# Patient Record
Sex: MALE | Race: WHITE | NOT HISPANIC OR LATINO | Employment: OTHER | URBAN - METROPOLITAN AREA
[De-identification: names, ages, dates, MRNs, and addresses within clinical notes are randomized per-mention and may not be internally consistent; named-entity substitution may affect disease eponyms.]

---

## 2018-05-05 LAB
ALBUMIN SERPL BCP-MCNC: 4.1 G/DL (ref 3.5–5.7)
ALP SERPL-CCNC: 92 IU/L (ref 55–165)
ALT SERPL W P-5'-P-CCNC: 16 IU/L (ref 7–29)
ANION GAP SERPL CALCULATED.3IONS-SCNC: 13.9 MM/L
APTT PPP: 31.5 SEC (ref 24.4–37.6)
AST SERPL W P-5'-P-CCNC: 13 U/L (ref 8–27)
BASOPHILS # BLD AUTO: 0.1 X3/UL (ref 0–0.3)
BASOPHILS # BLD AUTO: 0.7 % (ref 0–2)
BILIRUB SERPL-MCNC: 0.4 MG/DL (ref 0.3–1)
BNP SERPL-MCNC: 105 PG/ML (ref 1–100)
BUN SERPL-MCNC: 12 MG/DL (ref 7–25)
CALCIUM SERPL-MCNC: 8.6 MG/DL (ref 8.6–10.5)
CHLORIDE SERPL-SCNC: 99 MM/L (ref 98–107)
CO2 SERPL-SCNC: 24 MM/L (ref 21–31)
CREAT SERPL-MCNC: 0.82 MG/DL (ref 0.7–1.3)
DEPRECATED RDW RBC AUTO: 13.2 % (ref 11.5–14.5)
EGFR (HISTORICAL): > 60 GFR
EGFR AFRICAN AMERICAN (HISTORICAL): > 60 GFR
EOSINOPHIL # BLD AUTO: 0.7 X3/UL (ref 0–0.5)
EOSINOPHIL NFR BLD AUTO: 8.2 % (ref 0–5)
GLUCOSE (HISTORICAL): 231 MG/DL (ref 65–99)
HCT VFR BLD AUTO: 40.3 % (ref 42–52)
HGB BLD-MCNC: 14.2 G/DL (ref 14–18)
INR PPP: 0.94 (ref 0.9–1.5)
LDL CHOLESTEROL DIRECT (HISTORICAL): 110.7 MG/DL
LDL CHOLESTEROL DIRECT (HISTORICAL): 110.7 MG/DL
LYMPHOCYTES # BLD AUTO: 1.9 X3/UL (ref 1.2–4.2)
LYMPHOCYTES NFR BLD AUTO: 21.1 % (ref 20.5–51.1)
MCH RBC QN AUTO: 31.1 PG (ref 26–34)
MCHC RBC AUTO-ENTMCNC: 35.1 G/DL (ref 31–36)
MCV RBC AUTO: 88.5 FL (ref 81–99)
MONOCYTES # BLD AUTO: 0.6 X3/UL (ref 0–1)
MONOCYTES NFR BLD AUTO: 7.1 % (ref 1.7–12)
NEUTROPHILS # BLD AUTO: 5.6 X3/UL (ref 1.4–6.5)
NEUTS SEG NFR BLD AUTO: 62.9 % (ref 42.2–75.2)
OSMOLALITY, SERUM (HISTORICAL): 273 MOSM (ref 262–291)
PLATELET # BLD AUTO: 201 X3/UL (ref 130–400)
PMV BLD AUTO: 9.4 FL (ref 8.6–11.7)
POTASSIUM SERPL-SCNC: 3.9 MM/L (ref 3.5–5.5)
PROTHROMBIN TIME (HISTORICAL): 10.9 SEC (ref 10.1–12.9)
RBC # BLD AUTO: 4.56 X6/UL (ref 4.3–5.9)
SODIUM SERPL-SCNC: 133 MM/L (ref 134–143)
TOTAL PROTEIN (HISTORICAL): 6.6 G/DL (ref 6.4–8.9)
WBC # BLD AUTO: 8.9 X3/UL (ref 4.8–10.8)

## 2018-05-28 LAB
ALBUMIN SERPL BCP-MCNC: 4.1 G/DL (ref 3.5–5.7)
ALP SERPL-CCNC: 93 IU/L (ref 55–165)
ALT SERPL W P-5'-P-CCNC: 13 IU/L (ref 7–29)
ANION GAP SERPL CALCULATED.3IONS-SCNC: 14.2 MM/L
APTT PPP: 33.7 SEC (ref 24.4–37.6)
AST SERPL W P-5'-P-CCNC: 10 U/L (ref 8–27)
BASOPHILS # BLD AUTO: 0.1 X3/UL (ref 0–0.3)
BASOPHILS # BLD AUTO: 1.2 % (ref 0–2)
BILIRUB SERPL-MCNC: 0.5 MG/DL (ref 0.3–1)
BNP SERPL-MCNC: 170 PG/ML (ref 1–100)
BUN SERPL-MCNC: 12 MG/DL (ref 7–25)
CALCIUM SERPL-MCNC: 8.9 MG/DL (ref 8.6–10.5)
CHLORIDE SERPL-SCNC: 98 MM/L (ref 98–107)
CO2 SERPL-SCNC: 24 MM/L (ref 21–31)
CREAT SERPL-MCNC: 0.83 MG/DL (ref 0.7–1.3)
DEPRECATED RDW RBC AUTO: 13.2 % (ref 11.5–14.5)
EGFR (HISTORICAL): > 60 GFR
EGFR AFRICAN AMERICAN (HISTORICAL): > 60 GFR
EOSINOPHIL # BLD AUTO: 0.8 X3/UL (ref 0–0.5)
EOSINOPHIL NFR BLD AUTO: 8.7 % (ref 0–5)
GLUCOSE (HISTORICAL): 369 MG/DL (ref 65–99)
HCT VFR BLD AUTO: 40.2 % (ref 42–52)
HGB BLD-MCNC: 13.9 G/DL (ref 14–18)
INR PPP: 1.03 (ref 0.9–1.5)
LDL CHOLESTEROL DIRECT (HISTORICAL): 110.4 MG/DL
LYMPHOCYTES # BLD AUTO: 1.6 X3/UL (ref 1.2–4.2)
LYMPHOCYTES NFR BLD AUTO: 18.8 % (ref 20.5–51.1)
MCH RBC QN AUTO: 30.5 PG (ref 26–34)
MCHC RBC AUTO-ENTMCNC: 34.5 G/DL (ref 31–36)
MCV RBC AUTO: 88.6 FL (ref 81–99)
MONOCYTES # BLD AUTO: 0.4 X3/UL (ref 0–1)
MONOCYTES NFR BLD AUTO: 5 % (ref 1.7–12)
NEUTROPHILS # BLD AUTO: 5.8 X3/UL (ref 1.4–6.5)
NEUTS SEG NFR BLD AUTO: 66.3 % (ref 42.2–75.2)
OSMOLALITY, SERUM (HISTORICAL): 279 MOSM (ref 262–291)
PLATELET # BLD AUTO: 252 X3/UL (ref 130–400)
PMV BLD AUTO: 9.1 FL (ref 8.6–11.7)
POTASSIUM SERPL-SCNC: 4.2 MM/L (ref 3.5–5.5)
PROTHROMBIN TIME (HISTORICAL): 12 SEC (ref 10.1–12.9)
RBC # BLD AUTO: 4.54 X6/UL (ref 4.3–5.9)
SODIUM SERPL-SCNC: 132 MM/L (ref 134–143)
TOTAL PROTEIN (HISTORICAL): 6.3 G/DL (ref 6.4–8.9)
WBC # BLD AUTO: 8.8 X3/UL (ref 4.8–10.8)

## 2018-06-24 ENCOUNTER — HOSPITAL ENCOUNTER (EMERGENCY)
Facility: HOSPITAL | Age: 73
Discharge: HOME | End: 2018-06-24
Attending: EMERGENCY MEDICINE
Payer: MEDICARE

## 2018-06-24 ENCOUNTER — APPOINTMENT (EMERGENCY)
Dept: RADIOLOGY | Facility: HOSPITAL | Age: 73
End: 2018-06-24
Payer: MEDICARE

## 2018-06-24 VITALS
RESPIRATION RATE: 18 BRPM | WEIGHT: 190 LBS | BODY MASS INDEX: 27.2 KG/M2 | SYSTOLIC BLOOD PRESSURE: 167 MMHG | HEART RATE: 80 BPM | TEMPERATURE: 98.2 F | HEIGHT: 70 IN | DIASTOLIC BLOOD PRESSURE: 83 MMHG | OXYGEN SATURATION: 92 %

## 2018-06-24 DIAGNOSIS — J44.1 COPD EXACERBATION (HCC): Primary | ICD-10-CM

## 2018-06-24 LAB
ALBUMIN SERPL BCP-MCNC: 4.1 G/DL (ref 3.5–5.7)
ALP SERPL-CCNC: 91 U/L (ref 55–165)
ALT SERPL W P-5'-P-CCNC: 14 U/L (ref 7–52)
ANION GAP SERPL CALCULATED.3IONS-SCNC: 9 MMOL/L (ref 4–13)
AST SERPL W P-5'-P-CCNC: 11 U/L (ref 13–39)
BASOPHILS # BLD AUTO: 0.1 THOUSANDS/ΜL (ref 0–0.1)
BASOPHILS NFR BLD AUTO: 1 % (ref 0–2)
BILIRUB SERPL-MCNC: 0.6 MG/DL (ref 0.2–1)
BNP SERPL-MCNC: 170 PG/ML (ref 1–100)
BUN SERPL-MCNC: 11 MG/DL (ref 7–25)
CALCIUM SERPL-MCNC: 8.9 MG/DL (ref 8.6–10.5)
CHLORIDE SERPL-SCNC: 100 MMOL/L (ref 98–107)
CO2 SERPL-SCNC: 27 MMOL/L (ref 21–31)
CREAT SERPL-MCNC: 0.85 MG/DL (ref 0.7–1.3)
EOSINOPHIL # BLD AUTO: 0.7 THOUSAND/ΜL (ref 0–0.61)
EOSINOPHIL NFR BLD AUTO: 10 % (ref 0–5)
ERYTHROCYTE [DISTWIDTH] IN BLOOD BY AUTOMATED COUNT: 13.2 % (ref 11.5–14.5)
GFR SERPL CREATININE-BSD FRML MDRD: 87 ML/MIN/1.73SQ M
GLUCOSE SERPL-MCNC: 238 MG/DL (ref 65–99)
HCT VFR BLD AUTO: 39.8 % (ref 36.5–49.3)
HGB BLD-MCNC: 14.2 G/DL (ref 14–18)
INR PPP: 1.01 (ref 0.9–1.5)
LYMPHOCYTES # BLD AUTO: 2.2 THOUSANDS/ΜL (ref 0.6–4.47)
LYMPHOCYTES NFR BLD AUTO: 29 % (ref 21–51)
MCH RBC QN AUTO: 31.6 PG (ref 26–34)
MCHC RBC AUTO-ENTMCNC: 35.7 G/DL (ref 31–37)
MCV RBC AUTO: 89 FL (ref 81–99)
MONOCYTES # BLD AUTO: 0.5 THOUSAND/ΜL (ref 0.17–1.22)
MONOCYTES NFR BLD AUTO: 6 % (ref 2–12)
NEUTROPHILS # BLD AUTO: 4.1 THOUSANDS/ΜL (ref 1.4–6.5)
NEUTS SEG NFR BLD AUTO: 54 % (ref 42–75)
NRBC BLD AUTO-RTO: 0 /100 WBCS
PLATELET # BLD AUTO: 200 THOUSANDS/UL (ref 149–390)
PMV BLD AUTO: 8.8 FL (ref 8.6–11.7)
POTASSIUM SERPL-SCNC: 3.9 MMOL/L (ref 3.5–5.5)
PROT SERPL-MCNC: 6.3 G/DL (ref 6.4–8.9)
PROTHROMBIN TIME: 11.7 SECONDS (ref 10.1–12.9)
RBC # BLD AUTO: 4.49 MILLION/UL (ref 4.3–5.9)
SODIUM SERPL-SCNC: 136 MMOL/L (ref 134–143)
TROPONIN I SERPL-MCNC: <0.03 NG/ML
WBC # BLD AUTO: 7.6 THOUSAND/UL (ref 4.8–10.8)

## 2018-06-24 PROCEDURE — 84484 ASSAY OF TROPONIN QUANT: CPT | Performed by: EMERGENCY MEDICINE

## 2018-06-24 PROCEDURE — 85610 PROTHROMBIN TIME: CPT | Performed by: EMERGENCY MEDICINE

## 2018-06-24 PROCEDURE — 96374 THER/PROPH/DIAG INJ IV PUSH: CPT

## 2018-06-24 PROCEDURE — 71045 X-RAY EXAM CHEST 1 VIEW: CPT

## 2018-06-24 PROCEDURE — 85025 COMPLETE CBC W/AUTO DIFF WBC: CPT | Performed by: EMERGENCY MEDICINE

## 2018-06-24 PROCEDURE — 83880 ASSAY OF NATRIURETIC PEPTIDE: CPT | Performed by: EMERGENCY MEDICINE

## 2018-06-24 PROCEDURE — 36415 COLL VENOUS BLD VENIPUNCTURE: CPT | Performed by: EMERGENCY MEDICINE

## 2018-06-24 PROCEDURE — 99285 EMERGENCY DEPT VISIT HI MDM: CPT

## 2018-06-24 PROCEDURE — 80053 COMPREHEN METABOLIC PANEL: CPT | Performed by: EMERGENCY MEDICINE

## 2018-06-24 PROCEDURE — 94640 AIRWAY INHALATION TREATMENT: CPT

## 2018-06-24 PROCEDURE — 94760 N-INVAS EAR/PLS OXIMETRY 1: CPT

## 2018-06-24 RX ORDER — ALBUTEROL SULFATE 2.5 MG/3ML
SOLUTION RESPIRATORY (INHALATION)
Status: DISCONTINUED
Start: 2018-06-24 | End: 2018-06-24 | Stop reason: HOSPADM

## 2018-06-24 RX ORDER — IPRATROPIUM BROMIDE AND ALBUTEROL SULFATE 2.5; .5 MG/3ML; MG/3ML
SOLUTION RESPIRATORY (INHALATION)
Status: DISCONTINUED
Start: 2018-06-24 | End: 2018-06-24 | Stop reason: HOSPADM

## 2018-06-24 RX ORDER — LEVOFLOXACIN 500 MG/1
500 TABLET, FILM COATED ORAL DAILY
Qty: 7 TABLET | Refills: 0 | Status: SHIPPED | OUTPATIENT
Start: 2018-06-24 | End: 2018-07-01

## 2018-06-24 RX ORDER — ALBUTEROL SULFATE 2.5 MG/3ML
2.5 SOLUTION RESPIRATORY (INHALATION) ONCE
Status: COMPLETED | OUTPATIENT
Start: 2018-06-24 | End: 2018-06-24

## 2018-06-24 RX ORDER — ASPIRIN 81 MG/1
81 TABLET ORAL EVERY OTHER DAY
Status: ON HOLD | COMMUNITY
End: 2020-09-03 | Stop reason: SDUPTHER

## 2018-06-24 RX ORDER — METHYLPREDNISOLONE SODIUM SUCCINATE 125 MG/2ML
INJECTION, POWDER, LYOPHILIZED, FOR SOLUTION INTRAMUSCULAR; INTRAVENOUS
Status: DISCONTINUED
Start: 2018-06-24 | End: 2018-06-24 | Stop reason: HOSPADM

## 2018-06-24 RX ORDER — ALBUTEROL SULFATE 2.5 MG/3ML
2.5 SOLUTION RESPIRATORY (INHALATION) EVERY 4 HOURS PRN
Qty: 75 ML | Refills: 0 | Status: ON HOLD | OUTPATIENT
Start: 2018-06-24 | End: 2020-09-03 | Stop reason: SDUPTHER

## 2018-06-24 RX ORDER — PREDNISONE 20 MG/1
60 TABLET ORAL DAILY
Qty: 15 TABLET | Refills: 0 | Status: SHIPPED | OUTPATIENT
Start: 2018-06-24 | End: 2018-07-01

## 2018-06-24 RX ORDER — PRIMIDONE 50 MG/1
50 TABLET ORAL EVERY 8 HOURS PRN
Status: ON HOLD | COMMUNITY
Start: 2017-03-24 | End: 2019-07-18 | Stop reason: SDUPTHER

## 2018-06-24 RX ORDER — ATORVASTATIN CALCIUM 80 MG/1
80 TABLET, FILM COATED ORAL DAILY
COMMUNITY
End: 2019-08-20 | Stop reason: SDUPTHER

## 2018-06-24 RX ORDER — METHYLPREDNISOLONE SODIUM SUCCINATE 125 MG/2ML
125 INJECTION, POWDER, LYOPHILIZED, FOR SOLUTION INTRAMUSCULAR; INTRAVENOUS ONCE
Status: COMPLETED | OUTPATIENT
Start: 2018-06-24 | End: 2018-06-24

## 2018-06-24 RX ORDER — IPRATROPIUM BROMIDE AND ALBUTEROL SULFATE 2.5; .5 MG/3ML; MG/3ML
3 SOLUTION RESPIRATORY (INHALATION) ONCE
Status: COMPLETED | OUTPATIENT
Start: 2018-06-24 | End: 2018-06-24

## 2018-06-24 RX ORDER — ALBUTEROL SULFATE 90 UG/1
2 AEROSOL, METERED RESPIRATORY (INHALATION) EVERY 6 HOURS PRN
Status: ON HOLD | COMMUNITY
End: 2020-09-03 | Stop reason: SDUPTHER

## 2018-06-24 RX ORDER — METOPROLOL TARTRATE 50 MG/1
25 TABLET, FILM COATED ORAL 2 TIMES DAILY
COMMUNITY
Start: 2017-03-16 | End: 2019-12-29 | Stop reason: HOSPADM

## 2018-06-24 RX ADMIN — ALBUTEROL SULFATE 2.5 MG: 2.5 SOLUTION RESPIRATORY (INHALATION) at 08:26

## 2018-06-24 RX ADMIN — IPRATROPIUM BROMIDE AND ALBUTEROL SULFATE 3 ML: .5; 3 SOLUTION RESPIRATORY (INHALATION) at 06:29

## 2018-06-24 RX ADMIN — METHYLPREDNISOLONE SODIUM SUCCINATE 125 MG: 125 INJECTION, POWDER, FOR SOLUTION INTRAMUSCULAR; INTRAVENOUS at 06:36

## 2018-06-24 NOTE — DISCHARGE INSTRUCTIONS
COPD (Chronic Obstructive Pulmonary Disease)   WHAT YOU NEED TO KNOW:   Chronic obstructive pulmonary disease (COPD) is a lung disease that makes it hard for you to breathe  It is usually a result of lung damage caused by years of irritation and inflammation in your lungs  DISCHARGE INSTRUCTIONS:   Call 911 if:   · You feel lightheaded, short of breath, and have chest pain  Return to the emergency department if:   · You are confused, dizzy, or feel faint  · Your arm or leg feels warm, tender, and painful  It may look swollen and red  · You cough up blood  Contact your healthcare provider if:   · You have more shortness of breath than usual      · You need more medicine than usual to control your symptoms  · You are coughing or wheezing more than usual      · You are coughing up more mucus, or it is a different color or has a different odor  · You gain more than 3 pounds in a week  · You have a fever, a runny or stuffy nose, and a sore throat, or other cold or flu symptoms  · Your skin, lips, or nails start to turn blue  · You have swelling in your legs or ankles  · You are very tired or weak for more than a day  · You notice changes in your mood, or changes in your ability to think or concentrate  · You have questions or concerns about your condition or care  Medicines:   · Medicines  may be used to open your airways, decrease swelling and inflammation in your lungs, or treat an infection  You may need 2 or more medicines  A short-acting medicine relieves symptoms quickly  Long-acting medicines will control or prevent symptoms  Ask for more information about the medicines you are given and how to use them safely  · Take your medicine as directed  Contact your healthcare provider if you think your medicine is not helping or if you have side effects  Tell him or her if you are allergic to any medicine   Keep a list of the medicines, vitamins, and herbs you take  Include the amounts, and when and why you take them  Bring the list or the pill bottles to follow-up visits  Carry your medicine list with you in case of an emergency  Help make breathing easier:   · Use pursed-lip breathing any time you feel short of breath  Take a deep breath in through your nose  Slowly breathe out through your mouth with your lips pursed for twice as long as you inhaled  You can also practice this breathing pattern while you bend, lift, climb stairs, or exercise  It slows down your breathing and helps move more air in and out of your lungs  · Do not smoke, and avoid others who smoke  Nicotine and other substances can cause lung irritation or damage and make it harder for you to breathe  Do not use e-cigarettes or smokeless tobacco  They still contain nicotine  Ask your healthcare provider for information if you currently smoke and need help to quit  For support and more information:  ¨ [place]  Phone: 0- 909 - [phone]  Web Address: [employer]      · Be aware of and avoid anything that makes your symptoms worse  Stay out of high altitudes and places with high humidity  Stay inside, or cover your mouth and nose with a scarf when you are outside during cold weather  Stay inside on days when air pollution or pollen counts are high  Do not use aerosol sprays such as deodorant, bug spray, and hair spray  Manage COPD and help prevent exacerbations:  COPD is a serious condition that gets worse over time  A COPD exacerbation means your symptoms suddenly get worse  It is important to prevent exacerbations  An exacerbation can cause more lung damage  COPD cannot be cured, but you can take action to feel better and prevent COPD exacerbations:  · Protect yourself from germs  Germs can get into your lungs and cause an infection  An infection in your lungs can create more mucus and make it harder to breathe   An infection can also create swelling in your airways and prevent air from getting in  You can decrease your risk for infection by doing the following:     [facility] your hands often with soap and water  Carry germ-killing gel with you  You can use the gel to clean your hands when soap and water are not available  ¨ Do not touch your eyes, nose, or mouth unless you have washed your hands first      ¨ Always cover your mouth when you cough  Cough into a tissue or your shirtsleeve so you do not spread germs from your hands  ¨ Try to avoid people who have a cold or the flu  If you are sick, stay away from others as much as possible  · Drink more liquids  This will help to keep your air passages moist and help you cough up mucus  Ask how much liquid to drink each day and which liquids are best for you  · Exercise daily  Exercise for at least 20 minutes each day to help increase your energy and decrease shortness of breath  Walking or riding a bike are good ways to exercise  Talk to your healthcare provider about the best exercise plan for you  · Ask about vaccines  Your healthcare provider may recommend that you get regular flu and pneumonia vaccines  Pneumonia can become life-threatening for a person who has COPD  Ask about other vaccines you may need  Ask your healthcare provider about the flu and pneumonia vaccines  All adults should get the flu (influenza) vaccine every year as soon as it becomes available  The pneumonia vaccine is given to adults aged 72 or older to prevent pneumococcal disease, such as pneumonia  Adults aged 23 to 59 years who are at high risk for pneumococcal disease also should get the pneumococcal vaccine  It may need to be repeated 1 or 5 years later  Pulmonary rehabilitation:  Your healthcare provider may recommend a program to help you manage your symptoms and improve your quality of life  It may include nutritional counseling and exercise to strengthen your lungs     Make decisions about your choices for future treatment:  Ask for information about advanced medical directives and living drew  These documents help you decide and write down your choices for treatment and end-of-life care  It is best to complete them when you feel well and can think clearly about your wishes  The information can then be kept for future use if you are in the hospital or become very ill  Follow up with your healthcare provider as directed: You may need more tests  Your healthcare provider may refer you to a pulmonary (lung) specialist  Write down your questions so you remember to ask them during your visits  © 2017 [facility] Information is for End User's use only and may not be sold, redistributed or otherwise used for commercial purposes  All illustrations and images included in CareNotes® are the copyrighted property of A D A M [employer] Inc  or Basilio Cheng  The above information is an  only  It is not intended as medical advice for individual conditions or treatments  Talk to your doctor, nurse or pharmacist before following any medical regimen to see if it is safe and effective for you

## 2018-06-24 NOTE — ED CARE HANDOFF
Emergency Department Sign Out Note        Sign out and transfer of care from Dr Tee Conklin  See Separate Emergency Department note  The patient, Oly Sweeney, was evaluated by the previous provider for COPD exacerbation  Workup Completed: The patient is given Solu-Medrol and breathing treatment  The patient is examined at 7:00 a m  states he is feeling much better  Patient still having some shortness of breath and wheezing all started on albuterol treatment  ED Course / Workup  Chest x-ray:    IMPRESSION:  No acute pulmonary disease  Results Reviewed     Procedure Component Value Units Date/Time    B-Type Natriuretic Peptide [facility] ONLY) [[id number]]  (Abnormal) Collected:  06/24/18 0631    Lab Status:  Final result Specimen:  Blood from Arm, Right Updated:  06/24/18 0807      (H) pg/mL     Troponin I [[id number]]  (Normal) Collected:  06/24/18 0631    Lab Status:  Final result Specimen:  Blood from Arm, Right Updated:  06/24/18 0721     Troponin I <0 03 ng/mL     Comprehensive metabolic panel [[id number]]  (Abnormal) Collected:  06/24/18 0631    Lab Status:  Final result Specimen:  Blood from Arm, Right Updated:  06/24/18 0720     Sodium 136 mmol/L      Potassium 3 9 mmol/L      Chloride 100 mmol/L      CO2 27 mmol/L      Anion Gap 9 mmol/L      BUN 11 mg/dL      Creatinine 0 85 mg/dL      Glucose 238 (H) mg/dL      Calcium 8 9 mg/dL      AST 11 (L) U/L      ALT 14 U/L      Alkaline Phosphatase 91 U/L      Total Protein 6 3 (L) g/dL      Albumin 4 1 g/dL      Total Bilirubin 0 60 mg/dL      eGFR 87 ml/min/1 73sq m     Narrative:         National Kidney Disease Education Program recommendations are as follows:  GFR calculation is accurate only with a steady state creatinine  Chronic Kidney disease less than 60 ml/min/1 73 sq  meters  Kidney failure less than 15 ml/min/1 73 sq  meters      Noreen Jeffries [[id number]]  (Normal) Collected:  06/24/18 0631    Lab Status:  Final result Specimen:  Blood from Arm, Right Updated:  06/24/18 0656     Protime 11 7 seconds      INR 1 01    CBC and differential [[id number]]  (Abnormal) Collected:  06/24/18 0631    Lab Status:  Final result Specimen:  Blood from Arm, Right Updated:  06/24/18 0651     WBC 7 60 Thousand/uL      RBC 4 49 Million/uL      Hemoglobin 14 2 g/dL      Hematocrit 39 8 %      MCV 89 fL      MCH 31 6 pg      MCHC 35 7 g/dL      RDW 13 2 %      MPV 8 8 fL      Platelets 210 Thousands/uL      nRBC 0 /100 WBCs      Neutrophils Relative 54 %      Lymphocytes Relative 29 %      Monocytes Relative 6 %      Eosinophils Relative 10 (H) %      Basophils Relative 1 %      Neutrophils Absolute 4 10 Thousands/µL      Lymphocytes Absolute 2 20 Thousands/µL      Monocytes Absolute 0 50 Thousand/µL      Eosinophils Absolute 0 70 (H) Thousand/µL      Basophils Absolute 0 10 Thousands/µL         X-ray chest 1 view portable   Final Result           ED course  Patient is stable lungs are clear to auscultation  We will discharge home  Does not feel labs are within normal limits patient advised to follow up with his PCP and continue taking his medication  Return to the ED if symptoms are worse  Procedures  MDM  CritCare Time      Disposition  Final diagnoses:   COPD exacerbation ([facility] [address] )     Time reflects when diagnosis was documented in both MDM as applicable and the Disposition within this note     Time User Action Codes Description Comment    6/24/2018  6:47 AM Ankit Rosario Add [J44 1] COPD exacerbation [facility])       ED Disposition     ED Disposition Condition Comment    Discharge  Leslie Hopson discharge to home/self care      Condition at discharge: Stable        Follow-up Information     Follow up With Specialties Details Why Contact Info    PCP  In 2 days If symptoms worsen         Patient's Medications   Discharge Prescriptions    LEVOFLOXACIN (LEVAQUIN) 500 MG TABLET    Take 1 tablet (500 mg total) by mouth daily for 7 days Start Date: 6/24/2018 End Date: 7/1/2018       Order Dose: 500 mg       Quantity: 7 tablet    Refills: 0    PREDNISONE 20 MG TABLET    Take 3 tablets (60 mg total) by mouth daily for 7 days       Start Date: 6/24/2018 End Date: 7/1/2018       Order Dose: 60 mg       Quantity: 15 tablet    Refills: 0       Outpatient Discharge Orders  Nebulizer Supplies            ED Provider  Electronically Signed by     Betsy Lugo MD  06/24/18 0900

## 2018-06-24 NOTE — ED PROVIDER NOTES
History  Chief Complaint   Patient presents with    Shortness of Breath     Pt  With hx of COPD presents to ED with shortness of breath cough and mucous states "I get bronchitis each month and they give me neb steroids and abx and I get better then it come back  Symptoms started yesterday  History provided by:  Patient  Shortness of Breath   Severity:  Moderate  Onset quality:  Gradual  Duration:  1 day  Progression:  Worsening  Chronicity:  Recurrent  Associated symptoms: cough, sputum production and wheezing    Associated symptoms: no abdominal pain, no chest pain, no claudication, no ear pain, no fever, no headaches, no hemoptysis, no rash, no sore throat and no vomiting        Prior to Admission Medications   Prescriptions Last Dose Informant Patient Reported? Taking? albuterol (PROVENTIL HFA,VENTOLIN HFA) 90 mcg/act inhaler   Yes Yes   Sig: Inhale 2 puffs every 6 (six) hours as needed for wheezing or shortness of breath   aspirin (ECOTRIN LOW STRENGTH) 81 mg EC tablet   Yes Yes   Sig: Take 81 mg by mouth daily   atorvastatin (LIPITOR) 80 mg tablet   Yes Yes   Sig: Take 80 mg by mouth daily   metoprolol tartrate (LOPRESSOR) 50 mg tablet   Yes Yes   Sig: Take 25 mg by mouth 2 (two) times a day   primidone (MYSOLINE) 50 mg tablet   Yes Yes   Sig: Take 50 mg by mouth every 8 (eight) hours as needed      Facility-Administered Medications: None       Past Medical History:   Diagnosis Date    Cardiac disease     Diabetes mellitus ([facility] [address] )     Hyperlipidemia     Hypertension     [facility]        Past Surgical History:   Procedure Laterality Date    CORONARY ARTERY BYPASS GRAFT         History reviewed  No pertinent family history  I have reviewed and agree with the history as documented      Social History   Substance Use Topics    Smoking status: Former Smoker     Quit date: 6/24/2017    Smokeless tobacco: Never Used    Alcohol use Yes      Comment: on occasion        Review of Systems   Constitutional: Negative for activity change, appetite change, chills, fatigue and fever  HENT: Negative for congestion, ear pain, rhinorrhea and sore throat  Eyes: Negative for discharge, redness and visual disturbance  Respiratory: Positive for cough, sputum production, shortness of breath and wheezing  Negative for hemoptysis and chest tightness  Cardiovascular: Negative for chest pain, palpitations and claudication  Gastrointestinal: Negative for abdominal pain, constipation, diarrhea, nausea and vomiting  Endocrine: Negative for polydipsia and polyuria  Genitourinary: Negative for difficulty urinating, dysuria, frequency, hematuria and urgency  Musculoskeletal: Negative for arthralgias and myalgias  Skin: Negative for color change, pallor and rash  Neurological: Negative for dizziness, weakness, light-headedness, numbness and headaches  Hematological: Negative for adenopathy  Does not bruise/bleed easily  All other systems reviewed and are negative  Physical Exam  Physical Exam   Constitutional: He is oriented to person, place, and time  He appears well-developed and well-nourished  HENT:   Head: Normocephalic and atraumatic  Right Ear: External ear normal    Left Ear: External ear normal    Nose: Nose normal    Mouth/Throat: Oropharynx is clear and moist    Eyes: Conjunctivae and EOM are normal  Pupils are equal, round, and reactive to light  Neck: Normal range of motion  Neck supple  Cardiovascular: Normal rate, regular rhythm, normal heart sounds and intact distal pulses  Pulmonary/Chest: Effort normal  No respiratory distress  He has wheezes  He has no rales  He exhibits no tenderness  Abdominal: Soft  Bowel sounds are normal  He exhibits no distension  There is no tenderness  There is no guarding  Musculoskeletal: Normal range of motion  He exhibits no edema  Neurological: He is alert and oriented to person, place, and time   No cranial nerve deficit or sensory deficit  Skin: Skin is warm and dry  Psychiatric: He has a normal mood and affect  Nursing note and vitals reviewed  Vital Signs  ED Triage Vitals [06/24/18 0604]   Temperature Pulse Respirations Blood Pressure SpO2   98 2 °F (36 8 °C) 96 22 (!) 202/95 94 %      Temp Source Heart Rate Source Patient Position - Orthostatic VS BP Location FiO2 (%)   Temporal Monitor Sitting Left arm --      Pain Score       No Pain           Vitals:    06/24/18 0604   BP: (!) 202/95   Pulse: 96   Patient Position - Orthostatic VS: Sitting       Visual Acuity      ED Medications  Medications   ipratropium-albuterol (DUO-NEB) 0 5-2 5 mg/3 mL inhalation solution **AcuDose Override Pull** (not administered)   methylPREDNISolone sodium succinate (Solu-MEDROL) 125 MG injection **AcuDose Override Pull** (not administered)   ipratropium-albuterol (DUO-NEB) 0 5-2 5 mg/3 mL inhalation solution 3 mL (3 mL Nebulization Given 6/24/18 0629)   methylPREDNISolone sodium succinate (Solu-MEDROL) injection 125 mg (125 mg Intravenous Given 6/24/18 0636)       Diagnostic Studies  Results Reviewed     Procedure Component Value Units Date/Time    Troponin I [[id number]] Collected:  06/24/18 0631    Lab Status: In process Specimen:  Blood from Arm, Right Updated:  06/24/18 0636    CBC and differential [[id number]] Collected:  06/24/18 0631    Lab Status: In process Specimen:  Blood from Arm, Right Updated:  06/24/18 0636    Protime-INR [[id number]] Collected:  06/24/18 0631    Lab Status: In process Specimen:  Blood from Arm, Right Updated:  06/24/18 0636    B-Type Natriuretic Peptide [facility] ONLY) [[id number]] Collected:  06/24/18 0631    Lab Status: In process Specimen:  Blood from Arm, Right Updated:  06/24/18 0636    Comprehensive metabolic panel [[id number]] Collected:  06/24/18 0631    Lab Status:   In process Specimen:  Blood from Arm, Right Updated:  06/24/18 0636                 X-ray chest 1 view portable    (Results Pending)              Procedures  ECG 12 Lead Documentation  Date/Time: 6/24/2018 6:26 AM  Performed by: Joesph Gaucher  Authorized by: Joesph Gaucher     Indications / Diagnosis:  Sob  ECG reviewed by me, the ED Provider: yes    Patient location:  ED  Rate:     ECG rate:  93    ECG rate assessment: normal    Rhythm:     Rhythm: sinus rhythm    Ectopy:     Ectopy: PVCs    QRS:     QRS axis:  Normal    QRS intervals:  Normal  T waves:     T waves: flattening and inverted             Phone Contacts  ED Phone Contact    ED Course     0700 - Case discussed and care transferred to Dr Burgess Briceño pending labs and imaging and further evaluation and treatment and disposition  MDM  Number of Diagnoses or Management Options     Amount and/or Complexity of Data Reviewed  Clinical lab tests: ordered  Tests in the radiology section of CPT®: ordered  Tests in the medicine section of CPT®: ordered  Decide to obtain previous medical records or to obtain history from someone other than the patient: yes  Review and summarize past medical records: yes    Risk of Complications, Morbidity, and/or Mortality  Presenting problems: moderate    Patient Progress  Patient progress: stable    CritCare Time    Disposition  Final diagnoses:   COPD exacerbation ([address] )     Time reflects when diagnosis was documented in both MDM as applicable and the Disposition within this note     Time User Action Codes Description Comment    6/24/2018  6:47 AM Jerardo Ríos Add [J44 1] COPD exacerbation [facility])       ED Disposition     None      Follow-up Information    None         Patient's Medications   Discharge Prescriptions    No medications on file     No discharge procedures on file      ED Provider  Electronically Signed by           Denise Marques DO  06/24/18 0189

## 2018-06-24 NOTE — ED PROVIDER NOTES
History  Chief Complaint   Patient presents with    Shortness of Breath     HPI    Prior to Admission Medications   Prescriptions Last Dose Informant Patient Reported? Taking? albuterol (PROVENTIL HFA,VENTOLIN HFA) 90 mcg/act inhaler   Yes Yes   Sig: Inhale 2 puffs every 6 (six) hours as needed for wheezing or shortness of breath   aspirin (ECOTRIN LOW STRENGTH) 81 mg EC tablet   Yes Yes   Sig: Take 81 mg by mouth daily   atorvastatin (LIPITOR) 80 mg tablet   Yes Yes   Sig: Take 80 mg by mouth daily   metoprolol tartrate (LOPRESSOR) 50 mg tablet   Yes Yes   Sig: Take 25 mg by mouth 2 (two) times a day   primidone (MYSOLINE) 50 mg tablet   Yes Yes   Sig: Take 50 mg by mouth every 8 (eight) hours as needed      Facility-Administered Medications: None       Past Medical History:   Diagnosis Date    Cardiac disease     Diabetes mellitus ([facility] )     Hyperlipidemia     Hypertension     Prostate cancer ([facility] )        Past Surgical History:   Procedure Laterality Date    CORONARY ARTERY BYPASS GRAFT         History reviewed  No pertinent family history  I have reviewed and agree with the history as documented      Social History   Substance Use Topics    Smoking status: Former Smoker     Quit date: 6/24/2017    Smokeless tobacco: Never Used    Alcohol use Yes      Comment: on occasion        Review of Systems    Physical Exam  Physical Exam    Vital Signs  ED Triage Vitals [06/24/18 0604]   Temperature Pulse Respirations Blood Pressure SpO2   98 2 °F (36 8 °C) 96 22 (!) 202/95 94 %      Temp Source Heart Rate Source Patient Position - Orthostatic VS BP Location FiO2 (%)   Temporal Monitor Sitting Left arm --      Pain Score       No Pain           Vitals:    06/24/18 0730 06/24/18 0800 06/24/18 0830 06/24/18 0900   BP: (!) 180/90 (!) 172/83 168/85 167/83   Pulse: 77 89 80 80   Patient Position - Orthostatic VS: Sitting Sitting Sitting Sitting       Visual Acuity      ED Medications  Medications ipratropium-albuterol (DUO-NEB) 0 5-2 5 mg/3 mL inhalation solution 3 mL (3 mL Nebulization Given 6/24/18 0629)   methylPREDNISolone sodium succinate (Solu-MEDROL) injection 125 mg (125 mg Intravenous Given 6/24/18 0636)   albuterol inhalation solution 2 5 mg (2 5 mg Nebulization Given 6/24/18 0826)       Diagnostic Studies  Results Reviewed     Procedure Component Value Units Date/Time    B-Type Natriuretic Peptide [facility] ONLY) [[id number]]  (Abnormal) Collected:  06/24/18 0631    Lab Status:  Final result Specimen:  Blood from Arm, Right Updated:  06/24/18 0807      (H) pg/mL     Troponin I [[id number]]  (Normal) Collected:  06/24/18 0631    Lab Status:  Final result Specimen:  Blood from Arm, Right Updated:  06/24/18 0721     Troponin I <0 03 ng/mL     Comprehensive metabolic panel [[id number]]  (Abnormal) Collected:  06/24/18 0631    Lab Status:  Final result Specimen:  Blood from Arm, Right Updated:  06/24/18 0720     Sodium 136 mmol/L      Potassium 3 9 mmol/L      Chloride 100 mmol/L      CO2 27 mmol/L      Anion Gap 9 mmol/L      BUN 11 mg/dL      Creatinine 0 85 mg/dL      Glucose 238 (H) mg/dL      Calcium 8 9 mg/dL      AST 11 (L) U/L      ALT 14 U/L      Alkaline Phosphatase 91 U/L      Total Protein 6 3 (L) g/dL      Albumin 4 1 g/dL      Total Bilirubin 0 60 mg/dL      eGFR 87 ml/min/1 73sq m     Narrative:         National Kidney Disease Education Program recommendations are as follows:  GFR calculation is accurate only with a steady state creatinine  Chronic Kidney disease less than 60 ml/min/1 73 sq  meters  Kidney failure less than 15 ml/min/1 73 sq  meters      Jing Finch [[id number]]  (Normal) Collected:  06/24/18 0631    Lab Status:  Final result Specimen:  Blood from Arm, Right Updated:  06/24/18 0656     Protime 11 7 seconds      INR 1 01    CBC and differential [[id number]]  (Abnormal) Collected:  06/24/18 0631    Lab Status:  Final result Specimen:  Blood from Arm, Right Updated: 06/24/18 0651     WBC 7 60 Thousand/uL      RBC 4 49 Million/uL      Hemoglobin 14 2 g/dL      Hematocrit 39 8 %      MCV 89 fL      MCH 31 6 pg      MCHC 35 7 g/dL      RDW 13 2 %      MPV 8 8 fL      Platelets 262 Thousands/uL      nRBC 0 /100 WBCs      Neutrophils Relative 54 %      Lymphocytes Relative 29 %      Monocytes Relative 6 %      Eosinophils Relative 10 (H) %      Basophils Relative 1 %      Neutrophils Absolute 4 10 Thousands/µL      Lymphocytes Absolute 2 20 Thousands/µL      Monocytes Absolute 0 50 Thousand/µL      Eosinophils Absolute 0 70 (H) Thousand/µL      Basophils Absolute 0 10 Thousands/µL                  X-ray chest 1 view portable   Final Result by France Mccall MD (06/24 6568)                 Procedures  Procedures       Phone Contacts  ED Phone Contact    ED Course                               MDM  CritCare Time    Disposition  Final diagnoses:   COPD exacerbation ([facility] )     Time reflects when diagnosis was documented in both MDM as applicable and the Disposition within this note     Time User Action Codes Description Comment    6/24/2018  6:47 AM Nitza James Add [J44 1] COPD exacerbation [facility])       ED Disposition     ED Disposition Condition Comment    Discharge  Mary James discharge to home/self care      Condition at discharge: Stable        Follow-up Information     Follow up With Specialties Details Why Contact Info    PCP  In 2 days If symptoms worsen           Discharge Medication List as of 6/24/2018  9:36 AM      START taking these medications    Details   albuterol (2 5 mg/3 mL) 0 083 % nebulizer solution Take 1 vial (2 5 mg total) by nebulization every 4 (four) hours as needed for wheezing or shortness of breath, Starting Sun 6/24/2018, Print      levofloxacin (LEVAQUIN) 500 mg tablet Take 1 tablet (500 mg total) by mouth daily for 7 days, Starting Sun 6/24/2018, Until Sun 7/1/2018, Print      predniSONE 20 mg tablet Take 3 tablets (60 mg total) by mouth daily for 7 days, Starting Sun 6/24/2018, Until Sun 7/1/2018, Print         CONTINUE these medications which have NOT CHANGED    Details   albuterol (PROVENTIL HFA,VENTOLIN HFA) 90 mcg/act inhaler Inhale 2 puffs every 6 (six) hours as needed for wheezing or shortness of breath, Historical Med      aspirin (ECOTRIN LOW STRENGTH) 81 mg EC tablet Take 81 mg by mouth daily, Historical Med      atorvastatin (LIPITOR) 80 mg tablet Take 80 mg by mouth daily, Historical Med      metoprolol tartrate (LOPRESSOR) 50 mg tablet Take 25 mg by mouth 2 (two) times a day, Starting Thu 3/16/2017, Historical Med      primidone (MYSOLINE) 50 mg tablet Take 50 mg by mouth every 8 (eight) hours as needed, Starting Fri 3/24/2017, Historical Med             Outpatient Discharge Orders  Nebulizer Supplies         ED Provider  Electronically Signed by           Sharon Parra MD  06/24/18 0012

## 2019-02-22 ENCOUNTER — APPOINTMENT (EMERGENCY)
Dept: RADIOLOGY | Facility: HOSPITAL | Age: 74
DRG: 192 | End: 2019-02-22
Payer: MEDICARE

## 2019-02-22 ENCOUNTER — HOSPITAL ENCOUNTER (INPATIENT)
Facility: HOSPITAL | Age: 74
LOS: 2 days | Discharge: HOME | DRG: 192 | End: 2019-02-24
Attending: EMERGENCY MEDICINE | Admitting: INTERNAL MEDICINE
Payer: MEDICARE

## 2019-02-22 DIAGNOSIS — J44.1 COPD WITH ACUTE EXACERBATION (HCC): Primary | ICD-10-CM

## 2019-02-22 PROBLEM — E11.9 TYPE 2 DIABETES MELLITUS (HCC): Status: ACTIVE | Noted: 2017-03-08

## 2019-02-22 PROBLEM — I10 ESSENTIAL HYPERTENSION: Status: ACTIVE | Noted: 2017-03-08

## 2019-02-22 PROBLEM — E78.5 HYPERLIPIDEMIA: Status: ACTIVE | Noted: 2019-02-22

## 2019-02-22 PROBLEM — I25.10 CAD (CORONARY ARTERY DISEASE), NATIVE CORONARY ARTERY: Status: ACTIVE | Noted: 2019-02-22

## 2019-02-22 PROBLEM — R25.1 TREMOR: Chronic | Status: ACTIVE | Noted: 2019-02-22

## 2019-02-22 PROBLEM — N40.0 BPH (BENIGN PROSTATIC HYPERPLASIA): Status: ACTIVE | Noted: 2019-02-22

## 2019-02-22 PROBLEM — Z72.0 TOBACCO ABUSE: Status: ACTIVE | Noted: 2017-03-08

## 2019-02-22 LAB
ALBUMIN SERPL BCP-MCNC: 4.5 G/DL (ref 3.5–5.7)
ALP SERPL-CCNC: 88 U/L (ref 55–165)
ALT SERPL W P-5'-P-CCNC: 10 U/L (ref 7–52)
ANION GAP SERPL CALCULATED.3IONS-SCNC: 9 MMOL/L (ref 4–13)
APTT PPP: 40 SECONDS (ref 26–38)
AST SERPL W P-5'-P-CCNC: 12 U/L (ref 13–39)
BASOPHILS # BLD AUTO: 0.1 THOUSANDS/ΜL (ref 0–0.1)
BASOPHILS NFR BLD AUTO: 1 % (ref 0–2)
BILIRUB SERPL-MCNC: 0.4 MG/DL (ref 0.2–1)
BNP SERPL-MCNC: 114 PG/ML (ref 1–100)
BUN SERPL-MCNC: 16 MG/DL (ref 7–25)
CALCIUM SERPL-MCNC: 9.4 MG/DL (ref 8.6–10.5)
CHLORIDE SERPL-SCNC: 102 MMOL/L (ref 98–107)
CK SERPL-CCNC: 46 U/L (ref 30–308)
CO2 SERPL-SCNC: 26 MMOL/L (ref 21–31)
CREAT SERPL-MCNC: 0.77 MG/DL (ref 0.7–1.3)
EOSINOPHIL # BLD AUTO: 0.7 THOUSAND/ΜL (ref 0–0.61)
EOSINOPHIL NFR BLD AUTO: 9 % (ref 0–5)
ERYTHROCYTE [DISTWIDTH] IN BLOOD BY AUTOMATED COUNT: 13.9 % (ref 11.5–14.5)
GFR SERPL CREATININE-BSD FRML MDRD: 91 ML/MIN/1.73SQ M
GLUCOSE SERPL-MCNC: 122 MG/DL (ref 65–99)
GLUCOSE SERPL-MCNC: 170 MG/DL (ref 65–140)
HCT VFR BLD AUTO: 43.8 % (ref 42–47)
HGB BLD-MCNC: 15.2 G/DL (ref 14–18)
INR PPP: 1.01 (ref 0.9–1.5)
LACTATE SERPL-SCNC: 1.4 MMOL/L (ref 0.5–2)
LACTATE SERPL-SCNC: 1.8 MMOL/L (ref 0.5–2)
LYMPHOCYTES # BLD AUTO: 1.5 THOUSANDS/ΜL (ref 0.6–4.47)
LYMPHOCYTES NFR BLD AUTO: 20 % (ref 21–51)
MAGNESIUM SERPL-MCNC: 2.3 MG/DL (ref 1.9–2.7)
MCH RBC QN AUTO: 31.1 PG (ref 26–34)
MCHC RBC AUTO-ENTMCNC: 34.8 G/DL (ref 31–37)
MCV RBC AUTO: 89 FL (ref 81–99)
MONOCYTES # BLD AUTO: 0.6 THOUSAND/ΜL (ref 0.17–1.22)
MONOCYTES NFR BLD AUTO: 7 % (ref 2–12)
NEUTROPHILS # BLD AUTO: 5 THOUSANDS/ΜL (ref 1.4–6.5)
NEUTS SEG NFR BLD AUTO: 64 % (ref 42–75)
NRBC BLD AUTO-RTO: 0 /100 WBCS
PLATELET # BLD AUTO: 202 THOUSANDS/UL (ref 149–390)
PMV BLD AUTO: 9.3 FL (ref 8.6–11.7)
POTASSIUM SERPL-SCNC: 4 MMOL/L (ref 3.5–5.5)
PROT SERPL-MCNC: 7.3 G/DL (ref 6.4–8.9)
PROTHROMBIN TIME: 11.7 SECONDS (ref 10.2–13)
RBC # BLD AUTO: 4.89 MILLION/UL (ref 4.3–5.9)
SODIUM SERPL-SCNC: 137 MMOL/L (ref 134–143)
TROPONIN I SERPL-MCNC: <0.03 NG/ML
TROPONIN I SERPL-MCNC: <0.03 NG/ML
WBC # BLD AUTO: 7.8 THOUSAND/UL (ref 4.8–10.8)

## 2019-02-22 PROCEDURE — 83735 ASSAY OF MAGNESIUM: CPT | Performed by: EMERGENCY MEDICINE

## 2019-02-22 PROCEDURE — 99223 1ST HOSP IP/OBS HIGH 75: CPT | Performed by: PHYSICIAN ASSISTANT

## 2019-02-22 PROCEDURE — 94664 DEMO&/EVAL PT USE INHALER: CPT

## 2019-02-22 PROCEDURE — 87040 BLOOD CULTURE FOR BACTERIA: CPT | Performed by: EMERGENCY MEDICINE

## 2019-02-22 PROCEDURE — 82550 ASSAY OF CK (CPK): CPT | Performed by: EMERGENCY MEDICINE

## 2019-02-22 PROCEDURE — 94760 N-INVAS EAR/PLS OXIMETRY 1: CPT

## 2019-02-22 PROCEDURE — 71045 X-RAY EXAM CHEST 1 VIEW: CPT

## 2019-02-22 PROCEDURE — 36415 COLL VENOUS BLD VENIPUNCTURE: CPT | Performed by: EMERGENCY MEDICINE

## 2019-02-22 PROCEDURE — 96374 THER/PROPH/DIAG INJ IV PUSH: CPT

## 2019-02-22 PROCEDURE — 85610 PROTHROMBIN TIME: CPT | Performed by: EMERGENCY MEDICINE

## 2019-02-22 PROCEDURE — 84484 ASSAY OF TROPONIN QUANT: CPT | Performed by: EMERGENCY MEDICINE

## 2019-02-22 PROCEDURE — 94640 AIRWAY INHALATION TREATMENT: CPT

## 2019-02-22 PROCEDURE — 85025 COMPLETE CBC W/AUTO DIFF WBC: CPT | Performed by: EMERGENCY MEDICINE

## 2019-02-22 PROCEDURE — 83880 ASSAY OF NATRIURETIC PEPTIDE: CPT | Performed by: EMERGENCY MEDICINE

## 2019-02-22 PROCEDURE — 80053 COMPREHEN METABOLIC PANEL: CPT | Performed by: EMERGENCY MEDICINE

## 2019-02-22 PROCEDURE — 83036 HEMOGLOBIN GLYCOSYLATED A1C: CPT | Performed by: PHYSICIAN ASSISTANT

## 2019-02-22 PROCEDURE — 99285 EMERGENCY DEPT VISIT HI MDM: CPT

## 2019-02-22 PROCEDURE — 83605 ASSAY OF LACTIC ACID: CPT | Performed by: EMERGENCY MEDICINE

## 2019-02-22 PROCEDURE — 93005 ELECTROCARDIOGRAM TRACING: CPT

## 2019-02-22 PROCEDURE — 85730 THROMBOPLASTIN TIME PARTIAL: CPT | Performed by: EMERGENCY MEDICINE

## 2019-02-22 PROCEDURE — 82948 REAGENT STRIP/BLOOD GLUCOSE: CPT

## 2019-02-22 RX ORDER — PRIMIDONE 50 MG/1
50 TABLET ORAL ONCE
Status: COMPLETED | OUTPATIENT
Start: 2019-02-22 | End: 2019-02-22

## 2019-02-22 RX ORDER — IPRATROPIUM BROMIDE AND ALBUTEROL SULFATE 2.5; .5 MG/3ML; MG/3ML
3 SOLUTION RESPIRATORY (INHALATION) ONCE
Status: DISCONTINUED | OUTPATIENT
Start: 2019-02-22 | End: 2019-02-22

## 2019-02-22 RX ORDER — GUAIFENESIN 600 MG
600 TABLET, EXTENDED RELEASE 12 HR ORAL 2 TIMES DAILY
Status: DISCONTINUED | OUTPATIENT
Start: 2019-02-22 | End: 2019-02-24 | Stop reason: HOSPADM

## 2019-02-22 RX ORDER — ALBUTEROL SULFATE 2.5 MG/3ML
2.5 SOLUTION RESPIRATORY (INHALATION) EVERY 4 HOURS PRN
Status: DISCONTINUED | OUTPATIENT
Start: 2019-02-22 | End: 2019-02-24 | Stop reason: HOSPADM

## 2019-02-22 RX ORDER — METHYLPREDNISOLONE SODIUM SUCCINATE 125 MG/2ML
125 INJECTION, POWDER, LYOPHILIZED, FOR SOLUTION INTRAMUSCULAR; INTRAVENOUS ONCE
Status: COMPLETED | OUTPATIENT
Start: 2019-02-22 | End: 2019-02-22

## 2019-02-22 RX ORDER — IPRATROPIUM BROMIDE AND ALBUTEROL SULFATE 2.5; .5 MG/3ML; MG/3ML
3 SOLUTION RESPIRATORY (INHALATION) ONCE
Status: COMPLETED | OUTPATIENT
Start: 2019-02-22 | End: 2019-02-22

## 2019-02-22 RX ORDER — ASPIRIN 81 MG/1
81 TABLET ORAL DAILY
Status: DISCONTINUED | OUTPATIENT
Start: 2019-02-23 | End: 2019-02-24 | Stop reason: HOSPADM

## 2019-02-22 RX ORDER — AZITHROMYCIN 250 MG/1
500 TABLET, FILM COATED ORAL EVERY 24 HOURS
Status: DISCONTINUED | OUTPATIENT
Start: 2019-02-22 | End: 2019-02-24 | Stop reason: HOSPADM

## 2019-02-22 RX ORDER — ATORVASTATIN CALCIUM 40 MG/1
80 TABLET, FILM COATED ORAL DAILY
Status: DISCONTINUED | OUTPATIENT
Start: 2019-02-23 | End: 2019-02-24 | Stop reason: HOSPADM

## 2019-02-22 RX ORDER — PRIMIDONE 50 MG/1
100 TABLET ORAL DAILY
Status: DISCONTINUED | OUTPATIENT
Start: 2019-02-23 | End: 2019-02-24 | Stop reason: HOSPADM

## 2019-02-22 RX ORDER — INSULIN GLARGINE 100 [IU]/ML
10 INJECTION, SOLUTION SUBCUTANEOUS
Status: DISCONTINUED | OUTPATIENT
Start: 2019-02-22 | End: 2019-02-24 | Stop reason: HOSPADM

## 2019-02-22 RX ORDER — PRIMIDONE 50 MG/1
150 TABLET ORAL
Status: DISCONTINUED | OUTPATIENT
Start: 2019-02-22 | End: 2019-02-24 | Stop reason: HOSPADM

## 2019-02-22 RX ORDER — METHYLPREDNISOLONE SODIUM SUCCINATE 40 MG/ML
40 INJECTION, POWDER, LYOPHILIZED, FOR SOLUTION INTRAMUSCULAR; INTRAVENOUS EVERY 8 HOURS
Status: DISCONTINUED | OUTPATIENT
Start: 2019-02-23 | End: 2019-02-24 | Stop reason: HOSPADM

## 2019-02-22 RX ADMIN — IPRATROPIUM BROMIDE AND ALBUTEROL SULFATE 3 ML: 2.5; .5 SOLUTION RESPIRATORY (INHALATION) at 20:05

## 2019-02-22 RX ADMIN — IPRATROPIUM BROMIDE AND ALBUTEROL SULFATE 3 ML: 2.5; .5 SOLUTION RESPIRATORY (INHALATION) at 19:53

## 2019-02-22 RX ADMIN — METOPROLOL TARTRATE 50 MG: 25 TABLET ORAL at 21:18

## 2019-02-22 RX ADMIN — PRIMIDONE 50 MG: 50 TABLET ORAL at 21:19

## 2019-02-22 RX ADMIN — METHYLPREDNISOLONE SODIUM SUCCINATE 125 MG: 125 INJECTION, POWDER, FOR SOLUTION INTRAMUSCULAR; INTRAVENOUS at 20:22

## 2019-02-22 RX ADMIN — IPRATROPIUM BROMIDE AND ALBUTEROL SULFATE 3 ML: 2.5; .5 SOLUTION RESPIRATORY (INHALATION) at 20:22

## 2019-02-23 LAB
ATRIAL RATE: 75 BPM
BILIRUB UR QL STRIP: NEGATIVE
CLARITY UR: CLEAR
COLOR UR: YELLOW
EST. AVERAGE GLUCOSE BLD GHB EST-MCNC: 171 MG/DL
GLUCOSE SERPL-MCNC: 147 MG/DL (ref 65–140)
GLUCOSE SERPL-MCNC: 186 MG/DL (ref 65–140)
GLUCOSE SERPL-MCNC: 235 MG/DL (ref 65–140)
GLUCOSE SERPL-MCNC: 262 MG/DL (ref 65–140)
GLUCOSE UR STRIP-MCNC: ABNORMAL MG/DL
HBA1C MFR BLD: 7.6 % (ref 4.2–6.3)
HGB UR QL STRIP.AUTO: NEGATIVE
KETONES UR STRIP-MCNC: NEGATIVE MG/DL
LEUKOCYTE ESTERASE UR QL STRIP: NEGATIVE
NITRITE UR QL STRIP: NEGATIVE
P AXIS: 77 DEGREES
PH UR STRIP.AUTO: 5.5 [PH] (ref 5–8)
PR INTERVAL: 178 MS
PROT UR STRIP-MCNC: NEGATIVE MG/DL
QRS AXIS: 64 DEGREES
QRSD INTERVAL: 104 MS
QT INTERVAL: 400 MS
QTC INTERVAL: 446 MS
SP GR UR STRIP.AUTO: 1.01 (ref 1–1.03)
T WAVE AXIS: 95 DEGREES
TROPONIN I SERPL-MCNC: <0.03 NG/ML
UROBILINOGEN UR QL STRIP.AUTO: 0.2 E.U./DL
VENTRICULAR RATE: 75 BPM

## 2019-02-23 PROCEDURE — 82948 REAGENT STRIP/BLOOD GLUCOSE: CPT

## 2019-02-23 PROCEDURE — 97163 PT EVAL HIGH COMPLEX 45 MIN: CPT

## 2019-02-23 PROCEDURE — 81003 URINALYSIS AUTO W/O SCOPE: CPT | Performed by: EMERGENCY MEDICINE

## 2019-02-23 PROCEDURE — G8978 MOBILITY CURRENT STATUS: HCPCS

## 2019-02-23 PROCEDURE — 84484 ASSAY OF TROPONIN QUANT: CPT | Performed by: EMERGENCY MEDICINE

## 2019-02-23 PROCEDURE — 93010 ELECTROCARDIOGRAM REPORT: CPT | Performed by: INTERNAL MEDICINE

## 2019-02-23 PROCEDURE — G8979 MOBILITY GOAL STATUS: HCPCS

## 2019-02-23 PROCEDURE — 99232 SBSQ HOSP IP/OBS MODERATE 35: CPT | Performed by: INTERNAL MEDICINE

## 2019-02-23 PROCEDURE — 94760 N-INVAS EAR/PLS OXIMETRY 1: CPT

## 2019-02-23 RX ADMIN — PRIMIDONE 150 MG: 50 TABLET ORAL at 21:36

## 2019-02-23 RX ADMIN — INSULIN GLARGINE 10 UNITS: 100 INJECTION, SOLUTION SUBCUTANEOUS at 22:16

## 2019-02-23 RX ADMIN — INSULIN LISPRO 2 UNITS: 100 INJECTION, SOLUTION INTRAVENOUS; SUBCUTANEOUS at 21:37

## 2019-02-23 RX ADMIN — INSULIN LISPRO 1 UNITS: 100 INJECTION, SOLUTION INTRAVENOUS; SUBCUTANEOUS at 08:04

## 2019-02-23 RX ADMIN — METHYLPREDNISOLONE SODIUM SUCCINATE 40 MG: 40 INJECTION, POWDER, FOR SOLUTION INTRAMUSCULAR; INTRAVENOUS at 14:24

## 2019-02-23 RX ADMIN — METHYLPREDNISOLONE SODIUM SUCCINATE 40 MG: 40 INJECTION, POWDER, FOR SOLUTION INTRAMUSCULAR; INTRAVENOUS at 22:16

## 2019-02-23 RX ADMIN — AZITHROMYCIN 500 MG: 250 TABLET, FILM COATED ORAL at 00:36

## 2019-02-23 RX ADMIN — METOPROLOL TARTRATE 25 MG: 25 TABLET, FILM COATED ORAL at 17:16

## 2019-02-23 RX ADMIN — AZITHROMYCIN 500 MG: 250 TABLET, FILM COATED ORAL at 21:41

## 2019-02-23 RX ADMIN — INSULIN LISPRO 1 UNITS: 100 INJECTION, SOLUTION INTRAVENOUS; SUBCUTANEOUS at 00:39

## 2019-02-23 RX ADMIN — METHYLPREDNISOLONE SODIUM SUCCINATE 40 MG: 40 INJECTION, POWDER, FOR SOLUTION INTRAMUSCULAR; INTRAVENOUS at 05:28

## 2019-02-23 RX ADMIN — PRIMIDONE 100 MG: 50 TABLET ORAL at 08:05

## 2019-02-23 RX ADMIN — GUAIFENESIN 600 MG: 600 TABLET, EXTENDED RELEASE ORAL at 00:37

## 2019-02-23 RX ADMIN — ASPIRIN 81 MG: 81 TABLET, COATED ORAL at 08:05

## 2019-02-23 RX ADMIN — INSULIN LISPRO 5 UNITS: 100 INJECTION, SOLUTION INTRAVENOUS; SUBCUTANEOUS at 17:16

## 2019-02-23 RX ADMIN — INSULIN LISPRO 3 UNITS: 100 INJECTION, SOLUTION INTRAVENOUS; SUBCUTANEOUS at 11:50

## 2019-02-23 RX ADMIN — PRIMIDONE 150 MG: 50 TABLET ORAL at 00:40

## 2019-02-23 RX ADMIN — ATORVASTATIN CALCIUM 80 MG: 40 TABLET, FILM COATED ORAL at 08:06

## 2019-02-23 RX ADMIN — ENOXAPARIN SODIUM 40 MG: 40 INJECTION SUBCUTANEOUS at 08:05

## 2019-02-23 RX ADMIN — GUAIFENESIN 600 MG: 600 TABLET, EXTENDED RELEASE ORAL at 08:05

## 2019-02-23 RX ADMIN — METOPROLOL TARTRATE 25 MG: 25 TABLET, FILM COATED ORAL at 08:05

## 2019-02-23 RX ADMIN — INSULIN GLARGINE 10 UNITS: 100 INJECTION, SOLUTION SUBCUTANEOUS at 00:37

## 2019-02-23 RX ADMIN — INSULIN LISPRO 5 UNITS: 100 INJECTION, SOLUTION INTRAVENOUS; SUBCUTANEOUS at 08:04

## 2019-02-23 RX ADMIN — GUAIFENESIN 600 MG: 600 TABLET, EXTENDED RELEASE ORAL at 17:16

## 2019-02-23 RX ADMIN — INSULIN LISPRO 5 UNITS: 100 INJECTION, SOLUTION INTRAVENOUS; SUBCUTANEOUS at 11:49

## 2019-02-23 NOTE — PHYSICAL THERAPY NOTE
Physical Therapy Evaluation     Patient's Name: Edwar Bautista    Admitting Diagnosis  Shortness of breath [R06 02]  COPD with acute exacerbation ([facility] ) [J44 1]    Problem List  Patient Active Problem List   Diagnosis    Type 2 diabetes mellitus ([facility] )    Tobacco abuse    Hyperlipidemia    Essential hypertension    CAD (coronary artery disease), native coronary artery    BPH (benign prostatic hyperplasia)    COPD with acute exacerbation (HCC)    Tremor       Past Medical History  Past Medical History:   Diagnosis Date    BPH (benign prostatic hyperplasia)     45 days radiation treatment    Cardiac disease     Coronary artery disease     Diabetes mellitus ([facility] )     Hyperlipidemia     Hypertension     Prostate cancer [facility])        Past Surgical History  Past Surgical History:   Procedure Laterality Date    CORONARY ANGIOPLASTY WITH STENT PLACEMENT      CORONARY ARTERY BYPASS GRAFT      PROSTATE BIOPSY        02/23/19 09   Note Type   Note type Eval only   Pain Assessment   Pain Assessment No/denies pain   Home Living   Type of Home House   Home Layout Two level; Able to live on main level with bedroom/bathroom;Stairs to enter with rails  (3 BEATRIZ)   Bathroom Shower/Tub Tub/shower unit   Bathroom Toilet Standard   Bathroom Equipment Grab bars in shower; Shower chair   Home Equipment Walker;Cane  (uses cane occasionally)   Prior Function   Level of [place] Independent with ADLs and functional mobility   Lives With Family; Spouse   Receives Help From Family   ADL Assistance Independent   IADLs Independent   Falls in the last 6 months 0   Vocational Retired   General   Family/Caregiver Present No   Cognition   Overall Cognitive Status WFL   Arousal/Participation Alert   Orientation Level Oriented X4   Memory Within functional limits   Following Commands Follows all commands and directions without difficulty   RLE Assessment   RLE Assessment WFL   LLE Assessment   LLE Assessment [facility] Sensation WFL   Bed Mobility   Rolling R 7  Independent   Rolling L 7  Independent   Supine to Sit 7  Independent   Sit to Supine 7  Independent   Transfers   Sit to Stand 7  Independent   Stand to Sit 7  Independent   Toilet transfer 7  Independent   Additional items Standard toilet   Ambulation/Elevation   Gait pattern Excessively slow   Gait Assistance 6  Modified independent   Assistive Device None   Distance 100 ft   Balance   Static Sitting Normal   Dynamic Sitting Normal   Static Standing Good   Dynamic Standing Fair +   Ambulatory Fair +   Endurance Deficit   Endurance Deficit Yes   Endurance Deficit Description required rest break during amb, RPE = 3 post amb, SaO2 post amb = 93%   Activity Tolerance   Activity Tolerance Patient limited by fatigue   Assessment   Prognosis Excellent   Problem List Decreased endurance   Assessment Pt is 68 y o  male seen for PT evaluation s/p admit to [address] [place] [address] on 2/22/2019 w/ COPD with acute exacerbation ([facility] )  PT consulted to assess pt's functional mobility and d/c needs  Order placed for PT eval and tx, w/ up as tolerated order  Comorbidities affecting pt's physical performance at time of assessment include: recent tremors, CAD, htn with recent increase BP, and DM  PTA, pt was requiring A for mobility, ambulates community distances and elevations, has 3 BEATRIZ, lives w/ family in 2  level house and retired  Personal factors affecting pt at time of IE include: lives in 2 story house, stairs to enter home and inability to navigate community distances  Please find objective findings from PT assessment regarding body systems outlined above with impairments and limitations including decreased endurance, decreased activity tolerance, decreased functional mobility tolerance, fall risk and SOB upon exertion  The following objective measures performed on IE also reveal limitations: Barthel Index: 100/100   Pt's clinical presentation is currently unstable/unpredictable seen in pt's presentation of recent increase in BP and SOB on exertion  Pt to benefit from continued PT tx to address deficits as defined above and maximize level of functional independent mobility and consistency  From PT/mobility standpoint, recommendation at time of d/c would be OP PT pending progress in order to facilitate return to PLOF  Goals   Patient Goals to get some rest   LTG Expiration Date 03/02/19   Long Term Goal #1 Pt will:  Increase OOB activity tolerance to 10 minutes without s/s of physical exertion   Treatment Day 0   Plan   Treatment/Interventions Endurance training   PT Frequency 5x/wk   Recommendation   Recommendation Outpatient PT   PT - OK to Discharge Yes   Barthel Index   Feeding 10   Bathing 5   Grooming Score 5   Dressing Score 10   Bladder Score 10   Bowels Score 10   Toilet Use Score 10   Transfers (Bed/Chair) Score 15   Mobility (Level Surface) Score 15   Stairs Score 10   Barthel Index Score 100     Christina Scott, PT

## 2019-02-23 NOTE — RESPIRATORY THERAPY NOTE
RT Protocol Note  Ekta Gallagher 68 y o  male MRN: [id number]  Unit/Bed#: -01 Encounter: [id number]    Assessment    Principal Problem:    COPD with acute exacerbation ([facility] )  Active Problems:    Type 2 diabetes mellitus ([facility] )    Tobacco abuse    Hyperlipidemia    Essential hypertension    CAD (coronary artery disease), native coronary artery    BPH (benign prostatic hyperplasia)    Tremor      Home Pulmonary Medications:  No resp meds or equip   Home Devices/Therapy: (P) (none)    Past Medical History:   Diagnosis Date    BPH (benign prostatic hyperplasia)     45 days radiation treatment    Cardiac disease     Coronary artery disease     Diabetes mellitus ([place] [address] )     Hyperlipidemia     Hypertension     Prostate cancer ([place] [address] )      Social History     Socioeconomic History    Marital status: /Civil Union     Spouse name: None    Number of children: None    Years of education: None    Highest education level: None   Occupational History    None   Social Needs    Financial resource strain: None    Food insecurity:     Worry: None     Inability: None    Transportation needs:     Medical: None     Non-medical: None   Tobacco Use    Smoking status: Former Smoker     Last attempt to quit: 2/15/2019     Years since quittin 0    Smokeless tobacco: Never Used   Substance and Sexual Activity    Alcohol use: Yes     Comment: on occasion    Drug use: No    Sexual activity: None   Lifestyle    Physical activity:     Days per week: None     Minutes per session: None    Stress: None   Relationships    Social connections:     Talks on phone: None     Gets together: None     Attends Alevism service: None     Active member of club or organization: None     Attends meetings of clubs or organizations: None     Relationship status: None    Intimate partner violence:     Fear of current or ex partner: None     Emotionally abused: None     Physically abused: None     Forced sexual activity: None Other Topics Concern    None   Social History Narrative    None       Subjective         Objective    Physical Exam:   Assessment Type: (P) Assess only  General Appearance: (P) Alert, Awake  Respiratory Pattern: (P) Normal  Chest Assessment: (P) Chest expansion symmetrical  Bilateral Breath Sounds: (P) Expiratory wheezes  Cough: (P) None    Vitals:  Blood pressure 128/80, pulse (P) 86, temperature 97 7 °F (36 5 °C), temperature source Temporal, resp  rate (P) 16, height 5' 10" (1 778 m), weight 87 8 kg (193 lb 9 oz), SpO2 (P) 97 %  Imaging and other studies: I have personally reviewed pertinent reports              Plan    Respiratory Plan: (P) No distress/Pulmonary history        Resp Comments: (P) no resp meds or equipment

## 2019-02-23 NOTE — SOCIAL WORK
Chart reviewed by case management, assessment completed at the bedside, pt states he is independent and drives, pt lives with his wife, grandson and his fiance, he lives in a 2 story home with 3 steps ouside and 12-14 steps inside, pt has br on the 1st & 2nd floor, pt has a nebulizer, inhaler, cane, walker, and bars in the bathroom, I called his wife at 13:10 to # [phone],AMJ states she is not his primary care taker, she stated she was in the past, but now the pt is independent, pt states he has an appt at [facility] in [facility] on Wednesday to have a pulmonary function test, the pt drove here and he plans to drive himself home, pt denies any d/c needs, pt states he quit smoking and drinks alcohol on occasion, pt [facility] to [facility], pt wears glasses and he states he is able to read his medication labels, pt was given Sarah;s card to call the finance dept afte he receives his bill, Billy Tracy was called and made aware this pt may have some financial needs ,cm will continue to follow and assess for any adtional d/c needs, pulmonary consult is pending, CM reviewed d/c planning process including the following: identifying help at home, patient preference for d/c planning needs, availability of treatment team to discuss questions or concerns patient and/or family may have regarding understanding medications and recognizing signs and symptoms once discharged  CM also encouraged patient to follow up with all recommended appointments after discharge  Patient advised of importance for patient and family to participate in managing patients medical well being

## 2019-02-23 NOTE — UTILIZATION REVIEW
Initial Clinical Review    Admission: Date/Time/Statement: 2/22/19 @ 2136   Orders Placed This Encounter   Procedures    Inpatient Admission     Standing Status:   Standing     Number of Occurrences:   1     Order Specific Question:   Admitting Physician     Answer:   Chika Emmanuel     Order Specific Question:   Level of Care     Answer:   Med Surg [16]     Order Specific Question:   Estimated length of stay     Answer:   More than 2 Midnights     Order Specific Question:   Certification     Answer:   I certify that inpatient services are medically necessary for this patient for a duration of greater than two midnights  See H&P and MD Progress Notes for additional information about the patient's course of treatment  ED: Date/Time/Mode of Arrival:   ED Arrival Information     Expected Arrival Acuity Means of Arrival Escorted By Service Admission Type    - 2/22/2019 19:14 Emergent Walk-In Self General Medicine Emergency    Arrival Complaint    shortness of breath        Chief Complaint:   Chief Complaint   Patient presents with    Shortness of Breath     Started 2 days ago denies fever chills with cough COPD hx     History of Illness: Jigna Chavira is a 68 y o  male who presents with shortness of breath  PMHx of CAD with CABG and stent, DM2, HTN, HLD, BPH and prostate cancer presents to the ER with shortness of breath that started 2 days ago  The SOB has progressed over last 2 days with increased wheezing  Tried albuterol nebulizer 3 times and MDI  at home with no relief  No fever, chills, chest pain, abd pain  Stopped smoking 1 week ago, smoked since age 16    [facility]            ED Vital Signs:   ED Triage Vitals [02/22/19 1929]   Temperature Pulse Respirations Blood Pressure SpO2   98 4 °F (36 9 °C) 81 22 (!) 229/107 92 %      Temp Source Heart Rate Source Patient Position - Orthostatic VS BP Location FiO2 (%)   Temporal Monitor Lying Left arm -- Pain Score       No Pain        Wt Readings from Last 1 Encounters:   02/22/19 87 8 kg (193 lb 9 oz)     Vital Signs (abnormal):    above    Pertinent Labs/Diagnostic Test Results:    BNP  114  Other labs  unremarkable    ED Treatment:   Medication Administration from 02/22/2019 1914 to 02/22/2019 2157       Date/Time Order Dose Route Action Action by Comments     02/22/2019 2005 ipratropium-albuterol (DUO-NEB) 0 5-2 5 mg/3 mL inhalation solution 3 mL 3 mL Nebulization Given Demaris Northlake, RT      02/22/2019 1953 ipratropium-albuterol (DUO-NEB) 0 5-2 5 mg/3 mL inhalation solution 3 mL 3 mL Nebulization Given Demaris Northlake, RT      02/22/2019 2022 methylPREDNISolone sodium succinate (Solu-MEDROL) injection 125 mg 125 mg Intravenous Given Jacek Cuello, NATALIA      02/22/2019 2022 ipratropium-albuterol (DUO-NEB) 0 5-2 5 mg/3 mL inhalation solution 3 mL 3 mL Nebulization Given [facility], RT      02/22/2019 2118 metoprolol tartrate (LOPRESSOR) tablet 50 mg 50 mg Oral Given Erica Napoles RN      02/22/2019 2119 primidone (MYSOLINE) tablet 50 mg 50 mg Oral Given Erica Napoles RN         Past Medical/Surgical History:    Active Ambulatory Problems     Diagnosis Date Noted    No Active Ambulatory Problems     Resolved Ambulatory Problems     Diagnosis Date Noted    No Resolved Ambulatory Problems     Past Medical History:   Diagnosis Date    BPH (benign prostatic hyperplasia)     Cardiac disease     Coronary artery disease     Diabetes mellitus ([place] [address] )     Hyperlipidemia     Hypertension     Prostate cancer ([place] [address] )      Admitting Diagnosis: Shortness of breath [R06 02]  COPD with acute exacerbation ([place] [address] ) [J44 1]  Age/Sex: 68 y o  male     Assessment/Plan: COPD with acute exacerbation ([place] [address] )  Assessment & Plan  · Was using nebulizer and inhaler at home multiple times and had 3 Duo-nebs and 125mg solumedrol in ER with no improvement , will place as inpatient  · IV steriods  · Nebs ATC and prn  · Respiratory protocol  · Pulmonary consult     Tremor  Assessment & Plan  · Mysoline 100mg am and 150mg at bedtime     CAD (coronary artery disease), native coronary artery  Assessment & Plan  · With hx of CABG  · Continue home meds     Essential hypertension  Assessment & Plan  · Continue meds and monitor  · BP was elevated in ER  · Improved with evening medications  Hyperlipidemia  Assessment & Plan  · statin     Tobacco abuse  Assessment & Plan  · Smoked since age 16  · Quit 1 week ago  · Supportive care     Type 2 diabetes mellitus ([facility] )  Assessment & Plan  No results found for: HGBA1C    Anticipated Length of Stay:  Patient will be admitted on an Inpatient basis with an anticipated length of stay of  > 2 midnights     Justification for Hospital Stay: IV steriods, nebs ATC and prn, pulmonary input       ·         Admission Orders:   IP  2/22 @    0585  Scheduled Meds:   Current Facility-Administered Medications:  albuterol 2 5 mg Nebulization Q4H PRN Alison Lev, PA-C   aspirin 81 mg Oral Daily Alison Lev, PA-C   atorvastatin 80 mg Oral Daily Alison Lev, PA-C   azithromycin 500 mg Oral Q24H Alison Lev, PA-C   enoxaparin 40 mg Subcutaneous Daily Alison Jerome, PA-C   guaiFENesin 600 mg Oral BID Alison Jerome, PA-C   insulin glargine 10 Units Subcutaneous HS Alison Jerome, PA-C   insulin lispro 1-5 Units Subcutaneous HS Alison Lev, PA-C   insulin lispro 1-6 Units Subcutaneous TID AC SUN Khalil-SHADI   insulin lispro 5 Units Subcutaneous TID With Meals SUN Coronel-C   methylPREDNISolone sodium succinate 40 mg Intravenous Q8H SUN Khalil-SHADI   metoprolol tartrate 25 mg Oral BID Alison Jerome, PA-C   primidone 100 mg Oral Daily Alison Jerome, PA-C   primidone 150 mg Oral HS Skylar Booker PA-C     Continuous Infusions:    PRN Meds:   albuterol     Sputum c/s  Urine  C/s  CCD diet  PT/OT  Cons pulmonary  O2  2L

## 2019-02-23 NOTE — ED PROVIDER NOTES
History  Chief Complaint   Patient presents with    Shortness of Breath     Started 2 days ago denies fever chills with cough COPD hx     73-year-old male with history of COPD, hypertension, hyperlipidemia, prostate CA and CAD presents for evaluation of shortness of breath  Patient reports symptoms worsening over the last 2 days, with increased wheezing despite use of home albuterol  Patient reports no new recent illness, no fevers, no chills, no chest pain or abdominal pain  Patient reports that he has been a smoker since he was 13 and quit 1 week prior  History provided by:  Patient   used: No    Shortness of Breath   Associated symptoms: wheezing    Associated symptoms: no abdominal pain, no chest pain, no cough, no fever, no headaches, no rash, no sore throat and no vomiting        Prior to Admission Medications   Prescriptions Last Dose Informant Patient Reported? Taking?    albuterol (2 5 mg/3 mL) 0 083 % nebulizer solution 2/22/2019 at 1430  No Yes   Sig: Take 1 vial (2 5 mg total) by nebulization every 4 (four) hours as needed for wheezing or shortness of breath   albuterol (PROVENTIL HFA,VENTOLIN HFA) 90 mcg/act inhaler 2/22/2019 at 1600  Yes Yes   Sig: Inhale 2 puffs every 6 (six) hours as needed for wheezing or shortness of breath   aspirin (ECOTRIN LOW STRENGTH) 81 mg EC tablet 2/22/2019 at 0730  Yes Yes   Sig: Take 81 mg by mouth daily   atorvastatin (LIPITOR) 80 mg tablet 2/21/2019 at 2100  Yes Yes   Sig: Take 80 mg by mouth daily   metoprolol tartrate (LOPRESSOR) 50 mg tablet 2/22/2019 at 2100  Yes Yes   Sig: Take 25 mg by mouth 2 (two) times a day   primidone (MYSOLINE) 50 mg tablet 2/22/2019 at 0730  Yes Yes   Sig: Take 50 mg by mouth every 8 (eight) hours as needed      Facility-Administered Medications: None       Past Medical History:   Diagnosis Date    BPH (benign prostatic hyperplasia)     45 days radiation treatment    Cardiac disease     Coronary artery disease  Diabetes mellitus ([facility] [address] )     Hyperlipidemia     Hypertension     Prostate cancer [facility])        Past Surgical History:   Procedure Laterality Date    CORONARY ANGIOPLASTY WITH STENT PLACEMENT      CORONARY ARTERY BYPASS GRAFT      PROSTATE BIOPSY         Family History   Problem Relation Age of Onset    Cancer Father     Heart disease Brother      I have reviewed and agree with the history as documented  Social History     Tobacco Use    Smoking status: Former Smoker     Last attempt to quit: 2/15/2019     Years since quittin 0    Smokeless tobacco: Never Used   Substance Use Topics    Alcohol use: Yes     Comment: on occasion    Drug use: No        Review of Systems   Constitutional: Negative for chills and fever  HENT: Negative for rhinorrhea and sore throat  Eyes: Negative for visual disturbance  Respiratory: Positive for shortness of breath and wheezing  Negative for cough  Cardiovascular: Negative for chest pain and leg swelling  Gastrointestinal: Negative for abdominal pain, diarrhea, nausea and vomiting  Genitourinary: Negative for dysuria  Musculoskeletal: Negative for back pain and myalgias  Skin: Negative for rash  Neurological: Negative for dizziness and headaches  Psychiatric/Behavioral: Negative for confusion  All other systems reviewed and are negative  Physical Exam  Physical Exam   Constitutional: He is oriented to person, place, and time  He appears well-developed and well-nourished  HENT:   Nose: Nose normal    Mouth/Throat: Oropharynx is clear and moist  No oropharyngeal exudate  Eyes: Pupils are equal, round, and reactive to light  Conjunctivae and EOM are normal  No scleral icterus  Neck: Normal range of motion  Neck supple  No JVD present  No tracheal deviation present  Cardiovascular: Normal rate, regular rhythm and normal heart sounds  No murmur heard  Pulmonary/Chest: Tachypnea noted  He is in respiratory distress (Mild)   He has wheezes ( diffuse expiratory wheezing)  He has no rales  Abdominal: Soft  Bowel sounds are normal  There is no tenderness  There is no guarding  Musculoskeletal: Normal range of motion  He exhibits no edema or tenderness  Neurological: He is alert and oriented to person, place, and time  No cranial nerve deficit or sensory deficit  He exhibits normal muscle tone  5/5 motor, nl sens   Skin: Skin is warm and dry  Psychiatric: He has a normal mood and affect  His behavior is normal    Nursing note and vitals reviewed        Vital Signs  ED Triage Vitals [02/22/19 1929]   Temperature Pulse Respirations Blood Pressure SpO2   98 4 °F (36 9 °C) 81 22 (!) 229/107 92 %      Temp Source Heart Rate Source Patient Position - Orthostatic VS BP Location FiO2 (%)   Temporal Monitor Lying Left arm --      Pain Score       No Pain           Vitals:    02/22/19 2118 02/22/19 2130 02/22/19 2145 02/22/19 2200   BP: (!) 192/104 (!) 190/89 (!) 183/89 128/80   Pulse: 75 75 69 71   Patient Position - Orthostatic VS:    Sitting       Visual Acuity      ED Medications  Medications   guaiFENesin (MUCINEX) 12 hr tablet 600 mg (has no administration in time range)   methylPREDNISolone sodium succinate (Solu-MEDROL) injection 40 mg (has no administration in time range)   azithromycin (ZITHROMAX) tablet 500 mg (has no administration in time range)   enoxaparin (LOVENOX) subcutaneous injection 40 mg (has no administration in time range)   insulin lispro (HumaLOG) 100 units/mL subcutaneous injection 1-6 Units (has no administration in time range)   insulin glargine (LANTUS) subcutaneous injection 10 Units 0 1 mL (has no administration in time range)   insulin lispro (HumaLOG) 100 units/mL subcutaneous injection 1-5 Units (has no administration in time range)   insulin lispro (HumaLOG) 100 units/mL subcutaneous injection 5 Units (has no administration in time range)   aspirin (ECOTRIN LOW STRENGTH) EC tablet 81 mg (has no administration in time range)   atorvastatin (LIPITOR) tablet 80 mg (has no administration in time range)   metoprolol tartrate (LOPRESSOR) tablet 25 mg (has no administration in time range)   albuterol inhalation solution 2 5 mg (has no administration in time range)   primidone (MYSOLINE) tablet 100 mg (has no administration in time range)   primidone (MYSOLINE) tablet 150 mg (has no administration in time range)   ipratropium-albuterol (DUO-NEB) 0 5-2 5 mg/3 mL inhalation solution 3 mL (3 mL Nebulization Given 2/22/19 2005)   ipratropium-albuterol (DUO-NEB) 0 5-2 5 mg/3 mL inhalation solution 3 mL (3 mL Nebulization Given 2/22/19 1953)   methylPREDNISolone sodium succinate (Solu-MEDROL) injection 125 mg (125 mg Intravenous Given 2/22/19 2022)   ipratropium-albuterol (DUO-NEB) 0 5-2 5 mg/3 mL inhalation solution 3 mL (3 mL Nebulization Given 2/22/19 2022)   metoprolol tartrate (LOPRESSOR) tablet 50 mg (50 mg Oral Given 2/22/19 2118)   primidone (MYSOLINE) tablet 50 mg (50 mg Oral Given 2/22/19 2119)       Diagnostic Studies  Results Reviewed     Procedure Component Value Units Date/Time    Sputum culture and Gram stain [[id number]]     Lab Status:  No result Specimen:  Expectorated Sputum     Hemoglobin A1c w/EAG Estimation (Orders if not completed within the last 90 days) [[id number]]     Lab Status:  No result Specimen:  Blood     B-Type Natriuretic Peptide [facility] [place] [facility] ONLY) [[id number]]  (Abnormal) Collected:  02/22/19 2006    Lab Status:  Final result Specimen:  Blood from Arm, Right Updated:  02/22/19 2048      pg/mL     Troponin I [[id number]]  (Normal) Collected:  02/22/19 2006    Lab Status:  Final result Specimen:  Blood from Arm, Right Updated:  02/22/19 2043     Troponin I <0 03 ng/mL     CK Total with Reflex CKMB [[id number]]  (Normal) Collected:  02/22/19 2006    Lab Status:  Final result Specimen:  Blood from Arm, Right Updated:  02/22/19 2041     Total CK 46 U/L     Lactic acid, plasma [[id number]]  (Normal) Collected:  02/22/19 2006    Lab Status:  Final result Specimen:  Blood from Arm, Right Updated:  02/22/19 2040     LACTIC ACID 1 4 mmol/L     Narrative:       Result may be elevated if tourniquet was used during collection  Magnesium [[id number]]  (Normal) Collected:  02/22/19 2006    Lab Status:  Final result Specimen:  Blood from Arm, Right Updated:  02/22/19 2040     Magnesium 2 3 mg/dL     Comprehensive metabolic panel [[id number]]  (Abnormal) Collected:  02/22/19 2006    Lab Status:  Final result Specimen:  Blood from Arm, Right Updated:  02/22/19 2040     Sodium 137 mmol/L      Potassium 4 0 mmol/L      Chloride 102 mmol/L      CO2 26 mmol/L      ANION GAP 9 mmol/L      BUN 16 mg/dL      Creatinine 0 77 mg/dL      Glucose 122 mg/dL      Calcium 9 4 mg/dL      AST 12 U/L      ALT 10 U/L      Alkaline Phosphatase 88 U/L      Total Protein 7 3 g/dL      Albumin 4 5 g/dL      Total Bilirubin 0 40 mg/dL      eGFR 91 ml/min/1 73sq m     Narrative:       National Kidney Disease Education Program recommendations are as follows:  GFR calculation is accurate only with a steady state creatinine  Chronic Kidney disease less than 60 ml/min/1 73 sq  meters  Kidney failure less than 15 ml/min/1 73 sq  meters      Protime-INR [[id number]]  (Normal) Collected:  02/22/19 2006    Lab Status:  Final result Specimen:  Blood from Arm, Right Updated:  02/22/19 2037     Protime 11 7 seconds      INR 1 01    APTT [[id number]]  (Abnormal) Collected:  02/22/19 2006    Lab Status:  Final result Specimen:  Blood from Arm, Right Updated:  02/22/19 2037     PTT 40 seconds     CBC and differential [[id number]]  (Abnormal) Collected:  02/22/19 2006    Lab Status:  Final result Specimen:  Blood from Arm, Right Updated:  02/22/19 2025     WBC 7 80 Thousand/uL      RBC 4 89 Million/uL      Hemoglobin 15 2 g/dL      Hematocrit 43 8 %      MCV 89 fL      MCH 31 1 pg      MCHC 34 8 g/dL      RDW 13 9 %      MPV 9 3 fL      Platelets 595 Thousands/uL nRBC 0 /100 WBCs      Neutrophils Relative 64 %      Lymphocytes Relative 20 %      Monocytes Relative 7 %      Eosinophils Relative 9 %      Basophils Relative 1 %      Neutrophils Absolute 5 00 Thousands/µL      Lymphocytes Absolute 1 50 Thousands/µL      Monocytes Absolute 0 60 Thousand/µL      Eosinophils Absolute 0 70 Thousand/µL      Basophils Absolute 0 10 Thousands/µL     Blood culture #2 [[id number]] Collected:  02/22/19 2006    Lab Status: In process Specimen:  Blood from Arm, Right Updated:  02/22/19 2020    Blood culture #1 [[id number]] Collected:  02/22/19 2006    Lab Status: In process Specimen:  Blood from Arm, Right Updated:  02/22/19 2020    Lactic acid, plasma [[id number]]     Lab Status:  No result Specimen:  Blood     Troponin I [[id number]]     Lab Status:  No result Specimen:  Blood     UA w Reflex to Microscopic w Reflex to Culture [[id number]]     Lab Status:  No result Specimen:  Urine                  XR chest 1 view portable    (Results Pending)              Procedures  ECG 12 Lead Documentation  Date/Time: 2/22/2019 7:37 PM  Performed by: Ran White DO  Authorized by: Ran White DO     Indications / Diagnosis:  Shortness of breath  ECG reviewed by me, the ED Provider: yes    Patient location:  ED  Previous ECG:     Previous ECG:  Unavailable    Comparison to cardiac monitor: Yes    Interpretation:     Interpretation: normal    Rate:     ECG rate:  75 BPM    ECG rate assessment: normal    Rhythm:     Rhythm: sinus rhythm    Ectopy:     Ectopy: none    QRS:     QRS axis:  Normal  Conduction:     Conduction: normal    ST segments:     ST segments:  Non-specific  T waves:     T waves: non-specific             Phone Contacts  ED Phone Contact    ED Course  ED Course as of Feb 22 2316 Fri Feb 22, 2019   1938 Patient seen and examined at bedside  Labs, imaging and EKG ordered  Duo nebs x3 and Solu-Medrol 125 mg IV ordered  2046 Patient re-evaluated multiple times    Patient with continued wheezing despite 3 Duo nebs and Solu-Medrol  Patient reports improved symptoms but appears to still be tachypneic  Plan to give home doses of blood pressure medications and reassess  2130 Patient re-evaluated again and continues to wheeze  Hospitalist contacted and case discussed  Patient to be admitted for COPD exacerbation                MDM    Disposition  Final diagnoses:   COPD with acute exacerbation ([address] )     Time reflects when diagnosis was documented in both MDM as applicable and the Disposition within this note     Time User Action Codes Description Comment    2/22/2019  9:35 PM Kaycehipolito Christiano Granados [J44 1] COPD with acute exacerbation [facility])       ED Disposition     ED Disposition Condition Date/Time Comment    Admit Stable Fri Feb 22, 2019  9:35 PM Case was discussed with hospitalist and the patient's admission status was agreed to be Admission Status: inpatient status to the service of Dr Sav Mata   Follow-up Information    None         Current Discharge Medication List      CONTINUE these medications which have NOT CHANGED    Details   albuterol (2 5 mg/3 mL) 0 083 % nebulizer solution Take 1 vial (2 5 mg total) by nebulization every 4 (four) hours as needed for wheezing or shortness of breath  Qty: 75 mL, Refills: 0    Associated Diagnoses: COPD exacerbation (HCC)      albuterol (PROVENTIL HFA,VENTOLIN HFA) 90 mcg/act inhaler Inhale 2 puffs every 6 (six) hours as needed for wheezing or shortness of breath      aspirin (ECOTRIN LOW STRENGTH) 81 mg EC tablet Take 81 mg by mouth daily      atorvastatin (LIPITOR) 80 mg tablet Take 80 mg by mouth daily      metoprolol tartrate (LOPRESSOR) 50 mg tablet Take 25 mg by mouth 2 (two) times a day      primidone (MYSOLINE) 50 mg tablet Take 50 mg by mouth every 8 (eight) hours as needed           No discharge procedures on file      ED Provider  Electronically Signed by           Danilo Lizarraga DO  02/22/19 7219

## 2019-02-23 NOTE — PHYSICIAN ADVISOR
Current patient class: Inpatient  The patient is currently on Hospital Day: 2 at [address]      The patient was admitted to the hospital at 2136 on 2/22/19 for the following diagnosis:  Shortness of breath [R06 02]  COPD with acute exacerbation ([address] ) [J44 1]       There is documentation in the medical record of an expected length of stay of at least 2 midnights  The patient is therefore expected to satisfy the 2 midnight benchmark and given the 2 midnight presumption is appropriate for INPATIENT ADMISSION  Given this expectation of a satisfying stay, CMS instructs us that the patient is most often appropriate for inpatient admission under part A provided medical necessity is documented in the chart  After review of the relevant documentation, labs, vital signs and test results, the patient is appropriate for INPATIENT ADMISSION  Admission to the hospital as an inpatient is a complex decision making process which requires the practitioner to consider the patients presenting complaint, history and physical examination and all relevant testing  With this in mind, in this case, the patient was deemed appropriate for INPATIENT ADMISSION  After review of the documentation and testing available at the time of the admission I concur with this clinical determination of medical necessity  Rationale is as follows: The patient is a 68 yrs old Male who presented to the ED at 2/22/2019  7:25 PM with a chief complaint of Shortness of Breath (Started 2 days ago denies fever chills with cough COPD hx)     Given the need for further hospitalization, and along with the documentation of medical necessity present in the chart, the patient is appropriate for inpatient admission  The patient is expected to satisfy the 2 midnight benchmark, and will require further acute medical care  The patient does have comorbid conditions which increases the risk for significant adverse outcome  Given this the patient is appropriate for inpatient admission  The patients vitals on arrival were ED Triage Vitals [02/22/19 1929]   Temperature Pulse Respirations Blood Pressure SpO2   98 4 °F (36 9 °C) 81 22 (!) 229/107 92 %      Temp Source Heart Rate Source Patient Position - Orthostatic VS BP Location FiO2 (%)   Temporal Monitor Lying Left arm --      Pain Score       No Pain           Past Medical History:   Diagnosis Date    BPH (benign prostatic hyperplasia)     45 days radiation treatment    Cardiac disease     Coronary artery disease     Diabetes mellitus ([facility] )     Hyperlipidemia     Hypertension     Prostate cancer ([facility] )      Past Surgical History:   Procedure Laterality Date    CORONARY ANGIOPLASTY WITH STENT PLACEMENT      CORONARY ARTERY BYPASS GRAFT      PROSTATE BIOPSY             Consults have been placed to:   IP CONSULT TO CASE MANAGEMENT  IP CONSULT TO PULMONOLOGY    Vitals:    02/23/19 0605 02/23/19 0728 02/23/19 0805 02/23/19 1545   BP:  (!) 190/83 142/68 145/67   BP Location:  Left arm  Left arm   Pulse: 86 102  75   Resp: 16 18  18   Temp:  (!) 97 4 °F (36 3 °C)  (!) 96 6 °F (35 9 °C)   TempSrc:  Temporal  Tympanic   SpO2: 97% 91%  95%   Weight:       Height:           Most recent labs:    Recent Labs     02/22/19 2006 02/23/19  0151   WBC 7 80  --   --    HGB 15 2  --   --    HCT 43 8  --   --      --   --    K 4 0  --   --    CALCIUM 9 4  --   --    BUN 16  --   --    CREATININE 0 77  --   --    INR 1 01  --   --    TROPONINI <0 03   < > <0 03   CKTOTAL 46  --   --    AST 12*  --   --    ALT 10  --   --    ALKPHOS 88  --   --     < > = values in this interval not displayed         Scheduled Meds:  Current Facility-Administered Medications:  albuterol 2 5 mg Nebulization Q4H PRN Tiara Bright PA-C   aspirin 81 mg Oral Daily Tiara Bright PA-C   atorvastatin 80 mg Oral Daily Tiara Bright PA-C   azithromycin 500 mg Oral Q24H Tiara Bright PA-C enoxaparin 40 mg Subcutaneous Daily Maco Daniels PA-C   guaiFENesin 600 mg Oral BID Maco Daniels PA-C   insulin glargine 10 Units Subcutaneous HS Maco Daniels PA-C   insulin lispro 1-5 Units Subcutaneous HS Maco Daniels PA-C   insulin lispro 1-6 Units Subcutaneous TID AC Skylar Booker PA-C   insulin lispro 5 Units Subcutaneous TID With Meals Maco Daniels PA-C   methylPREDNISolone sodium succinate 40 mg Intravenous Q8H Skylar Booker PA-C   metoprolol tartrate 25 mg Oral BID Maco aDniels PA-C   primidone 100 mg Oral Daily Maco Daniels PA-C   primidone 150 mg Oral HS Skylar Booker PA-C     Continuous Infusions:   PRN Meds:   albuterol    Surgical procedures (if appropriate):

## 2019-02-23 NOTE — ASSESSMENT & PLAN NOTE
· BP has been elevated however most recent BP improved  · Will continue home meds and monitor  · Will adjust dosing or add another agent if necessary

## 2019-02-23 NOTE — PLAN OF CARE
Problem: Potential for Falls  Goal: Patient will remain free of falls  Description  INTERVENTIONS:  - Assess patient frequently for physical needs  -  Identify cognitive and physical deficits and behaviors that affect risk of falls  -  Stem fall precautions as indicated by assessment   - Educate patient/family on patient safety including physical limitations  - Instruct patient to call for assistance with activity based on assessment  - Modify environment to reduce risk of injury  - Consider OT/PT consult to assist with strengthening/mobility  Outcome: Progressing     Problem: CARDIOVASCULAR - ADULT  Goal: Maintains optimal cardiac output and hemodynamic stability  Description  INTERVENTIONS:  - Monitor I/O, vital signs and rhythm  - Monitor for S/S and trends of decreased cardiac output i e  bleeding, hypotension  - Administer and titrate ordered vasoactive medications to optimize hemodynamic stability  - Assess quality of pulses, skin color and temperature  - Assess for signs of decreased coronary artery perfusion - ex   Angina  - Instruct patient to report change in severity of symptoms  Outcome: Progressing  Goal: Absence of cardiac dysrhythmias or at baseline rhythm  Description  INTERVENTIONS:  - Continuous cardiac monitoring, monitor vital signs, obtain 12 lead EKG if indicated  - Administer antiarrhythmic and heart rate control medications as ordered  - Monitor electrolytes and administer replacement therapy as ordered  Outcome: Progressing     Problem: RESPIRATORY - ADULT  Goal: Achieves optimal ventilation and oxygenation  Description  INTERVENTIONS:  - Assess for changes in respiratory status  - Assess for changes in mentation and behavior  - Position to facilitate oxygenation and minimize respiratory effort  - Oxygen administration by appropriate delivery method based on oxygen saturation (per order) or ABGs  - Initiate smoking cessation education as indicated  - Encourage broncho-pulmonary hygiene including cough, deep breathe, Incentive Spirometry  - Assess the need for suctioning and aspirate as needed  - Assess and instruct to report SOB or any respiratory difficulty  - Respiratory Therapy support as indicated  Outcome: Progressing     Problem: GASTROINTESTINAL - ADULT  Goal: Minimal or absence of nausea and/or vomiting  Description  INTERVENTIONS:  - Administer IV fluids as ordered to ensure adequate hydration  - Maintain NPO status until nausea and vomiting are resolved  - Nasogastric tube as ordered  - Administer ordered antiemetic medications as needed  - Provide nonpharmacologic comfort measures as appropriate  - Advance diet as tolerated, if ordered  - Nutrition services referral to assist patient with adequate nutrition and appropriate food choices  Outcome: Progressing  Goal: Maintains or returns to baseline bowel function  Description  INTERVENTIONS:  - Assess bowel function  - Encourage oral fluids to ensure adequate hydration  - Administer IV fluids as ordered to ensure adequate hydration  - Administer ordered medications as needed  - Encourage mobilization and activity  - Nutrition services referral to assist patient with appropriate food choices  Outcome: Progressing  Goal: Maintains adequate nutritional intake  Description  INTERVENTIONS:  - Monitor percentage of each meal consumed  - Identify factors contributing to decreased intake, treat as appropriate  - Assist with meals as needed  - Monitor I&O, WT and lab values  - Obtain nutrition services referral as needed  Outcome: Progressing     Problem: GENITOURINARY - ADULT  Goal: Maintains or returns to baseline urinary function  Description  INTERVENTIONS:  - Assess urinary function  - Encourage oral fluids to ensure adequate hydration  - Administer IV fluids as ordered to ensure adequate hydration  - Administer ordered medications as needed  - Offer frequent toileting  - Follow urinary retention protocol if ordered  Outcome: Progressing  Goal: Absence of urinary retention  Description  INTERVENTIONS:  - Assess patient?s ability to void and empty bladder  - Monitor I/O  - Bladder scan as needed  - Discuss with physician/AP medications to alleviate retention as needed  - Discuss catheterization for long term situations as appropriate  Outcome: Progressing     Problem: METABOLIC, FLUID AND ELECTROLYTES - ADULT  Goal: Electrolytes maintained within normal limits  Description  INTERVENTIONS:  - Monitor labs and assess patient for signs and symptoms of electrolyte imbalances  - Administer electrolyte replacement as ordered  - Monitor response to electrolyte replacements, including repeat lab results as appropriate  - Instruct patient on fluid and nutrition as appropriate  Outcome: Progressing  Goal: Fluid balance maintained  Description  INTERVENTIONS:  - Monitor labs and assess for signs and symptoms of volume excess or deficit  - Monitor I/O and WT  - Instruct patient on fluid and nutrition as appropriate  Outcome: Progressing  Goal: Glucose maintained within target range  Description  INTERVENTIONS:  - Monitor Blood Glucose as ordered  - Assess for signs and symptoms of hyperglycemia and hypoglycemia  - Administer ordered medications to maintain glucose within target range  - Assess nutritional intake and initiate nutrition service referral as needed  Outcome: Progressing     Problem: SKIN/TISSUE INTEGRITY - ADULT  Goal: Skin integrity remains intact  Description  INTERVENTIONS  - Identify patients at risk for skin breakdown  - Assess and monitor skin integrity  - Assess and monitor nutrition and hydration status  - Monitor labs (i e  albumin)  - Assess for incontinence   - Turn and reposition patient  - Assist with mobility/ambulation  - Relieve pressure over bony prominences  - Avoid friction and shearing  - Provide appropriate hygiene as needed including keeping skin clean and dry  - Evaluate need for skin moisturizer/barrier cream  - Collaborate with interdisciplinary team (i e  Nutrition, Rehabilitation, etc )   - Patient/family teaching  Outcome: Progressing  Goal: Oral mucous membranes remain intact  Description  INTERVENTIONS  - Assess oral mucosa and hygiene practices  - Implement preventative oral hygiene regimen  - Implement oral medicated treatments as ordered  - Initiate Nutrition services referral as needed  Outcome: Progressing     Problem: HEMATOLOGIC - ADULT  Goal: Maintains hematologic stability  Description  INTERVENTIONS  - Assess for signs and symptoms of bleeding or hemorrhage  - Monitor labs  - Administer supportive blood products/factors as ordered and appropriate  Outcome: Progressing     Problem: MUSCULOSKELETAL - ADULT  Goal: Maintain or return mobility to safest level of function  Description  INTERVENTIONS:  - Assess patient's ability to carry out ADLs; assess patient's baseline for ADL function and identify physical deficits which impact ability to perform ADLs (bathing, care of mouth/teeth, toileting, grooming, dressing, etc )  - Assess/evaluate cause of self-care deficits   - Assess range of motion  - Assess patient's mobility; develop plan if impaired  - Assess patient's need for assistive devices and provide as appropriate  - Encourage maximum independence but intervene and supervise when necessary  - Involve family in performance of ADLs  - Assess for home care needs following discharge   - Request OT consult to assist with ADL evaluation and planning for discharge  - Provide patient education as appropriate  Outcome: Progressing  Goal: Maintain proper alignment of affected body part  Description  INTERVENTIONS:  - Support, maintain and protect limb and body alignment  - Provide pt/fam with appropriate education  Outcome: Progressing     Problem: PAIN - ADULT  Goal: Verbalizes/displays adequate comfort level or baseline comfort level  Description  Interventions:  - Encourage patient to monitor pain and request assistance  - Assess pain using appropriate pain scale  - Administer analgesics based on type and severity of pain and evaluate response  - Implement non-pharmacological measures as appropriate and evaluate response  - Consider cultural and social influences on pain and pain management  - Notify physician/advanced practitioner if interventions unsuccessful or patient reports new pain  Outcome: Progressing     Problem: INFECTION - ADULT  Goal: Absence or prevention of progression during hospitalization  Description  INTERVENTIONS:  - Assess and monitor for signs and symptoms of infection  - Monitor lab/diagnostic results  - Monitor all insertion sites, i e  indwelling lines, tubes, and drains  - Monitor endotracheal (as able) and nasal secretions for changes in amount and color  - Crystal appropriate cooling/warming therapies per order  - Administer medications as ordered  - Instruct and encourage patient and family to use good hand hygiene technique  - Identify and instruct in appropriate isolation precautions for identified infection/condition  Outcome: Progressing  Goal: Absence of fever/infection during neutropenic period  Description  INTERVENTIONS:  - Monitor WBC  - Implement neutropenic guidelines  Outcome: Progressing     Problem: SAFETY ADULT  Goal: Patient will remain free of falls  Description  INTERVENTIONS:  - Assess patient frequently for physical needs  -  Identify cognitive and physical deficits and behaviors that affect risk of falls    -  Crystal fall precautions as indicated by assessment   - Educate patient/family on patient safety including physical limitations  - Instruct patient to call for assistance with activity based on assessment  - Modify environment to reduce risk of injury  - Consider OT/PT consult to assist with strengthening/mobility  Outcome: Progressing  Goal: Maintain or return to baseline ADL function  Description  INTERVENTIONS:  -  Assess patient's ability to carry out ADLs; assess patient's baseline for ADL function and identify physical deficits which impact ability to perform ADLs (bathing, care of mouth/teeth, toileting, grooming, dressing, etc )  - Assess/evaluate cause of self-care deficits   - Assess range of motion  - Assess patient's mobility; develop plan if impaired  - Assess patient's need for assistive devices and provide as appropriate  - Encourage maximum independence but intervene and supervise when necessary  ¯ Involve family in performance of ADLs  ¯ Assess for home care needs following discharge   ¯ Request OT consult to assist with ADL evaluation and planning for discharge  ¯ Provide patient education as appropriate  Outcome: Progressing  Goal: Maintain or return mobility status to optimal level  Description  INTERVENTIONS:  - Assess patient's baseline mobility status (ambulation, transfers, stairs, etc )    - Identify cognitive and physical deficits and behaviors that affect mobility  - Identify mobility aids required to assist with transfers and/or ambulation (gait belt, sit-to-stand, lift, walker, cane, etc )  - [place] fall precautions as indicated by assessment  - Record patient progress and toleration of activity level on Mobility SBAR; progress patient to next Phase/Stage  - Instruct patient to call for assistance with activity based on assessment  - Request Rehabilitation consult to assist with strengthening/weightbearing, etc   Outcome: Progressing     Problem: DISCHARGE PLANNING  Goal: Discharge to home or other facility with appropriate resources  Description  INTERVENTIONS:  - Identify barriers to discharge w/patient and caregiver  - Arrange for needed discharge resources and transportation as appropriate  - Identify discharge learning needs (meds, wound care, etc )  - Arrange for interpretive services to assist at discharge as needed  - Refer to Case Management Department for coordinating discharge planning if the patient needs post-hospital services based on physician/advanced practitioner order or complex needs related to functional status, cognitive ability, or social support system  Outcome: Progressing     Problem: Knowledge Deficit  Goal: Patient/family/caregiver demonstrates understanding of disease process, treatment plan, medications, and discharge instructions  Description  Complete learning assessment and assess knowledge base    Interventions:  - Provide teaching at level of understanding  - Provide teaching via preferred learning methods  Outcome: Progressing

## 2019-02-23 NOTE — PLAN OF CARE
Problem: PHYSICAL THERAPY ADULT  Goal: Performs mobility at highest level of function for planned discharge setting  See evaluation for individualized goals  Description  Treatment/Interventions: Endurance training          See flowsheet documentation for full assessment, interventions and recommendations  Outcome: Progressing  Note:   Prognosis: Excellent  Problem List: Decreased endurance  Assessment: Pt is 68 y o  male seen for PT evaluation s/p admit to [address] on 2/22/2019 w/ COPD with acute exacerbation ([address] )  PT consulted to assess pt's functional mobility and d/c needs  Order placed for PT eval and tx, w/ up as tolerated order  Comorbidities affecting pt's physical performance at time of assessment include: recent tremors, CAD, htn with recent increase BP, and DM  PTA, pt was requiring A for mobility, ambulates community distances and elevations, has 3 BEATRIZ, lives w/ family in 2  level house and retired  Personal factors affecting pt at time of IE include: lives in 2 story house, stairs to enter home and inability to navigate community distances  Please find objective findings from PT assessment regarding body systems outlined above with impairments and limitations including decreased endurance, decreased activity tolerance, decreased functional mobility tolerance, fall risk and SOB upon exertion  The following objective measures performed on IE also reveal limitations: Barthel Index: 100/100  Pt's clinical presentation is currently unstable/unpredictable seen in pt's presentation of recent increase in BP and SOB on exertion  Pt to benefit from continued PT tx to address deficits as defined above and maximize level of functional independent mobility and consistency  From PT/mobility standpoint, recommendation at time of d/c would be OP PT pending progress in order to facilitate return to PLOF          Recommendation: Outpatient PT     PT - OK to Discharge: Yes    See flowsheet documentation for full assessment     Jerardo Esquivel, PT

## 2019-02-23 NOTE — H&P
H&P- Angelita Groves 1945, 68 y o  male MRN: [id number]    Unit/Bed#: -01 Encounter: [id number]    Primary Care Provider: No primary care provider on file  Date and time admitted to hospital: 2/22/2019  7:25 PM        * COPD with acute exacerbation ([facility] )  Assessment & Plan  · Was using nebulizer and inhaler at home multiple times and had 3 Duo-nebs and 125mg solumedrol in ER with no improvement , will place as inpatient  · IV steriods  · Nebs ATC and prn  · Respiratory protocol  · Pulmonary consult    Tremor  Assessment & Plan  · Mysoline 100mg am and 150mg at bedtime    CAD (coronary artery disease), native coronary artery  Assessment & Plan  · With hx of CABG  · Continue home meds    Essential hypertension  Assessment & Plan  · Continue meds and monitor  · BP was elevated in ER  · Improved with evening medications    Hyperlipidemia  Assessment & Plan  · statin    Tobacco abuse  Assessment & Plan  · Smoked since age 16  · Quit 1 week ago  · Supportive care    Type 2 diabetes mellitus ([facility] )  Assessment & Plan  No results found for: HGBA1C    No results for input(s): POCGLU in the last 72 hours  Blood Sugar Average: Last 72 hrs:          VTE Prophylaxis: Enoxaparin (Lovenox)  Code Status: full code  POLST: POLST is not applicable to this patient    Anticipated Length of Stay:  Patient will be admitted on an Inpatient basis with an anticipated length of stay of  > 2 midnights  Justification for Hospital Stay: IV steriods, nebs ATC and prn, pulmonary input    Chief Complaint:   Shortness of breath    History of Present Illness:    Angelita Groves is a 68 y o  male who presents with shortness of breath  PMHx of CAD with CABG and stent, DM2, HTN, HLD, BPH and prostate cancer presents to the ER with shortness of breath that started 2 days ago  The SOB has progressed over last 2 days with increased wheezing  Tried albuterol nebulizer 3 times and MDI  at home with no relief    No fever, chills, chest pain, abd pain  Stopped smoking 1 week ago, smoked since age 16  [facility]  Review of Systems:  Review of Systems   Constitutional: Negative for chills and fever  HENT: Negative for sore throat and trouble swallowing  Eyes: Negative for discharge and visual disturbance  Respiratory: Positive for cough, shortness of breath and wheezing  Cardiovascular: Negative for chest pain and leg swelling  Gastrointestinal: Negative for abdominal pain, diarrhea, nausea and vomiting  Genitourinary: Negative for dysuria  Musculoskeletal: Negative for back pain and neck pain  Skin: Negative for wound  Neurological: Negative for dizziness, light-headedness and headaches  Psychiatric/Behavioral: Negative for confusion  Past Medical and Surgical History:   Past Medical History:   Diagnosis Date    BPH (benign prostatic hyperplasia)     45 days radiation treatment    Cardiac disease     Coronary artery disease     Diabetes mellitus ([facility] )     Hyperlipidemia     Hypertension     Prostate cancer ([facility] )        Past Surgical History:   Procedure Laterality Date    CORONARY ANGIOPLASTY WITH STENT PLACEMENT      CORONARY ARTERY BYPASS GRAFT      PROSTATE BIOPSY         Meds/Allergies:  Prior to Admission medications    Medication Sig Start Date End Date Taking?  Authorizing Provider   albuterol (2 5 mg/3 mL) 0 083 % nebulizer solution Take 1 vial (2 5 mg total) by nebulization every 4 (four) hours as needed for wheezing or shortness of breath 6/24/18  Yes Neto Arellano MD   albuterol (PROVENTIL HFA,VENTOLIN HFA) 90 mcg/act inhaler Inhale 2 puffs every 6 (six) hours as needed for wheezing or shortness of breath   Yes Historical Provider, MD   aspirin (ECOTRIN LOW STRENGTH) 81 mg EC tablet Take 81 mg by mouth daily   Yes Historical Provider, MD   atorvastatin (LIPITOR) 80 mg tablet Take 80 mg by mouth daily   Yes Historical Provider, MD   metoprolol tartrate (LOPRESSOR) 50 mg tablet Take 25 mg by mouth 2 (two) times a day 3/16/17  Yes Historical Provider, MD   primidone (MYSOLINE) 50 mg tablet Take 50 mg by mouth every 8 (eight) hours as needed 3/24/17  Yes Historical Provider, MD     I have reviewed home medications with patient personally  Allergies: No Known Allergies    Social History:  Marital Status: /Civil Union   Occupation: retired  Patient Pre-hospital Living Situation: home  Patient Pre-hospital Level of Mobility: cane as needed  Patient Pre-hospital Diet Restrictions: none  Substance Use History:     Social History     Substance and Sexual Activity   Alcohol Use Yes    Comment: on occasion     Social History     Tobacco Use   Smoking Status Former Smoker    Last attempt to quit: 2/15/2019    Years since quittin 0   Smokeless Tobacco Never Used     Social History     Substance and Sexual Activity   Drug Use No       Family History:  I have reviewed the patients family history    Physical Exam:   Vitals:   Blood Pressure: 128/80 (19)  Pulse: 71 (19)  Temperature: 97 7 °F (36 5 °C) (19)  Temp Source: Temporal (19)  Respirations: 21 (19)  Height: 5' 10" (177 8 cm)(Stated by patient ) (19)  Weight - Scale: 87 8 kg (193 lb 9 oz)(Standing scale) (19)  SpO2: 94 % (19)    Physical Exam   Constitutional: He is oriented to person, place, and time  He appears well-developed and well-nourished  HENT:   Head: Normocephalic and atraumatic  Eyes: Conjunctivae and EOM are normal  Right eye exhibits no discharge  Left eye exhibits no discharge  Neck: Normal range of motion  No tracheal deviation present  Cardiovascular: Normal rate, regular rhythm and normal heart sounds  Exam reveals no gallop and no friction rub  No murmur heard  Pulmonary/Chest: Effort normal  No respiratory distress  He has no wheezes  He has no rales     Decreased BS throughout, no significant air movement   Abdominal: Soft  Bowel sounds are normal  He exhibits no distension  There is no tenderness  There is no guarding  Musculoskeletal: Normal range of motion  He exhibits no edema, tenderness or deformity  Neurological: He is alert and oriented to person, place, and time  Skin: Skin is warm and dry  No rash noted  No erythema  No pallor  Psychiatric: He has a normal mood and affect  His behavior is normal  Judgment and thought content normal    Nursing note and vitals reviewed  Additional Data:   Lab Results: I have personally reviewed pertinent reports  Results from last 7 days   Lab Units 02/22/19 2006   WBC Thousand/uL 7 80   HEMOGLOBIN g/dL 15 2   HEMATOCRIT % 43 8   PLATELETS Thousands/uL 202   NEUTROS PCT % 64   LYMPHS PCT % 20*   MONOS PCT % 7   EOS PCT % 9*     Results from last 7 days   Lab Units 02/22/19 2006   POTASSIUM mmol/L 4 0   CHLORIDE mmol/L 102   CO2 mmol/L 26   BUN mg/dL 16   CREATININE mg/dL 0 77   CALCIUM mg/dL 9 4   ALK PHOS U/L 88   ALT U/L 10   AST U/L 12*     Results from last 7 days   Lab Units 02/22/19 2006   INR  1 01       Imaging: xray pending  XR chest 1 view portable    (Results Pending)     NetAccess/ShopIt Records Reviewed: Yes     ** Please Note: This note has been constructed using a voice recognition system   **

## 2019-02-23 NOTE — ASSESSMENT & PLAN NOTE
· Was using nebulizer and inhaler at home multiple times and had 3 Duo-nebs and 125mg solumedrol in ER with no improvement , will place as inpatient  · IV steriods  · Nebs ATC and prn  · Respiratory protocol  · Pulmonary consult

## 2019-02-23 NOTE — PLAN OF CARE
Problem: Potential for Falls  Goal: Patient will remain free of falls  Description  INTERVENTIONS:  - Assess patient frequently for physical needs  -  Identify cognitive and physical deficits and behaviors that affect risk of falls  -  [place] fall precautions as indicated by assessment   - Educate patient/family on patient safety including physical limitations  - Instruct patient to call for assistance with activity based on assessment  - Modify environment to reduce risk of injury  - Consider OT/PT consult to assist with strengthening/mobility  Outcome: Progressing     Problem: CARDIOVASCULAR - ADULT  Goal: Maintains optimal cardiac output and hemodynamic stability  Description  INTERVENTIONS:  - Monitor I/O, vital signs and rhythm  - Monitor for S/S and trends of decreased cardiac output i e  bleeding, hypotension  - Administer and titrate ordered vasoactive medications to optimize hemodynamic stability  - Assess quality of pulses, skin color and temperature  - Assess for signs of decreased coronary artery perfusion - ex   Angina  - Instruct patient to report change in severity of symptoms  Outcome: Progressing  Goal: Absence of cardiac dysrhythmias or at baseline rhythm  Description  INTERVENTIONS:  - Continuous cardiac monitoring, monitor vital signs, obtain 12 lead EKG if indicated  - Administer antiarrhythmic and heart rate control medications as ordered  - Monitor electrolytes and administer replacement therapy as ordered  Outcome: Progressing     Problem: RESPIRATORY - ADULT  Goal: Achieves optimal ventilation and oxygenation  Description  INTERVENTIONS:  - Assess for changes in respiratory status  - Assess for changes in mentation and behavior  - Position to facilitate oxygenation and minimize respiratory effort  - Oxygen administration by appropriate delivery method based on oxygen saturation (per order) or ABGs  - Initiate smoking cessation education as indicated  - Encourage broncho-pulmonary hygiene including cough, deep breathe, Incentive Spirometry  - Assess the need for suctioning and aspirate as needed  - Assess and instruct to report SOB or any respiratory difficulty  - Respiratory Therapy support as indicated  Outcome: Progressing     Problem: GASTROINTESTINAL - ADULT  Goal: Minimal or absence of nausea and/or vomiting  Description  INTERVENTIONS:  - Administer IV fluids as ordered to ensure adequate hydration  - Maintain NPO status until nausea and vomiting are resolved  - Nasogastric tube as ordered  - Administer ordered antiemetic medications as needed  - Provide nonpharmacologic comfort measures as appropriate  - Advance diet as tolerated, if ordered  - Nutrition services referral to assist patient with adequate nutrition and appropriate food choices  Outcome: Progressing  Goal: Maintains or returns to baseline bowel function  Description  INTERVENTIONS:  - Assess bowel function  - Encourage oral fluids to ensure adequate hydration  - Administer IV fluids as ordered to ensure adequate hydration  - Administer ordered medications as needed  - Encourage mobilization and activity  - Nutrition services referral to assist patient with appropriate food choices  Outcome: Progressing  Goal: Maintains adequate nutritional intake  Description  INTERVENTIONS:  - Monitor percentage of each meal consumed  - Identify factors contributing to decreased intake, treat as appropriate  - Assist with meals as needed  - Monitor I&O, WT and lab values  - Obtain nutrition services referral as needed  Outcome: Progressing     Problem: GENITOURINARY - ADULT  Goal: Maintains or returns to baseline urinary function  Description  INTERVENTIONS:  - Assess urinary function  - Encourage oral fluids to ensure adequate hydration  - Administer IV fluids as ordered to ensure adequate hydration  - Administer ordered medications as needed  - Offer frequent toileting  - Follow urinary retention protocol if ordered  Outcome: Progressing  Goal: Absence of urinary retention  Description  INTERVENTIONS:  - Assess patient?s ability to void and empty bladder  - Monitor I/O  - Bladder scan as needed  - Discuss with physician/AP medications to alleviate retention as needed  - Discuss catheterization for long term situations as appropriate  Outcome: Progressing     Problem: METABOLIC, FLUID AND ELECTROLYTES - ADULT  Goal: Electrolytes maintained within normal limits  Description  INTERVENTIONS:  - Monitor labs and assess patient for signs and symptoms of electrolyte imbalances  - Administer electrolyte replacement as ordered  - Monitor response to electrolyte replacements, including repeat lab results as appropriate  - Instruct patient on fluid and nutrition as appropriate  Outcome: Progressing  Goal: Fluid balance maintained  Description  INTERVENTIONS:  - Monitor labs and assess for signs and symptoms of volume excess or deficit  - Monitor I/O and WT  - Instruct patient on fluid and nutrition as appropriate  Outcome: Progressing  Goal: Glucose maintained within target range  Description  INTERVENTIONS:  - Monitor Blood Glucose as ordered  - Assess for signs and symptoms of hyperglycemia and hypoglycemia  - Administer ordered medications to maintain glucose within target range  - Assess nutritional intake and initiate nutrition service referral as needed  Outcome: Progressing     Problem: SKIN/TISSUE INTEGRITY - ADULT  Goal: Skin integrity remains intact  Description  INTERVENTIONS  - Identify patients at risk for skin breakdown  - Assess and monitor skin integrity  - Assess and monitor nutrition and hydration status  - Monitor labs (i e  albumin)  - Assess for incontinence   - Turn and reposition patient  - Assist with mobility/ambulation  - Relieve pressure over bony prominences  - Avoid friction and shearing  - Provide appropriate hygiene as needed including keeping skin clean and dry  - Evaluate need for skin moisturizer/barrier cream  - Collaborate with interdisciplinary team (i e  Nutrition, Rehabilitation, etc )   - Patient/family teaching  Outcome: Progressing  Goal: Oral mucous membranes remain intact  Description  INTERVENTIONS  - Assess oral mucosa and hygiene practices  - Implement preventative oral hygiene regimen  - Implement oral medicated treatments as ordered  - Initiate Nutrition services referral as needed  Outcome: Progressing     Problem: HEMATOLOGIC - ADULT  Goal: Maintains hematologic stability  Description  INTERVENTIONS  - Assess for signs and symptoms of bleeding or hemorrhage  - Monitor labs  - Administer supportive blood products/factors as ordered and appropriate  Outcome: Progressing     Problem: MUSCULOSKELETAL - ADULT  Goal: Maintain or return mobility to safest level of function  Description  INTERVENTIONS:  - Assess patient's ability to carry out ADLs; assess patient's baseline for ADL function and identify physical deficits which impact ability to perform ADLs (bathing, care of mouth/teeth, toileting, grooming, dressing, etc )  - Assess/evaluate cause of self-care deficits   - Assess range of motion  - Assess patient's mobility; develop plan if impaired  - Assess patient's need for assistive devices and provide as appropriate  - Encourage maximum independence but intervene and supervise when necessary  - Involve family in performance of ADLs  - Assess for home care needs following discharge   - Request OT consult to assist with ADL evaluation and planning for discharge  - Provide patient education as appropriate  Outcome: Progressing  Goal: Maintain proper alignment of affected body part  Description  INTERVENTIONS:  - Support, maintain and protect limb and body alignment  - Provide pt/fam with appropriate education  Outcome: Progressing     Problem: PAIN - ADULT  Goal: Verbalizes/displays adequate comfort level or baseline comfort level  Description  Interventions:  - Encourage patient to monitor pain and request assistance  - Assess pain using appropriate pain scale  - Administer analgesics based on type and severity of pain and evaluate response  - Implement non-pharmacological measures as appropriate and evaluate response  - Consider cultural and social influences on pain and pain management  - Notify physician/advanced practitioner if interventions unsuccessful or patient reports new pain  Outcome: Progressing     Problem: INFECTION - ADULT  Goal: Absence or prevention of progression during hospitalization  Description  INTERVENTIONS:  - Assess and monitor for signs and symptoms of infection  - Monitor lab/diagnostic results  - Monitor all insertion sites, i e  indwelling lines, tubes, and drains  - Monitor endotracheal (as able) and nasal secretions for changes in amount and color  - [place] appropriate cooling/warming therapies per order  - Administer medications as ordered  - Instruct and encourage patient and family to use good hand hygiene technique  - Identify and instruct in appropriate isolation precautions for identified infection/condition  Outcome: Progressing  Goal: Absence of fever/infection during neutropenic period  Description  INTERVENTIONS:  - Monitor WBC  - Implement neutropenic guidelines  Outcome: Progressing     Problem: SAFETY ADULT  Goal: Patient will remain free of falls  Description  INTERVENTIONS:  - Assess patient frequently for physical needs  -  Identify cognitive and physical deficits and behaviors that affect risk of falls    -  [place] fall precautions as indicated by assessment   - Educate patient/family on patient safety including physical limitations  - Instruct patient to call for assistance with activity based on assessment  - Modify environment to reduce risk of injury  - Consider OT/PT consult to assist with strengthening/mobility  Outcome: Progressing  Goal: Maintain or return to baseline ADL function  Description  INTERVENTIONS:  -  Assess patient's ability to carry out ADLs; assess patient's baseline for ADL function and identify physical deficits which impact ability to perform ADLs (bathing, care of mouth/teeth, toileting, grooming, dressing, etc )  - Assess/evaluate cause of self-care deficits   - Assess range of motion  - Assess patient's mobility; develop plan if impaired  - Assess patient's need for assistive devices and provide as appropriate  - Encourage maximum independence but intervene and supervise when necessary  ¯ Involve family in performance of ADLs  ¯ Assess for home care needs following discharge   ¯ Request OT consult to assist with ADL evaluation and planning for discharge  ¯ Provide patient education as appropriate  Outcome: Progressing  Goal: Maintain or return mobility status to optimal level  Description  INTERVENTIONS:  - Assess patient's baseline mobility status (ambulation, transfers, stairs, etc )    - Identify cognitive and physical deficits and behaviors that affect mobility  - Identify mobility aids required to assist with transfers and/or ambulation (gait belt, sit-to-stand, lift, walker, cane, etc )  - [place] fall precautions as indicated by assessment  - Record patient progress and toleration of activity level on Mobility SBAR; progress patient to next Phase/Stage  - Instruct patient to call for assistance with activity based on assessment  - Request Rehabilitation consult to assist with strengthening/weightbearing, etc   Outcome: Progressing     Problem: DISCHARGE PLANNING  Goal: Discharge to home or other facility with appropriate resources  Description  INTERVENTIONS:  - Identify barriers to discharge w/patient and caregiver  - Arrange for needed discharge resources and transportation as appropriate  - Identify discharge learning needs (meds, wound care, etc )  - Arrange for interpretive services to assist at discharge as needed  - Refer to Case Management Department for coordinating discharge planning if the patient needs post-hospital services based on physician/advanced practitioner order or complex needs related to functional status, cognitive ability, or social support system  Outcome: Progressing     Problem: Knowledge Deficit  Goal: Patient/family/caregiver demonstrates understanding of disease process, treatment plan, medications, and discharge instructions  Description  Complete learning assessment and assess knowledge base    Interventions:  - Provide teaching at level of understanding  - Provide teaching via preferred learning methods  Outcome: Progressing

## 2019-02-23 NOTE — PROGRESS NOTES
Progress Note - Braden Stephens 1945, 68 y o  male MRN: [id number]    Unit/Bed#: -01 Encounter: [id number]    Primary Care Provider: No primary care provider on file  Date and time admitted to hospital: 2019  7:25 PM        * COPD with acute exacerbation (HCC)  Assessment & Plan  · Improving  · Continue Solu-Medrol  · Continue Zithromax  · Oxygen as needed  · Patient states he has an appointment with pulmonologist at the [place] next week    CAD (coronary artery disease), native coronary artery  Assessment & Plan  · With hx of CABG  · Continue home meds    Essential hypertension  Assessment & Plan  · BP has been elevated however most recent BP improved  · Will continue home meds and monitor  · Will adjust dosing or add another agent if necessary    Hyperlipidemia  Assessment & Plan  · statin    Tobacco abuse  Assessment & Plan  · Smoked since age 16  · Quit 1 week ago  · Supportive care    Type 2 diabetes mellitus (HCC)  Assessment & Plan  Insulin sliding scale        VTE Pharmacologic Prophylaxis: Pharmacologic: Enoxaparin (Lovenox)    Patient Centered Rounds: I have performed bedside rounds with nursing staff today  Discussions with Specialists or Other Care Team Provider: yes  Education and Discussions with Family / Patient: yes    Time Spent for Care: 30 minutes  More than 50% of total time spent on counseling and coordination of care as described above  Current Length of Stay: 1 day(s)    Current Patient Status: Inpatient   Certification Statement: The patient will continue to require additional inpatient hospital stay due to COPD exacerbation    Discharge Plan:  Pending hospital course    Code Status: Level 1 - Full Code    Subjective:   Patient states he feels improved from admission  Denies any chest pain  No fever or chills  Tolerating diet      Objective:     Vitals:   Temp (24hrs), Av 8 °F (36 6 °C), Min:97 4 °F (36 3 °C), Max:98 4 °F (36 9 °C)    Temp:  [97 4 °F (36 3 °C)-98 4 °F (36 9 °C)] 97 4 °F (36 3 °C)  HR:  [] 102  Resp:  [16-35] 18  BP: (128-229)/() 142/68  SpO2:  [91 %-100 %] 91 %  Body mass index is 27 77 kg/m²  Input and Output Summary (last 24 hours): Intake/Output Summary (Last 24 hours) at 2/23/2019 1218  Last data filed at 2/23/2019 1128  Gross per 24 hour   Intake    Output 525 ml   Net -525 ml       Physical Exam:     Physical Exam   Constitutional: He appears well-developed  No distress  HENT:   Head: Normocephalic and atraumatic  Eyes: Conjunctivae and EOM are normal    Neck: Normal range of motion  Neck supple  Cardiovascular: Normal rate and regular rhythm  Pulmonary/Chest: Effort normal  No respiratory distress  He has wheezes  Abdominal: Soft  He exhibits no distension  There is no tenderness  Musculoskeletal: Normal range of motion  He exhibits no edema  Neurological: He is alert  No cranial nerve deficit  Skin: Skin is warm and dry  Psychiatric: He has a normal mood and affect  Additional Data:     Labs:    Results from last 7 days   Lab Units 02/22/19 2006   WBC Thousand/uL 7 80   HEMOGLOBIN g/dL 15 2   HEMATOCRIT % 43 8   PLATELETS Thousands/uL 202   NEUTROS PCT % 64   LYMPHS PCT % 20*   MONOS PCT % 7   EOS PCT % 9*     Results from last 7 days   Lab Units 02/22/19 2006   POTASSIUM mmol/L 4 0   CHLORIDE mmol/L 102   CO2 mmol/L 26   BUN mg/dL 16   CREATININE mg/dL 0 77   CALCIUM mg/dL 9 4   ALK PHOS U/L 88   ALT U/L 10   AST U/L 12*     Results from last 7 days   Lab Units 02/22/19 2006   INR  1 01     Results from last 7 days   Lab Units 02/23/19  1127 02/23/19  0700 02/22/19  2202   POC GLUCOSE mg/dl 235* 186* 170*           * I Have Reviewed All Lab Data Listed Above  * Additional Pertinent Lab Tests Reviewed:  [facility] 67 Admission  Reviewed    Imaging:  Imaging Reports Reviewed Today Include:  No new imaging    Recent Cultures (last 7 days):           Last 24 Hours Medication List: Current Facility-Administered Medications:  albuterol 2 5 mg Nebulization Q4H PRN Greg Sinha, SPEEDY   aspirin 81 mg Oral Daily Greg Sinha, SPEEDY   atorvastatin 80 mg Oral Daily Greg Sinha, SPEEDY   azithromycin 500 mg Oral Q24H Greg Sinha, SPEEDY   enoxaparin 40 mg Subcutaneous Daily Greg Sinha, SPEEDY   guaiFENesin 600 mg Oral BID Greg Sinha, SPEEDY   insulin glargine 10 Units Subcutaneous HS Greg Sinha, SPEEDY   insulin lispro 1-5 Units Subcutaneous HS Greg Sinha, SPEEDY   insulin lispro 1-6 Units Subcutaneous TID AC Skylar Booker, SPEEDY   insulin lispro 5 Units Subcutaneous TID With Meals Greg Sinha PA-C   methylPREDNISolone sodium succinate 40 mg Intravenous Q8H Skylar Booker PA-C   metoprolol tartrate 25 mg Oral BID Greg Sinha, SPEEDY   primidone 100 mg Oral Daily Greg Sinha, SPEEDY   primidone 150 mg Oral HS Greg Sinha PA-C        Today, Patient Was Seen By: Barbara Grajeda MD    ** Please Note: Dictation voice to text software may have been used in the creation of this document   **

## 2019-02-23 NOTE — SOCIAL WORK
Pt was wife called with the phone # of the pt's room so she can call him, pt has a high lace score, unable to make an outpt cm referral for this pt, pt does not have a [facility]u [facility] pcp, pt goes to the [facility] in [facility],

## 2019-02-23 NOTE — PLAN OF CARE
Problem: DISCHARGE PLANNING - CARE MANAGEMENT  Goal: Discharge to post-acute care or home with appropriate resources  Description  INTERVENTIONS:  - Conduct assessment to determine patient/family and health care team treatment goals, and need for post-acute services based on payer coverage, community resources, and patient preferences, and barriers to discharge  - Address psychosocial, clinical, and financial barriers to discharge as identified in assessment in conjunction with the patient/family and health care team  - Arrange appropriate level of post-acute services according to patient's   needs and preference and payer coverage in collaboration with the physician and health care team  - Communicate with and update the patient/family, physician, and health care team regarding progress on the discharge plan  - Arrange appropriate transportation to post-acute venues    Pt's goal is to return home with his spouse   Outcome: Progressing

## 2019-02-23 NOTE — ASSESSMENT & PLAN NOTE
· Improving  · Continue Solu-Medrol  · Continue Zithromax  · Oxygen as needed  · Patient states he has an appointment with pulmonologist at the [place] next week

## 2019-02-24 VITALS
WEIGHT: 193.56 LBS | BODY MASS INDEX: 27.71 KG/M2 | SYSTOLIC BLOOD PRESSURE: 128 MMHG | DIASTOLIC BLOOD PRESSURE: 64 MMHG | RESPIRATION RATE: 18 BRPM | TEMPERATURE: 97.2 F | HEIGHT: 70 IN | HEART RATE: 80 BPM | OXYGEN SATURATION: 95 %

## 2019-02-24 LAB
ANION GAP SERPL CALCULATED.3IONS-SCNC: 7 MMOL/L (ref 4–13)
BASOPHILS # BLD AUTO: 0 THOUSANDS/ΜL (ref 0–0.1)
BASOPHILS NFR BLD AUTO: 0 % (ref 0–2)
BUN SERPL-MCNC: 20 MG/DL (ref 7–25)
CALCIUM SERPL-MCNC: 9.1 MG/DL (ref 8.6–10.5)
CHLORIDE SERPL-SCNC: 103 MMOL/L (ref 98–107)
CO2 SERPL-SCNC: 26 MMOL/L (ref 21–31)
CREAT SERPL-MCNC: 0.84 MG/DL (ref 0.7–1.3)
EOSINOPHIL # BLD AUTO: 0 THOUSAND/ΜL (ref 0–0.61)
EOSINOPHIL NFR BLD AUTO: 0 % (ref 0–5)
ERYTHROCYTE [DISTWIDTH] IN BLOOD BY AUTOMATED COUNT: 13.4 % (ref 11.5–14.5)
GFR SERPL CREATININE-BSD FRML MDRD: 87 ML/MIN/1.73SQ M
GLUCOSE SERPL-MCNC: 178 MG/DL (ref 65–140)
GLUCOSE SERPL-MCNC: 181 MG/DL (ref 65–99)
GLUCOSE SERPL-MCNC: 325 MG/DL (ref 65–140)
HCT VFR BLD AUTO: 39.8 % (ref 42–47)
HGB BLD-MCNC: 13.6 G/DL (ref 14–18)
LYMPHOCYTES # BLD AUTO: 1.1 THOUSANDS/ΜL (ref 0.6–4.47)
LYMPHOCYTES NFR BLD AUTO: 9 % (ref 21–51)
MCH RBC QN AUTO: 30.8 PG (ref 26–34)
MCHC RBC AUTO-ENTMCNC: 34.2 G/DL (ref 31–37)
MCV RBC AUTO: 90 FL (ref 81–99)
MONOCYTES # BLD AUTO: 0.4 THOUSAND/ΜL (ref 0.17–1.22)
MONOCYTES NFR BLD AUTO: 3 % (ref 2–12)
NEUTROPHILS # BLD AUTO: 11.2 THOUSANDS/ΜL (ref 1.4–6.5)
NEUTS SEG NFR BLD AUTO: 88 % (ref 42–75)
NRBC BLD AUTO-RTO: 0 /100 WBCS
PLATELET # BLD AUTO: 216 THOUSANDS/UL (ref 149–390)
PMV BLD AUTO: 9.1 FL (ref 8.6–11.7)
POTASSIUM SERPL-SCNC: 4 MMOL/L (ref 3.5–5.5)
RBC # BLD AUTO: 4.43 MILLION/UL (ref 4.3–5.9)
SODIUM SERPL-SCNC: 136 MMOL/L (ref 134–143)
WBC # BLD AUTO: 12.6 THOUSAND/UL (ref 4.8–10.8)

## 2019-02-24 PROCEDURE — 85025 COMPLETE CBC W/AUTO DIFF WBC: CPT | Performed by: INTERNAL MEDICINE

## 2019-02-24 PROCEDURE — 82948 REAGENT STRIP/BLOOD GLUCOSE: CPT

## 2019-02-24 PROCEDURE — 80048 BASIC METABOLIC PNL TOTAL CA: CPT | Performed by: INTERNAL MEDICINE

## 2019-02-24 PROCEDURE — 99232 SBSQ HOSP IP/OBS MODERATE 35: CPT | Performed by: INTERNAL MEDICINE

## 2019-02-24 PROCEDURE — 94760 N-INVAS EAR/PLS OXIMETRY 1: CPT

## 2019-02-24 RX ORDER — PREDNISONE 10 MG/1
30 TABLET ORAL DAILY
Qty: 12 TABLET | Refills: 0 | Status: ON HOLD | OUTPATIENT
Start: 2019-02-24 | End: 2019-03-14 | Stop reason: ALTCHOICE

## 2019-02-24 RX ORDER — HYDRALAZINE HYDROCHLORIDE 20 MG/ML
10 INJECTION INTRAMUSCULAR; INTRAVENOUS EVERY 6 HOURS PRN
Status: DISCONTINUED | OUTPATIENT
Start: 2019-02-24 | End: 2019-02-24 | Stop reason: HOSPADM

## 2019-02-24 RX ADMIN — HYDRALAZINE HYDROCHLORIDE 10 MG: 20 INJECTION INTRAMUSCULAR; INTRAVENOUS at 00:43

## 2019-02-24 RX ADMIN — METOPROLOL TARTRATE 25 MG: 25 TABLET, FILM COATED ORAL at 07:56

## 2019-02-24 RX ADMIN — PRIMIDONE 100 MG: 50 TABLET ORAL at 08:00

## 2019-02-24 RX ADMIN — ATORVASTATIN CALCIUM 80 MG: 40 TABLET, FILM COATED ORAL at 07:55

## 2019-02-24 RX ADMIN — INSULIN LISPRO 5 UNITS: 100 INJECTION, SOLUTION INTRAVENOUS; SUBCUTANEOUS at 11:20

## 2019-02-24 RX ADMIN — ASPIRIN 81 MG: 81 TABLET, COATED ORAL at 07:57

## 2019-02-24 RX ADMIN — INSULIN LISPRO 5 UNITS: 100 INJECTION, SOLUTION INTRAVENOUS; SUBCUTANEOUS at 11:19

## 2019-02-24 RX ADMIN — INSULIN LISPRO 1 UNITS: 100 INJECTION, SOLUTION INTRAVENOUS; SUBCUTANEOUS at 07:59

## 2019-02-24 RX ADMIN — GUAIFENESIN 600 MG: 600 TABLET, EXTENDED RELEASE ORAL at 07:56

## 2019-02-24 RX ADMIN — ENOXAPARIN SODIUM 40 MG: 40 INJECTION SUBCUTANEOUS at 07:57

## 2019-02-24 RX ADMIN — INSULIN LISPRO 5 UNITS: 100 INJECTION, SOLUTION INTRAVENOUS; SUBCUTANEOUS at 07:59

## 2019-02-24 RX ADMIN — METHYLPREDNISOLONE SODIUM SUCCINATE 40 MG: 40 INJECTION, POWDER, FOR SOLUTION INTRAMUSCULAR; INTRAVENOUS at 05:58

## 2019-02-24 NOTE — PLAN OF CARE
Problem: Potential for Falls  Goal: Patient will remain free of falls  Description  INTERVENTIONS:  - Assess patient frequently for physical needs  -  Identify cognitive and physical deficits and behaviors that affect risk of falls  -  [place] fall precautions as indicated by assessment   - Educate patient/family on patient safety including physical limitations  - Instruct patient to call for assistance with activity based on assessment  - Modify environment to reduce risk of injury  - Consider OT/PT consult to assist with strengthening/mobility  2/23/2019 2033 by Chad Johnson RN  Outcome: Progressing  2/23/2019 2033 by Chad Johnson RN  Outcome: Progressing     Problem: CARDIOVASCULAR - ADULT  Goal: Maintains optimal cardiac output and hemodynamic stability  Description  INTERVENTIONS:  - Monitor I/O, vital signs and rhythm  - Monitor for S/S and trends of decreased cardiac output i e  bleeding, hypotension  - Administer and titrate ordered vasoactive medications to optimize hemodynamic stability  - Assess quality of pulses, skin color and temperature  - Assess for signs of decreased coronary artery perfusion - ex   Angina  - Instruct patient to report change in severity of symptoms  2/23/2019 2033 by Chad Johnson RN  Outcome: Progressing  2/23/2019 2033 by Chad Johnson RN  Outcome: Progressing  Goal: Absence of cardiac dysrhythmias or at baseline rhythm  Description  INTERVENTIONS:  - Continuous cardiac monitoring, monitor vital signs, obtain 12 lead EKG if indicated  - Administer antiarrhythmic and heart rate control medications as ordered  - Monitor electrolytes and administer replacement therapy as ordered  2/23/2019 2033 by Chad Johnson RN  Outcome: Progressing  2/23/2019 2033 by Chad Johnson RN  Outcome: Progressing     Problem: RESPIRATORY - ADULT  Goal: Achieves optimal ventilation and oxygenation  Description  INTERVENTIONS:  - Assess for changes in respiratory status  - Assess for changes in mentation and behavior  - Position to facilitate oxygenation and minimize respiratory effort  - Oxygen administration by appropriate delivery method based on oxygen saturation (per order) or ABGs  - Initiate smoking cessation education as indicated  - Encourage broncho-pulmonary hygiene including cough, deep breathe, Incentive Spirometry  - Assess the need for suctioning and aspirate as needed  - Assess and instruct to report SOB or any respiratory difficulty  - Respiratory Therapy support as indicated  2/23/2019 2033 by Ivania Del Rio RN  Outcome: Progressing  2/23/2019 2033 by Ivania Del Rio RN  Outcome: Progressing     Problem: GASTROINTESTINAL - ADULT  Goal: Minimal or absence of nausea and/or vomiting  Description  INTERVENTIONS:  - Administer IV fluids as ordered to ensure adequate hydration  - Maintain NPO status until nausea and vomiting are resolved  - Nasogastric tube as ordered  - Administer ordered antiemetic medications as needed  - Provide nonpharmacologic comfort measures as appropriate  - Advance diet as tolerated, if ordered  - Nutrition services referral to assist patient with adequate nutrition and appropriate food choices  2/23/2019 2033 by Ivania Del Rio RN  Outcome: Progressing  2/23/2019 2033 by Ivania Del Rio RN  Outcome: Progressing  Goal: Maintains or returns to baseline bowel function  Description  INTERVENTIONS:  - Assess bowel function  - Encourage oral fluids to ensure adequate hydration  - Administer IV fluids as ordered to ensure adequate hydration  - Administer ordered medications as needed  - Encourage mobilization and activity  - Nutrition services referral to assist patient with appropriate food choices  2/23/2019 2033 by Ivania Del Rio RN  Outcome: Progressing  2/23/2019 2033 by Ivania Del Rio RN  Outcome: Progressing  Goal: Maintains adequate nutritional intake  Description  INTERVENTIONS:  - Monitor percentage of each meal consumed  - Identify factors contributing to decreased intake, treat as appropriate  - Assist with meals as needed  - Monitor I&O, WT and lab values  - Obtain nutrition services referral as needed  2/23/2019 2033 by Rupidner Valdez RN  Outcome: Progressing  2/23/2019 2033 by Rupinder Valdez RN  Outcome: Progressing     Problem: GENITOURINARY - ADULT  Goal: Maintains or returns to baseline urinary function  Description  INTERVENTIONS:  - Assess urinary function  - Encourage oral fluids to ensure adequate hydration  - Administer IV fluids as ordered to ensure adequate hydration  - Administer ordered medications as needed  - Offer frequent toileting  - Follow urinary retention protocol if ordered  2/23/2019 2033 by Rupinder Valdez RN  Outcome: Progressing  2/23/2019 2033 by Rupinder Valdez RN  Outcome: Progressing  Goal: Absence of urinary retention  Description  INTERVENTIONS:  - Assess patient?s ability to void and empty bladder  - Monitor I/O  - Bladder scan as needed  - Discuss with physician/AP medications to alleviate retention as needed  - Discuss catheterization for long term situations as appropriate  2/23/2019 2033 by Rupinder Valdez RN  Outcome: Progressing  2/23/2019 2033 by Rupinder Valdez RN  Outcome: Progressing     Problem: METABOLIC, FLUID AND ELECTROLYTES - ADULT  Goal: Electrolytes maintained within normal limits  Description  INTERVENTIONS:  - Monitor labs and assess patient for signs and symptoms of electrolyte imbalances  - Administer electrolyte replacement as ordered  - Monitor response to electrolyte replacements, including repeat lab results as appropriate  - Instruct patient on fluid and nutrition as appropriate  2/23/2019 2033 by Rupinder Valdez RN  Outcome: Progressing  2/23/2019 2033 by Rupinder Valdez RN  Outcome: Progressing  Goal: Fluid balance maintained  Description  INTERVENTIONS:  - Monitor labs and assess for signs and symptoms of volume excess or deficit  - Monitor I/O and WT  - Instruct patient on fluid and nutrition as appropriate  2/23/2019 2033 by Amy Mcdermott RN  Outcome: Progressing  2/23/2019 2033 by Amy Mcdermott RN  Outcome: Progressing  Goal: Glucose maintained within target range  Description  INTERVENTIONS:  - Monitor Blood Glucose as ordered  - Assess for signs and symptoms of hyperglycemia and hypoglycemia  - Administer ordered medications to maintain glucose within target range  - Assess nutritional intake and initiate nutrition service referral as needed  2/23/2019 2033 by Amy Mcdermott RN  Outcome: Progressing  2/23/2019 2033 by Amy Mcdermott RN  Outcome: Progressing     Problem: SKIN/TISSUE INTEGRITY - ADULT  Goal: Skin integrity remains intact  Description  INTERVENTIONS  - Identify patients at risk for skin breakdown  - Assess and monitor skin integrity  - Assess and monitor nutrition and hydration status  - Monitor labs (i e  albumin)  - Assess for incontinence   - Turn and reposition patient  - Assist with mobility/ambulation  - Relieve pressure over bony prominences  - Avoid friction and shearing  - Provide appropriate hygiene as needed including keeping skin clean and dry  - Evaluate need for skin moisturizer/barrier cream  - Collaborate with interdisciplinary team (i e  Nutrition, Rehabilitation, etc )   - Patient/family teaching  2/23/2019 2033 by Amy Mcdermott RN  Outcome: Progressing  2/23/2019 2033 by Amy Mcdermott RN  Outcome: Progressing  Goal: Oral mucous membranes remain intact  Description  INTERVENTIONS  - Assess oral mucosa and hygiene practices  - Implement preventative oral hygiene regimen  - Implement oral medicated treatments as ordered  - Initiate Nutrition services referral as needed  2/23/2019 2033 by Amy Mcdermott RN  Outcome: Progressing  2/23/2019 2033 by Amy Mcdermott RN  Outcome: Progressing     Problem: HEMATOLOGIC - ADULT  Goal: Maintains hematologic stability  Description  INTERVENTIONS  - Assess for signs and symptoms of bleeding or hemorrhage  - Monitor labs  - Administer supportive blood products/factors as ordered and appropriate  2/23/2019 2033 by Yasmine Ryder RN  Outcome: Progressing  2/23/2019 2033 by Yasmine Ryder RN  Outcome: Progressing     Problem: MUSCULOSKELETAL - ADULT  Goal: Maintain or return mobility to safest level of function  Description  INTERVENTIONS:  - Assess patient's ability to carry out ADLs; assess patient's baseline for ADL function and identify physical deficits which impact ability to perform ADLs (bathing, care of mouth/teeth, toileting, grooming, dressing, etc )  - Assess/evaluate cause of self-care deficits   - Assess range of motion  - Assess patient's mobility; develop plan if impaired  - Assess patient's need for assistive devices and provide as appropriate  - Encourage maximum independence but intervene and supervise when necessary  - Involve family in performance of ADLs  - Assess for home care needs following discharge   - Request OT consult to assist with ADL evaluation and planning for discharge  - Provide patient education as appropriate  2/23/2019 2033 by Yasmine Ryder RN  Outcome: Progressing  2/23/2019 2033 by Yasmine Ryder RN  Outcome: Progressing  Goal: Maintain proper alignment of affected body part  Description  INTERVENTIONS:  - Support, maintain and protect limb and body alignment  - Provide pt/fam with appropriate education  2/23/2019 2033 by Yasmine Ryder RN  Outcome: Progressing  2/23/2019 2033 by Yasmine Ryder RN  Outcome: Progressing     Problem: PAIN - ADULT  Goal: Verbalizes/displays adequate comfort level or baseline comfort level  Description  Interventions:  - Encourage patient to monitor pain and request assistance  - Assess pain using appropriate pain scale  - Administer analgesics based on type and severity of pain and evaluate response  - Implement non-pharmacological measures as appropriate and evaluate response  - Consider cultural and social influences on pain and pain management  - Notify physician/advanced practitioner if interventions unsuccessful or patient reports new pain  2/23/2019 2033 by Nils Fernandez RN  Outcome: Progressing  2/23/2019 2033 by Nils Fernandez RN  Outcome: Progressing     Problem: INFECTION - ADULT  Goal: Absence or prevention of progression during hospitalization  Description  INTERVENTIONS:  - Assess and monitor for signs and symptoms of infection  - Monitor lab/diagnostic results  - Monitor all insertion sites, i e  indwelling lines, tubes, and drains  - Monitor endotracheal (as able) and nasal secretions for changes in amount and color  - [place] appropriate cooling/warming therapies per order  - Administer medications as ordered  - Instruct and encourage patient and family to use good hand hygiene technique  - Identify and instruct in appropriate isolation precautions for identified infection/condition  2/23/2019 2033 by Nils Fernandez RN  Outcome: Progressing  2/23/2019 2033 by Nils Fernandez RN  Outcome: Progressing  Goal: Absence of fever/infection during neutropenic period  Description  INTERVENTIONS:  - Monitor WBC  - Implement neutropenic guidelines  2/23/2019 2033 by Nils Fernandez RN  Outcome: Progressing  2/23/2019 2033 by Nils Fernandez RN  Outcome: Progressing     Problem: SAFETY ADULT  Goal: Patient will remain free of falls  Description  INTERVENTIONS:  - Assess patient frequently for physical needs  -  Identify cognitive and physical deficits and behaviors that affect risk of falls    -  [place] fall precautions as indicated by assessment   - Educate patient/family on patient safety including physical limitations  - Instruct patient to call for assistance with activity based on assessment  - Modify environment to reduce risk of injury  - Consider OT/PT consult to assist with strengthening/mobility  2/23/2019 2033 by Nils Fernandez RN  Outcome: Progressing  2/23/2019 2033 by Nils Fernandez RN  Outcome: Progressing  Goal: Maintain or return to baseline ADL function  Description  INTERVENTIONS:  -  Assess patient's ability to carry out ADLs; assess patient's baseline for ADL function and identify physical deficits which impact ability to perform ADLs (bathing, care of mouth/teeth, toileting, grooming, dressing, etc )  - Assess/evaluate cause of self-care deficits   - Assess range of motion  - Assess patient's mobility; develop plan if impaired  - Assess patient's need for assistive devices and provide as appropriate  - Encourage maximum independence but intervene and supervise when necessary  ¯ Involve family in performance of ADLs  ¯ Assess for home care needs following discharge   ¯ Request OT consult to assist with ADL evaluation and planning for discharge  ¯ Provide patient education as appropriate  2/23/2019 2033 by Amy Mcdermott RN  Outcome: Progressing  2/23/2019 2033 by Amy Mcdermott RN  Outcome: Progressing  Goal: Maintain or return mobility status to optimal level  Description  INTERVENTIONS:  - Assess patient's baseline mobility status (ambulation, transfers, stairs, etc )    - Identify cognitive and physical deficits and behaviors that affect mobility  - Identify mobility aids required to assist with transfers and/or ambulation (gait belt, sit-to-stand, lift, walker, cane, etc )  - [place] fall precautions as indicated by assessment  - Record patient progress and toleration of activity level on Mobility SBAR; progress patient to next Phase/Stage  - Instruct patient to call for assistance with activity based on assessment  - Request Rehabilitation consult to assist with strengthening/weightbearing, etc   2/23/2019 2033 by Amy Mcdermott RN  Outcome: Progressing  2/23/2019 2033 by Amy Mcdermott RN  Outcome: Progressing     Problem: DISCHARGE PLANNING  Goal: Discharge to home or other facility with appropriate resources  Description  INTERVENTIONS:  - Identify barriers to discharge w/patient and caregiver  - Arrange for needed discharge resources and transportation as appropriate  - Identify discharge learning needs (meds, wound care, etc )  - Arrange for interpretive services to assist at discharge as needed  - Refer to Case Management Department for coordinating discharge planning if the patient needs post-hospital services based on physician/advanced practitioner order or complex needs related to functional status, cognitive ability, or social support system  2/23/2019 2033 by Pravin Lovett RN  Outcome: Progressing  2/23/2019 2033 by Pravin Lovett RN  Outcome: Progressing     Problem: Knowledge Deficit  Goal: Patient/family/caregiver demonstrates understanding of disease process, treatment plan, medications, and discharge instructions  Description  Complete learning assessment and assess knowledge base    Interventions:  - Provide teaching at level of understanding  - Provide teaching via preferred learning methods  2/23/2019 2033 by Pravin Lovett RN  Outcome: Progressing  2/23/2019 2033 by Pravin Lovett RN  Outcome: Progressing     Problem: DISCHARGE PLANNING - CARE MANAGEMENT  Goal: Discharge to post-acute care or home with appropriate resources  Description  INTERVENTIONS:  - Conduct assessment to determine patient/family and health care team treatment goals, and need for post-acute services based on payer coverage, community resources, and patient preferences, and barriers to discharge  - Address psychosocial, clinical, and financial barriers to discharge as identified in assessment in conjunction with the patient/family and health care team  - Arrange appropriate level of post-acute services according to patient's   needs and preference and payer coverage in collaboration with the physician and health care team  - Communicate with and update the patient/family, physician, and health care team regarding progress on the discharge plan  - Arrange appropriate transportation to post-acute venues    Pt's goal is to return home with his spouse   2/23/2019 2033 by Pravin Lovett RN  Outcome: Progressing  2/23/2019 2033 by Pravin Lovett RN  Outcome: Progressing

## 2019-02-24 NOTE — ASSESSMENT & PLAN NOTE
· Improving  · Will transition to p o   Steroids as patient will be discharged today  · No need for antibiotics on discharge  · Patient states he has an appointment with pulmonologist at the [facility] next week

## 2019-02-24 NOTE — SOCIAL WORK
Chart reviewed by case management, d/c order has been written, pt denies any d/c needs  Pt declined a rx for out pt therapy as recommended by pt dept, pt has a high lace score and I am unable to make a referral , pt's pcp is from the [place], pt has an appointment wed 2/27/19 at the [facility] in [facility] to see his pulmonologist, pt drove here and he plans to drive himself home, Paige Rowe Poster and nurse were made aware, I did try to reach his wife to make her aware of the d/c,but there was no answer, pt denies any d/c needs  Pt is in agreement with the d/c and d/c plan home with his spouse

## 2019-02-24 NOTE — PLAN OF CARE
Problem: DISCHARGE PLANNING - CARE MANAGEMENT  Goal: Discharge to post-acute care or home with appropriate resources  Description  INTERVENTIONS:  - Conduct assessment to determine patient/family and health care team treatment goals, and need for post-acute services based on payer coverage, community resources, and patient preferences, and barriers to discharge  - Address psychosocial, clinical, and financial barriers to discharge as identified in assessment in conjunction with the patient/family and health care team  - Arrange appropriate level of post-acute services according to patient's   needs and preference and payer coverage in collaboration with the physician and health care team  - Communicate with and update the patient/family, physician, and health care team regarding progress on the discharge plan  - Arrange appropriate transportation to post-acute venues    Pt's goal is to return home with his spouse   Outcome: Completed

## 2019-02-24 NOTE — ASSESSMENT & PLAN NOTE
· BP has been fluctuating however relatively controlled today  · Follow up with PCP for further management  · Continue home meds

## 2019-02-24 NOTE — PROGRESS NOTES
Progress Note - Harjit Cruz 1945, 68 y o  male MRN: [id number]    Unit/Bed#: -01 Encounter: [id number]    Primary Care Provider: No primary care provider on file  Date and time admitted to hospital: 2/22/2019  7:25 PM        * COPD with acute exacerbation ([facility] )  Assessment & Plan  · Improving  · Will transition to p o  Steroids as patient will be discharged today  · No need for antibiotics on discharge  · Patient states he has an appointment with pulmonologist at the [facility] next week    Essential hypertension  Assessment & Plan  · BP has been fluctuating however relatively controlled today  · Follow up with PCP for further management  · Continue home meds    Hyperlipidemia  Assessment & Plan  · statin    Tobacco abuse  Assessment & Plan  · Smoked since age 16  · Quit 1 week ago  · Supportive care    Type 2 diabetes mellitus ([facility] )  Assessment & Plan  Continue home regimen        Discharging Physician / Practitioner: Eva Perez MD  PCP: No primary care provider on file  Admission Date:   Admission Orders (From admission, onward)    Ordered        02/22/19 2136  Inpatient Admission  Once     Order ID Start Status   [id number] 02/22/19 2136 Completed              Discharge Date: 02/24/19    Resolved Problems  Date Reviewed: 2/22/2019    None          Consultations During Hospital Stay:  · None    Procedures Performed:   · None    Significant Findings / Test Results:   · COPD exacerbation    Incidental Findings:   · None     Test Results Pending at Discharge (will require follow up): · None     Outpatient Tests Requested:  · Follow up with pulmonology at the [facility] which patient has scheduled for this Wednesday    Complications:  None    Reason for Admission:  COPD exacerbation    Hospital Course:     Harjit Cruz is a 68 y o  male patient who originally presented to the hospital on 2/22/2019 due to shortness of breath    Patient admitted for COPD exacerbation and started on IV steroids as well as Zithromax  Patient clinically improved gradually  On day of discharge patient was saturating well on room air  Denied any shortness of breath, cough, chest pain  Patient was discharged on prednisone taper  Advised to follow up with the [place] pulmonology which he has an appointment with next week  Patient was in agreement with discharge plan and stable for discharge home  Please see above list of diagnoses and related plan for additional information  Condition at Discharge: stable     Discharge Day Visit / Exam:     Subjective:  No complaints at this time  Vitals: Blood Pressure: 128/64 (02/24/19 0755)  Pulse: 80 (02/24/19 0755)  Temperature: (!) 97 2 °F (36 2 °C) (02/24/19 0755)  Temp Source: Temporal (02/24/19 0755)  Respirations: 18 (02/24/19 0755)  Height: 5' 10" (177 8 cm)(Stated by patient ) (02/22/19 2200)  Weight - Scale: 87 8 kg (193 lb 9 oz)(Standing scale) (02/22/19 2200)  SpO2: 95 % (02/24/19 0755)     Exam:   Physical Exam   Constitutional: He appears well-developed  No distress  HENT:   Head: Normocephalic and atraumatic  Eyes: Conjunctivae and EOM are normal    Neck: Normal range of motion  Neck supple  Cardiovascular: Normal rate and regular rhythm  Pulmonary/Chest: Effort normal  No respiratory distress  Abdominal: Soft  He exhibits no distension  There is no tenderness  Musculoskeletal: Normal range of motion  He exhibits no edema  Neurological: He is alert  No cranial nerve deficit  Skin: Skin is warm and dry  Psychiatric: He has a normal mood and affect  Discharge instructions/Information to patient and family:   See after visit summary for information provided to patient and family  Provisions for Follow-Up Care:  See after visit summary for information related to follow-up care and any pertinent home health orders        Disposition:     Home    For Discharges to [facility] SNF:   · Not Applicable to this Patient - Not Applicable to this Patient    Planned Readmission:  No     Discharge Statement:  I spent 35 minutes discharging the patient  This time was spent on the day of discharge  I had direct contact with the patient on the day of discharge  Greater than 50% of the total time was spent examining patient, answering all patient questions, arranging and discussing plan of care with patient as well as directly providing post-discharge instructions  Additional time then spent on discharge activities  Discharge Medications:  See after visit summary for reconciled discharge medications provided to patient and family        ** Please Note: This note has been constructed using a voice recognition system **

## 2019-02-27 LAB
BACTERIA BLD CULT: NORMAL
BACTERIA BLD CULT: NORMAL

## 2019-02-28 ENCOUNTER — TELEPHONE (OUTPATIENT)
Dept: MEDSURG UNIT | Facility: HOSPITAL | Age: 74
End: 2019-02-28

## 2019-03-13 ENCOUNTER — HOSPITAL ENCOUNTER (OUTPATIENT)
Facility: HOSPITAL | Age: 74
Setting detail: OBSERVATION
Discharge: HOME | End: 2019-03-14
Attending: EMERGENCY MEDICINE | Admitting: HOSPITALIST
Payer: MEDICARE

## 2019-03-13 DIAGNOSIS — J44.1 COPD WITH ACUTE EXACERBATION (HCC): Primary | ICD-10-CM

## 2019-03-13 LAB
ANION GAP SERPL CALCULATED.3IONS-SCNC: 9 MMOL/L (ref 4–13)
BASOPHILS # BLD AUTO: 0.1 THOUSANDS/ΜL (ref 0–0.1)
BASOPHILS NFR BLD AUTO: 1 % (ref 0–2)
BUN SERPL-MCNC: 15 MG/DL (ref 7–25)
CALCIUM SERPL-MCNC: 9.1 MG/DL (ref 8.6–10.5)
CHLORIDE SERPL-SCNC: 102 MMOL/L (ref 98–107)
CO2 SERPL-SCNC: 27 MMOL/L (ref 21–31)
CREAT SERPL-MCNC: 0.81 MG/DL (ref 0.7–1.3)
EOSINOPHIL # BLD AUTO: 1.3 THOUSAND/ΜL (ref 0–0.61)
EOSINOPHIL NFR BLD AUTO: 14 % (ref 0–5)
ERYTHROCYTE [DISTWIDTH] IN BLOOD BY AUTOMATED COUNT: 13.7 % (ref 11.5–14.5)
GFR SERPL CREATININE-BSD FRML MDRD: 89 ML/MIN/1.73SQ M
GLUCOSE SERPL-MCNC: 200 MG/DL (ref 65–99)
HCT VFR BLD AUTO: 41.4 % (ref 42–47)
HGB BLD-MCNC: 14.4 G/DL (ref 14–18)
LYMPHOCYTES # BLD AUTO: 2 THOUSANDS/ΜL (ref 0.6–4.47)
LYMPHOCYTES NFR BLD AUTO: 21 % (ref 21–51)
MCH RBC QN AUTO: 31.5 PG (ref 26–34)
MCHC RBC AUTO-ENTMCNC: 34.8 G/DL (ref 31–37)
MCV RBC AUTO: 91 FL (ref 81–99)
MONOCYTES # BLD AUTO: 0.7 THOUSAND/ΜL (ref 0.17–1.22)
MONOCYTES NFR BLD AUTO: 7 % (ref 2–12)
NEUTROPHILS # BLD AUTO: 5.2 THOUSANDS/ΜL (ref 1.4–6.5)
NEUTS SEG NFR BLD AUTO: 56 % (ref 42–75)
NRBC BLD AUTO-RTO: 0 /100 WBCS
PLATELET # BLD AUTO: 217 THOUSANDS/UL (ref 149–390)
PMV BLD AUTO: 8.6 FL (ref 8.6–11.7)
POTASSIUM SERPL-SCNC: 4.5 MMOL/L (ref 3.5–5.5)
RBC # BLD AUTO: 4.57 MILLION/UL (ref 4.3–5.9)
SODIUM SERPL-SCNC: 138 MMOL/L (ref 134–143)
WBC # BLD AUTO: 9.3 THOUSAND/UL (ref 4.8–10.8)

## 2019-03-13 PROCEDURE — 94760 N-INVAS EAR/PLS OXIMETRY 1: CPT

## 2019-03-13 PROCEDURE — 96361 HYDRATE IV INFUSION ADD-ON: CPT

## 2019-03-13 PROCEDURE — 83036 HEMOGLOBIN GLYCOSYLATED A1C: CPT | Performed by: PHYSICIAN ASSISTANT

## 2019-03-13 PROCEDURE — 94640 AIRWAY INHALATION TREATMENT: CPT

## 2019-03-13 PROCEDURE — 85025 COMPLETE CBC W/AUTO DIFF WBC: CPT | Performed by: EMERGENCY MEDICINE

## 2019-03-13 PROCEDURE — 99291 CRITICAL CARE FIRST HOUR: CPT

## 2019-03-13 PROCEDURE — 80048 BASIC METABOLIC PNL TOTAL CA: CPT | Performed by: EMERGENCY MEDICINE

## 2019-03-13 PROCEDURE — 36415 COLL VENOUS BLD VENIPUNCTURE: CPT | Performed by: EMERGENCY MEDICINE

## 2019-03-13 PROCEDURE — 96374 THER/PROPH/DIAG INJ IV PUSH: CPT

## 2019-03-13 RX ORDER — IPRATROPIUM BROMIDE AND ALBUTEROL SULFATE 2.5; .5 MG/3ML; MG/3ML
3 SOLUTION RESPIRATORY (INHALATION) ONCE
Status: COMPLETED | OUTPATIENT
Start: 2019-03-13 | End: 2019-03-14

## 2019-03-13 RX ORDER — METHYLPREDNISOLONE SODIUM SUCCINATE 125 MG/2ML
125 INJECTION, POWDER, LYOPHILIZED, FOR SOLUTION INTRAMUSCULAR; INTRAVENOUS ONCE
Status: COMPLETED | OUTPATIENT
Start: 2019-03-13 | End: 2019-03-13

## 2019-03-13 RX ORDER — IPRATROPIUM BROMIDE AND ALBUTEROL SULFATE 2.5; .5 MG/3ML; MG/3ML
3 SOLUTION RESPIRATORY (INHALATION) ONCE
Status: COMPLETED | OUTPATIENT
Start: 2019-03-13 | End: 2019-03-13

## 2019-03-13 RX ORDER — SODIUM CHLORIDE 9 MG/ML
75 INJECTION, SOLUTION INTRAVENOUS CONTINUOUS
Status: DISCONTINUED | OUTPATIENT
Start: 2019-03-13 | End: 2019-03-14 | Stop reason: HOSPADM

## 2019-03-13 RX ADMIN — METHYLPREDNISOLONE SODIUM SUCCINATE 125 MG: 125 INJECTION, POWDER, FOR SOLUTION INTRAMUSCULAR; INTRAVENOUS at 23:27

## 2019-03-13 RX ADMIN — SODIUM CHLORIDE 75 ML/HR: 9 INJECTION, SOLUTION INTRAVENOUS at 23:20

## 2019-03-13 RX ADMIN — IPRATROPIUM BROMIDE AND ALBUTEROL SULFATE 3 ML: .5; 3 SOLUTION RESPIRATORY (INHALATION) at 23:23

## 2019-03-14 ENCOUNTER — APPOINTMENT (OUTPATIENT)
Dept: RADIOLOGY | Facility: HOSPITAL | Age: 74
End: 2019-03-14
Payer: MEDICARE

## 2019-03-14 VITALS
OXYGEN SATURATION: 97 % | WEIGHT: 198.2 LBS | TEMPERATURE: 97.5 F | SYSTOLIC BLOOD PRESSURE: 150 MMHG | RESPIRATION RATE: 20 BRPM | HEIGHT: 71 IN | HEART RATE: 84 BPM | BODY MASS INDEX: 27.75 KG/M2 | DIASTOLIC BLOOD PRESSURE: 78 MMHG

## 2019-03-14 PROBLEM — J96.00 ACUTE RESPIRATORY FAILURE (HCC): Status: ACTIVE | Noted: 2017-03-09

## 2019-03-14 PROBLEM — Z98.890 ON INTRA-AORTIC BALLOON PUMP ASSIST: Status: ACTIVE | Noted: 2017-03-10

## 2019-03-14 PROBLEM — E78.49 OTHER HYPERLIPIDEMIA: Status: ACTIVE | Noted: 2019-02-22

## 2019-03-14 PROBLEM — R26.2 AMBULATORY DYSFUNCTION: Status: ACTIVE | Noted: 2017-03-16

## 2019-03-14 PROBLEM — C61 PROSTATE CANCER (HCC): Status: ACTIVE | Noted: 2018-03-20

## 2019-03-14 PROBLEM — R25.1 TREMOR: Status: ACTIVE | Noted: 2019-02-22

## 2019-03-14 PROBLEM — I21.4 NSTEMI (NON-ST ELEVATED MYOCARDIAL INFARCTION) (HCC): Status: ACTIVE | Noted: 2017-03-08

## 2019-03-14 PROBLEM — Z95.1 S/P CABG X 4: Status: ACTIVE | Noted: 2017-03-09

## 2019-03-14 LAB
EST. AVERAGE GLUCOSE BLD GHB EST-MCNC: 166 MG/DL
GLUCOSE SERPL-MCNC: 203 MG/DL (ref 65–140)
GLUCOSE SERPL-MCNC: 252 MG/DL (ref 65–140)
GLUCOSE SERPL-MCNC: 287 MG/DL (ref 65–140)
HBA1C MFR BLD: 7.4 % (ref 4.2–6.3)

## 2019-03-14 PROCEDURE — 99217 PR OBSERVATION CARE DISCHARGE MANAGEMENT: CPT | Performed by: NURSE PRACTITIONER

## 2019-03-14 PROCEDURE — 94760 N-INVAS EAR/PLS OXIMETRY 1: CPT

## 2019-03-14 PROCEDURE — 82948 REAGENT STRIP/BLOOD GLUCOSE: CPT

## 2019-03-14 PROCEDURE — 99219 PR INITIAL OBSERVATION CARE/DAY 50 MINUTES: CPT | Performed by: PHYSICIAN ASSISTANT

## 2019-03-14 PROCEDURE — 99213 OFFICE O/P EST LOW 20 MIN: CPT | Performed by: INTERNAL MEDICINE

## 2019-03-14 PROCEDURE — 94640 AIRWAY INHALATION TREATMENT: CPT

## 2019-03-14 PROCEDURE — 71046 X-RAY EXAM CHEST 2 VIEWS: CPT

## 2019-03-14 RX ORDER — PREDNISONE 10 MG/1
40 TABLET ORAL DAILY
Qty: 14 TABLET | Refills: 0 | Status: SHIPPED | OUTPATIENT
Start: 2019-03-14 | End: 2019-05-16 | Stop reason: HOSPADM

## 2019-03-14 RX ORDER — ALBUTEROL SULFATE 2.5 MG/3ML
2.5 SOLUTION RESPIRATORY (INHALATION) EVERY 4 HOURS PRN
Status: DISCONTINUED | OUTPATIENT
Start: 2019-03-14 | End: 2019-03-14 | Stop reason: HOSPADM

## 2019-03-14 RX ORDER — HEPARIN SODIUM 5000 [USP'U]/ML
5000 INJECTION, SOLUTION INTRAVENOUS; SUBCUTANEOUS EVERY 8 HOURS SCHEDULED
Status: DISCONTINUED | OUTPATIENT
Start: 2019-03-14 | End: 2019-03-14 | Stop reason: HOSPADM

## 2019-03-14 RX ORDER — PREDNISONE 10 MG/1
40 TABLET ORAL DAILY
Qty: 14 TABLET | Refills: 0 | Status: CANCELLED | OUTPATIENT
Start: 2019-03-15

## 2019-03-14 RX ORDER — FLUTICASONE FUROATE AND VILANTEROL 200; 25 UG/1; UG/1
1 POWDER RESPIRATORY (INHALATION) DAILY
Qty: 28 EACH | Refills: 0 | Status: CANCELLED | OUTPATIENT
Start: 2019-03-14 | End: 2019-04-13

## 2019-03-14 RX ORDER — FLUTICASONE FUROATE AND VILANTEROL 200; 25 UG/1; UG/1
1 POWDER RESPIRATORY (INHALATION) DAILY
Qty: 28 EACH | Refills: 0 | Status: SHIPPED | OUTPATIENT
Start: 2019-03-14 | End: 2019-07-05

## 2019-03-14 RX ORDER — METHYLPREDNISOLONE SODIUM SUCCINATE 40 MG/ML
40 INJECTION, POWDER, LYOPHILIZED, FOR SOLUTION INTRAMUSCULAR; INTRAVENOUS EVERY 12 HOURS SCHEDULED
Status: DISCONTINUED | OUTPATIENT
Start: 2019-03-14 | End: 2019-03-14 | Stop reason: HOSPADM

## 2019-03-14 RX ORDER — PRIMIDONE 50 MG/1
50 TABLET ORAL EVERY 8 HOURS PRN
Status: DISCONTINUED | OUTPATIENT
Start: 2019-03-14 | End: 2019-03-14 | Stop reason: HOSPADM

## 2019-03-14 RX ORDER — ATORVASTATIN CALCIUM 80 MG/1
80 TABLET, FILM COATED ORAL
Status: DISCONTINUED | OUTPATIENT
Start: 2019-03-14 | End: 2019-03-14 | Stop reason: HOSPADM

## 2019-03-14 RX ORDER — IPRATROPIUM BROMIDE AND ALBUTEROL SULFATE 2.5; .5 MG/3ML; MG/3ML
3 SOLUTION RESPIRATORY (INHALATION)
Status: DISCONTINUED | OUTPATIENT
Start: 2019-03-14 | End: 2019-03-14 | Stop reason: HOSPADM

## 2019-03-14 RX ORDER — METHYLPREDNISOLONE SODIUM SUCCINATE 40 MG/ML
40 INJECTION, POWDER, LYOPHILIZED, FOR SOLUTION INTRAMUSCULAR; INTRAVENOUS EVERY 8 HOURS
Status: DISCONTINUED | OUTPATIENT
Start: 2019-03-14 | End: 2019-03-14

## 2019-03-14 RX ORDER — AZITHROMYCIN 250 MG/1
500 TABLET, FILM COATED ORAL EVERY 24 HOURS
Status: DISCONTINUED | OUTPATIENT
Start: 2019-03-14 | End: 2019-03-14 | Stop reason: HOSPADM

## 2019-03-14 RX ORDER — FLUTICASONE FUROATE AND VILANTEROL 200; 25 UG/1; UG/1
1 POWDER RESPIRATORY (INHALATION) DAILY
Status: DISCONTINUED | OUTPATIENT
Start: 2019-03-14 | End: 2019-03-14 | Stop reason: HOSPADM

## 2019-03-14 RX ORDER — CLONIDINE HYDROCHLORIDE 0.1 MG/1
0.1 TABLET ORAL
Status: DISCONTINUED | OUTPATIENT
Start: 2019-03-14 | End: 2019-03-14 | Stop reason: HOSPADM

## 2019-03-14 RX ORDER — ASPIRIN 81 MG/1
81 TABLET ORAL DAILY
Status: DISCONTINUED | OUTPATIENT
Start: 2019-03-14 | End: 2019-03-14 | Stop reason: HOSPADM

## 2019-03-14 RX ADMIN — FLUTICASONE FUROATE AND VILANTEROL TRIFENATATE 1 PUFF: 200; 25 POWDER RESPIRATORY (INHALATION) at 14:02

## 2019-03-14 RX ADMIN — ASPIRIN 81 MG: 81 TABLET, COATED ORAL at 08:34

## 2019-03-14 RX ADMIN — IPRATROPIUM BROMIDE AND ALBUTEROL SULFATE 3 ML: 2.5; .5 SOLUTION RESPIRATORY (INHALATION) at 11:19

## 2019-03-14 RX ADMIN — SODIUM CHLORIDE 75 ML/HR: 9 INJECTION, SOLUTION INTRAVENOUS at 02:57

## 2019-03-14 RX ADMIN — CLONIDINE HYDROCHLORIDE 0.1 MG: 0.1 TABLET ORAL at 02:57

## 2019-03-14 RX ADMIN — INSULIN LISPRO 4 UNITS: 100 INJECTION, SOLUTION INTRAVENOUS; SUBCUTANEOUS at 11:56

## 2019-03-14 RX ADMIN — AZITHROMYCIN 500 MG: 250 TABLET, FILM COATED ORAL at 02:57

## 2019-03-14 RX ADMIN — HEPARIN SODIUM 5000 UNITS: 5000 INJECTION, SOLUTION INTRAVENOUS; SUBCUTANEOUS at 05:16

## 2019-03-14 RX ADMIN — METOPROLOL TARTRATE 25 MG: 25 TABLET ORAL at 08:34

## 2019-03-14 RX ADMIN — METHYLPREDNISOLONE SODIUM SUCCINATE 40 MG: 40 INJECTION, POWDER, FOR SOLUTION INTRAMUSCULAR; INTRAVENOUS at 07:34

## 2019-03-14 RX ADMIN — INSULIN LISPRO 3 UNITS: 100 INJECTION, SOLUTION INTRAVENOUS; SUBCUTANEOUS at 07:34

## 2019-03-14 RX ADMIN — IPRATROPIUM BROMIDE AND ALBUTEROL SULFATE 3 ML: .5; 3 SOLUTION RESPIRATORY (INHALATION) at 00:02

## 2019-03-14 RX ADMIN — ALBUTEROL SULFATE 2.5 MG: 2.5 SOLUTION RESPIRATORY (INHALATION) at 04:03

## 2019-03-14 RX ADMIN — IPRATROPIUM BROMIDE AND ALBUTEROL SULFATE 3 ML: 2.5; .5 SOLUTION RESPIRATORY (INHALATION) at 07:23

## 2019-03-14 NOTE — ED NOTES
Patient is short of breath with minimal exertion, states he wanted to be unhooked to go to the bathroom  Encouraged to use urinal, patient refused  Ambulated back to bed, approximately 50 feet, and SaO2 dropped to 82% on room air  Respirations 42/min and audible wheezing noted  MD aware to come examine patient       Yodit Garner RN  03/14/19 1482

## 2019-03-14 NOTE — SOCIAL WORK
Evaluated the pt at the bedside  Pt states he lives with his spouse in a 2 story home with 4 [address]RI[address] and 12 steps inside  Pt states he is independent with ADL's and IADL's and drove himself to the hospital   Pt indicated he gets his medications from [address]  Pt goes to the [facility] [place] clinic and states he will make his own appointment  Did not make a referral to outpt care coordinator as pt goes to the [facility] for all appointments  Pt has a nebulizer, walker and cane at home  CM reviewed d/c planning process including the following: identifying help at home, patient preference for d/c planning needs, availability of treatment team to discuss questions or concerns patient and/or family may have regarding understanding medications and recognizing signs and symptoms once discharged  CM also encouraged patient to follow up with all recommended appointments after discharge  Patient advised of importance for patient and family to participate in managing patients medical well being

## 2019-03-14 NOTE — CONSULTS
Consultation - Pulmonary Medicine   Zofia Manning 68 y o  male MRN: [id number]  Unit/Bed#: -01 Encounter: [id number]      Physician Requesting Consult:  Mohsen Chaudhary PA-C  Reason for Consult:  COPD exacerbation    Assessment/Plan:    1  Acute exacerbation of COPD  · Patient should probably be on maintenance inhalers such as Breo or Trelogy  · Advised him that it would be better to call his primary doctor when he is getting worse to get oral steroids  This may avoid repeated hospital admissions  2  Concomitant cardiac disease  Details unavailable  · Recommend repeat EKG this admission, just for completeness  3  Can taper steroids as clinically able based on clinical response   ______________________________________________________________________    HPI:    Zofia Manning is a 68 y o  male who presents with shortness of breath that has been worsening over the past couple weeks  He was just in the hospital with an exacerbation and improved  He was sent out on steroid  He follows at the [place]  He has not seen a pulmonologist previously  He is on no maintenance inhalers, just nebulized medications that he uses several times a day  As of late, he has been using it up to 6 times a day  When he did not get better, he decided he better come in for evaluation  Karrie Nissen PFT results:  None available although just done recently at the [place]     Review of Systems:Review of Systems   Constitutional: Negative for chills and fever  HENT: Negative for congestion, postnasal drip and sinus pain  Respiratory: Positive for cough and shortness of breath  Cardiovascular: Negative for chest pain and leg swelling  Gastrointestinal: Negative for nausea and vomiting  Allergic/Immunologic: Negative for environmental allergies         Historical Information   Past Medical History:   Diagnosis Date    BPH (benign prostatic hyperplasia)     45 days radiation treatment    Cardiac disease     Coronary artery disease     Diabetes mellitus ([address] )     Hyperlipidemia     Hypertension     Prostate cancer [facility])      Past Surgical History:   Procedure Laterality Date    CORONARY ANGIOPLASTY WITH STENT PLACEMENT      CORONARY ARTERY BYPASS GRAFT      PROSTATE BIOPSY       Social History   Social History     Substance and Sexual Activity   Alcohol Use Yes    Comment: on occasion     Social History     Tobacco Use   Smoking Status Former Smoker    Last attempt to quit: 2/15/2019    Years since quittin 0   Smokeless Tobacco Never Used       Occupational history:  Not queried    Family History:   Family History   Problem Relation Age of Onset    Cancer Father     Heart disease Brother        Medications: The patient's active and prehospital medications were reviewed  Current Facility-Administered Medications:  albuterol 2 5 mg Nebulization Q4H PRN [facility] Papito, PA-C    aspirin 81 mg Oral Daily [facility] Papito, PA-C    atorvastatin 80 mg Oral Daily With Reuben Michelle, PA-C    azithromycin 500 mg Oral Q24H [facility] Papito, PA-C    cloNIDine 0 1 mg Oral Q3H [facility]N [facility]    heparin (porcine) 5,000 Units Subcutaneous [facility] Papito, PA-C    insulin lispro 1-6 Units Subcutaneous TID [facility] Papito, PA-C    insulin lispro 1-6 Units Subcutaneous HS [facility] Papito, PA-C    ipratropium-albuterol 3 mL Nebulization 4x Daily Maurilio Buchanan MD    methylPREDNISolone sodium succinate 40 mg Intravenous Q8H [facility] Papito, PA-C    metoprolol tartrate 25 mg Oral BID [facility]    primidone 50 mg Oral Q8H PRN [facility]    sodium chloride 75 mL/hr Intravenous Continuous Jv DO Alexx Last Rate: 75 mL/hr (19 0257)         PhysicalExamination:Physical Exam   Constitutional: He appears well-developed and well-nourished  He is cooperative  Non-toxic appearance  He does not appear ill  No distress  Neck: No JVD present  Cardiovascular: Regular rhythm  No murmur heard    Pulmonary/Chest: No accessory muscle usage or stridor  No tachypnea  No respiratory distress  He has decreased breath sounds  He has wheezes  Abdominal: Soft  Musculoskeletal:   No edema   Neurological: He is alert  Skin: No cyanosis  Nails show no clubbing  Vitals:   Vitals:    03/14/19 0405 03/14/19 0416 03/14/19 0721 03/14/19 0724   BP:  (!) 180/80 150/78    BP Location:  Right arm Left arm    Pulse:  88 84    Resp:  22 20    Temp:   97 5 °F (36 4 °C)    TempSrc:   Tympanic    SpO2: 96% 96% 96% 97%   Weight:       Height:             Weight    03/13/19 2120 03/14/19 0143   Weight: 86 2 kg (190 lb) 89 9 kg (198 lb 3 2 oz)       Temp  Min: 97 5 °F (36 4 °C)  Max: 98 2 °F (36 8 °C)  IBW: 75 3 kg  SpO2: 97 %,   SpO2 Activity: At Rest,   O2 Device: Nasal cannula    I/O last 24 hours: In: 825 3 [P O :300; I V :525 3]  Out: 950 [Urine:950]    Diagnostic Data:  CBC:   Results from last 7 days   Lab Units 03/13/19  2327   WBC Thousand/uL 9 30   HEMOGLOBIN g/dL 14 4   HEMATOCRIT % 41 4*   PLATELETS Thousands/uL 217       CMP:   Results from last 7 days   Lab Units 03/13/19  2327   POTASSIUM mmol/L 4 5   CHLORIDE mmol/L 102   CO2 mmol/L 27   BUN mg/dL 15   CREATININE mg/dL 0 81   CALCIUM mg/dL 9 1         Microbiology:        ABG: No results found for: PHART, HLW8REH, PO2ART, MGA5TGB, H8QEOKDD, BEART, SOURCE    Imaging: Procedure: Xr Chest 2 Views    Result Date: 3/14/2019  Narrative: CHEST INDICATION:   sob  COMPARISON:  2/22/2019 EXAM PERFORMED/VIEWS:  XR CHEST PA & LATERAL FINDINGS: Cardiomediastinal silhouette appears unremarkable  CABG, aortic atherosclerosis, thoracic spondylosis and chronic markings  The lungs are otherwise clear  No pneumothorax or pleural effusion  Osseous structures appear within normal limits for patient age  Impression: No acute cardiopulmonary disease  Workstation performed: [id number]     Film reviewed personally    Cardiac lab/EKG/telemetry/ECHO:       No EKG seen this admission    Prior EKG reviewed along with report which did not compared to previous EKG from June 2018      Nnamdi Butterfield MD

## 2019-03-14 NOTE — H&P
H&P- Michael Stout 1945, 68 y o  male MRN: [id number]    Unit/Bed#: ED 06 Encounter: [id number]    Primary Care Provider: No primary care provider on file  Date and time admitted to hospital: 3/13/2019  9:16 PM        COPD with acute exacerbation [facility])  Assessment & Plan  Placed on observation Medicine, give Solu-Medrol 40 mg IV q 8 hours, Zithromax 500 mg p o  Daily and nebulizers q 4 hours p r n  Additionally will consult pulmonary in a m  Type 2 diabetes mellitus ([facility] [address] )  Assessment & Plan  Lab Results   Component Value Date    HGBA1C 7 6 (H) 02/22/2019       No results for input(s): POCGLU in the last 72 hours  Blood Sugar Average: Last 72 hrs:     Place on [facility] step 2 diet, will hold oral antihyperglycemics, obtain Accu-Cheks AC and HS and place on algorithm 3 correction dose a c  And HS    Tremor  Assessment & Plan  Continue pre-hospital  Mysoline 50 mg p o  Q 8 hours p r n  Essential hypertension  Assessment & Plan  Continue pre-hospital metoprolol tartrate 25 mg p o  B i d  patient was somewhat hypertensive upon arrival in the ER will continue monitor and give Catapres p r n  For systolic blood pressure greater than 170  Hyperlipidemia  Assessment & Plan  Continue pre-hospital Lipitor 80 mg p o  Daily    Tobacco abuse  Assessment & Plan  Patient quit smoking approximately 3 weeks ago that he does report that he snuck 2 cigarettes after meals will continue with supportive care  VTE Prophylaxis: Heparin  Code Status:  Level 1  POLST: There is no POLST form on file for this patient (pre-hospital)  Discussion with family:  None present at bedside at time of exam    Anticipated Length of Stay:  Patient will be admitted on an Observation basis with an anticipated length of stay of  < 2 midnights     Justification for Hospital Stay:  COPD exacerbation requiring IV steroids, supplemental oxygen and frequent breathing treatments    Total Time for Visit, including Counseling / Coordination of Care: 45 minutes  Greater than 50% of this total time spent on direct patient counseling and coordination of care  Chief Complaint:   Dyspnea at rest x4 days    History of Present Illness:    Lincoln Child is a 68 y o  male who presents with dyspnea at rest and 4 days  Patient is a known COPD year who was recently admitted to our facility on 22 February 2019 subsequently discharged on 24 February 2019 with a diagnosis is COPD exacerbation  Patient sees Pulmonary from [facility] who he states that he followed up with after discharge here and had a formal pulmonary function test done by them approximately 1 week ago  Patient reports increasing shortness of breath to the point of dyspnea at rest and a cough productive of clear sputum over the past 4 days  Patient did uses nebulizer at home with some improvement initially though his shortness of breath became much more pronounced and resistant to nebulizer treatment  Patient denies any fever chills, no recent sick contacts  Patient does report that he stop smoking approximately 3 weeks ago after over a 60 pack year history but does report that he snuck 2 cigarettes after meals  Review of Systems:  Review of Systems   Constitutional: Negative for chills and fever  Respiratory: Positive for cough, shortness of breath and wheezing  Negative for chest tightness  Cardiovascular: Negative for chest pain and palpitations  Gastrointestinal: Negative for diarrhea, nausea and vomiting  Genitourinary: Negative for dysuria, frequency, hematuria and urgency  Neurological: Positive for tremors  Negative for syncope, weakness and light-headedness  All other systems reviewed and are negative        Past Medical and Surgical History:   Past Medical History:   Diagnosis Date    BPH (benign prostatic hyperplasia)     45 days radiation treatment    Cardiac disease     Coronary artery disease     Diabetes mellitus ([facility] [address] )     Hyperlipidemia     Hypertension     Prostate cancer [facility])        Past Surgical History:   Procedure Laterality Date    CORONARY ANGIOPLASTY WITH STENT PLACEMENT      CORONARY ARTERY BYPASS GRAFT      PROSTATE BIOPSY         Meds/Allergies:  Prior to Admission medications    Medication Sig Start Date End Date Taking? Authorizing Provider   albuterol (2 5 mg/3 mL) 0 083 % nebulizer solution Take 1 vial (2 5 mg total) by nebulization every 4 (four) hours as needed for wheezing or shortness of breath 6/24/18  Yes Zane Seen, MD   albuterol (PROVENTIL HFA,VENTOLIN HFA) 90 mcg/act inhaler Inhale 2 puffs every 6 (six) hours as needed for wheezing or shortness of breath   Yes Historical Provider, MD   aspirin (ECOTRIN LOW STRENGTH) 81 mg EC tablet Take 81 mg by mouth daily   Yes Historical Provider, MD   atorvastatin (LIPITOR) 80 mg tablet Take 80 mg by mouth daily   Yes Historical Provider, MD   metFORMIN (GLUCOPHAGE) 500 mg tablet Take 500 mg by mouth 2 (two) times a day with meals 1/2 a tablet in the morning and evening   Yes Historical Provider, MD   metoprolol tartrate (LOPRESSOR) 50 mg tablet Take 25 mg by mouth 2 (two) times a day 3/16/17  Yes Historical Provider, MD   primidone (MYSOLINE) 50 mg tablet Take 50 mg by mouth every 8 (eight) hours as needed 3/24/17  Yes Historical Provider, MD   saxagliptin (ONGLYZA) 5 MG tablet Take 5 mg by mouth daily   Yes Historical Provider, MD   predniSONE 10 mg tablet Take 3 tablets (30 mg total) by mouth daily 30mg PO Day1,2 Take 20mg PO day 3,4 Take 30mg PO day 5,6 2/24/19   Cindy Mancuso MD     I have reviewed home medications with patient personally      Allergies: No Known Allergies    Social History:  Marital Status: /Civil Union   Occupation:  Retired  Patient Pre-hospital Living Situation:  Resides at home  Patient Pre-hospital Level of Mobility:  Full with assistance of a cane  Patient Pre-hospital Diet Restrictions:  Diabetic  Substance Use History:   Social History     Substance and Sexual Activity   Alcohol Use Yes    Comment: on occasion     Social History     Tobacco Use   Smoking Status Former Smoker    Last attempt to quit: 2/15/2019    Years since quittin 0   Smokeless Tobacco Never Used     Social History     Substance and Sexual Activity   Drug Use No       Family History:  I have reviewed the patients family history    Physical Exam:   Vitals:   Blood Pressure: (!) 178/81 (19)  Pulse: 86 (19)  Temperature: 98 2 °F (36 8 °C) (19)  Temp Source: Temporal (19)  Respirations: 20 (19)  Height: 5' 11" (180 3 cm) (19)  Weight - Scale: 86 2 kg (190 lb) (19)  SpO2: 97 % (19)    Physical Exam   Constitutional: He is oriented to person, place, and time  He appears well-developed and well-nourished  HENT:   Head: Normocephalic and atraumatic  Mouth/Throat: No oropharyngeal exudate  Eyes: Pupils are equal, round, and reactive to light  EOM are normal  No scleral icterus  Neck: Normal range of motion  Neck supple  No JVD present  Cardiovascular: Normal rate, regular rhythm and normal heart sounds  No murmur heard  Pulmonary/Chest: Effort normal  No respiratory distress  He has decreased breath sounds  He has wheezes  He has no rales  Abdominal: Bowel sounds are normal  There is no tenderness  There is no rebound and no guarding  Musculoskeletal: Normal range of motion  He exhibits no edema  Lymphadenopathy:     He has no cervical adenopathy  Neurological: He is alert and oriented to person, place, and time  Skin: Skin is warm and dry  No rash noted  No erythema  Psychiatric: He has a normal mood and affect  His behavior is normal    Nursing note and vitals reviewed        Additional Data:   Lab Results: I have personally reviewed pertinent films in PACS  Results from last 7 days   Lab Units 19  2327   WBC Thousand/uL 9 30   HEMOGLOBIN g/dL 14 4   HEMATOCRIT % 41 4*   PLATELETS Thousands/uL 217   NEUTROS PCT % 56   LYMPHS PCT % 21   MONOS PCT % 7   EOS PCT % 14*     Results from last 7 days   Lab Units 03/13/19  2327   POTASSIUM mmol/L 4 5   CHLORIDE mmol/L 102   CO2 mmol/L 27   BUN mg/dL 15   CREATININE mg/dL 0 81   CALCIUM mg/dL 9 1                   Imaging: I have personally reviewed pertinent films in PACS  XR chest 2 views    (Results Pending)       EKG, Pathology, and Other Studies Reviewed on Admission:   · EKG: N/A    NetAccess / Epic Records Reviewed: Yes     ** Please Note: This note has been constructed using a voice recognition system   **

## 2019-03-14 NOTE — ASSESSMENT & PLAN NOTE
Placed on observation Medicine, give Solu-Medrol 40 mg IV q 8 hours, Zithromax 500 mg p o  Daily and nebulizers q 4 hours p r n  Additionally will consult pulmonary in a m

## 2019-03-14 NOTE — RESPIRATORY THERAPY NOTE
RT Protocol Note  France Foot 68 y o  male MRN: [id number]  Unit/Bed#: -01 Encounter: [id number]    Assessment    Active Problems:    Type 2 diabetes mellitus ([facility] )    Tobacco abuse    Hyperlipidemia    Essential hypertension    COPD with acute exacerbation (HCC)    Tremor      Home Pulmonary Medications:  Has mdi and nebs at home per pt   Home Devices/Therapy: (P) Other (Comment)(neb)    Past Medical History:   Diagnosis Date    BPH (benign prostatic hyperplasia)     45 days radiation treatment    Cardiac disease     Coronary artery disease     Diabetes mellitus ([place] [address] )     Hyperlipidemia     Hypertension     Prostate cancer ([place] [address] )      Social History     Socioeconomic History    Marital status: /Civil Union     Spouse name: None    Number of children: None    Years of education: None    Highest education level: None   Occupational History    None   Social Needs    Financial resource strain: None    Food insecurity:     Worry: None     Inability: None    Transportation needs:     Medical: None     Non-medical: None   Tobacco Use    Smoking status: Former Smoker     Last attempt to quit: 2/15/2019     Years since quittin 0    Smokeless tobacco: Never Used   Substance and Sexual Activity    Alcohol use: Yes     Comment: on occasion    Drug use: No    Sexual activity: None   Lifestyle    Physical activity:     Days per week: None     Minutes per session: None    Stress: None   Relationships    Social connections:     Talks on phone: None     Gets together: None     Attends Sabianist service: None     Active member of club or organization: None     Attends meetings of clubs or organizations: None     Relationship status: None    Intimate partner violence:     Fear of current or ex partner: None     Emotionally abused: None     Physically abused: None     Forced sexual activity: None   Other Topics Concern    None   Social History Narrative    None       Subjective Objective    Physical Exam:   Assessment Type: (P) Assess only  General Appearance: (P) Alert, Awake  Respiratory Pattern: (P) Labored, Accessory muscle use  Chest Assessment: (P) Chest expansion symmetrical  Bilateral Breath Sounds: (P) Expiratory wheezes  Cough: (P) Strong    Vitals:  Blood pressure (!) 200/101, pulse (P) 88, temperature 97 8 °F (36 6 °C), temperature source Tympanic, resp  rate (P) 22, height 5' 11" (1 803 m), weight 89 9 kg (198 lb 3 2 oz), SpO2 (P) 96 %  Imaging and other studies: I have personally reviewed pertinent reports  Plan    Respiratory Plan: (P) Mild Distress pathway        Resp Comments: prn tx asked for     mild distress cougjhing fit

## 2019-03-14 NOTE — NURSING NOTE
Patient discharged to home  IV removed with catheter tip intact, DSD and pressure applied  All belongings returned to patient upon discharge  Patient verbalized understanding of discharge and smoking cessation instructions

## 2019-03-14 NOTE — ASSESSMENT & PLAN NOTE
· Patient was somewhat hypertensive upon arrival in the ER   · Suspected to be secondary to respiratory distress  · BP is much improved    · Continue with metoprolol on discharge

## 2019-03-14 NOTE — UTILIZATION REVIEW
Initial Clinical Review    Admission: Date/Time/Statement: 3/14/19 @ 0042 OBSERVATION  Orders Placed This Encounter   Procedures    Place in Observation     Standing Status:   Standing     Number of Occurrences:   1     Order Specific Question:   Admitting Physician     Answer:   Phillip Hylton [[id number]]     Order Specific Question:   Level of Care     Answer:   Med Surg [16]     ED: Date/Time/Mode of Arrival:   ED Arrival Information     Expected Arrival Acuity Means of Arrival Escorted By Service Admission Type    - 3/13/2019 21:02 Urgent Walk-In Self General Medicine Urgent    Arrival Complaint    shortness of breath        Chief Complaint:   Chief Complaint   Patient presents with    Shortness of Breath     Assessment/Plan: 69 y/o male presents to ED from home with worsening dyspnea at rest and productive cough for 4 days  Patient did uses nebulizer at home with some improvement initially though his shortness of breath became much more pronounced and resistant to nebulizer treatment  On exam, pt has decreased breath sounds and wheezes  Pt with RA O2 sat of 82% on RA with ambulation in ED  Admitted as observation due to COPD exacerbation  COPD with acute exacerbation [facility])  Assessment & Plan  Placed on observation Medicine, give Solu-Medrol 40 mg IV q 8 hours, Zithromax 500 mg p o  Daily and nebulizers q 4 hours p r n  Additionally will consult pulmonary in a m      Type 2 diabetes mellitus ([facility] [address] )  Assessment & Plan        Lab Results   Component Value Date     HGBA1C 7 6 (H) 02/22/2019         No results for input(s): POCGLU in the last 72 hours      Blood Sugar Average: Last 72 hrs:     Place on [facility] step 2 diet, will hold oral antihyperglycemics, obtain Accu-Cheks AC and HS and place on algorithm 3 correction dose a c   And HS     Tremor  Assessment & Plan  Continue pre-hospital  Mysoline 50 mg p o  Q 8 hours p r n      Essential hypertension  Assessment & Plan  Continue pre-hospital metoprolol tartrate 25 mg p o  B i d  patient was somewhat hypertensive upon arrival in the ER will continue monitor and give Catapres p r n  For systolic blood pressure greater than 170      Hyperlipidemia  Assessment & Plan  Continue pre-hospital Lipitor 80 mg p o  Daily     Tobacco abuse  Assessment & Plan  Patient quit smoking approximately 3 weeks ago that he does report that he snuck 2 cigarettes after meals will continue with supportive care  Anticipated Length of Stay:  Patient will be admitted on an Observation basis with an anticipated length of stay of  < 2 midnights  Justification for Hospital Stay:  COPD exacerbation requiring IV steroids, supplemental oxygen and frequent breathing treatments    3/14 Pt continues to have decreased breath sounds and audible wheezes  ED Vital Signs:   ED Triage Vitals [03/13/19 2120]   Temperature Pulse Respirations Blood Pressure SpO2   98 2 °F (36 8 °C) 85 (!) 24 (!) 207/95 91 %      Temp Source Heart Rate Source Patient Position - Orthostatic VS BP Location FiO2 (%)   Temporal Monitor Sitting Left arm --      Pain Score       No Pain        Wt Readings from Last 1 Encounters:   03/14/19 89 9 kg (198 lb 3 2 oz)     Vital Signs (abnormal):   03/14/19 0416  --  88  22  180/80  96 %  --   03/14/19 0143  97 8 °F (36 6 °C)  94  20  200/101  96 %  Nasal cannula 3L   03/14/19 0047  --  86  20  178/81  97 %  --   03/13/19 2230  --  80  21  180/83  96 %  --   03/13/19 2200  --  78  19  186/83  97 %         Pertinent Labs/Diagnostic Test Results:   Blood gluc 200, 203, 252, 287  Hct 41 4  HgbA1C 7 4  CXR:  No acute cardiopulmonary disease      ED Treatment:   Medication Administration from 03/13/2019 2102 to 03/14/2019 0151       Date/Time Order Dose Route Action Action by Comments     03/13/2019 2323 ipratropium-albuterol (DUO-NEB) 0 5-2 5 mg/3 mL inhalation solution 3 mL 3 mL Nebulization Given Kamila Rashid, RT      03/13/2019 2320 sodium chloride 0 9 % infusion 75 mL/hr Intravenous New Bag Sari Galan, NATALIA      03/13/2019 2327 methylPREDNISolone sodium succinate (Solu-MEDROL) injection 125 mg 125 mg Intravenous Given Sari Daniel RN      03/14/2019 0002 ipratropium-albuterol (DUO-NEB) 0 5-2 5 mg/3 mL inhalation solution 3 mL 3 mL Nebulization Given Ekta Chapa, RT         Past Medical/Surgical History:    Active Ambulatory Problems     Diagnosis Date Noted    Type 2 diabetes mellitus ([place] [id number] ) 03/08/2017    Tobacco abuse 03/08/2017    Hyperlipidemia 02/22/2019    Essential hypertension 03/08/2017    CAD (coronary artery disease), native coronary artery 02/22/2019    BPH (benign prostatic hyperplasia) 02/22/2019    COPD with acute exacerbation ([place] [id number] ) 02/22/2019    Tremor 02/22/2019    Ambulatory dysfunction 03/16/2017    NSTEMI (non-ST elevated myocardial infarction) ([place] [id number] ) 03/08/2017    On intra-aortic balloon pump assist 03/10/2017    Prostate cancer ([place] [id number] ) 03/20/2018    S/P CABG x 4 03/09/2017    Acute respiratory failure ([place] [id number] ) 03/09/2017     Resolved Ambulatory Problems     Diagnosis Date Noted    No Resolved Ambulatory Problems     Past Medical History:   Diagnosis Date    BPH (benign prostatic hyperplasia)     Cardiac disease     Coronary artery disease     Diabetes mellitus ([place] [id number] )     Hyperlipidemia     Hypertension     Prostate cancer ([place] [id number] )      Admitting Diagnosis: Shortness of breath [R06 02]  COPD with acute exacerbation (HCC) [J44 1]  Age/Sex: 68 y o  male     Admission Orders:  Accuchecks  VS  SCDs  Labs  Diabetic diet  Pulmonology consult  Respiratory protocol    Scheduled Meds:   Current Facility-Administered Medications:  albuterol 2 5 mg Nebulization Q4H PRN   aspirin 81 mg Oral Daily   atorvastatin 80 mg Oral Daily With Dinner   azithromycin 500 mg Oral Q24H   cloNIDine 0 1 mg Oral Q3H PRN   heparin (porcine) 5,000 Units Subcutaneous Q8H [facility]   insulin lispro 1-6 Units Subcutaneous TID AC   insulin lispro 1-6 Units Subcutaneous HS ipratropium-albuterol 3 mL Nebulization 4x Daily   methylPREDNISolone sodium succinate 40 mg Intravenous Q8H   metoprolol tartrate 25 mg Oral BID   primidone 50 mg Oral Q8H PRN   sodium chloride 75 mL/hr Intravenous Continuous

## 2019-03-14 NOTE — DISCHARGE SUMMARY
Discharge- [place] Foot 1945, 68 y o  male MRN: [id number]    Unit/Bed#: -01 Encounter: [id number]    Primary Care Provider: No primary care provider on file  Date and time admitted to hospital: 3/13/2019  9:16 PM        * COPD with acute exacerbation [facility])  Assessment & Plan  · Pulmonology input is appreciated  · Will start the patient on Breo  · Clinically is much improved we were able to titrate off oxygen  · Will start steroid taper course  · Recommend to follow up with his PCP or pulmonologist once discharged  · Smoking cessation discussed    Tremor  Assessment & Plan  · Continue with Mysoline p r n  Essential hypertension  Assessment & Plan  · Patient was somewhat hypertensive upon arrival in the ER   · Suspected to be secondary to respiratory distress  · BP is much improved  · Continue with metoprolol on discharge      Other hyperlipidemia  Assessment & Plan  · Continue statin    Tobacco abuse  Assessment & Plan  · Patient quit smoking approximately 3 weeks ago that he does report that he snuck 2 cigarettes after meals  · Smoking cessation discussed with the patient in detail    Type 2 diabetes mellitus without complication, without long-term current use of insulin [facility])  Assessment & Plan  Lab Results   Component Value Date    HGBA1C 7 4 (H) 03/13/2019       Recent Labs     03/14/19  0150 03/14/19  0627 03/14/19  1129   POCGLU 203* 252* 287*       Blood Sugar Average: Last 72 hrs:  (P) 001 [id number]   · Resume his home regimen on discharge          Discharging Physician / Practitioner: Lizbeth Francois  PCP: No primary care provider on file    Admission Date:   Admission Orders (From admission, onward)    Ordered        03/14/19 0042  Place in Observation  Once     Order ID Start Status   [id number] 03/14/19 0038 Completed              Discharge Date: 03/14/19    Resolved Problems  Date Reviewed: 3/14/2019    None          Consultations During Hospital Stay:  · Pulmonary Procedures Performed:   · None     Significant Findings / Test Results:   · Chest x-ray shows no acute cardiopulmonary disease    Incidental Findings:   · None     Test Results Pending at Discharge (will require follow up): · None     Outpatient Tests Requested:  · Follow-up with PCP/pulmonology    Complications:  None     Reason for Admission:  Dyspnea at rest for 4 days  Hospital Course:     Rajiv Kim is a 68 y o  male patient who originally presented to the hospital on 3/13/2019 due to increase in dyspnea for the past 4 days  The patient was admitted with COPD exacerbation  He was initiated on IV Solu-Medrol and azithromycin  He was placed on oxygen supplement  As the patient remains afebrile, no productive cough, no leukocytosis azithromycin was discontinued  Pulmonology evaluation was done  Recommend to start the patient on steroid taper course  Patient was started on Breo  Recommended the patient follow up with his PCP or pulmonology once discharged  I recommended the patient continues to use nebulizer treatment at least 3-4 times day for the next few days and may use it PRN  Overall clinically is much improved  The patient was able to titrate off of oxygen supplement  He will be discharged with prednisone taper course and Breo inhaler  He does not need home O2 at this time  Please see above list of diagnoses and related plan for additional information  Condition at Discharge: fair     Discharge Day Visit / Exam:     Subjective:  Feeling much better compared to when I came in   Vitals: Blood Pressure: 150/78 (03/14/19 0721)  Pulse: 84 (03/14/19 0721)  Temperature: 97 5 °F (36 4 °C) (03/14/19 0721)  Temp Source: Tympanic (03/14/19 0721)  Respirations: 20 (03/14/19 0721)  Height: 5' 11" (180 3 cm) (03/14/19 0143)  Weight - Scale: 89 9 kg (198 lb 3 2 oz) (03/14/19 0143)  SpO2: 97 % (03/14/19 1121)  Exam:   Physical Exam   Constitutional: He is oriented to person, place, and time  He appears well-developed and well-nourished  No distress  HENT:   Head: Normocephalic and atraumatic  Mouth/Throat: Oropharynx is clear and moist    Eyes: Pupils are equal, round, and reactive to light  Conjunctivae and EOM are normal    Neck: Normal range of motion  Neck supple  No thyromegaly present  Cardiovascular: Normal rate, regular rhythm, normal heart sounds and intact distal pulses  Pulmonary/Chest: Effort normal  No respiratory distress  He has wheezes (mild scattered wheezing on expiration on all lobes)  Abdominal: Soft  Bowel sounds are normal  He exhibits no distension  There is no tenderness  Musculoskeletal: Normal range of motion  He exhibits no edema or deformity  Neurological: He is alert and oriented to person, place, and time  He has normal reflexes  Skin: Skin is warm and dry  No erythema  Psychiatric: He has a normal mood and affect  His behavior is normal  Thought content normal    Vitals reviewed  Discussion with Family: n/a     Discharge instructions/Information to patient and family:   See after visit summary for information provided to patient and family  Provisions for Follow-Up Care:  See after visit summary for information related to follow-up care and any pertinent home health orders  Disposition:     Home    For Discharges to [facility]:   · Not Applicable to this Patient - Not Applicable to this Patient    Planned Readmission: no      Discharge Statement:  I spent 38 minutes discharging the patient  This time was spent on the day of discharge  I had direct contact with the patient on the day of discharge  Greater than 50% of the total time was spent examining patient, answering all patient questions, arranging and discussing plan of care with patient as well as directly providing post-discharge instructions  Additional time then spent on discharge activities      Discharge Medications:  See after visit summary for reconciled discharge medications provided to patient and family        ** Please Note: This note has been constructed using a voice recognition system **

## 2019-03-14 NOTE — ASSESSMENT & PLAN NOTE
Patient quit smoking approximately 3 weeks ago that he does report that he snuck 2 cigarettes after meals will continue with supportive care

## 2019-03-14 NOTE — ASSESSMENT & PLAN NOTE
Lab Results   Component Value Date    HGBA1C 7 4 (H) 03/13/2019       Recent Labs     03/14/19  0150 03/14/19  0627 03/14/19  1129   POCGLU 203* 252* 287*       Blood Sugar Average: Last 72 hrs:  (P) 143 [id number]   · Resume his home regimen on discharge

## 2019-03-14 NOTE — ASSESSMENT & PLAN NOTE
· Pulmonology input is appreciated  · Will start the patient on Breo  · Clinically is much improved we were able to titrate off oxygen  Ambulation pulse ox 94% on RA  · Will start steroid taper course  · Recommend to follow up with his PCP or pulmonologist once discharged    · Smoking cessation discussed

## 2019-03-14 NOTE — NURSING NOTE
Pt  Admitted from ed at 0155 to room 120-1  Pt with audible wheezes  Plan of care discussed, questions answered  Pt  Did admit to smoking cig  2 xs since last discharge, refuses any counseling to help him quit  02 on at 3L/NC  Orders reviewed

## 2019-03-14 NOTE — ASSESSMENT & PLAN NOTE
Lab Results   Component Value Date    HGBA1C 7 6 (H) 02/22/2019       No results for input(s): POCGLU in the last 72 hours  Blood Sugar Average: Last 72 hrs:     Place on [facility] step 2 diet, will hold oral antihyperglycemics, obtain Accu-Cheks AC and HS and place on algorithm 3 correction dose a c   And HS

## 2019-03-14 NOTE — ASSESSMENT & PLAN NOTE
· Patient quit smoking approximately 3 weeks ago that he does report that he snuck 2 cigarettes after meals    · Smoking cessation discussed with the patient in detail

## 2019-03-14 NOTE — ASSESSMENT & PLAN NOTE
Continue pre-hospital metoprolol tartrate 25 mg p o  B i d  patient was somewhat hypertensive upon arrival in the ER will continue monitor and give Catapres p r n  For systolic blood pressure greater than 170

## 2019-03-27 NOTE — ED PROVIDER NOTES
History  Chief Complaint   Patient presents with    Shortness of Breath     PT C/O  WORSENED SOB X FEW DAYS, INCREASED COUGH, OCC SPUTUM -- VERY VAGUE HISTORIAN  NO CP/SYNCOPE  NO DEFINITE FEVER  NO CHILLS           Prior to Admission Medications   Prescriptions Last Dose Informant Patient Reported? Taking?    albuterol (2 5 mg/3 mL) 0 083 % nebulizer solution 3/13/2019 at 1800  No Yes   Sig: Take 1 vial (2 5 mg total) by nebulization every 4 (four) hours as needed for wheezing or shortness of breath   albuterol (PROVENTIL HFA,VENTOLIN HFA) 90 mcg/act inhaler 3/13/2019 at 1600  Yes Yes   Sig: Inhale 2 puffs every 6 (six) hours as needed for wheezing or shortness of breath   aspirin (ECOTRIN LOW STRENGTH) 81 mg EC tablet 3/13/2019 at 0800  Yes Yes   Sig: Take 81 mg by mouth daily   atorvastatin (LIPITOR) 80 mg tablet 3/13/2019 at 1800  Yes Yes   Sig: Take 80 mg by mouth daily   metFORMIN (GLUCOPHAGE) 500 mg tablet 3/13/2019 at 1800  Yes Yes   Sig: Take 500 mg by mouth 2 (two) times a day with meals 1/2 a tablet in the morning and evening   metoprolol tartrate (LOPRESSOR) 50 mg tablet 3/13/2019 at 1800  Yes Yes   Sig: Take 25 mg by mouth 2 (two) times a day   primidone (MYSOLINE) 50 mg tablet 3/13/2019 at 1800  Yes Yes   Sig: Take 50 mg by mouth every 8 (eight) hours as needed   saxagliptin (ONGLYZA) 5 MG tablet 3/13/2019 at 0800  Yes Yes   Sig: Take 5 mg by mouth daily      Facility-Administered Medications: None       Past Medical History:   Diagnosis Date    BPH (benign prostatic hyperplasia)     45 days radiation treatment    Cardiac disease     Coronary artery disease     [facility] mellitus ([facility] [address] [facility]     Hyperlipidemia     Hypertension     Prostate cancer ([facility] [address] )        Past Surgical History:   Procedure Laterality Date    CORONARY ANGIOPLASTY WITH STENT PLACEMENT      CORONARY ARTERY BYPASS GRAFT      PROSTATE BIOPSY         Family History   Problem Relation Age of Onset    Cancer Father     Heart disease Brother      I have reviewed and agree with the history as documented  Social History     Tobacco Use    Smoking status: Former Smoker     Last attempt to quit: 2/15/2019     Years since quittin 1    Smokeless tobacco: Never Used   Substance Use Topics    Alcohol use: Yes     Comment: on occasion    Drug use: No        Review of Systems   Constitutional: Negative for chills and fatigue  HENT: Positive for postnasal drip  Negative for nosebleeds and sinus pain  Eyes: Negative  Respiratory: Positive for cough, shortness of breath and wheezing  Negative for chest tightness  Cardiovascular: Negative for chest pain, palpitations and leg swelling  Gastrointestinal: Negative  Genitourinary: Negative  Musculoskeletal: Negative  Skin: Negative  Neurological: Negative  Hematological: Negative  Physical Exam  Physical Exam   Constitutional: He is oriented to person, place, and time  Non-toxic appearance  He appears distressed  HENT:   Head: Normocephalic and atraumatic  Mouth/Throat: Oropharynx is clear and moist  No oropharyngeal exudate or posterior oropharyngeal edema  Eyes: EOM are normal    Cardiovascular: Regular rhythm and normal heart sounds  No murmur heard  Pulmonary/Chest: No stridor  Tachypnea noted  He has wheezes in the right middle field, the right lower field, the left middle field and the left lower field  He has no rhonchi  He has no rales  He exhibits no tenderness  Abdominal: Soft  Bowel sounds are normal  There is no tenderness  Musculoskeletal:        Right lower leg: He exhibits no tenderness  Left lower leg: He exhibits no tenderness  Neurological: He is alert and oriented to person, place, and time  He is not disoriented  No cranial nerve deficit  Skin: Skin is warm  Capillary refill takes less than 2 seconds  No rash noted  No erythema  Nursing note and vitals reviewed        Vital Signs  ED Triage Vitals [19] Temperature Pulse Respirations Blood Pressure SpO2   98 2 °F (36 8 °C) 85 (!) 24 (!) 207/95 91 %      Temp Source Heart Rate Source Patient Position - Orthostatic VS BP Location FiO2 (%)   Temporal Monitor Sitting Left arm --      Pain Score       No Pain           Vitals:    03/14/19 0047 03/14/19 0143 03/14/19 0416 03/14/19 0721   BP: (!) 178/81 (!) 200/101 (!) 180/80 150/78   Pulse: 86 94 88 84   Patient Position - Orthostatic VS:    Sitting         Visual Acuity      ED Medications  Medications   ipratropium-albuterol (DUO-NEB) 0 5-2 5 mg/3 mL inhalation solution 3 mL (3 mL Nebulization Given 3/13/19 2323)   methylPREDNISolone sodium succinate (Solu-MEDROL) injection 125 mg (125 mg Intravenous Given 3/13/19 2327)   ipratropium-albuterol (DUO-NEB) 0 5-2 5 mg/3 mL inhalation solution 3 mL (3 mL Nebulization Given 3/14/19 0002)       Diagnostic Studies  Results Reviewed     Procedure Component Value Units Date/Time    Hemoglobin A1c w/EAG Estimation (Orders if not completed within the last 90 days) [[id number]]  (Abnormal) Collected:  03/13/19 2327    Lab Status:  Final result Specimen:  Blood Updated:  03/14/19 1017     Hemoglobin A1C 7 4 %       mg/dl     Fingerstick Glucose (POCT) [[id number]]  (Abnormal) Collected:  03/14/19 0150    Lab Status:  Final result Updated:  03/14/19 0151     POC Glucose 203 mg/dl     Basic metabolic panel [[id number]]  (Abnormal) Collected:  03/13/19 2327    Lab Status:  Final result Specimen:  Blood from Arm, Left Updated:  03/13/19 2350     Sodium 138 mmol/L      Potassium 4 5 mmol/L      Chloride 102 mmol/L      CO2 27 mmol/L      ANION GAP 9 mmol/L      BUN 15 mg/dL      Creatinine 0 81 mg/dL      Glucose 200 mg/dL      Calcium 9 1 mg/dL      eGFR 89 ml/min/1 73sq m     Narrative:       National Kidney Disease Education Program recommendations are as follows:  GFR calculation is accurate only with a steady state creatinine  Chronic Kidney disease less than 60 ml/min/1 73 sq  meters  Kidney failure less than 15 ml/min/1 73 sq  meters  CBC and differential [[id number]]  (Abnormal) Collected:  03/13/19 2327    Lab Status:  Final result Specimen:  Blood from Arm, Left Updated:  03/13/19 2335     WBC 9 30 Thousand/uL      RBC 4 57 Million/uL      Hemoglobin 14 4 g/dL      Hematocrit 41 4 %      MCV 91 fL      MCH 31 5 pg      MCHC 34 8 g/dL      RDW 13 7 %      MPV 8 6 fL      Platelets 502 Thousands/uL      nRBC 0 /100 WBCs      Neutrophils Relative 56 %      Lymphocytes Relative 21 %      Monocytes Relative 7 %      Eosinophils Relative 14 %      Basophils Relative 1 %      Neutrophils Absolute 5 20 Thousands/µL      Lymphocytes Absolute 2 00 Thousands/µL      Monocytes Absolute 0 70 Thousand/µL      Eosinophils Absolute 1 30 Thousand/µL      Basophils Absolute 0 10 Thousands/µL                  XR chest 2 views   Final Result by Tacho Moeller MD (03/14 8238)      No acute cardiopulmonary disease  Workstation performed: [id number]                    Procedures  Procedures       Phone Contacts  ED Phone Contact    ED Course     EXAM&EvAL-- pt given neb tx's, & steroids & improved but only slightly; w/ exertion/conversation was obviously dyspneic  W/ ambul ation, hypoxic ~ 84 %  D/W overnite [facility] for admit                            MDM    Disposition  Final diagnoses:   COPD with acute exacerbation ([facility] [address] )     Time reflects when diagnosis was documented in both MDM as applicable and the Disposition within this note     Time User Action Codes Description Comment    3/14/2019 12:41 AM Ramin JORGE Add [J44 1] COPD with acute exacerbation ([facility] [address] )     3/14/2019 12:41 AM Asha Monique Modify [J44 1] COPD with acute exacerbation [facility])       ED Disposition     ED Disposition Condition Date/Time Comment    Admit Stable Thu Mar 14, 2019 12:39 AM Case was discussed with  SPEEDY and the patient's admission status was agreed to be Admission Status: observation status to the service of Dr Daina Vera           Follow-up Information     Follow up With Specialties Details Why Contact Info    [facility]  Follow up in 1 week(s) COPD exacerbation      [facility]  Follow up in 3 day(s) please call for an appointment           Discharge Medication List as of 3/14/2019  1:39 PM      START taking these medications    Details   fluticasone-vilanterol (BREO ELLIPTA) 200-25 MCG/INH inhaler Inhale 1 puff daily for 30 days Rinse mouth after use , Starting u 3/14/2019, Until Sat 4/13/2019, Normal      predniSONE 10 mg tablet Take 4 tablets (40 mg total) by mouth daily Take 40 mg x 2 days, take 20 mg x 2 days, take 10 mg x 2 days then stop, Starting u 3/14/2019, Normal         CONTINUE these medications which have NOT CHANGED    Details   albuterol (2 5 mg/3 mL) 0 083 % nebulizer solution Take 1 vial (2 5 mg total) by nebulization every 4 (four) hours as needed for wheezing or shortness of breath, Starting Sun 6/24/2018, Print      albuterol (PROVENTIL HFA,VENTOLIN HFA) 90 mcg/act inhaler Inhale 2 puffs every 6 (six) hours as needed for wheezing or shortness of breath, Historical Med      aspirin (ECOTRIN LOW STRENGTH) 81 mg EC tablet Take 81 mg by mouth daily, Historical Med      atorvastatin (LIPITOR) 80 mg tablet Take 80 mg by mouth daily, Historical Med      metFORMIN (GLUCOPHAGE) 500 mg tablet Take 500 mg by mouth 2 (two) times a day with meals 1/2 a tablet in the morning and evening, Historical Med      metoprolol tartrate (LOPRESSOR) 50 mg tablet Take 25 mg by mouth 2 (two) times a day, Starting Thu 3/16/2017, Historical Med      primidone (MYSOLINE) 50 mg tablet Take 50 mg by mouth every 8 (eight) hours as needed, Starting Fri 3/24/2017, Historical Med      saxagliptin (ONGLYZA) 5 MG tablet Take 5 mg by mouth daily, Historical Med           Outpatient Discharge Orders   Discharge Diet     Activity as tolerated     Call provider for:  difficulty breathing, headache or visual disturbances     Call provider for:  persistent dizziness or light-headedness     No dressing needed       ED Provider  Electronically Signed by           Radha Mares DO  03/26/19 8139

## 2019-05-15 ENCOUNTER — HOSPITAL ENCOUNTER (OUTPATIENT)
Facility: HOSPITAL | Age: 74
Setting detail: OBSERVATION
Discharge: HOME | End: 2019-05-16
Attending: EMERGENCY MEDICINE | Admitting: INTERNAL MEDICINE
Payer: MEDICARE

## 2019-05-15 ENCOUNTER — APPOINTMENT (OUTPATIENT)
Dept: RADIOLOGY | Facility: HOSPITAL | Age: 74
End: 2019-05-15
Payer: MEDICARE

## 2019-05-15 DIAGNOSIS — R06.2 WHEEZING: ICD-10-CM

## 2019-05-15 DIAGNOSIS — J44.1 COPD WITH ACUTE EXACERBATION (HCC): Primary | ICD-10-CM

## 2019-05-15 LAB
ALBUMIN SERPL BCP-MCNC: 4.4 G/DL (ref 3.5–5.7)
ALP SERPL-CCNC: 79 U/L (ref 55–165)
ALT SERPL W P-5'-P-CCNC: 13 U/L (ref 7–52)
ANION GAP SERPL CALCULATED.3IONS-SCNC: 9 MMOL/L (ref 4–13)
AST SERPL W P-5'-P-CCNC: 11 U/L (ref 13–39)
ATRIAL RATE: 79 BPM
BASOPHILS # BLD AUTO: 0.1 THOUSANDS/ΜL (ref 0–0.1)
BASOPHILS NFR BLD AUTO: 1 % (ref 0–2)
BILIRUB SERPL-MCNC: 0.4 MG/DL (ref 0.2–1)
BUN SERPL-MCNC: 13 MG/DL (ref 7–25)
CALCIUM SERPL-MCNC: 9.2 MG/DL (ref 8.6–10.5)
CHLORIDE SERPL-SCNC: 100 MMOL/L (ref 98–107)
CO2 SERPL-SCNC: 24 MMOL/L (ref 21–31)
CREAT SERPL-MCNC: 0.86 MG/DL (ref 0.7–1.3)
EOSINOPHIL # BLD AUTO: 0.9 THOUSAND/ΜL (ref 0–0.61)
EOSINOPHIL NFR BLD AUTO: 11 % (ref 0–5)
ERYTHROCYTE [DISTWIDTH] IN BLOOD BY AUTOMATED COUNT: 13.5 % (ref 11.5–14.5)
GFR SERPL CREATININE-BSD FRML MDRD: 86 ML/MIN/1.73SQ M
GLUCOSE SERPL-MCNC: 235 MG/DL (ref 65–140)
GLUCOSE SERPL-MCNC: 289 MG/DL (ref 65–140)
GLUCOSE SERPL-MCNC: 303 MG/DL (ref 65–99)
HCT VFR BLD AUTO: 41 % (ref 42–47)
HGB BLD-MCNC: 14.4 G/DL (ref 14–18)
LYMPHOCYTES # BLD AUTO: 1.3 THOUSANDS/ΜL (ref 0.6–4.47)
LYMPHOCYTES NFR BLD AUTO: 16 % (ref 21–51)
MCH RBC QN AUTO: 31.6 PG (ref 26–34)
MCHC RBC AUTO-ENTMCNC: 35.1 G/DL (ref 31–37)
MCV RBC AUTO: 90 FL (ref 81–99)
MONOCYTES # BLD AUTO: 0.6 THOUSAND/ΜL (ref 0.17–1.22)
MONOCYTES NFR BLD AUTO: 7 % (ref 2–12)
NEUTROPHILS # BLD AUTO: 5.2 THOUSANDS/ΜL (ref 1.4–6.5)
NEUTS SEG NFR BLD AUTO: 64 % (ref 42–75)
P AXIS: 74 DEGREES
PLATELET # BLD AUTO: 195 THOUSANDS/UL (ref 149–390)
PMV BLD AUTO: 9 FL (ref 8.6–11.7)
POTASSIUM SERPL-SCNC: 4.2 MMOL/L (ref 3.5–5.5)
PR INTERVAL: 184 MS
PROCALCITONIN SERPL-MCNC: <0.05 NG/ML
PROT SERPL-MCNC: 7.1 G/DL (ref 6.4–8.9)
QRS AXIS: 54 DEGREES
QRSD INTERVAL: 100 MS
QT INTERVAL: 386 MS
QTC INTERVAL: 442 MS
RBC # BLD AUTO: 4.55 MILLION/UL (ref 4.3–5.9)
SODIUM SERPL-SCNC: 133 MMOL/L (ref 134–143)
T WAVE AXIS: 97 DEGREES
TROPONIN I SERPL-MCNC: <0.03 NG/ML
VENTRICULAR RATE: 79 BPM
WBC # BLD AUTO: 8 THOUSAND/UL (ref 4.8–10.8)

## 2019-05-15 PROCEDURE — 96375 TX/PRO/DX INJ NEW DRUG ADDON: CPT

## 2019-05-15 PROCEDURE — 93010 ELECTROCARDIOGRAM REPORT: CPT | Performed by: INTERNAL MEDICINE

## 2019-05-15 PROCEDURE — 80053 COMPREHEN METABOLIC PANEL: CPT | Performed by: EMERGENCY MEDICINE

## 2019-05-15 PROCEDURE — 94644 CONT INHLJ TX 1ST HOUR: CPT

## 2019-05-15 PROCEDURE — 36415 COLL VENOUS BLD VENIPUNCTURE: CPT | Performed by: EMERGENCY MEDICINE

## 2019-05-15 PROCEDURE — 93005 ELECTROCARDIOGRAM TRACING: CPT

## 2019-05-15 PROCEDURE — 84484 ASSAY OF TROPONIN QUANT: CPT | Performed by: EMERGENCY MEDICINE

## 2019-05-15 PROCEDURE — 94645 CONT INHLJ TX EACH ADDL HOUR: CPT

## 2019-05-15 PROCEDURE — 85025 COMPLETE CBC W/AUTO DIFF WBC: CPT | Performed by: EMERGENCY MEDICINE

## 2019-05-15 PROCEDURE — 84145 PROCALCITONIN (PCT): CPT | Performed by: INTERNAL MEDICINE

## 2019-05-15 PROCEDURE — 94664 DEMO&/EVAL PT USE INHALER: CPT

## 2019-05-15 PROCEDURE — 71046 X-RAY EXAM CHEST 2 VIEWS: CPT

## 2019-05-15 PROCEDURE — 99219 PR INITIAL OBSERVATION CARE/DAY 50 MINUTES: CPT | Performed by: INTERNAL MEDICINE

## 2019-05-15 PROCEDURE — 96365 THER/PROPH/DIAG IV INF INIT: CPT

## 2019-05-15 PROCEDURE — 94640 AIRWAY INHALATION TREATMENT: CPT

## 2019-05-15 PROCEDURE — 99285 EMERGENCY DEPT VISIT HI MDM: CPT

## 2019-05-15 PROCEDURE — 82948 REAGENT STRIP/BLOOD GLUCOSE: CPT

## 2019-05-15 PROCEDURE — 94760 N-INVAS EAR/PLS OXIMETRY 1: CPT

## 2019-05-15 RX ORDER — METOPROLOL TARTRATE 5 MG/5ML
5 INJECTION INTRAVENOUS ONCE
Status: COMPLETED | OUTPATIENT
Start: 2019-05-15 | End: 2019-05-15

## 2019-05-15 RX ORDER — IPRATROPIUM BROMIDE AND ALBUTEROL SULFATE 2.5; .5 MG/3ML; MG/3ML
3 SOLUTION RESPIRATORY (INHALATION)
Status: DISCONTINUED | OUTPATIENT
Start: 2019-05-15 | End: 2019-05-16

## 2019-05-15 RX ORDER — CEFTRIAXONE 1 G/50ML
1000 INJECTION, SOLUTION INTRAVENOUS ONCE
Status: COMPLETED | OUTPATIENT
Start: 2019-05-15 | End: 2019-05-15

## 2019-05-15 RX ORDER — CEFTRIAXONE 1 G/50ML
1000 INJECTION, SOLUTION INTRAVENOUS EVERY 24 HOURS
Status: DISCONTINUED | OUTPATIENT
Start: 2019-05-16 | End: 2019-05-16

## 2019-05-15 RX ORDER — METHYLPREDNISOLONE SODIUM SUCCINATE 40 MG/ML
40 INJECTION, POWDER, LYOPHILIZED, FOR SOLUTION INTRAMUSCULAR; INTRAVENOUS EVERY 8 HOURS
Status: DISCONTINUED | OUTPATIENT
Start: 2019-05-15 | End: 2019-05-16

## 2019-05-15 RX ORDER — IPRATROPIUM BROMIDE AND ALBUTEROL SULFATE 2.5; .5 MG/3ML; MG/3ML
3 SOLUTION RESPIRATORY (INHALATION) ONCE
Status: COMPLETED | OUTPATIENT
Start: 2019-05-15 | End: 2019-05-15

## 2019-05-15 RX ORDER — ATORVASTATIN CALCIUM 80 MG/1
80 TABLET, FILM COATED ORAL DAILY
Status: DISCONTINUED | OUTPATIENT
Start: 2019-05-16 | End: 2019-05-16 | Stop reason: HOSPADM

## 2019-05-15 RX ORDER — INSULIN GLARGINE 100 [IU]/ML
8 INJECTION, SOLUTION SUBCUTANEOUS
Status: DISCONTINUED | OUTPATIENT
Start: 2019-05-15 | End: 2019-05-16 | Stop reason: HOSPADM

## 2019-05-15 RX ORDER — DOXYCYCLINE HYCLATE 50 MG/1
100 CAPSULE ORAL ONCE
Status: DISCONTINUED | OUTPATIENT
Start: 2019-05-15 | End: 2019-05-15

## 2019-05-15 RX ORDER — ASPIRIN 81 MG/1
81 TABLET ORAL DAILY
Status: DISCONTINUED | OUTPATIENT
Start: 2019-05-16 | End: 2019-05-16 | Stop reason: HOSPADM

## 2019-05-15 RX ORDER — ALBUTEROL SULFATE 2.5 MG/3ML
10 SOLUTION RESPIRATORY (INHALATION) ONCE
Status: COMPLETED | OUTPATIENT
Start: 2019-05-15 | End: 2019-05-15

## 2019-05-15 RX ORDER — METHYLPREDNISOLONE SODIUM SUCCINATE 125 MG/2ML
60 INJECTION, POWDER, LYOPHILIZED, FOR SOLUTION INTRAMUSCULAR; INTRAVENOUS ONCE
Status: COMPLETED | OUTPATIENT
Start: 2019-05-15 | End: 2019-05-15

## 2019-05-15 RX ORDER — GUAIFENESIN 600 MG
600 TABLET, EXTENDED RELEASE 12 HR ORAL 2 TIMES DAILY
Status: DISCONTINUED | OUTPATIENT
Start: 2019-05-15 | End: 2019-05-16 | Stop reason: HOSPADM

## 2019-05-15 RX ADMIN — GUAIFENESIN 600 MG: 600 TABLET, EXTENDED RELEASE ORAL at 17:00

## 2019-05-15 RX ADMIN — INSULIN LISPRO 3 UNITS: 100 INJECTION, SOLUTION INTRAVENOUS; SUBCUTANEOUS at 16:58

## 2019-05-15 RX ADMIN — METOPROLOL TARTRATE 5 MG: 1 INJECTION, SOLUTION INTRAVENOUS at 13:06

## 2019-05-15 RX ADMIN — METHYLPREDNISOLONE SODIUM SUCCINATE 60 MG: 125 INJECTION, POWDER, FOR SOLUTION INTRAMUSCULAR; INTRAVENOUS at 12:13

## 2019-05-15 RX ADMIN — METOPROLOL TARTRATE 25 MG: 25 TABLET ORAL at 17:00

## 2019-05-15 RX ADMIN — INSULIN LISPRO 3 UNITS: 100 INJECTION, SOLUTION INTRAVENOUS; SUBCUTANEOUS at 21:06

## 2019-05-15 RX ADMIN — IPRATROPIUM BROMIDE AND ALBUTEROL SULFATE 3 ML: 2.5; .5 SOLUTION RESPIRATORY (INHALATION) at 19:54

## 2019-05-15 RX ADMIN — IPRATROPIUM BROMIDE AND ALBUTEROL SULFATE 3 ML: 2.5; .5 SOLUTION RESPIRATORY (INHALATION) at 17:02

## 2019-05-15 RX ADMIN — METHYLPREDNISOLONE SODIUM SUCCINATE 40 MG: 40 INJECTION, POWDER, FOR SOLUTION INTRAMUSCULAR; INTRAVENOUS at 20:02

## 2019-05-15 RX ADMIN — CEFTRIAXONE 1000 MG: 1 INJECTION, SOLUTION INTRAVENOUS at 14:27

## 2019-05-15 RX ADMIN — IPRATROPIUM BROMIDE AND ALBUTEROL SULFATE 3 ML: 2.5; .5 SOLUTION RESPIRATORY (INHALATION) at 12:10

## 2019-05-15 RX ADMIN — ALBUTEROL SULFATE 10 MG: 2.5 SOLUTION RESPIRATORY (INHALATION) at 12:27

## 2019-05-15 RX ADMIN — INSULIN GLARGINE 8 UNITS: 100 INJECTION, SOLUTION SUBCUTANEOUS at 21:06

## 2019-05-16 VITALS
TEMPERATURE: 96.2 F | DIASTOLIC BLOOD PRESSURE: 68 MMHG | RESPIRATION RATE: 18 BRPM | OXYGEN SATURATION: 91 % | WEIGHT: 202.6 LBS | BODY MASS INDEX: 28.36 KG/M2 | HEART RATE: 93 BPM | SYSTOLIC BLOOD PRESSURE: 138 MMHG | HEIGHT: 71 IN

## 2019-05-16 LAB
ALBUMIN SERPL BCP-MCNC: 4 G/DL (ref 3.5–5.7)
ALP SERPL-CCNC: 74 U/L (ref 55–165)
ALT SERPL W P-5'-P-CCNC: 10 U/L (ref 7–52)
ANION GAP SERPL CALCULATED.3IONS-SCNC: 8 MMOL/L (ref 4–13)
AST SERPL W P-5'-P-CCNC: 9 U/L (ref 13–39)
BILIRUB SERPL-MCNC: 0.4 MG/DL (ref 0.2–1)
BUN SERPL-MCNC: 13 MG/DL (ref 7–25)
CALCIUM SERPL-MCNC: 8.9 MG/DL (ref 8.6–10.5)
CHLORIDE SERPL-SCNC: 101 MMOL/L (ref 98–107)
CO2 SERPL-SCNC: 25 MMOL/L (ref 21–31)
CREAT SERPL-MCNC: 0.78 MG/DL (ref 0.7–1.3)
EOSINOPHIL # BLD AUTO: 0.08 THOUSAND/UL (ref 0–0.61)
EOSINOPHIL NFR BLD MANUAL: 1 % (ref 0–6)
ERYTHROCYTE [DISTWIDTH] IN BLOOD BY AUTOMATED COUNT: 13.6 % (ref 11.5–14.5)
GFR SERPL CREATININE-BSD FRML MDRD: 90 ML/MIN/1.73SQ M
GLUCOSE SERPL-MCNC: 228 MG/DL (ref 65–99)
GLUCOSE SERPL-MCNC: 237 MG/DL (ref 65–140)
GLUCOSE SERPL-MCNC: 280 MG/DL (ref 65–140)
HCT VFR BLD AUTO: 37 % (ref 42–47)
HGB BLD-MCNC: 13.2 G/DL (ref 14–18)
LYMPHOCYTES # BLD AUTO: 0.76 THOUSAND/UL (ref 0.6–4.47)
LYMPHOCYTES # BLD AUTO: 10 % (ref 20–51)
MCH RBC QN AUTO: 32 PG (ref 26–34)
MCHC RBC AUTO-ENTMCNC: 35.7 G/DL (ref 31–37)
MCV RBC AUTO: 90 FL (ref 81–99)
MONOCYTES # BLD AUTO: 0.23 THOUSAND/UL (ref 0–1.22)
MONOCYTES NFR BLD AUTO: 3 % (ref 4–12)
NEUTS BAND NFR BLD MANUAL: 3 % (ref 0–8)
NEUTS SEG # BLD: 6.54 THOUSAND/UL (ref 1.81–6.82)
NEUTS SEG NFR BLD AUTO: 83 % (ref 42–75)
PLATELET # BLD AUTO: 184 THOUSANDS/UL (ref 149–390)
PMV BLD AUTO: 9.3 FL (ref 8.6–11.7)
POTASSIUM SERPL-SCNC: 4.2 MMOL/L (ref 3.5–5.5)
PROCALCITONIN SERPL-MCNC: <0.05 NG/ML
PROT SERPL-MCNC: 6.5 G/DL (ref 6.4–8.9)
RBC # BLD AUTO: 4.13 MILLION/UL (ref 4.3–5.9)
SODIUM SERPL-SCNC: 134 MMOL/L (ref 134–143)
TOTAL CELLS COUNTED SPEC: 100
WBC # BLD AUTO: 7.6 THOUSAND/UL (ref 4.8–10.8)

## 2019-05-16 PROCEDURE — 94640 AIRWAY INHALATION TREATMENT: CPT

## 2019-05-16 PROCEDURE — 84145 PROCALCITONIN (PCT): CPT | Performed by: INTERNAL MEDICINE

## 2019-05-16 PROCEDURE — 94760 N-INVAS EAR/PLS OXIMETRY 1: CPT

## 2019-05-16 PROCEDURE — 82948 REAGENT STRIP/BLOOD GLUCOSE: CPT

## 2019-05-16 PROCEDURE — 85027 COMPLETE CBC AUTOMATED: CPT | Performed by: INTERNAL MEDICINE

## 2019-05-16 PROCEDURE — 94664 DEMO&/EVAL PT USE INHALER: CPT

## 2019-05-16 PROCEDURE — 94761 N-INVAS EAR/PLS OXIMETRY MLT: CPT

## 2019-05-16 PROCEDURE — 80053 COMPREHEN METABOLIC PANEL: CPT | Performed by: INTERNAL MEDICINE

## 2019-05-16 PROCEDURE — 99217 PR OBSERVATION CARE DISCHARGE MANAGEMENT: CPT | Performed by: HOSPITALIST

## 2019-05-16 PROCEDURE — 85007 BL SMEAR W/DIFF WBC COUNT: CPT | Performed by: INTERNAL MEDICINE

## 2019-05-16 RX ORDER — PREDNISONE 20 MG/1
40 TABLET ORAL DAILY
Status: DISCONTINUED | OUTPATIENT
Start: 2019-05-16 | End: 2019-05-16 | Stop reason: HOSPADM

## 2019-05-16 RX ORDER — PREDNISONE 20 MG/1
60 TABLET ORAL DAILY
Qty: 18 TABLET | Refills: 0 | Status: SHIPPED | OUTPATIENT
Start: 2019-05-16 | End: 2019-07-05

## 2019-05-16 RX ORDER — IPRATROPIUM BROMIDE AND ALBUTEROL SULFATE 2.5; .5 MG/3ML; MG/3ML
3 SOLUTION RESPIRATORY (INHALATION)
Status: DISCONTINUED | OUTPATIENT
Start: 2019-05-16 | End: 2019-05-16 | Stop reason: HOSPADM

## 2019-05-16 RX ADMIN — METOPROLOL TARTRATE 25 MG: 25 TABLET ORAL at 08:05

## 2019-05-16 RX ADMIN — METHYLPREDNISOLONE SODIUM SUCCINATE 40 MG: 40 INJECTION, POWDER, FOR SOLUTION INTRAMUSCULAR; INTRAVENOUS at 03:21

## 2019-05-16 RX ADMIN — ENOXAPARIN SODIUM 40 MG: 40 INJECTION SUBCUTANEOUS at 08:05

## 2019-05-16 RX ADMIN — INSULIN LISPRO 4 UNITS: 100 INJECTION, SOLUTION INTRAVENOUS; SUBCUTANEOUS at 11:44

## 2019-05-16 RX ADMIN — PREDNISONE 40 MG: 20 TABLET ORAL at 11:41

## 2019-05-16 RX ADMIN — IPRATROPIUM BROMIDE AND ALBUTEROL SULFATE 3 ML: 2.5; .5 SOLUTION RESPIRATORY (INHALATION) at 01:12

## 2019-05-16 RX ADMIN — ATORVASTATIN CALCIUM 80 MG: 80 TABLET, FILM COATED ORAL at 08:05

## 2019-05-16 RX ADMIN — GUAIFENESIN 600 MG: 600 TABLET, EXTENDED RELEASE ORAL at 08:05

## 2019-05-16 RX ADMIN — INSULIN LISPRO 3 UNITS: 100 INJECTION, SOLUTION INTRAVENOUS; SUBCUTANEOUS at 08:04

## 2019-05-16 RX ADMIN — IPRATROPIUM BROMIDE AND ALBUTEROL SULFATE 3 ML: 2.5; .5 SOLUTION RESPIRATORY (INHALATION) at 07:14

## 2019-07-05 ENCOUNTER — HOSPITAL ENCOUNTER (INPATIENT)
Facility: HOSPITAL | Age: 74
LOS: 1 days | Discharge: HOME | DRG: 190 | End: 2019-07-06
Attending: EMERGENCY MEDICINE | Admitting: HOSPITALIST
Payer: MEDICARE

## 2019-07-05 ENCOUNTER — APPOINTMENT (EMERGENCY)
Dept: RADIOLOGY | Facility: HOSPITAL | Age: 74
DRG: 190 | End: 2019-07-05
Payer: MEDICARE

## 2019-07-05 DIAGNOSIS — J44.1 COPD WITH ACUTE EXACERBATION (HCC): Primary | ICD-10-CM

## 2019-07-05 DIAGNOSIS — J18.9 PNEUMONIA OF LEFT LOWER LOBE DUE TO INFECTIOUS ORGANISM: ICD-10-CM

## 2019-07-05 PROBLEM — I25.10 CAD (CORONARY ARTERY DISEASE), NATIVE CORONARY ARTERY: Chronic | Status: ACTIVE | Noted: 2019-02-22

## 2019-07-05 PROBLEM — E11.9 TYPE 2 DIABETES MELLITUS WITHOUT COMPLICATION, WITHOUT LONG-TERM CURRENT USE OF INSULIN (HCC): Chronic | Status: ACTIVE | Noted: 2017-03-08

## 2019-07-05 PROBLEM — I10 ESSENTIAL HYPERTENSION: Chronic | Status: ACTIVE | Noted: 2017-03-08

## 2019-07-05 PROBLEM — Z72.0 TOBACCO ABUSE: Chronic | Status: ACTIVE | Noted: 2017-03-08

## 2019-07-05 PROBLEM — R06.02 SHORTNESS OF BREATH: Status: ACTIVE | Noted: 2019-07-05

## 2019-07-05 PROBLEM — E78.49 OTHER HYPERLIPIDEMIA: Chronic | Status: ACTIVE | Noted: 2019-02-22

## 2019-07-05 PROBLEM — N40.0 BPH (BENIGN PROSTATIC HYPERPLASIA): Chronic | Status: ACTIVE | Noted: 2019-02-22

## 2019-07-05 PROBLEM — R06.02 SHORTNESS OF BREATH: Chronic | Status: ACTIVE | Noted: 2019-07-05

## 2019-07-05 LAB
ALBUMIN SERPL BCP-MCNC: 4.4 G/DL (ref 3.5–5.7)
ALP SERPL-CCNC: 77 U/L (ref 55–165)
ALT SERPL W P-5'-P-CCNC: 14 U/L (ref 7–52)
ANION GAP SERPL CALCULATED.3IONS-SCNC: 7 MMOL/L (ref 4–13)
APTT PPP: 36 SECONDS (ref 23–37)
AST SERPL W P-5'-P-CCNC: 13 U/L (ref 13–39)
ATRIAL RATE: 76 BPM
BASOPHILS # BLD AUTO: 0.1 THOUSANDS/ΜL (ref 0–0.1)
BASOPHILS NFR BLD AUTO: 1 % (ref 0–2)
BILIRUB SERPL-MCNC: 0.5 MG/DL (ref 0.2–1)
BNP SERPL-MCNC: 81 PG/ML (ref 1–100)
BUN SERPL-MCNC: 16 MG/DL (ref 7–25)
CALCIUM SERPL-MCNC: 9.1 MG/DL (ref 8.6–10.5)
CHLORIDE SERPL-SCNC: 102 MMOL/L (ref 98–107)
CK SERPL-CCNC: 44 U/L (ref 30–308)
CO2 SERPL-SCNC: 26 MMOL/L (ref 21–31)
CREAT SERPL-MCNC: 0.94 MG/DL (ref 0.7–1.3)
EOSINOPHIL # BLD AUTO: 1.2 THOUSAND/ΜL (ref 0–0.61)
EOSINOPHIL NFR BLD AUTO: 15 % (ref 0–5)
ERYTHROCYTE [DISTWIDTH] IN BLOOD BY AUTOMATED COUNT: 13.4 % (ref 11.5–14.5)
EST. AVERAGE GLUCOSE BLD GHB EST-MCNC: 186 MG/DL
GFR SERPL CREATININE-BSD FRML MDRD: 80 ML/MIN/1.73SQ M
GLUCOSE SERPL-MCNC: 248 MG/DL (ref 65–99)
GLUCOSE SERPL-MCNC: 280 MG/DL (ref 65–140)
GLUCOSE SERPL-MCNC: 293 MG/DL (ref 65–140)
HBA1C MFR BLD: 8.1 % (ref 4.2–6.3)
HCT VFR BLD AUTO: 38.1 % (ref 42–47)
HGB BLD-MCNC: 13.4 G/DL (ref 14–18)
INR PPP: 0.98 (ref 0.9–1.5)
L PNEUMO1 AG UR QL IA.RAPID: NEGATIVE
LACTATE SERPL-SCNC: 2.2 MMOL/L (ref 0.5–2)
LYMPHOCYTES # BLD AUTO: 1.4 THOUSANDS/ΜL (ref 0.6–4.47)
LYMPHOCYTES NFR BLD AUTO: 18 % (ref 21–51)
MCH RBC QN AUTO: 31.9 PG (ref 26–34)
MCHC RBC AUTO-ENTMCNC: 35.2 G/DL (ref 31–37)
MCV RBC AUTO: 91 FL (ref 81–99)
MONOCYTES # BLD AUTO: 0.5 THOUSAND/ΜL (ref 0.17–1.22)
MONOCYTES NFR BLD AUTO: 7 % (ref 2–12)
NEUTROPHILS # BLD AUTO: 4.7 THOUSANDS/ΜL (ref 1.4–6.5)
NEUTS SEG NFR BLD AUTO: 59 % (ref 42–75)
P AXIS: 63 DEGREES
PLATELET # BLD AUTO: 173 THOUSANDS/UL (ref 149–390)
PLATELET # BLD AUTO: 180 THOUSANDS/UL (ref 149–390)
PMV BLD AUTO: 9.1 FL (ref 8.6–11.7)
POTASSIUM SERPL-SCNC: 3.8 MMOL/L (ref 3.5–5.5)
PR INTERVAL: 172 MS
PROCALCITONIN SERPL-MCNC: <0.05 NG/ML
PROT SERPL-MCNC: 7 G/DL (ref 6.4–8.9)
PROTHROMBIN TIME: 11.4 SECONDS (ref 10.2–13)
QRS AXIS: 51 DEGREES
QRSD INTERVAL: 98 MS
QT INTERVAL: 384 MS
QTC INTERVAL: 432 MS
RBC # BLD AUTO: 4.21 MILLION/UL (ref 4.3–5.9)
S PNEUM AG UR QL: NEGATIVE
SODIUM SERPL-SCNC: 135 MMOL/L (ref 134–143)
T WAVE AXIS: 139 DEGREES
TROPONIN I SERPL-MCNC: <0.03 NG/ML
VENTRICULAR RATE: 76 BPM
WBC # BLD AUTO: 7.9 THOUSAND/UL (ref 4.8–10.8)

## 2019-07-05 PROCEDURE — 85730 THROMBOPLASTIN TIME PARTIAL: CPT | Performed by: EMERGENCY MEDICINE

## 2019-07-05 PROCEDURE — 84145 PROCALCITONIN (PCT): CPT | Performed by: NURSE PRACTITIONER

## 2019-07-05 PROCEDURE — 93005 ELECTROCARDIOGRAM TRACING: CPT

## 2019-07-05 PROCEDURE — 94760 N-INVAS EAR/PLS OXIMETRY 1: CPT

## 2019-07-05 PROCEDURE — 96365 THER/PROPH/DIAG IV INF INIT: CPT

## 2019-07-05 PROCEDURE — 36415 COLL VENOUS BLD VENIPUNCTURE: CPT | Performed by: EMERGENCY MEDICINE

## 2019-07-05 PROCEDURE — 71045 X-RAY EXAM CHEST 1 VIEW: CPT

## 2019-07-05 PROCEDURE — 94664 DEMO&/EVAL PT USE INHALER: CPT

## 2019-07-05 PROCEDURE — 84484 ASSAY OF TROPONIN QUANT: CPT | Performed by: NURSE PRACTITIONER

## 2019-07-05 PROCEDURE — 94640 AIRWAY INHALATION TREATMENT: CPT

## 2019-07-05 PROCEDURE — 80053 COMPREHEN METABOLIC PANEL: CPT | Performed by: EMERGENCY MEDICINE

## 2019-07-05 PROCEDURE — 99285 EMERGENCY DEPT VISIT HI MDM: CPT

## 2019-07-05 PROCEDURE — 87449 NOS EACH ORGANISM AG IA: CPT | Performed by: NURSE PRACTITIONER

## 2019-07-05 PROCEDURE — 85049 AUTOMATED PLATELET COUNT: CPT | Performed by: NURSE PRACTITIONER

## 2019-07-05 PROCEDURE — 84484 ASSAY OF TROPONIN QUANT: CPT | Performed by: EMERGENCY MEDICINE

## 2019-07-05 PROCEDURE — 83605 ASSAY OF LACTIC ACID: CPT | Performed by: EMERGENCY MEDICINE

## 2019-07-05 PROCEDURE — 82948 REAGENT STRIP/BLOOD GLUCOSE: CPT

## 2019-07-05 PROCEDURE — 82550 ASSAY OF CK (CPK): CPT | Performed by: EMERGENCY MEDICINE

## 2019-07-05 PROCEDURE — 99223 1ST HOSP IP/OBS HIGH 75: CPT | Performed by: NURSE PRACTITIONER

## 2019-07-05 PROCEDURE — 83880 ASSAY OF NATRIURETIC PEPTIDE: CPT | Performed by: EMERGENCY MEDICINE

## 2019-07-05 PROCEDURE — 85025 COMPLETE CBC W/AUTO DIFF WBC: CPT | Performed by: EMERGENCY MEDICINE

## 2019-07-05 PROCEDURE — 94644 CONT INHLJ TX 1ST HOUR: CPT

## 2019-07-05 PROCEDURE — 83036 HEMOGLOBIN GLYCOSYLATED A1C: CPT | Performed by: NURSE PRACTITIONER

## 2019-07-05 PROCEDURE — 87040 BLOOD CULTURE FOR BACTERIA: CPT | Performed by: EMERGENCY MEDICINE

## 2019-07-05 PROCEDURE — 85610 PROTHROMBIN TIME: CPT | Performed by: EMERGENCY MEDICINE

## 2019-07-05 PROCEDURE — 96375 TX/PRO/DX INJ NEW DRUG ADDON: CPT

## 2019-07-05 PROCEDURE — 93010 ELECTROCARDIOGRAM REPORT: CPT | Performed by: INTERNAL MEDICINE

## 2019-07-05 RX ORDER — NICOTINE 21 MG/24HR
21 PATCH, TRANSDERMAL 24 HOURS TRANSDERMAL DAILY
Status: DISCONTINUED | OUTPATIENT
Start: 2019-07-05 | End: 2019-07-06 | Stop reason: HOSPADM

## 2019-07-05 RX ORDER — ALBUTEROL SULFATE 2.5 MG/3ML
2.5 SOLUTION RESPIRATORY (INHALATION) EVERY 4 HOURS PRN
Status: DISCONTINUED | OUTPATIENT
Start: 2019-07-05 | End: 2019-07-06 | Stop reason: HOSPADM

## 2019-07-05 RX ORDER — ALBUTEROL SULFATE 90 UG/1
2 AEROSOL, METERED RESPIRATORY (INHALATION) EVERY 6 HOURS PRN
Status: DISCONTINUED | OUTPATIENT
Start: 2019-07-05 | End: 2019-07-06 | Stop reason: HOSPADM

## 2019-07-05 RX ORDER — PRIMIDONE 50 MG/1
50 TABLET ORAL EVERY 8 HOURS PRN
Status: DISCONTINUED | OUTPATIENT
Start: 2019-07-05 | End: 2019-07-06 | Stop reason: HOSPADM

## 2019-07-05 RX ORDER — CEFTRIAXONE 1 G/50ML
1000 INJECTION, SOLUTION INTRAVENOUS EVERY 24 HOURS
Status: DISCONTINUED | OUTPATIENT
Start: 2019-07-05 | End: 2019-07-06 | Stop reason: HOSPADM

## 2019-07-05 RX ORDER — SODIUM CHLORIDE 9 MG/ML
100 INJECTION, SOLUTION INTRAVENOUS CONTINUOUS
Status: DISCONTINUED | OUTPATIENT
Start: 2019-07-05 | End: 2019-07-06 | Stop reason: HOSPADM

## 2019-07-05 RX ORDER — ATORVASTATIN CALCIUM 80 MG/1
80 TABLET, FILM COATED ORAL
Status: DISCONTINUED | OUTPATIENT
Start: 2019-07-05 | End: 2019-07-06 | Stop reason: HOSPADM

## 2019-07-05 RX ORDER — IPRATROPIUM BROMIDE AND ALBUTEROL SULFATE 2.5; .5 MG/3ML; MG/3ML
3 SOLUTION RESPIRATORY (INHALATION)
Status: COMPLETED | OUTPATIENT
Start: 2019-07-05 | End: 2019-07-05

## 2019-07-05 RX ORDER — ONDANSETRON 2 MG/ML
4 INJECTION INTRAMUSCULAR; INTRAVENOUS EVERY 6 HOURS PRN
Status: DISCONTINUED | OUTPATIENT
Start: 2019-07-05 | End: 2019-07-06 | Stop reason: HOSPADM

## 2019-07-05 RX ORDER — LEVOFLOXACIN 5 MG/ML
750 INJECTION, SOLUTION INTRAVENOUS ONCE
Status: DISCONTINUED | OUTPATIENT
Start: 2019-07-05 | End: 2019-07-05

## 2019-07-05 RX ORDER — ACETAMINOPHEN 325 MG/1
650 TABLET ORAL EVERY 6 HOURS PRN
Status: DISCONTINUED | OUTPATIENT
Start: 2019-07-05 | End: 2019-07-06 | Stop reason: HOSPADM

## 2019-07-05 RX ORDER — HEPARIN SODIUM 5000 [USP'U]/ML
5000 INJECTION, SOLUTION INTRAVENOUS; SUBCUTANEOUS EVERY 8 HOURS SCHEDULED
Status: DISCONTINUED | OUTPATIENT
Start: 2019-07-05 | End: 2019-07-06 | Stop reason: HOSPADM

## 2019-07-05 RX ORDER — METHYLPREDNISOLONE SODIUM SUCCINATE 125 MG/2ML
125 INJECTION, POWDER, LYOPHILIZED, FOR SOLUTION INTRAMUSCULAR; INTRAVENOUS ONCE
Status: COMPLETED | OUTPATIENT
Start: 2019-07-05 | End: 2019-07-05

## 2019-07-05 RX ORDER — ASPIRIN 81 MG/1
81 TABLET ORAL EVERY OTHER DAY
Status: DISCONTINUED | OUTPATIENT
Start: 2019-07-05 | End: 2019-07-06 | Stop reason: HOSPADM

## 2019-07-05 RX ADMIN — AZITHROMYCIN MONOHYDRATE 500 MG: 500 INJECTION, POWDER, LYOPHILIZED, FOR SOLUTION INTRAVENOUS at 18:02

## 2019-07-05 RX ADMIN — SODIUM CHLORIDE 100 ML/HR: 9 INJECTION, SOLUTION INTRAVENOUS at 15:52

## 2019-07-05 RX ADMIN — METOPROLOL TARTRATE 25 MG: 25 TABLET, FILM COATED ORAL at 17:42

## 2019-07-05 RX ADMIN — NICOTINE 21 MG: 21 PATCH, EXTENDED RELEASE TRANSDERMAL at 15:52

## 2019-07-05 RX ADMIN — IPRATROPIUM BROMIDE AND ALBUTEROL SULFATE 3 ML: 2.5; .5 SOLUTION RESPIRATORY (INHALATION) at 11:39

## 2019-07-05 RX ADMIN — INSULIN LISPRO 2 UNITS: 100 INJECTION, SOLUTION INTRAVENOUS; SUBCUTANEOUS at 23:09

## 2019-07-05 RX ADMIN — IPRATROPIUM BROMIDE AND ALBUTEROL SULFATE 3 ML: 2.5; .5 SOLUTION RESPIRATORY (INHALATION) at 11:31

## 2019-07-05 RX ADMIN — ALBUTEROL SULFATE 2 PUFF: 90 AEROSOL, METERED RESPIRATORY (INHALATION) at 18:03

## 2019-07-05 RX ADMIN — METHYLPREDNISOLONE SODIUM SUCCINATE 125 MG: 125 INJECTION, POWDER, FOR SOLUTION INTRAMUSCULAR; INTRAVENOUS at 11:34

## 2019-07-05 RX ADMIN — INSULIN LISPRO 4 UNITS: 100 INJECTION, SOLUTION INTRAVENOUS; SUBCUTANEOUS at 16:05

## 2019-07-05 RX ADMIN — CEFTRIAXONE 1000 MG: 1 INJECTION, SOLUTION INTRAVENOUS at 16:01

## 2019-07-05 RX ADMIN — LEVOFLOXACIN 750 MG: 750 INJECTION, SOLUTION INTRAVENOUS at 13:55

## 2019-07-05 RX ADMIN — ATORVASTATIN CALCIUM 80 MG: 80 TABLET, FILM COATED ORAL at 15:53

## 2019-07-05 RX ADMIN — HEPARIN SODIUM 5000 UNITS: 5000 INJECTION INTRAVENOUS; SUBCUTANEOUS at 15:53

## 2019-07-05 RX ADMIN — ALBUTEROL SULFATE 10 MG: 2.5 SOLUTION RESPIRATORY (INHALATION) at 12:45

## 2019-07-05 RX ADMIN — HEPARIN SODIUM 5000 UNITS: 5000 INJECTION INTRAVENOUS; SUBCUTANEOUS at 23:09

## 2019-07-05 NOTE — PLAN OF CARE
Problem: Potential for Falls  Goal: Patient will remain free of falls  Description  INTERVENTIONS:  - Assess patient frequently for physical needs  -  Identify cognitive and physical deficits and behaviors that affect risk of falls  -  [place] fall precautions as indicated by assessment   - Educate patient/family on patient safety including physical limitations  - Instruct patient to call for assistance with activity based on assessment  - Modify environment to reduce risk of injury  - Consider OT/PT consult to assist with strengthening/mobility  Outcome: Progressing     Problem: Nutrition/Hydration-ADULT  Goal: Nutrient/Hydration intake appropriate for improving, restoring or maintaining nutritional needs  Description  Monitor and assess patient's nutrition/hydration status for malnutrition (ex- brittle hair, bruises, dry skin, pale skin and conjunctiva, muscle wasting, smooth red tongue, and disorientation)  Collaborate with interdisciplinary team and initiate plan and interventions as ordered  Monitor patient's weight and dietary intake as ordered or per policy  Utilize nutrition screening tool and intervene per policy  Determine patient's food preferences and provide high-protein, high-caloric foods as appropriate       INTERVENTIONS:  - Monitor oral intake, urinary output, labs, and treatment plans  - Assess nutrition and hydration status and recommend course of action  - Evaluate amount of meals eaten  - Assist patient with eating if necessary   - Allow adequate time for meals  - Recommend/ encourage appropriate diets, oral nutritional supplements, and vitamin/mineral supplements  - Order, calculate, and assess calorie counts as needed  - Recommend, monitor, and adjust tube feedings and TPN/PPN based on assessed needs  - Assess need for intravenous fluids  - Provide specific nutrition/hydration education as appropriate  - Include patient/family/caregiver in decisions related to nutrition  Outcome: Progressing     Problem: PAIN - ADULT  Goal: Verbalizes/displays adequate comfort level or baseline comfort level  Description  Interventions:  - Encourage patient to monitor pain and request assistance  - Assess pain using appropriate pain scale  - Administer analgesics based on type and severity of pain and evaluate response  - Implement non-pharmacological measures as appropriate and evaluate response  - Consider cultural and social influences on pain and pain management  - Notify physician/advanced practitioner if interventions unsuccessful or patient reports new pain  Outcome: Progressing     Problem: INFECTION - ADULT  Goal: Absence or prevention of progression during hospitalization  Description  INTERVENTIONS:  - Assess and monitor for signs and symptoms of infection  - Monitor lab/diagnostic results  - Monitor all insertion sites, i e  indwelling lines, tubes, and drains  - Monitor endotracheal (as able) and nasal secretions for changes in amount and color  - [place] appropriate cooling/warming therapies per order  - Administer medications as ordered  - Instruct and encourage patient and family to use good hand hygiene technique  - Identify and instruct in appropriate isolation precautions for identified infection/condition  Outcome: Progressing  Goal: Absence of fever/infection during neutropenic period  Description  INTERVENTIONS:  - Monitor WBC  - Implement neutropenic guidelines  Outcome: Progressing     Problem: SAFETY ADULT  Goal: Maintain or return to baseline ADL function  Description  INTERVENTIONS:  -  Assess patient's ability to carry out ADLs; assess patient's baseline for ADL function and identify physical deficits which impact ability to perform ADLs (bathing, care of mouth/teeth, toileting, grooming, dressing, etc )  - Assess/evaluate cause of self-care deficits   - Assess range of motion  - Assess patient's mobility; develop plan if impaired  - Assess patient's need for assistive devices and provide as appropriate  - Encourage maximum independence but intervene and supervise when necessary  ¯ Involve family in performance of ADLs  ¯ Assess for home care needs following discharge   ¯ Request OT consult to assist with ADL evaluation and planning for discharge  ¯ Provide patient education as appropriate  Outcome: Progressing  Goal: Maintain or return mobility status to optimal level  Description  INTERVENTIONS:  - Assess patient's baseline mobility status (ambulation, transfers, stairs, etc )    - Identify cognitive and physical deficits and behaviors that affect mobility  - Identify mobility aids required to assist with transfers and/or ambulation (gait belt, sit-to-stand, lift, walker, cane, etc )  - [place] fall precautions as indicated by assessment  - Record patient progress and toleration of activity level on Mobility SBAR; progress patient to next Phase/Stage  - Instruct patient to call for assistance with activity based on assessment  - Request Rehabilitation consult to assist with strengthening/weightbearing, etc   Outcome: Progressing     Problem: DISCHARGE PLANNING  Goal: Discharge to home or other facility with appropriate resources  Description  INTERVENTIONS:  - Identify barriers to discharge w/patient and caregiver  - Arrange for needed discharge resources and transportation as appropriate  - Identify discharge learning needs (meds, wound care, etc )  - Arrange for interpretive services to assist at discharge as needed  - Refer to Case Management Department for coordinating discharge planning if the patient needs post-hospital services based on physician/advanced practitioner order or complex needs related to functional status, cognitive ability, or social support system  Outcome: Progressing     Problem: Knowledge Deficit  Goal: Patient/family/caregiver demonstrates understanding of disease process, treatment plan, medications, and discharge instructions  Description  Complete learning assessment and assess knowledge base    Interventions:  - Provide teaching at level of understanding  - Provide teaching via preferred learning methods  Outcome: Progressing

## 2019-07-05 NOTE — H&P
H&P- Coleen Lu 1945, 69 y o  male MRN: [id number]    Unit/Bed#: ED 01 Encounter: [id number]    Primary Care Provider: No primary care provider on file  Date and time admitted to hospital: 7/5/2019 11:12 AM        * SOB (shortness of breath)  Assessment & Plan  · Positive shortness of breath and wheeze for 2 days  · Cough with white sputum productivity    Pneumonia of left lower lobe due to infectious organism [facility])  Assessment & Plan  · Found on chest x-ray  · Was given Levaquin in the ER  · Will change IV antibiotics to Rocephin and azithromycin  · Sputum culture pending  · Urine Legionella and strep  · Aspiration precautions  · Continue nebulizer treatments  · Respiratory protocol  · Check procalcitonin    COPD with acute exacerbation (HCC)  Assessment & Plan  · Continue neb treatments    Type 2 diabetes mellitus without complication, without long-term current use of insulin (HCC)  Assessment & Plan  Lab Results   Component Value Date    HGBA1C 7 4 (H) 03/13/2019       No results for input(s): POCGLU in the last 72 hours  Blood Sugar Average: Last 72 hrs:  ·  Accu-Cheks Q a c  And HS with sliding scale insulin  · Hold patient's metformin and saxagliptin    Essential hypertension  Assessment & Plan  · Continue aspirin and metoprolol    CAD (coronary artery disease), native coronary artery  Assessment & Plan  · Continue aspirin    Other hyperlipidemia  Assessment & Plan  · Continue Lipitor    Tobacco abuse  Assessment & Plan  · Nicotine patch    VTE Prophylaxis: Heparin  Code Status:  Full code  POLST: POLST is not applicable to this patient  Discussion with family:  Patient    Anticipated Length of Stay:  Patient will be admitted on an Inpatient basis with an anticipated length of stay of  > 2 midnights     Justification for Hospital Stay:  IV antibiotics    Total Time for Visit, including Counseling / Coordination of Care: 70   Greater than 50% of this total time spent on direct patient counseling and coordination of care  Chief Complaint:   Shortness of breath with auditory wheezes    History of Present Illness:    Elvira Sutherland is a 69 y o  male who presents with shortness of breath with auditory wheezes  The patient states that he does have COPD and has had exacerbations numerous throughout the last 6 months  He thought he was just having another exacerbation however it was found via chest x-ray to have a left lower lobe pneumonia  Patient states that he started having audible wheezes in the early hours of this morning which prompted him to come to the emergency department  He does have a dry cough with occasional white sputum production  While in the emergency department he was given an hour long heart neb, DuoNeb, Solu-Medrol 125  He was also given Levaquin IV  Patient does continue to wheeze, and with the left lower lobe pneumonia will be able transition him into Rocephin and azithromycin IV for his pneumonia  Review of Systems:  Review of Systems   Constitutional: Positive for activity change and fever  HENT: Negative  Eyes: Negative  Respiratory: Positive for cough and shortness of breath  Cardiovascular: Positive for chest pain and palpitations  Gastrointestinal: Negative  Endocrine: Negative  Genitourinary: Negative  Musculoskeletal: Negative  Skin: Negative  Allergic/Immunologic: Negative  Neurological: Negative  Hematological: Negative  Psychiatric/Behavioral: Negative          Past Medical and Surgical History:   Past Medical History:   Diagnosis Date    BPH (benign prostatic hyperplasia)     45 days radiation treatment    [facility]     Coronary artery disease     Diabetes mellitus [facility] )     Hypertension     Prostate cancer ([facility] )        Past Surgical History:   Procedure Laterality Date    CORONARY ANGIOPLASTY WITH STENT PLACEMENT      CORONARY ARTERY BYPASS GRAFT      PROSTATE BIOPSY         Meds/Allergies:  Prior to Admission medications    Medication Sig Start Date End Date Taking? Authorizing Provider   albuterol (2 5 mg/3 mL) 0 083 % nebulizer solution Take 1 vial (2 5 mg total) by nebulization every 4 (four) hours as needed for wheezing or shortness of breath 6/24/18   Haim Trotter MD   albuterol (PROVENTIL HFA,VENTOLIN HFA) 90 mcg/act inhaler Inhale 2 puffs every 6 (six) hours as needed for wheezing or shortness of breath    Historical Provider, MD   aspirin (ECOTRIN LOW STRENGTH) 81 mg EC tablet Take 81 mg by mouth every other day     Historical Provider, MD   atorvastatin (LIPITOR) 80 mg tablet Take 80 mg by mouth daily    Historical Provider, MD   metFORMIN (GLUCOPHAGE) 500 mg tablet Take 500 mg by mouth 2 (two) times a day with meals 1/2 a tablet in the morning and evening    Historical Provider, MD   metoprolol tartrate (LOPRESSOR) 50 mg tablet Take 25 mg by mouth 2 (two) times a day 3/16/17   Historical Provider, MD   primidone (MYSOLINE) 50 mg tablet Take 50 mg by mouth every 8 (eight) hours as needed 3/24/17   Historical Provider, MD   saxagliptin (ONGLYZA) 5 MG tablet Take 5 mg by mouth daily    Historical Provider, MD   fluticasone-vilanterol (BREO ELLIPTA) 200-25 MCG/INH inhaler Inhale 1 puff daily for 30 days Rinse mouth after use  Patient not taking: Reported on 5/15/2019 3/14/19 7/5/19  PENG Anderson   predniSONE 20 mg tablet Take 3 tablets (60 mg total) by mouth daily Take 3 tablets (60 mg total) by mouth daily for 3 days, then 2 tablets (40 mg total) by mouth daily for 3 days, then 1 tablet (20mg total) daily by mouth for 3 days then stop  5/16/19 7/5/19  Maria G Hamm MD     I have reviewed home medications with patient personally      Allergies: No Known Allergies    Social History:  Marital Status: /Civil Union   Occupation:  Retired  Patient Pre-hospital Living Situation:  At home  Patient Pre-hospital Level of Mobility:  Full function  Patient Pre-hospital Diet Restrictions:  Diabetic  Substance Use History:     Social History     Substance and Sexual Activity   Alcohol Use Yes    Comment: on occasion     Social History     Tobacco Use   Smoking Status Former Smoker    Last attempt to quit: 2/15/2019    Years since quittin 3   Smokeless Tobacco Never Used     Social History     Substance and Sexual Activity   Drug Use No       Family History:  I have reviewed the patients family history    Physical Exam:   Vitals:   Blood Pressure: (!) 193/92 (19 1230)  Pulse: 72 (19 1230)  Temperature: 97 9 °F (36 6 °C) (19 1106)  Temp Source: Tympanic (19 1106)  Respirations: 18 (19 1230)  Height: 5' 11" (180 3 cm) (19 1106)  Weight - Scale: 94 3 kg (208 lb) (19 1106)  SpO2: 93 % (19 1245)    Physical Exam   Constitutional: He is oriented to person, place, and time  He appears well-developed and well-nourished  He is cooperative  HENT:   Head: Normocephalic and atraumatic  Nose: Nose normal    Mouth/Throat: Oropharynx is clear and moist  Mucous membranes are dry  Eyes: Conjunctivae and EOM are normal    Neck: Normal range of motion and full passive range of motion without pain  Neck supple  Cardiovascular: Regular rhythm, normal heart sounds and normal pulses  Tachycardia present  Pulmonary/Chest: Effort normal  He has wheezes in the right upper field, the right middle field, the right lower field, the left upper field, the left middle field and the left lower field  He has rhonchi in the right lower field and the left lower field  Abdominal: Soft  Normal appearance and bowel sounds are normal    Musculoskeletal: Normal range of motion  Neurological: He is alert and oriented to person, place, and time  Skin: Skin is warm  Psychiatric: He has a normal mood and affect   His speech is normal and behavior is normal        Additional Data:   Lab Results: I have personally reviewed pertinent reports  Results from last 7 days   Lab Units 07/05/19  1126   WBC Thousand/uL 7 90   HEMOGLOBIN g/dL 13 4*   HEMATOCRIT % 38 1*   PLATELETS Thousands/uL 180   NEUTROS PCT % 59   LYMPHS PCT % 18*   MONOS PCT % 7   EOS PCT % 15*     Results from last 7 days   Lab Units 07/05/19  1126   POTASSIUM mmol/L 3 8   CHLORIDE mmol/L 102   CO2 mmol/L 26   BUN mg/dL 16   CREATININE mg/dL 0 94   CALCIUM mg/dL 9 1   ALK PHOS U/L 77   ALT U/L 14   AST U/L 13     Results from last 7 days   Lab Units 07/05/19  1126   INR  0 98               Imaging: I have personally reviewed pertinent reports  XR chest 1 view portable   Final Result by Marina Talbot MD (07/05 1303)   Development of at the left lung base suspicious for infiltrate/atelectasis and possible pleural effusion      Status post CABG      Workstation performed: [id number]             EKG, Pathology, and Other Studies Reviewed on Admission:   · EKG:     NetAccess/Epic Records Reviewed: Yes     ** Please Note: This note has been constructed using a voice recognition system   **

## 2019-07-05 NOTE — ED PROVIDER NOTES
History  Chief Complaint   Patient presents with    Shortness of Breath     wheezing started several days ago     Cough     white sputum      78-year-old male with history of COPD, diabetes, hypertension, BPH and CAD presents for evaluation of shortness of breath x3 days  Patient reports shortness of breath and wheezing despite use of albuterol at home  Additionally patient with cough productive of yellow to white sputum  Patient denies fevers, chills, chest pain, abdominal pain nausea vomiting  Shortness of Breath   Associated symptoms: cough and wheezing    Associated symptoms: no abdominal pain, no chest pain, no fever, no headaches, no rash, no sore throat and no vomiting    Cough   Associated symptoms: shortness of breath and wheezing    Associated symptoms: no chest pain, no chills, no fever, no headaches, no myalgias, no rash, no rhinorrhea and no sore throat        Prior to Admission Medications   Prescriptions Last Dose Informant Patient Reported? Taking?    albuterol (2 5 mg/3 mL) 0 083 % nebulizer solution   No No   Sig: Take 1 vial (2 5 mg total) by nebulization every 4 (four) hours as needed for wheezing or shortness of breath   albuterol (PROVENTIL HFA,VENTOLIN HFA) 90 mcg/act inhaler   Yes No   Sig: Inhale 2 puffs every 6 (six) hours as needed for wheezing or shortness of breath   aspirin (ECOTRIN LOW STRENGTH) 81 mg EC tablet   Yes No   Sig: Take 81 mg by mouth every other day    atorvastatin (LIPITOR) 80 mg tablet   Yes No   Sig: Take 80 mg by mouth daily   metFORMIN (GLUCOPHAGE) 500 mg tablet   Yes No   Sig: Take 500 mg by mouth 2 (two) times a day with meals 1/2 a tablet in the morning and evening   metoprolol tartrate (LOPRESSOR) 50 mg tablet   Yes No   Sig: Take 25 mg by mouth 2 (two) times a day   primidone (MYSOLINE) 50 mg tablet   Yes No   Sig: Take 50 mg by mouth every 8 (eight) hours as needed   saxagliptin (ONGLYZA) 5 MG tablet   Yes No   Sig: Take 5 mg by mouth daily Facility-Administered Medications: None       Past Medical History:   Diagnosis Date    BPH (benign prostatic hyperplasia)     45 days radiation treatment    COPD (chronic obstructive pulmonary disease) ([place] [address] )     Coronary artery disease     Diabetes mellitus ([place] [address] )     Hypertension     Prostate cancer ([place] [address] )        Past Surgical History:   Procedure Laterality Date    CORONARY ANGIOPLASTY WITH STENT PLACEMENT      CORONARY ARTERY BYPASS GRAFT      PROSTATE BIOPSY         Family History   Problem Relation Age of Onset    Cancer Father     Heart disease Brother      I have reviewed and agree with the history as documented  Social History     Tobacco Use    Smoking status: Former Smoker     Last attempt to quit: 2/15/2019     Years since quittin 3    Smokeless tobacco: Never Used   Substance Use Topics    Alcohol use: Yes     Comment: on occasion    Drug use: No        Review of Systems   Constitutional: Negative for chills and fever  HENT: Negative for rhinorrhea and sore throat  Eyes: Negative for visual disturbance  Respiratory: Positive for cough, shortness of breath and wheezing  Cardiovascular: Negative for chest pain and leg swelling  Gastrointestinal: Negative for abdominal pain, diarrhea, nausea and vomiting  Genitourinary: Negative for dysuria  Musculoskeletal: Negative for back pain and myalgias  Skin: Negative for rash  Neurological: Negative for dizziness and headaches  Psychiatric/Behavioral: Negative for confusion  All other systems reviewed and are negative  Physical Exam  Physical Exam   Constitutional: He is oriented to person, place, and time  He appears well-developed and well-nourished  HENT:   Nose: Nose normal    Mouth/Throat: Oropharynx is clear and moist  No oropharyngeal exudate  Eyes: Pupils are equal, round, and reactive to light  Conjunctivae and EOM are normal  No scleral icterus  Neck: Normal range of motion  Neck supple   No JVD present  No tracheal deviation present  Cardiovascular: Normal rate, regular rhythm and normal heart sounds  No murmur heard  Pulmonary/Chest: Effort normal  No respiratory distress  He has wheezes (Bilateral expiratory wheezing)  He has no rales  Abdominal: Soft  Bowel sounds are normal  There is no tenderness  There is no guarding  Musculoskeletal: Normal range of motion  He exhibits no edema or tenderness  Neurological: He is alert and oriented to person, place, and time  No cranial nerve deficit or sensory deficit  He exhibits normal muscle tone  5/5 motor, nl sens   Skin: Skin is warm and dry  Psychiatric: He has a normal mood and affect  His behavior is normal    Nursing note and vitals reviewed        Vital Signs  ED Triage Vitals   Temperature Pulse Respirations Blood Pressure SpO2   07/05/19 1106 07/05/19 1106 07/05/19 1106 07/05/19 1108 07/05/19 1106   97 9 °F (36 6 °C) 84 16 (!) 195/88 95 %      Temp Source Heart Rate Source Patient Position - Orthostatic VS BP Location FiO2 (%)   07/05/19 1106 07/05/19 1106 -- 07/05/19 1108 --   Tympanic Monitor  Left arm       Pain Score       07/05/19 1106       No Pain           Vitals:    07/05/19 1106 07/05/19 1108 07/05/19 1230   BP:  (!) 195/88 (!) 193/92   Pulse: 84  72         Visual Acuity      ED Medications  Medications   levofloxacin (LEVAQUIN) IVPB (premix) 750 mg (has no administration in time range)   sodium chloride 0 9 % bolus 1,000 mL (has no administration in time range)   ipratropium-albuterol (DUO-NEB) 0 5-2 5 mg/3 mL inhalation solution 3 mL (3 mL Nebulization Given 7/5/19 1139)   methylPREDNISolone sodium succinate (Solu-MEDROL) injection 125 mg (125 mg Intravenous Given 7/5/19 1134)   albuterol inhalation solution 10 mg (10 mg Nebulization Given 7/5/19 1245)       Diagnostic Studies  Results Reviewed     Procedure Component Value Units Date/Time    Lactic acid, plasma [[id number]]  (Abnormal) Collected:  07/05/19 1407    Lab Status: Final result Specimen:  Blood from Arm, Left Updated:  07/05/19 1435     LACTIC ACID 2 2 mmol/L     Narrative:       Result may be elevated if tourniquet was used during collection  Blood culture #2 [[id number]] Collected:  07/05/19 1407    Lab Status: In process Specimen:  Blood from Arm, Left Updated:  07/05/19 1433    Blood culture #1 [[id number]] Collected:  07/05/19 1407    Lab Status:   In process Specimen:  Blood from Arm, Left Updated:  07/05/19 1433    B-Type Natriuretic Peptide [facility] ONLY) [[id number]]  (Normal) Collected:  07/05/19 1126    Lab Status:  Final result Specimen:  Blood from Arm, Right Updated:  07/05/19 1205     BNP 81 pg/mL     Troponin I [[id number]]     Lab Status:  No result Specimen:  Blood     Troponin I [[id number]]  (Normal) Collected:  07/05/19 1126    Lab Status:  Final result Specimen:  Blood from Arm, Right Updated:  07/05/19 1159     Troponin I <0 03 ng/mL     CK Total with Reflex CKMB [[id number]]  (Normal) Collected:  07/05/19 1126    Lab Status:  Final result Specimen:  Blood from Arm, Right Updated:  07/05/19 1156     Total CK 44 U/L     Comprehensive metabolic panel [[id number]]  (Abnormal) Collected:  07/05/19 1126    Lab Status:  Final result Specimen:  Blood from Arm, Right Updated:  07/05/19 1156     Sodium 135 mmol/L      Potassium 3 8 mmol/L      Chloride 102 mmol/L      CO2 26 mmol/L      ANION GAP 7 mmol/L      BUN 16 mg/dL      Creatinine 0 94 mg/dL      Glucose 248 mg/dL      Calcium 9 1 mg/dL      AST 13 U/L      ALT 14 U/L      Alkaline Phosphatase 77 U/L      Total Protein 7 0 g/dL      Albumin 4 4 g/dL      Total Bilirubin 0 50 mg/dL      eGFR 80 ml/min/1 73sq m     Narrative:       [place] guidelines for Chronic Kidney Disease (CKD):     Stage 1 with normal or high GFR (GFR > 90 mL/min/1 73 square meters)    Stage 2 Mild CKD (GFR = 60-89 mL/min/1 73 square meters)    Stage 3A Moderate CKD (GFR = 45-59 mL/min/1 73 square meters)    Stage 3B Moderate CKD (GFR = 30-44 mL/min/1 73 square meters)    Stage 4 Severe CKD (GFR = 15-29 mL/min/1 73 square meters)    Stage 5 End Stage CKD (GFR <15 mL/min/1 73 square meters)  Note: GFR calculation is accurate only with a steady state creatinine    Protime-INR [[id number]]  (Normal) Collected:  07/05/19 1126    Lab Status:  Final result Specimen:  Blood from Arm, Right Updated:  07/05/19 1152     Protime 11 4 seconds      INR 0 98    APTT [[id number]]  (Normal) Collected:  07/05/19 1126    Lab Status:  Final result Specimen:  Blood from Arm, Right Updated:  07/05/19 1152     PTT 36 seconds     CBC and differential [[id number]]  (Abnormal) Collected:  07/05/19 1126    Lab Status:  Final result Specimen:  Blood from Arm, Right Updated:  07/05/19 1141     WBC 7 90 Thousand/uL      RBC 4 21 Million/uL      Hemoglobin 13 4 g/dL      Hematocrit 38 1 %      MCV 91 fL      MCH 31 9 pg      MCHC 35 2 g/dL      RDW 13 4 %      MPV 9 1 fL      Platelets 915 Thousands/uL      Neutrophils Relative 59 %      Lymphocytes Relative 18 %      Monocytes Relative 7 %      Eosinophils Relative 15 %      Basophils Relative 1 %      Neutrophils Absolute 4 70 Thousands/µL      Lymphocytes Absolute 1 40 Thousands/µL      Monocytes Absolute 0 50 Thousand/µL      Eosinophils Absolute 1 20 Thousand/µL      Basophils Absolute 0 10 Thousands/µL                  XR chest 1 view portable   Final Result by Satinder Méndez MD (07/05 1303)   Development of at the left lung base suspicious for infiltrate/atelectasis and possible pleural effusion      Status post CABG      Workstation performed: [id number]                    Procedures  ECG 12 Lead Documentation Only  Date/Time: 7/5/2019 12:06 PM  Performed by: Jeff Phillip DO  Authorized by: Jeff Phillip DO     Indications / Diagnosis:  Shortness of breath  ECG reviewed by me, the ED Provider: yes    Patient location:  ED  Previous ECG:     Previous ECG:  Compared to current    Comparison ECG info:  05/19/2019    Similarity:  No change    Comparison to cardiac monitor: Yes    Interpretation:     Interpretation: abnormal    Rate:     ECG rate:  76 BPM    ECG rate assessment: normal    Rhythm:     Rhythm: sinus rhythm    Ectopy:     Ectopy: none    QRS:     QRS axis:  Normal    QRS intervals:  Normal  Conduction:     Conduction: normal    ST segments:     ST segments:  Non-specific  T waves:     T waves: inverted      Inverted:  V4, V5 and V6  Comments:      No change from prior           ED Course  ED Course as of Jul 05 1449   Fri Jul 05, 2019   1122 Patient seen and examined at bedside  Patient with bilateral expiratory wheezing  DuoNeb x2, Solu-Medrol 125 mg IV, labs and imaging ordered  1200 Labs reviewed, WBC 7 9 and troponin and BNP negative  1220 Patient re-evaluated status post DuoNeb treatment and continues to have bilateral expiratory wheezing  Patient to receive continuous albuterol treatment x1  And will reassess      9109 Imaging reviewed, patient with possible left lower lobe pneumonia  Levaquin 750 mg IV ordered and cultures and lactic acid ordered  1400 Patient re-evaluated and continues to wheeze bilaterally  Patient maintaining oxygen saturation above 94%  Discussed with patient plan for admission for COPD exacerbation and pneumonia  [facility] contacted and case discussed  Patient to be admitted for COPD exacerbation and pneumonia              MDM    Disposition  Final diagnoses:   COPD with acute exacerbation ([facility] )   Pneumonia of left lower lobe due to infectious organism [facility]     Time reflects when diagnosis was documented in both MDM as applicable and the Disposition within this note     Time User Action Codes Description Comment    7/5/2019  2:30 PM Hellen Spann Add [J44 1] COPD with acute exacerbation ([facility] )     7/5/2019  2:30 PM Florence Spann Add [J18 1] Pneumonia of left lower lobe due to infectious organism [facility])       ED Disposition     ED Disposition Condition Date/Time Comment    Admit Stable Fri Jul 5, 2019  2:30 PM Case was discussed with hospitalist and the patient's admission status was agreed to be Admission Status: inpatient status to the service of Dr Doss Lowers   Follow-up Information    None         Patient's Medications   Discharge Prescriptions    No medications on file     No discharge procedures on file      ED Provider  Electronically Signed by           Rock Walter DO  07/05/19 9799

## 2019-07-06 VITALS
SYSTOLIC BLOOD PRESSURE: 184 MMHG | HEIGHT: 71 IN | RESPIRATION RATE: 18 BRPM | DIASTOLIC BLOOD PRESSURE: 88 MMHG | HEART RATE: 83 BPM | OXYGEN SATURATION: 96 % | BODY MASS INDEX: 28.43 KG/M2 | TEMPERATURE: 98.2 F | WEIGHT: 203.1 LBS

## 2019-07-06 LAB
ALBUMIN SERPL BCP-MCNC: 4.2 G/DL (ref 3.5–5.7)
ALP SERPL-CCNC: 72 U/L (ref 55–165)
ALT SERPL W P-5'-P-CCNC: 14 U/L (ref 7–52)
ANION GAP SERPL CALCULATED.3IONS-SCNC: 11 MMOL/L (ref 4–13)
AST SERPL W P-5'-P-CCNC: 12 U/L (ref 13–39)
BASOPHILS # BLD AUTO: 0.1 THOUSANDS/ΜL (ref 0–0.1)
BASOPHILS NFR BLD AUTO: 1 % (ref 0–2)
BILIRUB SERPL-MCNC: 0.4 MG/DL (ref 0.2–1)
BUN SERPL-MCNC: 13 MG/DL (ref 7–25)
CALCIUM SERPL-MCNC: 9.1 MG/DL (ref 8.6–10.5)
CHLORIDE SERPL-SCNC: 103 MMOL/L (ref 98–107)
CO2 SERPL-SCNC: 24 MMOL/L (ref 21–31)
CREAT SERPL-MCNC: 0.85 MG/DL (ref 0.7–1.3)
EOSINOPHIL # BLD AUTO: 0 THOUSAND/ΜL (ref 0–0.61)
EOSINOPHIL NFR BLD AUTO: 0 % (ref 0–5)
ERYTHROCYTE [DISTWIDTH] IN BLOOD BY AUTOMATED COUNT: 13.6 % (ref 11.5–14.5)
GFR SERPL CREATININE-BSD FRML MDRD: 86 ML/MIN/1.73SQ M
GLUCOSE SERPL-MCNC: 158 MG/DL (ref 65–140)
GLUCOSE SERPL-MCNC: 172 MG/DL (ref 65–99)
HCT VFR BLD AUTO: 39.1 % (ref 42–47)
HGB BLD-MCNC: 13.9 G/DL (ref 14–18)
LYMPHOCYTES # BLD AUTO: 1 THOUSANDS/ΜL (ref 0.6–4.47)
LYMPHOCYTES NFR BLD AUTO: 14 % (ref 21–51)
MCH RBC QN AUTO: 32.1 PG (ref 26–34)
MCHC RBC AUTO-ENTMCNC: 35.4 G/DL (ref 31–37)
MCV RBC AUTO: 91 FL (ref 81–99)
MONOCYTES # BLD AUTO: 0.4 THOUSAND/ΜL (ref 0.17–1.22)
MONOCYTES NFR BLD AUTO: 5 % (ref 2–12)
NEUTROPHILS # BLD AUTO: 5.6 THOUSANDS/ΜL (ref 1.4–6.5)
NEUTS SEG NFR BLD AUTO: 79 % (ref 42–75)
PLATELET # BLD AUTO: 188 THOUSANDS/UL (ref 149–390)
PMV BLD AUTO: 9.9 FL (ref 8.6–11.7)
POTASSIUM SERPL-SCNC: 4.3 MMOL/L (ref 3.5–5.5)
PROCALCITONIN SERPL-MCNC: <0.05 NG/ML
PROT SERPL-MCNC: 6.7 G/DL (ref 6.4–8.9)
RBC # BLD AUTO: 4.32 MILLION/UL (ref 4.3–5.9)
SODIUM SERPL-SCNC: 138 MMOL/L (ref 134–143)
WBC # BLD AUTO: 7 THOUSAND/UL (ref 4.8–10.8)

## 2019-07-06 PROCEDURE — 87070 CULTURE OTHR SPECIMN AEROBIC: CPT | Performed by: NURSE PRACTITIONER

## 2019-07-06 PROCEDURE — 87205 SMEAR GRAM STAIN: CPT | Performed by: NURSE PRACTITIONER

## 2019-07-06 PROCEDURE — 80053 COMPREHEN METABOLIC PANEL: CPT | Performed by: NURSE PRACTITIONER

## 2019-07-06 PROCEDURE — 99239 HOSP IP/OBS DSCHRG MGMT >30: CPT | Performed by: NURSE PRACTITIONER

## 2019-07-06 PROCEDURE — 85025 COMPLETE CBC W/AUTO DIFF WBC: CPT | Performed by: NURSE PRACTITIONER

## 2019-07-06 PROCEDURE — 84145 PROCALCITONIN (PCT): CPT | Performed by: NURSE PRACTITIONER

## 2019-07-06 PROCEDURE — 82948 REAGENT STRIP/BLOOD GLUCOSE: CPT

## 2019-07-06 RX ORDER — AZITHROMYCIN 500 MG/1
500 TABLET, FILM COATED ORAL DAILY
Qty: 5 TABLET | Refills: 0 | Status: SHIPPED | OUTPATIENT
Start: 2019-07-06 | End: 2019-07-11

## 2019-07-06 RX ADMIN — NICOTINE 21 MG: 21 PATCH, EXTENDED RELEASE TRANSDERMAL at 08:01

## 2019-07-06 RX ADMIN — INSULIN LISPRO 1 UNITS: 100 INJECTION, SOLUTION INTRAVENOUS; SUBCUTANEOUS at 07:52

## 2019-07-06 RX ADMIN — SODIUM CHLORIDE 100 ML/HR: 9 INJECTION, SOLUTION INTRAVENOUS at 03:38

## 2019-07-06 RX ADMIN — HEPARIN SODIUM 5000 UNITS: 5000 INJECTION INTRAVENOUS; SUBCUTANEOUS at 05:49

## 2019-07-06 RX ADMIN — METOPROLOL TARTRATE 25 MG: 25 TABLET, FILM COATED ORAL at 08:01

## 2019-07-06 NOTE — PLAN OF CARE
Problem: Potential for Falls  Goal: Patient will remain free of falls  Description  INTERVENTIONS:  - Assess patient frequently for physical needs  -  Identify cognitive and physical deficits and behaviors that affect risk of falls  -  [place] fall precautions as indicated by assessment   - Educate patient/family on patient safety including physical limitations  - Instruct patient to call for assistance with activity based on assessment  - Modify environment to reduce risk of injury  - Consider OT/PT consult to assist with strengthening/mobility  Outcome: Progressing     Problem: Nutrition/Hydration-ADULT  Goal: Nutrient/Hydration intake appropriate for improving, restoring or maintaining nutritional needs  Description  Monitor and assess patient's nutrition/hydration status for malnutrition (ex- brittle hair, bruises, dry skin, pale skin and conjunctiva, muscle wasting, smooth red tongue, and disorientation)  Collaborate with interdisciplinary team and initiate plan and interventions as ordered  Monitor patient's weight and dietary intake as ordered or per policy  Utilize nutrition screening tool and intervene per policy  Determine patient's food preferences and provide high-protein, high-caloric foods as appropriate       INTERVENTIONS:  - Monitor oral intake, urinary output, labs, and treatment plans  - Assess nutrition and hydration status and recommend course of action  - Evaluate amount of meals eaten  - Assist patient with eating if necessary   - Allow adequate time for meals  - Recommend/ encourage appropriate diets, oral nutritional supplements, and vitamin/mineral supplements  - Order, calculate, and assess calorie counts as needed  - Recommend, monitor, and adjust tube feedings and TPN/PPN based on assessed needs  - Assess need for intravenous fluids  - Provide specific nutrition/hydration education as appropriate  - Include patient/family/caregiver in decisions related to nutrition  Outcome: Progressing     Problem: PAIN - ADULT  Goal: Verbalizes/displays adequate comfort level or baseline comfort level  Description  Interventions:  - Encourage patient to monitor pain and request assistance  - Assess pain using appropriate pain scale  - Administer analgesics based on type and severity of pain and evaluate response  - Implement non-pharmacological measures as appropriate and evaluate response  - Consider cultural and social influences on pain and pain management  - Notify physician/advanced practitioner if interventions unsuccessful or patient reports new pain  Outcome: Progressing     Problem: INFECTION - ADULT  Goal: Absence or prevention of progression during hospitalization  Description  INTERVENTIONS:  - Assess and monitor for signs and symptoms of infection  - Monitor lab/diagnostic results  - Monitor all insertion sites, i e  indwelling lines, tubes, and drains  - Monitor endotracheal (as able) and nasal secretions for changes in amount and color  - [place] appropriate cooling/warming therapies per order  - Administer medications as ordered  - Instruct and encourage patient and family to use good hand hygiene technique  - Identify and instruct in appropriate isolation precautions for identified infection/condition  Outcome: Progressing  Goal: Absence of fever/infection during neutropenic period  Description  INTERVENTIONS:  - Monitor WBC  - Implement neutropenic guidelines  Outcome: Progressing     Problem: SAFETY ADULT  Goal: Maintain or return to baseline ADL function  Description  INTERVENTIONS:  -  Assess patient's ability to carry out ADLs; assess patient's baseline for ADL function and identify physical deficits which impact ability to perform ADLs (bathing, care of mouth/teeth, toileting, grooming, dressing, etc )  - Assess/evaluate cause of self-care deficits   - Assess range of motion  - Assess patient's mobility; develop plan if impaired  - Assess patient's need for assistive devices and provide as appropriate  - Encourage maximum independence but intervene and supervise when necessary  ¯ Involve family in performance of ADLs  ¯ Assess for home care needs following discharge   ¯ Request OT consult to assist with ADL evaluation and planning for discharge  ¯ Provide patient education as appropriate  Outcome: Progressing  Goal: Maintain or return mobility status to optimal level  Description  INTERVENTIONS:  - Assess patient's baseline mobility status (ambulation, transfers, stairs, etc )    - Identify cognitive and physical deficits and behaviors that affect mobility  - Identify mobility aids required to assist with transfers and/or ambulation (gait belt, sit-to-stand, lift, walker, cane, etc )  - [place] fall precautions as indicated by assessment  - Record patient progress and toleration of activity level on Mobility SBAR; progress patient to next Phase/Stage  - Instruct patient to call for assistance with activity based on assessment  - Request Rehabilitation consult to assist with strengthening/weightbearing, etc   Outcome: Progressing     Problem: DISCHARGE PLANNING  Goal: Discharge to home or other facility with appropriate resources  Description  INTERVENTIONS:  - Identify barriers to discharge w/patient and caregiver  - Arrange for needed discharge resources and transportation as appropriate  - Identify discharge learning needs (meds, wound care, etc )  - Arrange for interpretive services to assist at discharge as needed  - Refer to Case Management Department for coordinating discharge planning if the patient needs post-hospital services based on physician/advanced practitioner order or complex needs related to functional status, cognitive ability, or social support system  Outcome: Progressing     Problem: Knowledge Deficit  Goal: Patient/family/caregiver demonstrates understanding of disease process, treatment plan, medications, and discharge instructions  Description  Complete learning assessment and assess knowledge base    Interventions:  - Provide teaching at level of understanding  - Provide teaching via preferred learning methods  Outcome: Progressing

## 2019-07-06 NOTE — PLAN OF CARE
Problem: Potential for Falls  Goal: Patient will remain free of falls  Description  INTERVENTIONS:  - Assess patient frequently for physical needs  -  Identify cognitive and physical deficits and behaviors that affect risk of falls  -  [place] fall precautions as indicated by assessment   - Educate patient/family on patient safety including physical limitations  - Instruct patient to call for assistance with activity based on assessment  - Modify environment to reduce risk of injury  - Consider OT/PT consult to assist with strengthening/mobility  7/6/2019 1008 by Trever Pinedo RN  Outcome: Adequate for Discharge  7/6/2019 0801 by Trever Pinedo RN  Outcome: Progressing     Problem: Nutrition/Hydration-ADULT  Goal: Nutrient/Hydration intake appropriate for improving, restoring or maintaining nutritional needs  Description  Monitor and assess patient's nutrition/hydration status for malnutrition (ex- brittle hair, bruises, dry skin, pale skin and conjunctiva, muscle wasting, smooth red tongue, and disorientation)  Collaborate with interdisciplinary team and initiate plan and interventions as ordered  Monitor patient's weight and dietary intake as ordered or per policy  Utilize nutrition screening tool and intervene per policy  Determine patient's food preferences and provide high-protein, high-caloric foods as appropriate       INTERVENTIONS:  - Monitor oral intake, urinary output, labs, and treatment plans  - Assess nutrition and hydration status and recommend course of action  - Evaluate amount of meals eaten  - Assist patient with eating if necessary   - Allow adequate time for meals  - Recommend/ encourage appropriate diets, oral nutritional supplements, and vitamin/mineral supplements  - Order, calculate, and assess calorie counts as needed  - Recommend, monitor, and adjust tube feedings and TPN/PPN based on assessed needs  - Assess need for intravenous fluids  - Provide specific nutrition/hydration education as appropriate  - Include patient/family/caregiver in decisions related to nutrition  7/6/2019 1008 by Lukas Ag RN  Outcome: Adequate for Discharge  7/6/2019 0801 by Lukas Ag RN  Outcome: Progressing     Problem: PAIN - ADULT  Goal: Verbalizes/displays adequate comfort level or baseline comfort level  Description  Interventions:  - Encourage patient to monitor pain and request assistance  - Assess pain using appropriate pain scale  - Administer analgesics based on type and severity of pain and evaluate response  - Implement non-pharmacological measures as appropriate and evaluate response  - Consider cultural and social influences on pain and pain management  - Notify physician/advanced practitioner if interventions unsuccessful or patient reports new pain  7/6/2019 1008 by Lukas Ag RN  Outcome: Adequate for Discharge  7/6/2019 0801 by Lukas Ag RN  Outcome: Progressing     Problem: INFECTION - ADULT  Goal: Absence or prevention of progression during hospitalization  Description  INTERVENTIONS:  - Assess and monitor for signs and symptoms of infection  - Monitor lab/diagnostic results  - Monitor all insertion sites, i e  indwelling lines, tubes, and drains  - Monitor endotracheal (as able) and nasal secretions for changes in amount and color  - [place] appropriate cooling/warming therapies per order  - Administer medications as ordered  - Instruct and encourage patient and family to use good hand hygiene technique  - Identify and instruct in appropriate isolation precautions for identified infection/condition  7/6/2019 1008 by Lukas Ag RN  Outcome: Adequate for Discharge  7/6/2019 0801 by Lukas Ag RN  Outcome: Progressing  Goal: Absence of fever/infection during neutropenic period  Description  INTERVENTIONS:  - Monitor WBC  - Implement neutropenic guidelines  7/6/2019 1008 by Lkuas Ag RN  Outcome: Adequate for Discharge  7/6/2019 0801 by Lukas Ag RN  Outcome: Progressing     Problem: SAFETY ADULT  Goal: Maintain or return to baseline ADL function  Description  INTERVENTIONS:  -  Assess patient's ability to carry out ADLs; assess patient's baseline for ADL function and identify physical deficits which impact ability to perform ADLs (bathing, care of mouth/teeth, toileting, grooming, dressing, etc )  - Assess/evaluate cause of self-care deficits   - Assess range of motion  - Assess patient's mobility; develop plan if impaired  - Assess patient's need for assistive devices and provide as appropriate  - Encourage maximum independence but intervene and supervise when necessary  ¯ Involve family in performance of ADLs  ¯ Assess for home care needs following discharge   ¯ Request OT consult to assist with ADL evaluation and planning for discharge  ¯ Provide patient education as appropriate  7/6/2019 1008 by Oniel Cristobal RN  Outcome: Adequate for Discharge  7/6/2019 0801 by Oniel Cristobal RN  Outcome: Progressing  Goal: Maintain or return mobility status to optimal level  Description  INTERVENTIONS:  - Assess patient's baseline mobility status (ambulation, transfers, stairs, etc )    - Identify cognitive and physical deficits and behaviors that affect mobility  - Identify mobility aids required to assist with transfers and/or ambulation (gait belt, sit-to-stand, lift, walker, cane, etc )  - [place] fall precautions as indicated by assessment  - Record patient progress and toleration of activity level on Mobility SBAR; progress patient to next Phase/Stage  - Instruct patient to call for assistance with activity based on assessment  - Request Rehabilitation consult to assist with strengthening/weightbearing, etc   7/6/2019 1008 by Oniel Cristobal RN  Outcome: Adequate for Discharge  7/6/2019 0801 by Oniel Cristobal RN  Outcome: Progressing     Problem: DISCHARGE PLANNING  Goal: Discharge to home or other facility with appropriate resources  Description  INTERVENTIONS:  - Identify barriers to discharge w/patient and caregiver  - Arrange for needed discharge resources and transportation as appropriate  - Identify discharge learning needs (meds, wound care, etc )  - Arrange for interpretive services to assist at discharge as needed  - Refer to Case Management Department for coordinating discharge planning if the patient needs post-hospital services based on physician/advanced practitioner order or complex needs related to functional status, cognitive ability, or social support system  7/6/2019 1008 by Tammy Martin RN  Outcome: Adequate for Discharge  7/6/2019 0801 by Tammy Martin RN  Outcome: Progressing     Problem: Knowledge Deficit  Goal: Patient/family/caregiver demonstrates understanding of disease process, treatment plan, medications, and discharge instructions  Description  Complete learning assessment and assess knowledge base    Interventions:  - Provide teaching at level of understanding  - Provide teaching via preferred learning methods  7/6/2019 1008 by Tammy Martin RN  Outcome: Adequate for Discharge  7/6/2019 0801 by Tammy Martin RN  Outcome: Progressing

## 2019-07-06 NOTE — SOCIAL WORK
CM met with pt at bedside and explained role  Pt lives with his wife in a 2 story house; [address], 12-14 steps inside  Pt uses a cane to ambulate  He reports walker and w/c at home from previous use  Pt reports he is completely independent with ADLs  He continues to drive and will drive himself home upon discharge  Pt PCP is through [facility]  Pt uses [facility] and denies any difficulty obtaining his medications  Pt denies any history of HHC or STR  He further denied a history of MH or D&A treatment  Pt denies any discharge needs at this time  CM reviewed d/c planning process including the following: identifying help at home, patient preference for d/c planning needs, availability of treatment team to discuss questions or concerns patient and/or family may have regarding understanding medications and recognizing signs and symptoms once discharged  CM also encouraged patient to follow up with all recommended appointments after discharge  Patient advised of importance for patient and family to participate in managing patients medical well being  Patient/caregiver received discharge checklist   Content reviewed  Patient/caregiver encouraged to participate in discharge plan of care prior to discharge home

## 2019-07-06 NOTE — DISCHARGE INSTR - AVS FIRST PAGE
XR chest 1 view portable   Status: Final result   PACS Images      Show images for XR chest 1 view portable   Study Result     CHEST      INDICATION: 81-year-old male, shortness of breath     COMPARISON:  5/15/2019 chest x-ray     EXAM PERFORMED/VIEWS:  XR CHEST PORTABLE  Single view     FINDINGS:     Sternal wires and cardiac clips consistent with CABG, unchanged    Normal cardiac size      Partial obscuration of the left lung base suspicious for infiltrate/atelectasis and possible pleural effusion      No pneumothorax     Osseous structures appear within normal limits for patient age      IMPRESSION:  Development of at the left lung base suspicious for infiltrate/atelectasis and possible pleural effusion     Status post CABG

## 2019-07-06 NOTE — NURSING NOTE
Pt discharged home  Vss  Pt denies pain, denies shortness of breath  Discharge instructions read and explained to pt, all questions answered  IV removed without complications and tip intact  All belongings with pt  Pt escorted to front entrance and assisted into his car

## 2019-07-06 NOTE — DISCHARGE SUMMARY
Discharge- Corina Jayne 1945, 69 y o  male MRN: [id number]    Unit/Bed#: -01 Encounter: [id number]    Primary Care Provider: No primary care provider on file  Date and time admitted to hospital: 7/5/2019 11:12 AM        * SOB (shortness of breath)  Assessment & Plan  · Positive shortness of breath and wheeze for 2 days  · Cough with white sputum productivity  · Resolved    Pneumonia of left lower lobe due to infectious organism [facility])  Assessment & Plan  · Found on chest x-ray  · Was given Levaquin in the ER  · Will change IV antibiotics to Rocephin and azithromycin  · Sputum culture pending  · Urine Legionella and strep  · Aspiration precautions  · Continue nebulizer treatments  · Respiratory protocol  · Check procalcitonin    COPD with acute exacerbation ([facility] [address] )  Assessment & Plan  · Continue neb treatments    Type 2 diabetes mellitus without complication, without long-term current use of insulin [facility])  Assessment & Plan  Lab Results   Component Value Date    HGBA1C 8 1 (H) 07/05/2019       Recent Labs     07/05/19  1545 07/05/19  2022 07/06/19  0640   POCGLU 280* 293* 158*       Blood Sugar Average: Last 72 hrs:  · (P) 167 [id number] Accu-Cheks Q a c  And HS with sliding scale insulin  · Hold patient's metformin and saxagliptin    Essential hypertension  Assessment & Plan  · Continue aspirin and metoprolol    CAD (coronary artery disease), native coronary artery  Assessment & Plan  · Continue aspirin    Other hyperlipidemia  Assessment & Plan  · Continue Lipitor    Tobacco abuse  Assessment & Plan  · Nicotine patch      Discharging Physician / Practitioner: PENG [place]den  PCP: No primary care provider on file    Admission Date:   Admission Orders (From admission, onward)    Ordered        07/05/19 1431  Inpatient Admission  Once             Discharge Date: 07/06/19    Resolved Problems  Date Reviewed: 7/6/2019    None          Consultations During [facility] Stay:  · none    Procedures Performed:   · CXR: Development of at the left lung base suspicious for infiltrate/atelectasis and possible pleural effusion    Significant Findings / Test Results:   · none    Incidental Findings:   · none     Test Results Pending at Discharge (will require follow up):   · none     Outpatient Tests Requested:  · none    Complications:     none    Reason for Admission: Shortness of breath with auditory wheezes    Hospital Course:     Hal Brown is a 69 y o  male patient who originally presented to the hospital on 7/5/2019 due to shortness of breath with auditory wheezes  The patient states that he does have COPD and has had exacerbations numerous throughout the last 6 months  He thought he was just having another exacerbation however it was found via chest x-ray to have a left lower lobe pneumonia  Patient states that he started having audible wheezes in the early hours of this morning which prompted him to come to the emergency department  He does have a dry cough with occasional white sputum production      While in the emergency department he was given an hour long heart neb, DuoNeb, Solu-Medrol 125  He was also given Levaquin IV      Patient does continue to wheeze, and with the left lower lobe pneumonia will be able transition him into Rocephin and azithromycin IV for his pneumonia  Patient improved overnight, he no longer has further wheezing  He states he has been up and ambulating without any severe shortness of breath  The patient states that he has been continuing to use his incentive spirometer and will be transitioned to oral antibiotics and ready for discharge        Please see above list of diagnoses and related plan for additional information  Condition at Discharge: good     Discharge Day Visit / Exam:     Subjective:  Patient is sitting at the side of the bed    He states that he has been using his incentive spirometer which he is able to use appropriately in front of me  He states that any shortness of breath he had any wheezing has gone  Listening to him also provides no wheezing and lungs are clear  We will continue with oral antibiotics as an outpatient and he should follow up in 1-2 weeks with his PCP  Vitals: Blood Pressure: (!) 184/88 (07/06/19 0809)  Pulse: 83 (07/06/19 0809)  Temperature: 98 2 °F (36 8 °C) (07/06/19 0809)  Temp Source: Temporal (07/06/19 0809)  Respirations: 18 (07/06/19 0809)  Height: 5' 11" (180 3 cm) (07/05/19 1106)  Weight - Scale: 92 1 kg (203 lb 1 6 oz) (07/06/19 0600)  SpO2: 96 % (07/06/19 0809)  Exam:   Physical Exam   Constitutional: He is oriented to person, place, and time  Vital signs are normal  He appears well-developed and well-nourished  He is cooperative  HENT:   Head: Normocephalic and atraumatic  Nose: Nose normal    Mouth/Throat: Oropharynx is clear and moist and mucous membranes are normal    Eyes: Conjunctivae and EOM are normal    Neck: Normal range of motion and full passive range of motion without pain  Neck supple  Cardiovascular: Normal rate, regular rhythm, normal heart sounds and normal pulses  Pulmonary/Chest: Effort normal and breath sounds normal    Abdominal: Soft  Normal appearance and bowel sounds are normal    Musculoskeletal: Normal range of motion  Neurological: He is alert and oriented to person, place, and time  Skin: Skin is warm  Psychiatric: He has a normal mood and affect  His speech is normal and behavior is normal        Discussion with Family:  Discussed with patient    Discharge instructions/Information to patient and family:   See after visit summary for information provided to patient and family  Provisions for Follow-Up Care:  See after visit summary for information related to follow-up care and any pertinent home health orders        Disposition:     Home    For Discharges to [facility] SNF:   · Not Applicable to this Patient - Not Applicable to this Patient    Planned Readmission:    No     Discharge Statement:  I spent 35 minutes discharging the patient  This time was spent on the day of discharge  I had direct contact with the patient on the day of discharge  Greater than 50% of the total time was spent examining patient, answering all patient questions, arranging and discussing plan of care with patient as well as directly providing post-discharge instructions  Additional time then spent on discharge activities  On admission, the intent was to keep this patient for 2 midnights, but due to unforeseen circumstances and the patient improvement over the last 24 hours the patient is appropriate for discharge home and follow up as an outpatient  Discharge Medications:  See after visit summary for reconciled discharge medications provided to patient and family        ** Please Note: This note has been constructed using a voice recognition system **

## 2019-07-08 LAB
BACTERIA SPT RESP CULT: ABNORMAL
BACTERIA SPT RESP CULT: ABNORMAL
GRAM STN SPEC: ABNORMAL

## 2019-07-08 NOTE — PHYSICIAN ADVISOR
Current patient class: Inpatient  The patient is currently on Hospital Day: 2 at [address]      The patient was admitted to the hospital at [phone] on 7/5/19 for the following diagnosis:  Shortness of breath [R06 02]  COPD with acute exacerbation ([facility] [address] ) [J44 1]  Pneumonia of left lower lobe due to infectious organism ([facility] [address] ) [J18 1]       There is documentation in the medical record of an expected length of stay of at least 2 midnights  The patient is therefore expected to satisfy the 2 midnight benchmark and given the 2 midnight presumption is appropriate for INPATIENT ADMISSION  Given this expectation of a satisfying stay, CMS instructs us that the patient is most often appropriate for inpatient admission under part A provided medical necessity is documented in the chart  After review of the relevant documentation, labs, vital signs and test results, the patient is appropriate for INPATIENT ADMISSION  Admission to the hospital as an inpatient is a complex decision making process which requires the practitioner to consider the patients presenting complaint, history and physical examination and all relevant testing  With this in mind, in this case, the patient was deemed appropriate for INPATIENT ADMISSION  After review of the documentation and testing available at the time of the admission I concur with this clinical determination of medical necessity  Rationale is as follows: The patient is a 69 yrs old Male who presented to the ED at 7/5/2019 11:12 AM with a chief complaint of Shortness of Breath (wheezing started several days ago ) and Cough (white sputum )  Patient was noted to have audible wheezing on admission  The patient was noted to have pneumonia and put on IV ceftriaxone azithromycin  Patient was only discharged after 1 midnight    Given the documentation in the chart the patient's wheezing did improved the patient significantly improved the next day and was discharged on oral antibiotics  Given the rapid improvement in a patient who came in with audible wheezing with a community-acquired pneumonia who did receive IV steroids the patient is inpatient appropriate with a sooner than expected recovery  The patients vitals on arrival were ED Triage Vitals   Temperature Pulse Respirations Blood Pressure SpO2   07/05/19 1106 07/05/19 1106 07/05/19 1106 07/05/19 1108 07/05/19 1106   97 9 °F (36 6 °C) 84 16 (!) 195/88 95 %      Temp Source Heart Rate Source Patient Position - Orthostatic VS BP Location FiO2 (%)   07/05/19 1106 07/05/19 1106 07/05/19 1503 07/05/19 1108 --   Tympanic Monitor Sitting Left arm       Pain Score       07/05/19 1106       No Pain           Past Medical History:   Diagnosis Date    BPH (benign prostatic hyperplasia)     45 days radiation treatment    COPD [facility]chronic obstructive pulmonary disease) ([facility] )     Coronary artery disease     Diabetes mellitus ([facility] )     Hypertension     Prostate cancer ([facility] )      Past Surgical History:   Procedure Laterality Date    CORONARY ANGIOPLASTY WITH STENT PLACEMENT      CORONARY ARTERY BYPASS GRAFT      PROSTATE BIOPSY             Consults have been placed to:   None    Vitals:    07/06/19 0600 07/06/19 0755 07/06/19 0757 07/06/19 0809   BP:  (!) 184/96  (!) 184/88   BP Location:    Left arm   Pulse:   88 83   Resp:    18   Temp:    98 2 °F (36 8 °C)   TempSrc:    Temporal   SpO2:    96%   Weight: 92 1 kg (203 lb 1 6 oz)      Height:           Most recent labs:    Recent Labs     07/05/19  1756 07/06/19  0539   WBC  --  7 00   HGB  --  13 9*   HCT  --  39 1*   PLT  --  188   K  --  4 3   CALCIUM  --  9 1   BUN  --  13   CREATININE  --  0 85   TROPONINI <0 03  --    AST  --  12*   ALT  --  14   ALKPHOS  --  72       Scheduled Meds:  Continuous Infusions:  No current facility-administered medications for this encounter  PRN Meds:      Surgical procedures (if appropriate):

## 2019-07-08 NOTE — UTILIZATION REVIEW
Initial Clinical Review    Admission: Date/Time/Statement: 7/5/19 @ 1431 INPATIENT    Orders Placed This Encounter   Procedures    Inpatient Admission     Standing Status:   Standing     Number of Occurrences:   1     Order Specific Question:   Admitting Physician     Answer:   Larisa Batista [[id number]]     Order Specific Question:   Level of Care     Answer:   Med Surg [[id number]]     Order Specific Question:   Estimated length of stay     Answer:   More than 2 Midnights     Order Specific Question:   Certification     Answer:   I certify that inpatient services are medically necessary for this patient for a duration of greater than two midnights  See H&P and MD Progress Notes for additional information about the patient's course of treatment  ED Arrival Information     Expected Arrival Acuity Means of Arrival Escorted By Service Admission Type    - 7/5/2019 11:03 Urgent Walk-In Self General Medicine Urgent    Arrival Complaint    -        Chief Complaint   Patient presents with    Shortness of Breath     wheezing started several days ago     Cough     white sputum      Assessment/Plan: 68 y/o male presents to ED from home with SOB, wheezing, productive cough x3 days  No improvement with home albuterol    B/L expiratory wheezing, tachycardia on exam   Admitted as inpatient  SOB (shortness of breath)  Assessment & Plan  · Positive shortness of breath and wheeze for 2 days  · Cough with white sputum productivity  Pneumonia of left lower lobe due to infectious organism [facility])  Assessment & Plan  · Found on chest x-ray  · Was given Levaquin in the ER  · Will change IV antibiotics to Rocephin and azithromycin  · Sputum culture pending  · Urine Legionella and strep  · Aspiration precautions  · Continue nebulizer treatments  · Respiratory protocol  · Check procalcitonin  COPD with acute exacerbation (HCC)  Assessment & Plan  · Continue neb treatments  Essential hypertension  Assessment & Plan  · Continue aspirin and metoprolol    7/6 per hospitalist:  Patient improved overnight, he no longer has further wheezing  He states he has been up and ambulating without any severe shortness of breath  The patient states that he has been continuing to use his incentive spirometer and will be transitioned to oral antibiotics and ready for discharge      ED Triage Vitals   Temperature Pulse Respirations Blood Pressure SpO2   07/05/19 1106 07/05/19 1106 07/05/19 1106 07/05/19 1108 07/05/19 1106   97 9 °F (36 6 °C) 84 16 (!) 195/88 95 %      Temp Source Heart Rate Source Patient Position - Orthostatic VS BP Location FiO2 (%)   07/05/19 1106 07/05/19 1106 07/05/19 1503 07/05/19 1108 --   Tympanic Monitor Sitting Left arm       Pain Score       07/05/19 1106       No Pain          Wt Readings from Last 1 Encounters:   07/06/19 92 1 kg (203 lb 1 6 oz)     Additional Vital Signs:   07/06/19 0809  98 2 °F (36 8 °C)  83  18  184/88Abnormal   96 %  None (Room air)   07/06/19 0757    88           07/06/19 0755        184/96Abnormal        07/05/19 2151  98 6 °F (37 °C)  84  17  126/62  94 %  None (Room air)   07/05/19 2056        164/94       07/05/19 2033            None (Room air)   07/05/19 2032          94 %  None (Room air)   07/05/19 1503  98 1 °F (36 7 °C)  82  18  180/86Abnormal   94 %  None (Room air)   07/05/19 1245          93 %     07/05/19 1230    72  18  193/92Abnormal   94 %         Pertinent Labs/Diagnostic Test Results:   Results from last 7 days   Lab Units 07/06/19  0539 07/05/19  1624 07/05/19  1126   WBC Thousand/uL 7 00  --  7 90   HEMOGLOBIN g/dL 13 9*  --  13 4*   HEMATOCRIT % 39 1*  --  38 1*   PLATELETS Thousands/uL 188 173 180   NEUTROS ABS Thousands/µL 5 60  --  4 70         Results from last 7 days   Lab Units 07/06/19  0539 07/05/19  1126   SODIUM mmol/L 138 135   POTASSIUM mmol/L 4 3 3 8   CHLORIDE mmol/L 103 102   CO2 mmol/L 24 26   ANION GAP mmol/L 11 7   BUN mg/dL 13 16   CREATININE mg/dL 0 85 0 94   EGFR ml/min/1 73sq m 86 80   CALCIUM mg/dL 9 1 9 1     Results from last 7 days   Lab Units 07/06/19  0539 07/05/19  1126   AST U/L 12* 13   ALT U/L 14 14   ALK PHOS U/L 72 77   TOTAL PROTEIN g/dL 6 7 7 0   ALBUMIN g/dL 4 2 4 4   TOTAL BILIRUBIN mg/dL 0 40 0 50     Results from last 7 days   Lab Units 07/06/19  0640 07/05/19  2022 07/05/19  1545   POC GLUCOSE mg/dl 158* 293* 280*     Results from last 7 days   Lab Units 07/06/19  0539 07/05/19  1126   GLUCOSE RANDOM mg/dL 172* 248*         Results from last 7 days   Lab Units 07/05/19  1624   HEMOGLOBIN A1C % 8 1*   EAG mg/dl 186     Results from last 7 days   Lab Units 07/05/19  1126   CK TOTAL U/L 44     Results from last 7 days   Lab Units 07/05/19  1756 07/05/19  1624 07/05/19  1126   TROPONIN I ng/mL <0 03 <0 03 <0 03         Results from last 7 days   Lab Units 07/05/19  1126   PROTIME seconds 11 4   INR  0 98   PTT seconds 36     Results from last 7 days   Lab Units 07/06/19  0539 07/05/19  1624   PROCALCITONIN ng/ml <0 05 <0 05     Results from last 7 days   Lab Units 07/05/19  1407   LACTIC ACID mmol/L 2 2*         Results from last 7 days   Lab Units 07/05/19  1126   BNP pg/mL 81     Results from last 7 days   Lab Units 07/05/19  1756   STREP PNEUMONIAE ANTIGEN, URINE  Negative   LEGIONELLA URINARY ANTIGEN  Negative     Results from last 7 days   Lab Units 07/06/19  0755 07/05/19  1407   BLOOD CULTURE   --  No Growth at 48 hrs  No Growth at 48 hrs  SPUTUM CULTURE  2+ Growth of   Commensal respiratory may only; No significant growth of Staph aureus/MRSA or Pseudomonas aeruginosa    --    GRAM STAIN RESULT  1+ Epithelial cells per low power field*  No polys seen*  1+ Gram positive rods*  1+ Gram positive cocci in pairs*  1+ Gram negative rods*  --      EKG:  Normal sinus rhythm  Left ventricular hypertrophy  Nonspecific ST-t wave changes  CXR:  Development of at the left lung base suspicious for infiltrate/atelectasis and possible pleural effusion   Status post CABG    ED Treatment:   Medication Administration from 07/05/2019 1102 to 07/05/2019 1501       Date/Time Order Dose Route Action Action by Comments     07/05/2019 1139 ipratropium-albuterol (DUO-NEB) 0 5-2 5 mg/3 mL inhalation solution 3 mL 3 mL Nebulization Given Sofiya Link, RT      11/63/2743 1131 ipratropium-albuterol (DUO-NEB) 0 5-2 5 mg/3 mL inhalation solution 3 mL 3 mL Nebulization Given Sofiya Link, RT      64/81/1686 1134 methylPREDNISolone sodium succinate (Solu-MEDROL) injection 125 mg 125 mg Intravenous Given Joshua Neely RN      07/05/2019 1245 albuterol inhalation solution 10 mg 10 mg Nebulization Given Mile Gillespie, RT      90/87/8677 1355 levofloxacin (LEVAQUIN) IVPB (premix) 750 mg 750 mg Intravenous Tc Nguyen RN         Past Medical History:   Diagnosis Date    BPH (benign prostatic hyperplasia)     45 days radiation treatment    [facility]     Coronary artery disease     Diabetes mellitus ([facility] )     Hypertension     Prostate cancer ([facility] )      Present on Admission:   SOB (shortness of breath)   Type 2 diabetes mellitus without complication, without long-term current use of insulin (HCC)   Tobacco abuse   Other hyperlipidemia   Essential hypertension   CAD (coronary artery disease), native coronary artery   COPD with acute exacerbation (HCC)   Pneumonia of left lower lobe due to infectious organism [facility])      Admitting Diagnosis: Shortness of breath [R06 02]  COPD with acute exacerbation ([facility] ) [J44 1]  Pneumonia of left lower lobe due to infectious organism ([facility] ) [J18 1]  Age/Sex: 69 y o  male  Admission Orders:    PO lipitor; IV rocephin; IV zithromax; SQ heparin; SQ humalog; PO lopressor; albuterol inhaler  sodium chloride 0 9 % infusion   Rate: 100 mL/hr Dose: 100 mL/hr  Freq: Continuous Route: IV  Indications of Use: IV Hydration  Last Dose: 100 mL/hr (07/06/19 0389)  Start: 07/05/19 1530 End: 07/06/19 1254    SCDs  VS  I/S  PT/OT    Network Utilization Review Department  Phone: [phone]; Fax [phone]  [email or web address]  ATTENTION: Please call with any questions or concerns to [phone]  and carefully listen to the prompts so that you are directed to the right person  Send all requests for admission clinical reviews, approved or denied determinations and any other requests to fax [phone]   All voicemails are confidential

## 2019-07-10 LAB
BACTERIA BLD CULT: NORMAL
BACTERIA BLD CULT: NORMAL

## 2019-07-16 ENCOUNTER — APPOINTMENT (EMERGENCY)
Dept: RADIOLOGY | Facility: HOSPITAL | Age: 74
DRG: 190 | End: 2019-07-16
Payer: MEDICARE

## 2019-07-16 ENCOUNTER — HOSPITAL ENCOUNTER (INPATIENT)
Facility: HOSPITAL | Age: 74
LOS: 1 days | Discharge: HOME | DRG: 190 | End: 2019-07-18
Attending: EMERGENCY MEDICINE | Admitting: HOSPITALIST
Payer: MEDICARE

## 2019-07-16 DIAGNOSIS — J44.1 COPD WITH ACUTE EXACERBATION (HCC): Chronic | ICD-10-CM

## 2019-07-16 DIAGNOSIS — R25.1 TREMOR: Chronic | ICD-10-CM

## 2019-07-16 DIAGNOSIS — J18.9 PNEUMONIA: ICD-10-CM

## 2019-07-16 DIAGNOSIS — J44.1 COPD EXACERBATION (HCC): Primary | ICD-10-CM

## 2019-07-16 LAB
ALBUMIN SERPL BCP-MCNC: 4.1 G/DL (ref 3.5–5.7)
ALP SERPL-CCNC: 73 U/L (ref 55–165)
ALT SERPL W P-5'-P-CCNC: 16 U/L (ref 7–52)
ANION GAP SERPL CALCULATED.3IONS-SCNC: 8 MMOL/L (ref 4–13)
AST SERPL W P-5'-P-CCNC: 12 U/L (ref 13–39)
ATRIAL RATE: 81 BPM
BASOPHILS # BLD AUTO: 0.1 THOUSANDS/ΜL (ref 0–0.1)
BASOPHILS NFR BLD AUTO: 1 % (ref 0–2)
BILIRUB SERPL-MCNC: 0.5 MG/DL (ref 0.2–1)
BUN SERPL-MCNC: 16 MG/DL (ref 7–25)
CALCIUM SERPL-MCNC: 8.8 MG/DL (ref 8.6–10.5)
CHLORIDE SERPL-SCNC: 102 MMOL/L (ref 98–107)
CO2 SERPL-SCNC: 27 MMOL/L (ref 21–31)
CREAT SERPL-MCNC: 0.81 MG/DL (ref 0.7–1.3)
EOSINOPHIL # BLD AUTO: 0.7 THOUSAND/ΜL (ref 0–0.61)
EOSINOPHIL NFR BLD AUTO: 10 % (ref 0–5)
ERYTHROCYTE [DISTWIDTH] IN BLOOD BY AUTOMATED COUNT: 13.4 % (ref 11.5–14.5)
GFR SERPL CREATININE-BSD FRML MDRD: 88 ML/MIN/1.73SQ M
GLUCOSE SERPL-MCNC: 228 MG/DL (ref 65–99)
GLUCOSE SERPL-MCNC: 260 MG/DL (ref 65–140)
GLUCOSE SERPL-MCNC: 342 MG/DL (ref 65–140)
HCT VFR BLD AUTO: 41.1 % (ref 42–47)
HGB BLD-MCNC: 14.3 G/DL (ref 14–18)
LACTATE SERPL-SCNC: 1.3 MMOL/L (ref 0.5–2)
LYMPHOCYTES # BLD AUTO: 1.3 THOUSANDS/ΜL (ref 0.6–4.47)
LYMPHOCYTES NFR BLD AUTO: 18 % (ref 21–51)
MCH RBC QN AUTO: 31.4 PG (ref 26–34)
MCHC RBC AUTO-ENTMCNC: 34.8 G/DL (ref 31–37)
MCV RBC AUTO: 90 FL (ref 81–99)
MONOCYTES # BLD AUTO: 0.6 THOUSAND/ΜL (ref 0.17–1.22)
MONOCYTES NFR BLD AUTO: 8 % (ref 2–12)
NEUTROPHILS # BLD AUTO: 4.7 THOUSANDS/ΜL (ref 1.4–6.5)
NEUTS SEG NFR BLD AUTO: 63 % (ref 42–75)
P AXIS: 69 DEGREES
PLATELET # BLD AUTO: 171 THOUSANDS/UL (ref 149–390)
PMV BLD AUTO: 9 FL (ref 8.6–11.7)
POTASSIUM SERPL-SCNC: 3.8 MMOL/L (ref 3.5–5.5)
PR INTERVAL: 176 MS
PROCALCITONIN SERPL-MCNC: <0.05 NG/ML
PROT SERPL-MCNC: 6.6 G/DL (ref 6.4–8.9)
QRS AXIS: 58 DEGREES
QRSD INTERVAL: 102 MS
QT INTERVAL: 412 MS
QTC INTERVAL: 478 MS
RBC # BLD AUTO: 4.56 MILLION/UL (ref 4.3–5.9)
SODIUM SERPL-SCNC: 137 MMOL/L (ref 134–143)
T WAVE AXIS: 93 DEGREES
VENTRICULAR RATE: 81 BPM
WBC # BLD AUTO: 7.5 THOUSAND/UL (ref 4.8–10.8)

## 2019-07-16 PROCEDURE — 83605 ASSAY OF LACTIC ACID: CPT | Performed by: EMERGENCY MEDICINE

## 2019-07-16 PROCEDURE — 99220 PR INITIAL OBSERVATION CARE/DAY 70 MINUTES: CPT | Performed by: HOSPITALIST

## 2019-07-16 PROCEDURE — 94644 CONT INHLJ TX 1ST HOUR: CPT

## 2019-07-16 PROCEDURE — 93005 ELECTROCARDIOGRAM TRACING: CPT

## 2019-07-16 PROCEDURE — 82948 REAGENT STRIP/BLOOD GLUCOSE: CPT

## 2019-07-16 PROCEDURE — 94760 N-INVAS EAR/PLS OXIMETRY 1: CPT

## 2019-07-16 PROCEDURE — 71046 X-RAY EXAM CHEST 2 VIEWS: CPT

## 2019-07-16 PROCEDURE — 93010 ELECTROCARDIOGRAM REPORT: CPT | Performed by: INTERNAL MEDICINE

## 2019-07-16 PROCEDURE — 94640 AIRWAY INHALATION TREATMENT: CPT

## 2019-07-16 PROCEDURE — 96374 THER/PROPH/DIAG INJ IV PUSH: CPT

## 2019-07-16 PROCEDURE — 99285 EMERGENCY DEPT VISIT HI MDM: CPT

## 2019-07-16 PROCEDURE — 80053 COMPREHEN METABOLIC PANEL: CPT | Performed by: EMERGENCY MEDICINE

## 2019-07-16 PROCEDURE — 96361 HYDRATE IV INFUSION ADD-ON: CPT

## 2019-07-16 PROCEDURE — 36415 COLL VENOUS BLD VENIPUNCTURE: CPT | Performed by: EMERGENCY MEDICINE

## 2019-07-16 PROCEDURE — 84145 PROCALCITONIN (PCT): CPT | Performed by: HOSPITALIST

## 2019-07-16 PROCEDURE — 85025 COMPLETE CBC W/AUTO DIFF WBC: CPT | Performed by: EMERGENCY MEDICINE

## 2019-07-16 PROCEDURE — 87040 BLOOD CULTURE FOR BACTERIA: CPT | Performed by: EMERGENCY MEDICINE

## 2019-07-16 RX ORDER — ALBUTEROL SULFATE 2.5 MG/3ML
2.5 SOLUTION RESPIRATORY (INHALATION) EVERY 4 HOURS PRN
Status: DISCONTINUED | OUTPATIENT
Start: 2019-07-16 | End: 2019-07-16

## 2019-07-16 RX ORDER — ONDANSETRON 2 MG/ML
4 INJECTION INTRAMUSCULAR; INTRAVENOUS EVERY 6 HOURS PRN
Status: DISCONTINUED | OUTPATIENT
Start: 2019-07-16 | End: 2019-07-18 | Stop reason: HOSPADM

## 2019-07-16 RX ORDER — CEFTRIAXONE 1 G/50ML
1000 INJECTION, SOLUTION INTRAVENOUS ONCE
Status: COMPLETED | OUTPATIENT
Start: 2019-07-16 | End: 2019-07-16

## 2019-07-16 RX ORDER — SODIUM CHLORIDE 9 MG/ML
1000 INJECTION, SOLUTION INTRAVENOUS ONCE
Status: COMPLETED | OUTPATIENT
Start: 2019-07-16 | End: 2019-07-16

## 2019-07-16 RX ORDER — ATORVASTATIN CALCIUM 80 MG/1
80 TABLET, FILM COATED ORAL DAILY
Status: DISCONTINUED | OUTPATIENT
Start: 2019-07-17 | End: 2019-07-18 | Stop reason: HOSPADM

## 2019-07-16 RX ORDER — ALBUTEROL SULFATE 90 UG/1
2 AEROSOL, METERED RESPIRATORY (INHALATION) EVERY 6 HOURS PRN
Status: DISCONTINUED | OUTPATIENT
Start: 2019-07-16 | End: 2019-07-16

## 2019-07-16 RX ORDER — DOCUSATE SODIUM 100 MG/1
100 CAPSULE, LIQUID FILLED ORAL 2 TIMES DAILY
Status: DISCONTINUED | OUTPATIENT
Start: 2019-07-16 | End: 2019-07-18 | Stop reason: HOSPADM

## 2019-07-16 RX ORDER — LEVALBUTEROL 1.25 MG/.5ML
1.25 SOLUTION, CONCENTRATE RESPIRATORY (INHALATION)
Status: DISCONTINUED | OUTPATIENT
Start: 2019-07-16 | End: 2019-07-18 | Stop reason: HOSPADM

## 2019-07-16 RX ORDER — METHYLPREDNISOLONE SODIUM SUCCINATE 125 MG/2ML
125 INJECTION, POWDER, LYOPHILIZED, FOR SOLUTION INTRAMUSCULAR; INTRAVENOUS ONCE
Status: COMPLETED | OUTPATIENT
Start: 2019-07-16 | End: 2019-07-16

## 2019-07-16 RX ORDER — CEFTRIAXONE 1 G/50ML
1000 INJECTION, SOLUTION INTRAVENOUS EVERY 24 HOURS
Status: DISCONTINUED | OUTPATIENT
Start: 2019-07-16 | End: 2019-07-16

## 2019-07-16 RX ORDER — METHYLPREDNISOLONE SODIUM SUCCINATE 40 MG/ML
40 INJECTION, POWDER, LYOPHILIZED, FOR SOLUTION INTRAMUSCULAR; INTRAVENOUS EVERY 8 HOURS SCHEDULED
Status: DISCONTINUED | OUTPATIENT
Start: 2019-07-16 | End: 2019-07-18

## 2019-07-16 RX ORDER — GABAPENTIN 100 MG/1
200 CAPSULE ORAL
Status: ON HOLD | COMMUNITY
End: 2020-09-03 | Stop reason: SDUPTHER

## 2019-07-16 RX ORDER — ACETAMINOPHEN 325 MG/1
650 TABLET ORAL EVERY 6 HOURS PRN
Status: DISCONTINUED | OUTPATIENT
Start: 2019-07-16 | End: 2019-07-18 | Stop reason: HOSPADM

## 2019-07-16 RX ORDER — PRIMIDONE 50 MG/1
50 TABLET ORAL EVERY 8 HOURS PRN
Status: DISCONTINUED | OUTPATIENT
Start: 2019-07-16 | End: 2019-07-18 | Stop reason: HOSPADM

## 2019-07-16 RX ORDER — SODIUM CHLORIDE FOR INHALATION 0.9 %
12 VIAL, NEBULIZER (ML) INHALATION ONCE
Status: COMPLETED | OUTPATIENT
Start: 2019-07-16 | End: 2019-07-16

## 2019-07-16 RX ORDER — SODIUM CHLORIDE FOR INHALATION 0.9 %
3 VIAL, NEBULIZER (ML) INHALATION
Status: DISCONTINUED | OUTPATIENT
Start: 2019-07-16 | End: 2019-07-18 | Stop reason: HOSPADM

## 2019-07-16 RX ORDER — CEFEPIME HYDROCHLORIDE 2 G/50ML
2000 INJECTION, SOLUTION INTRAVENOUS EVERY 12 HOURS
Status: DISCONTINUED | OUTPATIENT
Start: 2019-07-16 | End: 2019-07-17

## 2019-07-16 RX ORDER — ALBUTEROL SULFATE 90 UG/1
2 AEROSOL, METERED RESPIRATORY (INHALATION) EVERY 4 HOURS PRN
Status: DISCONTINUED | OUTPATIENT
Start: 2019-07-16 | End: 2019-07-18 | Stop reason: HOSPADM

## 2019-07-16 RX ORDER — ASPIRIN 81 MG/1
81 TABLET ORAL EVERY OTHER DAY
Status: DISCONTINUED | OUTPATIENT
Start: 2019-07-18 | End: 2019-07-18 | Stop reason: HOSPADM

## 2019-07-16 RX ADMIN — LEVALBUTEROL 1.25 MG: 1.25 SOLUTION, CONCENTRATE RESPIRATORY (INHALATION) at 20:43

## 2019-07-16 RX ADMIN — VANCOMYCIN HYDROCHLORIDE 1750 MG: 1 INJECTION, POWDER, LYOPHILIZED, FOR SOLUTION INTRAVENOUS at 21:10

## 2019-07-16 RX ADMIN — CEFEPIME HYDROCHLORIDE 2000 MG: 2 INJECTION, SOLUTION INTRAVENOUS at 19:47

## 2019-07-16 RX ADMIN — ISODIUM CHLORIDE 3 ML: 0.03 SOLUTION RESPIRATORY (INHALATION) at 20:43

## 2019-07-16 RX ADMIN — METOPROLOL TARTRATE 25 MG: 25 TABLET ORAL at 19:47

## 2019-07-16 RX ADMIN — ALBUTEROL SULFATE 10 MG: 2.5 SOLUTION RESPIRATORY (INHALATION) at 15:29

## 2019-07-16 RX ADMIN — METHYLPREDNISOLONE SODIUM SUCCINATE 125 MG: 125 INJECTION, POWDER, FOR SOLUTION INTRAMUSCULAR; INTRAVENOUS at 15:14

## 2019-07-16 RX ADMIN — ISODIUM CHLORIDE 12 ML: 0.03 SOLUTION RESPIRATORY (INHALATION) at 15:29

## 2019-07-16 RX ADMIN — IPRATROPIUM BROMIDE 1 MG: 0.5 SOLUTION RESPIRATORY (INHALATION) at 15:29

## 2019-07-16 RX ADMIN — DOCUSATE SODIUM 100 MG: 100 CAPSULE, LIQUID FILLED ORAL at 19:47

## 2019-07-16 RX ADMIN — SODIUM CHLORIDE 1000 ML/HR: 0.9 INJECTION, SOLUTION INTRAVENOUS at 15:14

## 2019-07-16 RX ADMIN — METHYLPREDNISOLONE SODIUM SUCCINATE 40 MG: 40 INJECTION, POWDER, FOR SOLUTION INTRAMUSCULAR; INTRAVENOUS at 21:11

## 2019-07-16 RX ADMIN — INSULIN LISPRO 5 UNITS: 100 INJECTION, SOLUTION INTRAVENOUS; SUBCUTANEOUS at 19:46

## 2019-07-16 RX ADMIN — CEFTRIAXONE 1000 MG: 1 INJECTION, SOLUTION INTRAVENOUS at 16:52

## 2019-07-16 NOTE — ED TRIAGE NOTES
Patient recently discharged 7/5 with pneumonia  Today patient suddenly became severely SOB patient used his nebulizer and and PRN inhaler

## 2019-07-16 NOTE — PLAN OF CARE
Problem: Potential for Falls  Goal: Patient will remain free of falls  Description  INTERVENTIONS:  - Assess patient frequently for physical needs  -  Identify cognitive and physical deficits and behaviors that affect risk of falls    -  [place] fall precautions as indicated by assessment   - Educate patient/family on patient safety including physical limitations  - Instruct patient to call for assistance with activity based on assessment  - Modify environment to reduce risk of injury  - Consider OT/PT consult to assist with strengthening/mobility  Outcome: Progressing     Problem: PAIN - ADULT  Goal: Verbalizes/displays adequate comfort level or baseline comfort level  Description  Interventions:  - Encourage patient to monitor pain and request assistance  - Assess pain using appropriate pain scale  - Administer analgesics based on type and severity of pain and evaluate response  - Implement non-pharmacological measures as appropriate and evaluate response  - Consider cultural and social influences on pain and pain management  - Notify physician/advanced practitioner if interventions unsuccessful or patient reports new pain  Outcome: Progressing     Problem: INFECTION - ADULT  Goal: Absence or prevention of progression during hospitalization  Description  INTERVENTIONS:  - Assess and monitor for signs and symptoms of infection  - Monitor lab/diagnostic results  - Monitor all insertion sites, i e  indwelling lines, tubes, and drains  - Monitor endotracheal (as able) and nasal secretions for changes in amount and color  - [place] appropriate cooling/warming therapies per order  - Administer medications as ordered  - Instruct and encourage patient and family to use good hand hygiene technique  - Identify and instruct in appropriate isolation precautions for identified infection/condition  Outcome: Progressing  Goal: Absence of fever/infection during neutropenic period  Description  INTERVENTIONS:  - Monitor WBC  - Implement neutropenic guidelines  Outcome: Progressing     Problem: SAFETY ADULT  Goal: Maintain or return to baseline ADL function  Description  INTERVENTIONS:  -  Assess patient's ability to carry out ADLs; assess patient's baseline for ADL function and identify physical deficits which impact ability to perform ADLs (bathing, care of mouth/teeth, toileting, grooming, dressing, etc )  - Assess/evaluate cause of self-care deficits   - Assess range of motion  - Assess patient's mobility; develop plan if impaired  - Assess patient's need for assistive devices and provide as appropriate  - Encourage maximum independence but intervene and supervise when necessary  ¯ Involve family in performance of ADLs  ¯ Assess for home care needs following discharge   ¯ Request OT consult to assist with ADL evaluation and planning for discharge  ¯ Provide patient education as appropriate  Outcome: Progressing  Goal: Maintain or return mobility status to optimal level  Description  INTERVENTIONS:  - Assess patient's baseline mobility status (ambulation, transfers, stairs, etc )    - Identify cognitive and physical deficits and behaviors that affect mobility  - Identify mobility aids required to assist with transfers and/or ambulation (gait belt, sit-to-stand, lift, walker, cane, etc )  - [place] fall precautions as indicated by assessment  - Record patient progress and toleration of activity level on Mobility SBAR; progress patient to next Phase/Stage  - Instruct patient to call for assistance with activity based on assessment  - Request Rehabilitation consult to assist with strengthening/weightbearing, etc   Outcome: Progressing     Problem: DISCHARGE PLANNING  Goal: Discharge to home or other facility with appropriate resources  Description  INTERVENTIONS:  - Identify barriers to discharge w/patient and caregiver  - Arrange for needed discharge resources and transportation as appropriate  - Identify discharge learning needs (meds, wound care, etc )  - Arrange for interpretive services to assist at discharge as needed  - Refer to Case Management Department for coordinating discharge planning if the patient needs post-hospital services based on physician/advanced practitioner order or complex needs related to functional status, cognitive ability, or social support system  Outcome: Progressing     Problem: Knowledge Deficit  Goal: Patient/family/caregiver demonstrates understanding of disease process, treatment plan, medications, and discharge instructions  Description  Complete learning assessment and assess knowledge base    Interventions:  - Provide teaching at level of understanding  - Provide teaching via preferred learning methods  Outcome: Progressing

## 2019-07-16 NOTE — ASSESSMENT & PLAN NOTE
Lab Results   Component Value Date    HGBA1C 8 1 (H) 07/05/2019       Recent Labs     07/16/19  1729   POCGLU 260*       Blood Sugar Average: Last 72 hrs:  (P) 260   Will implement Accu-Cheks with AC and HS and sliding scale coverage    Hold oral hypoglycemics

## 2019-07-16 NOTE — CONSULTS
Vancomycin Assessment    Grant Monge is a 69 y o  male who is currently receiving vancomycin 1250 mg IV q12 for Pneumonia     Relevant clinical data and objective history reviewed:  Creatinine   Date Value Ref Range Status   07/16/2019 0 81 0 70 - 1 30 mg/dL Final     Comment:     Standardized to IDMS reference method   07/06/2019 0 85 0 70 - 1 30 mg/dL Final     Comment:     Standardized to IDMS reference method   07/05/2019 0 94 0 70 - 1 30 mg/dL Final     Comment:     Standardized to IDMS reference method   05/28/2018 0 83 0 7 - 1 3 MG/DL Final     Comment:     IDMS method performed  The drugs Metamizole, Sulfasalazine, and Sulfapyridine may interfere and  result in false low results  05/05/2018 0 82 0 7 - 1 3 MG/DL Final     Comment:     IDMS method performed  The drugs Metamizole, Sulfasalazine, and Sulfapyridine may interfere and  result in false low results  /94 (BP Location: Left arm)   Pulse 81   Temp (!) 97 2 °F (36 2 °C) (Temporal)   Resp 22   Ht 5' 11" (1 803 m) Comment: Stated  Wt 90 9 kg (200 lb 6 4 oz)   SpO2 93%   BMI 27 95 kg/m²   No intake/output data recorded    Lab Results   Component Value Date/Time    BUN 16 07/16/2019 02:56 PM    BUN 12 05/28/2018 10:00 AM    WBC 7 50 07/16/2019 02:56 PM    WBC 8 8 05/28/2018 10:00 AM    HGB 14 3 07/16/2019 02:56 PM    HGB 13 9 (L) 05/28/2018 10:00 AM    HCT 41 1 (L) 07/16/2019 02:56 PM    HCT 40 2 (L) 05/28/2018 10:00 AM    MCV 90 07/16/2019 02:56 PM    MCV 88 6 05/28/2018 10:00 AM     07/16/2019 02:56 PM     05/28/2018 10:00 AM     Temp Readings from Last 3 Encounters:   07/16/19 (!) 97 2 °F (36 2 °C) (Temporal)   07/06/19 98 2 °F (36 8 °C) (Temporal)   05/16/19 (!) 96 2 °F (35 7 °C) (Tympanic)     Vancomycin Days of Therapy: 1    Assessment/Plan  The patient is currently on vancomycin utilizing scheduled dosing based on actual body weight  Baseline risks associated with therapy include: advanced age  The patient is currently receiving 1250 mg IV q12 and after clinical evaluation will be changed to 1750 mg IV q12  Pharmacy will also follow closely for s/sx of nephrotoxicity, infusion reactions and appropriateness of therapy  BMP and CBC will be ordered per protocol  Plan for trough as patient approaches steady state, prior to the 4th  dose at approximately 0630 on 7/18/19  Due to infection severity, will target a trough of 15-20 (appropriate for most indications)   Pharmacy will continue to follow the patients culture results and clinical progress daily      Jean-Pierre Zhao, Pharmacist

## 2019-07-16 NOTE — H&P
H&P- [facility] 1945, 69 y o  male MRN: [id number]    Unit/Bed#: -02 Encounter: [id number]    Primary Care Provider: No primary care provider on file  Date and time admitted to hospital: 7/16/2019  2:34 PM        * COPD with acute exacerbation ([facility] [address] )  Assessment & Plan  · Admit to med surge  · Secondary to a left lung pneumonia  · Follow-up on blood cultures as ordered by the emergency department  · Check a procalcitonin level  · Change antibiotics from Rocephin and Zithromax to vancomycin and cefepime because of the patient's recent multiple hospitalizations  · Suspect this pneumonia is secondary to a Gram-negative etiology  · Adjust antibiotics according based on culture results  · Patient received 125 mg of IV Solu-Medrol in the emergency department, will continue with 40 mg IV t i d   · Continue breathing treatments via the respiratory protocol    Type 2 diabetes mellitus without complication, without long-term current use of insulin [facility])  Assessment & Plan  Lab Results   Component Value Date    HGBA1C 8 1 (H) 07/05/2019       Recent Labs     07/16/19  1729   POCGLU 260*       Blood Sugar Average: Last 72 hrs:  (P) 260   Will implement Accu-Cheks with AC and HS and sliding scale coverage  Hold oral hypoglycemics    Essential hypertension  Assessment & Plan  · Continue metoprolol for blood pressure control    CAD (coronary artery disease), native coronary artery  Assessment & Plan  · Stable  · Continue aspirin, metoprolol, and atorvastatin        VTE Prophylaxis: Enoxaparin (Lovenox)  Code Status:  DNR DNI level 3  POLST: POLST is not applicable to this patient  Discussion with family:  No family members were present at the time of my H&P evaluation    Anticipated Length of Stay:  Patient will be admitted on an Observation basis with an anticipated length of stay of  < 2 midnights     Justification for Hospital Stay:  Need for Antibiotics and steroids    Chief Complaint:   Shortness of breath x2 days    History of Present Illness:    Missy Cerna is a 69 y o  male who presents with shortness of breath x2 days  Patient was recently hospitalized and discharged from this facility about 10 days ago he states that initially he was doing well  Two days ago on Monday July 14th he once again started feeling short of breath  He denies any chest pain, nausea, vomiting, diarrhea, but admits to some subjective fevers and subjective chills  He also has started coughing again  He is producing a clear colored sputum for the past 2 days  He denies any travel history  Over the past 2 days he reports having more difficulty with shortness of breath has not had the ability to complete his activities of daily living  He states that his rescue inhaler was not working  He got frustrated with his shortness of breath and drove himself back to the emergency room earlier this morning  In the emergency room he was found to have an arrival O2 sat of 90% on room air  After he was given some IV steroids and supplemental oxygen his O2 sat increased to 97%  On chest x-ray he was determined to have a left lingular pneumonia  He is being admitted to the observation unit for further management  He feels that his symptoms have worsened because of the increasing humidity with the summer temperatures  Review of Systems:   Review of Systems   Constitutional: Positive for chills, fatigue and fever  Respiratory: Positive for cough, chest tightness, shortness of breath and wheezing  All other systems reviewed and are negative        Past Medical and Surgical History:   Past Medical History:   Diagnosis Date    BPH (benign prostatic hyperplasia)     45 days radiation treatment    Cardiac disease     [facility]ch[facility] obstructive pulmonary disease) ([facility] )     Coronary artery disease     Diabetes mellitus ([facility] )     Hypertension     Prostate cancer [facility])     prostate        Past Surgical History:   Procedure Laterality Date    CORONARY ANGIOPLASTY WITH STENT PLACEMENT      CORONARY ARTERY BYPASS GRAFT      PROSTATE BIOPSY         Meds/Allergies:  Prior to Admission medications    Medication Sig Start Date End Date Taking? Authorizing Provider   albuterol (2 5 mg/3 mL) 0 083 % nebulizer solution Take 1 vial (2 5 mg total) by nebulization every 4 (four) hours as needed for wheezing or shortness of breath 6/24/18  Yes Familia Patricia MD   albuterol (PROVENTIL HFA,VENTOLIN HFA) 90 mcg/act inhaler Inhale 2 puffs every 6 (six) hours as needed for wheezing or shortness of breath   Yes Historical Provider, MD   aspirin (ECOTRIN LOW STRENGTH) 81 mg EC tablet Take 81 mg by mouth every other day    Yes Historical Provider, MD   atorvastatin (LIPITOR) 80 mg tablet Take 80 mg by mouth daily   Yes Historical Provider, MD   metFORMIN (GLUCOPHAGE) 500 mg tablet Take 500 mg by mouth 2 (two) times a day with meals 1/2 a tablet in the morning and evening   Yes Historical Provider, MD   metoprolol tartrate (LOPRESSOR) 50 mg tablet Take 25 mg by mouth 2 (two) times a day 3/16/17  Yes Historical Provider, MD   primidone (MYSOLINE) 50 mg tablet Take 50 mg by mouth every 8 (eight) hours as needed 3/24/17  Yes Historical Provider, MD   saxagliptin (ONGLYZA) 5 MG tablet Take 5 mg by mouth daily   Yes Historical Provider, MD   gabapentin (NEURONTIN) 100 mg capsule Take 200 mg by mouth daily at bedtime    Historical Provider, MD     I have reviewed home medications with patient personally      Allergies: No Known Allergies    Social History:  Marital Status: /Civil Union   Occupation:  Retired  Patient Pre-hospital Living Situation:  Lives at home with his wife, grandson and grandson's kan  Patient Pre-hospital Level of Mobility:  Independent  Patient Pre-hospital Diet Restrictions:  Diabetic diet in place  Substance Use History:  Denies  Social History     Substance and Sexual Activity   Alcohol Use Not Currently    Comment: on occasion     Social History     Tobacco Use   Smoking Status Former Smoker    Last attempt to quit: 2/15/2019    Years since quittin 4   Smokeless Tobacco Never Used     Social History     Substance and Sexual Activity   Drug Use No       Family History:  I have reviewed the patients family history - patient does not report any early onset diabetes, cancer and or heart disease his immediate family members    Physical Exam:   Vitals:   Blood Pressure: 154/94 (19)  Pulse: 81 (19)  Temperature: (!) 97 2 °F (36 2 °C) (19)  Temp Source: Temporal (19)  Respirations: 22 (19)  Height: 5' 11" (180 3 cm)(Stated) (19)  Weight - Scale: 90 9 kg (200 lb 6 4 oz) (19)  SpO2: 93 % (19)    Physical Exam   Constitutional: He is oriented to person, place, and time  He appears well-developed and well-nourished  HENT:   Head: Normocephalic and atraumatic  Nose: Nose normal    Mouth/Throat: Oropharynx is clear and moist    Eyes: Pupils are equal, round, and reactive to light  Conjunctivae and EOM are normal    Neck: Normal range of motion  Neck supple  No JVD present  No thyromegaly present  Cardiovascular: Normal rate, regular rhythm and intact distal pulses  Exam reveals no gallop and no friction rub  No murmur heard  Pulmonary/Chest: No respiratory distress  Increased respiratory effort  Mild positive use of the accessory muscles of respiration  Tight air entry bilaterally  Faint bilateral expiratory wheezing  Decreased breath sounds bilaterally at the bases   Abdominal: Soft  Bowel sounds are normal  He exhibits no distension and no mass  There is no tenderness  There is no guarding  Musculoskeletal: Normal range of motion  He exhibits no edema  Lymphadenopathy:     He has no cervical adenopathy  Neurological: He is alert and oriented to person, place, and time  No cranial nerve deficit  Skin: Skin is warm  No rash noted  No erythema  Psychiatric: He has a normal mood and affect  His behavior is normal    Vitals reviewed  Additional Data:   Lab Results: I have personally reviewed pertinent reports  Results from last 7 days   Lab Units 07/16/19  1456   WBC Thousand/uL 7 50   HEMOGLOBIN g/dL 14 3   HEMATOCRIT % 41 1*   PLATELETS Thousands/uL 171   NEUTROS PCT % 63   LYMPHS PCT % 18*   MONOS PCT % 8   EOS PCT % 10*     Results from last 7 days   Lab Units 07/16/19  1456   POTASSIUM mmol/L 3 8   CHLORIDE mmol/L 102   CO2 mmol/L 27   BUN mg/dL 16   CREATININE mg/dL 0 81   CALCIUM mg/dL 8 8   ALK PHOS U/L 73   ALT U/L 16   AST U/L 12*         Results from last 7 days   Lab Units 07/16/19  1729   POC GLUCOSE mg/dl 260*           Imaging: I have personally reviewed pertinent reports  XR chest 2 views   Final Result by João Soler MD (07/16 1711)      Small focus of atelectasis or infiltrate within the lingula  Workstation performed: [id number]             EKG, Pathology, and Other Studies Reviewed on Admission:   · EKG:  None reviewed    [facility] Reviewed: Yes     ** Please Note: This note has been constructed using a voice recognition system   **

## 2019-07-16 NOTE — ED PROVIDER NOTES
History  Chief Complaint   Patient presents with    Shortness of Breath     Patient is a 80-year-old man with a history of COPD and diabetes who was admitted to the hospital approximately 10 days ago for exacerbation is COPD and pneumonia  He spent 1 day in the hospital discharge feeling better  Patient says yesterday watch television when he developed acute dyspnea  Patient has had a cough which is productive of yellow sputum  He has not been febrile  Patient says he has not smoked in the past 30 days  Prior to Admission Medications   Prescriptions Last Dose Informant Patient Reported? Taking?    albuterol (2 5 mg/3 mL) 0 083 % nebulizer solution   No No   Sig: Take 1 vial (2 5 mg total) by nebulization every 4 (four) hours as needed for wheezing or shortness of breath   albuterol (PROVENTIL HFA,VENTOLIN HFA) 90 mcg/act inhaler   Yes No   Sig: Inhale 2 puffs every 6 (six) hours as needed for wheezing or shortness of breath   aspirin (ECOTRIN LOW STRENGTH) 81 mg EC tablet   Yes No   Sig: Take 81 mg by mouth every other day    atorvastatin (LIPITOR) 80 mg tablet   Yes No   Sig: Take 80 mg by mouth daily   metFORMIN (GLUCOPHAGE) 500 mg tablet   Yes No   Sig: Take 500 mg by mouth 2 (two) times a day with meals 1/2 a tablet in the morning and evening   metoprolol tartrate (LOPRESSOR) 50 mg tablet   Yes No   Sig: Take 25 mg by mouth 2 (two) times a day   primidone (MYSOLINE) 50 mg tablet   Yes No   Sig: Take 50 mg by mouth every 8 (eight) hours as needed   saxagliptin (ONGLYZA) 5 MG tablet   Yes No   Sig: Take 5 mg by mouth daily      Facility-Administered Medications: None       Past Medical History:   Diagnosis Date    BPH (benign prostatic hyperplasia)     45 days radiation treatment    Cardiac disease     [facility]     Coronary artery disease     [facility]     Hypertension     Pro[facility] )     prostate        Past Surgical History: Procedure Laterality Date    CORONARY ANGIOPLASTY WITH STENT PLACEMENT      CORONARY ARTERY BYPASS GRAFT      PROSTATE BIOPSY         Family History   Problem Relation Age of Onset    Cancer Father     Heart disease Brother      I have reviewed and agree with the history as documented  Social History     Tobacco Use    Smoking status: Former Smoker     Last attempt to quit: 2/15/2019     Years since quittin 4    Smokeless tobacco: Never Used   Substance Use Topics    Alcohol use: Yes     Comment: on occasion    Drug use: No        Review of Systems   Constitutional: Negative for chills, fatigue, fever and unexpected weight change  HENT: Negative for congestion and nosebleeds  Eyes: Negative for visual disturbance  Respiratory: Negative for chest tightness and shortness of breath  Cardiovascular: Negative for chest pain, palpitations and leg swelling  Gastrointestinal: Negative for abdominal pain, blood in stool, diarrhea, nausea and vomiting  Endocrine: Negative for cold intolerance and heat intolerance  Genitourinary: Negative for difficulty urinating  Musculoskeletal: Negative for arthralgias, back pain, gait problem, joint swelling and myalgias  Skin: Negative for rash  Neurological: Negative for dizziness, speech difficulty, weakness and headaches  Psychiatric/Behavioral: Negative for behavioral problems, confusion, self-injury and suicidal ideas  All other systems reviewed and are negative  Physical Exam  Physical Exam   Constitutional: He is oriented to person, place, and time  He appears well-developed and well-nourished  HENT:   Head: Normocephalic and atraumatic  Nose: Nose normal    Eyes: Pupils are equal, round, and reactive to light  EOM are normal    Neck: Normal range of motion  Neck supple  Cardiovascular: Normal rate, regular rhythm and normal heart sounds  Exam reveals no gallop and no friction rub  No murmur heard    Pulmonary/Chest: Effort normal  No respiratory distress  He has wheezes (In all lung fields)  He has no rales  Abdominal: Soft  He exhibits no distension  There is no tenderness  There is no rebound and no guarding  Musculoskeletal: Normal range of motion  He exhibits no edema  Neurological: He is alert and oriented to person, place, and time  Skin: Skin is warm and dry  Psychiatric: He has a normal mood and affect  His behavior is normal  Judgment and thought content normal    Nursing note and vitals reviewed  Vital Signs  ED Triage Vitals [07/16/19 1435]   Temperature Pulse Respirations Blood Pressure SpO2   (!) 97 4 °F (36 3 °C) 85 (!) 24 140/84 90 %      Temp Source Heart Rate Source Patient Position - Orthostatic VS BP Location FiO2 (%)   Temporal Monitor Sitting Right arm --      Pain Score       No Pain           Vitals:    07/16/19 1435   BP: 140/84   Pulse: 85   Patient Position - Orthostatic VS: Sitting         Visual Acuity      ED Medications  Medications - No data to display    Diagnostic Studies  Results Reviewed     None                 No orders to display              Procedures  Procedures       ED Course  ED Course as of Jul 16 1811   Tue Jul 16, 2019   1629 Infiltrate   XR chest 2 views                               MDM    Disposition  Final diagnoses:   None     ED Disposition     None      Follow-up Information    None         Patient's Medications   Discharge Prescriptions    No medications on file     No discharge procedures on file      ED Provider  Electronically Signed by           Kristyn Yepez MD  07/16/19 5205

## 2019-07-16 NOTE — ASSESSMENT & PLAN NOTE
· Admit to med surge  · Secondary to a left lung pneumonia  · Follow-up on blood cultures as ordered by the emergency department  · Check a procalcitonin level  · Change antibiotics from Rocephin and Zithromax to vancomycin and cefepime because of the patient's recent multiple hospitalizations    · Suspect this pneumonia is secondary to a Gram-negative etiology  · Adjust antibiotics according based on culture results  · Patient received 125 mg of IV Solu-Medrol in the emergency department, will continue with 40 mg IV t i d   · Continue breathing treatments via the respiratory protocol

## 2019-07-17 PROBLEM — R06.89 ACUTE RESPIRATORY INSUFFICIENCY: Status: ACTIVE | Noted: 2017-03-09

## 2019-07-17 LAB
ALBUMIN SERPL BCP-MCNC: 3.8 G/DL (ref 3.5–5.7)
ALP SERPL-CCNC: 65 U/L (ref 55–165)
ALT SERPL W P-5'-P-CCNC: 14 U/L (ref 7–52)
ANION GAP SERPL CALCULATED.3IONS-SCNC: 7 MMOL/L (ref 4–13)
AST SERPL W P-5'-P-CCNC: 11 U/L (ref 13–39)
BASOPHILS # BLD AUTO: 0 THOUSANDS/ΜL (ref 0–0.1)
BASOPHILS NFR BLD AUTO: 0 % (ref 0–2)
BILIRUB SERPL-MCNC: 0.5 MG/DL (ref 0.2–1)
BUN SERPL-MCNC: 14 MG/DL (ref 7–25)
CALCIUM SERPL-MCNC: 8.4 MG/DL (ref 8.6–10.5)
CHLORIDE SERPL-SCNC: 102 MMOL/L (ref 98–107)
CO2 SERPL-SCNC: 26 MMOL/L (ref 21–31)
CREAT SERPL-MCNC: 0.78 MG/DL (ref 0.7–1.3)
EOSINOPHIL # BLD AUTO: 0 THOUSAND/ΜL (ref 0–0.61)
EOSINOPHIL NFR BLD AUTO: 0 % (ref 0–5)
ERYTHROCYTE [DISTWIDTH] IN BLOOD BY AUTOMATED COUNT: 13.5 % (ref 11.5–14.5)
GFR SERPL CREATININE-BSD FRML MDRD: 90 ML/MIN/1.73SQ M
GLUCOSE SERPL-MCNC: 162 MG/DL (ref 65–140)
GLUCOSE SERPL-MCNC: 244 MG/DL (ref 65–140)
GLUCOSE SERPL-MCNC: 246 MG/DL (ref 65–99)
GLUCOSE SERPL-MCNC: 353 MG/DL (ref 65–140)
GLUCOSE SERPL-MCNC: 368 MG/DL (ref 65–140)
HCT VFR BLD AUTO: 39 % (ref 42–47)
HGB BLD-MCNC: 13.6 G/DL (ref 14–18)
LYMPHOCYTES # BLD AUTO: 0.6 THOUSANDS/ΜL (ref 0.6–4.47)
LYMPHOCYTES NFR BLD AUTO: 10 % (ref 21–51)
MAGNESIUM SERPL-MCNC: 1.9 MG/DL (ref 1.9–2.7)
MCH RBC QN AUTO: 31.5 PG (ref 26–34)
MCHC RBC AUTO-ENTMCNC: 34.9 G/DL (ref 31–37)
MCV RBC AUTO: 90 FL (ref 81–99)
MONOCYTES # BLD AUTO: 0.1 THOUSAND/ΜL (ref 0.17–1.22)
MONOCYTES NFR BLD AUTO: 1 % (ref 2–12)
NEUTROPHILS # BLD AUTO: 5.4 THOUSANDS/ΜL (ref 1.4–6.5)
NEUTS SEG NFR BLD AUTO: 89 % (ref 42–75)
PHOSPHATE SERPL-MCNC: 3.3 MG/DL (ref 3–5.5)
PLATELET # BLD AUTO: 164 THOUSANDS/UL (ref 149–390)
PMV BLD AUTO: 10 FL (ref 8.6–11.7)
POTASSIUM SERPL-SCNC: 4 MMOL/L (ref 3.5–5.5)
PROT SERPL-MCNC: 6 G/DL (ref 6.4–8.9)
RBC # BLD AUTO: 4.32 MILLION/UL (ref 4.3–5.9)
SODIUM SERPL-SCNC: 135 MMOL/L (ref 134–143)
WBC # BLD AUTO: 6 THOUSAND/UL (ref 4.8–10.8)

## 2019-07-17 PROCEDURE — 94640 AIRWAY INHALATION TREATMENT: CPT

## 2019-07-17 PROCEDURE — G8978 MOBILITY CURRENT STATUS: HCPCS

## 2019-07-17 PROCEDURE — 84100 ASSAY OF PHOSPHORUS: CPT | Performed by: HOSPITALIST

## 2019-07-17 PROCEDURE — 94760 N-INVAS EAR/PLS OXIMETRY 1: CPT

## 2019-07-17 PROCEDURE — 85025 COMPLETE CBC W/AUTO DIFF WBC: CPT | Performed by: HOSPITALIST

## 2019-07-17 PROCEDURE — G8989 SELF CARE D/C STATUS: HCPCS

## 2019-07-17 PROCEDURE — G8987 SELF CARE CURRENT STATUS: HCPCS

## 2019-07-17 PROCEDURE — 80053 COMPREHEN METABOLIC PANEL: CPT | Performed by: HOSPITALIST

## 2019-07-17 PROCEDURE — G8979 MOBILITY GOAL STATUS: HCPCS

## 2019-07-17 PROCEDURE — 97163 PT EVAL HIGH COMPLEX 45 MIN: CPT

## 2019-07-17 PROCEDURE — G8988 SELF CARE GOAL STATUS: HCPCS

## 2019-07-17 PROCEDURE — 83735 ASSAY OF MAGNESIUM: CPT | Performed by: HOSPITALIST

## 2019-07-17 PROCEDURE — G8980 MOBILITY D/C STATUS: HCPCS

## 2019-07-17 PROCEDURE — 82948 REAGENT STRIP/BLOOD GLUCOSE: CPT

## 2019-07-17 PROCEDURE — 99232 SBSQ HOSP IP/OBS MODERATE 35: CPT | Performed by: PHYSICIAN ASSISTANT

## 2019-07-17 PROCEDURE — 94664 DEMO&/EVAL PT USE INHALER: CPT

## 2019-07-17 PROCEDURE — 97165 OT EVAL LOW COMPLEX 30 MIN: CPT

## 2019-07-17 RX ORDER — INSULIN GLARGINE 100 [IU]/ML
10 INJECTION, SOLUTION SUBCUTANEOUS EVERY MORNING
Status: DISCONTINUED | OUTPATIENT
Start: 2019-07-17 | End: 2019-07-18 | Stop reason: HOSPADM

## 2019-07-17 RX ORDER — GABAPENTIN 100 MG/1
200 CAPSULE ORAL
Status: DISCONTINUED | OUTPATIENT
Start: 2019-07-17 | End: 2019-07-18 | Stop reason: HOSPADM

## 2019-07-17 RX ORDER — AZITHROMYCIN 250 MG/1
500 TABLET, FILM COATED ORAL EVERY 24 HOURS
Status: DISCONTINUED | OUTPATIENT
Start: 2019-07-17 | End: 2019-07-18 | Stop reason: HOSPADM

## 2019-07-17 RX ADMIN — INSULIN LISPRO 3 UNITS: 100 INJECTION, SOLUTION INTRAVENOUS; SUBCUTANEOUS at 09:09

## 2019-07-17 RX ADMIN — METHYLPREDNISOLONE SODIUM SUCCINATE 40 MG: 40 INJECTION, POWDER, FOR SOLUTION INTRAMUSCULAR; INTRAVENOUS at 21:06

## 2019-07-17 RX ADMIN — METHYLPREDNISOLONE SODIUM SUCCINATE 40 MG: 40 INJECTION, POWDER, FOR SOLUTION INTRAMUSCULAR; INTRAVENOUS at 14:34

## 2019-07-17 RX ADMIN — ISODIUM CHLORIDE 3 ML: 0.03 SOLUTION RESPIRATORY (INHALATION) at 19:49

## 2019-07-17 RX ADMIN — METHYLPREDNISOLONE SODIUM SUCCINATE 40 MG: 40 INJECTION, POWDER, FOR SOLUTION INTRAMUSCULAR; INTRAVENOUS at 06:37

## 2019-07-17 RX ADMIN — DOCUSATE SODIUM 100 MG: 100 CAPSULE, LIQUID FILLED ORAL at 09:10

## 2019-07-17 RX ADMIN — INSULIN LISPRO 8 UNITS: 100 INJECTION, SOLUTION INTRAVENOUS; SUBCUTANEOUS at 11:59

## 2019-07-17 RX ADMIN — INSULIN LISPRO 1 UNITS: 100 INJECTION, SOLUTION INTRAVENOUS; SUBCUTANEOUS at 17:05

## 2019-07-17 RX ADMIN — INSULIN LISPRO 8 UNITS: 100 INJECTION, SOLUTION INTRAVENOUS; SUBCUTANEOUS at 17:05

## 2019-07-17 RX ADMIN — INSULIN LISPRO 8 UNITS: 100 INJECTION, SOLUTION INTRAVENOUS; SUBCUTANEOUS at 09:09

## 2019-07-17 RX ADMIN — LEVALBUTEROL 1.25 MG: 1.25 SOLUTION, CONCENTRATE RESPIRATORY (INHALATION) at 08:51

## 2019-07-17 RX ADMIN — DOCUSATE SODIUM 100 MG: 100 CAPSULE, LIQUID FILLED ORAL at 17:05

## 2019-07-17 RX ADMIN — CEFEPIME HYDROCHLORIDE 2000 MG: 2 INJECTION, SOLUTION INTRAVENOUS at 06:37

## 2019-07-17 RX ADMIN — INSULIN LISPRO 6 UNITS: 100 INJECTION, SOLUTION INTRAVENOUS; SUBCUTANEOUS at 11:59

## 2019-07-17 RX ADMIN — ISODIUM CHLORIDE 3 ML: 0.03 SOLUTION RESPIRATORY (INHALATION) at 08:51

## 2019-07-17 RX ADMIN — INSULIN GLARGINE 10 UNITS: 100 INJECTION, SOLUTION SUBCUTANEOUS at 09:09

## 2019-07-17 RX ADMIN — METOPROLOL TARTRATE 25 MG: 25 TABLET ORAL at 17:05

## 2019-07-17 RX ADMIN — ATORVASTATIN CALCIUM 80 MG: 80 TABLET, FILM COATED ORAL at 09:09

## 2019-07-17 RX ADMIN — LEVALBUTEROL 1.25 MG: 1.25 SOLUTION, CONCENTRATE RESPIRATORY (INHALATION) at 19:49

## 2019-07-17 RX ADMIN — ISODIUM CHLORIDE 3 ML: 0.03 SOLUTION RESPIRATORY (INHALATION) at 14:21

## 2019-07-17 RX ADMIN — AZITHROMYCIN 500 MG: 250 TABLET, FILM COATED ORAL at 09:09

## 2019-07-17 RX ADMIN — METOPROLOL TARTRATE 25 MG: 25 TABLET ORAL at 09:09

## 2019-07-17 RX ADMIN — ENOXAPARIN SODIUM 40 MG: 40 INJECTION SUBCUTANEOUS at 09:09

## 2019-07-17 RX ADMIN — GABAPENTIN 200 MG: 100 CAPSULE ORAL at 21:06

## 2019-07-17 RX ADMIN — LEVALBUTEROL 1.25 MG: 1.25 SOLUTION, CONCENTRATE RESPIRATORY (INHALATION) at 14:21

## 2019-07-17 NOTE — ASSESSMENT & PLAN NOTE
Lab Results   Component Value Date    HGBA1C 8 1 (H) 07/05/2019       Recent Labs     07/16/19  1729 07/16/19  1939 07/17/19  0655   POCGLU 260* 342* 244*       Blood Sugar Average: Last 72 hrs:  (P) 282   Will implement Accu-Cheks with AC and HS and sliding scale coverage    Hold oral hypoglycemics  BS elevated with steroids, will adjust insulin

## 2019-07-17 NOTE — PLAN OF CARE
Problem: PHYSICAL THERAPY ADULT  Goal: Performs mobility at highest level of function for planned discharge setting  See evaluation for individualized goals  Description  Treatment/Interventions: Elevations, Therapeutic exercise, Endurance training, Patient/family training, Gait training, Spoke to nursing  Equipment Recommended: (none at this time)       See flowsheet documentation for full assessment, interventions and recommendations  Note:   Prognosis: Good  Problem List: Decreased endurance, Decreased mobility  Assessment: Pt is 69 y o  male seen for PT evaluation on 7/17/2019 s/p admit to [address] [place] [address] on 7/16/2019 w/ Acute respiratory insufficiency  PT consulted to assess pt's functional mobility and d/c needs  Order placed for PT eval and tx, w/ up and OOB as tolerated order  Performed at least 2 patient identifiers during session: Name and wristband  Comorbidities affecting pt's physical performance at time of assessment include: COPD, DME type 2, essential HTN, CAD  PTA, pt was independent w/ all functional mobility w/ SPC occasionally, ambulates unrestricted distances and all terrain, has 4 BEATRIZ, lives w/ spouse and grnddtr+family in 2 level home and retired  Personal factors affecting pt at time of IE include: lives in 2 story house and stairs to enter home  Please find objective findings from PT assessment regarding body systems outlined above with impairments and limitations including decreased endurance, decreased activity tolerance, decreased functional mobility tolerance and SOB upon exertion, as well as mobility assessment (need for supervision assist w/ all phases of mobility when usually ambulating independently)  The following objective measures performed on IE also reveal limitations: Barthel Index: 85/100  Pt's clinical presentation is currently unstable/unpredictable seen in pt's presentation of ongoing medical assessment   Pt to benefit from continued PT tx to address deficits as defined above and maximize level of functional independent mobility and consistency  From PT/mobility standpoint, recommendation at time of d/c would be anticipate no needs pending progress in order to facilitate return to PLOF  Barriers to Discharge: None     Recommendation: Home with family support(anticipate no needs)     PT - OK to Discharge: Yes(when medically cleared)    See flowsheet documentation for full assessment

## 2019-07-17 NOTE — PLAN OF CARE
Problem: Potential for Falls  Goal: Patient will remain free of falls  Description  INTERVENTIONS:  - Assess patient frequently for physical needs  -  Identify cognitive and physical deficits and behaviors that affect risk of falls    -  [place] fall precautions as indicated by assessment   - Educate patient/family on patient safety including physical limitations  - Instruct patient to call for assistance with activity based on assessment  - Modify environment to reduce risk of injury  - Consider OT/PT consult to assist with strengthening/mobility  Outcome: Progressing     Problem: PAIN - ADULT  Goal: Verbalizes/displays adequate comfort level or baseline comfort level  Description  Interventions:  - Encourage patient to monitor pain and request assistance  - Assess pain using appropriate pain scale  - Administer analgesics based on type and severity of pain and evaluate response  - Implement non-pharmacological measures as appropriate and evaluate response  - Consider cultural and social influences on pain and pain management  - Notify physician/advanced practitioner if interventions unsuccessful or patient reports new pain  Outcome: Progressing     Problem: INFECTION - ADULT  Goal: Absence or prevention of progression during hospitalization  Description  INTERVENTIONS:  - Assess and monitor for signs and symptoms of infection  - Monitor lab/diagnostic results  - Monitor all insertion sites, i e  indwelling lines, tubes, and drains  - Monitor endotracheal (as able) and nasal secretions for changes in amount and color  - [place] appropriate cooling/warming therapies per order  - Administer medications as ordered  - Instruct and encourage patient and family to use good hand hygiene technique  - Identify and instruct in appropriate isolation precautions for identified infection/condition  Outcome: Progressing  Goal: Absence of fever/infection during neutropenic period  Description  INTERVENTIONS:  - Monitor WBC  - Implement neutropenic guidelines  Outcome: Progressing     Problem: SAFETY ADULT  Goal: Maintain or return to baseline ADL function  Description  INTERVENTIONS:  -  Assess patient's ability to carry out ADLs; assess patient's baseline for ADL function and identify physical deficits which impact ability to perform ADLs (bathing, care of mouth/teeth, toileting, grooming, dressing, etc )  - Assess/evaluate cause of self-care deficits   - Assess range of motion  - Assess patient's mobility; develop plan if impaired  - Assess patient's need for assistive devices and provide as appropriate  - Encourage maximum independence but intervene and supervise when necessary  ¯ Involve family in performance of ADLs  ¯ Assess for home care needs following discharge   ¯ Request OT consult to assist with ADL evaluation and planning for discharge  ¯ Provide patient education as appropriate  Outcome: Progressing  Goal: Maintain or return mobility status to optimal level  Description  INTERVENTIONS:  - Assess patient's baseline mobility status (ambulation, transfers, stairs, etc )    - Identify cognitive and physical deficits and behaviors that affect mobility  - Identify mobility aids required to assist with transfers and/or ambulation (gait belt, sit-to-stand, lift, walker, cane, etc )  - [place] fall precautions as indicated by assessment  - Record patient progress and toleration of activity level on Mobility SBAR; progress patient to next Phase/Stage  - Instruct patient to call for assistance with activity based on assessment  - Request Rehabilitation consult to assist with strengthening/weightbearing, etc   Outcome: Progressing     Problem: DISCHARGE PLANNING  Goal: Discharge to home or other facility with appropriate resources  Description  INTERVENTIONS:  - Identify barriers to discharge w/patient and caregiver  - Arrange for needed discharge resources and transportation as appropriate  - Identify discharge learning needs (meds, wound care, etc )  - Arrange for interpretive services to assist at discharge as needed  - Refer to Case Management Department for coordinating discharge planning if the patient needs post-hospital services based on physician/advanced practitioner order or complex needs related to functional status, cognitive ability, or social support system  Outcome: Progressing     Problem: Knowledge Deficit  Goal: Patient/family/caregiver demonstrates understanding of disease process, treatment plan, medications, and discharge instructions  Description  Complete learning assessment and assess knowledge base    Interventions:  - Provide teaching at level of understanding  - Provide teaching via preferred learning methods  Outcome: Progressing

## 2019-07-17 NOTE — PLAN OF CARE
Problem: DISCHARGE PLANNING - CARE MANAGEMENT  Goal: Discharge to post-acute care or home with appropriate resources  Description  INTERVENTIONS:  - Conduct assessment to determine patient/family and health care team treatment goals, and need for post-acute services based on payer coverage, community resources, and patient preferences, and barriers to discharge  - Address psychosocial, clinical, and financial barriers to discharge as identified in assessment in conjunction with the patient/family and health care team  - Arrange appropriate level of post-acute services according to patient's   needs and preference and payer coverage in collaboration with the physician and health care team  - Communicate with and update the patient/family, physician, and health care team regarding progress on the discharge plan  - Arrange appropriate transportation to post-acute venues  Discharge home with wife  May need a desat study prior to discharge     Outcome: Progressing

## 2019-07-17 NOTE — UTILIZATION REVIEW
Initial Clinical Review    Admission: Date/Time/Statement:   OBSERVATION WRITTEN 7/16/19 @ 1639 CONVERTED TO INPATIENT ADMISSION 7/17/19 @ 1101 DUE TO FURTHER DIAGNOSTIC WORKUP REQUIRED FOR RESPIRATORY INSUFFICIENCY, REQUIRING AT LEAST A 2 MIDNIGHT STAY  Admitting Physician Estefany SHAIKH    Level of Care Med Surg    Estimated length of stay More than 2 Midnights    Certification I certify that inpatient services are medically necessary for this patient for a duration of greater than two midnights  See H&P and MD Progress Notes for additional information about the patient's course of treatment  ED Arrival Information     Expected Arrival Acuity Means of Arrival Escorted By Service Admission Type    - 7/16/2019 14:31 Emergent Walk-In Self General Medicine Emergency    Arrival Complaint    -        Chief Complaint   Patient presents with    Shortness of Breath     Assessment/Plan:  69 y o  male who presents with shortness of breath x2 days  Patient was recently hospitalized and discharged from this facility about 10 days ago he states that initially he was doing well  Two days ago on Monday July 14th he once again started feeling short of breath  He denies any chest pain, nausea, vomiting, diarrhea, but admits to some subjective fevers and subjective chills  He also has started coughing again  He is producing a clear colored sputum for the past 2 days  He denies any travel history  Over the past 2 days he reports having more difficulty with shortness of breath has not had the ability to complete his activities of daily living  He states that his rescue inhaler was not working  He got frustrated with his shortness of breath and drove himself back to the emergency room earlier this morning  In the emergency room he was found to have an arrival O2 sat of 90% on room air  After he was given some IV steroids and supplemental oxygen his O2 sat increased to 97%    On chest x-ray he was determined to have a left lingular pneumonia  He is being admitted to the observation unit for further management  He feels that his symptoms have worsened because of the increasing humidity with the summer temperatures  COPD with acute exacerbation ([address] )  Assessment & Plan  · Admit to med surge  · Secondary to a left lung pneumonia  · Follow-up on blood cultures as ordered by the emergency department  · Check a procalcitonin level  · Change antibiotics from Rocephin and Zithromax to vancomycin and cefepime because of the patient's recent multiple hospitalizations  · Suspect this pneumonia is secondary to a Gram-negative etiology  · Adjust antibiotics according based on culture results  · Patient received 125 mg of IV Solu-Medrol in the emergency department, will continue with 40 mg IV t i d   · Continue breathing treatments via the respiratory protocol     Type 2 diabetes mellitus without complication, without long-term current use of insulin (HCC)  Assessment & Plan  Blood Sugar Average: Last 72 hrs:  (P) 260   Will implement Accu-Cheks with AC and HS and sliding scale coverage  Hold oral hypoglycemics     Essential hypertension  Assessment & Plan  · Continue metoprolol for blood pressure control     CAD (coronary artery disease), native coronary artery  Assessment & Plan  · Stable  · Continue aspirin, metoprolol, and atorvastatin     VTE Prophylaxis: Enoxaparin (Lovenox)  Anticipated Length of Stay:  Patient will be admitted on an Observation basis with an anticipated length of stay of  < 2 midnights     Justification for Hospital Stay:  Need for Antibiotics and steroids    ED Triage Vitals [07/16/19 1435]   Temperature Pulse Respirations Blood Pressure SpO2   (!) 97 4 °F (36 3 °C) 85 (!) 24 140/84 90 %      Temp Source Heart Rate Source Patient Position - Orthostatic VS BP Location FiO2 (%)   Temporal Monitor Sitting Right arm --      Pain Score       No Pain        Wt Readings from Last 1 Encounters:   07/17/19 90 9 kg (200 lb 6 4 oz)     Additional Vital Signs:   Date/Time  Temp  Pulse  Resp  BP  SpO2  O2 Device  Patient Position - Orthostatic VS   07/17/19 0716  97 6 °F (36 4 °C)  91  18  178/89Abnormal   91 %  Nasal cannula  Lying   07/16/19 2318  97 4 °F (36 3 °C)Abnormal   83  20  198/88Abnormal   98 %  Nasal cannula  Lying   07/16/19 2043  --  --  --  --  97 %  Nasal cannula  --   07/16/19 1832  --  88  22  --  86 %Abnormal    None (Room air)  --   SpO2: Pt placed back on O2 @ 2l at 07/16/19 1832   07/16/19 1727  97 2 °F (36 2 °C)Abnormal   81  22  154/94  93 %  Nasal cannula  Sitting   07/16/19 1715  97 3 °F (36 3 °C)Abnormal   81  20  182/81Abnormal   96 %  Nasal cannula  Lying   07/16/19 1645  --  80  20  189/84Abnormal   97 %  Nasal cannula  Lying   07/16/19 1532  --  --  --  --  97 %  Nasal cannula  --   07/16/19 1435  97 4 °F (36 3 °C)Abnormal   85  24Abnormal   140/84  90 %  None (Room air)  Sitting       Pertinent Labs/Diagnostic Test Results:   Results from last 7 days   Lab Units 07/17/19  0440 07/16/19  1456   WBC Thousand/uL 6 00 7 50   HEMOGLOBIN g/dL 13 6* 14 3   HEMATOCRIT % 39 0* 41 1*   PLATELETS Thousands/uL 164 171   NEUTROS ABS Thousands/µL 5 40 4 70     Results from last 7 days   Lab Units 07/17/19  0440 07/16/19  1456   SODIUM mmol/L 135 137   POTASSIUM mmol/L 4 0 3 8   CHLORIDE mmol/L 102 102   CO2 mmol/L 26 27   ANION GAP mmol/L 7 8   BUN mg/dL 14 16   CREATININE mg/dL 0 78 0 81   EGFR ml/min/1 73sq m 90 88   CALCIUM mg/dL 8 4* 8 8   MAGNESIUM mg/dL 1 9  --    PHOSPHORUS mg/dL 3 3  --      Results from last 7 days   Lab Units 07/17/19  0440 07/16/19  1456   AST U/L 11* 12*   ALT U/L 14 16   ALK PHOS U/L 65 73   TOTAL PROTEIN g/dL 6 0* 6 6   ALBUMIN g/dL 3 8 4 1   TOTAL BILIRUBIN mg/dL 0 50 0 50     Results from last 7 days   Lab Units 07/17/19  0655 07/16/19  1939 07/16/19  1729   POC GLUCOSE mg/dl 244* 342* 260*     Results from last 7 days   Lab Units 07/17/19  0440 07/16/19  1456   GLUCOSE RANDOM mg/dL 246* 228*     Results from last 7 days   Lab Units 07/16/19  1829   PROCALCITONIN ng/ml <0 05     Results from last 7 days   Lab Units 07/16/19  1648   LACTIC ACID mmol/L 1 3     7/16 CXR:  Small focus of atelectasis or infiltrate within the lingula    7/16 EKG:  NSR  ED Treatment:   Medication Administration from 07/16/2019 1431 to 07/16/2019 1725       Date/Time Order Dose Route Action     07/16/2019 1529 albuterol inhalation solution 10 mg 10 mg Nebulization Given     07/16/2019 1529 ipratropium (ATROVENT) 0 02 % inhalation solution 1 mg 1 mg Nebulization Given     07/16/2019 1529 sodium chloride 0 9 % inhalation solution 12 mL 12 mL Nebulization Given     07/16/2019 1514 methylPREDNISolone sodium succinate (Solu-MEDROL) injection 125 mg 125 mg Intravenous Given     07/16/2019 1514 sodium chloride 0 9 % infusion 1,000 mL/hr Intravenous New Bag     07/16/2019 1652 cefTRIAXone (ROCEPHIN) IVPB (premix) 1,000 mg 1,000 mg Intravenous New Bag        Past Medical History:   Diagnosis Date    BPH (benign prostatic hyperplasia)     45 days radiation treatment    Cardiac disease     COPD (chronic obstructive pulmonary disease) ([place] [address] )     Coronary artery disease     Diabetes mellitus ([place] [address] )     Hypertension     Prostate cancer ([facility] )     prostate      Present on Admission:   COPD with acute exacerbation ([place] [address] )   Type 2 diabetes mellitus without complication, without long-term current use of insulin ([place] [address] )   Essential hypertension   CAD (coronary artery disease), native coronary artery   Tobacco abuse   Tremor   Acute respiratory insufficiency      Admitting Diagnosis: Shortness of breath [R06 02]  Pneumonia [J18 9]  COPD exacerbation ([place] [address] ) [J44 1]  Age/Sex: 69 y o  male  Admission Orders:  Regular diet  oob as dorina  Accuchecks QAC and QHS with coverage  Daily weights, I/O  Blood and Sputum cx  Pt, ot  sscd  Current Facility-Administered Medications:  acetaminophen 650 mg Oral Q6H PRN   albuterol 2 puff Inhalation Q4H PRN   [START ON 7/18/2019] aspirin 81 mg Oral Every Other Day   atorvastatin 80 mg Oral Daily   azithromycin 500 mg Oral Q24H   docusate sodium 100 mg Oral BID   enoxaparin 40 mg Subcutaneous Daily   gabapentin 200 mg Oral HS   insulin glargine 10 Units Subcutaneous QAM   insulin lispro 1-6 Units Subcutaneous TID AC   insulin lispro 8 Units Subcutaneous TID With Meals   levalbuterol 1 25 mg Nebulization TID   And      sodium chloride 3 mL Nebulization TID   methylPREDNISolone sodium succinate 40 mg Intravenous Q8H [address]   metoprolol tartrate 25 mg Oral BID   ondansetron 4 mg Intravenous Q6H PRN   primidone 50 mg Oral Q8H PRN       IP CONSULT TO PHARMACY    Network Utilization Review Department  Phone: [phone]; Fax [phone]  [email or web address]  ATTENTION: Please call with any questions or concerns to [phone]  and carefully listen to the prompts so that you are directed to the right person  Send all requests for admission clinical reviews, approved or denied determinations and any other requests to fax [phone]   All voicemails are confidential

## 2019-07-17 NOTE — SOCIAL WORK
Evaluated the pt at the bedside  Pt was admitted to the hospital with SOB  Pt indicated he lives with his spouse in a 2 story home with 4 BEATRIZ and 14 steps to the upstairs  Pt reports he is independent and is able to drive  Pt sees his PCP at the [place] [facility] in [facility]  Pt has a cane and walker at home  Pt reports when he goes to the store he will occasionally use the cane  Pt gets his medications from [facility] and the [place]  Discussed the Observation level of care with the pt and provided booklet of same  Pt verbalized understanding of same  Pt reports his spouse will transport home upon discharge  Pt does not wear oxygen at home  Patient/caregiver received discharge checklist   Content reviewed  Patient  encouraged to participate in discharge plan of care prior to discharge home  CM reviewed d/c planning process including the following: identifying help at home, patient preference for d/c planning needs, availability of treatment team to discuss questions or concerns patient and/or family may have regarding understanding medications and recognizing signs and symptoms once discharged  CM also encouraged patient to follow up with all recommended appointments after discharge  Patient advised of importance for patient and family to participate in managing patients medical well being

## 2019-07-17 NOTE — ASSESSMENT & PLAN NOTE
·  Status post CABG x4  ·  no complaints of chest pain  · Continue aspirin, metoprolol, and atorvastatin

## 2019-07-17 NOTE — PHYSICAL THERAPY NOTE
Physical Therapy Evaluation     Patient's Name: Trisha Velasquez    Admitting Diagnosis  Shortness of breath [R06 02]  Pneumonia [J18 9]  COPD exacerbation ([place] [address] ) [J44 1]    Problem List  Patient Active Problem List   Diagnosis    Type 2 diabetes mellitus without complication, without long-term current use of insulin ([place] [address] )    Tobacco abuse    Other hyperlipidemia    Essential hypertension    CAD (coronary artery disease), native coronary artery    BPH (benign prostatic hyperplasia)    COPD with acute exacerbation (HCC)    Tremor    Ambulatory dysfunction    NSTEMI (non-ST elevated myocardial infarction) ([place] [address] )    On intra-aortic balloon pump assist    Prostate cancer (HCC)    S/P CABG x 4    Acute respiratory insufficiency    Pneumonia of left lower lobe due to infectious organism [facility])       Past Medical History  Past Medical History:   Diagnosis Date    BPH (benign prostatic hyperplasia)     45 days radiation treatment    Cardiac disease     COPD (chronic obstructive pulmonary disease) ([place] [address] )     Coronary artery disease     Diabetes mellitus ([place] [address] )     Hypertension     Prostate cancer ([place] [address] )     prostate        Past Surgical History  Past Surgical History:   Procedure Laterality Date    CORONARY ANGIOPLASTY WITH STENT PLACEMENT      CORONARY ARTERY BYPASS GRAFT      PROSTATE BIOPSY          19 1142   Note Type   Note type Eval/Treat   Pain Assessment   Pain Assessment No/denies pain   Pain Score No Pain   Home Living   Type of Home House   Home Layout Two level;Bed/bath upstairs;Stairs to enter with rails;1/2 bath on main level  (FF to 2nd level; 4 BEATRIZ c U/L HR)   Bathroom Shower/Tub Tub/shower unit   Bathroom Toilet Standard   Bathroom Equipment Grab bars in shower; Shower chair   [address] Cane;Walker  (occasional use of SPC)   Additional Comments no O2 at baseline   Prior Function   Level of [place] Independent with ADLs and functional mobility   Lives With Spouse; Family   Receives Help From Family   ADL Assistance Independent   IADLs Independent   Falls in the last 6 months 0   Vocational Retired  (worked for [employer])   Comments pt drives   Restrictions/Precautions   Wells Didi Bearing Precautions Per Order No   Braces or Orthoses   (none per pt)   Other Precautions O2;Fall Risk  (1 L O2 via NC)   General   Family/Caregiver Present No   Cognition   Overall Cognitive Status WFL   Arousal/Participation Alert   Attention Within functional limits   Orientation Level Oriented X4   Memory Within functional limits   Following Commands Follows all commands and directions without difficulty   Comments pt agreeable to PT session   RLE Assessment   RLE Assessment WFL   LLE Assessment   LLE Assessment WFL   Coordination   Movements are Fluid and Coordinated 1   Sensation WFL   Light Touch   RLE Light Touch Grossly intact   LLE Light Touch Grossly intact   Bed Mobility   Supine to Sit 6  Modified independent   Additional items HOB elevated   Transfers   Sit to Stand 7  Independent   Stand to Sit 7  Independent   Ambulation/Elevation   Gait pattern WNL; Short stride  (no overt LOB)   Gait Assistance 5  Supervision   Additional items Assist x 1;Verbal cues   Assistive Device None   Distance 200 ft   Balance   Static Sitting Normal   Dynamic Sitting Normal   Static Standing Good   Dynamic Standing Good   Ambulatory Good   Endurance Deficit   Endurance Deficit Yes   Activity Tolerance   Activity Tolerance Patient tolerated treatment well   Nurse Made Aware Yes, RN verbalized pt appropriate for PT session   Assessment   Prognosis Good   Problem List Decreased endurance;Decreased mobility   Assessment Pt is 69 y o  male seen for PT evaluation on 7/17/2019 s/p admit to [address] on 7/16/2019 w/ Acute respiratory insufficiency  PT consulted to assess pt's functional mobility and d/c needs   Order placed for PT eval and tx, w/ up and OOB as tolerated order  Performed at least 2 patient identifiers during session: Name and wristband  Comorbidities affecting pt's physical performance at time of assessment include: COPD, DME type 2, essential HTN, CAD  PTA, pt was independent w/ all functional mobility w/ SPC occasionally, ambulates unrestricted distances and all terrain, has 4 BEATRIZ, lives w/ spouse and grnddtr+family in 2 level home and retired  Personal factors affecting pt at time of IE include: lives in 2 story house and stairs to enter home  Please find objective findings from PT assessment regarding body systems outlined above with impairments and limitations including decreased endurance, decreased activity tolerance, decreased functional mobility tolerance and SOB upon exertion, as well as mobility assessment (need for supervision assist w/ all phases of mobility when usually ambulating independently)  The following objective measures performed on IE also reveal limitations: Barthel Index: 85/100  Pt's clinical presentation is currently unstable/unpredictable seen in pt's presentation of ongoing medical assessment  Pt to benefit from continued PT tx to address deficits as defined above and maximize level of functional independent mobility and consistency  From PT/mobility standpoint, recommendation at time of d/c would be anticipate no needs pending progress in order to facilitate return to PLOF  Barriers to Discharge None   Goals   Patient Goals "to go home"   STG Expiration Date 07/20/19   Short Term Goal #1 In 2-3 days: Ambulate > 350 ft  with least restrictive assistive device independently w/o LOB and w/ normalized gait pattern 100% of the time, Navigate 12 stairs modified independent with unilateral handrail to facilitate return to previous living environment, Complete exercise program independently and Tolerate 4 hr OOB to faciliate upright tolerance   Treatment Day 0   Plan   Treatment/Interventions Elevations; Therapeutic exercise; Endurance training;Patient/family training;Gait training;Spoke to nursing   PT Frequency Follow-up visit only   Recommendation   Recommendation Home with family support  (anticipate no needs)   Equipment Recommended   (none at this time)   PT - OK to Discharge Yes  (when medically cleared)   Barthel Index   Feeding 10   Bathing 5   Grooming Score 5   Dressing Score 10   Bladder Score 10   Bowels Score 10   Toilet Use Score 10   Transfers (Bed/Chair) Score 15   Mobility (Level Surface) Score 10   Stairs Score 0   Barthel Index Score 85           Binh Le, PT

## 2019-07-17 NOTE — OCCUPATIONAL THERAPY NOTE
Occupational Therapy Evaluation      Omar Bonus    7/17/2019    Patient Active Problem List   Diagnosis    Type 2 diabetes mellitus without complication, without long-term current use of insulin ([facility] )    Tobacco abuse    Other hyperlipidemia    Essential hypertension    CAD (coronary artery disease), native coronary artery    BPH (benign prostatic hyperplasia)    COPD with acute exacerbation (HCC)    Tremor    Ambulatory dysfunction    NSTEMI (non-ST elevated myocardial infarction) ([facility] )    On intra-aortic balloon pump assist    Prostate cancer ([place] [address] )    S/P CABG x 4    Acute respiratory insufficiency    Pneumonia of left lower lobe due to infectious organism [facility])       Past Medical History:   Diagnosis Date    BPH (benign prostatic hyperplasia)     45 days radiation treatment    Cardiac disease     COPD (chronic obstructive pulmonary disease) ([place] [address] )     Coronary artery disease     Diabetes mellitus ([place] [address] )     Hypertension     Prostate cancer ([place] [address] )     prostate        Past Surgical History:   Procedure Laterality Date    CORONARY ANGIOPLASTY WITH STENT PLACEMENT      CORONARY ARTERY BYPASS GRAFT      PROSTATE BIOPSY          07/17/19 0932   Note Type   Note type Eval only   Restrictions/Precautions   Weight Bearing Precautions Per Order No   Other Precautions O2;Fall Risk  (1L of O2)   Pain Assessment   Pain Assessment No/denies pain   Pain Score No Pain   Home Living   Type of [address] [place] [address] Two level;Bed/bath upstairs;Stairs to enter with rails;1/2 bath on main level  (FF to 2nd level; 4 BEATRIZ c U/L HR)   Bathroom Shower/Tub Tub/shower unit   Bathroom Toilet Standard   Bathroom Equipment Grab bars in shower; Shower chair   [address] Cane;Walker  (occasional use of SPC)   Additional Comments no O2 at baseline   Prior Function   Level of [place] Independent with ADLs and functional mobility   Lives With Spouse; Family   Receives Help From Family   ADL Assistance Independent   IADLs Independent   Falls in the last 6 months 0   Vocational Retired  (worked for [employer])   Comments + drive   ADL   [address]  Independent   LB Bathing Assistance 7  [address] 7  Independent   LB Dressing Assistance 7  Independent   Bed Mobility   Supine to Sit 7  Independent   Transfers   Sit to Stand 7  Independent   Stand to Sit 7  Independent   Functional Mobility   Functional Mobility 7  Independent   Additional Comments 200ft   Additional items   (none)   Balance   Static Sitting Normal   Dynamic Sitting Normal   Static Standing Good   Dynamic Standing Good   Ambulatory Good   Activity Tolerance   Activity Tolerance Patient tolerated treatment well   RUE Assessment   RUE Assessment WFL   LUE Assessment   LUE Assessment WFL   Hand Function   Gross Motor Coordination Functional   Fine Motor Coordination Functional   Cognition   Overall Cognitive Status WFL   Arousal/Participation Alert; Cooperative   Attention Within functional limits   Orientation Level Oriented X4   Memory Within functional limits   Following Commands Follows all commands and directions without difficulty   Comments Mini-Cog assessment performed today  Version 1 was given  Patient able to recall 3/3 words  Patient able to draw a normal clock with the incorrect time  Total score of test was 5/5 indicating no further screening of cognition is required  Cognition Assessment Tools   (Mini Cog)   Score 4   Assessment   Assessment Pt is a 69 y o  male seen for OT evaluation s/p admit to [address] [place] [address] on 7/16/2019 w/ Acute respiratory insufficiency  Comorbidities affecting pt's functional performance at time of assessment include: COPD, CAD, DM, HTN, Prostate CA  Personal factors affecting pt at time of IE include:steps to enter environment  Prior to admission, pt was I with ADLs and IADLs, Pt is currently at baseline function   From OT standpoint, recommendation at time of d/c would be Home I    Goals   Patient Goals Return home   Plan   Goal Expiration Date 07/17/19   Treatment Day 0   OT Frequency Eval only   Recommendation   OT Discharge Recommendation Home independent   OT - OK to Discharge Yes   Barthel Index   Feeding 10   Bathing 5   Grooming Score 5   Dressing Score 10   Bladder Score 10   Bowels Score 10   Toilet Use Score 10   Transfers (Bed/Chair) Score 15   Mobility (Level Surface) Score 15   Stairs Score 10   Barthel Index Score 100     Adrian Graham MS, OTR/L

## 2019-07-17 NOTE — PROGRESS NOTES
Progress Note - Pilar Rhodes 1945, 69 y o  male MRN: [id number]    Unit/Bed#: -02 Encounter: [id number]    Primary Care Provider: No primary care provider on file  Date and time admitted to hospital: 7/16/2019  2:34 PM        * Acute respiratory insufficiency  Assessment & Plan  ·  Pulse ox was 86% on room air in the ER, improved with oxygen 2 L nasal cannula  ·  secondary to COPD exacerbation with recent pneumonia and hospitalization  ·  supportive care and Respiratory protocol    COPD with acute exacerbation ([address] )  Assessment & Plan  · Secondary to a left lung pneumonia w/ recent hospitalization  · Follow-up on blood cultures as ordered by the emergency department  · procalcitonin level - negative  · Change antibiotics PO Zithromax   · Patient received 125 mg of IV Solu-Medrol in the emergency department, will continue with 40 mg IV t i d   · Continue breathing treatments via the respiratory protocol    Tremor  Assessment & Plan  ·  Continue Mysoline p r n  CAD (coronary artery disease), native coronary artery  Assessment & Plan  ·  Status post CABG x4  ·  no complaints of chest pain  · Continue aspirin, metoprolol, and atorvastatin    Essential hypertension  Assessment & Plan  · Continue metoprolol for blood pressure control    Type 2 diabetes mellitus without complication, without long-term current use of insulin [facility])  Assessment & Plan  Lab Results   Component Value Date    HGBA1C 8 1 (H) 07/05/2019       Recent Labs     07/16/19  1729 07/16/19  1939 07/17/19  0655   POCGLU 260* 342* 244*       Blood Sugar Average: Last 72 hrs:  (P) 282   Will implement Accu-Cheks with AC and HS and sliding scale coverage  Hold oral hypoglycemics  BS elevated with steroids, will adjust insulin      VTE Pharmacologic Prophylaxis: Pharmacologic: Enoxaparin (Lovenox)    Patient Centered Rounds: I have performed bedside rounds with nursing staff today      Discussions with Specialists or Other Care Team Provider:  Case management, care coordination team  Education and Discussions with Family / Patient:  patient    Current Length of Stay: 0 day(s)    Current Patient Status: Observation   Certification Statement: The patient, admitted on an observation basis, will now require > 2 midnight hospital stay due to IV steroids, nebulizers,    Discharge Plan:  When medically stable    Code Status: Level 3 - DNAR and DNI    Subjective:   Patient seen in bed  He reports he still feels short of breath with activity and still feels wheezing  He reports this is about his 4th exacerbation and 6 months  Recently hospitalized for pneumonia  [facility] and [facility] with [facility] in the past   He states he has nebulizers and albuterol MDI at home  He does not have any home oxygen  He has no chest pain, nausea, vomiting, abdominal pain    Objective:     Vitals:   Temp (24hrs), Av 4 °F (36 3 °C), Min:97 2 °F (36 2 °C), Max:97 6 °F (36 4 °C)    Temp:  [97 2 °F (36 2 °C)-97 6 °F (36 4 °C)] 97 6 °F (36 4 °C)  HR:  [80-91] 91  Resp:  [18-24] 18  BP: (140-198)/(81-94) 178/89  SpO2:  [86 %-98 %] 91 %  Body mass index is 27 95 kg/m²  Input and Output Summary (last 24 hours): Intake/Output Summary (Last 24 hours) at 2019 0759  Last data filed at 2019 0500  Gross per 24 hour   Intake 1300 ml   Output 750 ml   Net 550 ml       Physical Exam:     Physical Exam   Constitutional: He is oriented to person, place, and time  He appears well-developed and well-nourished  Pleasant elderly  male   HENT:   Head: Normocephalic and atraumatic  Eyes: Conjunctivae and EOM are normal  Right eye exhibits no discharge  Left eye exhibits no discharge  Neck: Normal range of motion  No tracheal deviation present  Cardiovascular: Normal rate, regular rhythm and normal heart sounds  Exam reveals no gallop and no friction rub  No murmur heard    Pulmonary/Chest: Effort normal  No respiratory distress  He has wheezes  He has no rales  Decreased breath sounds   Abdominal: Soft  Bowel sounds are normal  He exhibits no distension and no mass  There is no tenderness  There is no guarding  Musculoskeletal: Normal range of motion  He exhibits no edema, tenderness or deformity  Neurological: He is alert and oriented to person, place, and time  Skin: Skin is warm and dry  No rash noted  No erythema  No pallor  Psychiatric: He has a normal mood and affect  His behavior is normal  Judgment and thought content normal    Nursing note and vitals reviewed  Additional Data:     Labs:    Results from last 7 days   Lab Units 07/17/19  0440   WBC Thousand/uL 6 00   HEMOGLOBIN g/dL 13 6*   HEMATOCRIT % 39 0*   PLATELETS Thousands/uL 164   NEUTROS PCT % 89*   LYMPHS PCT % 10*   MONOS PCT % 1*   EOS PCT % 0     Results from last 7 days   Lab Units 07/17/19  0440   POTASSIUM mmol/L 4 0   CHLORIDE mmol/L 102   CO2 mmol/L 26   BUN mg/dL 14   CREATININE mg/dL 0 78   CALCIUM mg/dL 8 4*   ALK PHOS U/L 65   ALT U/L 14   AST U/L 11*         Results from last 7 days   Lab Units 07/17/19  0655 07/16/19  1939 07/16/19  1729   POC GLUCOSE mg/dl 244* 342* 260*           * I Have Reviewed All Lab Data Listed Above  * Additional Pertinent Lab Tests Reviewed:  [address] Admission  Reviewed    Imaging:  Imaging Reports Reviewed Today Include:  Chest x-ray with small focus of atelectasis or infiltrate within the lingula    Recent Cultures (last 7 days):    blood cultures pending no sputum culture obtained as of yet        Last 24 Hours Medication List:     Current Facility-Administered Medications:  acetaminophen 650 mg Oral Q6H PRN Mercedes Cristobal MD   albuterol 2 puff Inhalation Q4H PRN Mercedes Cristobal MD   [START ON 7/18/2019] aspirin 81 mg Oral Every Other Day Mercedes Crisotbal MD   atorvastatin 80 mg Oral Daily Mercedes Cristobal MD   azithromycin 500 mg Oral Q24H Domi Jaining SPEEDY Booker   docusate sodium 100 mg Oral BID Sarahi Mace MD   enoxaparin 40 mg Subcutaneous Daily Sarahi Mace MD   gabapentin 200 mg Oral HS Bernadette Ramsey PA-C   insulin glargine 10 Units Subcutaneous QAM Bernadette Ramsey PA-C   insulin lispro 1-6 Units Subcutaneous TID [facility] Sarahi Mace MD   insulin lispro 8 Units Subcutaneous TID With Meals Bernadette Ramsey PA-C   levalbuterol 1 25 mg Nebulization TID Sarahi Mace MD   And       sodium chloride 3 mL Nebulization TID Sarahi Mace MD   methylPREDNISolone sodium succinate 40 mg Intravenous [facility] Sarahi Mace MD   metoprolol tartrate 25 mg Oral BID Sarahi Mace MD   ondansetron 4 mg Intravenous Q6H PRN Sarahi Mace MD   primidone 50 mg Oral Q8H PRN Sarahi Mace MD        Today, Patient Was Seen By: Bernadette Ramsey PA-C    ** Please Note: Dictation voice to text software may have been used in the creation of this document   **

## 2019-07-17 NOTE — ASSESSMENT & PLAN NOTE
· Secondary to a left lung pneumonia w/ recent hospitalization  · Follow-up on blood cultures as ordered by the emergency department  · procalcitonin level - negative  · Change antibiotics PO Zithromax   · Patient received 125 mg of IV Solu-Medrol in the emergency department, will continue with 40 mg IV t i d   · Continue breathing treatments via the respiratory protocol

## 2019-07-17 NOTE — ASSESSMENT & PLAN NOTE
·  Pulse ox was 86% on room air in the ER, improved with oxygen 2 L nasal cannula  ·  secondary to COPD exacerbation with recent pneumonia and hospitalization  ·  supportive care and Respiratory protocol

## 2019-07-18 ENCOUNTER — EPISODE CHANGES (OUTPATIENT)
Dept: CASE MANAGEMENT | Facility: HOSPITAL | Age: 74
End: 2019-07-18

## 2019-07-18 VITALS
OXYGEN SATURATION: 96 % | DIASTOLIC BLOOD PRESSURE: 70 MMHG | RESPIRATION RATE: 18 BRPM | WEIGHT: 195.4 LBS | HEIGHT: 71 IN | HEART RATE: 78 BPM | SYSTOLIC BLOOD PRESSURE: 146 MMHG | TEMPERATURE: 97 F | BODY MASS INDEX: 27.35 KG/M2

## 2019-07-18 LAB
GLUCOSE SERPL-MCNC: 217 MG/DL (ref 65–140)
GLUCOSE SERPL-MCNC: 314 MG/DL (ref 65–140)

## 2019-07-18 PROCEDURE — 82948 REAGENT STRIP/BLOOD GLUCOSE: CPT

## 2019-07-18 PROCEDURE — 99239 HOSP IP/OBS DSCHRG MGMT >30: CPT | Performed by: PHYSICIAN ASSISTANT

## 2019-07-18 PROCEDURE — 94640 AIRWAY INHALATION TREATMENT: CPT

## 2019-07-18 PROCEDURE — 94760 N-INVAS EAR/PLS OXIMETRY 1: CPT

## 2019-07-18 PROCEDURE — 94664 DEMO&/EVAL PT USE INHALER: CPT

## 2019-07-18 PROCEDURE — 94761 N-INVAS EAR/PLS OXIMETRY MLT: CPT

## 2019-07-18 RX ORDER — METHYLPREDNISOLONE 4 MG/1
TABLET ORAL
Refills: 0 | Status: ON HOLD
Start: 2019-07-19 | End: 2019-10-23 | Stop reason: CLARIF

## 2019-07-18 RX ORDER — PRIMIDONE 50 MG/1
50 TABLET ORAL EVERY 12 HOURS SCHEDULED
Refills: 0 | Status: ON HOLD
Start: 2019-07-18 | End: 2019-10-23 | Stop reason: SDUPTHER

## 2019-07-18 RX ORDER — METHYLPREDNISOLONE SODIUM SUCCINATE 40 MG/ML
20 INJECTION, POWDER, LYOPHILIZED, FOR SOLUTION INTRAMUSCULAR; INTRAVENOUS EVERY 8 HOURS SCHEDULED
Status: DISCONTINUED | OUTPATIENT
Start: 2019-07-18 | End: 2019-07-18 | Stop reason: HOSPADM

## 2019-07-18 RX ADMIN — INSULIN LISPRO 5 UNITS: 100 INJECTION, SOLUTION INTRAVENOUS; SUBCUTANEOUS at 11:49

## 2019-07-18 RX ADMIN — INSULIN LISPRO 8 UNITS: 100 INJECTION, SOLUTION INTRAVENOUS; SUBCUTANEOUS at 08:17

## 2019-07-18 RX ADMIN — LEVALBUTEROL 1.25 MG: 1.25 SOLUTION, CONCENTRATE RESPIRATORY (INHALATION) at 07:00

## 2019-07-18 RX ADMIN — INSULIN LISPRO 8 UNITS: 100 INJECTION, SOLUTION INTRAVENOUS; SUBCUTANEOUS at 12:43

## 2019-07-18 RX ADMIN — ISODIUM CHLORIDE 3 ML: 0.03 SOLUTION RESPIRATORY (INHALATION) at 07:00

## 2019-07-18 RX ADMIN — METHYLPREDNISOLONE SODIUM SUCCINATE 40 MG: 40 INJECTION, POWDER, FOR SOLUTION INTRAMUSCULAR; INTRAVENOUS at 06:22

## 2019-07-18 RX ADMIN — METOPROLOL TARTRATE 25 MG: 25 TABLET ORAL at 08:48

## 2019-07-18 RX ADMIN — INSULIN GLARGINE 10 UNITS: 100 INJECTION, SOLUTION SUBCUTANEOUS at 08:54

## 2019-07-18 RX ADMIN — ASPIRIN 81 MG: 81 TABLET, COATED ORAL at 08:48

## 2019-07-18 RX ADMIN — AZITHROMYCIN 500 MG: 250 TABLET, FILM COATED ORAL at 08:49

## 2019-07-18 RX ADMIN — INSULIN LISPRO 2 UNITS: 100 INJECTION, SOLUTION INTRAVENOUS; SUBCUTANEOUS at 07:48

## 2019-07-18 RX ADMIN — ATORVASTATIN CALCIUM 80 MG: 80 TABLET, FILM COATED ORAL at 08:47

## 2019-07-18 RX ADMIN — ENOXAPARIN SODIUM 40 MG: 40 INJECTION SUBCUTANEOUS at 08:47

## 2019-07-18 RX ADMIN — DOCUSATE SODIUM 100 MG: 100 CAPSULE, LIQUID FILLED ORAL at 08:48

## 2019-07-18 NOTE — RESPIRATORY THERAPY NOTE
Home Oxygen Qualifying Test       Patient name: Destini Song        : 1945   Date of Test:  2019  Diagnosis: Acute respiratory Insufficiency     Home Oxygen Test:  Medicare Guidelines require item(s) 1-5 on all ambulatory patients or 1 and 2 on non-ambulatory patients  1   Baseline SPO2 on Room Air at rest - 97 %  2   SPO2 during exercise on Room Air - 90 %  During exercise monitor SpO2  If SPO2 increases >=89% with ambulation do not add supplemental oxygen  If <= 88% on room air add O2 via NC and titrate patient  Patient must be ambulated with O2 and titrated to > 88% with exertion  3   SPO2 on Oxygen at rest - N/A     4   SPO2 during exercise on Oxygen - N/A     5   Exercise performed:          walking, duration 10 (min), distance 320 (feet)          []  Supplemental Home Oxygen is indicated  [x]  Client does not qualify for home oxygen  Respiratory Additional Notes-   Pt  ambulated a good steady pace around the entire MedSurg unit  Pt  denied SOB, increased WOB or Respiratory distress during ambulation       Lisandro Zelaya, RT

## 2019-07-18 NOTE — DISCHARGE INSTRUCTIONS
Chronic Hypertension, Ambulatory Care   Liss Zaman S, Tony BL, et al: 2014 evidence-based guideline for the management of high blood pressure in adults: report from the panel members appointed to the [facility] ([address])  [place]EN[place] 2014; 311(5):507-520  Norm DE: Hypertension, Essential  In: Dayne FJ, ed  The 5-Minute Clinical Consult 2012, 20th ed  [facility],[address] [place] [address], [address], [place], 2012  Dominga AV , Wendi GL , Merrill HR , et al: The Seventh Report of the [employer] on Prevention, Detection, Evaluation, and Treatment of High Blood Pressure: the JNC 7 report  G[place]EN[place] 2003; 132(96):6178-8222  Fanny BUSTAMANTE : Antihypertensives  Medsurg Nurs 2008; 17(5):337-341  Madhuri RH : Resistant hypertension  Heart 2012; 98(3):254-261  [address] : Best Evidence on management of asymptomatic hypertension in ED patients  J Emerg Nurs 2011; 37(2):174-178  Ashley Cortez EJ: Hypertension  In: Handbook of Medical-Surgical Nursing, 4th ed  [facility],[address] [place] [address], [address], [place], 2006  [facility], Camille G, et al: Call to action on use and reimbursement for home blood pressure monitoring: a joint scientific statement from the [employer], American Society of Hypertension, and Preventive Cardiovascular Nurses Association  J Cardiovasc Nurs 2008; 23(4):299-323  Delphine TYLER : Managing hypertension in women  [facility] 2008; 20(3):305-310  © 2014 [address] is for End User's use only and may not be sold, redistributed or otherwise used for commercial purposes  All illustrations and images included in CareNotes® are the copyrighted property of [employer] , [employer]  or Basilio Cheng  The above information is an  only  It is not intended as medical advice for individual conditions or treatments   Talk to your doctor, nurse or pharmacist before following any medical regimen to see if it is safe and effective for you  COPD (Chronic Obstructive Pulmonary Disease)   WHAT YOU NEED TO KNOW:   What is chronic obstructive pulmonary disease (COPD)? COPD is a lung disease that makes it hard for you to breathe  It is usually a result of lung damage caused by years of irritation and inflammation in your lungs  This limits air flow in your lungs  Smoking, pollution, genetics, or a history of lung infections can increase your risk for COPD  What are the signs and symptoms of COPD? · Shortness of breath     · A dry cough     · Coughing fits that bring up mucus from your lungs     · Wheezing and chest tightness  How is COPD diagnosed? Your healthcare provider will ask about your symptoms and examine you  He or she will ask if a family member has COPD or breathing problems  He or she will ask if you are a current or former smoker  Tell your provider if you have other medical conditions, such as heart disease or asthma  Tell him or her how long you have had symptoms, what makes them worse, and how they affect your life  You may need the following:  · Lung function tests  measure the airflow in your lungs and show how well you can breathe  · Blood tests  check for infection and measure oxygen levels in your blood  · A chest x-ray  is done to check for other lung problems  · CT scan pictures  may be taken of your lungs  You may be given contrast liquid to help your lungs show up better in the pictures  Tell the healthcare provider if you have ever had an allergic reaction to contrast liquid  How is COPD treated? · Medicines  may be used to open your airways, decrease swelling and inflammation in your lungs, or treat an infection  You may need 2 or more medicines  A short-acting medicine relieves symptoms quickly  Long-acting medicines will control or prevent symptoms  Ask your healthcare provider for more information about the medicines you are given and how to use them safely  · Pulmonary rehabilitation  is a program to help you manage your symptoms and improve your quality of life  It may include nutritional counseling and exercise to strengthen your lungs  · Oxygen  may help you breathe easier and feel more alert if you have severe COPD  · Surgery  is sometimes done if all other treatments have failed  A lung reduction is surgery to remove part of your damaged lung  A lung transplant is the replacement of your lung with a donor lung  Ask your healthcare provider for more information about surgery for COPD  What are the risks of COPD? COPD raises your risk for diabetes, high blood pressure, and heart disease  Without treatment, COPD can become life-threatening  What can I do to help make breathing easier? · Use pursed-lip breathing any time you feel short of breath  Take a deep breath in through your nose  Slowly breathe out through your mouth with your lips pursed for twice as long as you inhaled  You can also practice this breathing pattern while you bend, lift, climb stairs, or exercise  It slows down your breathing and helps move more air in and out of your lungs  · Do not smoke, and avoid others who smoke  Nicotine and other substances can cause lung irritation or damage and make it harder for you to breathe  Do not use e-cigarettes or smokeless tobacco  They still contain nicotine  Ask your healthcare provider for information if you currently smoke and need help to quit  For support and more information:  ¨ [employer]  gov  Phone: 4- 884 - 899-2585  Web Address: V-Key      · Be aware of and avoid anything that makes your symptoms worse  Stay out of high altitudes and places with high humidity  Stay inside, or cover your mouth and nose with a scarf when you are outside during cold weather  Stay inside on days when air pollution or pollen counts are high  Do not use aerosol sprays such as deodorant, bug spray, and hair spray    How can I manage COPD and help prevent exacerbations? COPD is a serious condition that gets worse over time  A COPD exacerbation means your symptoms suddenly get worse  It is important to prevent exacerbations  An exacerbation can cause more lung damage  COPD cannot be cured, but you can take action to feel better and prevent COPD exacerbations:  · Protect yourself from germs  Germs can get into your lungs and cause an infection  An infection in your lungs can create more mucus and make it harder to breathe  An infection can also create swelling in your airways and prevent air from getting in  You can decrease your risk for infection by doing the following:     [facility] your hands often with soap and water  Carry germ-killing gel with you  You can use the gel to clean your hands when soap and water are not available  ¨ Do not touch your eyes, nose, or mouth unless you have washed your hands first      ¨ Always cover your mouth when you cough  Cough into a tissue or your shirtsleeve so you do not spread germs from your hands  ¨ Try to avoid people who have a cold or the flu  If you are sick, stay away from others as much as possible  · Drink more liquids  This will help to keep your air passages moist and help you cough up mucus  Ask how much liquid to drink each day and which liquids are best for you  · Exercise daily  Exercise for at least 20 minutes each day to help increase your energy and decrease shortness of breath  Talk to your healthcare provider about the best exercise plan for you  · Ask about vaccines  Your healthcare provider may recommend that you get regular flu and pneumonia vaccines  Pneumonia can become life-threatening for a person who has COPD  Ask about other vaccines you may need  Ask your healthcare provider about the flu and pneumonia vaccines  All adults should get the flu (influenza) vaccine every year as soon as it becomes available   The pneumonia vaccine is given to adults aged 72 or older to prevent pneumococcal disease, such as pneumonia  Adults aged 23 to 59 years who are at high risk for pneumococcal disease also should get the pneumococcal vaccine  It may need to be repeated 1 or 5 years later  Call 911 if:   · You feel lightheaded, short of breath, and have chest pain  When should I seek immediate care? · You are confused, dizzy, or feel faint  · Your arm or leg feels warm, tender, and painful  It may look swollen and red  · You cough up blood  When should I contact my healthcare provider? · You have more shortness of breath than usual      · You need more medicine than usual to control your symptoms  · You are coughing or wheezing more than usual      · You are coughing up more mucus, or it is a different color or has a different odor  · You gain more than 3 pounds in a week  · You have a fever, a runny or stuffy nose, and a sore throat, or other cold or flu symptoms  · Your skin, lips, or nails start to turn blue  · You have swelling in your legs or ankles  · You are very tired or weak for more than a day  · You notice changes in your mood, or changes in your ability to think or concentrate  · You have questions or concerns about your condition or care  CARE AGREEMENT:   You have the right to help plan your care  Learn about your health condition and how it may be treated  Discuss treatment options with your caregivers to decide what care you want to receive  You always have the right to refuse treatment  The above information is an  only  It is not intended as medical advice for individual conditions or treatments  Talk to your doctor, nurse or pharmacist before following any medical regimen to see if it is safe and effective for you  © 2017 [employer] Information is for End User's use only and may not be sold, redistributed or otherwise used for commercial purposes   All illustrations and images included in CareNotes® are the copyrighted property of A D A M [employer]  or Basilio Cheng  Diabetes in the Older Adult   WHAT YOU NEED TO KNOW:   What do I need to know if I am an older adult with diabetes? Older adults with diabetes are at risk for heart disease, stroke, kidney disease, blindness, and nerve damage  You may also be at risk for any of the following:  · Poor nutrition or low blood sugar levels    · Confusion or problems with memory, attention, or learning new things    · Trouble controlling urination or frequent urinary tract infections    · Trouble with coordination or balance    · Falls and injuries    · Pain    · Depression    · Open sores on your legs or feet  What are the ABCs of diabetes? The ABCs stand for certain things you can do to manage or prevent problems caused by diabetes:  · A  stands for A1c test   This test shows the average amount of sugar in your blood over the past 2 to 3 months  High levels of sugar in your blood can cause damage to your heart, blood vessels, kidneys, feet, and eyes  Most older adults with diabetes should have an A1c level less than 7 5  Ask your healthcare provider if this A1c goal is right for you  Your provider can help you make changes if your A1c is too high  · B  stands for blood pressure   High blood pressure can increase your risk for a heart attack, stroke, or kidney disease  Most older adults with diabetes should have a systolic blood pressure (first number) of 140  Your diastolic blood pressure (second number) should be below 90  Ask your healthcare provider if these blood pressure goals are right for you  · C  stands for cholesterol   High levels of cholesterol can block your arteries and cause a heart attack or stroke  Ask your healthcare provider what your cholesterol levels should be  · S  stands for stop smoking    Nicotine and other chemicals in cigarettes and cigars can cause lung damage and make it more difficult to manage your diabetes  What can I do to manage the ABCs and prevent problems caused by diabetes? · Check your blood sugar levels as directed  Your healthcare provider will tell you when and how often to check during the day  Your healthcare provider will also tell you what your blood sugar levels should be before and after a meal  You may need to check for ketones in your urine or blood if your level is higher than directed  Write down your results and show them to your healthcare provider  Your provider may use the results to make changes to your medicine, food, or exercise schedules  Ask your healthcare provider for more information about how to treat a high or low blood sugar level  · Follow your meal plan as directed  A dietitian will help you make a meal plan to keep your blood sugar level steady and make sure you get enough nutrition  Do not skip meals  Your blood sugar level may drop too low if you have taken diabetes medicine and do not eat  Ask your healthcare provider about programs in your community that can deliver the meals to your home  · Try to be active for 30 to 60 minutes most days of the week  Exercise can help keep your blood sugar level steady, decrease your risk of heart disease, and help you lose weight  It can also help improve your balance and decrease your risk for falls  Work with your healthcare provider to create an exercise plan  Ask a family member or friend to exercise with you  Start slow and exercise for 5 to 10 minutes at a time  Examples of activities include walking or swimming  Include muscle strengthening activities 2 to 3 days each week  Include balance training 2 to 3 times each week  Activities that help increase balance include yoga and julia chi      · Maintain a healthy weight  Ask your healthcare provider how much you should weigh  A healthy weight can help you control your diabetes and prevent heart disease   Ask your provider to help you create a weight loss plan if you are overweight  Together you can set manageable weight loss goals  · Do not smoke  Ask your healthcare provider for information if you currently smoke and need help to quit  Do not use e-cigarettes or smokeless tobacco in place of cigarettes or to help you quit  They still contain nicotine  · Manage stress  Stress may increase your blood sugar level  Deep breathing, muscle relaxation, and music may help you relax  Ask your healthcare provider for more information about these practices  What else can I do to manage my diabetes? · Check your feet every day for sores  Look at your whole foot, including the bottom, and between and under your toes  Check for wounds, corns, and calluses  Use a mirror to see the bottom of your feet  The skin on your feet may be shiny, tight, dry, or darker than normal  Your feet may also be cold and pale  Feel your feet by running your hands along the tops, bottoms, sides, and between your toes  Redness, swelling, and warmth are signs of blood flow problems that can lead to a foot ulcer  Do not try to remove corns or calluses yourself  · Wear medical alert identification  Wear medical alert jewelry or carry a card that says you have diabetes  Ask your healthcare provider where to get these items  · Ask about vaccines  You have a higher risk for serious illness if you get the flu, pneumonia, or hepatitis  Ask your healthcare provider if you should get a flu, pneumonia, shingles, or hepatitis B vaccine, and when to get the vaccine  · Keep all appointments  You may need to return to have your A1c checked every 3 months  You will need to return at least once each year to have your feet checked  You will need an eye exam once a year to check for retinopathy  You will also need urine tests every year to check for kidney problems  You may need tests to monitor for heart disease   Write down your questions so you remember to ask them during your visits  · Get help from family and friends  You may need help checking your blood sugar level, giving insulin injections, or preparing your meals  Ask your family and friends to help you with these tasks  Talk to your healthcare provider if you do not have someone at home to help you  A healthcare provider can come to your home to help you with these tasks  Call 911 for any of the following:   · You have any of the following signs of a stroke:      ¨ Numbness or drooping on one side of your face     ¨ Weakness in an arm or leg    ¨ Confusion or difficulty speaking    ¨ Dizziness, a severe headache, or vision loss    · You have any of the following signs of a heart attack:      ¨ Squeezing, pressure, or pain in your chest that lasts longer than 5 minutes or returns    ¨ Discomfort or pain in your back, neck, jaw, stomach, or arm     ¨ Trouble breathing    ¨ Nausea or vomiting    ¨ Lightheadedness or a sudden cold sweat, especially with chest pain or trouble breathing  When should I seek immediate care? · You have severe abdominal pain, or the pain spreads to your back  You may also be vomiting  · You have trouble staying awake or focusing  · You are shaking or sweating  · You have blurred or double vision  · Your breath has a fruity, sweet smell  · Your breathing is deep and labored, or rapid and shallow  · Your heartbeat is fast and weak  · You fall and get hurt  When should I contact my healthcare provider? · You are vomiting or have diarrhea  · You have an upset stomach and cannot eat the foods on your meal plan  · You feel weak or more tired than usual      · You feel dizzy, have headaches, or are easily irritated  · Your skin is red, warm, dry, or swollen  · You have a wound that does not heal      · You have numbness in your arms or legs  · You have trouble coping with your illness, or you feel anxious or depressed       · You have problems with your memory  · You have changes in your vision  · You have questions or concerns about your condition or care  CARE AGREEMENT:   You have the right to help plan your care  Learn about your health condition and how it may be treated  Discuss treatment options with your caregivers to decide what care you want to receive  You always have the right to refuse treatment  The above information is an  only  It is not intended as medical advice for individual conditions or treatments  Talk to your doctor, nurse or pharmacist before following any medical regimen to see if it is safe and effective for you  © 2017 [facility] Information is for End User's use only and may not be sold, redistributed or otherwise used for commercial purposes  All illustrations and images included in CareNotes® are the copyrighted property of [employer]  or Basilio Cheng

## 2019-07-18 NOTE — PLAN OF CARE
Problem: Potential for Falls  Goal: Patient will remain free of falls  Description  INTERVENTIONS:  - Assess patient frequently for physical needs  -  Identify cognitive and physical deficits and behaviors that affect risk of falls    -  [place] fall precautions as indicated by assessment   - Educate patient/family on patient safety including physical limitations  - Instruct patient to call for assistance with activity based on assessment  - Modify environment to reduce risk of injury  - Consider OT/PT consult to assist with strengthening/mobility  Outcome: Progressing     Problem: INFECTION - ADULT  Goal: Absence or prevention of progression during hospitalization  Description  INTERVENTIONS:  - Assess and monitor for signs and symptoms of infection  - Monitor lab/diagnostic results  - Monitor all insertion sites, i e  indwelling lines, tubes, and drains  - Monitor endotracheal (as able) and nasal secretions for changes in amount and color  - [place] appropriate cooling/warming therapies per order  - Administer medications as ordered  - Instruct and encourage patient and family to use good hand hygiene technique  - Identify and instruct in appropriate isolation precautions for identified infection/condition  Outcome: Progressing     Problem: SAFETY ADULT  Goal: Maintain or return to baseline ADL function  Description  INTERVENTIONS:  -  Assess patient's ability to carry out ADLs; assess patient's baseline for ADL function and identify physical deficits which impact ability to perform ADLs (bathing, care of mouth/teeth, toileting, grooming, dressing, etc )  - Assess/evaluate cause of self-care deficits   - Assess range of motion  - Assess patient's mobility; develop plan if impaired  - Assess patient's need for assistive devices and provide as appropriate  - Encourage maximum independence but intervene and supervise when necessary  ¯ Involve family in performance of ADLs  ¯ Assess for home care needs following discharge   ¯ Request OT consult to assist with ADL evaluation and planning for discharge  ¯ Provide patient education as appropriate  Outcome: Progressing

## 2019-07-18 NOTE — ASSESSMENT & PLAN NOTE
Lab Results   Component Value Date    HGBA1C 8 1 (H) 07/05/2019       Recent Labs     07/17/19  1622 07/17/19  2034 07/18/19  0648 07/18/19  1126   POCGLU 162* 368* 217* 314*       Blood Sugar Average: Last 72 hrs:  (P) 282 5   Resume home meds at discharge

## 2019-07-18 NOTE — DISCHARGE SUMMARY
Discharge- Monisha Avery 1945, 69 y o  male MRN: [id number]    Unit/Bed#: -02 Encounter: [id number]    Primary Care Provider: No primary care provider on file  Date and time admitted to hospital: 7/16/2019  2:34 PM        * Acute respiratory insufficiency  Assessment & Plan  · Pulse ox was 86% on room air in the ER, improved with oxygen 2 L nasal cannula  · Resolved-Patient currently on Room Air  · secondary to COPD exacerbation with recent pneumonia and hospitalization early July  · Patient has no needs for oxygen at discharge per home desat study result    COPD with acute exacerbation ([facility] [address] )  Assessment & Plan  · Secondary to a left lung pneumonia infection w/ recent hospitalization  · Blood cultures no growth to date  · procalcitonin level - negative  · Completed antibiotic  · Patient has steroids at home from the [place] methylprednisolone pack:  Take as directed  · Patient reports he scheduled follow-up with Dr Verona Hodgkins as an outpatient  · Sputum culture mixed respiratory may  · Patient was ambulated by Respiratory therapy he has no needs for oxygen at discharge    Tremor  Assessment & Plan  ·  Continue Mysoline     CAD (coronary artery disease), native coronary artery  Assessment & Plan  ·  Status post CABG x4  ·  no complaints of chest pain  · Continue aspirin, metoprolol, and atorvastatin    Essential hypertension  Assessment & Plan  · Continue metoprolol for blood pressure control    Type 2 diabetes mellitus without complication, without long-term current use of insulin [facility])  Assessment & Plan  Lab Results   Component Value Date    HGBA1C 8 1 (H) 07/05/2019       Recent Labs     07/17/19  1622 07/17/19  2034 07/18/19  0648 07/18/19  1126   POCGLU 162* 368* 217* 314*       Blood Sugar Average: Last 72 hrs:  (P) 282 5   Resume home meds at discharge      Discharging Physician / Practitioner: Mike Jay PA-C  PCP: No primary care provider on file    Admission Date:   Admission Orders (From admission, onward)    Ordered        07/17/19 1101  Inpatient Admission  Once         07/16/19 1639  Place in Observation (expected length of stay for this patient is less than two midnights)  Once             Discharge Date: 07/18/19    Resolved Problems  Date Reviewed: 7/18/2019    None          Consultations During Hospital Stay:  · None    Procedures Performed:   · Small focus of atelectasis or infiltrate within the lingula    Significant Findings / Test Results:   · None    Incidental Findings:   · None     Test Results Pending at Discharge (will require follow up): · None     Outpatient Tests Requested:  · None    Complications:     None    Reason for Admission:  Shortness of breath for 2 days    Hospital Course:     Cami Bassett is a 69 y o  male patient who originally presented to the hospital on 7/16/2019 due to shortness of breath for 2 days  Patient has past medical history recent hospitalization for pneumonia, diabetes mellitus type 2 not on insulin, hypertension, coronary artery disease with history of CABG and stent, no home oxygen presented to the ER secondary to coughing shortness of breath fatigue and wheezing  He had completed his course of antibiotics for pneumonia  Patient reports that he called his [address] [place] [address] provider who sent over and antibiotic azithromycin and steroids  The patient did not receive these he presents to the ER secondary to not feeling well  In the ER is found to be 86% on room air is placed on nasal cannula  He was placed on IV steroids  Respiratory protocol  His saturations improved  The patient's wheezing improved he has completed course of antibiotics will be transitioned to his steroids from the [address] [place] [address] at discharge  He does have follow-up scheduled with Dr Rui Ellis as an outpatient  [facility]   Patient was ambulated by Respiratory therapy has no oxygen needs at discharge      Please see above list of diagnoses and related plan for additional information  Condition at Discharge: stable     Discharge Day Visit / Exam:     Subjective:  Patient seen ambulating in his room  He has no complaints of chest pain  He reports his breathing is better  He has some mild sputum that he is bringing up  Vitals: Blood Pressure: 146/70 (07/18/19 0847)  Pulse: 78 (07/18/19 0847)  Temperature: (!) 97 °F (36 1 °C) (07/18/19 0646)  Temp Source: Temporal (07/18/19 0646)  Respirations: 18 (07/18/19 0646)  Height: 5' 11" (180 3 cm)(Stated) (07/16/19 1727)  Weight - Scale: 88 6 kg (195 lb 6 4 oz) (07/18/19 0600)  SpO2: 96 % (07/18/19 0700)  Exam:   Physical Exam   Constitutional: He is oriented to person, place, and time  He appears well-developed and well-nourished  HENT:   Head: Normocephalic and atraumatic  Eyes: Conjunctivae and EOM are normal  Right eye exhibits no discharge  Left eye exhibits no discharge  Neck: Normal range of motion  No tracheal deviation present  Cardiovascular: Normal rate, regular rhythm and normal heart sounds  Exam reveals no gallop and no friction rub  No murmur heard  Pulmonary/Chest: Effort normal and breath sounds normal  No respiratory distress  He has no wheezes  He has no rales  Decreased breath sounds   Abdominal: Soft  Bowel sounds are normal  He exhibits no distension and no mass  There is no tenderness  There is no guarding  Musculoskeletal: Normal range of motion  He exhibits no edema, tenderness or deformity  Neurological: He is alert and oriented to person, place, and time  Skin: Skin is warm and dry  No rash noted  No erythema  No pallor  Psychiatric: He has a normal mood and affect  His behavior is normal  Judgment and thought content normal    Nursing note and vitals reviewed  Discussion with Family: wife on phone in room    Discharge instructions/Information to patient and family:   See after visit summary for information provided to patient and family        Provisions for Follow-Up Care:  See after visit summary for information related to follow-up care and any pertinent home health orders  Disposition:     Home    Planned Readmission:    None     Discharge Statement:  I spent 35minutes discharging the patient  This time was spent on the day of discharge  I had direct contact with the patient on the day of discharge  Greater than 50% of the total time was spent examining patient, answering all patient questions, arranging and discussing plan of care with patient as well as directly providing post-discharge instructions  Additional time then spent on discharge activities  Discharge Medications:  See after visit summary for reconciled discharge medications provided to patient and family        ** Please Note: This note has been constructed using a voice recognition system **

## 2019-07-18 NOTE — ASSESSMENT & PLAN NOTE
· Pulse ox was 86% on room air in the ER, improved with oxygen 2 L nasal cannula  · Resolved-Patient currently on Room Air  · secondary to COPD exacerbation with recent pneumonia and hospitalization early July  · Patient has no needs for oxygen at discharge per home desat study result

## 2019-07-18 NOTE — ASSESSMENT & PLAN NOTE
· Secondary to a left lung pneumonia infection w/ recent hospitalization  · Blood cultures no growth to date  · procalcitonin level - negative  · Completed antibiotic  · Patient has steroids at home from the [place] methylprednisolone pack:  Take as directed  · Patient reports he scheduled follow-up with Dr Rosa Maria Rodriguez as an outpatient  · Sputum culture mixed respiratory may  · Patient was ambulated by Respiratory therapy he has no needs for oxygen at discharge

## 2019-07-18 NOTE — NURSING NOTE
Patient discharged to home  Patient VSS  IV removed with catheter tip intact, DSD and pressure applied  Patient verbalized understanding of discharge instructions  All belongings returned to patient upon discharge

## 2019-07-21 LAB
BACTERIA BLD CULT: NORMAL
BACTERIA BLD CULT: NORMAL

## 2019-07-23 ENCOUNTER — PATIENT OUTREACH (OUTPATIENT)
Dept: CASE MANAGEMENT | Facility: OTHER | Age: 74
End: 2019-07-23

## 2019-07-24 DIAGNOSIS — I25.5 ISCHEMIC CARDIOMYOPATHY: Primary | ICD-10-CM

## 2019-07-25 ENCOUNTER — PATIENT OUTREACH (OUTPATIENT)
Dept: CASE MANAGEMENT | Facility: OTHER | Age: 74
End: 2019-07-25

## 2019-08-01 ENCOUNTER — PATIENT OUTREACH (OUTPATIENT)
Dept: CASE MANAGEMENT | Facility: OTHER | Age: 74
End: 2019-08-01

## 2019-08-01 NOTE — PROGRESS NOTES
Patient denies SOB  Occasional productive cough with a trace amount of clear mucus  He denies wheeze, chest pain, fever, n/v  He completed his medrol pack as ordered  He saw his PCP at the [place] last week  He states she "didn't like my sugar"  Patient started on Januvia  He does fasting accuchecks and is now keeping a log for his doctor  This morning was 155  He states he eats whatever he wants and is not interested in making any changes  He is not interested in participating in outpatient care management

## 2019-08-20 ENCOUNTER — OFFICE VISIT (OUTPATIENT)
Dept: CARDIOLOGY CLINIC | Facility: CLINIC | Age: 74
End: 2019-08-20
Payer: MEDICARE

## 2019-08-20 ENCOUNTER — HOSPITAL ENCOUNTER (OUTPATIENT)
Dept: NON INVASIVE DIAGNOSTICS | Facility: CLINIC | Age: 74
Discharge: HOME | End: 2019-08-20
Payer: MEDICARE

## 2019-08-20 VITALS
HEART RATE: 76 BPM | BODY MASS INDEX: 28 KG/M2 | DIASTOLIC BLOOD PRESSURE: 78 MMHG | HEIGHT: 71 IN | WEIGHT: 200 LBS | SYSTOLIC BLOOD PRESSURE: 126 MMHG

## 2019-08-20 DIAGNOSIS — I25.5 ISCHEMIC CARDIOMYOPATHY: ICD-10-CM

## 2019-08-20 DIAGNOSIS — I25.10 CORONARY ARTERY DISEASE INVOLVING NATIVE CORONARY ARTERY OF NATIVE HEART WITHOUT ANGINA PECTORIS: Chronic | ICD-10-CM

## 2019-08-20 DIAGNOSIS — E11.9 TYPE 2 DIABETES MELLITUS WITHOUT COMPLICATION, WITHOUT LONG-TERM CURRENT USE OF INSULIN (HCC): Primary | Chronic | ICD-10-CM

## 2019-08-20 DIAGNOSIS — J44.9 CHRONIC OBSTRUCTIVE PULMONARY DISEASE, UNSPECIFIED COPD TYPE (HCC): ICD-10-CM

## 2019-08-20 PROCEDURE — 1124F ACP DISCUSS-NO DSCNMKR DOCD: CPT | Performed by: INTERNAL MEDICINE

## 2019-08-20 PROCEDURE — 93306 TTE W/DOPPLER COMPLETE: CPT | Performed by: INTERNAL MEDICINE

## 2019-08-20 PROCEDURE — 99214 OFFICE O/P EST MOD 30 MIN: CPT | Performed by: INTERNAL MEDICINE

## 2019-08-20 PROCEDURE — C8929 TTE W OR WO FOL WCON,DOPPLER: HCPCS

## 2019-08-20 RX ORDER — LISINOPRIL 5 MG/1
5 TABLET ORAL DAILY
Qty: 90 TABLET | Refills: 3 | Status: SHIPPED | OUTPATIENT
Start: 2019-08-20 | End: 2019-12-29 | Stop reason: HOSPADM

## 2019-08-20 RX ORDER — ALOGLIPTIN 25 MG/1
TABLET, FILM COATED ORAL DAILY
COMMUNITY
End: 2019-12-29 | Stop reason: HOSPADM

## 2019-08-20 RX ORDER — ROSUVASTATIN CALCIUM 10 MG/1
10 TABLET, COATED ORAL DAILY
Status: ON HOLD | COMMUNITY
End: 2020-09-02 | Stop reason: CLARIF

## 2019-08-20 RX ADMIN — PERFLUTREN 0.8 ML/MIN: 6.52 INJECTION, SUSPENSION INTRAVENOUS at 14:52

## 2019-08-20 NOTE — PROGRESS NOTES
[facility]     Percy Shine / 69 y o  male / : 1945 / MRN: [id number]  Date of Visit: 19    ASSESSMENT/PLAN  1  CAD  · S/p NSTEMI + 4V CABG 3/2017 (LIMA-LAD, VG-RI, [id number] RCA)   · On ASA, rosuvastatin and metoprolol   · No recent angina     2  Ischemic CM   · EF has been 40-45%, appears slightly worse by today's echo (30-40%)  · Prophylactic ICD discussed with patient and his wife - will hold off at this time, given other comorbidities - patient and wife are agreeable  Plan to repeat echo in 1 year  · Continue metoprolol  Will add low-dose lisinopril    3  HTN - continue metoprolol  Start lisinopril as above     4  HLD - on statin therapy    5  PVD   · Good hair growth on lower extremities   · No clear claudication symptoms     6  COPD  · Advised complete smoking cessation   · Will refer to pulmonary rehab, as exercise tolerance is very limited and interferes with daily activities     F/u in 6 months     SUBJECTIVE:  HPI: Percy Shine is a 69 y o  male who presents for follow-up regarding CAD, PVD, HTN and HLD  He has had 5 hospital admissions this year for COPD exacerbations  (BNP was low during these times)  He continued to smoke, although he quit a couple of months ago, given the frequency of re-admission  He occasionally has 1-2 cigarettes but nothing regularly  He has chest tightness (different than the chest pain prior to his MI) which is relieved by frequent nebulizer treatments (3-5x/day)  Exercise tolerance is very low due to dyspnea  He describes foot tightness - feels like he needs to stretch - while in bed, but no clear claudication  Cardiac testing:    Echo today - EF 30-40%  Global dysfunction with severe hypokinesis of the apex  Mild MR  Arterial duplex 2017 - likely occlusion of the left SFA  KEI mildly impaired     Echo 2017 - EF 40%  Mild LVH  Mild MR    Echo 3/2017 - EF 45%  Basal inferior akinesis   Mild-moderate MR    No Known Allergies      Current Outpatient Medications:     albuterol (2 5 mg/3 mL) 0 083 % nebulizer solution, Take 1 vial (2 5 mg total) by nebulization every 4 (four) hours as needed for wheezing or shortness of breath, Disp: 75 mL, Rfl: 0    albuterol (PROVENTIL HFA,VENTOLIN HFA) 90 mcg/act inhaler, Inhale 2 puffs every 6 (six) hours as needed for wheezing or shortness of breath, Disp: , Rfl:     Alogliptin Benzoate 25 MG TABS, Take by mouth daily, Disp: , Rfl:     aspirin (ECOTRIN LOW STRENGTH) 81 mg EC tablet, Take 81 mg by mouth every other day , Disp: , Rfl:     Empagliflozin 10 MG TABS, Take 10 mg by mouth every morning, Disp: , Rfl:     gabapentin (NEURONTIN) 100 mg capsule, Take 200 mg by mouth daily at bedtime, Disp: , Rfl:     lisinopril (ZESTRIL) 5 mg tablet, Take 1 tablet (5 mg total) by mouth daily, Disp: 90 tablet, Rfl: 3    metFORMIN (GLUCOPHAGE) 500 mg tablet, 1/2 a tablet in the morning and evening , Disp: , Rfl:     methylPREDNISolone 4 MG tablet therapy pack, Use as directed on package, Disp: , Rfl: 0    metoprolol tartrate (LOPRESSOR) 50 mg tablet, Take 25 mg by mouth 2 (two) times a day, Disp: , Rfl:     primidone (MYSOLINE) 50 mg tablet, Take 1 tablet (50 mg total) by mouth every 12 (twelve) hours (Patient taking differently: Take 100 mg by mouth every 12 (twelve) hours ), Disp: , Rfl: 0    rosuvastatin (CRESTOR) 10 MG tablet, Take 10 mg by mouth daily, Disp: , Rfl:     tiotropium-olodaterol (STIOLTO RESPIMAT) 2 5-2 5 MCG/ACT inhaler, Inhale 2 puffs daily, Disp: , Rfl:     saxagliptin (ONGLYZA) 5 MG tablet, Take 5 mg by mouth daily, Disp: , Rfl:   No current facility-administered medications for this visit       Past Medical History:   Diagnosis Date    BPH (benign prostatic hyperplasia)     45 days radiation treatment    COPD (chronic obstructive pulmonary disease)     Coronary artery disease     CABG x4 in 2017    Diabetes mellitus     History of Arterial Duplex of LE 12/26/2017 Likely occlusion of the left superficial femoral artery  Calcific changes bilaterally  Despite these changes, the ankle-brachial index as a measure of peripheral blood flow only mildly impaired   History of echocardiogram 2017    EF 40%, mild LVH, mild MR     Hyperlipidemia     Hypertension     NSTEMI (non-ST elevated myocardial infarction)     Prostate cancer     prostate     PVD (peripheral vascular disease)     Tremor        Family History   Problem Relation Age of Onset    Cancer Father     Heart disease Brother        Past Surgical History:   Procedure Laterality Date    CARDIAC CATHETERIZATION  2017    Significant left main plus triple-vessel CAD   CORONARY ARTERY BYPASS GRAFT  2017    4V CABG:  LIMA to LAD, VG to RI, SVG to PDA to LVBR RCA      PROSTATE BIOPSY         Social History     Socioeconomic History    Marital status: /Civil Union     Spouse name: Not on file    Number of children: Not on file    Years of education: Not on file    Highest education level: Not on file   Occupational History    Not on file   Social Needs    Financial resource strain: Not on file    Food insecurity:     Worry: Not on file     Inability: Not on file    Transportation needs:     Medical: Not on file     Non-medical: Not on file   Tobacco Use    Smoking status: Former Smoker     Last attempt to quit: 2/15/2019     Years since quittin 5    Smokeless tobacco: Never Used   Substance and Sexual Activity    Alcohol use: Not Currently     Comment: on occasion    Drug use: No    Sexual activity: Not on file   Lifestyle    Physical activity:     Days per week: Not on file     Minutes per session: Not on file    Stress: Not on file   Relationships    Social connections:     Talks on phone: Not on file     Gets together: Not on file     Attends Holiness service: Not on file     Active member of club or organization: Not on file     Attends meetings of clubs or organizations: Not on file     Relationship status: Not on file    Intimate partner violence:     Fear of current or ex partner: Not on file     Emotionally abused: Not on file     Physically abused: Not on file     Forced sexual activity: Not on file   Other Topics Concern    Not on file   Social History Narrative    Not on file       Review of Systems   Constitution: Negative  HENT: Negative  Eyes: Negative  Cardiovascular: Positive for dyspnea on exertion  Negative for chest pain, claudication, irregular heartbeat, leg swelling, near-syncope, orthopnea, palpitations, paroxysmal nocturnal dyspnea and syncope  Respiratory: Positive for shortness of breath and wheezing  Negative for cough  Endocrine: Negative  Skin: Negative  Musculoskeletal: Negative  Gastrointestinal: Negative  Genitourinary: Negative  Neurological: Negative for dizziness and light-headedness  Psychiatric/Behavioral: Negative  OBJECTIVE:  Vitals: /78   Pulse 76   Ht 5' 11" (1 803 m)   Wt 90 7 kg (200 lb)   BMI 27 89 kg/m²     Physical exam:   Physical Exam   Constitutional: He is oriented to person, place, and time  He appears well-developed and well-nourished  No distress  HENT:   Head: Normocephalic and atraumatic  Eyes: EOM are normal  No scleral icterus  Neck: Normal range of motion  Neck supple  No JVD present  Cardiovascular: Normal rate, regular rhythm, S1 normal and S2 normal    No murmur heard  Pulses:       Femoral pulses are 1+ on the right side, and 1+ on the left side  Popliteal pulses are 1+ on the right side, and 1+ on the left side  Dorsalis pedis pulses are 0 on the right side, and 0 on the left side  Midline sternotomy scar   Pulmonary/Chest: Effort normal  He has wheezes (significant diffuse expiratory)  He has no rales  Abdominal: Soft  Bowel sounds are normal    Musculoskeletal: He exhibits no edema     Neurological: He is alert and oriented to person, place, and time  Skin: Skin is warm and dry  Psychiatric: He has a normal mood and affect   His behavior is normal        Lab Results:   Lab Results   Component Value Date    HGBA1C 8 1 (H) 07/05/2019    HGBA1C 7 4 (H) 03/13/2019    HGBA1C 7 6 (H) 02/22/2019     No results found for: CHOL  No results found for: HDL  No results found for: LDLCALC  No results found for: TRIG  No results found for: CHOLHDL

## 2019-08-29 ENCOUNTER — CLINICAL SUPPORT (OUTPATIENT)
Dept: CARDIAC REHAB | Facility: HOSPITAL | Age: 74
End: 2019-08-29
Payer: MEDICARE

## 2019-08-29 VITALS — WEIGHT: 197 LBS | BODY MASS INDEX: 27.58 KG/M2 | HEIGHT: 71 IN

## 2019-08-29 DIAGNOSIS — J44.9 CHRONIC OBSTRUCTIVE PULMONARY DISEASE, UNSPECIFIED COPD TYPE (HCC): Primary | ICD-10-CM

## 2019-08-29 DIAGNOSIS — I50.22 CHRONIC SYSTOLIC HEART FAILURE (HCC): Primary | ICD-10-CM

## 2019-08-29 DIAGNOSIS — J44.9 CHRONIC OBSTRUCTIVE PULMONARY DISEASE, UNSPECIFIED COPD TYPE (HCC): ICD-10-CM

## 2019-08-29 NOTE — PROGRESS NOTES
Pulmonary Rehabilitation Plan of Care   Care Plan     Today's date: 2019   Total visits to date: 1- Intake  Patient name: Ashley King      : 1945  Age: 69 y o  MRN: [id number]  Referring Physician: Holly Guerin MD  Provider: Junito Hurt  Clinician: Nicko Caputo MS, CCRP    Dx: No diagnosis found  Date of onset: 2019    COMMENTS:  Today was Irene Ryan first visit at Pulmonary Rehab  The patient states that he is normally 100% medication compliant but forgot to take his meds this morning because he was in a rush to get here; he states he will take his medications when he gets back home  Patient states that he does have neuropathy in both feet which may limit his exercise and affect his balance  During rest, the patient's HR was 83, his BP was 110/64, and his O2 was 89-90% on Room Air  The patient states he has not had a PFT done for a few years and is unsure which [place] physician completed the test for him  The patient states that he does have an appointment to see a Pulmonologist next Friday afternoon and will find out more then  Pulmonary Rehab staff will follow up with Cardiologist and Primary Care physician to order new PFT and/or have old results faxed over  As of today, the patient's COPD type is unspecified but will be determined with a PFT  Patient states he is agreeable to coming to Pulmonary Rehab 3 times per week for 12 weeks or 36 sessions are completed  The patient attempted a 6MWT; he completed a total of 2 minutes and 30 seconds  The test was terminated due to his O2 dropping from 88% to 85% from the second to third minute of the test  Melodie Price was able to walk 280 feet during that time which is a 1 40 MET level  The patient stated that the test was a "4" on the 1-10 RPE Scale  The patient also stated that his SOB was a "6" on the 1-10 SOB Scale  Melodie Price reached a peak HR of 101 and a peak BP of 144/60 during the 6MWT   During recovery, the patient's HR was 88, his BP was 118/60, and his O2 was 95%  The patient was put on a telemetry monitor due to prior history of Heart Disease  He did show some ectopy which can be seen in the attached "Exercise Session Details "    Medication compliance: Yes   Comments: Patient states he did forget to take his emdications this  Morning but will take it as soon as he gets home; he states he normally does not forget  Fall Risk: Moderate   Comments: Neuropathy in both feet limit the patient's balance  EXERCISE/ACTIVITY    Cardiopulmonary Goals:   Min: 30-50   HR:    RPE: 5-7 moderate to somewhat hard exercise   O2 sat: >90%    Modalities: UBE, NuStep and Recumbent bike  Strength trainin-3 days / week, 12-15 repitations  and 1-2 sets per modality    Modalities: Leg press, Chest press, Lateral raise, Arm curl and Seated Row    Exercise Progression: Bike/Nustep for 10-12 min at level 1/5, Arm Ergometer for  8-10 min at level 4 or 60/40, Weights 5-10 lbs , 10-15 repititions, 1-2 sets      RPE 4-6  Home activity: None; patient was instructed to hold off on formal exercise plan until consulting with his pulmonologist next Friday  Goals: 10% improvement in functional capacity, home exercise days opposite MN, improved DASI score and >150 mins of exercise/wk  Education: Benefit of exercise, home exercise, RPE scale and O2 saturation monitoring   Plan:home exercise target 30 mins, 2 days opposite MN and Improved 6MW results  Readiness to change: Preparation    NUTRITION    Weight control:    Starting weight: 197     Diabetes: Patient states he has diabetes but does not check his BG     Goals:BMI <25, improved A1c and Improved Rate Your Plate score  Education:More frequent meals, smaller portions  hydration  diabetes management and exercise  weight loss  healthy choices while dining out  portion control  Plan: Education Class: Healthy Eating and Education Class: Label Reading  Readiness to change: Preparation    PSYCHOSOCIAL    Emotional: signs/sxs of depression, benefits of positive support system and coping mechanisms  Social support: Patient denies having depression; he states that his grandson, future granddaughter in law as well as his neighbors are very supportive  Goals: Reduce perceived stress to 1-3/10, improved [facility] QOL < 27, PHQ-9 - reduced severity by one level, Feelings in [facility] Score < 3, Physical Fitness in [facility] Score < 3, Daily Activity in [facility] Score < 3, Social Activities in [facility] Score < 3, Pain in [facility] Score < 3, Overall Health in [facility] Score < 3, Quality of Life in [facility] Score < 3 , Change in Health in [place] Score < 3 , Increased interest in doing things, improved sleep, Improved appetite, improved concentration, improved positive thoughts of well being and increased energy  Education: Mental Health Education  Plan: Stress and Your Lungs, Relaxation and Anxiety and Lung disease  Readiness to change: Preparation    OTHER CORE COMPONENTS     Tobacco:   Social History     Tobacco Use   Smoking Status Former Smoker    Last attempt to quit: 2/15/2019    Years since quittin 5   Smokeless Tobacco Never Used     Oxygen: 89-90 on Room Air; Patient has not yet seen a pulmonologist and rhiannon require supplemental O2    Blood pressure:    Restin/64   Exercise:    Goals: consistent BP < 130/80, reduced dietary sodium <2300mg and consistent exercise >150 mins/wk  Education: Pulmonary Disease, physiology, Conservation of energy, oxygen, breathing techniques and Diet,nutrition  Plan: Causes of lung disease, Prevention and treatment, Smoking cessation, Exercise benefits and Proper nutrition  Readiness to change: Preparation    Exercise Prescription   Exercise Modality  Initial Workload MET Level    Nu-Step (NU) Level: 5 Steps/Minute: 60 2 5 METs   Room Walking Intervals of 80 feet laps    Arm Ergometer (AE) RPM: 40-50 Level 4 or 60/40 2 7 METs   Recumbent Bike (RB) Level: 1 RPM: 50-60 3 2 METs   Resistance (RES) 5 lbs Weights 10 lbs Pulleys        Comments: Patient completed a 6 Minute Walk Test for a total of 280 feet at ( 2 5 min @ 1 4 METS)  Will start exercise at 2 0-2 5 METS and increase intensity as tolerated by patient  Will add strength training at 4-6 weeks       OUTCOMES  Name: Alla Barragan : 1945      Risk:     HIGH        Pre Post % change  Goal   Date: 2019      Physical           Sub Max ETT (mets) NA  #VALUE! 10% increase   6MWT (feet) 280  -100 0% 10% increase   [facility] Dyspnea Score 77  -100 0% 5 pt decrease   Supplemental O2 use (L) 0      [facility] AI (est  peak O2) 18 95  -100 0%    COPD assessment Test (CAT) 27  -100 0% 2 pt decrease   Peak exercise CR/ LA (mets) 1 4  -100 0% 40% increase   Emotional           PHQ 9  (> 10 refer to MD) 12  -100 0% 4 pt decrease   [facility] lower score = improvement     Total  31  -100 0% < 27   Feelings 3  -100 0% < 3   Physical fitness 5  -100 0% < 3   Social Support 2  0 0% < 3   Daily activities 4  -100 0% < 3   Social Activities 3  -100 0% < 3   Pain 4  -100 0% < 3   Overall Health 4  -100 0% < 3   Quality of Life 3  -100 0% < 3   Change in health 3  -100 0% < 3   Dietary           Rate your plate 43  -105 1% > 58   Measurements           Weight 197  -100 0% 2 5-5%    BMI 27 9   -100 0% 19-25   Waist Circ  39  -100 0% < 40 M / < 35 F    BP left arm     (systolic) 048  -522 0% < 462                              (diastolic) 64  -130 3% < 90   Smoking #/day (if applicable) 0  #DIV/0! 0   Lipids/ Glucose (Date) 2019   -1     Total cholesterol NA  #VALUE!    Triglycerides NA  #VALUE! < 150   HDL NA  #VALUE! 40-60   LDL NA  #VALUE! < 100   A1C % 8 1  -100 0% 4 0 - 5 6%   Fasting   -100 0%           Physician signature: _______________________ _________________     Date:             CARDIAC/PULMONARY REHAB ASSESSMENT    Today's date: 2019   Patient name: El Segundolandy Mackenzie      : 1945       MRN: [id number]  PCP: No primary care provider on file  Pulmonologist: Patient states he does not know who it is; he is seeing the pulmonologist for the first next week  Dx: No diagnosis found  Date of onset: 7/16/2019  Cultural needs: None  Height:   70 in  Weight:    197  Medical History:   Past Medical History:   Diagnosis Date    BPH (benign prostatic hyperplasia)     45 days radiation treatment    COPD (chronic obstructive pulmonary disease)     Coronary artery disease     CABG x4 in 2017    Diabetes mellitus     History of Arterial Duplex of LE 12/26/2017    Likely occlusion of the left superficial femoral artery  Calcific changes bilaterally  Despite these changes, the ankle-brachial index as a measure of peripheral blood flow only mildly impaired   History of echocardiogram 06/12/2017    EF 40%, mild LVH, mild MR     Hyperlipidemia     Hypertension     NSTEMI (non-ST elevated myocardial infarction)     Prostate cancer     prostate     PVD (peripheral vascular disease)     Tremor        Physical Limitations: Neuropathy in both feet  Risk Factors   Smoking: Yes; patient states he still smokes and "sneaks a cigarette once in a while "  HTN: Yes  DM: Type 2; patient states he does not check it  Obesity: No, overweight; BMI 27 9   Inactivity: Yes; patient does not do formal exercise at home  Family History:   Family History   Problem Relation Age of Onset    Cancer Father     Heart disease Brother      Allergies: No Known Allergies  Other: None       Current Medications:   Current Outpatient Medications   Medication Sig Dispense Refill    albuterol (2 5 mg/3 mL) 0 083 % nebulizer solution Take 1 vial (2 5 mg total) by nebulization every 4 (four) hours as needed for wheezing or shortness of breath 75 mL 0    albuterol (PROVENTIL HFA,VENTOLIN HFA) 90 mcg/act inhaler Inhale 2 puffs every 6 (six) hours as needed for wheezing or shortness of breath      Alogliptin Benzoate 25 MG TABS Take by mouth daily      aspirin (ECOTRIN LOW STRENGTH) 81 mg EC tablet Take 81 mg by mouth every other day       Empagliflozin 10 MG TABS Take 10 mg by mouth every morning      gabapentin (NEURONTIN) 100 mg capsule Take 200 mg by mouth daily at bedtime      lisinopril (ZESTRIL) 5 mg tablet Take 1 tablet (5 mg total) by mouth daily 90 tablet 3    metFORMIN (GLUCOPHAGE) 500 mg tablet 1/2 a tablet in the morning and evening       methylPREDNISolone 4 MG tablet therapy pack Use as directed on package  0    metoprolol tartrate (LOPRESSOR) 50 mg tablet Take 25 mg by mouth 2 (two) times a day      primidone (MYSOLINE) 50 mg tablet Take 1 tablet (50 mg total) by mouth every 12 (twelve) hours (Patient taking differently: Take 100 mg by mouth every 12 (twelve) hours )  0    rosuvastatin (CRESTOR) 10 MG tablet Take 10 mg by mouth daily      saxagliptin (ONGLYZA) 5 MG tablet Take 5 mg by mouth daily      tiotropium-olodaterol (STIOLTO RESPIMAT) 2 5-2 5 MCG/ACT inhaler Inhale 2 puffs daily       No current facility-administered medications for this visit  Functional Status Prior to Diagnosis for Treatment   Occupation: Retired in 2009  Recreation: sleeping; watching car races, spending time with friends  ADLs: Patient states he gets SOB with anything  [place]: Patient states he is able to do everything himself but does have problems with SOB with going up/down steps as well as getting up and getting dressed  Exercise: Patient denies formal exercise at home  Other: None  Short Term Program Goals: Decrease shortness of breath, improve stamina, increase strength   Long Term Goals: Be able to participate in favorite activity, sport, hobby (golf, hunt, sew, play games, cook) and enjoy family, friends without breathing difficulties    Comments: Patient O2 is between 89-90 on [employer]   Patient is to see a pulmonologist next Friday to determine the severity of his COPD as well as to see if he needs supplemental O2  Ability to reach goals/rehabilitation potential: medium    Projected return to function: Partial    Emotional/Social    Marital status: ; Wife is retired    Life Stressors: Stress level 3/10; Patient states that when he and his wife fight, it stressing him out  Goals: Patient states he would like to walk with less dyspnea as well as completely get the bronchitis out of his system  Comments: Patient states he is agreeable to coming to Pulmonary Rehab 3 times per week  Patient PFT results are not on record; patient states he does not remember who did the PFT  Pulmonary Rehab staff will follow up with Cardiologist and Primary Care physician to order new PFT and/or have old results faxed over

## 2019-08-29 NOTE — PROGRESS NOTES
Pulmonary rehab requested that the patient have PFTs prior to starting pulmonary rehab  Order placed

## 2019-09-02 ENCOUNTER — HOSPITAL ENCOUNTER (EMERGENCY)
Facility: HOSPITAL | Age: 74
Discharge: HOME | End: 2019-09-02
Attending: EMERGENCY MEDICINE
Payer: MEDICARE

## 2019-09-02 ENCOUNTER — APPOINTMENT (EMERGENCY)
Dept: RADIOLOGY | Facility: HOSPITAL | Age: 74
End: 2019-09-02
Payer: MEDICARE

## 2019-09-02 VITALS
HEIGHT: 70 IN | BODY MASS INDEX: 28.35 KG/M2 | TEMPERATURE: 97.8 F | OXYGEN SATURATION: 95 % | DIASTOLIC BLOOD PRESSURE: 69 MMHG | WEIGHT: 198 LBS | SYSTOLIC BLOOD PRESSURE: 137 MMHG | HEART RATE: 79 BPM | RESPIRATION RATE: 17 BRPM

## 2019-09-02 DIAGNOSIS — J44.1 COPD WITH ACUTE EXACERBATION (HCC): Primary | ICD-10-CM

## 2019-09-02 LAB
ANION GAP SERPL CALCULATED.3IONS-SCNC: 7 MMOL/L (ref 4–13)
BASOPHILS # BLD AUTO: 0.1 THOUSANDS/ΜL (ref 0–0.1)
BASOPHILS NFR BLD AUTO: 1 % (ref 0–2)
BNP SERPL-MCNC: 140 PG/ML (ref 1–100)
BUN SERPL-MCNC: 15 MG/DL (ref 7–25)
CALCIUM SERPL-MCNC: 9 MG/DL (ref 8.6–10.5)
CHLORIDE SERPL-SCNC: 102 MMOL/L (ref 98–107)
CO2 SERPL-SCNC: 27 MMOL/L (ref 21–31)
CREAT SERPL-MCNC: 0.97 MG/DL (ref 0.7–1.3)
EOSINOPHIL # BLD AUTO: 0.7 THOUSAND/ΜL (ref 0–0.61)
EOSINOPHIL NFR BLD AUTO: 7 % (ref 0–5)
ERYTHROCYTE [DISTWIDTH] IN BLOOD BY AUTOMATED COUNT: 13.9 % (ref 11.5–14.5)
GFR SERPL CREATININE-BSD FRML MDRD: 77 ML/MIN/1.73SQ M
GLUCOSE SERPL-MCNC: 265 MG/DL (ref 65–99)
HCT VFR BLD AUTO: 41.8 % (ref 42–47)
HGB BLD-MCNC: 14.2 G/DL (ref 14–18)
LYMPHOCYTES # BLD AUTO: 1.2 THOUSANDS/ΜL (ref 0.6–4.47)
LYMPHOCYTES NFR BLD AUTO: 12 % (ref 21–51)
MAGNESIUM SERPL-MCNC: 2.2 MG/DL (ref 1.9–2.7)
MCH RBC QN AUTO: 31.7 PG (ref 26–34)
MCHC RBC AUTO-ENTMCNC: 34 G/DL (ref 31–37)
MCV RBC AUTO: 93 FL (ref 81–99)
MONOCYTES # BLD AUTO: 0.5 THOUSAND/ΜL (ref 0.17–1.22)
MONOCYTES NFR BLD AUTO: 6 % (ref 2–12)
NEUTROPHILS # BLD AUTO: 7.3 THOUSANDS/ΜL (ref 1.4–6.5)
NEUTS SEG NFR BLD AUTO: 74 % (ref 42–75)
PLATELET # BLD AUTO: 165 THOUSANDS/UL (ref 149–390)
PMV BLD AUTO: 9.4 FL (ref 8.6–11.7)
POTASSIUM SERPL-SCNC: 4.1 MMOL/L (ref 3.5–5.5)
RBC # BLD AUTO: 4.49 MILLION/UL (ref 4.3–5.9)
SODIUM SERPL-SCNC: 136 MMOL/L (ref 134–143)
TROPONIN I SERPL-MCNC: <0.03 NG/ML
WBC # BLD AUTO: 9.8 THOUSAND/UL (ref 4.8–10.8)

## 2019-09-02 PROCEDURE — 83880 ASSAY OF NATRIURETIC PEPTIDE: CPT | Performed by: EMERGENCY MEDICINE

## 2019-09-02 PROCEDURE — 71045 X-RAY EXAM CHEST 1 VIEW: CPT

## 2019-09-02 PROCEDURE — 83735 ASSAY OF MAGNESIUM: CPT | Performed by: EMERGENCY MEDICINE

## 2019-09-02 PROCEDURE — 94760 N-INVAS EAR/PLS OXIMETRY 1: CPT

## 2019-09-02 PROCEDURE — 93005 ELECTROCARDIOGRAM TRACING: CPT

## 2019-09-02 PROCEDURE — 96374 THER/PROPH/DIAG INJ IV PUSH: CPT

## 2019-09-02 PROCEDURE — 94640 AIRWAY INHALATION TREATMENT: CPT

## 2019-09-02 PROCEDURE — 84484 ASSAY OF TROPONIN QUANT: CPT | Performed by: EMERGENCY MEDICINE

## 2019-09-02 PROCEDURE — 80048 BASIC METABOLIC PNL TOTAL CA: CPT | Performed by: EMERGENCY MEDICINE

## 2019-09-02 PROCEDURE — 85025 COMPLETE CBC W/AUTO DIFF WBC: CPT | Performed by: EMERGENCY MEDICINE

## 2019-09-02 PROCEDURE — 99285 EMERGENCY DEPT VISIT HI MDM: CPT

## 2019-09-02 PROCEDURE — 36415 COLL VENOUS BLD VENIPUNCTURE: CPT | Performed by: EMERGENCY MEDICINE

## 2019-09-02 RX ORDER — PREDNISONE 20 MG/1
60 TABLET ORAL DAILY
Qty: 12 TABLET | Refills: 0 | Status: SHIPPED | OUTPATIENT
Start: 2019-09-03 | End: 2019-09-07

## 2019-09-02 RX ORDER — IPRATROPIUM BROMIDE AND ALBUTEROL SULFATE 2.5; .5 MG/3ML; MG/3ML
3 SOLUTION RESPIRATORY (INHALATION) ONCE
Status: COMPLETED | OUTPATIENT
Start: 2019-09-02 | End: 2019-09-02

## 2019-09-02 RX ORDER — ALBUTEROL SULFATE 2.5 MG/3ML
5 SOLUTION RESPIRATORY (INHALATION) ONCE
Status: COMPLETED | OUTPATIENT
Start: 2019-09-02 | End: 2019-09-02

## 2019-09-02 RX ORDER — METHYLPREDNISOLONE SODIUM SUCCINATE 125 MG/2ML
125 INJECTION, POWDER, LYOPHILIZED, FOR SOLUTION INTRAMUSCULAR; INTRAVENOUS ONCE
Status: COMPLETED | OUTPATIENT
Start: 2019-09-02 | End: 2019-09-02

## 2019-09-02 RX ADMIN — ALBUTEROL SULFATE 5 MG: 2.5 SOLUTION RESPIRATORY (INHALATION) at 11:51

## 2019-09-02 RX ADMIN — IPRATROPIUM BROMIDE AND ALBUTEROL SULFATE 3 ML: 2.5; .5 SOLUTION RESPIRATORY (INHALATION) at 10:52

## 2019-09-02 RX ADMIN — IPRATROPIUM BROMIDE AND ALBUTEROL SULFATE 3 ML: 2.5; .5 SOLUTION RESPIRATORY (INHALATION) at 10:41

## 2019-09-02 RX ADMIN — METHYLPREDNISOLONE SODIUM SUCCINATE 125 MG: 125 INJECTION, POWDER, FOR SOLUTION INTRAMUSCULAR; INTRAVENOUS at 10:35

## 2019-09-02 NOTE — ED PROVIDER NOTES
History  Chief Complaint   Patient presents with    Shortness of Breath     wheezing, short of breath, history COPD     SOB, started about 1 week ago, at rest, worse w/exertion, no fever or chest pain, w/scant cough productive of white sputum  Has hx both COPD and CHF w/ECHO 2019: LVEF 30-40%  Does not wear home oxygen  Took nebulized treat at home this morning, w/o improvement  Prior to Admission Medications   Prescriptions Last Dose Informant Patient Reported? Taking?    Alogliptin Benzoate 25 MG TABS   Yes No   Sig: Take by mouth daily   Empagliflozin 10 MG TABS   Yes No   Sig: Take 10 mg by mouth every morning   albuterol (2 5 mg/3 mL) 0 083 % nebulizer solution   No No   Sig: Take 1 vial (2 5 mg total) by nebulization every 4 (four) hours as needed for wheezing or shortness of breath   albuterol (PROVENTIL HFA,VENTOLIN HFA) 90 mcg/act inhaler   Yes No   Sig: Inhale 2 puffs every 6 (six) hours as needed for wheezing or shortness of breath   aspirin (ECOTRIN LOW STRENGTH) 81 mg EC tablet   Yes No   Sig: Take 81 mg by mouth every other day    gabapentin (NEURONTIN) 100 mg capsule   Yes No   Sig: Take 200 mg by mouth daily at bedtime   lisinopril (ZESTRIL) 5 mg tablet   No No   Sig: Take 1 tablet (5 mg total) by mouth daily   metFORMIN (GLUCOPHAGE) 500 mg tablet   Yes No   Si/2 a tablet in the morning and evening    methylPREDNISolone 4 MG tablet therapy pack   No No   Sig: Use as directed on package   metoprolol tartrate (LOPRESSOR) 50 mg tablet   Yes No   Sig: Take 25 mg by mouth 2 (two) times a day   primidone (MYSOLINE) 50 mg tablet   No No   Sig: Take 1 tablet (50 mg total) by mouth every 12 (twelve) hours   Patient taking differently: Take 100 mg by mouth every 12 (twelve) hours    rosuvastatin (CRESTOR) 10 MG tablet   Yes No   Sig: Take 10 mg by mouth daily   saxagliptin (ONGLYZA) 5 MG tablet   Yes No   Sig: Take 5 mg by mouth daily   tiotropium-olodaterol (STIOLTO RESPIMAT) 2 5-2 5 MCG/ACT inhaler   Yes No   Sig: Inhale 2 puffs daily      Facility-Administered Medications: None       Past Medical History:   Diagnosis Date    BPH (benign prostatic hyperplasia)     45 days radiation treatment    COPD (chronic obstructive pulmonary disease)     Coronary artery disease     CABG x4 in 2017    Diabetes mellitus     History of Arterial Duplex of LE 2017    Likely occlusion of the left superficial femoral artery  Calcific changes bilaterally  Despite these changes, the ankle-brachial index as a measure of peripheral blood flow only mildly impaired   History of echocardiogram 2017    EF 40%, mild LVH, mild MR     Hyperlipidemia     Hypertension     NSTEMI (non-ST elevated myocardial infarction)     Prostate cancer     prostate     PVD (peripheral vascular disease)     Tremor        Past Surgical History:   Procedure Laterality Date    CARDIAC CATHETERIZATION  2017    Significant left main plus triple-vessel CAD   CORONARY ARTERY BYPASS GRAFT  2017    4V CABG:  LIMA to LAD, VG to RI, SVG to PDA to LVBR RCA   PROSTATE BIOPSY         Family History   Problem Relation Age of Onset    Cancer Father     Heart disease Brother      I have reviewed and agree with the history as documented  Social History     Tobacco Use    Smoking status: Former Smoker     Last attempt to quit: 2/15/2019     Years since quittin 5    Smokeless tobacco: Never Used   Substance Use Topics    Alcohol use: Not Currently     Comment: on occasion    Drug use: No        Review of Systems   Constitutional: Negative for chills and fever  HENT: Negative for rhinorrhea and sore throat  Eyes: Negative for visual disturbance  Respiratory: Positive for cough and shortness of breath  Cardiovascular: Negative for chest pain and palpitations  Gastrointestinal: Negative for abdominal pain, diarrhea, nausea and vomiting  Genitourinary: Negative for dysuria     Musculoskeletal: Negative for back pain and myalgias  Skin: Negative for rash  Neurological: Negative for dizziness and headaches  Psychiatric/Behavioral: Negative for confusion  All other systems reviewed and are negative  Physical Exam  Physical Exam   Constitutional: He is oriented to person, place, and time  He appears well-developed and well-nourished  In mild respiratory distress   HENT:   Nose: Nose normal    Mouth/Throat: Oropharynx is clear and moist  No oropharyngeal exudate  Eyes: Pupils are equal, round, and reactive to light  Conjunctivae and EOM are normal  No scleral icterus  Neck: Normal range of motion  Neck supple  No JVD present  No tracheal deviation present  Cardiovascular: Normal rate, regular rhythm and normal heart sounds  No murmur heard  Pulmonary/Chest: He is in respiratory distress  He has decreased breath sounds  He has wheezes  He has no rales  Abdominal: Soft  Bowel sounds are normal  There is no tenderness  There is no guarding  Musculoskeletal: Normal range of motion  He exhibits no edema or tenderness  Right lower leg: He exhibits no tenderness  Left lower leg: He exhibits no tenderness  Neurological: He is alert and oriented to person, place, and time  No cranial nerve deficit or sensory deficit  He exhibits normal muscle tone  5/5 motor, nl sens   Skin: Skin is warm and dry  Psychiatric: He has a normal mood and affect  His behavior is normal    Nursing note and vitals reviewed        Vital Signs  ED Triage Vitals [09/02/19 1013]   Temperature Pulse Respirations Blood Pressure SpO2   97 8 °F (36 6 °C) 90 22 (!) 195/95 91 %      Temp Source Heart Rate Source Patient Position - Orthostatic VS BP Location FiO2 (%)   Temporal Monitor Sitting Left arm --      Pain Score       No Pain           Vitals:    09/02/19 1145 09/02/19 1200 09/02/19 1215 09/02/19 1230   BP:  151/76  137/69   Pulse: 75 78 84 79   Patient Position - Orthostatic VS:             Visual Acuity      ED Medications  Medications   ipratropium-albuterol (DUO-NEB) 0 5-2 5 mg/3 mL inhalation solution 3 mL (3 mL Nebulization Given 9/2/19 1041)   ipratropium-albuterol (DUO-NEB) 0 5-2 5 mg/3 mL inhalation solution 3 mL (3 mL Nebulization Given 9/2/19 1052)   methylPREDNISolone sodium succinate (Solu-MEDROL) injection 125 mg (125 mg Intravenous Given 9/2/19 1035)   albuterol inhalation solution 5 mg (5 mg Nebulization Given 9/2/19 1151)       Diagnostic Studies  Results Reviewed     Procedure Component Value Units Date/Time    B-Type Natriuretic Peptide [facility] ONLY) [[id number]]  (Abnormal) Collected:  09/02/19 1053    Lab Status:  Final result Specimen:  Blood from Arm, Left Updated:  09/02/19 1125      pg/mL     Basic metabolic panel [[id number]]  (Abnormal) Collected:  09/02/19 1053    Lab Status:  Final result Specimen:  Blood from Arm, Left Updated:  09/02/19 1125     Sodium 136 mmol/L      Potassium 4 1 mmol/L      Chloride 102 mmol/L      CO2 27 mmol/L      ANION GAP 7 mmol/L      BUN 15 mg/dL      Creatinine 0 97 mg/dL      Glucose 265 mg/dL      Calcium 9 0 mg/dL      eGFR 77 ml/min/1 73sq m     Narrative:       Leonard guidelines for Chronic Kidney Disease (CKD):     Stage 1 with normal or high GFR (GFR > 90 mL/min/1 73 square meters)    Stage 2 Mild CKD (GFR = 60-89 mL/min/1 73 square meters)    Stage 3A Moderate CKD (GFR = 45-59 mL/min/1 73 square meters)    Stage 3B Moderate CKD (GFR = 30-44 mL/min/1 73 square meters)    Stage 4 Severe CKD (GFR = 15-29 mL/min/1 73 square meters)    Stage 5 End Stage CKD (GFR <15 mL/min/1 73 square meters)  Note: GFR calculation is accurate only with a steady state creatinine    Magnesium [[id number]]  (Normal) Collected:  09/02/19 1053    Lab Status:  Final result Specimen:  Blood from Arm, Left Updated:  09/02/19 1125     Magnesium 2 2 mg/dL     Troponin I [[id number]]  (Normal) Collected:  09/02/19 1053    Lab Status:  Final result Specimen:  Blood from Arm, Left Updated:  09/02/19 1125     Troponin I <0 03 ng/mL     CBC and differential [[id number]]  (Abnormal) Collected:  09/02/19 1053    Lab Status:  Final result Specimen:  Blood from Arm, Left Updated:  09/02/19 1107     WBC 9 80 Thousand/uL      RBC 4 49 Million/uL      Hemoglobin 14 2 g/dL      Hematocrit 41 8 %      MCV 93 fL      MCH 31 7 pg      MCHC 34 0 g/dL      RDW 13 9 %      MPV 9 4 fL      Platelets 296 Thousands/uL      Neutrophils Relative 74 %      Lymphocytes Relative 12 %      Monocytes Relative 6 %      Eosinophils Relative 7 %      Basophils Relative 1 %      Neutrophils Absolute 7 30 Thousands/µL      Lymphocytes Absolute 1 20 Thousands/µL      Monocytes Absolute 0 50 Thousand/µL      Eosinophils Absolute 0 70 Thousand/µL      Basophils Absolute 0 10 Thousands/µL                  XR chest 1 view portable   ED Interpretation by Nadja Nogueira MD (09/02 1058)   No acute process      Final Result by Irene Razo MD (09/02 1110)      No acute cardiopulmonary disease  Workstation performed: [id number]                    Procedures  ECG 12 Lead Documentation Only  Date/Time: 9/2/2019 10:39 AM  Performed by: Nadja Nogueira MD  Authorized by: Nadja Nogueira MD     Indications / Diagnosis:  SOB  ECG reviewed by me, the ED Provider: yes    Patient location:  ED  Previous ECG:     Previous ECG:  Compared to current    Comparison ECG info:  7/16/2019  Comments:      NSR w/isolated PVCs, w/trace lateral ST depressions, no ST elevations; c/w EKG dated 7/16/2019 no significant changes              ED Course  ED Course as of Sep 02 1830   Mon Sep 02, 2019   1018 Initial Impression/MDM: SOB w/mild hypoxemia, likely COPD exacerbation, no fever or chest pain; will screen with CXR and labs, and treat initially w/Duoneb x 2 and solumedrol IV          1140 Improved w/mild residual wheezing, w/u including CXR unremarkable; will give additional neb x 2 and re-evaluate; patient stating he feels ok for discharge      1244 Improved further w/trace residual wheezing; asking to leave Ed, has outpatient Pulmonology appointment already scheduled in 3 days  MDM    Disposition  Final diagnoses:   COPD with acute exacerbation ([address] )     Time reflects when diagnosis was documented in both MDM as applicable and the Disposition within this note     Time User Action Codes Description Comment    9/2/2019 12:42 PM Antonio Nunn Add [J44 1] COPD with acute exacerbation [facility])       ED Disposition     ED Disposition Condition Date/Time Comment    Discharge Stable Mon Sep 2, 2019 12:42 PM Jovanny Og discharge to home/self care  Follow-up Information     Follow up With Specialties Details Why Contact Info    Follow-up with the Pulmonologist (lung doctor) this Thursday as planned                Discharge Medication List as of 9/2/2019 12:44 PM      START taking these medications    Details   predniSONE 20 mg tablet Take 3 tablets (60 mg total) by mouth daily for 4 days, Starting Tue 9/3/2019, Until Sat 9/7/2019, Print         CONTINUE these medications which have NOT CHANGED    Details   albuterol (2 5 mg/3 mL) 0 083 % nebulizer solution Take 1 vial (2 5 mg total) by nebulization every 4 (four) hours as needed for wheezing or shortness of breath, Starting Sun 6/24/2018, Print      albuterol (PROVENTIL HFA,VENTOLIN HFA) 90 mcg/act inhaler Inhale 2 puffs every 6 (six) hours as needed for wheezing or shortness of breath, Historical Med      Alogliptin Benzoate 25 MG TABS Take by mouth daily, Historical Med      aspirin (ECOTRIN LOW STRENGTH) 81 mg EC tablet Take 81 mg by mouth every other day , Historical Med      Empagliflozin 10 MG TABS Take 10 mg by mouth every morning, Historical Med      gabapentin (NEURONTIN) 100 mg capsule Take 200 mg by mouth daily at bedtime, Historical Med      lisinopril (ZESTRIL) 5 mg tablet Take 1 tablet (5 mg total) by mouth daily, Starting Tue 8/20/2019, Normal      metFORMIN (GLUCOPHAGE) 500 mg tablet 1/2 a tablet in the morning and evening , Historical Med      methylPREDNISolone 4 MG tablet therapy pack Use as directed on package, No Print      metoprolol tartrate (LOPRESSOR) 50 mg tablet Take 25 mg by mouth 2 (two) times a day, Starting Thu 3/16/2017, Historical Med      primidone (MYSOLINE) 50 mg tablet Take 1 tablet (50 mg total) by mouth every 12 (twelve) hours, Starting Thu 7/18/2019, No Print      rosuvastatin (CRESTOR) 10 MG tablet Take 10 mg by mouth daily, Historical Med      saxagliptin (ONGLYZA) 5 MG tablet Take 5 mg by mouth daily, Historical Med      tiotropium-olodaterol (STIOLTO RESPIMAT) 2 5-2 5 MCG/ACT inhaler Inhale 2 puffs daily, Historical Med           No discharge procedures on file      ED Provider  Electronically Signed by           Rafy Ferro MD  09/02/19 4000

## 2019-09-03 LAB
ATRIAL RATE: 88 BPM
P AXIS: 76 DEGREES
PR INTERVAL: 178 MS
QRS AXIS: 64 DEGREES
QRSD INTERVAL: 100 MS
QT INTERVAL: 378 MS
QTC INTERVAL: 457 MS
T WAVE AXIS: 102 DEGREES
VENTRICULAR RATE: 88 BPM

## 2019-09-03 PROCEDURE — 93010 ELECTROCARDIOGRAM REPORT: CPT | Performed by: INTERNAL MEDICINE

## 2019-09-04 ENCOUNTER — PATIENT OUTREACH (OUTPATIENT)
Dept: CASE MANAGEMENT | Facility: OTHER | Age: 74
End: 2019-09-04

## 2019-09-04 NOTE — PROGRESS NOTES
Patient states improved SOB since ED visit, but still not back to his baseline  Occasional productive cough persists with white mucus  He denies chest tightness or pain  Reviewed S&S of COPD exacerbation and when to call his PCP or pulmonologist for red flags - increased SOB, worsening cough, increase in mucus production, thick and/or dark colored mucus  Patient states understanding & verbalizes he will call his PCP prior to going to the ED  He thanks me for my call, but still does not want to participate in care management

## 2019-09-20 ENCOUNTER — HOSPITAL ENCOUNTER (INPATIENT)
Facility: HOSPITAL | Age: 74
LOS: 1 days | Discharge: HOME | DRG: 190 | End: 2019-09-21
Attending: INTERNAL MEDICINE | Admitting: INTERNAL MEDICINE
Payer: MEDICARE

## 2019-09-20 ENCOUNTER — APPOINTMENT (EMERGENCY)
Dept: RADIOLOGY | Facility: HOSPITAL | Age: 74
DRG: 190 | End: 2019-09-20
Payer: MEDICARE

## 2019-09-20 DIAGNOSIS — J44.9 CHRONIC OBSTRUCTIVE PULMONARY DISEASE (COPD) (HCC): Primary | ICD-10-CM

## 2019-09-20 DIAGNOSIS — J44.1 COPD WITH ACUTE EXACERBATION (HCC): Chronic | ICD-10-CM

## 2019-09-20 LAB
ALBUMIN SERPL BCP-MCNC: 4.4 G/DL (ref 3.5–5.7)
ALP SERPL-CCNC: 76 U/L (ref 55–165)
ALT SERPL W P-5'-P-CCNC: 17 U/L (ref 7–52)
ANION GAP SERPL CALCULATED.3IONS-SCNC: 10 MMOL/L (ref 4–13)
ANION GAP SERPL CALCULATED.3IONS-SCNC: 8 MMOL/L (ref 4–13)
AST SERPL W P-5'-P-CCNC: 13 U/L (ref 13–39)
BASOPHILS # BLD AUTO: 0.1 THOUSANDS/ΜL (ref 0–0.1)
BASOPHILS NFR BLD AUTO: 1 % (ref 0–2)
BILIRUB SERPL-MCNC: 0.3 MG/DL (ref 0.2–1)
BNP SERPL-MCNC: 116 PG/ML (ref 1–100)
BUN SERPL-MCNC: 15 MG/DL (ref 7–25)
BUN SERPL-MCNC: 16 MG/DL (ref 7–25)
CALCIUM SERPL-MCNC: 8.9 MG/DL (ref 8.6–10.5)
CALCIUM SERPL-MCNC: 9.3 MG/DL (ref 8.6–10.5)
CHLORIDE SERPL-SCNC: 102 MMOL/L (ref 98–107)
CHLORIDE SERPL-SCNC: 103 MMOL/L (ref 98–107)
CO2 SERPL-SCNC: 26 MMOL/L (ref 21–31)
CO2 SERPL-SCNC: 26 MMOL/L (ref 21–31)
CREAT SERPL-MCNC: 0.85 MG/DL (ref 0.7–1.3)
CREAT SERPL-MCNC: 0.96 MG/DL (ref 0.7–1.3)
EOSINOPHIL # BLD AUTO: 0.08 THOUSAND/UL (ref 0–0.61)
EOSINOPHIL # BLD AUTO: 0.6 THOUSAND/ΜL (ref 0–0.61)
EOSINOPHIL NFR BLD AUTO: 8 % (ref 0–5)
EOSINOPHIL NFR BLD MANUAL: 1 % (ref 0–6)
ERYTHROCYTE [DISTWIDTH] IN BLOOD BY AUTOMATED COUNT: 13.7 % (ref 11.5–14.5)
ERYTHROCYTE [DISTWIDTH] IN BLOOD BY AUTOMATED COUNT: 13.9 % (ref 11.5–14.5)
EST. AVERAGE GLUCOSE BLD GHB EST-MCNC: 192 MG/DL
GFR SERPL CREATININE-BSD FRML MDRD: 78 ML/MIN/1.73SQ M
GFR SERPL CREATININE-BSD FRML MDRD: 86 ML/MIN/1.73SQ M
GLUCOSE SERPL-MCNC: 250 MG/DL (ref 65–140)
GLUCOSE SERPL-MCNC: 258 MG/DL (ref 65–99)
GLUCOSE SERPL-MCNC: 260 MG/DL (ref 65–140)
GLUCOSE SERPL-MCNC: 262 MG/DL (ref 65–140)
GLUCOSE SERPL-MCNC: 329 MG/DL (ref 65–99)
GLUCOSE SERPL-MCNC: 414 MG/DL (ref 65–140)
HBA1C MFR BLD: 8.3 % (ref 4.2–6.3)
HCT VFR BLD AUTO: 41.1 % (ref 42–47)
HCT VFR BLD AUTO: 44.3 % (ref 42–47)
HGB BLD-MCNC: 14.1 G/DL (ref 14–18)
HGB BLD-MCNC: 15 G/DL (ref 14–18)
LYMPHOCYTES # BLD AUTO: 0.54 THOUSAND/UL (ref 0.6–4.47)
LYMPHOCYTES # BLD AUTO: 1.7 THOUSANDS/ΜL (ref 0.6–4.47)
LYMPHOCYTES # BLD AUTO: 7 % (ref 20–51)
LYMPHOCYTES NFR BLD AUTO: 21 % (ref 21–51)
MCH RBC QN AUTO: 31.4 PG (ref 26–34)
MCH RBC QN AUTO: 31.5 PG (ref 26–34)
MCHC RBC AUTO-ENTMCNC: 33.8 G/DL (ref 31–37)
MCHC RBC AUTO-ENTMCNC: 34.3 G/DL (ref 31–37)
MCV RBC AUTO: 91 FL (ref 81–99)
MCV RBC AUTO: 93 FL (ref 81–99)
MONOCYTES # BLD AUTO: 0.31 THOUSAND/UL (ref 0–1.22)
MONOCYTES # BLD AUTO: 0.6 THOUSAND/ΜL (ref 0.17–1.22)
MONOCYTES NFR BLD AUTO: 4 % (ref 4–12)
MONOCYTES NFR BLD AUTO: 7 % (ref 2–12)
NEUTROPHILS # BLD AUTO: 4.8 THOUSANDS/ΜL (ref 1.4–6.5)
NEUTS BAND NFR BLD MANUAL: 4 % (ref 0–8)
NEUTS SEG # BLD: 6.78 THOUSAND/UL (ref 1.81–6.82)
NEUTS SEG NFR BLD AUTO: 62 % (ref 42–75)
NEUTS SEG NFR BLD AUTO: 84 % (ref 42–75)
PLATELET # BLD AUTO: 167 THOUSANDS/UL (ref 149–390)
PLATELET # BLD AUTO: 195 THOUSANDS/UL (ref 149–390)
PLATELET BLD QL SMEAR: ADEQUATE
PMV BLD AUTO: 9.3 FL (ref 8.6–11.7)
PMV BLD AUTO: 9.5 FL (ref 8.6–11.7)
POTASSIUM SERPL-SCNC: 4.1 MMOL/L (ref 3.5–5.5)
POTASSIUM SERPL-SCNC: 4.2 MMOL/L (ref 3.5–5.5)
PROCALCITONIN SERPL-MCNC: <0.05 NG/ML
PROCALCITONIN SERPL-MCNC: <0.05 NG/ML
PROT SERPL-MCNC: 6.9 G/DL (ref 6.4–8.9)
RBC # BLD AUTO: 4.5 MILLION/UL (ref 4.3–5.9)
RBC # BLD AUTO: 4.75 MILLION/UL (ref 4.3–5.9)
RBC MORPH BLD: NORMAL
SODIUM SERPL-SCNC: 137 MMOL/L (ref 134–143)
SODIUM SERPL-SCNC: 138 MMOL/L (ref 134–143)
TOTAL CELLS COUNTED SPEC: 100
TROPONIN I SERPL-MCNC: <0.03 NG/ML
WBC # BLD AUTO: 7.7 THOUSAND/UL (ref 4.8–10.8)
WBC # BLD AUTO: 7.8 THOUSAND/UL (ref 4.8–10.8)

## 2019-09-20 PROCEDURE — 85025 COMPLETE CBC W/AUTO DIFF WBC: CPT | Performed by: INTERNAL MEDICINE

## 2019-09-20 PROCEDURE — 83036 HEMOGLOBIN GLYCOSYLATED A1C: CPT | Performed by: PHYSICIAN ASSISTANT

## 2019-09-20 PROCEDURE — 36415 COLL VENOUS BLD VENIPUNCTURE: CPT | Performed by: INTERNAL MEDICINE

## 2019-09-20 PROCEDURE — 84484 ASSAY OF TROPONIN QUANT: CPT | Performed by: INTERNAL MEDICINE

## 2019-09-20 PROCEDURE — 80048 BASIC METABOLIC PNL TOTAL CA: CPT | Performed by: PHYSICIAN ASSISTANT

## 2019-09-20 PROCEDURE — 84145 PROCALCITONIN (PCT): CPT | Performed by: INTERNAL MEDICINE

## 2019-09-20 PROCEDURE — 94664 DEMO&/EVAL PT USE INHALER: CPT

## 2019-09-20 PROCEDURE — 83880 ASSAY OF NATRIURETIC PEPTIDE: CPT | Performed by: INTERNAL MEDICINE

## 2019-09-20 PROCEDURE — 85007 BL SMEAR W/DIFF WBC COUNT: CPT | Performed by: PHYSICIAN ASSISTANT

## 2019-09-20 PROCEDURE — 96365 THER/PROPH/DIAG IV INF INIT: CPT

## 2019-09-20 PROCEDURE — 94760 N-INVAS EAR/PLS OXIMETRY 1: CPT

## 2019-09-20 PROCEDURE — 82948 REAGENT STRIP/BLOOD GLUCOSE: CPT

## 2019-09-20 PROCEDURE — 71045 X-RAY EXAM CHEST 1 VIEW: CPT

## 2019-09-20 PROCEDURE — 96375 TX/PRO/DX INJ NEW DRUG ADDON: CPT

## 2019-09-20 PROCEDURE — 99223 1ST HOSP IP/OBS HIGH 75: CPT | Performed by: HOSPITALIST

## 2019-09-20 PROCEDURE — 85027 COMPLETE CBC AUTOMATED: CPT | Performed by: PHYSICIAN ASSISTANT

## 2019-09-20 PROCEDURE — 99285 EMERGENCY DEPT VISIT HI MDM: CPT

## 2019-09-20 PROCEDURE — 93005 ELECTROCARDIOGRAM TRACING: CPT

## 2019-09-20 PROCEDURE — 84145 PROCALCITONIN (PCT): CPT | Performed by: PHYSICIAN ASSISTANT

## 2019-09-20 PROCEDURE — 94640 AIRWAY INHALATION TREATMENT: CPT

## 2019-09-20 PROCEDURE — 1124F ACP DISCUSS-NO DSCNMKR DOCD: CPT | Performed by: INTERNAL MEDICINE

## 2019-09-20 PROCEDURE — 80053 COMPREHEN METABOLIC PANEL: CPT | Performed by: INTERNAL MEDICINE

## 2019-09-20 RX ORDER — ONDANSETRON 2 MG/ML
4 INJECTION INTRAMUSCULAR; INTRAVENOUS EVERY 6 HOURS PRN
Status: DISCONTINUED | OUTPATIENT
Start: 2019-09-20 | End: 2019-09-21 | Stop reason: HOSPADM

## 2019-09-20 RX ORDER — METHYLPREDNISOLONE SODIUM SUCCINATE 40 MG/ML
40 INJECTION, POWDER, LYOPHILIZED, FOR SOLUTION INTRAMUSCULAR; INTRAVENOUS EVERY 8 HOURS
Status: DISCONTINUED | OUTPATIENT
Start: 2019-09-20 | End: 2019-09-21

## 2019-09-20 RX ORDER — LISINOPRIL 5 MG/1
5 TABLET ORAL DAILY
Status: DISCONTINUED | OUTPATIENT
Start: 2019-09-20 | End: 2019-09-21 | Stop reason: HOSPADM

## 2019-09-20 RX ORDER — LORAZEPAM 2 MG/ML
1 INJECTION INTRAMUSCULAR ONCE
Status: COMPLETED | OUTPATIENT
Start: 2019-09-20 | End: 2019-09-20

## 2019-09-20 RX ORDER — GABAPENTIN 100 MG/1
200 CAPSULE ORAL
Status: DISCONTINUED | OUTPATIENT
Start: 2019-09-20 | End: 2019-09-21 | Stop reason: HOSPADM

## 2019-09-20 RX ORDER — GUAIFENESIN 600 MG
600 TABLET, EXTENDED RELEASE 12 HR ORAL 2 TIMES DAILY
Status: DISCONTINUED | OUTPATIENT
Start: 2019-09-20 | End: 2019-09-21 | Stop reason: HOSPADM

## 2019-09-20 RX ORDER — HEPARIN SODIUM 5000 [USP'U]/ML
5000 INJECTION, SOLUTION INTRAVENOUS; SUBCUTANEOUS EVERY 8 HOURS SCHEDULED
Status: DISCONTINUED | OUTPATIENT
Start: 2019-09-20 | End: 2019-09-21 | Stop reason: HOSPADM

## 2019-09-20 RX ORDER — IPRATROPIUM BROMIDE AND ALBUTEROL SULFATE 2.5; .5 MG/3ML; MG/3ML
3 SOLUTION RESPIRATORY (INHALATION)
Status: DISCONTINUED | OUTPATIENT
Start: 2019-09-20 | End: 2019-09-21 | Stop reason: HOSPADM

## 2019-09-20 RX ORDER — BUDESONIDE AND FORMOTEROL FUMARATE DIHYDRATE 160; 4.5 UG/1; UG/1
2 AEROSOL RESPIRATORY (INHALATION) 2 TIMES DAILY
Status: DISCONTINUED | OUTPATIENT
Start: 2019-09-20 | End: 2019-09-21 | Stop reason: HOSPADM

## 2019-09-20 RX ORDER — ACETAMINOPHEN 325 MG/1
650 TABLET ORAL EVERY 6 HOURS PRN
Status: DISCONTINUED | OUTPATIENT
Start: 2019-09-20 | End: 2019-09-21 | Stop reason: HOSPADM

## 2019-09-20 RX ORDER — METHYLPREDNISOLONE SODIUM SUCCINATE 125 MG/2ML
125 INJECTION, POWDER, LYOPHILIZED, FOR SOLUTION INTRAMUSCULAR; INTRAVENOUS ONCE
Status: COMPLETED | OUTPATIENT
Start: 2019-09-20 | End: 2019-09-20

## 2019-09-20 RX ORDER — ASPIRIN 81 MG/1
81 TABLET ORAL EVERY OTHER DAY
Status: DISCONTINUED | OUTPATIENT
Start: 2019-09-20 | End: 2019-09-21 | Stop reason: HOSPADM

## 2019-09-20 RX ORDER — AZITHROMYCIN 250 MG/1
500 TABLET, FILM COATED ORAL EVERY 24 HOURS
Status: DISCONTINUED | OUTPATIENT
Start: 2019-09-20 | End: 2019-09-21 | Stop reason: HOSPADM

## 2019-09-20 RX ORDER — PRAVASTATIN SODIUM 40 MG
80 TABLET ORAL
Status: DISCONTINUED | OUTPATIENT
Start: 2019-09-20 | End: 2019-09-21 | Stop reason: HOSPADM

## 2019-09-20 RX ORDER — MAGNESIUM SULFATE 1 G/100ML
1 INJECTION INTRAVENOUS ONCE
Status: COMPLETED | OUTPATIENT
Start: 2019-09-20 | End: 2019-09-20

## 2019-09-20 RX ORDER — ALBUTEROL SULFATE 2.5 MG/3ML
2.5 SOLUTION RESPIRATORY (INHALATION) EVERY 4 HOURS PRN
Status: DISCONTINUED | OUTPATIENT
Start: 2019-09-20 | End: 2019-09-21 | Stop reason: HOSPADM

## 2019-09-20 RX ORDER — PRIMIDONE 50 MG/1
100 TABLET ORAL EVERY 12 HOURS SCHEDULED
Status: DISCONTINUED | OUTPATIENT
Start: 2019-09-20 | End: 2019-09-21 | Stop reason: HOSPADM

## 2019-09-20 RX ORDER — METHYLPREDNISOLONE SODIUM SUCCINATE 125 MG/2ML
125 INJECTION, POWDER, LYOPHILIZED, FOR SOLUTION INTRAMUSCULAR; INTRAVENOUS ONCE
Status: DISCONTINUED | OUTPATIENT
Start: 2019-09-20 | End: 2019-09-20

## 2019-09-20 RX ORDER — IPRATROPIUM BROMIDE AND ALBUTEROL SULFATE 2.5; .5 MG/3ML; MG/3ML
3 SOLUTION RESPIRATORY (INHALATION) ONCE
Status: COMPLETED | OUTPATIENT
Start: 2019-09-20 | End: 2019-09-20

## 2019-09-20 RX ADMIN — HEPARIN SODIUM 5000 UNITS: 5000 INJECTION INTRAVENOUS; SUBCUTANEOUS at 14:33

## 2019-09-20 RX ADMIN — IPRATROPIUM BROMIDE AND ALBUTEROL SULFATE 3 ML: 2.5; .5 SOLUTION RESPIRATORY (INHALATION) at 01:24

## 2019-09-20 RX ADMIN — HEPARIN SODIUM 5000 UNITS: 5000 INJECTION INTRAVENOUS; SUBCUTANEOUS at 06:03

## 2019-09-20 RX ADMIN — GUAIFENESIN 600 MG: 600 TABLET, EXTENDED RELEASE ORAL at 08:08

## 2019-09-20 RX ADMIN — INSULIN HUMAN 8 UNITS: 100 INJECTION, SOLUTION PARENTERAL at 02:24

## 2019-09-20 RX ADMIN — ASPIRIN 81 MG: 81 TABLET, COATED ORAL at 08:08

## 2019-09-20 RX ADMIN — INSULIN LISPRO 3 UNITS: 100 INJECTION, SOLUTION INTRAVENOUS; SUBCUTANEOUS at 17:26

## 2019-09-20 RX ADMIN — METOPROLOL TARTRATE 25 MG: 25 TABLET, FILM COATED ORAL at 17:26

## 2019-09-20 RX ADMIN — HEPARIN SODIUM 5000 UNITS: 5000 INJECTION INTRAVENOUS; SUBCUTANEOUS at 22:01

## 2019-09-20 RX ADMIN — METOPROLOL TARTRATE 25 MG: 25 TABLET, FILM COATED ORAL at 08:08

## 2019-09-20 RX ADMIN — MAGNESIUM SULFATE HEPTAHYDRATE 1 G: 1 INJECTION, SOLUTION INTRAVENOUS at 01:51

## 2019-09-20 RX ADMIN — GUAIFENESIN 600 MG: 600 TABLET, EXTENDED RELEASE ORAL at 17:26

## 2019-09-20 RX ADMIN — GABAPENTIN 200 MG: 100 CAPSULE ORAL at 22:01

## 2019-09-20 RX ADMIN — METHYLPREDNISOLONE SODIUM SUCCINATE 125 MG: 125 INJECTION, POWDER, FOR SOLUTION INTRAMUSCULAR; INTRAVENOUS at 01:26

## 2019-09-20 RX ADMIN — PRIMIDONE 100 MG: 50 TABLET ORAL at 22:00

## 2019-09-20 RX ADMIN — IPRATROPIUM BROMIDE AND ALBUTEROL SULFATE 3 ML: 2.5; .5 SOLUTION RESPIRATORY (INHALATION) at 20:04

## 2019-09-20 RX ADMIN — IPRATROPIUM BROMIDE AND ALBUTEROL SULFATE 3 ML: 2.5; .5 SOLUTION RESPIRATORY (INHALATION) at 07:25

## 2019-09-20 RX ADMIN — BUDESONIDE AND FORMOTEROL FUMARATE DIHYDRATE 2 PUFF: 160; 4.5 AEROSOL RESPIRATORY (INHALATION) at 17:25

## 2019-09-20 RX ADMIN — INSULIN LISPRO 3 UNITS: 100 INJECTION, SOLUTION INTRAVENOUS; SUBCUTANEOUS at 10:10

## 2019-09-20 RX ADMIN — INSULIN LISPRO 3 UNITS: 100 INJECTION, SOLUTION INTRAVENOUS; SUBCUTANEOUS at 22:02

## 2019-09-20 RX ADMIN — AZITHROMYCIN 500 MG: 250 TABLET, FILM COATED ORAL at 04:04

## 2019-09-20 RX ADMIN — METHYLPREDNISOLONE SODIUM SUCCINATE 40 MG: 40 INJECTION, POWDER, FOR SOLUTION INTRAMUSCULAR; INTRAVENOUS at 20:00

## 2019-09-20 RX ADMIN — LORAZEPAM 1 MG: 2 INJECTION INTRAMUSCULAR; INTRAVENOUS at 01:48

## 2019-09-20 RX ADMIN — PRIMIDONE 100 MG: 50 TABLET ORAL at 10:08

## 2019-09-20 RX ADMIN — METHYLPREDNISOLONE SODIUM SUCCINATE 40 MG: 40 INJECTION, POWDER, FOR SOLUTION INTRAMUSCULAR; INTRAVENOUS at 11:48

## 2019-09-20 RX ADMIN — BUDESONIDE AND FORMOTEROL FUMARATE DIHYDRATE 2 PUFF: 160; 4.5 AEROSOL RESPIRATORY (INHALATION) at 10:10

## 2019-09-20 RX ADMIN — ALBUTEROL SULFATE 2.5 MG: 2.5 SOLUTION RESPIRATORY (INHALATION) at 04:08

## 2019-09-20 RX ADMIN — IPRATROPIUM BROMIDE AND ALBUTEROL SULFATE 3 ML: 2.5; .5 SOLUTION RESPIRATORY (INHALATION) at 13:39

## 2019-09-20 RX ADMIN — INSULIN LISPRO 6 UNITS: 100 INJECTION, SOLUTION INTRAVENOUS; SUBCUTANEOUS at 11:48

## 2019-09-20 RX ADMIN — LISINOPRIL 5 MG: 5 TABLET ORAL at 08:08

## 2019-09-20 RX ADMIN — PRAVASTATIN SODIUM 80 MG: 40 TABLET ORAL at 17:26

## 2019-09-20 NOTE — ASSESSMENT & PLAN NOTE
Lab Results   Component Value Date    HGBA1C 8 1 (H) 07/05/2019       No results for input(s): POCGLU in the last 72 hours  Blood Sugar Average: Last 72 hrs:     Place on [facility] step 2 diet, obtain Accu-Cheks AC and HS, hold pre-hospital oral antihyperglycemics give Humalog correction dose a c   And HS

## 2019-09-20 NOTE — SOCIAL WORK
Visit with patient in his hospital room to initiate discharge planning  Patient lives with his spouse in a 2 story home with bathrooms both floors  Has 4 steps to enter home  Does not have O2 supplied at home  Patient is independent, drives, ambulates independently without dme  Uses [facility] in [place] and denies any problem obtaining his meds  Will drive himself home on discharge  CM reviewed d/c planning process including the following: identifying help at home, patient preference for d/c planning needs, availability of treatment team to discuss questions or concerns patient and/or family may have regarding understanding medications and recognizing signs and symptoms once discharged  CM also encouraged patient to follow up with all recommended appointments after discharge  Patient advised of importance for patient and family to participate in managing patients medical well being

## 2019-09-20 NOTE — PHYSICIAN ADVISOR
Current patient class: Inpatient  The patient is currently on Hospital Day: 1 at [address]      The patient was admitted to the hospital at [phone] on 9/20/19 for the following diagnosis:  Shortness of breath [R06 02]       There is documentation in the medical record of an expected length of stay of at least 2 midnights  The patient is therefore expected to satisfy the 2 midnight benchmark and given the 2 midnight presumption is appropriate for INPATIENT ADMISSION  Given this expectation of a satisfying stay, CMS instructs us that the patient is most often appropriate for inpatient admission under part A provided medical necessity is documented in the chart  After review of the relevant documentation, labs, vital signs and test results, the patient is appropriate for INPATIENT ADMISSION  Admission to the hospital as an inpatient is a complex decision making process which requires the practitioner to consider the patients presenting complaint, history and physical examination and all relevant testing  With this in mind, in this case, the patient was deemed appropriate for INPATIENT ADMISSION  After review of the documentation and testing available at the time of the admission I concur with this clinical determination of medical necessity  Rationale is as follows: The patient is a 69 yrs old Male who presented to the ED at 9/20/2019  1:16 AM with a chief complaint of Shortness of Breath     The patient is being admitted with COPD exacerbation  The plan of care includes IV steroids, Duonebs, IV Zithromax, pulmonary consultation  This patient is appropriate for INPATIENT admission; continued hospitalization is necessary to ensure stabilization of his clinical status       The patients vitals on arrival were ED Triage Vitals [09/20/19 0114]   Temperature Pulse Respirations Blood Pressure SpO2   (!) 95 7 °F (35 4 °C) (!) 127 (!) 26 141/83 95 %      Temp Source Heart Rate Source Patient Position - Orthostatic VS BP Location FiO2 (%)   Temporal Monitor Sitting Left arm --      Pain Score       No Pain           Past Medical History:   Diagnosis Date    BPH (benign prostatic hyperplasia)     45 days radiation treatment    COPD (chronic obstructive pulmonary disease)     Coronary artery disease     CABG x4 in 2017    Diabetes mellitus     History of Arterial Duplex of LE 12/26/2017    Likely occlusion of the left superficial femoral artery  Calcific changes bilaterally  Despite these changes, the ankle-brachial index as a measure of peripheral blood flow only mildly impaired   History of echocardiogram 06/12/2017    EF 40%, mild LVH, mild MR     Hyperlipidemia     Hypertension     NSTEMI (non-ST elevated myocardial infarction)     Prostate cancer     prostate     PVD (peripheral vascular disease)     Tremor      Past Surgical History:   Procedure Laterality Date    CARDIAC CATHETERIZATION  03/08/2017    Significant left main plus triple-vessel CAD   CORONARY ARTERY BYPASS GRAFT  03/08/2017    4V CABG:  LIMA to LAD, VG to RI, SVG to PDA to LVBR RCA      PROSTATE BIOPSY             Consults have been placed to:   IP CONSULT TO PULMONOLOGY    Vitals:    09/20/19 0806 09/20/19 1339 09/20/19 1443 09/20/19 1725   BP:   155/72 149/72   BP Location:   Left arm    Pulse:   78 72   Resp:   22    Temp:   98 7 °F (37 1 °C)    TempSrc:   Temporal    SpO2: 92% 94% 91%    Weight:       Height:           Most recent labs:    Recent Labs     09/20/19  0134 09/20/19  0520   WBC 7 80 7 70   HGB 15 0 14 1   HCT 44 3 41 1*    167   K 4 2 4 1   CALCIUM 9 3 8 9   BUN 15 16   CREATININE 0 96 0 85   TROPONINI <0 03  --    AST 13  --    ALT 17  --    ALKPHOS 76  --        Scheduled Meds:  Current Facility-Administered Medications:  acetaminophen 650 mg Oral Q6H PRN Tiffany Cover, PA-C   albuterol 2 5 mg Nebulization Q4H PRN Tiffany Cover, PA-C   aspirin 81 mg Oral Every Other Day Vani Johnson Alecia Benson, SPEEDY   azithromycin 500 mg Oral Q24H Mary Failing, PA-SHADI   budesonide-formoterol 2 puff Inhalation BID Mary Failing, PA-C   gabapentin 200 mg Oral HS Mary Failing, PA-C   guaiFENesin 600 mg Oral BID Mary Failing, PA-C   heparin (porcine) 5,000 Units Subcutaneous [facility] Mary Failing, PA-C   insulin lispro 1-6 Units Subcutaneous TID [facility] Mary Failing, PA-C   insulin lispro 1-6 Units Subcutaneous HS Mary Failing, PA-SHADI   ipratropium-albuterol 3 mL Nebulization Q6H While awake James Colby MD   lisinopril 5 mg Oral Daily Mary Failing, SPEEDY   methylPREDNISolone sodium succinate 40 mg Intravenous Q8H Mary Failing, SPEEDY   metoprolol tartrate 25 mg Oral BID Mary Failing, PA-SHADI   ondansetron 4 mg Intravenous Q6H PRN Mary Failing, SPEEDY   pravastatin 80 mg Oral Daily With [employer], PA-C   primidone 100 mg Oral Q12H [address] Mary Failing, SPEEDY     Continuous Infusions:   PRN Meds:   acetaminophen    albuterol    ondansetron    Surgical procedures (if appropriate):

## 2019-09-20 NOTE — ASSESSMENT & PLAN NOTE
· Admit to Medicine  · Respiratory and O2 protocol  · Solu-Medrol 40 mg IV q 8 hours  · Zithromax 500 mg IV daily  · Duo nebs q 6 while awake  · Mucinex 600 mg p o  Q 12  · Consult pulmonary  · Continue pre-hospital Symbicort 160/4 52 puffs b i d    · Likely cause of exacerbation of COPD is bronchitis

## 2019-09-20 NOTE — RESPIRATORY THERAPY NOTE
RT Protocol Note  Sixto Christianson 69 y o  male MRN: [id number]  Unit/Bed#: -02 Encounter: [id number]    Assessment    Active Problems:    * No active hospital problems  *      Home Pulmonary Medications:  Home Devices/Therapy: (P) Other (Comment)(Alb MN and alb  MDI Q6PRN)    Past Medical History:   Diagnosis Date    BPH (benign prostatic hyperplasia)     45 days radiation treatment    COPD (chronic obstructive pulmonary disease)     Coronary artery disease     CABG x4 in 2017    Diabetes mellitus     History of Arterial Duplex of LE 2017    Likely occlusion of the left superficial femoral artery  Calcific changes bilaterally  Despite these changes, the ankle-brachial index as a measure of peripheral blood flow only mildly impaired      History of echocardiogram 2017    EF 40%, mild LVH, mild MR     Hyperlipidemia     Hypertension     NSTEMI (non-ST elevated myocardial infarction)     Prostate cancer     prostate     PVD (peripheral vascular disease)     Tremor      Social History     Socioeconomic History    Marital status: /Civil Union     Spouse name: None    Number of children: None    Years of education: None    Highest education level: None   Occupational History    None   Social Needs    Financial resource strain: None    Food insecurity:     Worry: None     Inability: None    Transportation needs:     Medical: None     Non-medical: None   Tobacco Use    Smoking status: Former Smoker     Last attempt to quit: 2/15/2019     Years since quittin 5    Smokeless tobacco: Never Used   Substance and Sexual Activity    Alcohol use: Not Currently     Comment: on occasion    Drug use: No    Sexual activity: None   Lifestyle    Physical activity:     Days per week: None     Minutes per session: None    Stress: None   Relationships    Social connections:     Talks on phone: None     Gets together: None     Attends Nondenominational service: None     Active member of club or organization: None     Attends meetings of clubs or organizations: None     Relationship status: None    Intimate partner violence:     Fear of current or ex partner: None     Emotionally abused: None     Physically abused: None     Forced sexual activity: None   Other Topics Concern    None   Social History Narrative    None       Subjective Pt  protocoled to [facility] Q6WA, Albuterol MDI Q4 PRN  Pt  Denies home 02 use  Pt  Is on 02 at this present time  Objective    Physical Exam:   Assessment Type: (P) Pre-treatment  General Appearance: (P) Alert, Awake  Respiratory Pattern: (P) Labored  Chest Assessment: (P) Chest expansion symmetrical  Bilateral Breath Sounds: (P) Diminished, Expiratory wheezes    Vitals:  Blood pressure (!) 201/89, pulse 90, temperature (!) 96 9 °F (36 1 °C), temperature source Tympanic, resp  rate (!) 28, height 5' 11" (1 803 m), weight 88 8 kg (195 lb 12 3 oz), SpO2 96 %  Imaging and other studies: I have personally reviewed pertinent reports  Plan    Respiratory Plan: (P) Moderate/Severe Distress pathway        Resp Comments: (P) [facility] ordered  Jovanny Rai PA-C aware

## 2019-09-20 NOTE — ED PROVIDER NOTES
History  Chief Complaint   Patient presents with    Shortness of Breath     69YEAR-OLD MALE WITH HISTORY SIGNIFICANT FOR COPD presents to the emergency room in respiratory distress  Patient states he awakened the last evening Thursday at 2:30 a m  With some shortness of breath took his albuterol and a nebulizer treatment felt a little better and slept  Throughout the day became short of breath and uses nebulizer 4 times  At about midnight he states he felt like he could not breathe any longer and drove himself to the emergency room  He complains of some chest tightness but no chest pain pressure heaviness  He has had multiple trips emergency room with exacerbation of COPD  His breathing was significantly labored and his O2 sat was 78% on 2 L  patient is put on a 50% non-rebreather and his O2 sat went up to 98%  He stat respiratory treatment was ordered and the patient felt significantly better  States he has no chest tightness or pressure now  He denies recent illness fever cough  Patient stop smoking about 6 8 weeks ago  He complains of about a 20 lb weight gain or more and that period of time            Prior to Admission Medications   Prescriptions Last Dose Informant Patient Reported? Taking?    Alogliptin Benzoate 25 MG TABS   Yes No   Sig: Take by mouth daily   Empagliflozin 10 MG TABS   Yes No   Sig: Take 10 mg by mouth every morning   albuterol (2 5 mg/3 mL) 0 083 % nebulizer solution   No No   Sig: Take 1 vial (2 5 mg total) by nebulization every 4 (four) hours as needed for wheezing or shortness of breath   albuterol (PROVENTIL HFA,VENTOLIN HFA) 90 mcg/act inhaler   Yes No   Sig: Inhale 2 puffs every 6 (six) hours as needed for wheezing or shortness of breath   aspirin (ECOTRIN LOW STRENGTH) 81 mg EC tablet   Yes No   Sig: Take 81 mg by mouth every other day    gabapentin (NEURONTIN) 100 mg capsule   Yes No   Sig: Take 200 mg by mouth daily at bedtime   lisinopril (ZESTRIL) 5 mg tablet   No No Sig: Take 1 tablet (5 mg total) by mouth daily   metFORMIN (GLUCOPHAGE) 500 mg tablet   Yes No   Si/2 a tablet in the morning and evening    methylPREDNISolone 4 MG tablet therapy pack   No No   Sig: Use as directed on package   metoprolol tartrate (LOPRESSOR) 50 mg tablet   Yes No   Sig: Take 25 mg by mouth 2 (two) times a day   primidone (MYSOLINE) 50 mg tablet   No No   Sig: Take 1 tablet (50 mg total) by mouth every 12 (twelve) hours   Patient taking differently: Take 100 mg by mouth every 12 (twelve) hours    rosuvastatin (CRESTOR) 10 MG tablet   Yes No   Sig: Take 10 mg by mouth daily   saxagliptin (ONGLYZA) 5 MG tablet   Yes No   Sig: Take 5 mg by mouth daily   tiotropium-olodaterol (STIOLTO RESPIMAT) 2 5-2 5 MCG/ACT inhaler   Yes No   Sig: Inhale 2 puffs daily      Facility-Administered Medications: None       Past Medical History:   Diagnosis Date    BPH (benign prostatic hyperplasia)     45 days radiation treatment    COPD (chronic obstructive pulmonary disease)     Coronary artery disease     CABG x4 in 2017    Diabetes mellitus     History of Arterial Duplex of LE 2017    Likely occlusion of the left superficial femoral artery  Calcific changes bilaterally  Despite these changes, the ankle-brachial index as a measure of peripheral blood flow only mildly impaired   History of echocardiogram 2017    EF 40%, mild LVH, mild MR     Hyperlipidemia     Hypertension     NSTEMI (non-ST elevated myocardial infarction)     Prostate cancer     prostate     PVD (peripheral vascular disease)     Tremor        Past Surgical History:   Procedure Laterality Date    CARDIAC CATHETERIZATION  2017    Significant left main plus triple-vessel CAD   CORONARY ARTERY BYPASS GRAFT  2017    4V CABG:  LIMA to LAD, VG to RI, SVG to PDA to LVBR RCA      PROSTATE BIOPSY         Family History   Problem Relation Age of Onset    Cancer Father     Heart disease Brother      I have reviewed and agree with the history as documented  Social History     Tobacco Use    Smoking status: Former Smoker     Last attempt to quit: 2/15/2019     Years since quittin 5    Smokeless tobacco: Never Used   Substance Use Topics    Alcohol use: Not Currently     Comment: on occasion    Drug use: No        Review of Systems   Constitutional: Negative  HENT: Negative  Eyes: Negative  Respiratory: Positive for cough, chest tightness, shortness of breath and wheezing  Negative for stridor  Cardiovascular: Negative for chest pain, palpitations and leg swelling  Gastrointestinal: Negative  Genitourinary: Negative  Skin: Negative  Neurological: Negative  Hematological: Negative  Psychiatric/Behavioral: Negative  Physical Exam  Physical Exam   Constitutional: He is oriented to person, place, and time  He appears well-developed and well-nourished  He appears ill  He appears distressed  HENT:   Head: Normocephalic and atraumatic  Neck: Normal range of motion  Cardiovascular:   Patient is significantly tachycardic rhythm at a rate of about 120  Pulmonary/Chest: Accessory muscle usage present  Tachypnea noted  He is in respiratory distress  He has decreased breath sounds  He has wheezes  He has no rhonchi  He has no rales  He exhibits no mass and no tenderness  Abdominal: Soft  Bowel sounds are normal    Musculoskeletal:        Right lower leg: Normal         Left lower leg: Normal    Neurological: He is alert and oriented to person, place, and time  Skin: Skin is warm and dry  Psychiatric: His behavior is normal  His mood appears anxious  Nursing note and vitals reviewed        Vital Signs  ED Triage Vitals [19 0114]   Temperature Pulse Respirations Blood Pressure SpO2   (!) 95 7 °F (35 4 °C) (!) 127 (!) 26 141/83 95 %      Temp Source Heart Rate Source Patient Position - Orthostatic VS BP Location FiO2 (%)   Temporal Monitor Sitting Left arm --      Pain Score       No Pain           Vitals:    09/20/19 0114   BP: 141/83   Pulse: (!) 127   Patient Position - Orthostatic VS: Sitting         Visual Acuity      ED Medications  Medications   ipratropium-albuterol (DUO-NEB) 0 5-2 5 mg/3 mL inhalation solution 3 mL (3 mL Nebulization Given 9/20/19 0124)   methylPREDNISolone sodium succinate (Solu-MEDROL) injection 125 mg (125 mg Intravenous Given 9/20/19 0126)       Diagnostic Studies  Results Reviewed     None                 No orders to display              Procedures  ECG 12 Lead Documentation Only  Date/Time: 9/20/2019 1:52 AM  Performed by: Oneil Britton MD  Authorized by: Oneil Britton MD     Indications / Diagnosis:  Sob  ECG reviewed by me, the ED Provider: yes    Patient location:  ED  Previous ECG:     Comparison to cardiac monitor: Yes    Interpretation:     Interpretation: normal    Rate:     ECG rate:  95    ECG rate assessment: normal    Rhythm:     Rhythm: sinus rhythm    Ectopy:     Ectopy: PVCs      PVCs:  Frequent  QRS:     QRS axis:  Normal    QRS intervals:  Normal  Conduction:     Conduction: normal    ST segments:     ST segments:  Abnormal    Elevation:  V5 and V6  T waves:     T waves: non-specific    Q waves:     Q waves:  V1 and V2    There is no change in the patient's EKG from his Sept 2nd EKG  ED Course                               MDM    Disposition  Final diagnoses:   None     ED Disposition     None      Follow-up Information    None         Patient's Medications   Discharge Prescriptions    No medications on file     No discharge procedures on file      ED Provider  Electronically Signed by           Oneil Britton MD  09/20/19 5876       Oneil Britton MD  09/20/19 1214

## 2019-09-20 NOTE — H&P
H&P- Jerald Geller 1945, 69 y o  male MRN: [id number]    Unit/Bed#: -02 Encounter: [id number]    Primary Care Provider: Katie Browne MD   Date and time admitted to hospital: 9/20/2019  1:16 AM        * COPD with acute exacerbation ([facility] )  Assessment & Plan  · Admit to Medicine  · Respiratory and O2 protocol  · Solu-Medrol 40 mg IV q 8 hours  · Zithromax 500 mg IV daily  · Duo nebs q 6 while awake  · Mucinex 600 mg p o  Q 12  · Consult pulmonary  · Continue pre-hospital Symbicort 160/4 52 puffs b i d  · Likely cause of exacerbation of COPD is bronchitis    Type 2 diabetes mellitus without complication, without long-term current use of insulin ([facility] )  Assessment & Plan  Lab Results   Component Value Date    HGBA1C 8 1 (H) 07/05/2019       No results for input(s): POCGLU in the last 72 hours  Blood Sugar Average: Last 72 hrs:     Place on [facility] step 2 diet, obtain Accu-Cheks AC and HS, hold pre-hospital oral antihyperglycemics give Humalog correction dose a c  And HS    Essential hypertension  Assessment & Plan  Continue pre-hospital Lopressor 25 mg p o  B i d  And lisinopril 5 mg p o  Daily    Other hyperlipidemia  Assessment & Plan  Substitute Pravachol 80 mg p o  Daily for pre-hospital Crestor 10 mg    Tobacco abuse  Assessment & Plan  Currently in remission and patient refused nicotine replacement      VTE Prophylaxis: Heparin  Code Status:  Level 1  POLST: There is no POLST form on file for this patient (pre-hospital)  Discussion with family:  None present at bedside at time of exam    Anticipated Length of Stay:  Patient will be admitted on an Inpatient basis with an anticipated length of stay of  > 2 midnights  Justification for Hospital Stay:  COPD exacerbation requiring frequent nebulizer treatments and IV steroids    Total Time for Visit, including Counseling / Coordination of Care: 1 hour    Greater than 50% of this total time spent on direct patient counseling and coordination of care     Chief Complaint:   Dyspnea x2 days    History of Present Illness:    Elda Lee is a 69 y o  male who presents with dyspnea x2 days  Patient has a longstanding history of COPD and is on multiple respiratory medications and follows with Dr Lyric Hanson from Pulmonary who he last saw 1 week ago to establish care with him  Patient was previously seen in our ER on 09/02/2019 and diagnosed with COPD which improved after an hour long nebulizer and he was to follow up with his already scheduled pulmonary appointment  Patient states 2 days ago he began with increasing shortness of breath but this was responsive to his prescribed albuterol MDI as well as nebulizer treatments but yesterday became more short of breath at approximately midnight acutely became worse and was unresponsive to his MDI  Patient does report a cough which is productive of scant clear sputum but denies any fever chills, no recent sick contacts  Patient does have an extensive smoking history but states that he stopped smoking approximately 2 months ago  While he is on multiple respiratory medications at home he has not O2 dependent  Upon arrival in the ER was found to be in respiratory distress with an O2 saturation of 78% and placed on a non-rebreather and his O2 saturation improved significantly and on exam he is resting comfortably in bed and states that his breathing is much improved from his arrival though he does still have audible wheezes from across the room  Review of Systems:  Review of Systems   Constitutional: Negative for chills and fever  Respiratory: Positive for cough, chest tightness, shortness of breath and wheezing  Cardiovascular: Negative for chest pain and palpitations  Gastrointestinal: Negative for diarrhea, nausea and vomiting  Genitourinary: Negative for dysuria, frequency, hematuria and urgency  All other systems reviewed and are negative        Past Medical and Surgical History:   Past Medical History:   Diagnosis Date    BPH (benign prostatic hyperplasia)     45 days radiation treatment    COPD (chronic obstructive pulmonary disease)     Coronary artery disease     CABG x4 in 2017    Diabetes mellitus     History of Arterial Duplex of LE 12/26/2017    Likely occlusion of the left superficial femoral artery  Calcific changes bilaterally  Despite these changes, the ankle-brachial index as a measure of peripheral blood flow only mildly impaired   History of echocardiogram 06/12/2017    EF 40%, mild LVH, mild MR     Hyperlipidemia     Hypertension     NSTEMI (non-ST elevated myocardial infarction)     Prostate cancer     prostate     PVD (peripheral vascular disease)     Tremor        Past Surgical History:   Procedure Laterality Date    CARDIAC CATHETERIZATION  03/08/2017    Significant left main plus triple-vessel CAD   CORONARY ARTERY BYPASS GRAFT  03/08/2017    4V CABG:  LIMA to LAD, VG to RI, SVG to PDA to LVBR RCA   PROSTATE BIOPSY         Meds/Allergies:  Prior to Admission medications    Medication Sig Start Date End Date Taking?  Authorizing Provider   albuterol (2 5 mg/3 mL) 0 083 % nebulizer solution Take 1 vial (2 5 mg total) by nebulization every 4 (four) hours as needed for wheezing or shortness of breath 6/24/18   Simón Henry MD   albuterol (PROVENTIL HFA,VENTOLIN HFA) 90 mcg/act inhaler Inhale 2 puffs every 6 (six) hours as needed for wheezing or shortness of breath    Historical Provider, MD   Alogliptin Benzoate 25 MG TABS Take by mouth daily    Historical Provider, MD   aspirin (ECOTRIN LOW STRENGTH) 81 mg EC tablet Take 81 mg by mouth every other day     Historical Provider, MD   Empagliflozin 10 MG TABS Take 10 mg by mouth every morning    Historical Provider, MD   gabapentin (NEURONTIN) 100 mg capsule Take 200 mg by mouth daily at bedtime    Historical Provider, MD   lisinopril (ZESTRIL) 5 mg tablet Take 1 tablet (5 mg total) by mouth daily 8/20/19   Sky Godfrey Gildardo Nielsen PA-C   metFORMIN (GLUCOPHAGE) 500 mg tablet 1/2 a tablet in the morning and evening     Historical Provider, MD   methylPREDNISolone 4 MG tablet therapy pack Use as directed on package 19   Angeles Barr PA-C   metoprolol tartrate (LOPRESSOR) 50 mg tablet Take 25 mg by mouth 2 (two) times a day 3/16/17   Historical Provider, MD   primidone (MYSOLINE) 50 mg tablet Take 1 tablet (50 mg total) by mouth every 12 (twelve) hours  Patient taking differently: Take 100 mg by mouth every 12 (twelve) hours  19   Angeles Barr PA-C   rosuvastatin (CRESTOR) 10 MG tablet Take 10 mg by mouth daily    Historical Provider, MD   saxagliptin (ONGLYZA) 5 MG tablet Take 5 mg by mouth daily    Historical Provider, MD   tiotropium-olodaterol (STIOLTO RESPIMAT) 2 5-2 5 MCG/ACT inhaler Inhale 2 puffs daily    Historical Provider, MD     I have reviewed home medications with patient personally      Allergies: No Known Allergies    Social History:  Marital Status: /Civil Union   Occupation:  Retired [employer] worker  Patient Pre-hospital Living Situation:  Resides at home with wife  Patient Pre-hospital Level of Mobility:  Full with assist of a cane  Patient Pre-hospital Diet Restrictions:  None  Substance Use History:     Social History     Substance and Sexual Activity   Alcohol Use Not Currently    Comment: on occasion     Social History     Tobacco Use   Smoking Status Former Smoker    Last attempt to quit: 2/15/2019    Years since quittin 5   Smokeless Tobacco Never Used     Social History     Substance and Sexual Activity   Drug Use No       Family History:  I have reviewed the patients family history    Physical Exam:   Vitals:   Blood Pressure: (!) 201/89 (19)  Pulse: 90 (19)  Temperature: (!) 96 9 °F (36 1 °C) (19)  Temp Source: Tympanic (19)  Respirations: (!) 28 (19)  Height: 5' 11" (180 3 cm) (19)  Weight - Scale: 88 8 kg (195 lb 12 3 oz) (09/20/19 0315)  SpO2: 98 % (09/20/19 0409)    Physical Exam   Constitutional: He is oriented to person, place, and time  He appears well-developed and well-nourished  HENT:   Head: Normocephalic and atraumatic  Mouth/Throat: No oropharyngeal exudate  Eyes: Pupils are equal, round, and reactive to light  EOM are normal  No scleral icterus  Neck: Normal range of motion  Neck supple  No JVD present  Cardiovascular: Normal rate, regular rhythm and normal heart sounds  No murmur heard  Pulmonary/Chest: Tachypnea noted  No respiratory distress  He has wheezes in the right upper field, the right middle field, the right lower field, the left upper field, the left middle field and the left lower field  He has no rhonchi  He has no rales  Abdominal: Soft  Bowel sounds are normal  There is no tenderness  There is no rebound and no guarding  Musculoskeletal: Normal range of motion  He exhibits no edema  Lymphadenopathy:     He has no cervical adenopathy  Neurological: He is alert and oriented to person, place, and time  Skin: Skin is warm and dry  No rash noted  No erythema  Psychiatric: He has a normal mood and affect  His behavior is normal    Nursing note and vitals reviewed  Additional Data:   Lab Results: I have personally reviewed pertinent reports  Results from last 7 days   Lab Units 09/20/19  0134   WBC Thousand/uL 7 80   HEMOGLOBIN g/dL 15 0   HEMATOCRIT % 44 3   PLATELETS Thousands/uL 195   NEUTROS PCT % 62   LYMPHS PCT % 21   MONOS PCT % 7   EOS PCT % 8*     Results from last 7 days   Lab Units 09/20/19  0134   POTASSIUM mmol/L 4 2   CHLORIDE mmol/L 102   CO2 mmol/L 26   BUN mg/dL 15   CREATININE mg/dL 0 96   CALCIUM mg/dL 9 3   ALK PHOS U/L 76   ALT U/L 17   AST U/L 13                   Imaging: I have personally reviewed pertinent reports  XR chest 1 view portable   ED Interpretation by Erin Powell MD (09/20 4896)   Chest x-ray is a AP portable semi-erect    Eugenia Donohue inspiratory effort with some blunting at the costal phrenic ankles  There is no obvious infiltrate effusion or pneumothorax  EKG, Pathology, and Other Studies Reviewed on Admission:   · EKG:  Normal sinus rhythm rate of 95 with frequent PVCs    [facility] Records Reviewed: Yes     ** Please Note: This note has been constructed using a voice recognition system   **

## 2019-09-20 NOTE — UTILIZATION REVIEW
Initial Clinical Review    Admission: Date/Time/Statement: Inpatient Admission Orders (From admission, onward)     Ordered        09/20/19 0224  Inpatient Admission  Once                   Orders Placed This Encounter   Procedures    Inpatient Admission     Standing Status:   Standing     Number of Occurrences:   1     Order Specific Question:   Admitting Physician     Answer:   Tom Kramer     Order Specific Question:   Level of Care     Answer:   Med Surg [16]     Comments:   24 hour     Order Specific Question:   Estimated length of stay     Answer:   Not Applicable     ED Arrival Information     Expected Arrival Acuity Means of Arrival Escorted By Service Admission Type    - 9/20/2019 01:12 Emergent Walk-In Self General Medicine Emergency    Arrival Complaint    shortness of breath        Chief Complaint   Patient presents with    Shortness of Breath     Assessment/Plan:   69 y o  male who presents with dyspnea x2 days  Patient has a longstanding history of COPD and is on multiple respiratory medications and follows with Dr Bill Rodriguez from Pulmonary who he last saw 1 week ago to establish care with him  Patient was previously seen in our ER on 09/02/2019 and diagnosed with COPD which improved after an hour long nebulizer and he was to follow up with his already scheduled pulmonary appointment      Patient states 2 days ago he began with increasing shortness of breath but this was responsive to his prescribed albuterol MDI as well as nebulizer treatments but yesterday became more short of breath at approximately midnight acutely became worse and was unresponsive to his MDI  Patient does report a cough which is productive of scant clear sputum but denies any fever chills, no recent sick contacts  Patient does have an extensive smoking history but states that he stopped smoking approximately 2 months ago    While he is on multiple respiratory medications at home he has not O2 dependent      Upon arrival in the ER was found to be in respiratory distress with an O2 saturation of 78% and placed on a non-rebreather and his O2 saturation improved significantly and on exam he is resting comfortably in bed and states that his breathing is much improved from his arrival though he does still have audible wheezes from across the room  COPD with acute exacerbation ([facility] [address] )  Assessment & Plan  · Admit to Medicine  · Respiratory and O2 protocol  · Solu-Medrol 40 mg IV q 8 hours  · Zithromax 500 mg IV daily  · Duo nebs q 6 while awake  · Mucinex 600 mg p o  Q 12  · Consult pulmonary  · Continue pre-hospital Symbicort 160/4 52 puffs b i d  · Likely cause of exacerbation of COPD is bronchitis     Type 2 diabetes mellitus without complication, without long-term current use of insulin ([facility])  Assessment & Plan        Lab Results   Component Value Date     HGBA1C 8 1 (H) 07/05/2019         No results for input(s): POCGLU in the last 72 hours      Blood Sugar Average: Last 72 hrs:     Place on [facility] step 2 diet, obtain Accu-Cheks AC and HS, hold pre-hospital oral antihyperglycemics give Humalog correction dose a c  And HS     Essential hypertension  Assessment & Plan  Continue pre-hospital Lopressor 25 mg p o  B i d  And lisinopril 5 mg p o  Daily     Other hyperlipidemia  Assessment & Plan  Substitute Pravachol 80 mg p o  Daily for pre-hospital Crestor 10 mg     Tobacco abuse  Assessment & Plan  Currently in remission and patient refused nicotine replacement        VTE Prophylaxis: Heparin  Code Status:  Level 1  POLST: There is no POLST form on file for this patient (pre-hospital)  Discussion with family:  None present at bedside at time of exam     Anticipated Length of Stay:  Patient will be admitted on an Inpatient basis with an anticipated length of stay of  > 2 midnights     Justification for Hospital Stay:  COPD exacerbation requiring frequent nebulizer treatments and IV steroids     ED Triage Vitals [09/20/19 0114] Temperature Pulse Respirations Blood Pressure SpO2   (!) 95 7 °F (35 4 °C) (!) 127 (!) 26 141/83 95 %      Temp Source Heart Rate Source Patient Position - Orthostatic VS BP Location FiO2 (%)   Temporal Monitor Sitting Left arm --      Pain Score       No Pain        Wt Readings from Last 1 Encounters:   09/20/19 88 8 kg (195 lb 12 3 oz)     Additional Vital Signs:   09/20/19 0731  97 6 °F (36 4 °C)  86  22  176/83  Abnormal   99 %  Nasal cannula  Sitting   09/20/19 0725          97 %  Nasal cannula     09/20/19 0319            Nasal cannula     09/20/19 0315  96 9 °F (36 1 °C)Abnormal   90  28  Abnormal   201/89  Abnormal   96 %  Nasal cannula  Lying   09/20/19 0245    82  24  Abnormal     96 %       09/20/19 0230    86  21  174/90  Abnormal   97 %       09/20/19 0215    91  25  Abnormal   148/86  97 %       09/20/19 0200    92  24Abnormal   148/86             Pertinent Labs/Diagnostic Test Results:   CXR - No acute cardiopulmonary disease      Results from last 7 days   Lab Units 09/20/19  0520 09/20/19  0134   WBC Thousand/uL 7 70 7 80   HEMOGLOBIN g/dL 14 1 15 0   HEMATOCRIT % 41 1* 44 3   PLATELETS Thousands/uL 167 195   NEUTROS ABS Thousands/µL  --  4 80   TOTAL NEUT ABS Thousand/uL 6 78  --    BANDS PCT % 4  --          Results from last 7 days   Lab Units 09/20/19  0520 09/20/19  0134   SODIUM mmol/L 137 138   POTASSIUM mmol/L 4 1 4 2   CHLORIDE mmol/L 103 102   CO2 mmol/L 26 26   ANION GAP mmol/L 8 10   BUN mg/dL 16 15   CREATININE mg/dL 0 85 0 96   EGFR ml/min/1 73sq m 86 78   CALCIUM mg/dL 8 9 9 3     Results from last 7 days   Lab Units 09/20/19  0134   AST U/L 13   ALT U/L 17   ALK PHOS U/L 76   TOTAL PROTEIN g/dL 6 9   ALBUMIN g/dL 4 4   TOTAL BILIRUBIN mg/dL 0 30     Results from last 7 days   Lab Units 09/20/19  1112 09/20/19  0618   POC GLUCOSE mg/dl 414* 260*     Results from last 7 days   Lab Units 09/20/19  0520 09/20/19  0134   GLUCOSE RANDOM mg/dL 258* 329* Results from last 7 days   Lab Units 09/20/19  0520   HEMOGLOBIN A1C % 8 3*   EAG mg/dl 192     Results from last 7 days   Lab Units 09/20/19  0134   TROPONIN I ng/mL <0 03     Results from last 7 days   Lab Units 09/20/19  0520 09/20/19  0134   PROCALCITONIN ng/ml <0 05 <0 05     Results from last 7 days   Lab Units 09/20/19  0134   BNP pg/mL 116*     Results from last 7 days   Lab Units 09/20/19  0520   TOTAL COUNTED  100       ED Treatment:   Medication Administration from 09/20/2019 0112 to 09/20/2019 0255       Date/Time Order Dose Route Action     09/20/2019 0124 ipratropium-albuterol (DUO-NEB) 0 5-2 5 mg/3 mL inhalation solution 3 mL 3 mL Nebulization Given     09/20/2019 0126 methylPREDNISolone sodium succinate (Solu-MEDROL) injection 125 mg 125 mg Intravenous Given     09/20/2019 0151 magnesium sulfate IVPB (premix) SOLN 1 g 1 g Intravenous New Bag     09/20/2019 0148 LORazepam (ATIVAN) 2 mg/mL injection 1 mg 1 mg Intravenous Given     09/20/2019 0224 insulin regular (HumuLIN R,NovoLIN R) injection 8 Units 8 Units Intravenous Given        Past Medical History:   Diagnosis Date    BPH (benign prostatic hyperplasia)     45 days radiation treatment    COPD (chronic obstructive pulmonary disease)     Coronary artery disease     CABG x4 in 2017    Diabetes mellitus     History of Arterial Duplex of LE 12/26/2017    Likely occlusion of the left superficial femoral artery  Calcific changes bilaterally  Despite these changes, the ankle-brachial index as a measure of peripheral blood flow only mildly impaired      History of echocardiogram 06/12/2017    EF 40%, mild LVH, mild MR     Hyperlipidemia     Hypertension     NSTEMI (non-ST elevated myocardial infarction)     Prostate cancer     prostate     PVD (peripheral vascular disease)     Tremor      Present on Admission:   Type 2 diabetes mellitus without complication, without long-term current use of insulin (HCC)   Other hyperlipidemia   Essential hypertension   Tobacco abuse    Admitting Diagnosis: Shortness of breath [R06 02]     Age/Sex: 69 y o  male     Admission Orders:  Sputum culture and Gram stain  IP CONSULT TO PULMONOLOGY    Current Facility-Administered Medications:  acetaminophen 650 mg Oral Q6H PRN   albuterol 2 5 mg Nebulization Q4H PRN   aspirin 81 mg Oral Every Other Day   azithromycin 500 mg Oral Q24H   budesonide-formoterol 2 puff Inhalation BID   gabapentin 200 mg Oral HS   guaiFENesin 600 mg Oral BID   heparin (porcine) 5,000 Units Subcutaneous Q8H [facility]   insulin lispro 1-6 Units Subcutaneous TID AC   insulin lispro 1-6 Units Subcutaneous HS   ipratropium-albuterol 3 mL Nebulization Q6H While awake   lisinopril 5 mg Oral Daily   methylPREDNISolone sodium succinate 40 mg Intravenous Q8H   metoprolol tartrate 25 mg Oral BID   ondansetron 4 mg Intravenous Q6H PRN   pravastatin 80 mg Oral Daily With Dinner   primidone 100 mg Oral Q12H [facility]     Network Utilization Review Department  Phone: [phone]; Fax [phone]  [email or web address]  ATTENTION: Please call with any questions or concerns to [phone]  and carefully listen to the prompts so that you are directed to the right person  Send all requests for admission clinical reviews, approved or denied determinations and any other requests to fax [phone]   All voicemails are confidential

## 2019-09-20 NOTE — ED NOTES
Patient breathing easier post breathing treatment and medications  Currently on 4 L 02 nasal cannula at 98%    Mild audible wheezes heard at rest      Drew Lui RN  09/20/19 [id number]

## 2019-09-20 NOTE — PLAN OF CARE
Problem: Potential for Falls  Goal: Patient will remain free of falls  Description  INTERVENTIONS:  - Assess patient frequently for physical needs  -  Identify cognitive and physical deficits and behaviors that affect risk of falls    -  [place] fall precautions as indicated by assessment   - Educate patient/family on patient safety including physical limitations  - Instruct patient to call for assistance with activity based on assessment  - Modify environment to reduce risk of injury  - Consider OT/PT consult to assist with strengthening/mobility  Outcome: Progressing     Problem: PAIN - ADULT  Goal: Verbalizes/displays adequate comfort level or baseline comfort level  Description  Interventions:  - Encourage patient to monitor pain and request assistance  - Assess pain using appropriate pain scale  - Administer analgesics based on type and severity of pain and evaluate response  - Implement non-pharmacological measures as appropriate and evaluate response  - Consider cultural and social influences on pain and pain management  - Notify physician/advanced practitioner if interventions unsuccessful or patient reports new pain  Outcome: Progressing     Problem: INFECTION - ADULT  Goal: Absence or prevention of progression during hospitalization  Description  INTERVENTIONS:  - Assess and monitor for signs and symptoms of infection  - Monitor lab/diagnostic results  - Monitor all insertion sites, i e  indwelling lines, tubes, and drains  - Monitor endotracheal if appropriate and nasal secretions for changes in amount and color  - [place] appropriate cooling/warming therapies per order  - Administer medications as ordered  - Instruct and encourage patient and family to use good hand hygiene technique   Outcome: Progressing  Goal: Absence of fever/infection during neutropenic period  Description  INTERVENTIONS:  - Monitor WBC    Outcome: Progressing     Problem: SAFETY ADULT  Goal: Patient will remain free of falls  Description  INTERVENTIONS:  - Assess patient frequently for physical needs  -  Identify cognitive and physical deficits and behaviors that affect risk of falls    -  [place] fall precautions as indicated by assessment   - Educate patient/family on patient safety including physical limitations  - Instruct patient to call for assistance with activity based on assessment  - Modify environment to reduce risk of injury  - Consider OT/PT consult to assist with strengthening/mobility  Outcome: Progressing  Goal: Maintain or return to baseline ADL function  Description  INTERVENTIONS:  -  Assess patient's ability to carry out ADLs; assess patient's baseline for ADL function and identify physical deficits which impact ability to perform ADLs (bathing, care of mouth/teeth, toileting, grooming, dressing, etc )  - Assess/evaluate cause of self-care deficits   - Assess range of motion  - Assess patient's mobility; develop plan if impaired  - Assess patient's need for assistive devices and provide as appropriate  - Encourage maximum independence but intervene and supervise when necessary  - Involve family in performance of ADLs  - Assess for home care needs following discharge   - Consider OT consult to assist with ADL evaluation and planning for discharge  - Provide patient education as appropriate  Outcome: Progressing  Goal: Maintain or return mobility status to optimal level  Description  INTERVENTIONS:  - Assess patient's baseline mobility status (ambulation, transfers, stairs, etc )    - Identify cognitive and physical deficits and behaviors that affect mobility  - Identify mobility aids required to assist with transfers and/or ambulation (gait belt, sit-to-stand, lift, walker, cane, etc )  - [place] fall precautions as indicated by assessment  - Record patient progress and toleration of activity level on Mobility SBAR; progress patient to next Phase/Stage  - Instruct patient to call for assistance with activity based on assessment  - Consider rehabilitation consult to assist with strengthening/weightbearing, etc   Outcome: Progressing     Problem: DISCHARGE PLANNING  Goal: Discharge to home or other facility with appropriate resources  Description  INTERVENTIONS:  - Identify barriers to discharge w/patient and caregiver  - Arrange for needed discharge resources and transportation as appropriate  - Identify discharge learning needs (meds, wound care, etc )  - Refer to Case Management Department for coordinating discharge planning if the patient needs post-hospital services based on physician/advanced practitioner order or complex needs related to functional status, cognitive ability, or social support system   Outcome: Progressing     Problem: Knowledge Deficit  Goal: Patient/family/caregiver demonstrates understanding of disease process, treatment plan, medications, and discharge instructions  Description  Complete learning assessment and assess knowledge base    Interventions:  - Provide teaching at level of understanding  - Provide teaching via preferred learning methods  Outcome: Progressing     Problem: RESPIRATORY - ADULT  Goal: Achieves optimal ventilation and oxygenation  Description  INTERVENTIONS:  - Assess for changes in respiratory status  - Assess for changes in mentation and behavior  - Position to facilitate oxygenation and minimize respiratory effort  - Oxygen administered by appropriate delivery if ordered  - Initiate smoking cessation education as indicated  - Encourage broncho-pulmonary hygiene including cough, deep breathe, Incentive Spirometry  - Assess the need for suctioning and aspirate as needed  - Assess and instruct to report SOB or any respiratory difficulty  - Respiratory Therapy support as indicated  Outcome: Progressing

## 2019-09-21 VITALS
WEIGHT: 204.15 LBS | RESPIRATION RATE: 18 BRPM | BODY MASS INDEX: 28.58 KG/M2 | SYSTOLIC BLOOD PRESSURE: 167 MMHG | HEART RATE: 87 BPM | DIASTOLIC BLOOD PRESSURE: 74 MMHG | TEMPERATURE: 97.3 F | HEIGHT: 71 IN | OXYGEN SATURATION: 93 %

## 2019-09-21 LAB
ANION GAP SERPL CALCULATED.3IONS-SCNC: 9 MMOL/L (ref 4–13)
ATRIAL RATE: 94 BPM
BASOPHILS # BLD AUTO: 0 THOUSANDS/ΜL (ref 0–0.1)
BASOPHILS NFR BLD AUTO: 0 % (ref 0–2)
BUN SERPL-MCNC: 20 MG/DL (ref 7–25)
CALCIUM SERPL-MCNC: 8.7 MG/DL (ref 8.6–10.5)
CHLORIDE SERPL-SCNC: 101 MMOL/L (ref 98–107)
CO2 SERPL-SCNC: 25 MMOL/L (ref 21–31)
CREAT SERPL-MCNC: 0.8 MG/DL (ref 0.7–1.3)
EOSINOPHIL # BLD AUTO: 0 THOUSAND/ΜL (ref 0–0.61)
EOSINOPHIL NFR BLD AUTO: 0 % (ref 0–5)
ERYTHROCYTE [DISTWIDTH] IN BLOOD BY AUTOMATED COUNT: 13.5 % (ref 11.5–14.5)
GFR SERPL CREATININE-BSD FRML MDRD: 89 ML/MIN/1.73SQ M
GLUCOSE SERPL-MCNC: 225 MG/DL (ref 65–99)
GLUCOSE SERPL-MCNC: 234 MG/DL (ref 65–140)
GLUCOSE SERPL-MCNC: 341 MG/DL (ref 65–140)
HCT VFR BLD AUTO: 37.4 % (ref 42–47)
HGB BLD-MCNC: 12.9 G/DL (ref 14–18)
LYMPHOCYTES # BLD AUTO: 0.7 THOUSANDS/ΜL (ref 0.6–4.47)
LYMPHOCYTES NFR BLD AUTO: 7 % (ref 21–51)
MCH RBC QN AUTO: 31.4 PG (ref 26–34)
MCHC RBC AUTO-ENTMCNC: 34.5 G/DL (ref 31–37)
MCV RBC AUTO: 91 FL (ref 81–99)
MONOCYTES # BLD AUTO: 0.3 THOUSAND/ΜL (ref 0.17–1.22)
MONOCYTES NFR BLD AUTO: 2 % (ref 2–12)
NEUTROPHILS # BLD AUTO: 10.5 THOUSANDS/ΜL (ref 1.4–6.5)
NEUTS SEG NFR BLD AUTO: 91 % (ref 42–75)
P AXIS: 74 DEGREES
PLATELET # BLD AUTO: 175 THOUSANDS/UL (ref 149–390)
PMV BLD AUTO: 10.1 FL (ref 8.6–11.7)
POTASSIUM SERPL-SCNC: 4.1 MMOL/L (ref 3.5–5.5)
PR INTERVAL: 186 MS
QRS AXIS: 64 DEGREES
QRSD INTERVAL: 96 MS
QT INTERVAL: 372 MS
QTC INTERVAL: 465 MS
RBC # BLD AUTO: 4.11 MILLION/UL (ref 4.3–5.9)
SODIUM SERPL-SCNC: 135 MMOL/L (ref 134–143)
T WAVE AXIS: 139 DEGREES
VENTRICULAR RATE: 94 BPM
WBC # BLD AUTO: 11.6 THOUSAND/UL (ref 4.8–10.8)

## 2019-09-21 PROCEDURE — 99239 HOSP IP/OBS DSCHRG MGMT >30: CPT | Performed by: NURSE PRACTITIONER

## 2019-09-21 PROCEDURE — 85025 COMPLETE CBC W/AUTO DIFF WBC: CPT | Performed by: HOSPITALIST

## 2019-09-21 PROCEDURE — 94640 AIRWAY INHALATION TREATMENT: CPT

## 2019-09-21 PROCEDURE — 94760 N-INVAS EAR/PLS OXIMETRY 1: CPT

## 2019-09-21 PROCEDURE — 82948 REAGENT STRIP/BLOOD GLUCOSE: CPT

## 2019-09-21 PROCEDURE — 80048 BASIC METABOLIC PNL TOTAL CA: CPT | Performed by: HOSPITALIST

## 2019-09-21 PROCEDURE — 93010 ELECTROCARDIOGRAM REPORT: CPT | Performed by: INTERNAL MEDICINE

## 2019-09-21 PROCEDURE — 94761 N-INVAS EAR/PLS OXIMETRY MLT: CPT

## 2019-09-21 RX ORDER — PREDNISONE 20 MG/1
TABLET ORAL
Qty: 32 TABLET | Refills: 0 | Status: ON HOLD | OUTPATIENT
Start: 2019-09-21 | End: 2019-10-23 | Stop reason: CLARIF

## 2019-09-21 RX ORDER — GUAIFENESIN 600 MG
600 TABLET, EXTENDED RELEASE 12 HR ORAL 2 TIMES DAILY
Qty: 20 TABLET | Refills: 0 | Status: SHIPPED | OUTPATIENT
Start: 2019-09-21 | End: 2019-12-29 | Stop reason: HOSPADM

## 2019-09-21 RX ORDER — PREDNISONE 20 MG/1
40 TABLET ORAL 2 TIMES DAILY WITH MEALS
Status: DISCONTINUED | OUTPATIENT
Start: 2019-09-21 | End: 2019-09-21 | Stop reason: HOSPADM

## 2019-09-21 RX ORDER — AZITHROMYCIN 500 MG/1
500 TABLET, FILM COATED ORAL EVERY 24 HOURS
Qty: 4 TABLET | Refills: 0 | Status: SHIPPED | OUTPATIENT
Start: 2019-09-22 | End: 2019-09-26

## 2019-09-21 RX ADMIN — METOPROLOL TARTRATE 25 MG: 25 TABLET, FILM COATED ORAL at 08:30

## 2019-09-21 RX ADMIN — AZITHROMYCIN 500 MG: 250 TABLET, FILM COATED ORAL at 04:01

## 2019-09-21 RX ADMIN — HEPARIN SODIUM 5000 UNITS: 5000 INJECTION INTRAVENOUS; SUBCUTANEOUS at 05:59

## 2019-09-21 RX ADMIN — INSULIN LISPRO 5 UNITS: 100 INJECTION, SOLUTION INTRAVENOUS; SUBCUTANEOUS at 12:05

## 2019-09-21 RX ADMIN — GUAIFENESIN 600 MG: 600 TABLET, EXTENDED RELEASE ORAL at 08:30

## 2019-09-21 RX ADMIN — IPRATROPIUM BROMIDE AND ALBUTEROL SULFATE 3 ML: 2.5; .5 SOLUTION RESPIRATORY (INHALATION) at 07:14

## 2019-09-21 RX ADMIN — METHYLPREDNISOLONE SODIUM SUCCINATE 40 MG: 40 INJECTION, POWDER, FOR SOLUTION INTRAMUSCULAR; INTRAVENOUS at 04:01

## 2019-09-21 RX ADMIN — PRIMIDONE 100 MG: 50 TABLET ORAL at 08:30

## 2019-09-21 RX ADMIN — LISINOPRIL 5 MG: 5 TABLET ORAL at 08:30

## 2019-09-21 RX ADMIN — INSULIN LISPRO 3 UNITS: 100 INJECTION, SOLUTION INTRAVENOUS; SUBCUTANEOUS at 08:30

## 2019-09-21 RX ADMIN — BUDESONIDE AND FORMOTEROL FUMARATE DIHYDRATE 2 PUFF: 160; 4.5 AEROSOL RESPIRATORY (INHALATION) at 08:30

## 2019-09-21 NOTE — DISCHARGE SUMMARY
Discharge- Brissa Comp 1945, 69 y o  male MRN: [id number]    Unit/Bed#: -02 Encounter: [id number]    Primary Care Provider: Melo Esposito MD   Date and time admitted to hospital: 9/20/2019  1:16 AM        * COPD with acute exacerbation ([address] )  Assessment & Plan  · Admit to Medicine  · Respiratory and O2 protocol  · Solu-Medrol 40 mg IV q 8 hours - will change to po prednisone  · Zithromax 500 mg IV daily  · Duo nebs q 6 while awake  · Mucinex 600 mg p o  Q 12  · Consult pulmonary  · Continue pre-hospital Symbicort 160/4 52 puffs b i d  · Likely cause of exacerbation of COPD is bronchitis    Type 2 diabetes mellitus without complication, without long-term current use of insulin [facility])  Assessment & Plan  Lab Results   Component Value Date    HGBA1C 8 3 (H) 09/20/2019       Recent Labs     09/20/19  1112 09/20/19  1552 09/20/19  2059 09/21/19  0700   POCGLU 414* 262* 250* 234*       Blood Sugar Average: Last 72 hrs:  (P) 284   · Place on [facility] step 2 diet, obtain Accu-Cheks AC and HS, hold pre-hospital oral antihyperglycemics give Humalog correction dose a c  And HS    Essential hypertension  Assessment & Plan  · Continue pre-hospital Lopressor 25 mg p o  B i d  And lisinopril 5 mg p o  Daily    Other hyperlipidemia  Assessment & Plan  · Substitute Pravachol 80 mg p o   Daily for pre-hospital Crestor 10 mg    Tobacco abuse  Assessment & Plan  · Currently in remission and patient refused nicotine replacement      Discharging Physician / Practitioner: PENG Guajardo  PCP: Melo Esposito MD  Admission Date:   Admission Orders (From admission, onward)     Ordered        09/20/19 0224  Inpatient Admission  Once                   Discharge Date: 09/21/19    Resolved Problems  Date Reviewed: 9/21/2019    None          Consultations During Hospital Stay:  · Pulmonary consult placed will be discontinued    Procedures Performed:   · CXR: No acute cardiopulmonary disease    Significant Findings / Test Results:   · none    Incidental Findings:   · none     Test Results Pending at Discharge (will require follow up):   · none     Outpatient Tests Requested:  · none    Complications:     none    Reason for Admission: dyspnea x 2 days    Hospital Course:     Flavio Richey is a 69 y o  male patient who originally presented to the hospital on 9/20/2019 due to dyspnea x2 days  Patient has a longstanding history of COPD and is on multiple respiratory medications and follows with Dr Yokasta Couch from Pulmonary who he last saw 1 week ago to establish care with him  Patient was previously seen in our ER on 09/02/2019 and diagnosed with COPD which improved after an hour long nebulizer and he was to follow up with his already scheduled pulmonary appointment      Patient states 2 days ago he began with increasing shortness of breath but this was responsive to his prescribed albuterol MDI as well as nebulizer treatments but yesterday became more short of breath at approximately midnight acutely became worse and was unresponsive to his MDI  Patient does report a cough which is productive of scant clear sputum but denies any fever chills, no recent sick contacts  Patient does have an extensive smoking history but states that he stopped smoking approximately 2 months ago  While he is on multiple respiratory medications at home he has not O2 dependent      Upon arrival in the ER was found to be in respiratory distress with an O2 saturation of 78% and placed on a non-rebreather and his O2 saturation improved significantly and on exam he is resting comfortably in bed and states that his breathing is much improved from his arrival though he does still have audible wheezes from across the room  All of his audible wheezes have subsided, patient was weaned off his oxygen and will be appropriate for discharge home  Patient has been receiving IV steroids, and will be discharged home on p o  Prednisone      Please see above list of diagnoses and related plan for additional information  Patient was originally scheduled to stay more than 2 midnights, but due to the patient's significant improvement, we will discharge him home  Condition at Discharge: fair     Discharge Day Visit / Exam:     Subjective:  Patient is lying in bed, he is on room air  He states that he does feel significantly improved however he does get short of breath with ambulation  We will order a desat study and monitor patient for need for oxygen at home  Vitals: Blood Pressure: 167/74 (09/21/19 0656)  Pulse: 87 (09/21/19 0656)  Temperature: (!) 97 3 °F (36 3 °C) (09/21/19 0656)  Temp Source: Temporal (09/21/19 0656)  Respirations: 18 (09/21/19 0656)  Height: 5' 11" (180 3 cm) (09/20/19 0315)  Weight - Scale: 92 6 kg (204 lb 2 3 oz) (09/21/19 0600)  SpO2: 90 % (09/21/19 0826)  Exam:   Physical Exam    Please see physical exam from daily progress note  Discussion with Family: discussed need for steroids  Discharge instructions/Information to patient and family:   See after visit summary for information provided to patient and family  Provisions for Follow-Up Care:  See after visit summary for information related to follow-up care and any pertinent home health orders  Disposition:     Home    For Discharges to [facility]:   · Not Applicable to this Patient - Not Applicable to this Patient    Planned Readmission:    no     Discharge Statement:  I spent 35 minutes discharging the patient  This time was spent on the day of discharge  I had direct contact with the patient on the day of discharge  Greater than 50% of the total time was spent examining patient, answering all patient questions, arranging and discussing plan of care with patient as well as directly providing post-discharge instructions  Additional time then spent on discharge activities      Discharge Medications:  See after visit summary for reconciled discharge medications provided to patient and family        ** Please Note: This note has been constructed using a voice recognition system **

## 2019-09-21 NOTE — PROGRESS NOTES
Progress Note - Marisa Leongr 1945, 69 y o  male MRN: [id number]    Unit/Bed#: -02 Encounter: [id number]    Primary Care Provider: Lissa Solo MD   Date and time admitted to hospital: 9/20/2019  1:16 AM        * COPD with acute exacerbation ([facility] [address] )  Assessment & Plan  · Admit to Medicine  · Respiratory and O2 protocol  · Solu-Medrol 40 mg IV q 8 hours - will change to po prednisone  · Zithromax 500 mg IV daily  · Duo nebs q 6 while awake  · Mucinex 600 mg p o  Q 12  · Consult pulmonary  · Continue pre-hospital Symbicort 160/4 52 puffs b i d  · Likely cause of exacerbation of COPD is bronchitis    Type 2 diabetes mellitus without complication, without long-term current use of insulin [facility])  Assessment & Plan  Lab Results   Component Value Date    HGBA1C 8 3 (H) 09/20/2019       Recent Labs     09/20/19  1112 09/20/19  1552 09/20/19  2059 09/21/19  0700   POCGLU 414* 262* 250* 234*       Blood Sugar Average: Last 72 hrs:  (P) 284   · Place on [facility] step 2 diet, obtain Accu-Cheks AC and HS, hold pre-hospital oral antihyperglycemics give Humalog correction dose a c  And HS    Essential hypertension  Assessment & Plan  · Continue pre-hospital Lopressor 25 mg p o  B i d  And lisinopril 5 mg p o  Daily    Other hyperlipidemia  Assessment & Plan  · Substitute Pravachol 80 mg p o  Daily for pre-hospital Crestor 10 mg    Tobacco abuse  Assessment & Plan  · Currently in remission and patient refused nicotine replacement      VTE Pharmacologic Prophylaxis: Pharmacologic: Heparin    Patient Centered Rounds: I have performed bedside rounds with nursing staff today      Discussions with Specialists or Other Care Team Provider:  Nursing, case management, respiratory  Education and Discussions with Family / Patient:  Discussed with patient COPD exacerbations    Current Length of Stay: 1 day(s)    Current Patient Status: Inpatient   Certification Statement: The patient will continue to require additional inpatient hospital stay due to Continued need for steroids, monitoring for oxygen requirements  Discharge Plan:  Home when appropriate    Code Status: Level 1 - Full Code    Subjective:   Patient is lying in bed, he is on room air  He states that he does feel significantly improved however he does get short of breath with ambulation  We will order a desat study and monitor patient for need for oxygen at home  Objective:     Vitals:   Temp (24hrs), Av °F (36 7 °C), Min:97 3 °F (36 3 °C), Max:98 7 °F (37 1 °C)    Temp:  [97 3 °F (36 3 °C)-98 7 °F (37 1 °C)] 97 3 °F (36 3 °C)  HR:  [70-87] 87  Resp:  [18-22] 18  BP: (140-167)/(68-74) 167/74  SpO2:  [90 %-99 %] 90 %  Body mass index is 28 47 kg/m²  Input and Output Summary (last 24 hours): Intake/Output Summary (Last 24 hours) at 2019 1144  Last data filed at 2019 1100  Gross per 24 hour   Intake 600 ml   Output 2375 ml   Net -1775 ml       Physical Exam:     Physical Exam   Constitutional: He is oriented to person, place, and time  Vital signs are normal  He appears well-developed and well-nourished  He is cooperative  HENT:   Head: Normocephalic and atraumatic  Nose: Nose normal    Mouth/Throat: Oropharynx is clear and moist and mucous membranes are normal    Eyes: Conjunctivae and EOM are normal    Neck: Normal range of motion and full passive range of motion without pain  Cardiovascular: Normal rate, regular rhythm, normal heart sounds and normal pulses  Pulmonary/Chest: Effort normal and breath sounds normal    Currently on room air   Abdominal: Soft  Normal appearance and bowel sounds are normal    Musculoskeletal: Normal range of motion  Neurological: He is alert and oriented to person, place, and time  Skin: Skin is warm  Psychiatric: He has a normal mood and affect   His speech is normal and behavior is normal        Additional Data:     Labs:    Results from last 7 days   Lab Units 19  0507   WBC Thousand/uL 11 60* HEMOGLOBIN g/dL 12 9*   HEMATOCRIT % 37 4*   PLATELETS Thousands/uL 175   NEUTROS PCT % 91*   LYMPHS PCT % 7*   MONOS PCT % 2   EOS PCT % 0     Results from last 7 days   Lab Units 09/21/19  0507  09/20/19  0134   POTASSIUM mmol/L 4 1   < > 4 2   CHLORIDE mmol/L 101   < > 102   CO2 mmol/L 25   < > 26   BUN mg/dL 20   < > 15   CREATININE mg/dL 0 80   < > 0 96   CALCIUM mg/dL 8 7   < > 9 3   ALK PHOS U/L  --   --  76   ALT U/L  --   --  17   AST U/L  --   --  13    < > = values in this interval not displayed  Results from last 7 days   Lab Units 09/21/19  1105 09/21/19  0700 09/20/19  2059 09/20/19  1552 09/20/19  1112 09/20/19  0618   POC GLUCOSE mg/dl 341* 234* 250* 262* 414* 260*     Results from last 7 days   Lab Units 09/20/19  0520   HEMOGLOBIN A1C % 8 3*       * I Have Reviewed All Lab Data Listed Above  * Additional Pertinent Lab Tests Reviewed:  [address] Admission  Reviewed    Imaging:  Imaging Reports Reviewed Today Include:  None    Recent Cultures (last 7 days):           Last 24 Hours Medication List:     Current Facility-Administered Medications:  acetaminophen 650 mg Oral Q6H PRN Anurag Land, PA-C   albuterol 2 5 mg Nebulization Q4H PRN Anurag Land, PA-C   aspirin 81 mg Oral Every Other Day Anurag Land, PA-C   azithromycin 500 mg Oral Q24H Anurag Land, PA-C   budesonide-formoterol 2 puff Inhalation BID Anurag Land, PA-C   gabapentin 200 mg Oral HS Anurag Land, PA-C   guaiFENesin 600 mg Oral BID Anurag Land, PA-C   heparin (porcine) 5,000 Units Subcutaneous [facility] Anurag Land, PA-C   insulin lispro 1-6 Units Subcutaneous TID [facility] Anurag Land, PA-C   insulin lispro 1-6 Units Subcutaneous HS Anurag Land, PA-C   ipratropium-albuterol 3 mL Nebulization Q6H While awake Agata Bennett MD   lisinopril 5 mg Oral Daily Anurag Land, PA-C   metoprolol tartrate 25 mg Oral BID Anurag Land PA-C   ondansetron 4 mg Intravenous Q6H PRN Anurag Land PA-C   pravastatin 80 mg Oral Daily With Janine Guajardo PA-C   predniSONE 40 mg Oral BID With Meals PENG Kerr   primidone 100 mg Oral Q12H [address] Jay Harris PA-C        Today, Patient Was Seen By: PENG Boone    ** Please Note: Dictation voice to text software may have been used in the creation of this document   **

## 2019-09-21 NOTE — SOCIAL WORK
D/c order written, pt denied any d/c needs, rn and doctor aware pt mynor drive himself home, pt was assessed by resp dept and he does not qualify for home oxygen, pt was in agreement with the d/c and d/c plan home

## 2019-09-21 NOTE — ASSESSMENT & PLAN NOTE
Lab Results   Component Value Date    HGBA1C 8 3 (H) 09/20/2019       Recent Labs     09/20/19  1112 09/20/19  1552 09/20/19 2059 09/21/19  0700   POCGLU 414* 262* 250* 234*       Blood Sugar Average: Last 72 hrs:  (P) 284   · Place on [facility] step 2 diet, obtain Accu-Cheks AC and HS, hold pre-hospital oral antihyperglycemics give Humalog correction dose a c   And HS

## 2019-09-21 NOTE — ASSESSMENT & PLAN NOTE
· Admit to Medicine  · Respiratory and O2 protocol  · Solu-Medrol 40 mg IV q 8 hours - will change to po prednisone  · Zithromax 500 mg IV daily  · Duo nebs q 6 while awake  · Mucinex 600 mg p o  Q 12  · Consult pulmonary  · Continue pre-hospital Symbicort 160/4 52 puffs b i d    · Likely cause of exacerbation of COPD is bronchitis

## 2019-09-21 NOTE — PLAN OF CARE
Problem: Potential for Falls  Goal: Patient will remain free of falls  Description  INTERVENTIONS:  - Assess patient frequently for physical needs  -  Identify cognitive and physical deficits and behaviors that affect risk of falls    -  [place] fall precautions as indicated by assessment   - Educate patient/family on patient safety including physical limitations  - Instruct patient to call for assistance with activity based on assessment  - Modify environment to reduce risk of injury  - Consider OT/PT consult to assist with strengthening/mobility  Outcome: Progressing     Problem: PAIN - ADULT  Goal: Verbalizes/displays adequate comfort level or baseline comfort level  Description  Interventions:  - Encourage patient to monitor pain and request assistance  - Assess pain using appropriate pain scale  - Administer analgesics based on type and severity of pain and evaluate response  - Implement non-pharmacological measures as appropriate and evaluate response  - Consider cultural and social influences on pain and pain management  - Notify physician/advanced practitioner if interventions unsuccessful or patient reports new pain  Outcome: Progressing     Problem: INFECTION - ADULT  Goal: Absence or prevention of progression during hospitalization  Description  INTERVENTIONS:  - Assess and monitor for signs and symptoms of infection  - Monitor lab/diagnostic results  - Monitor all insertion sites, i e  indwelling lines, tubes, and drains  - Monitor endotracheal if appropriate and nasal secretions for changes in amount and color  - [place] appropriate cooling/warming therapies per order  - Administer medications as ordered  - Instruct and encourage patient and family to use good hand hygiene technique   Outcome: Progressing  Goal: Absence of fever/infection during neutropenic period  Description  INTERVENTIONS:  - Monitor WBC    Outcome: Progressing     Problem: SAFETY ADULT  Goal: Patient will remain free of falls  Description  INTERVENTIONS:  - Assess patient frequently for physical needs  -  Identify cognitive and physical deficits and behaviors that affect risk of falls    -  [place] fall precautions as indicated by assessment   - Educate patient/family on patient safety including physical limitations  - Instruct patient to call for assistance with activity based on assessment  - Modify environment to reduce risk of injury  - Consider OT/PT consult to assist with strengthening/mobility  Outcome: Progressing  Goal: Maintain or return to baseline ADL function  Description  INTERVENTIONS:  -  Assess patient's ability to carry out ADLs; assess patient's baseline for ADL function and identify physical deficits which impact ability to perform ADLs (bathing, care of mouth/teeth, toileting, grooming, dressing, etc )  - Assess/evaluate cause of self-care deficits   - Assess range of motion  - Assess patient's mobility; develop plan if impaired  - Assess patient's need for assistive devices and provide as appropriate  - Encourage maximum independence but intervene and supervise when necessary  - Involve family in performance of ADLs  - Assess for home care needs following discharge   - Consider OT consult to assist with ADL evaluation and planning for discharge  - Provide patient education as appropriate  Outcome: Progressing  Goal: Maintain or return mobility status to optimal level  Description  INTERVENTIONS:  - Assess patient's baseline mobility status (ambulation, transfers, stairs, etc )    - Identify cognitive and physical deficits and behaviors that affect mobility  - Identify mobility aids required to assist with transfers and/or ambulation (gait belt, sit-to-stand, lift, walker, cane, etc )  - [place] fall precautions as indicated by assessment  - Record patient progress and toleration of activity level on Mobility SBAR; progress patient to next Phase/Stage  - Instruct patient to call for assistance with activity based on assessment  - Consider rehabilitation consult to assist with strengthening/weightbearing, etc   Outcome: Progressing     Problem: DISCHARGE PLANNING  Goal: Discharge to home or other facility with appropriate resources  Description  INTERVENTIONS:  - Identify barriers to discharge w/patient and caregiver  - Arrange for needed discharge resources and transportation as appropriate  - Identify discharge learning needs (meds, wound care, etc )  - Refer to Case Management Department for coordinating discharge planning if the patient needs post-hospital services based on physician/advanced practitioner order or complex needs related to functional status, cognitive ability, or social support system   Outcome: Progressing     Problem: Knowledge Deficit  Goal: Patient/family/caregiver demonstrates understanding of disease process, treatment plan, medications, and discharge instructions  Description  Complete learning assessment and assess knowledge base    Interventions:  - Provide teaching at level of understanding  - Provide teaching via preferred learning methods  Outcome: Progressing     Problem: RESPIRATORY - ADULT  Goal: Achieves optimal ventilation and oxygenation  Description  INTERVENTIONS:  - Assess for changes in respiratory status  - Assess for changes in mentation and behavior  - Position to facilitate oxygenation and minimize respiratory effort  - Oxygen administered by appropriate delivery if ordered  - Initiate smoking cessation education as indicated  - Encourage broncho-pulmonary hygiene including cough, deep breathe, Incentive Spirometry  - Assess the need for suctioning and aspirate as needed  - Assess and instruct to report SOB or any respiratory difficulty  - Respiratory Therapy support as indicated  Outcome: Progressing     Problem: DISCHARGE PLANNING - CARE MANAGEMENT  Goal: Discharge to post-acute care or home with appropriate resources  Description  INTERVENTIONS:  - Conduct assessment to determine patient/family and health care team treatment goals, and need for post-acute services based on payer coverage, community resources, and patient preferences, and barriers to discharge  - Address psychosocial, clinical, and financial barriers to discharge as identified in assessment in conjunction with the patient/family and health care team  - Arrange appropriate level of post-acute services according to patient's   needs and preference and payer coverage in collaboration with the physician and health care team  - Communicate with and update the patient/family, physician, and health care team regarding progress on the discharge plan  - Arrange appropriate transportation to post-acute venues    Will drive self home on discharge   Outcome: Progressing

## 2019-09-21 NOTE — RESPIRATORY THERAPY NOTE
Home Oxygen Qualifying Test       Patient name: Tashi Mcmillan        : 1945   Date of Test:  2019  Diagnosis:      Home Oxygen Test:    **Medicare Guidelines require item(s) 1-5 on all ambulatory patients or 1 and 2 on non-ambulatory patients  1   Baseline SPO2 on Room Air at rest 95 %  2   SPO2 during exercise on Room Air 93 %  During exercise monitor SpO2  If SPO2 increases >=89% with ambulation do not add supplemental             oxygen  If <= 88% on room air add O2 via NC and titrate patient  Patient must be ambulated with O2 and titrated to > 88% with exertion  3   SPO2 on Oxygen at rest NA % NA lpm     4   SPO2 during exercise on Oxygen  NA% a liter flow of NA lpm     5   Exercise performed:          walking          []  Supplemental Home Oxygen is indicated  [x]  Client does not qualify for home oxygen        Respiratory Additional Notes  Jagdish Chi, RT

## 2019-09-21 NOTE — PLAN OF CARE
Problem: Potential for Falls  Goal: Patient will remain free of falls  Description  INTERVENTIONS:  - Assess patient frequently for physical needs  -  Identify cognitive and physical deficits and behaviors that affect risk of falls    -  [place] fall precautions as indicated by assessment   - Educate patient/family on patient safety including physical limitations  - Instruct patient to call for assistance with activity based on assessment  - Modify environment to reduce risk of injury  - Consider OT/PT consult to assist with strengthening/mobility  Outcome: Progressing     Problem: PAIN - ADULT  Goal: Verbalizes/displays adequate comfort level or baseline comfort level  Description  Interventions:  - Encourage patient to monitor pain and request assistance  - Assess pain using appropriate pain scale  - Administer analgesics based on type and severity of pain and evaluate response  - Implement non-pharmacological measures as appropriate and evaluate response  - Consider cultural and social influences on pain and pain management  - Notify physician/advanced practitioner if interventions unsuccessful or patient reports new pain  Outcome: Progressing     Problem: INFECTION - ADULT  Goal: Absence or prevention of progression during hospitalization  Description  INTERVENTIONS:  - Assess and monitor for signs and symptoms of infection  - Monitor lab/diagnostic results  - Monitor all insertion sites, i e  indwelling lines, tubes, and drains  - Monitor endotracheal if appropriate and nasal secretions for changes in amount and color  - [place] appropriate cooling/warming therapies per order  - Administer medications as ordered  - Instruct and encourage patient and family to use good hand hygiene technique   Outcome: Progressing  Goal: Absence of fever/infection during neutropenic period  Description  INTERVENTIONS:  - Monitor WBC    Outcome: Progressing     Problem: SAFETY ADULT  Goal: Patient will remain free of falls  Description  INTERVENTIONS:  - Assess patient frequently for physical needs  -  Identify cognitive and physical deficits and behaviors that affect risk of falls    -  [place] fall precautions as indicated by assessment   - Educate patient/family on patient safety including physical limitations  - Instruct patient to call for assistance with activity based on assessment  - Modify environment to reduce risk of injury  - Consider OT/PT consult to assist with strengthening/mobility  Outcome: Progressing  Goal: Maintain or return to baseline ADL function  Description  INTERVENTIONS:  -  Assess patient's ability to carry out ADLs; assess patient's baseline for ADL function and identify physical deficits which impact ability to perform ADLs (bathing, care of mouth/teeth, toileting, grooming, dressing, etc )  - Assess/evaluate cause of self-care deficits   - Assess range of motion  - Assess patient's mobility; develop plan if impaired  - Assess patient's need for assistive devices and provide as appropriate  - Encourage maximum independence but intervene and supervise when necessary  - Involve family in performance of ADLs  - Assess for home care needs following discharge   - Consider OT consult to assist with ADL evaluation and planning for discharge  - Provide patient education as appropriate  Outcome: Progressing  Goal: Maintain or return mobility status to optimal level  Description  INTERVENTIONS:  - Assess patient's baseline mobility status (ambulation, transfers, stairs, etc )    - Identify cognitive and physical deficits and behaviors that affect mobility  - Identify mobility aids required to assist with transfers and/or ambulation (gait belt, sit-to-stand, lift, walker, cane, etc )  - [place] fall precautions as indicated by assessment  - Record patient progress and toleration of activity level on Mobility SBAR; progress patient to next Phase/Stage  - Instruct patient to call for assistance with activity based on assessment  - Consider rehabilitation consult to assist with strengthening/weightbearing, etc   Outcome: Progressing     Problem: DISCHARGE PLANNING  Goal: Discharge to home or other facility with appropriate resources  Description  INTERVENTIONS:  - Identify barriers to discharge w/patient and caregiver  - Arrange for needed discharge resources and transportation as appropriate  - Identify discharge learning needs (meds, wound care, etc )  - Refer to Case Management Department for coordinating discharge planning if the patient needs post-hospital services based on physician/advanced practitioner order or complex needs related to functional status, cognitive ability, or social support system   Outcome: Progressing     Problem: Knowledge Deficit  Goal: Patient/family/caregiver demonstrates understanding of disease process, treatment plan, medications, and discharge instructions  Description  Complete learning assessment and assess knowledge base    Interventions:  - Provide teaching at level of understanding  - Provide teaching via preferred learning methods  Outcome: Progressing     Problem: RESPIRATORY - ADULT  Goal: Achieves optimal ventilation and oxygenation  Description  INTERVENTIONS:  - Assess for changes in respiratory status  - Assess for changes in mentation and behavior  - Position to facilitate oxygenation and minimize respiratory effort  - Oxygen administered by appropriate delivery if ordered  - Initiate smoking cessation education as indicated  - Encourage broncho-pulmonary hygiene including cough, deep breathe, Incentive Spirometry  - Assess the need for suctioning and aspirate as needed  - Assess and instruct to report SOB or any respiratory difficulty  - Respiratory Therapy support as indicated  Outcome: Progressing     Problem: DISCHARGE PLANNING - CARE MANAGEMENT  Goal: Discharge to post-acute care or home with appropriate resources  Description  INTERVENTIONS:  - Conduct assessment to determine patient/family and health care team treatment goals, and need for post-acute services based on payer coverage, community resources, and patient preferences, and barriers to discharge  - Address psychosocial, clinical, and financial barriers to discharge as identified in assessment in conjunction with the patient/family and health care team  - Arrange appropriate level of post-acute services according to patient's   needs and preference and payer coverage in collaboration with the physician and health care team  - Communicate with and update the patient/family, physician, and health care team regarding progress on the discharge plan  - Arrange appropriate transportation to post-acute venues    Will drive self home on discharge   Outcome: Progressing

## 2019-09-21 NOTE — SOCIAL WORK
I called Octavio Amaya 2 12:55 to # [phone] and the was no answer , I then called [phone] and spoke with the pt  Pt had left befor I could check if he had a nebulizer at home, pt stated to me he does have a nebulizer at home, he denied any additional d/c needs

## 2019-09-21 NOTE — NURSING NOTE
AWAKE AND SITTING UP AT BEDSIDE  R/A IS MAINTAINED  NO COUGH NOTED  LUNGS ARE DECREASED B/L  NO S/S RESP DISTRESS IS NOTED   HE OFFERS NO C/O  CALL BELL GIVEN

## 2019-09-23 DIAGNOSIS — Z71.89 COMPLEX CARE COORDINATION: Primary | ICD-10-CM

## 2019-09-25 ENCOUNTER — CLINICAL SUPPORT (OUTPATIENT)
Dept: CARDIAC REHAB | Facility: HOSPITAL | Age: 74
End: 2019-09-25
Attending: INTERNAL MEDICINE
Payer: MEDICARE

## 2019-09-25 DIAGNOSIS — I50.22 CHRONIC SYSTOLIC HEART FAILURE (HCC): ICD-10-CM

## 2019-09-25 PROCEDURE — 93797 PHYS/QHP OP CAR RHAB WO ECG: CPT

## 2019-09-25 NOTE — PROGRESS NOTES
CARDIAC/PULMONARY REHAB ASSESSMENT     Today's date: 2019   Patient name: Juan Hadley                                        : 1945                                                            MRN: [id number]  PCP: No primary care provider on file  Cardiologist: Geneva Brittle, MD  Dx: CHF  Date of onset: 2019  Cultural needs: None       Height:   70 in  Weight:    208 lb  Medical History:        Past Medical History:   Diagnosis Date    BPH (benign prostatic hyperplasia)       45 days radiation treatment    COPD (chronic obstructive pulmonary disease)      Coronary artery disease       CABG x4 in 2017    Diabetes mellitus      History of Arterial Duplex of LE 2017     Likely occlusion of the left superficial femoral artery  Calcific changes bilaterally  Despite these changes, the ankle-brachial index as a measure of peripheral blood flow only mildly impaired   History of echocardiogram 2017     EF 40%, mild LVH, mild MR     Hyperlipidemia      Hypertension      NSTEMI (non-ST elevated myocardial infarction)      Prostate cancer       prostate     PVD (peripheral vascular disease)      Tremor           Physical Limitations: Neuropathy in both feet       Risk Factors   Smoking: Yes; patient states he still smokes and "sneaks a cigarette once in a while "  HTN: Yes  DM: Type 2; patient states he does not check it  Obesity: No, overweight; BMI 27 9   Inactivity: Yes; patient does not do formal exercise at home     Family History:         Family History   Problem Relation Age of Onset    Cancer Father      Heart disease Brother        Allergies: No Known Allergies  Other: None       Current Medications:   Current Outpatient Medications   Medication Sig Dispense Refill    albuterol (2 5 mg/3 mL) 0 083 % nebulizer solution Take 1 vial (2 5 mg total) by nebulization every 4 (four) hours as needed for wheezing or shortness of breath 75 mL 0    albuterol (PROVENTIL HFA,VENTOLIN HFA) 90 mcg/act inhaler Inhale 2 puffs every 6 (six) hours as needed for wheezing or shortness of breath        Alogliptin Benzoate 25 MG TABS Take by mouth daily        aspirin (ECOTRIN LOW STRENGTH) 81 mg EC tablet Take 81 mg by mouth every other day         Empagliflozin 10 MG TABS Take 10 mg by mouth every morning        gabapentin (NEURONTIN) 100 mg capsule Take 200 mg by mouth daily at bedtime        lisinopril (ZESTRIL) 5 mg tablet Take 1 tablet (5 mg total) by mouth daily 90 tablet 3    metFORMIN (GLUCOPHAGE) 500 mg tablet 1/2 a tablet in the morning and evening         methylPREDNISolone 4 MG tablet therapy pack Use as directed on package   0    metoprolol tartrate (LOPRESSOR) 50 mg tablet Take 25 mg by mouth 2 (two) times a day        primidone (MYSOLINE) 50 mg tablet Take 1 tablet (50 mg total) by mouth every 12 (twelve) hours (Patient taking differently: Take 100 mg by mouth every 12 (twelve) hours )   0    rosuvastatin (CRESTOR) 10 MG tablet Take 10 mg by mouth daily        saxagliptin (ONGLYZA) 5 MG tablet Take 5 mg by mouth daily        tiotropium-olodaterol (STIOLTO RESPIMAT) 2 5-2 5 MCG/ACT inhaler Inhale 2 puffs daily          No current facility-administered medications for this visit           Functional Status Prior to Diagnosis for Treatment   Occupation: Retired in 2009  Recreation: sleeping; watching car races, spending time with friends  ADLs: Patient states he gets SOB with anything  Larimer: Patient states he is able to do everything himself but does have problems with SOB with going up/down steps as well as getting up and getting dressed  Exercise: Patient denies formal exercise at home     Other: None       Short Term Program Goals: Decrease shortness of breath, improve stamina, increase strength            Long Term Goals: Be able to participate in favorite activity, sport, hobby (golf, hunt, sew, play games, cook) and enjoy family, friends without breathing difficulties     Comments: Patient O2 is between 89-90 on Room Air  Patient is to see a pulmonologist next Friday to determine the severity of his COPD as well as to see if he needs supplemental O2       Ability to reach goals/rehabilitation potential: medium     Projected return to function: Partial     Emotional/Social     Marital status: ; Wife is retired     Life Stressors: Stress level 3/10; Patient states that when he and his wife fight, it stressing him out       Goals: Patient states he would like to walk with less dyspnea as well as completely get the bronchitis out of his system       Comments: Patient states he is agreeable to coming to Cardiac Rehab 3 times per week  Patient PFT results were done with Dr Julio Saavedra at [facility]; results showed very severe COPD

## 2019-09-25 NOTE — PROGRESS NOTES
Cardiac Rehabilitation Plan of Care   Care Plan       Today's date: 2019   Visits: 1- Intake  Patient name: Simi Gilbert      : 1945  Age: 69 y o  MRN: [id number]  Referring Physician: Jaimee Delatorre MD  Cardiologist: Tip Pack MD  Provider: Oneida Moffett  Clinician: Miguel Ave, [place], CCRP    Dx:   Encounter Diagnosis   Name Primary?  Chronic systolic heart failure [facility])      Date of onset: 2019      SUMMARY OF PROGRESS:   Today was Sherryl Hammans first initial visit at [address]  The patient states he is 100% medication compliant  Patient states that he does have neuropathy in both feet which may limit his exercise and affect his balance  During rest, the patient's HR was 83, his BP was 110/64, and his O2 was 89-90% on room air  The patient stated that he did  Have a PFT done with Dr Jessica Ventura at [address]  PFT results stated that he had "very severe COPD " Pulmonary Rehab staff will follow up with his Cardiologist and PCP once again to assure he does have a PFT done  Patient states he is agreeable to coming to Pulmonary Rehab 3 times per week for 12 weeks or 36 sessions are completed  The patient attempted a 6MWT; he completed a total of 2 minutes and 30 seconds  The test was terminated due to his O2 dropping from 88% to 85% from the second to third minute of the test  Lazaro Alegria was able to walk 280 feet during that time which is a 1 40 MET level  The patient stated that the test was a "4" on the 1-10 RPE Scale  The patient also stated that his SOB was a "6" on the 1-10 SOB Scale  Lazaro Alegria reached a peak HR of 101 and a peak BP of 144/60 during the 6MWT  During recovery, the patient's HR was 88, his BP was 118/60, and his O2 was 95%  He did show some ectopy which can be seen in the attached "Exercise Session Details "      Medication compliance: Yes   Comments: Patient states he is 100% medication compliant     Fall Risk: Moderate   Comments: Patient uses a one point cane to aide when walking  Pt  Also has neuropathy in both feet which effects his balance as well  EKG changes: Frequent PVCs      EXERCISE ASSESSMENT and PLAN    Current Exercise Program in Rehab:       Frequency: 3-4 days/week        Minutes: 30-35         METS: 2 0-2 5   HR: 20-30 above RHR; 103-113   RPE: 4-6         Modalities: UBE, NuStep and Recumbent bike      Exercise Progression 30 Day Goals :    Frequency: 4-5days/week   Minutes: 35-40   METS: 3 0-3 5   HR: 20-30 above RHR; 103-113    RPE: 4-6   Modalities: UBE, NuStep, Recumbent bike and Room walking    Strength trainin-3 days / week  12-15 repetitions  1-2 sets per modality   Will be added following 2-3 weeks of monitored exercise sessions   Modalities: Leg Press, Chest Press, Lateral Raise, Arm Curl, Seated Row and Front Raises    Progressing: In Progress    Home Exercise: None; Cardiac Rehab Staff did encourage patient to begin exercising at home once his COPD is more controlled  Goals: 10% improvement in functional capacity, Reduced Dyspnea with physical activity  0-1/10, improved DASI score by 10%, Increase in peak CR METs by 40%, Improved 6MWT distance by 10%, Resume ADLs with increased strength and Exercise 5 days/wk, >150mins/wk  Education: Benefit of exercise for CAD risk factors, home exercise guidelines, signs and sxs and RPE scale   Plan:education on home exercise guidelines, home exercise 30+ mins 2 days opposite CR and Education class: Risk Factors for Heart Disease  Readiness to change: Preparation      NUTRITION ASSESSMENT AND PLAN    Weight control:    Starting weight: 208 lb    Current weight:     Waist circumference:    Startin   Current:    Diabetes: Patient states he has diabetes but does not check his BG     Lipid management: Discussed diet and lipid management  Goals:reduced BMI to < 25, reduced waist circumference <40 inches, fasting BG , improved A1c  < 6 5%, Improved Rate Your Plate score  >75 and Wt  loss 1-2 ppw goal of 20 lbs  Education: heart healthy eating  low sodium diet  hydration  diet and lipid management  diabetes management and exercise  wt  loss  Progressing: In Progress  Plan: Education class: Reading Food Labels, Education Class: Heart Healthy Eating, Increase PUFA and MUFA, Decrease SFA, Increase whole grains, increase fruits/vegs, eliminate processed meats, reduce red meat 1x/wk, swtich to low fat dairy and Reduce added sugars <25g/day  Readiness to change: Preparation      PSYCHOSOCIAL ASSESSMENT AND PLAN    Emotional:  PHQ-9 = 1-4 = Minimal Depression  Self-reported stress level: 5   Social support: Very Good  Goals:  Reduce perceived stress to 1-3/10, PHQ-9 - reduced severity by one level, Feelings in [facility] Score < 3, Physical Fitness in [facility] Score < 3, Daily Activity in [facility] Score < 3, Social Activities in [facility] Score < 3, Pain in [facility] Score < 3, Quality of Life in [facility] Score < 3 , Change in Health in [facility] Score < 3 , Increased interest in doing things, improved sleep, improved positive thoughts of well being and increased energy  Education: signs/sxs of depression, benefits of positive support system and coping mechanisms  Progressing: In Progress  Plan: Practice relaxation techniques  Readiness to change: Preparation      OTHER CORE COMPONENTS     Tobacco:   Social History     Tobacco Use   Smoking Status Former Smoker    Last attempt to quit: 2/15/2019    Years since quittin 6   Smokeless Tobacco Never Used       Tobacco Use Intervention: Referral to tobacco expert:   Brief counseling by cardiac rehab professional  Date: 2019    Blood pressure:    Restin/64   Exercise: 144/60   Recovery: 118/60    Goals: consistent BP < 130/80, consistent exercise >150 mins/wk, reduce number of cigarettes/day, set a target quit date and make contact with a tobacco use prevention program  Education:  understanding HTN and CAD, low sodium diet and HTN and smoking and heart disease  Progressing: In Progress  Plan: Class: Understanding Heart Disease, Class: Common Heart Medications, make contact with smoking cessation program and Set target quit date for smoking  Readiness to change: Preparation    OUTCOMES  Name: Eris Medeiros : 1945      Risk:     HIGH        Pre Post % change  Goal   Date: 2019      Physical           Sub Max ETT (mets) NA  #VALUE! 10% increase   6MWT (feet) 280  -100 0% 10% increase   UCSD Dyspnea Score 77  -100 0% 5 pt decrease   Supplemental O2 use (L) 0      [facility] AI (est  peak O2) 18 95  -100 0%    COPD assessment Test (CAT) 27  -100 0% 2 pt decrease   Peak exercise CR/ ND (mets) 1 4  -100 0% 40% increase   Emotional           PHQ 9  (> 10 refer to MD) 12  -100 0% 4 pt decrease   [facility] score = improvement     Total  31  -100 0% < 27   Feelings 3  -100 0% < 3   Physical fitness 5  -100 0% < 3   Social Support 2  0 0% < 3   Daily activities 4  -100 0% < 3   Social Activities 3  -100 0% < 3   Pain 4  -100 0% < 3   Overall Health 4  -100 0% < 3   Quality of Life 3  -100 0% < 3   Change in health 3  -100 0% < 3   Dietary           Rate your plate 43  -144 4% > 58   Measurements           Weight 208  -100 0% 2 5-5%    BMI 27 9   -100 0% 19-25   Waist Circ  39  -100 0% < 40 M / < 35 F    BP left arm     (systolic) 963  -027 4% < 589                              (diastolic) 64  -424 3% < 90   Smoking #/day (if applicable) 0  #DIV/0! 0   Lipids/ Glucose (Date) 2019   -1     Total cholesterol NA  #VALUE!    Triglycerides NA  #VALUE! < 150   HDL NA  #VALUE! 40-60   LDL NA  #VALUE! < 100   A1C % 8 1  -100 0% 4 0 - 5 6%   Fasting   -100 0%           Physician signature: _______________________ _________________     Date:         Exercise Prescription   Exercise Modality  Initial Workload MET Level    Nu-Step (NU) Level: 4 Steps/Minute: 60 2 0 METs   Arm Ergometer (AE) RPM: 40-50 Level 4 0 2 7 METs   Room Walking  0% 2 5 METs Recumbent Bike (RB) Level: 1 RPM: 50-60 3 2 METs   Resistance (RES) 5 lbs Weights 10 lbs Pulleys      Comments: Patient completed 6MWT at 280 feet ( 2:30 @ 1 4 METS)  Will start exercise at 2 0-2 5 METS and increase intensity as tolerated by patient  Will add strength training at  weeks

## 2019-09-27 ENCOUNTER — CLINICAL SUPPORT (OUTPATIENT)
Dept: CARDIAC REHAB | Facility: HOSPITAL | Age: 74
End: 2019-09-27
Attending: INTERNAL MEDICINE
Payer: MEDICARE

## 2019-09-27 DIAGNOSIS — I50.22 CHRONIC SYSTOLIC HEART FAILURE (HCC): ICD-10-CM

## 2019-09-27 PROCEDURE — 93798 PHYS/QHP OP CAR RHAB W/ECG: CPT

## 2019-09-30 ENCOUNTER — CLINICAL SUPPORT (OUTPATIENT)
Dept: CARDIAC REHAB | Facility: HOSPITAL | Age: 74
End: 2019-09-30
Attending: INTERNAL MEDICINE
Payer: MEDICARE

## 2019-09-30 DIAGNOSIS — I50.22 CHRONIC SYSTOLIC HEART FAILURE (HCC): ICD-10-CM

## 2019-09-30 PROCEDURE — 93798 PHYS/QHP OP CAR RHAB W/ECG: CPT

## 2019-10-02 ENCOUNTER — CLINICAL SUPPORT (OUTPATIENT)
Dept: CARDIAC REHAB | Facility: HOSPITAL | Age: 74
End: 2019-10-02
Attending: INTERNAL MEDICINE
Payer: MEDICARE

## 2019-10-02 DIAGNOSIS — I50.22 CHRONIC SYSTOLIC HEART FAILURE (HCC): ICD-10-CM

## 2019-10-02 PROCEDURE — 93798 PHYS/QHP OP CAR RHAB W/ECG: CPT

## 2019-10-04 ENCOUNTER — CLINICAL SUPPORT (OUTPATIENT)
Dept: CARDIAC REHAB | Facility: HOSPITAL | Age: 74
End: 2019-10-04
Attending: INTERNAL MEDICINE
Payer: MEDICARE

## 2019-10-04 DIAGNOSIS — I50.22 CHRONIC SYSTOLIC HEART FAILURE (HCC): ICD-10-CM

## 2019-10-04 PROCEDURE — 93798 PHYS/QHP OP CAR RHAB W/ECG: CPT

## 2019-10-07 ENCOUNTER — APPOINTMENT (OUTPATIENT)
Dept: CARDIAC REHAB | Facility: HOSPITAL | Age: 74
End: 2019-10-07
Attending: INTERNAL MEDICINE
Payer: MEDICARE

## 2019-10-09 ENCOUNTER — APPOINTMENT (OUTPATIENT)
Dept: CARDIAC REHAB | Facility: HOSPITAL | Age: 74
End: 2019-10-09
Attending: INTERNAL MEDICINE
Payer: MEDICARE

## 2019-10-09 DIAGNOSIS — I50.22 CHRONIC SYSTOLIC HEART FAILURE (HCC): ICD-10-CM

## 2019-10-09 PROCEDURE — 93798 PHYS/QHP OP CAR RHAB W/ECG: CPT

## 2019-10-11 ENCOUNTER — CLINICAL SUPPORT (OUTPATIENT)
Dept: CARDIAC REHAB | Facility: HOSPITAL | Age: 74
End: 2019-10-11
Attending: INTERNAL MEDICINE
Payer: MEDICARE

## 2019-10-11 DIAGNOSIS — I50.22 CHRONIC SYSTOLIC HEART FAILURE (HCC): ICD-10-CM

## 2019-10-11 PROCEDURE — 93798 PHYS/QHP OP CAR RHAB W/ECG: CPT

## 2019-10-14 ENCOUNTER — CLINICAL SUPPORT (OUTPATIENT)
Dept: CARDIAC REHAB | Facility: HOSPITAL | Age: 74
End: 2019-10-14
Attending: INTERNAL MEDICINE
Payer: MEDICARE

## 2019-10-14 DIAGNOSIS — I50.22 CHRONIC SYSTOLIC HEART FAILURE (HCC): ICD-10-CM

## 2019-10-14 PROCEDURE — 93798 PHYS/QHP OP CAR RHAB W/ECG: CPT

## 2019-10-15 ENCOUNTER — PATIENT OUTREACH (OUTPATIENT)
Dept: CASE MANAGEMENT | Facility: OTHER | Age: 74
End: 2019-10-15

## 2019-10-15 NOTE — PROGRESS NOTES
Patient denies SOB and chest pain, pressure, or tightness  C/o occasional dry cough  Reviewed COPD exacerbation-triggers, red flags, & action plan with teach back  Patient states he quit smoking a month ago without assistance  He was seen by the [place]  His BS had a high of 326 while he was taking prednisone  Now he reports improvement, but still in the high 100s, low 200s  He states he does not follow a diabetic diet & frequently eats candy & drinks regular soda  He is willing to cut back slightly, but not eliminate or make substitutions for foods he likes  Educated on uncontrolled blood sugars & complications  Patient states understanding  He denies questions or needs  BPCI closure, episode end, 10/15/19

## 2019-10-16 ENCOUNTER — APPOINTMENT (OUTPATIENT)
Dept: CARDIAC REHAB | Facility: HOSPITAL | Age: 74
End: 2019-10-16
Attending: INTERNAL MEDICINE
Payer: MEDICARE

## 2019-10-18 ENCOUNTER — CLINICAL SUPPORT (OUTPATIENT)
Dept: CARDIAC REHAB | Facility: HOSPITAL | Age: 74
End: 2019-10-18
Attending: INTERNAL MEDICINE
Payer: MEDICARE

## 2019-10-18 DIAGNOSIS — I50.22 CHRONIC SYSTOLIC HEART FAILURE (HCC): ICD-10-CM

## 2019-10-18 PROCEDURE — 93798 PHYS/QHP OP CAR RHAB W/ECG: CPT

## 2019-10-21 ENCOUNTER — APPOINTMENT (OUTPATIENT)
Dept: CARDIAC REHAB | Facility: HOSPITAL | Age: 74
End: 2019-10-21
Attending: INTERNAL MEDICINE
Payer: MEDICARE

## 2019-10-22 ENCOUNTER — HOSPITAL ENCOUNTER (OUTPATIENT)
Facility: HOSPITAL | Age: 74
Setting detail: OBSERVATION
Discharge: HOME | End: 2019-10-23
Attending: INTERNAL MEDICINE | Admitting: INTERNAL MEDICINE
Payer: MEDICARE

## 2019-10-22 ENCOUNTER — APPOINTMENT (EMERGENCY)
Dept: RADIOLOGY | Facility: HOSPITAL | Age: 74
End: 2019-10-22
Payer: MEDICARE

## 2019-10-22 DIAGNOSIS — J44.1 COPD WITH ACUTE EXACERBATION (HCC): Primary | Chronic | ICD-10-CM

## 2019-10-22 DIAGNOSIS — I50.82 BIVENTRICULAR CONGESTIVE HEART FAILURE (HCC): ICD-10-CM

## 2019-10-22 DIAGNOSIS — R25.1 TREMOR: Chronic | ICD-10-CM

## 2019-10-22 DIAGNOSIS — J44.1 COPD EXACERBATION (HCC): ICD-10-CM

## 2019-10-22 LAB
ALBUMIN SERPL BCP-MCNC: 4.4 G/DL (ref 3.5–5.7)
ALP SERPL-CCNC: 78 U/L (ref 55–165)
ALT SERPL W P-5'-P-CCNC: 17 U/L (ref 7–52)
ANION GAP SERPL CALCULATED.3IONS-SCNC: 10 MMOL/L (ref 4–13)
ANION GAP SERPL CALCULATED.3IONS-SCNC: 11 MMOL/L (ref 4–13)
AST SERPL W P-5'-P-CCNC: 11 U/L (ref 13–39)
BASOPHILS # BLD AUTO: 0 THOUSANDS/ΜL (ref 0–0.1)
BASOPHILS # BLD AUTO: 0 THOUSANDS/ΜL (ref 0–0.1)
BASOPHILS NFR BLD AUTO: 0 % (ref 0–2)
BASOPHILS NFR BLD AUTO: 1 % (ref 0–2)
BILIRUB SERPL-MCNC: 0.4 MG/DL (ref 0.2–1)
BNP SERPL-MCNC: 89 PG/ML (ref 1–100)
BUN SERPL-MCNC: 17 MG/DL (ref 7–25)
BUN SERPL-MCNC: 17 MG/DL (ref 7–25)
CALCIUM SERPL-MCNC: 8.7 MG/DL (ref 8.6–10.5)
CALCIUM SERPL-MCNC: 9.1 MG/DL (ref 8.6–10.5)
CHLORIDE SERPL-SCNC: 104 MMOL/L (ref 98–107)
CHLORIDE SERPL-SCNC: 104 MMOL/L (ref 98–107)
CO2 SERPL-SCNC: 24 MMOL/L (ref 21–31)
CO2 SERPL-SCNC: 24 MMOL/L (ref 21–31)
CREAT SERPL-MCNC: 0.79 MG/DL (ref 0.7–1.3)
CREAT SERPL-MCNC: 0.81 MG/DL (ref 0.7–1.3)
D DIMER PPP FEU-MCNC: <0.15 UG/ML FEU
EOSINOPHIL # BLD AUTO: 0.2 THOUSAND/ΜL (ref 0–0.61)
EOSINOPHIL # BLD AUTO: 0.3 THOUSAND/ΜL (ref 0–0.61)
EOSINOPHIL NFR BLD AUTO: 3 % (ref 0–5)
EOSINOPHIL NFR BLD AUTO: 4 % (ref 0–5)
ERYTHROCYTE [DISTWIDTH] IN BLOOD BY AUTOMATED COUNT: 13.5 % (ref 11.5–14.5)
ERYTHROCYTE [DISTWIDTH] IN BLOOD BY AUTOMATED COUNT: 14 % (ref 11.5–14.5)
GFR SERPL CREATININE-BSD FRML MDRD: 88 ML/MIN/1.73SQ M
GFR SERPL CREATININE-BSD FRML MDRD: 89 ML/MIN/1.73SQ M
GLUCOSE SERPL-MCNC: 235 MG/DL (ref 65–140)
GLUCOSE SERPL-MCNC: 249 MG/DL (ref 65–99)
GLUCOSE SERPL-MCNC: 273 MG/DL (ref 65–140)
GLUCOSE SERPL-MCNC: 282 MG/DL (ref 65–99)
GLUCOSE SERPL-MCNC: 366 MG/DL (ref 65–140)
GLUCOSE SERPL-MCNC: 397 MG/DL (ref 65–140)
GLUCOSE SERPL-MCNC: 417 MG/DL (ref 65–140)
HCT VFR BLD AUTO: 40.1 % (ref 42–47)
HCT VFR BLD AUTO: 41.9 % (ref 42–47)
HGB BLD-MCNC: 13.9 G/DL (ref 14–18)
HGB BLD-MCNC: 14.5 G/DL (ref 14–18)
LACTATE SERPL-SCNC: 1.6 MMOL/L (ref 0.5–2)
LYMPHOCYTES # BLD AUTO: 0.7 THOUSANDS/ΜL (ref 0.6–4.47)
LYMPHOCYTES # BLD AUTO: 1.5 THOUSANDS/ΜL (ref 0.6–4.47)
LYMPHOCYTES NFR BLD AUTO: 11 % (ref 21–51)
LYMPHOCYTES NFR BLD AUTO: 23 % (ref 21–51)
MCH RBC QN AUTO: 31.3 PG (ref 26–34)
MCH RBC QN AUTO: 31.4 PG (ref 26–34)
MCHC RBC AUTO-ENTMCNC: 34.5 G/DL (ref 31–37)
MCHC RBC AUTO-ENTMCNC: 34.6 G/DL (ref 31–37)
MCV RBC AUTO: 91 FL (ref 81–99)
MCV RBC AUTO: 91 FL (ref 81–99)
MONOCYTES # BLD AUTO: 0.2 THOUSAND/ΜL (ref 0.17–1.22)
MONOCYTES # BLD AUTO: 0.6 THOUSAND/ΜL (ref 0.17–1.22)
MONOCYTES NFR BLD AUTO: 3 % (ref 2–12)
MONOCYTES NFR BLD AUTO: 8 % (ref 2–12)
NEUTROPHILS # BLD AUTO: 4.3 THOUSANDS/ΜL (ref 1.4–6.5)
NEUTROPHILS # BLD AUTO: 5.6 THOUSANDS/ΜL (ref 1.4–6.5)
NEUTS SEG NFR BLD AUTO: 64 % (ref 42–75)
NEUTS SEG NFR BLD AUTO: 83 % (ref 42–75)
PLATELET # BLD AUTO: 220 THOUSANDS/UL (ref 149–390)
PLATELET # BLD AUTO: 238 THOUSANDS/UL (ref 149–390)
PMV BLD AUTO: 8.6 FL (ref 8.6–11.7)
PMV BLD AUTO: 8.8 FL (ref 8.6–11.7)
POTASSIUM SERPL-SCNC: 3.8 MMOL/L (ref 3.5–5.5)
POTASSIUM SERPL-SCNC: 4 MMOL/L (ref 3.5–5.5)
PROCALCITONIN SERPL-MCNC: <0.05 NG/ML
PROCALCITONIN SERPL-MCNC: <0.05 NG/ML
PROT SERPL-MCNC: 6.7 G/DL (ref 6.4–8.9)
RBC # BLD AUTO: 4.43 MILLION/UL (ref 4.3–5.9)
RBC # BLD AUTO: 4.61 MILLION/UL (ref 4.3–5.9)
SODIUM SERPL-SCNC: 138 MMOL/L (ref 134–143)
SODIUM SERPL-SCNC: 139 MMOL/L (ref 134–143)
WBC # BLD AUTO: 6.6 THOUSAND/UL (ref 4.8–10.8)
WBC # BLD AUTO: 6.7 THOUSAND/UL (ref 4.8–10.8)

## 2019-10-22 PROCEDURE — G8987 SELF CARE CURRENT STATUS: HCPCS

## 2019-10-22 PROCEDURE — 94760 N-INVAS EAR/PLS OXIMETRY 1: CPT

## 2019-10-22 PROCEDURE — 99285 EMERGENCY DEPT VISIT HI MDM: CPT

## 2019-10-22 PROCEDURE — 99220 PR INITIAL OBSERVATION CARE/DAY 70 MINUTES: CPT | Performed by: NURSE PRACTITIONER

## 2019-10-22 PROCEDURE — 84145 PROCALCITONIN (PCT): CPT | Performed by: NURSE PRACTITIONER

## 2019-10-22 PROCEDURE — 97163 PT EVAL HIGH COMPLEX 45 MIN: CPT

## 2019-10-22 PROCEDURE — G8988 SELF CARE GOAL STATUS: HCPCS

## 2019-10-22 PROCEDURE — 84145 PROCALCITONIN (PCT): CPT | Performed by: INTERNAL MEDICINE

## 2019-10-22 PROCEDURE — 94640 AIRWAY INHALATION TREATMENT: CPT

## 2019-10-22 PROCEDURE — 71045 X-RAY EXAM CHEST 1 VIEW: CPT

## 2019-10-22 PROCEDURE — G8978 MOBILITY CURRENT STATUS: HCPCS

## 2019-10-22 PROCEDURE — 85025 COMPLETE CBC W/AUTO DIFF WBC: CPT | Performed by: NURSE PRACTITIONER

## 2019-10-22 PROCEDURE — 80053 COMPREHEN METABOLIC PANEL: CPT | Performed by: INTERNAL MEDICINE

## 2019-10-22 PROCEDURE — 97167 OT EVAL HIGH COMPLEX 60 MIN: CPT

## 2019-10-22 PROCEDURE — 85379 FIBRIN DEGRADATION QUANT: CPT | Performed by: INTERNAL MEDICINE

## 2019-10-22 PROCEDURE — 82948 REAGENT STRIP/BLOOD GLUCOSE: CPT

## 2019-10-22 PROCEDURE — 36415 COLL VENOUS BLD VENIPUNCTURE: CPT | Performed by: INTERNAL MEDICINE

## 2019-10-22 PROCEDURE — 96375 TX/PRO/DX INJ NEW DRUG ADDON: CPT

## 2019-10-22 PROCEDURE — 93005 ELECTROCARDIOGRAM TRACING: CPT

## 2019-10-22 PROCEDURE — 83605 ASSAY OF LACTIC ACID: CPT | Performed by: INTERNAL MEDICINE

## 2019-10-22 PROCEDURE — 80048 BASIC METABOLIC PNL TOTAL CA: CPT | Performed by: NURSE PRACTITIONER

## 2019-10-22 PROCEDURE — G8979 MOBILITY GOAL STATUS: HCPCS

## 2019-10-22 PROCEDURE — 85025 COMPLETE CBC W/AUTO DIFF WBC: CPT | Performed by: INTERNAL MEDICINE

## 2019-10-22 PROCEDURE — 87040 BLOOD CULTURE FOR BACTERIA: CPT | Performed by: INTERNAL MEDICINE

## 2019-10-22 PROCEDURE — 83880 ASSAY OF NATRIURETIC PEPTIDE: CPT | Performed by: INTERNAL MEDICINE

## 2019-10-22 PROCEDURE — 96365 THER/PROPH/DIAG IV INF INIT: CPT

## 2019-10-22 RX ORDER — METHYLPREDNISOLONE SODIUM SUCCINATE 40 MG/ML
40 INJECTION, POWDER, LYOPHILIZED, FOR SOLUTION INTRAMUSCULAR; INTRAVENOUS EVERY 8 HOURS
Status: DISCONTINUED | OUTPATIENT
Start: 2019-10-22 | End: 2019-10-23 | Stop reason: HOSPADM

## 2019-10-22 RX ORDER — ACETAMINOPHEN 325 MG/1
650 TABLET ORAL EVERY 6 HOURS PRN
Status: DISCONTINUED | OUTPATIENT
Start: 2019-10-22 | End: 2019-10-23 | Stop reason: HOSPADM

## 2019-10-22 RX ORDER — IPRATROPIUM BROMIDE AND ALBUTEROL SULFATE 2.5; .5 MG/3ML; MG/3ML
3 SOLUTION RESPIRATORY (INHALATION)
Status: DISCONTINUED | OUTPATIENT
Start: 2019-10-22 | End: 2019-10-23 | Stop reason: HOSPADM

## 2019-10-22 RX ORDER — LORAZEPAM 2 MG/ML
1 INJECTION INTRAMUSCULAR ONCE
Status: COMPLETED | OUTPATIENT
Start: 2019-10-22 | End: 2019-10-22

## 2019-10-22 RX ORDER — ASPIRIN 81 MG/1
81 TABLET ORAL EVERY OTHER DAY
Status: DISCONTINUED | OUTPATIENT
Start: 2019-10-22 | End: 2019-10-23 | Stop reason: HOSPADM

## 2019-10-22 RX ORDER — PRAVASTATIN SODIUM 40 MG
80 TABLET ORAL
Status: DISCONTINUED | OUTPATIENT
Start: 2019-10-22 | End: 2019-10-23 | Stop reason: HOSPADM

## 2019-10-22 RX ORDER — CEFTRIAXONE 1 G/50ML
1000 INJECTION, SOLUTION INTRAVENOUS ONCE
Status: COMPLETED | OUTPATIENT
Start: 2019-10-22 | End: 2019-10-22

## 2019-10-22 RX ORDER — INSULIN GLARGINE 100 [IU]/ML
15 INJECTION, SOLUTION SUBCUTANEOUS
Status: DISCONTINUED | OUTPATIENT
Start: 2019-10-22 | End: 2019-10-23 | Stop reason: HOSPADM

## 2019-10-22 RX ORDER — GABAPENTIN 100 MG/1
200 CAPSULE ORAL
Status: DISCONTINUED | OUTPATIENT
Start: 2019-10-22 | End: 2019-10-23 | Stop reason: HOSPADM

## 2019-10-22 RX ORDER — METHYLPREDNISOLONE SODIUM SUCCINATE 125 MG/2ML
125 INJECTION, POWDER, LYOPHILIZED, FOR SOLUTION INTRAMUSCULAR; INTRAVENOUS ONCE
Status: COMPLETED | OUTPATIENT
Start: 2019-10-22 | End: 2019-10-22

## 2019-10-22 RX ORDER — AZITHROMYCIN 250 MG/1
500 TABLET, FILM COATED ORAL ONCE
Status: COMPLETED | OUTPATIENT
Start: 2019-10-22 | End: 2019-10-22

## 2019-10-22 RX ORDER — ONDANSETRON 2 MG/ML
4 INJECTION INTRAMUSCULAR; INTRAVENOUS EVERY 6 HOURS PRN
Status: DISCONTINUED | OUTPATIENT
Start: 2019-10-22 | End: 2019-10-23 | Stop reason: HOSPADM

## 2019-10-22 RX ORDER — PRIMIDONE 50 MG/1
100 TABLET ORAL EVERY 12 HOURS SCHEDULED
Status: DISCONTINUED | OUTPATIENT
Start: 2019-10-22 | End: 2019-10-23 | Stop reason: HOSPADM

## 2019-10-22 RX ORDER — LISINOPRIL 5 MG/1
5 TABLET ORAL DAILY
Status: DISCONTINUED | OUTPATIENT
Start: 2019-10-22 | End: 2019-10-23 | Stop reason: HOSPADM

## 2019-10-22 RX ORDER — ALBUTEROL SULFATE 2.5 MG/3ML
2.5 SOLUTION RESPIRATORY (INHALATION) EVERY 4 HOURS PRN
Status: DISCONTINUED | OUTPATIENT
Start: 2019-10-22 | End: 2019-10-23 | Stop reason: HOSPADM

## 2019-10-22 RX ORDER — CEFTRIAXONE 1 G/50ML
1000 INJECTION, SOLUTION INTRAVENOUS EVERY 24 HOURS
Status: DISCONTINUED | OUTPATIENT
Start: 2019-10-23 | End: 2019-10-23

## 2019-10-22 RX ORDER — GUAIFENESIN 600 MG
600 TABLET, EXTENDED RELEASE 12 HR ORAL 2 TIMES DAILY
Status: DISCONTINUED | OUTPATIENT
Start: 2019-10-22 | End: 2019-10-23 | Stop reason: HOSPADM

## 2019-10-22 RX ORDER — ALBUTEROL SULFATE 90 UG/1
2 AEROSOL, METERED RESPIRATORY (INHALATION) EVERY 6 HOURS PRN
Status: DISCONTINUED | OUTPATIENT
Start: 2019-10-22 | End: 2019-10-22

## 2019-10-22 RX ADMIN — LORAZEPAM: 2 INJECTION INTRAMUSCULAR; INTRAVENOUS at 02:33

## 2019-10-22 RX ADMIN — PRAVASTATIN SODIUM 80 MG: 40 TABLET ORAL at 16:54

## 2019-10-22 RX ADMIN — INSULIN GLARGINE 15 UNITS: 100 INJECTION, SOLUTION SUBCUTANEOUS at 21:28

## 2019-10-22 RX ADMIN — PRIMIDONE 100 MG: 50 TABLET ORAL at 21:29

## 2019-10-22 RX ADMIN — GUAIFENESIN 600 MG: 600 TABLET, EXTENDED RELEASE ORAL at 08:24

## 2019-10-22 RX ADMIN — INSULIN LISPRO 4 UNITS: 100 INJECTION, SOLUTION INTRAVENOUS; SUBCUTANEOUS at 21:28

## 2019-10-22 RX ADMIN — INSULIN LISPRO 6 UNITS: 100 INJECTION, SOLUTION INTRAVENOUS; SUBCUTANEOUS at 15:41

## 2019-10-22 RX ADMIN — METHYLPREDNISOLONE SODIUM SUCCINATE 125 MG: 125 INJECTION, POWDER, FOR SOLUTION INTRAMUSCULAR; INTRAVENOUS at 02:34

## 2019-10-22 RX ADMIN — ASPIRIN 81 MG: 81 TABLET, COATED ORAL at 08:24

## 2019-10-22 RX ADMIN — METOPROLOL TARTRATE 25 MG: 25 TABLET, FILM COATED ORAL at 17:07

## 2019-10-22 RX ADMIN — ENOXAPARIN SODIUM 40 MG: 40 INJECTION SUBCUTANEOUS at 08:24

## 2019-10-22 RX ADMIN — METHYLPREDNISOLONE SODIUM SUCCINATE 40 MG: 40 INJECTION, POWDER, FOR SOLUTION INTRAMUSCULAR; INTRAVENOUS at 17:07

## 2019-10-22 RX ADMIN — GUAIFENESIN 600 MG: 600 TABLET, EXTENDED RELEASE ORAL at 17:07

## 2019-10-22 RX ADMIN — INSULIN LISPRO 3 UNITS: 100 INJECTION, SOLUTION INTRAVENOUS; SUBCUTANEOUS at 08:25

## 2019-10-22 RX ADMIN — GABAPENTIN 200 MG: 100 CAPSULE ORAL at 21:29

## 2019-10-22 RX ADMIN — INSULIN LISPRO 5 UNITS: 100 INJECTION, SOLUTION INTRAVENOUS; SUBCUTANEOUS at 11:41

## 2019-10-22 RX ADMIN — AZITHROMYCIN 500 MG: 250 TABLET, FILM COATED ORAL at 02:46

## 2019-10-22 RX ADMIN — IPRATROPIUM BROMIDE AND ALBUTEROL SULFATE 3 ML: 2.5; .5 SOLUTION RESPIRATORY (INHALATION) at 14:32

## 2019-10-22 RX ADMIN — IPRATROPIUM BROMIDE AND ALBUTEROL SULFATE 3 ML: 2.5; .5 SOLUTION RESPIRATORY (INHALATION) at 20:08

## 2019-10-22 RX ADMIN — INSULIN LISPRO 4 UNITS: 100 INJECTION, SOLUTION INTRAVENOUS; SUBCUTANEOUS at 16:54

## 2019-10-22 RX ADMIN — IPRATROPIUM BROMIDE AND ALBUTEROL SULFATE 3 ML: 2.5; .5 SOLUTION RESPIRATORY (INHALATION) at 07:42

## 2019-10-22 RX ADMIN — METOPROLOL TARTRATE 25 MG: 25 TABLET, FILM COATED ORAL at 08:26

## 2019-10-22 RX ADMIN — PRIMIDONE 100 MG: 50 TABLET ORAL at 08:26

## 2019-10-22 RX ADMIN — CEFTRIAXONE 1000 MG: 1 INJECTION, SOLUTION INTRAVENOUS at 02:47

## 2019-10-22 RX ADMIN — IPRATROPIUM BROMIDE AND ALBUTEROL SULFATE 3 ML: 2.5; .5 SOLUTION RESPIRATORY (INHALATION) at 02:06

## 2019-10-22 RX ADMIN — METHYLPREDNISOLONE SODIUM SUCCINATE 40 MG: 40 INJECTION, POWDER, FOR SOLUTION INTRAMUSCULAR; INTRAVENOUS at 10:39

## 2019-10-22 RX ADMIN — LISINOPRIL 5 MG: 5 TABLET ORAL at 08:25

## 2019-10-22 NOTE — ED PROVIDER NOTES
History  Chief Complaint   Patient presents with    Shortness of Breath     Patient presents with respiratory distress  He states he was having difficulty all day and uses nebulizer 4 times, states at at about 11 o'clock he became more short of breath tonight uses nebulizer since that time but was using his rescue inhaler  He is recurrent bouts exacerbation of COPD and is well known to the emergency room  He denies fever chills or productive cough  States he has been wheezing throughout the day  He has no chest pain chest pressure heaviness or tightness  Patient states he stopped smoking about a month ago  Prior to Admission Medications   Prescriptions Last Dose Informant Patient Reported? Taking?    Alogliptin Benzoate 25 MG TABS   Yes No   Sig: Take by mouth daily   Empagliflozin 10 MG TABS   Yes No   Sig: Take 10 mg by mouth every morning   albuterol (2 5 mg/3 mL) 0 083 % nebulizer solution   No No   Sig: Take 1 vial (2 5 mg total) by nebulization every 4 (four) hours as needed for wheezing or shortness of breath   albuterol (PROVENTIL HFA,VENTOLIN HFA) 90 mcg/act inhaler   Yes No   Sig: Inhale 2 puffs every 6 (six) hours as needed for wheezing or shortness of breath   aspirin (ECOTRIN LOW STRENGTH) 81 mg EC tablet   Yes No   Sig: Take 81 mg by mouth every other day    gabapentin (NEURONTIN) 100 mg capsule   Yes No   Sig: Take 200 mg by mouth daily at bedtime   guaiFENesin (MUCINEX) 600 mg 12 hr tablet   No No   Sig: Take 1 tablet (600 mg total) by mouth 2 (two) times a day   lisinopril (ZESTRIL) 5 mg tablet   No No   Sig: Take 1 tablet (5 mg total) by mouth daily   metFORMIN (GLUCOPHAGE) 500 mg tablet   Yes No   Si/2 a tablet in the morning and evening    methylPREDNISolone 4 MG tablet therapy pack   No No   Sig: Use as directed on package   metoprolol tartrate (LOPRESSOR) 50 mg tablet   Yes No   Sig: Take 25 mg by mouth 2 (two) times a day   predniSONE 20 mg tablet   No No   Simg twice daily x 3 days, 30mg twice daily x 3 days, 20mg twice daily x 3 days, 10mg twice daily x 3 days, 10mg daily x 3 days  primidone (MYSOLINE) 50 mg tablet   No No   Sig: Take 1 tablet (50 mg total) by mouth every 12 (twelve) hours   Patient taking differently: Take 100 mg by mouth every 12 (twelve) hours    rosuvastatin (CRESTOR) 10 MG tablet   Yes No   Sig: Take 10 mg by mouth daily   saxagliptin (ONGLYZA) 5 MG tablet   Yes No   Sig: Take 5 mg by mouth daily   tiotropium-olodaterol (STIOLTO RESPIMAT) 2 5-2 5 MCG/ACT inhaler   Yes No   Sig: Inhale 2 puffs daily      Facility-Administered Medications: None       Past Medical History:   Diagnosis Date    BPH (benign prostatic hyperplasia)     45 days radiation treatment    COPD (chronic obstructive pulmonary disease)     Coronary artery disease     CABG x4 in 2017    Diabetes mellitus     History of Arterial Duplex of LE 2017    Likely occlusion of the left superficial femoral artery  Calcific changes bilaterally  Despite these changes, the ankle-brachial index as a measure of peripheral blood flow only mildly impaired   History of echocardiogram 2017    EF 40%, mild LVH, mild MR     Hyperlipidemia     Hypertension     NSTEMI (non-ST elevated myocardial infarction)     Prostate cancer     prostate     PVD (peripheral vascular disease)     Tremor        Past Surgical History:   Procedure Laterality Date    CARDIAC CATHETERIZATION  2017    Significant left main plus triple-vessel CAD   CORONARY ARTERY BYPASS GRAFT  2017    4V CABG:  LIMA to LAD, VG to RI, SVG to PDA to LVBR RCA   PROSTATE BIOPSY         Family History   Problem Relation Age of Onset    Cancer Father     Heart disease Brother      I have reviewed and agree with the history as documented      Social History     Tobacco Use    Smoking status: Former Smoker     Last attempt to quit: 2/15/2019     Years since quittin 6    Smokeless tobacco: Never Used Substance Use Topics    Alcohol use: Not Currently     Comment: on occasion    Drug use: No        Review of Systems   Constitutional: Negative  HENT: Negative  Respiratory: Positive for cough, chest tightness, shortness of breath and wheezing  Negative for apnea  Cardiovascular: Negative for chest pain, palpitations and leg swelling  Gastrointestinal: Negative  Genitourinary: Negative  Musculoskeletal: Negative  Neurological: Negative  Hematological: Negative  Psychiatric/Behavioral: Negative  Physical Exam  Physical Exam   Constitutional: He appears well-developed and well-nourished  He appears distressed  HENT:   Head: Normocephalic and atraumatic  Mouth/Throat: Oropharynx is clear and moist    Eyes: Pupils are equal, round, and reactive to light  EOM are normal    Neck: Normal range of motion  Hepatojugular reflux present  Cardiovascular: Intact distal pulses  Pulmonary/Chest: Tachypnea noted  He is in respiratory distress  He has decreased breath sounds  He has wheezes  He has no rhonchi  He has no rales  Abdominal: Soft  Bowel sounds are normal    Musculoskeletal: Normal range of motion  Right lower leg: Normal         Left lower leg: Normal    Skin: Capillary refill takes less than 2 seconds  He is diaphoretic  Psychiatric: His mood appears anxious  Nursing note and vitals reviewed        Vital Signs  ED Triage Vitals [10/22/19 0149]   Temperature Pulse Respirations Blood Pressure SpO2   98 6 °F (37 °C) (!) 113 (!) 24 (!) 234/128 91 %      Temp src Heart Rate Source Patient Position - Orthostatic VS BP Location FiO2 (%)   -- -- -- -- --      Pain Score       No Pain           Vitals:    10/22/19 0149   BP: (!) 234/128   Pulse: (!) 113         Visual Acuity      ED Medications  Medications - No data to display    Diagnostic Studies  Results Reviewed     None                 No orders to display              Procedures  ECG 12 Lead Documentation Only  Date/Time: 10/22/2019 2:07 AM  Performed by: Ilene Bertrand MD  Authorized by: Ilene Bertrand MD     Indications / Diagnosis:  SOB  ECG reviewed by me, the ED Provider: yes    Patient location:  ED  Previous ECG:     Comparison to cardiac monitor: Yes    Interpretation:     Interpretation: abnormal    Rate:     ECG rate:  100    ECG rate assessment: tachycardic    Rhythm:     Rhythm: sinus tachycardia    Ectopy:     Ectopy: PVCs      PVCs:  Frequent  QRS:     QRS axis:  Normal  Conduction:     Conduction: normal    ST segments:     ST segments:  Normal  T waves:     T waves: flattening      Flattening:  I, aVL, V4, V5 and V6  Q waves:     Q waves:  V1 and V2           ED Course                               MDM    Disposition  Final diagnoses:   None     ED Disposition     None      Follow-up Information    None         Patient's Medications   Discharge Prescriptions    No medications on file     No discharge procedures on file      ED Provider  Electronically Signed by           Ilene Bertrand MD  10/22/19 1196       Ilene Bertrand MD  10/22/19 5991

## 2019-10-22 NOTE — ASSESSMENT & PLAN NOTE
Lab Results   Component Value Date    HGBA1C 8 3 (H) 09/20/2019       Recent Labs     10/22/19  0202   POCGLU 235*       Blood Sugar Average: Last 72 hrs:  (P) 235   ·   · Patient with hyperglycemia secondary to steroids   · Resume home medications at discharge

## 2019-10-22 NOTE — SOCIAL WORK
Chart reviewed by CM, assessment completed at the bedside, pt lives with his wife and family in a 2 story home, 4 steps outside, 12-14 steps inside,br 2nd che, pt has a cane, walker, b=nebulizer and inhalers, pt is on oxygen and he does not have oxygen, pt denies smoking and on rare occasion alcohol use, pt drives and he drove here, he plans to drive himself home, pt [facility] rxp [facility], pt has pulmonary consult ordered, no d/c today as discussed at care coordination rounds today, pt is on oxygen and he does not have oxygen at home, pt I s aware that he is here as an obs status and obs booklet was given, pt was given [email or web address] card for financial assistance with his bill   Patient/caregiver received discharge checklist   Content reviewed  Patient/caregiver encouraged to participate in discharge plan of care prior to discharge home  CM reviewed d/c planning process including the following: identifying help at home, patient preference for d/c planning needs, availability of treatment team to discuss questions or concerns patient and/or family may have regarding understanding medications and recognizing signs and symptoms once discharged  CM also encouraged patient to follow up with all recommended appointments after discharge  Patient advised of importance for patient and family to participate in managing patients medical well being

## 2019-10-22 NOTE — UTILIZATION REVIEW
Initial Clinical Review    Admission: Date/Time/Statement: 10/22 @ 0314 OBSERVATION    Orders Placed This Encounter   Procedures    Place in Observation (expected length of stay for this patient is less than two midnights)     Standing Status:   Standing     Number of Occurrences:   1     Order Specific Question:   Admitting Physician     Answer:   Radha Desir [[id number]]     Order Specific Question:   Level of Care     Answer:   Med Surg [[id number]]     ED Arrival Information     Expected Arrival Acuity Means of Arrival Escorted By Service Admission Type    - 10/22/2019 01:44 Urgent Walk-In Self General Medicine Urgent    Arrival Complaint    shortness of breath        Chief Complaint   Patient presents with    Shortness of Breath     Assessment/Plan: 68 y/o male presents to ED from home with respiratory distress  Worsening SOB despite using nebulizer and inhalers multiple times  Hx COPD  On exam, respiratory distress, accessory muscle use, decreased breath sounds, wheezes, tachypnea, diaphoretic, +HJR    Admitted as observation  COPD with acute exacerbation [facility])  Assessment & Plan  · Consult Pulmonary  · Continue Rocephin and azithromycin IV  · Solu-Medrol IV  · Nebulizers and inhalers  · Mucinex  · Check procalcitonin and sputum culture      ED Triage Vitals   Temperature Pulse Respirations Blood Pressure SpO2   10/22/19 0149 10/22/19 0149 10/22/19 0149 10/22/19 0149 10/22/19 0149   98 6 °F (37 °C) (!) 113 (!) 24 (!) 234/128 91 %      Temp Source Heart Rate Source Patient Position - Orthostatic VS BP Location FiO2 (%)   10/22/19 0359 -- 10/22/19 0359 10/22/19 0359 --   Temporal  Lying Left arm       Pain Score       10/22/19 0149       No Pain        Wt Readings from Last 1 Encounters:   10/22/19 88 3 kg (194 lb 10 7 oz)     Additional Vital Signs:   10/22/19 0620  98 1 °F (36 7 °C)  97  18  172/88Abnormal   97 %  Nasal cannula  Lying   10/22/19 0359  97 °F (36 1 °C)Abnormal   83  18  163/86  98 %  Nasal cannula Lying   10/22/19 0337    80  16  161/91  96 %  Nasal cannula     10/22/19 0330    81  16    96 %       10/22/19 0315    86  17    97 %       10/22/19 0300    90  20  153/95  96 %       10/22/19 0245    90  21    97 %       10/22/19 0215    87  23Abnormal   196/101Abnormal   99 %           Pertinent Labs/Diagnostic Test Results:   Results from last 7 days   Lab Units 10/22/19  0434 10/22/19  0209   WBC Thousand/uL 6 70 6 60   HEMOGLOBIN g/dL 13 9* 14 5   HEMATOCRIT % 40 1* 41 9*   PLATELETS Thousands/uL 220 238   NEUTROS ABS Thousands/µL 5 60 4 30         Results from last 7 days   Lab Units 10/22/19  0434 10/22/19  0209   SODIUM mmol/L 138 139   POTASSIUM mmol/L 4 0 3 8   CHLORIDE mmol/L 104 104   CO2 mmol/L 24 24   ANION GAP mmol/L 10 11   BUN mg/dL 17 17   CREATININE mg/dL 0 79 0 81   EGFR ml/min/1 73sq m 89 88   CALCIUM mg/dL 8 7 9 1     Results from last 7 days   Lab Units 10/22/19  0209   AST U/L 11*   ALT U/L 17   ALK PHOS U/L 78   TOTAL PROTEIN g/dL 6 7   ALBUMIN g/dL 4 4   TOTAL BILIRUBIN mg/dL 0 40     Results from last 7 days   Lab Units 10/22/19  1118 10/22/19  0622 10/22/19  0202   POC GLUCOSE mg/dl 417* 273* 235*     Results from last 7 days   Lab Units 10/22/19  0434 10/22/19  0209   GLUCOSE RANDOM mg/dL 282* 249*             No results found for: BETA-HYDROXYBUTYRATE                       Results from last 7 days   Lab Units 10/22/19  0209   D-DIMER QUANTITATIVE ug/ml FEU <0 15             Results from last 7 days   Lab Units 10/22/19  0434 10/22/19  0209   PROCALCITONIN ng/ml <0 05 <0 05     Results from last 7 days   Lab Units 10/22/19  0218   LACTIC ACID mmol/L 1 6             Results from last 7 days   Lab Units 10/22/19  0209   BNP pg/mL 89     EKG:  Sinus tachycardia, frequent PVCs, flattened T waves    CXR:  Mildly increased density left lower lung field, small effusion not excluded  Otherwise no consolidation       ED Treatment:   Medication Administration from 10/22/2019 0144 to 10/22/2019 0345       Date/Time Order Dose Route Action Action by Comments     10/22/2019 0233 LORazepam (ATIVAN) 2 mg/mL injection 1 mg   Intravenous Given Cynthia Fuller RN      95/00/9575 0234 methylPREDNISolone sodium succinate (Solu-MEDROL) injection 125 mg 125 mg Intravenous Given Cynthia Fuller RN      46/96/9477 0206 ipratropium-albuterol (DUO-NEB) 0 5-2 5 mg/3 mL inhalation solution 3 mL 3 mL Nebulization Given Markjaden Herrera, RT      10/22/2019 0323 cefTRIAXone (ROCEPHIN) IVPB (premix) 1,000 mg 0 mg Intravenous Stopped Cynthia Fuller RN      31/65/3642 0247 cefTRIAXone (ROCEPHIN) IVPB (premix) 1,000 mg 1,000 mg Intravenous [facility] Cynthia Fuller, [address] [place] [address]      32/34/3466 0246 azithromycin (ZITHROMAX) tablet 500 mg 500 mg Oral Given Cynthia Fuller RN         Past Medical History:   Diagnosis Date    BPH (benign prostatic hyperplasia)     45 days radiation treatment    COPD (chronic obstructive pulmonary disease)     Coronary artery disease     CABG x4 in 2017    Diabetes mellitus     History of Arterial Duplex of LE 12/26/2017    Likely occlusion of the left superficial femoral artery  Calcific changes bilaterally  Despite these changes, the ankle-brachial index as a measure of peripheral blood flow only mildly impaired      History of echocardiogram 06/12/2017    EF 40%, mild LVH, mild MR     Hyperlipidemia     Hypertension     NSTEMI (non-ST elevated myocardial infarction)     Prostate cancer     prostate     PVD (peripheral vascular disease)     Tremor      Present on Admission:   COPD with acute exacerbation (HCC)   Type 2 diabetes mellitus without complication, without long-term current use of insulin ([facility] )   Essential hypertension   Other hyperlipidemia   CAD (coronary artery disease), native coronary artery   BPH (benign prostatic hyperplasia)      Admitting Diagnosis: Shortness of breath [R06 02]  COPD with acute exacerbation (HCC) [J44 1]  Age/Sex: 69 y o  male  Admission Orders:    Medications:  aspirin 81 mg Oral Every Other Day   [START ON 10/23/2019] azithromycin 500 mg Intravenous Q24H   [START ON 10/23/2019] cefTRIAXone 1,000 mg Intravenous Q24H   enoxaparin 40 mg Subcutaneous Daily   gabapentin 200 mg Oral HS   guaiFENesin 600 mg Oral BID   insulin lispro 1-5 Units Subcutaneous TID AC   insulin lispro 1-5 Units Subcutaneous HS   ipratropium-albuterol 3 mL Nebulization Q6H   lisinopril 5 mg Oral Daily   methylPREDNISolone sodium succinate 40 mg Intravenous Q8H   metoprolol tartrate 25 mg Oral BID   pravastatin 80 mg Oral Daily With Dinner   primidone 100 mg Oral Q12H ENDER          acetaminophen 650 mg Oral Q6H PRN   albuterol 2 5 mg Nebulization Q4H PRN   ondansetron 4 mg Intravenous Q6H PRN       IP CONSULT TO PULMONOLOGY  IP CONSULT TO CASE MANAGEMENT    Network Utilization Review Department  [email or web address]  ATTENTION: Please call with any questions or concerns to [phone] and carefully listen to the prompts so that you are directed to the right person  All voicemails are confidential   Anabel Watt all requests for admission clinical reviews, approved or denied determinations and any other requests to dedicated fax number below belonging to the campus where the patient is receiving treatment    FACILITY NAME UR FAX NUMBER   ADMISSION DENIALS (Administrative/Medical Necessity) [facility] (Maternity/NICU/Pediatrics) [phone]   [facility] [id number] [place] [address] [address] [facility] [phone]   [address] [place] [address] [phone]   [facility] [address] [phone]

## 2019-10-22 NOTE — PHYSICAL THERAPY NOTE
Physical Therapy Evaluation     Patient's Name: Damien Frederick    Admitting Diagnosis  Shortness of breath [R06 02]  COPD with [facility] exacerbation ([facility] ) [J44 1]    Problem List  Patient Active Problem List   Diagnosis    Type 2 diabetes mellitus without complication, without long-term current use of insulin ([facility] )    Tobacco abuse    Other hyperlipidemia    Essential hypertension    CAD (coronary artery disease), native coronary artery    BPH (benign prostatic hyperplasia)    COPD with acute exacerbation (HCC)    Tremor    Ambulatory dysfunction    NSTEMI (non-ST elevated myocardial infarction) ([facility]    On intra-aortic balloon pump assist    Prostate cancer ([facility] )    S/P CABG x 4    Ischemic cardiomyopathy       Past Medical History  Past Medical History:   Diagnosis Date    BPH (benign prostatic hyperplasia)     39 days radiation treatment    COPD (chronic obstructive pulmonary disease)     Coronary artery disease     CABG x4 in 2017    Diabetes mellitus     History of Arterial Duplex of LE 12/26/2017    Likely occlusion of the left superficial femoral artery  Calcific changes bilaterally  Despite these changes, the ankle-brachial index as a measure of peripheral blood flow only mildly impaired   History of echocardiogram 06/12/2017    EF 40%, mild LVH, mild MR     Hyperlipidemia     Hypertension     NSTEMI (non-ST elevated myocardial infarction)     Prostate cancer     prostate     PVD (peripheral vascular disease)     Tremor        Past Surgical History  Past Surgical History:   Procedure Laterality Date    CARDIAC CATHETERIZATION  03/08/2017    Significant left main plus triple-vessel CAD   CORONARY ARTERY BYPASS GRAFT  03/08/2017    4V CABG:  LIMA to LAD, VG to RI, SVG to PDA to LVBR RCA      PROSTATE BIOPSY            10/22/19 0919   Note Type   Note type Eval/Treat   Pain Assessment   Pain Assessment No/denies pain   Pain Score No Pain   Home Living   Type of [address] Layout Multi-level;Bed/bath upstairs;Stairs to enter with rails  (13 steps to 2nd floor, 4 BEATRIZ)   Bathroom Shower/Tub Tub/shower unit   [employer] Raised   Bathroom Equipment Grab bars in shower; Shower chair   [address] Cane;Walker  (prn use of SPC)   Additional Comments pt does not wear O2 at baseline   Prior Function   Level of [place] Independent with ADLs and functional mobility   Lives With Spouse; Family  (grandson, grandson's fiancee)   [address] Help From Family   ADL Assistance Independent   IADLs Independent   Falls in the last 6 months 0   Vocational Retired   Comments pt drives   Restrictions/Precautions   [place] Bearing Precautions Per Order No   Other Precautions Telemetry;O2;Fall Risk  (2 L O2 via NC)   General   Family/Caregiver Present No   Cognition   Overall Cognitive Status WFL   Arousal/Participation Alert   Orientation Level Oriented X4   Memory Within functional limits   Following Commands Follows all commands and directions without difficulty   Comments pt agreeable to PT session   RLE Assessment   RLE Assessment WFL  (grossly 4/5 throughout)   LLE Assessment   LLE Assessment WFL  (grossly 4/5 throughout)   Coordination   Movements are Fluid and Coordinated 1   Sensation WFL   Bed Mobility   Supine to Sit 5  Supervision   Additional items Assist x 1;HOB elevated; Bedrails; Increased time required   Transfers   Sit to Stand 5  Supervision   Additional items Assist x 1; Increased time required;Verbal cues   Stand to Sit 5  Supervision   Additional items Assist x 1;Verbal cues; Impulsive   Toilet transfer 5  Supervision   Additional items Assist x 1;Standard toilet;Verbal cues; Impulsive   Ambulation/Elevation   Gait pattern Short stride; Shuffling  (reduced safety awareness managing RW/obstacles)   Gait Assistance 5  Supervision  (SBA)   Additional items Assist x 1;Verbal cues; Tactile cues   Assistive Device Rolling walker   Distance 20 ft x 2   Stair Management Assistance Not tested   Balance   Static Sitting Good   Dynamic Sitting Fair   Static Standing Fair   Dynamic Standing Fair -   Ambulatory Fair -   Endurance Deficit   Endurance Deficit Yes   Activity Tolerance   Activity Tolerance Patient limited by fatigue   Medical Staff Made Aware pt w/ up w/ A order   Nurse Made Aware Yes, RN verbalized pt appropriate for PT session   Assessment   Prognosis Good   Problem List Decreased strength;Decreased endurance; Impaired balance;Decreased mobility; Decreased safety awareness   Assessment Pt is 69 y o  male seen for PT evaluation on 10/22/2019 s/p admit to [facility] on 10/22/2019 w/ COPD with acute exacerbation ([facility] ); pt presented to ED c SOB x 24 hrs  PT consulted to assess pt's functional mobility and d/c needs  Order placed for PT eval and tx, w/ up w/ A order  Performed at least 2 patient identifiers during session: Name and wristband  Comorbidities affecting pt's physical performance at time of assessment include: COPD, DM type 2, essential HTN, HLD, CAD, BPH  PTA, pt was independent w/ all functional mobility w/ prn use of SPC, ambulates community distances and elevations, ambulates household distances, has 4 BEATRIZ, lives w/ family in 2 level home and retired  Personal factors affecting pt at time of IE include: lives in 2 story house, ambulating w/ assistive device, stairs to enter home, unable to perform dynamic tasks in community, decreased initiation and engagement, unable to perform physical activity and inability to perform IADLs  Please find objective findings from PT assessment regarding body systems outlined above with impairments and limitations including weakness, impaired balance, decreased endurance, decreased activity tolerance, decreased functional mobility tolerance and fall risk, as well as mobility assessment (need for cueing for mobility technique)   The following objective measures performed on IE also reveal limitations: Barthel Index: 50/100  Pt's clinical presentation is currently unstable/unpredictable seen in pt's presentation of need for input for task focus and mobility technique and ongoing medical assessment  Pt to benefit from continued PT tx to address deficits as defined above and maximize level of functional independent mobility and consistency  From PT/mobility standpoint, recommendation at time of d/c would be Home PT with family support pending progress in order to facilitate return to PLOF  Barriers to Discharge Inaccessible home environment   Goals   Patient Goals "to return home"   [facility] Expiration Date 11/01/19   Short Term Goal #1 In 7-10 days: Increase bilateral LE strength 1/2 grade to facilitate independent mobility, Perform all bed mobility tasks modified independent to decrease caregiver burden, Perform all transfers modified independent to improve independence, Ambulate > 150 ft  with least restrictive assistive device modified independent w/o LOB and w/ normalized gait pattern 100% of the time, Navigate 10 stairs with close S with unilateral handrail to facilitate return to previous living environment, Increase all balance 1/2 grade to decrease risk for falls, Complete exercise program independently and Tolerate 4 hr OOB to faciliate upright tolerance   PT Treatment Day 0   Plan   Treatment/Interventions Functional transfer training;Elevations; Therapeutic exercise; Endurance training;Patient/family training;Gait training;Spoke to nursing   PT Frequency   (3-5x/wk)   Recommendation   Recommendation Home PT; Home with family support   Equipment Recommended Walker  (continue RW use)   PT - OK to Discharge No   Barthel Index   Feeding 10   Bathing 0   Grooming Score 5   Dressing Score 5   Bladder Score 5   Bowels Score 10   Toilet Use Score 5   Transfers (Bed/Chair) Score 10   Mobility (Level Surface) Score 0   Stairs Score 0   Barthel Index Score 50         Laure Lombard, PT

## 2019-10-22 NOTE — PLAN OF CARE
Problem: DISCHARGE PLANNING - CARE MANAGEMENT  Goal: Discharge to post-acute care or home with appropriate resources  Description  INTERVENTIONS:  - Conduct assessment to determine patient/family and health care team treatment goals, and need for post-acute services based on payer coverage, community resources, and patient preferences, and barriers to discharge  - Address psychosocial, clinical, and financial barriers to discharge as identified in assessment in conjunction with the patient/family and health care team  - Arrange appropriate level of post-acute services according to patient's   needs and preference and payer coverage in collaboration with the physician and health care team  - Communicate with and update the patient/family, physician, and health care team regarding progress on the discharge plan  - Arrange appropriate transportation to post-acute venues    Pt's goal is to return home   Outcome: Progressing

## 2019-10-22 NOTE — ASSESSMENT & PLAN NOTE
· Suspect mucus plugging  · Continue home medications  · Mucinex  · procalcitonin <0 05 x 2 and sputum culture not obtained as of yet

## 2019-10-22 NOTE — H&P
H&P- Milton Tracy 1945, 69 y o  male MRN: [id number]    Unit/Bed#: -01 Encounter: [id number]    Primary Care Provider: Burak Gomez MD   Date and time admitted to hospital: 10/22/2019  1:47 AM        * COPD with acute exacerbation (HCC)  Assessment & Plan  · Consult Pulmonary  · Continue Rocephin and azithromycin IV  · Solu-Medrol IV  · Nebulizers and inhalers  · Mucinex  · Check procalcitonin and sputum culture    Type 2 diabetes mellitus without complication, without long-term current use of insulin [facility])  Assessment & Plan  Lab Results   Component Value Date    HGBA1C 8 3 (H) 09/20/2019       Recent Labs     10/22/19  0202   POCGLU 235*       Blood Sugar Average: Last 72 hrs:  (P) 235   · Accu-Cheks Q a c  And HS with sliding scale insulin  · Will hold patient's oral medications    Essential hypertension  Assessment & Plan  · Continue metoprolol and lisinopril    Other hyperlipidemia  Assessment & Plan  · Continue statin    CAD (coronary artery disease), native coronary artery  Assessment & Plan  · Continue aspirin    BPH (benign prostatic hyperplasia)  Assessment & Plan  · Continue Neurontin      VTE Prophylaxis: Enoxaparin (Lovenox)  Code Status:  Full code  POLST: POLST is not applicable to this patient  Discussion with family:  Discussed with patient    Anticipated Length of Stay:  Patient will be admitted on an Observation basis with an anticipated length of stay of  < 2 midnights  Justification for Hospital Stay:  Need for IV antibiotics, steroids, consultation with Pulmonary    Chief Complaint:   Shortness of breath    History of Present Illness:    Milton Tracy is a 69 y o  male who presents with shortness of breath for 24 hours  Increased wheezing in the last few hours with increased nebulizer use at least 4 times before arriving at the emergency department  Patient stated that he felt like he was unable to get enough air in    While in the emergency department he was placed on oxygen which did increase his saturation from 91-97%  The patient does deny any fever chills or cough, he did quit smoking during his last admission and states he has not smoke since prior to that admission  This patient does have a longstanding history of COPD exacerbations with diabetes type 2, hyperlipidemia, hypertension, coronary artery disease, BPH  In the emergency department he was given Solu-Medrol 125, azithromycin and Rocephin IV, DuoNeb treatment, and Ativan 2 mg IV  Review of Systems:  Review of Systems   Constitutional: Positive for fatigue  HENT: Negative  Eyes: Negative  Respiratory: Positive for shortness of breath and wheezing  Cardiovascular: Negative  Gastrointestinal: Negative  Endocrine: Negative  Genitourinary: Negative  Skin: Negative  Allergic/Immunologic: Negative  Neurological: Negative  Hematological: Negative  Psychiatric/Behavioral: Negative  Past Medical and Surgical History:   Past Medical History:   Diagnosis Date    BPH (benign prostatic hyperplasia)     39 days radiation treatment    COPD (chronic obstructive pulmonary disease)     Coronary artery disease     CABG x4 in 2017    Diabetes mellitus     History of Arterial Duplex of LE 12/26/2017    Likely occlusion of the left superficial femoral artery  Calcific changes bilaterally  Despite these changes, the ankle-brachial index as a measure of peripheral blood flow only mildly impaired   History of echocardiogram 06/12/2017    EF 40%, mild LVH, mild MR     Hyperlipidemia     Hypertension     NSTEMI (non-ST elevated myocardial infarction)     Prostate cancer     prostate     PVD (peripheral vascular disease)     Tremor        Past Surgical History:   Procedure Laterality Date    CARDIAC CATHETERIZATION  03/08/2017    Significant left main plus triple-vessel CAD      CORONARY ARTERY BYPASS GRAFT  03/08/2017    4V CABG:  LIMA to LAD, VG to RI, SVG to PDA to LVBR RCA     PROSTATE BIOPSY         Meds/Allergies:  Prior to Admission medications    Medication Sig Start Date End Date Taking? Authorizing Provider   albuterol (2 5 mg/3 mL) 0 083 % nebulizer solution Take 1 vial (2 5 mg total) by nebulization every 4 (four) hours as needed for wheezing or shortness of breath 6/24/18  Yes Lori Conklin MD   albuterol (PROVENTIL HFA,VENTOLIN HFA) 90 mcg/act inhaler Inhale 2 puffs every 6 (six) hours as needed for wheezing or shortness of breath   Yes Historical Provider, MD   Alogliptin Benzoate 25 MG TABS Take by mouth daily   Yes Historical Provider, MD   aspirin (ECOTRIN LOW STRENGTH) 81 mg EC tablet Take 81 mg by mouth every other day    Yes Historical Provider, MD   Empagliflozin 10 MG TABS Take 10 mg by mouth every morning   Yes Historical Provider, MD   gabapentin (NEURONTIN) 100 mg capsule Take 200 mg by mouth daily at bedtime   Yes Historical Provider, MD   guaiFENesin (MUCINEX) 600 mg 12 hr tablet Take 1 tablet (600 mg total) by mouth 2 (two) times a day 9/21/19  Yes PENG Kerr   lisinopril (ZESTRIL) 5 mg tablet Take 1 tablet (5 mg total) by mouth daily 8/20/19  Yes Linh Zhang PA-C   metFORMIN (GLUCOPHAGE) 500 mg tablet 1/2 a tablet in the morning and evening    Yes Historical Provider, MD   methylPREDNISolone 4 MG tablet therapy pack Use as directed on package 7/19/19  Yes Kamron Jacobs PA-C   metoprolol tartrate (LOPRESSOR) 50 mg tablet Take 25 mg by mouth 2 (two) times a day 3/16/17  Yes Historical Provider, MD   predniSONE 20 mg tablet 40mg twice daily x 3 days, 30mg twice daily x 3 days, 20mg twice daily x 3 days, 10mg twice daily x 3 days, 10mg daily x 3 days   9/21/19  Yes PENG Kerr   primidone (MYSOLINE) 50 mg tablet Take 1 tablet (50 mg total) by mouth every 12 (twelve) hours  Patient taking differently: Take 100 mg by mouth every 12 (twelve) hours  7/18/19  Yes Kamron Jacobs PA-C   rosuvastatin (CRESTOR) 10 MG tablet Take 10 mg by mouth daily   Yes Historical Provider, MD   saxagliptin (ONGLYZA) 5 MG tablet Take 5 mg by mouth daily   Yes Historical Provider, MD   tiotropium-olodaterol ([place]) 2 5-2 5 MCG/ACT inhaler Inhale 2 puffs daily   Yes Historical Provider, MD     I have reviewed home medications with patient personally  Allergies: No Known Allergies    Social History:  Marital Status: /Civil Union   Occupation:  Retired  Patient Pre-hospital Living Situation:  At home  Patient Pre-hospital Level of Mobility:  Functioning  Patient Pre-hospital Diet Restrictions:  Cardiac  Substance Use History:   Social History     Substance and Sexual Activity   Alcohol Use Not Currently    Comment: on occasion     Social History     Tobacco Use   Smoking Status Former Smoker    Last attempt to quit: 2/15/2019    Years since quittin 6   Smokeless Tobacco Never Used     Social History     Substance and Sexual Activity   Drug Use No       Family History:  I have reviewed the patients family history    Physical Exam:   Vitals:   Blood Pressure: 163/86 (10/22/19 0359)  Pulse: 83 (10/22/19 0359)  Temperature: (!) 97 °F (36 1 °C) (10/22/19 0359)  Temp Source: Temporal (10/22/19 0359)  Respirations: 18 (10/22/19 0359)  Height: 5' 10" (177 8 cm) (10/22/19 0359)  Weight - Scale: 88 3 kg (194 lb 10 7 oz) (10/22/19 0359)  SpO2: 98 % (10/22/19 0359)    Physical Exam   Constitutional: He is oriented to person, place, and time  Vital signs are normal  He appears well-developed and well-nourished  He appears lethargic  He is easily aroused  He appears ill  HENT:   Head: Normocephalic and atraumatic  Nose: Nose normal    Mouth/Throat: Oropharynx is clear and moist and mucous membranes are normal    Eyes: Conjunctivae and EOM are normal    Neck: Full passive range of motion without pain  Cardiovascular: Normal rate, regular rhythm, normal heart sounds and normal pulses  Pulmonary/Chest: Accessory muscle usage present   Tachypnea noted  He has wheezes in the right upper field, the right middle field, the right lower field, the left upper field, the left middle field and the left lower field  Abdominal: Soft  Normal appearance and bowel sounds are normal    Musculoskeletal: Normal range of motion  Neurological: He is oriented to person, place, and time and easily aroused  He appears lethargic  Skin: Skin is warm  Psychiatric: He has a normal mood and affect  His speech is normal and behavior is normal        Additional Data:   Lab Results: I have personally reviewed pertinent reports  Results from last 7 days   Lab Units 10/22/19  0209   WBC Thousand/uL 6 60   HEMOGLOBIN g/dL 14 5   HEMATOCRIT % 41 9*   PLATELETS Thousands/uL 238   NEUTROS PCT % 64   LYMPHS PCT % 23   MONOS PCT % 8   EOS PCT % 4     Results from last 7 days   Lab Units 10/22/19  0209   POTASSIUM mmol/L 3 8   CHLORIDE mmol/L 104   CO2 mmol/L 24   BUN mg/dL 17   CREATININE mg/dL 0 81   CALCIUM mg/dL 9 1   ALK PHOS U/L 78   ALT U/L 17   AST U/L 11*         Results from last 7 days   Lab Units 10/22/19  0202   POC GLUCOSE mg/dl 235*           Imaging: I have personally reviewed pertinent reports  XR chest 1 view portable   ED Interpretation by Agustina Allen MD (10/22 0309)   Chest x-ray shows increased left-sided effusion as compared to prior study  Patient with also increased right-sided possible infiltrate as compared to prior study  EKG, Pathology, and Other Studies Reviewed on Admission:   · EKG:  Sinus tachycardia, heart rate 100    [facility] Records Reviewed: Yes     ** Please Note: This note has been constructed using a voice recognition system   **

## 2019-10-22 NOTE — PLAN OF CARE
Problem: PHYSICAL THERAPY ADULT  Goal: Performs mobility at highest level of function for planned discharge setting  See evaluation for individualized goals  Description  Treatment/Interventions: Functional transfer training, Elevations, Therapeutic exercise, Endurance training, Patient/family training, Gait training, Spoke to nursing  Equipment Recommended: Walker(continue RW use)       See flowsheet documentation for full assessment, interventions and recommendations  Note:   Prognosis: Good  Problem List: Decreased strength, Decreased endurance, Impaired balance, Decreased mobility, Decreased safety awareness  Assessment: Pt is 69 y o  male seen for PT evaluation on 10/22/2019 s/p admit to [facility] on 10/22/2019 w/ COPD with acute exacerbation ([facility] [address] ); pt presented to ED c SOB x 24 hrs  PT consulted to assess pt's functional mobility and d/c needs  Order placed for PT eval and tx, w/ up w/ A order  Performed at least 2 patient identifiers during session: Name and wristband  Comorbidities affecting pt's physical performance at time of assessment include: COPD, DM type 2, essential HTN, HLD, CAD, BPH  PTA, pt was independent w/ all functional mobility w/ prn use of SPC, ambulates community distances and elevations, ambulates household distances, has 4 BEATRIZ, lives w/ family in 2 level home and retired  Personal factors affecting pt at time of IE include: lives in 2 story house, ambulating w/ assistive device, stairs to enter home, unable to perform dynamic tasks in community, decreased initiation and engagement, unable to perform physical activity and inability to perform IADLs   Please find objective findings from PT assessment regarding body systems outlined above with impairments and limitations including weakness, impaired balance, decreased endurance, decreased activity tolerance, decreased functional mobility tolerance and fall risk, as well as mobility assessment (need for cueing for mobility technique)  The following objective measures performed on IE also reveal limitations: Barthel Index: 50/100  Pt's clinical presentation is currently unstable/unpredictable seen in pt's presentation of need for input for task focus and mobility technique and ongoing medical assessment  Pt to benefit from continued PT tx to address deficits as defined above and maximize level of functional independent mobility and consistency  From PT/mobility standpoint, recommendation at time of d/c would be Home PT with family support pending progress in order to facilitate return to PLOF  Barriers to Discharge: Inaccessible home environment     Recommendation: Home PT, Home with family support     PT - OK to Discharge: No    See flowsheet documentation for full assessment

## 2019-10-22 NOTE — OCCUPATIONAL THERAPY NOTE
Occupational Therapy Evaluation      [place] [facility]    10/22/2019    Patient Active Problem List   Diagnosis    Type 2 diabetes mellitus without complication, without long-term current use of insulin ([facility] [address] )    Tobacco abuse    Other hyperlipidemia    Essential hypertension    CAD (coronary artery disease), native coronary artery    BPH (benign prostatic hyperplasia)    COPD with acute exacerbation (HCC)    Tremor    Ambulatory dysfunction    NSTEMI (non-ST elevated myocardial infarction) ([facility] [address] )    On intra-aortic balloon pump assist    Prostate cancer ([facility] [address] )    S/P CABG x 4    Ischemic cardiomyopathy       Past Medical History:   Diagnosis Date    BPH (benign prostatic hyperplasia)     45 days radiation treatment    COPD (chronic obstructive pulmonary disease)     Coronary artery disease     CABG x4 in 2017    Diabetes mellitus     History of Arterial Duplex of LE 12/26/2017    Likely occlusion of the left superficial femoral artery  Calcific changes bilaterally  Despite these changes, the ankle-brachial index as a measure of peripheral blood flow only mildly impaired   History of echocardiogram 06/12/2017    EF 40%, mild LVH, mild MR     Hyperlipidemia     Hypertension     NSTEMI (non-ST elevated myocardial infarction)     Prostate cancer     prostate     PVD (peripheral vascular disease)     Tremor        Past Surgical History:   Procedure Laterality Date    CARDIAC CATHETERIZATION  03/08/2017    Significant left main plus triple-vessel CAD   CORONARY ARTERY BYPASS GRAFT  03/08/2017    4V CABG:  LIMA to LAD, VG to RI, SVG to PDA to LVBR RCA      PROSTATE BIOPSY          10/22/19 0951   Note Type   Note type Eval only   Restrictions/Precautions   Weight Bearing Precautions Per Order No   Other Precautions Telemetry;O2;Fall Risk  (2 L O2 via NC- not used at baseline )   Pain Assessment   Pain Assessment No/denies pain   Pain Score No Pain   Home Living   Type of [address] Layout Multi-level;Bed/bath upstairs;Stairs to enter with rails  (4 BEATRIZ, 13 steps to 2nd floor )   Bathroom Shower/Tub Tub/shower unit   [employer] Raised   Bathroom Equipment Grab bars in shower; Shower chair   [address] Cane;Walker  (prn use of SPC)   Prior Function   Level of [place] Independent with ADLs and functional mobility   Lives With Spouse; Family  (grandson, grandson's fiancee)   [address] Help From Family   ADL Assistance Independent   IADLs Independent   Falls in the last 6 months 0   Vocational Retired   Comments (+) driving    Lifestyle   Autonomy Patient reporting being independent with ADLs/IADLs, ambulatory with no AD or SPC as needed and lives with family in a multi-level house with 3 BEATRIZ   Reciprocal Relationships family    ADL   Eating Assistance 6  Modified independent   Grooming Assistance 5  Supervision/Setup   UB Bathing Assistance 5  Supervision/Setup   LB Bathing Assistance 4  Minimal Assistance   UB Dressing Assistance 5  Supervision/Setup   LB Dressing Assistance 4  [address]  5  Supervision/Setup   Bed Mobility   Supine to Sit 5  Supervision   Additional items Assist x 1;HOB elevated; Bedrails; Increased time required   Transfers   Sit to Stand 5  Supervision   Additional items Assist x 1; Increased time required;Verbal cues   Stand to Sit 5  Supervision   Additional items Assist x 1;Verbal cues; Impulsive   Toilet transfer 5  Supervision   Additional items Assist x 1;Verbal cues;Standard toilet; Impulsive   Functional Mobility   Functional Mobility 5  Supervision   Additional items Rolling walker   Balance   Static Sitting Good   Dynamic Sitting Fair   Static Standing Fair   Dynamic Standing Fair -   Activity Tolerance   Activity Tolerance Patient limited by fatigue   Nurse Made Aware RN [address] made aware of outcomes/recs   RUE Assessment   RUE Assessment WFL  (full AROM, grossly 4-/5 MMT)   LUE Assessment   LUE Assessment WFL  (full AROM, grossly 4-/5 MMT)   Hand Function   Gross Motor Coordination Functional   Fine Motor Coordination Functional   Sensation   Light Touch No apparent deficits  (per pt report)   Cognition   Overall Cognitive Status [facility]   Arousal/Participation Alert; Responsive; Cooperative   Attention Within functional limits   Orientation Level Oriented X4   Memory Within functional limits   Following Commands Follows all commands and directions without difficulty   Comments Mini-Cog assessment performed today  Version 1 was given  Patient able to recall 1/3 words  Patient able to draw an abnormal clock with the incorrect time  Total score of test was 2/5 indicating  further screening of cognition is recommended  Cognition Assessment Tools   (Mini-cog )   Score 2   Assessment   Limitation Decreased ADL status; Decreased UE strength;Decreased Safe judgement during ADL;Decreased cognition;Decreased endurance;Decreased self-care trans;Decreased high-level ADLs   Prognosis Fair   Assessment Pt is a 69 y o  male who was admitted to [address] [place] [address] on 10/22/2019 with COPD with acute exacerbation ([facility] [address] )  At this time, patient is also affected by the comorbidities of: DM2, hyperlipidemia, HTN, CAD, and BPH  Additionally, patient is affected by the following personal factors:steps to enter environment, difficulty performing ADLS and difficulty performing IADLS   Orders placed for OT evaluation/treatment with up with assistance orders  Prior to admission, Patient reporting being independent with ADLs/IADLs, ambulatory with no AD or SPC as needed and lives with family in a multi-level house with 3 BEATRIZ  Upon OT evaluation, patient requires supervision/set-up for UB ADLs and minimum assist for LB ADLs   Occupational performance is affected by the following deficits: decreased strength, decreased balance, decreased tolerance, impaired memory, impaired sequencing, impaired problem solving, impulsivity and decreased safety awareness  Based on the following information, patient would benefit from continued skilled OT treatment while in the hospital to address deficits and maximize level of functional independence with ADL's and functional mobility  Occupational Performance areas to address include: grooming, bathing/shower, toilet hygiene, dressing, medication management, functional mobility and clothing management  From OT standpoint, recommendation at time of d/c would be home OT  Goals   Patient Goals to go home    Plan   Treatment Interventions ADL retraining;Functional transfer training;UE strengthening/ROM; Endurance training;Cognitive reorientation; Compensatory technique education; Energy conservation   Goal Expiration Date 11/01/19   OT Treatment Day 0   OT Frequency 3-5x/wk   Recommendation   OT Discharge Recommendation Home OT   OT - OK to Discharge Yes  (Once medically cleared)   Barthel Index   Feeding 10   Bathing 0   Grooming Score 5   Dressing Score 5   Bladder Score 5   Bowels Score 10   Toilet Use Score 5   Transfers (Bed/Chair) Score 10   Mobility (Level Surface) Score 0   Stairs Score 0   Barthel Index Score 50     GOALS:    *ADL transfers with (I) for inc'd independence with ADLs/purposeful tasks    *UB ADL with (I) for inc'd independence with self cares    *LB ADL with (I) using AE prn for inc'd independence with self cares    *Toileting with (I) for clothing management and hygiene for return to PLOF with personal care    *Increase static stand balance to good and dyn stand balance to fair+ for inc'd safety with standing purposeful tasks    *Increase stand tolerance x10 m for inc'd tolerance with standing purposeful tasks    *Participate in 10m UE therex to increase overall stamina/activity tolerance for purposeful tasks    *Bed mobility- (I) for inc'd independence to manage own comfort and initiate EOB & OOB purposeful tasks    *Patient will verbalize 3 safety awareness/ principles to prevent falls in the home setting  *Patient will verbalize and demonstrate use of energy conservation/deep breathing techniques and work simplification skills during functional activities with no verbal cues  *Patient will increase OOB/sitting tolerance to 2-4 hours per day to increase participation in self-care and leisure tasks with no s/s of exertion  *Patient will engage in ongoing cognitive assessments to assist with safe discharge planning/recommendations          Sravan Rodriguez MS, OTR/L

## 2019-10-22 NOTE — ASSESSMENT & PLAN NOTE
· Consult Pulmonary  · Continue Rocephin and azithromycin IV  · Solu-Medrol IV  · Nebulizers and inhalers  · Mucinex  · Check procalcitonin and sputum culture

## 2019-10-22 NOTE — PLAN OF CARE
Problem: OCCUPATIONAL THERAPY ADULT  Goal: Performs self-care activities at highest level of function for planned discharge setting  See evaluation for individualized goals  Description  Treatment Interventions: ADL retraining, Functional transfer training, UE strengthening/ROM, Endurance training, Cognitive reorientation, Compensatory technique education, Energy conservation          See flowsheet documentation for full assessment, interventions and recommendations  Note:   Limitation: Decreased ADL status, Decreased UE strength, Decreased Safe judgement during ADL, Decreased cognition, Decreased endurance, Decreased self-care trans, Decreased high-level ADLs  Prognosis: Fair  Assessment: Pt is a 69 y o  male who was admitted to [address] [place] [address] on 10/22/2019 with COPD with acute exacerbation ([facility] [address] )  At this time, patient is also affected by the comorbidities of: DM2, hyperlipidemia, HTN, CAD, and BPH  Additionally, patient is affected by the following personal factors:steps to enter environment, difficulty performing ADLS and difficulty performing IADLS   Orders placed for OT evaluation/treatment with up with assistance orders  Prior to admission, Patient reporting being independent with ADLs/IADLs, ambulatory with no AD or SPC as needed and lives with family in a multi-level house with 3 BEATRIZ  Upon OT evaluation, patient requires supervision/set-up for UB ADLs and minimum assist for LB ADLs  Occupational performance is affected by the following deficits: decreased strength, decreased balance, decreased tolerance, impaired memory, impaired sequencing, impaired problem solving, impulsivity and decreased safety awareness  Based on the following information, patient would benefit from continued skilled OT treatment while in the hospital to address deficits and maximize level of functional independence with ADL's and functional mobility   Occupational Performance areas to address include: grooming, bathing/shower, toilet hygiene, dressing, medication management, functional mobility and clothing management  From OT standpoint, recommendation at time of d/c would be home OT       OT Discharge Recommendation: Home OT  OT - OK to Discharge: Yes(Once medically cleared)     Michelle Tuttle, OT

## 2019-10-22 NOTE — ASSESSMENT & PLAN NOTE
Lab Results   Component Value Date    HGBA1C 8 3 (H) 09/20/2019       Recent Labs     10/22/19  0202   POCGLU 235*       Blood Sugar Average: Last 72 hrs:  (P) 235   · Accu-Cheks Q a c   And HS with sliding scale insulin  · Will hold patient's oral medications

## 2019-10-23 ENCOUNTER — APPOINTMENT (OUTPATIENT)
Dept: CARDIAC REHAB | Facility: HOSPITAL | Age: 74
End: 2019-10-23
Attending: INTERNAL MEDICINE
Payer: MEDICARE

## 2019-10-23 VITALS
WEIGHT: 194.67 LBS | DIASTOLIC BLOOD PRESSURE: 70 MMHG | HEIGHT: 70 IN | TEMPERATURE: 98.1 F | HEART RATE: 85 BPM | BODY MASS INDEX: 27.87 KG/M2 | SYSTOLIC BLOOD PRESSURE: 153 MMHG | RESPIRATION RATE: 18 BRPM | OXYGEN SATURATION: 95 %

## 2019-10-23 PROBLEM — J41.1 MUCOPURULENT CHRONIC BRONCHITIS (HCC): Status: ACTIVE | Noted: 2019-02-22

## 2019-10-23 LAB
ATRIAL RATE: 102 BPM
GLUCOSE SERPL-MCNC: 236 MG/DL (ref 65–140)
GLUCOSE SERPL-MCNC: 293 MG/DL (ref 65–140)
GLUCOSE SERPL-MCNC: 361 MG/DL (ref 65–140)
P AXIS: 81 DEGREES
PR INTERVAL: 182 MS
QRS AXIS: 69 DEGREES
QRSD INTERVAL: 100 MS
QT INTERVAL: 392 MS
QTC INTERVAL: 510 MS
T WAVE AXIS: 86 DEGREES
VENTRICULAR RATE: 102 BPM

## 2019-10-23 PROCEDURE — 93010 ELECTROCARDIOGRAM REPORT: CPT | Performed by: INTERNAL MEDICINE

## 2019-10-23 PROCEDURE — 97116 GAIT TRAINING THERAPY: CPT

## 2019-10-23 PROCEDURE — 94640 AIRWAY INHALATION TREATMENT: CPT

## 2019-10-23 PROCEDURE — 82948 REAGENT STRIP/BLOOD GLUCOSE: CPT

## 2019-10-23 PROCEDURE — 99217 PR OBSERVATION CARE DISCHARGE MANAGEMENT: CPT | Performed by: PHYSICIAN ASSISTANT

## 2019-10-23 PROCEDURE — 94760 N-INVAS EAR/PLS OXIMETRY 1: CPT

## 2019-10-23 RX ORDER — PRIMIDONE 50 MG/1
100 TABLET ORAL EVERY 12 HOURS SCHEDULED
Status: ON HOLD
Start: 2019-10-23 | End: 2020-09-02 | Stop reason: CLARIF

## 2019-10-23 RX ADMIN — LISINOPRIL 5 MG: 5 TABLET ORAL at 08:29

## 2019-10-23 RX ADMIN — INSULIN LISPRO 8 UNITS: 100 INJECTION, SOLUTION INTRAVENOUS; SUBCUTANEOUS at 16:18

## 2019-10-23 RX ADMIN — INSULIN LISPRO 4 UNITS: 100 INJECTION, SOLUTION INTRAVENOUS; SUBCUTANEOUS at 13:12

## 2019-10-23 RX ADMIN — AZITHROMYCIN MONOHYDRATE 500 MG: 500 INJECTION, POWDER, LYOPHILIZED, FOR SOLUTION INTRAVENOUS at 05:37

## 2019-10-23 RX ADMIN — IPRATROPIUM BROMIDE AND ALBUTEROL SULFATE 3 ML: 2.5; .5 SOLUTION RESPIRATORY (INHALATION) at 07:23

## 2019-10-23 RX ADMIN — CEFTRIAXONE 1000 MG: 1 INJECTION, SOLUTION INTRAVENOUS at 04:18

## 2019-10-23 RX ADMIN — PRIMIDONE 100 MG: 50 TABLET ORAL at 08:29

## 2019-10-23 RX ADMIN — METHYLPREDNISOLONE SODIUM SUCCINATE 40 MG: 40 INJECTION, POWDER, FOR SOLUTION INTRAMUSCULAR; INTRAVENOUS at 09:15

## 2019-10-23 RX ADMIN — METHYLPREDNISOLONE SODIUM SUCCINATE 40 MG: 40 INJECTION, POWDER, FOR SOLUTION INTRAMUSCULAR; INTRAVENOUS at 02:32

## 2019-10-23 RX ADMIN — ENOXAPARIN SODIUM 40 MG: 40 INJECTION SUBCUTANEOUS at 08:28

## 2019-10-23 RX ADMIN — INSULIN LISPRO 8 UNITS: 100 INJECTION, SOLUTION INTRAVENOUS; SUBCUTANEOUS at 09:16

## 2019-10-23 RX ADMIN — METHYLPREDNISOLONE SODIUM SUCCINATE 40 MG: 40 INJECTION, POWDER, FOR SOLUTION INTRAMUSCULAR; INTRAVENOUS at 16:16

## 2019-10-23 RX ADMIN — PRAVASTATIN SODIUM 80 MG: 40 TABLET ORAL at 16:17

## 2019-10-23 RX ADMIN — GUAIFENESIN 600 MG: 600 TABLET, EXTENDED RELEASE ORAL at 08:28

## 2019-10-23 RX ADMIN — INSULIN LISPRO 2 UNITS: 100 INJECTION, SOLUTION INTRAVENOUS; SUBCUTANEOUS at 16:17

## 2019-10-23 RX ADMIN — IPRATROPIUM BROMIDE AND ALBUTEROL SULFATE 3 ML: 2.5; .5 SOLUTION RESPIRATORY (INHALATION) at 13:50

## 2019-10-23 RX ADMIN — INSULIN LISPRO 8 UNITS: 100 INJECTION, SOLUTION INTRAVENOUS; SUBCUTANEOUS at 13:13

## 2019-10-23 RX ADMIN — INSULIN LISPRO 3 UNITS: 100 INJECTION, SOLUTION INTRAVENOUS; SUBCUTANEOUS at 08:28

## 2019-10-23 RX ADMIN — METOPROLOL TARTRATE 25 MG: 25 TABLET, FILM COATED ORAL at 08:29

## 2019-10-23 RX ADMIN — IPRATROPIUM BROMIDE AND ALBUTEROL SULFATE 3 ML: 2.5; .5 SOLUTION RESPIRATORY (INHALATION) at 01:24

## 2019-10-23 RX ADMIN — METOPROLOL TARTRATE 25 MG: 25 TABLET, FILM COATED ORAL at 16:16

## 2019-10-23 RX ADMIN — GUAIFENESIN 600 MG: 600 TABLET, EXTENDED RELEASE ORAL at 16:15

## 2019-10-23 NOTE — DISCHARGE SUMMARY
Discharge- [facility] 1945, 69 y o  male MRN: [id number]    Unit/Bed#: -01 Encounter: [id number]    Primary Care Provider: Simran Le MD   Date and time admitted to hospital: 10/22/2019  1:47 AM        * Mucopurulent chronic bronchitis (HCC)  Assessment & Plan  · Suspect mucus plugging  · Continue home medications  · Mucinex  · procalcitonin <0 05 x 2 and sputum culture not obtained as of yet    Tremor  Assessment & Plan  · mysoline    CAD (coronary artery disease), native coronary artery  Assessment & Plan  · Continue aspirin and beta-blocker    Essential hypertension  Assessment & Plan  · Continue metoprolol and lisinopril     Other hyperlipidemia  Assessment & Plan  · Continue statin     Type 2 diabetes mellitus without complication, without long-term current use of insulin (HCC)  Assessment & Plan  Lab Results   Component Value Date    HGBA1C 8 3 (H) 09/20/2019       Recent Labs     10/22/19  0202   POCGLU 235*       Blood Sugar Average: Last 72 hrs:  (P) 235   ·   · Patient with hyperglycemia secondary to steroids   · Resume home medications at discharge    Discharging Physician / Practitioner: Karan Moulton PA-C  PCP: Simran Le MD  Admission Date:   Admission Orders (From admission, onward)     Ordered        10/22/19 0314  Place in Observation (expected length of stay for this patient is less than two midnights)  Once                   Discharge Date: 10/23/19    Resolved Problems  Date Reviewed: 10/23/2019    None          Consultations During Hospital Stay:  · None    Procedures Performed:   · Chest x-ray with mildly increased density left lung field, small effusion possible  No consolidation noted  Significant Findings / Test Results:   · None    Incidental Findings:   · None     Test Results Pending at Discharge (will require follow up):    · None     Outpatient Tests Requested:  · Pulmonary function testing has been ordered and is pending    Complications:  None    Reason for Admission:  Shortness of breath    Hospital Course:     Bonnie Meeks is a 69 y o  male patient who originally presented to the hospital on 10/22/2019 due to shortness of breath  Patient has known COPD, diabetes mellitus type 2, hypertension, hyperlipidemia, coronary artery disease  He presented to the emergency room secondary of shortness of breath he noted some increased wheezing he tried his nebulizer at home with no improvement  Patient in the emergency room was given Solu-Medrol 125 mg IV Rocephin and azithromycin and a DuoNeb treatment and some Ativan  Patient reports he rapidly improved after he expectorated a large chunk of mucus  Patient reports no wheezing no chest pain or shortness breath at this time  He was evaluated by Physical and Occupational therapy they recommended home services  Patient declined home services he is requesting to be discharged home  I do recommend following up with pulmonologist as an outpatient  Patient has had several hospitalizations for COPD exacerbation in the past year 1 in July 1 and September  He has pulmonary function testing scheduled October 25th  Please see above list of diagnoses and related plan for additional information  Condition at Discharge: stable     Discharge Day Visit / Exam:     Subjective:  Patient seen in the chair  He reports feeling much better after expect creating a large chunk of mucus  He has been ambulating without any issues of chest pain or shortness breath  He would like to be discharged home  Vitals: Blood Pressure: 153/70 (10/23/19 1445)  Pulse: 85 (10/23/19 1445)  Temperature: 98 1 °F (36 7 °C) (10/23/19 1445)  Temp Source: Tympanic (10/23/19 1445)  Respirations: 18 (10/23/19 1445)  Height: 5' 10" (177 8 cm) (10/22/19 0359)  Weight - Scale: 88 3 kg (194 lb 10 7 oz) (10/22/19 0359)  SpO2: 95 % (10/23/19 1445)     Exam:   Physical Exam   Constitutional: He is oriented to person, place, and time   He appears well-developed and well-nourished  Pleasant elderly male sitting in the chair   HENT:   Head: Normocephalic and atraumatic  Eyes: Conjunctivae and EOM are normal  Right eye exhibits no discharge  Left eye exhibits no discharge  Neck: Normal range of motion  No tracheal deviation present  Cardiovascular: Normal rate, regular rhythm and normal heart sounds  Exam reveals no gallop and no friction rub  No murmur heard  Pulmonary/Chest: Effort normal  No respiratory distress  He has decreased breath sounds in the right lower field and the left lower field  He has no wheezes  He has no rales  Abdominal: Soft  Bowel sounds are normal  He exhibits no distension and no mass  There is no tenderness  There is no guarding  Musculoskeletal: Normal range of motion  He exhibits no edema, tenderness or deformity  Neurological: He is alert and oriented to person, place, and time  Skin: Skin is warm and dry  No rash noted  No erythema  No pallor  Psychiatric: He has a normal mood and affect  His behavior is normal  Judgment and thought content normal    Nursing note and vitals reviewed  Discussion with Family:  Patient, case management    Discharge instructions/Information to patient and family:   See after visit summary for information provided to patient and family  Provisions for Follow-Up Care:  See after visit summary for information related to follow-up care and any pertinent home health orders  Disposition:     Home      Planned Readmission:    None     Discharge Statement:  I spent 30 minutes discharging the patient  This time was spent on the day of discharge  I had direct contact with the patient on the day of discharge  Greater than 50% of the total time was spent examining patient, answering all patient questions, arranging and discussing plan of care with patient as well as directly providing post-discharge instructions    Additional time then spent on discharge activities  Discharge Medications:  See after visit summary for reconciled discharge medications provided to patient and family        ** Please Note: This note has been constructed using a voice recognition system **

## 2019-10-23 NOTE — PHYSICIAN ADVISOR
Current patient class: Observation  The patient is currently on Hospital Day: 2 at [address]        The patient was admitted to the hospital  on N/A at N/A for the following diagnosis:  Shortness of breath [R06 02]  COPD with acute exacerbation ([facility] [address] ) [J44 1]     After review of the relevant documentation, labs, vital signs and test results, the patient is most appropriate for OBSERVATION STATUS  Rationale is as follows:     The patient is a 69 yrs   Male who presented to the ED at 10/22/2019  1:47 AM with a chief complaint of shortness of breath        * Mucopurulent chronic bronchitis (HCC)  Assessment & Plan  · Suspect mucus plugging  · Continue home medications  · Mucinex  · procalcitonin <0 05 x 2 and sputum culture not obtained as of yet     Tremor  Assessment & Plan  · mysoline     CAD (coronary artery disease), native coronary artery  Assessment & Plan  · Continue aspirin and beta-blocker     Essential hypertension  Assessment & Plan  · Continue metoprolol and lisinopril      Other hyperlipidemia  Assessment & Plan  · Continue statin      Type 2 diabetes mellitus without complication, without long-term current use of insulin (HCC)  Assessment & Plan        Lab Results   Component Value Date     HGBA1C 8 3 (H) 09/20/2019             Recent Labs     10/22/19  0202   POCGLU 235*         Blood Sugar Average: Last 72 hrs:  (P) 235   ·    · Patient with hyperglycemia secondary to steroids   · Resume home medications at discharge      The patients vitals on arrival were ED Triage Vitals   Temperature Pulse Respirations Blood Pressure SpO2   10/22/19 0149 10/22/19 0149 10/22/19 0149 10/22/19 0149 10/22/19 0149   98 6 °F (37 °C) (!) 113 (!) 24 (!) 234/128 91 %      Temp Source Heart Rate Source Patient Position - Orthostatic VS BP Location FiO2 (%)   10/22/19 0359 10/22/19 1417 10/22/19 0359 10/22/19 0359 --   Temporal Monitor Lying Left arm       Pain Score       10/22/19 0149 No Pain           Past Medical History:   Diagnosis Date    BPH (benign prostatic hyperplasia)     45 days radiation treatment    COPD (chronic obstructive pulmonary disease)     Coronary artery disease     CABG x4 in 2017    Diabetes mellitus     History of Arterial Duplex of LE 12/26/2017    Likely occlusion of the left superficial femoral artery  Calcific changes bilaterally  Despite these changes, the ankle-brachial index as a measure of peripheral blood flow only mildly impaired   History of echocardiogram 06/12/2017    EF 40%, mild LVH, mild MR     Hyperlipidemia     Hypertension     NSTEMI (non-ST elevated myocardial infarction)     Prostate cancer     prostate     PVD (peripheral vascular disease)     Tremor      Past Surgical History:   Procedure Laterality Date    CARDIAC CATHETERIZATION  03/08/2017    Significant left main plus triple-vessel CAD   CORONARY ARTERY BYPASS GRAFT  03/08/2017    4V CABG:  LIMA to LAD, VG to RI, SVG to PDA to LVBR RCA      PROSTATE BIOPSY             Consults have been placed to:   IP CONSULT TO PULMONOLOGY  IP CONSULT TO CASE MANAGEMENT    Vitals:    10/23/19 0723 10/23/19 1158 10/23/19 1350 10/23/19 1445   BP:  143/69  153/70   BP Location:  Right arm  Left arm   Pulse:  89  85   Resp:  18  18   Temp:  98 4 °F (36 9 °C)  98 1 °F (36 7 °C)   TempSrc:  Tympanic  Tympanic   SpO2: 91% 93% 97% 95%   Weight:       Height:           Most recent labs:    Recent Labs     10/22/19  0209 10/22/19  0434   WBC 6 60 6 70   HGB 14 5 13 9*   HCT 41 9* 40 1*    220   K 3 8 4 0   CALCIUM 9 1 8 7   BUN 17 17   CREATININE 0 81 0 79   AST 11*  --    ALT 17  --    ALKPHOS 78  --        Scheduled Meds:  Current Facility-Administered Medications:  acetaminophen 650 mg Oral Q6H PRN PENG Kerr   albuterol 2 5 mg Nebulization Q4H PRN PENG Kerr   aspirin 81 mg Oral Every Other Day PENG Kerr   enoxaparin 40 mg Subcutaneous Daily Lian Hogan, PENG   gabapentin 200 mg Oral HS Lian Hogan, PENG   guaiFENesin 600 mg Oral BID PENG Kerr   insulin glargine 15 Units Subcutaneous HS Jonathan Jones MD   insulin lispro 1-5 Units Subcutaneous TID AC Lian Hogan, PENG   insulin lispro 1-5 Units Subcutaneous HS Lian Hogan, PENG   insulin lispro 8 Units Subcutaneous TID With Meals Leandra Hess PA-C   ipratropium-albuterol 3 mL Nebulization Q6H PENG Kerr   lisinopril 5 mg Oral Daily PENG Kerr   methylPREDNISolone sodium succinate 40 mg Intravenous Q8H PENG Kerr   metoprolol tartrate 25 mg Oral BID Lian Hogan, PENG   ondansetron 4 mg Intravenous Q6H PRN PENG Kerr   pravastatin 80 mg Oral Daily With [employer], PENG   primidone 100 mg Oral Q12H [facility] PENG Kerr     Continuous Infusions:   PRN Meds:   acetaminophen    albuterol    ondansetron

## 2019-10-23 NOTE — PHYSICAL THERAPY NOTE
10/23/19 1433   Pain Assessment   Pain Assessment 0-10   Pain Score No Pain   Restrictions/Precautions   Weight Bearing Precautions Per Order No   Other Precautions Telemetry; Fall Risk  (no O2 this date)   General   Chart Reviewed Yes   Family/Caregiver Present No   Cognition   Overall Cognitive Status WFL   Arousal/Participation Alert; Cooperative   Attention Within functional limits   Orientation Level Oriented X4   Memory Within functional limits   Following Commands Follows all commands and directions without difficulty   Comments pt agreed to PT session   Subjective   Subjective "I feel good, no O2 now "   Bed Mobility   Additional Comments pt seated in bedside chair to begin & end session   Transfers   Sit to Stand 5  Supervision   Additional items Assist x 1; Increased time required;Verbal cues   Stand to Sit 5  Supervision   Additional items Assist x 1;Verbal cues;Armrests   Ambulation/Elevation   Gait pattern Short stride   Gait Assistance 5  Supervision   Additional items Assist x 1;Verbal cues   Assistive Device None   Distance 300 feet x 1   Stair Management Assistance Not tested   Balance   Static Sitting Good   Dynamic Sitting Fair +   Static Standing Fair   Dynamic Standing Fair   Ambulatory Fair   Endurance Deficit   Endurance Deficit Yes   Activity Tolerance   Activity Tolerance Patient tolerated treatment well   Exercises   Hip Flexion Sitting;10 reps;AROM; Bilateral   Hip Abduction Sitting;10 reps;AROM; Bilateral   Hip Adduction Sitting;10 reps;AROM; Bilateral   Knee AROM Long Arc Quad Sitting;10 reps;AROM; Bilateral   Ankle Pumps Sitting;10 reps;AROM; Bilateral   Assessment   Prognosis Good   Problem List Decreased strength;Decreased endurance; Impaired balance;Decreased mobility; Decreased safety awareness   Assessment Pt seen for PT treatment session this date with interventions consisting of gait training w/ emphasis on improving pt's ability to ambulate level surfaces x 300 feet x 1 with close S provided by therapist with no AD  Pt agreeable to PT treatment session upon arrival, pt found seated OOB in chair, in no apparent distress  In comparison to previous session, pt with significant improvements in amb distance, activity tolerance  Post session: all needs in reach Continue to recommend anticipate no needs at time of d/c in order to maximize pt's functional independence and safety w/ mobility  Pt continues to be functioning below baseline level, and remains limited 2* factors listed above and including decreased strength, endurance & safe functional mobility  PT will continue to see pt while here in order to address the deficits listed above and provide interventions consistent w/ POC in effort to achieve STGs   Goals   Patient Goals to go home   PT Treatment Day 1   Plan   Treatment/Interventions Functional transfer training;LE strengthening/ROM; Therapeutic exercise; Endurance training;Patient/family training;Equipment eval/education;Gait training   Progress Progressing toward goals   PT Frequency   (3-5 x week)   Recommendation   Recommendation Home with family support   Equipment Recommended [employer] Sergeant  (as needed)   PT - OK to Discharge Yes  (when med cleared)   Additional Comments pt seated OOB in chair at end of session, all needs in reach   He deferred SCDs "for a break from them "   Lesa Hatfield, PTA

## 2019-10-23 NOTE — PLAN OF CARE
Problem: PHYSICAL THERAPY ADULT  Goal: Performs mobility at highest level of function for planned discharge setting  See evaluation for individualized goals  Description  Treatment/Interventions: Functional transfer training, Elevations, Therapeutic exercise, Endurance training, Patient/family training, Gait training, Spoke to nursing  Equipment Recommended: Walker(continue RW use)       See flowsheet documentation for full assessment, interventions and recommendations  Outcome: Progressing  Note:   Prognosis: Good  Problem List: Decreased strength, Decreased endurance, Impaired balance, Decreased mobility, Decreased safety awareness  Assessment: Pt seen for PT treatment session this date with interventions consisting of gait training w/ emphasis on improving pt's ability to ambulate level surfaces x 300 feet x 1 with close S provided by therapist with no AD  Pt agreeable to PT treatment session upon arrival, pt found seated OOB in chair, in no apparent distress  In comparison to previous session, pt with significant improvements in amb distance, activity tolerance  Post session: all needs in reach Continue to recommend anticipate no needs at time of d/c in order to maximize pt's functional independence and safety w/ mobility  Pt continues to be functioning below baseline level, and remains limited 2* factors listed above and including decreased strength, endurance & safe functional mobility  PT will continue to see pt while here in order to address the deficits listed above and provide interventions consistent w/ POC in effort to achieve STGs  Barriers to Discharge: Inaccessible home environment     Recommendation: Home with family support     PT - OK to Discharge: Yes(when med cleared)    See flowsheet documentation for full assessment

## 2019-10-23 NOTE — PROGRESS NOTES
Cardiac Rehabilitation Plan of Care   30 Day      Today's date: 10/23/2019   Visits:   Patient name: Jerald Geller      : 1945  Age: 69 y o  MRN: [id number]  Referring Physician: Valeryi Ly MD  Cardiologist: oCdy Bhatti MD  Provider: Bladimir Landon  Clinician: Gosia Cee MS, CCRP    Dx:   Encounter Diagnosis   Name Primary?  Chronic systolic heart failure ([address] )      Date of onset: 2019      SUMMARY OF PROGRESS: Friday, 10/18/2019, was Rafy George  visit at Cardiac Rehab  The patient states he is 100% medication compliant  Patient states that he does have neuropathy in both feet which may limit his exercise and affect his balance  During rest, the patient's HR was 74, his BP was 102/64, and his O2 was approximately 90% on room air  The patient stated that he did  Have a PFT done with Dr Reyna Michaels at [facility]  PFT results stated that he had "very severe COPD " The patient attempted a 6MWT on his initial visit; he completed a total of 2 minutes and 30 seconds  The test was terminated due to his O2 dropping from 88% to 85% from the second to third minute of the test  Arsalan Feliz was able to walk 280 feet during that time which is a 1 40 MET level  On 10/18/19, the patient stated that the exercises were a "4" on the 1-10 RPE Scale  The patient also stated that his SOB was a "5/6" on the 1-10 SOB Scale  Arsalan Feliz also reached a peak HR of 96 on the UBE and a peak BP of 128/62 while on the NuStep  During recovery, the patient's HR was 83, his BP was 122/56, and his O2 was 95%  He did show some ectopy which can be seen in the attached "Exercise Session Details " The patient has been doing well in the program and stated that he felt as though it was helping him to do more ADLs at home  However, the patient was admitted to the ED yesterday for SOB   The patient will need medical clearance to return to the program        Medication compliance: Yes   Comments: Patient states he is 100% medication compliant     Fall Risk: Moderate   Comments: Patient uses a one point cane to aide when walking  Pt  Also has neuropathy in both feet which effects his balance as well  EKG changes: Frequent PVCs      EXERCISE ASSESSMENT and PLAN    Current Exercise Program in Rehab:       Frequency: 3-4 days/week        Minutes: 35-40        METS: 2 0-2 5   HR: 20-30 above RHR;    RPE: 4-6         Modalities: UBE, NuStep and Recumbent bike      Exercise Progression 30 Day Goals :    Frequency: 4-5 days/week   Minutes:40-45   METS: 3 0-3 5   HR: 20-30 above RHR;    RPE: 4-6   Modalities: UBE, NuStep, Recumbent bike and Room walking    Strength trainin-3 days / week  12-15 repetitions  1-2 sets per modality   Will be added following 2-3 weeks of monitored exercise sessions   Modalities: Leg Press, Chest Press, Lateral Raise, Arm Curl, Seated Row and Front Raises    Progressing: In Progress    Home Exercise: None; Cardiac Rehab Staff did encourage patient to begin exercising at home once his COPD is more controlled  Goals: 10% improvement in functional capacity, Reduced Dyspnea with physical activity  0-1/10, improved DASI score by 10%, Increase in peak CR METs by 40%, Improved 6MWT distance by 10%, Resume ADLs with increased strength and Exercise 5 days/wk, >150mins/wk  Education: Benefit of exercise for CAD risk factors, home exercise guidelines, signs and sxs and RPE scale   Plan:education on home exercise guidelines, home exercise 30+ mins 2 days opposite CR and Education class: Risk Factors for Heart Disease  Readiness to change: Action      NUTRITION ASSESSMENT AND PLAN    Weight control:    Starting weight: 208 lb    Current weight:   208 lb  Waist circumference:    Startin in   Current:  42 in  Diabetes: Patient states he has diabetes but does not check his BG     Lipid management: Discussed diet and lipid management  Goals:reduced BMI to < 25, reduced waist circumference <40 inches, fasting BG , improved A1c  < 6 5%, Improved Rate Your Plate score  >16 and Wt  loss 1-2 ppw goal of 20 lbs  Education: heart healthy eating  low sodium diet  hydration  diet and lipid management  diabetes management and exercise  wt  loss  Progressing: In Progress  Plan: Education class: Reading Food Labels, Education Class: Heart Healthy Eating, Increase PUFA and MUFA, Decrease SFA, Increase whole grains, increase fruits/vegs, eliminate processed meats, reduce red meat 1x/wk, swtich to low fat dairy and Reduce added sugars <25g/day  Readiness to change: Action      PSYCHOSOCIAL ASSESSMENT AND PLAN    Emotional:  PHQ-9 = 1-4 = Minimal Depression  Self-reported stress level: 5   Social support: Very Good  Goals:  Reduce perceived stress to 1-3/10, PHQ-9 - reduced severity by one level, Feelings in [facility] Score < 3, Physical Fitness in [facility] Score < 3, Daily Activity in [facility] Score < 3, Social Activities in [facility] Score < 3, Pain in [facility] Score < 3, Quality of Life in [facility] Score < 3 , Change in Health in [place] Score < 3 , Increased interest in doing things, improved sleep, improved positive thoughts of well being and increased energy  Education: signs/sxs of depression, benefits of positive support system and coping mechanisms  Progressing: In Progress  Plan: Practice relaxation techniques  Readiness to change: Action    OTHER CORE COMPONENTS     Tobacco:   Social History     Tobacco Use   Smoking Status Former Smoker    Last attempt to quit: 2/15/2019    Years since quittin 6   Smokeless Tobacco Never Used       Tobacco Use Intervention: Referral to tobacco expert:   Brief counseling by cardiac rehab professional  Date: 2019    Blood pressure:    Restin/64   Exercise: 128/62   Recovery: 122/56    Goals: consistent BP < 130/80, consistent exercise >150 mins/wk, reduce number of cigarettes/day, set a target quit date and make contact with a tobacco use prevention program  Education:  understanding HTN and CAD, low sodium diet and HTN and smoking and heart disease  Progressing: In Progress  Plan: Class: Understanding Heart Disease, Class: Common Heart Medications, make contact with smoking cessation program and Set target quit date for smoking  Readiness to change: Action

## 2019-10-23 NOTE — SOCIAL WORK
D/c plan was discussed at care coordination rounds, doctor and rn aware if the pt is being d/c today he plans to drive himself, pt does not want hhc , pt will continue with his cardiac rehab, he wanted to make his own pcp appointment, pt denies any additional d/c needs, pt was in agreement with the d/c plan home with family and outpt services  outpt cm referral was made for this pt

## 2019-10-24 DIAGNOSIS — Z71.89 COMPLEX CARE COORDINATION: Primary | ICD-10-CM

## 2019-10-25 ENCOUNTER — APPOINTMENT (OUTPATIENT)
Dept: CARDIAC REHAB | Facility: HOSPITAL | Age: 74
End: 2019-10-25
Attending: INTERNAL MEDICINE
Payer: MEDICARE

## 2019-10-27 LAB
BACTERIA BLD CULT: NORMAL
BACTERIA BLD CULT: NORMAL

## 2019-10-28 ENCOUNTER — CLINICAL SUPPORT (OUTPATIENT)
Dept: CARDIAC REHAB | Facility: HOSPITAL | Age: 74
End: 2019-10-28
Attending: INTERNAL MEDICINE
Payer: MEDICARE

## 2019-10-28 DIAGNOSIS — I50.22 CHRONIC SYSTOLIC HEART FAILURE (HCC): ICD-10-CM

## 2019-10-28 PROCEDURE — 93798 PHYS/QHP OP CAR RHAB W/ECG: CPT

## 2019-10-30 ENCOUNTER — CLINICAL SUPPORT (OUTPATIENT)
Dept: CARDIAC REHAB | Facility: HOSPITAL | Age: 74
End: 2019-10-30
Attending: INTERNAL MEDICINE
Payer: MEDICARE

## 2019-10-30 DIAGNOSIS — I50.22 CHRONIC SYSTOLIC HEART FAILURE (HCC): ICD-10-CM

## 2019-10-30 PROCEDURE — 93798 PHYS/QHP OP CAR RHAB W/ECG: CPT

## 2019-11-01 ENCOUNTER — CLINICAL SUPPORT (OUTPATIENT)
Dept: CARDIAC REHAB | Facility: HOSPITAL | Age: 74
End: 2019-11-01
Attending: INTERNAL MEDICINE
Payer: MEDICARE

## 2019-11-01 DIAGNOSIS — I50.22 CHRONIC SYSTOLIC HEART FAILURE (HCC): ICD-10-CM

## 2019-11-01 PROCEDURE — 93798 PHYS/QHP OP CAR RHAB W/ECG: CPT

## 2019-11-04 ENCOUNTER — APPOINTMENT (OUTPATIENT)
Dept: CARDIAC REHAB | Facility: HOSPITAL | Age: 74
End: 2019-11-04
Attending: INTERNAL MEDICINE
Payer: MEDICARE

## 2019-11-05 ENCOUNTER — APPOINTMENT (EMERGENCY)
Dept: RADIOLOGY | Facility: HOSPITAL | Age: 74
End: 2019-11-05
Payer: MEDICARE

## 2019-11-05 ENCOUNTER — HOSPITAL ENCOUNTER (EMERGENCY)
Facility: HOSPITAL | Age: 74
Discharge: HOME | End: 2019-11-05
Attending: EMERGENCY MEDICINE
Payer: MEDICARE

## 2019-11-05 VITALS
BODY MASS INDEX: 27.87 KG/M2 | TEMPERATURE: 97.5 F | OXYGEN SATURATION: 90 % | SYSTOLIC BLOOD PRESSURE: 131 MMHG | HEIGHT: 70 IN | DIASTOLIC BLOOD PRESSURE: 70 MMHG | HEART RATE: 79 BPM | WEIGHT: 194.67 LBS | RESPIRATION RATE: 21 BRPM

## 2019-11-05 DIAGNOSIS — J44.1 COPD WITH ACUTE EXACERBATION (HCC): Primary | ICD-10-CM

## 2019-11-05 LAB
ANION GAP SERPL CALCULATED.3IONS-SCNC: 8 MMOL/L (ref 4–13)
BASOPHILS # BLD AUTO: 0.1 THOUSANDS/ΜL (ref 0–0.1)
BASOPHILS NFR BLD AUTO: 1 % (ref 0–2)
BUN SERPL-MCNC: 15 MG/DL (ref 7–25)
CALCIUM SERPL-MCNC: 8.7 MG/DL (ref 8.6–10.5)
CHLORIDE SERPL-SCNC: 104 MMOL/L (ref 98–107)
CO2 SERPL-SCNC: 27 MMOL/L (ref 21–31)
CREAT SERPL-MCNC: 0.87 MG/DL (ref 0.7–1.3)
EOSINOPHIL # BLD AUTO: 0.5 THOUSAND/ΜL (ref 0–0.61)
EOSINOPHIL NFR BLD AUTO: 7 % (ref 0–5)
ERYTHROCYTE [DISTWIDTH] IN BLOOD BY AUTOMATED COUNT: 14.2 % (ref 11.5–14.5)
GFR SERPL CREATININE-BSD FRML MDRD: 86 ML/MIN/1.73SQ M
GLUCOSE SERPL-MCNC: 145 MG/DL (ref 65–99)
HCT VFR BLD AUTO: 41.7 % (ref 42–47)
HGB BLD-MCNC: 14.2 G/DL (ref 14–18)
LYMPHOCYTES # BLD AUTO: 1.1 THOUSANDS/ΜL (ref 0.6–4.47)
LYMPHOCYTES NFR BLD AUTO: 14 % (ref 21–51)
MAGNESIUM SERPL-MCNC: 2 MG/DL (ref 1.9–2.7)
MCH RBC QN AUTO: 31.3 PG (ref 26–34)
MCHC RBC AUTO-ENTMCNC: 34.1 G/DL (ref 31–37)
MCV RBC AUTO: 92 FL (ref 81–99)
MONOCYTES # BLD AUTO: 0.3 THOUSAND/ΜL (ref 0.17–1.22)
MONOCYTES NFR BLD AUTO: 4 % (ref 2–12)
NEUTROPHILS # BLD AUTO: 5.8 THOUSANDS/ΜL (ref 1.4–6.5)
NEUTS SEG NFR BLD AUTO: 73 % (ref 42–75)
PLATELET # BLD AUTO: 165 THOUSANDS/UL (ref 149–390)
PMV BLD AUTO: 9.5 FL (ref 8.6–11.7)
POTASSIUM SERPL-SCNC: 3.7 MMOL/L (ref 3.5–5.5)
RBC # BLD AUTO: 4.54 MILLION/UL (ref 4.3–5.9)
SODIUM SERPL-SCNC: 139 MMOL/L (ref 134–143)
TROPONIN I SERPL-MCNC: 0.04 NG/ML
WBC # BLD AUTO: 7.9 THOUSAND/UL (ref 4.8–10.8)

## 2019-11-05 PROCEDURE — 36415 COLL VENOUS BLD VENIPUNCTURE: CPT | Performed by: EMERGENCY MEDICINE

## 2019-11-05 PROCEDURE — 94760 N-INVAS EAR/PLS OXIMETRY 1: CPT

## 2019-11-05 PROCEDURE — 71046 X-RAY EXAM CHEST 2 VIEWS: CPT

## 2019-11-05 PROCEDURE — 85025 COMPLETE CBC W/AUTO DIFF WBC: CPT | Performed by: EMERGENCY MEDICINE

## 2019-11-05 PROCEDURE — 94640 AIRWAY INHALATION TREATMENT: CPT

## 2019-11-05 PROCEDURE — 99285 EMERGENCY DEPT VISIT HI MDM: CPT | Performed by: EMERGENCY MEDICINE

## 2019-11-05 PROCEDURE — 84484 ASSAY OF TROPONIN QUANT: CPT | Performed by: EMERGENCY MEDICINE

## 2019-11-05 PROCEDURE — 96365 THER/PROPH/DIAG IV INF INIT: CPT

## 2019-11-05 PROCEDURE — 93005 ELECTROCARDIOGRAM TRACING: CPT

## 2019-11-05 PROCEDURE — 99285 EMERGENCY DEPT VISIT HI MDM: CPT

## 2019-11-05 PROCEDURE — 80048 BASIC METABOLIC PNL TOTAL CA: CPT | Performed by: EMERGENCY MEDICINE

## 2019-11-05 PROCEDURE — 94644 CONT INHLJ TX 1ST HOUR: CPT

## 2019-11-05 PROCEDURE — 83735 ASSAY OF MAGNESIUM: CPT | Performed by: EMERGENCY MEDICINE

## 2019-11-05 RX ORDER — PREDNISONE 10 MG/1
40 TABLET ORAL DAILY
Qty: 20 TABLET | Refills: 0 | Status: SHIPPED | OUTPATIENT
Start: 2019-11-05 | End: 2019-11-10

## 2019-11-05 RX ORDER — PREDNISONE 20 MG/1
60 TABLET ORAL ONCE
Status: COMPLETED | OUTPATIENT
Start: 2019-11-05 | End: 2019-11-05

## 2019-11-05 RX ORDER — MAGNESIUM SULFATE HEPTAHYDRATE 40 MG/ML
2 INJECTION, SOLUTION INTRAVENOUS ONCE
Status: COMPLETED | OUTPATIENT
Start: 2019-11-05 | End: 2019-11-05

## 2019-11-05 RX ORDER — SODIUM CHLORIDE FOR INHALATION 0.9 %
12 VIAL, NEBULIZER (ML) INHALATION ONCE
Status: COMPLETED | OUTPATIENT
Start: 2019-11-05 | End: 2019-11-05

## 2019-11-05 RX ADMIN — MAGNESIUM SULFATE HEPTAHYDRATE 2 G: 40 INJECTION, SOLUTION INTRAVENOUS at 10:09

## 2019-11-05 RX ADMIN — ALBUTEROL SULFATE 10 MG: 2.5 SOLUTION RESPIRATORY (INHALATION) at 10:07

## 2019-11-05 RX ADMIN — IPRATROPIUM BROMIDE 1 MG: 0.5 SOLUTION RESPIRATORY (INHALATION) at 10:07

## 2019-11-05 RX ADMIN — IPRATROPIUM BROMIDE 0.5 MG: 0.5 SOLUTION RESPIRATORY (INHALATION) at 12:14

## 2019-11-05 RX ADMIN — ISODIUM CHLORIDE 12 ML: 0.03 SOLUTION RESPIRATORY (INHALATION) at 10:08

## 2019-11-05 RX ADMIN — PREDNISONE 60 MG: 20 TABLET ORAL at 10:08

## 2019-11-05 RX ADMIN — ALBUTEROL SULFATE 5 MG: 2.5 SOLUTION RESPIRATORY (INHALATION) at 12:14

## 2019-11-05 NOTE — ED PROCEDURE NOTE
PROCEDURE  ECG 12 Lead Documentation Only  Date/Time: 11/5/2019 9:53 AM  Performed by: Tone Reyes DO  Authorized by:  Tone Reyes DO     Indications / Diagnosis:  Chest pain/dyspnea  ECG reviewed by me, the ED Provider: yes    Patient location:  ED  Interpretation:     Interpretation: non-specific    Rate:     ECG rate:  79    ECG rate assessment: normal    Rhythm:     Rhythm: sinus rhythm    Ectopy:     Ectopy: none    QRS:     QRS axis:  Normal  Conduction:     Conduction: normal    ST segments:     ST segments:  Non-specific  T waves:     T waves: normal           Tone Reyes DO  11/05/19 1038

## 2019-11-05 NOTE — ED PROVIDER NOTES
History  Chief Complaint   Patient presents with    Shortness of Breath     started last pm     Patient complains of chest tightness and dyspnea with wheezing similar to prior episodes of COPD exacerbation that started last night after he plugged in an air freshener  He has used these in his home before and does not know them to be a trigger of his COPD  He was last admitted for COPD 3 weeks ago  He has not changed or missed any of his medications in that time period he has been using his inhalers and nebulizers as directed, without increased frequency or dose  He has never been intubated for his COPD  He also has a h/o coronary artery disease status post 4 vessel CABG in 2016  He followed up with Dr Camila Hair in the past couple of weeks without further problems  He denies any other associated cardiac symptoms  He presents to the emergency department with audible wheezing, retractions and mild conversational dyspnea but not appearing toxic or in significant respiratory distress  He denies any change in his baseline exertional dyspnea since symptoms began last night  He does not require supplemental oxygen at home  Denies inhalation drug use  No recent travel or sick contacts  Denies f/c, lightheadedness/dizziness, diaphoresis, abdominal pain, n/v/d  12 system ROS o/w negative  History provided by:  Patient and medical records  Shortness of Breath   Severity:  Moderate  Onset quality:  Sudden  Duration:  1 day  Timing:  Constant  Progression:  Worsening  Chronicity:  Recurrent  Context: fumes (Potentially)    Context: not smoke exposure    Relieved by:  Nothing  Worsened by:   Activity, coughing and movement  Ineffective treatments:  Inhaler (Nebulizer)  Associated symptoms: cough (Dry, intermittent)    Associated symptoms: no abdominal pain, no claudication, no diaphoresis, no fever, no headaches, no hemoptysis, no neck pain, no sore throat, no sputum production, no syncope, no swollen glands, no vomiting and no wheezing    Risk factors: no hx of PE/DVT, no prolonged immobilization, no recent surgery and no tobacco use (Quit in 2019)        Prior to Admission Medications   Prescriptions Last Dose Informant Patient Reported? Taking?    Alogliptin Benzoate 25 MG TABS   Yes No   Sig: Take by mouth daily   Empagliflozin 10 MG TABS   Yes No   Sig: Take 10 mg by mouth every morning   albuterol (2 5 mg/3 mL) 0 083 % nebulizer solution   No No   Sig: Take 1 vial (2 5 mg total) by nebulization every 4 (four) hours as needed for wheezing or shortness of breath   albuterol (PROVENTIL HFA,VENTOLIN HFA) 90 mcg/act inhaler   Yes No   Sig: Inhale 2 puffs every 6 (six) hours as needed for wheezing or shortness of breath   aspirin (ECOTRIN LOW STRENGTH) 81 mg EC tablet   Yes No   Sig: Take 81 mg by mouth every other day    gabapentin (NEURONTIN) 100 mg capsule   Yes No   Sig: Take 200 mg by mouth daily at bedtime   guaiFENesin (MUCINEX) 600 mg 12 hr tablet   No No   Sig: Take 1 tablet (600 mg total) by mouth 2 (two) times a day   lisinopril (ZESTRIL) 5 mg tablet   No No   Sig: Take 1 tablet (5 mg total) by mouth daily   metFORMIN (GLUCOPHAGE) 500 mg tablet   Yes No   Si/2 a tablet in the morning and evening    metoprolol tartrate (LOPRESSOR) 50 mg tablet   Yes No   Sig: Take 25 mg by mouth 2 (two) times a day   primidone (MYSOLINE) 50 mg tablet   No No   Sig: Take 2 tablets (100 mg total) by mouth every 12 (twelve) hours   rosuvastatin (CRESTOR) 10 MG tablet   Yes No   Sig: Take 10 mg by mouth daily   saxagliptin (ONGLYZA) 5 MG tablet   Yes No   Sig: Take 5 mg by mouth daily   tiotropium-olodaterol (STIOLTO RESPIMAT) 2 5-2 5 MCG/ACT inhaler   Yes No   Sig: Inhale 2 puffs daily      Facility-Administered Medications: None       Past Medical History:   Diagnosis Date    BPH (benign prostatic hyperplasia)     45 days radiation treatment    COPD (chronic obstructive pulmonary disease)     Coronary artery disease     CABG x4 in 2017    Diabetes mellitus     History of Arterial Duplex of LE 2017    Likely occlusion of the left superficial femoral artery  Calcific changes bilaterally  Despite these changes, the ankle-brachial index as a measure of peripheral blood flow only mildly impaired   History of echocardiogram 2017    EF 40%, mild LVH, mild MR     Hyperlipidemia     Hypertension     NSTEMI (non-ST elevated myocardial infarction)     Prostate cancer     prostate     PVD (peripheral vascular disease)     Tremor        Past Surgical History:   Procedure Laterality Date    CARDIAC CATHETERIZATION  2017    Significant left main plus triple-vessel CAD   CORONARY ARTERY BYPASS GRAFT  2017    4V CABG:  LIMA to LAD, VG to RI, SVG to PDA to LVBR RCA   PROSTATE BIOPSY         Family History   Problem Relation Age of Onset    Cancer Father     Heart disease Brother      I have reviewed and agree with the history as documented  Social History     Tobacco Use    Smoking status: Former Smoker     Last attempt to quit: 2/15/2019     Years since quittin 7    Smokeless tobacco: Never Used   Substance Use Topics    Alcohol use: Not Currently     Comment: on occasion    Drug use: No        Review of Systems   Constitutional: Negative for activity change, diaphoresis, fever and unexpected weight change  HENT: Negative for congestion, postnasal drip, rhinorrhea, sore throat and trouble swallowing  Eyes: Negative  Respiratory: Positive for cough (Dry, intermittent), chest tightness and shortness of breath  Negative for hemoptysis, sputum production and wheezing  Cardiovascular: Negative for palpitations, claudication, leg swelling and syncope  Gastrointestinal: Negative for abdominal pain, diarrhea, nausea and vomiting  Genitourinary: Negative for flank pain  Musculoskeletal: Negative for neck pain and neck stiffness  Skin: Negative for pallor  Neurological: Negative for dizziness, light-headedness and headaches  Psychiatric/Behavioral: Negative for agitation and confusion  All other systems reviewed and are negative  Physical Exam  Physical Exam   Constitutional: He is oriented to person, place, and time  He appears well-developed and well-nourished  Non-toxic appearance  He appears ill  No distress  HENT:   Head: Normocephalic and atraumatic  Mouth/Throat: Oropharynx is clear and moist  No oropharyngeal exudate or posterior oropharyngeal edema  Eyes: Pupils are equal, round, and reactive to light  Conjunctivae and EOM are normal  No scleral icterus  Neck: Normal range of motion  Neck supple  Cardiovascular: Normal rate, regular rhythm and normal heart sounds  No murmur heard  Pulmonary/Chest: Accessory muscle usage present  No stridor  Tachypnea noted  He is in respiratory distress  He has decreased breath sounds in the right lower field and the left lower field  He has wheezes (Inspiratory and expiratory) in the right upper field, the right middle field, the right lower field, the left upper field, the left middle field and the left lower field  Abdominal: Soft  Bowel sounds are normal    Musculoskeletal: Normal range of motion  He exhibits no edema  Right lower leg: He exhibits no tenderness and no edema  Left lower leg: He exhibits no tenderness and no edema  Lymphadenopathy:     He has no cervical adenopathy  Neurological: He is alert and oriented to person, place, and time  No cranial nerve deficit  He exhibits normal muscle tone  Skin: Skin is warm and dry  Capillary refill takes less than 2 seconds  He is not diaphoretic  No pallor  Psychiatric: He has a normal mood and affect  His behavior is normal  Thought content normal    Vitals reviewed        Vital Signs  ED Triage Vitals [11/05/19 0949]   Temperature Pulse Respirations Blood Pressure SpO2   97 5 °F (36 4 °C) 76 22 (!) 197/75 96 %      Temp Source Heart Rate Source Patient Position - Orthostatic VS BP Location FiO2 (%)   Temporal -- -- Left arm --      Pain Score       3           Vitals:    11/05/19 0949 11/05/19 1000 11/05/19 1130   BP: (!) 197/75 (!) 189/85 131/70   Pulse: 76 73 79         Visual Acuity      ED Medications  Medications   magnesium sulfate 2 g/50 mL IVPB (premix) 2 g (0 g Intravenous Stopped 11/5/19 1030)   predniSONE tablet 60 mg (60 mg Oral Given 11/5/19 1008)   albuterol inhalation solution 10 mg (10 mg Nebulization Given 11/5/19 1007)   ipratropium (ATROVENT) 0 02 % inhalation solution 1 mg (1 mg Nebulization Given 11/5/19 1007)   sodium chloride 0 9 % inhalation solution 12 mL (12 mL Nebulization Given 11/5/19 1008)   ipratropium (ATROVENT) 0 02 % inhalation solution 0 5 mg (0 5 mg Nebulization Given 11/5/19 1214)   albuterol inhalation solution 5 mg (5 mg Nebulization Given 11/5/19 1214)       Diagnostic Studies  Results Reviewed     Procedure Component Value Units Date/Time    Troponin I [[id number]]  (Abnormal) Collected:  11/05/19 1000    Lab Status:  Final result Specimen:  Blood from Arm, Right Updated:  11/05/19 1036     Troponin I 0 04 ng/mL     Magnesium [[id number]]  (Normal) Collected:  11/05/19 0959    Lab Status:  Final result Specimen:  Blood from Arm, Right Updated:  11/05/19 1022     Magnesium 2 0 mg/dL     Basic metabolic panel [[id number]]  (Abnormal) Collected:  11/05/19 0959    Lab Status:  Final result Specimen:  Blood from Arm, Right Updated:  11/05/19 1022     Sodium 139 mmol/L      Potassium 3 7 mmol/L      Chloride 104 mmol/L      CO2 27 mmol/L      ANION GAP 8 mmol/L      BUN 15 mg/dL      Creatinine 0 87 mg/dL      Glucose 145 mg/dL      Calcium 8 7 mg/dL      eGFR 86 ml/min/1 73sq m     Narrative:       Leonard guidelines for Chronic Kidney Disease (CKD):     Stage 1 with normal or high GFR (GFR > 90 mL/min/1 73 square meters)    Stage 2 Mild CKD (GFR = 60-89 mL/min/1 73 square meters)    Stage 3A Moderate CKD (GFR = 45-59 mL/min/1 73 square meters)    Stage 3B Moderate CKD (GFR = 30-44 mL/min/1 73 square meters)    Stage 4 Severe CKD (GFR = 15-29 mL/min/1 73 square meters)    Stage 5 End Stage CKD (GFR <15 mL/min/1 73 square meters)  Note: GFR calculation is accurate only with a steady state creatinine    CBC and differential [[id number]]  (Abnormal) Collected:  11/05/19 0959    Lab Status:  Final result Specimen:  Blood from Arm, Right Updated:  11/05/19 1012     WBC 7 90 Thousand/uL      RBC 4 54 Million/uL      Hemoglobin 14 2 g/dL      Hematocrit 41 7 %      MCV 92 fL      MCH 31 3 pg      MCHC 34 1 g/dL      RDW 14 2 %      MPV 9 5 fL      Platelets 721 Thousands/uL      Neutrophils Relative 73 %      Lymphocytes Relative 14 %      Monocytes Relative 4 %      Eosinophils Relative 7 %      Basophils Relative 1 %      Neutrophils Absolute 5 80 Thousands/µL      Lymphocytes Absolute 1 10 Thousands/µL      Monocytes Absolute 0 30 Thousand/µL      Eosinophils Absolute 0 50 Thousand/µL      Basophils Absolute 0 10 Thousands/µL                  X-ray chest 2 views   Final Result by Debbie Toro MD (11/05 1206)      Chronic air-trapping without pneumonic consolidation or other acute cardiopulmonary findings  Workstation performed: [id number]                    Procedures  Procedures       ED Course  ED Course as of Nov 05 1316   Tue Nov 05, 2019   1030 NART neb begun - still with insp/exp wheezing  1039 Historically <0 03  Has used albuterol at home PTA and is getting a Heart neb now  No ischemic EKG changes  Will monitor  Troponin I(!): 0 04   1148 Symptoms much improved but still has some expiratory wheezes  Will give another neb in re-evaluate for admission versus discharge  1314 No further wheezes, patient feels well and has adequate supplies of his medications at home    Will continue oral steroids and recommend follow-up with his pulmonologist  HEART Risk Score      Most Recent Value   History  0 Filed at: 11/05/2019 1038   ECG  1 Filed at: 11/05/2019 1038   Age  2 Filed at: 11/05/2019 1038   Risk Factors  2 Filed at: 11/05/2019 1038   Troponin  0 Filed at: 11/05/2019 1038   Heart Score Risk Calculator   History  0 Filed at: 11/05/2019 1038   ECG  1 Filed at: 11/05/2019 1038   Age  2 Filed at: 11/05/2019 1038   Risk Factors  2 Filed at: 11/05/2019 1038   Troponin  0 Filed at: 11/05/2019 1038   HEART Score  5 Filed at: 11/05/2019 1038   HEART Score  5 Filed at: 11/05/2019 1038                            MDM  Number of Diagnoses or Management Options  Diagnosis management comments: DDx: Chest tightness/wheezes - COPD flare, bronchitis/bronchospasm, doubt pneumonia, pneumothorax, ACS/MI or PE  A/P: Will check cardiopulmonary workup, treat symptoms with Heart neb, magnesium and steroids, reevaluate for disposition  Amount and/or Complexity of Data Reviewed  Clinical lab tests: reviewed and ordered  Tests in the radiology section of CPT®: ordered and reviewed  Review and summarize past medical records: yes        Disposition  Final diagnoses:   COPD with acute exacerbation ([address] )     Time reflects when diagnosis was documented in both MDM as applicable and the Disposition within this note     Time User Action Codes Description Comment    11/5/2019  1:14 PM [address] [place] [address]  2800, [facility] Enmanuel Sanchez [J44 [id number]] COPD with acute exacerbation [facility])       ED Disposition     ED Disposition Condition Date/Time Comment    Discharge Stable Tue Nov 5, 2019  1:14 PM Mariely Listen discharge to home/self care              Follow-up Information     Follow up With Specialties Details Why [facility] Pul[facility] Call today for further evaluation and treatment Haile Mendoza [phone]  [facility], [address] [place] [address] 1717 [facility], [address] [facility]          Patient's Medications   Discharge Prescriptions    PREDNISONE 10 MG TABLET    Take 4 tablets (40 mg total) by mouth daily for 5 days       Start Date: 11/5/2019 End Date: 11/10/2019       Order Dose: 40 mg       Quantity: 20 tablet    Refills: 0     No discharge procedures on file      ED Provider  Electronically Signed by           Jannie Payne DO  11/05/19 9504

## 2019-11-06 ENCOUNTER — CLINICAL SUPPORT (OUTPATIENT)
Dept: CARDIAC REHAB | Facility: HOSPITAL | Age: 74
End: 2019-11-06
Attending: INTERNAL MEDICINE
Payer: MEDICARE

## 2019-11-06 DIAGNOSIS — I50.22 CHRONIC SYSTOLIC HEART FAILURE (HCC): ICD-10-CM

## 2019-11-06 LAB
ATRIAL RATE: 79 BPM
P AXIS: 74 DEGREES
PR INTERVAL: 180 MS
QRS AXIS: 70 DEGREES
QRSD INTERVAL: 100 MS
QT INTERVAL: 436 MS
QTC INTERVAL: 499 MS
T WAVE AXIS: 129 DEGREES
VENTRICULAR RATE: 79 BPM

## 2019-11-06 PROCEDURE — 93798 PHYS/QHP OP CAR RHAB W/ECG: CPT

## 2019-11-06 PROCEDURE — 93010 ELECTROCARDIOGRAM REPORT: CPT | Performed by: INTERNAL MEDICINE

## 2019-11-08 ENCOUNTER — CLINICAL SUPPORT (OUTPATIENT)
Dept: CARDIAC REHAB | Facility: HOSPITAL | Age: 74
End: 2019-11-08
Attending: INTERNAL MEDICINE
Payer: MEDICARE

## 2019-11-08 DIAGNOSIS — I50.22 CHRONIC SYSTOLIC HEART FAILURE (HCC): ICD-10-CM

## 2019-11-08 PROCEDURE — 93798 PHYS/QHP OP CAR RHAB W/ECG: CPT

## 2019-11-11 ENCOUNTER — CLINICAL SUPPORT (OUTPATIENT)
Dept: CARDIAC REHAB | Facility: HOSPITAL | Age: 74
End: 2019-11-11
Attending: INTERNAL MEDICINE
Payer: MEDICARE

## 2019-11-11 DIAGNOSIS — I50.22 CHRONIC SYSTOLIC HEART FAILURE (HCC): ICD-10-CM

## 2019-11-11 PROCEDURE — 93798 PHYS/QHP OP CAR RHAB W/ECG: CPT

## 2019-11-13 ENCOUNTER — CLINICAL SUPPORT (OUTPATIENT)
Dept: CARDIAC REHAB | Facility: HOSPITAL | Age: 74
End: 2019-11-13
Attending: INTERNAL MEDICINE
Payer: MEDICARE

## 2019-11-13 DIAGNOSIS — I50.22 CHRONIC SYSTOLIC HEART FAILURE (HCC): ICD-10-CM

## 2019-11-13 PROCEDURE — 93798 PHYS/QHP OP CAR RHAB W/ECG: CPT

## 2019-11-15 ENCOUNTER — CLINICAL SUPPORT (OUTPATIENT)
Dept: CARDIAC REHAB | Facility: HOSPITAL | Age: 74
End: 2019-11-15
Attending: INTERNAL MEDICINE
Payer: MEDICARE

## 2019-11-15 DIAGNOSIS — I50.22 CHRONIC SYSTOLIC HEART FAILURE (HCC): ICD-10-CM

## 2019-11-15 PROCEDURE — 93798 PHYS/QHP OP CAR RHAB W/ECG: CPT

## 2019-11-18 ENCOUNTER — CLINICAL SUPPORT (OUTPATIENT)
Dept: CARDIAC REHAB | Facility: HOSPITAL | Age: 74
End: 2019-11-18
Attending: INTERNAL MEDICINE
Payer: MEDICARE

## 2019-11-18 DIAGNOSIS — I50.22 CHRONIC SYSTOLIC HEART FAILURE (HCC): ICD-10-CM

## 2019-11-18 PROCEDURE — 93798 PHYS/QHP OP CAR RHAB W/ECG: CPT

## 2019-11-18 NOTE — PROGRESS NOTES
Cardiac Rehabilitation Plan of Care   60 Day      Today's date: 2019   Visits:   Patient name: Jett Hendrix      : 1945  Age: 69 y o  MRN: [id number]  Referring Physician: Leon Holcomb MD  Cardiologist: Watson Moore MD  Provider: Mike Mosley  Clinician: Glenys Buckner MS, CCRP    Dx:   Encounter Diagnosis   Name Primary?  Chronic systolic heart failure [facility])      Date of onset: 2019      SUMMARY OF PROGRESS: Today was Johnny Sanchez  visit at Cardiac Rehab  The patient states he is 100% medication compliant  Patient states that he does have neuropathy in both feet which may limit his exercise and affect his balance  During rest, the patient's HR was 59, his BP was 122/74, and his O2 was approximately 90% on room air  The patient stated that he did  Have a PFT done with Dr Mikhail Pham at [facility]  PFT results stated that he had "very severe COPD " The patient attempted a 6MWT on his initial visit; he completed a total of 2 minutes and 30 seconds  The test was terminated due to his O2 dropping from 88% to 85% from the second to third minute of the test  Janie Galeano was able to walk 280 feet during that time which is a 1 40 MET level  The patient stated that the exercises were a "4-5" on the 1-10 RPE Scale  The patient did not complain of SOB today  Janie Galeano also reached a peak HR of 102 on the UBE and a peak BP of 136/78 while on the NuStep  During recovery, the patient's HR was 84, his BP was 122/58, and his O2 was 95%  He did show some ectopy which can be seen in the attached "Exercise Session Details " The patient has been doing well in the program and stated that he felt as though it was helping him to do more ADLs at home  Linda Price states he has been having great difficulty breathing and also stated that last week he almost decided to go to the ER for another exacerbation   Patient experiences frequent/occasional PVCs      Medication compliance: Yes   Comments: Patient states he is 100% medication compliant     Fall Risk: Moderate   Comments: Patient uses a one point cane to aide when walking  Pt  Also has neuropathy in both feet which effects his balance as well  EKG changes: Frequent PVCs      EXERCISE ASSESSMENT and PLAN    Current Exercise Program in Rehab:       Frequency: 3-4 days/week        Minutes: 35-40        METS: 2 0-2 5   HR: 20-30 above RHR;    RPE: 4-6         Modalities: UBE, NuStep and Recumbent bike      Exercise Progression 30 Day Goals :    Frequency: 4-5 days/week   Minutes:40-45   METS: 3 0-3 5   HR: 20-30 above RHR;    RPE: 4-6   Modalities: UBE, NuStep, Recumbent bike and Room walking    Strength trainin-3 days / week  12-15 repetitions  1-2 sets per modality   Will be added following 2-3 weeks of monitored exercise sessions   Modalities: Leg Press, Chest Press, Lateral Raise, Arm Curl, Seated Row and Front Raises    Progressing: In Progress    Home Exercise: None; Cardiac Rehab Staff did encourage patient to begin exercising at home once his COPD is more controlled  Goals: 10% improvement in functional capacity, Reduced Dyspnea with physical activity  0-1/10, improved DASI score by 10%, Increase in peak CR METs by 40%, Improved 6MWT distance by 10%, Resume ADLs with increased strength and Exercise 5 days/wk, >150mins/wk  Education: Benefit of exercise for CAD risk factors, home exercise guidelines, signs and sxs and RPE scale   Plan:education on home exercise guidelines, home exercise 30+ mins 2 days opposite CR and Education class: Risk Factors for Heart Disease  Readiness to change: Action      NUTRITION ASSESSMENT AND PLAN    Weight control:    Starting weight: 208 lb    Current weight:   208 lb  Waist circumference:    Startin in   Current:  42 in  Diabetes: Patient states he has diabetes but does not check his BG     Lipid management: Discussed diet and lipid management  Goals:reduced BMI to < 25, reduced waist circumference <40 inches, fasting BG , improved A1c  < 6 5%, Improved Rate Your Plate score  >87 and Wt  loss 1-2 ppw goal of 20 lbs  Education: heart healthy eating  low sodium diet  hydration  diet and lipid management  diabetes management and exercise  wt  loss  Progressing: In Progress  Plan: Education class: Reading Food Labels, Education Class: Heart Healthy Eating, Increase PUFA and MUFA, Decrease SFA, Increase whole grains, increase fruits/vegs, eliminate processed meats, reduce red meat 1x/wk, swtich to low fat dairy and Reduce added sugars <25g/day  Readiness to change: Action      PSYCHOSOCIAL ASSESSMENT AND PLAN    Emotional:  PHQ-9 = 1-4 = Minimal Depression  Self-reported stress level: 5   Social support: Very Good  Goals:  Reduce perceived stress to 1-3/10, PHQ-9 - reduced severity by one level, Feelings in [facility] Score < 3, Physical Fitness in [facility] Score < 3, Daily Activity in [facility] Score < 3, Social Activities in [facility] Score < 3, Pain in [facility] Score < 3, Quality of Life in [facility] Score < 3 , Change in Health in [place] Score < 3 , Increased interest in doing things, improved sleep, improved positive thoughts of well being and increased energy  Education: signs/sxs of depression, benefits of positive support system and coping mechanisms  Progressing: In Progress  Plan: Practice relaxation techniques  Readiness to change: Action    OTHER CORE COMPONENTS     Tobacco:   Social History     Tobacco Use   Smoking Status Former Smoker    Last attempt to quit: 2/15/2019    Years since quittin 7   Smokeless Tobacco Never Used       Tobacco Use Intervention: Referral to tobacco expert:   Brief counseling by cardiac rehab professional  Date: 2019    Blood pressure:    Restin/64   Exercise: 128/62   Recovery: 122/56    Goals: consistent BP < 130/80, consistent exercise >150 mins/wk, reduce number of cigarettes/day, set a target quit date and make contact with a tobacco use prevention program  Education:  understanding HTN and CAD, low sodium diet and HTN and smoking and heart disease  Progressing: In Progress  Plan: Class: Understanding Heart Disease, Class: Common Heart Medications, make contact with smoking cessation program and Set target quit date for smoking  Readiness to change: Action

## 2019-11-21 ENCOUNTER — APPOINTMENT (EMERGENCY)
Dept: RADIOLOGY | Facility: HOSPITAL | Age: 74
End: 2019-11-21
Payer: MEDICARE

## 2019-11-21 ENCOUNTER — HOSPITAL ENCOUNTER (OUTPATIENT)
Facility: HOSPITAL | Age: 74
Setting detail: OBSERVATION
Discharge: HOME | End: 2019-11-23
Attending: EMERGENCY MEDICINE | Admitting: INTERNAL MEDICINE
Payer: MEDICARE

## 2019-11-21 DIAGNOSIS — J96.20 ACUTE ON CHRONIC RESPIRATORY FAILURE, UNSPECIFIED WHETHER WITH HYPOXIA OR HYPERCAPNIA (HCC): ICD-10-CM

## 2019-11-21 DIAGNOSIS — J44.1 COPD EXACERBATION (HCC): Primary | ICD-10-CM

## 2019-11-21 LAB
ALBUMIN SERPL BCP-MCNC: 4.3 G/DL (ref 3.5–5.7)
ALP SERPL-CCNC: 66 U/L (ref 55–165)
ALT SERPL W P-5'-P-CCNC: 14 U/L (ref 7–52)
ANION GAP SERPL CALCULATED.3IONS-SCNC: 9 MMOL/L (ref 4–13)
APTT PPP: 40 SECONDS (ref 23–37)
AST SERPL W P-5'-P-CCNC: 10 U/L (ref 13–39)
BASOPHILS # BLD AUTO: 0 THOUSANDS/ΜL (ref 0–0.1)
BASOPHILS NFR BLD AUTO: 1 % (ref 0–2)
BILIRUB SERPL-MCNC: 0.4 MG/DL (ref 0.2–1)
BNP SERPL-MCNC: 117 PG/ML (ref 1–100)
BUN SERPL-MCNC: 18 MG/DL (ref 7–25)
CALCIUM SERPL-MCNC: 9.1 MG/DL (ref 8.6–10.5)
CHLORIDE SERPL-SCNC: 103 MMOL/L (ref 98–107)
CK SERPL-CCNC: 21 U/L (ref 30–308)
CO2 SERPL-SCNC: 26 MMOL/L (ref 21–31)
CREAT SERPL-MCNC: 0.79 MG/DL (ref 0.7–1.3)
CRP SERPL HS-MCNC: 11.01 MG/L
EOSINOPHIL # BLD AUTO: 0.6 THOUSAND/ΜL (ref 0–0.61)
EOSINOPHIL NFR BLD AUTO: 7 % (ref 0–5)
ERYTHROCYTE [DISTWIDTH] IN BLOOD BY AUTOMATED COUNT: 14.3 % (ref 11.5–14.5)
GFR SERPL CREATININE-BSD FRML MDRD: 89 ML/MIN/1.73SQ M
GLUCOSE SERPL-MCNC: 197 MG/DL (ref 65–99)
HCT VFR BLD AUTO: 43.5 % (ref 42–47)
HGB BLD-MCNC: 14.9 G/DL (ref 14–18)
INR PPP: 1.02 (ref 0.9–1.5)
LACTATE SERPL-SCNC: 2.1 MMOL/L (ref 0.5–2)
LYMPHOCYTES # BLD AUTO: 1.2 THOUSANDS/ΜL (ref 0.6–4.47)
LYMPHOCYTES NFR BLD AUTO: 14 % (ref 21–51)
MAGNESIUM SERPL-MCNC: 2.1 MG/DL (ref 1.9–2.7)
MCH RBC QN AUTO: 31.5 PG (ref 26–34)
MCHC RBC AUTO-ENTMCNC: 34.2 G/DL (ref 31–37)
MCV RBC AUTO: 92 FL (ref 81–99)
MONOCYTES # BLD AUTO: 0.5 THOUSAND/ΜL (ref 0.17–1.22)
MONOCYTES NFR BLD AUTO: 6 % (ref 2–12)
NEUTROPHILS # BLD AUTO: 6.2 THOUSANDS/ΜL (ref 1.4–6.5)
NEUTS SEG NFR BLD AUTO: 73 % (ref 42–75)
PLATELET # BLD AUTO: 175 THOUSANDS/UL (ref 149–390)
PMV BLD AUTO: 9 FL (ref 8.6–11.7)
POTASSIUM SERPL-SCNC: 4.1 MMOL/L (ref 3.5–5.5)
PROT SERPL-MCNC: 6.7 G/DL (ref 6.4–8.9)
PROTHROMBIN TIME: 11.8 SECONDS (ref 10.2–13)
RBC # BLD AUTO: 4.72 MILLION/UL (ref 4.3–5.9)
SODIUM SERPL-SCNC: 138 MMOL/L (ref 134–143)
TROPONIN I SERPL-MCNC: <0.03 NG/ML
WBC # BLD AUTO: 8.4 THOUSAND/UL (ref 4.8–10.8)

## 2019-11-21 PROCEDURE — 99285 EMERGENCY DEPT VISIT HI MDM: CPT | Performed by: EMERGENCY MEDICINE

## 2019-11-21 PROCEDURE — 36600 WITHDRAWAL OF ARTERIAL BLOOD: CPT

## 2019-11-21 PROCEDURE — 36415 COLL VENOUS BLD VENIPUNCTURE: CPT | Performed by: EMERGENCY MEDICINE

## 2019-11-21 PROCEDURE — 99285 EMERGENCY DEPT VISIT HI MDM: CPT

## 2019-11-21 PROCEDURE — 71046 X-RAY EXAM CHEST 2 VIEWS: CPT

## 2019-11-21 PROCEDURE — 85025 COMPLETE CBC W/AUTO DIFF WBC: CPT | Performed by: EMERGENCY MEDICINE

## 2019-11-21 PROCEDURE — 86141 C-REACTIVE PROTEIN HS: CPT | Performed by: EMERGENCY MEDICINE

## 2019-11-21 PROCEDURE — 94760 N-INVAS EAR/PLS OXIMETRY 1: CPT

## 2019-11-21 PROCEDURE — 83735 ASSAY OF MAGNESIUM: CPT | Performed by: EMERGENCY MEDICINE

## 2019-11-21 PROCEDURE — 83880 ASSAY OF NATRIURETIC PEPTIDE: CPT | Performed by: EMERGENCY MEDICINE

## 2019-11-21 PROCEDURE — 87040 BLOOD CULTURE FOR BACTERIA: CPT | Performed by: EMERGENCY MEDICINE

## 2019-11-21 PROCEDURE — 80053 COMPREHEN METABOLIC PANEL: CPT | Performed by: EMERGENCY MEDICINE

## 2019-11-21 PROCEDURE — 82805 BLOOD GASES W/O2 SATURATION: CPT | Performed by: EMERGENCY MEDICINE

## 2019-11-21 PROCEDURE — 85610 PROTHROMBIN TIME: CPT | Performed by: EMERGENCY MEDICINE

## 2019-11-21 PROCEDURE — 94644 CONT INHLJ TX 1ST HOUR: CPT

## 2019-11-21 PROCEDURE — 93005 ELECTROCARDIOGRAM TRACING: CPT

## 2019-11-21 PROCEDURE — 85730 THROMBOPLASTIN TIME PARTIAL: CPT | Performed by: EMERGENCY MEDICINE

## 2019-11-21 PROCEDURE — 83605 ASSAY OF LACTIC ACID: CPT | Performed by: EMERGENCY MEDICINE

## 2019-11-21 PROCEDURE — 96374 THER/PROPH/DIAG INJ IV PUSH: CPT

## 2019-11-21 PROCEDURE — 84484 ASSAY OF TROPONIN QUANT: CPT | Performed by: EMERGENCY MEDICINE

## 2019-11-21 PROCEDURE — 82550 ASSAY OF CK (CPK): CPT | Performed by: EMERGENCY MEDICINE

## 2019-11-21 RX ORDER — METHYLPREDNISOLONE SODIUM SUCCINATE 125 MG/2ML
125 INJECTION, POWDER, LYOPHILIZED, FOR SOLUTION INTRAMUSCULAR; INTRAVENOUS ONCE
Status: COMPLETED | OUTPATIENT
Start: 2019-11-21 | End: 2019-11-21

## 2019-11-21 RX ORDER — IPRATROPIUM BROMIDE AND ALBUTEROL SULFATE 2.5; .5 MG/3ML; MG/3ML
3 SOLUTION RESPIRATORY (INHALATION) ONCE
Status: COMPLETED | OUTPATIENT
Start: 2019-11-21 | End: 2019-11-21

## 2019-11-21 RX ORDER — ALBUTEROL SULFATE 2.5 MG/3ML
10 SOLUTION RESPIRATORY (INHALATION) ONCE
Status: DISCONTINUED | OUTPATIENT
Start: 2019-11-21 | End: 2019-11-21

## 2019-11-21 RX ORDER — SODIUM CHLORIDE FOR INHALATION 0.9 %
3 VIAL, NEBULIZER (ML) INHALATION ONCE
Status: COMPLETED | OUTPATIENT
Start: 2019-11-21 | End: 2019-11-21

## 2019-11-21 RX ADMIN — IPRATROPIUM BROMIDE AND ALBUTEROL SULFATE 3 ML: 2.5; .5 SOLUTION RESPIRATORY (INHALATION) at 20:22

## 2019-11-21 RX ADMIN — IPRATROPIUM BROMIDE 1 MG: 0.5 SOLUTION RESPIRATORY (INHALATION) at 20:21

## 2019-11-21 RX ADMIN — METHYLPREDNISOLONE SODIUM SUCCINATE 125 MG: 125 INJECTION, POWDER, FOR SOLUTION INTRAMUSCULAR; INTRAVENOUS at 20:46

## 2019-11-21 RX ADMIN — ISODIUM CHLORIDE 3 ML: 0.03 SOLUTION RESPIRATORY (INHALATION) at 20:22

## 2019-11-21 RX ADMIN — ALBUTEROL SULFATE 10 MG: 2.5 SOLUTION RESPIRATORY (INHALATION) at 20:22

## 2019-11-22 LAB
ALBUMIN SERPL BCP-MCNC: 4.1 G/DL (ref 3.5–5.7)
ALP SERPL-CCNC: 61 U/L (ref 55–165)
ALT SERPL W P-5'-P-CCNC: 12 U/L (ref 7–52)
ANION GAP SERPL CALCULATED.3IONS-SCNC: 11 MMOL/L (ref 4–13)
ANISOCYTOSIS BLD QL SMEAR: PRESENT
ARTERIAL PATENCY WRIST A: YES
AST SERPL W P-5'-P-CCNC: 9 U/L (ref 13–39)
ATRIAL RATE: 89 BPM
BASE EXCESS BLDA CALC-SCNC: -4.1 MMOL/L (ref -2–3)
BILIRUB SERPL-MCNC: 0.4 MG/DL (ref 0.2–1)
BUN SERPL-MCNC: 22 MG/DL (ref 7–25)
CALCIUM SERPL-MCNC: 8.4 MG/DL (ref 8.6–10.5)
CHLORIDE SERPL-SCNC: 104 MMOL/L (ref 98–107)
CO2 SERPL-SCNC: 23 MMOL/L (ref 21–31)
CREAT SERPL-MCNC: 0.72 MG/DL (ref 0.7–1.3)
ERYTHROCYTE [DISTWIDTH] IN BLOOD BY AUTOMATED COUNT: 14.2 % (ref 11.5–14.5)
EST. AVERAGE GLUCOSE BLD GHB EST-MCNC: 192 MG/DL
GFR SERPL CREATININE-BSD FRML MDRD: 93 ML/MIN/1.73SQ M
GLUCOSE SERPL-MCNC: 160 MG/DL (ref 65–140)
GLUCOSE SERPL-MCNC: 176 MG/DL (ref 65–140)
GLUCOSE SERPL-MCNC: 185 MG/DL (ref 65–99)
GLUCOSE SERPL-MCNC: 199 MG/DL (ref 65–140)
GLUCOSE SERPL-MCNC: 199 MG/DL (ref 65–140)
GLUCOSE SERPL-MCNC: 250 MG/DL (ref 65–140)
HBA1C MFR BLD: 8.3 % (ref 4.2–6.3)
HCO3 BLDA-SCNC: 21.4 MMOL/L (ref 22–28)
HCT VFR BLD AUTO: 40.9 % (ref 42–47)
HGB BLD-MCNC: 14.1 G/DL (ref 14–18)
LACTATE SERPL-SCNC: 1.7 MMOL/L (ref 0.5–2)
LYMPHOCYTES # BLD AUTO: 0.47 THOUSAND/UL (ref 0.6–4.47)
LYMPHOCYTES # BLD AUTO: 7 % (ref 20–51)
MCH RBC QN AUTO: 31.8 PG (ref 26–34)
MCHC RBC AUTO-ENTMCNC: 34.4 G/DL (ref 31–37)
MCV RBC AUTO: 92 FL (ref 81–99)
MONOCYTES # BLD AUTO: 0.07 THOUSAND/UL (ref 0–1.22)
MONOCYTES NFR BLD AUTO: 1 % (ref 4–12)
NEUTS BAND NFR BLD MANUAL: 3 % (ref 0–8)
NEUTS SEG # BLD: 6.16 THOUSAND/UL (ref 1.81–6.82)
NEUTS SEG NFR BLD AUTO: 89 % (ref 42–75)
NON VENT ROOM AIR: ABNORMAL %
O2 CT BLDA-SCNC: 17.3 ML/DL
OTHER FIO2: ABNORMAL %
OXYHGB MFR BLDA: 89.3 % (ref 94–100)
P AXIS: 82 DEGREES
PCO2 BLDA: 41.8 MM HG (ref 35–45)
PH BLDA: 7.33 [PH] (ref 7.35–7.45)
PLATELET # BLD AUTO: 154 THOUSANDS/UL (ref 149–390)
PLATELET BLD QL SMEAR: ADEQUATE
PMV BLD AUTO: 9.7 FL (ref 8.6–11.7)
PO2 BLDA: 64 MM HG (ref 80–100)
POTASSIUM SERPL-SCNC: 4.1 MMOL/L (ref 3.5–5.5)
PR INTERVAL: 174 MS
PROCALCITONIN SERPL-MCNC: <0.05 NG/ML
PROT SERPL-MCNC: 6.3 G/DL (ref 6.4–8.9)
QRS AXIS: 71 DEGREES
QRSD INTERVAL: 100 MS
QT INTERVAL: 380 MS
QTC INTERVAL: 462 MS
RBC # BLD AUTO: 4.42 MILLION/UL (ref 4.3–5.9)
RBC MORPH BLD: ABNORMAL
SODIUM SERPL-SCNC: 138 MMOL/L (ref 134–143)
SPECIMEN SOURCE: ABNORMAL
T WAVE AXIS: 114 DEGREES
TOTAL CELLS COUNTED SPEC: 100
VENTRICULAR RATE: 89 BPM
WBC # BLD AUTO: 6.7 THOUSAND/UL (ref 4.8–10.8)

## 2019-11-22 PROCEDURE — 97163 PT EVAL HIGH COMPLEX 45 MIN: CPT

## 2019-11-22 PROCEDURE — G8987 SELF CARE CURRENT STATUS: HCPCS

## 2019-11-22 PROCEDURE — G8979 MOBILITY GOAL STATUS: HCPCS

## 2019-11-22 PROCEDURE — 94760 N-INVAS EAR/PLS OXIMETRY 1: CPT

## 2019-11-22 PROCEDURE — 82948 REAGENT STRIP/BLOOD GLUCOSE: CPT

## 2019-11-22 PROCEDURE — 93010 ELECTROCARDIOGRAM REPORT: CPT | Performed by: INTERNAL MEDICINE

## 2019-11-22 PROCEDURE — 84145 PROCALCITONIN (PCT): CPT | Performed by: NURSE PRACTITIONER

## 2019-11-22 PROCEDURE — 85007 BL SMEAR W/DIFF WBC COUNT: CPT | Performed by: NURSE PRACTITIONER

## 2019-11-22 PROCEDURE — 80053 COMPREHEN METABOLIC PANEL: CPT | Performed by: NURSE PRACTITIONER

## 2019-11-22 PROCEDURE — 85027 COMPLETE CBC AUTOMATED: CPT | Performed by: NURSE PRACTITIONER

## 2019-11-22 PROCEDURE — 83036 HEMOGLOBIN GLYCOSYLATED A1C: CPT | Performed by: NURSE PRACTITIONER

## 2019-11-22 PROCEDURE — 94640 AIRWAY INHALATION TREATMENT: CPT

## 2019-11-22 PROCEDURE — G8978 MOBILITY CURRENT STATUS: HCPCS

## 2019-11-22 PROCEDURE — G8989 SELF CARE D/C STATUS: HCPCS

## 2019-11-22 PROCEDURE — 97165 OT EVAL LOW COMPLEX 30 MIN: CPT

## 2019-11-22 PROCEDURE — 97116 GAIT TRAINING THERAPY: CPT

## 2019-11-22 PROCEDURE — 99220 PR INITIAL OBSERVATION CARE/DAY 70 MINUTES: CPT | Performed by: INTERNAL MEDICINE

## 2019-11-22 PROCEDURE — G8988 SELF CARE GOAL STATUS: HCPCS

## 2019-11-22 RX ORDER — SODIUM CHLORIDE 9 MG/ML
125 INJECTION, SOLUTION INTRAVENOUS CONTINUOUS
Status: DISCONTINUED | OUTPATIENT
Start: 2019-11-22 | End: 2019-11-22

## 2019-11-22 RX ORDER — ALBUTEROL SULFATE 2.5 MG/3ML
2.5 SOLUTION RESPIRATORY (INHALATION)
Status: DISCONTINUED | OUTPATIENT
Start: 2019-11-22 | End: 2019-11-23 | Stop reason: HOSPADM

## 2019-11-22 RX ORDER — GABAPENTIN 100 MG/1
200 CAPSULE ORAL
Status: DISCONTINUED | OUTPATIENT
Start: 2019-11-22 | End: 2019-11-23 | Stop reason: HOSPADM

## 2019-11-22 RX ORDER — LISINOPRIL 5 MG/1
5 TABLET ORAL DAILY
Status: DISCONTINUED | OUTPATIENT
Start: 2019-11-22 | End: 2019-11-23 | Stop reason: HOSPADM

## 2019-11-22 RX ORDER — PRAVASTATIN SODIUM 40 MG
80 TABLET ORAL
Status: DISCONTINUED | OUTPATIENT
Start: 2019-11-22 | End: 2019-11-23 | Stop reason: HOSPADM

## 2019-11-22 RX ORDER — AZITHROMYCIN 250 MG/1
500 TABLET, FILM COATED ORAL ONCE
Status: COMPLETED | OUTPATIENT
Start: 2019-11-22 | End: 2019-11-22

## 2019-11-22 RX ORDER — AZITHROMYCIN 250 MG/1
250 TABLET, FILM COATED ORAL EVERY 24 HOURS
Status: DISCONTINUED | OUTPATIENT
Start: 2019-11-23 | End: 2019-11-23 | Stop reason: HOSPADM

## 2019-11-22 RX ORDER — BUDESONIDE AND FORMOTEROL FUMARATE DIHYDRATE 160; 4.5 UG/1; UG/1
2 AEROSOL RESPIRATORY (INHALATION) 2 TIMES DAILY
Status: DISCONTINUED | OUTPATIENT
Start: 2019-11-22 | End: 2019-11-23 | Stop reason: HOSPADM

## 2019-11-22 RX ORDER — ASPIRIN 81 MG/1
81 TABLET ORAL EVERY OTHER DAY
Status: DISCONTINUED | OUTPATIENT
Start: 2019-11-22 | End: 2019-11-23 | Stop reason: HOSPADM

## 2019-11-22 RX ORDER — PRIMIDONE 50 MG/1
100 TABLET ORAL EVERY 12 HOURS SCHEDULED
Status: DISCONTINUED | OUTPATIENT
Start: 2019-11-22 | End: 2019-11-23 | Stop reason: HOSPADM

## 2019-11-22 RX ORDER — PREDNISONE 20 MG/1
40 TABLET ORAL DAILY
Status: DISCONTINUED | OUTPATIENT
Start: 2019-11-22 | End: 2019-11-22

## 2019-11-22 RX ORDER — ALBUTEROL SULFATE 2.5 MG/3ML
2.5 SOLUTION RESPIRATORY (INHALATION) EVERY 4 HOURS PRN
Status: DISCONTINUED | OUTPATIENT
Start: 2019-11-22 | End: 2019-11-23 | Stop reason: HOSPADM

## 2019-11-22 RX ORDER — ACETAMINOPHEN 325 MG/1
650 TABLET ORAL EVERY 6 HOURS PRN
Status: DISCONTINUED | OUTPATIENT
Start: 2019-11-22 | End: 2019-11-23 | Stop reason: HOSPADM

## 2019-11-22 RX ORDER — GUAIFENESIN 600 MG
600 TABLET, EXTENDED RELEASE 12 HR ORAL 2 TIMES DAILY
Status: DISCONTINUED | OUTPATIENT
Start: 2019-11-22 | End: 2019-11-23 | Stop reason: HOSPADM

## 2019-11-22 RX ORDER — ALBUTEROL SULFATE 90 UG/1
2 AEROSOL, METERED RESPIRATORY (INHALATION) EVERY 6 HOURS PRN
Status: DISCONTINUED | OUTPATIENT
Start: 2019-11-22 | End: 2019-11-22

## 2019-11-22 RX ORDER — METHYLPREDNISOLONE SODIUM SUCCINATE 40 MG/ML
40 INJECTION, POWDER, LYOPHILIZED, FOR SOLUTION INTRAMUSCULAR; INTRAVENOUS EVERY 12 HOURS SCHEDULED
Status: DISCONTINUED | OUTPATIENT
Start: 2019-11-22 | End: 2019-11-23 | Stop reason: HOSPADM

## 2019-11-22 RX ORDER — ONDANSETRON 2 MG/ML
4 INJECTION INTRAMUSCULAR; INTRAVENOUS EVERY 6 HOURS PRN
Status: DISCONTINUED | OUTPATIENT
Start: 2019-11-22 | End: 2019-11-23 | Stop reason: HOSPADM

## 2019-11-22 RX ADMIN — PIPERACILLIN SODIUM AND TAZOBACTAM SODIUM 3.38 G: 3; .375 INJECTION, POWDER, LYOPHILIZED, FOR SOLUTION INTRAVENOUS at 00:06

## 2019-11-22 RX ADMIN — INSULIN LISPRO 1 UNITS: 100 INJECTION, SOLUTION INTRAVENOUS; SUBCUTANEOUS at 17:38

## 2019-11-22 RX ADMIN — GUAIFENESIN 600 MG: 600 TABLET, EXTENDED RELEASE ORAL at 17:37

## 2019-11-22 RX ADMIN — METOPROLOL TARTRATE 25 MG: 25 TABLET, FILM COATED ORAL at 17:36

## 2019-11-22 RX ADMIN — VANCOMYCIN HYDROCHLORIDE 1250 MG: 1 INJECTION, POWDER, LYOPHILIZED, FOR SOLUTION INTRAVENOUS at 00:34

## 2019-11-22 RX ADMIN — INSULIN LISPRO 1 UNITS: 100 INJECTION, SOLUTION INTRAVENOUS; SUBCUTANEOUS at 21:25

## 2019-11-22 RX ADMIN — ALBUTEROL SULFATE 2.5 MG: 2.5 SOLUTION RESPIRATORY (INHALATION) at 08:10

## 2019-11-22 RX ADMIN — PRIMIDONE 100 MG: 50 TABLET ORAL at 21:25

## 2019-11-22 RX ADMIN — GABAPENTIN 200 MG: 100 CAPSULE ORAL at 21:25

## 2019-11-22 RX ADMIN — Medication 3.38 G: at 06:20

## 2019-11-22 RX ADMIN — BUDESONIDE AND FORMOTEROL FUMARATE DIHYDRATE 2 PUFF: 160; 4.5 AEROSOL RESPIRATORY (INHALATION) at 08:26

## 2019-11-22 RX ADMIN — METOPROLOL TARTRATE 25 MG: 25 TABLET, FILM COATED ORAL at 08:25

## 2019-11-22 RX ADMIN — ASPIRIN 81 MG: 81 TABLET, COATED ORAL at 08:25

## 2019-11-22 RX ADMIN — GUAIFENESIN 600 MG: 600 TABLET, EXTENDED RELEASE ORAL at 08:25

## 2019-11-22 RX ADMIN — ALBUTEROL SULFATE 2.5 MG: 2.5 SOLUTION RESPIRATORY (INHALATION) at 16:11

## 2019-11-22 RX ADMIN — INSULIN LISPRO 3 UNITS: 100 INJECTION, SOLUTION INTRAVENOUS; SUBCUTANEOUS at 12:26

## 2019-11-22 RX ADMIN — ENOXAPARIN SODIUM 40 MG: 40 INJECTION SUBCUTANEOUS at 08:26

## 2019-11-22 RX ADMIN — METHYLPREDNISOLONE SODIUM SUCCINATE 40 MG: 40 INJECTION, POWDER, FOR SOLUTION INTRAMUSCULAR; INTRAVENOUS at 21:25

## 2019-11-22 RX ADMIN — PREDNISONE 40 MG: 20 TABLET ORAL at 08:25

## 2019-11-22 RX ADMIN — PRAVASTATIN SODIUM 80 MG: 40 TABLET ORAL at 17:37

## 2019-11-22 RX ADMIN — SODIUM CHLORIDE 125 ML/HR: 9 INJECTION, SOLUTION INTRAVENOUS at 02:06

## 2019-11-22 RX ADMIN — INSULIN LISPRO 1 UNITS: 100 INJECTION, SOLUTION INTRAVENOUS; SUBCUTANEOUS at 08:27

## 2019-11-22 RX ADMIN — BUDESONIDE AND FORMOTEROL FUMARATE DIHYDRATE 2 PUFF: 160; 4.5 AEROSOL RESPIRATORY (INHALATION) at 17:37

## 2019-11-22 RX ADMIN — ALBUTEROL SULFATE 2.5 MG: 2.5 SOLUTION RESPIRATORY (INHALATION) at 20:20

## 2019-11-22 RX ADMIN — PRIMIDONE 100 MG: 50 TABLET ORAL at 08:25

## 2019-11-22 RX ADMIN — LISINOPRIL 5 MG: 5 TABLET ORAL at 08:25

## 2019-11-22 RX ADMIN — AZITHROMYCIN 500 MG: 250 TABLET, FILM COATED ORAL at 12:26

## 2019-11-22 NOTE — RESPIRATORY THERAPY NOTE
RT Protocol Note  Claude Reno 69 y o  male MRN: [id number]  Unit/Bed#: -01 Encounter: [id number]    Assessment    Principal Problem:    Chronic obstructive pulmonary disease with acute exacerbation ([address] )  Active Problems:    Type 2 diabetes mellitus without complication, without long-term current use of insulin (HCC)    Other hyperlipidemia    Essential hypertension    CAD (coronary artery disease), native coronary artery    Tremor      Home Pulmonary Medications:  Home Devices/Therapy: (P) Other (Comment)(No home 02or BiPAP/CPAP  Proventil MDI q6prn [place] QID)    Past Medical History:   Diagnosis Date    BPH (benign prostatic hyperplasia)     45 days radiation treatment    COPD (chronic obstructive pulmonary disease)     Coronary artery disease     CABG x4 in 2017    Diabetes mellitus     History of Arterial Duplex of LE 2017    Likely occlusion of the left superficial femoral artery  Calcific changes bilaterally  Despite these changes, the ankle-brachial index as a measure of peripheral blood flow only mildly impaired      History of echocardiogram 2017    EF 40%, mild LVH, mild MR     Hyperlipidemia     Hypertension     NSTEMI (non-ST elevated myocardial infarction)     Prostate cancer     prostate     PVD (peripheral vascular disease)     Tremor      Social History     Socioeconomic History    Marital status: /Civil Union     Spouse name: None    Number of children: None    Years of education: None    Highest education level: None   Occupational History    None   Social Needs    Financial resource strain: None    Food insecurity:     Worry: None     Inability: None    Transportation needs:     Medical: None     Non-medical: None   Tobacco Use    Smoking status: Former Smoker     Last attempt to quit: 2/15/2019     Years since quittin 7    Smokeless tobacco: Never Used   Substance and Sexual Activity    Alcohol use: Not Currently     Comment: on occasion    Drug use: No    Sexual activity: None   Lifestyle    Physical activity:     Days per week: None     Minutes per session: None    Stress: None   Relationships    Social connections:     Talks on phone: None     Gets together: None     Attends [employer] service: None     Active member of club or organization: None     Attends meetings of clubs or organizations: None     Relationship status: None    Intimate partner violence:     Fear of current or ex partner: None     Emotionally abused: None     Physically abused: None     Forced sexual activity: None   Other Topics Concern    None   Social History Narrative    None       Subjective Pt  In no distress at this time 02 intact via nasal cannula @ 2lpm  Pt  protocoled to home bronchodialator routine  Objective    Physical Exam:   Assessment Type: (P) Assess only  General Appearance: (P) Drowsy  Respiratory Pattern: (P) Dyspnea with exertion  Chest Assessment: (P) Chest expansion symmetrical  Bilateral Breath Sounds: (P) Diminished, Expiratory wheezes    Vitals:  Blood pressure (!) 183/87, pulse 90, temperature 98 3 °F (36 8 °C), temperature source Temporal, resp  rate (!) 24, height 5' 11" (1 803 m), weight 86 8 kg (191 lb 5 8 oz), SpO2 97 %  Results from last 7 days   Lab Units 11/21/19  2336   PH ART  7 330*   PCO2 ART mm Hg 41 8   PO2 ART mm Hg 64 0*   HCO3 ART mmol/L 21 4*   BASE EXC ART mmol/L -4 1*   O2 CONTENT ART mL/dL 17 3   O2 HGB, ARTERIAL % 89 3*   ABG SOURCE  Radial, Right   ROSA MARIA TEST  Yes   NON VENT ROOM AIR % 21%       Imaging and other studies: I have personally reviewed pertinent reports  Plan    Respiratory Plan: (P) Home Bronchodilator Patient pathway        Resp Comments: (P) Pt  has no distress at this time  /S/U left in room

## 2019-11-22 NOTE — ASSESSMENT & PLAN NOTE
· Patient has chronic issues with COPD exacerbations  · Patient was at a social gathering on 11/16/2019 where he did smoke 3 cigarettes  · Patient has had shortness of breath since that time  · Patient did try using his inhalers and neb treatments at home without improvement  · Patient does have cough with off white/gray colored sputum  · Obtain sputum culture  · Check procalcitonin  · Respiratory protocol  · IV fluids  · Mucinex, prednisone, Zosyn, Vanco  · Monitor on oxygen

## 2019-11-22 NOTE — ED NOTES
Patient requested water - MD aware and ok with the same - the same given     Tristen Bailey RN  11/21/19 0904

## 2019-11-22 NOTE — PLAN OF CARE
Problem: PHYSICAL THERAPY ADULT  Goal: Performs mobility at highest level of function for planned discharge setting  See evaluation for individualized goals  Description  Treatment/Interventions: Elevations, Therapeutic exercise, Gait training  Equipment Recommended: Silke Taveras       See flowsheet documentation for full assessment, interventions and recommendations  Note:   Prognosis: Good  Problem List: Decreased mobility, Decreased coordination  Assessment: Pt is 69 y o  male seen for PT evaluation on 11/22/2019 s/p admit to [address] [place] [address] on 11/21/2019 w/ Chronic obstructive pulmonary disease with acute exacerbation ([facility] [address] )  PT consulted to assess pt's functional mobility and d/c needs  Order placed for PT eval and tx, w/ up w/ A order  Performed at least 2 patient identifiers during session: Name and wristband  Comorbidities affecting pt's physical performance at time of assessment include: DM2, HTN, Tremor  PTA, pt was independent w/ all functional mobility w/ o A  Personal factors affecting pt at time of IE include: lives in 2 story house, ambulating w/ assistive device and stairs to enter home  Please find objective findings from PT assessment regarding body systems outlined above with impairments and limitations including decreased activity tolerance, as well as mobility assessment (need for supervision assist w/ all phases of mobility when usually ambulating independently)  The following objective measures performed on IE also reveal limitations: Barthel Index: 90/100  Pt's clinical presentation is currently unstable/unpredictable seen in pt's presentation of ongoing medical assessment  Pt to benefit from continued PT tx to address deficits as defined above and maximize level of functional independent mobility and consistency   From PT/mobility standpoint, recommendation at time of d/c would be anticipate no needs and home independently w/ no skilled acute PT needs pending progress in order to facilitate return to PLOF  Recommendation: Home with family support     PT - OK to Discharge: Yes(if medically stable)    See flowsheet documentation for full assessment

## 2019-11-22 NOTE — UTILIZATION REVIEW
Initial Clinical Review    Admission: Date/Time/Statement: 11/21 @ 2319 OBSERVATION    Orders Placed This Encounter   Procedures    Place in Observation     Standing Status:   Standing     Number of Occurrences:   1     Order Specific Question:   Admitting Physician     Answer:   Ita Shoemaker [[id number]]     Order Specific Question:   Level of Care     Answer:   Med Surg [[id number]]     ED Arrival Information     Expected Arrival Acuity Means of Arrival Escorted By Service Admission Type    - 11/21/2019 19:48 Emergent Walk-In Self Hospitalist Emergency    Arrival Complaint    difficulty breathing        Chief Complaint   Patient presents with    Respiratory Distress     92% room air, albuterol at home     Assessment/Plan: 68 y/o male presents to ED from home with SOB, productive cough with grey sputum, increased home inhaler use  On exam, respiratory distress, tachypnea, wheezes, abd distension    Admitted as observation  Chronic obstructive pulmonary disease with acute exacerbation ([facility] [address] )  Assessment & Plan  · Patient has chronic issues with COPD exacerbations  · Patient was at a social gathering on 11/16/2019 where he did smoke 3 cigarettes  · Patient has had shortness of breath since that time  · Patient did try using his inhalers and neb treatments at home without improvement  · Patient does have cough with off white/gray colored sputum  · Obtain sputum culture  · Check procalcitonin  · Respiratory protocol  · IV fluids  · Mucinex, prednisone, Zosyn, Vanco  · Monitor on oxygen      ED Triage Vitals   Temperature Pulse Respirations Blood Pressure SpO2   11/21/19 2326 11/21/19 2025 11/21/19 2025 11/21/19 2025 11/21/19 2025   98 4 °F (36 9 °C) 84 (!) 26 (!) 182/87 97 %      Temp Source Heart Rate Source Patient Position - Orthostatic VS BP Location FiO2 (%)   11/21/19 2326 11/21/19 2025 11/21/19 2025 11/21/19 2025 --   Temporal Monitor Lying Left arm       Pain Score       11/21/19 2326       No Pain        Wt Readings from Last 1 Encounters:   11/22/19 86 8 kg (191 lb 5 8 oz)     Additional Vital Signs:   11/22/19 0720  97 8 °F (36 6 °C)  87  22  138/80  98 %  Nasal cannula 2L   11/22/19 0046  98 3 °F (36 8 °C)  90  24Abnormal   183/87Abnormal   97 %  Nasal cannula   11/21/19 2326  98 4 °F (36 9 °C)  84  20  165/106Abnormal   94 %         Pertinent Labs/Diagnostic Test Results:   Results from last 7 days   Lab Units 11/22/19  0539 11/21/19 2041   WBC Thousand/uL 6 70 8 40   HEMOGLOBIN g/dL 14 1 14 9   HEMATOCRIT % 40 9* 43 5   PLATELETS Thousands/uL 154 175   NEUTROS ABS Thousands/µL  --  6 20         Results from last 7 days   Lab Units 11/22/19  0539 11/21/19  2041   SODIUM mmol/L 138 138   POTASSIUM mmol/L 4 1 4 1   CHLORIDE mmol/L 104 103   CO2 mmol/L 23 26   ANION GAP mmol/L 11 9   BUN mg/dL 22 18   CREATININE mg/dL 0 72 0 79   EGFR ml/min/1 73sq m 93 89   CALCIUM mg/dL 8 4* 9 1   MAGNESIUM mg/dL  --  2 1     Results from last 7 days   Lab Units 11/22/19  0539 11/21/19  2041   AST U/L 9* 10*   ALT U/L 12 14   ALK PHOS U/L 61 66   TOTAL PROTEIN g/dL 6 3* 6 7   ALBUMIN g/dL 4 1 4 3   TOTAL BILIRUBIN mg/dL 0 40 0 40     Results from last 7 days   Lab Units 11/22/19  0625 11/22/19  0028   POC GLUCOSE mg/dl 176* 199*     Results from last 7 days   Lab Units 11/22/19  0539 11/21/19  2041   GLUCOSE RANDOM mg/dL 185* 197*             No results found for: BETA-HYDROXYBUTYRATE   Results from last 7 days   Lab Units 11/21/19  2336   PH ART  7 330*   PCO2 ART mm Hg 41 8   PO2 ART mm Hg 64 0*   HCO3 ART mmol/L 21 4*   BASE EXC ART mmol/L -4 1*   O2 CONTENT ART mL/dL 17 3   O2 HGB, ARTERIAL % 89 3*   ABG SOURCE  Radial, Right             Results from last 7 days   Lab Units 11/21/19  2041   CK TOTAL U/L 21*     Results from last 7 days   Lab Units 11/21/19  2041   TROPONIN I ng/mL <0 03         Results from last 7 days   Lab Units 11/21/19  2041   PROTIME seconds 11 8   INR  1 02   PTT seconds 40*             Results from last 7 days   Lab Units 11/21/19  2332 11/21/19  2041   LACTIC ACID mmol/L 1 7 2 1*             Results from last 7 days   Lab Units 11/21/19  2041   BNP pg/mL 117*     CXR:  Emphysematous changes are noted  No focal consolidation, pleural effusion, or pneumothorax  ED Treatment:   Medication Administration from 11/21/2019 1948 to 11/22/2019 0015       Date/Time Order Dose Route Action Action by Comments     11/21/2019 2022 ipratropium-albuterol (DUO-NEB) 0 5-2 5 mg/3 mL inhalation solution 3 mL 3 mL Nebulization Given Chaim Lambert, RT      11/21/2019 2022 albuterol inhalation solution 10 mg 10 mg Nebulization Given Chaim Lambert, RT      11/21/2019 2021 ipratropium (ATROVENT) 0 02 % inhalation solution 1 mg 1 mg Nebulization Given Chaim Lambert, RT      11/21/2019 2022 sodium chloride 0 9 % inhalation solution 3 mL 3 mL Nebulization Given Chaim Lambert, RT      11/21/2019 2046 methylPREDNISolone sodium succinate (Solu-MEDROL) injection 125 mg 125 mg Intravenous Given Frances Ly RN      11/22/2019 0006 piperacillin-tazobactam (ZOSYN) 3 375 g in sodium chloride 0 9 % 50 mL IVPB 3 375 g Intravenous New Bag Cory Emmanuel RN         Past Medical History:   Diagnosis Date    BPH (benign prostatic hyperplasia)     45 days radiation treatment    COPD (chronic obstructive pulmonary disease)     Coronary artery disease     CABG x4 in 2017    Diabetes mellitus     History of Arterial Duplex of LE 12/26/2017    Likely occlusion of the left superficial femoral artery  Calcific changes bilaterally  Despite these changes, the ankle-brachial index as a measure of peripheral blood flow only mildly impaired      History of echocardiogram 06/12/2017    EF 40%, mild LVH, mild MR     Hyperlipidemia     Hypertension     NSTEMI (non-ST elevated myocardial infarction)     Prostate cancer     prostate     PVD (peripheral vascular disease)     Tremor      Present on Admission:   Type 2 diabetes mellitus without complication, without long-term current use of insulin (HCC)   Essential hypertension   Other hyperlipidemia   CAD (coronary artery disease), native coronary artery   Chronic obstructive pulmonary disease with acute exacerbation (HCC)   Tremor      Admitting Diagnosis: Respiratory distress [R06 03]  COPD exacerbation (HCC) [J44 1]  Acute on chronic respiratory failure, unspecified whether with hypoxia or hypercapnia (HCC) [J96 20]  Age/Sex: 69 y o  male  Admission Orders:  Scheduled Medications:    Medications:  albuterol 2 5 mg Nebulization 4x Daily   aspirin 81 mg Oral Every Other Day   budesonide-formoterol 2 puff Inhalation BID   enoxaparin 40 mg Subcutaneous Daily   gabapentin 200 mg Oral HS   guaiFENesin 600 mg Oral BID   insulin lispro 1-5 Units Subcutaneous HS   insulin lispro 1-6 Units Subcutaneous TID AC   lisinopril 5 mg Oral Daily   metoprolol tartrate 25 mg Oral BID   piperacillin-tazobactam 3 375 g Intravenous Q6H   pravastatin 80 mg Oral Daily With Dinner   predniSONE 40 mg Oral Daily   primidone 100 mg Oral Q12H ENDER   vancomycin 20 mg/kg Intravenous Q12H     Continuous IV Infusions:    sodium chloride 125 mL/hr Intravenous Continuous     PRN Meds:    acetaminophen 650 mg Oral Q6H PRN   albuterol 2 5 mg Nebulization Q4H PRN   ondansetron 4 mg Intravenous Q6H PRN       IP CONSULT TO PHARMACY  IP CONSULT TO CASE MANAGEMENT    Network Utilization Review Department  [email or web address]  ATTENTION: Please call with any questions or concerns to [phone] and carefully listen to the prompts so that you are directed to the right person  All voicemails are confidential   Norberto Mcfarlane all requests for admission clinical reviews, approved or denied determinations and any other requests to dedicated fax number below belonging to the campus where the patient is receiving treatment    FACILITY NAME UR FAX NUMBER   ADMISSION DENIALS (Administrative/Medical Necessity) [phone]   [address] (Maternity/NICU/Pediatrics) [phone]   ST  [address] [facility] [phone]   [facility] [phone]   [address] [place] [address] [phone]   [address] [place] [address] [address] [place] [phone]

## 2019-11-22 NOTE — SOCIAL WORK
Visit with patient in his hospital room to initiate discharge planning  Patient is observation status and denies questions re: same  Lives with spouse in a 2 story home with bathrooms on both floors  Has 4 steps to enter  Does have a cane at home and uses occasionally  Has walker at home and showerchair  States has handrails as needed  Drives self, drove here and plans on driving himself home on discharge  Uses the [address] when he goes to the [place]  He uses [address] and denies any issues obtaining his meds  [facility]  Was doing cardiac rehab and plans to return on discharge  CM reviewed d/c planning process including the following: identifying help at home, patient preference for d/c planning needs, availability of treatment team to discuss questions or concerns patient and/or family may have regarding understanding medications and recognizing signs and symptoms once discharged  CM also encouraged patient to follow up with all recommended appointments after discharge  Patient advised of importance for patient and family to participate in managing patients medical well being

## 2019-11-22 NOTE — ED NOTES
Patient ambulatory in ED to bathroom - MD to bedside to discuss [id number], [address] [facility]  11/21/19 2829

## 2019-11-22 NOTE — ED PROVIDER NOTES
History  Chief Complaint   Patient presents with    Respiratory Distress     92% room air, albuterol at home     69YEAR-OLD MALE  1 PACK-A-DAY SMOKER  - at least a 60 pack year hx  PMH: COPD, CABG      HE IS HERE FOR ACUTE COPD EXACERBATION C/W SHORTNESS OF BREATH AND PRODUCTIVE COUGH  Sputum is Grey color now  NO  COMPLAINTS OF DIAPHORESIS OR CHEST PAIN OR PRESSURE  HE DENIES ANY PAIN TO THE JAW NECK OR THE BACK  INTERVENTIONS:   PT TOOK HIS INHALER 4 X TODAY    PATIENT DENIES FEVERS, CHILLS  NO SINUS SYMPTOMS  NO HEADACHE OR NECK PAIN, NO DIZZINESS     VTE  RISK FACTORS:  NONE  NO HISTORY OF PE OR DVT  NO LONG CAR RIDES OR PLANE RIDES  NO IMMOBILIZATION  NO RECENT SURGERY OR TRAUMA  NO HEMOPTYSIS  OTHER ASSOCIATED SYMPTOMS:  NO ABDOMINAL PAIN  NO ACUTE BACK PAIN  NO NAUSEA OR VOMITING  NO STOOL CHANGES  NO URINARY COMPLAINTS: NO DYSURIA, NO HEMATURIA, NO FREQUENCY        History provided by:  Patient  Shortness of Breath   Severity:  Moderate  Onset quality:  Gradual  Timing:  Intermittent  Progression:  Worsening  Chronicity:  Chronic  Context: weather changes    Relieved by: Inhaler  Worsened by:  Nothing  Associated symptoms: cough, sputum production and wheezing    Associated symptoms: no abdominal pain, no chest pain, no claudication, no diaphoresis, no fever, no headaches, no hemoptysis, no neck pain, no rash, no sore throat, no syncope, no swollen glands and no vomiting    Risk factors: tobacco use    Risk factors: no recent alcohol use, no family hx of DVT, no hx of cancer, no hx of PE/DVT, no obesity, no oral contraceptive use, no prolonged immobilization and no recent surgery        Prior to Admission Medications   Prescriptions Last Dose Informant Patient Reported? Taking?    Alogliptin Benzoate 25 MG TABS   Yes Yes   Sig: Take by mouth daily   Empagliflozin 10 MG TABS   Yes No   Sig: Take 10 mg by mouth every morning   albuterol (2 5 mg/3 mL) 0 083 % nebulizer solution 2019 at Unknown time  No Yes   Sig: Take 1 vial (2 5 mg total) by nebulization every 4 (four) hours as needed for wheezing or shortness of breath   albuterol (PROVENTIL HFA,VENTOLIN HFA) 90 mcg/act inhaler   Yes Yes   Sig: Inhale 2 puffs every 6 (six) hours as needed for wheezing or shortness of breath   aspirin (ECOTRIN LOW STRENGTH) 81 mg EC tablet   Yes Yes   Sig: Take 81 mg by mouth every other day    gabapentin (NEURONTIN) 100 mg capsule   Yes Yes   Sig: Take 200 mg by mouth daily at bedtime   guaiFENesin (MUCINEX) 600 mg 12 hr tablet   No Yes   Sig: Take 1 tablet (600 mg total) by mouth 2 (two) times a day   lisinopril (ZESTRIL) 5 mg tablet   No Yes   Sig: Take 1 tablet (5 mg total) by mouth daily   metFORMIN (GLUCOPHAGE) 500 mg tablet   Yes Yes   Si/2 a tablet in the morning and evening    metoprolol tartrate (LOPRESSOR) 50 mg tablet   Yes Yes   Sig: Take 25 mg by mouth 2 (two) times a day   primidone (MYSOLINE) 50 mg tablet   No Yes   Sig: Take 2 tablets (100 mg total) by mouth every 12 (twelve) hours   rosuvastatin (CRESTOR) 10 MG tablet   Yes Yes   Sig: Take 10 mg by mouth daily   saxagliptin (ONGLYZA) 5 MG tablet   Yes Yes   Sig: Take 5 mg by mouth daily   tiotropium-olodaterol (STIOLTO RESPIMAT) 2 5-2 5 MCG/ACT inhaler   Yes Yes   Sig: Inhale 2 puffs daily      Facility-Administered Medications: None       Past Medical History:   Diagnosis Date    BPH (benign prostatic hyperplasia)     45 days radiation treatment    COPD (chronic obstructive pulmonary disease)     Coronary artery disease     CABG x4 in 2017    Diabetes mellitus     History of Arterial Duplex of LE 2017    Likely occlusion of the left superficial femoral artery  Calcific changes bilaterally  Despite these changes, the ankle-brachial index as a measure of peripheral blood flow only mildly impaired      History of echocardiogram 2017    EF 40%, mild LVH, mild MR     Hyperlipidemia     Hypertension     NSTEMI (non-ST elevated myocardial infarction)     Prostate cancer     prostate     PVD (peripheral vascular disease)     Tremor        Past Surgical History:   Procedure Laterality Date    CARDIAC CATHETERIZATION  2017    Significant left main plus triple-vessel CAD   CORONARY ARTERY BYPASS GRAFT  2017    4V CABG:  LIMA to LAD, VG to RI, SVG to PDA to LVBR RCA   PROSTATE BIOPSY         Family History   Problem Relation Age of Onset    Cancer Father     Heart disease Brother      I have reviewed and agree with the history as documented  Social History     Tobacco Use    Smoking status: Former Smoker     Last attempt to quit: 2/15/2019     Years since quittin 7    Smokeless tobacco: Never Used   Substance Use Topics    Alcohol use: Not Currently     Comment: on occasion    Drug use: No        Review of Systems   Constitutional: Negative for chills, diaphoresis, fatigue and fever  HENT: Negative for sore throat  Respiratory: Positive for cough, sputum production, shortness of breath and wheezing  Negative for hemoptysis and stridor  Cardiovascular: Negative for chest pain, palpitations, claudication, leg swelling and syncope  Gastrointestinal: Negative for abdominal pain, blood in stool, nausea and vomiting  Genitourinary: Negative for difficulty urinating, dysuria, flank pain and frequency  Musculoskeletal: Negative for arthralgias, back pain, gait problem, joint swelling, myalgias, neck pain and neck stiffness  Skin: Negative for rash and wound  Neurological: Negative for dizziness, weakness, light-headedness and headaches  All other systems reviewed and are negative  Physical Exam  Physical Exam   Constitutional: He is oriented to person, place, and time  He appears well-developed and well-nourished  No distress  HENT:   Head: Normocephalic and atraumatic  Nose: Nose normal    Mouth/Throat: Oropharynx is clear and moist  No oropharyngeal exudate     Eyes: Pupils are equal, round, and reactive to light  Conjunctivae and EOM are normal  Right eye exhibits no discharge  Left eye exhibits no discharge  No scleral icterus  Neck: Normal range of motion  Neck supple  No JVD present  No tracheal deviation present  Cardiovascular: Normal rate, regular rhythm, normal heart sounds and intact distal pulses  Exam reveals no gallop and no friction rub  No murmur heard  Pulmonary/Chest: No stridor  He is in respiratory distress (MILD)  He has wheezes  He has no rales  He exhibits no tenderness  Abdominal: Soft  Bowel sounds are normal  He exhibits no distension and no mass  There is no tenderness  There is no rebound and no guarding  No hernia  Musculoskeletal: Normal range of motion  He exhibits no edema, tenderness or deformity  Lymphadenopathy:     He has no cervical adenopathy  Neurological: He is alert and oriented to person, place, and time  No cranial nerve deficit or sensory deficit  He exhibits normal muscle tone  Coordination normal    Skin: Skin is warm  Capillary refill takes less than 2 seconds  No rash noted  He is not diaphoretic  No erythema  No pallor  Psychiatric: He has a normal mood and affect   His behavior is normal  Judgment and thought content normal        Vital Signs  ED Triage Vitals   Temperature Pulse Respirations Blood Pressure SpO2   11/21/19 2326 11/21/19 2025 11/21/19 2025 11/21/19 2025 11/21/19 2025   98 4 °F (36 9 °C) 84 (!) 26 (!) 182/87 97 %      Temp Source Heart Rate Source Patient Position - Orthostatic VS BP Location FiO2 (%)   11/21/19 2326 11/21/19 2025 11/21/19 2025 11/21/19 2025 --   Temporal Monitor Lying Left arm       Pain Score       11/21/19 2326       No Pain           Vitals:    11/22/19 1736 11/22/19 2227 11/23/19 0704 11/23/19 1120   BP: 146/60 140/62 (!) 174/84 158/82   Pulse: 64 68 71    Patient Position - Orthostatic VS:  Lying Lying Sitting         Visual Acuity      ED Medications  Medications ipratropium-albuterol (DUO-NEB) 0 5-2 5 mg/3 mL inhalation solution 3 mL (3 mL Nebulization Given 11/21/19 2022)   albuterol inhalation solution 10 mg (10 mg Nebulization Given 11/21/19 2022)     And   ipratropium (ATROVENT) 0 02 % inhalation solution 1 mg (1 mg Nebulization Given 11/21/19 2021)     And   sodium chloride 0 9 % inhalation solution 3 mL (3 mL Nebulization Given 11/21/19 2022)   methylPREDNISolone sodium succinate (Solu-MEDROL) injection 125 mg (125 mg Intravenous Given 11/21/19 2046)   piperacillin-tazobactam (ZOSYN) 3 375 g in sodium chloride 0 9 % 50 mL IVPB (3 375 g Intravenous New Bag 11/22/19 0006)   vancomycin (VANCOCIN) 1,250 mg in sodium chloride 0 9 % 250 mL IVPB (1,250 mg Intravenous New Bag 11/22/19 0034)   azithromycin (ZITHROMAX) tablet 500 mg (500 mg Oral Given 11/22/19 1226)       Diagnostic Studies  Results Reviewed     Procedure Component Value Units Date/Time    Blood culture #2 [[id number]] Collected:  11/21/19 2300    Lab Status:  Preliminary result Specimen:  Blood from Arm, Right Updated:  11/25/19 1001     Blood Culture No Growth at 72 hrs  Blood culture #1 [[id number]] Collected:  11/21/19 2300    Lab Status:  Preliminary result Specimen:  Blood from Arm, Right Updated:  11/25/19 1001     Blood Culture No Growth at 72 hrs      Blood gas, arterial [[id number]]  (Abnormal) Collected:  11/21/19 2336    Lab Status:  Final result Specimen:  Blood, Arterial from Radial, Right Updated:  11/22/19 0006     pH, Arterial 7 330     pCO2, Arterial 41 8 mm Hg      pO2, Arterial 64 0 mm Hg      HCO3, Arterial 21 4 mmol/L      Base Excess, Arterial -4 1 mmol/L      O2 Content, Arterial 17 3 mL/dL      O2 HGB,Arterial  89 3 %      SOURCE Radial, Right     ROSA MARIA TEST Yes     ROOM AIR FIO2 21% %      Other FIO2 room -air %     Lactic acid, plasma [[id number]]  (Normal) Collected:  11/21/19 2332    Lab Status:  Final result Specimen:  Blood from Arm, Right Updated:  11/22/19 0004     LACTIC ACID 1 7 mmol/L     Narrative:       Result may be elevated if tourniquet was used during collection  Lactic acid, plasma [[id number]]  (Abnormal) Collected:  11/21/19 2041    Lab Status:  Final result Specimen:  Blood from Arm, Right Updated:  11/21/19 2133     LACTIC ACID 2 1 mmol/L     Narrative:       Result may be elevated if tourniquet was used during collection      CK Total with Reflex CKMB [[id number]]  (Abnormal) Collected:  11/21/19 2041    Lab Status:  Final result Specimen:  Blood from Arm, Right Updated:  11/21/19 2129     Total CK 21 U/L     B-Type Natriuretic Peptide ([facility]) [[id number]]  (Abnormal) Collected:  11/21/19 2041    Lab Status:  Final result Specimen:  Blood from Arm, Right Updated:  11/21/19 2128      pg/mL     Magnesium [[id number]]  (Normal) Collected:  11/21/19 2041    Lab Status:  Final result Specimen:  Blood from Arm, Right Updated:  11/21/19 2128     Magnesium 2 1 mg/dL     High sensitivity CRP [[id number]] Collected:  11/21/19 2041    Lab Status:  Final result Specimen:  Blood from Arm, Right Updated:  11/21/19 2128     CRP, High Sensitivity 11 01 mg/L     Narrative:             HsCRP Level       Relative Risk           <1 0 mg/L          Low           1 0 to 3 0 mg/L    Average           >3 0 mg/L          High        Comprehensive metabolic panel [[id number]]  (Abnormal) Collected:  11/21/19 2041    Lab Status:  Final result Specimen:  Blood from Arm, Right Updated:  11/21/19 2128     Sodium 138 mmol/L      Potassium 4 1 mmol/L      Chloride 103 mmol/L      CO2 26 mmol/L      ANION GAP 9 mmol/L      BUN 18 mg/dL      Creatinine 0 79 mg/dL      Glucose 197 mg/dL      Calcium 9 1 mg/dL      AST 10 U/L      ALT 14 U/L      Alkaline Phosphatase 66 U/L      Total Protein 6 7 g/dL      Albumin 4 3 g/dL      Total Bilirubin 0 40 mg/dL      eGFR 89 ml/min/1 73sq m     Narrative:       [place] guidelines for Chronic Kidney Disease (CKD):     Stage 1 with normal or high GFR (GFR > 90 mL/min/1 73 square meters)    Stage 2 Mild CKD (GFR = 60-89 mL/min/1 73 square meters)    Stage 3A Moderate CKD (GFR = 45-59 mL/min/1 73 square meters)    Stage 3B Moderate CKD (GFR = 30-44 mL/min/1 73 square meters)    Stage 4 Severe CKD (GFR = 15-29 mL/min/1 73 square meters)    Stage 5 End Stage CKD (GFR <15 mL/min/1 73 square meters)  Note: GFR calculation is accurate only with a steady state creatinine    Troponin I [[id number]]  (Normal) Collected:  11/21/19 2041    Lab Status:  Final result Specimen:  Blood from Arm, Right Updated:  11/21/19 2128     Troponin I <0 03 ng/mL     Protime-INR [[id number]]  (Normal) Collected:  11/21/19 2041    Lab Status:  Final result Specimen:  Blood from Arm, Right Updated:  11/21/19 2101     Protime 11 8 seconds      INR 1 02    APTT [[id number]]  (Abnormal) Collected:  11/21/19 2041    Lab Status:  Final result Specimen:  Blood from Arm, Right Updated:  11/21/19 2101     PTT 40 seconds     CBC and differential [[id number]]  (Abnormal) Collected:  11/21/19 2041    Lab Status:  Final result Specimen:  Blood from Arm, Right Updated:  11/21/19 2049     WBC 8 40 Thousand/uL      RBC 4 72 Million/uL      Hemoglobin 14 9 g/dL      Hematocrit 43 5 %      MCV 92 fL      MCH 31 5 pg      MCHC 34 2 g/dL      RDW 14 3 %      MPV 9 0 fL      Platelets 245 Thousands/uL      Neutrophils Relative 73 %      Lymphocytes Relative 14 %      Monocytes Relative 6 %      Eosinophils Relative 7 %      Basophils Relative 1 %      Neutrophils Absolute 6 20 Thousands/µL      Lymphocytes Absolute 1 20 Thousands/µL      Monocytes Absolute 0 50 Thousand/µL      Eosinophils Absolute 0 60 Thousand/µL      Basophils Absolute 0 00 Thousands/µL                  XR chest 2 views   Final Result by Bety Morris MD (11/22 0255)      Emphysematous changes are noted  No focal consolidation, pleural effusion, or pneumothorax              Workstation performed: [id number] Procedures  Procedures       ED Course  ED Course as of Nov 26 0959   Thu Nov 21, 2019   2128 Pt improving on neb  VSS, spo2 98%      2245 PT OFF HOUR LONG  STILL TACHYPNEIC  DOES NOT FEEL WELL ENOUGH TO GO HOME  LACTIC ACID ELEVATED        2248 D/W MERARI  WILL ADMIT      2252 DISCUSSED WITH ANA FROM RESPIRATORY  RECOMMEND DOING A BG BEFORE DECIDING ON OPTIFLOW VERSUS VAPOTHERM    PATIENT WILL NEED ICU LEVEL CARE IF VAPOTHERM IS NECESSARY                                  MDM    Disposition  Final diagnoses:   COPD exacerbation ([facility] )   Acute on chronic respiratory failure, unspecified whether with hypoxia or hypercapnia ([facility] )     Time reflects when diagnosis was documented in both MDM as applicable and the Disposition within this note     Time User Action Codes Description Comment    11/21/2019 11:18 PM Kofi Granados [J44 1] COPD exacerbation ([facility] )     11/21/2019 11:18 PM Kofi Fountain Add [J96 20] Acute on chronic respiratory failure, unspecified whether with hypoxia or hypercapnia [facility])       ED Disposition     ED Disposition Condition Date/Time Comment    Admit Stable Thu Nov 21, 2019 11:18 PM Case was discussed with Merari and the patient's admission status was agreed to be Admission Status: observation status to the service of Dr Kailyn Ho  Follow-up Information     Follow up With Specialties Details Why Thai Price MD Internal Medicine Follow up in 1 week(s)  [address]  [id number]  [phone]            Discharge Medication List as of 11/23/2019 12:47 PM      START taking these medications    Details   predniSONE 20 mg tablet Take 2 tablets (40 mg total) by mouth daily [id number], [id number], [id number], [id number], then stop, Starting Sat 11/23/2019, Print         CONTINUE these medications which have NOT CHANGED    Details   albuterol (2 5 mg/3 mL) 0 083 % nebulizer solution Take 1 vial (2 5 mg total) by nebulization every 4 (four) hours as needed for wheezing or shortness of breath, Starting Sun 6/24/2018, Print      albuterol (PROVENTIL HFA,VENTOLIN HFA) 90 mcg/act inhaler Inhale 2 puffs every 6 (six) hours as needed for wheezing or shortness of breath, Historical Med      Alogliptin Benzoate 25 MG TABS Take by mouth daily, Historical Med      aspirin (ECOTRIN LOW STRENGTH) 81 mg EC tablet Take 81 mg by mouth every other day , Historical Med      gabapentin (NEURONTIN) 100 mg capsule Take 200 mg by mouth daily at bedtime, Historical Med      guaiFENesin (MUCINEX) 600 mg 12 hr tablet Take 1 tablet (600 mg total) by mouth 2 (two) times a day, Starting Sat 9/21/2019, Normal      lisinopril (ZESTRIL) 5 mg tablet Take 1 tablet (5 mg total) by mouth daily, Starting Tue 8/20/2019, Normal      metFORMIN (GLUCOPHAGE) 500 mg tablet 1/2 a tablet in the morning and evening , Historical Med      metoprolol tartrate (LOPRESSOR) 50 mg tablet Take 25 mg by mouth 2 (two) times a day, Starting Thu 3/16/2017, Historical Med      primidone (MYSOLINE) 50 mg tablet Take 2 tablets (100 mg total) by mouth every 12 (twelve) hours, Starting Wed 10/23/2019, No Print      rosuvastatin (CRESTOR) 10 MG tablet Take 10 mg by mouth daily, Historical Med      saxagliptin (ONGLYZA) 5 MG tablet Take 5 mg by mouth daily, Historical Med      tiotropium-olodaterol (STIOLTO RESPIMAT) 2 5-2 5 MCG/ACT inhaler Inhale 2 puffs daily, Historical Med      Empagliflozin 10 MG TABS Take 10 mg by mouth every morning, Historical Med           Outpatient Discharge Orders   Call provider for:  difficulty breathing, headache or visual disturbances     Call provider for:  extreme fatigue     Call provider for:  persistent dizziness or light-headedness     Call provider for:  severe uncontrolled pain       ED Provider  Electronically Signed by           Jeanette Tello MD  11/26/19 0398

## 2019-11-22 NOTE — ASSESSMENT & PLAN NOTE
Lab Results   Component Value Date    HGBA1C 8 3 (H) 09/20/2019       Recent Labs     11/22/19  0028   POCGLU 199*       Blood Sugar Average: Last 72 hrs:  (P) 199   · Accu-Cheks Q a c   And HS with sliding scale insulin  · Hold metformin and 3 other oral anti hyperglycemics

## 2019-11-22 NOTE — H&P
H&P- Jett Hendrix 1945, 69 y o  male MRN: [id number]    Unit/Bed#: -01 Encounter: [id number]    Primary Care Provider: Denia Hernandez MD   Date and time admitted to hospital: 11/21/2019  7:55 PM        * Chronic obstructive pulmonary disease with acute exacerbation ([facility] [address] )  Assessment & Plan  · Patient has chronic issues with COPD exacerbations  · Patient was at a social gathering on 11/16/2019 where he did smoke 3 cigarettes  · Patient has had shortness of breath since that time  · Patient did try using his inhalers and neb treatments at home without improvement  · Patient does have cough with off white/gray colored sputum  · Obtain sputum culture  · Check procalcitonin  · Respiratory protocol  · IV fluids  · Mucinex, prednisone, Zosyn, Vanco  · Monitor on oxygen    Type 2 diabetes mellitus without complication, without long-term current use of insulin [facility])  Assessment & Plan  Lab Results   Component Value Date    HGBA1C 8 3 (H) 09/20/2019       Recent Labs     11/22/19  0028   POCGLU 199*       Blood Sugar Average: Last 72 hrs:  (P) 199   · Accu-Cheks Q a c  And HS with sliding scale insulin  · Hold metformin and 3 other oral anti hyperglycemics    Essential hypertension  Assessment & Plan  · Continue metoprolol and lisinopril    Other hyperlipidemia  Assessment & Plan  · Continue statin    CAD (coronary artery disease), native coronary artery  Assessment & Plan  · Continue aspirin and metoprolol    Tremor  Assessment & Plan  · Continue primidone      VTE Prophylaxis: Enoxaparin (Lovenox)  Code Status:  Full code  POLST: POLST is not applicable to this patient  Discussion with family:  Discussed with patient    Anticipated Length of Stay:  Patient will be admitted on an Observation basis with an anticipated length of stay of  < 2 midnights     Justification for Hospital Stay:  Need for IV antibiotics and continue breathing treatments    Chief Complaint:   Worsening shortness of breath    History of Present Illness:    Max Arriola is a 69 y o  male who presents with worsening shortness of breath  Patient has a longstanding history of COPD  He states he was at a social gathering on Saturday and did smoke 3 cigarettes which immediately made him start having some shortness of breath  He states that it had worsened in the last 24 hours, to the point where he had been doing his albuterol neb treatments 4 times a day and using in his inhaler 4 times a day  The patient comes to the emergency department tonight as he started having a productive cough with off white/gray colored sputum  In the emergency department he was given DuoNeb treatments, albuterol neb treatments, Atrovent, Solu-Medrol 125, Zosyn, vancomycin  Review of Systems:  Review of Systems   Respiratory: Positive for cough and shortness of breath  All other systems reviewed and are negative  Past Medical and Surgical History:   Past Medical History:   Diagnosis Date    BPH (benign prostatic hyperplasia)     39 days radiation treatment    COPD (chronic obstructive pulmonary disease)     Coronary artery disease     CABG x4 in 2017    Diabetes mellitus     History of Arterial Duplex of LE 12/26/2017    Likely occlusion of the left superficial femoral artery  Calcific changes bilaterally  Despite these changes, the ankle-brachial index as a measure of peripheral blood flow only mildly impaired   History of echocardiogram 06/12/2017    EF 40%, mild LVH, mild MR     Hyperlipidemia     Hypertension     NSTEMI (non-ST elevated myocardial infarction)     Prostate cancer     prostate     PVD (peripheral vascular disease)     Tremor        Past Surgical History:   Procedure Laterality Date    CARDIAC CATHETERIZATION  03/08/2017    Significant left main plus triple-vessel CAD   CORONARY ARTERY BYPASS GRAFT  03/08/2017    4V CABG:  LIMA to LAD, VG to RI, SVG to PDA to LVBR RCA      PROSTATE BIOPSY         Meds/Allergies:  Prior to Admission medications    Medication Sig Start Date End Date Taking? Authorizing Provider   albuterol (2 5 mg/3 mL) 0 083 % nebulizer solution Take 1 vial (2 5 mg total) by nebulization every 4 (four) hours as needed for wheezing or shortness of breath 6/24/18  Yes Lucy Roman MD   albuterol (PROVENTIL HFA,VENTOLIN HFA) 90 mcg/act inhaler Inhale 2 puffs every 6 (six) hours as needed for wheezing or shortness of breath   Yes Historical Provider, MD   Alogliptin Benzoate 25 MG TABS Take by mouth daily   Yes Historical Provider, MD   aspirin (ECOTRIN LOW STRENGTH) 81 mg EC tablet Take 81 mg by mouth every other day    Yes Historical Provider, MD   gabapentin (NEURONTIN) 100 mg capsule Take 200 mg by mouth daily at bedtime   Yes Historical Provider, MD   guaiFENesin (MUCINEX) 600 mg 12 hr tablet Take 1 tablet (600 mg total) by mouth 2 (two) times a day 9/21/19  Yes PENG Kerr   lisinopril (ZESTRIL) 5 mg tablet Take 1 tablet (5 mg total) by mouth daily 8/20/19  Yes Rosie Arango PA-C   metFORMIN (GLUCOPHAGE) 500 mg tablet 1/2 a tablet in the morning and evening    Yes Historical Provider, MD   metoprolol tartrate (LOPRESSOR) 50 mg tablet Take 25 mg by mouth 2 (two) times a day 3/16/17  Yes Historical Provider, MD   primidone (MYSOLINE) 50 mg tablet Take 2 tablets (100 mg total) by mouth every 12 (twelve) hours 10/23/19  Yes Natalia Acharya PA-C   rosuvastatin (CRESTOR) 10 MG tablet Take 10 mg by mouth daily   Yes Historical Provider, MD   saxagliptin (ONGLYZA) 5 MG tablet Take 5 mg by mouth daily   Yes Historical Provider, MD   tiotropium-olodaterol (STIOLTO RESPIMAT) 2 5-2 5 MCG/ACT inhaler Inhale 2 puffs daily   Yes Historical Provider, MD   Empagliflozin 10 MG TABS Take 10 mg by mouth every morning    Historical Provider, MD BERGER have reviewed home medications with patient personally      Allergies: No Known Allergies    Social History:  Marital Status: /Civil Union   Occupation: Retired  Patient Pre-hospital Living Situation:  At home  Patient Pre-hospital Level of Mobility:  Function  Patient Pre-hospital Diet Restrictions:  Diabetic  Substance Use History:   Social History     Substance and Sexual Activity   Alcohol Use Not Currently    Comment: on occasion     Social History     Tobacco Use   Smoking Status Former Smoker    Last attempt to quit: 2/15/2019    Years since quittin 7   Smokeless Tobacco Never Used     Social History     Substance and Sexual Activity   Drug Use No       Family History:  I have reviewed the patients family history    Physical Exam:   Vitals:   Blood Pressure: (!) 165/106 (19)  Pulse: 84 (19)  Temperature: 98 4 °F (36 9 °C) (19)  Temp Source: Temporal (19)  Respirations: 20 (19)  SpO2: 94 % (19)    Physical Exam   Constitutional: He is oriented to person, place, and time  Vital signs are normal  He appears well-developed and well-nourished  He is cooperative  HENT:   Head: Normocephalic and atraumatic  Nose: Nose normal    Mouth/Throat: Oropharynx is clear and moist and mucous membranes are normal    Eyes: Conjunctivae and EOM are normal    Neck: Full passive range of motion without pain  Cardiovascular: Normal rate, regular rhythm, normal heart sounds and normal pulses  Pulmonary/Chest: Tachypnea noted  He has wheezes in the right upper field, the right middle field, the right lower field, the left upper field, the left middle field and the left lower field  Abdominal: Soft  Bowel sounds are normal  He exhibits distension  Musculoskeletal: Normal range of motion  Neurological: He is alert and oriented to person, place, and time  Skin: Skin is warm  Psychiatric: He has a normal mood and affect  His speech is normal and behavior is normal        Additional Data:   Lab Results: I have personally reviewed pertinent reports      Results from last 7 days   Lab Units 11/21/19  2041   WBC Thousand/uL 8 40   HEMOGLOBIN g/dL 14 9   HEMATOCRIT % 43 5   PLATELETS Thousands/uL 175   NEUTROS PCT % 73   LYMPHS PCT % 14*   MONOS PCT % 6   EOS PCT % 7*     Results from last 7 days   Lab Units 11/21/19  2041   POTASSIUM mmol/L 4 1   CHLORIDE mmol/L 103   CO2 mmol/L 26   BUN mg/dL 18   CREATININE mg/dL 0 79   CALCIUM mg/dL 9 1   ALK PHOS U/L 66   ALT U/L 14   AST U/L 10*     Results from last 7 days   Lab Units 11/21/19  2041   INR  1 02     Results from last 7 days   Lab Units 11/22/19  0028   POC GLUCOSE mg/dl 199*           Imaging: I have personally reviewed pertinent reports  XR chest 2 views    (Results Pending)       EKG, Pathology, and Other Studies Reviewed on Admission:   · EKG:  Normal sinus rhythm, heart rate 89    [facility] / [facility] Records Reviewed: Yes     ** Please Note: This note has been constructed using a voice recognition system   **

## 2019-11-22 NOTE — PHYSICAL THERAPY NOTE
Physical Therapy Evaluation     Patient's Name: Carmine Palma    Admitting Diagnosis  Respiratory distress [R06 03]  COPD exacerbation ([facility] ) [J44 1]  Acute on chronic respiratory failure, unspecified whether with hypoxia or hypercapnia ([facility] ) [J96 20]    Problem List  Patient Active Problem List   Diagnosis    Type 2 diabetes mellitus without complication, without long-term current use of insulin ([facility] [address] )    Tobacco abuse    Other hyperlipidemia    Essential hypertension    CAD (coronary artery disease), native coronary artery    BPH (benign prostatic hyperplasia)    Mucopurulent chronic bronchitis (HCC)    Tremor    Ambulatory dysfunction    NSTEMI (non-ST elevated myocardial infarction) ([facility] [address] )    On intra-aortic balloon pump assist    Prostate cancer ([facility] [address] )    S/P CABG x 4    Ischemic cardiomyopathy    Chronic obstructive pulmonary disease with acute exacerbation ([facility] )       Past Medical History  Past Medical History:   Diagnosis Date    BPH (benign prostatic hyperplasia)     45 days radiation treatment    COPD (chronic obstructive pulmonary disease)     Coronary artery disease     CABG x4 in 2017    Diabetes mellitus     History of Arterial Duplex of LE 12/26/2017    Likely occlusion of the left superficial femoral artery  Calcific changes bilaterally  Despite these changes, the ankle-brachial index as a measure of peripheral blood flow only mildly impaired   History of echocardiogram 06/12/2017    EF 40%, mild LVH, mild MR     Hyperlipidemia     Hypertension     NSTEMI (non-ST elevated myocardial infarction)     Prostate cancer     prostate     PVD (peripheral vascular disease)     Tremor        Past Surgical History  Past Surgical History:   Procedure Laterality Date    CARDIAC CATHETERIZATION  03/08/2017    Significant left main plus triple-vessel CAD   CORONARY ARTERY BYPASS GRAFT  03/08/2017    4V CABG:  LIMA to LAD, VG to RI, SVG to PDA to LVBR RCA      PROSTATE BIOPSY 11/22/19 0900   Note Type   Note type Eval/Treat   Pain Assessment   Pain Assessment No/denies pain   Pain Score No Pain   Home Living   Type of Home House   Home Layout Two level;Bed/bath upstairs   Bathroom Shower/Tub Tub/shower unit   Bathroom Toilet Standard   Bathroom Equipment Grab bars in shower;Grab bars around toilet; Shower chair   Bathroom Accessibility Accessible   Home Equipment Walker;Cane   Prior Function   Level of [place] Independent with ADLs and functional mobility   Lives With Spouse   ADL Assistance Independent   IADLs Independent   Falls in the last 6 months 0   Vocational Retired   Comments (+) driving   Restrictions/Precautions   [place] Bearing Precautions Per Order No   Other Precautions Multiple lines;O2;Telemetry; Fall Risk  (2L O2)   General   Family/Caregiver Present No   Cognition   Overall Cognitive Status WFL   Arousal/Participation Alert   Orientation Level Oriented X4   Memory Within functional limits   Following Commands Follows all commands and directions without difficulty   RLE Assessment   RLE Assessment WFL   LLE Assessment   LLE Assessment WFL   Coordination   Movements are Fluid and Coordinated 1   Bed Mobility   Additional Comments Pt sitting EOB upon arrival   Transfers   Sit to Stand   (CGA)   Additional items Assist x 1; Increased time required   Stand to Sit   (CGA)   Additional items Assist x 1; Increased time required   Toilet transfer 5  Supervision   Additional items Assist x 1; Increased time required;Standard toilet   Ambulation/Elevation   Gait pattern Foward flexed   Gait Assistance 5  Supervision   Additional items Assist x 1;Verbal cues   Assistive Device Rolling walker   Distance 150 x2   Stair Management Assistance Not tested   Balance   Static Sitting Good   Dynamic Sitting Good   Static Standing Good   Dynamic Standing Good   Ambulatory Fair +   Endurance Deficit   Endurance Deficit No   Activity Tolerance   Activity Tolerance Patient tolerated treatment well   Nurse Made Aware yes, Carroll Hernández RN   Assessment   Prognosis Good   Problem List Decreased mobility; Decreased coordination   Assessment Pt is 69 y o  male seen for PT evaluation on 11/22/2019 s/p admit to [address] on 11/21/2019 w/ Chronic obstructive pulmonary disease with acute exacerbation ([facility] [address] )  PT consulted to assess pt's functional mobility and d/c needs  Order placed for PT eval and tx, w/ up w/ A order  Performed at least 2 patient identifiers during session: Name and wristband  Comorbidities affecting pt's physical performance at time of assessment include: DM2, HTN, Tremor  PTA, pt was independent w/ all functional mobility w/ o A  Personal factors affecting pt at time of IE include: lives in 2 story house, ambulating w/ assistive device and stairs to enter home  Please find objective findings from PT assessment regarding body systems outlined above with impairments and limitations including decreased activity tolerance, as well as mobility assessment (need for supervision assist w/ all phases of mobility when usually ambulating independently)  The following objective measures performed on IE also reveal limitations: Barthel Index: 90/100  Pt's clinical presentation is currently unstable/unpredictable seen in pt's presentation of ongoing medical assessment  Pt to benefit from continued PT tx to address deficits as defined above and maximize level of functional independent mobility and consistency  From PT/mobility standpoint, recommendation at time of d/c would be anticipate no needs and home independently w/ no skilled acute PT needs pending progress in order to facilitate return to PLOF      Goals   Patient Goals Get back home   LTG Expiration Date 11/27/19   Long Term Goal #1 In 3-5 days: Perform all transfers independently to improve independence, Ambulate > 300 feet ft  with RW modified independent w/o LOB and w/ normalized gait pattern 100% of the time, Navigate 11 stairs modified independent with unilateral handrail to facilitate return to previous living environment, Increase all balance 1/2 grade to decrease risk for falls and Complete % of the time   PT Treatment Day 1   Plan   Treatment/Interventions Elevations; Therapeutic exercise;Gait training   PT Frequency Follow-up visit only   Recommendation   Recommendation Home with family support   Equipment Recommended Walker   PT - OK to Discharge Yes  (if medically stable)   Barthel Index   Feeding 10   Bathing 5   Grooming Score 5   Dressing Score 10   Bladder Score 10   Bowels Score 10   Toilet Use Score 10   Transfers (Bed/Chair) Score 15   Mobility (Level Surface) Score 15   Stairs Score 0   Barthel Index Score 90     S: Patient reported no pain or dizziness upon standing  O: CGA x1 during sit to stand, gait training w/ supervision x 1 x 10 minutes with focus on correct walker use, toilet transfer and ambulation 300 feet to ensure understanding  A: Patient tolerated session very well    Pt appears to be safe to return home independently with no anticipated needs     P: Continue to increase ambulation distance and independence level during transfers and functional mobility as well as stair negotiation in order to ensure safety in the home    Ricky Bradford, SPT

## 2019-11-22 NOTE — OCCUPATIONAL THERAPY NOTE
Occupational Therapy Evaluation      Joshua Mominwater    11/22/2019    Patient Active Problem List   Diagnosis    Type 2 diabetes mellitus without complication, without long-term current use of insulin ([address] )    Tobacco abuse    Other hyperlipidemia    Essential hypertension    CAD (coronary artery disease), native coronary artery    BPH (benign prostatic hyperplasia)    Mucopurulent chronic bronchitis (HCC)    Tremor    Ambulatory dysfunction    NSTEMI (non-ST elevated myocardial infarction) ([address] )    On intra-aortic balloon pump assist    Prostate cancer ([facility] [address] )    S/P CABG x 4    Ischemic cardiomyopathy    Chronic obstructive pulmonary disease with acute exacerbation (HCC)       Past Medical History:   Diagnosis Date    BPH (benign prostatic hyperplasia)     45 days radiation treatment    COPD (chronic obstructive pulmonary disease)     Coronary artery disease     CABG x4 in 2017    Diabetes mellitus     History of Arterial Duplex of LE 12/26/2017    Likely occlusion of the left superficial femoral artery  Calcific changes bilaterally  Despite these changes, the ankle-brachial index as a measure of peripheral blood flow only mildly impaired   History of echocardiogram 06/12/2017    EF 40%, mild LVH, mild MR     Hyperlipidemia     Hypertension     NSTEMI (non-ST elevated myocardial infarction)     Prostate cancer     prostate     PVD (peripheral vascular disease)     Tremor        Past Surgical History:   Procedure Laterality Date    CARDIAC CATHETERIZATION  03/08/2017    Significant left main plus triple-vessel CAD   CORONARY ARTERY BYPASS GRAFT  03/08/2017    4V CABG:  LIMA to LAD, VG to RI, SVG to PDA to LVBR RCA   PROSTATE BIOPSY          11/22/19 0287   Note Type   Note type Eval only   Restrictions/Precautions   Weight Bearing Precautions Per Order No   Other Precautions Telemetry; Fall Risk   Pain Assessment   Pain Assessment No/denies pain   Pain Score No Pain   Home Living Type of Home House   Home Layout Two level;Bed/bath upstairs   Bathroom Shower/Tub Tub/shower unit   Bathroom Toilet Standard   Bathroom Equipment Grab bars in shower;Grab bars around toilet; Shower chair   Bathroom Accessibility Accessible   Home Equipment Walker;Cane  (no AD used at baseline )   Prior Function   Level of [place] Independent with ADLs and functional mobility   Lives With [place] Help From Family   ADL Assistance Independent   IADLs Independent   Falls in the last 6 months 0   Vocational Retired   Comments (+) driving    Lifestyle   Autonomy Patient reporting being independent with ADLs/IADLs, ambulatory with no AD, and lives with wife in a two story house    Reciprocal Relationships family    Service to Others served in the [facility] enjoys watching TV   ADL   Eating Assistance 6  Modified independent   Grooming Assistance 6  Modified Independent   [address] 5  Supervision/Setup   LB Bathing Assistance 5  Supervision/Setup    [address] 5  Supervision/Setup   LB Dressing Assistance 5  [address]  5  Supervision/Setup   Bed Mobility   Additional Comments Pt OOB and seated in chair prior to eval and at end of eval    Transfers   Sit to Stand 5  Supervision   Additional items Assist x 1; Armrests; Verbal cues   Stand to Sit 5  Supervision   Additional items Assist x 1; Increased time required;Verbal cues   Functional Mobility   Functional Mobility 5  Supervision  (with no AD)   Balance   Static Sitting Good   Dynamic Sitting Good   Static Standing Good   Dynamic Standing Good   Activity Tolerance   Activity Tolerance Patient tolerated treatment well   Nurse Made Aware NATALIA Meléndez verbalized pt appropriate for therapy    RUE Assessment   RUE Assessment WFL   LUE Assessment   LUE Assessment WFL   Hand Function   Gross Motor Coordination Functional   Fine Motor Coordination Functional   Sensation   Light Touch Partial deficits in the RLE;Partial deficits in the LLE  (numbness/tingly in B feet 2/2 DM)   Vision-Basic Assessment   Current Vision Wears glasses all the time   Vision - Complex Assessment   Acuity Able to read clock/calendar on wall without difficulty   Cognition   Overall Cognitive Status [facility]   Arousal/Participation Alert; Responsive; Cooperative   Attention Within functional limits   Orientation Level Oriented X4   Memory Within functional limits   Following Commands Follows all commands and directions without difficulty   Comments Pt agreeable to OT eval    Assessment   Prognosis Good   Assessment Pt is a 69 y o  male who was admitted to [address] [place] [address] on 11/21/2019 with Chronic obstructive pulmonary disease with acute exacerbation ([facility] [address] )  At this time, patient is also affected by the comorbidities of: DM2, hyperlipidemia, HTN, CAD, and chronic tremor   Orders placed for OT evaluation/treatment with up with assistance orders  Prior to admission, Patient reporting being independent with ADLs/IADLs, ambulatory with no AD, and lives with wife in a two story house  Upon OT evaluation, patient requires supervision/set-up for UB ADLs and supervision/set-up for LB ADLs  Patient presents with functional use of BUEs, with intact prehension and fine motor coordination, and symmetrical muscle strength  Patient appears to be functioning at baseline, no further Acute OT needs identified at this time to warrant OT services  D/C OT and from OT standpoint, recommendation at time of d/c would be home with family support     Goals   Patient Goals to go home today   Recommendation   OT Discharge Recommendation Home with family support   OT - OK to Discharge Yes  (Once medically cleared)   Barthel Index   Feeding 10   Bathing 5   Grooming Score 5   Dressing Score 10   Bladder Score 10   Bowels Score 10   Toilet Use Score 10   Transfers (Bed/Chair) Score 15   Mobility (Level Surface) Score 15   Stairs Score 0  (DNT)   Barthel Index Score 90     Moises Rangel, OT

## 2019-11-22 NOTE — CONSULTS
Vancomycin IV Pharmacy-to-Dose Consultation    Joshua Galeano is a 69 y o  male who was receiving Vancomycin IV with management by the Pharmacy Consult service for treatment of other COPD  The patient's Vancomycin therapy has been discontinued  Thank you for allowing us to take part in this patient's care  Pharmacy will sign-off now; please call or re-consult if there are any questions        Katia Murrieta [facility]

## 2019-11-22 NOTE — PLAN OF CARE
Problem: Potential for Falls  Goal: Patient will remain free of falls  Description  INTERVENTIONS:  - Assess patient frequently for physical needs  -  Identify cognitive and physical deficits and behaviors that affect risk of falls    -  [place] fall precautions as indicated by assessment   - Educate patient/family on patient safety including physical limitations  - Instruct patient to call for assistance with activity based on assessment  - Modify environment to reduce risk of injury  - Consider OT/PT consult to assist with strengthening/mobility  Outcome: Progressing     Problem: PAIN - ADULT  Goal: Verbalizes/displays adequate comfort level or baseline comfort level  Description  Interventions:  - Encourage patient to monitor pain and request assistance  - Assess pain using appropriate pain scale  - Administer analgesics based on type and severity of pain and evaluate response  - Implement non-pharmacological measures as appropriate and evaluate response  - Consider cultural and social influences on pain and pain management  - Notify physician/advanced practitioner if interventions unsuccessful or patient reports new pain  Outcome: Progressing     Problem: SAFETY ADULT  Goal: Maintain or return to baseline ADL function  Description  INTERVENTIONS:  -  Assess patient's ability to carry out ADLs; assess patient's baseline for ADL function and identify physical deficits which impact ability to perform ADLs (bathing, care of mouth/teeth, toileting, grooming, dressing, etc )  - Assess/evaluate cause of self-care deficits   - Assess range of motion  - Assess patient's mobility; develop plan if impaired  - Assess patient's need for assistive devices and provide as appropriate  - Encourage maximum independence but intervene and supervise when necessary  - Involve family in performance of ADLs  - Assess for home care needs following discharge   - Consider OT consult to assist with ADL evaluation and planning for discharge  - Provide patient education as appropriate  Outcome: Progressing  Goal: Maintain or return mobility status to optimal level  Description  INTERVENTIONS:  - Assess patient's baseline mobility status (ambulation, transfers, stairs, etc )    - Identify cognitive and physical deficits and behaviors that affect mobility  - Identify mobility aids required to assist with transfers and/or ambulation (gait belt, sit-to-stand, lift, walker, cane, etc )  - [place] fall precautions as indicated by assessment  - Record patient progress and toleration of activity level on Mobility SBAR; progress patient to next Phase/Stage  - Instruct patient to call for assistance with activity based on assessment  - Consider rehabilitation consult to assist with strengthening/weightbearing, etc   Outcome: Progressing     Problem: DISCHARGE PLANNING  Goal: Discharge to home or other facility with appropriate resources  Description  INTERVENTIONS:  - Identify barriers to discharge w/patient and caregiver  - Arrange for needed discharge resources and transportation as appropriate  - Identify discharge learning needs (meds, wound care, etc )  - Arrange for interpretive services to assist at discharge as needed  - Refer to Case Management Department for coordinating discharge planning if the patient needs post-hospital services based on physician/advanced practitioner order or complex needs related to functional status, cognitive ability, or social support system  Outcome: Progressing     Problem: Knowledge Deficit  Goal: Patient/family/caregiver demonstrates understanding of disease process, treatment plan, medications, and discharge instructions  Description  Complete learning assessment and assess knowledge base    Interventions:  - Provide teaching at level of understanding  - Provide teaching via preferred learning methods  Outcome: Progressing

## 2019-11-22 NOTE — PROGRESS NOTES
Vancomycin Assessment    Jade Garay is a 69 y o  male who is currently receiving vancomycin 1250 mg once then 1000 mg iv q 12 hours for other COPD   Relevant clinical data and objective history reviewed:  Creatinine   Date Value Ref Range Status   11/21/2019 0 79 0 70 - 1 30 mg/dL Final     Comment:     Standardized to IDMS reference method   11/05/2019 0 87 0 70 - 1 30 mg/dL Final     Comment:     Standardized to IDMS reference method   10/22/2019 0 79 0 70 - 1 30 mg/dL Final     Comment:     Standardized to IDMS reference method   05/28/2018 0 83 0 7 - 1 3 MG/DL Final     Comment:     IDMS method performed  The drugs Metamizole, Sulfasalazine, and Sulfapyridine may interfere and  result in false low results  05/05/2018 0 82 0 7 - 1 3 MG/DL Final     Comment:     IDMS method performed  The drugs Metamizole, Sulfasalazine, and Sulfapyridine may interfere and  result in false low results  BP (!) 183/87 (BP Location: Left arm)   Pulse 90   Temp 98 3 °F (36 8 °C) (Temporal)   Resp (!) 24   Ht 5' 11" (1 803 m)   Wt 86 8 kg (191 lb 5 8 oz)   SpO2 97%   BMI 26 69 kg/m²   No intake/output data recorded    Lab Results   Component Value Date/Time    BUN 18 11/21/2019 08:41 PM    BUN 12 05/28/2018 10:00 AM    WBC 8 40 11/21/2019 08:41 PM    WBC 8 8 05/28/2018 10:00 AM    HGB 14 9 11/21/2019 08:41 PM    HGB 13 9 (L) 05/28/2018 10:00 AM    HCT 43 5 11/21/2019 08:41 PM    HCT 40 2 (L) 05/28/2018 10:00 AM    MCV 92 11/21/2019 08:41 PM    MCV 88 6 05/28/2018 10:00 AM     11/21/2019 08:41 PM     05/28/2018 10:00 AM     Temp Readings from Last 3 Encounters:   11/22/19 98 3 °F (36 8 °C) (Temporal)   11/05/19 97 5 °F (36 4 °C) (Temporal)   10/23/19 98 1 °F (36 7 °C) (Tympanic)     Vancomycin Days of Therapy: 1    Assessment/Plan  The patient is currently on vancomycin utilizing scheduled dosing based on actual body weight  Baseline risks associated with therapy include: concomitant nephrotoxic medications and dehydration  The patient is currently receiving 1250 mg once then 1000 mg iv q 12 hours and after clinical evaluation will be changed to 1750 mg iv q 12hours  Pharmacy will also follow closely for s/sx of nephrotoxicity, infusion reactions and appropriateness of therapy  BMP and CBC will be ordered per protocol  Plan for trough as patient approaches steady state, prior to the 4th  dose at approximately 12:30 on 11/23/2019  Due to infection severity, will target a trough of 15-20 (appropriate for most indications)   Pharmacy will continue to follow the patients culture results and clinical progress daily      Carlotta Sorto, Pharmacist

## 2019-11-23 VITALS
HEART RATE: 71 BPM | OXYGEN SATURATION: 97 % | SYSTOLIC BLOOD PRESSURE: 158 MMHG | WEIGHT: 190.26 LBS | HEIGHT: 71 IN | DIASTOLIC BLOOD PRESSURE: 82 MMHG | RESPIRATION RATE: 18 BRPM | BODY MASS INDEX: 26.64 KG/M2 | TEMPERATURE: 97.5 F

## 2019-11-23 LAB
ANION GAP SERPL CALCULATED.3IONS-SCNC: 10 MMOL/L (ref 4–13)
BASOPHILS # BLD AUTO: 0 THOUSANDS/ΜL (ref 0–0.1)
BASOPHILS NFR BLD AUTO: 0 % (ref 0–2)
BUN SERPL-MCNC: 22 MG/DL (ref 7–25)
CALCIUM SERPL-MCNC: 8.4 MG/DL (ref 8.6–10.5)
CHLORIDE SERPL-SCNC: 105 MMOL/L (ref 98–107)
CO2 SERPL-SCNC: 23 MMOL/L (ref 21–31)
CREAT SERPL-MCNC: 0.75 MG/DL (ref 0.7–1.3)
EOSINOPHIL # BLD AUTO: 0 THOUSAND/ΜL (ref 0–0.61)
EOSINOPHIL NFR BLD AUTO: 0 % (ref 0–5)
ERYTHROCYTE [DISTWIDTH] IN BLOOD BY AUTOMATED COUNT: 13.8 % (ref 11.5–14.5)
GFR SERPL CREATININE-BSD FRML MDRD: 91 ML/MIN/1.73SQ M
GLUCOSE SERPL-MCNC: 176 MG/DL (ref 65–140)
GLUCOSE SERPL-MCNC: 191 MG/DL (ref 65–99)
GLUCOSE SERPL-MCNC: 244 MG/DL (ref 65–140)
HCT VFR BLD AUTO: 39.8 % (ref 42–47)
HGB BLD-MCNC: 13.7 G/DL (ref 14–18)
LYMPHOCYTES # BLD AUTO: 0.9 THOUSANDS/ΜL (ref 0.6–4.47)
LYMPHOCYTES NFR BLD AUTO: 9 % (ref 21–51)
MCH RBC QN AUTO: 31.5 PG (ref 26–34)
MCHC RBC AUTO-ENTMCNC: 34.6 G/DL (ref 31–37)
MCV RBC AUTO: 91 FL (ref 81–99)
MONOCYTES # BLD AUTO: 0.2 THOUSAND/ΜL (ref 0.17–1.22)
MONOCYTES NFR BLD AUTO: 3 % (ref 2–12)
NEUTROPHILS # BLD AUTO: 8 THOUSANDS/ΜL (ref 1.4–6.5)
NEUTS SEG NFR BLD AUTO: 88 % (ref 42–75)
PLATELET # BLD AUTO: 171 THOUSANDS/UL (ref 149–390)
PMV BLD AUTO: 9.3 FL (ref 8.6–11.7)
POTASSIUM SERPL-SCNC: 4 MMOL/L (ref 3.5–5.5)
RBC # BLD AUTO: 4.36 MILLION/UL (ref 4.3–5.9)
SODIUM SERPL-SCNC: 138 MMOL/L (ref 134–143)
WBC # BLD AUTO: 9.2 THOUSAND/UL (ref 4.8–10.8)

## 2019-11-23 PROCEDURE — 99217 PR OBSERVATION CARE DISCHARGE MANAGEMENT: CPT | Performed by: INTERNAL MEDICINE

## 2019-11-23 PROCEDURE — 85025 COMPLETE CBC W/AUTO DIFF WBC: CPT | Performed by: INTERNAL MEDICINE

## 2019-11-23 PROCEDURE — 94760 N-INVAS EAR/PLS OXIMETRY 1: CPT

## 2019-11-23 PROCEDURE — 82948 REAGENT STRIP/BLOOD GLUCOSE: CPT

## 2019-11-23 PROCEDURE — 80048 BASIC METABOLIC PNL TOTAL CA: CPT | Performed by: INTERNAL MEDICINE

## 2019-11-23 PROCEDURE — 94640 AIRWAY INHALATION TREATMENT: CPT

## 2019-11-23 PROCEDURE — 87205 SMEAR GRAM STAIN: CPT | Performed by: NURSE PRACTITIONER

## 2019-11-23 RX ORDER — PREDNISONE 20 MG/1
40 TABLET ORAL DAILY
Qty: 30 TABLET | Refills: 0 | Status: SHIPPED | OUTPATIENT
Start: 2019-11-23 | End: 2019-12-29 | Stop reason: HOSPADM

## 2019-11-23 RX ADMIN — METHYLPREDNISOLONE SODIUM SUCCINATE 40 MG: 40 INJECTION, POWDER, FOR SOLUTION INTRAMUSCULAR; INTRAVENOUS at 08:08

## 2019-11-23 RX ADMIN — ALBUTEROL SULFATE 2.5 MG: 2.5 SOLUTION RESPIRATORY (INHALATION) at 07:59

## 2019-11-23 RX ADMIN — GUAIFENESIN 600 MG: 600 TABLET, EXTENDED RELEASE ORAL at 08:11

## 2019-11-23 RX ADMIN — METOPROLOL TARTRATE 25 MG: 25 TABLET, FILM COATED ORAL at 08:11

## 2019-11-23 RX ADMIN — LISINOPRIL 5 MG: 5 TABLET ORAL at 08:11

## 2019-11-23 RX ADMIN — BUDESONIDE AND FORMOTEROL FUMARATE DIHYDRATE 2 PUFF: 160; 4.5 AEROSOL RESPIRATORY (INHALATION) at 08:08

## 2019-11-23 RX ADMIN — PRIMIDONE 100 MG: 50 TABLET ORAL at 08:11

## 2019-11-23 RX ADMIN — ENOXAPARIN SODIUM 40 MG: 40 INJECTION SUBCUTANEOUS at 08:10

## 2019-11-23 RX ADMIN — INSULIN LISPRO 1 UNITS: 100 INJECTION, SOLUTION INTRAVENOUS; SUBCUTANEOUS at 08:10

## 2019-11-23 RX ADMIN — INSULIN LISPRO 3 UNITS: 100 INJECTION, SOLUTION INTRAVENOUS; SUBCUTANEOUS at 11:33

## 2019-11-23 RX ADMIN — AZITHROMYCIN 250 MG: 250 TABLET, FILM COATED ORAL at 08:11

## 2019-11-23 NOTE — ASSESSMENT & PLAN NOTE
Lab Results   Component Value Date    HGBA1C 8 3 (H) 11/22/2019       Recent Labs     11/22/19  1608 11/22/19 2029 11/23/19  0620 11/23/19  1117   POCGLU 160* 199* 176* 244*       Blood Sugar Average: Last 72 hrs:  (P) 200 [id number]   ·  resume home medication regimen upon discharge

## 2019-11-23 NOTE — DISCHARGE SUMMARY
Discharge- Nelson Mancuso 1945, 69 y o  male MRN: [id number]    Unit/Bed#: -01 Encounter: [id number]    Primary Care Provider: Constanza Richardson MD   Date and time admitted to hospital: 11/21/2019  7:55 PM        CAD (coronary artery disease), native coronary artery  Assessment & Plan  · Continue aspirin and metoprolol    Essential hypertension  Assessment & Plan  · Continue metoprolol and lisinopril    Type 2 diabetes mellitus without complication, without long-term current use of insulin [facility])  Assessment & Plan  Lab Results   Component Value Date    HGBA1C 8 3 (H) 11/22/2019       Recent Labs     11/22/19  1608 11/22/19  2029 11/23/19  0620 11/23/19  1117   POCGLU 160* 199* 176* 244*       Blood Sugar Average: Last 72 hrs:  (P) 200 [id number]   ·  resume home medication regimen upon discharge      * Chronic obstructive pulmonary disease with acute exacerbation (HCC)  Assessment & Plan  · Procalcitonin negative  · Symptoms significantly improved  · Patient comfortable on room air  · Plan to discharge home on prednisone taper and recommend outpatient follow-up with his PCP          Discharging Physician / Practitioner: Bryan Amato MD  PCP: Constanza Richardson MD  Admission Date:   Admission Orders (From admission, onward)     Ordered        11/21/19 5749  Place in Observation  Once                   Discharge Date: 11/23/19    Resolved Problems  Date Reviewed: 11/23/2019    None          Consultations During Hospital Stay:  · n/a    Procedures Performed:   · n/a    Significant Findings / Test Results:   · n/a    Incidental Findings:   · n/a     Test Results Pending at Discharge (will require follow up):   · n/a     Outpatient Tests Requested:  · none    Complications:     n/a    Reason for Admission:  Shortness of breath    Hospital Course:     Nelson Mancuso is a 69 y o  male patient who originally presented to the hospital on 11/21/2019 due to shortness of breath    Patient has a longstanding history of COPD, a notes that in at a social gathering he did smoke some cigarettes recently which he had previously quit  Since then he developed a cough which prompted his admission to the hospital   He was noted to have a COPD exacerbation  He was treated with steroids and nebulizers with subsequent resolution in his symptoms  As he has returned to his baseline will be discharged on a prednisone taper and will be following with his primary care physician upon discharge  Please see above list of diagnoses and related plan for additional information  Condition at Discharge: fair     Discharge Day Visit / Exam:     Subjective:  Patient seen examined  No acute events overnight  Feels well and wants to go home  Vitals: Blood Pressure: 158/82 (11/23/19 1120)  Pulse: 71 (11/23/19 0704)  Temperature: 97 5 °F (36 4 °C) (11/23/19 0704)  Temp Source: Tympanic (11/23/19 0704)  Respirations: 18 (11/23/19 0704)  Height: 5' 11" (180 3 cm) (11/22/19 0046)  Weight - Scale: 86 3 kg (190 lb 4 1 oz) (11/23/19 0558)  SpO2: 97 % (11/23/19 0704)  Exam:   Physical Exam   Constitutional: He is oriented to person, place, and time  He appears well-developed and well-nourished  HENT:   Head: Normocephalic and atraumatic  Eyes: Pupils are equal, round, and reactive to light  EOM are normal    Neck: Normal range of motion  Neck supple  Cardiovascular: Normal rate and regular rhythm  Pulmonary/Chest: Effort normal  No respiratory distress  Coarse breath sounds noted bilaterally   Abdominal: Soft  Bowel sounds are normal  He exhibits no distension  Musculoskeletal: Normal range of motion  He exhibits no edema  Neurological: He is alert and oriented to person, place, and time  Skin: Skin is warm  Capillary refill takes less than 2 seconds  Psychiatric: He has a normal mood and affect  His behavior is normal  Judgment and thought content normal    Nursing note and vitals reviewed        Discussion with Family: n/a    Discharge instructions/Information to patient and family:   See after visit summary for information provided to patient and family  Provisions for Follow-Up Care:  See after visit summary for information related to follow-up care and any pertinent home health orders  Disposition:     Home    For Discharges to [facility] SNF:   · Not Applicable to this Patient - Not Applicable to this Patient    Planned Readmission:    n/a     Discharge Statement:  I spent 35 minutes discharging the patient  This time was spent on the day of discharge  I had direct contact with the patient on the day of discharge  Greater than 50% of the total time was spent examining patient, answering all patient questions, arranging and discussing plan of care with patient as well as directly providing post-discharge instructions  Additional time then spent on discharge activities  Discharge Medications:  See after visit summary for reconciled discharge medications provided to patient and family        ** Please Note: This note has been constructed using a voice recognition system **

## 2019-11-23 NOTE — UTILIZATION REVIEW
Continued Stay Review    Date: 11/23                      Current Patient Class: obs   Current Level of Care: beto     HPI:74 y o  male initially admitted on  11/21  @  2312    11/22   COPD exacerbation  Patient is not on home oxygen and does follow with pulmonology as outpatient  Will continue steroid taper  Continue Zithromax  Continue nebulizers            Pertinent Labs/Diagnostic Results:   Results from last 7 days   Lab Units 11/23/19  0537 11/22/19  0539 11/21/19  2041   WBC Thousand/uL 9 20 6 70 8 40   HEMOGLOBIN g/dL 13 7* 14 1 14 9   HEMATOCRIT % 39 8* 40 9* 43 5   PLATELETS Thousands/uL 171 154 175   NEUTROS ABS Thousands/µL 8 00*  --  6 20   TOTAL NEUT ABS Thousand/uL  --  6 16  --    BANDS PCT %  --  3  --          Results from last 7 days   Lab Units 11/23/19  0537 11/22/19  0539 11/21/19  2041   SODIUM mmol/L 138 138 138   POTASSIUM mmol/L 4 0 4 1 4 1   CHLORIDE mmol/L 105 104 103   CO2 mmol/L 23 23 26   ANION GAP mmol/L 10 11 9   BUN mg/dL 22 22 18   CREATININE mg/dL 0 75 0 72 0 79   EGFR ml/min/1 73sq m 91 93 89   CALCIUM mg/dL 8 4* 8 4* 9 1   MAGNESIUM mg/dL  --   --  2 1     Results from last 7 days   Lab Units 11/22/19  0539 11/21/19  2041   AST U/L 9* 10*   ALT U/L 12 14   ALK PHOS U/L 61 66   TOTAL PROTEIN g/dL 6 3* 6 7   ALBUMIN g/dL 4 1 4 3   TOTAL BILIRUBIN mg/dL 0 40 0 40     Results from last 7 days   Lab Units 11/23/19  0620 11/22/19  2029 11/22/19  1608 11/22/19  1151 11/22/19  0625 11/22/19  0028   POC GLUCOSE mg/dl 176* 199* 160* 250* 176* 199*     Results from last 7 days   Lab Units 11/23/19  0537 11/22/19  0539 11/21/19  2041   GLUCOSE RANDOM mg/dL 191* 185* 197*         Results from last 7 days   Lab Units 11/22/19  0539   HEMOGLOBIN A1C % 8 3*   EAG mg/dl 192     Results from last 7 days   Lab Units 11/21/19  2336   PH ART  7 330*   PCO2 ART mm Hg 41 8   PO2 ART mm Hg 64 0*   HCO3 ART mmol/L 21 4*   BASE EXC ART mmol/L -4 1*   O2 CONTENT ART mL/dL 17 3   O2 HGB, ARTERIAL % 89 3*   ABG SOURCE  Radial, Right     Results from last 7 days   Lab Units 11/21/19  2041   CK TOTAL U/L 21*     Results from last 7 days   Lab Units 11/21/19  2041   TROPONIN I ng/mL <0 03         Results from last 7 days   Lab Units 11/21/19  2041   PROTIME seconds 11 8   INR  1 02   PTT seconds 40*         Results from last 7 days   Lab Units 11/22/19  0539   PROCALCITONIN ng/ml <0 05     Results from last 7 days   Lab Units 11/21/19  2332 11/21/19  2041   LACTIC ACID mmol/L 1 7 2 1*     Results from last 7 days   Lab Units 11/21/19  2041   BNP pg/mL 117*     Results from last 7 days   Lab Units 11/21/19  2300   BLOOD CULTURE  Received in Microbiology Lab  Culture in Progress  Received in Microbiology Lab  Culture in Progress  Vital Signs:   11/23/19 0704  97 5 °F (36 4 °C)  71  18  174/84  97 %  ra   11/22/19 2227  97 2 °F (36 2 °C)Abnormal   68  19  140/62 94 %     Medications:   Scheduled Medications:    Medications:  albuterol 2 5 mg Nebulization 4x Daily   aspirin 81 mg Oral Every Other Day   azithromycin 250 mg Oral Q24H   budesonide-formoterol 2 puff Inhalation BID   enoxaparin 40 mg Subcutaneous Daily   gabapentin 200 mg Oral HS   guaiFENesin 600 mg Oral BID   insulin lispro 1-5 Units Subcutaneous HS   insulin lispro 1-6 Units Subcutaneous TID AC   lisinopril 5 mg Oral Daily   methylPREDNISolone sodium succinate 40 mg Intravenous Q12H ENDER   metoprolol tartrate 25 mg Oral BID   pravastatin 80 mg Oral Daily With Dinner   primidone 100 mg Oral Q12H [address]     Continuous IV Infusions:     PRN Meds:    acetaminophen 650 mg Oral Q6H PRN   albuterol 2 5 mg Nebulization Q4H PRN   ondansetron 4 mg Intravenous Q6H PRN       Discharge Plan: tbd    Network Utilization Review Department  [email or web address]  ATTENTION: Please call with any questions or concerns to [phone] and carefully listen to the prompts so that you are directed to the right person   All voicemails are confidential   Chrystine Can all requests for admission clinical reviews, approved or denied determinations and any other requests to dedicated fax number below belonging to the campus where the patient is receiving treatment    FACILITY NAME UR FAX NUMBER   ADMISSION DENIALS (Administrative/Medical Necessity) [address] [facility] (Maternity/NICU/Pediatrics) [phone]   [facility] [address] [facility] [address] [facility] [phone]   [address] [place] [address] 15[address] [facility] [phone]   [facility] [address] [place] [address] [phone]

## 2019-11-23 NOTE — PLAN OF CARE
Problem: DISCHARGE PLANNING  Goal: Discharge to home or other facility with appropriate resources  Description  INTERVENTIONS:  - Identify barriers to discharge w/patient and caregiver  - Arrange for needed discharge resources and transportation as appropriate  - Identify discharge learning needs (meds, wound care, etc )  - Arrange for interpretive services to assist at discharge as needed  - Refer to Case Management Department for coordinating discharge planning if the patient needs post-hospital services based on physician/advanced practitioner order or complex needs related to functional status, cognitive ability, or social support system  Outcome: Adequate for Discharge

## 2019-11-23 NOTE — NURSING NOTE
Pt is alert and oriented  Pleasant and cooperative  Heart is regular, no edema noted  Lungs are decreased with exp wheeze, cough present with very small amount of occasional 'gray' sputum  Denies pain  Hourly rounding discussed, no needs at this time  Call bell within reach  Pt aware we need sputum sample, states he is not coughing up enough

## 2019-11-23 NOTE — PLAN OF CARE
Problem: Potential for Falls  Goal: Patient will remain free of falls  Description  INTERVENTIONS:  - Assess patient frequently for physical needs  -  Identify cognitive and physical deficits and behaviors that affect risk of falls    -  [place] fall precautions as indicated by assessment   - Educate patient/family on patient safety including physical limitations  - Instruct patient to call for assistance with activity based on assessment  - Modify environment to reduce risk of injury  - Consider OT/PT consult to assist with strengthening/mobility  Outcome: Progressing     Problem: PAIN - ADULT  Goal: Verbalizes/displays adequate comfort level or baseline comfort level  Description  Interventions:  - Encourage patient to monitor pain and request assistance  - Assess pain using appropriate pain scale  - Administer analgesics based on type and severity of pain and evaluate response  - Implement non-pharmacological measures as appropriate and evaluate response  - Consider cultural and social influences on pain and pain management  - Notify physician/advanced practitioner if interventions unsuccessful or patient reports new pain  Outcome: Progressing     Problem: SAFETY ADULT  Goal: Maintain or return to baseline ADL function  Description  INTERVENTIONS:  -  Assess patient's ability to carry out ADLs; assess patient's baseline for ADL function and identify physical deficits which impact ability to perform ADLs (bathing, care of mouth/teeth, toileting, grooming, dressing, etc )  - Assess/evaluate cause of self-care deficits   - Assess range of motion  - Assess patient's mobility; develop plan if impaired  - Assess patient's need for assistive devices and provide as appropriate  - Encourage maximum independence but intervene and supervise when necessary  - Involve family in performance of ADLs  - Assess for home care needs following discharge   - Consider OT consult to assist with ADL evaluation and planning for discharge  - Provide patient education as appropriate  Outcome: Progressing  Goal: Maintain or return mobility status to optimal level  Description  INTERVENTIONS:  - Assess patient's baseline mobility status (ambulation, transfers, stairs, etc )    - Identify cognitive and physical deficits and behaviors that affect mobility  - Identify mobility aids required to assist with transfers and/or ambulation (gait belt, sit-to-stand, lift, walker, cane, etc )  - [place] fall precautions as indicated by assessment  - Record patient progress and toleration of activity level on Mobility SBAR; progress patient to next Phase/Stage  - Instruct patient to call for assistance with activity based on assessment  - Consider rehabilitation consult to assist with strengthening/weightbearing, etc   Outcome: Progressing     Problem: DISCHARGE PLANNING  Goal: Discharge to home or other facility with appropriate resources  Description  INTERVENTIONS:  - Identify barriers to discharge w/patient and caregiver  - Arrange for needed discharge resources and transportation as appropriate  - Identify discharge learning needs (meds, wound care, etc )  - Arrange for interpretive services to assist at discharge as needed  - Refer to Case Management Department for coordinating discharge planning if the patient needs post-hospital services based on physician/advanced practitioner order or complex needs related to functional status, cognitive ability, or social support system  Outcome: Progressing     Problem: Knowledge Deficit  Goal: Patient/family/caregiver demonstrates understanding of disease process, treatment plan, medications, and discharge instructions  Description  Complete learning assessment and assess knowledge base    Interventions:  - Provide teaching at level of understanding  - Provide teaching via preferred learning methods  Outcome: Progressing     Problem: DISCHARGE PLANNING - CARE MANAGEMENT  Goal: Discharge to post-acute care or home with appropriate resources  Description  INTERVENTIONS:  - Conduct assessment to determine patient/family and health care team treatment goals, and need for post-acute services based on payer coverage, community resources, and patient preferences, and barriers to discharge  - Address psychosocial, clinical, and financial barriers to discharge as identified in assessment in conjunction with the patient/family and health care team  - Arrange appropriate level of post-acute services according to patient's   needs and preference and payer coverage in collaboration with the physician and health care team  - Communicate with and update the patient/family, physician, and health care team regarding progress on the discharge plan  - Arrange appropriate transportation to post-acute venues    Home with family on discharge   Return to cardiac rehab   Outcome: Progressing

## 2019-11-24 LAB
BACTERIA SPT RESP CULT: ABNORMAL
GRAM STN SPEC: ABNORMAL

## 2019-11-25 ENCOUNTER — CLINICAL SUPPORT (OUTPATIENT)
Dept: CARDIAC REHAB | Facility: HOSPITAL | Age: 74
End: 2019-11-25
Attending: INTERNAL MEDICINE
Payer: MEDICARE

## 2019-11-25 DIAGNOSIS — I50.22 CHRONIC SYSTOLIC HEART FAILURE (HCC): ICD-10-CM

## 2019-11-25 PROCEDURE — 93798 PHYS/QHP OP CAR RHAB W/ECG: CPT

## 2019-11-26 NOTE — ED PROCEDURE NOTE
PROCEDURE  CriticalCare Time  Performed by: Xuan Sibley MD  Authorized by: Xuan Sibley MD     Critical care provider statement:     Critical care time (minutes):  35    Critical care start time:  11/21/2019 8:30 PM    Critical care end time:  11/22/2019 10:30 PM    Critical care time was exclusive of:  Separately billable procedures and treating other patients    Critical care was necessary to treat or prevent imminent or life-threatening deterioration of the following conditions:  Respiratory failure    Critical care was time spent personally by me on the following activities:  Examination of patient, development of treatment plan with patient or surrogate, obtaining history from patient or surrogate, ordering and performing treatments and interventions, ordering and review of laboratory studies, ordering and review of radiographic studies and re-evaluation of patient's condition         Xuan Sibley MD  11/26/19 Lauren Lowry MD  11/26/19 1002

## 2019-11-27 ENCOUNTER — CLINICAL SUPPORT (OUTPATIENT)
Dept: CARDIAC REHAB | Facility: HOSPITAL | Age: 74
End: 2019-11-27
Attending: INTERNAL MEDICINE
Payer: MEDICARE

## 2019-11-27 DIAGNOSIS — I50.22 CHRONIC SYSTOLIC HEART FAILURE (HCC): ICD-10-CM

## 2019-11-27 LAB
BACTERIA BLD CULT: NORMAL
BACTERIA BLD CULT: NORMAL

## 2019-11-27 PROCEDURE — 93798 PHYS/QHP OP CAR RHAB W/ECG: CPT

## 2019-11-29 ENCOUNTER — CLINICAL SUPPORT (OUTPATIENT)
Dept: CARDIAC REHAB | Facility: HOSPITAL | Age: 74
End: 2019-11-29
Attending: INTERNAL MEDICINE
Payer: MEDICARE

## 2019-11-29 DIAGNOSIS — I50.22 CHRONIC SYSTOLIC HEART FAILURE (HCC): ICD-10-CM

## 2019-11-29 PROCEDURE — 93798 PHYS/QHP OP CAR RHAB W/ECG: CPT

## 2019-12-02 ENCOUNTER — APPOINTMENT (OUTPATIENT)
Dept: CARDIAC REHAB | Facility: HOSPITAL | Age: 74
End: 2019-12-02
Attending: INTERNAL MEDICINE
Payer: MEDICARE

## 2019-12-04 ENCOUNTER — CLINICAL SUPPORT (OUTPATIENT)
Dept: CARDIAC REHAB | Facility: HOSPITAL | Age: 74
End: 2019-12-04
Attending: INTERNAL MEDICINE
Payer: MEDICARE

## 2019-12-04 DIAGNOSIS — I50.22 CHRONIC SYSTOLIC HEART FAILURE (HCC): ICD-10-CM

## 2019-12-04 PROCEDURE — 93798 PHYS/QHP OP CAR RHAB W/ECG: CPT

## 2019-12-06 ENCOUNTER — CLINICAL SUPPORT (OUTPATIENT)
Dept: CARDIAC REHAB | Facility: HOSPITAL | Age: 74
End: 2019-12-06
Attending: INTERNAL MEDICINE
Payer: MEDICARE

## 2019-12-06 DIAGNOSIS — I50.22 CHRONIC SYSTOLIC HEART FAILURE (HCC): ICD-10-CM

## 2019-12-06 PROCEDURE — 93798 PHYS/QHP OP CAR RHAB W/ECG: CPT

## 2019-12-09 ENCOUNTER — CLINICAL SUPPORT (OUTPATIENT)
Dept: CARDIAC REHAB | Facility: HOSPITAL | Age: 74
End: 2019-12-09
Attending: INTERNAL MEDICINE
Payer: MEDICARE

## 2019-12-09 DIAGNOSIS — I50.22 CHRONIC SYSTOLIC HEART FAILURE (HCC): ICD-10-CM

## 2019-12-09 PROCEDURE — 93798 PHYS/QHP OP CAR RHAB W/ECG: CPT

## 2019-12-11 ENCOUNTER — APPOINTMENT (OUTPATIENT)
Dept: CARDIAC REHAB | Facility: HOSPITAL | Age: 74
End: 2019-12-11
Attending: INTERNAL MEDICINE
Payer: MEDICARE

## 2019-12-12 NOTE — PROGRESS NOTES
Cardiac Rehabilitation Plan of Care   90 Day      Today's date: 2019   Visits:   Patient name: Luis Elizabeth      : 1945  Age: 69 y o  MRN: [id number]  Referring Physician: Boston Ardon MD  Cardiologist: Kendrick Daniel MD  Provider: Mae Luciano  Clinician: Soledad Issa, MS, CCRP    Dx:   Encounter Diagnosis   Name Primary?  Chronic systolic heart failure ([address] )      Date of onset: 2019      SUMMARY OF PROGRESS: Sheldon Scheuermann has completed  visits at Cardiac Rehab  The patient states he is 100% medication compliant  Patient states that he does have neuropathy in both feet which may limit his exercise and affect his balance  During rest, the patient's HR was 80, his BP was 102/58, and his O2 was approximately 90% on room air  The patient stated that he did  Have a PFT done with Dr Iglesia Montalvo at [facility] AT THE [facility]  PFT results stated that he had "very severe COPD " The patient attempted a 6MWT on his initial visit; he completed a total of 2 minutes and 30 seconds  The test was terminated due to his O2 dropping from 88% to 85% from the second to third minute of the test  Derian Bartholomew was able to walk 280 feet during that time which is a 1 40 MET level  The patient stated that the exercises were a "4-5" on the 1-10 RPE Scale  The patient did not complain of SOB today  Derian Bartholomew also reached a peak HR of 102 on the UBE and the NuStep and a peak BP of 132/70 while on the UBE  During recovery, the patient's HR was 86, his BP was 124/60, and his O2 was 95%  He did show some ectopy which can be seen in the attached "Exercise Session Details " The patient has been doing well in the program and stated that he felt as though it was helping him to do more ADLs at home  Derian Bartholomew states he has been having great difficulty breathing and also stated that last week he almost decided to go to the ER for another exacerbation  Patient experiences frequent/occasional PVCs     Hammans did decide to admit himself to the ED on  after he had stated on  that he had thought about doing it "yesterday " patient was hospitalized until , which he was then to cleared to resume exercise on   Medication compliance: Yes   Comments: Patient states he is 100% medication compliant     Fall Risk: Moderate   Comments: Patient uses a one point cane to aide when walking  Pt  Also has neuropathy in both feet which effects his balance as well  EKG changes: Frequent PVCs      EXERCISE ASSESSMENT and PLAN    Current Exercise Program in Rehab:       Frequency: 3-4 days/week        Minutes: 35-40        METS: 2 5-3 5   HR: 20-30 above RHR; 100-110   RPE: 4-6         Modalities: UBE, NuStep and Recumbent bike      Exercise Progression 30 Day Goals :    Frequency: 4-5 days/week   Minutes:40-45   METS: 3 5-4 5   HR: 20-30 above RHR; 100-110   RPE: 4-6   Modalities: UBE, NuStep, Recumbent bike and Room walking    Strength trainin-3 days / week  12-15 repetitions  1-2 sets per modality   Will be added following 2-3 weeks of monitored exercise sessions   Modalities: Leg Press, Chest Press, Lateral Raise, Arm Curl, Seated Row and Front Raises    Progressing: In Progress    Home Exercise: None; Cardiac Rehab Staff did encourage patient to begin exercising at home once his COPD is more controlled       Goals: 10% improvement in functional capacity, Reduced Dyspnea with physical activity  0-1/10, improved DASI score by 10%, Increase in peak CR METs by 40%, Improved 6MWT distance by 10%, Resume ADLs with increased strength and Exercise 5 days/wk, >150mins/wk  Education: Benefit of exercise for CAD risk factors, home exercise guidelines, signs and sxs and RPE scale   Plan:education on home exercise guidelines, home exercise 30+ mins 2 days opposite CR and Education class: Risk Factors for Heart Disease  Readiness to change: Action      NUTRITION ASSESSMENT AND PLAN    Weight control:    Starting weight: 208 lb    Current weight:   208 lb  Waist circumference:    Startin in   Current:  42 in  Diabetes: Patient states he has diabetes but does not check his BG  Lipid management: Discussed diet and lipid management  Goals:reduced BMI to < 25, reduced waist circumference <40 inches, fasting BG , improved A1c  < 6 5%, Improved Rate Your Plate score  >46 and Wt  loss 1-2 ppw goal of 20 lbs  Education: heart healthy eating  low sodium diet  hydration  diet and lipid management  diabetes management and exercise  wt  loss  Progressing: In Progress  Plan: Education class: Reading Food Labels, Education Class: Heart Healthy Eating, Increase PUFA and MUFA, Decrease SFA, Increase whole grains, increase fruits/vegs, eliminate processed meats, reduce red meat 1x/wk, swtich to low fat dairy and Reduce added sugars <25g/day  Readiness to change: Action      PSYCHOSOCIAL ASSESSMENT AND PLAN    Emotional:  PHQ-9 = 1-4 = Minimal Depression  Self-reported stress level: 5   Social support: Very Good  Goals:  Reduce perceived stress to 1-3/10, PHQ-9 - reduced severity by one level, Feelings in [facility] Score < 3, Physical Fitness in [facility] Score < 3, Daily Activity in [facility] Score < 3, Social Activities in [facility] Score < 3, Pain in [facility] Score < 3, Quality of Life in [facility] Score < 3 , Change in Health in [place] Score < 3 , Increased interest in doing things, improved sleep, improved positive thoughts of well being and increased energy  Education: signs/sxs of depression, benefits of positive support system and coping mechanisms  Progressing: In Progress  Plan: Practice relaxation techniques  Readiness to change: Action    OTHER CORE COMPONENTS     Tobacco:   Social History     Tobacco Use   Smoking Status Former Smoker    Last attempt to quit: 2/15/2019    Years since quittin 8   Smokeless Tobacco Never Used       Tobacco Use Intervention: Referral to tobacco expert:   Brief counseling by cardiac rehab professional  Date: 2019    Blood pressure:    Restin/58   Exercise: 132/70   Recovery: 124/60    Goals: consistent BP < 130/80, consistent exercise >150 mins/wk, reduce number of cigarettes/day, set a target quit date and make contact with a tobacco use prevention program  Education:  understanding HTN and CAD, low sodium diet and HTN and smoking and heart disease  Progressing: In Progress  Plan: Class: Understanding Heart Disease, Class: Common Heart Medications, make contact with smoking cessation program and Set target quit date for smoking  Readiness to change: Action

## 2019-12-13 ENCOUNTER — APPOINTMENT (OUTPATIENT)
Dept: CARDIAC REHAB | Facility: HOSPITAL | Age: 74
End: 2019-12-13
Attending: INTERNAL MEDICINE
Payer: MEDICARE

## 2019-12-16 ENCOUNTER — CLINICAL SUPPORT (OUTPATIENT)
Dept: CARDIAC REHAB | Facility: HOSPITAL | Age: 74
End: 2019-12-16
Attending: INTERNAL MEDICINE
Payer: MEDICARE

## 2019-12-16 DIAGNOSIS — I50.22 CHRONIC SYSTOLIC HEART FAILURE (HCC): ICD-10-CM

## 2019-12-16 PROCEDURE — 93798 PHYS/QHP OP CAR RHAB W/ECG: CPT

## 2019-12-18 ENCOUNTER — CLINICAL SUPPORT (OUTPATIENT)
Dept: CARDIAC REHAB | Facility: HOSPITAL | Age: 74
End: 2019-12-18
Attending: INTERNAL MEDICINE
Payer: MEDICARE

## 2019-12-18 DIAGNOSIS — I50.22 CHRONIC SYSTOLIC HEART FAILURE (HCC): ICD-10-CM

## 2019-12-18 PROCEDURE — 93798 PHYS/QHP OP CAR RHAB W/ECG: CPT

## 2019-12-20 ENCOUNTER — CLINICAL SUPPORT (OUTPATIENT)
Dept: CARDIAC REHAB | Facility: HOSPITAL | Age: 74
End: 2019-12-20
Attending: INTERNAL MEDICINE
Payer: MEDICARE

## 2019-12-20 DIAGNOSIS — I50.22 CHRONIC SYSTOLIC HEART FAILURE (HCC): ICD-10-CM

## 2019-12-20 PROCEDURE — 93798 PHYS/QHP OP CAR RHAB W/ECG: CPT

## 2019-12-23 ENCOUNTER — APPOINTMENT (OUTPATIENT)
Dept: CARDIAC REHAB | Facility: HOSPITAL | Age: 74
End: 2019-12-23
Attending: INTERNAL MEDICINE
Payer: MEDICARE

## 2019-12-25 ENCOUNTER — APPOINTMENT (OUTPATIENT)
Dept: CARDIAC REHAB | Facility: HOSPITAL | Age: 74
End: 2019-12-25
Attending: INTERNAL MEDICINE
Payer: MEDICARE

## 2019-12-27 ENCOUNTER — HOSPITAL ENCOUNTER (INPATIENT)
Facility: HOSPITAL | Age: 74
LOS: 1 days | Discharge: HOME | DRG: 192 | End: 2019-12-29
Attending: EMERGENCY MEDICINE | Admitting: INTERNAL MEDICINE
Payer: MEDICARE

## 2019-12-27 ENCOUNTER — CLINICAL SUPPORT (OUTPATIENT)
Dept: CARDIAC REHAB | Facility: HOSPITAL | Age: 74
End: 2019-12-27
Attending: INTERNAL MEDICINE
Payer: MEDICARE

## 2019-12-27 ENCOUNTER — APPOINTMENT (EMERGENCY)
Dept: RADIOLOGY | Facility: HOSPITAL | Age: 74
DRG: 192 | End: 2019-12-27
Payer: MEDICARE

## 2019-12-27 ENCOUNTER — APPOINTMENT (EMERGENCY)
Dept: CT IMAGING | Facility: HOSPITAL | Age: 74
DRG: 192 | End: 2019-12-27
Payer: MEDICARE

## 2019-12-27 DIAGNOSIS — E78.5 HYPERLIPIDEMIA: ICD-10-CM

## 2019-12-27 DIAGNOSIS — R55 SYNCOPE: Primary | ICD-10-CM

## 2019-12-27 DIAGNOSIS — I10 ESSENTIAL HYPERTENSION: Chronic | ICD-10-CM

## 2019-12-27 DIAGNOSIS — F10.929 ALCOHOL INTOXICATION (HCC): ICD-10-CM

## 2019-12-27 DIAGNOSIS — I10 HYPERTENSION: ICD-10-CM

## 2019-12-27 DIAGNOSIS — Z86.79 HISTORY OF CORONARY ARTERY DISEASE: ICD-10-CM

## 2019-12-27 DIAGNOSIS — I50.22 CHRONIC SYSTOLIC HEART FAILURE (HCC): ICD-10-CM

## 2019-12-27 DIAGNOSIS — J44.1 CHRONIC OBSTRUCTIVE PULMONARY DISEASE WITH ACUTE EXACERBATION (HCC): Chronic | ICD-10-CM

## 2019-12-27 DIAGNOSIS — E11.9 NON-INSULIN DEPENDENT TYPE 2 DIABETES MELLITUS (HCC): ICD-10-CM

## 2019-12-27 PROBLEM — F10.920 ACUTE ALCOHOLIC INTOXICATION WITHOUT COMPLICATION (HCC): Status: ACTIVE | Noted: 2019-12-27

## 2019-12-27 LAB
ALBUMIN SERPL BCP-MCNC: 4.5 G/DL (ref 3.5–5.7)
ALP SERPL-CCNC: 68 U/L (ref 55–165)
ALT SERPL W P-5'-P-CCNC: 16 U/L (ref 7–52)
ANION GAP SERPL CALCULATED.3IONS-SCNC: 11 MMOL/L (ref 4–13)
APTT PPP: 40 SECONDS (ref 23–37)
AST SERPL W P-5'-P-CCNC: 15 U/L (ref 13–39)
BASOPHILS # BLD AUTO: 0.1 THOUSANDS/ΜL (ref 0–0.1)
BASOPHILS NFR BLD AUTO: 1 % (ref 0–2)
BILIRUB SERPL-MCNC: 0.4 MG/DL (ref 0.2–1)
BUN SERPL-MCNC: 9 MG/DL (ref 7–25)
CALCIUM SERPL-MCNC: 9.1 MG/DL (ref 8.6–10.5)
CHLORIDE SERPL-SCNC: 97 MMOL/L (ref 98–107)
CO2 SERPL-SCNC: 26 MMOL/L (ref 21–31)
CREAT SERPL-MCNC: 0.84 MG/DL (ref 0.7–1.3)
EOSINOPHIL # BLD AUTO: 0.3 THOUSAND/ΜL (ref 0–0.61)
EOSINOPHIL NFR BLD AUTO: 6 % (ref 0–5)
ERYTHROCYTE [DISTWIDTH] IN BLOOD BY AUTOMATED COUNT: 13.7 % (ref 11.5–14.5)
ETHANOL SERPL-MCNC: 266.5 MG/DL
GFR SERPL CREATININE-BSD FRML MDRD: 87 ML/MIN/1.73SQ M
GLUCOSE SERPL-MCNC: 119 MG/DL (ref 65–140)
GLUCOSE SERPL-MCNC: 173 MG/DL (ref 65–99)
HCT VFR BLD AUTO: 43 % (ref 42–47)
HGB BLD-MCNC: 15 G/DL (ref 14–18)
INR PPP: 0.93 (ref 0.9–1.5)
LYMPHOCYTES # BLD AUTO: 1.3 THOUSANDS/ΜL (ref 0.6–4.47)
LYMPHOCYTES NFR BLD AUTO: 28 % (ref 21–51)
MCH RBC QN AUTO: 31.4 PG (ref 26–34)
MCHC RBC AUTO-ENTMCNC: 34.9 G/DL (ref 31–37)
MCV RBC AUTO: 90 FL (ref 81–99)
MONOCYTES # BLD AUTO: 0.4 THOUSAND/ΜL (ref 0.17–1.22)
MONOCYTES NFR BLD AUTO: 8 % (ref 2–12)
NEUTROPHILS # BLD AUTO: 2.7 THOUSANDS/ΜL (ref 1.4–6.5)
NEUTS SEG NFR BLD AUTO: 58 % (ref 42–75)
PLATELET # BLD AUTO: 187 THOUSANDS/UL (ref 149–390)
PMV BLD AUTO: 8.3 FL (ref 8.6–11.7)
POTASSIUM SERPL-SCNC: 4.1 MMOL/L (ref 3.5–5.5)
PROT SERPL-MCNC: 7.2 G/DL (ref 6.4–8.9)
PROTHROMBIN TIME: 10.8 SECONDS (ref 10.2–13)
RBC # BLD AUTO: 4.77 MILLION/UL (ref 4.3–5.9)
SODIUM SERPL-SCNC: 134 MMOL/L (ref 134–143)
TROPONIN I SERPL-MCNC: <0.03 NG/ML
TROPONIN I SERPL-MCNC: <0.03 NG/ML
WBC # BLD AUTO: 4.7 THOUSAND/UL (ref 4.8–10.8)

## 2019-12-27 PROCEDURE — 93005 ELECTROCARDIOGRAM TRACING: CPT

## 2019-12-27 PROCEDURE — 93798 PHYS/QHP OP CAR RHAB W/ECG: CPT

## 2019-12-27 PROCEDURE — 82948 REAGENT STRIP/BLOOD GLUCOSE: CPT

## 2019-12-27 PROCEDURE — 85025 COMPLETE CBC W/AUTO DIFF WBC: CPT | Performed by: PHYSICIAN ASSISTANT

## 2019-12-27 PROCEDURE — 72125 CT NECK SPINE W/O DYE: CPT

## 2019-12-27 PROCEDURE — 99220 PR INITIAL OBSERVATION CARE/DAY 70 MINUTES: CPT | Performed by: PHYSICIAN ASSISTANT

## 2019-12-27 PROCEDURE — 99285 EMERGENCY DEPT VISIT HI MDM: CPT

## 2019-12-27 PROCEDURE — 85610 PROTHROMBIN TIME: CPT | Performed by: PHYSICIAN ASSISTANT

## 2019-12-27 PROCEDURE — 99285 EMERGENCY DEPT VISIT HI MDM: CPT | Performed by: PHYSICIAN ASSISTANT

## 2019-12-27 PROCEDURE — 70450 CT HEAD/BRAIN W/O DYE: CPT

## 2019-12-27 PROCEDURE — 83036 HEMOGLOBIN GLYCOSYLATED A1C: CPT | Performed by: PHYSICIAN ASSISTANT

## 2019-12-27 PROCEDURE — 96361 HYDRATE IV INFUSION ADD-ON: CPT

## 2019-12-27 PROCEDURE — 80320 DRUG SCREEN QUANTALCOHOLS: CPT | Performed by: PHYSICIAN ASSISTANT

## 2019-12-27 PROCEDURE — 96360 HYDRATION IV INFUSION INIT: CPT

## 2019-12-27 PROCEDURE — 80053 COMPREHEN METABOLIC PANEL: CPT | Performed by: PHYSICIAN ASSISTANT

## 2019-12-27 PROCEDURE — 85730 THROMBOPLASTIN TIME PARTIAL: CPT | Performed by: PHYSICIAN ASSISTANT

## 2019-12-27 PROCEDURE — 36415 COLL VENOUS BLD VENIPUNCTURE: CPT | Performed by: PHYSICIAN ASSISTANT

## 2019-12-27 PROCEDURE — 84484 ASSAY OF TROPONIN QUANT: CPT | Performed by: PHYSICIAN ASSISTANT

## 2019-12-27 PROCEDURE — 71046 X-RAY EXAM CHEST 2 VIEWS: CPT

## 2019-12-27 RX ORDER — SODIUM CHLORIDE 9 MG/ML
75 INJECTION, SOLUTION INTRAVENOUS CONTINUOUS
Status: DISCONTINUED | OUTPATIENT
Start: 2019-12-27 | End: 2019-12-28

## 2019-12-27 RX ORDER — GUAIFENESIN 600 MG
600 TABLET, EXTENDED RELEASE 12 HR ORAL 2 TIMES DAILY
Status: DISCONTINUED | OUTPATIENT
Start: 2019-12-28 | End: 2019-12-29 | Stop reason: HOSPADM

## 2019-12-27 RX ORDER — ACETAMINOPHEN 325 MG/1
650 TABLET ORAL EVERY 6 HOURS PRN
Status: DISCONTINUED | OUTPATIENT
Start: 2019-12-27 | End: 2019-12-29 | Stop reason: HOSPADM

## 2019-12-27 RX ORDER — ALBUTEROL SULFATE 2.5 MG/3ML
2.5 SOLUTION RESPIRATORY (INHALATION) EVERY 4 HOURS PRN
Status: DISCONTINUED | OUTPATIENT
Start: 2019-12-27 | End: 2019-12-27

## 2019-12-27 RX ORDER — ONDANSETRON 2 MG/ML
4 INJECTION INTRAMUSCULAR; INTRAVENOUS EVERY 6 HOURS PRN
Status: DISCONTINUED | OUTPATIENT
Start: 2019-12-27 | End: 2019-12-29 | Stop reason: HOSPADM

## 2019-12-27 RX ORDER — PRAVASTATIN SODIUM 40 MG
80 TABLET ORAL
Status: DISCONTINUED | OUTPATIENT
Start: 2019-12-28 | End: 2019-12-29 | Stop reason: HOSPADM

## 2019-12-27 RX ORDER — LISINOPRIL 5 MG/1
5 TABLET ORAL DAILY
Status: DISCONTINUED | OUTPATIENT
Start: 2019-12-28 | End: 2019-12-28

## 2019-12-27 RX ORDER — ASPIRIN 81 MG/1
81 TABLET ORAL EVERY OTHER DAY
Status: DISCONTINUED | OUTPATIENT
Start: 2019-12-28 | End: 2019-12-29 | Stop reason: HOSPADM

## 2019-12-27 RX ORDER — HEPARIN SODIUM 5000 [USP'U]/ML
5000 INJECTION, SOLUTION INTRAVENOUS; SUBCUTANEOUS EVERY 8 HOURS SCHEDULED
Status: DISCONTINUED | OUTPATIENT
Start: 2019-12-27 | End: 2019-12-29 | Stop reason: HOSPADM

## 2019-12-27 RX ORDER — PRIMIDONE 50 MG/1
100 TABLET ORAL EVERY 12 HOURS SCHEDULED
Status: DISCONTINUED | OUTPATIENT
Start: 2019-12-27 | End: 2019-12-29 | Stop reason: HOSPADM

## 2019-12-27 RX ORDER — IPRATROPIUM BROMIDE AND ALBUTEROL SULFATE 2.5; .5 MG/3ML; MG/3ML
3 SOLUTION RESPIRATORY (INHALATION)
Status: DISCONTINUED | OUTPATIENT
Start: 2019-12-28 | End: 2019-12-29 | Stop reason: HOSPADM

## 2019-12-27 RX ADMIN — SODIUM CHLORIDE 1000 ML: 0.9 INJECTION, SOLUTION INTRAVENOUS at 20:16

## 2019-12-27 RX ADMIN — SODIUM CHLORIDE 1000 ML: 0.9 INJECTION, SOLUTION INTRAVENOUS at 18:45

## 2019-12-27 NOTE — ED PROVIDER NOTES
History  Chief Complaint   Patient presents with    Fall     Patient was drinking and had a syncopal episode and fell and hit his head, does not remember the event, states he drank "alot"   Alcohol Intoxication     [de-identified] through male presents emergency room via EMS after he sustained a fall from a bar stool witnessed by bystanders with brief loss consciousness  Patient has been drinking today excessive amounts according to patient  He denies headache neck pain visual disturbances shortness of breath chest pain  Patient denies lightheaded dizziness bleeding from ears nose or mouth  History provided by:  Patient and EMS personnel  History limited by: intoxicated   used: No    Fall   Mechanism of injury: fall    Injury location:  Head/neck  Head/neck injury location:  Head  Incident location: bar    Time since incident:  30 minutes  Arrived directly from scene: yes    Fall:     Fall occurred:  From a stool    Impact surface:  Hard floor    Point of impact:  Head and back    Entrapped after fall: no    Protective equipment: none    Suspicion of alcohol use: yes    Suspicion of drug use: no    Prior to arrival data:     Bystander interventions:  Bystander C-spine precautions    Patient ambulatory at scene: no      Blood loss:  None    Responsiveness at scene:  Alert    Orientation at scene:  Person and place    Loss of consciousness: yes      Amnesic to event: yes      Airway interventions:  None    Breathing interventions:  None    Cardiac interventions:  None    Medications administered:  None    Immobilization:  C-collar    Airway condition since incident:  Stable    Breathing condition since incident:  Stable    Circulation condition since incident:  Stable    Mental status condition since incident:  Stable  Associated symptoms: loss of consciousness    Associated symptoms: no abdominal pain, no back pain, no chest pain, no difficulty breathing, no headaches, no hearing loss, no nausea, no neck pain, no seizures and no vomiting    Risk factors: no anticoagulation therapy          No Known Allergies      Prior to Admission Medications   Prescriptions Last Dose Informant Patient Reported? Taking?    Alogliptin Benzoate 25 MG TABS   Yes No   Sig: Take by mouth daily   Empagliflozin 10 MG TABS   Yes No   Sig: Take 10 mg by mouth every morning   albuterol (2 5 mg/3 mL) 0 083 % nebulizer solution   No No   Sig: Take 1 vial (2 5 mg total) by nebulization every 4 (four) hours as needed for wheezing or shortness of breath   albuterol (PROVENTIL HFA,VENTOLIN HFA) 90 mcg/act inhaler   Yes No   Sig: Inhale 2 puffs every 6 (six) hours as needed for wheezing or shortness of breath   aspirin (ECOTRIN LOW STRENGTH) 81 mg EC tablet   Yes No   Sig: Take 81 mg by mouth every other day    gabapentin (NEURONTIN) 100 mg capsule   Yes No   Sig: Take 200 mg by mouth daily at bedtime   guaiFENesin (MUCINEX) 600 mg 12 hr tablet   No No   Sig: Take 1 tablet (600 mg total) by mouth 2 (two) times a day   lisinopril (ZESTRIL) 5 mg tablet   No No   Sig: Take 1 tablet (5 mg total) by mouth daily   metFORMIN (GLUCOPHAGE) 500 mg tablet   Yes No   Si/2 a tablet in the morning and evening    metoprolol tartrate (LOPRESSOR) 50 mg tablet   Yes No   Sig: Take 25 mg by mouth 2 (two) times a day   predniSONE 20 mg tablet   No No   Sig: Take 2 tablets (40 mg total) by mouth daily [id number], [id number], 06sbq7avpm, [id number], then stop   primidone (MYSOLINE) 50 mg tablet   No No   Sig: Take 2 tablets (100 mg total) by mouth every 12 (twelve) hours   rosuvastatin (CRESTOR) 10 MG tablet   Yes No   Sig: Take 10 mg by mouth daily   saxagliptin (ONGLYZA) 5 MG tablet   Yes No   Sig: Take 5 mg by mouth daily   tiotropium-olodaterol (STIOLTO RESPIMAT) 2 5-2 5 MCG/ACT inhaler   Yes No   Sig: Inhale 2 puffs daily      Facility-Administered Medications: None       Past Medical History:   Diagnosis Date    BPH (benign prostatic hyperplasia)     45 days radiation treatment    COPD (chronic obstructive pulmonary disease)     Coronary artery disease     CABG x4 in 2017    Diabetes mellitus     History of Arterial Duplex of LE 2017    Likely occlusion of the left superficial femoral artery  Calcific changes bilaterally  Despite these changes, the ankle-brachial index as a measure of peripheral blood flow only mildly impaired   History of echocardiogram 2017    EF 40%, mild LVH, mild MR     Hyperlipidemia     Hypertension     NSTEMI (non-ST elevated myocardial infarction)     Prostate cancer     prostate     PVD (peripheral vascular disease)     Tremor        Past Surgical History:   Procedure Laterality Date    CARDIAC CATHETERIZATION  2017    Significant left main plus triple-vessel CAD   CORONARY ARTERY BYPASS GRAFT  2017    4V CABG:  LIMA to LAD, VG to RI, SVG to PDA to LVBR RCA   PROSTATE BIOPSY         Family History   Problem Relation Age of Onset    Cancer Father     Heart disease Brother      I have reviewed and agree with the history as documented  Social History     Tobacco Use    Smoking status: Current Every Day Smoker     Packs/day: 1 00     Types: Cigarettes     Last attempt to quit: 2/15/2019     Years since quittin 8    Smokeless tobacco: Never Used   Substance Use Topics    Alcohol use: Yes     Comment: regularly     Drug use: No        Review of Systems   Constitutional: Negative for chills, diaphoresis, fatigue and fever  HENT: Negative for congestion, ear pain, hearing loss, rhinorrhea, sneezing and sore throat  Respiratory: Negative for cough, shortness of breath, wheezing and stridor  Cardiovascular: Negative for chest pain, palpitations and leg swelling  Gastrointestinal: Negative for abdominal pain, constipation, diarrhea, nausea and vomiting  Genitourinary: Negative for difficulty urinating, dysuria, frequency, hematuria and urgency  Musculoskeletal: Negative for back pain, gait problem, myalgias and neck pain  Skin: Negative for rash  Neurological: Positive for loss of consciousness and syncope  Negative for dizziness, seizures, weakness, light-headedness and headaches  All other systems reviewed and are negative  Physical Exam  Physical Exam   Constitutional: He is oriented to person, place, and time  He appears well-developed and well-nourished  HENT:   Head: Normocephalic and atraumatic  Right Ear: External ear normal    Left Ear: External ear normal    Nose: Nose normal    Mouth/Throat: Oropharynx is clear and moist  No oropharyngeal exudate  Eyes: Pupils are equal, round, and reactive to light  Conjunctivae and EOM are normal    Neck: Normal range of motion  Neck supple  No tracheal deviation present  Cardiovascular: Normal rate, regular rhythm, normal heart sounds and intact distal pulses  Pulmonary/Chest: Effort normal and breath sounds normal  No respiratory distress  He has no wheezes  Abdominal: Soft  Bowel sounds are normal  He exhibits no distension  There is no tenderness  Musculoskeletal: Normal range of motion  He exhibits no edema or tenderness  Neurological: He is alert and oriented to person, place, and time  Skin: Skin is warm and dry  No rash noted  No erythema  Nursing note and vitals reviewed        Vital Signs  ED Triage Vitals   Temperature Pulse Respirations Blood Pressure SpO2   12/27/19 1828 12/27/19 1827 12/27/19 1827 12/27/19 1828 12/27/19 1827   (!) 97 °F (36 1 °C) 80 16 (!) 191/80 99 %      Temp Source Heart Rate Source Patient Position - Orthostatic VS BP Location FiO2 (%)   12/27/19 1828 12/27/19 1827 12/27/19 1828 12/27/19 1828 --   Temporal Monitor Sitting Left arm       Pain Score       12/27/19 1827       No Pain           Vitals:    12/27/19 1827 12/27/19 1828 12/27/19 1909   BP:  (!) 191/80 169/80   Pulse: 80  78   Patient Position - Orthostatic VS:  Sitting          Visual Acuity      ED Medications  Medications   sodium chloride 0 9 % bolus 1,000 mL (0 mL Intravenous Stopped 12/27/19 2016)   sodium chloride 0 9 % bolus 1,000 mL (1,000 mL Intravenous New Bag 12/27/19 2016)       Diagnostic Studies  Results Reviewed     Procedure Component Value Units Date/Time    Troponin I [[id number]] Collected:  12/27/19 2124    Lab Status:   In process Specimen:  Blood from Arm, Right Updated:  12/27/19 2128    Ethanol [[id number]]  (Abnormal) Collected:  12/27/19 1840    Lab Status:  Final result Specimen:  Blood from Arm, Right Updated:  12/27/19 1915     Ethanol Lvl 266 5 mg/dL     Comprehensive metabolic panel [[id number]]  (Abnormal) Collected:  12/27/19 1840    Lab Status:  Final result Specimen:  Blood from Arm, Right Updated:  12/27/19 1914     Sodium 134 mmol/L      Potassium 4 1 mmol/L      Chloride 97 mmol/L      CO2 26 mmol/L      ANION GAP 11 mmol/L      BUN 9 mg/dL      Creatinine 0 84 mg/dL      Glucose 173 mg/dL      Calcium 9 1 mg/dL      AST 15 U/L      ALT 16 U/L      Alkaline Phosphatase 68 U/L      Total Protein 7 2 g/dL      Albumin 4 5 g/dL      Total Bilirubin 0 40 mg/dL      eGFR 87 ml/min/1 73sq m     Narrative:       Leonard guidelines for Chronic Kidney Disease (CKD):     Stage 1 with normal or high GFR (GFR > 90 mL/min/1 73 square meters)    Stage 2 Mild CKD (GFR = 60-89 mL/min/1 73 square meters)    Stage 3A Moderate CKD (GFR = 45-59 mL/min/1 73 square meters)    Stage 3B Moderate CKD (GFR = 30-44 mL/min/1 73 square meters)    Stage 4 Severe CKD (GFR = 15-29 mL/min/1 73 square meters)    Stage 5 End Stage CKD (GFR <15 mL/min/1 73 square meters)  Note: GFR calculation is accurate only with a steady state creatinine    Troponin I [[id number]]  (Normal) Collected:  12/27/19 1840    Lab Status:  Final result Specimen:  Blood from Arm, Right Updated:  12/27/19 1914     Troponin I <0 03 ng/mL     Protime-INR [[id number]]  (Normal) Collected: 12/27/19 1840    Lab Status:  Final result Specimen:  Blood from Arm, Right Updated:  12/27/19 1904     Protime 10 8 seconds      INR 0 93    APTT [[id number]]  (Abnormal) Collected:  12/27/19 1840    Lab Status:  Final result Specimen:  Blood from Arm, Right Updated:  12/27/19 1904     PTT 40 seconds     CBC and differential [[id number]]  (Abnormal) Collected:  12/27/19 1840    Lab Status:  Final result Specimen:  Blood from Arm, Right Updated:  12/27/19 1849     WBC 4 70 Thousand/uL      RBC 4 77 Million/uL      Hemoglobin 15 0 g/dL      Hematocrit 43 0 %      MCV 90 fL      MCH 31 4 pg      MCHC 34 9 g/dL      RDW 13 7 %      MPV 8 3 fL      Platelets 370 Thousands/uL      Neutrophils Relative 58 %      Lymphocytes Relative 28 %      Monocytes Relative 8 %      Eosinophils Relative 6 %      Basophils Relative 1 %      Neutrophils Absolute 2 70 Thousands/µL      Lymphocytes Absolute 1 30 Thousands/µL      Monocytes Absolute 0 40 Thousand/µL      Eosinophils Absolute 0 30 Thousand/µL      Basophils Absolute 0 10 Thousands/µL                  CT cervical spine without contrast   Final Result by Lisa Pinedo MD (12/27 1917)      No cervical spine fracture or traumatic malalignment  Workstation performed: [id number]         CT head without contrast   Final Result by Lisa Pinedo MD (12/27 1920)      No acute intracranial abnormality  Microangiopathic changes                    Workstation performed: [id number]         XR chest 2 views    (Results Pending)              Procedures  ECG 12 Lead Documentation Only  Date/Time: 12/27/2019 9:26 PM  Performed by: Alyssa Peng PA-C  Authorized by: Alyssa Peng PA-C     Indications / Diagnosis:  Syncope  ECG reviewed by me, the ED Provider: yes (& Dr Lisandro Minaya)    Patient location:  ED  Previous ECG:     Previous ECG:  Compared to current    Comparison ECG info:  11/21/19    Similarity:  No change  Interpretation:     Interpretation: non-specific    Rate: ECG rate:  78    ECG rate assessment: normal    Rhythm:     Rhythm: sinus rhythm    QRS:     QRS axis:  Normal  Conduction:     Conduction: normal    ST segments:     ST segments:  Abnormal    Elevation:  V5 and V6  T waves:     T waves: non-specific               ED Course  ED Course as of Dec 27 2131   Fri Dec 27, 2019   2118 Discussed with patient's wife and daughter at bedside they are refusing to take patient home intoxicated  Second troponin being drawn currently still denies chest pain at this time however still intoxicated  Patient be signed out to attending physician Dr Andreina Cruz              HEART Risk Score      Most Recent Value   History  0 Filed at: 12/27/2019 2124   ECG  1 Filed at: 12/27/2019 2124   Age  2 Filed at: 12/27/2019 2124   Risk Factors  2 Filed at: 12/27/2019 2124   Troponin  0 Filed at: 12/27/2019 2124   Heart Score Risk Calculator   History  0 Filed at: 12/27/2019 2124   ECG  1 Filed at: 12/27/2019 2124   Age  2 Filed at: 12/27/2019 2124   Risk Factors  2 Filed at: 12/27/2019 2124   Troponin  0 Filed at: 12/27/2019 2124   HEART Score  5 Filed at: 12/27/2019 2124   HEART Score  5 Filed at: 12/27/2019 2124                            MDM  Number of Diagnoses or Management Options  Alcohol intoxication ([facility] ): established and worsening  History of coronary artery disease:   Hyperlipidemia:   Hypertension:   Non-insulin dependent type 2 diabetes mellitus ([facility] ):   Syncope: new and requires workup     Amount and/or Complexity of Data Reviewed  Clinical lab tests: ordered and reviewed  Tests in the radiology section of CPT®: ordered and reviewed  Discuss the patient with other providers: yes (Hospitalist regarding admission)  Independent visualization of images, tracings, or specimens: yes    Risk of Complications, Morbidity, and/or Mortality  Presenting problems: moderate  Diagnostic procedures: moderate  Management options: moderate  General comments: 29-year-old male with history of coronary artery disease intoxicated syncopized and fell off a bar stool  Will admit observation tele for rule out arrhythmias eval with 1st troponin negative and EKG nonspecific changes same as from November 2019    Patient Progress  Patient progress: stable        Disposition  Final diagnoses:   Syncope   Alcohol intoxication ([facility] )   History of coronary artery disease   Hypertension   Hyperlipidemia   Non-insulin dependent type 2 diabetes mellitus ([facility] )     Time reflects when diagnosis was documented in both MDM as applicable and the Disposition within this note     Time User Action Codes Description Comment    12/27/2019  9:29 PM Raford Denisha Add [R55] Syncope     12/27/2019  9:29 PM Raford Denisha Add [F10 929] [facility] )     12/27/2019  9:29 PM Raford Denisha Add [Z86 79] History of coronary artery disease     12/27/2019  9:29 PM Raford Denisha Add [I10] Hypertension     12/27/2019  9:30 PM Raford Denisha Add [E78 5] Hyperlipidemia     12/27/2019  9:30 PM Raford Denisha Add [E11 9] Non-insulin dependent type 2 diabetes mellitus [facility])       ED Disposition     ED Disposition Condition Date/Time Comment    Admit Stable Fri Dec 27, 2019  9:29 PM Case was discussed with Mervat Fontenot and the patient's admission status was agreed to be Admission Status: observation status to the service of Dr Alexandra Fatmata   Follow-up Information    None         Patient's Medications   Discharge Prescriptions    No medications on file     No discharge procedures on file      ED Provider  Electronically Signed by           Dannielle Blair PA-C  12/27/19 3595

## 2019-12-28 ENCOUNTER — APPOINTMENT (OUTPATIENT)
Dept: CT IMAGING | Facility: HOSPITAL | Age: 74
DRG: 192 | End: 2019-12-28
Payer: MEDICARE

## 2019-12-28 LAB
ANION GAP SERPL CALCULATED.3IONS-SCNC: 13 MMOL/L (ref 4–13)
BUN SERPL-MCNC: 7 MG/DL (ref 7–25)
CALCIUM SERPL-MCNC: 8.1 MG/DL (ref 8.6–10.5)
CHLORIDE SERPL-SCNC: 104 MMOL/L (ref 98–107)
CO2 SERPL-SCNC: 22 MMOL/L (ref 21–31)
CREAT SERPL-MCNC: 0.69 MG/DL (ref 0.7–1.3)
ERYTHROCYTE [DISTWIDTH] IN BLOOD BY AUTOMATED COUNT: 13.5 % (ref 11.5–14.5)
EST. AVERAGE GLUCOSE BLD GHB EST-MCNC: 189 MG/DL
GFR SERPL CREATININE-BSD FRML MDRD: 94 ML/MIN/1.73SQ M
GLUCOSE SERPL-MCNC: 127 MG/DL (ref 65–140)
GLUCOSE SERPL-MCNC: 141 MG/DL (ref 65–140)
GLUCOSE SERPL-MCNC: 187 MG/DL (ref 65–140)
GLUCOSE SERPL-MCNC: 84 MG/DL (ref 65–140)
GLUCOSE SERPL-MCNC: 91 MG/DL (ref 65–99)
HBA1C MFR BLD: 8.2 % (ref 4.2–6.3)
HCT VFR BLD AUTO: 39.8 % (ref 42–47)
HGB BLD-MCNC: 13.9 G/DL (ref 14–18)
MAGNESIUM SERPL-MCNC: 1.9 MG/DL (ref 1.9–2.7)
MCH RBC QN AUTO: 31.2 PG (ref 26–34)
MCHC RBC AUTO-ENTMCNC: 35 G/DL (ref 31–37)
MCV RBC AUTO: 89 FL (ref 81–99)
PLATELET # BLD AUTO: 188 THOUSANDS/UL (ref 149–390)
PMV BLD AUTO: 8.6 FL (ref 8.6–11.7)
POTASSIUM SERPL-SCNC: 3.6 MMOL/L (ref 3.5–5.5)
RBC # BLD AUTO: 4.46 MILLION/UL (ref 4.3–5.9)
SODIUM SERPL-SCNC: 139 MMOL/L (ref 134–143)
TROPONIN I SERPL-MCNC: <0.03 NG/ML
WBC # BLD AUTO: 5.5 THOUSAND/UL (ref 4.8–10.8)

## 2019-12-28 PROCEDURE — 84484 ASSAY OF TROPONIN QUANT: CPT | Performed by: PHYSICIAN ASSISTANT

## 2019-12-28 PROCEDURE — 80048 BASIC METABOLIC PNL TOTAL CA: CPT | Performed by: PHYSICIAN ASSISTANT

## 2019-12-28 PROCEDURE — 94640 AIRWAY INHALATION TREATMENT: CPT

## 2019-12-28 PROCEDURE — 71250 CT THORAX DX C-: CPT

## 2019-12-28 PROCEDURE — 93005 ELECTROCARDIOGRAM TRACING: CPT

## 2019-12-28 PROCEDURE — 94664 DEMO&/EVAL PT USE INHALER: CPT

## 2019-12-28 PROCEDURE — 83735 ASSAY OF MAGNESIUM: CPT | Performed by: INTERNAL MEDICINE

## 2019-12-28 PROCEDURE — 85027 COMPLETE CBC AUTOMATED: CPT | Performed by: PHYSICIAN ASSISTANT

## 2019-12-28 PROCEDURE — 99232 SBSQ HOSP IP/OBS MODERATE 35: CPT | Performed by: INTERNAL MEDICINE

## 2019-12-28 PROCEDURE — 82948 REAGENT STRIP/BLOOD GLUCOSE: CPT

## 2019-12-28 PROCEDURE — 94760 N-INVAS EAR/PLS OXIMETRY 1: CPT

## 2019-12-28 RX ORDER — IPRATROPIUM BROMIDE AND ALBUTEROL SULFATE 2.5; .5 MG/3ML; MG/3ML
3 SOLUTION RESPIRATORY (INHALATION) ONCE
Status: COMPLETED | OUTPATIENT
Start: 2019-12-28 | End: 2019-12-28

## 2019-12-28 RX ORDER — IPRATROPIUM BROMIDE AND ALBUTEROL SULFATE 2.5; .5 MG/3ML; MG/3ML
3 SOLUTION RESPIRATORY (INHALATION)
Status: CANCELLED | OUTPATIENT
Start: 2019-12-28

## 2019-12-28 RX ORDER — METHYLPREDNISOLONE SODIUM SUCCINATE 40 MG/ML
40 INJECTION, POWDER, LYOPHILIZED, FOR SOLUTION INTRAMUSCULAR; INTRAVENOUS EVERY 8 HOURS SCHEDULED
Status: DISCONTINUED | OUTPATIENT
Start: 2019-12-28 | End: 2019-12-29 | Stop reason: HOSPADM

## 2019-12-28 RX ORDER — LISINOPRIL 20 MG/1
20 TABLET ORAL DAILY
Status: DISCONTINUED | OUTPATIENT
Start: 2019-12-28 | End: 2019-12-29 | Stop reason: HOSPADM

## 2019-12-28 RX ADMIN — HEPARIN SODIUM 5000 UNITS: 5000 INJECTION INTRAVENOUS; SUBCUTANEOUS at 21:46

## 2019-12-28 RX ADMIN — METOPROLOL TARTRATE 25 MG: 25 TABLET, FILM COATED ORAL at 18:35

## 2019-12-28 RX ADMIN — PRIMIDONE 100 MG: 50 TABLET ORAL at 09:05

## 2019-12-28 RX ADMIN — PRAVASTATIN SODIUM 80 MG: 40 TABLET ORAL at 18:35

## 2019-12-28 RX ADMIN — GUAIFENESIN 600 MG: 600 TABLET, EXTENDED RELEASE ORAL at 09:05

## 2019-12-28 RX ADMIN — INSULIN LISPRO 1 UNITS: 100 INJECTION, SOLUTION INTRAVENOUS; SUBCUTANEOUS at 21:45

## 2019-12-28 RX ADMIN — IPRATROPIUM BROMIDE AND ALBUTEROL SULFATE 3 ML: 2.5; .5 SOLUTION RESPIRATORY (INHALATION) at 12:14

## 2019-12-28 RX ADMIN — PRIMIDONE 100 MG: 50 TABLET ORAL at 00:27

## 2019-12-28 RX ADMIN — METHYLPREDNISOLONE SODIUM SUCCINATE 40 MG: 40 INJECTION, POWDER, FOR SOLUTION INTRAMUSCULAR; INTRAVENOUS at 21:46

## 2019-12-28 RX ADMIN — SODIUM CHLORIDE 75 ML/HR: 0.9 INJECTION, SOLUTION INTRAVENOUS at 00:26

## 2019-12-28 RX ADMIN — PRIMIDONE 100 MG: 50 TABLET ORAL at 21:46

## 2019-12-28 RX ADMIN — METOPROLOL TARTRATE 25 MG: 25 TABLET, FILM COATED ORAL at 01:42

## 2019-12-28 RX ADMIN — METHYLPREDNISOLONE SODIUM SUCCINATE 40 MG: 40 INJECTION, POWDER, FOR SOLUTION INTRAMUSCULAR; INTRAVENOUS at 13:18

## 2019-12-28 RX ADMIN — GUAIFENESIN 600 MG: 600 TABLET, EXTENDED RELEASE ORAL at 18:35

## 2019-12-28 RX ADMIN — HEPARIN SODIUM 5000 UNITS: 5000 INJECTION INTRAVENOUS; SUBCUTANEOUS at 00:27

## 2019-12-28 RX ADMIN — HEPARIN SODIUM 5000 UNITS: 5000 INJECTION INTRAVENOUS; SUBCUTANEOUS at 06:52

## 2019-12-28 RX ADMIN — IPRATROPIUM BROMIDE AND ALBUTEROL SULFATE 3 ML: 2.5; .5 SOLUTION RESPIRATORY (INHALATION) at 07:05

## 2019-12-28 RX ADMIN — IPRATROPIUM BROMIDE AND ALBUTEROL SULFATE 3 ML: 2.5; .5 SOLUTION RESPIRATORY (INHALATION) at 14:19

## 2019-12-28 RX ADMIN — METOPROLOL TARTRATE 25 MG: 25 TABLET, FILM COATED ORAL at 09:05

## 2019-12-28 RX ADMIN — ASPIRIN 81 MG: 81 TABLET, COATED ORAL at 09:05

## 2019-12-28 RX ADMIN — HEPARIN SODIUM 5000 UNITS: 5000 INJECTION INTRAVENOUS; SUBCUTANEOUS at 13:18

## 2019-12-28 RX ADMIN — LISINOPRIL 20 MG: 20 TABLET ORAL at 13:17

## 2019-12-28 RX ADMIN — LISINOPRIL 5 MG: 5 TABLET ORAL at 09:15

## 2019-12-28 RX ADMIN — IPRATROPIUM BROMIDE AND ALBUTEROL SULFATE 3 ML: 2.5; .5 SOLUTION RESPIRATORY (INHALATION) at 19:28

## 2019-12-28 RX ADMIN — IPRATROPIUM BROMIDE AND ALBUTEROL SULFATE 3 ML: 2.5; .5 SOLUTION RESPIRATORY (INHALATION) at 02:59

## 2019-12-28 NOTE — UTILIZATION REVIEW
Initial Clinical Review    Admission: Date/Time/Statement: Placed in Observation status on 12/27 @ 21:29  Orders Placed This Encounter   Procedures    Place in Observation (expected length of stay for this patient is less than two midnights)     Standing Status:   Standing     Number of Occurrences:   1     Order Specific Question:   Admitting Physician     Answer:   Julius Heimlich [[id number]]     Order Specific Question:   Level of Care     Answer:   Med Surg [[id number]]     ED Arrival Information     Expected Arrival Acuity Means of Arrival Escorted By Service Admission Type    - 12/27/2019 18:25 Urgent Ambulance [address] [facility] General Medicine Urgent    Arrival Complaint    Intoxication        Chief Complaint   Patient presents with    Fall     Patient was drinking and had a syncopal episode and fell and hit his head, does not remember the event, states he drank "alot"   Alcohol Intoxication     Assessment/Plan:  68 yo m with a PMH of NSTEMI s/p CABG x 3, COPD, BPH, DM, HTN, HLD, Prostate Cancer, PVD  He had been drinking at both a fire banuelos and the [employer] > 4- 5 beers( ETOH 266 5 on admit)  He had a syncopal episode falling off a bar stool with positive LOC, he denies any complaints  He does have a minor abrasion on his posterior scalp  He isadmitted to Observation status  Due to a syncopal episode        ED Triage Vitals   Temperature Pulse Respirations Blood Pressure SpO2   12/27/19 1828 12/27/19 1827 12/27/19 1827 12/27/19 1828 12/27/19 1827   (!) 97 °F (36 1 °C) 80 16 (!) 191/80 99 %      Temp Source Heart Rate Source Patient Position - Orthostatic VS BP Location FiO2 (%)   12/27/19 1828 12/27/19 1827 12/27/19 1828 12/27/19 1828 --   Temporal Monitor Sitting Left arm       Pain Score       12/27/19 1827       No Pain        Wt Readings from Last 1 Encounters:   12/28/19 77 1 kg (169 lb 15 6 oz)     Additional Vital Signs:   Date/Time  Temp  Pulse  Resp  BP  SpO2  O2 Device  Patient Position - Orthostatic VS 12/28/19 0749  98 5 °F (36 9 °C)  87  18  148/81  93 %  None (Room air)  Sitting   12/28/19 0705          91 %  None (Room air)     12/28/19 0336  97 7 °F (36 5 °C)  82  18  162/73  95 %  None (Room air)  Lying   12/28/19 0300          94 %  None (Room air)     12/28/19 0133  97 6 °F (36 4 °C)  90  20  193/84Abnormal   97 %  None (Room air)  Sitting   12/27/19 2227  98 °F (36 7 °C)  91  22  186/85Abnormal   97 %  None (Room air)     12/27/19 2201    87  16  150/68  96 %  None (Room air)  Lying   12/27/19 1909    78  18  169/80  98 %             Pertinent Labs/Diagnostic Test Results:   Results from last 7 days   Lab Units 12/28/19  0548 12/27/19  1840   WBC Thousand/uL 5 50 4 70*   HEMOGLOBIN g/dL 13 9* 15 0   HEMATOCRIT % 39 8* 43 0   PLATELETS Thousands/uL 188 187   NEUTROS ABS Thousands/µL  --  2 70     Results from last 7 days   Lab Units 12/28/19  0548 12/27/19  1840   SODIUM mmol/L 139 134   POTASSIUM mmol/L 3 6 4 1   CHLORIDE mmol/L 104 97*   CO2 mmol/L 22 26   ANION GAP mmol/L 13 11   BUN mg/dL 7 9   CREATININE mg/dL 0 69* 0 84   EGFR ml/min/1 73sq m 94 87   CALCIUM mg/dL 8 1* 9 1     Results from last 7 days   Lab Units 12/27/19  1840   AST U/L 15   ALT U/L 16   ALK PHOS U/L 68   TOTAL PROTEIN g/dL 7 2   ALBUMIN g/dL 4 5   TOTAL BILIRUBIN mg/dL 0 40     Results from last 7 days   Lab Units 12/28/19  0743 12/27/19  2227   POC GLUCOSE mg/dl 84 119     Results from last 7 days   Lab Units 12/28/19  0548 12/27/19  1840   GLUCOSE RANDOM mg/dL 91 173*     Results from last 7 days   Lab Units 12/28/19  0234 12/27/19  2124 12/27/19  1840   TROPONIN I ng/mL <0 03 <0 03 <0 03     Results from last 7 days   Lab Units 12/27/19  1840   PROTIME seconds 10 8   INR  0 93   PTT seconds 40*     Results from last 7 days   Lab Units 12/27/19  1840   ETHANOL LVL mg/dL 266 5*     CT Cervical Spine - 12/27 -  No acute  CT Head - 12/27 -  No acute  CXR 12/27 - no acute  ECG - 12/27 - NSR  - none specific ST  & T wave changes        ED Treatment:   Medication Administration from 12/27/2019 1825 to 12/27/2019 2221       Date/Time Order Dose Route Action     12/27/2019 1845 sodium chloride 0 9 % bolus 1,000 mL 1,000 mL Intravenous New Bag     12/27/2019 2016 sodium chloride 0 9 % bolus 1,000 mL 1,000 mL Intravenous New Bag        Past Medical History:   Diagnosis Date    BPH (benign prostatic hyperplasia)     45 days radiation treatment    COPD (chronic obstructive pulmonary disease)     Coronary artery disease     CABG x4 in 2017    Diabetes mellitus     History of Arterial Duplex of LE 12/26/2017    Likely occlusion of the left superficial femoral artery  Calcific changes bilaterally  Despite these changes, the ankle-brachial index as a measure of peripheral blood flow only mildly impaired      History of echocardiogram 06/12/2017    EF 40%, mild LVH, mild MR     Hyperlipidemia     Hypertension     NSTEMI (non-ST elevated myocardial infarction)     Prostate cancer     prostate     PVD (peripheral vascular disease)     Tremor      Present on Admission:   Syncope and collapse   Type 2 diabetes mellitus without complication, without long-term current use of insulin ([facility])   Tobacco abuse   Other hyperlipidemia   Essential hypertension   Tremor   Chronic obstructive pulmonary disease with acute exacerbation ([facility])      Admitting Diagnosis: Alcohol intoxication ([facility] [address] ) [F10 929]  Hyperlipidemia [E78 5]  Syncope [R55]  Hypertension [I10]  History of coronary artery disease [Z86 79]  [facility]-insulin dependent [facility] mellitus [facility] ) [E11 9]  Age/Sex: 69 y o  male  Admission Orders:  Scheduled Medications:    Medications:  aspirin 81 mg Oral Every Other Day   guaiFENesin 600 mg Oral BID   heparin (porcine) 5,000 Units Subcutaneous Q8H [address]   insulin lispro 1-6 Units Subcutaneous TID AC   insulin lispro 1-6 Units Subcutaneous HS   ipratropium-albuterol 3 mL Nebulization Q6H   lisinopril 5 mg Oral Daily   metoprolol tartrate 25 mg Oral BID   pravastatin 80 mg Oral Daily With Dinner   primidone 100 mg Oral Q12H [facility]     Continuous IV Infusions:    sodium chloride 75 mL/hr Intravenous Continuous     PRN Meds:    acetaminophen 650 mg Oral Q6H PRN   ondansetron 4 mg Intravenous Q6H PRN       Nursing Order - telem - SCD's to le's- Cisneros weights - up with assistance  - diet cons carb     Network Utilization Review Department  [email or web address]  ATTENTION: Please call with any questions or concerns to [phone] and carefully listen to the prompts so that you are directed to the right person  All voicemails are confidential   Mat Bicker all requests for admission clinical reviews, approved or denied determinations and any other requests to dedicated fax number below belonging to the campus where the patient is receiving treatment   List of dedicated fax numbers for the Facilities:  [address] DENIALS (Administrative/Medical Necessity) [phone]   [address] (Maternity/NICU/Pediatrics) [phone]   Samson Egan [phone]   Angi Promise [phone]   [facility] [phone]   Cierra Perez [phone]   [address] [facility] 15[address] [facility] [phone]   [facility]  [phone]   [address] [facility]  [address] [facility] [address] [facility] [phone]

## 2019-12-28 NOTE — NURSING NOTE
Informed hospitalist that tele monitor alarmed as 'vtach'    Several nurses reviewed episode and believe it may be artifact

## 2019-12-28 NOTE — ASSESSMENT & PLAN NOTE
Lab Results   Component Value Date    HGBA1C 8 3 (H) 11/22/2019       Recent Labs     12/27/19  2227   POCGLU 119       Blood Sugar Average: Last 72 hrs:  (P) 119     Will place on [facility] step 2 diet, hold pre-hospital oral antihyperglycemics, obtain Accu-Cheks AC and HS with Humalog correction dose a c   And HS

## 2019-12-28 NOTE — ED NOTES
Iv restarted due to one in left hand not working, sl swollen  Family at bedside-updated on plan of care  Denied questions        Marlee Rust, RN  12/27/19 0351

## 2019-12-28 NOTE — PROGRESS NOTES
12/28/19 0133   Vital Signs   Temperature 97 6 °F (36 4 °C)   Temp Source Temporal   Pulse 90   Heart Rate Source Monitor   Respirations 20   Blood Pressure (!) 193/84   BP Location Left arm   BP Method Automatic   Patient Position - Orthostatic VS Sitting     Gave   metoprolol tartrate (LOPRESSOR) tablet 25 mg

## 2019-12-28 NOTE — NURSING NOTE
Patient BP reported at 162/100, RN in to re-check BP and new bp was 170/96 will, informed MD, new orders placed, will continue to monitor

## 2019-12-28 NOTE — NURSING NOTE
TigerText to hospitalist:    Yolande Haywood  Bed:  -02  Prblm:  Syncope and collapse    Hello, Patient wants CPR if needed but also wants to be DNI

## 2019-12-28 NOTE — ASSESSMENT & PLAN NOTE
· Placed in observation telemetry  · Trend cardiac enzymes  · Repeat EKG in a m  · Patient recently had an outpatient stress test approximately 1 year ago as well as an echo he believes 6-8 months ago all these were reportedly negative  · Patient follows as an outpatient with Dr Ahmet Mancia from Cardiology we last saw approximately 5 months ago for routine follow-up  · Likely source of his syncope is acute alcohol intoxication but patient does have significant cardiac risk factors as well as cardiac history and will consider cardiac consult in a m  Depending upon results

## 2019-12-28 NOTE — ASSESSMENT & PLAN NOTE
· Will start Solu-Medrol 40 mg IV q 8  · Oxygen as needed  · Continue Mucinex  · Continue duo nebs  · No sign of underlying infection

## 2019-12-28 NOTE — PROGRESS NOTES
Progress Note - Andrés Benson 1945, 69 y o  male MRN: [id number]    Unit/Bed#: -02 Encounter: [id number]    Primary Care Provider: Juanis Galvez MD   Date and time admitted to hospital: 2019  6:31 PM        * Syncope and collapse  Assessment & Plan  · No tele events noted  · Troponins negative  · Possibly secondary to alcohol intoxication  · Will continue IV fluids as needed  · Monitor electrolytes    Chronic obstructive pulmonary disease with acute exacerbation (HCC)  Assessment & Plan  · Will start Solu-Medrol 40 mg IV q 8  · Oxygen as needed  · Continue Mucinex  · Continue duo nebs  · No sign of underlying infection    Alcoholic intoxication without complication (HCC)  Assessment & Plan  · Improved  · Will continue IV fluids as needed  · Counseled on alcohol cessation    Essential hypertension  Assessment & Plan  BP stable  Continue home meds    Other hyperlipidemia  Assessment & Plan  Continue statin    Type 2 diabetes mellitus without complication, without long-term current use of insulin ([facility])  Assessment & Plan  Insulin sliding scale      VTE Pharmacologic Prophylaxis: Pharmacologic: Heparin    Patient Centered Rounds: I have performed bedside rounds with nursing staff today  Discussions with Specialists or Other Care Team Provider: yes  Education and Discussions with Family / Patient: yes    Current Length of Stay: 0 day(s)    Current Patient Status: Inpatient   Certification Statement: The patient will continue to require additional inpatient hospital stay due to COPD exacerbation    Discharge Plan:  Pending hospital course    Code Status: Level 1 - Full Code    Subjective:   Patient with shortness of breath and wheezing this morning  Denies any chest pain or palpitations  No further episodes of lightheadedness or syncope  Tolerating diet      Objective:     Vitals:   Temp (24hrs), Av °F (36 7 °C), Min:97 °F (36 1 °C), Max:99 5 °F (37 5 °C)    Temp:  [97 °F (36 1 °C)-99 5 °F (37 5 °C)] 99 5 °F (37 5 °C)  HR:  [78-92] 90  Resp:  [16-24] 22  BP: (146-193)/() 154/74  SpO2:  [91 %-99 %] 95 %  Body mass index is 24 39 kg/m²  Input and Output Summary (last 24 hours): Intake/Output Summary (Last 24 hours) at 12/28/2019 1604  Last data filed at 12/28/2019 1325  Gross per 24 hour   Intake 1000 ml   Output 950 ml   Net 50 ml       Physical Exam:     Physical Exam   Constitutional: He appears well-developed  No distress  HENT:   Head: Normocephalic and atraumatic  Eyes: Conjunctivae and EOM are normal    Neck: Normal range of motion  Neck supple  Cardiovascular: Normal rate and regular rhythm  Pulmonary/Chest: Effort normal  No respiratory distress  He has wheezes  Abdominal: Soft  He exhibits no distension  There is no tenderness  Musculoskeletal: Normal range of motion  He exhibits no edema  Neurological: He is alert  No cranial nerve deficit  Skin: Skin is warm and dry  Psychiatric: He has a normal mood and affect  His behavior is normal        Additional Data:     Labs:    Results from last 7 days   Lab Units 12/28/19  0548 12/27/19  1840   WBC Thousand/uL 5 50 4 70*   HEMOGLOBIN g/dL 13 9* 15 0   HEMATOCRIT % 39 8* 43 0   PLATELETS Thousands/uL 188 187   NEUTROS PCT %  --  58   LYMPHS PCT %  --  28   MONOS PCT %  --  8   EOS PCT %  --  6*     Results from last 7 days   Lab Units 12/28/19  0548 12/27/19  1840   POTASSIUM mmol/L 3 6 4 1   CHLORIDE mmol/L 104 97*   CO2 mmol/L 22 26   BUN mg/dL 7 9   CREATININE mg/dL 0 69* 0 84   CALCIUM mg/dL 8 1* 9 1   ALK PHOS U/L  --  68   ALT U/L  --  16   AST U/L  --  15     Results from last 7 days   Lab Units 12/27/19  1840   INR  0 93     Results from last 7 days   Lab Units 12/28/19  1117 12/28/19  0743 12/27/19  2227   POC GLUCOSE mg/dl 141* 84 119           * I Have Reviewed All Lab Data Listed Above  * Additional Pertinent Lab Tests Reviewed:  Sophia 66 Admission  Reviewed    Imaging:  Imaging Reports Reviewed Today Include:  No new imaging    Recent Cultures (last 7 days):           Last 24 Hours Medication List:     Current Facility-Administered Medications:  acetaminophen 650 mg Oral Q6H PRN Darwin Glover PA-C   aspirin 81 mg Oral Every Other Day Darwin Glover PA-C   guaiFENesin 600 mg Oral BID Darwin Glover PA-C   heparin (porcine) 5,000 Units Subcutaneous [facility] [place] SPEEDY Glover   insulin lispro 1-6 Units Subcutaneous TID [facility] Darwin Glover PA-C   insulin lispro 1-6 Units Subcutaneous HS Darwin Glover PA-C   ipratropium-albuterol 3 mL Nebulization Q6H Darwin Glover PA-C   lisinopril 20 mg Oral Daily Terry Gamino MD   methylPREDNISolone sodium succinate 40 mg Intravenous Q8H Anette MD Usman   metoprolol tartrate 25 mg Oral BID Darwin Glover PA-C   ondansetron 4 mg Intravenous Q6H PRN Darwin Glover PA-C   pravastatin 80 mg Oral Daily With Eugene Armstrong PA-C   primidone 100 mg Oral Q12H [facility] Darwin Glover PA-C        Today, Patient Was Seen By: Terry Gamino MD    ** Please Note: Dictation voice to text software may have been used in the creation of this document   **

## 2019-12-28 NOTE — SOCIAL WORK
CM met with pt at bedside and explained role  Pt lives with his wife in a 2 story house; [address], 12-14 steps inside  Pt uses a cane to ambulate  He reports walker and w/c at home from previous use  Pt reports he is completely independent with ADLs  He continues to drive  Pt PCP is through [facility]  [address]  Pt uses [facility] and denies any difficulty obtaining his medications  Pt denies any history of HHC or STR  He further denied a history of MH or D&A treatment  Pt denies any discharge needs at this time  Spouse will transport home  CM to follow  CM reviewed d/c planning process including the following: identifying help at home, patient preference for d/c planning needs, availability of treatment team to discuss questions or concerns patient and/or family may have regarding understanding medications and recognizing signs and symptoms once discharged  CM also encouraged patient to follow up with all recommended appointments after discharge  Patient advised of importance for patient and family to participate in managing patients medical well being  Patient/caregiver received discharge checklist   Content reviewed  Patient/caregiver encouraged to participate in discharge plan of care prior to discharge home

## 2019-12-28 NOTE — PLAN OF CARE
Problem: Potential for Falls  Goal: Patient will remain free of falls  Description  INTERVENTIONS:  - Assess patient frequently for physical needs  -  Identify cognitive and physical deficits and behaviors that affect risk of falls    -  Warner fall precautions as indicated by assessment   - Educate patient/family on patient safety including physical limitations  - Instruct patient to call for assistance with activity based on assessment  - Modify environment to reduce risk of injury  - Consider OT/PT consult to assist with strengthening/mobility  Outcome: Progressing     Problem: PAIN - ADULT  Goal: Verbalizes/displays adequate comfort level or baseline comfort level  Description  Interventions:  - Encourage patient to monitor pain and request assistance  - Assess pain using appropriate pain scale  - Administer analgesics based on type and severity of pain and evaluate response  - Implement non-pharmacological measures as appropriate and evaluate response  - Consider cultural and social influences on pain and pain management  - Notify physician/advanced practitioner if interventions unsuccessful or patient reports new pain  Outcome: Progressing     Problem: INFECTION - ADULT  Goal: Absence or prevention of progression during hospitalization  Description  INTERVENTIONS:  - Assess and monitor for signs and symptoms of infection  - Monitor lab/diagnostic results  - Monitor all insertion sites, i e  indwelling lines, tubes, and drains  - Monitor endotracheal if appropriate and nasal secretions for changes in amount and color  - Warner appropriate cooling/warming therapies per order  - Administer medications as ordered  - Instruct and encourage patient and family to use good hand hygiene technique  - Identify and instruct in appropriate isolation precautions for identified infection/condition  Outcome: Progressing  Goal: Absence of fever/infection during neutropenic period  Description  INTERVENTIONS:  - Monitor WBC    Outcome: Progressing     Problem: SAFETY ADULT  Goal: Patient will remain free of falls  Description  INTERVENTIONS:  - Assess patient frequently for physical needs  -  Identify cognitive and physical deficits and behaviors that affect risk of falls    -  Clarita fall precautions as indicated by assessment   - Educate patient/family on patient safety including physical limitations  - Instruct patient to call for assistance with activity based on assessment  - Modify environment to reduce risk of injury  - Consider OT/PT consult to assist with strengthening/mobility  Outcome: Progressing  Goal: Maintain or return to baseline ADL function  Description  INTERVENTIONS:  -  Assess patient's ability to carry out ADLs; assess patient's baseline for ADL function and identify physical deficits which impact ability to perform ADLs (bathing, care of mouth/teeth, toileting, grooming, dressing, etc )  - Assess/evaluate cause of self-care deficits   - Assess range of motion  - Assess patient's mobility; develop plan if impaired  - Assess patient's need for assistive devices and provide as appropriate  - Encourage maximum independence but intervene and supervise when necessary  - Involve family in performance of ADLs  - Assess for home care needs following discharge   - Consider OT consult to assist with ADL evaluation and planning for discharge  - Provide patient education as appropriate  Outcome: Progressing  Goal: Maintain or return mobility status to optimal level  Description  INTERVENTIONS:  - Assess patient's baseline mobility status (ambulation, transfers, stairs, etc )    - Identify cognitive and physical deficits and behaviors that affect mobility  - Identify mobility aids required to assist with transfers and/or ambulation (gait belt, sit-to-stand, lift, walker, cane, etc )  - Clarita fall precautions as indicated by assessment  - Record patient progress and toleration of activity level on Mobility SBAR; progress patient to next Phase/Stage  - Instruct patient to call for assistance with activity based on assessment  - Consider rehabilitation consult to assist with strengthening/weightbearing, etc   Outcome: Progressing     Problem: DISCHARGE PLANNING  Goal: Discharge to home or other facility with appropriate resources  Description  INTERVENTIONS:  - Identify barriers to discharge w/patient and caregiver  - Arrange for needed discharge resources and transportation as appropriate  - Identify discharge learning needs (meds, wound care, etc )  - Arrange for interpretive services to assist at discharge as needed  - Refer to Case Management Department for coordinating discharge planning if the patient needs post-hospital services based on physician/advanced practitioner order or complex needs related to functional status, cognitive ability, or social support system  Outcome: Progressing     Problem: Knowledge Deficit  Goal: Patient/family/caregiver demonstrates understanding of disease process, treatment plan, medications, and discharge instructions  Description  Complete learning assessment and assess knowledge base    Interventions:  - Provide teaching at level of understanding  - Provide teaching via preferred learning methods  Outcome: Progressing

## 2019-12-28 NOTE — ASSESSMENT & PLAN NOTE
· No tele events noted  · Troponins negative  · Possibly secondary to alcohol intoxication  · Will continue IV fluids as needed  · Monitor electrolytes

## 2019-12-28 NOTE — H&P
H&P- Camila Barrett 1945, 69 y o  male MRN: [id number]    Unit/Bed#: -02 Encounter: [id number]    Primary Care Provider: Audi Benson MD   Date and time admitted to hospital: 12/27/2019  6:31 PM        * Syncope and collapse  Assessment & Plan  · Placed in observation telemetry  · Trend cardiac enzymes  · Repeat EKG in a m  · Patient recently had an outpatient stress test approximately 1 year ago as well as an echo he believes 6-8 months ago all these were reportedly negative  · Patient follows as an outpatient with Dr Jed Lao from Cardiology we last saw approximately 5 months ago for routine follow-up  · Likely source of his syncope is acute alcohol intoxication but patient does have significant cardiac risk factors as well as cardiac history and will consider cardiac consult in a m  Depending upon results  Acute alcoholic intoxication without complication [facility])  Assessment & Plan  Supportive care, will give Zofran p r n  And and hydrate with normal saline at 75 cc/hour    Type 2 diabetes mellitus without complication, without long-term current use of insulin [facility])  Assessment & Plan  Lab Results   Component Value Date    HGBA1C 8 3 (H) 11/22/2019       Recent Labs     12/27/19  2227   POCGLU 119       Blood Sugar Average: Last 72 hrs:  (P) 119     Will place on [facility] step 2 diet, hold pre-hospital oral antihyperglycemics, obtain Accu-Cheks AC and HS with Humalog correction dose a c  And HS    Chronic obstructive pulmonary disease with acute exacerbation (HCC)  Assessment & Plan  Continue pre-hospital Mucinex 600 mg p o  B i d  and give Duo nebs q 6 hours  Tremor  Assessment & Plan  Continue pre-hospital primidone 50 mg p o  Q 12 hours    Essential hypertension  Assessment & Plan  Continue pre-hospital Lopressor 25 mg p  O  B i d  And lisinopril 5 mg p o  Daily     Other hyperlipidemia  Assessment & Plan  Substitute Pravachol 80 mg p o   Daily for pre-hospital Crestor    Tobacco abuse  Assessment & Plan  Patient denies smoking on a regular basis and refused nicotine replacement      VTE Prophylaxis: Heparin  Code Status:  Level 1  POLST: There is no POLST form on file for this patient (pre-hospital)  Discussion with family:  None present at bedside at time of exam    Anticipated Length of Stay:  Patient will be admitted on an Observation basis with an anticipated length of stay of  < 2 midnights  Justification for Hospital Stay:  Syncope and collapse rule out cardiac origin requiring further cardiac evaluation, acute alcohol intoxication    Chief Complaint:   Syncope x1 this evening    History of Present Illness:    Jerald Geller is a 69 y o  male who presents with syncope x1 this evening  Patient was at the [facility] drinking beer when he had a syncopal episode while seated on the bar stool  Patient states that he had only drank 4-5 beers but then does state that he was previously at the [facility] before going to [facility]  Patient had a syncopal episode of unknown duration that was witnessed by bystanders while seated at the bar stool and patient has no recollection of events from the time the syncopal episode up until his arrival in the ER  Patient denies any preceding dizziness, lightheadedness, chest pain, palpitations or shortness of breath  Patient does have a significant cardiac history to include CABG x4 he believes 3 years ago and he follows with Dr Julius Garcia from Cardiology we last saw in routine follow-up approximately 5 months ago  Additionally patient states he had a stress test at the [facility] 1 year ago which was reportedly negative as well as an echocardiogram at Dr Barby Flores in his office 6-8 months ago once again also reportedly negative  Unfortunately last echocardiogram that I was able to find results of was in August of 2018 which showed an ejection fraction of 19-23% with diastolic dysfunction      Patient is resting comfortably in bed at time of exam, he denies any complaints and states he does not believe he hurt himself with this syncopal episode on physical exam there is a minor abrasion and on his posterior scalp  Review of Systems:  Review of Systems   Constitutional: Negative for chills and fever  Respiratory: Negative for cough, shortness of breath and wheezing  Cardiovascular: Negative for chest pain, palpitations and leg swelling  Gastrointestinal: Negative for diarrhea, nausea and vomiting  Genitourinary: Negative for dysuria, frequency, hematuria and urgency  Neurological: Positive for syncope  Negative for dizziness, seizures, weakness, light-headedness and headaches  All other systems reviewed and are negative  Past Medical and Surgical History:   Past Medical History:   Diagnosis Date    BPH (benign prostatic hyperplasia)     39 days radiation treatment    COPD (chronic obstructive pulmonary disease)     Coronary artery disease     CABG x4 in 2017    Diabetes mellitus     History of Arterial Duplex of LE 12/26/2017    Likely occlusion of the left superficial femoral artery  Calcific changes bilaterally  Despite these changes, the ankle-brachial index as a measure of peripheral blood flow only mildly impaired   History of echocardiogram 06/12/2017    EF 40%, mild LVH, mild MR     Hyperlipidemia     Hypertension     NSTEMI (non-ST elevated myocardial infarction)     Prostate cancer     prostate     PVD (peripheral vascular disease)     Tremor        Past Surgical History:   Procedure Laterality Date    CARDIAC CATHETERIZATION  03/08/2017    Significant left main plus triple-vessel CAD   CORONARY ARTERY BYPASS GRAFT  03/08/2017    4V CABG:  LIMA to LAD, VG to RI, SVG to PDA to LVBR RCA   PROSTATE BIOPSY         Meds/Allergies:  Prior to Admission medications    Medication Sig Start Date End Date Taking?  Authorizing Provider   albuterol (2 5 mg/3 mL) 0 083 % nebulizer solution Take 1 vial (2 5 mg total) by nebulization every 4 (four) hours as needed for wheezing or shortness of breath 6/24/18   Hernan Gómez MD   albuterol (PROVENTIL HFA,VENTOLIN HFA) 90 mcg/act inhaler Inhale 2 puffs every 6 (six) hours as needed for wheezing or shortness of breath    Historical Provider, MD   Alogliptin Benzoate 25 MG TABS Take by mouth daily    Historical Provider, MD   aspirin (ECOTRIN LOW STRENGTH) 81 mg EC tablet Take 81 mg by mouth every other day     Historical Provider, MD   Empagliflozin 10 MG TABS Take 10 mg by mouth every morning    Historical Provider, MD   gabapentin (NEURONTIN) 100 mg capsule Take 200 mg by mouth daily at bedtime    Historical Provider, MD   guaiFENesin (MUCINEX) 600 mg 12 hr tablet Take 1 tablet (600 mg total) by mouth 2 (two) times a day 9/21/19   PENG Chu   lisinopril (ZESTRIL) 5 mg tablet Take 1 tablet (5 mg total) by mouth daily 8/20/19   Matilde Montano PA-C   metFORMIN (GLUCOPHAGE) 500 mg tablet 1/2 a tablet in the morning and evening     Historical Provider, MD   metoprolol tartrate (LOPRESSOR) 50 mg tablet Take 25 mg by mouth 2 (two) times a day 3/16/17   Historical Provider, MD   predniSONE 20 mg tablet Take 2 tablets (40 mg total) by mouth daily [id number], [id number], [id number], 30nvc5vihp, then stop 11/23/19   Caridad Loredo MD   primidone (MYSOLINE) 50 mg tablet Take 2 tablets (100 mg total) by mouth every 12 (twelve) hours 10/23/19   Abbie Beck PA-C   rosuvastatin (CRESTOR) 10 MG tablet Take 10 mg by mouth daily    Historical Provider, MD   saxagliptin (ONGLYZA) 5 MG tablet Take 5 mg by mouth daily    Historical Provider, MD   tiotropium-olodaterol ([facility]) 2 5-2 5 MCG/ACT inhaler Inhale 2 puffs daily    Historical Provider, MD     I have reveiwed home medications using records provided by [facility]      Allergies: No Known Allergies    Social History:  Marital Status: /Civil Union   Occupation:  Retired from [address]  [address] Situation:  Resides at home with wife, grandson and grandson's kan  Patient Pre-hospital Level of Mobility:  Full with occasional use of a cane  Patient Pre-hospital Diet Restrictions:  None  Substance Use History:   Social History     Substance and Sexual Activity   Alcohol Use Yes    Comment: regularly      Social History     Tobacco Use   Smoking Status Current Every Day Smoker    Packs/day: 1 00    Types: Cigarettes    Last attempt to quit: 2/15/2019    Years since quittin 8   Smokeless Tobacco Never Used     Social History     Substance and Sexual Activity   Drug Use No       Family History:  I have reviewed the patients family history    Physical Exam:   Vitals:   Blood Pressure: (!) 186/85 (19)  Pulse: 91 (19)  Temperature: 98 °F (36 7 °C) (19)  Temp Source: Temporal (19)  Respirations: 22 (19)  Height: 5' 10" (177 8 cm) (19)  Weight - Scale: 77 1 kg (170 lb) (19)  SpO2: 97 % (19)    Physical Exam   Constitutional: He is oriented to person, place, and time  He appears well-developed and well-nourished  HENT:   Head: Normocephalic  Mouth/Throat: No oropharyngeal exudate  Mild abrasion approximately 5-6 cm in length on posterior scalp without bleeding that was present upon admission   Eyes: Pupils are equal, round, and reactive to light  EOM are normal  No scleral icterus  Neck: Normal range of motion  Neck supple  No JVD present  Cardiovascular: Normal rate, regular rhythm and normal heart sounds  No murmur heard  Pulmonary/Chest: Effort normal  No respiratory distress  He has decreased breath sounds  He has wheezes  He has no rhonchi  He has no rales  Abdominal: Soft  Bowel sounds are normal  There is no tenderness  There is no rebound and no guarding  Musculoskeletal: Normal range of motion  He exhibits no edema  Lymphadenopathy:     He has no cervical adenopathy     Neurological: He is alert and oriented to person, place, and time  Skin: Skin is warm and dry  No rash noted  No erythema  Psychiatric: He has a normal mood and affect  His behavior is normal    Nursing note and vitals reviewed  Additional Data:   Lab Results: I have personally reviewed pertinent reports  Results from last 7 days   Lab Units 12/27/19  1840   WBC Thousand/uL 4 70*   HEMOGLOBIN g/dL 15 0   HEMATOCRIT % 43 0   PLATELETS Thousands/uL 187   NEUTROS PCT % 58   LYMPHS PCT % 28   MONOS PCT % 8   EOS PCT % 6*     Results from last 7 days   Lab Units 12/27/19  1840   POTASSIUM mmol/L 4 1   CHLORIDE mmol/L 97*   CO2 mmol/L 26   BUN mg/dL 9   CREATININE mg/dL 0 84   CALCIUM mg/dL 9 1   ALK PHOS U/L 68   ALT U/L 16   AST U/L 15     Results from last 7 days   Lab Units 12/27/19  1840   INR  0 93     Results from last 7 days   Lab Units 12/27/19  2227   POC GLUCOSE mg/dl 119           Imaging: I have personally reviewed pertinent reports  CT cervical spine without contrast   Final Result by Jonathan Wagner MD (12/27 4341)      No cervical spine fracture or traumatic malalignment  Workstation performed: [id number]         CT head without contrast   Final Result by Jonathan Wagner MD (12/27 1920)      No acute intracranial abnormality  Microangiopathic changes  Workstation performed: [id number]         XR chest 2 views    (Results Pending)       EKG, Pathology, and Other Studies Reviewed on Admission:   · EKG:  Normal sinus rhythm at a rate of 78 with ST elevation in V5 and V6 which is chronic and present on previous EKG    [facility] / [facility] Records Reviewed: Yes     ** Please Note: This note has been constructed using a voice recognition system   **

## 2019-12-29 VITALS
DIASTOLIC BLOOD PRESSURE: 74 MMHG | RESPIRATION RATE: 18 BRPM | BODY MASS INDEX: 26.73 KG/M2 | OXYGEN SATURATION: 96 % | HEIGHT: 70 IN | WEIGHT: 186.73 LBS | TEMPERATURE: 97.1 F | SYSTOLIC BLOOD PRESSURE: 167 MMHG | HEART RATE: 78 BPM

## 2019-12-29 LAB
ANION GAP SERPL CALCULATED.3IONS-SCNC: 14 MMOL/L (ref 4–13)
BASOPHILS # BLD AUTO: 0 THOUSANDS/ΜL (ref 0–0.1)
BASOPHILS NFR BLD AUTO: 0 % (ref 0–2)
BUN SERPL-MCNC: 18 MG/DL (ref 7–25)
CALCIUM SERPL-MCNC: 9 MG/DL (ref 8.6–10.5)
CHLORIDE SERPL-SCNC: 100 MMOL/L (ref 98–107)
CO2 SERPL-SCNC: 21 MMOL/L (ref 21–31)
CREAT SERPL-MCNC: 0.77 MG/DL (ref 0.7–1.3)
EOSINOPHIL # BLD AUTO: 0 THOUSAND/ΜL (ref 0–0.61)
EOSINOPHIL NFR BLD AUTO: 0 % (ref 0–5)
ERYTHROCYTE [DISTWIDTH] IN BLOOD BY AUTOMATED COUNT: 13.4 % (ref 11.5–14.5)
GFR SERPL CREATININE-BSD FRML MDRD: 90 ML/MIN/1.73SQ M
GLUCOSE SERPL-MCNC: 174 MG/DL (ref 65–140)
GLUCOSE SERPL-MCNC: 175 MG/DL (ref 65–99)
GLUCOSE SERPL-MCNC: 298 MG/DL (ref 65–140)
HCT VFR BLD AUTO: 42.5 % (ref 42–47)
HGB BLD-MCNC: 14.4 G/DL (ref 14–18)
LYMPHOCYTES # BLD AUTO: 0.6 THOUSANDS/ΜL (ref 0.6–4.47)
LYMPHOCYTES NFR BLD AUTO: 13 % (ref 21–51)
MCH RBC QN AUTO: 30.8 PG (ref 26–34)
MCHC RBC AUTO-ENTMCNC: 33.8 G/DL (ref 31–37)
MCV RBC AUTO: 91 FL (ref 81–99)
MONOCYTES # BLD AUTO: 0.1 THOUSAND/ΜL (ref 0.17–1.22)
MONOCYTES NFR BLD AUTO: 2 % (ref 2–12)
NEUTROPHILS # BLD AUTO: 3.9 THOUSANDS/ΜL (ref 1.4–6.5)
NEUTS SEG NFR BLD AUTO: 85 % (ref 42–75)
PLATELET # BLD AUTO: 216 THOUSANDS/UL (ref 149–390)
PMV BLD AUTO: 9.3 FL (ref 8.6–11.7)
POTASSIUM SERPL-SCNC: 4.1 MMOL/L (ref 3.5–5.5)
RBC # BLD AUTO: 4.65 MILLION/UL (ref 4.3–5.9)
SODIUM SERPL-SCNC: 135 MMOL/L (ref 134–143)
WBC # BLD AUTO: 4.6 THOUSAND/UL (ref 4.8–10.8)

## 2019-12-29 PROCEDURE — 82948 REAGENT STRIP/BLOOD GLUCOSE: CPT

## 2019-12-29 PROCEDURE — 94640 AIRWAY INHALATION TREATMENT: CPT

## 2019-12-29 PROCEDURE — 85025 COMPLETE CBC W/AUTO DIFF WBC: CPT | Performed by: INTERNAL MEDICINE

## 2019-12-29 PROCEDURE — 94760 N-INVAS EAR/PLS OXIMETRY 1: CPT

## 2019-12-29 PROCEDURE — 80048 BASIC METABOLIC PNL TOTAL CA: CPT | Performed by: INTERNAL MEDICINE

## 2019-12-29 PROCEDURE — 99239 HOSP IP/OBS DSCHRG MGMT >30: CPT | Performed by: INTERNAL MEDICINE

## 2019-12-29 RX ORDER — LISINOPRIL 20 MG/1
20 TABLET ORAL DAILY
Qty: 30 TABLET | Refills: 0 | Status: SHIPPED | OUTPATIENT
Start: 2019-12-30 | End: 2020-07-23 | Stop reason: HOSPADM

## 2019-12-29 RX ORDER — PREDNISONE 10 MG/1
30 TABLET ORAL DAILY
Qty: 12 TABLET | Refills: 0 | Status: ON HOLD | OUTPATIENT
Start: 2019-12-29 | End: 2020-02-20 | Stop reason: SDUPTHER

## 2019-12-29 RX ADMIN — METHYLPREDNISOLONE SODIUM SUCCINATE 40 MG: 40 INJECTION, POWDER, FOR SOLUTION INTRAMUSCULAR; INTRAVENOUS at 06:15

## 2019-12-29 RX ADMIN — HEPARIN SODIUM 5000 UNITS: 5000 INJECTION INTRAVENOUS; SUBCUTANEOUS at 06:15

## 2019-12-29 RX ADMIN — INSULIN LISPRO 4 UNITS: 100 INJECTION, SOLUTION INTRAVENOUS; SUBCUTANEOUS at 11:57

## 2019-12-29 RX ADMIN — GUAIFENESIN 600 MG: 600 TABLET, EXTENDED RELEASE ORAL at 08:49

## 2019-12-29 RX ADMIN — INSULIN LISPRO 1 UNITS: 100 INJECTION, SOLUTION INTRAVENOUS; SUBCUTANEOUS at 08:48

## 2019-12-29 RX ADMIN — LISINOPRIL 20 MG: 20 TABLET ORAL at 08:48

## 2019-12-29 RX ADMIN — METOPROLOL TARTRATE 25 MG: 25 TABLET, FILM COATED ORAL at 08:49

## 2019-12-29 RX ADMIN — IPRATROPIUM BROMIDE AND ALBUTEROL SULFATE 3 ML: 2.5; .5 SOLUTION RESPIRATORY (INHALATION) at 01:46

## 2019-12-29 RX ADMIN — PRIMIDONE 100 MG: 50 TABLET ORAL at 08:49

## 2019-12-29 RX ADMIN — IPRATROPIUM BROMIDE AND ALBUTEROL SULFATE 3 ML: 2.5; .5 SOLUTION RESPIRATORY (INHALATION) at 07:09

## 2019-12-29 NOTE — ASSESSMENT & PLAN NOTE
Blood pressure was elevated  Lisinopril increased from 5 mg to 20 mg daily  Continue metoprolol  Follow up with PCP for further management

## 2019-12-29 NOTE — NURSING NOTE
hospitalist was in to see and assess the pt and he is ok for d/c to home, iv site removed with the tip intact and the tele monitor was removed, avs reviewed with the pt, all questions answered and the pt verbalized understanding, taken to family car via w/c by the pca and the pt was d/c'd from the hospital

## 2019-12-29 NOTE — PLAN OF CARE
Problem: Potential for Falls  Goal: Patient will remain free of falls  Description  INTERVENTIONS:  - Assess patient frequently for physical needs  -  Identify cognitive and physical deficits and behaviors that affect risk of falls    -  [place] fall precautions as indicated by assessment   - Educate patient/family on patient safety including physical limitations  - Instruct patient to call for assistance with activity based on assessment  - Modify environment to reduce risk of injury  - Consider OT/PT consult to assist with strengthening/mobility  Outcome: Progressing     Problem: PAIN - ADULT  Goal: Verbalizes/displays adequate comfort level or baseline comfort level  Description  Interventions:  - Encourage patient to monitor pain and request assistance  - Assess pain using appropriate pain scale  - Administer analgesics based on type and severity of pain and evaluate response  - Implement non-pharmacological measures as appropriate and evaluate response  - Consider cultural and social influences on pain and pain management  - Notify physician/advanced practitioner if interventions unsuccessful or patient reports new pain  Outcome: Progressing     Problem: INFECTION - ADULT  Goal: Absence or prevention of progression during hospitalization  Description  INTERVENTIONS:  - Assess and monitor for signs and symptoms of infection  - Monitor lab/diagnostic results  - Monitor all insertion sites, i e  indwelling lines, tubes, and drains  - Monitor endotracheal if appropriate and nasal secretions for changes in amount and color  - [place] appropriate cooling/warming therapies per order  - Administer medications as ordered  - Instruct and encourage patient and family to use good hand hygiene technique  - Identify and instruct in appropriate isolation precautions for identified infection/condition  Outcome: Progressing  Goal: Absence of fever/infection during neutropenic period  Description  INTERVENTIONS:  - Monitor WBC    Outcome: Progressing     Problem: SAFETY ADULT  Goal: Patient will remain free of falls  Description  INTERVENTIONS:  - Assess patient frequently for physical needs  -  Identify cognitive and physical deficits and behaviors that affect risk of falls    -  [place] fall precautions as indicated by assessment   - Educate patient/family on patient safety including physical limitations  - Instruct patient to call for assistance with activity based on assessment  - Modify environment to reduce risk of injury  - Consider OT/PT consult to assist with strengthening/mobility  Outcome: Progressing  Goal: Maintain or return to baseline ADL function  Description  INTERVENTIONS:  -  Assess patient's ability to carry out ADLs; assess patient's baseline for ADL function and identify physical deficits which impact ability to perform ADLs (bathing, care of mouth/teeth, toileting, grooming, dressing, etc )  - Assess/evaluate cause of self-care deficits   - Assess range of motion  - Assess patient's mobility; develop plan if impaired  - Assess patient's need for assistive devices and provide as appropriate  - Encourage maximum independence but intervene and supervise when necessary  - Involve family in performance of ADLs  - Assess for home care needs following discharge   - Consider OT consult to assist with ADL evaluation and planning for discharge  - Provide patient education as appropriate  Outcome: Progressing  Goal: Maintain or return mobility status to optimal level  Description  INTERVENTIONS:  - Assess patient's baseline mobility status (ambulation, transfers, stairs, etc )    - Identify cognitive and physical deficits and behaviors that affect mobility  - Identify mobility aids required to assist with transfers and/or ambulation (gait belt, sit-to-stand, lift, walker, cane, etc )  - [place] fall precautions as indicated by assessment  - Record patient progress and toleration of activity level on Mobility SBAR; progress patient to next Phase/Stage  - Instruct patient to call for assistance with activity based on assessment  - Consider rehabilitation consult to assist with strengthening/weightbearing, etc   Outcome: Progressing     Problem: DISCHARGE PLANNING  Goal: Discharge to home or other facility with appropriate resources  Description  INTERVENTIONS:  - Identify barriers to discharge w/patient and caregiver  - Arrange for needed discharge resources and transportation as appropriate  - Identify discharge learning needs (meds, wound care, etc )  - Arrange for interpretive services to assist at discharge as needed  - Refer to Case Management Department for coordinating discharge planning if the patient needs post-hospital services based on physician/advanced practitioner order or complex needs related to functional status, cognitive ability, or social support system  Outcome: Progressing     Problem: Knowledge Deficit  Goal: Patient/family/caregiver demonstrates understanding of disease process, treatment plan, medications, and discharge instructions  Description  Complete learning assessment and assess knowledge base    Interventions:  - Provide teaching at level of understanding  - Provide teaching via preferred learning methods  Outcome: Progressing

## 2019-12-29 NOTE — ASSESSMENT & PLAN NOTE
· Likely secondary to alcohol intoxication  · No further episodes while in the hospital  · Troponins negative  · No significant findings on telemetry monitor  · Electrolytes within normal limits

## 2019-12-29 NOTE — DISCHARGE SUMMARY
Discharge- Ilana Bajwa 1945, 69 y o  male MRN: [id number]    Unit/Bed#: -02 Encounter: [id number]    Primary Care Provider: Jong Wood MD   Date and time admitted to hospital: 12/27/2019  6:31 PM        * Syncope and collapse  Assessment & Plan  · Likely secondary to alcohol intoxication  · No further episodes while in the hospital  · Troponins negative  · No significant findings on telemetry monitor  · Electrolytes within normal limits    Chronic obstructive pulmonary disease with acute exacerbation (HCC)  Assessment & Plan  · Improved  · Will discharge on prednisone taper  · Continue home meds  · Follow up with PCP and pulmonology as outpatient  · Patient follows with Dr Shakila Gonzalez    Alcoholic intoxication without complication ([facility] )  Assessment & Plan  · Improved  · Counseled on alcohol cessation    Essential hypertension  Assessment & Plan  Blood pressure was elevated  Lisinopril increased from 5 mg to 20 mg daily  Continue metoprolol  Follow up with PCP for further management    Other hyperlipidemia  Assessment & Plan  Continue statin    Type 2 diabetes mellitus without complication, without long-term current use of insulin ([facility] )  Assessment & Plan  Continue home meds      Discharging Physician / Practitioner: Katie Sarmiento MD  PCP: Jong Wood MD  Admission Date:   Admission Orders (From admission, onward)     Ordered        12/28/19 1601  Inpatient Admission  Once         12/27/19 2131  Place in Observation (expected length of stay for this patient is less than two midnights)  Once                   Discharge Date: 12/29/19    Resolved Problems  Date Reviewed: 12/27/2019    None          Consultations During Hospital Stay:  · None    Procedures Performed:   · None    Significant Findings / Test Results:   · Alcohol intoxication and COPD exacerbation    Incidental Findings:   · None     Test Results Pending at Discharge (will require follow up):    · None     Outpatient Tests Requested:  · Routine labs with PCP    Complications:     None    Reason for Admission:  Alcohol intoxication    Hospital Course:     Brissa Staley is a 69 y o  male patient who originally presented to the hospital on 12/27/2019 due to syncope  Patient was monitored on tele with no acute events noted  As patient's alcohol intoxication improved he returned to baseline with no acute issues  Patient was complaining of shortness of breath and noted to have wheezing on exam   Patient was started on steroids and nebulizers  Patient did gradually improve  Patient was discharged on prednisone taper  During hospitalization patient was also noted to have elevated blood pressure for which lisinopril dosing was increased to 20 mg daily  Patient advised to follow up with PCP for further management of blood pressure  Patient was discharged on steroid taper and advised to follow up with pulmonology as outpatient  Please see above list of diagnoses and related plan for additional information  Condition at Discharge: stable     Discharge Day Visit / Exam:     Subjective:  No complaints at this time  Denies any shortness of breath  No lightheadedness or dizziness  No chest pain or shortness of breath  No palpitations  Vitals: Blood Pressure: 138/67 (12/29/19 0740)  Pulse: 82 (12/29/19 0740)  Temperature: (!) 96 6 °F (35 9 °C) (12/29/19 0740)  Temp Source: Temporal (12/29/19 0740)  Respirations: 18 (12/29/19 0740)  Height: 5' 10" (177 8 cm) (12/27/19 2227)  Weight - Scale: 84 7 kg (186 lb 11 7 oz) (12/29/19 0600)  SpO2: 93 % (12/29/19 0740)     Exam:   Physical Exam   Constitutional: He appears well-developed  No distress  HENT:   Head: Normocephalic and atraumatic  Eyes: Conjunctivae and EOM are normal    Neck: Normal range of motion  Neck supple  Cardiovascular: Normal rate and regular rhythm  Pulmonary/Chest: Effort normal  No respiratory distress     Mild bilateral expiratory wheeze   Abdominal: Soft  He exhibits no distension  There is no tenderness  Musculoskeletal: Normal range of motion  He exhibits no edema  Neurological: He is alert  No cranial nerve deficit  Skin: Skin is warm and dry  Psychiatric: He has a normal mood and affect  His behavior is normal        Discussion with Family:  No family available at bedside during my evaluation    Discharge instructions/Information to patient and family:   See after visit summary for information provided to patient and family  Provisions for Follow-Up Care:  See after visit summary for information related to follow-up care and any pertinent home health orders  Disposition:     Home    For Discharges to [facility] SNF:   · Not Applicable to this Patient - Not Applicable to this Patient    Planned Readmission:    no     Discharge Statement:  I spent 35 minutes discharging the patient  This time was spent on the day of discharge  I had direct contact with the patient on the day of discharge  Greater than 50% of the total time was spent examining patient, answering all patient questions, arranging and discussing plan of care with patient as well as directly providing post-discharge instructions  Additional time then spent on discharge activities  Discharge Medications:  See after visit summary for reconciled discharge medications provided to patient and family        ** Please Note: This note has been constructed using a voice recognition system **

## 2019-12-29 NOTE — ASSESSMENT & PLAN NOTE
· Improved  · Will discharge on prednisone taper  · Continue home meds  · Follow up with PCP and pulmonology as outpatient  · Patient follows with Dr Michael Grimaldo

## 2019-12-29 NOTE — DISCHARGE INSTRUCTIONS
Alcohol Intoxication   WHAT YOU NEED TO KNOW:   Alcohol intoxication is a harmful physical condition caused when you drink more alcohol than your body can handle  It is also called ethanol poisoning, or being drunk  DISCHARGE INSTRUCTIONS:   Medicine: You may be given medicine to manage the signs and symptoms of alcohol intoxication  Take your medicine as directed  Contact your healthcare provider if you think your medicine is not helping or if you have side effects  Tell him if you are allergic to any medicine  Keep a list of the medicines, vitamins, and herbs you take  Include the amounts, and when and why you take them  Bring the list or the pill bottles to follow-up visits  Keep the list with you in case of emergency  Follow up with your healthcare provider as directed:  Write down your questions so you remember to ask them during your visits  Limit or avoid alcohol:  Men should not have more than 2 drinks per day  Women should not have more than 1 drink per day  A drink is 12 ounces of beer, 5 ounces of wine, or 1½ ounces of liquor  Do not drive or operate machines when you drink alcohol:  Make sure you always have someone to drive you when you drink alcohol  For more information:   · Alcoholics Anonymous  Web Address: http://www [employer]/  Contact your healthcare provider if:   · You need help to stop drinking alcohol  · You have trouble with work or school because you drink too much alcohol  · You have physical or verbal fights because of alcohol  · You have questions or concerns about your condition or care  Return to the emergency department if:   · You have sudden trouble breathing or chest pain  · You have a seizure  · You feel sad enough to harm yourself or others  · You have hallucinations (you see or hear things that are not real)  · You cannot stop vomiting  · You were in an accident because of alcohol    © 2017 [employer] Information is for End User's use only and may not be sold, redistributed or otherwise used for commercial purposes  All illustrations and images included in CareNotes® are the copyrighted property of [employer]  or Basilio Cheng  The above information is an  only  It is not intended as medical advice for individual conditions or treatments  Talk to your doctor, nurse or pharmacist before following any medical regimen to see if it is safe and effective for you  Syncope   WHAT YOU NEED TO KNOW:   Syncope is also called fainting or passing out  Syncope is a sudden, temporary loss of consciousness, followed by a fall from a standing or sitting position  Syncope ranges from not serious to a sign of a more serious condition that needs to be treated  You can control some health conditions that cause syncope  Your healthcare providers can help you create a plan to manage syncope and prevent episodes  DISCHARGE INSTRUCTIONS:   Seek care immediately if:   · You are bleeding because you hit your head when you fainted  · You suddenly have double vision, difficulty speaking, numbness, and cannot move your arms or legs  · You have chest pain and trouble breathing  · You vomit blood or material that looks like coffee grounds  · You see blood in your bowel movement  Contact your healthcare provider if:   · You have new or worsening symptoms  · You have another syncope episode  · You have a headache, fast heartbeat, or feel too dizzy to stand up  · You have questions or concerns about your condition or care  Follow up with your healthcare provider as directed:  Write down your questions so you remember to ask them during your visits  Manage syncope:   · Keep a record of your syncope episodes  Include your symptoms and your activity before and after the episode   The record can help your healthcare provider find the cause of your syncope and help you manage episodes  · Sit or lie down when needed  This includes when you feel dizzy, your throat is getting tight, and your vision changes  Raise your legs above the level of your heart  · Take slow, deep breaths if you start to breathe faster with anxiety or fear  This can help decrease dizziness and the feeling that you might faint  · Check your blood pressure often  This is important if you take medicine to lower your blood pressure  Check your blood pressure when you are lying down and when you are standing  Ask how often to check during the day  Keep a record of your blood pressure numbers  Your healthcare provider may use the record to help plan your treatment  Prevent a syncope episode:   · Move slowly and let yourself get used to one position before you move to another position  This is very important when you change from a lying or sitting position to a standing position  Take some deep breaths before you stand up from a lying position  Stand up slowly  Sudden movements may cause a fainting spell  Sit on the side of the bed or couch for a few minutes before you stand up  If you are on bedrest, try to be upright for about 2 hours each day, or as directed  Do not lock your legs if you are standing for a long period of time  Move your legs and bend your knees to keep blood flowing  · Follow your healthcare provider's recommendations  Your provider may  recommend that you drink more liquids to prevent dehydration  You may also need to have more salt to keep your blood pressure from dropping too low and causing syncope  Your provider will tell you how much liquid and sodium to have each day  · Watch for signs of low blood sugar  These include hunger, nervousness, sweating, and fast or fluttery heartbeats  Talk with your healthcare provider about ways to keep your blood sugar level steady  · Do not strain if you are constipated  You may faint if you strain to have a bowel movement   Walking is the best way to get your bowels moving  Eat foods high in fiber to make it easier to have a bowel movement  Good examples are high-fiber cereals, beans, vegetables, and whole-grain breads  Prune juice may help make bowel movements softer  · Be careful in hot weather  Heat can cause a syncope episode  Limit activity done outside on hot days  Physical activity in hot weather can lead to dehydration  This can cause an episode  © 2017 [address] [facility] Information is for End User's use only and may not be sold, redistributed or otherwise used for commercial purposes  All illustrations and images included in CareNotes® are the copyrighted property of [employer]  or Basilio Cheng  The above information is an  only  It is not intended as medical advice for individual conditions or treatments  Talk to your doctor, nurse or pharmacist before following any medical regimen to see if it is safe and effective for you  Metoprolol (By mouth)   Metoprolol (met-oh-PROE-lol)  Treats high blood pressure, angina (chest pain), and heart failure  May lower the risk of death after a heart attack  This medicine is a beta-blocker  Brand Name(s): Lopressor, Toprol XL   There may be other brand names for this medicine  When This Medicine Should Not Be Used: This medicine is not right for everyone  Do not use if you had an allergic reaction to metoprolol or similar medicines  Do not use this medicine if you have certain blood circulation or heart problems  Ask your doctor about these problems  How to Use This Medicine:   Tablet, Long Acting Tablet  · Take your medicine as directed  Your dose may need to be changed several times to find what works best for you  · Take this medicine with a meal or right after a meal  Take this medicine the same way every day, at the same time  · Swallow the tablet whole with a glass of water   You may break the extended-release tablet in half, but do not chew or crush it  · Missed dose: Take a dose as soon as you remember  If it is almost time for your next dose, wait until then and take a regular dose  Do not take extra medicine to make up for a missed dose  · Store the medicine in a closed container at room temperature, away from heat, moisture, and direct light  Drugs and Foods to Avoid:   Ask your doctor or pharmacist before using any other medicine, including over-the-counter medicines, vitamins, and herbal products  · Some medicines can affect how metoprolol works  Tell your doctor if you are taking any of the following:   ¨ Digoxin, dipyridamole, hydralazine, hydroxychloroquine, methyldopa, quinidine  ¨ Medicine to treat depression (such as bupropion, clomipramine, desipramine, fluoxetine, fluvoxamine, paroxetine, sertraline), medicine to treat mental illness (such as chlorpromazine, fluphenazine, haloperidol, thioridazine), medicine for heart rhythm problems (such as propafenone), HIV/AIDS medicine (such as ritonavir), medicine to treat a fungus infection (such as terbinafine), a monoamine oxidase inhibitor (MAOI), an ergot medicine for headaches, a calcium channel blocker (such as amlodipine, diltiazem, verapamil), or an alpha blocker (such as clonidine, prazosin, reserpine, guanethidine)  Warnings While Using This Medicine:   · Tell your doctor if you are pregnant or breastfeeding, or if you have blood vessel, heart, or circulation problems (such as heart failure, rhythm problems, or slow heartbeat)  Tell your doctor if you have kidney disease, liver disease, diabetes, lung disease (such as asthma), an overactive thyroid, or a history of allergies  · This medicine may cause worse symptoms of heart failure while the dose is being adjusted  · Do not stop using this medicine suddenly  Your doctor will need to slowly decrease your dose before you stop it completely  · Tell any doctor or dentist who treats you that you are using this medicine   You may need to stop using this medicine several days before you have surgery or medical tests  · This medicine could lower your blood pressure too much, especially when you first use it or if you are dehydrated  Stand or sit up slowly if you feel lightheaded or dizzy  · This medicine may make you dizzy or drowsy  Do not drive or do anything else that could be dangerous until you know how this medicine affects you  · Your doctor will check your progress and the effects of this medicine at regular visits  Keep all appointments  · Keep all medicine out of the reach of children  Never share your medicine with anyone  Possible Side Effects While Using This Medicine:   Call your doctor right away if you notice any of these side effects:  · Allergic reaction: Itching or hives, swelling in your face or hands, swelling or tingling in your mouth or throat, chest tightness, trouble breathing  · Lightheadedness, dizziness, or fainting  · Slow heartbeat  · Swelling in your hands, ankles, or feet, trouble breathing, tiredness  · Worsening chest pain  If you notice these less serious side effects, talk with your doctor:   · Diarrhea  · Mild dizziness or tiredness  If you notice other side effects that you think are caused by this medicine, tell your doctor  Call your doctor for medical advice about side effects  You may report side effects to FDA at 2-303-FDA-0857  © 2017 [employer] [employer] Information is for End User's use only and may not be sold, redistributed or otherwise used for commercial purposes  The above information is an  only  It is not intended as medical advice for individual conditions or treatments  Talk to your doctor, nurse or pharmacist before following any medical regimen to see if it is safe and effective for you        Cigarette Smoking and Your Health   WHAT YOU NEED TO KNOW:   What are the risks to my health if I smoke tobacco?  Nicotine and other chemicals found in tobacco damage every cell in your body  Even if you are a light smoker, you have an increased risk for cancer, heart disease, and lung disease  If you are pregnant or have diabetes, smoking increases your risk for complications  What are the benefits to my health if I stop smoking? · You decrease respiratory symptoms such as coughing, wheezing, and shortness of breath  · You reduce your risk for cancers of the lung, mouth, throat, kidney, bladder, pancreas, stomach, and cervix  If you already have cancer, you increase the benefits of chemotherapy  You also reduce your risk for cancer returning or a second cancer from developing  · You reduce your risk for heart disease, blood clots, heart attack, and stroke  · You reduce your risk for lung infections, and diseases such as pneumonia, asthma, chronic bronchitis, and emphysema  · Your circulation improves  More oxygen can be delivered to your body  If you have diabetes, you lower your risk for complications, such as kidney, artery, and eye diseases  You also lower your risk for nerve damage  Nerve damage can lead to amputations, poor vision, and blindness  · You improve your body's ability to heal and to fight infections  What are the health benefits to others if I stop smoking? Tobacco is harmful to nonsmokers who breathe in your secondhand smoke  The following are ways the health of others around you may improve when you stop smoking:  · You lower the risks for lung cancer and heart disease in nonsmoking adults  · If you are pregnant, you lower the risk for miscarriage, early delivery, low birth weight, and stillbirth  You also lower your baby's risk for SIDS, obesity, developmental delay, and neurobehavioral problems, such as ADHD  · If you have children, you lower their risk for ear infections, colds, pneumonia, bronchitis, and asthma  Where can I find more information and support to stop smoking? · Smokefree  gov  Phone: 8- 780 - 721-4247  Web Address: www smokefree  gov  CARE AGREEMENT:   You have the right to help plan your care  Learn about your health condition and how it may be treated  Discuss treatment options with your caregivers to decide what care you want to receive  You always have the right to refuse treatment  The above information is an  only  It is not intended as medical advice for individual conditions or treatments  Talk to your doctor, nurse or pharmacist before following any medical regimen to see if it is safe and effective for you  © 2017 [employer] [employer]  Information is for End User's use only and may not be sold, redistributed or otherwise used for commercial purposes  All illustrations and images included in CareNotes® are the copyrighted property of [employer]  or Basilio Cheng  How to Stop Smoking   WHAT YOU NEED TO KNOW:   You will improve your health and the health of others around you if you stop smoking  Your risk for heart and lung disease, cancer, stroke, heart attack, and vision problems will also decrease  You can benefit from quitting no matter how long you have smoked  DISCHARGE INSTRUCTIONS:   Prepare to stop smoking:  Nicotine is a highly addictive drug found in cigarettes  Withdrawal symptoms can happen when you stop smoking and make it hard to quit  These include anxiety, depression, irritability, trouble sleeping, and increased appetite  You increase your chances of success if you prepare to quit  · Set a quit date  Dalton Martín a date that is within the next 2 weeks  Do not pick a day that you think may be stressful or busy  Write down the day or [place] it on your calender  · Tell friends and family that you plan to quit  Explain that you may have withdrawal symptoms when you try to quit  Ask them to support you  They may be able to encourage you and help reduce your stress to make it easier for you to quit  · Make a list of your reasons for quitting  Put the list somewhere you will see it every day, such as your refrigerator  You can look at the list when you have a craving  · Remove all tobacco and nicotine products from your home, car, and workplace  Also, remove anything else that will tempt you to smoke, such as lighters, matches, or ashtrays  Clean your car, home, and places at work that smell like smoke  The smell of smoke can trigger a craving  · Identify triggers that make you want to smoke  This may include activities, feelings, or people  Also write down 1 way you can deal with each of your triggers  For example, if you want to smoke as soon as you wake up, plan another activity during this time, such as exercise  · Make a plan for how you will quit  Learn about the tools that can help you quit, such as medicine, counseling, or nicotine replacement therapy  Choose at least 2 options to help you quit  Tools to help you stop smoking:   · Counseling  from a trained healthcare provider can provide you with support and skills to quit smoking  The provider will also teach you to manage your withdrawal symptoms and cravings  You may receive counseling from one counselor, in group therapy, or through phone therapy called a quit line  · Nicotine replacement therapy (NRT)  such as nicotine patches, gum, or lozenges may help reduce your nicotine cravings  You may get these without a doctor's order  Do not use e-cigarettes or smokeless tobacco in place of cigarettes or to help you quit  They still contain nicotine  · Prescription medicines  such as nasal sprays or nicotine inhalers may help reduce your withdrawal symptoms  Other medicines may also be used to reduce your urge to smoke  Ask your healthcare provider about these medicines  You may need to start certain medicines 2 weeks before your quit date for them to work well  · Hypnosis  is a practice that helps guide you through thoughts and feelings   Hypnosis may help decrease your cravings and make you more willing to quit  · Acupuncture therapy  uses very thin needles to balance energy channels in the body  This is thought to help decrease cravings and symptoms of nicotine withdrawal      · Support groups  let you talk to others who are trying to quit or have already quit  It may be helpful to speak with others about how they quit  Manage your cravings:   · Avoid situations, people, and places that tempt you to smoke  Go to nonsmoking places, such as libraries or restaurants  Understand what tempts you and try to avoid these things  · Keep your hands busy  Hold things such as a stress ball or pen  · Put candy or toothpicks in your mouth  Keep lollipops, sugarless gum, or toothpicks with you at all times  · Do not have alcohol or caffeine  These drinks may tempt you to smoke  Drink healthy liquids such as water or juice instead  · Reward yourself when you resist your cravings  Rewards will motivate you and help you stay positive  · Do an activity that distracts you from your craving  Examples include going for a walk, exercising, or cleaning  Prevent weight gain after you quit:  You may gain a few pounds after you quit smoking  It is healthier for you to gain a few pounds than to continue to smoke  The following can help you prevent weight gain:  · Eat healthy foods  These include fruits, vegetables, whole-grain breads, low-fat dairy products, beans, lean meats, and fish  Eat healthy snacks, such as low-fat yogurt, if you get hungry between meals  · Drink water before, during, and between meals  This will make your stomach feel full and help prevent you from overeating  Ask your healthcare provider how much liquid to drink each day and which liquids are best for you  · Exercise  Take a walk or do some kind of exercise every day  Ask your healthcare provider what exercise is right for you  This may help reduce your cravings and reduce stress    For support and more information:   · Smokefree  gov  Phone: 8- 477 - 188-1166  Web Address: www smokefree  gov  © 2017 [employer] [employer] Information is for End User's use only and may not be sold, redistributed or otherwise used for commercial purposes  All illustrations and images included in CareNotes® are the copyrighted property of [employer]  or Basilio Cheng  The above information is an  only  It is not intended as medical advice for individual conditions or treatments  Talk to your doctor, nurse or pharmacist before following any medical regimen to see if it is safe and effective for you

## 2019-12-29 NOTE — PLAN OF CARE
Problem: Potential for Falls  Goal: Patient will remain free of falls  Description  INTERVENTIONS:  - Assess patient frequently for physical needs  -  Identify cognitive and physical deficits and behaviors that affect risk of falls    -  [place] fall precautions as indicated by assessment   - Educate patient/family on patient safety including physical limitations  - Instruct patient to call for assistance with activity based on assessment  - Modify environment to reduce risk of injury  - Consider OT/PT consult to assist with strengthening/mobility  12/29/2019 1228 by Yvonne Jimenes RN  Outcome: Adequate for Discharge  12/29/2019 0905 by Yvonne Jimenes RN  Outcome: Progressing     Problem: PAIN - ADULT  Goal: Verbalizes/displays adequate comfort level or baseline comfort level  Description  Interventions:  - Encourage patient to monitor pain and request assistance  - Assess pain using appropriate pain scale  - Administer analgesics based on type and severity of pain and evaluate response  - Implement non-pharmacological measures as appropriate and evaluate response  - Consider cultural and social influences on pain and pain management  - Notify physician/advanced practitioner if interventions unsuccessful or patient reports new pain  12/29/2019 1228 by Yvonne Jimenes RN  Outcome: Adequate for Discharge  12/29/2019 0905 by Yvonne Jimenes RN  Outcome: Progressing     Problem: INFECTION - ADULT  Goal: Absence or prevention of progression during hospitalization  Description  INTERVENTIONS:  - Assess and monitor for signs and symptoms of infection  - Monitor lab/diagnostic results  - Monitor all insertion sites, i e  indwelling lines, tubes, and drains  - Monitor endotracheal if appropriate and nasal secretions for changes in amount and color  - [place] appropriate cooling/warming therapies per order  - Administer medications as ordered  - Instruct and encourage patient and family to use good hand hygiene technique  - Identify and instruct in appropriate isolation precautions for identified infection/condition  12/29/2019 1228 by Jak Johnson RN  Outcome: Adequate for Discharge  12/29/2019 0905 by Jak Johnson RN  Outcome: Progressing  Goal: Absence of fever/infection during neutropenic period  Description  INTERVENTIONS:  - Monitor WBC    12/29/2019 1228 by Jak Johnson RN  Outcome: Adequate for Discharge  12/29/2019 0905 by Jak Johnson RN  Outcome: Progressing     Problem: SAFETY ADULT  Goal: Patient will remain free of falls  Description  INTERVENTIONS:  - Assess patient frequently for physical needs  -  Identify cognitive and physical deficits and behaviors that affect risk of falls    -  [place] fall precautions as indicated by assessment   - Educate patient/family on patient safety including physical limitations  - Instruct patient to call for assistance with activity based on assessment  - Modify environment to reduce risk of injury  - Consider OT/PT consult to assist with strengthening/mobility  12/29/2019 1228 by Jak Johnson RN  Outcome: Adequate for Discharge  12/29/2019 0905 by Jak Johnson RN  Outcome: Progressing  Goal: Maintain or return to baseline ADL function  Description  INTERVENTIONS:  -  Assess patient's ability to carry out ADLs; assess patient's baseline for ADL function and identify physical deficits which impact ability to perform ADLs (bathing, care of mouth/teeth, toileting, grooming, dressing, etc )  - Assess/evaluate cause of self-care deficits   - Assess range of motion  - Assess patient's mobility; develop plan if impaired  - Assess patient's need for assistive devices and provide as appropriate  - Encourage maximum independence but intervene and supervise when necessary  - Involve family in performance of ADLs  - Assess for home care needs following discharge   - Consider OT consult to assist with ADL evaluation and planning for discharge  - Provide patient education as appropriate  12/29/2019 1228 by Maxime Cuirel RN  Outcome: Adequate for Discharge  12/29/2019 0905 by Maxime Curiel RN  Outcome: Progressing  Goal: Maintain or return mobility status to optimal level  Description  INTERVENTIONS:  - Assess patient's baseline mobility status (ambulation, transfers, stairs, etc )    - Identify cognitive and physical deficits and behaviors that affect mobility  - Identify mobility aids required to assist with transfers and/or ambulation (gait belt, sit-to-stand, lift, walker, cane, etc )  - [place] fall precautions as indicated by assessment  - Record patient progress and toleration of activity level on Mobility SBAR; progress patient to next Phase/Stage  - Instruct patient to call for assistance with activity based on assessment  - Consider rehabilitation consult to assist with strengthening/weightbearing, etc   12/29/2019 1228 by Maxime Curiel RN  Outcome: Adequate for Discharge  12/29/2019 0905 by Maxime Curiel RN  Outcome: Progressing     Problem: DISCHARGE PLANNING  Goal: Discharge to home or other facility with appropriate resources  Description  INTERVENTIONS:  - Identify barriers to discharge w/patient and caregiver  - Arrange for needed discharge resources and transportation as appropriate  - Identify discharge learning needs (meds, wound care, etc )  - Arrange for interpretive services to assist at discharge as needed  - Refer to Case Management Department for coordinating discharge planning if the patient needs post-hospital services based on physician/advanced practitioner order or complex needs related to functional status, cognitive ability, or social support system  12/29/2019 1228 by Maxime Curiel RN  Outcome: Adequate for Discharge  12/29/2019 0905 by Maxime Curiel RN  Outcome: Progressing     Problem: Knowledge Deficit  Goal: Patient/family/caregiver demonstrates understanding of disease process, treatment plan, medications, and discharge instructions  Description  Complete learning assessment and assess knowledge base    Interventions:  - Provide teaching at level of understanding  - Provide teaching via preferred learning methods  12/29/2019 1228 by Yulissa Garrison RN  Outcome: Adequate for Discharge  12/29/2019 0905 by Yulissa Garrison RN  Outcome: Progressing

## 2019-12-30 ENCOUNTER — APPOINTMENT (OUTPATIENT)
Dept: CARDIAC REHAB | Facility: HOSPITAL | Age: 74
End: 2019-12-30
Attending: INTERNAL MEDICINE
Payer: MEDICARE

## 2019-12-30 LAB
ATRIAL RATE: 78 BPM
ATRIAL RATE: 89 BPM
P AXIS: 78 DEGREES
P AXIS: 82 DEGREES
PR INTERVAL: 182 MS
PR INTERVAL: 188 MS
QRS AXIS: 60 DEGREES
QRS AXIS: 74 DEGREES
QRSD INTERVAL: 102 MS
QRSD INTERVAL: 108 MS
QT INTERVAL: 380 MS
QT INTERVAL: 408 MS
QTC INTERVAL: 462 MS
QTC INTERVAL: 465 MS
T WAVE AXIS: -73 DEGREES
T WAVE AXIS: 189 DEGREES
VENTRICULAR RATE: 78 BPM
VENTRICULAR RATE: 89 BPM

## 2019-12-30 PROCEDURE — 93010 ELECTROCARDIOGRAM REPORT: CPT | Performed by: INTERNAL MEDICINE

## 2019-12-31 ENCOUNTER — APPOINTMENT (OUTPATIENT)
Dept: CARDIAC REHAB | Facility: HOSPITAL | Age: 74
End: 2019-12-31
Attending: INTERNAL MEDICINE
Payer: MEDICARE

## 2020-01-01 ENCOUNTER — APPOINTMENT (OUTPATIENT)
Dept: CARDIAC REHAB | Facility: HOSPITAL | Age: 75
End: 2020-01-01
Attending: INTERNAL MEDICINE
Payer: MEDICARE

## 2020-01-02 ENCOUNTER — PATIENT OUTREACH (OUTPATIENT)
Dept: CASE MANAGEMENT | Facility: OTHER | Age: 75
End: 2020-01-02

## 2020-01-02 NOTE — PROGRESS NOTES
CM reached out to patient for hospital follow up and to introduce to bundle program   Patient states that he "does have difficulty breathing at times' , but resolves with rest   Patient also stated that he hasn't been complying with taking daily blood sugars or following diabetic diet  BPCI porgram explained to patient which he refused  CM offered to mail contact information and he agree

## 2020-01-03 ENCOUNTER — CLINICAL SUPPORT (OUTPATIENT)
Dept: CARDIAC REHAB | Facility: HOSPITAL | Age: 75
End: 2020-01-03
Attending: INTERNAL MEDICINE
Payer: MEDICARE

## 2020-01-03 DIAGNOSIS — I50.22 CHRONIC SYSTOLIC HEART FAILURE (HCC): ICD-10-CM

## 2020-01-03 PROCEDURE — 93798 PHYS/QHP OP CAR RHAB W/ECG: CPT

## 2020-01-06 ENCOUNTER — CLINICAL SUPPORT (OUTPATIENT)
Dept: CARDIAC REHAB | Facility: HOSPITAL | Age: 75
End: 2020-01-06
Attending: INTERNAL MEDICINE
Payer: MEDICARE

## 2020-01-06 DIAGNOSIS — I50.22 CHRONIC SYSTOLIC HEART FAILURE (HCC): ICD-10-CM

## 2020-01-06 PROCEDURE — 93798 PHYS/QHP OP CAR RHAB W/ECG: CPT

## 2020-01-08 ENCOUNTER — CLINICAL SUPPORT (OUTPATIENT)
Dept: CARDIAC REHAB | Facility: HOSPITAL | Age: 75
End: 2020-01-08
Attending: INTERNAL MEDICINE
Payer: MEDICARE

## 2020-01-08 DIAGNOSIS — I50.22 CHRONIC SYSTOLIC HEART FAILURE (HCC): ICD-10-CM

## 2020-01-08 PROCEDURE — 93798 PHYS/QHP OP CAR RHAB W/ECG: CPT

## 2020-01-08 NOTE — PROGRESS NOTES
Cardiac Rehabilitation Plan of Care   120 Day      Today's date: 2020   Visits:   Patient name: Jett Hendrix      : 1945  Age: 69 y o  MRN: [id number]  Referring Physician: Leon Holcomb MD  Cardiologist: aWtson Moore MD  Provider: Mike Mosley  Clinician: Verba Felty Cristino Coke, MPT    Dx:   Encounter Diagnosis   Name Primary?  Chronic systolic heart failure ([address] )      Date of onset: 2019      SUMMARY OF PROGRESS: Yesi Faustin has completed  visits at Cardiac Rehab  The patient states he is 100% medication compliant  Patient states that he does have neuropathy in both feet which may limit his exercise and affect his balance  At rest, the patient's HR are 68 and  his /56  His peak vitals are  and /60  His recovery vitals are HR 77 and /58  nhe rates his session a 4 on a 1-10 RPE Scale  His telemetry does reveal some ectopy which can be seen in the attached "Exercise Session Details " The patient has been doing well in the program and stated that he felt as though it was helping him to do more ADLs at home  Janie Galeano gives excellent effort entire session  His program will be progressed as he is able to tolerate  He has been attending the weekly education sessions  Medication compliance: Yes   Comments: Patient states he is 100% medication compliant     Fall Risk: Moderate   Comments: Patient uses a one point cane to aide when walking  Pt  Also has neuropathy in both feet which effects his balance as well       EKG changes: Frequent PVCs      EXERCISE ASSESSMENT and PLAN    Current Exercise Program in Rehab:       Frequency: 3-4 days/week        Minutes: 40-45       METS: 3 to 3 5   HR: 20-30 above RHR; 100-110   RPE: 4-6         Modalities: UBE, NuStep and Recumbent bike      Exercise Progression 30 Day Goals :    Frequency: 4-5 days/week   Minutes: 45-50   METS: 3 5-4 5   HR: 20-30 above RHR; 100-110   RPE: 4-6   Modalities: UBE, NuStep, Recumbent bike and Room walking    Strength trainin-3 days / week  12-15 repetitions  1-2 sets per modality   Will be added following 2-3 weeks of monitored exercise sessions   Modalities: Leg Press, Chest Press, Lateral Raise, Arm Curl, Seated Row and Front Raises    Progressing: In Progress    Home Exercise: None; Cardiac Rehab Staff did encourage patient to begin exercising at home once his COPD is more controlled  Goals: 10% improvement in functional capacity, Reduced Dyspnea with physical activity  0-10, improved DASI score by 10%, Increase in peak CR METs by 40%, Improved 6MWT distance by 10%, Resume ADLs with increased strength and Exercise 5 days/wk, >150mins/wk  Education: Benefit of exercise for CAD risk factors, home exercise guidelines, signs and sxs and RPE scale   Plan:education on home exercise guidelines, home exercise 30+ mins 2 days opposite CR and Education class: Risk Factors for Heart Disease  Readiness to change: Action      NUTRITION ASSESSMENT AND PLAN    Weight control:    Starting weight: 208 lb    Current weight:   204 lb  Waist circumference:    Startin in   Current:  42 in  Diabetes: Patient states he has diabetes but does not check his BG  Lipid management: Discussed diet and lipid management  Goals:reduced BMI to < 25, reduced waist circumference <40 inches, fasting BG , improved A1c  < 6 5%, Improved Rate Your Plate score  >75 and Wt  loss 1-2 ppw goal of 20 lbs  Education: heart healthy eating  low sodium diet  hydration  diet and lipid management  diabetes management and exercise  wt  loss  Progressing: In Progress  Plan: Education class: Reading Food Labels, Education Class: Heart Healthy Eating, Increase PUFA and MUFA, Decrease SFA, Increase whole grains, increase fruits/vegs, eliminate processed meats, reduce red meat 1x/wk, swtich to low fat dairy and Reduce added sugars <25g/day  Readiness to change: Action      PSYCHOSOCIAL ASSESSMENT AND PLAN    Emotional:  PHQ-9 = 1-4 = Minimal Depression  Self-reported stress level: 5   Social support: Very Good  Goals:  Reduce perceived stress to 1-3/10, PHQ-9 - reduced severity by one level, Feelings in [facility] Score < 3, Physical Fitness in [facility] Score < 3, Daily Activity in [facility] Score < 3, Social Activities in [facility] Score < 3, Pain in [facility] Score < 3, Quality of Life in [facility] Score < 3 , Change in Health in [place] Score < 3 , Increased interest in doing things, improved sleep, improved positive thoughts of well being and increased energy  Education: signs/sxs of depression, benefits of positive support system and coping mechanisms  Progressing: In Progress  Plan: Practice relaxation techniques  Readiness to change: Action    OTHER CORE COMPONENTS     Tobacco:   Social History     Tobacco Use   Smoking Status Current Every Day Smoker    Packs/day: 0 25    Types: Cigarettes    Last attempt to quit: 2/15/2019    Years since quittin 8   Smokeless Tobacco Never Used   Tobacco Comment    only smokes when he drinks beer       Tobacco Use Intervention: Referral to tobacco expert:   Brief counseling by cardiac rehab professional  Date: 2019    Blood pressure:    Restin/56   Exercise: 138/60   Recovery: 120/58    Goals: consistent BP < 130/80, consistent exercise >150 mins/wk, reduce number of cigarettes/day, set a target quit date and make contact with a tobacco use prevention program  Education:  understanding HTN and CAD, low sodium diet and HTN and smoking and heart disease  Progressing: In Progress  Plan: Class: Understanding Heart Disease, Class: Common Heart Medications, make contact with smoking cessation program and Set target quit date for smoking  Readiness to change: Action

## 2020-01-10 ENCOUNTER — CLINICAL SUPPORT (OUTPATIENT)
Dept: CARDIAC REHAB | Facility: HOSPITAL | Age: 75
End: 2020-01-10
Attending: INTERNAL MEDICINE
Payer: MEDICARE

## 2020-01-10 DIAGNOSIS — I50.22 CHRONIC SYSTOLIC HEART FAILURE (HCC): ICD-10-CM

## 2020-01-10 PROCEDURE — 93798 PHYS/QHP OP CAR RHAB W/ECG: CPT

## 2020-01-13 ENCOUNTER — CLINICAL SUPPORT (OUTPATIENT)
Dept: CARDIAC REHAB | Facility: HOSPITAL | Age: 75
End: 2020-01-13
Attending: INTERNAL MEDICINE
Payer: MEDICARE

## 2020-01-13 DIAGNOSIS — I50.22 CHRONIC SYSTOLIC HEART FAILURE (HCC): ICD-10-CM

## 2020-01-13 PROCEDURE — 93798 PHYS/QHP OP CAR RHAB W/ECG: CPT

## 2020-01-15 ENCOUNTER — CLINICAL SUPPORT (OUTPATIENT)
Dept: CARDIAC REHAB | Facility: HOSPITAL | Age: 75
End: 2020-01-15
Attending: INTERNAL MEDICINE
Payer: MEDICARE

## 2020-01-15 DIAGNOSIS — I50.22 CHRONIC SYSTOLIC HEART FAILURE (HCC): ICD-10-CM

## 2020-01-15 PROCEDURE — 93798 PHYS/QHP OP CAR RHAB W/ECG: CPT

## 2020-01-15 NOTE — PHYSICIAN ADVISOR
Current patient class: Inpatient  The patient is currently on Hospital Day: 3 at [address]      The patient was admitted to the hospital at [phone] on 12/28/19 for the following diagnosis:  Alcohol intoxication ([facility] [address] ) [F10 929]  Hyperlipidemia [E78 5]  Syncope [R55]  Hypertension [I10]  History of coronary artery disease [Z86 79]  Non-insulin dependent type 2 diabetes mellitus ([facility] [address] ) [E11 9]       There is documentation in the medical record of an expected length of stay of at least 2 midnights  The patient is therefore expected to satisfy the 2 midnight benchmark and given the 2 midnight presumption is appropriate for INPATIENT ADMISSION  Given this expectation of a satisfying stay, CMS instructs us that the patient is most often appropriate for inpatient admission under part A provided medical necessity is documented in the chart  After review of the relevant documentation, labs, vital signs and test results, the patient is appropriate for INPATIENT ADMISSION  Admission to the hospital as an inpatient is a complex decision making process which requires the practitioner to consider the patients presenting complaint, history and physical examination and all relevant testing  With this in mind, in this case, the patient was deemed appropriate for INPATIENT ADMISSION  After review of the documentation and testing available at the time of the admission I concur with this clinical determination of medical necessity  Rationale is as follows: The patient is a 69 yrs old Male who presented to the ED at 12/27/2019  6:31 PM with a chief complaint of Fall (Patient was drinking and had a syncopal episode and fell and hit his head, does not remember the event, states he drank "alot"  ) and Alcohol Intoxication     The patient was admitted with syncope, acute alcohol intoxication, DM, COPD   The plan of care includes telemetry monitoring, serial cardiac enzymes, echo, IV fluids, Zofran as needed, glucose monitoring  This patient is appropriate for INPATIENT admission; continued hospitalization is necessary to ensure stabilization of his clinical status  The patients vitals on arrival were ED Triage Vitals   Temperature Pulse Respirations Blood Pressure SpO2   12/27/19 1828 12/27/19 1827 12/27/19 1827 12/27/19 1828 12/27/19 1827   (!) 97 °F (36 1 °C) 80 16 (!) 191/80 99 %      Temp Source Heart Rate Source Patient Position - Orthostatic VS BP Location FiO2 (%)   12/27/19 1828 12/27/19 1827 12/27/19 1828 12/27/19 1828 --   Temporal Monitor Sitting Left arm       Pain Score       12/27/19 1827       No Pain           Past Medical History:   Diagnosis Date    BPH (benign prostatic hyperplasia)     45 days radiation treatment    COPD (chronic obstructive pulmonary disease)     Coronary artery disease     CABG x4 in 2017    Diabetes mellitus     History of Arterial Duplex of LE 12/26/2017    Likely occlusion of the left superficial femoral artery  Calcific changes bilaterally  Despite these changes, the ankle-brachial index as a measure of peripheral blood flow only mildly impaired   History of echocardiogram 06/12/2017    EF 40%, mild LVH, mild MR     Hyperlipidemia     Hypertension     NSTEMI (non-ST elevated myocardial infarction)     Prostate cancer     prostate     PVD (peripheral vascular disease)     Tremor      Past Surgical History:   Procedure Laterality Date    CARDIAC CATHETERIZATION  03/08/2017    Significant left main plus triple-vessel CAD   CORONARY ARTERY BYPASS GRAFT  03/08/2017    4V CABG:  LIMA to LAD, VG to RI, SVG to PDA to LVBR RCA      EYE SURGERY      shots in eye once a month @ the [address] have been placed to:   None    Vitals:    12/29/19 0600 12/29/19 0709 12/29/19 0740 12/29/19 1140   BP:   138/67 167/74   BP Location:   Right arm Right arm   Pulse:   82 78   Resp:   18 18   Temp:   (!) 96 6 °F (35 9 °C) (!) 97 1 °F (36 2 °C)   TempSrc:   Temporal Temporal   SpO2:  97% 93% 96%   Weight: 84 7 kg (186 lb 11 7 oz)      Height:           Most recent labs:    No results for input(s): WBC, HGB, HCT, PLT, K, NA, CALCIUM, BUN, CREATININE, LIPASE, AMYLASE, INR, TROPONINI, CKTOTAL, AST, ALT, ALKPHOS, BILITOT in the last 72 hours  Scheduled Meds:  Continuous Infusions:  No current facility-administered medications for this encounter  PRN Meds:      Surgical procedures (if appropriate):

## 2020-01-17 ENCOUNTER — CLINICAL SUPPORT (OUTPATIENT)
Dept: CARDIAC REHAB | Facility: HOSPITAL | Age: 75
End: 2020-01-17
Attending: INTERNAL MEDICINE
Payer: MEDICARE

## 2020-01-17 DIAGNOSIS — I50.22 CHRONIC SYSTOLIC HEART FAILURE (HCC): ICD-10-CM

## 2020-01-17 PROCEDURE — 93798 PHYS/QHP OP CAR RHAB W/ECG: CPT

## 2020-01-20 ENCOUNTER — CLINICAL SUPPORT (OUTPATIENT)
Dept: CARDIAC REHAB | Facility: HOSPITAL | Age: 75
End: 2020-01-20
Attending: INTERNAL MEDICINE
Payer: MEDICARE

## 2020-01-20 DIAGNOSIS — I50.22 CHRONIC SYSTOLIC HEART FAILURE (HCC): ICD-10-CM

## 2020-01-20 PROCEDURE — 93798 PHYS/QHP OP CAR RHAB W/ECG: CPT

## 2020-01-20 NOTE — PROGRESS NOTES
Cardiac Rehabilitation Plan of Care   120 Day (Final Discharge)      Today's date: 2020   Visits: 36/36  Patient name: Celia Jones      : 1945  Age: 69 y o  MRN: [id number]  Referring Physician: Jazmyn Boothe MD  Cardiologist: Rita Smiley MD  Provider: Soha Darden  Clinician: Dulce Maria Donovan MS, CCRP    Dx: CHF   Date of onset: 2019    SUMMARY OF PROGRESS: As of today, Parisa Reddy has completed 36/36 visits at Cardiac Rehab and is going to be graduating from the program today  The patient states he is 100% medication compliant  Patient states that he does have neuropathy in both feet which may limit his exercise and affect his balance  At rest, the patient's HR was 87 and  his /74  His peak vitals were HR of 106 and /82 on the Recumbent Bike  His recovery vitals are HR 93 and BP 98/68  Cy Him rates his session a "4-6" on a 1-10 RPE Scale  Catalinale Him does still get cramping in his legs while using the Recumbent Bike and the NuStep  For instance, the patient did have to stop at 11 minutes while using the Recumbent Bike rather than 15 minutes  His telemetry does reveal some ectopy which can be seen in the attached "Exercise Session Details " The patient has been doing well in the program and stated that he felt as though it was helping him to do more ADLs at home  Cy Chuck gives excellent effort entire session  His program will be progressed as he is able to tolerate  He has been attending the weekly education sessions  Cy Woods stated that he does not have any current plans to continue exercising but was encouraged to go to the gym and check into [employer]; he states he plans on looking into it  The patient was given a Home Exercise Program upon discharge  Medication compliance: Yes   Comments: Patient states he is 100% medication compliant     Fall Risk: Moderate   Comments: Patient uses a one point cane to aide when walking  Pt   Also has neuropathy in both feet which effects his balance as well  EKG changes: Frequent PVCs      EXERCISE ASSESSMENT and PLAN    Current Exercise Program in Rehab:       Frequency: 3-4 days/week        Minutes: 45-50      METS: 2 5 to 3 5   HR: 20-30 above RHR; 107-117   RPE: 4-6         Modalities: UBE, NuStep and Recumbent bike      Exercise Progression 30 Day Goals :    Frequency: 4-5 days/week   Minutes: 50-55   METS: 3 5-4 5   HR: 20-30 above RHR; 107-117   RPE: 4-6   Modalities: UBE, NuStep, Recumbent bike and Room walking    Strength trainin-3 days / week  12-15 repetitions  1-2 sets per modality   Will be added following 2-3 weeks of monitored exercise sessions   Modalities: Leg Press, Chest Press, Lateral Raise, Arm Curl, Seated Row and Front Raises    Progressing: In Progress    Home Exercise: None; Cardiac Rehab Staff did encourage patient to begin exercising at home once his COPD is more controlled  Goals: 10% improvement in functional capacity, Reduced Dyspnea with physical activity  0-1/10, improved DASI score by 10%, Increase in peak CR METs by 40%, Improved 6MWT distance by 10%, Resume ADLs with increased strength and Exercise 5 days/wk, >150mins/wk  Education: Benefit of exercise for CAD risk factors, home exercise guidelines, signs and sxs and RPE scale   Plan:education on home exercise guidelines, home exercise 30+ mins 2 days opposite CR and Education class: Risk Factors for Heart Disease  Readiness to change: Action      NUTRITION ASSESSMENT AND PLAN    Weight control:    Starting weight: 208 lb    Current weight:   204 lb  Waist circumference:    Startin in   Current:  42 in  Diabetes: Patient states he has diabetes but does not check his BG  Lipid management: Discussed diet and lipid management  Goals:reduced BMI to < 25, reduced waist circumference <40 inches, fasting BG , improved A1c  < 6 5%, Improved Rate Your Plate score  >61 and Wt  loss 1-2 ppw goal of 20 lbs    Education: heart healthy eating  low sodium diet  hydration  diet and lipid management  diabetes management and exercise  wt  loss  Progressing: In Progress  Plan: Education class: Reading Food Labels, Education Class: Heart Healthy Eating, Increase PUFA and MUFA, Decrease SFA, Increase whole grains, increase fruits/vegs, eliminate processed meats, reduce red meat 1x/wk, swtich to low fat dairy and Reduce added sugars <25g/day  Readiness to change: Action      PSYCHOSOCIAL ASSESSMENT AND PLAN    Emotional:  PHQ-9 = 1-4 = Minimal Depression  Self-reported stress level: 5   Social support: Very Good  Goals:  Reduce perceived stress to 1-3/10, PHQ-9 - reduced severity by one level, Feelings in [facility] Score < 3, Physical Fitness in [facility] Score < 3, Daily Activity in [facility] Score < 3, Social Activities in [facility] Score < 3, Pain in [facility] Score < 3, Quality of Life in [facility] Score < 3 , Change in Health in [place] Score < 3 , Increased interest in doing things, improved sleep, improved positive thoughts of well being and increased energy  Education: signs/sxs of depression, benefits of positive support system and coping mechanisms  Progressing: In Progress  Plan: Practice relaxation techniques  Readiness to change: Action    OTHER CORE COMPONENTS     Tobacco:   Social History     Tobacco Use   Smoking Status Current Every Day Smoker    Packs/day: 0 25    Types: Cigarettes    Last attempt to quit: 2/15/2019    Years since quittin 9   Smokeless Tobacco Never Used   Tobacco Comment    only smokes when he drinks beer       Tobacco Use Intervention: Referral to tobacco expert:   Brief counseling by cardiac rehab professional  Date: 2019    Blood pressure:    Restin/74   Exercise: 136/82   Recovery: 98/68    Goals: consistent BP < 130/80, consistent exercise >150 mins/wk, reduce number of cigarettes/day, set a target quit date and make contact with a tobacco use prevention program  Education:  understanding HTN and CAD, low sodium diet and HTN and smoking and heart disease  Progressing: In Progress  Plan: Class: Understanding Heart Disease, Class: Common Heart Medications, make contact with smoking cessation program and Set target quit date for smoking  Readiness to change: Action    OUTCOMES  Name: Charanjit Schmitz : 1945      Risk:     HIGH        Pre Post % change  Goal   Date: 2019     Physical           Sub Max ETT (mets) NA NA #VALUE! 10% increase   6MWT (feet) 280 800 185 7% 10% increase   [facility]SD Dyspnea Score 77 47 -39 0% 5 pt decrease   Supplemental O2 use (L) 0 0     [facility] AI (est  peak O2) 18 95 23 45 23 7%    COPD assessment Test (CAT) 27 18 -33 3% 2 pt decrease   Peak exercise CR/ ND (mets) 1 4 2 6 85 7% 40% increase   Emotional           PHQ 9  (> 10 refer to MD) 12 6 -50 0% 4 pt decrease   [facility] lower score = improvement     Total  31 26 -16 1% < 27   Feelings 3 2 -33 3% < 3   Physical fitness 5 4 -20 0% < 3   Social Support 2 3 150 0% < 3   Daily activities 4 3 -25 0% < 3   Social Activities 3 2 -33 3% < 3   Pain 4 3 -25 0% < 3   Overall Health 4 4 0 0% < 3   Quality of Life 3 3 0 0% < 3   Change in health 3 2 -33 3% < 3   Dietary           Rate your plate 43 43 8 7% > 58   Measurements           Weight 208 186 -10 6% 2 5-5%    BMI 27 9 26 79 -4 0% 19-25   Waist Circ  39 38 -2 6% < 40 M / < 35 F    BP left arm     (systolic) 169 902 80 2% < 140                              (diastolic) 64 80 82 5% < 90   Smoking #/day (if applicable) 0 0 #DIV/0! 0   Lipids/ Glucose (Date) 201919 #VALUE!      Total cholesterol NA NA #VALUE!    Triglycerides NA NA #VALUE! < 150   HDL NA NA #VALUE! 40-60   LDL NA NA #VALUE! < 100   A1C % 8 1 8 2 1 2% 4 0 - 5 6%   Fasting  NA #VALUE!           Physician signature: _______________________ _________________     Date:

## 2020-01-22 ENCOUNTER — APPOINTMENT (OUTPATIENT)
Dept: CARDIAC REHAB | Facility: HOSPITAL | Age: 75
End: 2020-01-22
Attending: INTERNAL MEDICINE
Payer: MEDICARE

## 2020-01-24 ENCOUNTER — APPOINTMENT (OUTPATIENT)
Dept: CARDIAC REHAB | Facility: HOSPITAL | Age: 75
End: 2020-01-24
Attending: INTERNAL MEDICINE
Payer: MEDICARE

## 2020-01-27 ENCOUNTER — APPOINTMENT (OUTPATIENT)
Dept: CARDIAC REHAB | Facility: HOSPITAL | Age: 75
End: 2020-01-27
Attending: INTERNAL MEDICINE
Payer: MEDICARE

## 2020-01-29 ENCOUNTER — APPOINTMENT (OUTPATIENT)
Dept: CARDIAC REHAB | Facility: HOSPITAL | Age: 75
End: 2020-01-29
Attending: INTERNAL MEDICINE
Payer: MEDICARE

## 2020-01-31 ENCOUNTER — APPOINTMENT (OUTPATIENT)
Dept: CARDIAC REHAB | Facility: HOSPITAL | Age: 75
End: 2020-01-31
Attending: INTERNAL MEDICINE
Payer: MEDICARE

## 2020-02-19 ENCOUNTER — HOSPITAL ENCOUNTER (OUTPATIENT)
Facility: HOSPITAL | Age: 75
Setting detail: OBSERVATION
Discharge: HOME | End: 2020-02-20
Attending: EMERGENCY MEDICINE | Admitting: INTERNAL MEDICINE
Payer: MEDICARE

## 2020-02-19 ENCOUNTER — APPOINTMENT (EMERGENCY)
Dept: RADIOLOGY | Facility: HOSPITAL | Age: 75
End: 2020-02-19
Payer: MEDICARE

## 2020-02-19 DIAGNOSIS — J44.1 CHRONIC OBSTRUCTIVE PULMONARY DISEASE WITH ACUTE EXACERBATION (HCC): Chronic | ICD-10-CM

## 2020-02-19 DIAGNOSIS — J44.1 COPD EXACERBATION (HCC): Primary | ICD-10-CM

## 2020-02-19 PROBLEM — F10.920 ALCOHOLIC INTOXICATION WITHOUT COMPLICATION (HCC): Status: RESOLVED | Noted: 2019-12-27 | Resolved: 2020-02-19

## 2020-02-19 PROBLEM — R55 SYNCOPE AND COLLAPSE: Status: RESOLVED | Noted: 2019-12-27 | Resolved: 2020-02-19

## 2020-02-19 LAB
ALBUMIN SERPL BCP-MCNC: 4.2 G/DL (ref 3.5–5.7)
ALP SERPL-CCNC: 73 U/L (ref 55–165)
ALT SERPL W P-5'-P-CCNC: 11 U/L (ref 7–52)
ANION GAP SERPL CALCULATED.3IONS-SCNC: 10 MMOL/L (ref 4–13)
AST SERPL W P-5'-P-CCNC: 11 U/L (ref 13–39)
ATRIAL RATE: 92 BPM
BASOPHILS # BLD AUTO: 0.1 THOUSANDS/ΜL (ref 0–0.1)
BASOPHILS NFR BLD AUTO: 1 % (ref 0–2)
BILIRUB SERPL-MCNC: 0.3 MG/DL (ref 0.2–1)
BNP SERPL-MCNC: 54 PG/ML (ref 1–100)
BUN SERPL-MCNC: 21 MG/DL (ref 7–25)
CALCIUM SERPL-MCNC: 8.8 MG/DL (ref 8.6–10.5)
CHLORIDE SERPL-SCNC: 102 MMOL/L (ref 98–107)
CO2 SERPL-SCNC: 26 MMOL/L (ref 21–31)
CREAT SERPL-MCNC: 1.03 MG/DL (ref 0.7–1.3)
EOSINOPHIL # BLD AUTO: 0.8 THOUSAND/ΜL (ref 0–0.61)
EOSINOPHIL NFR BLD AUTO: 9 % (ref 0–5)
ERYTHROCYTE [DISTWIDTH] IN BLOOD BY AUTOMATED COUNT: 14.5 % (ref 11.5–14.5)
FLUAV RNA NPH QL NAA+PROBE: NORMAL
FLUBV RNA NPH QL NAA+PROBE: NORMAL
GFR SERPL CREATININE-BSD FRML MDRD: 72 ML/MIN/1.73SQ M
GLUCOSE SERPL-MCNC: 113 MG/DL (ref 65–140)
GLUCOSE SERPL-MCNC: 138 MG/DL (ref 65–140)
GLUCOSE SERPL-MCNC: 141 MG/DL (ref 65–99)
GLUCOSE SERPL-MCNC: 144 MG/DL (ref 65–140)
GLUCOSE SERPL-MCNC: 182 MG/DL (ref 65–140)
HCT VFR BLD AUTO: 40.7 % (ref 42–47)
HGB BLD-MCNC: 13.4 G/DL (ref 14–18)
LYMPHOCYTES # BLD AUTO: 1.2 THOUSANDS/ΜL (ref 0.6–4.47)
LYMPHOCYTES NFR BLD AUTO: 14 % (ref 21–51)
MAGNESIUM SERPL-MCNC: 2.3 MG/DL (ref 1.9–2.7)
MCH RBC QN AUTO: 29.9 PG (ref 26–34)
MCHC RBC AUTO-ENTMCNC: 32.9 G/DL (ref 31–37)
MCV RBC AUTO: 91 FL (ref 81–99)
MONOCYTES # BLD AUTO: 0.6 THOUSAND/ΜL (ref 0.17–1.22)
MONOCYTES NFR BLD AUTO: 7 % (ref 2–12)
NEUTROPHILS # BLD AUTO: 6 THOUSANDS/ΜL (ref 1.4–6.5)
NEUTS SEG NFR BLD AUTO: 68 % (ref 42–75)
P AXIS: 66 DEGREES
PLATELET # BLD AUTO: 317 THOUSANDS/UL (ref 149–390)
PMV BLD AUTO: 8.3 FL (ref 8.6–11.7)
POTASSIUM SERPL-SCNC: 4 MMOL/L (ref 3.5–5.5)
PR INTERVAL: 172 MS
PROCALCITONIN SERPL-MCNC: 0.13 NG/ML
PROT SERPL-MCNC: 7.3 G/DL (ref 6.4–8.9)
QRS AXIS: 64 DEGREES
QRSD INTERVAL: 98 MS
QT INTERVAL: 384 MS
QTC INTERVAL: 474 MS
RBC # BLD AUTO: 4.47 MILLION/UL (ref 4.3–5.9)
RSV RNA NPH QL NAA+PROBE: NORMAL
SODIUM SERPL-SCNC: 138 MMOL/L (ref 134–143)
T WAVE AXIS: 41 DEGREES
TROPONIN I SERPL-MCNC: <0.03 NG/ML
VENTRICULAR RATE: 92 BPM
WBC # BLD AUTO: 8.8 THOUSAND/UL (ref 4.8–10.8)

## 2020-02-19 PROCEDURE — 94640 AIRWAY INHALATION TREATMENT: CPT | Performed by: EMERGENCY MEDICINE

## 2020-02-19 PROCEDURE — 85025 COMPLETE CBC W/AUTO DIFF WBC: CPT | Performed by: EMERGENCY MEDICINE

## 2020-02-19 PROCEDURE — 87631 RESP VIRUS 3-5 TARGETS: CPT | Performed by: EMERGENCY MEDICINE

## 2020-02-19 PROCEDURE — 93005 ELECTROCARDIOGRAM TRACING: CPT

## 2020-02-19 PROCEDURE — 96375 TX/PRO/DX INJ NEW DRUG ADDON: CPT

## 2020-02-19 PROCEDURE — 80053 COMPREHEN METABOLIC PANEL: CPT | Performed by: EMERGENCY MEDICINE

## 2020-02-19 PROCEDURE — 82948 REAGENT STRIP/BLOOD GLUCOSE: CPT

## 2020-02-19 PROCEDURE — 94640 AIRWAY INHALATION TREATMENT: CPT

## 2020-02-19 PROCEDURE — 83735 ASSAY OF MAGNESIUM: CPT | Performed by: EMERGENCY MEDICINE

## 2020-02-19 PROCEDURE — 96365 THER/PROPH/DIAG IV INF INIT: CPT

## 2020-02-19 PROCEDURE — 99285 EMERGENCY DEPT VISIT HI MDM: CPT

## 2020-02-19 PROCEDURE — 71045 X-RAY EXAM CHEST 1 VIEW: CPT

## 2020-02-19 PROCEDURE — 99285 EMERGENCY DEPT VISIT HI MDM: CPT | Performed by: EMERGENCY MEDICINE

## 2020-02-19 PROCEDURE — 94760 N-INVAS EAR/PLS OXIMETRY 1: CPT

## 2020-02-19 PROCEDURE — 84145 PROCALCITONIN (PCT): CPT | Performed by: EMERGENCY MEDICINE

## 2020-02-19 PROCEDURE — 84484 ASSAY OF TROPONIN QUANT: CPT | Performed by: EMERGENCY MEDICINE

## 2020-02-19 PROCEDURE — 36415 COLL VENOUS BLD VENIPUNCTURE: CPT | Performed by: EMERGENCY MEDICINE

## 2020-02-19 PROCEDURE — 93010 ELECTROCARDIOGRAM REPORT: CPT | Performed by: INTERNAL MEDICINE

## 2020-02-19 PROCEDURE — 83880 ASSAY OF NATRIURETIC PEPTIDE: CPT | Performed by: EMERGENCY MEDICINE

## 2020-02-19 PROCEDURE — 99220 PR INITIAL OBSERVATION CARE/DAY 70 MINUTES: CPT | Performed by: PHYSICIAN ASSISTANT

## 2020-02-19 PROCEDURE — 94644 CONT INHLJ TX 1ST HOUR: CPT

## 2020-02-19 RX ORDER — METHYLPREDNISOLONE SODIUM SUCCINATE 125 MG/2ML
60 INJECTION, POWDER, LYOPHILIZED, FOR SOLUTION INTRAMUSCULAR; INTRAVENOUS ONCE
Status: COMPLETED | OUTPATIENT
Start: 2020-02-19 | End: 2020-02-19

## 2020-02-19 RX ORDER — ALBUTEROL SULFATE 2.5 MG/3ML
2.5 SOLUTION RESPIRATORY (INHALATION) EVERY 4 HOURS PRN
Status: DISCONTINUED | OUTPATIENT
Start: 2020-02-19 | End: 2020-02-20 | Stop reason: HOSPADM

## 2020-02-19 RX ORDER — SODIUM CHLORIDE FOR INHALATION 0.9 %
3 VIAL, NEBULIZER (ML) INHALATION ONCE
Status: COMPLETED | OUTPATIENT
Start: 2020-02-19 | End: 2020-02-19

## 2020-02-19 RX ORDER — LISINOPRIL 20 MG/1
20 TABLET ORAL DAILY
Status: DISCONTINUED | OUTPATIENT
Start: 2020-02-19 | End: 2020-02-20 | Stop reason: HOSPADM

## 2020-02-19 RX ORDER — MAGNESIUM SULFATE 1 G/100ML
1 INJECTION INTRAVENOUS ONCE
Status: COMPLETED | OUTPATIENT
Start: 2020-02-19 | End: 2020-02-19

## 2020-02-19 RX ORDER — SODIUM CHLORIDE FOR INHALATION 0.9 %
3 VIAL, NEBULIZER (ML) INHALATION
Status: DISCONTINUED | OUTPATIENT
Start: 2020-02-19 | End: 2020-02-20 | Stop reason: HOSPADM

## 2020-02-19 RX ORDER — METHYLPREDNISOLONE SODIUM SUCCINATE 40 MG/ML
40 INJECTION, POWDER, LYOPHILIZED, FOR SOLUTION INTRAMUSCULAR; INTRAVENOUS EVERY 8 HOURS
Status: DISCONTINUED | OUTPATIENT
Start: 2020-02-19 | End: 2020-02-20 | Stop reason: HOSPADM

## 2020-02-19 RX ORDER — LEVALBUTEROL 1.25 MG/.5ML
1.25 SOLUTION, CONCENTRATE RESPIRATORY (INHALATION)
Status: DISCONTINUED | OUTPATIENT
Start: 2020-02-19 | End: 2020-02-20 | Stop reason: HOSPADM

## 2020-02-19 RX ORDER — ASPIRIN 81 MG/1
81 TABLET ORAL EVERY OTHER DAY
Status: DISCONTINUED | OUTPATIENT
Start: 2020-02-19 | End: 2020-02-20 | Stop reason: HOSPADM

## 2020-02-19 RX ORDER — PRAVASTATIN SODIUM 40 MG
80 TABLET ORAL
Status: DISCONTINUED | OUTPATIENT
Start: 2020-02-19 | End: 2020-02-20 | Stop reason: HOSPADM

## 2020-02-19 RX ORDER — AZITHROMYCIN 250 MG/1
500 TABLET, FILM COATED ORAL EVERY 24 HOURS
Status: DISCONTINUED | OUTPATIENT
Start: 2020-02-19 | End: 2020-02-20 | Stop reason: HOSPADM

## 2020-02-19 RX ORDER — GABAPENTIN 100 MG/1
200 CAPSULE ORAL
Status: DISCONTINUED | OUTPATIENT
Start: 2020-02-19 | End: 2020-02-20 | Stop reason: HOSPADM

## 2020-02-19 RX ORDER — GUAIFENESIN 600 MG
600 TABLET, EXTENDED RELEASE 12 HR ORAL 2 TIMES DAILY
Status: DISCONTINUED | OUTPATIENT
Start: 2020-02-19 | End: 2020-02-20 | Stop reason: HOSPADM

## 2020-02-19 RX ORDER — METHYLPREDNISOLONE SODIUM SUCCINATE 125 MG/2ML
60 INJECTION, POWDER, LYOPHILIZED, FOR SOLUTION INTRAMUSCULAR; INTRAVENOUS ONCE
Status: CANCELLED | OUTPATIENT
Start: 2020-02-19 | End: 2020-02-19

## 2020-02-19 RX ORDER — PRIMIDONE 50 MG/1
100 TABLET ORAL EVERY 12 HOURS SCHEDULED
Status: DISCONTINUED | OUTPATIENT
Start: 2020-02-19 | End: 2020-02-20 | Stop reason: HOSPADM

## 2020-02-19 RX ADMIN — INSULIN LISPRO 5 UNITS: 100 INJECTION, SOLUTION INTRAVENOUS; SUBCUTANEOUS at 07:55

## 2020-02-19 RX ADMIN — METHYLPREDNISOLONE SODIUM SUCCINATE 40 MG: 40 INJECTION, POWDER, FOR SOLUTION INTRAMUSCULAR; INTRAVENOUS at 17:28

## 2020-02-19 RX ADMIN — PRIMIDONE 100 MG: 50 TABLET ORAL at 21:11

## 2020-02-19 RX ADMIN — PRIMIDONE 100 MG: 50 TABLET ORAL at 12:32

## 2020-02-19 RX ADMIN — ISODIUM CHLORIDE 3 ML: 0.03 SOLUTION RESPIRATORY (INHALATION) at 13:39

## 2020-02-19 RX ADMIN — INSULIN LISPRO 1 UNITS: 100 INJECTION, SOLUTION INTRAVENOUS; SUBCUTANEOUS at 12:32

## 2020-02-19 RX ADMIN — LEVALBUTEROL HYDROCHLORIDE 1.25 MG: 1.25 SOLUTION, CONCENTRATE RESPIRATORY (INHALATION) at 21:48

## 2020-02-19 RX ADMIN — MAGNESIUM SULFATE HEPTAHYDRATE 1 G: 1 INJECTION, SOLUTION INTRAVENOUS at 03:32

## 2020-02-19 RX ADMIN — ALBUTEROL SULFATE 10 MG: 2.5 SOLUTION RESPIRATORY (INHALATION) at 03:45

## 2020-02-19 RX ADMIN — METOPROLOL TARTRATE 25 MG: 25 TABLET ORAL at 08:58

## 2020-02-19 RX ADMIN — GUAIFENESIN 600 MG: 600 TABLET, EXTENDED RELEASE ORAL at 08:58

## 2020-02-19 RX ADMIN — LISINOPRIL 20 MG: 20 TABLET ORAL at 08:58

## 2020-02-19 RX ADMIN — GUAIFENESIN 600 MG: 600 TABLET, EXTENDED RELEASE ORAL at 17:29

## 2020-02-19 RX ADMIN — INSULIN LISPRO 5 UNITS: 100 INJECTION, SOLUTION INTRAVENOUS; SUBCUTANEOUS at 12:32

## 2020-02-19 RX ADMIN — METOPROLOL TARTRATE 25 MG: 25 TABLET ORAL at 17:29

## 2020-02-19 RX ADMIN — METHYLPREDNISOLONE SODIUM SUCCINATE 60 MG: 125 INJECTION, POWDER, FOR SOLUTION INTRAMUSCULAR; INTRAVENOUS at 03:27

## 2020-02-19 RX ADMIN — LEVALBUTEROL HYDROCHLORIDE 1.25 MG: 1.25 SOLUTION, CONCENTRATE RESPIRATORY (INHALATION) at 09:20

## 2020-02-19 RX ADMIN — ISODIUM CHLORIDE 3 ML: 0.03 SOLUTION RESPIRATORY (INHALATION) at 03:46

## 2020-02-19 RX ADMIN — ENOXAPARIN SODIUM 40 MG: 40 INJECTION SUBCUTANEOUS at 08:58

## 2020-02-19 RX ADMIN — METHYLPREDNISOLONE SODIUM SUCCINATE 40 MG: 40 INJECTION, POWDER, FOR SOLUTION INTRAMUSCULAR; INTRAVENOUS at 07:48

## 2020-02-19 RX ADMIN — ISODIUM CHLORIDE 3 ML: 0.03 SOLUTION RESPIRATORY (INHALATION) at 09:20

## 2020-02-19 RX ADMIN — IPRATROPIUM BROMIDE 0.5 MG: 0.5 SOLUTION RESPIRATORY (INHALATION) at 03:45

## 2020-02-19 RX ADMIN — INSULIN LISPRO 5 UNITS: 100 INJECTION, SOLUTION INTRAVENOUS; SUBCUTANEOUS at 17:28

## 2020-02-19 RX ADMIN — AZITHROMYCIN 500 MG: 250 TABLET, FILM COATED ORAL at 05:23

## 2020-02-19 RX ADMIN — ASPIRIN 81 MG: 81 TABLET ORAL at 08:58

## 2020-02-19 RX ADMIN — PRAVASTATIN SODIUM 80 MG: 40 TABLET ORAL at 17:29

## 2020-02-19 RX ADMIN — LEVALBUTEROL HYDROCHLORIDE 1.25 MG: 1.25 SOLUTION, CONCENTRATE RESPIRATORY (INHALATION) at 13:39

## 2020-02-19 RX ADMIN — ISODIUM CHLORIDE 3 ML: 0.03 SOLUTION RESPIRATORY (INHALATION) at 21:48

## 2020-02-19 RX ADMIN — INSULIN DETEMIR 10 UNITS: 100 INJECTION, SOLUTION SUBCUTANEOUS at 07:46

## 2020-02-19 RX ADMIN — GABAPENTIN 200 MG: 100 CAPSULE ORAL at 21:11

## 2020-02-19 NOTE — ED NOTES
Magnesium stopped at this time per Dr Megan Dodge    50 ml of 100 ml bag (1/2 gram infused )     Shivam Espana RN  02/19/20 0534

## 2020-02-19 NOTE — PLAN OF CARE
Problem: Potential for Falls  Goal: Patient will remain free of falls  Description  INTERVENTIONS:  - Assess patient frequently for physical needs  -  Identify cognitive and physical deficits and behaviors that affect risk of falls    -  [place] fall precautions as indicated by assessment   - Educate patient/family on patient safety including physical limitations  - Instruct patient to call for assistance with activity based on assessment  - Modify environment to reduce risk of injury  - Consider OT/PT consult to assist with strengthening/mobility  Outcome: Progressing     Problem: RESPIRATORY - ADULT  Goal: Achieves optimal ventilation and oxygenation  Description  INTERVENTIONS:  - Assess for changes in respiratory status  - Assess for changes in mentation and behavior  - Position to facilitate oxygenation and minimize respiratory effort  - Oxygen administered by appropriate delivery if ordered  - Initiate smoking cessation education as indicated  - Encourage broncho-pulmonary hygiene including cough, deep breathe, Incentive Spirometry  - Assess the need for suctioning and aspirate as needed  - Assess and instruct to report SOB or any respiratory difficulty  - Respiratory Therapy support as indicated  Outcome: Progressing     Problem: METABOLIC, FLUID AND ELECTROLYTES - ADULT  Goal: Electrolytes maintained within normal limits  Description  INTERVENTIONS:  - Monitor labs and assess patient for signs and symptoms of electrolyte imbalances  - Administer electrolyte replacement as ordered  - Monitor response to electrolyte replacements, including repeat lab results as appropriate  - Instruct patient on fluid and nutrition as appropriate  Outcome: Progressing     Problem: SKIN/TISSUE INTEGRITY - ADULT  Goal: Skin integrity remains intact  Description  INTERVENTIONS  - Identify patients at risk for skin breakdown  - Assess and monitor skin integrity  - Assess and monitor nutrition and hydration status  - Monitor labs (i e  albumin)  - Assess for incontinence   - Turn and reposition patient  - Assist with mobility/ambulation  - Relieve pressure over bony prominences  - Avoid friction and shearing  - Provide appropriate hygiene as needed including keeping skin clean and dry  - Evaluate need for skin moisturizer/barrier cream  - Collaborate with interdisciplinary team (i e  Nutrition, Rehabilitation, etc )   - Patient/family teaching  Outcome: Progressing     Problem: MUSCULOSKELETAL - ADULT  Goal: Maintain or return mobility to safest level of function  Description  INTERVENTIONS:  - Assess patient's ability to carry out ADLs; assess patient's baseline for ADL function and identify physical deficits which impact ability to perform ADLs (bathing, care of mouth/teeth, toileting, grooming, dressing, etc )  - Assess/evaluate cause of self-care deficits   - Assess range of motion  - Assess patient's mobility  - Assess patient's need for assistive devices and provide as appropriate  - Encourage maximum independence but intervene and supervise when necessary  - Involve family in performance of ADLs  - Assess for home care needs following discharge   - Consider OT consult to assist with ADL evaluation and planning for discharge  - Provide patient education as appropriate  Outcome: Progressing     Problem: PAIN - ADULT  Goal: Verbalizes/displays adequate comfort level or baseline comfort level  Description  Interventions:  - Encourage patient to monitor pain and request assistance  - Assess pain using appropriate pain scale  - Administer analgesics based on type and severity of pain and evaluate response  - Implement non-pharmacological measures as appropriate and evaluate response  - Consider cultural and social influences on pain and pain management  - Notify physician/advanced practitioner if interventions unsuccessful or patient reports new pain  Outcome: Progressing     Problem: INFECTION - ADULT  Goal: Absence or prevention of progression during hospitalization  Description  INTERVENTIONS:  - Assess and monitor for signs and symptoms of infection  - Monitor lab/diagnostic results  - Monitor all insertion sites, i e  indwelling lines, tubes, and drains  - Monitor endotracheal if appropriate and nasal secretions for changes in amount and color  - [place] appropriate cooling/warming therapies per order  - Administer medications as ordered  - Instruct and encourage patient and family to use good hand hygiene technique   Outcome: Progressing     Problem: SAFETY ADULT  Goal: Maintain or return to baseline ADL function  Description  INTERVENTIONS:  -  Assess patient's ability to carry out ADLs; assess patient's baseline for ADL function and identify physical deficits which impact ability to perform ADLs (bathing, care of mouth/teeth, toileting, grooming, dressing, etc )  - Assess/evaluate cause of self-care deficits   - Assess range of motion  - Assess patient's mobility; develop plan if impaired  - Assess patient's need for assistive devices and provide as appropriate  - Encourage maximum independence but intervene and supervise when necessary  - Involve family in performance of ADLs  - Assess for home care needs following discharge   - Consider OT consult to assist with ADL evaluation and planning for discharge  - Provide patient education as appropriate  Outcome: Progressing  Goal: Maintain or return mobility status to optimal level  Description  INTERVENTIONS:  - Assess patient's baseline mobility status (ambulation, transfers, stairs, etc )    - Identify cognitive and physical deficits and behaviors that affect mobility  - Identify mobility aids required to assist with transfers and/or ambulation (gait belt, sit-to-stand, lift, walker, cane, etc )  - [place] fall precautions as indicated by assessment  - Record patient progress and toleration of activity level on Mobility SBAR; progress patient to next Phase/Stage  - Instruct patient to call for assistance with activity based on assessment  - Consider rehabilitation consult to assist with strengthening/weightbearing, etc   Outcome: Progressing     Problem: DISCHARGE PLANNING  Goal: Discharge to home or other facility with appropriate resources  Description  INTERVENTIONS:  - Identify barriers to discharge w/patient and caregiver  - Arrange for needed discharge resources and transportation as appropriate  - Identify discharge learning needs (meds, wound care, etc )  - Refer to Case Management Department for coordinating discharge planning if the patient needs post-hospital services based on physician/advanced practitioner order or complex needs related to functional status, cognitive ability, or social support system   Outcome: Progressing     Problem: Knowledge Deficit  Goal: Patient/family/caregiver demonstrates understanding of disease process, treatment plan, medications, and discharge instructions  Description  Complete learning assessment and assess knowledge base    Interventions:  - Provide teaching at level of understanding  - Provide teaching via preferred learning methods  Outcome: Progressing

## 2020-02-19 NOTE — ED PROVIDER NOTES
History  Chief Complaint   Patient presents with    Shortness of Breath     68 YO M    PMH:   COPD  CAD/Cabg  DM    Social: prior smoker    Pt here for evaluation of SOB and distress, worsening cough  cw prior COPD   Cough is Productive, clear sputum      severity indicators:  Last admission: 2 months ago  H/o intubation or need for Cpap/Biap: none      Tmax:   No fever    Interventions:  Machine Nebs at home      No cp  No abdominal pain  Denies Nausea/ vomiting  No Diarrhea     No urinary complaints:  No dysuria/hematuria/frequency     No oral sores   No swollen lymph nodes    Rash:    None noted      NO RECENT LONG CAR RIDES OR PLAN RIDES  NO H/O PE OR DVT  NO BIRTH CONTROL USE  NO RECENT TRAUMA OR SURGERY  NO HEMOPTYSIS      Sick contacts: None  No recent hospitalization             Shortness of Breath   Severity:  Severe  Onset quality:  Gradual  Progression:  Worsening  Relieved by:  Nothing  Worsened by:  Nothing  Associated symptoms: cough, sputum production and wheezing    Associated symptoms: no abdominal pain, no chest pain, no claudication, no diaphoresis, no fever, no headaches, no hemoptysis, no neck pain, no sore throat, no syncope, no swollen glands and no vomiting    Risk factors: no family hx of DVT, no hx of cancer, no hx of PE/DVT, no obesity, no prolonged immobilization and no tobacco use        Prior to Admission Medications   Prescriptions Last Dose Informant Patient Reported? Taking?    Empagliflozin 10 MG TABS   Yes No   Sig: Take 10 mg by mouth every morning   albuterol (2 5 mg/3 mL) 0 083 % nebulizer solution   No No   Sig: Take 1 vial (2 5 mg total) by nebulization every 4 (four) hours as needed for wheezing or shortness of breath   albuterol (PROVENTIL HFA,VENTOLIN HFA) 90 mcg/act inhaler   Yes No   Sig: Inhale 2 puffs every 6 (six) hours as needed for wheezing or shortness of breath   aspirin (ECOTRIN LOW STRENGTH) 81 mg EC tablet   Yes No   Sig: Take 81 mg by mouth every other day gabapentin (NEURONTIN) 100 mg capsule   Yes No   Sig: Take 200 mg by mouth daily at bedtime   lisinopril (ZESTRIL) 20 mg tablet   No No   Sig: Take 1 tablet (20 mg total) by mouth daily   metFORMIN (GLUCOPHAGE) 500 mg tablet   Yes No   Sig: Take 500 mg by mouth daily with dinner    metoprolol tartrate (LOPRESSOR) 25 mg tablet   No No   Sig: Take 1 tablet (25 mg total) by mouth 2 (two) times a day   predniSONE 10 mg tablet   No No   Sig: Take 3 tablets (30 mg total) by mouth daily 30mg PO day1,2 20mg PO day3,4 10mg PO day 5,6   Patient taking differently: Take 20 mg by mouth 2 (two) times a day with meals 30mg PO day1,2 20mg PO day3,4 10mg PO day 5,6   primidone (MYSOLINE) 50 mg tablet   No No   Sig: Take 2 tablets (100 mg total) by mouth every 12 (twelve) hours   rosuvastatin (CRESTOR) 10 MG tablet   Yes No   Sig: Take 10 mg by mouth daily   saxagliptin (ONGLYZA) 5 MG tablet   Yes No   Sig: Take 5 mg by mouth daily   tiotropium-olodaterol (STIOLTO RESPIMAT) 2 5-2 5 MCG/ACT inhaler   Yes No   Sig: Inhale 2 puffs daily      Facility-Administered Medications: None       Past Medical History:   Diagnosis Date    BPH (benign prostatic hyperplasia)     45 days radiation treatment    COPD (chronic obstructive pulmonary disease)     Coronary artery disease     CABG x4 in 2017    Diabetes mellitus     History of Arterial Duplex of LE 12/26/2017    Likely occlusion of the left superficial femoral artery  Calcific changes bilaterally  Despite these changes, the ankle-brachial index as a measure of peripheral blood flow only mildly impaired      History of echocardiogram 06/12/2017    EF 40%, mild LVH, mild MR     Hyperlipidemia     Hypertension     NSTEMI (non-ST elevated myocardial infarction)     Prostate cancer     prostate     PVD (peripheral vascular disease)     Tremor        Past Surgical History:   Procedure Laterality Date    CARDIAC CATHETERIZATION  03/08/2017    Significant left main plus triple-vessel CAD   CORONARY ARTERY BYPASS GRAFT  2017    4V CABG:  LIMA to LAD, VG to RI, SVG to PDA to LVBR RCA   EYE SURGERY      [facility] a [facility]    PROSTATE BIOPSY         Family History   Problem Relation Age of Onset    Cancer Father     Heart disease Brother      I have reviewed and agree with the history as documented  Social History     Tobacco Use    Smoking status: Former Smoker     Packs/day: 0 25     Types: Cigarettes     Last attempt to quit: 12/15/2018     Years since quittin 1    Smokeless tobacco: Never Used    Tobacco comment: only smokes when he drinks beer   Substance Use Topics    Alcohol use: Yes     Alcohol/week: 2 0 standard drinks     Types: 2 Cans of beer per week     Frequency: 2-4 times a month     Drinks per session: 1 or 2     Binge frequency: Never     Comment: regularly     Drug use: Never       Review of Systems   Constitutional: Negative for diaphoresis and fever  HENT: Negative for sore throat  Respiratory: Positive for cough, sputum production, shortness of breath and wheezing  Negative for hemoptysis  Cardiovascular: Negative for chest pain, claudication and syncope  Gastrointestinal: Negative for abdominal pain and vomiting  Genitourinary: Negative for dysuria and flank pain  Musculoskeletal: Negative for neck pain  Neurological: Negative for headaches  All other systems reviewed and are negative  Physical Exam  Physical Exam   Constitutional: He is oriented to person, place, and time  He appears well-developed and well-nourished  Non-toxic appearance  He appears ill  He appears distressed  HENT:   Head: Normocephalic and atraumatic  Nose: Nose normal    Mouth/Throat: Oropharynx is clear and moist  No oropharyngeal exudate  Eyes: Pupils are equal, round, and reactive to light  Conjunctivae and EOM are normal  Right eye exhibits no discharge  Left eye exhibits no discharge  No scleral icterus     Neck: Normal range of motion  Neck supple  No JVD present  No tracheal deviation present  Cardiovascular: Normal rate, regular rhythm, normal heart sounds and intact distal pulses  Exam reveals no gallop and no friction rub  No murmur heard  Pulmonary/Chest: Accessory muscle usage present  No stridor  He is in respiratory distress  He has decreased breath sounds  He has wheezes in the right upper field, the left upper field and the left lower field  He has no rhonchi  He has no rales  He exhibits no tenderness  Abdominal: Soft  Bowel sounds are normal  He exhibits no distension, no ascites and no mass  There is no tenderness  There is no rebound and no guarding  No hernia  Musculoskeletal: Normal range of motion  He exhibits no edema, tenderness or deformity  Right lower leg: Normal         Left lower leg: Normal    Lymphadenopathy:     He has no cervical adenopathy  Neurological: He is alert and oriented to person, place, and time  No cranial nerve deficit or sensory deficit  He exhibits normal muscle tone  Coordination normal    Skin: Skin is warm  Capillary refill takes less than 2 seconds  No rash noted  He is not diaphoretic  No erythema  No pallor  Psychiatric: Judgment and thought content normal  He is agitated     Mild agitation from SOB         Vital Signs  ED Triage Vitals [02/19/20 0309]   Temperature Pulse Respirations Blood Pressure SpO2   (!) 97 1 °F (36 2 °C) 91 (!) 24 (!) 173/87 93 %      Temp Source Heart Rate Source Patient Position - Orthostatic VS BP Location FiO2 (%)   Temporal Monitor Sitting Left arm --      Pain Score       No Pain           Vitals:    02/19/20 0315 02/19/20 0330 02/19/20 0345 02/19/20 0400   BP: (!) 173/87 140/74  149/83   Pulse: 92 91 85 83   Patient Position - Orthostatic VS:             Visual Acuity      ED Medications  Medications   albuterol inhalation solution 10 mg (10 mg Nebulization Given 2/19/20 0345)     And   ipratropium (ATROVENT) 0 02 % inhalation solution 0 5 mg (0 5 mg Nebulization Given 2/19/20 0345)     And   sodium chloride 0 9 % inhalation solution 3 mL (3 mL Nebulization Given 2/19/20 0346)   magnesium sulfate IVPB (premix) SOLN 1 g (0 g Intravenous Stopped 2/19/20 0405)   methylPREDNISolone sodium succinate (Solu-MEDROL) injection 60 mg (60 mg Intravenous Given 2/19/20 0327)       Diagnostic Studies  Results Reviewed     Procedure Component Value Units Date/Time    B-Type Natriuretic Peptide ([facility]) [[id number]]  (Normal) Collected:  02/19/20 0320    Lab Status:  Final result Specimen:  Blood from Arm, Right Updated:  02/19/20 0355     BNP 54 pg/mL     Comprehensive metabolic panel [[id number]]  (Abnormal) Collected:  02/19/20 0320    Lab Status:  Final result Specimen:  Blood from Arm, Right Updated:  02/19/20 0354     Sodium 138 mmol/L      Potassium 4 0 mmol/L      Chloride 102 mmol/L      CO2 26 mmol/L      ANION GAP 10 mmol/L      BUN 21 mg/dL      Creatinine 1 03 mg/dL      Glucose 141 mg/dL      Calcium 8 8 mg/dL      AST 11 U/L      ALT 11 U/L      Alkaline Phosphatase 73 U/L      Total Protein 7 3 g/dL      Albumin 4 2 g/dL      Total Bilirubin 0 30 mg/dL      eGFR 72 ml/min/1 73sq m     Narrative:       [place] guidelines for Chronic Kidney Disease (CKD):     Stage 1 with normal or high GFR (GFR > 90 mL/min/1 73 square meters)    Stage 2 Mild CKD (GFR = 60-89 mL/min/1 73 square meters)    Stage 3A Moderate CKD (GFR = 45-59 mL/min/1 73 square meters)    Stage 3B Moderate CKD (GFR = 30-44 mL/min/1 73 square meters)    Stage 4 Severe CKD (GFR = 15-29 mL/min/1 73 square meters)    Stage 5 End Stage CKD (GFR <15 mL/min/1 73 square meters)  Note: GFR calculation is accurate only with a steady state creatinine    Magnesium [[id number]]  (Normal) Collected:  02/19/20 0320    Lab Status:  Final result Specimen:  Blood from Arm, Right Updated:  02/19/20 0354     Magnesium 2 3 mg/dL     Troponin I [[id number]] (Normal) Collected:  02/19/20 0320    Lab Status:  Final result Specimen:  Blood from Arm, Right Updated:  02/19/20 0350     Troponin I <0 03 ng/mL     Influenza A/B and RSV PCR [[id number]] Collected:  02/19/20 0320    Lab Status: In process Specimen:  Nose Updated:  02/19/20 0336    CBC and differential [[id number]]  (Abnormal) Collected:  02/19/20 0320    Lab Status:  Final result Specimen:  Blood from Arm, Right Updated:  02/19/20 0336     WBC 8 80 Thousand/uL      RBC 4 47 Million/uL      Hemoglobin 13 4 g/dL      Hematocrit 40 7 %      MCV 91 fL      MCH 29 9 pg      MCHC 32 9 g/dL      RDW 14 5 %      MPV 8 3 fL      Platelets 241 Thousands/uL      Neutrophils Relative 68 %      Lymphocytes Relative 14 %      Monocytes Relative 7 %      Eosinophils Relative 9 %      Basophils Relative 1 %      Neutrophils Absolute 6 00 Thousands/µL      Lymphocytes Absolute 1 20 Thousands/µL      Monocytes Absolute 0 60 Thousand/µL      Eosinophils Absolute 0 80 Thousand/µL      Basophils Absolute 0 10 Thousands/µL     Procalcitonin [[id number]] Collected:  02/19/20 0320    Lab Status:   In process Specimen:  Blood from Arm, Right Updated:  02/19/20 0327                 XR chest 1 view portable    (Results Pending)              Procedures  Procedures         ED Course  ED Course as of Feb 19 0419 Wed Feb 19, 2020   0315 Patient improving on nasal cannula oxygen, satting high 90s      0336 WBC: 8 80   0336 Hemoglobin(!): 13 4   0336 HCT(!): 40 7   0336 Platelet Count: 573   0336 Neutrophils %: 68   0336 Lymphocytes Relative(!): 14   0336 Monocytes Relative: 7   0337 Eosinophils(!): 9   0400 Magnesium: 2 3   0400 BNP: 54   0400 WBC: 8 80   0400 Hemoglobin(!): 13 4   0400 HCT(!): 40 7   0401 Platelet Count: 962   0401 Neutrophils %: 68   0401 Lymphocytes Relative(!): 14   0401 Monocytes Relative: 7   0401 Eosinophils(!): 9   0401 Sodium: 138   0401 Potassium: 4 0   0401 Chloride: 102   0401 CO2: 26   0401 Anion Gap: 10   0401 BUN: 21   0401 Creatinine: 1 03   0401 Glucose, Random(!): 141   0401 Calcium: 8 8   0401 AST(!): 11   0401 ALT: 11   0401 Alkaline Phosphatase: 73   0401 Will give only 0 5 mg IV mag, as hie Mg level is normal    Troponin I: <0 03   0407 CXR: no pna, no ptx  Will admit for COPD exacerbation      0408 Dw Gib Lipoma w/ Hospitalist  Will obs for COPD exacerbation                                   MDM      Disposition  Final diagnoses:   COPD exacerbation ([facility] [address] )     Time reflects when diagnosis was documented in both MDM as applicable and the Disposition within this note     Time User Action Codes Description Comment    2/19/2020  4:16 AM Isabella Granados [J44 1] COPD exacerbation [facility])       ED Disposition     ED Disposition Condition Date/Time Comment    Admit Stable Wed Feb 19, 2020  4:17 AM Case was discussed with Gib Lipatul and the patient's admission status was agreed to be Admission Status: observation status to the service of Dr Celina Macias   Follow-up Information    None         Patient's Medications   Discharge Prescriptions    No medications on file     No discharge procedures on file      PDMP Review     None          ED Provider  Electronically Signed by           Lorraine Suarez MD  02/19/20 9984

## 2020-02-19 NOTE — H&P
H&P- Andrés Benson 1945, 69 y o  male MRN: [id number]    Unit/Bed#: ED 01 Encounter: [id number]    Primary Care Provider: Juanis Galvez MD   Date and time admitted to hospital: 2/19/2020  3:06 AM        * Chronic obstructive pulmonary disease with acute exacerbation ([facility] [address] )  Assessment & Plan  · Patient presents to the ER with shortness of breath respiratory distress  · He was given nebulizers and IV steroids  · Continue steroids and respiratory protocol  · Wean off oxygen as able    Ambulatory dysfunction  Assessment & Plan  · PT OT eval for DC planning    Tremor  Assessment & Plan  · Continue primidone    CAD (coronary artery disease), native coronary artery  Assessment & Plan  · History of CABG x4  · Continue home meds  · No chest pain    Essential hypertension  Assessment & Plan  · Continue meds and monitor    Other hyperlipidemia  Assessment & Plan  · Continue statin    Type 2 diabetes mellitus without complication, without long-term current use of insulin (HCC)  Assessment & Plan  Lab Results   Component Value Date    HGBA1C 8 2 (H) 12/27/2019       No results for input(s): POCGLU in the last 72 hours  Blood Sugar Average: Last 72 hrs:  ·  insulin coverage while in the hospital  · Hold oral medications      VTE Prophylaxis: Enoxaparin (Lovenox)  Code Status:  Full code  POLST: There is no POLST form on file for this patient (pre-hospital)  Discussion with patient    Anticipated Length of Stay:  Patient will be admitted on an Observation basis with an anticipated length of stay of  < 2 midnights  Justification for Hospital Stay:  IV steroids, wean from oxygen, supportive care, Respiratory protocol    Chief Complaint:   Shortness of breath    History of Present Illness:    Andrés Benson is a 69 y o  male who presents with shortness of breath    Patient with past medical history diabetes mellitus type 2 not on insulin, CAD with history of CABG x4, hypertension, hyperlipidemia, history of prostate cancer, tremor, COPD presented to the ER secondary to shortness of breath and worsening cough  Patient reported shortness of breath on Tuesday , he woke up approximately 2:00 a m  he was very short of breath he used his inhaler with 4 puffs and then did a nebulizer treatment he was still very short of breath and drove himself to the hospital   He notes clear sputum  Patient was noted to have respiratory distress in the ER with accessory muscle usage  In the ER he was given an albuterol nebulizer with Atrovent, magnesium 50 mL of 100 mL bag, Solu-Medrol 60 mg    Review of Systems:  Review of Systems   Constitutional: Negative for chills and fever  HENT: Negative for rhinorrhea and sore throat  Eyes: Negative for discharge and redness  Respiratory: Positive for cough, chest tightness, shortness of breath and wheezing  Cardiovascular: Negative for chest pain and palpitations  Gastrointestinal: Negative for abdominal pain, diarrhea, nausea and vomiting  Genitourinary: Negative for dysuria and hematuria  Musculoskeletal: Negative for back pain and neck pain  Skin: Negative for rash and wound  Neurological: Negative for dizziness and headaches  Psychiatric/Behavioral: Negative for agitation and confusion  Past Medical and Surgical History:   Past Medical History:   Diagnosis Date    BPH (benign prostatic hyperplasia)     39 days radiation treatment    COPD (chronic obstructive pulmonary disease)     Coronary artery disease     CABG x4 in 2017    Diabetes mellitus     History of Arterial Duplex of LE 12/26/2017    Likely occlusion of the left superficial femoral artery  Calcific changes bilaterally  Despite these changes, the ankle-brachial index as a measure of peripheral blood flow only mildly impaired      History of echocardiogram 06/12/2017    EF 40%, mild LVH, mild MR     Hyperlipidemia     Hypertension     NSTEMI (non-ST elevated myocardial infarction)     Prostate cancer prostate     PVD (peripheral vascular disease)     Tremor        Past Surgical History:   Procedure Laterality Date    CARDIAC CATHETERIZATION  03/08/2017    Significant left main plus triple-vessel CAD   CORONARY ARTERY BYPASS GRAFT  03/08/2017    4V CABG:  LIMA to LAD, VG to RI, SVG to PDA to LVBR RCA   EYE SURGERY      shots in eye once a month @ the [address] [place] [address]         Meds/Allergies:  Prior to Admission medications    Medication Sig Start Date End Date Taking?  Authorizing Provider   albuterol (2 5 mg/3 mL) 0 083 % nebulizer solution Take 1 vial (2 5 mg total) by nebulization every 4 (four) hours as needed for wheezing or shortness of breath 6/24/18   Miguelito Wick MD   albuterol (PROVENTIL HFA,VENTOLIN HFA) 90 mcg/act inhaler Inhale 2 puffs every 6 (six) hours as needed for wheezing or shortness of breath    Historical Provider, MD   aspirin (ECOTRIN LOW STRENGTH) 81 mg EC tablet Take 81 mg by mouth every other day     Historical Provider, MD   Empagliflozin 10 MG TABS Take 10 mg by mouth every morning    Historical Provider, MD   gabapentin (NEURONTIN) 100 mg capsule Take 200 mg by mouth daily at bedtime    Historical Provider, MD   lisinopril (ZESTRIL) 20 mg tablet Take 1 tablet (20 mg total) by mouth daily 12/30/19   Hardy De Oliveira MD   metFORMIN (GLUCOPHAGE) 500 mg tablet Take 500 mg by mouth daily with dinner     Historical Provider, MD   metoprolol tartrate (LOPRESSOR) 25 mg tablet Take 1 tablet (25 mg total) by mouth 2 (two) times a day 12/29/19   Hardy De Oliveira MD   predniSONE 10 mg tablet Take 3 tablets (30 mg total) by mouth daily 30mg PO day1,2 20mg PO day3,4 10mg PO day 5,6  Patient taking differently: Take 20 mg by mouth 2 (two) times a day with meals 30mg PO day1,2 20mg PO day3,4 10mg PO day 5,6 12/29/19   Hadry De Oliveira MD   primidone (MYSOLINE) 50 mg tablet Take 2 tablets (100 mg total) by mouth every 12 (twelve) hours 10/23/19   Edwar Stout PA-C   rosuvastatin (CRESTOR) 10 MG tablet Take 10 mg by mouth daily    Historical Provider, MD   saxagliptin (ONGLYZA) 5 MG tablet Take 5 mg by mouth daily    Historical Provider, MD   tiotropium-olodaterol (Fayrene Drape) 2 5-2 5 MCG/ACT inhaler Inhale 2 puffs daily    Historical Provider, MD     I have reviewed home medications with patient personally  Allergies: No Known Allergies    Social History:  Marital Status: /Civil Union   Occupation:  Retired from [employer]  Patient Pre-hospital Living Situation:  Resides at home with wife, grandson and grandson's dontrelle  Patient Pre-hospital Level of Mobility:  Full with occasional use of a cane  Patient Pre-hospital Diet Restrictions:  None  Substance Use History:   Social History     Substance and Sexual Activity   Alcohol Use Yes    Alcohol/week: 2 0 standard drinks    Types: 2 Cans of beer per week    Frequency: 2-4 times a month    Drinks per session: 1 or 2    Binge frequency: Never    Comment: regularly      Social History     Tobacco Use   Smoking Status Former Smoker    Packs/day: 0 25    Types: Cigarettes    Last attempt to quit: 12/15/2018    Years since quittin 1   Smokeless Tobacco Never Used   Tobacco Comment    only smokes when he drinks beer     Social History     Substance and Sexual Activity   Drug Use Never       Family History:  I have reviewed the patients family history    Physical Exam:   Vitals:   Blood Pressure: 149/83 (20)  Pulse: 83 (20)  Temperature: (!) 97 1 °F (36 2 °C) (20)  Temp Source: Temporal (20)  Respirations: 16 (20)  Height: 5' 10" (177 8 cm) (20)  Weight - Scale: 89 8 kg (198 lb) (20)  SpO2: 99 % (20)    Physical Exam   Constitutional: He is oriented to person, place, and time  He appears well-developed and well-nourished     Patient was she receiving a breathing treatment when I evaluated him   HENT:   Head: Normocephalic and atraumatic  Eyes: Conjunctivae and EOM are normal  Right eye exhibits no discharge  Left eye exhibits no discharge  Glasses in place   Neck: Normal range of motion  No tracheal deviation present  Cardiovascular: Normal rate, regular rhythm and normal heart sounds  Exam reveals no gallop and no friction rub  No murmur heard  Pulmonary/Chest: Effort normal  No respiratory distress  He has decreased breath sounds in the right lower field and the left lower field  He has wheezes  He has no rales  Abdominal: Soft  Bowel sounds are normal  He exhibits no distension and no mass  There is no tenderness  There is no guarding  Musculoskeletal: Normal range of motion  He exhibits no edema, tenderness or deformity  Neurological: He is alert and oriented to person, place, and time  Skin: Skin is warm and dry  No rash noted  No erythema  No pallor  Psychiatric: He has a normal mood and affect  His behavior is normal  Judgment and thought content normal    Nursing note and vitals reviewed  Additional Data:   Lab Results: I have personally reviewed pertinent reports  Results from last 7 days   Lab Units 02/19/20  0320   WBC Thousand/uL 8 80   HEMOGLOBIN g/dL 13 4*   HEMATOCRIT % 40 7*   PLATELETS Thousands/uL 317   NEUTROS PCT % 68   LYMPHS PCT % 14*   MONOS PCT % 7   EOS PCT % 9*     Results from last 7 days   Lab Units 02/19/20  0320   POTASSIUM mmol/L 4 0   CHLORIDE mmol/L 102   CO2 mmol/L 26   BUN mg/dL 21   CREATININE mg/dL 1 03   CALCIUM mg/dL 8 8   ALK PHOS U/L 73   ALT U/L 11   AST U/L 11*       Imaging: formal read pending  XR chest 1 view portable    (Results Pending)       NetAccess / Epic Records Reviewed: Yes     ** Please Note: This note has been constructed using a voice recognition system   **

## 2020-02-19 NOTE — ED PROCEDURE NOTE
PROCEDURE  ECG 12 Lead Documentation Only  Date/Time: 2/19/2020 3:27 AM  Performed by: Nguyễn Garces MD  Authorized by: Nguyễn Garces MD     Indications / Diagnosis:  Shortness of breath  ECG reviewed by me, the ED Provider: yes    Previous ECG:     Comparison to cardiac monitor: Yes    Interpretation:     Interpretation: non-specific    Rate:     ECG rate:  92    ECG rate assessment: normal    Rhythm:     Rhythm: sinus rhythm    Ectopy:     Ectopy: none    QRS:     QRS axis:  Left    QRS intervals:  Normal  Conduction:     Conduction: abnormal      Abnormal conduction: non-specific intraventricular conduction delay    ST segments:     ST segments:  Non-specific  T waves:     T waves: non-specific           Nguyễn Garces MD  02/19/20 7095

## 2020-02-19 NOTE — RESPIRATORY THERAPY NOTE
RT Protocol Note  Negar Carmichael 69 y o  male MRN: [id number]  Unit/Bed#: -01 Encounter: [id number]    Assessment    Principal Problem:    Chronic obstructive pulmonary disease with acute exacerbation ([address] )  Active Problems:    Type 2 diabetes mellitus without complication, without long-term current use of insulin (HCC)    Other hyperlipidemia    Essential hypertension    CAD (coronary artery disease), native coronary artery    Tremor    Ambulatory dysfunction      Home Pulmonary Medications:  Albuterol Nebulizer Q 4 PRN   Proventil MDI Q 6 PRN  Stiolto Respimat 2 Puffs daily    Home Devices/Therapy: Other (Comment)(None)    Past Medical History:   Diagnosis Date    BPH (benign prostatic hyperplasia)     45 days radiation treatment    COPD (chronic obstructive pulmonary disease)     Coronary artery disease     CABG x4 in 2017    Diabetes mellitus     History of Arterial Duplex of LE 2017    Likely occlusion of the left superficial femoral artery  Calcific changes bilaterally  Despite these changes, the ankle-brachial index as a measure of peripheral blood flow only mildly impaired      History of echocardiogram 2017    EF 40%, mild LVH, mild MR     Hyperlipidemia     Hypertension     NSTEMI (non-ST elevated myocardial infarction)     Prostate cancer     prostate     PVD (peripheral vascular disease)     Tremor      Social History     Socioeconomic History    Marital status: /Civil Union     Spouse name: None    Number of children: None    Years of education: None    Highest education level: None   Occupational History    None   Social Needs    Financial resource strain: Not hard at all   [place] insecurity:     Worry: Never true     Inability: Never true   [employer] needs:     Medical: No     Non-medical: No   Tobacco Use    Smoking status: Former Smoker     Packs/day: 0 25     Types: Cigarettes     Last attempt to quit: 12/15/2018     Years since quittin 1    Smokeless tobacco: Never Used    Tobacco comment: only smokes when he drinks beer   Substance and Sexual Activity    Alcohol use: Never     Alcohol/week: 2 0 standard drinks     Types: 2 Cans of beer per week     Frequency: 2-4 times a month     Drinks per session: 1 or 2     Binge frequency: Never     Comment: regularly     Drug use: Never    Sexual activity: None   Lifestyle    Physical activity:     Days per week: None     Minutes per session: None    Stress: None   Relationships    Social connections:     Talks on phone: None     Gets together: None     Attends Orthodoxy service: None     Active member of club or organization: None     Attends meetings of clubs or organizations: None     Relationship status: None    Intimate partner violence:     Fear of current or ex partner: None     Emotionally abused: None     Physically abused: None     Forced sexual activity: None   Other Topics Concern    None   Social History Narrative    None       Subjective         Objective    Physical Exam:   Assessment Type: Assess only  General Appearance: Alert, Awake  Respiratory Pattern: Dyspnea with exertion  Bilateral Breath Sounds: Diminished  Cough: Non-productive    Vitals:  Blood pressure 143/67, pulse 94, temperature (!) 97 2 °F (36 2 °C), temperature source Tympanic, resp  rate 18, height 5' 10" (1 778 m), weight 84 1 kg (185 lb 8 3 oz), SpO2 93 %  Imaging and other studies: I have personally reviewed pertinent reports              Plan    Respiratory Plan: Mild Distress pathway        Resp Comments: UDN given with 1 25 mg Raudel Cortez / David Berg

## 2020-02-19 NOTE — ASSESSMENT & PLAN NOTE
· Patient presents to the ER with shortness of breath respiratory distress  · He was given nebulizers and IV steroids  · Continue steroids and respiratory protocol  · Wean off oxygen as able

## 2020-02-19 NOTE — ASSESSMENT & PLAN NOTE
Lab Results   Component Value Date    HGBA1C 8 2 (H) 12/27/2019       No results for input(s): POCGLU in the last 72 hours      Blood Sugar Average: Last 72 hrs:  ·  insulin coverage while in the hospital  · Hold oral medications

## 2020-02-20 VITALS
RESPIRATION RATE: 18 BRPM | BODY MASS INDEX: 26.56 KG/M2 | WEIGHT: 185.52 LBS | TEMPERATURE: 97.4 F | DIASTOLIC BLOOD PRESSURE: 64 MMHG | HEIGHT: 70 IN | SYSTOLIC BLOOD PRESSURE: 129 MMHG | OXYGEN SATURATION: 94 % | HEART RATE: 74 BPM

## 2020-02-20 LAB
ANION GAP SERPL CALCULATED.3IONS-SCNC: 9 MMOL/L (ref 4–13)
BUN SERPL-MCNC: 19 MG/DL (ref 7–25)
CALCIUM SERPL-MCNC: 8.7 MG/DL (ref 8.6–10.5)
CHLORIDE SERPL-SCNC: 103 MMOL/L (ref 98–107)
CO2 SERPL-SCNC: 25 MMOL/L (ref 21–31)
CREAT SERPL-MCNC: 0.76 MG/DL (ref 0.7–1.3)
ERYTHROCYTE [DISTWIDTH] IN BLOOD BY AUTOMATED COUNT: 14.4 % (ref 11.5–14.5)
GFR SERPL CREATININE-BSD FRML MDRD: 91 ML/MIN/1.73SQ M
GLUCOSE SERPL-MCNC: 158 MG/DL (ref 65–140)
GLUCOSE SERPL-MCNC: 171 MG/DL (ref 65–99)
GLUCOSE SERPL-MCNC: 226 MG/DL (ref 65–140)
HCT VFR BLD AUTO: 37.4 % (ref 42–47)
HGB BLD-MCNC: 12.4 G/DL (ref 14–18)
MCH RBC QN AUTO: 30.1 PG (ref 26–34)
MCHC RBC AUTO-ENTMCNC: 33.2 G/DL (ref 31–37)
MCV RBC AUTO: 91 FL (ref 81–99)
PLATELET # BLD AUTO: 296 THOUSANDS/UL (ref 149–390)
PMV BLD AUTO: 8.7 FL (ref 8.6–11.7)
POTASSIUM SERPL-SCNC: 4.2 MMOL/L (ref 3.5–5.5)
PROCALCITONIN SERPL-MCNC: <0.05 NG/ML
RBC # BLD AUTO: 4.13 MILLION/UL (ref 4.3–5.9)
SODIUM SERPL-SCNC: 137 MMOL/L (ref 134–143)
WBC # BLD AUTO: 8.3 THOUSAND/UL (ref 4.8–10.8)

## 2020-02-20 PROCEDURE — 87205 SMEAR GRAM STAIN: CPT | Performed by: PHYSICIAN ASSISTANT

## 2020-02-20 PROCEDURE — 84145 PROCALCITONIN (PCT): CPT | Performed by: PHYSICIAN ASSISTANT

## 2020-02-20 PROCEDURE — 85027 COMPLETE CBC AUTOMATED: CPT | Performed by: PHYSICIAN ASSISTANT

## 2020-02-20 PROCEDURE — 94760 N-INVAS EAR/PLS OXIMETRY 1: CPT

## 2020-02-20 PROCEDURE — 82948 REAGENT STRIP/BLOOD GLUCOSE: CPT

## 2020-02-20 PROCEDURE — 87070 CULTURE OTHR SPECIMN AEROBIC: CPT | Performed by: PHYSICIAN ASSISTANT

## 2020-02-20 PROCEDURE — 94640 AIRWAY INHALATION TREATMENT: CPT

## 2020-02-20 PROCEDURE — 99217 PR OBSERVATION CARE DISCHARGE MANAGEMENT: CPT | Performed by: INTERNAL MEDICINE

## 2020-02-20 PROCEDURE — 80048 BASIC METABOLIC PNL TOTAL CA: CPT | Performed by: PHYSICIAN ASSISTANT

## 2020-02-20 RX ORDER — PREDNISONE 10 MG/1
30 TABLET ORAL DAILY
Qty: 12 TABLET | Refills: 0 | Status: SHIPPED | OUTPATIENT
Start: 2020-02-20 | End: 2020-03-13 | Stop reason: HOSPADM

## 2020-02-20 RX ADMIN — METHYLPREDNISOLONE SODIUM SUCCINATE 40 MG: 40 INJECTION, POWDER, FOR SOLUTION INTRAMUSCULAR; INTRAVENOUS at 08:23

## 2020-02-20 RX ADMIN — LEVALBUTEROL HYDROCHLORIDE 1.25 MG: 1.25 SOLUTION, CONCENTRATE RESPIRATORY (INHALATION) at 07:29

## 2020-02-20 RX ADMIN — AZITHROMYCIN 500 MG: 250 TABLET, FILM COATED ORAL at 05:04

## 2020-02-20 RX ADMIN — PRIMIDONE 100 MG: 50 TABLET ORAL at 08:18

## 2020-02-20 RX ADMIN — ISODIUM CHLORIDE 3 ML: 0.03 SOLUTION RESPIRATORY (INHALATION) at 07:29

## 2020-02-20 RX ADMIN — LISINOPRIL 20 MG: 20 TABLET ORAL at 08:18

## 2020-02-20 RX ADMIN — INSULIN LISPRO 5 UNITS: 100 INJECTION, SOLUTION INTRAVENOUS; SUBCUTANEOUS at 08:20

## 2020-02-20 RX ADMIN — METOPROLOL TARTRATE 25 MG: 25 TABLET ORAL at 08:18

## 2020-02-20 RX ADMIN — ENOXAPARIN SODIUM 40 MG: 40 INJECTION SUBCUTANEOUS at 08:19

## 2020-02-20 RX ADMIN — INSULIN LISPRO 1 UNITS: 100 INJECTION, SOLUTION INTRAVENOUS; SUBCUTANEOUS at 08:19

## 2020-02-20 RX ADMIN — INSULIN DETEMIR 10 UNITS: 100 INJECTION, SOLUTION SUBCUTANEOUS at 08:19

## 2020-02-20 RX ADMIN — INSULIN LISPRO 2 UNITS: 100 INJECTION, SOLUTION INTRAVENOUS; SUBCUTANEOUS at 11:34

## 2020-02-20 RX ADMIN — METHYLPREDNISOLONE SODIUM SUCCINATE 40 MG: 40 INJECTION, POWDER, FOR SOLUTION INTRAMUSCULAR; INTRAVENOUS at 00:58

## 2020-02-20 RX ADMIN — GUAIFENESIN 600 MG: 600 TABLET, EXTENDED RELEASE ORAL at 08:18

## 2020-02-20 RX ADMIN — INSULIN LISPRO 5 UNITS: 100 INJECTION, SOLUTION INTRAVENOUS; SUBCUTANEOUS at 11:34

## 2020-02-20 NOTE — PLAN OF CARE
Problem: Potential for Falls  Goal: Patient will remain free of falls  Description  INTERVENTIONS:  - Assess patient frequently for physical needs  -  Identify cognitive and physical deficits and behaviors that affect risk of falls    -  [place] fall precautions as indicated by assessment   - Educate patient/family on patient safety including physical limitations  - Instruct patient to call for assistance with activity based on assessment  - Modify environment to reduce risk of injury  - Consider OT/PT consult to assist with strengthening/mobility  Outcome: Progressing     Problem: RESPIRATORY - ADULT  Goal: Achieves optimal ventilation and oxygenation  Description  INTERVENTIONS:  - Assess for changes in respiratory status  - Assess for changes in mentation and behavior  - Position to facilitate oxygenation and minimize respiratory effort  - Oxygen administered by appropriate delivery if ordered  - Initiate smoking cessation education as indicated  - Encourage broncho-pulmonary hygiene including cough, deep breathe, Incentive Spirometry  - Assess the need for suctioning and aspirate as needed  - Assess and instruct to report SOB or any respiratory difficulty  - Respiratory Therapy support as indicated  Outcome: Progressing     Problem: METABOLIC, FLUID AND ELECTROLYTES - ADULT  Goal: Electrolytes maintained within normal limits  Description  INTERVENTIONS:  - Monitor labs and assess patient for signs and symptoms of electrolyte imbalances  - Administer electrolyte replacement as ordered  - Monitor response to electrolyte replacements, including repeat lab results as appropriate  - Instruct patient on fluid and nutrition as appropriate  Outcome: Progressing     Problem: SKIN/TISSUE INTEGRITY - ADULT  Goal: Skin integrity remains intact  Description  INTERVENTIONS  - Identify patients at risk for skin breakdown  - Assess and monitor skin integrity  - Assess and monitor nutrition and hydration status  - Monitor labs (i e  albumin)  - Assess for incontinence   - Turn and reposition patient  - Assist with mobility/ambulation  - Relieve pressure over bony prominences  - Avoid friction and shearing  - Provide appropriate hygiene as needed including keeping skin clean and dry  - Evaluate need for skin moisturizer/barrier cream  - Collaborate with interdisciplinary team (i e  Nutrition, Rehabilitation, etc )   - Patient/family teaching  Outcome: Progressing     Problem: MUSCULOSKELETAL - ADULT  Goal: Maintain or return mobility to safest level of function  Description  INTERVENTIONS:  - Assess patient's ability to carry out ADLs; assess patient's baseline for ADL function and identify physical deficits which impact ability to perform ADLs (bathing, care of mouth/teeth, toileting, grooming, dressing, etc )  - Assess/evaluate cause of self-care deficits   - Assess range of motion  - Assess patient's mobility  - Assess patient's need for assistive devices and provide as appropriate  - Encourage maximum independence but intervene and supervise when necessary  - Involve family in performance of ADLs  - Assess for home care needs following discharge   - Consider OT consult to assist with ADL evaluation and planning for discharge  - Provide patient education as appropriate  Outcome: Progressing     Problem: PAIN - ADULT  Goal: Verbalizes/displays adequate comfort level or baseline comfort level  Description  Interventions:  - Encourage patient to monitor pain and request assistance  - Assess pain using appropriate pain scale  - Administer analgesics based on type and severity of pain and evaluate response  - Implement non-pharmacological measures as appropriate and evaluate response  - Consider cultural and social influences on pain and pain management  - Notify physician/advanced practitioner if interventions unsuccessful or patient reports new pain  Outcome: Progressing     Problem: INFECTION - ADULT  Goal: Absence or prevention of progression during hospitalization  Description  INTERVENTIONS:  - Assess and monitor for signs and symptoms of infection  - Monitor lab/diagnostic results  - Monitor all insertion sites, i e  indwelling lines, tubes, and drains  - Monitor endotracheal if appropriate and nasal secretions for changes in amount and color  - [place] appropriate cooling/warming therapies per order  - Administer medications as ordered  - Instruct and encourage patient and family to use good hand hygiene technique   Outcome: Progressing     Problem: SAFETY ADULT  Goal: Maintain or return to baseline ADL function  Description  INTERVENTIONS:  -  Assess patient's ability to carry out ADLs; assess patient's baseline for ADL function and identify physical deficits which impact ability to perform ADLs (bathing, care of mouth/teeth, toileting, grooming, dressing, etc )  - Assess/evaluate cause of self-care deficits   - Assess range of motion  - Assess patient's mobility; develop plan if impaired  - Assess patient's need for assistive devices and provide as appropriate  - Encourage maximum independence but intervene and supervise when necessary  - Involve family in performance of ADLs  - Assess for home care needs following discharge   - Consider OT consult to assist with ADL evaluation and planning for discharge  - Provide patient education as appropriate  Outcome: Progressing  Goal: Maintain or return mobility status to optimal level  Description  INTERVENTIONS:  - Assess patient's baseline mobility status (ambulation, transfers, stairs, etc )    - Identify cognitive and physical deficits and behaviors that affect mobility  - Identify mobility aids required to assist with transfers and/or ambulation (gait belt, sit-to-stand, lift, walker, cane, etc )  - [place] fall precautions as indicated by assessment  - Record patient progress and toleration of activity level on Mobility SBAR; progress patient to next Phase/Stage  - Instruct patient to call for assistance with activity based on assessment  - Consider rehabilitation consult to assist with strengthening/weightbearing, etc   Outcome: Progressing     Problem: DISCHARGE PLANNING  Goal: Discharge to home or other facility with appropriate resources  Description  INTERVENTIONS:  - Identify barriers to discharge w/patient and caregiver  - Arrange for needed discharge resources and transportation as appropriate  - Identify discharge learning needs (meds, wound care, etc )  - Refer to Case Management Department for coordinating discharge planning if the patient needs post-hospital services based on physician/advanced practitioner order or complex needs related to functional status, cognitive ability, or social support system   Outcome: Progressing     Problem: Knowledge Deficit  Goal: Patient/family/caregiver demonstrates understanding of disease process, treatment plan, medications, and discharge instructions  Description  Complete learning assessment and assess knowledge base    Interventions:  - Provide teaching at level of understanding  - Provide teaching via preferred learning methods  Outcome: Progressing

## 2020-02-20 NOTE — ASSESSMENT & PLAN NOTE
Lab Results   Component Value Date    HGBA1C 8 2 (H) 12/27/2019       Recent Labs     02/19/20  1604 02/19/20 2011 02/20/20  0624 02/20/20  1131   POCGLU 113 138 158* 226*       Blood Sugar Average: Last 72 hrs:  · (P) [id number] resume home medication regimen upon discharge

## 2020-02-20 NOTE — PLAN OF CARE
Problem: Potential for Falls  Goal: Patient will remain free of falls  Description  INTERVENTIONS:  - Assess patient frequently for physical needs  -  Identify cognitive and physical deficits and behaviors that affect risk of falls    -  [place] fall precautions as indicated by assessment   - Educate patient/family on patient safety including physical limitations  - Instruct patient to call for assistance with activity based on assessment  - Modify environment to reduce risk of injury  - Consider OT/PT consult to assist with strengthening/mobility  Outcome: Adequate for Discharge     Problem: RESPIRATORY - ADULT  Goal: Achieves optimal ventilation and oxygenation  Description  INTERVENTIONS:  - Assess for changes in respiratory status  - Assess for changes in mentation and behavior  - Position to facilitate oxygenation and minimize respiratory effort  - Oxygen administered by appropriate delivery if ordered  - Initiate smoking cessation education as indicated  - Encourage broncho-pulmonary hygiene including cough, deep breathe, Incentive Spirometry  - Assess the need for suctioning and aspirate as needed  - Assess and instruct to report SOB or any respiratory difficulty  - Respiratory Therapy support as indicated  Outcome: Adequate for Discharge     Problem: METABOLIC, FLUID AND ELECTROLYTES - ADULT  Goal: Electrolytes maintained within normal limits  Description  INTERVENTIONS:  - Monitor labs and assess patient for signs and symptoms of electrolyte imbalances  - Administer electrolyte replacement as ordered  - Monitor response to electrolyte replacements, including repeat lab results as appropriate  - Instruct patient on fluid and nutrition as appropriate  Outcome: Adequate for Discharge     Problem: SKIN/TISSUE INTEGRITY - ADULT  Goal: Skin integrity remains intact  Description  INTERVENTIONS  - Identify patients at risk for skin breakdown  - Assess and monitor skin integrity  - Assess and monitor nutrition and hydration status  - Monitor labs (i e  albumin)  - Assess for incontinence   - Turn and reposition patient  - Assist with mobility/ambulation  - Relieve pressure over bony prominences  - Avoid friction and shearing  - Provide appropriate hygiene as needed including keeping skin clean and dry  - Evaluate need for skin moisturizer/barrier cream  - Collaborate with interdisciplinary team (i e  Nutrition, Rehabilitation, etc )   - Patient/family teaching  Outcome: Adequate for Discharge     Problem: MUSCULOSKELETAL - ADULT  Goal: Maintain or return mobility to safest level of function  Description  INTERVENTIONS:  - Assess patient's ability to carry out ADLs; assess patient's baseline for ADL function and identify physical deficits which impact ability to perform ADLs (bathing, care of mouth/teeth, toileting, grooming, dressing, etc )  - Assess/evaluate cause of self-care deficits   - Assess range of motion  - Assess patient's mobility  - Assess patient's need for assistive devices and provide as appropriate  - Encourage maximum independence but intervene and supervise when necessary  - Involve family in performance of ADLs  - Assess for home care needs following discharge   - Consider OT consult to assist with ADL evaluation and planning for discharge  - Provide patient education as appropriate  Outcome: Adequate for Discharge     Problem: PAIN - ADULT  Goal: Verbalizes/displays adequate comfort level or baseline comfort level  Description  Interventions:  - Encourage patient to monitor pain and request assistance  - Assess pain using appropriate pain scale  - Administer analgesics based on type and severity of pain and evaluate response  - Implement non-pharmacological measures as appropriate and evaluate response  - Consider cultural and social influences on pain and pain management  - Notify physician/advanced practitioner if interventions unsuccessful or patient reports new pain  Outcome: Adequate for Discharge     Problem: INFECTION - ADULT  Goal: Absence or prevention of progression during hospitalization  Description  INTERVENTIONS:  - Assess and monitor for signs and symptoms of infection  - Monitor lab/diagnostic results  - Monitor all insertion sites, i e  indwelling lines, tubes, and drains  - Monitor endotracheal if appropriate and nasal secretions for changes in amount and color  - [place] appropriate cooling/warming therapies per order  - Administer medications as ordered  - Instruct and encourage patient and family to use good hand hygiene technique   Outcome: Adequate for Discharge     Problem: SAFETY ADULT  Goal: Maintain or return to baseline ADL function  Description  INTERVENTIONS:  -  Assess patient's ability to carry out ADLs; assess patient's baseline for ADL function and identify physical deficits which impact ability to perform ADLs (bathing, care of mouth/teeth, toileting, grooming, dressing, etc )  - Assess/evaluate cause of self-care deficits   - Assess range of motion  - Assess patient's mobility; develop plan if impaired  - Assess patient's need for assistive devices and provide as appropriate  - Encourage maximum independence but intervene and supervise when necessary  - Involve family in performance of ADLs  - Assess for home care needs following discharge   - Consider OT consult to assist with ADL evaluation and planning for discharge  - Provide patient education as appropriate  Outcome: Adequate for Discharge  Goal: Maintain or return mobility status to optimal level  Description  INTERVENTIONS:  - Assess patient's baseline mobility status (ambulation, transfers, stairs, etc )    - Identify cognitive and physical deficits and behaviors that affect mobility  - Identify mobility aids required to assist with transfers and/or ambulation (gait belt, sit-to-stand, lift, walker, cane, etc )  - [place] fall precautions as indicated by assessment  - Record patient progress and toleration of activity level on Mobility SBAR; progress patient to next Phase/Stage  - Instruct patient to call for assistance with activity based on assessment  - Consider rehabilitation consult to assist with strengthening/weightbearing, etc   Outcome: Adequate for Discharge     Problem: DISCHARGE PLANNING  Goal: Discharge to home or other facility with appropriate resources  Description  INTERVENTIONS:  - Identify barriers to discharge w/patient and caregiver  - Arrange for needed discharge resources and transportation as appropriate  - Identify discharge learning needs (meds, wound care, etc )  - Refer to Case Management Department for coordinating discharge planning if the patient needs post-hospital services based on physician/advanced practitioner order or complex needs related to functional status, cognitive ability, or social support system   Outcome: Adequate for Discharge     Problem: Knowledge Deficit  Goal: Patient/family/caregiver demonstrates understanding of disease process, treatment plan, medications, and discharge instructions  Description  Complete learning assessment and assess knowledge base    Interventions:  - Provide teaching at level of understanding  - Provide teaching via preferred learning methods  Outcome: Adequate for Discharge

## 2020-02-20 NOTE — NURSING NOTE
Patient discharged in stable condition  Discharge instructions, F/U appointments reviewed with patient  Education material provided and my chart reviewed

## 2020-02-20 NOTE — PLAN OF CARE
Problem: DISCHARGE PLANNING - CARE MANAGEMENT  Goal: Discharge to post-acute care or home with appropriate resources  Description  INTERVENTIONS:  - Conduct assessment to determine patient/family and health care team treatment goals, and need for post-acute services based on payer coverage, community resources, and patient preferences, and barriers to discharge  - Address psychosocial, clinical, and financial barriers to discharge as identified in assessment in conjunction with the patient/family and health care team  - Arrange appropriate level of post-acute services according to patient's   needs and preference and payer coverage in collaboration with the physician and health care team  - Communicate with and update the patient/family, physician, and health care team regarding progress on the discharge plan  - Arrange appropriate transportation to post-acute venues  Pt is independent and drove to the hospital   Pt has no needs     Outcome: Progressing

## 2020-02-20 NOTE — ASSESSMENT & PLAN NOTE
· Symptoms improved  · Will discharge on prednisone taper along with nebulizers and inhalers which the patient has at home  · Follow-up with PCP as an outpatient

## 2020-02-20 NOTE — DISCHARGE SUMMARY
Discharge- Camila Gates 1945, 69 y o  male MRN: [id number]    Unit/Bed#: -01 Encounter: [id number]    Primary Care Provider: Audi Benson MD   Date and time admitted to hospital: 2/19/2020  3:06 AM        Ambulatory dysfunction  Assessment & Plan  · Resolved    Essential hypertension  Assessment & Plan  · Continue meds and monitor    Type 2 diabetes mellitus without complication, without long-term current use of insulin [facility])  Assessment & Plan  Lab Results   Component Value Date    HGBA1C 8 2 (H) 12/27/2019       Recent Labs     02/19/20  1604 02/19/20 2011 02/20/20  0624 02/20/20  1131   POCGLU 113 138 158* 226*       Blood Sugar Average: Last 72 hrs:  · (P) 769 [id number] resume home medication regimen upon discharge    * Chronic obstructive pulmonary disease with acute exacerbation (HCC)  Assessment & Plan  · Symptoms improved  · Will discharge on prednisone taper along with nebulizers and inhalers which the patient has at home  · Follow-up with PCP as an outpatient          Discharging Physician / Practitioner: Vianey Quintero MD  PCP: Audi Benson MD  Admission Date:   Admission Orders (From admission, onward)     Ordered        02/19/20 0417  Place in Observation  Once                   Discharge Date: 02/20/20    Resolved Problems  Date Reviewed: 2/19/2020    None          Consultations During Hospital Stay:  · n/a    Procedures Performed:   · n/a    Significant Findings / Test Results:   · n/a    Incidental Findings:   · n/a     Test Results Pending at Discharge (will require follow up):   · n/a     Outpatient Tests Requested:  · n/a    Complications:     n/a    Reason for Admission:  Shortness of breath    Hospital Course:     Camila Gates is a 69 y o  male patient who originally presented to the hospital on 2/19/2020 due to shortness of breath due to COPD exacerbation  Patient was admitted to the hospital under observation    He was treated with nebulizers and steroids with subsequent improvement in his symptoms  His procalcitonin was negative  As the symptoms improved, he was deemed stable for discharge  He will be discharged on a prednisone taper  He will be following up with his primary care physician  Please see above list of diagnoses and related plan for additional information  Condition at Discharge: fair     Discharge Day Visit / Exam:     Subjective:  Patient seen examined  No acute events overnight  Feels well wants go home  Vitals: Blood Pressure: 129/64 (02/20/20 0805)  Pulse: 74 (02/20/20 0805)  Temperature: (!) 97 4 °F (36 3 °C) (02/20/20 0805)  Temp Source: Tympanic (02/20/20 0805)  Respirations: 18 (02/20/20 0805)  Height: 5' 10" (177 8 cm) (02/19/20 0817)  Weight - Scale: 84 1 kg (185 lb 8 3 oz) (02/19/20 0817)  SpO2: 94 % (02/20/20 0805)  Exam:   Physical Exam   Constitutional: He appears well-developed and well-nourished  HENT:   Head: Normocephalic and atraumatic  Poor dentition   Eyes: Pupils are equal, round, and reactive to light  EOM are normal    Neck: Normal range of motion  Neck supple  Cardiovascular: Normal rate and regular rhythm  Pulmonary/Chest: Effort normal    Very mild wheeze   Abdominal: Soft  Bowel sounds are normal    Musculoskeletal: Normal range of motion  He exhibits no edema  Neurological: He is alert  Skin: Skin is warm  Capillary refill takes less than 2 seconds  Psychiatric: He has a normal mood and affect  His behavior is normal  Judgment and thought content normal    Nursing note and vitals reviewed  Discussion with Family: n/a    Discharge instructions/Information to patient and family:   See after visit summary for information provided to patient and family  Provisions for Follow-Up Care:  See after visit summary for information related to follow-up care and any pertinent home health orders        Disposition:     Home    For Discharges to [facility] SNF:   · Not Applicable to this Patient - Not Applicable to this Patient    Planned Readmission:    n/a     Discharge Statement:  I spent 22 minutes discharging the patient  This time was spent on the day of discharge  I had direct contact with the patient on the day of discharge  Greater than 50% of the total time was spent examining patient, answering all patient questions, arranging and discussing plan of care with patient as well as directly providing post-discharge instructions  Additional time then spent on discharge activities  Discharge Medications:  See after visit summary for reconciled discharge medications provided to patient and family        ** Please Note: This note has been constructed using a voice recognition system **

## 2020-02-20 NOTE — UTILIZATION REVIEW
Initial Clinical Review    Admission: Date/Time/Statement: Admission Orders (From admission, onward)     Ordered        02/19/20 0417  Place in Observation  Once                   Orders Placed This Encounter   Procedures    Place in Observation     Standing Status:   Standing     Number of Occurrences:   1     Order Specific Question:   Admitting Physician     Answer:   Jasmeet Luna [[id number]]     Order Specific Question:   Level of Care     Answer:   Med Surg [16]     ED Arrival Information     Expected Arrival Acuity Means of Arrival Escorted By Service Admission Type    - 2/19/2020 03:03 Emergent Walk-In Self Hospitalist Emergency    Arrival Complaint    Trouble breathing        Chief Complaint   Patient presents with    Shortness of Breath     Assessment/Plan: 69 y o  male   Jayden Sanchez   presented to the ER secondary to shortness of breath and worsening cough  Patient reported shortness of breath on Tuesday , he woke up approximately 2:00 a m  he was very short of breath he used his inhaler with 4 puffs and then did a nebulizer treatment he was still very short of breath and drove himself to the hospital   He notes clear sputum  Patient was noted to have respiratory distress in the ER with accessory muscle usage  In the ER he was given an albuterol nebulizer with Atrovent, magnesium 50 mL of 100 mL bag, Solu-Medrol 60 mg      Jayden Sanchez    Chronic obstructive pulmonary disease with acute exacerbation (HCC)  Assessment & Plan  · Patient presents to the ER with shortness of breath respiratory distress  · He was given nebulizers and IV steroids  · Continue steroids and respiratory protocol  · Wean off oxygen as able     Ambulatory dysfunction  Assessment & Plan  · PT OT eval for DC planning     Tremor  Assessment & Plan  · Continue primidone     CAD (coronary artery disease), native coronary artery  Assessment & Plan  · History of CABG x4  · Continue home meds  · No chest pain     Essential hypertension  Assessment & Plan  · Continue meds and monitor     Other hyperlipidemia  Assessment & Plan  · Continue statin     Type 2 diabetes mellitus without complication, without long-term current use of insulin (HCC)  Assessment & Plan        Lab Results   Component Value Date     HGBA1C 8 2 (H) 12/27/2019         No results for input(s): POCGLU in the last 72 hours      Blood Sugar Average: Last 72 hrs:  ·  insulin coverage while in the hospital  · Hold oral medications  ·     Marlen Serum        ED Triage Vitals [02/19/20 0309]   Temperature Pulse Respirations Blood Pressure SpO2   (!) 97 1 °F (36 2 °C) 91 (!) 24 (!) 173/87 93 %      Temp Source Heart Rate Source Patient Position - Orthostatic VS BP Location FiO2 (%)   Temporal Monitor Sitting Left arm --      Pain Score       No Pain        Wt Readings from Last 1 Encounters:   02/19/20 84 1 kg (185 lb 8 3 oz)     Additional Vital Signs:   02/19/20 0330    91  15  140/74  98  99 %       02/19/20 0315    92  19  173/87Abnormal   119  99 %         02/19/20 0817  97 2 °F (36 2 °C)Abnormal   94  18  143/67    95 %  None (Room air)  Sitting   02/19/20 0620            89 %Abnormal    None (Room air)     SpO2: placed back on 2 liters 02 via NC at 02/19/20 0620   02/19/20 0615    91  16                 2/20/20 0805  97 4 °F (36 3 °C)Abnormal   74  18  129/64    94 %  None (Room air)         Pertinent Labs/Diagnostic Test Results:   Results from last 7 days   Lab Units 02/20/20  0511 02/19/20  0320   WBC Thousand/uL 8 30 8 80   HEMOGLOBIN g/dL 12 4* 13 4*   HEMATOCRIT % 37 4* 40 7*   PLATELETS Thousands/uL 296 317   NEUTROS ABS Thousands/µL  --  6 00         Results from last 7 days   Lab Units 02/20/20  0511 02/19/20  0320   SODIUM mmol/L 137 138   POTASSIUM mmol/L 4 2 4 0 CHLORIDE mmol/L 103 102   CO2 mmol/L 25 26   ANION GAP mmol/L 9 10   BUN mg/dL 19 21   CREATININE mg/dL 0 76 1 03   EGFR ml/min/1 73sq m 91 72   CALCIUM mg/dL 8 7 8 8   MAGNESIUM mg/dL  --  2 3     Results from last 7 days   Lab Units 02/19/20  0320   AST U/L 11*   ALT U/L 11   ALK PHOS U/L 73   TOTAL PROTEIN g/dL 7 3   ALBUMIN g/dL 4 2   TOTAL BILIRUBIN mg/dL 0 30     Results from last 7 days   Lab Units 02/20/20  0624 02/19/20 2011 02/19/20  1604 02/19/20  1104 02/19/20  0709   POC GLUCOSE mg/dl 158* 138 113 182* 144*     Results from last 7 days   Lab Units 02/20/20  0511 02/19/20  0320   GLUCOSE RANDOM mg/dL 171* 141*             No results found for: BETA-HYDROXYBUTYRATE                   Results from last 7 days   Lab Units 02/19/20  0320   TROPONIN I ng/mL <0 03                 Results from last 7 days   Lab Units 02/19/20  0320   PROCALCITONIN ng/ml 0 13                 Results from last 7 days   Lab Units 02/19/20  0320   BNP pg/mL 54                             Results from last 7 days   Lab Units 02/19/20  0320   INFLUENZA A PCR  None Detected   INFLUENZA B PCR  None Detected   RSV PCR  None Detected                                       ED Treatment:   Medication Administration from 02/19/2020 0303 to 02/19/2020 9479       Date/Time Order Dose Route Action Action by Comments     02/19/2020 0345 albuterol inhalation solution 10 mg 10 mg Nebulization Given [facility], RT      02/19/2020 0345 ipratropium (ATROVENT) 0 02 % inhalation solution 0 5 mg 0 5 mg [facility]ul[facility]      02/19/2020 0346 sodium chloride 0 9 % inhalation solution 3 mL 3 mL Nebulization Given [facility], RT      02/19/2020 0405 magnesium sulfate IVPB (premix) SOLN 1 g 0 g Intravenous Stopped Estrada Kramer, NATALIA      02/19/2020 0332 magnesium sulfate IVPB (premix) SOLN 1 g 1 g Intravenous New 2025 Perla Xiong RN      02/19/2020 0327 methylPREDNISolone sodium succinate (Solu-MEDROL) injection 60 mg 60 mg Intravenous Given Joey Child RN      02/19/2020 7245 methylPREDNISolone sodium succinate (Solu-MEDROL) injection 40 mg 40 mg Intravenous Given Vicenta Garner RN      02/19/2020 0523 azithromycin (ZITHROMAX) tablet 500 mg 500 mg Oral Given Joey Child RN      02/19/2020 0755 insulin lispro (HumaLOG) 100 units/mL subcutaneous injection 5 Units 5 Units Subcutaneous Given Vicenta Garner RN      02/19/2020 0746 insulin detemir (LEVEMIR) subcutaneous injection 10 Units 10 Units Subcutaneous Given Leland Chris RN         Past Medical History:   Diagnosis Date    BPH (benign prostatic hyperplasia)     45 days radiation treatment    COPD (chronic obstructive pulmonary disease)     Coronary artery disease     CABG x4 in 2017    Diabetes mellitus     History of Arterial Duplex of LE 12/26/2017    Likely occlusion of the left superficial femoral artery  Calcific changes bilaterally  Despite these changes, the ankle-brachial index as a measure of peripheral blood flow only mildly impaired      History of echocardiogram 06/12/2017    EF 40%, mild LVH, mild MR     Hyperlipidemia     Hypertension     NSTEMI (non-ST elevated myocardial infarction)     Prostate cancer     prostate     PVD (peripheral vascular disease)     Tremor      Present on Admission:   CAD (coronary artery disease), native coronary artery   Essential hypertension   Chronic obstructive pulmonary disease with acute exacerbation (HCC)   Type 2 diabetes mellitus without complication, without long-term current use of insulin (HCC)   Tremor   Other hyperlipidemia   Ambulatory dysfunction      Admitting Diagnosis: Trouble breathing [R06 89]  COPD exacerbation ([facility] [address] ) [J44 1]  Age/Sex: 69 y o  male  Admission Orders:  Supplemental oxygen prn;  SCD's;  resp therapy to assess/neb pre protocol;   acccuks qid w/SSI;  xopenex neb TID     Scheduled Medications:    Medications:  aspirin 81 mg Oral Every Other Day   azithromycin 500 mg Oral Q24H   Empagliflozin 10 mg Oral QAM   enoxaparin 40 mg Subcutaneous Daily   gabapentin 200 mg Oral HS   guaiFENesin 600 mg Oral BID   insulin detemir 10 Units Subcutaneous Daily With Breakfast   insulin lispro 1-5 Units Subcutaneous HS   insulin lispro 1-6 Units Subcutaneous TID AC   insulin lispro 5 Units Subcutaneous TID With Meals   levalbuterol 1 25 mg Nebulization TID   And      sodium chloride 3 mL Nebulization TID   lisinopril 20 mg Oral Daily   methylPREDNISolone sodium succinate 40 mg Intravenous Q8H   metoprolol tartrate 25 mg Oral BID   pravastatin 80 mg Oral Daily With Dinner   primidone 100 mg Oral Q12H [facility]     Continuous IV Infusions:     PRN Meds:    albuterol 2 5 mg Nebulization Q4H PRN       Network Utilization Review Department  [email or web address]  ATTENTION: Please call with any questions or concerns to [phone] and carefully listen to the prompts so that you are directed to the right person  All voicemails are confidential   Crawford Glass all requests for admission clinical reviews, approved or denied determinations and any other requests to dedicated fax number below belonging to the campus where the patient is receiving treatment   List of dedicated fax numbers for the Facilities:  [address] DENIALS (Administrative/Medical Necessity) [phone]   [address] [place] [address] (Maternity/NICU/Pediatrics) [phone]   Ab [address] [phone]   Lesly Mccauley [phone]   To [place] [phone]   Deneen Fuel [phone]   [address] [facility] [address] [facility] [phone]   [facility]  [phone]   [address] [facility]  [address] [facility] [address] [facility] [phone]

## 2020-02-20 NOTE — SOCIAL WORK
Evaluated the pt at the bedside  Pt was admitted to the hospital with SOB  Explained the role of CM and the options of discharge planning with the pt  Pt states he lives with his spouse in a 2 story home with 3 BEATRIZ  Pt has 12-14 steps to the upstairs  Pt has a walker, nebulizer  and WC at home  Pt ambulates with a cane when outside the home  Pt PCP is from the [place]  Pt indicated he will be going to the [facility] instead of the [facility]  49  Pt gets his medications at the [place]  Pt states he is independent and drove himself to the hospital   Pt states he will have no needs upon discharge  Explained the Observation level of care with the pt who verbalized understanding of same  Patient/caregiver received discharge checklist   Content reviewed  Patient/caregiver encouraged to participate in discharge plan of care prior to discharge home  CM reviewed d/c planning process including the following: identifying help at home, patient preference for d/c planning needs, availability of treatment team to discuss questions or concerns patient and/or family may have regarding understanding medications and recognizing signs and symptoms once discharged  CM also encouraged patient to follow up with all recommended appointments after discharge  Patient advised of importance for patient and family to participate in managing patients medical well being

## 2020-02-21 ENCOUNTER — PATIENT OUTREACH (OUTPATIENT)
Dept: CASE MANAGEMENT | Facility: OTHER | Age: 75
End: 2020-02-21

## 2020-02-21 NOTE — PROGRESS NOTES
Inbasket notification received for new bundle patient  Chart reviewed  Patient readmitted within 30 days for SOB, COPD  DM2, patient states he has quit smoking  He lives with spouse, uses walker, nebulizer  and wc at home, cane when out of house  ,PCP from [facility]  He is independent, drives, independent with ADL's, medical management and scheduling dr visits  He states that he is feeling much better since hospital stay and politely declined [address] outreach services  He did agree to have CM mail out her card with contact information  Educational material re: COPD management and diabetic foot care mailed to patient

## 2020-02-23 LAB
BACTERIA SPT RESP CULT: ABNORMAL
BACTERIA SPT RESP CULT: ABNORMAL
GRAM STN SPEC: ABNORMAL

## 2020-03-10 ENCOUNTER — OFFICE VISIT (OUTPATIENT)
Dept: CARDIOLOGY CLINIC | Facility: CLINIC | Age: 75
End: 2020-03-10
Payer: MEDICARE

## 2020-03-10 VITALS
SYSTOLIC BLOOD PRESSURE: 122 MMHG | DIASTOLIC BLOOD PRESSURE: 70 MMHG | WEIGHT: 194 LBS | HEIGHT: 70 IN | HEART RATE: 74 BPM | BODY MASS INDEX: 27.77 KG/M2

## 2020-03-10 DIAGNOSIS — E78.49 OTHER HYPERLIPIDEMIA: Chronic | ICD-10-CM

## 2020-03-10 DIAGNOSIS — J44.1 CHRONIC OBSTRUCTIVE PULMONARY DISEASE WITH ACUTE EXACERBATION (HCC): Chronic | ICD-10-CM

## 2020-03-10 DIAGNOSIS — I25.5 ISCHEMIC CARDIOMYOPATHY: Primary | ICD-10-CM

## 2020-03-10 PROCEDURE — 99214 OFFICE O/P EST MOD 30 MIN: CPT | Performed by: INTERNAL MEDICINE

## 2020-03-10 NOTE — PROGRESS NOTES
Patient ID: Wagner Joe is a 69 y o  male  Plan:      Ischemic cardiomyopathy  Moderate  Borderline for requiring prophylactic ICD but his biggest limitation is underlying lung disease  Will hold off for now but read assess LV function in the fall    Other hyperlipidemia  Tolerating statin therapy  Chronic obstructive pulmonary disease with acute exacerbation ([address] )  Biggest problem  Had recent exacerbation successfully treated with steroids  Follow up Plan:  Follow-up in the fall with an EKG echo and visit  HPI:  The patient is seen in follow-up for reasons stated above  To reiterate, he had 4 vessel CABG in March of 2017 with left internal mammary to the LAD, vein graft to the ramus, vein graft to posterior descending artery and the ventricular branch of the right coronary artery  He has had a moderate depression of ejection fraction since then  Biggest limitation has been dyspnea with repeat he admissions but BNP below 200  He seems to respond to steroids  Most recent or relevant cardiac/vascular testing:    Echo 8/2019  - EF 30-40%  Global dysfunction with severe hypokinesis of the apex  Mild MR  Echo 6/2017 - EF 40%  Mild LVH  Mild MR   Echo 3/2017 - EF 45%  Basal inferior akinesis  Arterial duplex 12/2017 - likely occlusion of the left SFA  KEI mildly impaired        Past Surgical History:   Procedure Laterality Date    CARDIAC CATHETERIZATION  03/08/2017    Significant left main plus triple-vessel CAD   CORONARY ARTERY BYPASS GRAFT  03/08/2017    4V CABG:  LIMA to LAD, VG to RI, SVG to PDA to LVBR RCA      EYE SURGERY      shots in eye once a month @ the [address]:   Lab Results   Component Value Date     (L) 05/28/2018    K 4 2 02/20/2020    K 4 2 05/28/2018     02/20/2020    CL 98 05/28/2018    CO2 25 02/20/2020    CO2 24 05/28/2018    BUN 19 02/20/2020    BUN 12 05/28/2018    CREATININE 0 76 02/20/2020 CREATININE 0 83 05/28/2018    EGFR 91 02/20/2020       Lipid Profile: No results found for: CHOL, TRIG, HDL, LDL      Review of Systems   10  point ROS  was otherwise non pertinent or negative except as per HPI or as below  Gait: Slow           Objective:     /70   Pulse 74   Ht 5' 10" (1 778 m)   Wt 88 kg (194 lb)   BMI 27 84 kg/m²     PHYSICAL EXAM:    General:  Normal appearance in no distress  Eyes:  Anicteric  Oral mucosa:  Moist   Neck:  No JVD  Carotid upstrokes are brisk without bruits  No masses  Chest:  Decreased breath sounds throughout  Well healed midline sternotomy scar  Cardiac:  Normal PMI  Normal S1 and S2  No murmur gallop or rub  Abdomen:  Soft and nontender  No palpable organomegaly or aortic enlargement  Extremities:  No peripheral edema  Musculoskeletal:  Symmetric  Vascular:  Femoral popliteal and pedal pulses are absent  Neuro:  Grossly symmetric  Psych:  Alert and oriented x3          Current Outpatient Medications:     albuterol (2 5 mg/3 mL) 0 083 % nebulizer solution, Take 1 vial (2 5 mg total) by nebulization every 4 (four) hours as needed for wheezing or shortness of breath, Disp: 75 mL, Rfl: 0    albuterol (PROVENTIL HFA,VENTOLIN HFA) 90 mcg/act inhaler, Inhale 2 puffs every 6 (six) hours as needed for wheezing or shortness of breath, Disp: , Rfl:     aspirin (ECOTRIN LOW STRENGTH) 81 mg EC tablet, Take 81 mg by mouth every other day , Disp: , Rfl:     Empagliflozin 10 MG TABS, Take 10 mg by mouth every morning, Disp: , Rfl:     gabapentin (NEURONTIN) 100 mg capsule, Take 200 mg by mouth daily at bedtime, Disp: , Rfl:     lisinopril (ZESTRIL) 20 mg tablet, Take 1 tablet (20 mg total) by mouth daily, Disp: 30 tablet, Rfl: 0    metFORMIN (GLUCOPHAGE) 500 mg tablet, Take 500 mg by mouth daily with dinner , Disp: , Rfl:     metoprolol tartrate (LOPRESSOR) 25 mg tablet, Take 1 tablet (25 mg total) by mouth 2 (two) times a day, Disp: 60 tablet, Rfl: 0   primidone (MYSOLINE) 50 mg tablet, Take 2 tablets (100 mg total) by mouth every 12 (twelve) hours, Disp: , Rfl:     rosuvastatin (CRESTOR) 10 MG tablet, Take 10 mg by mouth daily, Disp: , Rfl:     saxagliptin (ONGLYZA) 5 MG tablet, Take 5 mg by mouth daily, Disp: , Rfl:     tiotropium-olodaterol (STIOLTO RESPIMAT) 2 5-2 5 MCG/ACT inhaler, Inhale 2 puffs daily, Disp: , Rfl:     predniSONE 10 mg tablet, Take 3 tablets (30 mg total) by mouth daily 30mg PO day1,2 20mg PO day3,4 10mg PO day 5,6 (Patient not taking: Reported on 3/10/2020), Disp: 12 tablet, Rfl: 0  No Known Allergies  Past Medical History:   Diagnosis Date    BPH (benign prostatic hyperplasia)     45 days radiation treatment    COPD (chronic obstructive pulmonary disease)     Coronary artery disease     CABG x4 in 2017    Diabetes mellitus     History of Arterial Duplex of LE 2017    Likely occlusion of the left superficial femoral artery  Calcific changes bilaterally  Despite these changes, the ankle-brachial index as a measure of peripheral blood flow only mildly impaired      History of echocardiogram 2017    EF 40%, mild LVH, mild MR     Hyperlipidemia     Hypertension     NSTEMI (non-ST elevated myocardial infarction)     Prostate cancer     prostate     PVD (peripheral vascular disease)     Tremor            Social History     Tobacco Use   Smoking Status Former Smoker    Packs/day: 0 25    Types: Cigarettes    Last attempt to quit: 12/15/2018    Years since quittin 2   Smokeless Tobacco Never Used   Tobacco Comment    only smokes when he drinks beer

## 2020-03-10 NOTE — ASSESSMENT & PLAN NOTE
Moderate  Borderline for requiring prophylactic ICD but his biggest limitation is underlying lung disease    Will hold off for now but read assess LV function in the fall

## 2020-03-12 ENCOUNTER — HOSPITAL ENCOUNTER (OUTPATIENT)
Facility: HOSPITAL | Age: 75
Setting detail: OBSERVATION
LOS: 1 days | Discharge: HOME | End: 2020-03-13
Attending: EMERGENCY MEDICINE | Admitting: INTERNAL MEDICINE
Payer: MEDICARE

## 2020-03-12 DIAGNOSIS — J96.91 RESPIRATORY FAILURE WITH HYPOXIA (HCC): ICD-10-CM

## 2020-03-12 DIAGNOSIS — J44.1 COPD EXACERBATION (HCC): Primary | ICD-10-CM

## 2020-03-12 PROBLEM — E78.2 MIXED HYPERLIPIDEMIA: Status: ACTIVE | Noted: 2020-03-12

## 2020-03-12 PROBLEM — Z86.79 HISTORY OF CORONARY ARTERY DISEASE: Status: ACTIVE | Noted: 2020-01-27

## 2020-03-12 LAB
ALBUMIN SERPL BCP-MCNC: 4 G/DL (ref 3.5–5.7)
ALP SERPL-CCNC: 59 U/L (ref 55–165)
ALT SERPL W P-5'-P-CCNC: 13 U/L (ref 7–52)
ANION GAP SERPL CALCULATED.3IONS-SCNC: 11 MMOL/L (ref 4–13)
APTT PPP: 37 SECONDS (ref 23–37)
AST SERPL W P-5'-P-CCNC: 22 U/L (ref 13–39)
ATRIAL RATE: 83 BPM
BASOPHILS # BLD AUTO: 0.1 THOUSANDS/ΜL (ref 0–0.1)
BASOPHILS NFR BLD AUTO: 1 % (ref 0–2)
BILIRUB SERPL-MCNC: 0.3 MG/DL (ref 0.2–1)
BNP SERPL-MCNC: 101 PG/ML (ref 1–100)
BUN SERPL-MCNC: 16 MG/DL (ref 7–25)
CALCIUM SERPL-MCNC: 8.6 MG/DL (ref 8.6–10.5)
CHLORIDE SERPL-SCNC: 101 MMOL/L (ref 98–107)
CO2 SERPL-SCNC: 25 MMOL/L (ref 21–31)
CREAT SERPL-MCNC: 0.8 MG/DL (ref 0.7–1.3)
EOSINOPHIL # BLD AUTO: 0.9 THOUSAND/ΜL (ref 0–0.61)
EOSINOPHIL NFR BLD AUTO: 9 % (ref 0–5)
ERYTHROCYTE [DISTWIDTH] IN BLOOD BY AUTOMATED COUNT: 15.1 % (ref 11.5–14.5)
GFR SERPL CREATININE-BSD FRML MDRD: 89 ML/MIN/1.73SQ M
GLUCOSE SERPL-MCNC: 112 MG/DL (ref 65–140)
GLUCOSE SERPL-MCNC: 113 MG/DL (ref 65–99)
HCT VFR BLD AUTO: 43.7 % (ref 42–47)
HGB BLD-MCNC: 14.5 G/DL (ref 14–18)
INR PPP: 0.95 (ref 0.9–1.5)
LYMPHOCYTES # BLD AUTO: 1.4 THOUSANDS/ΜL (ref 0.6–4.47)
LYMPHOCYTES NFR BLD AUTO: 14 % (ref 21–51)
MAGNESIUM SERPL-MCNC: 2.4 MG/DL (ref 1.9–2.7)
MCH RBC QN AUTO: 30.2 PG (ref 26–34)
MCHC RBC AUTO-ENTMCNC: 33.1 G/DL (ref 31–37)
MCV RBC AUTO: 91 FL (ref 81–99)
MONOCYTES # BLD AUTO: 0.6 THOUSAND/ΜL (ref 0.17–1.22)
MONOCYTES NFR BLD AUTO: 6 % (ref 2–12)
NEUTROPHILS # BLD AUTO: 7.1 THOUSANDS/ΜL (ref 1.4–6.5)
NEUTS SEG NFR BLD AUTO: 70 % (ref 42–75)
P AXIS: 80 DEGREES
PLATELET # BLD AUTO: 188 THOUSANDS/UL (ref 149–390)
PMV BLD AUTO: 9 FL (ref 8.6–11.7)
POTASSIUM SERPL-SCNC: 4.3 MMOL/L (ref 3.5–5.5)
PR INTERVAL: 178 MS
PROCALCITONIN SERPL-MCNC: <0.05 NG/ML
PROT SERPL-MCNC: 6.5 G/DL (ref 6.4–8.9)
PROTHROMBIN TIME: 11 SECONDS (ref 10.2–13)
QRS AXIS: 64 DEGREES
QRSD INTERVAL: 96 MS
QT INTERVAL: 386 MS
QTC INTERVAL: 453 MS
RBC # BLD AUTO: 4.79 MILLION/UL (ref 4.3–5.9)
SODIUM SERPL-SCNC: 137 MMOL/L (ref 134–143)
T WAVE AXIS: 93 DEGREES
TROPONIN I SERPL-MCNC: <0.03 NG/ML
VENTRICULAR RATE: 83 BPM
WBC # BLD AUTO: 10.2 THOUSAND/UL (ref 4.8–10.8)

## 2020-03-12 PROCEDURE — 94640 AIRWAY INHALATION TREATMENT: CPT | Performed by: EMERGENCY MEDICINE

## 2020-03-12 PROCEDURE — 99222 1ST HOSP IP/OBS MODERATE 55: CPT | Performed by: INTERNAL MEDICINE

## 2020-03-12 PROCEDURE — 94760 N-INVAS EAR/PLS OXIMETRY 1: CPT

## 2020-03-12 PROCEDURE — 94640 AIRWAY INHALATION TREATMENT: CPT

## 2020-03-12 PROCEDURE — 36415 COLL VENOUS BLD VENIPUNCTURE: CPT | Performed by: EMERGENCY MEDICINE

## 2020-03-12 PROCEDURE — 85025 COMPLETE CBC W/AUTO DIFF WBC: CPT | Performed by: EMERGENCY MEDICINE

## 2020-03-12 PROCEDURE — 99220 PR INITIAL OBSERVATION CARE/DAY 70 MINUTES: CPT | Performed by: PHYSICIAN ASSISTANT

## 2020-03-12 PROCEDURE — 85610 PROTHROMBIN TIME: CPT | Performed by: EMERGENCY MEDICINE

## 2020-03-12 PROCEDURE — 99291 CRITICAL CARE FIRST HOUR: CPT | Performed by: EMERGENCY MEDICINE

## 2020-03-12 PROCEDURE — 80053 COMPREHEN METABOLIC PANEL: CPT | Performed by: EMERGENCY MEDICINE

## 2020-03-12 PROCEDURE — 83735 ASSAY OF MAGNESIUM: CPT | Performed by: EMERGENCY MEDICINE

## 2020-03-12 PROCEDURE — 84484 ASSAY OF TROPONIN QUANT: CPT | Performed by: EMERGENCY MEDICINE

## 2020-03-12 PROCEDURE — 84145 PROCALCITONIN (PCT): CPT | Performed by: PHYSICIAN ASSISTANT

## 2020-03-12 PROCEDURE — 93010 ELECTROCARDIOGRAM REPORT: CPT | Performed by: INTERNAL MEDICINE

## 2020-03-12 PROCEDURE — 85730 THROMBOPLASTIN TIME PARTIAL: CPT | Performed by: EMERGENCY MEDICINE

## 2020-03-12 PROCEDURE — 99285 EMERGENCY DEPT VISIT HI MDM: CPT

## 2020-03-12 PROCEDURE — 93005 ELECTROCARDIOGRAM TRACING: CPT

## 2020-03-12 PROCEDURE — 83880 ASSAY OF NATRIURETIC PEPTIDE: CPT | Performed by: EMERGENCY MEDICINE

## 2020-03-12 PROCEDURE — 96374 THER/PROPH/DIAG INJ IV PUSH: CPT

## 2020-03-12 PROCEDURE — 82948 REAGENT STRIP/BLOOD GLUCOSE: CPT

## 2020-03-12 PROCEDURE — 94644 CONT INHLJ TX 1ST HOUR: CPT

## 2020-03-12 RX ORDER — PREDNISONE 20 MG/1
40 TABLET ORAL DAILY
Status: DISCONTINUED | OUTPATIENT
Start: 2020-03-13 | End: 2020-03-13 | Stop reason: HOSPADM

## 2020-03-12 RX ORDER — SODIUM CHLORIDE FOR INHALATION 0.9 %
3 VIAL, NEBULIZER (ML) INHALATION ONCE
Status: COMPLETED | OUTPATIENT
Start: 2020-03-12 | End: 2020-03-12

## 2020-03-12 RX ORDER — LEVALBUTEROL 1.25 MG/.5ML
1.25 SOLUTION, CONCENTRATE RESPIRATORY (INHALATION)
Status: DISCONTINUED | OUTPATIENT
Start: 2020-03-12 | End: 2020-03-13 | Stop reason: HOSPADM

## 2020-03-12 RX ORDER — PRAVASTATIN SODIUM 40 MG
80 TABLET ORAL
Status: DISCONTINUED | OUTPATIENT
Start: 2020-03-12 | End: 2020-03-13 | Stop reason: HOSPADM

## 2020-03-12 RX ORDER — AZITHROMYCIN 250 MG/1
500 TABLET, FILM COATED ORAL DAILY
Status: DISCONTINUED | OUTPATIENT
Start: 2020-03-12 | End: 2020-03-13

## 2020-03-12 RX ORDER — LISINOPRIL 20 MG/1
20 TABLET ORAL DAILY
Status: DISCONTINUED | OUTPATIENT
Start: 2020-03-12 | End: 2020-03-13 | Stop reason: HOSPADM

## 2020-03-12 RX ORDER — GABAPENTIN 100 MG/1
200 CAPSULE ORAL
Status: DISCONTINUED | OUTPATIENT
Start: 2020-03-12 | End: 2020-03-13 | Stop reason: HOSPADM

## 2020-03-12 RX ORDER — METHYLPREDNISOLONE SODIUM SUCCINATE 125 MG/2ML
60 INJECTION, POWDER, LYOPHILIZED, FOR SOLUTION INTRAMUSCULAR; INTRAVENOUS ONCE
Status: COMPLETED | OUTPATIENT
Start: 2020-03-12 | End: 2020-03-12

## 2020-03-12 RX ORDER — METHYLPREDNISOLONE SODIUM SUCCINATE 40 MG/ML
40 INJECTION, POWDER, LYOPHILIZED, FOR SOLUTION INTRAMUSCULAR; INTRAVENOUS EVERY 8 HOURS
Status: DISCONTINUED | OUTPATIENT
Start: 2020-03-12 | End: 2020-03-12

## 2020-03-12 RX ORDER — METHYLPREDNISOLONE SODIUM SUCCINATE 40 MG/ML
40 INJECTION, POWDER, LYOPHILIZED, FOR SOLUTION INTRAMUSCULAR; INTRAVENOUS EVERY 12 HOURS SCHEDULED
Status: DISCONTINUED | OUTPATIENT
Start: 2020-03-12 | End: 2020-03-12

## 2020-03-12 RX ORDER — HEPARIN SODIUM 5000 [USP'U]/ML
5000 INJECTION, SOLUTION INTRAVENOUS; SUBCUTANEOUS EVERY 8 HOURS SCHEDULED
Status: DISCONTINUED | OUTPATIENT
Start: 2020-03-12 | End: 2020-03-13 | Stop reason: HOSPADM

## 2020-03-12 RX ORDER — BUDESONIDE AND FORMOTEROL FUMARATE DIHYDRATE 160; 4.5 UG/1; UG/1
2 AEROSOL RESPIRATORY (INHALATION) 2 TIMES DAILY
Status: DISCONTINUED | OUTPATIENT
Start: 2020-03-12 | End: 2020-03-13 | Stop reason: HOSPADM

## 2020-03-12 RX ORDER — ALBUTEROL SULFATE 2.5 MG/3ML
2.5 SOLUTION RESPIRATORY (INHALATION) EVERY 4 HOURS PRN
Status: DISCONTINUED | OUTPATIENT
Start: 2020-03-12 | End: 2020-03-13 | Stop reason: HOSPADM

## 2020-03-12 RX ORDER — ASPIRIN 81 MG/1
81 TABLET ORAL EVERY OTHER DAY
Status: DISCONTINUED | OUTPATIENT
Start: 2020-03-12 | End: 2020-03-13 | Stop reason: HOSPADM

## 2020-03-12 RX ORDER — GUAIFENESIN 600 MG
600 TABLET, EXTENDED RELEASE 12 HR ORAL 2 TIMES DAILY
Status: DISCONTINUED | OUTPATIENT
Start: 2020-03-12 | End: 2020-03-13 | Stop reason: HOSPADM

## 2020-03-12 RX ORDER — METHYLPREDNISOLONE SODIUM SUCCINATE 40 MG/ML
40 INJECTION, POWDER, LYOPHILIZED, FOR SOLUTION INTRAMUSCULAR; INTRAVENOUS EVERY 12 HOURS SCHEDULED
Status: COMPLETED | OUTPATIENT
Start: 2020-03-12 | End: 2020-03-12

## 2020-03-12 RX ADMIN — LISINOPRIL 20 MG: 10 TABLET ORAL at 08:06

## 2020-03-12 RX ADMIN — ISODIUM CHLORIDE 3 ML: 0.03 SOLUTION RESPIRATORY (INHALATION) at 01:28

## 2020-03-12 RX ADMIN — AZITHROMYCIN 500 MG: 250 TABLET, FILM COATED ORAL at 04:57

## 2020-03-12 RX ADMIN — BUDESONIDE AND FORMOTEROL FUMARATE DIHYDRATE 2 PUFF: 160; 4.5 AEROSOL RESPIRATORY (INHALATION) at 08:08

## 2020-03-12 RX ADMIN — HEPARIN SODIUM 5000 UNITS: 5000 INJECTION, SOLUTION INTRAVENOUS; SUBCUTANEOUS at 22:33

## 2020-03-12 RX ADMIN — METHYLPREDNISOLONE SODIUM SUCCINATE 60 MG: 125 INJECTION, POWDER, FOR SOLUTION INTRAMUSCULAR; INTRAVENOUS at 01:13

## 2020-03-12 RX ADMIN — GUAIFENESIN 600 MG: 600 TABLET, EXTENDED RELEASE ORAL at 08:06

## 2020-03-12 RX ADMIN — PRAVASTATIN SODIUM 80 MG: 40 TABLET ORAL at 16:42

## 2020-03-12 RX ADMIN — LEVALBUTEROL HYDROCHLORIDE 1.25 MG: 1.25 SOLUTION, CONCENTRATE RESPIRATORY (INHALATION) at 19:30

## 2020-03-12 RX ADMIN — GABAPENTIN 200 MG: 100 CAPSULE ORAL at 22:33

## 2020-03-12 RX ADMIN — GUAIFENESIN 600 MG: 600 TABLET, EXTENDED RELEASE ORAL at 17:46

## 2020-03-12 RX ADMIN — IPRATROPIUM BROMIDE 0.5 MG: 0.5 SOLUTION RESPIRATORY (INHALATION) at 19:30

## 2020-03-12 RX ADMIN — IPRATROPIUM BROMIDE 0.5 MG: 0.5 SOLUTION RESPIRATORY (INHALATION) at 15:47

## 2020-03-12 RX ADMIN — IPRATROPIUM BROMIDE 0.5 MG: 0.5 SOLUTION RESPIRATORY (INHALATION) at 01:28

## 2020-03-12 RX ADMIN — HEPARIN SODIUM 5000 UNITS: 5000 INJECTION, SOLUTION INTRAVENOUS; SUBCUTANEOUS at 08:10

## 2020-03-12 RX ADMIN — METHYLPREDNISOLONE SODIUM SUCCINATE 40 MG: 40 INJECTION, POWDER, FOR SOLUTION INTRAMUSCULAR; INTRAVENOUS at 22:00

## 2020-03-12 RX ADMIN — METOPROLOL TARTRATE 25 MG: 25 TABLET ORAL at 08:06

## 2020-03-12 RX ADMIN — ALBUTEROL SULFATE 10 MG: 2.5 SOLUTION RESPIRATORY (INHALATION) at 01:28

## 2020-03-12 RX ADMIN — BUDESONIDE AND FORMOTEROL FUMARATE DIHYDRATE 2 PUFF: 160; 4.5 AEROSOL RESPIRATORY (INHALATION) at 17:46

## 2020-03-12 RX ADMIN — METOPROLOL TARTRATE 25 MG: 25 TABLET ORAL at 17:46

## 2020-03-12 RX ADMIN — LEVALBUTEROL HYDROCHLORIDE 1.25 MG: 1.25 SOLUTION, CONCENTRATE RESPIRATORY (INHALATION) at 15:47

## 2020-03-12 RX ADMIN — HEPARIN SODIUM 5000 UNITS: 5000 INJECTION, SOLUTION INTRAVENOUS; SUBCUTANEOUS at 14:43

## 2020-03-12 RX ADMIN — ASPIRIN 81 MG: 81 TABLET, COATED ORAL at 08:06

## 2020-03-12 NOTE — H&P
H&P- Carmine Palma 1945, 69 y o  male MRN: [id number]    Unit/Bed#: ED 04 Encounter: [id number]    Primary Care Provider: Janes Diaz DO   Date and time admitted to hospital: 3/12/2020 12:43 AM        * Chronic obstructive pulmonary disease with acute exacerbation ([facility] [address] )  Assessment & Plan  · Placed in observation Medicine  · Was on O2 and respiratory protocol  · Give Zithromax 500 mg p o  Daily  · Give Solu-Medrol 40 mg IV q 8 hours  · Give Mucinex 600 mg p o  B i d   · Substitute Symbicort for pre-hospital Stiolto  · Give albuterol nebs q 4 p r n  · Consult pulmonary    Type 2 diabetes mellitus without complication, without long-term current use of insulin ([facility]ly [facility])  Assessment & Plan  Lab Results   Component Value Date    HGBA1C 8 2 (H) 12/27/2019       No results for input(s): POCGLU in the last 72 hours  Blood Sugar Average: Last 72 hrs:     Place on [facility] step 2 diet, hold pre-hospital oral antihyperglycemics, obtain Accu-Cheks AC and HS with Humalog correction dose a c  And HS    Essential hypertension  Assessment & Plan  Continue pre-hospital Lopressor 25 mg p o  B i d  And lisinopril 20 mg p o  Daily    Other hyperlipidemia  Assessment & Plan  Substitute Pravachol 80 mg p o  Daily for pre-hospital Crestor      VTE Prophylaxis: Heparin  Code Status:  Level 1  POLST: There is no POLST form on file for this patient (pre-hospital)  Discussion with family:  None present at bedside at time of exam    Anticipated Length of Stay:  Patient will be admitted on an Observation basis with an anticipated length of stay of  < 2 midnights  Justification for Hospital Stay:  COPD exacerbation requiring IV steroids, frequent nebulizer treatments, O2 support and further pulmonary evaluation    Chief Complaint:   Shortness of breath x1 day    History of Present Illness:    Carmine Palma is a 69 y o  male who presents with shortness of breath x1 day    Patient presents ER for further evaluation treatment of his 1 day history of dyspnea at rest   Patient has a longstanding history of COPD and follows with pulmonary Dr Yuly Alvarez we last saw he believes 6 months ago for routine follow-up  Patient is well known to us from multiple previous admissions for his COPD with most recent being in February of this year and was discharged after 1 day and has pending appointment with his pulmonologist in April  Patient states that he was will from sleep this evening with acute onset of shortness of breath that was unresponsive to his MDI as well as his nebulizer  Patient is additionally complain of a cough which is productive of clear sputum and some accompanying wheezing  Patient denies any fever chills, no recent sick contacts  Review of Systems:  Review of Systems   Constitutional: Negative for chills and fever  Respiratory: Positive for cough, shortness of breath and wheezing  Cardiovascular: Negative for chest pain and palpitations  Gastrointestinal: Negative for diarrhea, nausea and vomiting  Genitourinary: Negative for dysuria, frequency, hematuria and urgency  All other systems reviewed and are negative  Past Medical and Surgical History:   Past Medical History:   Diagnosis Date    BPH (benign prostatic hyperplasia)     39 days radiation treatment    COPD (chronic obstructive pulmonary disease)     Coronary artery disease     CABG x4 in 2017    Diabetes mellitus     History of Arterial Duplex of LE 12/26/2017    Likely occlusion of the left superficial femoral artery  Calcific changes bilaterally  Despite these changes, the ankle-brachial index as a measure of peripheral blood flow only mildly impaired      History of echocardiogram 06/12/2017    EF 40%, mild LVH, mild MR     Hyperlipidemia     Hypertension     NSTEMI (non-ST elevated myocardial infarction)     Prostate cancer     prostate     PVD (peripheral vascular disease)     Tremor        Past Surgical History:   Procedure Laterality Date  CARDIAC CATHETERIZATION  03/08/2017    Significant left main plus triple-vessel CAD   CORONARY ARTERY BYPASS GRAFT  03/08/2017    4V CABG:  LIMA to LAD, VG to RI, SVG to PDA to LVBR RCA   EYE SURGERY      shots in eye once a month @ the [address] [place] [address]         Meds/Allergies:  Prior to Admission medications    Medication Sig Start Date End Date Taking?  Authorizing Provider   albuterol (2 5 mg/3 mL) 0 083 % nebulizer solution Take 1 vial (2 5 mg total) by nebulization every 4 (four) hours as needed for wheezing or shortness of breath 6/24/18  Yes Zoila Carpenter MD   albuterol (PROVENTIL HFA,VENTOLIN HFA) 90 mcg/act inhaler Inhale 2 puffs every 6 (six) hours as needed for wheezing or shortness of breath   Yes Historical Provider, MD   aspirin (ECOTRIN LOW STRENGTH) 81 mg EC tablet Take 81 mg by mouth every other day    Yes Historical Provider, MD   Empagliflozin 10 MG TABS Take 10 mg by mouth every morning   Yes Historical Provider, MD   gabapentin (NEURONTIN) 100 mg capsule Take 200 mg by mouth daily at bedtime   Yes Historical Provider, MD   lisinopril (ZESTRIL) 20 mg tablet Take 1 tablet (20 mg total) by mouth daily 12/30/19  Yes Philippe Ward MD   metFORMIN (GLUCOPHAGE) 500 mg tablet Take 500 mg by mouth daily with dinner    Yes Historical Provider, MD   metoprolol tartrate (LOPRESSOR) 25 mg tablet Take 1 tablet (25 mg total) by mouth 2 (two) times a day 12/29/19  Yes Philippe Ward MD   primidone (MYSOLINE) 50 mg tablet Take 2 tablets (100 mg total) by mouth every 12 (twelve) hours 10/23/19  Yes Jong Cortes PA-C   rosuvastatin (CRESTOR) 10 MG tablet Take 10 mg by mouth daily   Yes Historical Provider, MD   saxagliptin (ONGLYZA) 5 MG tablet Take 5 mg by mouth daily   Yes Historical Provider, MD   tiotropium-olodaterol (STIOLTO RESPIMAT) 2 5-2 5 MCG/ACT inhaler Inhale 2 puffs daily   Yes Historical Provider, MD   predniSONE 10 mg tablet Take 3 tablets (30 mg total) by mouth daily 30mg PO day1,2 20mg PO day3,4 10mg PO day 5,6 20   Maxx Zavaleta MD     I have reviewed home medications with patient personally  Allergies: No Known Allergies    Social History:  Marital Status: /Civil Union   Occupation:  Retired from [employer]  Patient Pre-hospital Living Situation:  Resides at home with wife, grandson and grandson's kan  Patient Pre-hospital Level of Mobility:  Full with occasional use of a cane  Patient Pre-hospital Diet Restrictions:  None  Substance Use History:   Social History     Substance and Sexual Activity   Alcohol Use Never    Alcohol/week: 2 0 standard drinks    Types: 2 Cans of beer per week    Frequency: 2-4 times a month    Drinks per session: 1 or 2    Binge frequency: Never    Comment: regularly      Social History     Tobacco Use   Smoking Status Former Smoker    Packs/day: 0 25    Types: Cigarettes    Last attempt to quit: 12/15/2018    Years since quittin 2   Smokeless Tobacco Never Used   Tobacco Comment    only smokes when he drinks beer     Social History     Substance and Sexual Activity   Drug Use Never       Family History:  I have reviewed the patients family history    Physical Exam:   Vitals:   Blood Pressure: 132/70 (20 0403)  Pulse: 75 (20 0515)  Temperature: 98 2 °F (36 8 °C) (20 0044)  Respirations: 16 (20 0515)  Weight - Scale: 86 2 kg (190 lb) (20 0044)  SpO2: 95 % (20 0515)    Physical Exam   Constitutional: He is oriented to person, place, and time  He appears well-developed and well-nourished  HENT:   Head: Normocephalic and atraumatic  Mouth/Throat: No oropharyngeal exudate  Eyes: Pupils are equal, round, and reactive to light  EOM are normal  No scleral icterus  Neck: Normal range of motion  Neck supple  No JVD present  Cardiovascular: Normal rate, regular rhythm and normal heart sounds  No murmur heard  Pulmonary/Chest: Effort normal  Tachypnea noted  No respiratory distress   He has decreased breath sounds  He has wheezes  He has rhonchi  He has no rales  Abdominal: Soft  Bowel sounds are normal  There is no tenderness  There is no rebound and no guarding  Musculoskeletal: Normal range of motion  He exhibits no edema  Lymphadenopathy:     He has no cervical adenopathy  Neurological: He is alert and oriented to person, place, and time  Skin: Skin is warm and dry  No rash noted  No erythema  Psychiatric: He has a normal mood and affect  His behavior is normal    Nursing note and vitals reviewed  Additional Data:   Lab Results: I have personally reviewed pertinent reports  Results from last 7 days   Lab Units 03/12/20  0116   WBC Thousand/uL 10 20   HEMOGLOBIN g/dL 14 5   HEMATOCRIT % 43 7   PLATELETS Thousands/uL 188   NEUTROS PCT % 70   LYMPHS PCT % 14*   MONOS PCT % 6   EOS PCT % 9*     Results from last 7 days   Lab Units 03/12/20  0116   POTASSIUM mmol/L 4 3   CHLORIDE mmol/L 101   CO2 mmol/L 25   BUN mg/dL 16   CREATININE mg/dL 0 80   CALCIUM mg/dL 8 6   ALK PHOS U/L 59   ALT U/L 13   AST U/L 22     Results from last 7 days   Lab Units 03/12/20  0116   INR  0 95               Imaging: I have personally reviewed pertinent reports  No orders to display       EKG, Pathology, and Other Studies Reviewed on Admission:   · EKG: N/A    [facility] Review[facility]: Yes     ** Please Note: This note has been constructed using a voice recognition system   **

## 2020-03-12 NOTE — ED PROCEDURE NOTE
PROCEDURE  ECG 12 Lead Documentation Only  Date/Time: 3/12/2020 1:53 AM  Performed by: Jeanette Tello MD  Authorized by: Jeanette Tello MD     Indications / Diagnosis:  Shortness of breath  ECG reviewed by me, the ED Provider: yes    Patient location:  ED  Previous ECG:     Comparison to cardiac monitor: Yes    Interpretation:     Interpretation: non-specific    Rate:     ECG rate:  83    ECG rate assessment: normal    Rhythm:     Rhythm: sinus rhythm    Ectopy:     Ectopy: none    QRS:     QRS axis:  Normal  Conduction:     Conduction: normal    ST segments:     ST segments:  Non-specific  T waves:     T waves: non-specific           Jeanette Tello MD  03/12/20 0154

## 2020-03-12 NOTE — PLAN OF CARE
Problem: Potential for Falls  Goal: Patient will remain free of falls  Description  INTERVENTIONS:  - Assess patient frequently for physical needs  -  Identify cognitive and physical deficits and behaviors that affect risk of falls    -  [place] fall precautions as indicated by assessment   - Educate patient/family on patient safety including physical limitations  - Instruct patient to call for assistance with activity based on assessment  - Modify environment to reduce risk of injury  - Consider OT/PT consult to assist with strengthening/mobility  Outcome: Progressing     Problem: PAIN - ADULT  Goal: Verbalizes/displays adequate comfort level or baseline comfort level  Description  Interventions:  - Encourage patient to monitor pain and request assistance  - Assess pain using appropriate pain scale  - Administer analgesics based on type and severity of pain and evaluate response  - Implement non-pharmacological measures as appropriate and evaluate response  - Consider cultural and social influences on pain and pain management  - Notify physician/advanced practitioner if interventions unsuccessful or patient reports new pain  Outcome: Progressing     Problem: INFECTION - ADULT  Goal: Absence or prevention of progression during hospitalization  Description  INTERVENTIONS:  - Assess and monitor for signs and symptoms of infection  - Monitor lab/diagnostic results  - Monitor all insertion sites, i e  indwelling lines, tubes, and drains  - Monitor endotracheal if appropriate and nasal secretions for changes in amount and color  - [place] appropriate cooling/warming therapies per order  - Administer medications as ordered  - Instruct and encourage patient and family to use good hand hygiene technique  - Identify and instruct in appropriate isolation precautions for identified infection/condition  Outcome: Progressing  Goal: Absence of fever/infection during neutropenic period  Description  INTERVENTIONS:  - Monitor WBC    Outcome: Progressing     Problem: SAFETY ADULT  Goal: Patient will remain free of falls  Description  INTERVENTIONS:  - Assess patient frequently for physical needs  -  Identify cognitive and physical deficits and behaviors that affect risk of falls    -  [place] fall precautions as indicated by assessment   - Educate patient/family on patient safety including physical limitations  - Instruct patient to call for assistance with activity based on assessment  - Modify environment to reduce risk of injury  - Consider OT/PT consult to assist with strengthening/mobility  Outcome: Progressing  Goal: Maintain or return to baseline ADL function  Description  INTERVENTIONS:  -  Assess patient's ability to carry out ADLs; assess patient's baseline for ADL function and identify physical deficits which impact ability to perform ADLs (bathing, care of mouth/teeth, toileting, grooming, dressing, etc )  - Assess/evaluate cause of self-care deficits   - Assess range of motion  - Assess patient's mobility; develop plan if impaired  - Assess patient's need for assistive devices and provide as appropriate  - Encourage maximum independence but intervene and supervise when necessary  - Involve family in performance of ADLs  - Assess for home care needs following discharge   - Consider OT consult to assist with ADL evaluation and planning for discharge  - Provide patient education as appropriate  Outcome: Progressing  Goal: Maintain or return mobility status to optimal level  Description  INTERVENTIONS:  - Assess patient's baseline mobility status (ambulation, transfers, stairs, etc )    - Identify cognitive and physical deficits and behaviors that affect mobility  - Identify mobility aids required to assist with transfers and/or ambulation (gait belt, sit-to-stand, lift, walker, cane, etc )  - [place] fall precautions as indicated by assessment  - Record patient progress and toleration of activity level on Mobility SBAR; progress patient to next Phase/Stage  - Instruct patient to call for assistance with activity based on assessment  - Consider rehabilitation consult to assist with strengthening/weightbearing, etc   Outcome: Progressing     Problem: DISCHARGE PLANNING  Goal: Discharge to home or other facility with appropriate resources  Description  INTERVENTIONS:  - Identify barriers to discharge w/patient and caregiver  - Arrange for needed discharge resources and transportation as appropriate  - Identify discharge learning needs (meds, wound care, etc )  - Arrange for interpretive services to assist at discharge as needed  - Refer to Case Management Department for coordinating discharge planning if the patient needs post-hospital services based on physician/advanced practitioner order or complex needs related to functional status, cognitive ability, or social support system  Outcome: Progressing     Problem: Knowledge Deficit  Goal: Patient/family/caregiver demonstrates understanding of disease process, treatment plan, medications, and discharge instructions  Description  Complete learning assessment and assess knowledge base    Interventions:  - Provide teaching at level of understanding  - Provide teaching via preferred learning methods  Outcome: Progressing

## 2020-03-12 NOTE — CONSULTS
Consultation - Pulmonary Medicine   Brissa Staley 69 y o  male MRN: [id number]  Unit/Bed#: -02 Encounter: [id number]      Assessment/Plan:    1    2  Very severe COPD with acute exacerbation  1  Decreased Solu-Medrol 40 mg IV q 12 hours  Start prednisone 40 mg p o  Daily tomorrow and complete 5 days of steroid therapy   2  Add Xopenex/Atrovent nebs t i d  While inpatient  3  Continue Symbicort 160/4 5 2 puffs twice daily  4  Low suspicion for acute infectious process- can continue azithromycin 500 mg x 3 days for antiinflammatory purposes  5  At discharge, resume Stiolto daily, albuterol nebs/HFA q 6 hours p r n   6  Follows with Dr Jackeline Sykes as an outpatient     Patient is stable for discharge from a pulmonary standpoint  Will sign off  Call with questions  History of Present Illness   Physician Requesting Consult: Leeanne Noguera MD  Reason for Consult / Principal Problem: COPD exacerbation  Hx and PE limited by: n/a  Chief Complaint: shortness of breath   HPI: Brissa Staley is a 69 y o   male who presented to [address] [place] [address] with complaints of acute onset of shortness of breath  Patient's past medical history positive for very severe COPD, hypertension, diabetes mellitus  Patient follows with Dr Jackeline Sykes for his lung disease  He has had frequent admissions for COPD exacerbations, most recently about a month ago  Patient states that this most recent exacerbation started less than 24 hours ago  Patient states that he went to sleep around 11 p m  Wednesday night  He woke up at 11:40 a m  Acutely short of breath, gasping for air  Patient states that he used his albuterol inhaler without any relief  He was able to walk downstairs and uses nebulizer machine (albuterol) with minimal relief and then took his Stiolto and noted enough relief for him to drive himself to the ED   Patient denies fevers, chills, headache, dizziness palpitations, chest pain, hemoptysis, nausea, vomiting, abdominal pain, leg pain, leg swelling  Does note cough productive clear sputum which is normal for him and also notes mild chest tightness with associated wheeze  On the ED was noted to be in respiratory distress using accessory muscles  He was treated with hour long nebulizer treatment and Solu-Medrol 60 mg IV once  Patient was admitted under observation for COPD exacerbation and Pulmonary was consulted to help manage this  Presently, patient states that he feels much better  In fact he feels so well that he feels that he is able to go home today  From a pulmonary perspective, patient follows with Dr Michael Grimaldo  He had pulmonary function tests in September of 2019 that showed very severe obstructive airflow defect  He is maintained on Stiolto and as needed albuterol HFA/nebs  He does not require oxygen at baseline  He has had frequent hospitalizations for exacerbation but has never been intubated  He is a former smoker with a quit date 3 months ago  Prior to that he smoked pack a day for 60 years  Patient is retired previously worked [employer] with exposures to fumes  Denies known exposures to asbestos or silica  Denies sick contacts  Denies recent travel  Denies bird exposure  Has no pets at his own  Denies change in his environment  He lives at home with his wife and grandson  Patient tells me that he had a sleep study many years ago and was told that he did not meet criteria for obstructive sleep apnea  He does not have any PAP therapy at home but does admit to snoring  Inpatient consult to Pulmonology  Consult performed by: Radha Ferrara PA-C  Consult ordered by: Kaya Lawrence PA-C          Review of Systems   Respiratory: Positive for cough, shortness of breath and wheezing  All other systems reviewed and are negative        Historical Information   Past Medical History:   Diagnosis Date    BPH (benign prostatic hyperplasia)     45 days radiation treatment    COPD (chronic obstructive pulmonary disease)     Coronary artery disease     CABG x4 in 2017    Diabetes mellitus     History of Arterial Duplex of LE 2017    Likely occlusion of the left superficial femoral artery  Calcific changes bilaterally  Despite these changes, the ankle-brachial index as a measure of peripheral blood flow only mildly impaired   History of echocardiogram 2017    EF 40%, mild LVH, mild MR     Hyperlipidemia     Hypertension     NSTEMI (non-ST elevated myocardial infarction)     Prostate cancer     prostate     PVD (peripheral vascular disease)     Tremor      Past Surgical History:   Procedure Laterality Date    CARDIAC CATHETERIZATION  2017    Significant left main plus triple-vessel CAD   CORONARY ARTERY BYPASS GRAFT  2017    4V CABG:  LIMA to LAD, VG to RI, SVG to PDA to LVBR RCA   EYE SURGERY      shots in eye once a month @ [facility] History   Social History     Substance and Sexual Activity   Alcohol Use Never    Alcohol/week: 2 0 standard drinks    Types: 2 Cans of beer per week    Frequency: 2-4 times a month    Drinks per session: 1 or 2    Binge frequency: Never    Comment: regularly      Social History     Substance and Sexual Activity   Drug Use Never     Social History     Tobacco Use   Smoking Status Former Smoker    Packs/day: 0 25    Types: Cigarettes    Last attempt to quit: 12/15/2018    Years since quittin 2   Smokeless Tobacco Never Used   Tobacco Comment    only smokes when he drinks beer     E-Cigarette/Vaping    E-Cigarette Use Never User      E-Cigarette/Vaping Substances     Occupational History:  Retired    Previously worked at [employer]    Family History:   Family History   Problem Relation Age of Onset    Cancer Father     Heart disease Brother        Meds/Allergies   all current active meds have been reviewed, pertinent pulmonary meds have been reviewed and current meds: Current Facility-Administered Medications   Medication Dose Route Frequency    albuterol inhalation solution 2 5 mg  2 5 mg Nebulization Q4H PRN    aspirin (ECOTRIN LOW STRENGTH) EC tablet 81 mg  81 mg Oral Every Other Day    azithromycin (ZITHROMAX) tablet 500 mg  500 mg Oral Daily    budesonide-formoterol (SYMBICORT) 160-4 5 mcg/act inhaler 2 puff  2 puff Inhalation BID    gabapentin (NEURONTIN) capsule 200 mg  200 mg Oral HS    guaiFENesin (MUCINEX) 12 hr tablet 600 mg  600 mg Oral BID    heparin (porcine) subcutaneous injection 5,000 Units  5,000 Units Subcutaneous Q8H [address]    lisinopril (ZESTRIL) tablet 20 mg  20 mg Oral Daily    methylPREDNISolone sodium succinate (Solu-MEDROL) injection 40 mg  40 mg Intravenous Q8H    metoprolol tartrate (LOPRESSOR) tablet 25 mg  25 mg Oral BID    pravastatin (PRAVACHOL) tablet 80 mg  80 mg Oral Daily With Dinner       No Known Allergies    Objective   Vitals: Blood pressure 142/80, pulse 85, temperature 97 6 °F (36 4 °C), temperature source Temporal, resp  rate 18, height 5' 10" (1 778 m), weight 86 5 kg (190 lb 11 2 oz), SpO2 92 %  room air,Body mass index is 27 36 kg/m²  No intake or output data in the 24 hours ending 03/12/20 1457  Invasive Devices     Peripheral Intravenous Line            Peripheral IV 03/12/20 Left Antecubital less than 1 day                Physical Exam   Constitutional: He is oriented to person, place, and time  He appears well-developed and well-nourished  No distress  HENT:   Head: Normocephalic and atraumatic  Right Ear: External ear normal    Left Ear: External ear normal    Nose: Nose normal    Mouth/Throat: Oropharynx is clear and moist  No oropharyngeal exudate  Eyes: Pupils are equal, round, and reactive to light  EOM are normal  Right eye exhibits no discharge  Left eye exhibits no discharge  Neck: Normal range of motion  Neck supple  No tracheal deviation present     Cardiovascular: Normal rate, regular rhythm, normal heart sounds and intact distal pulses  No murmur heard  Pulmonary/Chest: Effort normal and breath sounds normal  No stridor  No respiratory distress  He has no wheezes  He has no rales  Clear auscultation bilaterally without rhonchi, rales, wheeze  Abdominal: Soft  Bowel sounds are normal  There is no tenderness  Musculoskeletal: Normal range of motion  He exhibits no edema or deformity  Neurological: He is alert and oriented to person, place, and time  No cranial nerve deficit  Skin: Skin is warm and dry  No rash noted  He is not diaphoretic  No erythema  Psychiatric: He has a normal mood and affect  His behavior is normal  Judgment and thought content normal    Vitals reviewed  Lab Results:   I have personally reviewed pertinent lab results  , ABG: No results found for: PHART, [id number], PO2ART, UMD0YPU, L2GVINIA, BEART, SOURCE, BNP:   Lab Results   Component Value Date     (H) 03/12/2020   , CBC:   Lab Results   Component Value Date    WBC 10 20 03/12/2020    HGB 14 5 03/12/2020    HCT 43 7 03/12/2020    MCV 91 03/12/2020     03/12/2020    MCH 30 2 03/12/2020    MCHC 33 1 03/12/2020    RDW 15 1 (H) 03/12/2020    MPV 9 0 03/12/2020   , CMP:   Lab Results   Component Value Date    SODIUM 137 03/12/2020    K 4 3 03/12/2020     03/12/2020    CO2 25 03/12/2020    BUN 16 03/12/2020    CREATININE 0 80 03/12/2020    CALCIUM 8 6 03/12/2020    AST 22 03/12/2020    ALT 13 03/12/2020    ALKPHOS 59 03/12/2020    EGFR 89 03/12/2020   , PT/INR:   Lab Results   Component Value Date    INR 0 95 03/12/2020   , Troponin:   Lab Results   Component Value Date    TROPONINI <0 03 03/12/2020       Imaging Studies: I have personally reviewed pertinent reports  and I have personally reviewed pertinent films in PACS     Chest x-ray 03/12/2020  No acute cardiopulmonary disease    CT chest without contrast 12/28/2019  Emphysematous changes noted  A suspicious pulmonary nodules or masses      EKG, Pathology, and Other Studies: I have personally reviewed pertinent reports  Pulmonary Results (PFTs, PSG): I have personally reviewed pertinent reports  Spirometry 09/05/2019  FEV1/FVC ratio 52%  FEV1 820 mL 26% predicted  FEV1 1 58 L 37% predicted  Impression:  Very severe obstructive airflow defect with reduced FVC likely in the setting of obstruction with air trapping    VTE Prophylaxis: Sequential compression device (Venodyne)  and Enoxaparin (Lovenox)    Code Status: Level 1 - Full Code      Portions of the record may have been created with voice recognition software  Occasional wrong word or "sound a like" substitutions may have occurred due to the inherent limitations of voice recognition software  Read the chart carefully and recognize, using context, where substitutions have occurred

## 2020-03-12 NOTE — ASSESSMENT & PLAN NOTE
Lab Results   Component Value Date    HGBA1C 8 2 (H) 12/27/2019       No results for input(s): POCGLU in the last 72 hours  Blood Sugar Average: Last 72 hrs:     Place on [facility] step 2 diet, hold pre-hospital oral antihyperglycemics, obtain Accu-Cheks AC and HS with Humalog correction dose a c   And HS

## 2020-03-12 NOTE — ED PROVIDER NOTES
History  Chief Complaint   Patient presents with    Shortness of Breath     69YEAR-OLD MALE  Prior smoker until this year  H/O COPD      HE IS HERE FOR ACUTE COPD EXACERBATION C/W SHORTNESS OF BREATH AND NP PRODUCTIVE COUGH  NO  COMPLAINTS OF DIAPHORESIS  NO CP     HE DENIES ANY PAIN TO THE JAW NECK OR THE BACK  INTERVENTIONS:   Home INHALER      PATIENT DENIES FEVERS, REPORTS CHILLS  NO SINUS SYMPTOMS  NO HEADACHE OR NECK PAIN, NO DIZZINESS     VTE  RISK FACTORS:  NONE   NO HISTORY OF PE OR DVT   NO LONG CAR RIDES OR PLANE RIDES  NO IMMOBILIZATION  NO RECENT SURGERY OR TRAUMA  NO HEMOPTYSIS  OTHER ASSOCIATED SYMPTOMS:  NO ABDOMINAL PAIN  NO ACUTE BACK PAIN  NO NAUSEA OR VOMITING  NO STOOL CHANGES  NO URINARY COMPLAINTS: NO DYSURIA, NO HEMATURIA, NO FREQUENCY        History provided by:  Patient  Shortness of Breath   Severity:  Severe  Onset quality:  Gradual  Timing:  Constant  Progression:  Worsening  Chronicity:  Chronic  Relieved by:  Nothing  Worsened by:  Nothing  Ineffective treatments:  None tried  Associated symptoms: cough and wheezing    Associated symptoms: no abdominal pain, no chest pain, no claudication, no diaphoresis, no fever, no headaches, no hemoptysis, no neck pain, no PND, no sore throat, no sputum production, no swollen glands and no vomiting        Prior to Admission Medications   Prescriptions Last Dose Informant Patient Reported? Taking?    Empagliflozin 10 MG TABS 3/11/2020 at Unknown time  Yes Yes   Sig: Take 10 mg by mouth every morning   albuterol (2 5 mg/3 mL) 0 083 % nebulizer solution 3/12/2020 at Unknown time  No Yes   Sig: Take 1 vial (2 5 mg total) by nebulization every 4 (four) hours as needed for wheezing or shortness of breath   albuterol (PROVENTIL HFA,VENTOLIN HFA) 90 mcg/act inhaler 3/12/2020 at Unknown time  Yes Yes   Sig: Inhale 2 puffs every 6 (six) hours as needed for wheezing or shortness of breath   aspirin (ECOTRIN LOW STRENGTH) 81 mg EC tablet 3/11/2020 at Unknown time  Yes Yes   Sig: Take 81 mg by mouth every other day    gabapentin (NEURONTIN) 100 mg capsule 3/11/2020 at Unknown time  Yes Yes   Sig: Take 200 mg by mouth daily at bedtime   lisinopril (ZESTRIL) 20 mg tablet 3/11/2020 at Unknown time  No Yes   Sig: Take 1 tablet (20 mg total) by mouth daily   metFORMIN (GLUCOPHAGE) 500 mg tablet 3/11/2020 at Unknown time  Yes Yes   Sig: Take 500 mg by mouth daily with dinner    metoprolol tartrate (LOPRESSOR) 25 mg tablet 3/11/2020 at Unknown time  No Yes   Sig: Take 1 tablet (25 mg total) by mouth 2 (two) times a day   predniSONE 10 mg tablet Not Taking at Unknown time  No No   Sig: Take 3 tablets (30 mg total) by mouth daily 30mg PO day1,2 20mg PO day3,4 10mg PO day 5,6   Patient not taking: Reported on 3/12/2020   primidone (MYSOLINE) 50 mg tablet 3/11/2020 at Unknown time  No Yes   Sig: Take 2 tablets (100 mg total) by mouth every 12 (twelve) hours   rosuvastatin (CRESTOR) 10 MG tablet 3/11/2020 at Unknown time  Yes Yes   Sig: Take 10 mg by mouth daily   saxagliptin (ONGLYZA) 5 MG tablet 3/11/2020 at Unknown time  Yes Yes   Sig: Take 5 mg by mouth daily   tiotropium-olodaterol (STIOLTO RESPIMAT) 2 5-2 5 MCG/ACT inhaler 3/11/2020 at Unknown time  Yes Yes   Sig: Inhale 2 puffs daily      Facility-Administered Medications: None       Past Medical History:   Diagnosis Date    BPH (benign prostatic hyperplasia)     45 days radiation treatment    COPD (chronic obstructive pulmonary disease)     Coronary artery disease     CABG x4 in 2017    Diabetes mellitus     History of Arterial Duplex of LE 12/26/2017    Likely occlusion of the left superficial femoral artery  Calcific changes bilaterally  Despite these changes, the ankle-brachial index as a measure of peripheral blood flow only mildly impaired      History of echocardiogram 06/12/2017    EF 40%, mild LVH, mild MR     Hyperlipidemia     Hypertension     NSTEMI (non-ST elevated myocardial infarction)     Prostate cancer     prostate     PVD (peripheral vascular disease)     Tremor        Past Surgical History:   Procedure Laterality Date    CARDIAC CATHETERIZATION  2017    Significant left main plus triple-vessel CAD   CORONARY ARTERY BYPASS GRAFT  2017    4V CABG:  LIMA to LAD, VG to RI, SVG to PDA to LVBR RCA   EYE SURGERY      shots in eye once a month @ the [place]    PROSTATE BIOPSY         Family History   Problem Relation Age of Onset    Cancer Father     Heart disease Brother      I have reviewed and agree with the history as documented  E-Cigarette/Vaping    E-Cigarette Use Never User      E-Cigarette/Vaping Substances     Social History     Tobacco Use    Smoking status: Former Smoker     Packs/day: 0 25     Types: Cigarettes     Last attempt to quit: 12/15/2018     Years since quittin 2    Smokeless tobacco: Never Used    Tobacco comment: only smokes when he drinks beer   Substance Use Topics    Alcohol use: Never     Alcohol/week: 2 0 standard drinks     Types: 2 Cans of beer per week     Frequency: 2-4 times a month     Drinks per session: 1 or 2     Binge frequency: Never     Comment: regularly     Drug use: Never       Review of Systems   Constitutional: Negative for diaphoresis and fever  HENT: Negative for sore throat  Respiratory: Positive for cough, shortness of breath and wheezing  Negative for hemoptysis, sputum production and stridor  Cardiovascular: Negative for chest pain, palpitations, claudication, leg swelling and PND  Gastrointestinal: Negative for abdominal pain and vomiting  Musculoskeletal: Negative for neck pain  Neurological: Negative for headaches  Physical Exam  Physical Exam   Constitutional: He is oriented to person, place, and time  He appears well-developed and well-nourished  Non-toxic appearance  He appears ill  No distress  He is not intubated  HENT:   Head: Normocephalic and atraumatic     Nose: Nose normal    Mouth/Throat: Oropharynx is clear and moist  No oropharyngeal exudate  Eyes: Pupils are equal, round, and reactive to light  Conjunctivae and EOM are normal  Right eye exhibits no discharge  Left eye exhibits no discharge  No scleral icterus  Neck: Normal range of motion  Neck supple  No JVD present  No tracheal deviation present  Cardiovascular: Normal rate, regular rhythm, normal heart sounds and intact distal pulses  Exam reveals no gallop and no friction rub  No murmur heard  Pulmonary/Chest: Accessory muscle usage present  No stridor  Tachypnea noted  He is not intubated  He is in respiratory distress  He has wheezes in the right upper field, the right middle field, the right lower field, the left middle field and the left lower field  He has no rhonchi  He has no rales  He exhibits no tenderness  Abdominal: Soft  Bowel sounds are normal  He exhibits no distension and no mass  There is no tenderness  There is no rebound and no guarding  No hernia  Musculoskeletal: Normal range of motion  He exhibits no edema, tenderness or deformity  Right lower leg: Normal         Left lower leg: Normal    Lymphadenopathy:     He has no cervical adenopathy  Neurological: He is alert and oriented to person, place, and time  He is not disoriented  No cranial nerve deficit or sensory deficit  He exhibits normal muscle tone  Coordination normal    Skin: Skin is warm  Capillary refill takes less than 2 seconds  No rash noted  He is not diaphoretic  No erythema  No pallor  Psychiatric: Judgment and thought content normal  His mood appears not anxious  He is agitated         Vital Signs  ED Triage Vitals   Temperature Pulse Respirations Blood Pressure SpO2   03/12/20 0044 03/12/20 0044 03/12/20 0044 03/12/20 0044 03/12/20 0044   98 2 °F (36 8 °C) 96 (!) 30 (!) 189/88 (!) 88 %      Temp Source Heart Rate Source Patient Position - Orthostatic VS BP Location FiO2 (%)   03/12/20 1351 03/12/20 1216 03/12/20 1351 03/12/20 1351 --   Temporal Monitor Sitting Right arm       Pain Score       03/12/20 0047       No Pain           Vitals:    03/12/20 1432 03/12/20 1549 03/12/20 1746 03/12/20 2222   BP: 142/80 157/78 128/76 147/79   Pulse:  80 84 83   Patient Position - Orthostatic VS: Lying Lying  Lying         Visual Acuity      ED Medications  Medications   aspirin (ECOTRIN LOW STRENGTH) EC tablet 81 mg (81 mg Oral Given 3/12/20 0806)   gabapentin (NEURONTIN) capsule 200 mg (200 mg Oral Given 3/12/20 2233)   lisinopril (ZESTRIL) tablet 20 mg (20 mg Oral Given 3/12/20 0806)   metoprolol tartrate (LOPRESSOR) tablet 25 mg (25 mg Oral Given 3/12/20 1746)   pravastatin (PRAVACHOL) tablet 80 mg (80 mg Oral Given 3/12/20 1642)   budesonide-formoterol (SYMBICORT) 160-4 5 mcg/act inhaler 2 puff (2 puffs Inhalation Given 3/12/20 1746)   albuterol inhalation solution 2 5 mg (has no administration in time range)   guaiFENesin (MUCINEX) 12 hr tablet 600 mg (600 mg Oral Given 3/12/20 1746)   azithromycin (ZITHROMAX) tablet 500 mg (500 mg Oral Given 3/12/20 0457)   heparin (porcine) subcutaneous injection 5,000 Units (5,000 Units Subcutaneous Given 3/12/20 2233)   predniSONE tablet 40 mg (has no administration in time range)   levalbuterol (XOPENEX) inhalation solution 1 25 mg (1 25 mg Nebulization Given 3/12/20 1930)   ipratropium (ATROVENT) 0 02 % inhalation solution 0 5 mg (0 5 mg Nebulization Given 3/12/20 1930)   insulin lispro (HumaLOG) 100 units/mL subcutaneous injection 1-6 Units (has no administration in time range)   insulin lispro (HumaLOG) 100 units/mL subcutaneous injection 1-6 Units (1 Units Subcutaneous Not Given 3/13/20 0123)   albuterol inhalation solution 10 mg (10 mg Nebulization Given 3/12/20 0128)     And   ipratropium (ATROVENT) 0 02 % inhalation solution 0 5 mg (0 5 mg Nebulization Given 3/12/20 0128)     And   sodium chloride 0 9 % inhalation solution 3 mL (3 mL Nebulization Given 3/12/20 0128) methylPREDNISolone sodium succinate (Solu-MEDROL) injection 60 mg (60 mg Intravenous Given 3/12/20 0113)   methylPREDNISolone sodium succinate (Solu-MEDROL) injection 40 mg (40 mg Intravenous Given 3/12/20 2200)       Diagnostic Studies  Results Reviewed     Procedure Component Value Units Date/Time    Procalcitonin [[id number]]  (Normal) Collected:  03/12/20 1117    Lab Status:  Final result Specimen:  Blood Updated:  03/12/20 1702     Procalcitonin <0 05 ng/ml     Sputum culture and Gram stain [[id number]]     Lab Status:  No result Specimen:  Sputum     Procalcitonin AM Draw [[id number]]     Lab Status:  No result Specimen:  Blood     B-Type Natriuretic Peptide ([facility]) [[id number]]  (Abnormal) Collected:  03/12/20 0116    Lab Status:  Final result Specimen:  Blood from Arm, Right Updated:  03/12/20 0148      pg/mL     Magnesium [[id number]]  (Normal) Collected:  03/12/20 0116    Lab Status:  Final result Specimen:  Blood from Arm, Right Updated:  03/12/20 0148     Magnesium 2 4 mg/dL     Comprehensive metabolic panel [[id number]]  (Abnormal) Collected:  03/12/20 0116    Lab Status:  Final result Specimen:  Blood from Arm, Right Updated:  03/12/20 0148     Sodium 137 mmol/L      Potassium 4 3 mmol/L      Chloride 101 mmol/L      CO2 25 mmol/L      ANION GAP 11 mmol/L      BUN 16 mg/dL      Creatinine 0 80 mg/dL      Glucose 113 mg/dL      Calcium 8 6 mg/dL      AST 22 U/L      ALT 13 U/L      Alkaline Phosphatase 59 U/L      Total Protein 6 5 g/dL      Albumin 4 0 g/dL      Total Bilirubin 0 30 mg/dL      eGFR 89 ml/min/1 73sq m     Narrative:       [place] guidelines for Chronic Kidney Disease (CKD):     Stage 1 with normal or high GFR (GFR > 90 mL/min/1 73 square meters)    Stage 2 Mild CKD (GFR = 60-89 mL/min/1 73 square meters)    Stage 3A Moderate CKD (GFR = 45-59 mL/min/1 73 square meters)    Stage 3B Moderate CKD (GFR = 30-44 mL/min/1 73 square meters)    Stage 4 Severe CKD (GFR = 15-29 mL/min/1 73 square meters)    Stage 5 End Stage CKD (GFR <15 mL/min/1 73 square meters)  Note: GFR calculation is accurate only with a steady state creatinine    Troponin I [[id number]]  (Normal) Collected:  03/12/20 0115    Lab Status:  Final result Specimen:  Blood from Arm, Right Updated:  03/12/20 0145     Troponin I <0 03 ng/mL     Protime-INR [[id number]]  (Normal) Collected:  03/12/20 0116    Lab Status:  Final result Specimen:  Blood from Arm, Right Updated:  03/12/20 0140     Protime 11 0 seconds      INR 0 95    APTT [[id number]]  (Normal) Collected:  03/12/20 0116    Lab Status:  Final result Specimen:  Blood from Arm, Right Updated:  03/12/20 0140     PTT 37 seconds     CBC and differential [[id number]]  (Abnormal) Collected:  03/12/20 0116    Lab Status:  Final result Specimen:  Blood from Arm, Right Updated:  03/12/20 0132     WBC 10 20 Thousand/uL      RBC 4 79 Million/uL      Hemoglobin 14 5 g/dL      Hematocrit 43 7 %      MCV 91 fL      MCH 30 2 pg      MCHC 33 1 g/dL      RDW 15 1 %      MPV 9 0 fL      Platelets 889 Thousands/uL      Neutrophils Relative 70 %      Lymphocytes Relative 14 %      Monocytes Relative 6 %      Eosinophils Relative 9 %      Basophils Relative 1 %      Neutrophils Absolute 7 10 Thousands/µL      Lymphocytes Absolute 1 40 Thousands/µL      Monocytes Absolute 0 60 Thousand/µL      Eosinophils Absolute 0 90 Thousand/µL      Basophils Absolute 0 10 Thousands/µL                  No orders to display              Procedures  Procedures         ED Course  ED Course as of Mar 13 0339   Thu Mar 12, 2020   0151 BNP(!): 101   0151 WBC: 10 20   0151 Hemoglobin: 14 5   0151 HCT: 43 7   0151 Platelet Count: 768   0151 Neutrophils %: 70   0151 Monocytes Relative: 6   0151 Sodium: 137   0151 Potassium: 4 3   0151 Chloride: 101   0151 CO2: 25   0151 Anion Gap: 11   0151 BUN: 16   0151 Creatinine: 0 80   0151 Glucose, Random(!): 113   0151 AST: 22   0151 ALT: 13 0151 Magnesium: 2 4   0151 Labs unremarkable  Chest x-ray pending  EKG appears nonischemic   Troponin I: <0 03   0343 Patient appears improved  He has been on 2 L nasal cannula for SpO2 88%  I have offered to admit the patient but he feels he may be allowed to go home now that he has had an hour long treatment  I have offered to re-evaluate him after an ambulatory trial, which he is agreeable to      0350 Patient is satting 89% on room air now  I will admit for hypoxic respiratory failure related to COPD      0355 Perico Kimball with hospitalist service   Will admit  Wants the patient started out on observation as he was able to be discharged after 1 day on his last admission just under a month ago                                    MDM      Disposition  Final diagnoses:   COPD exacerbation ([address] )   Respiratory failure with hypoxia ([address] )     Time reflects when diagnosis was documented in both MDM as applicable and the Disposition within this note     Time User Action Codes Description Comment    3/12/2020  3:51 AM Pool Granados [J44 1] COPD exacerbation ([address] )     3/12/2020  3:52 AM Pool Granados [J96 91] Respiratory failure with hypoxia [facility])       ED Disposition     ED Disposition Condition Date/Time Comment    Admit Stable Thu Mar 12, 2020  3:51 AM Case was discussed with Hospitalist SUN Freeman and the patient's admission status was agreed to be Admission Status: inpatient status to the service of Dr Terrilyn Leyden           Follow-up Information    None         Current Discharge Medication List      CONTINUE these medications which have NOT CHANGED    Details   albuterol (2 5 mg/3 mL) 0 083 % nebulizer solution Take 1 vial (2 5 mg total) by nebulization every 4 (four) hours as needed for wheezing or shortness of breath  Qty: 75 mL, Refills: 0    Associated Diagnoses: COPD exacerbation (HCC)      albuterol (PROVENTIL HFA,VENTOLIN HFA) 90 mcg/act inhaler Inhale 2 puffs every 6 (six) hours as needed for wheezing or shortness of breath      aspirin (ECOTRIN LOW STRENGTH) 81 mg EC tablet Take 81 mg by mouth every other day       Empagliflozin 10 MG TABS Take 10 mg by mouth every morning      gabapentin (NEURONTIN) 100 mg capsule Take 200 mg by mouth daily at bedtime      lisinopril (ZESTRIL) 20 mg tablet Take 1 tablet (20 mg total) by mouth daily  Qty: 30 tablet, Refills: 0    Associated Diagnoses: Essential hypertension      metFORMIN (GLUCOPHAGE) 500 mg tablet Take 500 mg by mouth daily with dinner       metoprolol tartrate (LOPRESSOR) 25 mg tablet Take 1 tablet (25 mg total) by mouth 2 (two) times a day  Qty: 60 tablet, Refills: 0    Associated Diagnoses: Essential hypertension      primidone (MYSOLINE) 50 mg tablet Take 2 tablets (100 mg total) by mouth every 12 (twelve) hours    Associated Diagnoses: Tremor      rosuvastatin (CRESTOR) 10 MG tablet Take 10 mg by mouth daily      saxagliptin (ONGLYZA) 5 MG tablet Take 5 mg by mouth daily      tiotropium-olodaterol (STIOLTO RESPIMAT) 2 5-2 5 MCG/ACT inhaler Inhale 2 puffs daily      predniSONE 10 mg tablet Take 3 tablets (30 mg total) by mouth daily 30mg PO day1,2 20mg PO day3,4 10mg PO day 5,6  Qty: 12 tablet, Refills: 0    Associated Diagnoses: Chronic obstructive pulmonary disease with acute exacerbation (HCC)           No discharge procedures on file      PDMP Review     None          ED Provider  Electronically Signed by           Nik Esparza MD  03/13/20 1592

## 2020-03-12 NOTE — ASSESSMENT & PLAN NOTE
· Placed in observation Medicine  · Was on O2 and respiratory protocol  · Give Zithromax 500 mg p o  Daily  · Give Solu-Medrol 40 mg IV q 8 hours  · Give Mucinex 600 mg p o  B i d   · Substitute Symbicort for pre-hospital Stiolto  · Give albuterol nebs q 4 p r n    · Consult pulmonary

## 2020-03-12 NOTE — ED PROCEDURE NOTE
PROCEDURE  CriticalCare Time  Performed by: Danita Ruiz MD  Authorized by: Danita Ruiz MD     Critical care provider statement:     Critical care time (minutes):  72    Critical care start time:  3/12/2020 1:00 AM    Critical care end time:  3/12/2020 3:59 AM    Critical care time was exclusive of:  Separately billable procedures and treating other patients    Critical care was necessary to treat or prevent imminent or life-threatening deterioration of the following conditions:  Circulatory failure and respiratory failure    Critical care was time spent personally by me on the following activities:  Discussions with primary provider, review of old charts, ordering and review of laboratory studies, ordering and review of radiographic studies and ordering and performing treatments and interventions         Danita Ruiz MD  03/12/20 6739

## 2020-03-12 NOTE — ED NOTES
Assumed care of pt  Pt fully ambulatory to BR w/o difficulty  No SOB or dyspnea noted upon ambulating  Awaiting transfer to inpt room         Ni Cunha RN  03/12/20 1948

## 2020-03-12 NOTE — ED NOTES
Received phone call from Reginaldo at the lab in [place] they received patient's procalcitonin and it was hemalyzed -she was putting in for re-draw       Omi Meade RN  03/12/20 2664

## 2020-03-13 VITALS
WEIGHT: 186.07 LBS | OXYGEN SATURATION: 91 % | BODY MASS INDEX: 26.64 KG/M2 | HEIGHT: 70 IN | RESPIRATION RATE: 20 BRPM | DIASTOLIC BLOOD PRESSURE: 73 MMHG | SYSTOLIC BLOOD PRESSURE: 134 MMHG | TEMPERATURE: 97.5 F | HEART RATE: 88 BPM

## 2020-03-13 LAB
GLUCOSE SERPL-MCNC: 169 MG/DL (ref 65–140)
GLUCOSE SERPL-MCNC: 195 MG/DL (ref 65–140)

## 2020-03-13 PROCEDURE — 94640 AIRWAY INHALATION TREATMENT: CPT

## 2020-03-13 PROCEDURE — 82948 REAGENT STRIP/BLOOD GLUCOSE: CPT

## 2020-03-13 PROCEDURE — 99217 PR OBSERVATION CARE DISCHARGE MANAGEMENT: CPT | Performed by: NURSE PRACTITIONER

## 2020-03-13 RX ORDER — PREDNISONE 10 MG/1
40 TABLET ORAL DAILY
Qty: 10 TABLET | Refills: 0 | Status: SHIPPED | OUTPATIENT
Start: 2020-03-14 | End: 2020-05-15 | Stop reason: HOSPADM

## 2020-03-13 RX ORDER — AZITHROMYCIN 250 MG/1
500 TABLET, FILM COATED ORAL DAILY
Status: DISCONTINUED | OUTPATIENT
Start: 2020-03-14 | End: 2020-03-13 | Stop reason: HOSPADM

## 2020-03-13 RX ORDER — AZITHROMYCIN 500 MG/1
500 TABLET, FILM COATED ORAL DAILY
Qty: 1 TABLET | Refills: 0 | Status: SHIPPED | OUTPATIENT
Start: 2020-03-14 | End: 2020-03-15

## 2020-03-13 RX ORDER — GUAIFENESIN 600 MG
600 TABLET, EXTENDED RELEASE 12 HR ORAL 2 TIMES DAILY
Qty: 10 TABLET | Refills: 0 | Status: SHIPPED | OUTPATIENT
Start: 2020-03-13 | End: 2020-03-18

## 2020-03-13 RX ADMIN — PREDNISONE 40 MG: 20 TABLET ORAL at 08:38

## 2020-03-13 RX ADMIN — METOPROLOL TARTRATE 25 MG: 25 TABLET ORAL at 08:38

## 2020-03-13 RX ADMIN — INSULIN LISPRO 1 UNITS: 100 INJECTION, SOLUTION INTRAVENOUS; SUBCUTANEOUS at 08:38

## 2020-03-13 RX ADMIN — LEVALBUTEROL HYDROCHLORIDE 1.25 MG: 1.25 SOLUTION, CONCENTRATE RESPIRATORY (INHALATION) at 07:24

## 2020-03-13 RX ADMIN — IPRATROPIUM BROMIDE 0.5 MG: 0.5 SOLUTION RESPIRATORY (INHALATION) at 07:24

## 2020-03-13 RX ADMIN — INSULIN LISPRO 1 UNITS: 100 INJECTION, SOLUTION INTRAVENOUS; SUBCUTANEOUS at 11:51

## 2020-03-13 RX ADMIN — LISINOPRIL 20 MG: 10 TABLET ORAL at 08:38

## 2020-03-13 RX ADMIN — GUAIFENESIN 600 MG: 600 TABLET, EXTENDED RELEASE ORAL at 08:38

## 2020-03-13 RX ADMIN — HEPARIN SODIUM 5000 UNITS: 5000 INJECTION, SOLUTION INTRAVENOUS; SUBCUTANEOUS at 05:04

## 2020-03-13 RX ADMIN — BUDESONIDE AND FORMOTEROL FUMARATE DIHYDRATE 2 PUFF: 160; 4.5 AEROSOL RESPIRATORY (INHALATION) at 08:37

## 2020-03-13 RX ADMIN — AZITHROMYCIN 500 MG: 250 TABLET, FILM COATED ORAL at 08:38

## 2020-03-13 NOTE — PLAN OF CARE
Problem: Potential for Falls  Goal: Patient will remain free of falls  Description  INTERVENTIONS:  - Assess patient frequently for physical needs  -  Identify cognitive and physical deficits and behaviors that affect risk of falls    -  [place] fall precautions as indicated by assessment   - Educate patient/family on patient safety including physical limitations  - Instruct patient to call for assistance with activity based on assessment  - Modify environment to reduce risk of injury  - Consider OT/PT consult to assist with strengthening/mobility  Outcome: Progressing     Problem: PAIN - ADULT  Goal: Verbalizes/displays adequate comfort level or baseline comfort level  Description  Interventions:  - Encourage patient to monitor pain and request assistance  - Assess pain using appropriate pain scale  - Administer analgesics based on type and severity of pain and evaluate response  - Implement non-pharmacological measures as appropriate and evaluate response  - Consider cultural and social influences on pain and pain management  - Notify physician/advanced practitioner if interventions unsuccessful or patient reports new pain  Outcome: Progressing     Problem: INFECTION - ADULT  Goal: Absence or prevention of progression during hospitalization  Description  INTERVENTIONS:  - Assess and monitor for signs and symptoms of infection  - Monitor lab/diagnostic results  - Monitor all insertion sites, i e  indwelling lines, tubes, and drains  - Monitor endotracheal if appropriate and nasal secretions for changes in amount and color  - [place] appropriate cooling/warming therapies per order  - Administer medications as ordered  - Instruct and encourage patient and family to use good hand hygiene technique  - Identify and instruct in appropriate isolation precautions for identified infection/condition  Outcome: Progressing  Goal: Absence of fever/infection during neutropenic period  Description  INTERVENTIONS:  - Monitor WBC    Outcome: Progressing     Problem: SAFETY ADULT  Goal: Patient will remain free of falls  Description  INTERVENTIONS:  - Assess patient frequently for physical needs  -  Identify cognitive and physical deficits and behaviors that affect risk of falls    -  [place] fall precautions as indicated by assessment   - Educate patient/family on patient safety including physical limitations  - Instruct patient to call for assistance with activity based on assessment  - Modify environment to reduce risk of injury  - Consider OT/PT consult to assist with strengthening/mobility  Outcome: Progressing  Goal: Maintain or return to baseline ADL function  Description  INTERVENTIONS:  -  Assess patient's ability to carry out ADLs; assess patient's baseline for ADL function and identify physical deficits which impact ability to perform ADLs (bathing, care of mouth/teeth, toileting, grooming, dressing, etc )  - Assess/evaluate cause of self-care deficits   - Assess range of motion  - Assess patient's mobility; develop plan if impaired  - Assess patient's need for assistive devices and provide as appropriate  - Encourage maximum independence but intervene and supervise when necessary  - Involve family in performance of ADLs  - Assess for home care needs following discharge   - Consider OT consult to assist with ADL evaluation and planning for discharge  - Provide patient education as appropriate  Outcome: Progressing  Goal: Maintain or return mobility status to optimal level  Description  INTERVENTIONS:  - Assess patient's baseline mobility status (ambulation, transfers, stairs, etc )    - Identify cognitive and physical deficits and behaviors that affect mobility  - Identify mobility aids required to assist with transfers and/or ambulation (gait belt, sit-to-stand, lift, walker, cane, etc )  - [place] fall precautions as indicated by assessment  - Record patient progress and toleration of activity level on Mobility SBAR; progress patient to next Phase/Stage  - Instruct patient to call for assistance with activity based on assessment  - Consider rehabilitation consult to assist with strengthening/weightbearing, etc   Outcome: Progressing     Problem: DISCHARGE PLANNING  Goal: Discharge to home or other facility with appropriate resources  Description  INTERVENTIONS:  - Identify barriers to discharge w/patient and caregiver  - Arrange for needed discharge resources and transportation as appropriate  - Identify discharge learning needs (meds, wound care, etc )  - Arrange for interpretive services to assist at discharge as needed  - Refer to Case Management Department for coordinating discharge planning if the patient needs post-hospital services based on physician/advanced practitioner order or complex needs related to functional status, cognitive ability, or social support system  Outcome: Progressing     Problem: Knowledge Deficit  Goal: Patient/family/caregiver demonstrates understanding of disease process, treatment plan, medications, and discharge instructions  Description  Complete learning assessment and assess knowledge base    Interventions:  - Provide teaching at level of understanding  - Provide teaching via preferred learning methods  Outcome: Progressing

## 2020-03-13 NOTE — UTILIZATION REVIEW
Initial Clinical Review    Admission: Date/Time/Statement: Admission Orders (From admission, onward)     Ordered        03/12/20 0407  Place in Observation  Once         03/12/20 0358  Inpatient Admission  Once,   Status:  Canceled                   Orders Placed This Encounter   Procedures    Place in Observation     Standing Status:   Standing     Number of Occurrences:   1     Order Specific Question:   Admitting Physician     Answer:   Shakira Shaikh [[id number]]     Order Specific Question:   Level of Care     Answer:   Med Surg [[id number]]     ED Arrival Information     Expected Arrival Acuity Means of Arrival Escorted By Service Admission Type    - 3/12/2020 00:37 Urgent Walk-In Self Hospitalist Urgent    Arrival Complaint    shortness of breath        Chief Complaint   Patient presents with    Shortness of Breath     Assessment/Plan:   Mr Basilio Harris is a 70 yo male who presents to the ED from home with c/o SOB that woke him from sleep w/ dyspnea at rest x 1 day that was unresponsive to MDI and nebulizer  + productive cough with clear sputum and wheezing  PMH: COPD  He is admitted to OBSERVATION status with COPD with acute exacerbation - oxygen, antibiotics, IV steroids, mucinex, albuterol nebs, consult Pulmonary  Type 2 DM - hold oral med, POC glucose testing with SSI coverage  HTN - continue Lopressor and add Lisinopril  3/12 Pulmonology Consult - Very severe COPD with acute exacerbation - decrease IV steroids and start oral Prednisone 3/13 x 5 days  Xopenex and Atrovent TID, symbicort, low suspicion for infection - continue Azithromycin for antiinflammatory purposes        ED Triage Vitals   Temperature Pulse Respirations Blood Pressure SpO2   03/12/20 0044 03/12/20 0044 03/12/20 0044 03/12/20 0044 03/12/20 0044   98 2 °F (36 8 °C) 96 (!) 30 (!) 189/88 (!) 88 %      Temp Source Heart Rate Source Patient Position - Orthostatic VS BP Location FiO2 (%)   03/12/20 1351 03/12/20 1216 03/12/20 1351 03/12/20 1351 -- Temporal Monitor Sitting Right arm       Pain Score       03/12/20 0047       No Pain        Wt Readings from Last 1 Encounters:   03/13/20 84 4 kg (186 lb 1 1 oz)     Additional Vital Signs:   03/13/20 0815  97 5 °F (36 4 °C)  88  20  134/73  91 %  None (Room air)   03/13/20 0700          92 %     03/12/20 2222  99 6 °F (37 6 °C)  83    147/79  93 %  None (Room air)   03/12/20 1930          92 %  None (Room air)   03/12/20 1746    84    128/76       03/12/20 1549  98 3 °F (36 8 °C)  80  18  157/78  93 %  None (Room air)   03/12/20 1548          93 %  None (Room air)   03/12/20 1432        142/80       03/12/20 1351  97 6 °F (36 4 °C)  85  18  180/100Abnormal   92 %     03/12/20 1216    72  18  167/75  95 %  None (Room air)   03/12/20 0515    75  16    95 %     03/12/20 0403    78  19  132/70  97 %  None (Room air)   03/12/20 0128          96 %  Nasal cannula     Pertinent Labs/Diagnostic Test Results:     3/12 ECG - Normal sinus rhythm  Right atrial enlargement  Septal infarct (cited on or before 21-NOV-2019)  Abnormal ECG  When compared with ECG of 19-FEB-2020 03:13,  Nonspecific T wave abnormality no longer evident in Inferior leads    Results from last 7 days   Lab Units 03/12/20  0116   WBC Thousand/uL 10 20   HEMOGLOBIN g/dL 14 5   HEMATOCRIT % 43 7   PLATELETS Thousands/uL 188   NEUTROS ABS Thousands/µL 7 10*     Results from last 7 days   Lab Units 03/12/20  0116   SODIUM mmol/L 137   POTASSIUM mmol/L 4 3   CHLORIDE mmol/L 101   CO2 mmol/L 25   ANION GAP mmol/L 11   BUN mg/dL 16   CREATININE mg/dL 0 80   EGFR ml/min/1 73sq m 89   CALCIUM mg/dL 8 6   MAGNESIUM mg/dL 2 4     Results from last 7 days   Lab Units 03/12/20  0116   AST U/L 22   ALT U/L 13   ALK PHOS U/L 59   TOTAL PROTEIN g/dL 6 5   ALBUMIN g/dL 4 0   TOTAL BILIRUBIN mg/dL 0 30     Results from last 7 days   Lab Units 03/13/20  0613 03/12/20  2331   POC GLUCOSE mg/dl 169* 112     Results from last 7 days   Lab Units 03/12/20  0116   GLUCOSE RANDOM mg/dL 113*       Results from last 7 days   Lab Units 03/12/20  0115   TROPONIN I ng/mL <0 03     Results from last 7 days   Lab Units 03/12/20  0116   PROTIME seconds 11 0   INR  0 95   PTT seconds 37     Results from last 7 days   Lab Units 03/12/20  1117   PROCALCITONIN ng/ml <0 05       Results from last 7 days   Lab Units 03/12/20  0116   BNP pg/mL 101*     ED Treatment:   Medication Administration from 03/12/2020 0037 to 03/12/2020 1349    Date/Time Order Dose Route Action   03/12/2020 0128 albuterol inhalation solution 10 mg 10 mg Nebulization Given   03/12/2020 0128 ipratropium (ATROVENT) 0 02 % inhalation solution 0 5 mg 0 5 mg Nebulization Given   03/12/2020 0128 sodium chloride 0 9 % inhalation solution 3 mL 3 mL Nebulization Given   03/12/2020 0113 methylPREDNISolone sodium succinate (Solu-MEDROL) injection 60 mg 60 mg Intravenous Given   03/12/2020 0806 aspirin (ECOTRIN LOW STRENGTH) EC tablet 81 mg 81 mg Oral Given   03/12/2020 0806 lisinopril (ZESTRIL) tablet 20 mg 20 mg Oral Given   03/12/2020 0806 metoprolol tartrate (LOPRESSOR) tablet 25 mg 25 mg Oral Given   03/12/2020 0808 budesonide-formoterol (SYMBICORT) 160-4 5 mcg/act inhaler 2 puff 2 puff Inhalation Given   03/12/2020 0806 guaiFENesin (MUCINEX) 12 hr tablet 600 mg 600 mg Oral Given   03/12/2020 0457 azithromycin (ZITHROMAX) tablet 500 mg 500 mg Oral Given   03/12/2020 0810 heparin (porcine) subcutaneous injection 5,000 Units 5,000 Units Subcutaneous Given        Past Medical History:   Diagnosis Date    BPH (benign prostatic hyperplasia)     45 days radiation treatment    COPD (chronic obstructive pulmonary disease)     Coronary artery disease     CABG x4 in 2017    Diabetes mellitus     History of Arterial Duplex of LE 12/26/2017    Likely occlusion of the left superficial femoral artery  Calcific changes bilaterally    Despite these changes, the ankle-brachial index as a measure of peripheral blood flow only mildly impaired   History of echocardiogram 06/12/2017    EF 40%, mild LVH, mild MR     Hyperlipidemia     Hypertension     NSTEMI (non-ST elevated myocardial infarction)     Prostate cancer     prostate     PVD (peripheral vascular disease)     Tremor      Present on Admission:   Type 2 diabetes mellitus without complication, without long-term current use of insulin (HCC)   Other hyperlipidemia   Essential hypertension    Admitting Diagnosis: Shortness of breath [R06 02]  COPD exacerbation ([facility]) [J44 1]  Respiratory failure with hypoxia ([facility]) [J96 91]     Age/Sex: 69 y o  male     Admission Orders:  Scheduled Medications:    Medications:  aspirin 81 mg Oral Every Other Day   azithromycin 500 mg Oral Daily   budesonide-formoterol 2 puff Inhalation BID   gabapentin 200 mg Oral HS   guaiFENesin 600 mg Oral BID   heparin (porcine) 5,000 Units Subcutaneous Q8H [address]   insulin lispro 1-6 Units Subcutaneous TID AC   insulin lispro 1-6 Units Subcutaneous HS   ipratropium 0 5 mg Nebulization TID   levalbuterol 1 25 mg Nebulization TID   lisinopril 20 mg Oral Daily   metoprolol tartrate 25 mg Oral BID   pravastatin 80 mg Oral Daily With Dinner   predniSONE 40 mg Oral Daily     Continuous IV Infusions:     PRN Meds:    albuterol 2 5 mg Nebulization Q4H PRN     SCDs  Daily wt  OOB as dorina   POC GLUCOSE AC/HS WITH SSI COVERAGE   Sputum culture  ADA diet   IP CONSULT TO PULMONOLOGY    Network Utilization Review Department  [email or web address]  ATTENTION: Please call with any questions or concerns to [phone] and carefully listen to the prompts so that you are directed to the right person  All voicemails are confidential   Ankita Hernandez all requests for admission clinical reviews, approved or denied determinations and any other requests to dedicated fax number below belonging to the campus where the patient is receiving treatment   List of dedicated fax numbers for the Facilities:  FACILITY NAME UR FAX NUMBER   ADMISSION DENIALS (Administrative/Medical Necessity) [phone]   PARENT CHILD HEALTH (Maternity/NICU/Pediatrics) [phone]   [facility] [phone]   [facility] [phone]   [facility] [phone]   [facility] [address] [place] [id number] [facility] [phone]   [facility]  [phone]   [address] [place] [address], [address]  [address] [facility] [address] [facility] [phone]

## 2020-03-13 NOTE — DISCHARGE SUMMARY
Discharge- Tyree Nieves 1945, 69 y o  male MRN: [id number]    Unit/Bed#: -02 Encounter: [id number]    Primary Care Provider: Gerald Hodgkins, DO   Date and time admitted to hospital: 3/12/2020 12:43 AM        * COPD exacerbation ([address] )  Assessment & Plan  · Very severe COPD   · Clinically is much improved  · Pulmonology input appreciated  · Able to titrated off O2  · Will continue with azithromycin for anti-inflammatory purposes for total of 3 days  · Transition to steroid taper course  · Resume home inhaler  · Follow-up with Dr Mustapha Ayers outpatient      Essential hypertension  Assessment & Plan  · Continue Lopressor and lisinopril    Other hyperlipidemia  Assessment & Plan  · Continue statin     Type 2 diabetes mellitus without complication, without long-term current use of insulin [facility])  Assessment & Plan  Lab Results   Component Value Date    HGBA1C 8 2 (H) 12/27/2019       Recent Labs     03/12/20  2331 03/13/20  0613   POCGLU 112 169*       Blood Sugar Average: Last 72 hrs:  (P) 140 5   Resume home regimen         Discharging Physician / Practitioner: Lizbeth Haq  PCP: Gerald Hodgkins, DO  Admission Date:   Admission Orders (From admission, onward)     Ordered        03/12/20 0407  Place in Observation  Once         03/12/20 0358  Inpatient Admission  Once,   Status:  Canceled                   Discharge Date: 03/13/20    Resolved Problems  Date Reviewed: 3/13/2020    None          Consultations During Hospital Stay:  · Pulmonology    Procedures Performed:   · None    Significant Findings / Test Results:   · None     Incidental Findings:   · None      Test Results Pending at Discharge (will require follow up):    · None      Outpatient Tests Requested:  · Follow up with pulmonology  · Follow-up PCP    Complications:     None     Reason for Admission:  Shortness of breath for 1 day    Hospital Course:     Tyree Nieves is a 69 y o  male patient who originally presented to the hospital on 3/12/2020 due to shortness of breath for 1 day  Please refer to H&P for initial presenting complaint  In brief the patient with history of severe COPD presented to the ED with progress sadly worse dyspnea for 1 day  The patient was not improving with his home inhaler and nebulizer treatment  The patient was started on IV Solu-Medrol, azithromycin and nebulizer treatment around the clock  Overall clinically is much improved  Pulmonology evaluation was done  This time continue with current regimen and recommended to follow-up with outpatient pulmonologist     Please see above list of diagnoses and related plan for additional information  Condition at Discharge: good     Discharge Day Visit / Exam:     Subjective:  Feeling much better  Denies any chest pain or dyspnea  Vitals: Blood Pressure: 134/73 (03/13/20 0815)  Pulse: 88 (03/13/20 0815)  Temperature: 97 5 °F (36 4 °C) (03/13/20 0815)  Temp Source: Temporal (03/13/20 0815)  Respirations: 20 (03/13/20 0815)  Height: 5' 10" (177 8 cm) (03/12/20 1351)  Weight - Scale: 84 4 kg (186 lb 1 1 oz) (03/13/20 0600)  SpO2: 91 % (03/13/20 0815)  Exam:   Physical Exam   Constitutional: He is oriented to person, place, and time  He appears well-developed and well-nourished  No distress  HENT:   Head: Normocephalic and atraumatic  Mouth/Throat: Oropharynx is clear and moist    Eyes: Pupils are equal, round, and reactive to light  Conjunctivae and EOM are normal    Neck: Normal range of motion  Neck supple  No thyromegaly present  Cardiovascular: Normal rate, regular rhythm, normal heart sounds and intact distal pulses  Pulmonary/Chest: Effort normal and breath sounds normal  No respiratory distress  He has no wheezes (mild upper lobes)  Abdominal: Soft  Bowel sounds are normal  He exhibits no distension  There is no tenderness  Musculoskeletal: Normal range of motion  He exhibits no edema or deformity     Neurological: He is alert and oriented to person, place, and time  He has normal reflexes  No cranial nerve deficit  Skin: Skin is warm and dry  No erythema  Psychiatric: He has a normal mood and affect  His behavior is normal  Thought content normal    Vitals reviewed  Discussion with Family: none present during exam     Discharge instructions/Information to patient and family:   See after visit summary for information provided to patient and family  Provisions for Follow-Up Care:  See after visit summary for information related to follow-up care and any pertinent home health orders  Disposition:     Home    For Discharges to [facility]:   · Not Applicable to this Patient - Not Applicable to this Patient    Planned Readmission:    No      Discharge Statement:  I spent 38 minutes discharging the patient  This time was spent on the day of discharge  I had direct contact with the patient on the day of discharge  Greater than 50% of the total time was spent examining patient, answering all patient questions, arranging and discussing plan of care with patient as well as directly providing post-discharge instructions  Additional time then spent on discharge activities  Discharge Medications:  See after visit summary for reconciled discharge medications provided to patient and family        ** Please Note: This note has been constructed using a voice recognition system **

## 2020-03-13 NOTE — ASSESSMENT & PLAN NOTE
Lab Results   Component Value Date    HGBA1C 8 2 (H) 12/27/2019       Recent Labs     03/12/20  2331 03/13/20  0613   POCGLU 112 169*       Blood Sugar Average: Last 72 hrs:  (P) 140 5   Resume home regimen

## 2020-03-13 NOTE — ASSESSMENT & PLAN NOTE
· Very severe COPD   · Clinically is much improved  · Pulmonology input appreciated  · Able to titrated off O2  · Will continue with azithromycin for anti-inflammatory purposes for total of 3 days  · Transition to steroid taper course  · Resume home inhaler  · Follow-up with Dr Iglesia Montalvo outpatient

## 2020-03-13 NOTE — SOCIAL WORK
CM met with pt at bedside and explained role  Pt lives with his wife in a 2 story house; 3 [address], 12-14 steps inside  Pt uses a cane to ambulate  He reports walker and w/c at home from previous use  Pt reports he is completely independent with ADLs  He continues to drive  Pt PCP is through [facility] [address]  Pt uses [facility] and denies any difficulty obtaining his medications  Pt denies any history of HHC or STR  He further denied a history of MH or D&A treatment  Pt denies any discharge needs at this time  Spouse will transport home  CM to follow  Patient/caregiver received discharge checklist   Content reviewed  Patient/caregiver encouraged to participate in discharge plan of care prior to discharge home  CM reviewed d/c planning process including the following: identifying help at home, patient preference for d/c planning needs, availability of treatment team to discuss questions or concerns patient and/or family may have regarding understanding medications and recognizing signs and symptoms once discharged  CM also encouraged patient to follow up with all recommended appointments after discharge  Patient advised of importance for patient and family to participate in managing patients medical well being

## 2020-03-13 NOTE — DISCHARGE INSTRUCTIONS
COPD (Chronic Obstructive Pulmonary Disease)   WHAT YOU NEED TO KNOW:   Chronic obstructive pulmonary disease (COPD) is a lung disease that makes it hard for you to breathe  It is usually a result of lung damage caused by years of irritation and inflammation in your lungs  DISCHARGE INSTRUCTIONS:   Call 911 if:   · You feel lightheaded, short of breath, and have chest pain  Seek care immediately if:   · You are confused, dizzy, or feel faint  · Your arm or leg feels warm, tender, and painful  It may look swollen and red  · You cough up blood  Contact your healthcare provider if:   · You have more shortness of breath than usual      · You need more medicine than usual to control your symptoms  · You are coughing or wheezing more than usual      · You are coughing up more mucus, or it is a different color or has a different odor  · You gain more than 3 pounds in a week  · You have a fever, a runny or stuffy nose, and a sore throat, or other cold or flu symptoms  · Your skin, lips, or nails start to turn blue  · You have swelling in your legs or ankles  · You are very tired or weak for more than a day  · You notice changes in your mood, or changes in your ability to think or concentrate  · You have questions or concerns about your condition or care  Medicines:   · Medicines  may be used to open your airways, decrease swelling and inflammation in your lungs, or treat an infection  You may need 2 or more medicines  A short-acting medicine relieves symptoms quickly  Long-acting medicines will control or prevent symptoms  Ask for more information about the medicines you are given and how to use them safely  · Take your medicine as directed  Contact your healthcare provider if you think your medicine is not helping or if you have side effects  Tell him or her if you are allergic to any medicine  Keep a list of the medicines, vitamins, and herbs you take   Include the amounts, and when and why you take them  Bring the list or the pill bottles to follow-up visits  Carry your medicine list with you in case of an emergency  Help make breathing easier:   · Use pursed-lip breathing any time you feel short of breath  Take a deep breath in through your nose  Slowly breathe out through your mouth with your lips pursed for twice as long as you inhaled  You can also practice this breathing pattern while you bend, lift, climb stairs, or exercise  It slows down your breathing and helps move more air in and out of your lungs  · Do not smoke, and avoid others who smoke  Nicotine and other substances can cause lung irritation or damage and make it harder for you to breathe  Do not use e-cigarettes or smokeless tobacco  They still contain nicotine  Ask your healthcare provider for information if you currently smoke and need help to quit  For support and more information:  ¨ [place]  Phone: 2- 833 - 900-0468  Web Address: [employer]      · Be aware of and avoid anything that makes your symptoms worse  Stay out of high altitudes and places with high humidity  Stay inside, or cover your mouth and nose with a scarf when you are outside during cold weather  Stay inside on days when air pollution or pollen counts are high  Do not use aerosol sprays such as deodorant, bug spray, and hair spray  Manage COPD and help prevent exacerbations:  COPD is a serious condition that gets worse over time  A COPD exacerbation means your symptoms suddenly get worse  It is important to prevent exacerbations  An exacerbation can cause more lung damage  COPD cannot be cured, but you can take action to feel better and prevent COPD exacerbations:  · Protect yourself from germs  Germs can get into your lungs and cause an infection  An infection in your lungs can create more mucus and make it harder to breathe   An infection can also create swelling in your airways and prevent air from getting in  You can decrease your risk for infection by doing the following:     [facility] your hands often with soap and water  Carry germ-killing gel with you  You can use the gel to clean your hands when soap and water are not available  ¨ Do not touch your eyes, nose, or mouth unless you have washed your hands first      ¨ Always cover your mouth when you cough  Cough into a tissue or your shirtsleeve so you do not spread germs from your hands  ¨ Try to avoid people who have a cold or the flu  If you are sick, stay away from others as much as possible  · Drink more liquids  This will help to keep your air passages moist and help you cough up mucus  Ask how much liquid to drink each day and which liquids are best for you  · Exercise daily  Exercise for at least 20 minutes each day to help increase your energy and decrease shortness of breath  Walking or riding a bike are good ways to exercise  Talk to your healthcare provider about the best exercise plan for you  · Ask about vaccines  Your healthcare provider may recommend that you get regular flu and pneumonia vaccines  Pneumonia can become life-threatening for a person who has COPD  Ask about other vaccines you may need  Ask your healthcare provider about the flu and pneumonia vaccines  All adults should get the flu (influenza) vaccine every year as soon as it becomes available  The pneumonia vaccine is given to adults aged 72 or older to prevent pneumococcal disease, such as pneumonia  Adults aged 23 to 59 years who are at high risk for pneumococcal disease also should get the pneumococcal vaccine  It may need to be repeated 1 or 5 years later  Pulmonary rehabilitation:  Your healthcare provider may recommend a program to help you manage your symptoms and improve your quality of life  It may include nutritional counseling and exercise to strengthen your lungs     Make decisions about your choices for future treatment:  Ask for information about advanced medical directives and living drew  These documents help you decide and write down your choices for treatment and end-of-life care  It is best to complete them when you feel well and can think clearly about your wishes  The information can then be kept for future use if you are in the hospital or become very ill  Follow up with your healthcare provider as directed: You may need more tests  Your healthcare provider may refer you to a pulmonary (lung) specialist  Write down your questions so you remember to ask them during your visits  © 2017 [employer] Information is for End User's use only and may not be sold, redistributed or otherwise used for commercial purposes  All illustrations and images included in CareNotes® are the copyrighted property of A D A M [employer] Inc  or Basilio Cheng  The above information is an  only  It is not intended as medical advice for individual conditions or treatments  Talk to your doctor, nurse or pharmacist before following any medical regimen to see if it is safe and effective for you

## 2020-03-17 ENCOUNTER — PATIENT OUTREACH (OUTPATIENT)
Dept: CASE MANAGEMENT | Facility: OTHER | Age: 75
End: 2020-03-17

## 2020-03-17 NOTE — PROGRESS NOTES
This CM spoke to patient who declined [facility] service but accepted CM contact number and was informed he could call with any concerns  CM engaged patient in brief conversation about methods to prevent spread of corona virus  Patient stated he stays indoors most of time except when he needs to go to the store  CM advised he try not to go amongst crowds, practice handwashing often, keep at least a month's supply of medication and food on hand, call doctor if ill or having cough and fever

## 2020-03-27 ENCOUNTER — PATIENT OUTREACH (OUTPATIENT)
Dept: CASE MANAGEMENT | Facility: OTHER | Age: 75
End: 2020-03-27

## 2020-05-14 ENCOUNTER — APPOINTMENT (EMERGENCY)
Dept: RADIOLOGY | Facility: HOSPITAL | Age: 75
End: 2020-05-14
Attending: EMERGENCY MEDICINE
Payer: MEDICARE

## 2020-05-14 ENCOUNTER — HOSPITAL ENCOUNTER (OUTPATIENT)
Facility: HOSPITAL | Age: 75
Setting detail: OBSERVATION
Discharge: HOME | End: 2020-05-15
Attending: EMERGENCY MEDICINE | Admitting: HOSPITALIST
Payer: MEDICARE

## 2020-05-14 DIAGNOSIS — J44.1 COPD EXACERBATION (HCC): Primary | ICD-10-CM

## 2020-05-14 PROBLEM — J96.01 ACUTE RESPIRATORY FAILURE WITH HYPOXIA (HCC): Status: ACTIVE | Noted: 2020-05-14

## 2020-05-14 LAB
ALBUMIN SERPL BCP-MCNC: 4.5 G/DL (ref 3.5–5.7)
ALP SERPL-CCNC: 62 U/L (ref 55–165)
ALT SERPL W P-5'-P-CCNC: 13 U/L (ref 7–52)
ANION GAP SERPL CALCULATED.3IONS-SCNC: 9 MMOL/L (ref 4–13)
APTT PPP: 34 SECONDS (ref 23–37)
AST SERPL W P-5'-P-CCNC: 18 U/L (ref 13–39)
BASE EX.OXY STD BLDV CALC-SCNC: 96.9 %
BASE EXCESS BLDV CALC-SCNC: -3.8 MMOL/L
BASOPHILS # BLD AUTO: 0 THOUSANDS/ΜL (ref 0–0.1)
BASOPHILS NFR BLD AUTO: 0 % (ref 0–2)
BILIRUB SERPL-MCNC: 0.4 MG/DL (ref 0.2–1)
BNP SERPL-MCNC: 128 PG/ML (ref 1–100)
BUN SERPL-MCNC: 19 MG/DL (ref 7–25)
CALCIUM SERPL-MCNC: 8.9 MG/DL (ref 8.6–10.5)
CHLORIDE SERPL-SCNC: 102 MMOL/L (ref 98–107)
CO2 SERPL-SCNC: 24 MMOL/L (ref 21–31)
CREAT SERPL-MCNC: 0.88 MG/DL (ref 0.7–1.3)
D DIMER PPP FEU-MCNC: 0.33 UG/ML FEU
EOSINOPHIL # BLD AUTO: 0.6 THOUSAND/ΜL (ref 0–0.61)
EOSINOPHIL NFR BLD AUTO: 7 % (ref 0–5)
ERYTHROCYTE [DISTWIDTH] IN BLOOD BY AUTOMATED COUNT: 15.4 % (ref 11.5–14.5)
GFR SERPL CREATININE-BSD FRML MDRD: 85 ML/MIN/1.73SQ M
GLUCOSE SERPL-MCNC: 164 MG/DL (ref 65–99)
GLUCOSE SERPL-MCNC: 175 MG/DL (ref 65–140)
GLUCOSE SERPL-MCNC: 186 MG/DL (ref 65–140)
GLUCOSE SERPL-MCNC: 255 MG/DL (ref 65–140)
GLUCOSE SERPL-MCNC: 279 MG/DL (ref 65–140)
HCO3 BLDV-SCNC: 23 MMOL/L (ref 24–30)
HCT VFR BLD AUTO: 46.7 % (ref 42–47)
HGB BLD-MCNC: 15.7 G/DL (ref 14–18)
INR PPP: 0.94 (ref 0.84–1.19)
LYMPHOCYTES # BLD AUTO: 1.9 THOUSANDS/ΜL (ref 0.6–4.47)
LYMPHOCYTES NFR BLD AUTO: 22 % (ref 21–51)
MCH RBC QN AUTO: 30.3 PG (ref 26–34)
MCHC RBC AUTO-ENTMCNC: 33.6 G/DL (ref 31–37)
MCV RBC AUTO: 90 FL (ref 81–99)
MONOCYTES # BLD AUTO: 0.9 THOUSAND/ΜL (ref 0.17–1.22)
MONOCYTES NFR BLD AUTO: 10 % (ref 2–12)
NEUTROPHILS # BLD AUTO: 5.2 THOUSANDS/ΜL (ref 1.4–6.5)
NEUTS SEG NFR BLD AUTO: 61 % (ref 42–75)
O2 CT BLDV-SCNC: 21.4 ML/DL
PCO2 BLDV: 50 MM HG
PH BLDV: 7.29 [PH] (ref 7.3–7.4)
PLATELET # BLD AUTO: 181 THOUSANDS/UL (ref 149–390)
PMV BLD AUTO: 9.7 FL (ref 8.6–11.7)
PO2 BLDV: 189 MM HG (ref 35–45)
POTASSIUM SERPL-SCNC: 4.6 MMOL/L (ref 3.5–5.5)
PROCALCITONIN SERPL-MCNC: 0.13 NG/ML
PROT SERPL-MCNC: 7.3 G/DL (ref 6.4–8.9)
PROTHROMBIN TIME: 12.1 SECONDS (ref 11.6–14.5)
RBC # BLD AUTO: 5.19 MILLION/UL (ref 4.3–5.9)
SARS-COV-2 RNA RESP QL NAA+PROBE: NEGATIVE
SODIUM SERPL-SCNC: 135 MMOL/L (ref 134–143)
TROPONIN I SERPL-MCNC: <0.03 NG/ML
WBC # BLD AUTO: 8.6 THOUSAND/UL (ref 4.8–10.8)

## 2020-05-14 PROCEDURE — 84145 PROCALCITONIN (PCT): CPT | Performed by: PHYSICIAN ASSISTANT

## 2020-05-14 PROCEDURE — 94640 AIRWAY INHALATION TREATMENT: CPT

## 2020-05-14 PROCEDURE — 83880 ASSAY OF NATRIURETIC PEPTIDE: CPT | Performed by: EMERGENCY MEDICINE

## 2020-05-14 PROCEDURE — 36415 COLL VENOUS BLD VENIPUNCTURE: CPT | Performed by: EMERGENCY MEDICINE

## 2020-05-14 PROCEDURE — 99220 PR INITIAL OBSERVATION CARE/DAY 70 MINUTES: CPT | Performed by: PHYSICIAN ASSISTANT

## 2020-05-14 PROCEDURE — 82805 BLOOD GASES W/O2 SATURATION: CPT | Performed by: EMERGENCY MEDICINE

## 2020-05-14 PROCEDURE — 99285 EMERGENCY DEPT VISIT HI MDM: CPT

## 2020-05-14 PROCEDURE — 96375 TX/PRO/DX INJ NEW DRUG ADDON: CPT

## 2020-05-14 PROCEDURE — 94644 CONT INHLJ TX 1ST HOUR: CPT

## 2020-05-14 PROCEDURE — 82948 REAGENT STRIP/BLOOD GLUCOSE: CPT

## 2020-05-14 PROCEDURE — 85610 PROTHROMBIN TIME: CPT | Performed by: EMERGENCY MEDICINE

## 2020-05-14 PROCEDURE — 99285 EMERGENCY DEPT VISIT HI MDM: CPT | Performed by: EMERGENCY MEDICINE

## 2020-05-14 PROCEDURE — 94760 N-INVAS EAR/PLS OXIMETRY 1: CPT

## 2020-05-14 PROCEDURE — 96366 THER/PROPH/DIAG IV INF ADDON: CPT

## 2020-05-14 PROCEDURE — 93005 ELECTROCARDIOGRAM TRACING: CPT

## 2020-05-14 PROCEDURE — 94640 AIRWAY INHALATION TREATMENT: CPT | Performed by: EMERGENCY MEDICINE

## 2020-05-14 PROCEDURE — 71045 X-RAY EXAM CHEST 1 VIEW: CPT

## 2020-05-14 PROCEDURE — 80053 COMPREHEN METABOLIC PANEL: CPT | Performed by: EMERGENCY MEDICINE

## 2020-05-14 PROCEDURE — 85379 FIBRIN DEGRADATION QUANT: CPT | Performed by: EMERGENCY MEDICINE

## 2020-05-14 PROCEDURE — 85730 THROMBOPLASTIN TIME PARTIAL: CPT | Performed by: EMERGENCY MEDICINE

## 2020-05-14 PROCEDURE — 85025 COMPLETE CBC W/AUTO DIFF WBC: CPT | Performed by: EMERGENCY MEDICINE

## 2020-05-14 PROCEDURE — 87635 SARS-COV-2 COVID-19 AMP PRB: CPT | Performed by: EMERGENCY MEDICINE

## 2020-05-14 PROCEDURE — 96365 THER/PROPH/DIAG IV INF INIT: CPT

## 2020-05-14 PROCEDURE — 84484 ASSAY OF TROPONIN QUANT: CPT | Performed by: EMERGENCY MEDICINE

## 2020-05-14 RX ORDER — AZITHROMYCIN 500 MG/1
500 TABLET, FILM COATED ORAL EVERY 24 HOURS
Status: DISCONTINUED | OUTPATIENT
Start: 2020-05-14 | End: 2020-05-15 | Stop reason: HOSPADM

## 2020-05-14 RX ORDER — LISINOPRIL 20 MG/1
20 TABLET ORAL DAILY
Status: DISCONTINUED | OUTPATIENT
Start: 2020-05-14 | End: 2020-05-15 | Stop reason: HOSPADM

## 2020-05-14 RX ORDER — LEVALBUTEROL 1.25 MG/.5ML
1.25 SOLUTION, CONCENTRATE RESPIRATORY (INHALATION)
Status: DISCONTINUED | OUTPATIENT
Start: 2020-05-14 | End: 2020-05-15 | Stop reason: HOSPADM

## 2020-05-14 RX ORDER — PRIMIDONE 50 MG/1
100 TABLET ORAL EVERY 12 HOURS SCHEDULED
Status: DISCONTINUED | OUTPATIENT
Start: 2020-05-14 | End: 2020-05-15 | Stop reason: HOSPADM

## 2020-05-14 RX ORDER — ALBUTEROL SULFATE 2.5 MG/3ML
2.5 SOLUTION RESPIRATORY (INHALATION) EVERY 4 HOURS PRN
Status: DISCONTINUED | OUTPATIENT
Start: 2020-05-14 | End: 2020-05-15 | Stop reason: HOSPADM

## 2020-05-14 RX ORDER — MAGNESIUM SULFATE HEPTAHYDRATE 40 MG/ML
2 INJECTION, SOLUTION INTRAVENOUS ONCE
Status: COMPLETED | OUTPATIENT
Start: 2020-05-14 | End: 2020-05-14

## 2020-05-14 RX ORDER — PRAVASTATIN SODIUM 40 MG
80 TABLET ORAL
Status: DISCONTINUED | OUTPATIENT
Start: 2020-05-14 | End: 2020-05-15 | Stop reason: HOSPADM

## 2020-05-14 RX ORDER — GABAPENTIN 100 MG/1
200 CAPSULE ORAL
Status: DISCONTINUED | OUTPATIENT
Start: 2020-05-14 | End: 2020-05-15 | Stop reason: HOSPADM

## 2020-05-14 RX ORDER — DILTIAZEM HYDROCHLORIDE 5 MG/ML
10 INJECTION INTRAVENOUS ONCE
Status: COMPLETED | OUTPATIENT
Start: 2020-05-14 | End: 2020-05-14

## 2020-05-14 RX ORDER — METHYLPREDNISOLONE SODIUM SUCCINATE 125 MG/2ML
60 INJECTION, POWDER, LYOPHILIZED, FOR SOLUTION INTRAMUSCULAR; INTRAVENOUS ONCE
Status: COMPLETED | OUTPATIENT
Start: 2020-05-14 | End: 2020-05-14

## 2020-05-14 RX ORDER — METHYLPREDNISOLONE SODIUM SUCCINATE 40 MG/ML
40 INJECTION, POWDER, LYOPHILIZED, FOR SOLUTION INTRAMUSCULAR; INTRAVENOUS EVERY 8 HOURS
Status: DISCONTINUED | OUTPATIENT
Start: 2020-05-14 | End: 2020-05-15

## 2020-05-14 RX ORDER — SODIUM CHLORIDE FOR INHALATION 0.9 %
3 VIAL, NEBULIZER (ML) INHALATION
Status: DISCONTINUED | OUTPATIENT
Start: 2020-05-14 | End: 2020-05-15 | Stop reason: HOSPADM

## 2020-05-14 RX ORDER — GUAIFENESIN 600 MG
600 TABLET, EXTENDED RELEASE 12 HR ORAL 2 TIMES DAILY
Status: DISCONTINUED | OUTPATIENT
Start: 2020-05-14 | End: 2020-05-15 | Stop reason: HOSPADM

## 2020-05-14 RX ORDER — BUDESONIDE AND FORMOTEROL FUMARATE DIHYDRATE 160; 4.5 UG/1; UG/1
2 AEROSOL RESPIRATORY (INHALATION) 2 TIMES DAILY
Status: DISCONTINUED | OUTPATIENT
Start: 2020-05-14 | End: 2020-05-15 | Stop reason: HOSPADM

## 2020-05-14 RX ADMIN — ISODIUM CHLORIDE 3 ML: 0.03 SOLUTION RESPIRATORY (INHALATION) at 20:14

## 2020-05-14 RX ADMIN — BUDESONIDE AND FORMOTEROL FUMARATE DIHYDRATE 2 PUFF: 160; 4.5 AEROSOL RESPIRATORY (INHALATION) at 17:28

## 2020-05-14 RX ADMIN — METFORMIN HYDROCHLORIDE 500 MG: 500 TABLET, FILM COATED ORAL at 16:01

## 2020-05-14 RX ADMIN — LEVALBUTEROL HYDROCHLORIDE 1.25 MG: 1.25 SOLUTION, CONCENTRATE RESPIRATORY (INHALATION) at 20:14

## 2020-05-14 RX ADMIN — METHYLPREDNISOLONE SODIUM SUCCINATE 60 MG: 125 INJECTION, POWDER, FOR SOLUTION INTRAMUSCULAR; INTRAVENOUS at 07:53

## 2020-05-14 RX ADMIN — GABAPENTIN 200 MG: 100 CAPSULE ORAL at 22:13

## 2020-05-14 RX ADMIN — ALBUTEROL SULFATE 15 MG: 2.5 SOLUTION RESPIRATORY (INHALATION) at 08:11

## 2020-05-14 RX ADMIN — LEVALBUTEROL HYDROCHLORIDE 1.25 MG: 1.25 SOLUTION, CONCENTRATE RESPIRATORY (INHALATION) at 13:53

## 2020-05-14 RX ADMIN — GUAIFENESIN 600 MG: 600 TABLET, EXTENDED RELEASE ORAL at 12:21

## 2020-05-14 RX ADMIN — LISINOPRIL 20 MG: 20 TABLET ORAL at 16:01

## 2020-05-14 RX ADMIN — INSULIN LISPRO 3 UNITS: 100 INJECTION, SOLUTION INTRAVENOUS; SUBCUTANEOUS at 16:05

## 2020-05-14 RX ADMIN — ISODIUM CHLORIDE 3 ML: 0.03 SOLUTION RESPIRATORY (INHALATION) at 13:53

## 2020-05-14 RX ADMIN — MAGNESIUM SULFATE IN WATER 2 G: 40 INJECTION, SOLUTION INTRAVENOUS at 07:54

## 2020-05-14 RX ADMIN — PRIMIDONE 100 MG: 50 TABLET ORAL at 22:13

## 2020-05-14 RX ADMIN — DILTIAZEM HYDROCHLORIDE 10 MG: 5 INJECTION INTRAVENOUS at 07:54

## 2020-05-14 RX ADMIN — METHYLPREDNISOLONE SODIUM SUCCINATE 40 MG: 40 INJECTION, POWDER, FOR SOLUTION INTRAMUSCULAR; INTRAVENOUS at 22:12

## 2020-05-14 RX ADMIN — GUAIFENESIN 600 MG: 600 TABLET, EXTENDED RELEASE ORAL at 17:28

## 2020-05-14 RX ADMIN — PRAVASTATIN SODIUM 80 MG: 40 TABLET ORAL at 16:01

## 2020-05-14 RX ADMIN — AZITHROMYCIN 500 MG: 500 TABLET, FILM COATED ORAL at 12:21

## 2020-05-14 RX ADMIN — METOPROLOL TARTRATE 25 MG: 25 TABLET ORAL at 17:28

## 2020-05-14 RX ADMIN — INSULIN LISPRO 1 UNITS: 100 INJECTION, SOLUTION INTRAVENOUS; SUBCUTANEOUS at 22:12

## 2020-05-14 RX ADMIN — ENOXAPARIN SODIUM 40 MG: 40 INJECTION SUBCUTANEOUS at 12:21

## 2020-05-14 RX ADMIN — METHYLPREDNISOLONE SODIUM SUCCINATE 40 MG: 40 INJECTION, POWDER, FOR SOLUTION INTRAMUSCULAR; INTRAVENOUS at 16:01

## 2020-05-15 VITALS
OXYGEN SATURATION: 93 % | TEMPERATURE: 96.6 F | DIASTOLIC BLOOD PRESSURE: 70 MMHG | SYSTOLIC BLOOD PRESSURE: 146 MMHG | WEIGHT: 198.41 LBS | HEIGHT: 70 IN | HEART RATE: 87 BPM | BODY MASS INDEX: 28.41 KG/M2 | RESPIRATION RATE: 18 BRPM

## 2020-05-15 LAB
ATRIAL RATE: 112 BPM
GLUCOSE SERPL-MCNC: 172 MG/DL (ref 65–140)
GLUCOSE SERPL-MCNC: 247 MG/DL (ref 65–140)
P AXIS: 78 DEGREES
PR INTERVAL: 170 MS
PROCALCITONIN SERPL-MCNC: <0.05 NG/ML
QRS AXIS: 66 DEGREES
QRSD INTERVAL: 92 MS
QT INTERVAL: 342 MS
QTC INTERVAL: 466 MS
T WAVE AXIS: 64 DEGREES
VENTRICULAR RATE: 112 BPM

## 2020-05-15 PROCEDURE — 94640 AIRWAY INHALATION TREATMENT: CPT

## 2020-05-15 PROCEDURE — 99217 PR OBSERVATION CARE DISCHARGE MANAGEMENT: CPT | Performed by: HOSPITALIST

## 2020-05-15 PROCEDURE — 94760 N-INVAS EAR/PLS OXIMETRY 1: CPT

## 2020-05-15 PROCEDURE — 87070 CULTURE OTHR SPECIMN AEROBIC: CPT | Performed by: PHYSICIAN ASSISTANT

## 2020-05-15 PROCEDURE — 84145 PROCALCITONIN (PCT): CPT | Performed by: PHYSICIAN ASSISTANT

## 2020-05-15 PROCEDURE — 93010 ELECTROCARDIOGRAM REPORT: CPT | Performed by: INTERNAL MEDICINE

## 2020-05-15 PROCEDURE — 82948 REAGENT STRIP/BLOOD GLUCOSE: CPT

## 2020-05-15 PROCEDURE — 87205 SMEAR GRAM STAIN: CPT | Performed by: PHYSICIAN ASSISTANT

## 2020-05-15 RX ORDER — AZITHROMYCIN 500 MG/1
500 TABLET, FILM COATED ORAL EVERY 24 HOURS
Qty: 4 TABLET | Refills: 0 | Status: SHIPPED | OUTPATIENT
Start: 2020-05-15 | End: 2020-05-19

## 2020-05-15 RX ORDER — PREDNISONE 20 MG/1
40 TABLET ORAL DAILY
Qty: 9 TABLET | Refills: 0 | Status: SHIPPED | OUTPATIENT
Start: 2020-05-15 | End: 2020-07-23 | Stop reason: HOSPADM

## 2020-05-15 RX ORDER — PREDNISONE 20 MG/1
40 TABLET ORAL DAILY
Status: DISCONTINUED | OUTPATIENT
Start: 2020-05-15 | End: 2020-05-15 | Stop reason: HOSPADM

## 2020-05-15 RX ADMIN — INSULIN LISPRO 1 UNITS: 100 INJECTION, SOLUTION INTRAVENOUS; SUBCUTANEOUS at 08:46

## 2020-05-15 RX ADMIN — ISODIUM CHLORIDE 3 ML: 0.03 SOLUTION RESPIRATORY (INHALATION) at 13:52

## 2020-05-15 RX ADMIN — AZITHROMYCIN 500 MG: 500 TABLET, FILM COATED ORAL at 13:36

## 2020-05-15 RX ADMIN — PREDNISONE 40 MG: 20 TABLET ORAL at 13:36

## 2020-05-15 RX ADMIN — METOPROLOL TARTRATE 25 MG: 25 TABLET ORAL at 08:45

## 2020-05-15 RX ADMIN — ISODIUM CHLORIDE 3 ML: 0.03 SOLUTION RESPIRATORY (INHALATION) at 07:08

## 2020-05-15 RX ADMIN — METHYLPREDNISOLONE SODIUM SUCCINATE 40 MG: 40 INJECTION, POWDER, FOR SOLUTION INTRAMUSCULAR; INTRAVENOUS at 08:45

## 2020-05-15 RX ADMIN — GUAIFENESIN 600 MG: 600 TABLET, EXTENDED RELEASE ORAL at 08:45

## 2020-05-15 RX ADMIN — ENOXAPARIN SODIUM 40 MG: 40 INJECTION SUBCUTANEOUS at 08:44

## 2020-05-15 RX ADMIN — BUDESONIDE AND FORMOTEROL FUMARATE DIHYDRATE 2 PUFF: 160; 4.5 AEROSOL RESPIRATORY (INHALATION) at 08:43

## 2020-05-15 RX ADMIN — LEVALBUTEROL HYDROCHLORIDE 1.25 MG: 1.25 SOLUTION, CONCENTRATE RESPIRATORY (INHALATION) at 07:08

## 2020-05-15 RX ADMIN — PRIMIDONE 100 MG: 50 TABLET ORAL at 08:45

## 2020-05-15 RX ADMIN — LEVALBUTEROL HYDROCHLORIDE 1.25 MG: 1.25 SOLUTION, CONCENTRATE RESPIRATORY (INHALATION) at 13:52

## 2020-05-15 RX ADMIN — INSULIN LISPRO 3 UNITS: 100 INJECTION, SOLUTION INTRAVENOUS; SUBCUTANEOUS at 13:35

## 2020-05-18 LAB
BACTERIA SPT RESP CULT: ABNORMAL
GRAM STN SPEC: ABNORMAL

## 2020-05-28 NOTE — ED NOTES
ASSUMED CARE  RETURNED [address] 84  PLACED ON CARDIAC MONITOR  SR UPX2  EKG DONE   NO NEEDS EXPRESSED     Jose Krause, RN  12/27/19 9726 Daily Progress Note:     Date of Service: 5/28/2020  Primary Team: [facility] IM Green Team   Attending: JAMES Cotto   Senior Resident:    Intern:   Contact:  [phone]    Chief Complaint:   Chief Complaint   Patient presents with   • Abdominal Pain   ID: Mr. Coelho is a 65-year-old -American male with history of drug abuse, homelessness, hepatitis C and TAVR on follow-up with ID admitted for abdominal pain initially with CT scan revealing renal infarcts.  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  Repeat blood cultures on 10/11/2019 were negative and patient completed 6 weeks of IV vancomycin plus oral rifampin on 11/22/2019 in a skilled nursing facility with patient asked to continue on oral penicillin but patient defaulted.       Subjective:  No acute events overnight .AO x 4  Patient denies any acute complaints at present. Denies abdominal pain, nausea or vomiting.   Patient afebrile, hemodynamically stable on room air, oxygen saturation greater than 94%.  Thoracic echocardiogram was done on 5/21/2020 with echodensity noted between the TAVR and anterior mitral valve annulus concerning for endocarditis vegetation or abscess which transesophageal echogram done on 5/27/2020 did not show.  Cardiology was consulted by infectious disease with Dr. Lee of cardiology confirming absence of vegetations and abscesses prompting infectious disease to discontinue further antibiotics as patient had already completed 6 weeks of therapy with vancomycin and rifampicin previously on 11/2019.  Vancomycin has been discontinued per infectious disease recommendations  Cardiothoracic surgery for probable aneurysm not possibility given his history of IV drug abuse and patient will be monitored by primary care with regular JAYLYN/Echo every 6 months.  Awaiting  disposition.  Consultants/Specialty:   Vascular Surgery   Infectious disease     Review of Systems:    Review of Systems   Constitutional: Positive for malaise/fatigue. Negative for chills, diaphoresis, fever and weight loss.   HENT: Negative for congestion, ear pain, hearing loss, nosebleeds, sinus pain, sore throat and tinnitus.    Eyes: Negative for blurred vision, double vision and photophobia.   Respiratory: Negative for cough, hemoptysis, sputum production, shortness of breath, wheezing and stridor.    Cardiovascular: Negative for chest pain, palpitations, orthopnea, claudication and leg swelling.   Gastrointestinal: Negative for abdominal pain, blood in stool, constipation, diarrhea, heartburn, melena, nausea and vomiting.   Genitourinary: Negative for dysuria, frequency and urgency.   Musculoskeletal: Negative for back pain, falls, joint pain, myalgias and neck pain.   Skin: Negative for itching and rash.   Neurological: Negative for dizziness, tingling, tremors, sensory change, speech change, focal weakness, seizures, loss of consciousness, weakness and headaches.   Psychiatric/Behavioral: Positive for substance abuse. Negative for depression and suicidal ideas.       Objective Data:   Physical Exam:   Vitals:   Temp:  [36.4 °C (97.5 °F)-36.9 °C (98.5 °F)] 36.9 °C (98.5 °F)  Pulse:  [58-83] 62  Resp:  [16-18] 16  BP: (117-122)/(53-62) 117/61  SpO2:  [92 %-100 %] 95 %    Physical Exam     Constitutional:   Thin, disheveled man, appears uncomfortable  HENT:   Head: Normocephalic and atraumatic.   Eyes: Pupils are equal, round, and reactive to light. EOM are normal. No scleral icterus.   Neck: Normal range of motion. Neck supple. No JVD present.   Cardiovascular: Normal rate, regular rhythm and intact distal pulses.   Murmur heard.  Systolic murmur   Pulmonary/Chest: Effort normal and breath sounds normal. No respiratory distress. He has no wheezes. He has no rales.   Abdominal: Soft. He exhibits no  distension. mild discomfort in the lower abdomen   BS +. No guarding or rigidity   Musculoskeletal:         General: No edema.   Neurological: AO x 4 .No cranial nerve deficit.   Skin: Skin is warm and dry. He is not diaphoretic.     Labs:     Recent Labs     05/26/20  0824   WBC 7.2   RBC 4.15*   HEMOGLOBIN 11.3*   HEMATOCRIT 35.1*   MCV 84.6   MCH 27.2   RDW 48.9   PLATELETCT 216   MPV 9.8     Recent Labs     05/26/20  0827 05/28/20  0403   SODIUM 133* 129*   POTASSIUM 4.9 4.6   CHLORIDE 101 98   CO2 26 25   GLUCOSE 96 94   BUN 18 19     Recent Labs     05/26/20  0827 05/28/20  0403   CREATININE 0.96 1.06       Imaging:   EC-JAYLYN W/O CONT   Final Result      EC-ECHOCARDIOGRAM COMPLETE W/O CONT   Final Result      CT-CTA COMPLETE THORACOABDOMINAL AORTA   Final Result      1.  Diffuse abdominal aortic ectasia measuring up to 3.3 cm in diameter, similar to prior exam.  No evidence for dissection.   2.  Diminished enhancement along the ventral aspect of distal abdominal aorta may be admixture artifact or possibly thrombus.   3.  Inferior mesenteric artery is not well visualized which may be due to artifact or occlusion.   4.  Severe emphysema.   5.  Heterogeneous fatty infiltration of liver.   6.  Bilateral renal scarring.   7.  No bowel obstruction or pneumoperitoneum.                     * Bacterial endocarditis- (present on admission)  Assessment & Plan  Diagnosed with endocarditis in 10/2019, blood cx positive for Rothia mucilaginosa,  found to have vegetation on aortic valve (TAVR), appears to have received 6+ weeks of vancomycin and rifampin which was discontinued on 11/25/2019 and patient was to continue of PO penicillin for chronic suppression but reports that he has not been taking antibiotics. Also appears that he has not had a follow up JAYLYN as planned by ID.   Plan:    Echo on 05/21/2020 - Showed increased size of the vegetations compared to previous echo with decreased TAVR leaflet excursion.   Blood  cultures are negative till date   JAYLYN done on 05/26/2020 -probable aortic root abscess or pseudoaneurysm, case was discussed with cardiology by infectious disease Dr. Lee of cardiology confirming that the patient does not have an abscess.  Infectious disease to discontinue vancomycin as patient already had a course of vancomycin with rifampin in November 2019.  Patient will be followed up with primary care with regular JAYLYN/echocardiogram every 6 months on a continual basis for further intervention and need.        Lactic acidosis  Assessment & Plan  Resolved with IV fluids     Lower abdominal pain  Assessment & Plan  Improving   Initially given history of vascular disease, lactic acidosis considered mesenteric ischemia/ischemia bowel likely, concern for septic emboli to bowel given endocarditis, though vascular surgery evaluation of pt and CTA did not see this as likely. AAA appears stable on CTA.   Plan:  continue to monitor H and H  Regular diet    Continue to monitor     Acute renal failure (ARF) ([facility])- (present on admission)  Assessment & Plan  Resolved with IV fluids       HTN (hypertension)- (present on admission)  Assessment & Plan  Unclear outpatient regimen and adherence. Hypertensive on presentation.  Control BP < 160 in setting of AAA  PRN labetalol  Coreg 12.5 BID   Lisinopril 5 mg daily      AAA (abdominal aortic aneurysm) ([facility])- (present on admission)  Assessment & Plan  Stable on CT  BP control, SBP<160  Plan:   outpatient Vascular surgery follow up  for aneurysm Surveillance.      COPD (chronic obstructive pulmonary disease) ([facility])- (present on admission)  Assessment & Plan  Severe emphysema on CT  No current exacerbation  RT protocol    S/P TAVR (transcatheter aortic valve replacement)- (present on admission)  Assessment & Plan  Placed 3/18/2019 for severe aortic stenosis  Endocarditis 10/2019 with vegetation on prosthesis   No ASA/plavix due to high bleed risk per chart review  Echocardiogram  showed increased size of the vegetations compared to previous echo in 10/10/19.  Plan:  JAYLYN does not show any vegetation, probable aortic root abscess or aneurysm.  Case discussed with cardiology by infectious disease who to also do not think it is a vegetation or an abscess.  Vancomycin intravenous will be discontinued per ID recommendations.  As per chart review ,Cardiology has discussed with CT surgery and they do not feel that he is a good surgical candidate given ongoing drug use .      History of methamphetamine use- (present on admission)  Assessment & Plan  Denies recent use  History IVDU  UDS positive for methamphetamine   Plan:  Counseling provided to quit meth use  SW consult

## 2020-06-17 ENCOUNTER — APPOINTMENT (EMERGENCY)
Dept: RADIOLOGY | Facility: HOSPITAL | Age: 75
End: 2020-06-17
Payer: MEDICARE

## 2020-06-17 ENCOUNTER — HOSPITAL ENCOUNTER (EMERGENCY)
Facility: HOSPITAL | Age: 75
Discharge: HOME | End: 2020-06-17
Attending: INTERNAL MEDICINE | Admitting: INTERNAL MEDICINE
Payer: MEDICARE

## 2020-06-17 VITALS
BODY MASS INDEX: 28.07 KG/M2 | DIASTOLIC BLOOD PRESSURE: 89 MMHG | TEMPERATURE: 97.9 F | RESPIRATION RATE: 20 BRPM | SYSTOLIC BLOOD PRESSURE: 172 MMHG | OXYGEN SATURATION: 94 % | HEIGHT: 71 IN | HEART RATE: 93 BPM

## 2020-06-17 DIAGNOSIS — J44.1 COPD EXACERBATION (HCC): Primary | ICD-10-CM

## 2020-06-17 LAB
ALBUMIN SERPL BCP-MCNC: 4.2 G/DL (ref 3.5–5.7)
ALP SERPL-CCNC: 58 U/L (ref 55–165)
ALT SERPL W P-5'-P-CCNC: 10 U/L (ref 7–52)
ANION GAP SERPL CALCULATED.3IONS-SCNC: 10 MMOL/L (ref 4–13)
APTT PPP: 33 SECONDS (ref 23–37)
AST SERPL W P-5'-P-CCNC: 9 U/L (ref 13–39)
BASOPHILS # BLD AUTO: 0.1 THOUSANDS/ΜL (ref 0–0.1)
BASOPHILS NFR BLD AUTO: 1 % (ref 0–2)
BILIRUB SERPL-MCNC: 0.6 MG/DL (ref 0.2–1)
BUN SERPL-MCNC: 20 MG/DL (ref 7–25)
CALCIUM SERPL-MCNC: 8.7 MG/DL (ref 8.6–10.5)
CHLORIDE SERPL-SCNC: 105 MMOL/L (ref 98–107)
CO2 SERPL-SCNC: 24 MMOL/L (ref 21–31)
CREAT SERPL-MCNC: 0.86 MG/DL (ref 0.7–1.3)
EOSINOPHIL # BLD AUTO: 0.3 THOUSAND/ΜL (ref 0–0.61)
EOSINOPHIL NFR BLD AUTO: 3 % (ref 0–5)
ERYTHROCYTE [DISTWIDTH] IN BLOOD BY AUTOMATED COUNT: 14.2 % (ref 11.5–14.5)
GFR SERPL CREATININE-BSD FRML MDRD: 85 ML/MIN/1.73SQ M
GLUCOSE SERPL-MCNC: 100 MG/DL (ref 65–99)
HCT VFR BLD AUTO: 41.8 % (ref 42–47)
HGB BLD-MCNC: 14.7 G/DL (ref 14–18)
INR PPP: 1.05 (ref 0.84–1.19)
LACTATE SERPL-SCNC: 0.8 MMOL/L (ref 0.5–2)
LYMPHOCYTES # BLD AUTO: 1.1 THOUSANDS/ΜL (ref 0.6–4.47)
LYMPHOCYTES NFR BLD AUTO: 11 % (ref 21–51)
MCH RBC QN AUTO: 31 PG (ref 26–34)
MCHC RBC AUTO-ENTMCNC: 35.1 G/DL (ref 31–37)
MCV RBC AUTO: 88 FL (ref 81–99)
MONOCYTES # BLD AUTO: 0.7 THOUSAND/ΜL (ref 0.17–1.22)
MONOCYTES NFR BLD AUTO: 7 % (ref 2–12)
NEUTROPHILS # BLD AUTO: 7.5 THOUSANDS/ΜL (ref 1.4–6.5)
NEUTS SEG NFR BLD AUTO: 78 % (ref 42–75)
PLATELET # BLD AUTO: 175 THOUSANDS/UL (ref 149–390)
PMV BLD AUTO: 9.2 FL (ref 8.6–11.7)
POTASSIUM SERPL-SCNC: 3.8 MMOL/L (ref 3.5–5.5)
PROT SERPL-MCNC: 6.8 G/DL (ref 6.4–8.9)
PROTHROMBIN TIME: 13.2 SECONDS (ref 11.6–14.5)
RBC # BLD AUTO: 4.73 MILLION/UL (ref 4.3–5.9)
SARS-COV-2 RNA RESP QL NAA+PROBE: NEGATIVE
SODIUM SERPL-SCNC: 139 MMOL/L (ref 134–143)
WBC # BLD AUTO: 9.7 THOUSAND/UL (ref 4.8–10.8)

## 2020-06-17 PROCEDURE — 84145 PROCALCITONIN (PCT): CPT | Performed by: INTERNAL MEDICINE

## 2020-06-17 PROCEDURE — 94664 DEMO&/EVAL PT USE INHALER: CPT

## 2020-06-17 PROCEDURE — 80053 COMPREHEN METABOLIC PANEL: CPT | Performed by: INTERNAL MEDICINE

## 2020-06-17 PROCEDURE — 87040 BLOOD CULTURE FOR BACTERIA: CPT | Performed by: INTERNAL MEDICINE

## 2020-06-17 PROCEDURE — 99285 EMERGENCY DEPT VISIT HI MDM: CPT

## 2020-06-17 PROCEDURE — 85610 PROTHROMBIN TIME: CPT | Performed by: INTERNAL MEDICINE

## 2020-06-17 PROCEDURE — 85025 COMPLETE CBC W/AUTO DIFF WBC: CPT | Performed by: INTERNAL MEDICINE

## 2020-06-17 PROCEDURE — 87635 SARS-COV-2 COVID-19 AMP PRB: CPT | Performed by: INTERNAL MEDICINE

## 2020-06-17 PROCEDURE — 71045 X-RAY EXAM CHEST 1 VIEW: CPT

## 2020-06-17 PROCEDURE — 96374 THER/PROPH/DIAG INJ IV PUSH: CPT

## 2020-06-17 PROCEDURE — 93005 ELECTROCARDIOGRAM TRACING: CPT

## 2020-06-17 PROCEDURE — 94644 CONT INHLJ TX 1ST HOUR: CPT

## 2020-06-17 PROCEDURE — 99285 EMERGENCY DEPT VISIT HI MDM: CPT | Performed by: INTERNAL MEDICINE

## 2020-06-17 PROCEDURE — 94760 N-INVAS EAR/PLS OXIMETRY 1: CPT

## 2020-06-17 PROCEDURE — 85730 THROMBOPLASTIN TIME PARTIAL: CPT | Performed by: INTERNAL MEDICINE

## 2020-06-17 PROCEDURE — 96361 HYDRATE IV INFUSION ADD-ON: CPT

## 2020-06-17 PROCEDURE — 36415 COLL VENOUS BLD VENIPUNCTURE: CPT | Performed by: INTERNAL MEDICINE

## 2020-06-17 PROCEDURE — 83605 ASSAY OF LACTIC ACID: CPT | Performed by: INTERNAL MEDICINE

## 2020-06-17 RX ORDER — ALBUTEROL SULFATE 2.5 MG/3ML
2.5 SOLUTION RESPIRATORY (INHALATION) ONCE
Status: COMPLETED | OUTPATIENT
Start: 2020-06-17 | End: 2020-06-17

## 2020-06-17 RX ORDER — METHYLPREDNISOLONE SODIUM SUCCINATE 125 MG/2ML
125 INJECTION, POWDER, LYOPHILIZED, FOR SOLUTION INTRAMUSCULAR; INTRAVENOUS ONCE
Status: COMPLETED | OUTPATIENT
Start: 2020-06-17 | End: 2020-06-17

## 2020-06-17 RX ORDER — ALBUTEROL SULFATE 2.5 MG/3ML
10 SOLUTION RESPIRATORY (INHALATION) ONCE
Status: COMPLETED | OUTPATIENT
Start: 2020-06-17 | End: 2020-06-17

## 2020-06-17 RX ORDER — CEFUROXIME AXETIL 250 MG/1
250 TABLET ORAL EVERY 12 HOURS SCHEDULED
Qty: 14 TABLET | Refills: 0 | Status: SHIPPED | OUTPATIENT
Start: 2020-06-17 | End: 2020-06-24

## 2020-06-17 RX ORDER — PREDNISONE 10 MG/1
TABLET ORAL
Qty: 20 TABLET | Refills: 0 | Status: SHIPPED | OUTPATIENT
Start: 2020-06-17 | End: 2020-07-23 | Stop reason: HOSPADM

## 2020-06-17 RX ORDER — CEFUROXIME AXETIL 250 MG/1
250 TABLET ORAL ONCE
Status: COMPLETED | OUTPATIENT
Start: 2020-06-17 | End: 2020-06-17

## 2020-06-17 RX ADMIN — ALBUTEROL SULFATE 2.5 MG: 2.5 SOLUTION RESPIRATORY (INHALATION) at 17:51

## 2020-06-17 RX ADMIN — CEFUROXIME AXETIL 250 MG: 250 TABLET, FILM COATED ORAL at 20:31

## 2020-06-17 RX ADMIN — ALBUTEROL SULFATE 10 MG: 2.5 SOLUTION RESPIRATORY (INHALATION) at 19:29

## 2020-06-17 RX ADMIN — METHYLPREDNISOLONE SODIUM SUCCINATE 125 MG: 125 INJECTION, POWDER, FOR SOLUTION INTRAMUSCULAR; INTRAVENOUS at 17:48

## 2020-06-17 RX ADMIN — SODIUM CHLORIDE 2226 ML: 0.9 INJECTION, SOLUTION INTRAVENOUS at 18:47

## 2020-06-18 LAB
ATRIAL RATE: 84 BPM
P AXIS: 78 DEGREES
PR INTERVAL: 170 MS
PROCALCITONIN SERPL-MCNC: <0.05 NG/ML
QRS AXIS: 77 DEGREES
QRSD INTERVAL: 94 MS
QT INTERVAL: 384 MS
QTC INTERVAL: 453 MS
T WAVE AXIS: -41 DEGREES
VENTRICULAR RATE: 84 BPM

## 2020-06-18 PROCEDURE — 93010 ELECTROCARDIOGRAM REPORT: CPT | Performed by: INTERNAL MEDICINE

## 2020-06-23 LAB
BACTERIA BLD CULT: NORMAL
BACTERIA BLD CULT: NORMAL

## 2020-06-30 ENCOUNTER — HOSPITAL ENCOUNTER (EMERGENCY)
Facility: HOSPITAL | Age: 75
Discharge: HOME | End: 2020-06-30
Attending: EMERGENCY MEDICINE | Admitting: EMERGENCY MEDICINE
Payer: MEDICARE

## 2020-06-30 ENCOUNTER — APPOINTMENT (EMERGENCY)
Dept: RADIOLOGY | Facility: HOSPITAL | Age: 75
End: 2020-06-30
Payer: MEDICARE

## 2020-06-30 VITALS
SYSTOLIC BLOOD PRESSURE: 185 MMHG | WEIGHT: 208 LBS | BODY MASS INDEX: 29.78 KG/M2 | HEART RATE: 80 BPM | DIASTOLIC BLOOD PRESSURE: 86 MMHG | OXYGEN SATURATION: 93 % | HEIGHT: 70 IN | RESPIRATION RATE: 24 BRPM | TEMPERATURE: 97.4 F

## 2020-06-30 DIAGNOSIS — J44.1 COPD EXACERBATION (HCC): Primary | ICD-10-CM

## 2020-06-30 LAB
ALBUMIN SERPL BCP-MCNC: 4.1 G/DL (ref 3.5–5.7)
ALP SERPL-CCNC: 55 U/L (ref 55–165)
ALT SERPL W P-5'-P-CCNC: 12 U/L (ref 7–52)
ANION GAP SERPL CALCULATED.3IONS-SCNC: 8 MMOL/L (ref 4–13)
AST SERPL W P-5'-P-CCNC: 15 U/L (ref 13–39)
BASOPHILS # BLD AUTO: 0.1 THOUSANDS/ΜL (ref 0–0.1)
BASOPHILS NFR BLD AUTO: 1 % (ref 0–2)
BILIRUB SERPL-MCNC: 0.6 MG/DL (ref 0.2–1)
BNP SERPL-MCNC: 78 PG/ML (ref 1–100)
BUN SERPL-MCNC: 19 MG/DL (ref 7–25)
CALCIUM SERPL-MCNC: 8.9 MG/DL (ref 8.6–10.5)
CHLORIDE SERPL-SCNC: 101 MMOL/L (ref 98–107)
CO2 SERPL-SCNC: 28 MMOL/L (ref 21–31)
CREAT SERPL-MCNC: 1.19 MG/DL (ref 0.7–1.3)
EOSINOPHIL # BLD AUTO: 0.4 THOUSAND/ΜL (ref 0–0.61)
EOSINOPHIL NFR BLD AUTO: 4 % (ref 0–5)
ERYTHROCYTE [DISTWIDTH] IN BLOOD BY AUTOMATED COUNT: 14.6 % (ref 11.5–14.5)
GFR SERPL CREATININE-BSD FRML MDRD: 60 ML/MIN/1.73SQ M
GLUCOSE SERPL-MCNC: 194 MG/DL (ref 65–99)
HCT VFR BLD AUTO: 43 % (ref 42–47)
HGB BLD-MCNC: 14.8 G/DL (ref 14–18)
LYMPHOCYTES # BLD AUTO: 1.4 THOUSANDS/ΜL (ref 0.6–4.47)
LYMPHOCYTES NFR BLD AUTO: 13 % (ref 21–51)
MAGNESIUM SERPL-MCNC: 2.2 MG/DL (ref 1.9–2.7)
MCH RBC QN AUTO: 30.9 PG (ref 26–34)
MCHC RBC AUTO-ENTMCNC: 34.3 G/DL (ref 31–37)
MCV RBC AUTO: 90 FL (ref 81–99)
MONOCYTES # BLD AUTO: 0.8 THOUSAND/ΜL (ref 0.17–1.22)
MONOCYTES NFR BLD AUTO: 7 % (ref 2–12)
NEUTROPHILS # BLD AUTO: 8.1 THOUSANDS/ΜL (ref 1.4–6.5)
NEUTS SEG NFR BLD AUTO: 76 % (ref 42–75)
PLATELET # BLD AUTO: 169 THOUSANDS/UL (ref 149–390)
PMV BLD AUTO: 9.2 FL (ref 8.6–11.7)
POTASSIUM SERPL-SCNC: 4.3 MMOL/L (ref 3.5–5.5)
PROT SERPL-MCNC: 6.6 G/DL (ref 6.4–8.9)
RBC # BLD AUTO: 4.79 MILLION/UL (ref 4.3–5.9)
SARS-COV-2 RNA RESP QL NAA+PROBE: NEGATIVE
SODIUM SERPL-SCNC: 137 MMOL/L (ref 134–143)
TROPONIN I SERPL-MCNC: <0.03 NG/ML
WBC # BLD AUTO: 10.7 THOUSAND/UL (ref 4.8–10.8)

## 2020-06-30 PROCEDURE — 93005 ELECTROCARDIOGRAM TRACING: CPT

## 2020-06-30 PROCEDURE — 83880 ASSAY OF NATRIURETIC PEPTIDE: CPT | Performed by: EMERGENCY MEDICINE

## 2020-06-30 PROCEDURE — 36415 COLL VENOUS BLD VENIPUNCTURE: CPT | Performed by: EMERGENCY MEDICINE

## 2020-06-30 PROCEDURE — 80053 COMPREHEN METABOLIC PANEL: CPT | Performed by: EMERGENCY MEDICINE

## 2020-06-30 PROCEDURE — 87635 SARS-COV-2 COVID-19 AMP PRB: CPT | Performed by: EMERGENCY MEDICINE

## 2020-06-30 PROCEDURE — 99285 EMERGENCY DEPT VISIT HI MDM: CPT

## 2020-06-30 PROCEDURE — 71045 X-RAY EXAM CHEST 1 VIEW: CPT

## 2020-06-30 PROCEDURE — 96374 THER/PROPH/DIAG INJ IV PUSH: CPT

## 2020-06-30 PROCEDURE — 84484 ASSAY OF TROPONIN QUANT: CPT | Performed by: EMERGENCY MEDICINE

## 2020-06-30 PROCEDURE — 85025 COMPLETE CBC W/AUTO DIFF WBC: CPT | Performed by: EMERGENCY MEDICINE

## 2020-06-30 PROCEDURE — 99285 EMERGENCY DEPT VISIT HI MDM: CPT | Performed by: EMERGENCY MEDICINE

## 2020-06-30 PROCEDURE — 83735 ASSAY OF MAGNESIUM: CPT | Performed by: EMERGENCY MEDICINE

## 2020-06-30 RX ORDER — METHYLPREDNISOLONE SODIUM SUCCINATE 125 MG/2ML
60 INJECTION, POWDER, LYOPHILIZED, FOR SOLUTION INTRAMUSCULAR; INTRAVENOUS ONCE
Status: COMPLETED | OUTPATIENT
Start: 2020-06-30 | End: 2020-06-30

## 2020-06-30 RX ORDER — ALBUTEROL SULFATE 90 UG/1
2 AEROSOL, METERED RESPIRATORY (INHALATION) ONCE
Status: COMPLETED | OUTPATIENT
Start: 2020-06-30 | End: 2020-06-30

## 2020-06-30 RX ADMIN — ALBUTEROL SULFATE 2 PUFF: 90 AEROSOL, METERED RESPIRATORY (INHALATION) at 21:16

## 2020-06-30 RX ADMIN — METHYLPREDNISOLONE SODIUM SUCCINATE 60 MG: 125 INJECTION, POWDER, FOR SOLUTION INTRAMUSCULAR; INTRAVENOUS at 21:17

## 2020-07-01 LAB
ATRIAL RATE: 79 BPM
P AXIS: 68 DEGREES
PR INTERVAL: 176 MS
QRS AXIS: 53 DEGREES
QRSD INTERVAL: 96 MS
QT INTERVAL: 410 MS
QTC INTERVAL: 470 MS
T WAVE AXIS: 138 DEGREES
VENTRICULAR RATE: 79 BPM

## 2020-07-01 PROCEDURE — 93010 ELECTROCARDIOGRAM REPORT: CPT | Performed by: INTERNAL MEDICINE

## 2020-07-01 NOTE — ED PROVIDER NOTES
History  Chief Complaint   Patient presents with    Shortness of Breath     Started when pt woke up  Pt came because SOB isn't going away  Pt took nebulizer after 9m      77YEAR-OLD MALE    PAST MEDICAL HISTORY:   COPD  HTN  NSTEMI    SOCIAL HISTORY:  SMOKER, NO DRINKING, NO DRUGS      MODE OF TRANSPORT:   PRIVATE CAR, AMBULATORY       CHIEF COMPLAINT:  Feels COPD exacerbation symptoms all day  Worse pta  Tried neb at 7pm      VTE  RISK FACTORS:  NONE   NO HISTORY OF PE OR DVT   NO LONG CAR RIDES OR PLANE RIDES  NO IMMOBILIZATION  NO RECENT SURGERY OR TRAUMA  NO HEMOPTYSIS  HE DENIES CP OR PRESSURE  HE DENIES ANY PAIN TO THE JAW NECK OR THE BACK  SHE DENIES PAIN GOING DOWN THE ARM      INFECTIOUS SYMPTOMS:  PATIENT DENIES FEVERS, CHILLS  NO SINUS SYMPTOMS    NO HEADACHE OR NECK PAIN, NO DIZZINESS    OTHER ASSOCIATED SYMPTOMS:  NO ABDOMINAL PAIN  NO ACUTE BACK PAIN  NO NAUSEA OR VOMITING  NO STOOL CHANGES  DENIES HEADACHE OR DIZZINESS  NO NECK PAIN  SHE DENIES SYNCOPE OR NEAR SYNCOPE  SHE DENIES PALPITATIONS  SHE DENIES UNILATERAL WEAKNESS OR NUMBNESS  NO SPEECH DIFFICULTY  NO VISUAL CHANGES        INTERVENTIONS:   As above    Pt has no other complaints          History provided by:  Patient  Shortness of Breath   Severity:  Moderate  Onset quality:  Gradual  Timing:  Constant  Progression:  Worsening  Relieved by:  None tried  Worsened by:  Exertion  Ineffective treatments:  Inhaler  Associated symptoms: cough    Associated symptoms: no abdominal pain, no chest pain, no diaphoresis, no fever, no headaches, no hemoptysis, no neck pain, no rash, no sore throat, no vomiting and no wheezing        Prior to Admission Medications   Prescriptions Last Dose Informant Patient Reported? Taking?    Empagliflozin 10 MG TABS   Yes No   Sig: Take 10 mg by mouth every morning   albuterol (2 5 mg/3 mL) 0 083 % nebulizer solution   No No   Sig: Take 1 vial (2 5 mg total) by nebulization every 4 (four) hours as needed for wheezing or shortness of breath   albuterol (PROVENTIL HFA,VENTOLIN HFA) 90 mcg/act inhaler   Yes No   Sig: Inhale 2 puffs every 6 (six) hours as needed for wheezing or shortness of breath   aspirin (ECOTRIN LOW STRENGTH) 81 mg EC tablet   Yes No   Sig: Take 81 mg by mouth every other day    gabapentin (NEURONTIN) 100 mg capsule   Yes No   Sig: Take 200 mg by mouth daily at bedtime   lisinopril (ZESTRIL) 20 mg tablet   No No   Sig: Take 1 tablet (20 mg total) by mouth daily   metFORMIN (GLUCOPHAGE) 500 mg tablet   Yes No   Sig: Take 500 mg by mouth daily with dinner    metoprolol tartrate (LOPRESSOR) 25 mg tablet   No No   Sig: Take 1 tablet (25 mg total) by mouth 2 (two) times a day   predniSONE 10 mg tablet   No No   Si pills for 2 days, then 3 pills for 2 days, then 2 pills for 2 days then 1 pill for 2 days  predniSONE 20 mg tablet   No No   Sig: Take 2 tablets (40 mg total) by mouth daily For 3 days, then take 1 tablet (20 mg total) by mouth daily for 3 days then stop   primidone (MYSOLINE) 50 mg tablet   No No   Sig: Take 2 tablets (100 mg total) by mouth every 12 (twelve) hours   rosuvastatin (CRESTOR) 10 MG tablet   Yes No   Sig: Take 10 mg by mouth daily   saxagliptin (ONGLYZA) 5 MG tablet   Yes No   Sig: Take 5 mg by mouth daily   tiotropium-olodaterol (STIOLTO RESPIMAT) 2 5-2 5 MCG/ACT inhaler   Yes No   Sig: Inhale 2 puffs daily      Facility-Administered Medications: None       Past Medical History:   Diagnosis Date    BPH (benign prostatic hyperplasia)     45 days radiation treatment    COPD (chronic obstructive pulmonary disease)     Coronary artery disease     CABG x4 in 2017    Diabetes mellitus     History of Arterial Duplex of LE 2017    Likely occlusion of the left superficial femoral artery  Calcific changes bilaterally  Despite these changes, the ankle-brachial index as a measure of peripheral blood flow only mildly impaired      History of echocardiogram 2017    EF 40%, mild LVH, mild MR     Hyperlipidemia     Hypertension     NSTEMI (non-ST elevated myocardial infarction)     Prostate cancer     prostate     PVD (peripheral vascular disease)     Tremor        Past Surgical History:   Procedure Laterality Date    CARDIAC CATHETERIZATION  2017    Significant left main plus triple-vessel CAD   CORONARY ARTERY BYPASS GRAFT  2017    4V CABG:  LIMA to LAD, VG to RI, SVG to PDA to LVBR RCA   EYE SURGERY      shots in eye once a month @ the [place]    PROSTATE BIOPSY         Family History   Problem Relation Age of Onset    Cancer Father     Heart disease Brother      I have reviewed and agree with the history as documented  E-Cigarette/Vaping    E-Cigarette Use Never User      E-Cigarette/Vaping Substances     Social History     Tobacco Use    Smoking status: Former Smoker     Packs/day: 1 00     Years: 60 00     Pack years: 60 00     Types: Cigarettes     Last attempt to quit: 12/15/2018     Years since quittin 5    Smokeless tobacco: Never Used    Tobacco comment: only smokes when he drinks beer   Substance Use Topics    Alcohol use: Not Currently     Alcohol/week: 0 0 standard drinks     Frequency: Never     Drinks per session: Patient refused     Binge frequency: Never    Drug use: Never       Review of Systems   Constitutional: Negative for chills, diaphoresis, fatigue and fever  HENT: Negative for rhinorrhea, sinus pressure, sinus pain, sneezing, sore throat, trouble swallowing and voice change  Respiratory: Positive for cough  Negative for hemoptysis, shortness of breath, wheezing and stridor  Cardiovascular: Negative for chest pain, palpitations and leg swelling  Gastrointestinal: Negative for abdominal pain, blood in stool, nausea and vomiting  Genitourinary: Negative for difficulty urinating, dysuria, flank pain and frequency     Musculoskeletal: Negative for arthralgias, back pain, gait problem, joint swelling, myalgias, neck pain and neck stiffness  Skin: Negative for rash and wound  Neurological: Negative for dizziness, light-headedness and headaches  All other systems reviewed and are negative  Physical Exam  Physical Exam   Constitutional: He is oriented to person, place, and time  He appears well-developed and well-nourished  Non-toxic appearance  He does not appear ill  No distress  HENT:   Head: Normocephalic and atraumatic  Nose: Nose normal    Mouth/Throat: Oropharynx is clear and moist  No oropharyngeal exudate  Eyes: Pupils are equal, round, and reactive to light  Conjunctivae and EOM are normal  Right eye exhibits no discharge  Left eye exhibits no discharge  No scleral icterus  Neck: Normal range of motion  Neck supple  No JVD present  No tracheal deviation present  Cardiovascular: Normal rate, regular rhythm, normal heart sounds and intact distal pulses  Exam reveals no gallop and no friction rub  No murmur heard  Pulmonary/Chest: No accessory muscle usage or stridor  No tachypnea  No respiratory distress  He has wheezes  He has no rhonchi  He has no rales  He exhibits no tenderness  Abdominal: Soft  Bowel sounds are normal  He exhibits no distension and no mass  There is no tenderness  There is no rebound and no guarding  No hernia  Musculoskeletal: Normal range of motion  He exhibits no edema, tenderness or deformity  Right lower leg: Normal         Left lower leg: Normal    Lymphadenopathy:     He has no cervical adenopathy  Neurological: He is alert and oriented to person, place, and time  He is not disoriented  No cranial nerve deficit or sensory deficit  He exhibits normal muscle tone  Coordination normal    Skin: Skin is warm  Capillary refill takes less than 2 seconds  No rash noted  He is not diaphoretic  No erythema  No pallor  Psychiatric: He has a normal mood and affect   His behavior is normal  Judgment and thought content normal  His mood appears not anxious  He is not agitated  Vital Signs  ED Triage Vitals [06/30/20 2058]   Temperature Pulse Respirations Blood Pressure SpO2   (!) 97 4 °F (36 3 °C) 80 (!) 24 (!) 185/86 93 %      Temp Source Heart Rate Source Patient Position - Orthostatic VS BP Location FiO2 (%)   Temporal Monitor Sitting Right arm --      Pain Score       --           Vitals:    06/30/20 2058   BP: (!) 185/86   Pulse: 80   Patient Position - Orthostatic VS: Sitting         Visual Acuity      ED Medications  Medications   albuterol (PROVENTIL HFA,VENTOLIN HFA) inhaler 2 puff (2 puffs Inhalation Given 6/30/20 2116)   methylPREDNISolone sodium succinate (Solu-MEDROL) injection 60 mg (60 mg Intravenous Given 6/30/20 2117)       Diagnostic Studies  Results Reviewed     Procedure Component Value Units Date/Time    Novel Coronavirus [facility] [[id number]]  (Normal) Collected:  06/30/20 2113    Lab Status:  Final result Specimen:  Nares from Nose Updated:  06/30/20 2208     SARS-CoV-2 Negative    Narrative: The specimen collection materials, transport medium, and/or testing methodology utilized in the production of these test results have been proven to be reliable in a limited validation with an abbreviated program under the Emergency Utilization Authorization provided by the FDA  Testing reported as "Presumptive positive" will be confirmed with secondary testing with a reference laboratory to ensure result accuracy  Clinical caution and judgement should be used with the interpretation of these results with consideration of the clinical impression and other laboratory testing  Testing reported as "Positive" or "Negative" has been proven to be accurate according to standard laboratory validation requirements  All testing is performed with control materials showing appropriate reactivity at standard intervals        B-Type Natriuretic Peptide ([address] [facility]) [[id number]]  (Normal) Collected:  06/30/20 2108    Lab Status:  Final result Specimen:  Blood from Arm, Left Updated:  06/30/20 2143     BNP 78 pg/mL     Magnesium [[id number]]  (Normal) Collected:  06/30/20 2108    Lab Status:  Final result Specimen:  Blood from Arm, Left Updated:  06/30/20 2136     Magnesium 2 2 mg/dL     Comprehensive metabolic panel [[id number]]  (Abnormal) Collected:  06/30/20 2107    Lab Status:  Final result Specimen:  Blood from Arm, Left Updated:  06/30/20 2136     Sodium 137 mmol/L      Potassium 4 3 mmol/L      Chloride 101 mmol/L      CO2 28 mmol/L      ANION GAP 8 mmol/L      BUN 19 mg/dL      Creatinine 1 19 mg/dL      Glucose 194 mg/dL      Calcium 8 9 mg/dL      AST 15 U/L      ALT 12 U/L      Alkaline Phosphatase 55 U/L      Total Protein 6 6 g/dL      Albumin 4 1 g/dL      Total Bilirubin 0 60 mg/dL      eGFR 60 ml/min/1 73sq m     Narrative:       [place] guidelines for Chronic Kidney Disease (CKD):     Stage 1 with normal or high GFR (GFR > 90 mL/min/1 73 square meters)    Stage 2 Mild CKD (GFR = 60-89 mL/min/1 73 square meters)    Stage 3A Moderate CKD (GFR = 45-59 mL/min/1 73 square meters)    Stage 3B Moderate CKD (GFR = 30-44 mL/min/1 73 square meters)    Stage 4 Severe CKD (GFR = 15-29 mL/min/1 73 square meters)    Stage 5 End Stage CKD (GFR <15 mL/min/1 73 square meters)  Note: GFR calculation is accurate only with a steady state creatinine    Troponin I [[id number]]  (Normal) Collected:  06/30/20 2107    Lab Status:  Final result Specimen:  Blood from Arm, Left Updated:  06/30/20 2134     Troponin I <0 03 ng/mL     CBC and differential [[id number]]  (Abnormal) Collected:  06/30/20 2107    Lab Status:  Final result Specimen:  Blood from Arm, Left Updated:  06/30/20 2115     WBC 10 70 Thousand/uL      RBC 4 79 Million/uL      Hemoglobin 14 8 g/dL      Hematocrit 43 0 %      MCV 90 fL      MCH 30 9 pg      MCHC 34 3 g/dL      RDW 14 6 %      MPV 9 2 fL      Platelets 475 Thousands/uL      Neutrophils Relative 76 %      Lymphocytes Relative 13 %      Monocytes Relative 7 %      Eosinophils Relative 4 %      Basophils Relative 1 %      Neutrophils Absolute 8 10 Thousands/µL      Lymphocytes Absolute 1 40 Thousands/µL      Monocytes Absolute 0 80 Thousand/µL      Eosinophils Absolute 0 40 Thousand/µL      Basophils Absolute 0 10 Thousands/µL                  XR chest 1 view portable   ED Interpretation by Simran West MD (06/30 2224)       COMPARISON:  5/14/2020 and 6/17/20       EXAM PERFORMED/VIEWS:  XR CHEST PORTABLE           FINDINGS:       Cardiomediastinal silhouette appears unremarkable        The lungs are clear  No pneumothorax or pleural effusion        Sternal wires are present  Visualized osseous structures appear otherwise unremarkable for the patient's age        IMPRESSION:       No focal consolidation, pleural effusion, or pneumothorax                      Procedures  Procedures         ED Course  ED Course as of Jun 30 2326   Tue Jun 30, 2020 2221 EXT SARS-COV-2: Negative   2221 Troponin I: <0 03   2221 Magnesium: 2 2   2221 WBC: 10 70   2221 Hemoglobin: 14 8   2221 HCT: 43 0   2221 Platelet Count: 148   2221 Neutrophils %(!): 76   2221 Sodium: 137   2221 Potassium: 4 3   2221 Creatinine: 1 19   2221 Glucose, Random(!): 194   2221 AST: 15   2221 ALT: 12   2221 Alkaline Phosphatase: 54   2309 Pt feels much better after neb and Steroids  Wants to go home and is requesting discharge  He will f/u w/ his PCP tomorrow       5194 Patient ambulated out of the ED with normal and steady gait, unassisted  He remains very well  He is breathing with normal rate, no distress    He is very appreciative of his ER care  US AUDIT      Most Recent Value   Initial Alcohol Screen: US AUDIT-C    1  How often do you have a drink containing alcohol? 1 Filed at: 06/30/2020 2100   2  How many drinks containing alcohol do you have on a typical day you are drinking? 0 Filed at: 06/30/2020 2100   3a   Male UNDER 65: How often do you have five or more drinks on one occasion? 0 Filed at: 06/30/2020 2100   3b  FEMALE Any Age, or MALE 65+: How often do you have 4 or more drinks on one occassion? 0 Filed at: 06/30/2020 2100   Audit-C Score  1 Filed at: 06/30/2020 2100            HEART Risk Score      Most Recent Value   Heart Score Risk Calculator   History  0 Filed at: 06/30/2020 2315   ECG  1 Filed at: 06/30/2020 2315   Age  2 Filed at: 06/30/2020 2315   Risk Factors  1 Filed at: 06/30/2020 2315   Troponin  0 Filed at: 06/30/2020 2315   HEART Score  4 Filed at: 06/30/2020 2315            DEANNA/DAST-10      Most Recent Value   How many times in the past year have you    Used an illegal drug or used a prescription medication for non-medical reasons? Never Filed at: 06/30/2020 2101                                MDM      Disposition  Final diagnoses:   COPD exacerbation ([facility] [address] )     Time reflects when diagnosis was documented in both MDM as applicable and the Disposition within this note     Time User Action Codes Description Comment    6/30/2020 11:10 PM Leana Porter Add [J44 1] COPD exacerbation [facility])       ED Disposition     ED Disposition Condition Date/Time Comment    Discharge Stable Tue Jun 30, 2020 11:10 PM Elroy Silva discharge to home/self care              Follow-up Information     Follow up With Specialties Details Why Contact Jerri Hernández, DO Family Medicine Call today  [place] [address] [address] [facility]  [phone]            Discharge Medication List as of 6/30/2020 11:10 PM      CONTINUE these medications which have NOT CHANGED    Details   albuterol (2 5 mg/3 mL) 0 083 % nebulizer solution Take 1 vial (2 5 mg total) by nebulization every 4 (four) hours as needed for wheezing or shortness of breath, Starting Sun 6/24/2018, Print      albuterol (PROVENTIL HFA,VENTOLIN HFA) 90 mcg/act inhaler Inhale 2 puffs every 6 (six) hours as needed for wheezing or shortness of breath, Historical Med aspirin (ECOTRIN LOW STRENGTH) 81 mg EC tablet Take 81 mg by mouth every other day , Historical Med      Empagliflozin 10 MG TABS Take 10 mg by mouth every morning, Historical Med      gabapentin (NEURONTIN) 100 mg capsule Take 200 mg by mouth daily at bedtime, Historical Med      lisinopril (ZESTRIL) 20 mg tablet Take 1 tablet (20 mg total) by mouth daily, Starting Mon 12/30/2019, Normal      metFORMIN (GLUCOPHAGE) 500 mg tablet Take 500 mg by mouth daily with dinner , Historical Med      metoprolol tartrate (LOPRESSOR) 25 mg tablet Take 1 tablet (25 mg total) by mouth 2 (two) times a day, Starting Sun 12/29/2019, Normal      !! predniSONE 10 mg tablet 4 pills for 2 days, then 3 pills for 2 days, then 2 pills for 2 days then 1 pill for 2 days  , Normal      !! predniSONE 20 mg tablet Take 2 tablets (40 mg total) by mouth daily For 3 days, then take 1 tablet (20 mg total) by mouth daily for 3 days then stop, Starting Fri 5/15/2020, Normal      primidone (MYSOLINE) 50 mg tablet Take 2 tablets (100 mg total) by mouth every 12 (twelve) hours, Starting Wed 10/23/2019, No Print      rosuvastatin (CRESTOR) 10 MG tablet Take 10 mg by mouth daily, Historical Med      saxagliptin (ONGLYZA) 5 MG tablet Take 5 mg by mouth daily, Historical Med      tiotropium-olodaterol (STIOLTO RESPIMAT) 2 5-2 5 MCG/ACT inhaler Inhale 2 puffs daily, Historical Med       !! - Potential duplicate medications found  Please discuss with provider  No discharge procedures on file      PDMP Review     None          ED Provider  Electronically Signed by           Gabbi Vargas MD  06/30/20 6970

## 2020-07-01 NOTE — ED PROCEDURE NOTE
PROCEDURE  ECG 12 Lead Documentation Only  Date/Time: 6/30/2020 11:37 PM  Performed by: Sarika No MD  Authorized by: Sarika No MD     Indications / Diagnosis:  Sob  ECG reviewed by me, the ED Provider: yes    Patient location:  ED  Previous ECG:     Previous ECG:  Compared to current    Comparison ECG info:  6/17/20, 5/14/20, 3/12/20    Similarity:  No change    Comparison to cardiac monitor: Yes    Interpretation:     Interpretation: normal    Rate:     ECG rate:  79    ECG rate assessment: normal    Rhythm:     Rhythm: sinus rhythm    Ectopy:     Ectopy: none    QRS:     QRS axis:  Normal    QRS intervals:  Normal  Conduction:     Conduction: normal    ST segments:     ST segments:  Non-specific  T waves:     T waves: non-specific           Sarika No MD  06/30/20 1254

## 2020-07-22 ENCOUNTER — HOSPITAL ENCOUNTER (OUTPATIENT)
Facility: HOSPITAL | Age: 75
Setting detail: OBSERVATION
Discharge: HOME | End: 2020-07-23
Attending: EMERGENCY MEDICINE | Admitting: HOSPITALIST
Payer: MEDICARE

## 2020-07-22 ENCOUNTER — APPOINTMENT (EMERGENCY)
Dept: RADIOLOGY | Facility: HOSPITAL | Age: 75
End: 2020-07-22
Payer: MEDICARE

## 2020-07-22 DIAGNOSIS — I10 ESSENTIAL HYPERTENSION: Chronic | ICD-10-CM

## 2020-07-22 DIAGNOSIS — J96.01 ACUTE RESPIRATORY FAILURE WITH HYPOXIA (HCC): ICD-10-CM

## 2020-07-22 DIAGNOSIS — J44.1 COPD EXACERBATION (HCC): Primary | ICD-10-CM

## 2020-07-22 LAB
ALBUMIN SERPL BCP-MCNC: 4.2 G/DL (ref 3.5–5.7)
ALP SERPL-CCNC: 65 U/L (ref 55–165)
ALT SERPL W P-5'-P-CCNC: 11 U/L (ref 7–52)
ANION GAP SERPL CALCULATED.3IONS-SCNC: 11 MMOL/L (ref 4–13)
ARTERIAL PATENCY WRIST A: YES
AST SERPL W P-5'-P-CCNC: 11 U/L (ref 13–39)
ATRIAL RATE: 100 BPM
BASE EX.OXY STD BLDV CALC-SCNC: 94.7 %
BASE EXCESS BLDA CALC-SCNC: -2.9 MMOL/L (ref -2–3)
BASE EXCESS BLDV CALC-SCNC: -3.3 MMOL/L
BASOPHILS # BLD AUTO: 0.1 THOUSANDS/ΜL (ref 0–0.1)
BASOPHILS NFR BLD AUTO: 1 % (ref 0–2)
BILIRUB SERPL-MCNC: 0.6 MG/DL (ref 0.2–1)
BUN SERPL-MCNC: 15 MG/DL (ref 7–25)
CALCIUM SERPL-MCNC: 8.7 MG/DL (ref 8.6–10.5)
CHLORIDE SERPL-SCNC: 104 MMOL/L (ref 98–107)
CK SERPL-CCNC: 33 U/L (ref 30–308)
CO2 SERPL-SCNC: 24 MMOL/L (ref 21–31)
CREAT SERPL-MCNC: 1 MG/DL (ref 0.7–1.3)
EOSINOPHIL # BLD AUTO: 0.3 THOUSAND/ΜL (ref 0–0.61)
EOSINOPHIL NFR BLD AUTO: 4 % (ref 0–5)
ERYTHROCYTE [DISTWIDTH] IN BLOOD BY AUTOMATED COUNT: 14.4 % (ref 11.5–14.5)
GFR SERPL CREATININE-BSD FRML MDRD: 74 ML/MIN/1.73SQ M
GLUCOSE SERPL-MCNC: 170 MG/DL (ref 65–99)
HCO3 BLDA-SCNC: 24 MMOL/L (ref 22–28)
HCO3 BLDV-SCNC: 20.6 MMOL/L (ref 24–30)
HCT VFR BLD AUTO: 41.7 % (ref 42–47)
HGB BLD-MCNC: 14.4 G/DL (ref 14–18)
IPAP: 16
LACTATE SERPL-SCNC: 1.1 MMOL/L (ref 0.5–2)
LYMPHOCYTES # BLD AUTO: 1.1 THOUSANDS/ΜL (ref 0.6–4.47)
LYMPHOCYTES NFR BLD AUTO: 14 % (ref 21–51)
MAGNESIUM SERPL-MCNC: 2.1 MG/DL (ref 1.9–2.7)
MCH RBC QN AUTO: 31.4 PG (ref 26–34)
MCHC RBC AUTO-ENTMCNC: 34.6 G/DL (ref 31–37)
MCV RBC AUTO: 91 FL (ref 81–99)
MONOCYTES # BLD AUTO: 0.6 THOUSAND/ΜL (ref 0.17–1.22)
MONOCYTES NFR BLD AUTO: 8 % (ref 2–12)
NEUTROPHILS # BLD AUTO: 5.9 THOUSANDS/ΜL (ref 1.4–6.5)
NEUTS SEG NFR BLD AUTO: 73 % (ref 42–75)
NON VENT- BIPAP: ABNORMAL
O2 CT BLDA-SCNC: 12.3 ML/DL
O2 CT BLDV-SCNC: 18.1 ML/DL
OXYHGB MFR BLDA: 67.7 % (ref 94–100)
P AXIS: 76 DEGREES
PCO2 BLDA: 49.4 MM HG (ref 35–45)
PCO2 BLDV: 35.4 MM HG
PEEP MAX SETTING VENT: 7 CM[H2O]
PH BLDA: 7.31 [PH] (ref 7.35–7.45)
PH BLDV: 7.38 [PH] (ref 7.3–7.4)
PLATELET # BLD AUTO: 172 THOUSANDS/UL (ref 149–390)
PMV BLD AUTO: 9.3 FL (ref 8.6–11.7)
PO2 BLDA: 44 MM HG (ref 80–100)
PO2 BLDV: 102 MM HG (ref 35–45)
POTASSIUM SERPL-SCNC: 3.8 MMOL/L (ref 3.5–5.5)
PR INTERVAL: 166 MS
PROCALCITONIN SERPL-MCNC: <0.05 NG/ML
PROT SERPL-MCNC: 6.4 G/DL (ref 6.4–8.9)
QRS AXIS: 56 DEGREES
QRSD INTERVAL: 96 MS
QT INTERVAL: 376 MS
QTC INTERVAL: 485 MS
RBC # BLD AUTO: 4.59 MILLION/UL (ref 4.3–5.9)
SARS-COV-2 RNA RESP QL NAA+PROBE: NEGATIVE
SODIUM SERPL-SCNC: 139 MMOL/L (ref 134–143)
SPECIMEN SOURCE: ABNORMAL
T WAVE AXIS: 193 DEGREES
TROPONIN I SERPL-MCNC: <0.03 NG/ML
VENT BIPAP FIO2: ABNORMAL %
VENTRICULAR RATE: 100 BPM
WBC # BLD AUTO: 8.1 THOUSAND/UL (ref 4.8–10.8)

## 2020-07-22 PROCEDURE — 96361 HYDRATE IV INFUSION ADD-ON: CPT

## 2020-07-22 PROCEDURE — 36600 WITHDRAWAL OF ARTERIAL BLOOD: CPT

## 2020-07-22 PROCEDURE — 85025 COMPLETE CBC W/AUTO DIFF WBC: CPT | Performed by: EMERGENCY MEDICINE

## 2020-07-22 PROCEDURE — 82550 ASSAY OF CK (CPK): CPT | Performed by: EMERGENCY MEDICINE

## 2020-07-22 PROCEDURE — 87635 SARS-COV-2 COVID-19 AMP PRB: CPT | Performed by: EMERGENCY MEDICINE

## 2020-07-22 PROCEDURE — 99291 CRITICAL CARE FIRST HOUR: CPT | Performed by: EMERGENCY MEDICINE

## 2020-07-22 PROCEDURE — 87040 BLOOD CULTURE FOR BACTERIA: CPT | Performed by: EMERGENCY MEDICINE

## 2020-07-22 PROCEDURE — 94644 CONT INHLJ TX 1ST HOUR: CPT

## 2020-07-22 PROCEDURE — 83735 ASSAY OF MAGNESIUM: CPT | Performed by: EMERGENCY MEDICINE

## 2020-07-22 PROCEDURE — 94760 N-INVAS EAR/PLS OXIMETRY 1: CPT

## 2020-07-22 PROCEDURE — 84145 PROCALCITONIN (PCT): CPT | Performed by: PHYSICIAN ASSISTANT

## 2020-07-22 PROCEDURE — 1124F ACP DISCUSS-NO DSCNMKR DOCD: CPT | Performed by: EMERGENCY MEDICINE

## 2020-07-22 PROCEDURE — 96374 THER/PROPH/DIAG INJ IV PUSH: CPT

## 2020-07-22 PROCEDURE — 80053 COMPREHEN METABOLIC PANEL: CPT | Performed by: EMERGENCY MEDICINE

## 2020-07-22 PROCEDURE — 82805 BLOOD GASES W/O2 SATURATION: CPT | Performed by: EMERGENCY MEDICINE

## 2020-07-22 PROCEDURE — 99291 CRITICAL CARE FIRST HOUR: CPT

## 2020-07-22 PROCEDURE — 94664 DEMO&/EVAL PT USE INHALER: CPT

## 2020-07-22 PROCEDURE — 71045 X-RAY EXAM CHEST 1 VIEW: CPT

## 2020-07-22 PROCEDURE — 93005 ELECTROCARDIOGRAM TRACING: CPT

## 2020-07-22 PROCEDURE — 93010 ELECTROCARDIOGRAM REPORT: CPT | Performed by: INTERNAL MEDICINE

## 2020-07-22 PROCEDURE — 94640 AIRWAY INHALATION TREATMENT: CPT

## 2020-07-22 PROCEDURE — 84484 ASSAY OF TROPONIN QUANT: CPT | Performed by: EMERGENCY MEDICINE

## 2020-07-22 PROCEDURE — 83605 ASSAY OF LACTIC ACID: CPT | Performed by: EMERGENCY MEDICINE

## 2020-07-22 PROCEDURE — 99220 PR INITIAL OBSERVATION CARE/DAY 70 MINUTES: CPT | Performed by: PHYSICIAN ASSISTANT

## 2020-07-22 PROCEDURE — 36415 COLL VENOUS BLD VENIPUNCTURE: CPT | Performed by: EMERGENCY MEDICINE

## 2020-07-22 RX ORDER — GABAPENTIN 100 MG/1
200 CAPSULE ORAL
Status: DISCONTINUED | OUTPATIENT
Start: 2020-07-22 | End: 2020-07-23 | Stop reason: HOSPADM

## 2020-07-22 RX ORDER — LISINOPRIL 20 MG/1
40 TABLET ORAL DAILY
Status: DISCONTINUED | OUTPATIENT
Start: 2020-07-23 | End: 2020-07-23 | Stop reason: HOSPADM

## 2020-07-22 RX ORDER — SODIUM CHLORIDE FOR INHALATION 0.9 %
3 VIAL, NEBULIZER (ML) INHALATION ONCE
Status: COMPLETED | OUTPATIENT
Start: 2020-07-22 | End: 2020-07-22

## 2020-07-22 RX ORDER — METHYLPREDNISOLONE SODIUM SUCCINATE 125 MG/2ML
125 INJECTION, POWDER, LYOPHILIZED, FOR SOLUTION INTRAMUSCULAR; INTRAVENOUS ONCE
Status: COMPLETED | OUTPATIENT
Start: 2020-07-22 | End: 2020-07-22

## 2020-07-22 RX ORDER — ALBUTEROL SULFATE 2.5 MG/3ML
2.5 SOLUTION RESPIRATORY (INHALATION) EVERY 4 HOURS PRN
Status: DISCONTINUED | OUTPATIENT
Start: 2020-07-22 | End: 2020-07-23 | Stop reason: HOSPADM

## 2020-07-22 RX ORDER — METOPROLOL TARTRATE 50 MG/1
50 TABLET, FILM COATED ORAL 2 TIMES DAILY
Status: DISCONTINUED | OUTPATIENT
Start: 2020-07-22 | End: 2020-07-23 | Stop reason: HOSPADM

## 2020-07-22 RX ORDER — ASPIRIN 81 MG/1
81 TABLET ORAL EVERY OTHER DAY
Status: DISCONTINUED | OUTPATIENT
Start: 2020-07-22 | End: 2020-07-23 | Stop reason: HOSPADM

## 2020-07-22 RX ORDER — METHYLPREDNISOLONE SODIUM SUCCINATE 40 MG/ML
40 INJECTION, POWDER, LYOPHILIZED, FOR SOLUTION INTRAMUSCULAR; INTRAVENOUS EVERY 8 HOURS
Status: DISCONTINUED | OUTPATIENT
Start: 2020-07-22 | End: 2020-07-23 | Stop reason: HOSPADM

## 2020-07-22 RX ORDER — LEVALBUTEROL 1.25 MG/.5ML
1.25 SOLUTION, CONCENTRATE RESPIRATORY (INHALATION)
Status: DISCONTINUED | OUTPATIENT
Start: 2020-07-22 | End: 2020-07-23 | Stop reason: HOSPADM

## 2020-07-22 RX ORDER — GUAIFENESIN 600 MG
600 TABLET, EXTENDED RELEASE 12 HR ORAL 2 TIMES DAILY
Status: DISCONTINUED | OUTPATIENT
Start: 2020-07-22 | End: 2020-07-23 | Stop reason: HOSPADM

## 2020-07-22 RX ORDER — LISINOPRIL 20 MG/1
20 TABLET ORAL DAILY
Status: DISCONTINUED | OUTPATIENT
Start: 2020-07-22 | End: 2020-07-22

## 2020-07-22 RX ADMIN — METOPROLOL TARTRATE 50 MG: 50 TABLET, FILM COATED ORAL at 18:18

## 2020-07-22 RX ADMIN — GUAIFENESIN 600 MG: 600 TABLET, EXTENDED RELEASE ORAL at 09:38

## 2020-07-22 RX ADMIN — METHYLPREDNISOLONE SODIUM SUCCINATE 40 MG: 40 INJECTION, POWDER, FOR SOLUTION INTRAMUSCULAR; INTRAVENOUS at 14:43

## 2020-07-22 RX ADMIN — ENOXAPARIN SODIUM 40 MG: 40 INJECTION SUBCUTANEOUS at 09:38

## 2020-07-22 RX ADMIN — METHYLPREDNISOLONE SODIUM SUCCINATE 125 MG: 125 INJECTION, POWDER, FOR SOLUTION INTRAMUSCULAR; INTRAVENOUS at 03:51

## 2020-07-22 RX ADMIN — LEVALBUTEROL HYDROCHLORIDE 1.25 MG: 1.25 SOLUTION, CONCENTRATE RESPIRATORY (INHALATION) at 13:30

## 2020-07-22 RX ADMIN — METHYLPREDNISOLONE SODIUM SUCCINATE 40 MG: 40 INJECTION, POWDER, FOR SOLUTION INTRAMUSCULAR; INTRAVENOUS at 22:15

## 2020-07-22 RX ADMIN — ISODIUM CHLORIDE 3 ML: 0.03 SOLUTION RESPIRATORY (INHALATION) at 03:55

## 2020-07-22 RX ADMIN — METOPROLOL TARTRATE 25 MG: 25 TABLET, FILM COATED ORAL at 09:38

## 2020-07-22 RX ADMIN — IPRATROPIUM BROMIDE 0.5 MG: 0.5 SOLUTION RESPIRATORY (INHALATION) at 03:55

## 2020-07-22 RX ADMIN — SODIUM CHLORIDE 500 ML: 0.9 INJECTION, SOLUTION INTRAVENOUS at 04:06

## 2020-07-22 RX ADMIN — IPRATROPIUM BROMIDE 0.5 MG: 0.5 SOLUTION RESPIRATORY (INHALATION) at 21:36

## 2020-07-22 RX ADMIN — ASPIRIN 81 MG: 81 TABLET, COATED ORAL at 09:38

## 2020-07-22 RX ADMIN — IPRATROPIUM BROMIDE 0.5 MG: 0.5 SOLUTION RESPIRATORY (INHALATION) at 13:30

## 2020-07-22 RX ADMIN — GABAPENTIN 200 MG: 100 CAPSULE ORAL at 22:15

## 2020-07-22 RX ADMIN — GUAIFENESIN 600 MG: 600 TABLET, EXTENDED RELEASE ORAL at 18:18

## 2020-07-22 RX ADMIN — ALBUTEROL SULFATE 10 MG: 2.5 SOLUTION RESPIRATORY (INHALATION) at 03:54

## 2020-07-22 RX ADMIN — LISINOPRIL 20 MG: 20 TABLET ORAL at 09:38

## 2020-07-22 RX ADMIN — LEVALBUTEROL HYDROCHLORIDE 1.25 MG: 1.25 SOLUTION, CONCENTRATE RESPIRATORY (INHALATION) at 21:36

## 2020-07-22 NOTE — ED PROCEDURE NOTE
PROCEDURE  CriticalCare Time  Performed by: Mono Rogers MD  Authorized by: Mono Rogers MD     Critical care provider statement:     Critical care time (minutes):  72    Critical care start time:  7/22/2020 3:51 AM    Critical care end time:  7/22/2020 5:33 AM    Critical care time was exclusive of:  Separately billable procedures and treating other patients    Critical care was necessary to treat or prevent imminent or life-threatening deterioration of the following conditions:  Circulatory failure, cardiac failure, respiratory failure and dehydration    Critical care was time spent personally by me on the following activities:  Review of old charts, ventilator management, ordering and performing treatments and interventions, ordering and review of laboratory studies, examination of patient, discussions with primary provider, obtaining history from patient or surrogate and development of treatment plan with patient or surrogate         Mono Rogers MD  07/22/20 3644

## 2020-07-22 NOTE — ED PROCEDURE NOTE
PROCEDURE  ECG 12 Lead Documentation Only  Date/Time: 7/22/2020 4:09 AM  Performed by: Clare Pleitez MD  Authorized by: Clare Pleitez MD     Indications / Diagnosis:  Shortness of breath  ECG reviewed by me, the ED Provider: yes    Patient location:  ED  Previous ECG:     Comparison to cardiac monitor: Yes    Interpretation:     Interpretation: abnormal    Rate:     ECG rate:  100    ECG rate assessment: tachycardic    Rhythm:     Rhythm: sinus tachycardia    Ectopy:     Ectopy: none    QRS:     QRS axis:  Normal    QRS intervals:  Normal  Conduction:     Conduction: normal    ST segments:     ST segments:  Non-specific  T waves:     T waves: non-specific           Clare Pleitez MD  07/22/20 0410

## 2020-07-22 NOTE — ASSESSMENT & PLAN NOTE
Lab Results   Component Value Date    HGBA1C 8 2 (H) 12/27/2019       No results for input(s): POCGLU in the last 72 hours      Blood Sugar Average: Last 72 hrs:  ·  sliding scale insulin coverage

## 2020-07-22 NOTE — ASSESSMENT & PLAN NOTE
· Patient admission was initially on BiPAP and weaned off in the emergency room  · Wean off oxygen as able  · Currently requiring 2 L nasal cannula satting appropriately

## 2020-07-22 NOTE — RESPIRATORY THERAPY NOTE
RT Protocol Note  Braden Stephens 77 y o  male MRN: [id number]  Unit/Bed#: -02 Encounter: [id number]    Assessment    Principal Problem:    Acute respiratory failure with hypoxia ([address] )  Active Problems:    Type 2 diabetes mellitus without complication, without long-term current use of insulin (HCC)    Other hyperlipidemia    Essential hypertension    CAD (coronary artery disease), native coronary artery    Tremor    COPD exacerbation (HCC)      Home Pulmonary Medications:  Albuterol UDN 3-4 times a day  Albuterol MDI PRN  Stiloto MDI BID    Home Devices/Therapy: (P) Other (Comment)(Albuterol UDN, Albuterol MDI, Stiloto MDI)    Past Medical History:   Diagnosis Date    BPH (benign prostatic hyperplasia)     45 days radiation treatment    COPD (chronic obstructive pulmonary disease)     Coronary artery disease     CABG x4 in 2017    Diabetes mellitus     History of Arterial Duplex of LE 12/26/2017    Likely occlusion of the left superficial femoral artery  Calcific changes bilaterally  Despite these changes, the ankle-brachial index as a measure of peripheral blood flow only mildly impaired      History of echocardiogram 06/12/2017    EF 40%, mild LVH, mild MR     Hyperlipidemia     Hypertension     NSTEMI (non-ST elevated myocardial infarction)     Prostate cancer     prostate     PVD (peripheral vascular disease)     Tremor      Social History     Socioeconomic History    Marital status: /Civil Union     Spouse name: None    Number of children: None    Years of education: None    Highest education level: None   Occupational History    None   Social Needs    Financial resource strain: Not hard at all   [place] insecurity:     Worry: Never true     Inability: Never true    Transportation needs:     Medical: No     Non-medical: No   Tobacco Use    Smoking status: Former Smoker     Packs/day: 1 00     Years: 60 00     Pack years: 60 00     Types: Cigarettes     Last attempt to quit: 12/15/2018     Years since quittin 6    Smokeless tobacco: Never Used    Tobacco comment: only smokes when he drinks beer   Substance and Sexual Activity    Alcohol use: Not Currently     Alcohol/week: 0 0 standard drinks     Frequency: Never     Drinks per session: Patient refused     Binge frequency: Never    Drug use: Never    Sexual activity: None   Lifestyle    Physical activity:     Days per week: None     Minutes per session: None    Stress: None   Relationships    Social connections:     Talks on phone: None     Gets together: None     Attends Congregational service: None     Active member of club or organization: None     Attends meetings of clubs or organizations: None     Relationship status: None    Intimate partner violence:     Fear of current or ex partner: None     Emotionally abused: None     Physically abused: None     Forced sexual activity: None   Other Topics Concern    None   Social History Narrative    None       Subjective         Objective    Physical Exam:   Assessment Type: (P) Assess only  General Appearance: (P) Alert, Awake  Respiratory Pattern: (P) Normal  Chest Assessment: (P) Chest expansion symmetrical  Bilateral Breath Sounds: (P) Diminished  Cough: (P) None  O2 Device: (P) 2 l/m NC (O2 decreased to Room Air @ this time  )    Vitals:  Blood pressure 166/77, pulse (P) 72, temperature (!) 97 °F (36 1 °C), temperature source Temporal, resp  rate (P) 20, height 5' 10 5" (1 791 m), weight 90 5 kg (199 lb 8 3 oz), SpO2 (P) 96 %  Results from last 7 days   Lab Units 20  0447   PH ART  7 310*   PCO2 ART mm Hg 49 4*   PO2 ART mm Hg 44 0*   HCO3 ART mmol/L 24 0   BASE EXC ART mmol/L -2 9*   O2 CONTENT ART mL/dL 12 3   O2 HGB, ARTERIAL % 67 7*   ABG SOURCE  Radial, Left   ROSA MARIA TEST  Yes       Imaging and other studies: I have personally reviewed pertinent reports        O2 Device: (P) 2 l/m NC (O2 decreased to Room Air @ this time  )     Plan    Respiratory Plan: (P) Home Bronchodilator Patient pathway        Resp Comments: (P) Pt  assessed  Pt  stated that he has slight SOB and increased WOB  Pt  does not wear O2 or BiPap/CPAP @ home  Pt  uses an Albuterol UDN 3-4 times a day, an Albuterol MDI PRN and a Stiloto MDI BID  Nebulizer treatment will be administered, as ordered

## 2020-07-22 NOTE — ASSESSMENT & PLAN NOTE
Lab Results   Component Value Date    HGBA1C 8 2 (H) 12/27/2019       No results for input(s): POCGLU in the last 72 hours  Blood Sugar Average: Last 72 hrs:  ·  Sliding scale insulin coverage  · Controlled on current regimen at this time; would highly suspect patient to become hyperglycemic in the setting of IV steroid administration    · Will monitor with POCT BG and titrate regimen as indicated

## 2020-07-22 NOTE — ASSESSMENT & PLAN NOTE
· CAD with history of CABG x4  · No chest pain  · Continue metoprolol, asa, statin  · Follow-up outpatient with regularly scheduled cardiology visit

## 2020-07-22 NOTE — ED PROVIDER NOTES
History  Chief Complaint   Patient presents with    Shortness of Breath     Pt has COPD hx  Pt states he has been SOB since "this afternoon" but that it has gotten progressively worse as the day went on  Pt denies CP or dizziness    Headache     77YEAR-OLD MALE  H/O COPD  CAD      HE IS HERE FOR ACUTE COPD EXACERBATION C/W SHORTNESS OF BREATH AND COUGH, nonproductive  He is having chest tightness, which feels typical for him when he has bad COPD exacerbations  He has been on BiPAP once before, feels like he needs to be on BiPAP now based on the severity of his shortness of breath      NO  COMPLAINTS OF DIAPHORESIS     HE DENIES ANY PAIN TO THE JAW NECK OR THE BACK  PATIENT DENIES FEVERS, REPORTS CHILLS  NO SINUS SYMPTOMS  NO HEADACHE OR NECK PAIN, NO DIZZINESS       VTE  RISK FACTORS:  NONE   NO HISTORY OF PE OR DVT   NO LONG CAR RIDES OR PLANE RIDES  NO IMMOBILIZATION  NO RECENT SURGERY OR TRAUMA  NO HEMOPTYSIS  OTHER ASSOCIATED SYMPTOMS:  NO ABDOMINAL PAIN  NO ACUTE BACK PAIN  NO NAUSEA OR VOMITING  NO STOOL CHANGES  NO URINARY COMPLAINTS: NO DYSURIA, NO HEMATURIA, NO FREQUENCY        History provided by:  Patient  Shortness of Breath   Severity:  Severe  Onset quality:  Gradual  Progression:  Worsening  Chronicity:  Chronic  Relieved by:  Nothing  Worsened by:  Nothing  Associated symptoms: no abdominal pain, no chest pain, no claudication, no cough, no diaphoresis, no fever, no headaches, no hemoptysis, no neck pain, no rash, no sore throat, no sputum production, no syncope, no vomiting and no wheezing    Risk factors: obesity and tobacco use    Risk factors: no family hx of DVT, no hx of cancer, no hx of PE/DVT, no prolonged immobilization and no recent surgery        Prior to Admission Medications   Prescriptions Last Dose Informant Patient Reported? Taking?    Empagliflozin 10 MG TABS 7/21/2020 at Unknown time  Yes Yes   Sig: Take 10 mg by mouth every morning   albuterol (2 5 mg/3 mL) 0  083 % nebulizer solution 2020 at Unknown time  No Yes   Sig: Take 1 vial (2 5 mg total) by nebulization every 4 (four) hours as needed for wheezing or shortness of breath   albuterol (PROVENTIL HFA,VENTOLIN HFA) 90 mcg/act inhaler 2020 at Unknown time  Yes Yes   Sig: Inhale 2 puffs every 6 (six) hours as needed for wheezing or shortness of breath   aspirin (ECOTRIN LOW STRENGTH) 81 mg EC tablet 2020 at Unknown time  Yes Yes   Sig: Take 81 mg by mouth every other day    gabapentin (NEURONTIN) 100 mg capsule 2020 at Unknown time  Yes Yes   Sig: Take 200 mg by mouth daily at bedtime   lisinopril (ZESTRIL) 20 mg tablet 2020 at Unknown time  No Yes   Sig: Take 1 tablet (20 mg total) by mouth daily   metFORMIN (GLUCOPHAGE) 500 mg tablet 2020 at Unknown time  Yes Yes   Sig: Take 500 mg by mouth daily with dinner    metoprolol tartrate (LOPRESSOR) 25 mg tablet 2020 at Unknown time  No Yes   Sig: Take 1 tablet (25 mg total) by mouth 2 (two) times a day   predniSONE 10 mg tablet 2020 at Unknown time  No Yes   Si pills for 2 days, then 3 pills for 2 days, then 2 pills for 2 days then 1 pill for 2 days     predniSONE 20 mg tablet 2020 at Unknown time  No Yes   Sig: Take 2 tablets (40 mg total) by mouth daily For 3 days, then take 1 tablet (20 mg total) by mouth daily for 3 days then stop   primidone (MYSOLINE) 50 mg tablet 2020 at Unknown time  No Yes   Sig: Take 2 tablets (100 mg total) by mouth every 12 (twelve) hours   rosuvastatin (CRESTOR) 10 MG tablet 2020 at Unknown time  Yes Yes   Sig: Take 10 mg by mouth daily   saxagliptin (ONGLYZA) 5 MG tablet 2020 at Unknown time  Yes Yes   Sig: Take 5 mg by mouth daily   tiotropium-olodaterol (STIOLTO RESPIMAT) 2 5-2 5 MCG/ACT inhaler 2020 at Unknown time  Yes Yes   Sig: Inhale 2 puffs daily      Facility-Administered Medications: None       Past Medical History:   Diagnosis Date    BPH (benign prostatic hyperplasia)     45 days radiation treatment    COPD (chronic obstructive pulmonary disease)     Coronary artery disease     CABG x4 in 2017    Diabetes mellitus     History of Arterial Duplex of LE 2017    Likely occlusion of the left superficial femoral artery  Calcific changes bilaterally  Despite these changes, the ankle-brachial index as a measure of peripheral blood flow only mildly impaired   History of echocardiogram 2017    EF 40%, mild LVH, mild MR     Hyperlipidemia     Hypertension     NSTEMI (non-ST elevated myocardial infarction)     Prostate cancer     prostate     PVD (peripheral vascular disease)     Tremor        Past Surgical History:   Procedure Laterality Date    CARDIAC CATHETERIZATION  2017    Significant left main plus triple-vessel CAD   CORONARY ARTERY BYPASS GRAFT  2017    4V CABG:  LIMA to LAD, VG to RI, SVG to PDA to LVBR RCA   EYE SURGERY      shots in eye once a month @ the [place]    PROSTATE BIOPSY         Family History   Problem Relation Age of Onset    Cancer Father     Heart disease Brother      I have reviewed and agree with the history as documented  E-Cigarette/Vaping    E-Cigarette Use Never User      E-Cigarette/Vaping Substances     Social History     Tobacco Use    Smoking status: Former Smoker     Packs/day: 1 00     Years: 60 00     Pack years: 60 00     Types: Cigarettes     Last attempt to quit: 12/15/2018     Years since quittin 6    Smokeless tobacco: Never Used    Tobacco comment: only smokes when he drinks beer   Substance Use Topics    Alcohol use: Not Currently     Alcohol/week: 0 0 standard drinks     Frequency: Never     Drinks per session: Patient refused     Binge frequency: Never    Drug use: Never       Review of Systems   Constitutional: Negative for chills, diaphoresis, fatigue and fever  HENT: Negative for sore throat  Respiratory: Positive for chest tightness and shortness of breath   Negative for cough, hemoptysis, sputum production, wheezing and stridor  Cardiovascular: Negative for chest pain, palpitations, claudication, leg swelling and syncope  Gastrointestinal: Negative for abdominal pain, blood in stool, nausea and vomiting  Genitourinary: Negative for difficulty urinating, dysuria, flank pain and frequency  Musculoskeletal: Negative for arthralgias, back pain, gait problem, joint swelling, myalgias, neck pain and neck stiffness  Skin: Negative for rash and wound  Neurological: Negative for dizziness, light-headedness and headaches  All other systems reviewed and are negative  Physical Exam  Physical Exam   Constitutional: He is oriented to person, place, and time  He appears well-developed and well-nourished  He does not appear ill  He appears distressed  HENT:   Head: Normocephalic and atraumatic  Nose: Nose normal    Mouth/Throat: Oropharynx is clear and moist  No oropharyngeal exudate  Eyes: Pupils are equal, round, and reactive to light  Conjunctivae and EOM are normal  Right eye exhibits no discharge  Left eye exhibits no discharge  No scleral icterus  Neck: Normal range of motion  Neck supple  No JVD present  No tracheal deviation present  Cardiovascular: Normal rate, regular rhythm, normal heart sounds and intact distal pulses  Exam reveals no gallop and no friction rub  No murmur heard  Pulmonary/Chest: Accessory muscle usage present  No stridor  Tachypnea noted  He is in respiratory distress  He has decreased breath sounds  He has wheezes  He has no rhonchi  He has no rales  He exhibits no tenderness  Abdominal: Soft  Bowel sounds are normal  He exhibits no distension and no mass  There is no tenderness  There is no rebound and no guarding  No hernia  Musculoskeletal: Normal range of motion  He exhibits no edema, tenderness or deformity  Lymphadenopathy:     He has no cervical adenopathy     Neurological: He is alert and oriented to person, place, and time  No cranial nerve deficit or sensory deficit  He exhibits normal muscle tone  Coordination normal    Skin: Skin is warm  Capillary refill takes less than 2 seconds  No rash noted  He is not diaphoretic  No erythema  No pallor  Psychiatric: He has a normal mood and affect   His behavior is normal  Judgment and thought content normal        Vital Signs  ED Triage Vitals   Temperature Pulse Respirations Blood Pressure SpO2   07/22/20 0345 07/22/20 0330 07/22/20 0330 07/22/20 0330 07/22/20 0330   98 °F (36 7 °C) (!) 119 (!) 48 (!) 237/116 (!) 82 %      Temp Source Heart Rate Source Patient Position - Orthostatic VS BP Location FiO2 (%)   07/22/20 0345 07/22/20 0345 -- 07/22/20 0345 --   Temporal Monitor  Left arm       Pain Score       --                  Vitals:    07/22/20 0445 07/22/20 0500 07/22/20 0515 07/22/20 0530   BP: 165/74 148/65 147/73 135/64   Pulse: 90 92 91 88         Visual Acuity      ED Medications  Medications   albuterol inhalation solution 10 mg (10 mg Nebulization Given 7/22/20 0354)     And   ipratropium (ATROVENT) 0 02 % inhalation solution 0 5 mg (0 5 mg Nebulization Given 7/22/20 0355)     And   sodium chloride 0 9 % inhalation solution 3 mL (3 mL Nebulization Given 7/22/20 0355)   methylPREDNISolone sodium succinate (Solu-MEDROL) injection 125 mg (125 mg Intravenous Given 7/22/20 0351)   sodium chloride 0 9 % bolus 500 mL (0 mL Intravenous Stopped 7/22/20 0511)       Diagnostic Studies  Results Reviewed     Procedure Component Value Units Date/Time    Novel Coronavirus Stephanie Tomlinson [[id number]] Collected:  07/22/20 0519    Lab Status:  No result Specimen:  Nares from Nasopharyngeal Swab     Blood gas, arterial [[id number]]  (Abnormal) Collected:  07/22/20 0447    Lab Status:  Final result Specimen:  Blood, Arterial from Radial, Left Updated:  07/22/20 0513     pH, Arterial 7 310     pCO2, Arterial 49 4 mm Hg      pO2, Arterial 44 0 mm Hg      HCO3, Arterial 24 0 mmol/L      Base Excess, Arterial -2 9 mmol/L      O2 Content, Arterial 12 3 mL/dL      O2 HGB,Arterial  67 7 %      SOURCE Radial, Left     ROSA MARIA TEST Yes     Non Vent type BIPAP BIPAP     IPAP 16     EPAP 7     BIPAP fio2 30% %     Comprehensive metabolic panel [[id number]]  (Abnormal) Collected:  07/22/20 0351    Lab Status:  Final result Specimen:  Blood from Arm, Right Updated:  07/22/20 0435     Sodium 139 mmol/L      Potassium 3 8 mmol/L      Chloride 104 mmol/L      CO2 24 mmol/L      ANION GAP 11 mmol/L      BUN 15 mg/dL      Creatinine 1 00 mg/dL      Glucose 170 mg/dL      Calcium 8 7 mg/dL      AST 11 U/L      ALT 11 U/L      Alkaline Phosphatase 65 U/L      Total Protein 6 4 g/dL      Albumin 4 2 g/dL      Total Bilirubin 0 60 mg/dL      eGFR 74 ml/min/1 73sq m     Narrative:       [place] guidelines for Chronic Kidney Disease (CKD):     Stage 1 with normal or high GFR (GFR > 90 mL/min/1 73 square meters)    Stage 2 Mild CKD (GFR = 60-89 mL/min/1 73 square meters)    Stage 3A Moderate CKD (GFR = 45-59 mL/min/1 73 square meters)    Stage 3B Moderate CKD (GFR = 30-44 mL/min/1 73 square meters)    Stage 4 Severe CKD (GFR = 15-29 mL/min/1 73 square meters)    Stage 5 End Stage CKD (GFR <15 mL/min/1 73 square meters)  Note: GFR calculation is accurate only with a steady state creatinine    Troponin I [[id number]]  (Normal) Collected:  07/22/20 0351    Lab Status:  Final result Specimen:  Blood from Arm, Right Updated:  07/22/20 0433     Troponin I <0 03 ng/mL     Magnesium [[id number]]  (Normal) Collected:  07/22/20 0351    Lab Status:  Final result Specimen:  Blood from Arm, Right Updated:  07/22/20 0432     Magnesium 2 1 mg/dL     CK Total with Reflex CKMB [[id number]]  (Normal) Collected:  07/22/20 0351    Lab Status:  Final result Specimen:  Blood from Arm, Right Updated:  07/22/20 0432     Total CK 33 U/L     Lactic acid [[id number]]  (Normal) Collected:  07/22/20 0351    Lab Status:  Final result Specimen:  Blood from Arm, Right Updated:  07/22/20 0432     LACTIC ACID 1 1 mmol/L     Narrative:       Result may be elevated if tourniquet was used during collection  CBC and differential [[id number]]  (Abnormal) Collected:  07/22/20 0351    Lab Status:  Final result Specimen:  Blood from Arm, Right Updated:  07/22/20 0424     WBC 8 10 Thousand/uL      RBC 4 59 Million/uL      Hemoglobin 14 4 g/dL      Hematocrit 41 7 %      MCV 91 fL      MCH 31 4 pg      MCHC 34 6 g/dL      RDW 14 4 %      MPV 9 3 fL      Platelets 701 Thousands/uL      Neutrophils Relative 73 %      Lymphocytes Relative 14 %      Monocytes Relative 8 %      Eosinophils Relative 4 %      Basophils Relative 1 %      Neutrophils Absolute 5 90 Thousands/µL      Lymphocytes Absolute 1 10 Thousands/µL      Monocytes Absolute 0 60 Thousand/µL      Eosinophils Absolute 0 30 Thousand/µL      Basophils Absolute 0 10 Thousands/µL     Blood gas, venous [[id number]]  (Abnormal) Collected:  07/22/20 0351    Lab Status:  Final result Specimen:  Blood from Arm, Right Updated:  07/22/20 0420     pH, Daniel 7 380     pCO2, Daniel 35 4 mm Hg      pO2, Daniel 102 0 mm Hg      HCO3, Daniel 20 6 mmol/L      Base Excess, Daniel -3 3 mmol/L      O2 Content, Daniel 18 1 ml/dL      O2 HGB, VENOUS 94 7 %     Blood culture #1 [[id number]] Collected:  07/22/20 0351    Lab Status: In process Specimen:  Blood from Arm, Right Updated:  07/22/20 0411    Blood culture #2 [[id number]] Collected:  07/22/20 0351    Lab Status: In process Specimen:  Blood from Arm, Right Updated:  07/22/20 0411                 XR chest 1 view portable    (Results Pending)              Procedures  Procedures         ED Course  ED Course as of Jul 22 0534 Wed Jul 22, 2020   0325 Patient seen in the waiting room on arrival     He is in severe distress  Respiratory rate around 50  I personally brought the patient back in a wheelchair    His oxygen level was 80% on room air  He was promptly put on nasal cannula and stat call for respiratory for BiPap       0347 Pt much much improved w/ Bipap  RR from 50 to 24 now       0427 pH, Daniel: 7 380   0427 pCO2, Daniel: 35 4   0427 pO2, Daniel(!): 102 0   0427 WBC: 8 10   0427 Hemoglobin: 14 4   0427 HCT(!): 41 7   0427 Platelet Count: 881   0427 Neutrophils %: 73   0427 Lymphocytes Relative(!): 14   0427 Monocytes Relative: 8   0427 Cbc wnl  VBG is non acidotic  BP much improved   Eosinophils: 4   0505 LACTIC ACID: 1 1   0505 Troponin I: <0 03   0505 Magnesium: 2 1   0512 Abg results appear mixed  Al from respiratory agrees, he was a very tough stick    Pt clinically is ready to trial off Bipap       0516 pH, Arterial(!): 7 310   0516 pCO2, Arterial(!): 49 4   0532 D/w gamal  Accepts pt to Med surg  Wants Obs          US AUDIT      Most Recent Value   Initial Alcohol Screen: US AUDIT-C    1  How often do you have a drink containing alcohol? 1 Filed at: 07/22/2020 0347   3b  FEMALE Any Age, or MALE 65+: How often do you have 4 or more drinks on one occassion? 0 Filed at: 07/22/2020 0347   Audit-C Score  1 Filed at: 07/22/2020 0347            HEART Risk Score      Most Recent Value   Heart Score Risk Calculator   History  1 Filed at: 07/22/2020 0430   ECG  1 Filed at: 07/22/2020 0430   Age  2 Filed at: 07/22/2020 0430   Risk Factors  2 Filed at: 07/22/2020 0430   Troponin  0 Filed at: 07/22/2020 0430   HEART Score  6 Filed at: 07/22/2020 0430            DEANNA/DAST-10      Most Recent Value   How many times in the past year have you    Used an illegal drug or used a prescription medication for non-medical reasons?   Never Filed at: 07/22/2020 1149                                MDM      Disposition  Final diagnoses:   COPD exacerbation ([facility] [address] )   Acute respiratory failure with hypoxia ([facility] [address] )     Time reflects when diagnosis was documented in both MDM as applicable and the Disposition within this note     Time User Action Codes Description Comment    7/22/2020  4:28 AM Kanika Dillard Add [J44 1] COPD exacerbation ([facility] [address] )     7/22/2020  4:29 AM Don Bowman Add [J96 01] Acute respiratory failure with hypoxia [facility])       ED Disposition     None      Follow-up Information    None         Patient's Medications   Discharge Prescriptions    No medications on file     No discharge procedures on file      PDMP Review     None          ED Provider  Electronically Signed by           Sarika Gracia MD  07/22/20 9284

## 2020-07-22 NOTE — PLAN OF CARE
Problem: PAIN - ADULT  Goal: Verbalizes/displays adequate comfort level or baseline comfort level  Description  Interventions:  - Encourage patient to monitor pain and request assistance  - Assess pain using appropriate pain scale  - Administer analgesics based on type and severity of pain and evaluate response  - Implement non-pharmacological measures as appropriate and evaluate response  - Consider cultural and social influences on pain and pain management  - Notify physician/advanced practitioner if interventions unsuccessful or patient reports new pain  Outcome: Progressing     Problem: INFECTION - ADULT  Goal: Absence or prevention of progression during hospitalization  Description  INTERVENTIONS:  - Assess and monitor for signs and symptoms of infection  - Monitor lab/diagnostic results  - Monitor all insertion sites, i e  indwelling lines, tubes, and drains  - Monitor endotracheal if appropriate and nasal secretions for changes in amount and color  - [place] appropriate cooling/warming therapies per order  - Administer medications as ordered  - Instruct and encourage patient and family to use good hand hygiene technique  - Identify and instruct in appropriate isolation precautions for identified infection/condition  Outcome: Progressing     Problem: RESPIRATORY - ADULT  Goal: Achieves optimal ventilation and oxygenation  Description  INTERVENTIONS:  - Assess for changes in respiratory status  - Assess for changes in mentation and behavior  - Position to facilitate oxygenation and minimize respiratory effort  - Oxygen administered by appropriate delivery if ordered  - Initiate smoking cessation education as indicated  - Encourage broncho-pulmonary hygiene including cough, deep breathe, Incentive Spirometry  - Assess the need for suctioning and aspirate as needed  - Assess and instruct to report SOB or any respiratory difficulty  - Respiratory Therapy support as indicated  Outcome: Progressing

## 2020-07-22 NOTE — SOCIAL WORK
Evaluated the pt at the bedside  Pt was admitted to the hospital for SOB  Explained the role  of CM and the options of discharge planning with the pt  Pt states he lives with his wife in a 3 story home  Pt has 4 BEATRIZ, has a bathroom on the first floor  Pt bedroom is on the 2nd floor which has 14 steps  Pt states he is independent with ADL's and IADL's  Pt has a nebulizer, cane, shower chair  and walker at home  Pt indicated he only uses a cane when needed  Pt does not wear oxygen at home  Pt states he drove himself to the hospital   Pt PCP is Dr Glynn Vernon  Pt gets his medications at [facility]  Explained the Observation level of care with the pt who verbalized understanding of same  Patient/caregiver received discharge checklist   Content reviewed  Patient/caregiver encouraged to participate in discharge plan of care prior to discharge home  CM reviewed d/c planning process including the following: identifying help at home, patient preference for d/c planning needs, availability of treatment team to discuss questions or concerns patient and/or family may have regarding understanding medications and recognizing signs and symptoms once discharged  CM also encouraged patient to follow up with all recommended appointments after discharge  Patient advised of importance for patient and family to participate in managing patients medical well being

## 2020-07-22 NOTE — ASSESSMENT & PLAN NOTE
· Blood pressure significantly elevated with systolics in the 361U  · Continue lisinopril and metoprolol  · Increased doses on 07/22  · Monitor blood pressure with routine vitals

## 2020-07-22 NOTE — H&P
H&P- Zenon Gracia 1945, 77 y o  male MRN: [id number]    Unit/Bed#: TRUMAN Encounter: [id number]    Primary Care Provider: Oneal Jay DO   Date and time admitted to hospital: 7/22/2020  3:28 AM        * Acute respiratory failure with hypoxia [facility])  Assessment & Plan  · Patient admission was initially on BiPAP and weaned off in the emergency room  · Wean off oxygen as able    COPD exacerbation ([facility] )  Assessment & Plan  · Respiratory protocol  · IV steroids  · Wean off oxygen as able    Tremor  Assessment & Plan  · Patient reports he stopped primidone - now he does not get nose bleeds    CAD (coronary artery disease), native coronary artery  Assessment & Plan  · CAD with history of CABG x4  · No chest pain  · Continue metoprolol, asa, statin    Essential hypertension  Assessment & Plan  · Continue lisinopril and metoprolol  · Monitor blood pressure hold parameters    Other hyperlipidemia  Assessment & Plan  · Continue statin    Type 2 diabetes mellitus without complication, without long-term current use of insulin ([facility] )  Assessment & Plan  Lab Results   Component Value Date    HGBA1C 8 2 (H) 12/27/2019       No results for input(s): POCGLU in the last 72 hours  Blood Sugar Average: Last 72 hrs:  ·  sliding scale insulin coverage        VTE Prophylaxis: Enoxaparin (Lovenox)  Code Status: DNR/DNI  POLST: There is no POLST form on file for this patient (pre-hospital)  Discussion with patient    Anticipated Length of Stay:  Patient will be admitted on an Observation basis with an anticipated length of stay of  < 2 midnights  Justification for Hospital Stay: COPD exacerbation    Chief Complaint:   Shortness of breath    History of Present Illness:    Zenon Gracia is a 77 y o  male who presents with shortness of breath    Patient with past medical history of diabetes mellitus type 2 not on insulin, CAD with history of CABG x4, hypertension, hyperlipidemia, history of prostate cancer, tremor, COPD not on oxygen presents to the ER secondary to shortness of breath worsening cough  Patient noted progressive shortness of breath and nonproductive cough starting on Tuesday  He also had a headache  Feels tightness in his chest like COPD exacerbation  Patient reports he was out to the store, went home and tried 4 nebulizers with no improvement  In the emergency room patient was placed on BiPAP, given our long nebulizer an Solu-Medrol  He was able to be weaned off the BiPAP  Patient reports that he is scheduled to see Dr Chata Hernandez soon    ER treatment included hour long nebulizer Solu-Medrol 125 normal saline 500 mL an BiPAP    Review of Systems:  Review of Systems   Constitutional: Negative for chills and fever  HENT: Negative for rhinorrhea, sore throat and trouble swallowing  Respiratory: Positive for shortness of breath  Negative for cough  Cardiovascular: Negative for chest pain  Gastrointestinal: Negative for abdominal pain, diarrhea, nausea and vomiting  Genitourinary: Negative for dysuria and hematuria  Musculoskeletal: Negative for back pain and neck pain  Skin: Negative for rash and wound  Neurological: Negative for dizziness and headaches  Psychiatric/Behavioral: Negative for agitation and confusion  Past Medical and Surgical History:   Past Medical History:   Diagnosis Date    BPH (benign prostatic hyperplasia)     39 days radiation treatment    COPD (chronic obstructive pulmonary disease)     Coronary artery disease     CABG x4 in 2017    Diabetes mellitus     History of Arterial Duplex of LE 12/26/2017    Likely occlusion of the left superficial femoral artery  Calcific changes bilaterally  Despite these changes, the ankle-brachial index as a measure of peripheral blood flow only mildly impaired      History of echocardiogram 06/12/2017    EF 40%, mild LVH, mild MR     Hyperlipidemia     Hypertension     NSTEMI (non-ST elevated myocardial infarction)     Prostate cancer prostate     PVD (peripheral vascular disease)     Tremor        Past Surgical History:   Procedure Laterality Date    CARDIAC CATHETERIZATION  03/08/2017    Significant left main plus triple-vessel CAD   CORONARY ARTERY BYPASS GRAFT  03/08/2017    4V CABG:  LIMA to LAD, VG to RI, SVG to PDA to LVBR RCA   EYE SURGERY      shots in eye once a month @ the [address] [place] [address]         Meds/Allergies:  Prior to Admission medications    Medication Sig Start Date End Date Taking? Authorizing Provider   albuterol (2 5 mg/3 mL) 0 083 % nebulizer solution Take 1 vial (2 5 mg total) by nebulization every 4 (four) hours as needed for wheezing or shortness of breath 6/24/18  Yes Delores Belle MD   albuterol (PROVENTIL HFA,VENTOLIN HFA) 90 mcg/act inhaler Inhale 2 puffs every 6 (six) hours as needed for wheezing or shortness of breath   Yes Historical Provider, MD   aspirin (ECOTRIN LOW STRENGTH) 81 mg EC tablet Take 81 mg by mouth every other day    Yes Historical Provider, MD   Empagliflozin 10 MG TABS Take 10 mg by mouth every morning   Yes Historical Provider, MD   gabapentin (NEURONTIN) 100 mg capsule Take 200 mg by mouth daily at bedtime   Yes Historical Provider, MD   lisinopril (ZESTRIL) 20 mg tablet Take 1 tablet (20 mg total) by mouth daily 12/30/19  Yes Stefan Jenkins MD   metFORMIN (GLUCOPHAGE) 500 mg tablet Take 500 mg by mouth daily with dinner    Yes Historical Provider, MD   metoprolol tartrate (LOPRESSOR) 25 mg tablet Take 1 tablet (25 mg total) by mouth 2 (two) times a day 12/29/19  Yes Stefan Jenkins MD   predniSONE 10 mg tablet 4 pills for 2 days, then 3 pills for 2 days, then 2 pills for 2 days then 1 pill for 2 days   6/17/20  Yes Fartun Gao,    predniSONE 20 mg tablet Take 2 tablets (40 mg total) by mouth daily For 3 days, then take 1 tablet (20 mg total) by mouth daily for 3 days then stop 5/15/20  Yes Kalli Flannery MD   primidone (MYSOLINE) 50 mg tablet Take 2 tablets (100 mg total) by mouth every 12 (twelve) hours 10/23/19  Yes Seymour Bose PA-C   rosuvastatin (CRESTOR) 10 MG tablet Take 10 mg by mouth daily   Yes Historical Provider, MD   saxagliptin (ONGLYZA) 5 MG tablet Take 5 mg by mouth daily   Yes Historical Provider, MD   tiotropium-olodaterol ([place]) 2 5-2 5 MCG/ACT inhaler Inhale 2 puffs daily   Yes Historical Provider, MD     I have reviewed home medications with patient personally  Allergies: Allergies   Allergen Reactions    Atorvastatin Myalgia       Social History:  Marital Status: /Civil Union   Occupation:  Retired from [employer]  Patient Pre-hospital Living Situation:  Resides at home with wife, grandson and grandson's fiancee  Patient Pre-hospital Level of Mobility:  Full mobility with occasional use a cane  Patient Pre-hospital Diet Restrictions:  None  Substance Use History:   Social History     Substance and Sexual Activity   Alcohol Use Not Currently    Alcohol/week: 0 0 standard drinks    Frequency: Never    Drinks per session: Patient refused    Binge frequency: Never     Social History     Tobacco Use   Smoking Status Former Smoker    Packs/day: 1 00    Years: 60 00    Pack years: 60 00    Types: Cigarettes    Last attempt to quit: 12/15/2018    Years since quittin 6   Smokeless Tobacco Never Used   Tobacco Comment    only smokes when he drinks beer     Social History     Substance and Sexual Activity   Drug Use Never       Family History:  I have reviewed the patients family history    Physical Exam:   Vitals:   Blood Pressure: (!) 191/90 (20)  Pulse: 93 (20)  Temperature: 98 °F (36 7 °C) (20)  Temp Source: Temporal (20)  Respirations: (!) 38 (20)  Height: 5' 10" (177 8 cm) (20)  Weight - Scale: 94 3 kg (208 lb) (20)  SpO2: 93 % (20)    Physical Exam   Constitutional: He is oriented to person, place, and time   He appears well-developed and well-nourished  He appears lethargic  HENT:   Head: Normocephalic and atraumatic  Eyes: Conjunctivae and EOM are normal  Right eye exhibits no discharge  Left eye exhibits no discharge  Neck: Normal range of motion  No tracheal deviation present  Cardiovascular: Normal rate, regular rhythm and normal heart sounds  Exam reveals no gallop and no friction rub  No murmur heard  Pulmonary/Chest: Effort normal  No respiratory distress  He has decreased breath sounds in the right lower field and the left lower field  He has no wheezes  He has no rales  Abdominal: Soft  Bowel sounds are normal  He exhibits no distension and no mass  There is no tenderness  There is no guarding  Musculoskeletal: Normal range of motion  He exhibits no edema, tenderness or deformity  Neurological: He is oriented to person, place, and time  He appears lethargic  Skin: Skin is warm and dry  No rash noted  No erythema  No pallor  Psychiatric: He has a normal mood and affect  His behavior is normal  Judgment and thought content normal    Nursing note and vitals reviewed  Additional Data:   Lab Results: I have personally reviewed pertinent reports  Results from last 7 days   Lab Units 07/22/20  0351   WBC Thousand/uL 8 10   HEMOGLOBIN g/dL 14 4   HEMATOCRIT % 41 7*   PLATELETS Thousands/uL 172   NEUTROS PCT % 73   LYMPHS PCT % 14*   MONOS PCT % 8   EOS PCT % 4     Results from last 7 days   Lab Units 07/22/20  0351   SODIUM mmol/L 139   POTASSIUM mmol/L 3 8   CHLORIDE mmol/L 104   CO2 mmol/L 24   BUN mg/dL 15   CREATININE mg/dL 1 00   CALCIUM mg/dL 8 7   ALK PHOS U/L 65   ALT U/L 11   AST U/L 11*     Imaging: Formal read pending  XR chest 1 view portable   Final Result by Sarah Weeks MD (07/22 5771)      No acute cardiopulmonary disease              Workstation performed: [id number]             Epic Records Reviewed: Yes     ** Please Note: This note has been constructed using a voice recognition system   **

## 2020-07-22 NOTE — PROGRESS NOTES
Post Admission Follow Up Note -   Timmy Ferrara 1945, 77 y o  male MRN: [id number]    Unit/Bed#: -02 Encounter: [id number]    Primary Care Provider: French Brody DO   Date and time admitted to hospital: 7/22/2020  3:28 AM    * Acute respiratory failure with [facility] )  Assessment & Plan  · Patient admission was initially on BiPAP and weaned off in the emergency room  · Wean off oxygen as able  · Currently requiring 2 L nasal cannula satting appropriately    COPD exacerbation ([facility] )  Assessment & Plan  · Respiratory protocol  · Continued IV steroids at this time as patient is still requiring supplemental oxygen and is remaining tachypneic  · Consider long taper or weaning steroid dose on 07/23  · Add azithromycin for anti-inflammatory properties  · Wean off oxygen as able    Essential hypertension  Assessment & Plan  · Blood pressure significantly elevated with systolics in the 402U  · Continue lisinopril and metoprolol  · Increased doses on 07/22  · Monitor blood pressure with routine vitals    Other hyperlipidemia  Assessment & Plan  · Continue statin    Type 2 diabetes mellitus without complication, without long-term current use of insulin (HCC)  Assessment & Plan  Lab Results   Component Value Date    HGBA1C 8 2 (H) 12/27/2019       No results for input(s): POCGLU in the last 72 hours  Blood Sugar Average: Last 72 hrs:  ·  Sliding scale insulin coverage  · Controlled on current regimen at this time; would highly suspect patient to become hyperglycemic in the setting of IV steroid administration    · Will monitor with POCT BG and titrate regimen as indicated    CAD (coronary artery disease), native coronary artery  Assessment & Plan  · CAD with history of CABG x4  · No chest pain  · Continue metoprolol, asa, statin  · Follow-up outpatient with regularly scheduled cardiology visit    Tremor  Assessment & Plan  · Patient reports he stopped primidone - now he does not get nose bleeds      VTE Pharmacologic Prophylaxis:   Pharmacologic: Enoxaparin (Lovenox)  Mechanical VTE Prophylaxis in Place: Yes    Patient Centered Rounds: I have performed bedside rounds with nursing staff today  Discussions with Specialists or Other Care Team Provider:  Nursing staff and case management    Education and Discussions with Family / Patient:  Education provided the bedside regards to plan of care as noted above  Time Spent for Care: 30 minutes  More than 50% of total time spent on counseling and coordination of care as described above  Current Length of Stay: 0 day(s)    Current Patient Status: Observation   Certification Statement: The patient will continue to require additional inpatient hospital stay due to IV steroids    Discharge Plan:  Pending response to IV steroids and weaning of oxygen as tolerated  Possibly as early as     Code Status: Level 3 - DNAR and DNI      Subjective:   Patient overall still endorsing shortness of breath  Objective:     Vitals:   Temp (24hrs), Av 5 °F (36 4 °C), Min:97 °F (36 1 °C), Max:98 °F (36 7 °C)    Temp:  [97 °F (36 1 °C)-98 °F (36 7 °C)] 97 °F (36 1 °C)  HR:  [] 88  Resp:  [16-48] 22  BP: (135-237)/() 166/77  SpO2:  [82 %-98 %] 96 %  Body mass index is 28 22 kg/m²  Input and Output Summary (last 24 hours): Intake/Output Summary (Last 24 hours) at 2020 1200  Last data filed at 2020 0511  Gross per 24 hour   Intake 500 ml   Output    Net 500 ml       Physical Exam:     Physical Exam   Constitutional: He is oriented to person, place, and time  He is cooperative  No distress  Cardiovascular: Normal rate, regular rhythm, normal heart sounds and intact distal pulses  Pulses:       Radial pulses are 2+ on the right side, and 2+ on the left side  No peripheral edema noted  Pulmonary/Chest: No accessory muscle usage  Tachypnea noted  No respiratory distress  He has decreased breath sounds  He has no wheezes  Abdominal: Soft  Bowel sounds are normal  He exhibits no distension  There is no tenderness  Neurological: He is alert and oriented to person, place, and time  Skin: Skin is warm and dry  Capillary refill takes less than 2 seconds  Psychiatric: He has a normal mood and affect  His speech is normal and behavior is normal  Judgment normal    Vitals reviewed  Additional Data:     Labs:    Results from last 7 days   Lab Units 07/22/20  0351   WBC Thousand/uL 8 10   HEMOGLOBIN g/dL 14 4   HEMATOCRIT % 41 7*   PLATELETS Thousands/uL 172   NEUTROS PCT % 73   LYMPHS PCT % 14*   MONOS PCT % 8   EOS PCT % 4     Results from last 7 days   Lab Units 07/22/20  0351   SODIUM mmol/L 139   POTASSIUM mmol/L 3 8   CHLORIDE mmol/L 104   CO2 mmol/L 24   BUN mg/dL 15   CREATININE mg/dL 1 00   ANION GAP mmol/L 11   CALCIUM mg/dL 8 7   ALBUMIN g/dL 4 2   TOTAL BILIRUBIN mg/dL 0 60   ALK PHOS U/L 65   ALT U/L 11   AST U/L 11*   GLUCOSE RANDOM mg/dL 170*                 Results from last 7 days   Lab Units 07/22/20  0617 07/22/20  0351   LACTIC ACID mmol/L  --  1 1   PROCALCITONIN ng/ml <0 05  --            * I Have Reviewed All Lab Data Listed Above  * Additional Pertinent Lab Tests Reviewed: Sophia 66 Admission Reviewed    Imaging:    Imaging Reports Reviewed Today Include:  Chest x-ray  Imaging Personally Reviewed by Myself Includes:  None    Recent Cultures (last 7 days):     Results from last 7 days   Lab Units 07/22/20  0351   BLOOD CULTURE  Received in Microbiology Lab  Culture in Progress  Received in Microbiology Lab  Culture in Progress         Last 24 Hours Medication List:     Current Facility-Administered Medications:  aspirin 81 mg Oral Every Other Day SUN Justin-C   Empagliflozin 10 mg Oral QAM Rodger Silverlake, PA-C   enoxaparin 40 mg Subcutaneous Daily Chualar Silverlake, PA-C   gabapentin 200 mg Oral HS Rodger Silverlake, PA-C   guaiFENesin 600 mg Oral BID Chualar Silverlake, PA-C   ipratropium 0 5 mg Nebulization TID Rosemarie Alonzo MD   levalbuterol 1 25 mg Nebulization TID Rosemarie Alonzo MD   [START ON 7/23/2020] lisinopril 40 mg Oral Daily PENG Allen   methylPREDNISolone sodium succinate 40 mg Intravenous Q8H Skylar Booker PA-C   metoprolol tartrate 50 mg Oral BID PENG Pérez        Today, Patient Was Seen By: PENG Pérez    ** Please Note: Dictation voice to text software may have been used in the creation of this document   **

## 2020-07-22 NOTE — ASSESSMENT & PLAN NOTE
· Patient admission was initially on BiPAP and weaned off in the emergency room  · Wean off oxygen as able

## 2020-07-23 VITALS
WEIGHT: 199.52 LBS | TEMPERATURE: 97.5 F | RESPIRATION RATE: 18 BRPM | OXYGEN SATURATION: 92 % | HEART RATE: 80 BPM | BODY MASS INDEX: 27.93 KG/M2 | HEIGHT: 71 IN | DIASTOLIC BLOOD PRESSURE: 62 MMHG | SYSTOLIC BLOOD PRESSURE: 144 MMHG

## 2020-07-23 PROBLEM — J96.01 ACUTE RESPIRATORY FAILURE WITH HYPOXIA (HCC): Status: RESOLVED | Noted: 2020-05-14 | Resolved: 2020-07-23

## 2020-07-23 LAB
ANION GAP SERPL CALCULATED.3IONS-SCNC: 10 MMOL/L (ref 4–13)
BUN SERPL-MCNC: 24 MG/DL (ref 7–25)
CALCIUM SERPL-MCNC: 9.2 MG/DL (ref 8.6–10.5)
CHLORIDE SERPL-SCNC: 103 MMOL/L (ref 98–107)
CO2 SERPL-SCNC: 25 MMOL/L (ref 21–31)
CREAT SERPL-MCNC: 0.85 MG/DL (ref 0.7–1.3)
ERYTHROCYTE [DISTWIDTH] IN BLOOD BY AUTOMATED COUNT: 14.1 % (ref 11.5–14.5)
GFR SERPL CREATININE-BSD FRML MDRD: 86 ML/MIN/1.73SQ M
GLUCOSE P FAST SERPL-MCNC: 191 MG/DL (ref 65–99)
GLUCOSE SERPL-MCNC: 191 MG/DL (ref 65–99)
HCT VFR BLD AUTO: 40 % (ref 42–47)
HGB BLD-MCNC: 14 G/DL (ref 14–18)
LYMPHOCYTES # BLD AUTO: 0.52 THOUSAND/UL (ref 0.6–4.47)
LYMPHOCYTES # BLD AUTO: 5 % (ref 20–51)
MCH RBC QN AUTO: 31.4 PG (ref 26–34)
MCHC RBC AUTO-ENTMCNC: 35 G/DL (ref 31–37)
MCV RBC AUTO: 90 FL (ref 81–99)
NEUTS SEG # BLD: 9.79 THOUSAND/UL (ref 1.81–6.82)
NEUTS SEG NFR BLD AUTO: 95 % (ref 42–75)
PLATELET # BLD AUTO: 201 THOUSANDS/UL (ref 149–390)
PLATELET BLD QL SMEAR: ADEQUATE
PMV BLD AUTO: 9.3 FL (ref 8.6–11.7)
POTASSIUM SERPL-SCNC: 4.3 MMOL/L (ref 3.5–5.5)
PROCALCITONIN SERPL-MCNC: <0.05 NG/ML
RBC # BLD AUTO: 4.47 MILLION/UL (ref 4.3–5.9)
RBC MORPH BLD: NORMAL
SODIUM SERPL-SCNC: 138 MMOL/L (ref 134–143)
TOTAL CELLS COUNTED SPEC: 100
WBC # BLD AUTO: 10.3 THOUSAND/UL (ref 4.8–10.8)

## 2020-07-23 PROCEDURE — 84145 PROCALCITONIN (PCT): CPT | Performed by: PHYSICIAN ASSISTANT

## 2020-07-23 PROCEDURE — 94760 N-INVAS EAR/PLS OXIMETRY 1: CPT

## 2020-07-23 PROCEDURE — 99217 PR OBSERVATION CARE DISCHARGE MANAGEMENT: CPT | Performed by: NURSE PRACTITIONER

## 2020-07-23 PROCEDURE — 94640 AIRWAY INHALATION TREATMENT: CPT

## 2020-07-23 PROCEDURE — 80048 BASIC METABOLIC PNL TOTAL CA: CPT | Performed by: NURSE PRACTITIONER

## 2020-07-23 PROCEDURE — 85007 BL SMEAR W/DIFF WBC COUNT: CPT | Performed by: NURSE PRACTITIONER

## 2020-07-23 PROCEDURE — 85027 COMPLETE CBC AUTOMATED: CPT | Performed by: NURSE PRACTITIONER

## 2020-07-23 RX ORDER — AZITHROMYCIN 500 MG/1
500 TABLET, FILM COATED ORAL EVERY 24 HOURS
Qty: 5 TABLET | Refills: 0 | Status: SHIPPED | OUTPATIENT
Start: 2020-07-24 | End: 2020-07-29

## 2020-07-23 RX ORDER — METOPROLOL TARTRATE 50 MG/1
50 TABLET, FILM COATED ORAL 2 TIMES DAILY
Qty: 30 TABLET | Refills: 0 | Status: ON HOLD | OUTPATIENT
Start: 2020-07-23 | End: 2020-09-02 | Stop reason: CLARIF

## 2020-07-23 RX ORDER — AZITHROMYCIN 250 MG/1
500 TABLET, FILM COATED ORAL EVERY 24 HOURS
Status: DISCONTINUED | OUTPATIENT
Start: 2020-07-23 | End: 2020-07-23 | Stop reason: HOSPADM

## 2020-07-23 RX ORDER — PREDNISONE 20 MG/1
40 TABLET ORAL DAILY
Qty: 15 TABLET | Refills: 0 | Status: SHIPPED | OUTPATIENT
Start: 2020-07-23 | End: 2020-08-04

## 2020-07-23 RX ORDER — LISINOPRIL 40 MG/1
40 TABLET ORAL DAILY
Qty: 30 TABLET | Refills: 0 | Status: ON HOLD | OUTPATIENT
Start: 2020-07-24 | End: 2020-09-02 | Stop reason: CLARIF

## 2020-07-23 RX ADMIN — GUAIFENESIN 600 MG: 600 TABLET, EXTENDED RELEASE ORAL at 08:49

## 2020-07-23 RX ADMIN — LEVALBUTEROL HYDROCHLORIDE 1.25 MG: 1.25 SOLUTION, CONCENTRATE RESPIRATORY (INHALATION) at 07:27

## 2020-07-23 RX ADMIN — LISINOPRIL 40 MG: 20 TABLET ORAL at 08:50

## 2020-07-23 RX ADMIN — METHYLPREDNISOLONE SODIUM SUCCINATE 40 MG: 40 INJECTION, POWDER, FOR SOLUTION INTRAMUSCULAR; INTRAVENOUS at 05:12

## 2020-07-23 RX ADMIN — ENOXAPARIN SODIUM 40 MG: 40 INJECTION SUBCUTANEOUS at 08:50

## 2020-07-23 RX ADMIN — AZITHROMYCIN MONOHYDRATE 500 MG: 250 TABLET ORAL at 08:49

## 2020-07-23 RX ADMIN — IPRATROPIUM BROMIDE 0.5 MG: 0.5 SOLUTION RESPIRATORY (INHALATION) at 07:27

## 2020-07-23 RX ADMIN — METOPROLOL TARTRATE 50 MG: 50 TABLET, FILM COATED ORAL at 08:50

## 2020-07-23 NOTE — PLAN OF CARE
Problem: Potential for Falls  Goal: Patient will remain free of falls  Description  INTERVENTIONS:  - Assess patient frequently for physical needs  -  Identify cognitive and physical deficits and behaviors that affect risk of falls    -  [place] fall precautions as indicated by assessment   - Educate patient/family on patient safety including physical limitations  - Instruct patient to call for assistance with activity based on assessment  - Modify environment to reduce risk of injury  - Consider OT/PT consult to assist with strengthening/mobility  7/23/2020 0859 by Sophia Junior RN  Outcome: Progressing  7/23/2020 0859 by Sophia Junior RN  Outcome: Progressing     Problem: PAIN - ADULT  Goal: Verbalizes/displays adequate comfort level or baseline comfort level  Description  Interventions:  - Encourage patient to monitor pain and request assistance  - Assess pain using appropriate pain scale  - Administer analgesics based on type and severity of pain and evaluate response  - Implement non-pharmacological measures as appropriate and evaluate response  - Consider cultural and social influences on pain and pain management  - Notify physician/advanced practitioner if interventions unsuccessful or patient reports new pain  7/23/2020 0859 by Sophia Junior RN  Outcome: Progressing  7/23/2020 0859 by Sophia Junior RN  Outcome: Progressing     Problem: INFECTION - ADULT  Goal: Absence or prevention of progression during hospitalization  Description  INTERVENTIONS:  - Assess and monitor for signs and symptoms of infection  - Monitor lab/diagnostic results  - Monitor all insertion sites, i e  indwelling lines, tubes, and drains  - Monitor endotracheal if appropriate and nasal secretions for changes in amount and color  - [place] appropriate cooling/warming therapies per order  - Administer medications as ordered  - Instruct and encourage patient and family to use good hand hygiene technique  - Identify and instruct in appropriate isolation precautions for identified infection/condition  7/23/2020 0859 by Sherry Ramírez RN  Outcome: Progressing  7/23/2020 0859 by Sherry Ramírez RN  Outcome: Progressing     Problem: SAFETY ADULT  Goal: Maintain or return to baseline ADL function  Description  INTERVENTIONS:  -  Assess patient's ability to carry out ADLs; assess patient's baseline for ADL function and identify physical deficits which impact ability to perform ADLs (bathing, care of mouth/teeth, toileting, grooming, dressing, etc )  - Assess/evaluate cause of self-care deficits   - Assess range of motion  - Assess patient's mobility; develop plan if impaired  - Assess patient's need for assistive devices and provide as appropriate  - Encourage maximum independence but intervene and supervise when necessary  - Involve family in performance of ADLs  - Assess for home care needs following discharge   - Consider OT consult to assist with ADL evaluation and planning for discharge  - Provide patient education as appropriate  7/23/2020 0859 by Sherry Ramírez RN  Outcome: Progressing  7/23/2020 0859 by Sherry Ramírez RN  Outcome: Progressing  Goal: Maintain or return mobility status to optimal level  Description  INTERVENTIONS:  - Assess patient's baseline mobility status (ambulation, transfers, stairs, etc )    - Identify cognitive and physical deficits and behaviors that affect mobility  - Identify mobility aids required to assist with transfers and/or ambulation (gait belt, sit-to-stand, lift, walker, cane, etc )  - [place] fall precautions as indicated by assessment  - Record patient progress and toleration of activity level on Mobility SBAR; progress patient to next Phase/Stage  - Instruct patient to call for assistance with activity based on assessment  - Consider rehabilitation consult to assist with strengthening/weightbearing, etc   7/23/2020 0859 by Sherry Ramírez RN  Outcome: Progressing  7/23/2020 0859 by Sherry Ramírez RN  Outcome: Progressing     Problem: DISCHARGE PLANNING  Goal: Discharge to home or other facility with appropriate resources  Description  INTERVENTIONS:  - Identify barriers to discharge w/patient and caregiver  - Arrange for needed discharge resources and transportation as appropriate  - Identify discharge learning needs (meds, wound care, etc )  - Arrange for interpretive services to assist at discharge as needed  - Refer to Case Management Department for coordinating discharge planning if the patient needs post-hospital services based on physician/advanced practitioner order or complex needs related to functional status, cognitive ability, or social support system  7/23/2020 0859 by Ana Rosa Jeff RN  Outcome: Progressing  7/23/2020 0859 by Ana Rosa Jeff RN  Outcome: Progressing     Problem: Knowledge Deficit  Goal: Patient/family/caregiver demonstrates understanding of disease process, treatment plan, medications, and discharge instructions  Description  Complete learning assessment and assess knowledge base    Interventions:  - Provide teaching at level of understanding  - Provide teaching via preferred learning methods  7/23/2020 0859 by Ana Rosa Jeff RN  Outcome: Progressing  7/23/2020 0859 by Ana Rosa Jeff RN  Outcome: Progressing     Problem: RESPIRATORY - ADULT  Goal: Achieves optimal ventilation and oxygenation  Description  INTERVENTIONS:  - Assess for changes in respiratory status  - Assess for changes in mentation and behavior  - Position to facilitate oxygenation and minimize respiratory effort  - Oxygen administered by appropriate delivery if ordered  - Initiate smoking cessation education as indicated  - Encourage broncho-pulmonary hygiene including cough, deep breathe, Incentive Spirometry  - Assess the need for suctioning and aspirate as needed  - Assess and instruct to report SOB or any respiratory difficulty  - Respiratory Therapy support as indicated  7/23/2020 0859 by Ana Rosa Jeff RN  Outcome: Progressing  7/23/2020 0859 by Esau Booker RN  Outcome: Progressing

## 2020-07-23 NOTE — DISCHARGE INSTR - AVS FIRST PAGE
Dear Zenon Gracia,     It was our pleasure to care for you here at [facility], [facility]  It is our hope that we were always able to exceed the expected standards for your care during your stay  You were hospitalized due to COPD exacerbation  You were cared for on the 1st floor by PENG Segovia under the service of Mercy Haywood MD with the [facility] who covers for your primary care physician (PCP), Oneal Jay DO, while you were hospitalized  If you have any questions or concerns related to this hospitalization, you may contact us at [phone]  For follow up as well as any medication refills, we recommend that you follow up with your primary care physician  A registered nurse will reach out to you by phone within a few days after your discharge to answer any additional questions that you may have after going home  However, at this time we provide for you here, the most important instructions / recommendations at discharge:     · Notable Medication Adjustments -   · Initiation of prednisone taper as outlined in detail below:  · Prednisone 40 mg(2 tablets) for 3 days starting on 24th of July and ending on the 26th  · Prednisone 30 mg(1 5 tablets) for 3 days starting on the 27th of July and ending on the 29th  · Prednisone 20 mg(1 tablet) for 3 days starting on the 30th of July and ending on the 1st of August  · Prednisone 10 mg(0 5 tablets) for 3 days starting on the 2nd of August and ending on the 4th  · Initiation of azithromycin for 5 days following hospitalization  · Increase of lisinopril from 20 mg to 40 mg once daily  · Increase of metoprolol 25 mg twice daily to 50 mg twice daily  · Testing Required after Discharge -   · None  · Important follow up information -   · Please follow-up with primary pulmonologist at earliest convenience following hospitalization    · Please follow-up with primary care doctor in 7-14 days following hospitalization  · Other Instructions -   · Please check blood pressure regularly at home and document values in a journal to take to follow-up PCP visits for further blood pressure management and medication titration  · Please review this entire after visit summary as additional general instructions including medication list, appointments, activity, diet, any pertinent wound care, and other additional recommendations from your care team that may be provided for you        Sincerely,     PENG Ordoñez

## 2020-07-23 NOTE — PLAN OF CARE
Problem: Potential for Falls  Goal: Patient will remain free of falls  Description  INTERVENTIONS:  - Assess patient frequently for physical needs  -  Identify cognitive and physical deficits and behaviors that affect risk of falls    -  [place] fall precautions as indicated by assessment   - Educate patient/family on patient safety including physical limitations  - Instruct patient to call for assistance with activity based on assessment  - Modify environment to reduce risk of injury  - Consider OT/PT consult to assist with strengthening/mobility  Outcome: Progressing     Problem: PAIN - ADULT  Goal: Verbalizes/displays adequate comfort level or baseline comfort level  Description  Interventions:  - Encourage patient to monitor pain and request assistance  - Assess pain using appropriate pain scale  - Administer analgesics based on type and severity of pain and evaluate response  - Implement non-pharmacological measures as appropriate and evaluate response  - Consider cultural and social influences on pain and pain management  - Notify physician/advanced practitioner if interventions unsuccessful or patient reports new pain  Outcome: Progressing     Problem: INFECTION - ADULT  Goal: Absence or prevention of progression during hospitalization  Description  INTERVENTIONS:  - Assess and monitor for signs and symptoms of infection  - Monitor lab/diagnostic results  - Monitor all insertion sites, i e  indwelling lines, tubes, and drains  - Monitor endotracheal if appropriate and nasal secretions for changes in amount and color  - [place] appropriate cooling/warming therapies per order  - Administer medications as ordered  - Instruct and encourage patient and family to use good hand hygiene technique  - Identify and instruct in appropriate isolation precautions for identified infection/condition  Outcome: Progressing     Problem: SAFETY ADULT  Goal: Maintain or return to baseline ADL function  Description  INTERVENTIONS:  -  Assess patient's ability to carry out ADLs; assess patient's baseline for ADL function and identify physical deficits which impact ability to perform ADLs (bathing, care of mouth/teeth, toileting, grooming, dressing, etc )  - Assess/evaluate cause of self-care deficits   - Assess range of motion  - Assess patient's mobility; develop plan if impaired  - Assess patient's need for assistive devices and provide as appropriate  - Encourage maximum independence but intervene and supervise when necessary  - Involve family in performance of ADLs  - Assess for home care needs following discharge   - Consider OT consult to assist with ADL evaluation and planning for discharge  - Provide patient education as appropriate  Outcome: Progressing  Goal: Maintain or return mobility status to optimal level  Description  INTERVENTIONS:  - Assess patient's baseline mobility status (ambulation, transfers, stairs, etc )    - Identify cognitive and physical deficits and behaviors that affect mobility  - Identify mobility aids required to assist with transfers and/or ambulation (gait belt, sit-to-stand, lift, walker, cane, etc )  - [place] fall precautions as indicated by assessment  - Record patient progress and toleration of activity level on Mobility SBAR; progress patient to next Phase/Stage  - Instruct patient to call for assistance with activity based on assessment  - Consider rehabilitation consult to assist with strengthening/weightbearing, etc   Outcome: Progressing     Problem: DISCHARGE PLANNING  Goal: Discharge to home or other facility with appropriate resources  Description  INTERVENTIONS:  - Identify barriers to discharge w/patient and caregiver  - Arrange for needed discharge resources and transportation as appropriate  - Identify discharge learning needs (meds, wound care, etc )  - Arrange for interpretive services to assist at discharge as needed  - Refer to Case Management Department for coordinating discharge planning if the patient needs post-hospital services based on physician/advanced practitioner order or complex needs related to functional status, cognitive ability, or social support system  Outcome: Progressing     Problem: Knowledge Deficit  Goal: Patient/family/caregiver demonstrates understanding of disease process, treatment plan, medications, and discharge instructions  Description  Complete learning assessment and assess knowledge base    Interventions:  - Provide teaching at level of understanding  - Provide teaching via preferred learning methods  Outcome: Progressing     Problem: RESPIRATORY - ADULT  Goal: Achieves optimal ventilation and oxygenation  Description  INTERVENTIONS:  - Assess for changes in respiratory status  - Assess for changes in mentation and behavior  - Position to facilitate oxygenation and minimize respiratory effort  - Oxygen administered by appropriate delivery if ordered  - Initiate smoking cessation education as indicated  - Encourage broncho-pulmonary hygiene including cough, deep breathe, Incentive Spirometry  - Assess the need for suctioning and aspirate as needed  - Assess and instruct to report SOB or any respiratory difficulty  - Respiratory Therapy support as indicated  Outcome: Progressing

## 2020-07-23 NOTE — ASSESSMENT & PLAN NOTE
· CAD with history of CABG x4  · Currently denies any chest pain  · Continue metoprolol, asa, statin  · Follow-up outpatient with regularly scheduled cardiology visit

## 2020-07-23 NOTE — UTILIZATION REVIEW
Initial Clinical Review    Admission: Date/Time/Statement: Admission Orders (From admission, onward)     Ordered        07/22/20 0535  Place in Observation  Once                   Orders Placed This Encounter   Procedures    Place in Observation     Standing Status:   Standing     Number of Occurrences:   1     Order Specific Question:   Admitting Physician     Answer:   Nneka Red [[id number]]     Order Specific Question:   Level of Care     Answer:   Med Surg [[id number]]     ED Arrival Information     Expected Arrival Acuity Means of Arrival Escorted By Service Admission Type    - 7/22/2020 03:26 Emergent Walk-In Self Hospitalist Emergency    Arrival Complaint    Sob        Chief Complaint   Patient presents with    Shortness of Breath     Pt has COPD hx  Pt states he has been SOB since "this afternoon" but that it has gotten progressively worse as the day went on  Pt denies CP or dizziness    Headache     Assessment/Plan:   77YEAR-OLD MALE  H/O COPD, CAD   HE IS HERE FOR ACUTE COPD EXACERBATION C/W SHORTNESS OF BREATH AND COUGH, nonproductive  He is having chest tightness, which feels typical for him when he has bad COPD exacerbations  He has been on BiPAP once before, feels like he needs to be on BiPAP now based on the severity of his shortness of breath  Acute respiratory failure with hypoxia ([address] )  Assessment & Plan  · Patient admission was initially on BiPAP and weaned off in the emergency room  · Wean off oxygen as able     COPD exacerbation [facility])  Assessment & Plan  · Respiratory protocol  · IV steroids  · Wean off oxygen as able    ED Triage Vitals   Temperature Pulse Respirations Blood Pressure SpO2   07/22/20 0345 07/22/20 0330 07/22/20 0330 07/22/20 0330 07/22/20 0330   98 °F (36 7 °C) (!) 119 (!) 48 (!) 237/116 (!) 82 %      Temp Source Heart Rate Source Patient Position - Orthostatic VS BP Location FiO2 (%)   07/22/20 0345 07/22/20 0345 07/22/20 0745 07/22/20 0345 --   Temporal Monitor Lying Left arm Pain Score       07/22/20 0751       No Pain        Wt Readings from Last 1 Encounters:   07/22/20 90 5 kg (199 lb 8 3 oz)     Additional Vital Signs:   Date/Time  Temp  Pulse  Resp  BP  MAP (mmHg)  SpO2  Calculated FIO2 (%) - Nasal Cannula  O2 Flow Rate (L/min)  Nasal Cannula O2 Flow Rate (L/min)  O2 Device  Patient Position - Orthostatic VS   07/23/20 0730            92 %             07/23/20 0718  97 5 °F (36 4 °C)  79  18  150/67    96 %        None (Room air)  Lying   07/23/20 0014  97 4 °F (36 3 °C)Abnormal   88  18  152/73    94 %        None (Room air)  Lying   07/22/20 2136            95 %        None (Room air)       Pertinent Labs/Diagnostic Test Results:   Results from last 7 days   Lab Units 07/22/20  0519   SARS-COV-2  Negative     Results from last 7 days   Lab Units 07/23/20  0514 07/22/20  0351   WBC Thousand/uL 10 30 8 10   HEMOGLOBIN g/dL 14 0 14 4   HEMATOCRIT % 40 0* 41 7*   PLATELETS Thousands/uL 201 172   NEUTROS ABS Thousands/µL  --  5 90   TOTAL NEUT ABS Thousand/uL 9 79*  --      Results from last 7 days   Lab Units 07/23/20  0514 07/22/20  0351   SODIUM mmol/L 138 139   POTASSIUM mmol/L 4 3 3 8   CHLORIDE mmol/L 103 104   CO2 mmol/L 25 24   ANION GAP mmol/L 10 11   BUN mg/dL 24 15   CREATININE mg/dL 0 85 1 00   EGFR ml/min/1 73sq m 86 74   CALCIUM mg/dL 9 2 8 7   MAGNESIUM mg/dL  --  2 1     Results from last 7 days   Lab Units 07/22/20  0351   AST U/L 11*   ALT U/L 11   ALK PHOS U/L 65   TOTAL PROTEIN g/dL 6 4   ALBUMIN g/dL 4 2   TOTAL BILIRUBIN mg/dL 0 60     Results from last 7 days   Lab Units 07/23/20  0514 07/22/20  0351   GLUCOSE RANDOM mg/dL 191* 170*     Results from last 7 days   Lab Units 07/22/20  0447   PH ART  7 310*   PCO2 ART mm Hg 49 4*   PO2 ART mm Hg 44 0*   HCO3 ART mmol/L 24 0   BASE EXC ART mmol/L -2 9*   O2 CONTENT ART mL/dL 12 3   O2 HGB, ARTERIAL % 67 7*   ABG SOURCE  Radial, Left     Results from last 7 days   Lab Units 07/22/20  0351   PH GRETCHEN  7 380   PCO2 GRETCHEN mm Hg 35 4   PO2 GRETCHEN mm Hg 102 0*   HCO3 GRETCHEN mmol/L 20 6*   BASE EXC GRETCHEN mmol/L -3 3   O2 CONTENT GRETCHEN ml/dL 18 1   O2 HGB, VENOUS % 94 7     Results from last 7 days   Lab Units 07/22/20  0351   CK TOTAL U/L 33     Results from last 7 days   Lab Units 07/22/20  0351   TROPONIN I ng/mL <0 03     Results from last 7 days   Lab Units 07/22/20  0617   PROCALCITONIN ng/ml <0 05     Results from last 7 days   Lab Units 07/22/20  0351   LACTIC ACID mmol/L 1 1     Results from last 7 days   Lab Units 07/22/20  0351   BLOOD CULTURE  Received in Microbiology Lab  Culture in Progress  Received in Microbiology Lab  Culture in Progress  Results from last 7 days   Lab Units 07/23/20  0514   TOTAL COUNTED  100     7/22  Cxr= No acute cardiopulmonary disease  Ekg= Sinus rhythm with occasional Premature ventricular complexes  Nonspecific ST-t wave changes  ED Treatment:   Medication Administration from 07/22/2020 0326 to 07/22/2020 7507       Date/Time Order Dose Route Action     07/22/2020 0354 albuterol inhalation solution 10 mg 10 mg Nebulization Given     07/22/2020 0355 ipratropium (ATROVENT) 0 02 % inhalation solution 0 5 mg 0 5 mg Nebulization Given     07/22/2020 0355 sodium chloride 0 9 % inhalation solution 3 mL 3 mL Nebulization Given     07/22/2020 0351 methylPREDNISolone sodium succinate (Solu-MEDROL) injection 125 mg 125 mg Intravenous Given     07/22/2020 0406 sodium chloride 0 9 % bolus 500 mL 500 mL Intravenous New Bag        Past Medical History:   Diagnosis Date    BPH (benign prostatic hyperplasia)     45 days radiation treatment    COPD (chronic obstructive pulmonary disease)     Coronary artery disease     CABG x4 in 2017    Diabetes mellitus     History of Arterial Duplex of LE 12/26/2017    Likely occlusion of the left superficial femoral artery  Calcific changes bilaterally    Despite these changes, the ankle-brachial index as a measure of peripheral blood flow only mildly impaired   History of echocardiogram 06/12/2017    EF 40%, mild LVH, mild MR     Hyperlipidemia     Hypertension     NSTEMI (non-ST elevated myocardial infarction)     Prostate cancer     prostate     PVD (peripheral vascular disease)     Tremor      Present on Admission:   Acute respiratory failure with hypoxia (HCC)   CAD (coronary artery disease), native coronary artery   COPD exacerbation (HCC)   Essential hypertension   Tremor   Other hyperlipidemia   Type 2 diabetes mellitus without complication, without long-term current use of insulin (HCC)    Admitting Diagnosis: SOB (shortness of breath) [R06 02]  COPD exacerbation (HCC) [J44 1]  Acute respiratory failure with hypoxia ([facility] [address] ) [J96 01]  Age/Sex: 77 y o  male  Admission Orders:  Contact & airborne isolation  Scd/foot pumps    Scheduled Medications:  aspirin 81 mg Oral Every Other Day   azithromycin 500 mg Oral Q24H   Empagliflozin 10 mg Oral QAM   enoxaparin 40 mg Subcutaneous Daily   gabapentin 200 mg Oral HS   guaiFENesin 600 mg Oral BID   ipratropium 0 5 mg Nebulization TID   levalbuterol 1 25 mg Nebulization TID   lisinopril 40 mg Oral Daily   methylPREDNISolone sodium succinate 40 mg Intravenous Q8H   metoprolol tartrate 50 mg Oral BID     PRN Meds:  albuterol 2 5 mg Nebulization Q4H PRN     Network Utilization Review Department  [email or web address]  ATTENTION: Please call with any questions or concerns to [phone] and carefully listen to the prompts so that you are directed to the right person  All voicemails are confidential   Charmaine Skipper all requests for admission clinical reviews, approved or denied determinations and any other requests to dedicated fax number below belonging to the campus where the patient is receiving treatment   List of dedicated fax numbers for the Facilities:  [address] [place] [address] DENIALS (Administrative/Medical Necessity) [phone]   [address] (Maternity/NICU/Pediatrics) [phone]   [address] Viktori[facility] Santana [phone]   Denise Harden [phone]   Eleazar Chaney [phone]   Sakina Williamson [place] [address] [facility] [phone]   [facility]  [phone]   [address] [facility] [address] [facility]  [address] [phone]

## 2020-07-23 NOTE — ASSESSMENT & PLAN NOTE
· Blood pressure improved from yesterday as systolics in the [address]B to 383H    · Continue lisinopril and metoprolol  · Increased doses on 07/22; recommend continuing increased doses at discharge  · Monitor blood pressure with routine vitals

## 2020-07-23 NOTE — ASSESSMENT & PLAN NOTE
· Respiratory protocol  · Continued IV steroids at this time as patient is still requiring supplemental oxygen and is remaining tachypneic    · Consider long taper or weaning steroid dose on 07/23  · Added azithromycin for anti-inflammatory properties  · Wean off oxygen as able

## 2020-07-23 NOTE — RESPIRATORY THERAPY NOTE
RT Protocol Note  Niki Ordonez 77 y o  male MRN: [id number]  Unit/Bed#: -02 Encounter: [id number]    Assessment    Principal Problem:    Acute respiratory failure with hypoxia ([address] )  Active Problems:    Type 2 diabetes mellitus without complication, without long-term current use of insulin (HCC)    Other hyperlipidemia    Essential hypertension    CAD (coronary artery disease), native coronary artery    Tremor    COPD exacerbation (HCC)      Home Pulmonary Medications:    Home Devices/Therapy: Other (Comment)(Albuterol UDN, Albuterol MDI, Stiloto MDI)    Past Medical History:   Diagnosis Date    BPH (benign prostatic hyperplasia)     45 days radiation treatment    COPD (chronic obstructive pulmonary disease)     Coronary artery disease     CABG x4 in 2017    Diabetes mellitus     History of Arterial Duplex of LE 2017    Likely occlusion of the left superficial femoral artery  Calcific changes bilaterally  Despite these changes, the ankle-brachial index as a measure of peripheral blood flow only mildly impaired      History of echocardiogram 2017    EF 40%, mild LVH, mild MR     Hyperlipidemia     Hypertension     NSTEMI (non-ST elevated myocardial infarction)     Prostate cancer     prostate     PVD (peripheral vascular disease)     Tremor      Social History     Socioeconomic History    Marital status: /Civil Union     Spouse name: None    Number of children: None    Years of education: None    Highest education level: None   Occupational History    None   Social Needs    Financial resource strain: Not hard at all   Bossman-Emily insecurity:     Worry: Never true     Inability: Never true   [employer] needs:     Medical: No     Non-medical: No   Tobacco Use    Smoking status: Former Smoker     Packs/day: 1 00     Years: 60 00     Pack years: 60 00     Types: Cigarettes     Last attempt to quit: 12/15/2018     Years since quittin 6    Smokeless tobacco: Never Used  Tobacco comment: only smokes when he drinks beer   Substance and Sexual Activity    Alcohol use: Not Currently     Alcohol/week: 0 0 standard drinks     Frequency: Never     Drinks per session: Patient refused     Binge frequency: Never    Drug use: Never    Sexual activity: None   Lifestyle    Physical activity:     Days per week: None     Minutes per session: None    Stress: None   Relationships    Social connections:     Talks on phone: None     Gets together: None     Attends Church service: None     Active member of club or organization: None     Attends meetings of clubs or organizations: None     Relationship status: None    Intimate partner violence:     Fear of current or ex partner: None     Emotionally abused: None     Physically abused: None     Forced sexual activity: None   Other Topics Concern    None   Social History Narrative    None       Subjective         Objective    Physical Exam:   Assessment Type: (P) Pre-treatment  General Appearance: (P) Alert, Awake  Respiratory Pattern: (P) Normal  Chest Assessment: (P) Chest expansion symmetrical  Bilateral Breath Sounds: (P) Clear  Cough: (P) None  O2 Device: (P) Room air    Vitals:  Blood pressure 148/76, pulse 78, temperature 98 5 °F (36 9 °C), temperature source Temporal, resp  rate 18, height 5' 10 5" (1 791 m), weight 90 5 kg (199 lb 8 3 oz), SpO2 95 %  Results from last 7 days   Lab Units 07/22/20  0447   PH ART  7 310*   PCO2 ART mm Hg 49 4*   PO2 ART mm Hg 44 0*   HCO3 ART mmol/L 24 0   BASE EXC ART mmol/L -2 9*   O2 CONTENT ART mL/dL 12 3   O2 HGB, ARTERIAL % 67 7*   ABG SOURCE  Radial, Left   ROSA MARIA TEST  Yes       Imaging and other studies: I have personally reviewed pertinent reports  O2 Device: (P) Room air     Plan    Respiratory Plan: Home Bronchodilator Patient pathway        Resp Comments: (P) Assessed and administered neb tx as ordered, patient is now on RA with no noted SOB or distress   WIll continuie to monitor

## 2020-07-23 NOTE — DISCHARGE SUMMARY
Discharge- Jigna Chavira 1945, 77 y o  male MRN: [id number]    Unit/Bed#: -02 Encounter: [id number]    Primary Care Provider: Eriberto Ramos DO   Date and time admitted to hospital: 7/22/2020  3:28 AM    * Acute respiratory failure with hypoxia [facility])  Assessment & Plan  · Patient admission was initially on BiPAP and weaned off in the emergency room  · Wean off oxygen as able  · Patient currently satting appropriately on room air    COPD exacerbation ([address] )  Assessment & Plan  · Respiratory protocol  · Continued IV steroids at this time as patient is still requiring supplemental oxygen and is remaining tachypneic  · Consider long taper or weaning steroid dose on 07/23  · Added azithromycin for anti-inflammatory properties  · Wean off oxygen as able    Essential hypertension  Assessment & Plan  · Blood pressure improved from yesterday as systolics in the 609U to 988K  · Continue lisinopril and metoprolol  · Increased doses on 07/22; recommend continuing increased doses at discharge  · Monitor blood pressure with routine vitals    Other hyperlipidemia  Assessment & Plan  · Continue statin    Type 2 diabetes mellitus without complication, without long-term current use of insulin (HCC)  Assessment & Plan  Lab Results   Component Value Date    HGBA1C 8 2 (H) 12/27/2019       No results for input(s): POCGLU in the last 72 hours  Blood Sugar Average: Last 72 hrs:  ·  Sliding scale insulin coverage  · Controlled on current regimen at this time; would highly suspect patient to become hyperglycemic in the setting of IV steroid administration    · Will monitor with POCT BG and titrate regimen as indicated    CAD (coronary artery disease), native coronary artery  Assessment & Plan  · CAD with history of CABG x4  · Currently denies any chest pain  · Continue metoprolol, asa, statin  · Follow-up outpatient with regularly scheduled cardiology visit    Tremor  Assessment & Plan  · Patient reports he stopped primidone - now he does not get nose bleeds      Discharging Physician / Practitioner: Lizbeth Bazan  PCP: Candelaria Reveles DO  Admission Date:   Admission Orders (From admission, onward)     Ordered        07/22/20 0535  Place in Observation  Once                   Discharge Date: 07/23/20    Resolved Problems  Date Reviewed: 7/23/2020    None          Consultations During Hospital Stay:  · None    Procedures Performed:   · Chest x-ray    Significant Findings / Test Results:   · None    Incidental Findings:   · None     Test Results Pending at Discharge (will require follow up): · None     Outpatient Tests Requested:  · None    Complications:  None    Reason for Admission:  COPD exacerbation    Hospital Course:     Sharon Kumar is a 77 y o  male patient with past medical history of COPD, hypertension, hyperlipidemia, BPH, PVD, and coronary artery disease who originally presented to the hospital on 7/22/2020 due to worsening shortness of breath  On review of emergency medicine documentation it appears that he reported worsening shortness of breath and associated that with a cough  At 1 time in the emergency department he did require BiPAP however did not require it for long status post long nebulizer and IV steroids  He reports that he is scheduled to see his outpatient pulmonologist very soon  He was notably COVID-19 negative  Status post course of IV steroids patient improved significantly and currently satting appropriately on room air  Will discharge with long steroid taper and azithromycin for anti-inflammatory properties  Recommend following up with pulmonology at earliest convenience  Please see above list of diagnoses and related plan for additional information  Condition at Discharge: stable     Discharge Day Visit / Exam:     Subjective:  He reports that he is not feeling short of breath and feels significantly improved from yesterday    Vitals: Blood Pressure: 150/67 (07/23/20 2335)  Pulse: 79 (07/23/20 0718)  Temperature: 97 5 °F (36 4 °C) (07/23/20 0718)  Temp Source: Temporal (07/23/20 0718)  Respirations: 18 (07/23/20 0718)  Height: 5' 10 5" (179 1 cm) (07/22/20 0745)  Weight - Scale: 90 5 kg (199 lb 8 3 oz)(Standing scale , boxers, gown, socks ) (07/22/20 0745)  SpO2: 92 % (07/23/20 0730)  Exam:   Physical Exam   Constitutional: He is oriented to person, place, and time  He is cooperative  No distress  Cardiovascular: Normal rate, regular rhythm, normal heart sounds and intact distal pulses  Pulses:       Radial pulses are 2+ on the right side, and 2+ on the left side  No peripheral edema noted  Pulmonary/Chest: Effort normal and breath sounds normal  No accessory muscle usage  No tachypnea  No respiratory distress  He has no wheezes  Abdominal: Soft  Bowel sounds are normal  He exhibits no distension  There is no tenderness  Musculoskeletal: He exhibits no edema  Neurological: He is alert and oriented to person, place, and time  Skin: Skin is warm and dry  Capillary refill takes less than 2 seconds  He is not diaphoretic  Psychiatric: He has a normal mood and affect  His speech is normal and behavior is normal  Judgment normal    Vitals reviewed  Discussion with Family:  Patient reports he will be contacting his family himself  Discharge instructions/Information to patient and family:   See after visit summary for information provided to patient and family  Provisions for Follow-Up Care:  See after visit summary for information related to follow-up care and any pertinent home health orders  Disposition:     Home      Discharge Statement:  I spent 45 minutes discharging the patient  This time was spent on the day of discharge  I had direct contact with the patient on the day of discharge   Greater than 50% of the total time was spent examining patient, answering all patient questions, arranging and discussing plan of care with patient as well as directly providing post-discharge instructions  Additional time then spent on discharge activities  Discharge Medications:  See after visit summary for reconciled discharge medications provided to patient and family        ** Please Note: This note has been constructed using a voice recognition system **

## 2020-07-23 NOTE — ASSESSMENT & PLAN NOTE
· Patient admission was initially on BiPAP and weaned off in the emergency room  · Wean off oxygen as able  · Patient currently satting appropriately on room air

## 2020-07-27 LAB
BACTERIA BLD CULT: NORMAL
BACTERIA BLD CULT: NORMAL

## 2020-08-12 ENCOUNTER — HOSPITAL ENCOUNTER (INPATIENT)
Facility: HOSPITAL | Age: 75
LOS: 2 days | Discharge: HOME | DRG: 189 | End: 2020-08-15
Attending: EMERGENCY MEDICINE | Admitting: INTERNAL MEDICINE
Payer: MEDICARE

## 2020-08-12 ENCOUNTER — APPOINTMENT (EMERGENCY)
Dept: RADIOLOGY | Facility: HOSPITAL | Age: 75
DRG: 189 | End: 2020-08-12
Payer: MEDICARE

## 2020-08-12 DIAGNOSIS — J44.1 COPD EXACERBATION (HCC): Primary | ICD-10-CM

## 2020-08-12 LAB
ALBUMIN SERPL BCP-MCNC: 4.1 G/DL (ref 3.5–5.7)
ALP SERPL-CCNC: 64 U/L (ref 55–165)
ALT SERPL W P-5'-P-CCNC: 10 U/L (ref 7–52)
ANION GAP SERPL CALCULATED.3IONS-SCNC: 11 MMOL/L (ref 4–13)
AST SERPL W P-5'-P-CCNC: 9 U/L (ref 13–39)
BASOPHILS # BLD AUTO: 0.1 THOUSANDS/ΜL (ref 0–0.1)
BASOPHILS NFR BLD AUTO: 1 % (ref 0–2)
BILIRUB SERPL-MCNC: 0.6 MG/DL (ref 0.2–1)
BNP SERPL-MCNC: 183 PG/ML (ref 1–100)
BUN SERPL-MCNC: 19 MG/DL (ref 7–25)
CALCIUM SERPL-MCNC: 9.1 MG/DL (ref 8.6–10.5)
CHLORIDE SERPL-SCNC: 102 MMOL/L (ref 98–107)
CO2 SERPL-SCNC: 24 MMOL/L (ref 21–31)
CREAT SERPL-MCNC: 1.03 MG/DL (ref 0.7–1.3)
EOSINOPHIL # BLD AUTO: 0.5 THOUSAND/ΜL (ref 0–0.61)
EOSINOPHIL NFR BLD AUTO: 4 % (ref 0–5)
ERYTHROCYTE [DISTWIDTH] IN BLOOD BY AUTOMATED COUNT: 14.6 % (ref 11.5–14.5)
GFR SERPL CREATININE-BSD FRML MDRD: 71 ML/MIN/1.73SQ M
GLUCOSE SERPL-MCNC: 197 MG/DL (ref 65–99)
HCT VFR BLD AUTO: 40.5 % (ref 42–47)
HGB BLD-MCNC: 13.7 G/DL (ref 14–18)
LYMPHOCYTES # BLD AUTO: 1.2 THOUSANDS/ΜL (ref 0.6–4.47)
LYMPHOCYTES NFR BLD AUTO: 10 % (ref 21–51)
MCH RBC QN AUTO: 31.1 PG (ref 26–34)
MCHC RBC AUTO-ENTMCNC: 34 G/DL (ref 31–37)
MCV RBC AUTO: 92 FL (ref 81–99)
MONOCYTES # BLD AUTO: 0.8 THOUSAND/ΜL (ref 0.17–1.22)
MONOCYTES NFR BLD AUTO: 6 % (ref 2–12)
NEUTROPHILS # BLD AUTO: 9.9 THOUSANDS/ΜL (ref 1.4–6.5)
NEUTS SEG NFR BLD AUTO: 79 % (ref 42–75)
PLATELET # BLD AUTO: 169 THOUSANDS/UL (ref 149–390)
PMV BLD AUTO: 9.5 FL (ref 8.6–11.7)
POTASSIUM SERPL-SCNC: 4 MMOL/L (ref 3.5–5.5)
PROT SERPL-MCNC: 6.6 G/DL (ref 6.4–8.9)
RBC # BLD AUTO: 4.42 MILLION/UL (ref 4.3–5.9)
SODIUM SERPL-SCNC: 137 MMOL/L (ref 134–143)
WBC # BLD AUTO: 12.4 THOUSAND/UL (ref 4.8–10.8)

## 2020-08-12 PROCEDURE — 94760 N-INVAS EAR/PLS OXIMETRY 1: CPT

## 2020-08-12 PROCEDURE — 93010 ELECTROCARDIOGRAM REPORT: CPT | Performed by: INTERNAL MEDICINE

## 2020-08-12 PROCEDURE — 96365 THER/PROPH/DIAG IV INF INIT: CPT

## 2020-08-12 PROCEDURE — 96375 TX/PRO/DX INJ NEW DRUG ADDON: CPT

## 2020-08-12 PROCEDURE — 85730 THROMBOPLASTIN TIME PARTIAL: CPT | Performed by: EMERGENCY MEDICINE

## 2020-08-12 PROCEDURE — 94002 VENT MGMT INPAT INIT DAY: CPT

## 2020-08-12 PROCEDURE — 71045 X-RAY EXAM CHEST 1 VIEW: CPT

## 2020-08-12 PROCEDURE — 85610 PROTHROMBIN TIME: CPT | Performed by: EMERGENCY MEDICINE

## 2020-08-12 PROCEDURE — 99291 CRITICAL CARE FIRST HOUR: CPT

## 2020-08-12 PROCEDURE — 96366 THER/PROPH/DIAG IV INF ADDON: CPT

## 2020-08-12 PROCEDURE — 36415 COLL VENOUS BLD VENIPUNCTURE: CPT | Performed by: EMERGENCY MEDICINE

## 2020-08-12 PROCEDURE — 80053 COMPREHEN METABOLIC PANEL: CPT | Performed by: EMERGENCY MEDICINE

## 2020-08-12 PROCEDURE — 99285 EMERGENCY DEPT VISIT HI MDM: CPT | Performed by: EMERGENCY MEDICINE

## 2020-08-12 PROCEDURE — 83880 ASSAY OF NATRIURETIC PEPTIDE: CPT | Performed by: EMERGENCY MEDICINE

## 2020-08-12 PROCEDURE — 93005 ELECTROCARDIOGRAM TRACING: CPT

## 2020-08-12 PROCEDURE — 85025 COMPLETE CBC W/AUTO DIFF WBC: CPT | Performed by: EMERGENCY MEDICINE

## 2020-08-12 PROCEDURE — 94640 AIRWAY INHALATION TREATMENT: CPT

## 2020-08-12 RX ORDER — MAGNESIUM SULFATE HEPTAHYDRATE 40 MG/ML
2 INJECTION, SOLUTION INTRAVENOUS ONCE
Status: COMPLETED | OUTPATIENT
Start: 2020-08-12 | End: 2020-08-13

## 2020-08-12 RX ORDER — METHYLPREDNISOLONE SODIUM SUCCINATE 125 MG/2ML
125 INJECTION, POWDER, LYOPHILIZED, FOR SOLUTION INTRAMUSCULAR; INTRAVENOUS ONCE
Status: COMPLETED | OUTPATIENT
Start: 2020-08-12 | End: 2020-08-12

## 2020-08-12 RX ORDER — ALBUTEROL SULFATE 2.5 MG/3ML
2.5 SOLUTION RESPIRATORY (INHALATION) ONCE
Status: COMPLETED | OUTPATIENT
Start: 2020-08-12 | End: 2020-08-12

## 2020-08-12 RX ORDER — IPRATROPIUM BROMIDE AND ALBUTEROL SULFATE 2.5; .5 MG/3ML; MG/3ML
3 SOLUTION RESPIRATORY (INHALATION)
Status: DISCONTINUED | OUTPATIENT
Start: 2020-08-12 | End: 2020-08-13

## 2020-08-12 RX ADMIN — IPRATROPIUM BROMIDE AND ALBUTEROL SULFATE 3 ML: 2.5; .5 SOLUTION RESPIRATORY (INHALATION) at 22:30

## 2020-08-12 RX ADMIN — MAGNESIUM SULFATE IN WATER 2 G: 40 INJECTION, SOLUTION INTRAVENOUS at 22:10

## 2020-08-12 RX ADMIN — ALBUTEROL SULFATE 2.5 MG: 2.5 SOLUTION RESPIRATORY (INHALATION) at 22:08

## 2020-08-12 RX ADMIN — METHYLPREDNISOLONE SODIUM SUCCINATE 125 MG: 125 INJECTION, POWDER, FOR SOLUTION INTRAMUSCULAR; INTRAVENOUS at 22:06

## 2020-08-13 PROBLEM — E11.9 DIABETES (HCC): Status: ACTIVE | Noted: 2020-08-13

## 2020-08-13 PROBLEM — J44.1 COPD WITH ACUTE EXACERBATION (HCC): Status: ACTIVE | Noted: 2020-08-13

## 2020-08-13 LAB
APTT PPP: 34 SECONDS (ref 23–37)
ATRIAL RATE: 84 BPM
GLUCOSE SERPL-MCNC: 189 MG/DL (ref 65–140)
GLUCOSE SERPL-MCNC: 195 MG/DL (ref 65–140)
GLUCOSE SERPL-MCNC: 249 MG/DL (ref 65–140)
GLUCOSE SERPL-MCNC: 281 MG/DL (ref 65–140)
GLUCOSE SERPL-MCNC: 315 MG/DL (ref 65–140)
INR PPP: 1.06 (ref 0.84–1.19)
P AXIS: 67 DEGREES
PLATELET # BLD AUTO: 160 THOUSANDS/UL (ref 149–390)
PR INTERVAL: 178 MS
PROCALCITONIN SERPL-MCNC: <0.05 NG/ML
PROTHROMBIN TIME: 13.8 SECONDS (ref 11.6–14.5)
QRS AXIS: 83 DEGREES
QRSD INTERVAL: 100 MS
QT INTERVAL: 414 MS
QTC INTERVAL: 489 MS
SARS-COV-2 RNA RESP QL NAA+PROBE: NEGATIVE
T WAVE AXIS: 102 DEGREES
TROPONIN I SERPL-MCNC: <0.03 NG/ML
TROPONIN I SERPL-MCNC: <0.03 NG/ML
VENTRICULAR RATE: 84 BPM

## 2020-08-13 PROCEDURE — 99223 1ST HOSP IP/OBS HIGH 75: CPT | Performed by: NURSE PRACTITIONER

## 2020-08-13 PROCEDURE — 85049 AUTOMATED PLATELET COUNT: CPT | Performed by: NURSE PRACTITIONER

## 2020-08-13 PROCEDURE — 94640 AIRWAY INHALATION TREATMENT: CPT

## 2020-08-13 PROCEDURE — 84484 ASSAY OF TROPONIN QUANT: CPT | Performed by: NURSE PRACTITIONER

## 2020-08-13 PROCEDURE — 87635 SARS-COV-2 COVID-19 AMP PRB: CPT | Performed by: INTERNAL MEDICINE

## 2020-08-13 PROCEDURE — 84145 PROCALCITONIN (PCT): CPT | Performed by: NURSE PRACTITIONER

## 2020-08-13 PROCEDURE — 94660 CPAP INITIATION&MGMT: CPT

## 2020-08-13 PROCEDURE — 82948 REAGENT STRIP/BLOOD GLUCOSE: CPT

## 2020-08-13 PROCEDURE — 99222 1ST HOSP IP/OBS MODERATE 55: CPT | Performed by: INTERNAL MEDICINE

## 2020-08-13 PROCEDURE — 94760 N-INVAS EAR/PLS OXIMETRY 1: CPT

## 2020-08-13 RX ORDER — ROSUVASTATIN CALCIUM 20 MG/1
20 TABLET, COATED ORAL
Status: ON HOLD | COMMUNITY
End: 2020-09-03 | Stop reason: SDUPTHER

## 2020-08-13 RX ORDER — GUAIFENESIN 600 MG
600 TABLET, EXTENDED RELEASE 12 HR ORAL 2 TIMES DAILY
Status: DISCONTINUED | OUTPATIENT
Start: 2020-08-13 | End: 2020-08-15 | Stop reason: HOSPADM

## 2020-08-13 RX ORDER — ASPIRIN 81 MG/1
81 TABLET, CHEWABLE ORAL DAILY
Status: ON HOLD | COMMUNITY
End: 2020-09-02 | Stop reason: CLARIF

## 2020-08-13 RX ORDER — CEFTRIAXONE 1 G/50ML
1000 INJECTION, SOLUTION INTRAVENOUS ONCE
Status: COMPLETED | OUTPATIENT
Start: 2020-08-13 | End: 2020-08-13

## 2020-08-13 RX ORDER — ATORVASTATIN CALCIUM 40 MG/1
40 TABLET, FILM COATED ORAL
Status: DISCONTINUED | OUTPATIENT
Start: 2020-08-13 | End: 2020-08-15 | Stop reason: HOSPADM

## 2020-08-13 RX ORDER — GABAPENTIN 100 MG/1
200 CAPSULE ORAL
Status: DISCONTINUED | OUTPATIENT
Start: 2020-08-13 | End: 2020-08-15 | Stop reason: HOSPADM

## 2020-08-13 RX ORDER — ALBUTEROL SULFATE 2.5 MG/3ML
2.5 SOLUTION RESPIRATORY (INHALATION) EVERY 4 HOURS PRN
Status: DISCONTINUED | OUTPATIENT
Start: 2020-08-13 | End: 2020-08-15 | Stop reason: HOSPADM

## 2020-08-13 RX ORDER — LEVALBUTEROL 1.25 MG/.5ML
1.25 SOLUTION, CONCENTRATE RESPIRATORY (INHALATION)
Status: DISCONTINUED | OUTPATIENT
Start: 2020-08-13 | End: 2020-08-15 | Stop reason: HOSPADM

## 2020-08-13 RX ORDER — CALCIUM CARBONATE 200(500)MG
1000 TABLET,CHEWABLE ORAL DAILY PRN
Status: DISCONTINUED | OUTPATIENT
Start: 2020-08-13 | End: 2020-08-15 | Stop reason: HOSPADM

## 2020-08-13 RX ORDER — ASPIRIN 81 MG/1
81 TABLET, CHEWABLE ORAL DAILY
Status: DISCONTINUED | OUTPATIENT
Start: 2020-08-13 | End: 2020-08-15 | Stop reason: HOSPADM

## 2020-08-13 RX ORDER — GLIMEPIRIDE 1 MG/1
1 TABLET ORAL 2 TIMES DAILY
Status: ON HOLD | COMMUNITY
End: 2020-09-03 | Stop reason: SDUPTHER

## 2020-08-13 RX ORDER — METHYLPREDNISOLONE SODIUM SUCCINATE 40 MG/ML
40 INJECTION, POWDER, LYOPHILIZED, FOR SOLUTION INTRAMUSCULAR; INTRAVENOUS EVERY 12 HOURS SCHEDULED
Status: COMPLETED | OUTPATIENT
Start: 2020-08-13 | End: 2020-08-14

## 2020-08-13 RX ORDER — LISINOPRIL 20 MG/1
20 TABLET ORAL DAILY
Status: DISCONTINUED | OUTPATIENT
Start: 2020-08-13 | End: 2020-08-15 | Stop reason: HOSPADM

## 2020-08-13 RX ORDER — ONDANSETRON 2 MG/ML
4 INJECTION INTRAMUSCULAR; INTRAVENOUS EVERY 6 HOURS PRN
Status: DISCONTINUED | OUTPATIENT
Start: 2020-08-13 | End: 2020-08-15 | Stop reason: HOSPADM

## 2020-08-13 RX ORDER — GABAPENTIN 100 MG/1
200 CAPSULE ORAL
Status: ON HOLD | COMMUNITY
End: 2020-09-02 | Stop reason: CLARIF

## 2020-08-13 RX ORDER — BUDESONIDE 0.5 MG/2ML
0.5 INHALANT ORAL
Status: DISCONTINUED | OUTPATIENT
Start: 2020-08-13 | End: 2020-08-15

## 2020-08-13 RX ORDER — ALOGLIPTIN 25 MG/1
12.5 TABLET, FILM COATED ORAL
Status: ON HOLD | COMMUNITY
End: 2020-09-03 | Stop reason: SDUPTHER

## 2020-08-13 RX ORDER — LISINOPRIL 20 MG/1
20 TABLET ORAL DAILY
Status: ON HOLD | COMMUNITY
End: 2020-09-03

## 2020-08-13 RX ORDER — BENZONATATE 100 MG/1
100 CAPSULE ORAL 3 TIMES DAILY PRN
Status: DISCONTINUED | OUTPATIENT
Start: 2020-08-13 | End: 2020-08-15 | Stop reason: HOSPADM

## 2020-08-13 RX ORDER — METHYLPREDNISOLONE SODIUM SUCCINATE 40 MG/ML
40 INJECTION, POWDER, LYOPHILIZED, FOR SOLUTION INTRAMUSCULAR; INTRAVENOUS EVERY 8 HOURS
Status: DISCONTINUED | OUTPATIENT
Start: 2020-08-13 | End: 2020-08-13

## 2020-08-13 RX ADMIN — INSULIN LISPRO 1 UNITS: 100 INJECTION, SOLUTION INTRAVENOUS; SUBCUTANEOUS at 08:19

## 2020-08-13 RX ADMIN — INSULIN LISPRO 1 UNITS: 100 INJECTION, SOLUTION INTRAVENOUS; SUBCUTANEOUS at 02:28

## 2020-08-13 RX ADMIN — IPRATROPIUM BROMIDE AND ALBUTEROL SULFATE 3 ML: 2.5; .5 SOLUTION RESPIRATORY (INHALATION) at 01:01

## 2020-08-13 RX ADMIN — LEVALBUTEROL HYDROCHLORIDE 1.25 MG: 1.25 SOLUTION, CONCENTRATE RESPIRATORY (INHALATION) at 07:57

## 2020-08-13 RX ADMIN — GUAIFENESIN 600 MG: 600 TABLET, EXTENDED RELEASE ORAL at 10:04

## 2020-08-13 RX ADMIN — METOPROLOL TARTRATE 25 MG: 25 TABLET, FILM COATED ORAL at 21:22

## 2020-08-13 RX ADMIN — LISINOPRIL 20 MG: 20 TABLET ORAL at 16:31

## 2020-08-13 RX ADMIN — IPRATROPIUM BROMIDE 0.5 MG: 0.5 SOLUTION RESPIRATORY (INHALATION) at 19:51

## 2020-08-13 RX ADMIN — INSULIN LISPRO 3 UNITS: 100 INJECTION, SOLUTION INTRAVENOUS; SUBCUTANEOUS at 11:55

## 2020-08-13 RX ADMIN — ATORVASTATIN CALCIUM 40 MG: 40 TABLET, FILM COATED ORAL at 16:31

## 2020-08-13 RX ADMIN — LEVALBUTEROL HYDROCHLORIDE 1.25 MG: 1.25 SOLUTION, CONCENTRATE RESPIRATORY (INHALATION) at 14:17

## 2020-08-13 RX ADMIN — GABAPENTIN 200 MG: 100 CAPSULE ORAL at 21:22

## 2020-08-13 RX ADMIN — GUAIFENESIN 600 MG: 600 TABLET, EXTENDED RELEASE ORAL at 17:54

## 2020-08-13 RX ADMIN — IPRATROPIUM BROMIDE 0.5 MG: 0.5 SOLUTION RESPIRATORY (INHALATION) at 14:17

## 2020-08-13 RX ADMIN — LEVALBUTEROL HYDROCHLORIDE 1.25 MG: 1.25 SOLUTION, CONCENTRATE RESPIRATORY (INHALATION) at 19:51

## 2020-08-13 RX ADMIN — ASPIRIN 81 MG 81 MG: 81 TABLET ORAL at 16:31

## 2020-08-13 RX ADMIN — BUDESONIDE 0.5 MG: 0.5 INHALANT RESPIRATORY (INHALATION) at 19:51

## 2020-08-13 RX ADMIN — CEFTRIAXONE 1000 MG: 1 INJECTION, SOLUTION INTRAVENOUS at 00:36

## 2020-08-13 RX ADMIN — METHYLPREDNISOLONE SODIUM SUCCINATE 40 MG: 40 INJECTION, POWDER, FOR SOLUTION INTRAMUSCULAR; INTRAVENOUS at 21:21

## 2020-08-13 RX ADMIN — ENOXAPARIN SODIUM 40 MG: 40 INJECTION SUBCUTANEOUS at 10:03

## 2020-08-13 RX ADMIN — INSULIN LISPRO 2 UNITS: 100 INJECTION, SOLUTION INTRAVENOUS; SUBCUTANEOUS at 21:22

## 2020-08-13 RX ADMIN — IPRATROPIUM BROMIDE 0.5 MG: 0.5 SOLUTION RESPIRATORY (INHALATION) at 07:54

## 2020-08-13 RX ADMIN — METHYLPREDNISOLONE SODIUM SUCCINATE 40 MG: 40 INJECTION, POWDER, FOR SOLUTION INTRAMUSCULAR; INTRAVENOUS at 05:47

## 2020-08-13 RX ADMIN — INSULIN LISPRO 3 UNITS: 100 INJECTION, SOLUTION INTRAVENOUS; SUBCUTANEOUS at 16:21

## 2020-08-13 NOTE — PLAN OF CARE
Problem: Potential for Falls  Goal: Patient will remain free of falls  Description: INTERVENTIONS:  - Assess patient frequently for physical needs  -  Identify cognitive and physical deficits and behaviors that affect risk of falls  -  [place] fall precautions as indicated by assessment   - Educate patient/family on patient safety including physical limitations  - Instruct patient to call for assistance with activity based on assessment  - Modify environment to reduce risk of injury  - Consider OT/PT consult to assist with strengthening/mobility  Outcome: Progressing     Problem: DISCHARGE PLANNING  Goal: Discharge to home or other facility with appropriate resources  Description: INTERVENTIONS:  - Identify barriers to discharge w/patient and caregiver  - Arrange for needed discharge resources and transportation as appropriate  - Identify discharge learning needs (meds, wound care, etc )  - Refer to Case Management Department for coordinating discharge planning if the patient needs post-hospital services based on physician/advanced practitioner order or complex needs related to functional status, cognitive ability, or social support system  Outcome: Progressing     Problem: Knowledge Deficit  Goal: Patient/family/caregiver demonstrates understanding of disease process, treatment plan, medications, and discharge instructions  Description: Complete learning assessment and assess knowledge base    Interventions:  - Provide teaching at level of understanding  - Provide teaching via preferred learning methods  Outcome: Progressing     Problem: RESPIRATORY - ADULT  Goal: Achieves optimal ventilation and oxygenation  Description: INTERVENTIONS:  - Assess for changes in respiratory status  - Assess for changes in mentation and behavior  - Position to facilitate oxygenation and minimize respiratory effort  - Oxygen administered by appropriate delivery if ordered  - Initiate smoking cessation education as indicated  - Encourage broncho-pulmonary hygiene including cough, deep breathe, Incentive Spirometry  - Assess the need for suctioning and aspirate as needed  - Assess and instruct to report SOB or any respiratory difficulty  - Respiratory Therapy support as indicated  Outcome: Progressing

## 2020-08-13 NOTE — ASSESSMENT & PLAN NOTE
No results found for: HGBA1C    Recent Labs     08/13/20  0217 08/13/20  0629 08/13/20  1127   POCGLU 195* 189* 315*       Blood Sugar Average: Last 72 hrs:  (P) 233  Obtain hemoglobin A1c - last checked 12/2019 and elevated 8 2  Suspected hyperglycemia due to steroids, will increase SSI and monitor   Hold home oral medications

## 2020-08-13 NOTE — RESPIRATORY THERAPY NOTE
Resp Care      08/12/20 8313   Non-Invasive Information   Interface Face mask   Non-Invasive Ventilation Mode BiPAP   $ Continous NIV Initial   SpO2 96 %   $ Pulse Oximetry Spot Check Charge Completed   Resp Comments pt brought in via ems on cpap transistioned over to bipap in er by RT  Pt tolerated well  issued albuterol neb and then duoneb via inline with bipap   SpO2 97% ort greater   Non-Invasive Settings   IPAP (cm) 16 cm   EPAP (cm) 8 cm   Rate (Set) 15   FiO2 (%) 50   Pressure Support (cm H2O) 8   Rise Time 3   Inspiratory Time (Set) 1   Non-Invasive Readings   Total Rate 25   MV (Mech) 6 9   Peak Pressure (Obs) 16   Spontaneous Vt (mL) 374   Leak (lpm) 25   Skin Intervention Skin intact   Non-Invasive Alarms   Insp Pressure High (cm H20) 35   Insp Pressure Low (cm H20) 4   Low Insp Pressure Time (sec) 20 sec   MV Low (L/min) 2   Vt High (mL) 1300   Vt Low (mL) 200   High Resp Rate (BPM) 35 BPM   Low Resp Rate (BPM) 4 BPM   Apnea Interval (sec) 20

## 2020-08-13 NOTE — H&P
H&P- France Apple 1945, 77 y o  male MRN: [id number]    Unit/Bed#: ED 04 Encounter: [id number]    Primary Care Provider: La Nena Patino DO   Date and time admitted to hospital: 8/12/2020 10:11 PM        * COPD with acute exacerbation ([facility] )  Assessment & Plan  · Known history of COPD now with acute exacerbation  · Patient initially came in requiring CPAP then BiPAP patient was able to eventually being transition back to nasal cannula  · Initiate IV Solu-Medrol 40 mg q 8 hours  · DuoNeb q 6 hours  · Mucinex  · Procalcitonin now and in a m  · Patient was given 1 dose of ceftriaxone monitor off antibiotics patient is afebrile  · Wean oxygen maintaining oxygen saturations greater than 90%  · Respiratory protocol      Diabetes ([place] [address] )  Assessment & Plan  No results found for: HGBA1C    No results for input(s): POCGLU in the last 72 hours  Blood Sugar Average: Last 72 hrs:    Obtain hemoglobin A1c  Initiate SSI and blood glucose monitoring        VTE Prophylaxis: Enoxaparin (Lovenox)  / sequential compression device   Code Status:  Full code  POLST: POLST is not applicable to this patient  Discussion with family:  None at bedside    Anticipated Length of Stay:  Patient will be admitted on an Observation basis with an anticipated length of stay of  < 2 midnights  Justification for Hospital Stay:  COPD exacerbation    Total Time for Visit, including Counseling / Coordination of Care: 35 minutes  Greater than 50% of this total time spent on direct patient counseling and coordination of care  Chief Complaint:   Been having trouble breathing    History of Present Illness:    France Apple is a 77 y o  male who presents with known history of COPD and diabetes who has generally been in good health    Patient reports him in his wife and inquire in [place] home not around any sick contact when he started with sudden shortness of breath yesterday he was doing his nebulizer treatments without little improvement when he became significant stress called EMS and had a brat him to the ED where patient required CPAP to BiPAP and eventually was weaned down to nasal cannula  Patient denies fevers chills chest pain headache dizziness abdominal pain diarrhea difficulties urinating  Patient reports he was due to see his outpatient pulmonologist tomorrow  Patient question regarding his home medications and patient reports he is struggling to remember them and his wife is not at the bedside currently  Patient does endorse dyspnea with exertion, orthopnea, and discussion on dyspnea prior to this  Review of Systems:    Review of Systems   Constitutional: Positive for activity change and fatigue  Negative for appetite change, chills and fever  HENT: Negative  Respiratory: Positive for cough, chest tightness, shortness of breath and wheezing  Cardiovascular: Negative for chest pain, palpitations and leg swelling  Gastrointestinal: Negative  Genitourinary: Negative  Musculoskeletal: Negative  Neurological: Negative for dizziness, weakness, light-headedness and headaches  Psychiatric/Behavioral: Negative  Past Medical and Surgical History:     Past Medical History:   Diagnosis Date    COPD (chronic obstructive pulmonary disease) ([facility] )     Diabetes mellitus ([facility] )        No past surgical history on file  Meds/Allergies:    Prior to Admission medications    Not on File     I have been unable to obtain / verify an up to date medication list despite all reasonable attempts      Allergies: No Known Allergies    Social History:     Marital Status: /Civil Union   Occupation:    Patient Pre-hospital Living Situation:    Patient Pre-hospital Level of Mobility:    Patient Pre-hospital Diet Restrictions:    Substance Use History:   Social History     Substance and Sexual Activity   Alcohol Use Yes     Social History     Tobacco Use   Smoking Status Never Smoker   Smokeless Tobacco Never Used     Social History Substance and Sexual Activity   Drug Use Yes       Family History:    non-contributory    Physical Exam:     Vitals:   Blood Pressure: (!) 177/79 (08/13/20 0030)  Pulse: 68 (08/13/20 0030)  Temperature: (!) 97 3 °F (36 3 °C) (08/12/20 2219)  Temp Source: Temporal (08/12/20 2219)  Respirations: (!) 28 (08/12/20 2245)  Height: 5' 10" (177 8 cm) (08/12/20 2219)  Weight - Scale: 94 3 kg (208 lb) (08/12/20 2219)  SpO2: 96 % (08/13/20 0030)    Physical Exam  Constitutional:       Appearance: He is obese  HENT:      Head: Normocephalic and atraumatic  Nose: Nose normal    Eyes:      Pupils: Pupils are equal, round, and reactive to light  Neck:      Musculoskeletal: Normal range of motion  Cardiovascular:      Rate and Rhythm: Normal rate and regular rhythm  Pulmonary:      Effort: Accessory muscle usage present  No tachypnea or respiratory distress  Breath sounds: Decreased air movement present  Examination of the right-upper field reveals decreased breath sounds and wheezing  Examination of the left-upper field reveals decreased breath sounds and wheezing  Examination of the right-middle field reveals decreased breath sounds and wheezing  Examination of the right-lower field reveals decreased breath sounds and wheezing  Examination of the left-lower field reveals decreased breath sounds and wheezing  Decreased breath sounds and wheezing present  No rhonchi or rales  Abdominal:      General: Bowel sounds are normal       Palpations: Abdomen is soft  Musculoskeletal: Normal range of motion  Skin:     General: Skin is warm and dry  Neurological:      General: No focal deficit present  Mental Status: He is alert and oriented to person, place, and time  Psychiatric:         Mood and Affect: Mood normal          Behavior: Behavior normal              Additional Data:     Lab Results: I have personally reviewed pertinent reports        Results from last 7 days   Lab Units 08/12/20  6941   WBC Thousand/uL 12 40*   HEMOGLOBIN g/dL 13 7*   HEMATOCRIT % 40 5*   PLATELETS Thousands/uL 169   NEUTROS PCT % 79*   LYMPHS PCT % 10*   MONOS PCT % 6   EOS PCT % 4     Results from last 7 days   Lab Units 08/12/20  2254   SODIUM mmol/L 137   POTASSIUM mmol/L 4 0   CHLORIDE mmol/L 102   CO2 mmol/L 24   BUN mg/dL 19   CREATININE mg/dL 1 03   ANION GAP mmol/L 11   CALCIUM mg/dL 9 1   ALBUMIN g/dL 4 1   TOTAL BILIRUBIN mg/dL 0 60   ALK PHOS U/L 64   ALT U/L 10   AST U/L 9*   GLUCOSE RANDOM mg/dL 197*     Results from last 7 days   Lab Units 08/12/20  2351   INR  1 06                   Imaging: I have personally reviewed pertinent reports  and I have personally reviewed pertinent films in PACS    XR chest 1 view portable    (Results Pending)       EKG, Pathology, and Other Studies Reviewed on Admission:   · EKG:      Allscripts / Epic Records Reviewed: Yes     ** Please Note: This note has been constructed using a voice recognition system   **

## 2020-08-13 NOTE — ASSESSMENT & PLAN NOTE
· Known history of COPD now with acute exacerbation, reports affected by the weather at times  · Patient initially came in requiring CPAP then BiPAP patient was able to eventually being transitioned to nasal cannula  · Continue current dose of IV Solu-Medrol 40 mg q 8 hours pending pulmonary evaluation  · DuoNeb q 6 hours, Mucinex  · Procalcitonin normal, mild leukocytosis will trend - CXR shows no acute infiltrate  · Patient was given 1 dose of ceftriaxone monitor off antibiotics patient is afebrile  · Wean oxygen maintaining oxygen saturations greater than 90%  · Respiratory protocol  · COVID-19 test pending, low suspicion due to self isolation reported, additionally has had 2 negative tests in May and June this year

## 2020-08-13 NOTE — RESPIRATORY THERAPY NOTE
Resp Care      08/12/20 3551   Respiratory Assessment   Resp Comments Pt taken off bipap and placed on 3 L NC

## 2020-08-13 NOTE — ASSESSMENT & PLAN NOTE
No results found for: HGBA1C    No results for input(s): POCGLU in the last 72 hours      Blood Sugar Average: Last 72 hrs:    Obtain hemoglobin A1c  Initiate SSI and blood glucose monitoring

## 2020-08-13 NOTE — ED PROVIDER NOTES
History  Chief Complaint   Patient presents with    Respiratory Distress     Pt has COPD hx  Pt states he's been SOB for about 2 hrs  Pt states he had at least 4 nebs at home with little relief  Per EMS, pt about 83% RA on arrival  Pt started on CPAP en route  Sob that started this AM, states it resolved fully with two alb nebs  No sob for rest of the day  two hours pta he began with sob again  Ems reported wheezing, diaphreotic, 02 sat 83% and sev distress  placed on cpap and he feels 100% better  denies fever, uri sx, leg swelling  Hx of copd, cabg  States he has cp but only from work of breathing  States this is consistent with his previous copd exacerbations  Prior to Admission Medications   Prescriptions Last Dose Informant Patient Reported? Taking?    Alogliptin Benzoate 25 MG TABS  Spouse/Significant Other Yes Yes   Sig: Take 12 5 mg by mouth Patient states takes 1/2 of a 25 mg tablet every AM   Empagliflozin 25 MG TABS  Spouse/Significant Other Yes Yes   Sig: Take 25 mg by mouth every morning Take one half tablet every morning   aspirin 81 mg chewable tablet  Spouse/Significant Other Yes Yes   Sig: Chew 81 mg daily   gabapentin (NEURONTIN) 100 mg capsule  Spouse/Significant Other Yes Yes   Sig: Take 200 mg by mouth daily at bedtime   glimepiride (AMARYL) 1 mg tablet  Spouse/Significant Other Yes Yes   Sig: Take 1 mg by mouth 2 (two) times a day   lisinopril (ZESTRIL) 20 mg tablet  Spouse/Significant Other Yes Yes   Sig: Take 20 mg by mouth daily   metFORMIN (GLUCOPHAGE) 500 mg tablet  Spouse/Significant Other Yes Yes   Sig: Take 500 mg by mouth daily after dinner   metoprolol tartrate (LOPRESSOR) 25 mg tablet  Spouse/Significant Other Yes Yes   Sig: Take 25 mg by mouth every 12 (twelve) hours   rosuvastatin (CRESTOR) 20 MG tablet  Spouse/Significant Other Yes Yes   Sig: Take 20 mg by mouth daily after dinner Take one half tablet daily with dinner      Facility-Administered Medications: None Past Medical History:   Diagnosis Date    [facility]     Diabetes mellitus ([facility] )        History reviewed  No pertinent surgical history  History reviewed  No pertinent family history  I have reviewed and agree with the history as documented  E-Cigarette/Vaping    E-Cigarette Use Never User      E-Cigarette/Vaping Substances     Social History     Tobacco Use    Smoking status: Never Smoker    Smokeless tobacco: Never Used   Substance Use Topics    Alcohol use: Yes    Drug use: Yes       Review of Systems   All other systems reviewed and are negative  Physical Exam  Physical Exam  Constitutional:       General: He is not in acute distress  Appearance: He is not diaphoretic  HENT:      Head: Normocephalic and atraumatic  Right Ear: External ear normal       Left Ear: External ear normal       Nose: Nose normal       Mouth/Throat:      Pharynx: No oropharyngeal exudate  Eyes:      General:         Right eye: No discharge  Left eye: No discharge  Conjunctiva/sclera: Conjunctivae normal       Pupils: Pupils are equal, round, and reactive to light  Neck:      Musculoskeletal: Normal range of motion and neck supple  Vascular: No JVD  Trachea: No tracheal deviation  Cardiovascular:      Heart sounds: Normal heart sounds  No murmur  No friction rub  No gallop  Pulmonary:      Effort: No respiratory distress  Breath sounds: No stridor  Wheezing present  No rhonchi or rales  Comments: Significantly diminished bl with scant wheezing  Near silent wheezing  On bipap  Chest:      Chest wall: No tenderness  Abdominal:      General: Bowel sounds are normal  There is no distension  Palpations: Abdomen is soft  There is no mass  Tenderness: There is no abdominal tenderness  There is no guarding or rebound  Hernia: No hernia is present  Musculoskeletal: Normal range of motion           General: No tenderness or deformity  Skin:     General: Skin is warm and dry  Capillary Refill: Capillary refill takes less than 2 seconds  Coloration: Skin is not pale  Findings: No rash  Neurological:      Mental Status: He is alert and oriented to person, place, and time  Sensory: No sensory deficit  Motor: No abnormal muscle tone        Deep Tendon Reflexes: Reflexes normal          Vital Signs  ED Triage Vitals   Temperature Pulse Respirations Blood Pressure SpO2   08/12/20 2219 08/12/20 2215 08/12/20 2215 08/12/20 2215 08/12/20 2208   (!) 97 3 °F (36 3 °C) 87 16 135/70 96 %      Temp Source Heart Rate Source Patient Position - Orthostatic VS BP Location FiO2 (%)   08/12/20 2219 08/12/20 2219 08/13/20 0131 08/12/20 2219 --   Temporal Monitor Lying Right arm       Pain Score       08/12/20 2219       No Pain           Vitals:    08/13/20 0726 08/13/20 1544 08/13/20 2121 08/13/20 2300   BP: 168/89 144/71 115/63 139/67   Pulse: 82 92 94 94   Patient Position - Orthostatic VS: Lying Sitting  Lying         Visual Acuity      ED Medications  Medications   enoxaparin (LOVENOX) subcutaneous injection 40 mg (40 mg Subcutaneous Given 8/13/20 1003)   ondansetron (ZOFRAN) injection 4 mg (has no administration in time range)   calcium carbonate (TUMS) chewable tablet 1,000 mg (has no administration in time range)   guaiFENesin (MUCINEX) 12 hr tablet 600 mg (600 mg Oral Given 8/13/20 1754)   benzonatate (TESSALON PERLES) capsule 100 mg (has no administration in time range)   albuterol inhalation solution 2 5 mg (has no administration in time range)   levalbuterol (XOPENEX) inhalation solution 1 25 mg (1 25 mg Nebulization Given 8/13/20 1951)   ipratropium (ATROVENT) 0 02 % inhalation solution 0 5 mg (0 5 mg Nebulization Given 8/13/20 1951)   insulin lispro (HumaLOG) 100 units/mL subcutaneous injection 1-5 Units (2 Units Subcutaneous Given 8/13/20 2122)   methylPREDNISolone sodium succinate (Solu-MEDROL) injection 40 mg (40 mg Intravenous Given 8/13/20 2121)   budesonide (PULMICORT) inhalation solution 0 5 mg (0 5 mg Nebulization Given 8/13/20 1951)   aspirin chewable tablet 81 mg (81 mg Oral Given 8/13/20 1631)   gabapentin (NEURONTIN) capsule 200 mg (200 mg Oral Given 8/13/20 2122)   lisinopril (ZESTRIL) tablet 20 mg (20 mg Oral Given 8/13/20 1631)   metoprolol tartrate (LOPRESSOR) tablet 25 mg (25 mg Oral Given 8/13/20 2122)   atorvastatin (LIPITOR) tablet 40 mg (40 mg Oral Given 8/13/20 1631)   albuterol inhalation solution 2 5 mg (2 5 mg Nebulization Given 8/12/20 2208)   magnesium sulfate 2 g/50 mL IVPB (premix) 2 g (0 g Intravenous Stopped 8/13/20 0021)   methylPREDNISolone sodium succinate (Solu-MEDROL) injection 125 mg (125 mg Intravenous Given 8/12/20 2206)   cefTRIAXone (ROCEPHIN) IVPB (premix) 1,000 mg 50 mL (0 mg Intravenous Stopped 8/13/20 0114)       Diagnostic Studies  Results Reviewed     Procedure Component Value Units Date/Time    Troponin I repeat in 6 hrs [[id number]]  (Normal) Collected:  08/13/20 0601    Lab Status:  Final result Specimen:  Blood from Arm, Right Updated:  08/13/20 0645     Troponin I <0 03 ng/mL     Troponin I repeat in 3 hrs [[id number]]  (Normal) Collected:  08/13/20 0224    Lab Status:  Final result Specimen:  Blood from Arm, Right Updated:  08/13/20 0250     Troponin I <0 03 ng/mL     Protime-INR [[id number]]  (Normal) Collected:  08/12/20 2351    Lab Status:  Final result Specimen:  Blood from Arm, Right Updated:  08/13/20 0006     Protime 13 8 seconds      INR 1 06    APTT [[id number]]  (Normal) Collected:  08/12/20 2351    Lab Status:  Final result Specimen:  Blood from Arm, Right Updated:  08/13/20 0006     PTT 34 seconds     B-Type Natriuretic Peptide [facility] ONLY) [[id number]]  (Abnormal) Collected:  08/12/20 0538    Lab Status:  Final result Specimen:  Blood from Arm, Right Updated:  08/12/20 2323      pg/mL     Comprehensive metabolic panel [[id number]] (Abnormal) Collected:  08/12/20 2254    Lab Status:  Final result Specimen:  Blood from Arm, Right Updated:  08/12/20 2323     Sodium 137 mmol/L      Potassium 4 0 mmol/L      Chloride 102 mmol/L      CO2 24 mmol/L      ANION GAP 11 mmol/L      BUN 19 mg/dL      Creatinine 1 03 mg/dL      Glucose 197 mg/dL      Calcium 9 1 mg/dL      AST 9 U/L      ALT 10 U/L      Alkaline Phosphatase 64 U/L      Total Protein 6 6 g/dL      Albumin 4 1 g/dL      Total Bilirubin 0 60 mg/dL      eGFR 71 ml/min/1 73sq m     Narrative:       [facility] guidelines for Chronic Kidney Disease (CKD):     Stage 1 with normal or high GFR (GFR > 90 mL/min/1 73 square meters)    Stage 2 Mild CKD (GFR = 60-89 mL/min/1 73 square meters)    Stage 3A Moderate CKD (GFR = 45-59 mL/min/1 73 square meters)    Stage 3B Moderate CKD (GFR = 30-44 mL/min/1 73 square meters)    Stage 4 Severe CKD (GFR = 15-29 mL/min/1 73 square meters)    Stage 5 End Stage CKD (GFR <15 mL/min/1 73 square meters)  Note: GFR calculation is accurate only with a steady state creatinine    CBC and differential [[id number]]  (Abnormal) Collected:  08/12/20 2254    Lab Status:  Final result Specimen:  Blood from Arm, Right Updated:  08/12/20 2303     WBC 12 40 Thousand/uL      RBC 4 42 Million/uL      Hemoglobin 13 7 g/dL      Hematocrit 40 5 %      MCV 92 fL      MCH 31 1 pg      MCHC 34 0 g/dL      RDW 14 6 %      MPV 9 5 fL      Platelets 533 Thousands/uL      Neutrophils Relative 79 %      Lymphocytes Relative 10 %      Monocytes Relative 6 %      Eosinophils Relative 4 %      Basophils Relative 1 %      Neutrophils Absolute 9 90 Thousands/µL      Lymphocytes Absolute 1 20 Thousands/µL      Monocytes Absolute 0 80 Thousand/µL      Eosinophils Absolute 0 50 Thousand/µL      Basophils Absolute 0 10 Thousands/µL                  XR chest 1 view portable   Final Result by Bjorn Alonso MD (08/13 0820)      No acute cardiopulmonary disease  Workstation performed: [id number]                    Procedures  Procedures         ED Course       US AUDIT      Most Recent Value   Initial Alcohol Screen: US AUDIT-C    1  How often do you have a drink containing alcohol?  0 Filed at: 08/12/2020 2226   3b  FEMALE Any Age, or MALE 65+: How often do you have 4 or more drinks on one occassion? 0 Filed at: 08/12/2020 2226   Audit-C Score  0 Filed at: 08/12/2020 2226                  DEANNA/DAST-10      Most Recent Value   How many times in the past year have you    Used an illegal drug or used a prescription medication for non-medical reasons? Never Filed at: 08/12/2020 2227                                MDM  Number of Diagnoses or Management Options  COPD exacerbation [facility]):   Diagnosis management comments: Lungs have muck improved, is moving more air, no longer distressed  Has been transitioned off bipap  Will place on floor for copd  shelley has accepted for [facility]      ekg- nsr at 85, std v5-v6  Lateral t inversion, normal axis and prolonged qtc,         Disposition  Final diagnoses:   COPD exacerbation ([address] )     Time reflects when diagnosis was documented in both MDM as applicable and the Disposition within this note     Time User Action Codes Description Comment    8/13/2020 12:09 AM Scarlett Granados [J44 1] COPD exacerbation [facility])       ED Disposition     ED Disposition Condition Date/Time Comment    Admit Stable Thu Aug 13, 2020 12:09 AM Case was discussed with alex and the patient's admission status was agreed to be Admission Status: observation status to the service of Dr Chyna Paulson           Follow-up Information    None         Current Discharge Medication List      CONTINUE these medications which have NOT CHANGED    Details   Alogliptin Benzoate 25 MG TABS Take 12 5 mg by mouth Patient states takes 1/2 of a 25 mg tablet every AM      aspirin 81 mg chewable tablet Chew 81 mg daily      Empagliflozin 25 MG TABS Take 25 mg by mouth every morning Take one half tablet every morning      gabapentin (NEURONTIN) 100 mg capsule Take 200 mg by mouth daily at bedtime      glimepiride (AMARYL) 1 mg tablet Take 1 mg by mouth 2 (two) times a day      lisinopril (ZESTRIL) 20 mg tablet Take 20 mg by mouth daily      metFORMIN (GLUCOPHAGE) 500 mg tablet Take 500 mg by mouth daily after dinner      metoprolol tartrate (LOPRESSOR) 25 mg tablet Take 25 mg by mouth every 12 (twelve) hours      rosuvastatin (CRESTOR) 20 MG tablet Take 20 mg by mouth daily after dinner Take one half tablet daily with dinner           No discharge procedures on file      PDMP Review     None          ED Provider  Electronically Signed by           Cristin Ernst MD  08/14/20 4020

## 2020-08-13 NOTE — SOCIAL WORK
LOS: 1, URR: n/a, not a re-admit  CM met with pt at bedside ands explained role  Pt reports he lives with his spouse Barbara Jurado in a 2 story house; 4 BEATRI[address], 14 steps inside  Pt does not use an assistive device to ambulate  He had RW, Cane, nebulizer and shower chair at home  Pt is currently on oxygen and does not have same at home  Pt reports he is completely independent with ADLs  He continues to drive  Pt PCP is French Brody  He uses [employer] for medications and denies any barriers to obtaining them  Pt reports he has had [facility] services in the past  Pt has history of STR on ARU 2017  No history of MH or D&A treatment  Spouse will transport pt home on discharge  CM to follow  Patient/caregiver received discharge checklist   Content reviewed  Patient/caregiver encouraged to participate in discharge plan of care prior to discharge home  CM reviewed d/c planning process including the following: identifying help at home, patient preference for d/c planning needs, availability of treatment team to discuss questions or concerns patient and/or family may have regarding understanding medications and recognizing signs and symptoms once discharged  CM also encouraged patient to follow up with all recommended appointments after discharge  Patient advised of importance for patient and family to participate in managing patients medical well being

## 2020-08-13 NOTE — PROGRESS NOTES
POST ADMISSION FOLLOW UP    SLIM Progress Note - Angelita Mems 1945, 77 y o  male MRN: [id number]    Unit/Bed#: -02 Encounter: [id number]    Primary Care Provider: Terry Chappell DO   Date and time admitted to hospital: 8/12/2020 10:11 PM        * COPD with acute exacerbation ([facility] )  Assessment & Plan  · Known history of COPD now with acute exacerbation, reports affected by the weather at times  · Patient initially came in requiring CPAP then BiPAP patient was able to eventually being transitioned to nasal cannula  · Continue current dose of IV Solu-Medrol 40 mg q 8 hours pending pulmonary evaluation  · DuoNeb q 6 hours, Mucinex  · Procalcitonin normal, mild leukocytosis will trend - CXR shows no acute infiltrate  · Patient was given 1 dose of ceftriaxone monitor off antibiotics patient is afebrile  · Wean oxygen maintaining oxygen saturations greater than 90%  · Respiratory protocol  · COVID-19 test pending, low suspicion due to self isolation reported, additionally has had 2 negative tests in May and June this year      Diabetes [facility])  Assessment & Plan  No results found for: HGBA1C    Recent Labs     08/13/20  0217 08/13/20  0629 08/13/20  1127   POCGLU 195* 189* 315*       Blood Sugar Average: Last 72 hrs:  (P) 233  Obtain hemoglobin A1c - last checked 12/2019 and elevated 8 2  Suspected hyperglycemia due to steroids, will increase SSI and monitor   Hold home oral medications      VTE Pharmacologic Prophylaxis:   Pharmacologic: Enoxaparin (Lovenox)  Mechanical VTE Prophylaxis in Place: No    Patient Centered Rounds: I have performed bedside rounds with nursing staff today  Discussions with Specialists or Other Care Team Provider: pulmonology, CM     Education and Discussions with Family / Patient: patient wife called, no answer and no message left on unidentified voicemail    Time Spent for Care: 20 minutes    More than 50% of total time spent on counseling and coordination of care as described above     Current Length of Stay: 0 day(s)    Current Patient Status: Observation     Discharge Plan: pending, possible in next 24-48 hrs     Code Status: Level 1 - Full Code      Subjective:   Patient seen for post admission follow-up  He reports he is actually doing better  Has long history of COPD and is followed by the [place] as well as [facility], Dr Ama Gonzales  He was supposed to be seen outpatient today by his primary pulmonologist     Denies oxygen use at home  No sick contacts, reports essentially self isolated at home with his wife  The only time they go out is for his doctor's appointment or to the grocery store  Presently denies chest pain or shortness of breath  He wonders why he is still on oxygen, and we discussed need to monitor O2 saturations and wean  He also notes that his blood glucose is high, we discussed that this is likely secondary to steroid use  No subjective fevers or chills  No productive cough  He does admit that his COPD appears to be exacerbated by weather changes, and for this reason he tries to stay in the air conditioning as much as possible  Objective:     Vitals:   Temp (24hrs), Av 2 °F (36 2 °C), Min:96 8 °F (36 °C), Max:97 5 °F (36 4 °C)    Temp:  [96 8 °F (36 °C)-97 5 °F (36 4 °C)] 97 5 °F (36 4 °C)  HR:  [68-87] 82  Resp:  [16-28] 24  BP: (117-186)/(60-91) 168/89  SpO2:  [94 %-99 %] 98 %  Body mass index is 24 93 kg/m²  Input and Output Summary (last 24 hours): Intake/Output Summary (Last 24 hours) at 2020 1137  Last data filed at 2020 0021  Gross per 24 hour   Intake 50 ml   Output    Net 50 ml       Physical Exam:     Physical Exam  Vitals signs and nursing note reviewed  Constitutional:       General: He is not in acute distress  Appearance: Normal appearance  Cardiovascular:      Rate and Rhythm: Normal rate and regular rhythm  Heart sounds: No murmur     Pulmonary:      Effort: Pulmonary effort is normal  Prolonged expiration present  No accessory muscle usage or respiratory distress  Breath sounds: Decreased air movement present  No wheezing or rhonchi  Abdominal:      General: Bowel sounds are normal  There is no distension  Palpations: Abdomen is soft  Neurological:      Mental Status: He is alert and oriented to person, place, and time  Psychiatric:         Mood and Affect: Mood normal          Behavior: Behavior normal          Additional Data:     Labs:    Results from last 7 days   Lab Units 08/13/20  0556 08/12/20  2254   WBC Thousand/uL  --  12 40*   HEMOGLOBIN g/dL  --  13 7*   HEMATOCRIT %  --  40 5*   PLATELETS Thousands/uL 160 169   NEUTROS PCT %  --  79*   LYMPHS PCT %  --  10*   MONOS PCT %  --  6   EOS PCT %  --  4     Results from last 7 days   Lab Units 08/12/20  2254   SODIUM mmol/L 137   POTASSIUM mmol/L 4 0   CHLORIDE mmol/L 102   CO2 mmol/L 24   BUN mg/dL 19   CREATININE mg/dL 1 03   ANION GAP mmol/L 11   CALCIUM mg/dL 9 1   ALBUMIN g/dL 4 1   TOTAL BILIRUBIN mg/dL 0 60   ALK PHOS U/L 64   ALT U/L 10   AST U/L 9*   GLUCOSE RANDOM mg/dL 197*     Results from last 7 days   Lab Units 08/12/20  2351   INR  1 06     Results from last 7 days   Lab Units 08/13/20  1127 08/13/20  0629 08/13/20  0217   POC GLUCOSE mg/dl 315* 189* 195*         Results from last 7 days   Lab Units 08/13/20  0601   PROCALCITONIN ng/ml <0 05           * I Have Reviewed All Lab Data Listed Above  * Additional Pertinent Lab Tests Reviewed:  [facility] Admission Reviewed    Imaging:    Imaging Reports Reviewed Today Include: CXR   Imaging Personally Reviewed by Myself Includes:      Recent Cultures (last 7 days):           Last 24 Hours Medication List:   Current Facility-Administered Medications   Medication Dose Route Frequency Provider Last Rate    albuterol  2 5 mg Nebulization Q4H PRN Kandi Ramirez MD      benzonatate  100 mg Oral TID PRN PENG Sweet      calcium carbonate  1,000 mg Oral Daily PRN Vianiyae Sunset, CRNP      enoxaparin  40 mg Subcutaneous Daily Viaruben Nava, PENG      guaiFENesin  600 mg Oral BID Viaruben Bachon, CRPURVI      insulin lispro  1-5 Units Subcutaneous TID AC Libby Nava, PENG      insulin lispro  1-5 Units Subcutaneous HS PENG Araujo      ipratropium  0 5 mg Nebulization TID Mary Ramos MD      levalbuterol  1 25 mg Nebulization TID Mary Ramos MD      methylPREDNISolone sodium succinate  40 mg Intravenous Q8H Viaruben Nava, PENG      ondansetron  4 mg Intravenous Q6H PRN PENG Araujo          Today, Patient Was Seen By: Amalia Hernandez PA-C    ** Please Note: Dictation voice to text software may have been used in the creation of this document   **

## 2020-08-13 NOTE — RESPIRATORY THERAPY NOTE
RT Protocol Note  Lincoln Child 77 y o  male MRN: [id number]  Unit/Bed#: -02 Encounter: [id number]    Assessment    Principal Problem:    COPD with acute exacerbation ([place] [address] )  Active Problems:    Diabetes ([place] [address] )      Home Pulmonary Medications:  Stiolto Daily, PRN albuterol       Past Medical History:   Diagnosis Date    COPD (chronic obstructive pulmonary disease) ([place] [address] )     Diabetes mellitus ([place] [address] )      Social History     Socioeconomic History    Marital status: /Civil Union     Spouse name: Not on file    Number of children: Not on file    Years of education: Not on file    Highest education level: Not on file   Occupational History    Not on file   Social Needs    Financial resource strain: Not on file    Food insecurity     Worry: Not on file     Inability: Not on file   [employer] needs     Medical: Not on file     Non-medical: Not on file   Tobacco Use    Smoking status: Never Smoker    Smokeless tobacco: Never Used   Substance and Sexual Activity    Alcohol use: Yes    Drug use:  Yes    Sexual activity: Not on file   Lifestyle    Physical activity     Days per week: Not on file     Minutes per session: Not on file    Stress: Not on file   Relationships    Social connections     Talks on phone: Not on file     Gets together: Not on file     Attends Yarsanism service: Not on file     Active member of club or organization: Not on file     Attends meetings of clubs or organizations: Not on file     Relationship status: Not on file    Intimate partner violence     Fear of current or ex partner: Not on file     Emotionally abused: Not on file     Physically abused: Not on file     Forced sexual activity: Not on file   Other Topics Concern    Not on file   Social History Narrative    Not on file       Subjective         Objective    Physical Exam:   Assessment Type: During-treatment  General Appearance: Awake, Alert  Respiratory Pattern: Accessory muscle use  Chest Assessment: Chest expansion symmetrical  Bilateral Breath Sounds: Diminished, Expiratory wheezes  Location Specific: No  Cough: Non-productive  O2 Device: 2 L NC    Vitals:  Blood pressure (!) 185/86, pulse 83, temperature (!) 96 8 °F (36 °C), temperature source Temporal, resp  rate (!) 28, height 5' 10" (1 778 m), weight 78 8 kg (173 lb 11 6 oz), SpO2 97 %  Imaging and other studies: I have personally reviewed pertinent reports  O2 Device: 2 L NC     Plan    Respiratory Plan: Mild Distress pathway      Pt arrived to ED via ems in resp distress was placed on Bipap where he was able to recover  Pt the placed on 3  L NC  Pt has a hx of COPD does not use O2/CPAP at home  Pt had an increase in SOB/WOB today where he needed to call EMS  Pt Takes Stiolto Daily, and Prn albuterol  Pt ordered Q6 Duoneb while here, prn albuterol ordered as well  CXR done not read yet  Pt placed on CPAP to sleep, talked with PA on night shift about consult with pulmonary for at home CPAP   Will cont to follow via resp protocol

## 2020-08-13 NOTE — ASSESSMENT & PLAN NOTE
· Known history of COPD now with acute exacerbation  · Patient initially came in requiring CPAP then BiPAP patient was able to eventually being transition back to nasal cannula  · Initiate IV Solu-Medrol 40 mg q 8 hours  · DuoNeb q 6 hours  · Mucinex  · Procalcitonin now and in a m    · Patient was given 1 dose of ceftriaxone monitor off antibiotics patient is afebrile  · Wean oxygen maintaining oxygen saturations greater than 90%  · Respiratory protocol

## 2020-08-13 NOTE — UTILIZATION REVIEW
Initial Clinical Review  Observation 8/13/20 @ 0010, converted to inpatient admission 8/13/20 @ 1537 for continued care & tx for COPD exacerbation    Admission: Date/Time/Statement:   Admission Orders (From admission, onward)     Ordered        08/13/20 1537  Inpatient Admission  Once                   Orders Placed This Encounter   Procedures    Inpatient Admission     Standing Status:   Standing     Number of Occurrences:   1     Order Specific Question:   Admitting Physician     Answer:   Shelley Giron     Order Specific Question:   Level of Care     Answer:   Med Surg [16]     Order Specific Question:   Estimated length of stay     Answer:   More than 2 Midnights     Order Specific Question:   Certification     Answer:   I certify that inpatient services are medically necessary for this patient for a duration of greater than two midnights  See H&P and MD Progress Notes for additional information about the patient's course of treatment  ED Arrival Information     Expected Arrival Acuity Means of Arrival Escorted By Service Admission Type    - 8/12/2020 22:04 Emergent Ambulance [address] [facility] Hospitalist Emergency    Arrival Complaint    respiratory distress        Chief Complaint   Patient presents with    Respiratory Distress     Pt has COPD hx  Pt states he's been SOB for about 2 hrs  Pt states he had at least 4 nebs at home with little relief  Per EMS, pt about 83% RA on arrival  Pt started on CPAP en route  Assessment/Plan:   77 yom Sob that started this AM, states it resolved fully with two alb nebs  No sob for rest of the day  two hours pta he began with sob again  Ems reported wheezing, diaphreotic, 02 sat 83% and sev distress  placed on cpap and he feels 100% better  denies fever, uri sx, leg swelling  Hx of copd, cabg  States he has cp but only from work of breathing  States this is consistent with his previous copd exacerbations       ED Triage Vitals   Temperature Pulse Respirations Blood Pressure SpO2   08/12/20 2219 08/12/20 2215 08/12/20 2215 08/12/20 2215 08/12/20 2208   (!) 97 3 °F (36 3 °C) 87 16 135/70 96 %      Temp Source Heart Rate Source Patient Position - Orthostatic VS BP Location FiO2 (%)   08/12/20 2219 08/12/20 2219 08/13/20 0131 08/12/20 2219 --   Temporal Monitor Lying Right arm       Pain Score       08/12/20 2219       No Pain          Wt Readings from Last 1 Encounters:   08/13/20 78 8 kg (173 lb 11 6 oz)     Additional Vital Signs:   Date/Time   Temp   Pulse   Resp   BP   MAP (mmHg)   SpO2   Calculated FIO2 (%) - Nasal Cannula   Nasal Cannula O2 Flow Rate (L/min)   O2 Device   Patient Position - Orthostatic VS    08/13/20 0757                  98 %   28   2 L/min   Nasal cannula       08/13/20 0726   97 5 °F (36 4 °C)   82   24Abnormal     168/89      98 %         Other (comment)    Lying    O2 Device: Cpap at 08/13/20 0726    08/13/20 0157                     32   3 L/min   Nasal cannula       08/13/20 0140                  97 %                08/13/20 0131   96 8 °F (36 °C)Abnormal     83      185/86Abnormal        98 %   32   3 L/min   Nasal cannula   Lying      Pertinent Labs/Diagnostic Test Results:   Results from last 7 days   Lab Units 08/13/20  0556 08/12/20  2254   WBC Thousand/uL  --  12 40*   HEMOGLOBIN g/dL  --  13 7*   HEMATOCRIT %  --  40 5*   PLATELETS Thousands/uL 160 169   NEUTROS ABS Thousands/µL  --  9 90*     Results from last 7 days   Lab Units 08/12/20  2254   SODIUM mmol/L 137   POTASSIUM mmol/L 4 0   CHLORIDE mmol/L 102   CO2 mmol/L 24   ANION GAP mmol/L 11   BUN mg/dL 19   CREATININE mg/dL 1 03   EGFR ml/min/1 73sq m 71   CALCIUM mg/dL 9 1     Results from last 7 days   Lab Units 08/12/20  2254   AST U/L 9*   ALT U/L 10   ALK PHOS U/L 64   TOTAL PROTEIN g/dL 6 6   ALBUMIN g/dL 4 1   TOTAL BILIRUBIN mg/dL 0 60     Results from last 7 days   Lab Units 08/13/20  1127 08/13/20  0629 08/13/20  0217   POC GLUCOSE mg/dl 315* 189* 195*     Results from last 7 days   Lab Units 08/12/20  2254   GLUCOSE RANDOM mg/dL 197*     Results from last 7 days   Lab Units 08/13/20  0601 08/13/20  0224   TROPONIN I ng/mL <0 03 <0 03     Results from last 7 days   Lab Units 08/12/20  2351   PROTIME seconds 13 8   INR  1 06   PTT seconds 34     Results from last 7 days   Lab Units 08/12/20  2254   BNP pg/mL 183*     8/11  Cxr= No acute cardiopulmonary disease    ED Treatment:   Medication Administration from 08/12/2020 2204 to 08/13/2020 0128       Date/Time Order Dose Route Action     08/12/2020 2208 albuterol inhalation solution 2 5 mg 2 5 mg Nebulization Given     08/12/2020 2210 magnesium sulfate 2 g/50 mL IVPB (premix) 2 g 2 g Intravenous New Bag     08/12/2020 2206 methylPREDNISolone sodium succinate (Solu-MEDROL) injection 125 mg 125 mg Intravenous Given     08/13/2020 0101 ipratropium-albuterol (DUO-NEB) 0 5-2 5 mg/3 mL inhalation solution 3 mL 3 mL Nebulization Given     08/12/2020 2230 ipratropium-albuterol (DUO-NEB) 0 5-2 5 mg/3 mL inhalation solution 3 mL 3 mL Nebulization Given     08/13/2020 0036 cefTRIAXone (ROCEPHIN) IVPB (premix) 1,000 mg 50 mL 1,000 mg Intravenous New Bag        Past Medical History:   Diagnosis Date    COPD (chronic obstructive pulmonary disease) ([facility] [address] )     Diabetes mellitus ([facility])      Admitting Diagnosis: Respiratory distress [R06 03]  COPD exacerbation ([facility]) [J44 1]  Age/Sex: 77 y o  male  Admission Orders:  accuchecks with coverage scale  Scd/foot pumps  Contact & airborne isolation    Scheduled Medications:  enoxaparin, 40 mg, Subcutaneous, Daily  guaiFENesin, 600 mg, Oral, BID  insulin lispro, 1-5 Units, Subcutaneous, TID AC  insulin lispro, 1-5 Units, Subcutaneous, HS  ipratropium, 0 5 mg, Nebulization, TID  levalbuterol, 1 25 mg, Nebulization, TID  methylPREDNISolone sodium succinate, 40 mg, Intravenous, Q8H    PRN Meds:  albuterol, 2 5 mg, Nebulization, Q4H PRN  benzonatate, 100 mg, Oral, TID PRN  calcium carbonate, 1,000 mg, Oral, Daily PRN  ondansetron, 4 mg, Intravenous, Q6H PRN    Network Utilization Review Department  [email or web address]  ATTENTION: Please call with any questions or concerns to [phone] and carefully listen to the prompts so that you are directed to the right person  All voicemails are confidential   [facility] all requests for admission clinical reviews, approved or denied determinations and any other requests to dedicated fax number below belonging to the campus where the patient is receiving treatment   List of dedicated fax numbers for the Facilities:  [address] DENIALS (Administrative/Medical Necessity) [phone]   [address] (Maternity/NICU/Pediatrics) [phone]   Shelby Xavier [phone]   [place] [facility] [phone]   Charlie Tucker [phone]   Christine Pena [phone]   [address] [facility] [address] [facility] [phone]   [facility]  [phone]   [address] [facility]  [address] [address] [address] [facility] [phone]

## 2020-08-13 NOTE — CONSULTS
Consultation - Pulmonary Medicine   Rhea De Los Santos 77 y o  male MRN: [id number]  Unit/Bed#: -02 Encounter: [id number]      Assessment/Plan:    1  Acute hypoxic respiratory failure  1  Currently requiring 2 L nasal cannula  No oxygen requirement at baseline  2  Titrate oxygen maintain SpO2 greater than or equal to 88%  3  Pulmonary toilet:  Increase activity as tolerated, out of bed as tolerated, cough and deep breathing as encouraged, incentive spirometry q 1 hour  2  Very severe COPD with acute exacerbation  1  Decreased Solu-Medrol to 40 mg IV q 12 hours  2  Continue Atrovent/Xopenex nebs t i d   3  Add Pulmicort q 12 hours  4  Continue supportive care for cough including Mucinex 600 mg q 12 hours and Tessalon Perles 100 mg p o  T i d  P r n   5  At discharge, pulmonary regimen should include Stiolto 2 puffs daily and as needed albuterol HFA/nebs 3-4 times daily and prednisone taper  Consider the addition of ICS given frequent hospitalizations  6  Follows with Dr Traci Keith   3  Diabetes mellitus type 2  1  Monitor blood glucose closely in the setting of steroids  2  Further treatment per primary team    History of Present Illness   Physician Requesting Consult: Parrish Brooks MD  Reason for Consult / Principal Problem:  COPD with acute exacerbation  Hx and PE limited by: n/a  Chief Complaint:  Shortness of breath  HPI: Rhea De Los Santos is a 77 y o   male who presented to [address] [place] [address] with complaints of shortness of breaths  Patient's past medical history positive for severe COPD with FEV1 of 26% predicted, CAD status post CABG 2017, diabetes mellitus type 2, prostate cancer, hypertension, hyperlipidemia  Patient last saw his pulmonologist via virtual visit April 2020  Of note patient reports exacerbations requiring hospitalization once a month  He states most recently he was exerting himself more than he probably should of 2 days ago    He uses nebulizer twice with some relief and was able to get some sleep  Upon awakening he felt fine and was in his normal state of health  He went downstairs to load of laundry  When he went to carry back up stairs full date he became acutely short of breath with chest tightness, wheezing that was not relieved with nebulizers  He felt that he was so short of breath that he could not even keep the nebulizer mask on  EMS was called who initiated CPAP therapy  In the ED he was noted to have respiratory distress and hypoxia requiring transition to BiPAP  Laboratory workup revealed leukocytosis, hyperglycemia, elevated BNP, and negative troponin and procalcitonin  EKG revealed normal sinus rhythm  Chest x-ray shows unrevealing of acute pulmonary infiltrates, pneumothorax, pleural effusion  He was admitted to [facility] floor for COPD exacerbation  Pulmonary was consulted to help manage this  Currently, patient feels much better since admission  He was able to be weaned off BiPAp and is now on 2 L nasal cannula  He is not quite back to his baseline as he still endorses dyspnea on exertion and mild wheeze and chest tightness  Denies fevers or chills  Denies acute cough  He does endorse a chronic cough  Denies sputum production or hemoptysis  No nausea, vomiting, abdominal pain, leg pain, leg swelling  From a pulmonary standpoint, patient follows Dr Kacey Castillo  He was last seen in April 2020  He is maintained on Stiolto and as needed albuterol nebulizer 3-4 times daily  He does not require the use of supplemental oxygen at baseline  He is a former smoker with a quit date last December  Patient has significant exposures in the past working at the [employer] and in the [facility]  Currently retired  Denies bird exposure  Denies pets  Denies recent travel  Denies sick contacts  He states he and his wife have been staying in isolating due to the COVID-19 pandemic      Inpatient consult to Pulmonology  Consult performed by: Estrada Brown, SPEEDY  Consult ordered by: Emy Anderson PA-C          Review of Systems    Historical Information   Past Medical History:   Diagnosis Date    [facility]chronic obstructive pulmonary disease) ([facility] [address] )     [facility]      No past surgical history on file  Social History   Social History     Substance and Sexual Activity   Alcohol Use Yes     Social History     Substance and Sexual Activity   Drug Use Yes     Social History     Tobacco Use   Smoking Status Never Smoker   Smokeless Tobacco Never Used     E-Cigarette/Vaping    E-Cigarette Use Never User      E-Cigarette/Vaping Substances     Occupational History:  Retired  See HPI    Family History: No family history on file  Meds/Allergies   all current active meds have been reviewed, pertinent pulmonary meds have been reviewed and current meds:   Current Facility-Administered Medications   Medication Dose Route Frequency    albuterol inhalation solution 2 5 mg  2 5 mg Nebulization Q4H PRN    benzonatate (TESSALON PERLES) capsule 100 mg  100 mg Oral TID PRN    calcium carbonate (TUMS) chewable tablet 1,000 mg  1,000 mg Oral Daily PRN    enoxaparin (LOVENOX) subcutaneous injection 40 mg  40 mg Subcutaneous Daily    guaiFENesin (MUCINEX) 12 hr tablet 600 mg  600 mg Oral BID    insulin lispro (HumaLOG) 100 units/mL subcutaneous injection 1-5 Units  1-5 Units Subcutaneous 4x Daily (AC & HS)    ipratropium (ATROVENT) 0 02 % inhalation solution 0 5 mg  0 5 mg Nebulization TID    levalbuterol (XOPENEX) inhalation solution 1 25 mg  1 25 mg Nebulization TID    methylPREDNISolone sodium succinate (Solu-MEDROL) injection 40 mg  40 mg Intravenous Q8H    ondansetron (ZOFRAN) injection 4 mg  4 mg Intravenous Q6H PRN       No Known Allergies    Objective   Vitals: Blood pressure 168/89, pulse 82, temperature 97 5 °F (36 4 °C), temperature source Temporal, resp  rate (!) 24, height 5' 10" (1 778 m), weight 78 8 kg (173 lb 11 6 oz), SpO2 98 %   2L NC,Body mass index is 24 93 kg/m²  Intake/Output Summary (Last 24 hours) at 8/13/2020 1420  Last data filed at 8/13/2020 0021  Gross per 24 hour   Intake 50 ml   Output    Net 50 ml     Invasive Devices     Peripheral Intravenous Line            Peripheral IV 08/12/20 Left Antecubital less than 1 day                Physical Exam    Lab Results:   I have personally reviewed pertinent lab results  , ABG: No results found for: PHART, PXB9XON, PO2ART, TVN0JOQ, A3ZADGDR, BEART, SOURCE, BNP:   Lab Results   Component Value Date     (H) 08/12/2020   , CBC:   Lab Results   Component Value Date    WBC 12 40 (H) 08/12/2020    HGB 13 7 (L) 08/12/2020    HCT 40 5 (L) 08/12/2020    MCV 92 08/12/2020     08/13/2020    MCH 31 1 08/12/2020    MCHC 34 0 08/12/2020    RDW 14 6 (H) 08/12/2020    MPV 9 5 08/12/2020   , CMP:   Lab Results   Component Value Date    SODIUM 137 08/12/2020    K 4 0 08/12/2020     08/12/2020    CO2 24 08/12/2020    BUN 19 08/12/2020    CREATININE 1 03 08/12/2020    CALCIUM 9 1 08/12/2020    AST 9 (L) 08/12/2020    ALT 10 08/12/2020    ALKPHOS 64 08/12/2020    EGFR 71 08/12/2020   , PT/INR:   Lab Results   Component Value Date    INR 1 06 08/12/2020   , Troponin:   Lab Results   Component Value Date    TROPONINI <0 03 08/13/2020       Viral PCR: pending    Imaging Studies: I have personally reviewed pertinent reports  and I have personally reviewed pertinent films in PACS    Chest x-ray 08/12/2020  No acute cardiopulmonary disease    EKG, Pathology, and Other Studies: I have personally reviewed pertinent reports  EKG today normal sinus rhythm    Pulmonary Results (PFTs, PSG): I have personally reviewed pertinent reports  Spirometry September 2019  FEV1/FVC ratio 52%  FEV1 120 mL, 26% predicted  FVC 1 53 L, 37% predicted  Spirometry consistent with very severe obstruction with severely reduced vital capacity      VTE Prophylaxis: Sequential compression device (Venodyne)  and Enoxaparin (Lovenox)    Code Status: Level 1 - Full Code      Portions of the record may have been created with voice recognition software  Occasional wrong word or "sound a like" substitutions may have occurred due to the inherent limitations of voice recognition software  Read the chart carefully and recognize, using context, where substitutions have occurred

## 2020-08-13 NOTE — RESPIRATORY THERAPY NOTE
Resp Care      08/13/20 0101   Inhalation Therapy Tx   $ Inhalation Therapy Performed Yes   $ Pulse Oximetry Spot Check Charge Completed   Pre-Treatment Pulse 68   Pre-Treatment Respirations 16   Duration 10   Breath Sounds Pre-Treatment Bilateral Diminished   Breath Sounds Post-Treatment Bilateral Diminished   Post-Treatment Pulse 68   Post-Treatment Respsirations 16   Delivery Source UDN; Air   Position Semi Bermudez's   Treatment Tolerance Tolerated well   Resp Comments pt using accessory muscles however denies sob or tightness  Audible whezing heard after tx

## 2020-08-14 LAB
ANION GAP SERPL CALCULATED.3IONS-SCNC: 8 MMOL/L (ref 4–13)
ANISOCYTOSIS BLD QL SMEAR: PRESENT
BUN SERPL-MCNC: 29 MG/DL (ref 7–25)
CALCIUM SERPL-MCNC: 9 MG/DL (ref 8.6–10.5)
CHLORIDE SERPL-SCNC: 102 MMOL/L (ref 98–107)
CO2 SERPL-SCNC: 25 MMOL/L (ref 21–31)
CREAT SERPL-MCNC: 1 MG/DL (ref 0.7–1.3)
ERYTHROCYTE [DISTWIDTH] IN BLOOD BY AUTOMATED COUNT: 14.5 % (ref 11.5–14.5)
EST. AVERAGE GLUCOSE BLD GHB EST-MCNC: 157 MG/DL
GFR SERPL CREATININE-BSD FRML MDRD: 74 ML/MIN/1.73SQ M
GLUCOSE SERPL-MCNC: 221 MG/DL (ref 65–140)
GLUCOSE SERPL-MCNC: 246 MG/DL (ref 65–99)
GLUCOSE SERPL-MCNC: 251 MG/DL (ref 65–140)
GLUCOSE SERPL-MCNC: 283 MG/DL (ref 65–140)
GLUCOSE SERPL-MCNC: 320 MG/DL (ref 65–140)
HBA1C MFR BLD: 7.1 %
HCT VFR BLD AUTO: 37.3 % (ref 42–47)
HGB BLD-MCNC: 12.9 G/DL (ref 14–18)
LYMPHOCYTES # BLD AUTO: 0.38 THOUSAND/UL (ref 0.6–4.47)
LYMPHOCYTES # BLD AUTO: 3 % (ref 20–51)
MCH RBC QN AUTO: 31.4 PG (ref 26–34)
MCHC RBC AUTO-ENTMCNC: 34.6 G/DL (ref 31–37)
MCV RBC AUTO: 91 FL (ref 81–99)
MONOCYTES # BLD AUTO: 0.13 THOUSAND/UL (ref 0–1.22)
MONOCYTES NFR BLD AUTO: 1 % (ref 4–12)
NEUTS SEG # BLD: 12.19 THOUSAND/UL (ref 1.81–6.82)
NEUTS SEG NFR BLD AUTO: 96 % (ref 42–75)
PLATELET # BLD AUTO: 185 THOUSANDS/UL (ref 149–390)
PLATELET BLD QL SMEAR: ADEQUATE
PMV BLD AUTO: 9.8 FL (ref 8.6–11.7)
POTASSIUM SERPL-SCNC: 4.7 MMOL/L (ref 3.5–5.5)
RBC # BLD AUTO: 4.1 MILLION/UL (ref 4.3–5.9)
RBC MORPH BLD: ABNORMAL
SODIUM SERPL-SCNC: 135 MMOL/L (ref 134–143)
TOTAL CELLS COUNTED SPEC: 100
WBC # BLD AUTO: 12.7 THOUSAND/UL (ref 4.8–10.8)

## 2020-08-14 PROCEDURE — 85007 BL SMEAR W/DIFF WBC COUNT: CPT | Performed by: PHYSICIAN ASSISTANT

## 2020-08-14 PROCEDURE — 94760 N-INVAS EAR/PLS OXIMETRY 1: CPT

## 2020-08-14 PROCEDURE — 80048 BASIC METABOLIC PNL TOTAL CA: CPT | Performed by: PHYSICIAN ASSISTANT

## 2020-08-14 PROCEDURE — 82948 REAGENT STRIP/BLOOD GLUCOSE: CPT

## 2020-08-14 PROCEDURE — 85027 COMPLETE CBC AUTOMATED: CPT | Performed by: PHYSICIAN ASSISTANT

## 2020-08-14 PROCEDURE — 94640 AIRWAY INHALATION TREATMENT: CPT

## 2020-08-14 PROCEDURE — 99232 SBSQ HOSP IP/OBS MODERATE 35: CPT | Performed by: NURSE PRACTITIONER

## 2020-08-14 PROCEDURE — 83036 HEMOGLOBIN GLYCOSYLATED A1C: CPT | Performed by: NURSE PRACTITIONER

## 2020-08-14 RX ORDER — METHYLPREDNISOLONE SODIUM SUCCINATE 40 MG/ML
40 INJECTION, POWDER, LYOPHILIZED, FOR SOLUTION INTRAMUSCULAR; INTRAVENOUS DAILY
Status: DISCONTINUED | OUTPATIENT
Start: 2020-08-15 | End: 2020-08-15 | Stop reason: HOSPADM

## 2020-08-14 RX ADMIN — IPRATROPIUM BROMIDE 0.5 MG: 0.5 SOLUTION RESPIRATORY (INHALATION) at 07:20

## 2020-08-14 RX ADMIN — ENOXAPARIN SODIUM 40 MG: 40 INJECTION SUBCUTANEOUS at 08:02

## 2020-08-14 RX ADMIN — IPRATROPIUM BROMIDE 0.5 MG: 0.5 SOLUTION RESPIRATORY (INHALATION) at 14:22

## 2020-08-14 RX ADMIN — ATORVASTATIN CALCIUM 40 MG: 40 TABLET, FILM COATED ORAL at 17:55

## 2020-08-14 RX ADMIN — INSULIN LISPRO 3 UNITS: 100 INJECTION, SOLUTION INTRAVENOUS; SUBCUTANEOUS at 11:47

## 2020-08-14 RX ADMIN — LEVALBUTEROL HYDROCHLORIDE 1.25 MG: 1.25 SOLUTION, CONCENTRATE RESPIRATORY (INHALATION) at 14:22

## 2020-08-14 RX ADMIN — LISINOPRIL 20 MG: 20 TABLET ORAL at 08:03

## 2020-08-14 RX ADMIN — ASPIRIN 81 MG 81 MG: 81 TABLET ORAL at 08:01

## 2020-08-14 RX ADMIN — BUDESONIDE 0.5 MG: 0.5 INHALANT RESPIRATORY (INHALATION) at 19:27

## 2020-08-14 RX ADMIN — METOPROLOL TARTRATE 25 MG: 25 TABLET, FILM COATED ORAL at 21:43

## 2020-08-14 RX ADMIN — METHYLPREDNISOLONE SODIUM SUCCINATE 40 MG: 40 INJECTION, POWDER, FOR SOLUTION INTRAMUSCULAR; INTRAVENOUS at 21:43

## 2020-08-14 RX ADMIN — BUDESONIDE 0.5 MG: 0.5 INHALANT RESPIRATORY (INHALATION) at 07:20

## 2020-08-14 RX ADMIN — GABAPENTIN 200 MG: 100 CAPSULE ORAL at 21:43

## 2020-08-14 RX ADMIN — IPRATROPIUM BROMIDE 0.5 MG: 0.5 SOLUTION RESPIRATORY (INHALATION) at 19:27

## 2020-08-14 RX ADMIN — INSULIN LISPRO 2 UNITS: 100 INJECTION, SOLUTION INTRAVENOUS; SUBCUTANEOUS at 08:02

## 2020-08-14 RX ADMIN — LEVALBUTEROL HYDROCHLORIDE 1.25 MG: 1.25 SOLUTION, CONCENTRATE RESPIRATORY (INHALATION) at 19:27

## 2020-08-14 RX ADMIN — METHYLPREDNISOLONE SODIUM SUCCINATE 40 MG: 40 INJECTION, POWDER, FOR SOLUTION INTRAMUSCULAR; INTRAVENOUS at 08:03

## 2020-08-14 RX ADMIN — METOPROLOL TARTRATE 25 MG: 25 TABLET, FILM COATED ORAL at 08:03

## 2020-08-14 RX ADMIN — GUAIFENESIN 600 MG: 600 TABLET, EXTENDED RELEASE ORAL at 08:02

## 2020-08-14 RX ADMIN — INSULIN LISPRO 3 UNITS: 100 INJECTION, SOLUTION INTRAVENOUS; SUBCUTANEOUS at 17:56

## 2020-08-14 RX ADMIN — INSULIN LISPRO 2 UNITS: 100 INJECTION, SOLUTION INTRAVENOUS; SUBCUTANEOUS at 21:43

## 2020-08-14 RX ADMIN — LEVALBUTEROL HYDROCHLORIDE 1.25 MG: 1.25 SOLUTION, CONCENTRATE RESPIRATORY (INHALATION) at 07:20

## 2020-08-14 RX ADMIN — GUAIFENESIN 600 MG: 600 TABLET, EXTENDED RELEASE ORAL at 17:54

## 2020-08-14 NOTE — ASSESSMENT & PLAN NOTE
· Known history of COPD now with acute exacerbation, reports affected by the weather at times  · Patient initially came in requiring CPAP then BiPAP patient was able to eventually being transitioned to nasal cannula  · Continue current dose of IV Solu-Medrol 40 mg q12 hours per Pulmonology recommendation  · DuoNeb q 6 hours, Mucinex  · Procalcitonin normal, mild leukocytosis, stable, will trend - CXR shows no acute infiltrate  · Patient was given 1 dose of ceftriaxone monitor off antibiotics patient is afebrile  · Wean oxygen maintaining oxygen saturations greater than 90%  · Respiratory protocol  · COVID-19 test pending, low suspicion due to self isolation reported, additionally has had 2 negative tests in May and June this year

## 2020-08-14 NOTE — PROGRESS NOTES
Progress Note - Ela Cee 1945, 77 y o  male MRN: [id number]    Unit/Bed#: -02 Encounter: [id number]    Primary Care Provider: Mckinley Hector DO   Date and time admitted to hospital: 8/12/2020 10:11 PM    Diabetes [facility])  Assessment & Plan  No results found for: HGBA1C    Recent Labs     08/13/20  1127 08/13/20  1539 08/13/20  2041 08/14/20  0624   POCGLU 315* 281* 249* 221*       Blood Sugar Average: Last 72 hrs:  (P) 388 [id number]'    Hemoglobin A1c pending - last checked 12/2019 and elevated 8 2  Suspected hyperglycemia due to steroids, will increase SSI and monitor   Hold home oral medications    * COPD with acute exacerbation (HCC)  Assessment & Plan  · Known history of COPD now with acute exacerbation, reports affected by the weather at times  · Patient initially came in requiring CPAP then BiPAP patient was able to eventually being transitioned to nasal cannula  · Continue current dose of IV Solu-Medrol 40 mg q12 hours per Pulmonology recommendation  · DuoNeb q 6 hours, Mucinex  · Procalcitonin normal, mild leukocytosis, stable, will trend - CXR shows no acute infiltrate  · Patient was given 1 dose of ceftriaxone monitor off antibiotics patient is afebrile  · Wean oxygen maintaining oxygen saturations greater than 90%  · Respiratory protocol  · COVID-19 test pending, low suspicion due to self isolation reported, additionally has had 2 negative tests in May and June this year      VTE Pharmacologic Prophylaxis:   Pharmacologic: Enoxaparin (Lovenox)  Mechanical VTE Prophylaxis in Place: Yes    Patient Centered Rounds: I have performed bedside rounds with nursing staff today  Discussions with Specialists or Other Care Team Provider: Pulmonology note reviewed    Education and Discussions with Family / Patient: Patient    Time Spent for Care: 30 minutes  More than 50% of total time spent on counseling and coordination of care as described above      Current Length of Stay: 1 day(s)    Current Patient Status: Inpatient   Certification Statement: The patient will continue to require additional inpatient hospital stay due to ongoing treatment in setting of COPD exacerbation  Discharge Plan: Not medically cleared, anticipate 24 hours  Code Status: Level 1 - Full Code      Subjective:   Patient is resting in bed at this time  He denies any complaints of chest pain, shortness of breath at rest, fever, or chills  He does state that he has shortness of breath with activity and does not take much to get going  Objective:     Vitals:   Temp (24hrs), Av 9 °F (36 6 °C), Min:97 8 °F (36 6 °C), Max:98 2 °F (36 8 °C)    Temp:  [97 8 °F (36 6 °C)-98 2 °F (36 8 °C)] 97 8 °F (36 6 °C)  HR:  [92-94] 92  Resp:  [18] 18  BP: (115-148)/(63-71) 148/70  SpO2:  [94 %-98 %] 94 %  Body mass index is 24 93 kg/m²  Input and Output Summary (last 24 hours): Intake/Output Summary (Last 24 hours) at 2020 1155  Last data filed at 2020 0450  Gross per 24 hour   Intake 240 ml   Output 1125 ml   Net -885 ml       Physical Exam:     Physical Exam  Constitutional:       Appearance: He is not ill-appearing  HENT:      Head: Normocephalic  Nose: Nose normal       Mouth/Throat:      Mouth: Mucous membranes are moist    Eyes:      Extraocular Movements: Extraocular movements intact  Conjunctiva/sclera: Conjunctivae normal    Neck:      Musculoskeletal: Normal range of motion  Cardiovascular:      Rate and Rhythm: Normal rate  Pulses: Normal pulses  Heart sounds: Normal heart sounds  Pulmonary:      Effort: Pulmonary effort is normal       Breath sounds: Decreased breath sounds (Thoughout, worse in the RLL  ) present  Comments: 1 L O2 NC  Abdominal:      General: Bowel sounds are normal  There is no distension  Palpations: Abdomen is soft  Tenderness: There is no abdominal tenderness  Musculoskeletal: Normal range of motion           General: No swelling or tenderness  Right lower leg: No edema  Left lower leg: No edema  Skin:     General: Skin is warm and dry  Capillary Refill: Capillary refill takes less than 2 seconds  Neurological:      Mental Status: He is alert  Mental status is at baseline  Psychiatric:         Mood and Affect: Mood normal          Behavior: Behavior normal          Thought Content: Thought content normal          Judgment: Judgment normal          Additional Data:     Labs:    Results from last 7 days   Lab Units 08/14/20  0449  08/12/20  2254   WBC Thousand/uL 12 70*  --  12 40*   HEMOGLOBIN g/dL 12 9*  --  13 7*   HEMATOCRIT % 37 3*  --  40 5*   PLATELETS Thousands/uL 185   < > 169   NEUTROS PCT %  --   --  79*   LYMPHS PCT %  --   --  10*   LYMPHO PCT % 3*  --   --    MONOS PCT %  --   --  6   MONO PCT % 1*  --   --    EOS PCT %  --   --  4    < > = values in this interval not displayed  Results from last 7 days   Lab Units 08/14/20  0449 08/12/20  2254   SODIUM mmol/L 135 137   POTASSIUM mmol/L 4 7 4 0   CHLORIDE mmol/L 102 102   CO2 mmol/L 25 24   BUN mg/dL 29* 19   CREATININE mg/dL 1 00 1 03   ANION GAP mmol/L 8 11   CALCIUM mg/dL 9 0 9 1   ALBUMIN g/dL  --  4 1   TOTAL BILIRUBIN mg/dL  --  0 60   ALK PHOS U/L  --  64   ALT U/L  --  10   AST U/L  --  9*   GLUCOSE RANDOM mg/dL 246* 197*     Results from last 7 days   Lab Units 08/12/20  2351   INR  1 06     Results from last 7 days   Lab Units 08/14/20  1122 08/14/20  0624 08/13/20  2041 08/13/20  1539 08/13/20  1127 08/13/20  0629 08/13/20  0217   POC GLUCOSE mg/dl 320* 221* 249* 281* 315* 189* 195*         Results from last 7 days   Lab Units 08/13/20  0601   PROCALCITONIN ng/ml <0 05           * I Have Reviewed All Lab Data Listed Above  * Additional Pertinent Lab Tests Reviewed:  All Labs Within Last 24 Hours Reviewed    Recent Cultures (last 7 days):           Last 24 Hours Medication List:   Current Facility-Administered Medications   Medication Dose Route Frequency Provider Last Rate    albuterol  2 5 mg Nebulization Q4H PRN Polo Logan MD      aspirin  81 mg Oral Daily Verlan Pretty, CRNP      atorvastatin  40 mg Oral Daily With Dinner Verlan Pretty, CRNP      benzonatate  100 mg Oral TID PRN [facility] Guard, CRNP      budesonide  0 5 mg Nebulization Q12H Javier Kan PA-C      calcium carbonate  1,000 mg Oral Daily PRN [facility], CRNP      enoxaparin  40 mg Subcutaneous Daily [facility] Guard, CRNP      gabapentin  200 mg Oral HS Verlan Pretty, CRNP      guaiFENesin  600 mg Oral BID [facility] Guard, CRNP      insulin lispro  1-5 Units Subcutaneous 4x Daily (AC & HS) Alla Pacheco PA-C      ipratropium  0 5 mg Nebulization TID Polo Logan MD      levalbuterol  1 25 mg Nebulization TID Polo Logan MD      lisinopril  20 mg Oral Daily Verlan Pretty, CRPURVI      methylPREDNISolone sodium succinate  40 mg Intravenous Q12H [facility] Javier Kan PA-C      metoprolol tartrate  25 mg Oral Q12H [facility] Verlan Pretty, CRPURVI      ondansetron  4 mg Intravenous Q6H PRN [facility], PENG          Today, Patient Was Seen By: PENG Rajput    ** Please Note: Dictation voice to text software may have been used in the creation of this document   **

## 2020-08-14 NOTE — ASSESSMENT & PLAN NOTE
No results found for: HGBA1C    Recent Labs     08/13/20  1127 08/13/20  1539 08/13/20  2041 08/14/20  0624   POCGLU 315* 281* 249* 221*       Blood Sugar Average: Last 72 hrs:  (P) 236 [id number]'    Hemoglobin A1c pending - last checked 12/2019 and elevated 8 2  Suspected hyperglycemia due to steroids, will increase SSI and monitor   Hold home oral medications

## 2020-08-15 VITALS
RESPIRATION RATE: 18 BRPM | SYSTOLIC BLOOD PRESSURE: 151 MMHG | OXYGEN SATURATION: 94 % | HEIGHT: 70 IN | TEMPERATURE: 97.5 F | WEIGHT: 173.72 LBS | DIASTOLIC BLOOD PRESSURE: 72 MMHG | BODY MASS INDEX: 24.87 KG/M2 | HEART RATE: 74 BPM

## 2020-08-15 PROBLEM — J96.01 ACUTE RESPIRATORY FAILURE WITH HYPOXIA (HCC): Status: ACTIVE | Noted: 2020-08-15

## 2020-08-15 PROBLEM — J96.01 ACUTE RESPIRATORY FAILURE WITH HYPOXIA (HCC): Status: RESOLVED | Noted: 2020-08-15 | Resolved: 2020-08-15

## 2020-08-15 LAB
ANION GAP SERPL CALCULATED.3IONS-SCNC: 9 MMOL/L (ref 4–13)
BUN SERPL-MCNC: 28 MG/DL (ref 7–25)
CALCIUM SERPL-MCNC: 8.8 MG/DL (ref 8.6–10.5)
CHLORIDE SERPL-SCNC: 101 MMOL/L (ref 98–107)
CO2 SERPL-SCNC: 25 MMOL/L (ref 21–31)
CREAT SERPL-MCNC: 0.89 MG/DL (ref 0.7–1.3)
ERYTHROCYTE [DISTWIDTH] IN BLOOD BY AUTOMATED COUNT: 14.5 % (ref 11.5–14.5)
GFR SERPL CREATININE-BSD FRML MDRD: 84 ML/MIN/1.73SQ M
GLUCOSE SERPL-MCNC: 253 MG/DL (ref 65–140)
GLUCOSE SERPL-MCNC: 260 MG/DL (ref 65–99)
GLUCOSE SERPL-MCNC: 271 MG/DL (ref 65–140)
HCT VFR BLD AUTO: 38.5 % (ref 42–47)
HGB BLD-MCNC: 13.2 G/DL (ref 14–18)
MCH RBC QN AUTO: 31.2 PG (ref 26–34)
MCHC RBC AUTO-ENTMCNC: 34.2 G/DL (ref 31–37)
MCV RBC AUTO: 91 FL (ref 81–99)
PLATELET # BLD AUTO: 196 THOUSANDS/UL (ref 149–390)
PMV BLD AUTO: 9.8 FL (ref 8.6–11.7)
POTASSIUM SERPL-SCNC: 4.2 MMOL/L (ref 3.5–5.5)
PROCALCITONIN SERPL-MCNC: <0.05 NG/ML
RBC # BLD AUTO: 4.23 MILLION/UL (ref 4.3–5.9)
SODIUM SERPL-SCNC: 135 MMOL/L (ref 134–143)
WBC # BLD AUTO: 13.3 THOUSAND/UL (ref 4.8–10.8)

## 2020-08-15 PROCEDURE — 94760 N-INVAS EAR/PLS OXIMETRY 1: CPT

## 2020-08-15 PROCEDURE — 80048 BASIC METABOLIC PNL TOTAL CA: CPT | Performed by: NURSE PRACTITIONER

## 2020-08-15 PROCEDURE — 82948 REAGENT STRIP/BLOOD GLUCOSE: CPT

## 2020-08-15 PROCEDURE — 85027 COMPLETE CBC AUTOMATED: CPT | Performed by: NURSE PRACTITIONER

## 2020-08-15 PROCEDURE — 84145 PROCALCITONIN (PCT): CPT | Performed by: NURSE PRACTITIONER

## 2020-08-15 PROCEDURE — 99239 HOSP IP/OBS DSCHRG MGMT >30: CPT | Performed by: NURSE PRACTITIONER

## 2020-08-15 PROCEDURE — 94640 AIRWAY INHALATION TREATMENT: CPT

## 2020-08-15 RX ORDER — BUDESONIDE AND FORMOTEROL FUMARATE DIHYDRATE 160; 4.5 UG/1; UG/1
2 AEROSOL RESPIRATORY (INHALATION) 2 TIMES DAILY
Status: DISCONTINUED | OUTPATIENT
Start: 2020-08-15 | End: 2020-08-15 | Stop reason: HOSPADM

## 2020-08-15 RX ORDER — BUDESONIDE AND FORMOTEROL FUMARATE DIHYDRATE 160; 4.5 UG/1; UG/1
2 AEROSOL RESPIRATORY (INHALATION) 2 TIMES DAILY
Qty: 1 INHALER | Refills: 0 | Status: ON HOLD | OUTPATIENT
Start: 2020-08-15 | End: 2020-09-02 | Stop reason: CLARIF

## 2020-08-15 RX ORDER — GUAIFENESIN 600 MG
600 TABLET, EXTENDED RELEASE 12 HR ORAL 2 TIMES DAILY
Qty: 10 TABLET | Refills: 0 | Status: SHIPPED | OUTPATIENT
Start: 2020-08-15 | End: 2020-08-20

## 2020-08-15 RX ORDER — BENZONATATE 100 MG/1
100 CAPSULE ORAL 3 TIMES DAILY PRN
Qty: 20 CAPSULE | Refills: 0 | Status: ON HOLD | OUTPATIENT
Start: 2020-08-15 | End: 2020-09-02 | Stop reason: CLARIF

## 2020-08-15 RX ORDER — PREDNISONE 10 MG/1
40 TABLET ORAL DAILY
Qty: 26 TABLET | Refills: 0 | Status: ON HOLD | OUTPATIENT
Start: 2020-08-16 | End: 2020-09-02 | Stop reason: CLARIF

## 2020-08-15 RX ORDER — IPRATROPIUM BROMIDE AND ALBUTEROL SULFATE 2.5; .5 MG/3ML; MG/3ML
3 SOLUTION RESPIRATORY (INHALATION) 4 TIMES DAILY
Qty: 360 ML | Refills: 0 | Status: ON HOLD | OUTPATIENT
Start: 2020-08-15 | End: 2020-09-03 | Stop reason: SDUPTHER

## 2020-08-15 RX ADMIN — LISINOPRIL 20 MG: 20 TABLET ORAL at 09:28

## 2020-08-15 RX ADMIN — METOPROLOL TARTRATE 25 MG: 25 TABLET, FILM COATED ORAL at 09:28

## 2020-08-15 RX ADMIN — METHYLPREDNISOLONE SODIUM SUCCINATE 40 MG: 40 INJECTION, POWDER, FOR SOLUTION INTRAMUSCULAR; INTRAVENOUS at 09:28

## 2020-08-15 RX ADMIN — GUAIFENESIN 600 MG: 600 TABLET, EXTENDED RELEASE ORAL at 09:28

## 2020-08-15 RX ADMIN — IPRATROPIUM BROMIDE 0.5 MG: 0.5 SOLUTION RESPIRATORY (INHALATION) at 07:02

## 2020-08-15 RX ADMIN — INSULIN LISPRO 2 UNITS: 100 INJECTION, SOLUTION INTRAVENOUS; SUBCUTANEOUS at 07:47

## 2020-08-15 RX ADMIN — ENOXAPARIN SODIUM 40 MG: 40 INJECTION SUBCUTANEOUS at 09:28

## 2020-08-15 RX ADMIN — INSULIN LISPRO 3 UNITS: 100 INJECTION, SOLUTION INTRAVENOUS; SUBCUTANEOUS at 12:01

## 2020-08-15 RX ADMIN — ASPIRIN 81 MG 81 MG: 81 TABLET ORAL at 09:28

## 2020-08-15 RX ADMIN — BUDESONIDE 0.5 MG: 0.5 INHALANT RESPIRATORY (INHALATION) at 07:02

## 2020-08-15 RX ADMIN — LEVALBUTEROL HYDROCHLORIDE 1.25 MG: 1.25 SOLUTION, CONCENTRATE RESPIRATORY (INHALATION) at 07:02

## 2020-08-15 NOTE — DISCHARGE SUMMARY
Discharge- Christelle Majors 1945, 77 y o  male MRN: [id number]    Unit/Bed#: -02 Encounter: [id number]    Primary Care Provider: Bonner Paget, DO   Date and time admitted to hospital: 8/12/2020 10:11 PM        * COPD with acute exacerbation ([address] )  Assessment & Plan  · Known history of severe COPD now with acute exacerbation, reports affected by the weather at times  · Patient initially came in requiring CPAP then BiPAP patient was able to eventually being transitioned to nasal cannula  · Pulmonology input appreciated  · Will start prednisone taper course on discharge  · Pulmonology recommend to continue with Mucinex and Tessalon Perle at this time  · Will add ICS  · PRN albuterol   · CXR shows no acute infiltrate  · Follow up with Dr Kevin Rajan outpatient       Acute respiratory failure with hypoxia [facility])  Assessment & Plan  · The patient required 2 L nasal cannula initially  He does not use any oxygen supplement at home  · Currently is on room air         Type 2 diabetes mellitus without complication, without long-term current use of insulin [facility])  Assessment & Plan  Lab Results   Component Value Date    HGBA1C 7 1 (H) 08/14/2020       Recent Labs     08/14/20  1639 08/14/20  2045 08/15/20  0615 08/15/20  1120   POCGLU 283* 251* 253* 271*       Blood Sugar Average: Last 72 hrs:  (P) 257 [id number]'    Resume home regimen         Discharging Physician / Practitioner: Lizbeth Burris  PCP: Bonner Paget, DO  Admission Date:   Admission Orders (From admission, onward)     Ordered        08/13/20 1537  Inpatient Admission  Once         08/13/20 0010  Place in Observation (expected length of stay for this patient is less than two midnights)  Once                   Discharge Date: 08/15/20    Resolved Problems  Date Reviewed: 8/15/2020    None          Consultations During Hospital Stay:  · Pulmonology    Procedures Performed:   · None     Significant Findings / Test Results:   · cxr- no acute cardiopulmonary disease    Incidental Findings:   · none     Test Results Pending at Discharge (will require follow up): · None      Outpatient Tests Requested:  · Follow up with PCP and pulmonary     Complications:     None     Reason for Admission:  COPD exacerbation    Hospital Course:     Angelita Groves is a 77 y o  male patient who originally presented to the hospital on 8/12/2020 due to COPD exacerbation  Please refer to H&P for initial presenting complaint  Brief the patient presented with worsening dyspnea and subsequently was admitted for acute COPD exacerbations with acute respiratory failure hypoxia  The patient was started on IV Solu-Medrol, nebulizer treatment and oxygen supplement  Initially he was placed on BiPAP in the ED and eventually was able to transition to nasal cannula  Pulmonology evaluation was done  At this time recommend to continue with current regimen including nebulizer treatment and inhaler  The patient will need to follow-up with his primary pulmonologist-Dr Rebeca Reid on discharge  The patient will be started on prednisone taper course and ICS on discharge  Discharge planning discussed with him in details  Please see above list of diagnoses and related plan for additional information  Condition at Discharge: fair     Discharge Day Visit / Exam:     Subjective:  Feeling much improved  Denies any dyspnea  Would like to go home  Vitals: Blood Pressure: 151/72 (08/15/20 0746)  Pulse: 74 (08/15/20 0746)  Temperature: 97 5 °F (36 4 °C) (08/15/20 0746)  Temp Source: Temporal (08/15/20 0746)  Respirations: 18 (08/15/20 0746)  Height: 5' 10" (177 8 cm) (08/13/20 0131)  Weight - Scale: 78 8 kg (173 lb 11 6 oz) (08/13/20 0131)  SpO2: 94 % (08/15/20 0746)  Exam:   Physical Exam  Vitals signs and nursing note reviewed  Constitutional:       General: He is not in acute distress  Appearance: He is ill-appearing  HENT:      Head: Normocephalic and atraumatic        Right Ear: External ear normal       Left Ear: External ear normal       Nose: Nose normal       Mouth/Throat:      Mouth: Mucous membranes are moist       Pharynx: Oropharynx is clear  Eyes:      General:         Right eye: No discharge  Left eye: No discharge  Extraocular Movements: Extraocular movements intact  Pupils: Pupils are equal, round, and reactive to light  Neck:      Musculoskeletal: Normal range of motion and neck supple  No neck rigidity  Cardiovascular:      Rate and Rhythm: Normal rate and regular rhythm  Pulses: Normal pulses  Heart sounds: Normal heart sounds  No murmur  Pulmonary:      Effort: Pulmonary effort is normal  No respiratory distress  Breath sounds: No wheezing or rales  Comments: Decreased breath sounds bilaterally     Abdominal:      General: Bowel sounds are normal  There is no distension  Palpations: Abdomen is soft  There is no mass  Tenderness: There is no abdominal tenderness  Musculoskeletal: Normal range of motion  General: No swelling, tenderness or deformity  Skin:     General: Skin is warm and dry  Capillary Refill: Capillary refill takes less than 2 seconds  Coloration: Skin is not pale  Findings: No erythema  Neurological:      General: No focal deficit present  Mental Status: He is alert and oriented to person, place, and time  Mental status is at baseline  Psychiatric:         Mood and Affect: Mood normal          Behavior: Behavior normal          Thought Content: Thought content normal          Judgment: Judgment normal          Discussion with Family: none present during exam     Discharge instructions/Information to patient and family:   See after visit summary for information provided to patient and family  Provisions for Follow-Up Care:  See after visit summary for information related to follow-up care and any pertinent home health orders        Disposition:     Home    For Discharges to [facility]F:   · Not Applicable to this Patient - Not Applicable to this Patient    Planned Readmission:    No      Discharge Statement:  I spent 38 minutes discharging the patient  This time was spent on the day of discharge  I had direct contact with the patient on the day of discharge  Greater than 50% of the total time was spent examining patient, answering all patient questions, arranging and discussing plan of care with patient as well as directly providing post-discharge instructions  Additional time then spent on discharge activities  Discharge Medications:  See after visit summary for reconciled discharge medications provided to patient and family        ** Please Note: This note has been constructed using a voice recognition system **

## 2020-08-15 NOTE — DISCHARGE INSTR - AVS FIRST PAGE
Dear Braden Stephens,     It was our pleasure to care for you here at [facility]  It is our hope that we were always able to exceed the expected standards for your care during your stay  You were hospitalized due to COPD exacerbation  You were cared for on the 1st floor by PENG Noguera with the [facility] who covers for your primary care physician (PCP), Jaylene Elliott DO, while you were hospitalized  If you have any questions or concerns related to this hospitalization, you may contact us at [phone]  For follow up as well as any medication refills, we recommend that you follow up with your primary care physician  A registered nurse will reach out to you by phone within a few days after your discharge to answer any additional questions that you may have after going home  However, at this time we provide for you here, the most important instructions / recommendations at discharge:     · Notable Medication Adjustments -   · Prednisone taper course   · Mucinex, tessalon perle for cough   · symbicort- inhaler   · Testing Required after Discharge -   · None   · Important follow up information -   · Follow-up PCP and pulmonologist  · Other Instructions -   · Please refer to discharge instruction  · Please review this entire after visit summary as additional general instructions including medication list, appointments, activity, diet, any pertinent wound care, and other additional recommendations from your care team that may be provided for you        Sincerely,     PENG Noguera and NATALIA Madrigal

## 2020-08-15 NOTE — ASSESSMENT & PLAN NOTE
· Known history of severe COPD now with acute exacerbation, reports affected by the weather at times  · Patient initially came in requiring CPAP then BiPAP patient was able to eventually being transitioned to nasal cannula  · Pulmonology input appreciated  · Will start prednisone taper course on discharge  · Pulmonology recommend to continue with Mucinex and Tessalon Perle at this time  · Will add ICS  · PRN albuterol   · CXR shows no acute infiltrate  · Follow up with Dr Juana Ortega outpatient

## 2020-08-15 NOTE — SOCIAL WORK
LOS: 2, URR: 14, not a re-admit  [facility]  Pt now on room air and does not need home oxygen  Pt has no other identified CM discharge needs  Spouse to transport home

## 2020-08-15 NOTE — NURSING NOTE
Given discharge instructions, expressed understanding of same    Awaiting sister for discharge home/

## 2020-08-15 NOTE — ASSESSMENT & PLAN NOTE
Lab Results   Component Value Date    HGBA1C 7 1 (H) 08/14/2020       Recent Labs     08/14/20  1639 08/14/20  2045 08/15/20  0615 08/15/20  1120   POCGLU 283* 251* 253* 271*       Blood Sugar Average: Last 72 hrs:  (P) 257 [id number]'    Resume home regimen

## 2020-08-15 NOTE — ASSESSMENT & PLAN NOTE
· The patient required 2 L nasal cannula initially  He does not use any oxygen supplement at home  · Currently is on room air

## 2020-09-02 ENCOUNTER — HOSPITAL ENCOUNTER (OUTPATIENT)
Facility: HOSPITAL | Age: 75
Setting detail: OBSERVATION
Discharge: HOME | End: 2020-09-03
Attending: INTERNAL MEDICINE | Admitting: HOSPITALIST
Payer: MEDICARE

## 2020-09-02 ENCOUNTER — APPOINTMENT (EMERGENCY)
Dept: RADIOLOGY | Facility: HOSPITAL | Age: 75
End: 2020-09-02
Payer: MEDICARE

## 2020-09-02 DIAGNOSIS — J44.1 COPD EXACERBATION (HCC): Primary | ICD-10-CM

## 2020-09-02 DIAGNOSIS — E11.9 TYPE 2 DIABETES MELLITUS WITHOUT COMPLICATION, WITHOUT LONG-TERM CURRENT USE OF INSULIN (HCC): Chronic | ICD-10-CM

## 2020-09-02 DIAGNOSIS — I10 ESSENTIAL HYPERTENSION: Chronic | ICD-10-CM

## 2020-09-02 DIAGNOSIS — E78.49 OTHER HYPERLIPIDEMIA: Chronic | ICD-10-CM

## 2020-09-02 LAB
ALBUMIN SERPL BCP-MCNC: 4.4 G/DL (ref 3.5–5.7)
ALP SERPL-CCNC: 66 U/L (ref 55–165)
ALT SERPL W P-5'-P-CCNC: 12 U/L (ref 7–52)
ANION GAP SERPL CALCULATED.3IONS-SCNC: 10 MMOL/L (ref 4–13)
APTT PPP: 31 SECONDS (ref 23–37)
AST SERPL W P-5'-P-CCNC: 12 U/L (ref 13–39)
BASOPHILS # BLD AUTO: 0.1 THOUSANDS/ΜL (ref 0–0.1)
BASOPHILS NFR BLD AUTO: 1 % (ref 0–2)
BILIRUB SERPL-MCNC: 0.5 MG/DL (ref 0.2–1)
BNP SERPL-MCNC: 126 PG/ML (ref 1–100)
BUN SERPL-MCNC: 13 MG/DL (ref 7–25)
CALCIUM SERPL-MCNC: 9.1 MG/DL (ref 8.6–10.5)
CHLORIDE SERPL-SCNC: 101 MMOL/L (ref 98–107)
CO2 SERPL-SCNC: 27 MMOL/L (ref 21–31)
CREAT SERPL-MCNC: 0.9 MG/DL (ref 0.7–1.3)
EOSINOPHIL # BLD AUTO: 0.5 THOUSAND/ΜL (ref 0–0.61)
EOSINOPHIL NFR BLD AUTO: 6 % (ref 0–5)
ERYTHROCYTE [DISTWIDTH] IN BLOOD BY AUTOMATED COUNT: 14.1 % (ref 11.5–14.5)
GFR SERPL CREATININE-BSD FRML MDRD: 84 ML/MIN/1.73SQ M
GLUCOSE SERPL-MCNC: 151 MG/DL (ref 65–99)
HCT VFR BLD AUTO: 41.2 % (ref 42–47)
HGB BLD-MCNC: 14.4 G/DL (ref 14–18)
INR PPP: 0.96 (ref 0.84–1.19)
LACTATE SERPL-SCNC: 2 MMOL/L (ref 0.5–2)
LYMPHOCYTES # BLD AUTO: 1.2 THOUSANDS/ΜL (ref 0.6–4.47)
LYMPHOCYTES NFR BLD AUTO: 15 % (ref 21–51)
MCH RBC QN AUTO: 31.9 PG (ref 26–34)
MCHC RBC AUTO-ENTMCNC: 34.8 G/DL (ref 31–37)
MCV RBC AUTO: 92 FL (ref 81–99)
MONOCYTES # BLD AUTO: 0.6 THOUSAND/ΜL (ref 0.17–1.22)
MONOCYTES NFR BLD AUTO: 7 % (ref 2–12)
NEUTROPHILS # BLD AUTO: 6.1 THOUSANDS/ΜL (ref 1.4–6.5)
NEUTS SEG NFR BLD AUTO: 71 % (ref 42–75)
PLATELET # BLD AUTO: 176 THOUSANDS/UL (ref 149–390)
PMV BLD AUTO: 9.5 FL (ref 8.6–11.7)
POTASSIUM SERPL-SCNC: 4.1 MMOL/L (ref 3.5–5.5)
PROCALCITONIN SERPL-MCNC: <0.05 NG/ML
PROT SERPL-MCNC: 7 G/DL (ref 6.4–8.9)
PROTHROMBIN TIME: 12.7 SECONDS (ref 11.6–14.5)
RBC # BLD AUTO: 4.51 MILLION/UL (ref 4.3–5.9)
SODIUM SERPL-SCNC: 138 MMOL/L (ref 134–143)
WBC # BLD AUTO: 8.6 THOUSAND/UL (ref 4.8–10.8)

## 2020-09-02 PROCEDURE — 99285 EMERGENCY DEPT VISIT HI MDM: CPT | Performed by: INTERNAL MEDICINE

## 2020-09-02 PROCEDURE — 80053 COMPREHEN METABOLIC PANEL: CPT | Performed by: INTERNAL MEDICINE

## 2020-09-02 PROCEDURE — 36415 COLL VENOUS BLD VENIPUNCTURE: CPT | Performed by: INTERNAL MEDICINE

## 2020-09-02 PROCEDURE — 83880 ASSAY OF NATRIURETIC PEPTIDE: CPT | Performed by: INTERNAL MEDICINE

## 2020-09-02 PROCEDURE — 83605 ASSAY OF LACTIC ACID: CPT | Performed by: INTERNAL MEDICINE

## 2020-09-02 PROCEDURE — 85025 COMPLETE CBC W/AUTO DIFF WBC: CPT | Performed by: INTERNAL MEDICINE

## 2020-09-02 PROCEDURE — 84145 PROCALCITONIN (PCT): CPT | Performed by: INTERNAL MEDICINE

## 2020-09-02 PROCEDURE — 99285 EMERGENCY DEPT VISIT HI MDM: CPT

## 2020-09-02 PROCEDURE — 94644 CONT INHLJ TX 1ST HOUR: CPT

## 2020-09-02 PROCEDURE — 85610 PROTHROMBIN TIME: CPT | Performed by: INTERNAL MEDICINE

## 2020-09-02 PROCEDURE — 85730 THROMBOPLASTIN TIME PARTIAL: CPT | Performed by: INTERNAL MEDICINE

## 2020-09-02 PROCEDURE — 94760 N-INVAS EAR/PLS OXIMETRY 1: CPT

## 2020-09-02 PROCEDURE — 87040 BLOOD CULTURE FOR BACTERIA: CPT | Performed by: INTERNAL MEDICINE

## 2020-09-02 PROCEDURE — 93005 ELECTROCARDIOGRAM TRACING: CPT

## 2020-09-02 PROCEDURE — 96374 THER/PROPH/DIAG INJ IV PUSH: CPT

## 2020-09-02 PROCEDURE — 71045 X-RAY EXAM CHEST 1 VIEW: CPT

## 2020-09-02 PROCEDURE — 99220 PR INITIAL OBSERVATION CARE/DAY 70 MINUTES: CPT | Performed by: PHYSICIAN ASSISTANT

## 2020-09-02 RX ORDER — GUAIFENESIN 600 MG
600 TABLET, EXTENDED RELEASE 12 HR ORAL 2 TIMES DAILY
Status: DISCONTINUED | OUTPATIENT
Start: 2020-09-02 | End: 2020-09-03 | Stop reason: HOSPADM

## 2020-09-02 RX ORDER — DIAPER,BRIEF,INFANT-TODD,DISP
EACH MISCELLANEOUS 2 TIMES DAILY
Status: DISCONTINUED | OUTPATIENT
Start: 2020-09-02 | End: 2020-09-03 | Stop reason: HOSPADM

## 2020-09-02 RX ORDER — GABAPENTIN 100 MG/1
200 CAPSULE ORAL
Status: DISCONTINUED | OUTPATIENT
Start: 2020-09-02 | End: 2020-09-03 | Stop reason: HOSPADM

## 2020-09-02 RX ORDER — ASPIRIN 81 MG/1
81 TABLET ORAL EVERY OTHER DAY
Status: DISCONTINUED | OUTPATIENT
Start: 2020-09-03 | End: 2020-09-03 | Stop reason: HOSPADM

## 2020-09-02 RX ORDER — ALBUTEROL SULFATE 2.5 MG/3ML
10 SOLUTION RESPIRATORY (INHALATION) ONCE
Status: COMPLETED | OUTPATIENT
Start: 2020-09-02 | End: 2020-09-02

## 2020-09-02 RX ORDER — BUDESONIDE AND FORMOTEROL FUMARATE DIHYDRATE 160; 4.5 UG/1; UG/1
2 AEROSOL RESPIRATORY (INHALATION) 2 TIMES DAILY
Status: DISCONTINUED | OUTPATIENT
Start: 2020-09-02 | End: 2020-09-03 | Stop reason: HOSPADM

## 2020-09-02 RX ORDER — HYDRALAZINE HYDROCHLORIDE 20 MG/ML
10 INJECTION INTRAMUSCULAR; INTRAVENOUS EVERY 6 HOURS PRN
Status: DISCONTINUED | OUTPATIENT
Start: 2020-09-02 | End: 2020-09-03 | Stop reason: HOSPADM

## 2020-09-02 RX ORDER — METHYLPREDNISOLONE SODIUM SUCCINATE 40 MG/ML
40 INJECTION, POWDER, LYOPHILIZED, FOR SOLUTION INTRAMUSCULAR; INTRAVENOUS EVERY 8 HOURS
Status: DISCONTINUED | OUTPATIENT
Start: 2020-09-03 | End: 2020-09-03 | Stop reason: HOSPADM

## 2020-09-02 RX ORDER — AMLODIPINE BESYLATE 5 MG/1
5 TABLET ORAL ONCE
Status: COMPLETED | OUTPATIENT
Start: 2020-09-02 | End: 2020-09-02

## 2020-09-02 RX ORDER — AZITHROMYCIN 250 MG/1
500 TABLET, FILM COATED ORAL EVERY 24 HOURS
Status: DISCONTINUED | OUTPATIENT
Start: 2020-09-02 | End: 2020-09-03 | Stop reason: HOSPADM

## 2020-09-02 RX ORDER — LISINOPRIL 20 MG/1
20 TABLET ORAL DAILY
Status: DISCONTINUED | OUTPATIENT
Start: 2020-09-03 | End: 2020-09-03 | Stop reason: HOSPADM

## 2020-09-02 RX ORDER — METHYLPREDNISOLONE SODIUM SUCCINATE 125 MG/2ML
125 INJECTION, POWDER, LYOPHILIZED, FOR SOLUTION INTRAMUSCULAR; INTRAVENOUS ONCE
Status: COMPLETED | OUTPATIENT
Start: 2020-09-02 | End: 2020-09-02

## 2020-09-02 RX ADMIN — AMLODIPINE BESYLATE 5 MG: 5 TABLET ORAL at 23:01

## 2020-09-02 RX ADMIN — GABAPENTIN 200 MG: 100 CAPSULE ORAL at 23:01

## 2020-09-02 RX ADMIN — METHYLPREDNISOLONE SODIUM SUCCINATE 125 MG: 125 INJECTION, POWDER, FOR SOLUTION INTRAMUSCULAR; INTRAVENOUS at 19:23

## 2020-09-02 RX ADMIN — METOPROLOL TARTRATE 25 MG: 25 TABLET, FILM COATED ORAL at 23:01

## 2020-09-02 RX ADMIN — BUDESONIDE AND FORMOTEROL FUMARATE DIHYDRATE 2 PUFF: 160; 4.5 AEROSOL RESPIRATORY (INHALATION) at 23:00

## 2020-09-02 RX ADMIN — ALBUTEROL SULFATE 10 MG: 2.5 SOLUTION RESPIRATORY (INHALATION) at 19:20

## 2020-09-02 RX ADMIN — AZITHROMYCIN MONOHYDRATE 500 MG: 250 TABLET ORAL at 21:19

## 2020-09-03 VITALS
WEIGHT: 201.39 LBS | RESPIRATION RATE: 18 BRPM | HEART RATE: 82 BPM | BODY MASS INDEX: 28.83 KG/M2 | HEIGHT: 70 IN | TEMPERATURE: 97.5 F | OXYGEN SATURATION: 92 % | SYSTOLIC BLOOD PRESSURE: 152 MMHG | DIASTOLIC BLOOD PRESSURE: 86 MMHG

## 2020-09-03 PROBLEM — J41.1 MUCOPURULENT CHRONIC BRONCHITIS (HCC): Chronic | Status: ACTIVE | Noted: 2019-02-22

## 2020-09-03 LAB
BILIRUB UR QL STRIP: NEGATIVE
CLARITY UR: CLEAR
COLOR UR: YELLOW
GLUCOSE UR STRIP-MCNC: ABNORMAL MG/DL
HGB UR QL STRIP.AUTO: NEGATIVE
KETONES UR STRIP-MCNC: ABNORMAL MG/DL
LEUKOCYTE ESTERASE UR QL STRIP: NEGATIVE
NITRITE UR QL STRIP: NEGATIVE
PH UR STRIP.AUTO: 5.5 [PH]
PROCALCITONIN SERPL-MCNC: <0.05 NG/ML
PROT UR STRIP-MCNC: NEGATIVE MG/DL
SP GR UR STRIP.AUTO: 1.01 (ref 1–1.03)
UROBILINOGEN UR QL STRIP.AUTO: 0.2 E.U./DL

## 2020-09-03 PROCEDURE — 81003 URINALYSIS AUTO W/O SCOPE: CPT | Performed by: INTERNAL MEDICINE

## 2020-09-03 PROCEDURE — 84145 PROCALCITONIN (PCT): CPT | Performed by: INTERNAL MEDICINE

## 2020-09-03 PROCEDURE — 94640 AIRWAY INHALATION TREATMENT: CPT

## 2020-09-03 PROCEDURE — 94760 N-INVAS EAR/PLS OXIMETRY 1: CPT

## 2020-09-03 PROCEDURE — 99217 PR OBSERVATION CARE DISCHARGE MANAGEMENT: CPT | Performed by: NURSE PRACTITIONER

## 2020-09-03 RX ORDER — ALBUTEROL SULFATE 2.5 MG/3ML
2.5 SOLUTION RESPIRATORY (INHALATION) EVERY 4 HOURS PRN
Qty: 75 ML | Refills: 0
Start: 2020-09-03 | End: 2022-01-23 | Stop reason: SDUPTHER

## 2020-09-03 RX ORDER — GUAIFENESIN 600 MG
600 TABLET, EXTENDED RELEASE 12 HR ORAL 2 TIMES DAILY
Refills: 0
Start: 2020-09-03 | End: 2020-09-03

## 2020-09-03 RX ORDER — LEVALBUTEROL INHALATION SOLUTION 0.63 MG/3ML
0.63 SOLUTION RESPIRATORY (INHALATION)
Status: DISCONTINUED | OUTPATIENT
Start: 2020-09-03 | End: 2020-09-03 | Stop reason: HOSPADM

## 2020-09-03 RX ORDER — ASPIRIN 81 MG/1
81 TABLET ORAL EVERY OTHER DAY
Refills: 0
Start: 2020-09-03

## 2020-09-03 RX ORDER — GUAIFENESIN 600 MG
600 TABLET, EXTENDED RELEASE 12 HR ORAL 2 TIMES DAILY
Refills: 0
Start: 2020-09-03 | End: 2022-04-22

## 2020-09-03 RX ORDER — LISINOPRIL 20 MG/1
20 TABLET ORAL 2 TIMES DAILY
Status: ON HOLD | COMMUNITY
Start: 2020-07-23 | End: 2020-09-03 | Stop reason: SDUPTHER

## 2020-09-03 RX ORDER — ROSUVASTATIN CALCIUM 20 MG/1
20 TABLET, COATED ORAL
Refills: 0
Start: 2020-09-03 | End: 2022-04-22 | Stop reason: SDUPTHER

## 2020-09-03 RX ORDER — DIAPER,BRIEF,INFANT-TODD,DISP
EACH MISCELLANEOUS 2 TIMES DAILY
Qty: 30 G | Refills: 0 | Status: SHIPPED | OUTPATIENT
Start: 2020-09-03 | End: 2020-09-03

## 2020-09-03 RX ORDER — AZITHROMYCIN 500 MG/1
500 TABLET, FILM COATED ORAL EVERY 24 HOURS
Qty: 10 TABLET | Refills: 0 | Status: SHIPPED | OUTPATIENT
Start: 2020-09-03 | End: 2020-09-03

## 2020-09-03 RX ORDER — GLIMEPIRIDE 1 MG/1
1 TABLET ORAL 2 TIMES DAILY
Refills: 0
Start: 2020-09-03 | End: 2022-04-22

## 2020-09-03 RX ORDER — GABAPENTIN 100 MG/1
200 CAPSULE ORAL
Qty: 60 CAPSULE | Refills: 0
Start: 2020-09-03 | End: 2022-04-22 | Stop reason: SDUPTHER

## 2020-09-03 RX ORDER — ALOGLIPTIN 25 MG/1
12.5 TABLET, FILM COATED ORAL DAILY
Qty: 15 TABLET | Refills: 0
Start: 2020-09-03 | End: 2022-03-24

## 2020-09-03 RX ORDER — AZITHROMYCIN 500 MG/1
500 TABLET, FILM COATED ORAL EVERY 24 HOURS
Qty: 10 TABLET | Refills: 0
Start: 2020-09-03 | End: 2020-09-13

## 2020-09-03 RX ORDER — ALBUTEROL SULFATE 90 UG/1
2 AEROSOL, METERED RESPIRATORY (INHALATION) EVERY 6 HOURS PRN
Refills: 0
Start: 2020-09-03 | End: 2022-01-23 | Stop reason: SDUPTHER

## 2020-09-03 RX ORDER — PREDNISONE 10 MG/1
TABLET ORAL
Qty: 30 TABLET | Refills: 0
Start: 2020-09-03 | End: 2020-09-15

## 2020-09-03 RX ORDER — DIAPER,BRIEF,INFANT-TODD,DISP
EACH MISCELLANEOUS 2 TIMES DAILY
Qty: 30 G | Refills: 0
Start: 2020-09-03 | End: 2022-04-22

## 2020-09-03 RX ORDER — LISINOPRIL 20 MG/1
20 TABLET ORAL 2 TIMES DAILY
Qty: 60 TABLET | Refills: 0 | Status: SHIPPED | OUTPATIENT
Start: 2020-09-03 | End: 2021-09-17 | Stop reason: HOSPADM

## 2020-09-03 RX ORDER — PREDNISONE 10 MG/1
TABLET ORAL
Qty: 30 TABLET | Refills: 1 | Status: SHIPPED | OUTPATIENT
Start: 2020-09-03 | End: 2020-09-03 | Stop reason: SDUPTHER

## 2020-09-03 RX ORDER — IPRATROPIUM BROMIDE AND ALBUTEROL SULFATE 2.5; .5 MG/3ML; MG/3ML
3 SOLUTION RESPIRATORY (INHALATION) 4 TIMES DAILY
Qty: 360 ML | Refills: 0 | Status: SHIPPED | OUTPATIENT
Start: 2020-09-03 | End: 2020-10-03

## 2020-09-03 RX ORDER — PREDNISONE 20 MG/1
20 TABLET ORAL DAILY
Qty: 30 TABLET | Refills: 0
Start: 2020-09-15 | End: 2020-12-01 | Stop reason: HOSPADM

## 2020-09-03 RX ORDER — PREDNISONE 20 MG/1
20 TABLET ORAL DAILY
Qty: 30 TABLET | Refills: 0 | Status: SHIPPED | OUTPATIENT
Start: 2020-09-15 | End: 2020-09-03 | Stop reason: SDUPTHER

## 2020-09-03 RX ADMIN — LEVALBUTEROL HYDROCHLORIDE 0.63 MG: 0.63 SOLUTION RESPIRATORY (INHALATION) at 07:42

## 2020-09-03 RX ADMIN — GUAIFENESIN 600 MG: 600 TABLET, EXTENDED RELEASE ORAL at 09:08

## 2020-09-03 RX ADMIN — ENOXAPARIN SODIUM 40 MG: 40 INJECTION SUBCUTANEOUS at 09:09

## 2020-09-03 RX ADMIN — HYDROCORTISONE: 10 CREAM TOPICAL at 09:54

## 2020-09-03 RX ADMIN — LEVALBUTEROL HYDROCHLORIDE 0.63 MG: 0.63 SOLUTION RESPIRATORY (INHALATION) at 14:34

## 2020-09-03 RX ADMIN — LISINOPRIL 20 MG: 20 TABLET ORAL at 09:09

## 2020-09-03 RX ADMIN — METOPROLOL TARTRATE 25 MG: 25 TABLET, FILM COATED ORAL at 09:09

## 2020-09-03 RX ADMIN — METHYLPREDNISOLONE SODIUM SUCCINATE 40 MG: 40 INJECTION, POWDER, FOR SOLUTION INTRAMUSCULAR; INTRAVENOUS at 06:14

## 2020-09-03 RX ADMIN — BUDESONIDE AND FORMOTEROL FUMARATE DIHYDRATE 2 PUFF: 160; 4.5 AEROSOL RESPIRATORY (INHALATION) at 09:08

## 2020-09-03 RX ADMIN — ASPIRIN 81 MG: 81 TABLET, COATED ORAL at 09:09

## 2020-09-03 NOTE — ASSESSMENT & PLAN NOTE
Lab Results   Component Value Date    HGBA1C 7 1 (H) 08/14/2020       No results for input(s): POCGLU in the last 72 hours      Blood Sugar Average: Last 72 hrs:  ·  hold oral medications  · Sliding scale insulin coverage while in the

## 2020-09-03 NOTE — CASE MANAGEMENT
Chart reviewed by cm, assessment completed, pt is independent and drives, pt drove here and he plans to drive himself home, pt lives with his wife in a 2 story home with also a basement, DME: walker, ccane, nebulizer, shower chair, glucometer, , RX plan [facility], pt has had [facility] in the past and he does not know the agency, pt denies smoking and states he drinks alcohol on occasion, pt denies any d/c needs, pt was made aware that he is here as an obs status, pt was d/c on 8/15/2020 for copd, pt stated he was not able to get the  Inhaler that he was sent home with , he stated the [place] changed it to a different medication, pt denies smoking and states he drinks alcohol on occasion, doctor and rn are aware the pt is driving himself home, call was placed to dr Amrit Valverde office pt has an appointment  Oct  1,2020, they do not have an earlier appointment they will  Pt the pt on a cx list to be called sooner if possible, Patient requires a follow up appointment with their primary care physician post discharge  CM called patients primary care office to request a follow up appointment  See appointment details below   PCP Name:Dr Francisco Javier Marcos Appointment date and time: 9/11/2020 10:40 am  Pt was made aware of this information, pt in agreement with the d/c and d/c plan home  Patient/caregiver received discharge checklist   Content reviewed  Patient/caregiver encouraged to participate in discharge plan of care prior to discharge home  CM reviewed d/c planning process including the following: identifying help at home, patient preference for d/c planning needs, availability of treatment team to discuss questions or concerns patient and/or family may have regarding understanding medications and recognizing signs and symptoms once discharged  CM also encouraged patient to follow up with all recommended appointments after discharge   Patient advised of importance for patient and family to participate in managing patients medical well being

## 2020-09-03 NOTE — UTILIZATION REVIEW
Initial Clinical Review    Admission: Date/Time/Statement:   Admission Orders (From admission, onward)     Ordered        09/02/20 2036  Place in Observation (expected length of stay for this patient is less than two midnights)  Once                   Orders Placed This Encounter   Procedures    Place in Observation (expected length of stay for this patient is less than two midnights)     Standing Status:   Standing     Number of Occurrences:   1     Order Specific Question:   Admitting Physician     Answer:   Nikki Estrella [[id number]]     Order Specific Question:   Level of Care     Answer:   Med Surg [[id number]]     ED Arrival Information     Expected Arrival Acuity Means of Arrival Escorted By Service Admission Type    - 9/2/2020 18:30 Emergent Walk-In Self General Medicine Emergency    Arrival Complaint    shortness of breath        Chief Complaint   Patient presents with    Shortness of Breath     History of Present Illness:     France Apple is a 77 y o  male who presents with shortness of breath  Patient with past medical history of diabetes mellitus type 2 not on insulin, CAD with CABG x4, hypertension hyperlipidemia, history of prostate cancer, tremor, COPD not on chronic oxygen presents to the ER secondary to shortness breath  He had a recent hospitalization for COPD exacerbation with acute respiratory failure requiring oxygen all initially and tapered off  Today patient reports he was eating dinner and felt short of breath      Patient reports trouble breathing earlier today, used nebulizer 2 times before supper  Relaxed and then ate dinner, small bowl spaghetti, then noted SOB again  Used nebulizer again  No relief  So he didn't want to wait, he came to ER to be evaluated  He had neb and steriod and was able to be taken off the oxygen in the ER   In the ER he was given an inhaler, solumedrol 125mg          * Mucopurulent chronic bronchitis (HCC)  Assessment & Plan  · With wheezing  · Respiratory protocol     CAD (coronary artery disease), native coronary artery  Assessment & Plan  · No current chest pain  · History of CABG x4  · Continue metoprolol     Essential hypertension  Assessment & Plan  · Continue lisinopril and metoprolol     Type 2 diabetes mellitus without complication, without long-term current use of insulin (HCC)  Assessment & Plan        Lab Results   Component Value Date     HGBA1C 7 1 (H) 08/14/2020         No results for input(s): POCGLU in the last 72 hours      Blood Sugar Average: Last 72 hrs:  ·  hold oral medications  · Sliding scale insulin coverage while in the    VTE Prophylaxis: Enoxaparin (Lovenox)  Code Status:  Full code  POLST: POLST is not applicable to this patient  Discussion with patient     Anticipated Length of Stay:  Patient will be admitted on an Observation basis with an anticipated length of stay of  < 2 midnights     Justification for Hospital Stay:  Respiratory status monitoring    ED Triage Vitals   Temperature Pulse Respirations Blood Pressure SpO2   09/02/20 1836 09/02/20 1836 09/02/20 1836 09/02/20 1900 09/02/20 1900   98 6 °F (37 °C) 102 (!) 28 (!) 182/81 97 %      Temp Source Heart Rate Source Patient Position - Orthostatic VS BP Location FiO2 (%)   09/02/20 1836 09/02/20 1836 09/02/20 2100 09/02/20 2100 --   Tympanic Monitor Sitting Left arm       Pain Score       09/02/20 2226       No Pain          Wt Readings from Last 1 Encounters:   09/02/20 91 3 kg (201 lb 6 2 oz)     Additional Vital Signs:     /Time   Temp   Pulse   Resp   BP   MAP (mmHg)   SpO2   Calculated FIO2 (%) - Nasal Cannula   Nasal Cannula O2 Flow Rate (L/min)   O2 Device     09/03/20 0742                  94 %         None (Room air)     09/03/20 0730            152/86                    09/03/20 0710   97 5 °F (36 4 °C)   82   18   186/79Abnormal        94 %         None (Room air)     09/02/20 2300   97 1 °F (36 2 °C)Abnormal     92   20   122/70      92 %         None (Room air)     09/02/20 2100   97 4 °F (36 3 °C)Abnormal     84   22   182/98Abnormal        93 %         None (Room air)     09/02/20 2049      82            93 %              09/02/20 2030      81      175/78Abnormal     112   92 %              09/02/20 2000      78      164/75   108   94 %              09/02/20 1920                  97 %   26   1 5 L/min   Nasal cannula           Pertinent Labs/Diagnostic Test Results:       9/2 CXR:  No acute cardiopulmonary disease  9/2 ED EKG:  SR, rate 86, infrequent PVCs             Results from last 7 days   Lab Units 09/02/20  1905   WBC Thousand/uL 8 60   HEMOGLOBIN g/dL 14 4   HEMATOCRIT % 41 2*   PLATELETS Thousands/uL 176   NEUTROS ABS Thousands/µL 6 10         Results from last 7 days   Lab Units 09/02/20  1905   SODIUM mmol/L 138   POTASSIUM mmol/L 4 1   CHLORIDE mmol/L 101   CO2 mmol/L 27   ANION GAP mmol/L 10   BUN mg/dL 13   CREATININE mg/dL 0 90   EGFR ml/min/1 73sq m 84   CALCIUM mg/dL 9 1     Results from last 7 days   Lab Units 09/02/20  1905   AST U/L 12*   ALT U/L 12   ALK PHOS U/L 66   TOTAL PROTEIN g/dL 7 0   ALBUMIN g/dL 4 4   TOTAL BILIRUBIN mg/dL 0 50         Results from last 7 days   Lab Units 09/02/20  1905   GLUCOSE RANDOM mg/dL 151*         Results from last 7 days   Lab Units 09/02/20  1905   PROTIME seconds 12 7   INR  0 96   PTT seconds 31         Results from last 7 days   Lab Units 09/02/20  1905   PROCALCITONIN ng/ml <0 05     Results from last 7 days   Lab Units 09/02/20  1905   LACTIC ACID mmol/L 2 0             Results from last 7 days   Lab Units 09/02/20  1905   BNP pg/mL 126*                         Results from last 7 days   Lab Units 09/03/20  0032   CLARITY UA  Clear   COLOR UA  Yellow   SPEC GRAV UA  1 010   PH UA  5 5   GLUCOSE UA mg/dl 3+*   KETONES UA mg/dl Trace*   BLOOD UA  Negative   PROTEIN UA mg/dl Negative   NITRITE UA  Negative   BILIRUBIN UA  Negative   UROBILINOGEN UA E U /dl 0 2   LEUKOCYTES UA Negative                                 Results from last 7 days   Lab Units 09/02/20  1909 09/02/20  1905   BLOOD CULTURE  Received in Microbiology Lab  Culture in Progress  Received in Microbiology Lab  Culture in Progress  ED Treatment:   Medication Administration from 09/02/2020 1830 to 09/02/2020 2100       Date/Time Order Dose Route Action Action by Comments     09/02/2020 1920 albuterol inhalation solution 10 mg 10 mg Nebulization Given Ran Paige,       09/02/2020 1923 methylPREDNISolone sodium succinate (Solu-MEDROL) injection 125 mg 125 mg Intravenous Given Arthur Meeks RN         Past Medical History:   Diagnosis Date    BPH (benign prostatic hyperplasia)     45 days radiation treatment    COPD (chronic obstructive pulmonary disease)     Coronary artery disease     CABG x4 in 2017    Diabetes mellitus     History of Arterial Duplex of LE 12/26/2017    Likely occlusion of the left superficial femoral artery  Calcific changes bilaterally  Despite these changes, the ankle-brachial index as a measure of peripheral blood flow only mildly impaired      History of echocardiogram 06/12/2017    EF 40%, mild LVH, mild MR     Hyperlipidemia     Hypertension     NSTEMI (non-ST elevated myocardial infarction)     Prostate cancer     prostate     PVD (peripheral vascular disease)     Tremor      Present on Admission:   Type 2 diabetes mellitus without complication, without long-term current use of insulin ([facility])   Tobacco abuse   Other hyperlipidemia   Essential hypertension   CAD (coronary artery disease), native coronary artery   Tremor   Mucopurulent chronic bronchitis ([facility])      Admitting Diagnosis: Shortness of breath [R06 02]  COPD exacerbation ([facility]) [J44 1]  Age/Sex: 77 y o  male     Admission Orders:  Sputum culture and gram stain      Scheduled Medications:  aspirin, 81 mg, Oral, Every Other Day  azithromycin, 500 mg, Oral, Q24H  budesonide-formoterol, 2 puff, Inhalation, BID  Empagliflozin, 25 mg, Oral, QAM  enoxaparin, 40 mg, Subcutaneous, Daily  gabapentin, 200 mg, Oral, HS  guaiFENesin, 600 mg, Oral, BID  hydrocortisone, , Topical, BID  levalbuterol, 0 63 mg, Nebulization, TID  lisinopril, 20 mg, Oral, Daily  methylPREDNISolone sodium succinate, 40 mg, Intravenous, Q8H  metoprolol tartrate, 25 mg, Oral, Q12H ENDER      Continuous IV Infusions: None  PRN Meds:  hydrALAZINE, 10 mg, Intravenous, Q6H PRN        IP CONSULT TO CASE MANAGEMENT    Network Utilization Review Department  [email or web address]  ATTENTION: Please call with any questions or concerns to [phone] and carefully listen to the prompts so that you are directed to the right person  All voicemails are confidential   [place] all requests for admission clinical reviews, approved or denied determinations and any other requests to dedicated fax number below belonging to the campus where the patient is receiving treatment   List of dedicated fax numbers for the Facilities:  [address] DENIALS (Administrative/Medical Necessity) [phone]   [address] (Maternity/NICU/Pediatrics) [phone]   Grey Challenger [phone]   Jv Giles [phone]   [facility] [phone]   [facility] [phone]   12[address] [facility] [address] [facility] [phone]   [facility]  [phone]   [address] [facility] [address] [facility]  24[address] [facility] 1000 [facility] [phone]

## 2020-09-03 NOTE — ASSESSMENT & PLAN NOTE
Lab Results   Component Value Date    HGBA1C 7 1 (H) 08/14/2020       No results for input(s): POCGLU in the last 72 hours      Blood Sugar Average: Last 72 hrs:  ·  hold oral medications - will restart at discharge   · Sliding scale insulin coverage while in the hospital

## 2020-09-03 NOTE — ASSESSMENT & PLAN NOTE
· With wheezing  · Respiratory protocol  · Continue azithromycin po  · Continue mucinex  · Continue inhalers and nebulizers

## 2020-09-03 NOTE — H&P
H&P- Zack Crow 1945, 77 y o  male MRN: [id number]    Unit/Bed#: -01 Encounter: [id number]    Primary Care Provider: Mason Murrieta DO   Date and time admitted to hospital: 9/2/2020  6:36 PM      * Mucopurulent chronic bronchitis ([address] )  Assessment & Plan  · With wheezing  · Respiratory protocol    CAD (coronary artery disease), native coronary artery  Assessment & Plan  · No current chest pain  · History of CABG x4  · Continue metoprolol    Essential hypertension  Assessment & Plan  · Continue lisinopril and metoprolol    Type 2 diabetes mellitus without complication, without long-term current use of insulin (HCC)  Assessment & Plan  Lab Results   Component Value Date    HGBA1C 7 1 (H) 08/14/2020       No results for input(s): POCGLU in the last 72 hours  Blood Sugar Average: Last 72 hrs:  ·  hold oral medications  · Sliding scale insulin coverage while in the    VTE Prophylaxis: Enoxaparin (Lovenox)  Code Status:  Full code  POLST: POLST is not applicable to this patient  Discussion with patient    Anticipated Length of Stay:  Patient will be admitted on an Observation basis with an anticipated length of stay of  < 2 midnights  Justification for Hospital Stay:  Respiratory status monitoring    Chief Complaint:   Shortness breath    History of Present Illness:    Zack Crow is a 77 y o  male who presents with shortness of breath  Patient with past medical history of diabetes mellitus type 2 not on insulin, CAD with CABG x4, hypertension hyperlipidemia, history of prostate cancer, tremor, COPD not on chronic oxygen presents to the ER secondary to shortness breath  He had a recent hospitalization for COPD exacerbation with acute respiratory failure requiring oxygen all initially and tapered off  Today patient reports he was eating dinner and felt short of breath  Patient reports trouble breathing earlier today, used nebulizer 2 times before supper    Relaxed and then ate dinner, small senthil ahmadi, then noted SOB again  Used nebulizer again  No relief  So he didn't want to wait, he came to ER to be evaluated  He had neb and steriod and was able to be taken off the oxygen in the ER  In the ER he was given an inhaler, solumedrol 125mg  Review of Systems:  Review of Systems   Constitutional: Negative for chills and fever  HENT: Negative for rhinorrhea, sore throat and trouble swallowing  Eyes: Negative for discharge and redness  Respiratory: Positive for cough, shortness of breath and wheezing  Cardiovascular: Negative for chest pain and leg swelling  Gastrointestinal: Negative for abdominal pain, diarrhea, nausea and vomiting  Genitourinary: Negative for dysuria and hematuria  Musculoskeletal: Negative for back pain and neck pain  Skin: Positive for rash  Neurological: Negative for dizziness and headaches  Psychiatric/Behavioral: Negative for agitation and confusion  Past Medical and Surgical History:   Past Medical History:   Diagnosis Date    BPH (benign prostatic hyperplasia)     39 days radiation treatment    COPD (chronic obstructive pulmonary disease)     Coronary artery disease     CABG x4 in 2017    Diabetes mellitus     History of Arterial Duplex of LE 12/26/2017    Likely occlusion of the left superficial femoral artery  Calcific changes bilaterally  Despite these changes, the ankle-brachial index as a measure of peripheral blood flow only mildly impaired   History of echocardiogram 06/12/2017    EF 40%, mild LVH, mild MR     Hyperlipidemia     Hypertension     NSTEMI (non-ST elevated myocardial infarction)     Prostate cancer     prostate     PVD (peripheral vascular disease)     Tremor        Past Surgical History:   Procedure Laterality Date    CARDIAC CATHETERIZATION  03/08/2017    Significant left main plus triple-vessel CAD      CORONARY ARTERY BYPASS GRAFT  03/08/2017    4V CABG:  LIMA to LAD, VG to RI, SVG to PDA to LVBR RCA     EYE SURGERY      shots in eye once a month @ the [address] [place] [address]         Meds/Allergies:  Prior to Admission medications    Medication Sig Start Date End Date Taking? Authorizing Provider   albuterol (2 5 mg/3 mL) 0 083 % nebulizer solution Take 1 vial (2 5 mg total) by nebulization every 4 (four) hours as needed for wheezing or shortness of breath 6/24/18   Karlo An MD   albuterol (PROVENTIL HFA,VENTOLIN HFA) 90 mcg/act inhaler Inhale 2 puffs every 6 (six) hours as needed for wheezing or shortness of breath    Historical Provider, MD   Alogliptin Benzoate 25 MG TABS Take 12 5 mg by mouth Patient states takes 1/2 of a 25 mg tablet every AM    Historical Provider, MD   aspirin (ECOTRIN LOW STRENGTH) 81 mg EC tablet Take 81 mg by mouth every other day     Historical Provider, MD   aspirin 81 mg chewable tablet Chew 81 mg daily    Historical Provider, MD   benzonatate (TESSALON PERLES) 100 mg capsule Take 1 capsule (100 mg total) by mouth 3 (three) times a day as needed for cough 8/15/20   Rosie Kumarty, PENG   budesonide-formoterol (SYMBICORT) 160-4 5 mcg/act inhaler Inhale 2 puffs 2 (two) times a day Rinse mouth after use   8/15/20 9/14/20  Rosie Kumarty, PENG   Empagliflozin 10 MG TABS Take 10 mg by mouth every morning    Historical Provider, MD   Empagliflozin 25 MG TABS Take 25 mg by mouth every morning Take one half tablet every morning    Historical Provider, MD   gabapentin (NEURONTIN) 100 mg capsule Take 200 mg by mouth daily at bedtime    Historical Provider, MD   gabapentin (NEURONTIN) 100 mg capsule Take 200 mg by mouth daily at bedtime    Historical Provider, MD   glimepiride (AMARYL) 1 mg tablet Take 1 mg by mouth 2 (two) times a day    Historical Provider, MD   ipratropium-albuterol (DUO-NEB) 0 5-2 5 mg/3 mL nebulizer solution Take 1 vial (3 mL total) by nebulization 4 (four) times a day 8/15/20 9/14/20  Rosie Kumarty, PENG   lisinopril (ZESTRIL) 20 mg tablet Take 20 mg by mouth daily    Historical Provider, MD   lisinopril (ZESTRIL) 40 mg tablet Take 1 tablet (40 mg total) by mouth daily 7/24/20   PENG Allen   metFORMIN (GLUCOPHAGE) 500 mg tablet Take 500 mg by mouth daily with dinner     Historical Provider, MD   metFORMIN (GLUCOPHAGE) 500 mg tablet Take 500 mg by mouth daily after dinner    Historical Provider, MD   metoprolol tartrate (LOPRESSOR) 25 mg tablet Take 25 mg by mouth every 12 (twelve) hours    Historical Provider, MD   metoprolol tartrate (LOPRESSOR) 50 mg tablet Take 1 tablet (50 mg total) by mouth 2 (two) times a day 7/23/20   PENG Allen   predniSONE 10 mg tablet Take 4 tablets (40 mg total) by mouth daily Take 40 mg x 2 days, take 30 mg x 3 days, take 20 mg x 3 days, take 10 mg x 3 days then stop 8/16/20   Claude Peel, CRNP   primidone (MYSOLINE) 50 mg tablet Take 2 tablets (100 mg total) by mouth every 12 (twelve) hours 10/23/19   Greg Sinha PA-C   rosuvastatin (CRESTOR) 10 MG tablet Take 10 mg by mouth daily    Historical Provider, MD   rosuvastatin (CRESTOR) 20 MG tablet Take 20 mg by mouth daily after dinner Take one half tablet daily with dinner    Historical Provider, MD   saxagliptin (ONGLYZA) 5 MG tablet Take 5 mg by mouth daily    Historical Provider, MD   tiotropium-olodaterol (STIOLTO RESPIMAT) 2 5-2 5 MCG/ACT inhaler Inhale 2 puffs daily    Historical Provider, MD     I have reviewed home medications with patient personally  Allergies:    Allergies   Allergen Reactions    Atorvastatin Myalgia       Social History:  Marital Status: /Civil Union   Occupation:  retired  Patient Pre-hospital Living Situation:  Home  Patient Pre-hospital Level of Mobility:  Mobile  Patient Pre-hospital Diet Restrictions:  None  Substance Use History:   Social History     Substance and Sexual Activity   Alcohol Use Yes    Frequency: Never    Drinks per session: Patient refused    Binge frequency: Never     Social History Tobacco Use   Smoking Status Never Smoker   Smokeless Tobacco Never Used   Tobacco Comment    only smokes when he drinks beer     Social History     Substance and Sexual Activity   Drug Use Yes       Family History:  I have reviewed the patients family history    Physical Exam:   Vitals:   Blood Pressure: (!) 182/98 (09/02/20 2100)  Pulse: 84 (09/02/20 2100)  Temperature: (!) 97 4 °F (36 3 °C) (09/02/20 2100)  Temp Source: Temporal (09/02/20 2100)  Respirations: 22 (09/02/20 2100)  Height: 5' 10" (177 8 cm) (09/02/20 2100)  Weight - Scale: 91 3 kg (201 lb 6 2 oz) (09/02/20 2100)  SpO2: 93 % (09/02/20 2100)    Physical Exam  Vitals signs reviewed  Constitutional:       General: He is not in acute distress  Appearance: Normal appearance  Comments: Pleasant elderly  male lying In bed watching TV   HENT:      Head: Normocephalic and atraumatic  Right Ear: External ear normal       Left Ear: External ear normal       Nose: Nose normal    Eyes:      General:         Right eye: No discharge  Left eye: No discharge  Conjunctiva/sclera: Conjunctivae normal    Neck:      Musculoskeletal: Normal range of motion  Cardiovascular:      Rate and Rhythm: Normal rate and regular rhythm  Heart sounds: Normal heart sounds  No murmur  Pulmonary:      Effort: Pulmonary effort is normal  No respiratory distress  Breath sounds: Decreased air movement present  Examination of the right-upper field reveals decreased breath sounds  Examination of the left-upper field reveals decreased breath sounds  Examination of the right-middle field reveals decreased breath sounds  Examination of the left-middle field reveals decreased breath sounds  Examination of the right-lower field reveals decreased breath sounds  Examination of the left-lower field reveals decreased breath sounds  Decreased breath sounds present  No wheezing or rales     Abdominal:      General: Bowel sounds are normal  There is no distension  Palpations: Abdomen is soft  Tenderness: There is no abdominal tenderness  There is no guarding  Musculoskeletal: Normal range of motion  Skin:     General: Skin is warm and dry  Comments: Area of redness on the right posterior hand reports itching and rash comes and goes and skin pedal   Neurological:      Mental Status: He is alert  Mental status is at baseline  Psychiatric:         Mood and Affect: Mood normal          Behavior: Behavior normal          Thought Content: Thought content normal          Judgment: Judgment normal          Additional Data:   Lab Results: I have personally reviewed pertinent reports  Results from last 7 days   Lab Units 09/02/20  1905   WBC Thousand/uL 8 60   HEMOGLOBIN g/dL 14 4   HEMATOCRIT % 41 2*   PLATELETS Thousands/uL 176   NEUTROS PCT % 71   LYMPHS PCT % 15*   MONOS PCT % 7   EOS PCT % 6*     Results from last 7 days   Lab Units 09/02/20  1905   SODIUM mmol/L 138   POTASSIUM mmol/L 4 1   CHLORIDE mmol/L 101   CO2 mmol/L 27   BUN mg/dL 13   CREATININE mg/dL 0 90   CALCIUM mg/dL 9 1   ALK PHOS U/L 66   ALT U/L 12   AST U/L 12*     Results from last 7 days   Lab Units 09/02/20  1905   INR  0 96     Imaging: I have personally reviewed pertinent reports  XR chest 1 view portable   ED Interpretation by Jagdish Childs DO (09/02 1927)   No significant changes since last x-ray  No dense consolidations      Final Result by Sav Clark MD (09/02 2113)      No acute cardiopulmonary disease  Workstation performed: [id number]             Epic Records Reviewed: Yes     ** Please Note: This note has been constructed using a voice recognition system   **

## 2020-09-03 NOTE — DISCHARGE INSTR - AVS FIRST PAGE
Dear Rajiv Kim,     It was our pleasure to care for you here at [facility]  It is our hope that we were always able to exceed the expected standards for your care during your stay  You were hospitalized due to a COPD exacerbation  You were cared for on the medical surgical floor by PENG Doss with the [facility] who covers for your primary care physician (PCP), Evelio Adam DO, while you were hospitalized  If you have any questions or concerns related to this hospitalization, you may contact us at [phone]  For follow up as well as any medication refills, we recommend that you follow up with your primary care physician  A registered nurse will reach out to you by phone within a few days after your discharge to answer any additional questions that you may have after going home  However, at this time we provide for you here, the most important instructions / recommendations at discharge:     · Notable Medication Adjustments -   · I ordered you a Prednisone Taper - Follow the directions ( I gave you 1 refill for the same directions)  · I also ordered you prednisone 20mg tablets to help prevent you from having to come into the hospital before you see Dr Ama Gonzales  (maybe you wont need to take them at all)  · Azithromycin 500 mg by mouth daily for the next 10 days  · Continue your Mucinex 600mg by mouth Twice daily  · Hydrocortisone cream for your hand Twice daily  · You have another Nebulizer Medication ordered for you, it has a similar name, but has an added ingredient - Duo-Neb 0 5-2 5mg/3ml  · Testing Required after Discharge -   · You need to weigh yourself everyday  · Important follow up information -   · Take these papers with you to every doctor appointment you have (Dr Eulalio Carballo and Dr Ama Gonzales) so that they can update in their system your correct medications  · Other Instructions -   · I corrected all of your medications  Here is the correct list with the names and doses and directions:  · Metoprolol 25mg by mouth Twice daily  · Lisinopril 20mg by mouth Twice daily  · Alogliptin 12 5mg by mouth daily  · Crestor 10mg by mouth with dinner  · Onglyza 5mg by mouth daily  · Amaryl 1mg by mouth Twice daily  · Empagliflozin 12 5mg by mouth daily  · Metformin 500mg by mouth with dinner  · Aspirin 81mg by mouth daily  · Gabapentin 200mg by mouth at Bedtime  · Stiolto Inhaler   · Albuterol Inhaler  · Albuterol Nebulizer (0 083%)  · Duo-Neb Nebulizer (0 5-2 5mg/3ml  · Please review this entire after visit summary as additional general instructions including medication list, appointments, activity, diet, any pertinent wound care, and other additional recommendations from your care team that may be provided for you        Sincerely,     PENG Tariq

## 2020-09-03 NOTE — PLAN OF CARE
Problem: Potential for Falls  Goal: Patient will remain free of falls  Description: INTERVENTIONS:  - Assess patient frequently for physical needs  -  Identify cognitive and physical deficits and behaviors that affect risk of falls    -  [place] fall precautions as indicated by assessment   - Educate patient/family on patient safety including physical limitations  - Instruct patient to call for assistance with activity based on assessment  - Modify environment to reduce risk of injury  - Consider OT/PT consult to assist with strengthening/mobility  Outcome: Progressing     Problem: INFECTION - ADULT  Goal: Absence or prevention of progression during hospitalization  Description: INTERVENTIONS:  - Assess and monitor for signs and symptoms of infection  - Monitor lab/diagnostic results  - Monitor all insertion sites, i e  indwelling lines, tubes, and drains  - Monitor endotracheal if appropriate and nasal secretions for changes in amount and color  - [place] appropriate cooling/warming therapies per order  - Administer medications as ordered  - Instruct and encourage patient and family to use good hand hygiene technique  - Identify and instruct in appropriate isolation precautions for identified infection/condition  Outcome: Progressing     Problem: DISCHARGE PLANNING  Goal: Discharge to home or other facility with appropriate resources  Description: INTERVENTIONS:  - Identify barriers to discharge w/patient and caregiver  - Arrange for needed discharge resources and transportation as appropriate  - Identify discharge learning needs (meds, wound care, etc )  - Refer to Case Management Department for coordinating discharge planning if the patient needs post-hospital services based on physician/advanced practitioner order or complex needs related to functional status, cognitive ability, or social support system  Outcome: Progressing     Problem: RESPIRATORY - ADULT  Goal: Achieves optimal ventilation and oxygenation  Description: INTERVENTIONS:  - Assess for changes in respiratory status  - Assess for changes in mentation and behavior  - Position to facilitate oxygenation and minimize respiratory effort  - Oxygen administered by appropriate delivery if ordered  - Initiate smoking cessation education as indicated  - Encourage broncho-pulmonary hygiene including cough, deep breathe, Incentive Spirometry  - Assess the need for suctioning and aspirate as needed  - Assess and instruct to report SOB or any respiratory difficulty  - Respiratory Therapy support as indicated  Outcome: Progressing

## 2020-09-03 NOTE — RESPIRATORY THERAPY NOTE
RT Protocol Note  Samson Aldana 77 y o  male MRN: [id number]  Unit/Bed#: -01 Encounter: [id number]    Assessment    Principal Problem:    Mucopurulent chronic bronchitis ([address] )  Active Problems:    Type 2 diabetes mellitus without complication, without long-term current use of insulin (HCC)    Tobacco abuse    Other hyperlipidemia    Essential hypertension    CAD (coronary artery disease), native coronary artery    Tremor      Home Pulmonary Medications:    Home Devices/Therapy: Other (Comment)(nebs/mdi)    Past Medical History:   Diagnosis Date    BPH (benign prostatic hyperplasia)     45 days radiation treatment    COPD (chronic obstructive pulmonary disease)     Coronary artery disease     CABG x4 in 2017    Diabetes mellitus     History of Arterial Duplex of LE 12/26/2017    Likely occlusion of the left superficial femoral artery  Calcific changes bilaterally  Despite these changes, the ankle-brachial index as a measure of peripheral blood flow only mildly impaired      History of echocardiogram 06/12/2017    EF 40%, mild LVH, mild MR     Hyperlipidemia     Hypertension     NSTEMI (non-ST elevated myocardial infarction)     Prostate cancer     prostate     PVD (peripheral vascular disease)     Tremor      Social History     Socioeconomic History    Marital status: /Civil Union     Spouse name: None    Number of children: None    Years of education: None    Highest education level: None   Occupational History    None   Social Needs    Financial resource strain: Not hard at all   [place] insecurity     Worry: Never true     Inability: Never true    Transportation needs     Medical: No     Non-medical: No   Tobacco Use    Smoking status: Never Smoker    Smokeless tobacco: Never Used    Tobacco comment: only smokes when he drinks beer   Substance and Sexual Activity    Alcohol use: Yes     Frequency: Never     Drinks per session: Patient refused     Binge frequency: Never    Drug use: Yes    Sexual activity: None   Lifestyle    Physical activity     Days per week: None     Minutes per session: None    Stress: None   Relationships    Social connections     Talks on phone: None     Gets together: None     Attends Congregational service: None     Active member of club or organization: None     Attends meetings of clubs or organizations: None     Relationship status: None    Intimate partner violence     Fear of current or ex partner: None     Emotionally abused: None     Physically abused: None     Forced sexual activity: None   Other Topics Concern    None   Social History Narrative    ** Merged History Encounter **            Subjective         Objective    Physical Exam:   Assessment Type: Assess only  General Appearance: Alert, Awake  Respiratory Pattern: Dyspnea with exertion  Chest Assessment: Chest expansion symmetrical  Bilateral Breath Sounds: Diminished, Clear  Cough: None    Vitals:  Blood pressure (!) 186/79, pulse 82, temperature 97 5 °F (36 4 °C), temperature source Temporal, resp  rate 18, height 5' 10" (1 778 m), weight 91 3 kg (201 lb 6 2 oz), SpO2 94 %  Imaging and other studies: I have personally reviewed pertinent reports              Plan    Respiratory Plan: Home Bronchodilator Patient pathway        Resp Comments: pt assessed as per protocol, pt states he does not use home o2 but has nebs 4x's day and mdi prn, pt denies having a bipap/cpap

## 2020-09-03 NOTE — PLAN OF CARE
Problem: Potential for Falls  Goal: Patient will remain free of falls  Description: INTERVENTIONS:  - Assess patient frequently for physical needs  -  Identify cognitive and physical deficits and behaviors that affect risk of falls    -  [place] fall precautions as indicated by assessment   - Educate patient/family on patient safety including physical limitations  - Instruct patient to call for assistance with activity based on assessment  - Modify environment to reduce risk of injury  - Consider OT/PT consult to assist with strengthening/mobility  Outcome: Progressing     Problem: INFECTION - ADULT  Goal: Absence or prevention of progression during hospitalization  Description: INTERVENTIONS:  - Assess and monitor for signs and symptoms of infection  - Monitor lab/diagnostic results  - Monitor all insertion sites, i e  indwelling lines, tubes, and drains  - Monitor endotracheal if appropriate and nasal secretions for changes in amount and color  - [place] appropriate cooling/warming therapies per order  - Administer medications as ordered  - Instruct and encourage patient and family to use good hand hygiene technique  - Identify and instruct in appropriate isolation precautions for identified infection/condition  Outcome: Progressing     Problem: DISCHARGE PLANNING  Goal: Discharge to home or other facility with appropriate resources  Description: INTERVENTIONS:  - Identify barriers to discharge w/patient and caregiver  - Arrange for needed discharge resources and transportation as appropriate  - Identify discharge learning needs (meds, wound care, etc )  - Refer to Case Management Department for coordinating discharge planning if the patient needs post-hospital services based on physician/advanced practitioner order or complex needs related to functional status, cognitive ability, or social support system  Outcome: Progressing     Problem: RESPIRATORY - ADULT  Goal: Achieves optimal ventilation and oxygenation  Description: INTERVENTIONS:  - Assess for changes in respiratory status  - Assess for changes in mentation and behavior  - Position to facilitate oxygenation and minimize respiratory effort  - Oxygen administered by appropriate delivery if ordered  - Initiate smoking cessation education as indicated  - Encourage broncho-pulmonary hygiene including cough, deep breathe, Incentive Spirometry  - Assess the need for suctioning and aspirate as needed  - Assess and instruct to report SOB or any respiratory difficulty  - Respiratory Therapy support as indicated  Outcome: Progressing

## 2020-09-03 NOTE — DISCHARGE SUMMARY
Discharge- Domenico Loaiza 1945, 77 y o  male MRN: [id number]    Unit/Bed#: -01 Encounter: [id number]    Primary Care Provider: Sacha Galvan DO   Date and time admitted to hospital: 9/2/2020  6:36 PM        * Mucopurulent chronic bronchitis ([address] )  Assessment & Plan  · With wheezing  · Respiratory protocol  · Continue azithromycin po  · Continue mucinex  · Continue inhalers and nebulizers    Type 2 diabetes mellitus without complication, without long-term current use of insulin (HCC)  Assessment & Plan  Lab Results   Component Value Date    HGBA1C 7 1 (H) 08/14/2020       No results for input(s): POCGLU in the last 72 hours  Blood Sugar Average: Last 72 hrs:  ·  hold oral medications - will restart at discharge   · Sliding scale insulin coverage while in the hospital    Essential hypertension  Assessment & Plan  · Continue lisinopril and metoprolol    Other hyperlipidemia  Assessment & Plan  · Continue crestor    Coronary artery disease involving native coronary artery of native heart without angina pectoris  Assessment & Plan  · No current chest pain  · History of CABG x4  · Continue metoprolol and lisinopril         Discharging Physician / Practitioner: PENG Menchaca  PCP: Sacha Galvan DO  Admission Date:   Admission Orders (From admission, onward)     Ordered        09/02/20 2036  Place in Observation (expected length of stay for this patient is less than two midnights)  Once                   Discharge Date: 09/03/20    Resolved Problems  Date Reviewed: 9/3/2020    None          Consultations During Hospital Stay:  · Respiratory therapy  · Case management    Procedures Performed:   CHEST      INDICATION:   sob  Former smoker      COMPARISON:  8/12/2020     EXAM PERFORMED/VIEWS:  XR CHEST PORTABLE        FINDINGS:  There are median sternotomy wires indicating prior cardiac surgery      Cardiomediastinal silhouette appears stable      The lungs are clear    No pneumothorax or pleural effusion      Osseous structures appear within normal limits for patient age      IMPRESSION:     No acute cardiopulmonary disease  Significant Findings / Test Results:   · none    Incidental Findings:   · none     Test Results Pending at Discharge (will require follow up):   · none     Outpatient Tests Requested:  · none    Complications:     none    Reason for Admission: Shortness breath    Hospital Course:     Lincoln Child is a 77 y o  male patient who originally presented to the hospital on 9/2/2020 due to shortness of breath  Patient with past medical history of diabetes mellitus type 2 not on insulin, CAD with CABG x4, hypertension hyperlipidemia, history of prostate cancer, tremor, COPD not on chronic oxygen presents to the ER secondary to shortness breath  He had a recent hospitalization for COPD exacerbation with acute respiratory failure requiring oxygen all initially and tapered off  Today patient reports he was eating dinner and felt short of breath      Patient reports trouble breathing earlier today, used nebulizer 2 times before supper  Relaxed and then ate dinner, small bowl spaghetti, then noted SOB again  Used nebulizer again  No relief  So he didn't want to wait, he came to ER to be evaluated  He had neb and steriod and was able to be taken off the oxygen in the ER      In the ER he was given an inhaler, solumedrol 125mg  Patient improved overnight and was appropriate for discharge home on oral antibiotics, and oral steroid taper  Please see above list of diagnoses and related plan for additional information  Condition at Discharge: fair     Discharge Day Visit / Exam:     Subjective:  Patent is lying in bed  He states he is feeling better, breathing better, and is ready to go home  Prednisone taper was sent to [address] [place] [address] and patient has stated that he is planning on speaking to his pulmonologist about having prn prednisone to prevent re-hospitalization      Vitals: Blood Pressure: 152/86 (09/03/20 0730)  Pulse: 82 (09/03/20 0710)  Temperature: 97 5 °F (36 4 °C) (09/03/20 0710)  Temp Source: Temporal (09/03/20 0710)  Respirations: 18 (09/03/20 0710)  Height: 5' 10" (177 8 cm) (09/02/20 2100)  Weight - Scale: 91 3 kg (201 lb 6 2 oz) (09/02/20 2100)  SpO2: 92 % (09/03/20 1434)  Exam:   Physical Exam  Constitutional:       General: He is awake  Appearance: Normal appearance  He is well-developed and overweight  HENT:      Head: Normocephalic and atraumatic  Nose: Nose normal       Mouth/Throat:      Mouth: Mucous membranes are moist    Eyes:      Extraocular Movements: Extraocular movements intact  Neck:      Musculoskeletal: Normal range of motion  Cardiovascular:      Rate and Rhythm: Normal rate and regular rhythm  Pulses: Normal pulses  Heart sounds: Normal heart sounds  Pulmonary:      Effort: Pulmonary effort is normal       Breath sounds: Examination of the right-upper field reveals decreased breath sounds  Examination of the left-upper field reveals decreased breath sounds  Examination of the right-middle field reveals decreased breath sounds  Examination of the left-middle field reveals decreased breath sounds  Examination of the right-lower field reveals decreased breath sounds  Examination of the left-lower field reveals decreased breath sounds  Decreased breath sounds present  Comments: Occasional expiratory wheeze  Abdominal:      General: Bowel sounds are normal    Musculoskeletal: Normal range of motion  Skin:     General: Skin is warm  Neurological:      Mental Status: He is alert and oriented to person, place, and time  Psychiatric:         Attention and Perception: Attention normal          Mood and Affect: Mood normal          Speech: Speech normal          Behavior: Behavior normal  Behavior is cooperative  Discussion with Family: discussed with the patient   And I personally called the patient's wife rogelio and spoke to her as well  Discharge instructions/Information to patient and family:   See after visit summary for information provided to patient and family  Provisions for Follow-Up Care:  See after visit summary for information related to follow-up care and any pertinent home health orders  Disposition:     Home    For Discharges to [facility] SNF:   · Not Applicable to this Patient - Not Applicable to this Patient    Planned Readmission:    no     Discharge Statement:  I spent 45 minutes discharging the patient  This time was spent on the day of discharge  I had direct contact with the patient on the day of discharge  Greater than 50% of the total time was spent examining patient, answering all patient questions, arranging and discussing plan of care with patient as well as directly providing post-discharge instructions  Additional time then spent on discharge activities  Discharge Medications:  See after visit summary for reconciled discharge medications provided to patient and family  I personally spoke to the patient's wife Kenya Merida who was at home during the phone call  We verified the patient's current medications and doses as there were some discrepancies in the computer system  As of now the only medications that were sent to the [address] [place] [address] included azithromycin, prednisone, hydrocortisone cream     The patient and his wife do wish to have all other medications sent to the [place] where he is able to get his medications at either a discounted price or for free      ** Please Note: This note has been constructed using a voice recognition system **

## 2020-09-04 LAB
ATRIAL RATE: 88 BPM
P AXIS: 75 DEGREES
PR INTERVAL: 172 MS
QRS AXIS: 75 DEGREES
QRSD INTERVAL: 96 MS
QT INTERVAL: 374 MS
QTC INTERVAL: 452 MS
T WAVE AXIS: 115 DEGREES
VENTRICULAR RATE: 88 BPM

## 2020-09-04 PROCEDURE — 93010 ELECTROCARDIOGRAM REPORT: CPT | Performed by: INTERNAL MEDICINE

## 2020-09-07 LAB
BACTERIA BLD CULT: NORMAL
BACTERIA BLD CULT: NORMAL

## 2020-09-28 ENCOUNTER — HOSPITAL ENCOUNTER (EMERGENCY)
Facility: HOSPITAL | Age: 75
Discharge: HOME | End: 2020-09-28
Attending: EMERGENCY MEDICINE
Payer: MEDICARE

## 2020-09-28 ENCOUNTER — APPOINTMENT (EMERGENCY)
Dept: RADIOLOGY | Facility: HOSPITAL | Age: 75
End: 2020-09-28
Payer: MEDICARE

## 2020-09-28 VITALS
TEMPERATURE: 97.5 F | SYSTOLIC BLOOD PRESSURE: 148 MMHG | HEART RATE: 70 BPM | OXYGEN SATURATION: 96 % | DIASTOLIC BLOOD PRESSURE: 78 MMHG | BODY MASS INDEX: 28.49 KG/M2 | RESPIRATION RATE: 16 BRPM | HEIGHT: 71 IN

## 2020-09-28 DIAGNOSIS — J44.1 COPD EXACERBATION (HCC): Primary | ICD-10-CM

## 2020-09-28 DIAGNOSIS — R06.02 SHORTNESS OF BREATH: ICD-10-CM

## 2020-09-28 PROCEDURE — 71045 X-RAY EXAM CHEST 1 VIEW: CPT

## 2020-09-28 PROCEDURE — 99284 EMERGENCY DEPT VISIT MOD MDM: CPT

## 2020-09-28 PROCEDURE — 99284 EMERGENCY DEPT VISIT MOD MDM: CPT | Performed by: EMERGENCY MEDICINE

## 2020-09-28 RX ORDER — ALBUTEROL SULFATE 90 UG/1
2 AEROSOL, METERED RESPIRATORY (INHALATION) ONCE
Status: COMPLETED | OUTPATIENT
Start: 2020-09-28 | End: 2020-09-28

## 2020-09-28 RX ORDER — PREDNISONE 50 MG/1
50 TABLET ORAL DAILY
Qty: 5 TABLET | Refills: 0 | Status: SHIPPED | OUTPATIENT
Start: 2020-09-28 | End: 2020-10-03

## 2020-09-28 RX ORDER — AZITHROMYCIN 250 MG/1
TABLET, FILM COATED ORAL
Qty: 6 TABLET | Refills: 0 | Status: SHIPPED | OUTPATIENT
Start: 2020-09-28 | End: 2020-10-02

## 2020-09-28 RX ORDER — PREDNISONE 20 MG/1
100 TABLET ORAL ONCE
Status: COMPLETED | OUTPATIENT
Start: 2020-09-28 | End: 2020-09-28

## 2020-09-28 RX ADMIN — ALBUTEROL SULFATE 2 PUFF: 90 AEROSOL, METERED RESPIRATORY (INHALATION) at 15:43

## 2020-09-28 RX ADMIN — PREDNISONE 100 MG: 20 TABLET ORAL at 15:42

## 2020-10-12 ENCOUNTER — HOSPITAL ENCOUNTER (OUTPATIENT)
Dept: NON INVASIVE DIAGNOSTICS | Facility: CLINIC | Age: 75
Discharge: HOME | End: 2020-10-12
Payer: MEDICARE

## 2020-10-12 DIAGNOSIS — I25.5 ISCHEMIC CARDIOMYOPATHY: ICD-10-CM

## 2020-10-12 PROCEDURE — 93306 TTE W/DOPPLER COMPLETE: CPT | Performed by: INTERNAL MEDICINE

## 2020-10-12 PROCEDURE — 93306 TTE W/DOPPLER COMPLETE: CPT

## 2020-11-12 ENCOUNTER — APPOINTMENT (EMERGENCY)
Dept: CT IMAGING | Facility: HOSPITAL | Age: 75
End: 2020-11-12
Payer: MEDICARE

## 2020-11-12 ENCOUNTER — APPOINTMENT (EMERGENCY)
Dept: RADIOLOGY | Facility: HOSPITAL | Age: 75
End: 2020-11-12
Payer: MEDICARE

## 2020-11-12 ENCOUNTER — HOSPITAL ENCOUNTER (EMERGENCY)
Facility: HOSPITAL | Age: 75
Discharge: HOME | End: 2020-11-13
Attending: EMERGENCY MEDICINE
Payer: MEDICARE

## 2020-11-12 DIAGNOSIS — R06.02 SOB (SHORTNESS OF BREATH): ICD-10-CM

## 2020-11-12 DIAGNOSIS — J44.1 COPD EXACERBATION (HCC): Primary | ICD-10-CM

## 2020-11-12 DIAGNOSIS — J44.1 COPD WITH ACUTE EXACERBATION (HCC): ICD-10-CM

## 2020-11-12 LAB
ALBUMIN SERPL BCP-MCNC: 4.1 G/DL (ref 3.5–5.7)
ALP SERPL-CCNC: 53 U/L (ref 55–165)
ALT SERPL W P-5'-P-CCNC: 13 U/L (ref 7–52)
ANION GAP SERPL CALCULATED.3IONS-SCNC: 11 MMOL/L (ref 4–13)
AST SERPL W P-5'-P-CCNC: 9 U/L (ref 13–39)
BILIRUB SERPL-MCNC: 0.3 MG/DL (ref 0.2–1)
BNP SERPL-MCNC: 149 PG/ML (ref 1–100)
BUN SERPL-MCNC: 17 MG/DL (ref 7–25)
CALCIUM SERPL-MCNC: 9 MG/DL (ref 8.6–10.5)
CHLORIDE SERPL-SCNC: 101 MMOL/L (ref 98–107)
CO2 SERPL-SCNC: 24 MMOL/L (ref 21–31)
CREAT SERPL-MCNC: 1.01 MG/DL (ref 0.7–1.3)
EOSINOPHIL # BLD AUTO: 0.12 THOUSAND/UL (ref 0–0.61)
EOSINOPHIL NFR BLD MANUAL: 1 % (ref 0–6)
ERYTHROCYTE [DISTWIDTH] IN BLOOD BY AUTOMATED COUNT: 14.3 % (ref 11.5–14.5)
GFR SERPL CREATININE-BSD FRML MDRD: 73 ML/MIN/1.73SQ M
GIANT PLATELETS BLD QL SMEAR: PRESENT
GLUCOSE SERPL-MCNC: 239 MG/DL (ref 65–99)
HCT VFR BLD AUTO: 42.2 % (ref 42–47)
HGB BLD-MCNC: 14.1 G/DL (ref 14–18)
LYMPHOCYTES # BLD AUTO: 1.17 THOUSAND/UL (ref 0.6–4.47)
LYMPHOCYTES # BLD AUTO: 10 % (ref 20–51)
MAGNESIUM SERPL-MCNC: 2.3 MG/DL (ref 1.9–2.7)
MCH RBC QN AUTO: 31.1 PG (ref 26–34)
MCHC RBC AUTO-ENTMCNC: 33.5 G/DL (ref 31–37)
MCV RBC AUTO: 93 FL (ref 81–99)
METAMYELOCYTES NFR BLD MANUAL: 1 % (ref 0–1)
MONOCYTES # BLD AUTO: 0.82 THOUSAND/UL (ref 0–1.22)
MONOCYTES NFR BLD AUTO: 7 % (ref 4–12)
MYELOCYTES NFR BLD MANUAL: 5 % (ref 0–1)
NEUTS SEG # BLD: 8.89 THOUSAND/UL (ref 1.81–6.82)
NEUTS SEG NFR BLD AUTO: 76 % (ref 42–75)
PLATELET # BLD AUTO: 174 THOUSANDS/UL (ref 149–390)
PLATELET BLD QL SMEAR: ADEQUATE
PMV BLD AUTO: 9.5 FL (ref 8.6–11.7)
POTASSIUM SERPL-SCNC: 4 MMOL/L (ref 3.5–5.5)
PROT SERPL-MCNC: 5.9 G/DL (ref 6.4–8.9)
RBC # BLD AUTO: 4.55 MILLION/UL (ref 4.3–5.9)
RBC MORPH BLD: NORMAL
SODIUM SERPL-SCNC: 136 MMOL/L (ref 134–143)
TOTAL CELLS COUNTED SPEC: 100
TROPONIN I SERPL-MCNC: <0.03 NG/ML
WBC # BLD AUTO: 11.7 THOUSAND/UL (ref 4.8–10.8)

## 2020-11-12 PROCEDURE — 71045 X-RAY EXAM CHEST 1 VIEW: CPT

## 2020-11-12 PROCEDURE — 85027 COMPLETE CBC AUTOMATED: CPT | Performed by: PHYSICIAN ASSISTANT

## 2020-11-12 PROCEDURE — 84484 ASSAY OF TROPONIN QUANT: CPT | Performed by: PHYSICIAN ASSISTANT

## 2020-11-12 PROCEDURE — 36415 COLL VENOUS BLD VENIPUNCTURE: CPT | Performed by: PHYSICIAN ASSISTANT

## 2020-11-12 PROCEDURE — 80053 COMPREHEN METABOLIC PANEL: CPT | Performed by: PHYSICIAN ASSISTANT

## 2020-11-12 PROCEDURE — 83735 ASSAY OF MAGNESIUM: CPT | Performed by: PHYSICIAN ASSISTANT

## 2020-11-12 PROCEDURE — 74176 CT ABD & PELVIS W/O CONTRAST: CPT

## 2020-11-12 PROCEDURE — 83880 ASSAY OF NATRIURETIC PEPTIDE: CPT | Performed by: PHYSICIAN ASSISTANT

## 2020-11-12 PROCEDURE — 99285 EMERGENCY DEPT VISIT HI MDM: CPT

## 2020-11-12 PROCEDURE — 85007 BL SMEAR W/DIFF WBC COUNT: CPT | Performed by: PHYSICIAN ASSISTANT

## 2020-11-12 PROCEDURE — 93005 ELECTROCARDIOGRAM TRACING: CPT

## 2020-11-12 PROCEDURE — 84145 PROCALCITONIN (PCT): CPT | Performed by: PHYSICIAN ASSISTANT

## 2020-11-12 PROCEDURE — G1004 CDSM NDSC: HCPCS

## 2020-11-12 PROCEDURE — 94640 AIRWAY INHALATION TREATMENT: CPT

## 2020-11-12 RX ORDER — IPRATROPIUM BROMIDE AND ALBUTEROL SULFATE 2.5; .5 MG/3ML; MG/3ML
3 SOLUTION RESPIRATORY (INHALATION)
Status: DISCONTINUED | OUTPATIENT
Start: 2020-11-12 | End: 2020-11-12

## 2020-11-12 RX ORDER — PREDNISONE 20 MG/1
60 TABLET ORAL ONCE
Status: COMPLETED | OUTPATIENT
Start: 2020-11-12 | End: 2020-11-12

## 2020-11-12 RX ORDER — PREDNISONE 20 MG/1
20 TABLET ORAL 2 TIMES DAILY WITH MEALS
Qty: 10 TABLET | Refills: 0 | Status: SHIPPED | OUTPATIENT
Start: 2020-11-12 | End: 2020-11-17

## 2020-11-12 RX ORDER — ALBUTEROL SULFATE 2.5 MG/3ML
5 SOLUTION RESPIRATORY (INHALATION) ONCE
Status: DISCONTINUED | OUTPATIENT
Start: 2020-11-12 | End: 2020-11-12

## 2020-11-12 RX ORDER — IPRATROPIUM BROMIDE AND ALBUTEROL SULFATE 2.5; .5 MG/3ML; MG/3ML
3 SOLUTION RESPIRATORY (INHALATION) ONCE
Status: COMPLETED | OUTPATIENT
Start: 2020-11-12 | End: 2020-11-12

## 2020-11-12 RX ADMIN — PREDNISONE 60 MG: 20 TABLET ORAL at 20:34

## 2020-11-12 RX ADMIN — IPRATROPIUM BROMIDE AND ALBUTEROL SULFATE 3 ML: 2.5; .5 SOLUTION RESPIRATORY (INHALATION) at 20:34

## 2020-11-12 RX ADMIN — IPRATROPIUM BROMIDE AND ALBUTEROL SULFATE 3 ML: 2.5; .5 SOLUTION RESPIRATORY (INHALATION) at 23:07

## 2020-11-13 VITALS
BODY MASS INDEX: 29.4 KG/M2 | TEMPERATURE: 97.8 F | DIASTOLIC BLOOD PRESSURE: 70 MMHG | WEIGHT: 210 LBS | HEART RATE: 80 BPM | SYSTOLIC BLOOD PRESSURE: 146 MMHG | RESPIRATION RATE: 22 BRPM | HEIGHT: 71 IN | OXYGEN SATURATION: 93 %

## 2020-11-13 LAB
ATRIAL RATE: 76 BPM
P AXIS: 76 DEGREES
PR INTERVAL: 164 MS
PROCALCITONIN SERPL-MCNC: <0.05 NG/ML
QRS AXIS: 63 DEGREES
QRSD INTERVAL: 96 MS
QT INTERVAL: 384 MS
QTC INTERVAL: 432 MS
T WAVE AXIS: 128 DEGREES
VENTRICULAR RATE: 76 BPM

## 2020-11-13 PROCEDURE — 99285 EMERGENCY DEPT VISIT HI MDM: CPT | Performed by: PHYSICIAN ASSISTANT

## 2020-11-13 PROCEDURE — 93010 ELECTROCARDIOGRAM REPORT: CPT | Performed by: INTERNAL MEDICINE

## 2020-11-17 ENCOUNTER — OFFICE VISIT (OUTPATIENT)
Dept: CARDIOLOGY CLINIC | Facility: CLINIC | Age: 75
End: 2020-11-17
Payer: MEDICARE

## 2020-11-17 VITALS
BODY MASS INDEX: 29.96 KG/M2 | SYSTOLIC BLOOD PRESSURE: 110 MMHG | DIASTOLIC BLOOD PRESSURE: 70 MMHG | HEIGHT: 71 IN | WEIGHT: 214 LBS | HEART RATE: 60 BPM

## 2020-11-17 DIAGNOSIS — E78.2 MIXED HYPERLIPIDEMIA: ICD-10-CM

## 2020-11-17 DIAGNOSIS — I25.5 ISCHEMIC CARDIOMYOPATHY: Primary | ICD-10-CM

## 2020-11-17 DIAGNOSIS — I25.10 CORONARY ARTERY DISEASE INVOLVING NATIVE CORONARY ARTERY OF NATIVE HEART WITHOUT ANGINA PECTORIS: ICD-10-CM

## 2020-11-17 DIAGNOSIS — J44.9 CHRONIC OBSTRUCTIVE PULMONARY DISEASE, UNSPECIFIED COPD TYPE (HCC): ICD-10-CM

## 2020-11-17 PROCEDURE — 99214 OFFICE O/P EST MOD 30 MIN: CPT | Performed by: INTERNAL MEDICINE

## 2020-11-29 ENCOUNTER — HOSPITAL ENCOUNTER (INPATIENT)
Facility: HOSPITAL | Age: 75
LOS: 1 days | Discharge: HOME | DRG: 189 | End: 2020-12-01
Attending: EMERGENCY MEDICINE | Admitting: INTERNAL MEDICINE
Payer: MEDICARE

## 2020-11-29 ENCOUNTER — APPOINTMENT (EMERGENCY)
Dept: RADIOLOGY | Facility: HOSPITAL | Age: 75
DRG: 189 | End: 2020-11-29
Payer: MEDICARE

## 2020-11-29 DIAGNOSIS — J18.9 PNEUMONIA: Primary | ICD-10-CM

## 2020-11-29 DIAGNOSIS — J44.1 COPD EXACERBATION (HCC): ICD-10-CM

## 2020-11-29 LAB
ALBUMIN SERPL BCP-MCNC: 4.1 G/DL (ref 3.5–5.7)
ALP SERPL-CCNC: 67 U/L (ref 55–165)
ALT SERPL W P-5'-P-CCNC: 16 U/L (ref 7–52)
ANION GAP SERPL CALCULATED.3IONS-SCNC: 11 MMOL/L (ref 4–13)
APTT PPP: 30 SECONDS (ref 23–37)
AST SERPL W P-5'-P-CCNC: 17 U/L (ref 13–39)
BASOPHILS # BLD AUTO: 0.1 THOUSANDS/ΜL (ref 0–0.1)
BASOPHILS NFR BLD AUTO: 1 % (ref 0–2)
BILIRUB SERPL-MCNC: 0.4 MG/DL (ref 0.2–1)
BUN SERPL-MCNC: 23 MG/DL (ref 7–25)
CALCIUM SERPL-MCNC: 9 MG/DL (ref 8.6–10.5)
CHLORIDE SERPL-SCNC: 104 MMOL/L (ref 98–107)
CO2 SERPL-SCNC: 22 MMOL/L (ref 21–31)
CREAT SERPL-MCNC: 1.14 MG/DL (ref 0.7–1.3)
EOSINOPHIL # BLD AUTO: 0.2 THOUSAND/ΜL (ref 0–0.61)
EOSINOPHIL NFR BLD AUTO: 2 % (ref 0–5)
ERYTHROCYTE [DISTWIDTH] IN BLOOD BY AUTOMATED COUNT: 13.9 % (ref 11.5–14.5)
GFR SERPL CREATININE-BSD FRML MDRD: 63 ML/MIN/1.73SQ M
GLUCOSE SERPL-MCNC: 201 MG/DL (ref 65–99)
HCT VFR BLD AUTO: 40.6 % (ref 42–47)
HGB BLD-MCNC: 13.8 G/DL (ref 14–18)
INR PPP: 1 (ref 0.84–1.19)
LACTATE SERPL-SCNC: 1.7 MMOL/L (ref 0.5–2)
LYMPHOCYTES # BLD AUTO: 1.2 THOUSANDS/ΜL (ref 0.6–4.47)
LYMPHOCYTES NFR BLD AUTO: 13 % (ref 21–51)
MCH RBC QN AUTO: 31.2 PG (ref 26–34)
MCHC RBC AUTO-ENTMCNC: 33.9 G/DL (ref 31–37)
MCV RBC AUTO: 92 FL (ref 81–99)
MONOCYTES # BLD AUTO: 0.8 THOUSAND/ΜL (ref 0.17–1.22)
MONOCYTES NFR BLD AUTO: 8 % (ref 2–12)
NEUTROPHILS # BLD AUTO: 7.4 THOUSANDS/ΜL (ref 1.4–6.5)
NEUTS SEG NFR BLD AUTO: 76 % (ref 42–75)
PLATELET # BLD AUTO: 189 THOUSANDS/UL (ref 149–390)
PMV BLD AUTO: 9.5 FL (ref 8.6–11.7)
POTASSIUM SERPL-SCNC: 4.2 MMOL/L (ref 3.5–5.5)
PROT SERPL-MCNC: 6.6 G/DL (ref 6.4–8.9)
PROTHROMBIN TIME: 13.1 SECONDS (ref 11.6–14.5)
RBC # BLD AUTO: 4.42 MILLION/UL (ref 4.3–5.9)
SODIUM SERPL-SCNC: 137 MMOL/L (ref 134–143)
TROPONIN I SERPL-MCNC: <0.03 NG/ML
WBC # BLD AUTO: 9.7 THOUSAND/UL (ref 4.8–10.8)

## 2020-11-29 PROCEDURE — 84484 ASSAY OF TROPONIN QUANT: CPT | Performed by: EMERGENCY MEDICINE

## 2020-11-29 PROCEDURE — 87040 BLOOD CULTURE FOR BACTERIA: CPT | Performed by: EMERGENCY MEDICINE

## 2020-11-29 PROCEDURE — 94640 AIRWAY INHALATION TREATMENT: CPT

## 2020-11-29 PROCEDURE — 71045 X-RAY EXAM CHEST 1 VIEW: CPT

## 2020-11-29 PROCEDURE — 85730 THROMBOPLASTIN TIME PARTIAL: CPT | Performed by: EMERGENCY MEDICINE

## 2020-11-29 PROCEDURE — 83605 ASSAY OF LACTIC ACID: CPT | Performed by: EMERGENCY MEDICINE

## 2020-11-29 PROCEDURE — 93005 ELECTROCARDIOGRAM TRACING: CPT

## 2020-11-29 PROCEDURE — 99285 EMERGENCY DEPT VISIT HI MDM: CPT

## 2020-11-29 PROCEDURE — 84145 PROCALCITONIN (PCT): CPT | Performed by: EMERGENCY MEDICINE

## 2020-11-29 PROCEDURE — 85025 COMPLETE CBC W/AUTO DIFF WBC: CPT | Performed by: EMERGENCY MEDICINE

## 2020-11-29 PROCEDURE — 85610 PROTHROMBIN TIME: CPT | Performed by: EMERGENCY MEDICINE

## 2020-11-29 PROCEDURE — 80053 COMPREHEN METABOLIC PANEL: CPT | Performed by: EMERGENCY MEDICINE

## 2020-11-29 PROCEDURE — 36415 COLL VENOUS BLD VENIPUNCTURE: CPT | Performed by: EMERGENCY MEDICINE

## 2020-11-29 RX ORDER — CEFTRIAXONE 1 G/50ML
1000 INJECTION, SOLUTION INTRAVENOUS ONCE
Status: COMPLETED | OUTPATIENT
Start: 2020-11-30 | End: 2020-11-30

## 2020-11-29 RX ORDER — ALBUTEROL SULFATE 2.5 MG/3ML
2.5 SOLUTION RESPIRATORY (INHALATION) ONCE
Status: COMPLETED | OUTPATIENT
Start: 2020-11-29 | End: 2020-11-29

## 2020-11-29 RX ORDER — IPRATROPIUM BROMIDE AND ALBUTEROL SULFATE 2.5; .5 MG/3ML; MG/3ML
3 SOLUTION RESPIRATORY (INHALATION) ONCE
Status: COMPLETED | OUTPATIENT
Start: 2020-11-29 | End: 2020-11-29

## 2020-11-29 RX ADMIN — ALBUTEROL SULFATE 2.5 MG: 2.5 SOLUTION RESPIRATORY (INHALATION) at 22:56

## 2020-11-29 RX ADMIN — IPRATROPIUM BROMIDE AND ALBUTEROL SULFATE 3 ML: 2.5; .5 SOLUTION RESPIRATORY (INHALATION) at 22:56

## 2020-11-30 PROBLEM — N40.1 BENIGN PROSTATIC HYPERPLASIA WITH URINARY RETENTION: Status: ACTIVE | Noted: 2019-02-22

## 2020-11-30 PROBLEM — R33.8 BENIGN PROSTATIC HYPERPLASIA WITH URINARY RETENTION: Status: ACTIVE | Noted: 2019-02-22

## 2020-11-30 PROBLEM — J12.82 PNEUMONIA DUE TO COVID-19 VIRUS: Status: ACTIVE | Noted: 2020-11-30

## 2020-11-30 PROBLEM — U07.1 PNEUMONIA DUE TO COVID-19 VIRUS: Status: ACTIVE | Noted: 2020-11-30

## 2020-11-30 PROBLEM — J44.1 CHRONIC OBSTRUCTIVE PULMONARY DISEASE WITH ACUTE EXACERBATION (HCC): Status: ACTIVE | Noted: 2019-11-21

## 2020-11-30 PROBLEM — J96.20 ACUTE ON CHRONIC RESPIRATORY FAILURE (HCC): Status: ACTIVE | Noted: 2020-08-15

## 2020-11-30 PROBLEM — G25.0 BENIGN ESSENTIAL TREMOR: Status: ACTIVE | Noted: 2019-02-22

## 2020-11-30 LAB
ALBUMIN SERPL BCP-MCNC: 3.7 G/DL (ref 3.5–5.7)
ALP SERPL-CCNC: 63 U/L (ref 55–165)
ALT SERPL W P-5'-P-CCNC: 13 U/L (ref 7–52)
ANION GAP SERPL CALCULATED.3IONS-SCNC: 11 MMOL/L (ref 4–13)
AST SERPL W P-5'-P-CCNC: 9 U/L (ref 13–39)
ATRIAL RATE: 83 BPM
BILIRUB SERPL-MCNC: 0.4 MG/DL (ref 0.2–1)
BILIRUB UR QL STRIP: NEGATIVE
BNP SERPL-MCNC: 92 PG/ML (ref 1–100)
BUN SERPL-MCNC: 22 MG/DL (ref 7–25)
CALCIUM SERPL-MCNC: 8.5 MG/DL (ref 8.6–10.5)
CHLORIDE SERPL-SCNC: 102 MMOL/L (ref 98–107)
CK SERPL-CCNC: 20 U/L (ref 30–308)
CLARITY UR: CLEAR
CO2 SERPL-SCNC: 22 MMOL/L (ref 21–31)
COLOR UR: YELLOW
CREAT SERPL-MCNC: 0.95 MG/DL (ref 0.7–1.3)
CRP SERPL QL: 60.1 MG/L
D DIMER PPP FEU-MCNC: 0.5 UG/ML FEU
EOSINOPHIL # BLD AUTO: 0.1 THOUSAND/UL (ref 0–0.61)
EOSINOPHIL NFR BLD MANUAL: 1 % (ref 0–6)
ERYTHROCYTE [DISTWIDTH] IN BLOOD BY AUTOMATED COUNT: 14.1 % (ref 11.5–14.5)
FERRITIN SERPL-MCNC: 199 NG/ML (ref 8–388)
FLUAV RNA RESP QL NAA+PROBE: NEGATIVE
FLUBV RNA RESP QL NAA+PROBE: NEGATIVE
GFR SERPL CREATININE-BSD FRML MDRD: 79 ML/MIN/1.73SQ M
GLUCOSE SERPL-MCNC: 194 MG/DL (ref 65–140)
GLUCOSE SERPL-MCNC: 206 MG/DL (ref 65–140)
GLUCOSE SERPL-MCNC: 233 MG/DL (ref 65–99)
GLUCOSE SERPL-MCNC: 244 MG/DL (ref 65–140)
GLUCOSE SERPL-MCNC: 350 MG/DL (ref 65–140)
GLUCOSE UR STRIP-MCNC: ABNORMAL MG/DL
HCT VFR BLD AUTO: 38.3 % (ref 42–47)
HGB BLD-MCNC: 13.1 G/DL (ref 14–18)
HGB UR QL STRIP.AUTO: NEGATIVE
KETONES UR STRIP-MCNC: ABNORMAL MG/DL
L PNEUMO1 AG UR QL IA.RAPID: NEGATIVE
LEUKOCYTE ESTERASE UR QL STRIP: NEGATIVE
LYMPHOCYTES # BLD AUTO: 0.5 THOUSAND/UL (ref 0.6–4.47)
LYMPHOCYTES # BLD AUTO: 5 % (ref 20–51)
MAGNESIUM SERPL-MCNC: 2.1 MG/DL (ref 1.9–2.7)
MCH RBC QN AUTO: 31.4 PG (ref 26–34)
MCHC RBC AUTO-ENTMCNC: 34.2 G/DL (ref 31–37)
MCV RBC AUTO: 92 FL (ref 81–99)
METAMYELOCYTES NFR BLD MANUAL: 1 % (ref 0–1)
MONOCYTES # BLD AUTO: 0.1 THOUSAND/UL (ref 0–1.22)
MONOCYTES NFR BLD AUTO: 1 % (ref 4–12)
MYELOCYTES NFR BLD MANUAL: 1 % (ref 0–1)
NEUTS BAND NFR BLD MANUAL: 4 % (ref 0–8)
NEUTS SEG # BLD: 9.1 THOUSAND/UL (ref 1.81–6.82)
NEUTS SEG NFR BLD AUTO: 87 % (ref 42–75)
NITRITE UR QL STRIP: NEGATIVE
P AXIS: 61 DEGREES
PH UR STRIP.AUTO: 5 [PH]
PLATELET # BLD AUTO: 161 THOUSANDS/UL (ref 149–390)
PLATELET BLD QL SMEAR: ADEQUATE
PMV BLD AUTO: 10 FL (ref 8.6–11.7)
POTASSIUM SERPL-SCNC: 4.2 MMOL/L (ref 3.5–5.5)
PR INTERVAL: 166 MS
PROCALCITONIN SERPL-MCNC: 0.05 NG/ML
PROT SERPL-MCNC: 6.5 G/DL (ref 6.4–8.9)
PROT UR STRIP-MCNC: NEGATIVE MG/DL
QRS AXIS: 52 DEGREES
QRSD INTERVAL: 96 MS
QT INTERVAL: 366 MS
QTC INTERVAL: 430 MS
RBC # BLD AUTO: 4.18 MILLION/UL (ref 4.3–5.9)
RBC MORPH BLD: NORMAL
RSV RNA RESP QL NAA+PROBE: NEGATIVE
S PNEUM AG UR QL: NEGATIVE
SARS-COV-2 RNA RESP QL NAA+PROBE: NEGATIVE
SODIUM SERPL-SCNC: 135 MMOL/L (ref 134–143)
SP GR UR STRIP.AUTO: 1.02 (ref 1–1.03)
T WAVE AXIS: 145 DEGREES
TOTAL CELLS COUNTED SPEC: 100
UROBILINOGEN UR QL STRIP.AUTO: 0.2 E.U./DL
VENTRICULAR RATE: 83 BPM
WBC # BLD AUTO: 10 THOUSAND/UL (ref 4.8–10.8)

## 2020-11-30 PROCEDURE — 99284 EMERGENCY DEPT VISIT MOD MDM: CPT | Performed by: EMERGENCY MEDICINE

## 2020-11-30 PROCEDURE — 85007 BL SMEAR W/DIFF WBC COUNT: CPT | Performed by: NURSE PRACTITIONER

## 2020-11-30 PROCEDURE — 83520 IMMUNOASSAY QUANT NOS NONAB: CPT | Performed by: NURSE PRACTITIONER

## 2020-11-30 PROCEDURE — 85027 COMPLETE CBC AUTOMATED: CPT | Performed by: NURSE PRACTITIONER

## 2020-11-30 PROCEDURE — 97165 OT EVAL LOW COMPLEX 30 MIN: CPT

## 2020-11-30 PROCEDURE — 99223 1ST HOSP IP/OBS HIGH 75: CPT | Performed by: NURSE PRACTITIONER

## 2020-11-30 PROCEDURE — 94664 DEMO&/EVAL PT USE INHALER: CPT

## 2020-11-30 PROCEDURE — 96365 THER/PROPH/DIAG IV INF INIT: CPT

## 2020-11-30 PROCEDURE — 87449 NOS EACH ORGANISM AG IA: CPT | Performed by: NURSE PRACTITIONER

## 2020-11-30 PROCEDURE — 83735 ASSAY OF MAGNESIUM: CPT | Performed by: NURSE PRACTITIONER

## 2020-11-30 PROCEDURE — 82948 REAGENT STRIP/BLOOD GLUCOSE: CPT

## 2020-11-30 PROCEDURE — 82728 ASSAY OF FERRITIN: CPT | Performed by: NURSE PRACTITIONER

## 2020-11-30 PROCEDURE — 96375 TX/PRO/DX INJ NEW DRUG ADDON: CPT

## 2020-11-30 PROCEDURE — 99233 SBSQ HOSP IP/OBS HIGH 50: CPT | Performed by: FAMILY MEDICINE

## 2020-11-30 PROCEDURE — 80053 COMPREHEN METABOLIC PANEL: CPT | Performed by: NURSE PRACTITIONER

## 2020-11-30 PROCEDURE — 86140 C-REACTIVE PROTEIN: CPT | Performed by: NURSE PRACTITIONER

## 2020-11-30 PROCEDURE — 85379 FIBRIN DEGRADATION QUANT: CPT | Performed by: NURSE PRACTITIONER

## 2020-11-30 PROCEDURE — 81003 URINALYSIS AUTO W/O SCOPE: CPT | Performed by: NURSE PRACTITIONER

## 2020-11-30 PROCEDURE — 97163 PT EVAL HIGH COMPLEX 45 MIN: CPT

## 2020-11-30 PROCEDURE — 94760 N-INVAS EAR/PLS OXIMETRY 1: CPT

## 2020-11-30 PROCEDURE — 93010 ELECTROCARDIOGRAM REPORT: CPT | Performed by: INTERNAL MEDICINE

## 2020-11-30 PROCEDURE — 82550 ASSAY OF CK (CPK): CPT | Performed by: NURSE PRACTITIONER

## 2020-11-30 PROCEDURE — 0241U HB NFCT DS VIR RESP RNA 4 TRGT: CPT | Performed by: EMERGENCY MEDICINE

## 2020-11-30 PROCEDURE — 83880 ASSAY OF NATRIURETIC PEPTIDE: CPT | Performed by: NURSE PRACTITIONER

## 2020-11-30 RX ORDER — FAMOTIDINE 20 MG/1
20 TABLET, FILM COATED ORAL 2 TIMES DAILY
Status: DISCONTINUED | OUTPATIENT
Start: 2020-11-30 | End: 2020-12-01 | Stop reason: HOSPADM

## 2020-11-30 RX ORDER — IPRATROPIUM BROMIDE AND ALBUTEROL SULFATE 2.5; .5 MG/3ML; MG/3ML
3 SOLUTION RESPIRATORY (INHALATION) ONCE
Status: COMPLETED | OUTPATIENT
Start: 2020-11-30 | End: 2020-11-30

## 2020-11-30 RX ORDER — ALBUTEROL SULFATE 2.5 MG/3ML
2.5 SOLUTION RESPIRATORY (INHALATION) EVERY 4 HOURS PRN
Status: DISCONTINUED | OUTPATIENT
Start: 2020-11-30 | End: 2020-11-30

## 2020-11-30 RX ORDER — PRIMIDONE 50 MG/1
100 TABLET ORAL 2 TIMES DAILY
COMMUNITY

## 2020-11-30 RX ORDER — ASPIRIN 81 MG/1
81 TABLET ORAL EVERY OTHER DAY
Status: DISCONTINUED | OUTPATIENT
Start: 2020-11-30 | End: 2020-12-01 | Stop reason: HOSPADM

## 2020-11-30 RX ORDER — GUAIFENESIN 600 MG
600 TABLET, EXTENDED RELEASE 12 HR ORAL 2 TIMES DAILY
Status: DISCONTINUED | OUTPATIENT
Start: 2020-11-30 | End: 2020-12-01 | Stop reason: HOSPADM

## 2020-11-30 RX ORDER — PRIMIDONE 50 MG/1
100 TABLET ORAL EVERY 8 HOURS SCHEDULED
Status: DISCONTINUED | OUTPATIENT
Start: 2020-11-30 | End: 2020-12-01 | Stop reason: HOSPADM

## 2020-11-30 RX ORDER — LISINOPRIL 20 MG/1
20 TABLET ORAL 2 TIMES DAILY
Status: DISCONTINUED | OUTPATIENT
Start: 2020-11-30 | End: 2020-12-01 | Stop reason: HOSPADM

## 2020-11-30 RX ORDER — ZINC SULFATE 50(220)MG
220 CAPSULE ORAL DAILY
Status: DISCONTINUED | OUTPATIENT
Start: 2020-11-30 | End: 2020-12-01 | Stop reason: HOSPADM

## 2020-11-30 RX ORDER — ONDANSETRON 2 MG/ML
4 INJECTION INTRAMUSCULAR; INTRAVENOUS EVERY 6 HOURS PRN
Status: DISCONTINUED | OUTPATIENT
Start: 2020-11-30 | End: 2020-12-01 | Stop reason: HOSPADM

## 2020-11-30 RX ORDER — DEXAMETHASONE SODIUM PHOSPHATE 4 MG/ML
6 INJECTION, SOLUTION INTRA-ARTICULAR; INTRALESIONAL; INTRAMUSCULAR; INTRAVENOUS; SOFT TISSUE EVERY 24 HOURS
Status: DISCONTINUED | OUTPATIENT
Start: 2020-11-30 | End: 2020-12-01 | Stop reason: HOSPADM

## 2020-11-30 RX ORDER — CEFTRIAXONE 1 G/50ML
1000 INJECTION, SOLUTION INTRAVENOUS EVERY 24 HOURS
Status: DISCONTINUED | OUTPATIENT
Start: 2020-11-30 | End: 2020-11-30

## 2020-11-30 RX ORDER — MELATONIN
2000 DAILY
Status: DISCONTINUED | OUTPATIENT
Start: 2020-11-30 | End: 2020-12-01 | Stop reason: HOSPADM

## 2020-11-30 RX ORDER — FLUTICASONE PROPIONATE 220 UG/1
1 AEROSOL, METERED RESPIRATORY (INHALATION) DAILY
Status: DISCONTINUED | OUTPATIENT
Start: 2020-11-30 | End: 2020-11-30

## 2020-11-30 RX ORDER — MULTIVITAMIN/IRON/FOLIC ACID 18MG-0.4MG
1 TABLET ORAL DAILY
Status: DISCONTINUED | OUTPATIENT
Start: 2020-12-07 | End: 2020-12-01 | Stop reason: HOSPADM

## 2020-11-30 RX ORDER — ALBUTEROL SULFATE 90 UG/1
2 AEROSOL, METERED RESPIRATORY (INHALATION) EVERY 6 HOURS PRN
Status: DISCONTINUED | OUTPATIENT
Start: 2020-11-30 | End: 2020-12-01 | Stop reason: HOSPADM

## 2020-11-30 RX ORDER — ACETAMINOPHEN 325 MG/1
650 TABLET ORAL EVERY 6 HOURS PRN
Status: DISCONTINUED | OUTPATIENT
Start: 2020-11-30 | End: 2020-12-01 | Stop reason: HOSPADM

## 2020-11-30 RX ORDER — GABAPENTIN 100 MG/1
200 CAPSULE ORAL
Status: DISCONTINUED | OUTPATIENT
Start: 2020-11-30 | End: 2020-12-01 | Stop reason: HOSPADM

## 2020-11-30 RX ORDER — BUDESONIDE AND FORMOTEROL FUMARATE DIHYDRATE 80; 4.5 UG/1; UG/1
2 AEROSOL RESPIRATORY (INHALATION) 2 TIMES DAILY
Status: DISCONTINUED | OUTPATIENT
Start: 2020-11-30 | End: 2020-12-01 | Stop reason: HOSPADM

## 2020-11-30 RX ORDER — FLUTICASONE PROPIONATE 220 UG/1
1 AEROSOL, METERED RESPIRATORY (INHALATION) DAILY
COMMUNITY
End: 2021-11-06 | Stop reason: HOSPADM

## 2020-11-30 RX ORDER — ASCORBIC ACID 500 MG
1000 TABLET ORAL EVERY 12 HOURS SCHEDULED
Status: DISCONTINUED | OUTPATIENT
Start: 2020-11-30 | End: 2020-12-01 | Stop reason: HOSPADM

## 2020-11-30 RX ORDER — NICOTINE 21 MG/24HR
1 PATCH, TRANSDERMAL 24 HOURS TRANSDERMAL DAILY
Status: DISCONTINUED | OUTPATIENT
Start: 2020-11-30 | End: 2020-12-01 | Stop reason: HOSPADM

## 2020-11-30 RX ORDER — DEXAMETHASONE SODIUM PHOSPHATE 4 MG/ML
8 INJECTION, SOLUTION INTRA-ARTICULAR; INTRALESIONAL; INTRAMUSCULAR; INTRAVENOUS; SOFT TISSUE ONCE
Status: COMPLETED | OUTPATIENT
Start: 2020-11-30 | End: 2020-11-30

## 2020-11-30 RX ORDER — GUAIFENESIN 600 MG
600 TABLET, EXTENDED RELEASE 12 HR ORAL 2 TIMES DAILY
Status: DISCONTINUED | OUTPATIENT
Start: 2020-11-30 | End: 2020-11-30 | Stop reason: SDUPTHER

## 2020-11-30 RX ADMIN — GUAIFENESIN 600 MG: 600 TABLET, EXTENDED RELEASE ORAL at 17:25

## 2020-11-30 RX ADMIN — AZITHROMYCIN MONOHYDRATE 500 MG: 500 INJECTION, POWDER, LYOPHILIZED, FOR SOLUTION INTRAVENOUS at 23:08

## 2020-11-30 RX ADMIN — IPRATROPIUM BROMIDE AND ALBUTEROL SULFATE 3 ML: 2.5; .5 SOLUTION RESPIRATORY (INHALATION) at 01:23

## 2020-11-30 RX ADMIN — DEXAMETHASONE SODIUM PHOSPHATE 8 MG: 4 INJECTION, SOLUTION INTRAMUSCULAR; INTRAVENOUS at 00:29

## 2020-11-30 RX ADMIN — METOPROLOL TARTRATE 25 MG: 25 TABLET, FILM COATED ORAL at 02:53

## 2020-11-30 RX ADMIN — GABAPENTIN 200 MG: 100 CAPSULE ORAL at 23:00

## 2020-11-30 RX ADMIN — LISINOPRIL 20 MG: 20 TABLET ORAL at 17:25

## 2020-11-30 RX ADMIN — INSULIN LISPRO 3 UNITS: 100 INJECTION, SOLUTION INTRAVENOUS; SUBCUTANEOUS at 09:42

## 2020-11-30 RX ADMIN — FAMOTIDINE 20 MG: 20 TABLET, FILM COATED ORAL at 09:39

## 2020-11-30 RX ADMIN — DEXAMETHASONE SODIUM PHOSPHATE 6 MG: 4 INJECTION, SOLUTION INTRA-ARTICULAR; INTRALESIONAL; INTRAMUSCULAR; INTRAVENOUS; SOFT TISSUE at 09:39

## 2020-11-30 RX ADMIN — OXYCODONE HYDROCHLORIDE AND ACETAMINOPHEN 1000 MG: 500 TABLET ORAL at 02:53

## 2020-11-30 RX ADMIN — OXYCODONE HYDROCHLORIDE AND ACETAMINOPHEN 1000 MG: 500 TABLET ORAL at 09:47

## 2020-11-30 RX ADMIN — Medication 500 MG: at 03:08

## 2020-11-30 RX ADMIN — OXYCODONE HYDROCHLORIDE AND ACETAMINOPHEN 1000 MG: 500 TABLET ORAL at 23:00

## 2020-11-30 RX ADMIN — PRIMIDONE 100 MG: 50 TABLET ORAL at 14:56

## 2020-11-30 RX ADMIN — LISINOPRIL 20 MG: 20 TABLET ORAL at 09:39

## 2020-11-30 RX ADMIN — Medication 2000 UNITS: at 09:39

## 2020-11-30 RX ADMIN — INSULIN LISPRO 6 UNITS: 100 INJECTION, SOLUTION INTRAVENOUS; SUBCUTANEOUS at 11:45

## 2020-11-30 RX ADMIN — ZINC SULFATE 220 MG (50 MG) CAPSULE 220 MG: CAPSULE at 09:39

## 2020-11-30 RX ADMIN — GUAIFENESIN 600 MG: 600 TABLET, EXTENDED RELEASE ORAL at 09:39

## 2020-11-30 RX ADMIN — INSULIN LISPRO 2 UNITS: 100 INJECTION, SOLUTION INTRAVENOUS; SUBCUTANEOUS at 17:25

## 2020-11-30 RX ADMIN — PRIMIDONE 100 MG: 50 TABLET ORAL at 05:47

## 2020-11-30 RX ADMIN — TIOTROPIUM BROMIDE 18 MCG: 18 CAPSULE ORAL; RESPIRATORY (INHALATION) at 09:41

## 2020-11-30 RX ADMIN — INSULIN LISPRO 1 UNITS: 100 INJECTION, SOLUTION INTRAVENOUS; SUBCUTANEOUS at 23:06

## 2020-11-30 RX ADMIN — METOPROLOL TARTRATE 25 MG: 25 TABLET, FILM COATED ORAL at 23:05

## 2020-11-30 RX ADMIN — METOPROLOL TARTRATE 25 MG: 25 TABLET, FILM COATED ORAL at 09:47

## 2020-11-30 RX ADMIN — PRIMIDONE 100 MG: 50 TABLET ORAL at 23:01

## 2020-11-30 RX ADMIN — BUDESONIDE AND FORMOTEROL FUMARATE DIHYDRATE 2 PUFF: 80; 4.5 AEROSOL RESPIRATORY (INHALATION) at 09:41

## 2020-11-30 RX ADMIN — ENOXAPARIN SODIUM 40 MG: 40 INJECTION SUBCUTANEOUS at 09:39

## 2020-11-30 RX ADMIN — FAMOTIDINE 20 MG: 20 TABLET, FILM COATED ORAL at 17:25

## 2020-11-30 RX ADMIN — CEFTRIAXONE 1000 MG: 1 INJECTION, SOLUTION INTRAVENOUS at 00:29

## 2020-11-30 RX ADMIN — ASPIRIN 81 MG: 81 TABLET, COATED ORAL at 09:39

## 2020-11-30 RX ADMIN — BUDESONIDE AND FORMOTEROL FUMARATE DIHYDRATE 2 PUFF: 80; 4.5 AEROSOL RESPIRATORY (INHALATION) at 17:28

## 2020-12-01 VITALS
HEIGHT: 65 IN | TEMPERATURE: 97.4 F | WEIGHT: 211.2 LBS | HEART RATE: 76 BPM | DIASTOLIC BLOOD PRESSURE: 67 MMHG | SYSTOLIC BLOOD PRESSURE: 162 MMHG | OXYGEN SATURATION: 98 % | BODY MASS INDEX: 35.19 KG/M2 | RESPIRATION RATE: 18 BRPM

## 2020-12-01 LAB
GLUCOSE SERPL-MCNC: 159 MG/DL (ref 65–140)
GLUCOSE SERPL-MCNC: 183 MG/DL (ref 65–140)
GLUCOSE SERPL-MCNC: 253 MG/DL (ref 65–140)
INR PPP: 1 (ref 0.84–1.19)
PLATELET # BLD AUTO: 199 THOUSANDS/UL (ref 149–390)
PROCALCITONIN SERPL-MCNC: <0.05 NG/ML
PROTHROMBIN TIME: 13.1 SECONDS (ref 11.6–14.5)

## 2020-12-01 PROCEDURE — 85610 PROTHROMBIN TIME: CPT | Performed by: NURSE PRACTITIONER

## 2020-12-01 PROCEDURE — 84145 PROCALCITONIN (PCT): CPT | Performed by: EMERGENCY MEDICINE

## 2020-12-01 PROCEDURE — 85049 AUTOMATED PLATELET COUNT: CPT | Performed by: NURSE PRACTITIONER

## 2020-12-01 PROCEDURE — 94760 N-INVAS EAR/PLS OXIMETRY 1: CPT

## 2020-12-01 PROCEDURE — 82948 REAGENT STRIP/BLOOD GLUCOSE: CPT

## 2020-12-01 PROCEDURE — 94761 N-INVAS EAR/PLS OXIMETRY MLT: CPT

## 2020-12-01 RX ORDER — AZITHROMYCIN 250 MG/1
TABLET, FILM COATED ORAL
Qty: 6 TABLET | Refills: 0 | Status: SHIPPED | OUTPATIENT
Start: 2020-12-01 | End: 2020-12-05

## 2020-12-01 RX ORDER — PREDNISONE 50 MG/1
50 TABLET ORAL DAILY
Qty: 4 TABLET | Refills: 0 | Status: SHIPPED | OUTPATIENT
Start: 2020-12-01 | End: 2020-12-05

## 2020-12-01 RX ADMIN — LISINOPRIL 20 MG: 20 TABLET ORAL at 08:38

## 2020-12-01 RX ADMIN — DEXAMETHASONE SODIUM PHOSPHATE 6 MG: 4 INJECTION, SOLUTION INTRA-ARTICULAR; INTRALESIONAL; INTRAMUSCULAR; INTRAVENOUS; SOFT TISSUE at 08:38

## 2020-12-01 RX ADMIN — METOPROLOL TARTRATE 25 MG: 25 TABLET, FILM COATED ORAL at 08:38

## 2020-12-01 RX ADMIN — GUAIFENESIN 600 MG: 600 TABLET, EXTENDED RELEASE ORAL at 08:38

## 2020-12-01 RX ADMIN — ALBUTEROL SULFATE 2 PUFF: 90 AEROSOL, METERED RESPIRATORY (INHALATION) at 02:15

## 2020-12-01 RX ADMIN — Medication 2000 UNITS: at 08:38

## 2020-12-01 RX ADMIN — OXYCODONE HYDROCHLORIDE AND ACETAMINOPHEN 1000 MG: 500 TABLET ORAL at 08:38

## 2020-12-01 RX ADMIN — BUDESONIDE AND FORMOTEROL FUMARATE DIHYDRATE 2 PUFF: 80; 4.5 AEROSOL RESPIRATORY (INHALATION) at 08:37

## 2020-12-01 RX ADMIN — FAMOTIDINE 20 MG: 20 TABLET, FILM COATED ORAL at 08:38

## 2020-12-01 RX ADMIN — ALBUTEROL SULFATE 2 PUFF: 90 AEROSOL, METERED RESPIRATORY (INHALATION) at 08:36

## 2020-12-01 RX ADMIN — PRIMIDONE 100 MG: 50 TABLET ORAL at 06:04

## 2020-12-01 RX ADMIN — ZINC SULFATE 220 MG (50 MG) CAPSULE 220 MG: CAPSULE at 08:38

## 2020-12-01 RX ADMIN — ENOXAPARIN SODIUM 40 MG: 40 INJECTION SUBCUTANEOUS at 08:37

## 2020-12-01 RX ADMIN — TIOTROPIUM BROMIDE 18 MCG: 18 CAPSULE ORAL; RESPIRATORY (INHALATION) at 08:36

## 2020-12-01 RX ADMIN — INSULIN LISPRO 1 UNITS: 100 INJECTION, SOLUTION INTRAVENOUS; SUBCUTANEOUS at 08:38

## 2020-12-03 ENCOUNTER — PATIENT OUTREACH (OUTPATIENT)
Dept: CASE MANAGEMENT | Facility: OTHER | Age: 75
End: 2020-12-03

## 2020-12-03 LAB — IL6 SERPL-MCNC: 18.9 PG/ML (ref 0–13)

## 2020-12-05 LAB
BACTERIA BLD CULT: NORMAL
BACTERIA BLD CULT: NORMAL

## 2020-12-14 ENCOUNTER — PATIENT OUTREACH (OUTPATIENT)
Dept: CASE MANAGEMENT | Facility: OTHER | Age: 75
End: 2020-12-14

## 2020-12-22 ENCOUNTER — EPISODE CHANGES (OUTPATIENT)
Dept: CASE MANAGEMENT | Facility: HOSPITAL | Age: 75
End: 2020-12-22

## 2021-01-16 ENCOUNTER — HOSPITAL ENCOUNTER (INPATIENT)
Facility: HOSPITAL | Age: 76
LOS: 2 days | Discharge: HOME | DRG: 189 | End: 2021-01-19
Attending: FAMILY MEDICINE | Admitting: FAMILY MEDICINE
Payer: MEDICARE

## 2021-01-16 ENCOUNTER — APPOINTMENT (EMERGENCY)
Dept: RADIOLOGY | Facility: HOSPITAL | Age: 76
DRG: 189 | End: 2021-01-16
Payer: MEDICARE

## 2021-01-16 DIAGNOSIS — J44.1 COPD EXACERBATION (HCC): Primary | ICD-10-CM

## 2021-01-16 PROBLEM — H34.8190 CENTRAL RETINAL VEIN OCCLUSION WITH MACULAR EDEMA: Status: ACTIVE | Noted: 2021-01-16

## 2021-01-16 PROBLEM — H35.3190 NONEXUDATIVE AGE-RELATED MACULAR DEGENERATION: Status: ACTIVE | Noted: 2021-01-16

## 2021-01-16 PROBLEM — B35.1 ONYCHOMYCOSIS: Status: ACTIVE | Noted: 2021-01-16

## 2021-01-16 PROBLEM — E11.40 TYPE 2 DIABETES MELLITUS WITH DIABETIC NEUROPATHY, UNSPECIFIED (HCC): Status: ACTIVE | Noted: 2021-01-16

## 2021-01-16 PROBLEM — B35.1 TINEA UNGUIUM: Status: ACTIVE | Noted: 2021-01-16

## 2021-01-16 PROBLEM — E11.42 DIABETIC POLYNEUROPATHY ASSOCIATED WITH TYPE 2 DIABETES MELLITUS (HCC): Status: ACTIVE | Noted: 2020-06-08

## 2021-01-16 PROBLEM — E11.69 HYPERLIPIDEMIA ASSOCIATED WITH TYPE 2 DIABETES MELLITUS (HCC): Status: ACTIVE | Noted: 2020-06-08

## 2021-01-16 PROBLEM — E66.9 OBESITY: Status: ACTIVE | Noted: 2021-01-16

## 2021-01-16 PROBLEM — E11.311: Status: ACTIVE | Noted: 2021-01-16

## 2021-01-16 PROBLEM — H40.059 OCULAR HYPERTENSION: Status: ACTIVE | Noted: 2021-01-16

## 2021-01-16 PROBLEM — E78.5 HYPERLIPIDEMIA ASSOCIATED WITH TYPE 2 DIABETES MELLITUS (HCC): Status: ACTIVE | Noted: 2020-06-08

## 2021-01-16 PROBLEM — B35.1 ONYCHOMYCOSIS DUE TO DERMATOPHYTE: Status: ACTIVE | Noted: 2021-01-16

## 2021-01-16 PROBLEM — H25.89 OTHER AGE-RELATED CATARACT: Status: ACTIVE | Noted: 2021-01-16

## 2021-01-16 PROBLEM — E11.65 POORLY CONTROLLED DIABETES MELLITUS (HCC): Status: ACTIVE | Noted: 2021-01-16

## 2021-01-16 LAB
ALBUMIN SERPL BCP-MCNC: 4.3 G/DL (ref 3.5–5.7)
ALP SERPL-CCNC: 81 U/L (ref 55–165)
ALT SERPL W P-5'-P-CCNC: 10 U/L (ref 7–52)
ANION GAP SERPL CALCULATED.3IONS-SCNC: 8 MMOL/L (ref 4–13)
APTT PPP: 33 SECONDS (ref 23–37)
AST SERPL W P-5'-P-CCNC: 13 U/L (ref 13–39)
BASOPHILS # BLD AUTO: 0.1 THOUSANDS/ΜL (ref 0–0.1)
BASOPHILS NFR BLD AUTO: 1 % (ref 0–2)
BILIRUB SERPL-MCNC: 0.3 MG/DL (ref 0.2–1)
BILIRUB UR QL STRIP: NEGATIVE
BUN SERPL-MCNC: 14 MG/DL (ref 7–25)
CALCIUM SERPL-MCNC: 9.1 MG/DL (ref 8.6–10.5)
CHLORIDE SERPL-SCNC: 104 MMOL/L (ref 98–107)
CLARITY UR: CLEAR
CO2 SERPL-SCNC: 28 MMOL/L (ref 21–31)
COLOR UR: YELLOW
CREAT SERPL-MCNC: 0.87 MG/DL (ref 0.7–1.3)
CRP SERPL QL: 21.4 MG/L
D DIMER PPP FEU-MCNC: 0.37 UG/ML FEU
EOSINOPHIL # BLD AUTO: 0.4 THOUSAND/ΜL (ref 0–0.61)
EOSINOPHIL NFR BLD AUTO: 5 % (ref 0–5)
ERYTHROCYTE [DISTWIDTH] IN BLOOD BY AUTOMATED COUNT: 13.9 % (ref 11.5–14.5)
FLUAV RNA RESP QL NAA+PROBE: NEGATIVE
FLUBV RNA RESP QL NAA+PROBE: NEGATIVE
GFR SERPL CREATININE-BSD FRML MDRD: 85 ML/MIN/1.73SQ M
GLUCOSE SERPL-MCNC: 102 MG/DL (ref 65–99)
GLUCOSE UR STRIP-MCNC: ABNORMAL MG/DL
HCT VFR BLD AUTO: 42.9 % (ref 42–47)
HGB BLD-MCNC: 14.4 G/DL (ref 14–18)
HGB UR QL STRIP.AUTO: NEGATIVE
INR PPP: 1.05 (ref 0.84–1.19)
KETONES UR STRIP-MCNC: ABNORMAL MG/DL
LACTATE SERPL-SCNC: 0.9 MMOL/L (ref 0.5–2)
LEUKOCYTE ESTERASE UR QL STRIP: NEGATIVE
LYMPHOCYTES # BLD AUTO: 1 THOUSANDS/ΜL (ref 0.6–4.47)
LYMPHOCYTES NFR BLD AUTO: 12 % (ref 21–51)
MAGNESIUM SERPL-MCNC: 2.3 MG/DL (ref 1.9–2.7)
MCH RBC QN AUTO: 31.1 PG (ref 26–34)
MCHC RBC AUTO-ENTMCNC: 33.7 G/DL (ref 31–37)
MCV RBC AUTO: 92 FL (ref 81–99)
MONOCYTES # BLD AUTO: 0.5 THOUSAND/ΜL (ref 0.17–1.22)
MONOCYTES NFR BLD AUTO: 6 % (ref 2–12)
NEUTROPHILS # BLD AUTO: 6.2 THOUSANDS/ΜL (ref 1.4–6.5)
NEUTS SEG NFR BLD AUTO: 76 % (ref 42–75)
NITRITE UR QL STRIP: NEGATIVE
PH UR STRIP.AUTO: 5 [PH]
PLATELET # BLD AUTO: 240 THOUSANDS/UL (ref 149–390)
PMV BLD AUTO: 9.4 FL (ref 8.6–11.7)
POTASSIUM SERPL-SCNC: 4.2 MMOL/L (ref 3.5–5.5)
PROT SERPL-MCNC: 6.8 G/DL (ref 6.4–8.9)
PROT UR STRIP-MCNC: NEGATIVE MG/DL
PROTHROMBIN TIME: 13.6 SECONDS (ref 11.6–14.5)
RBC # BLD AUTO: 4.64 MILLION/UL (ref 4.3–5.9)
RSV RNA RESP QL NAA+PROBE: NEGATIVE
SARS-COV-2 RNA RESP QL NAA+PROBE: NEGATIVE
SODIUM SERPL-SCNC: 140 MMOL/L (ref 134–143)
SP GR UR STRIP.AUTO: >=1.03 (ref 1–1.03)
TROPONIN I SERPL-MCNC: <0.03 NG/ML
UROBILINOGEN UR QL STRIP.AUTO: 0.2 E.U./DL
WBC # BLD AUTO: 8.1 THOUSAND/UL (ref 4.8–10.8)

## 2021-01-16 PROCEDURE — 99285 EMERGENCY DEPT VISIT HI MDM: CPT | Performed by: PHYSICIAN ASSISTANT

## 2021-01-16 PROCEDURE — 81003 URINALYSIS AUTO W/O SCOPE: CPT | Performed by: PHYSICIAN ASSISTANT

## 2021-01-16 PROCEDURE — 99285 EMERGENCY DEPT VISIT HI MDM: CPT

## 2021-01-16 PROCEDURE — 93005 ELECTROCARDIOGRAM TRACING: CPT

## 2021-01-16 PROCEDURE — 85025 COMPLETE CBC W/AUTO DIFF WBC: CPT | Performed by: PHYSICIAN ASSISTANT

## 2021-01-16 PROCEDURE — 1124F ACP DISCUSS-NO DSCNMKR DOCD: CPT | Performed by: PHYSICIAN ASSISTANT

## 2021-01-16 PROCEDURE — 83605 ASSAY OF LACTIC ACID: CPT | Performed by: PHYSICIAN ASSISTANT

## 2021-01-16 PROCEDURE — 85610 PROTHROMBIN TIME: CPT | Performed by: PHYSICIAN ASSISTANT

## 2021-01-16 PROCEDURE — 36415 COLL VENOUS BLD VENIPUNCTURE: CPT | Performed by: PHYSICIAN ASSISTANT

## 2021-01-16 PROCEDURE — 71045 X-RAY EXAM CHEST 1 VIEW: CPT

## 2021-01-16 PROCEDURE — 96365 THER/PROPH/DIAG IV INF INIT: CPT

## 2021-01-16 PROCEDURE — 83735 ASSAY OF MAGNESIUM: CPT | Performed by: PHYSICIAN ASSISTANT

## 2021-01-16 PROCEDURE — 85730 THROMBOPLASTIN TIME PARTIAL: CPT | Performed by: PHYSICIAN ASSISTANT

## 2021-01-16 PROCEDURE — 94640 AIRWAY INHALATION TREATMENT: CPT

## 2021-01-16 PROCEDURE — 84484 ASSAY OF TROPONIN QUANT: CPT | Performed by: PHYSICIAN ASSISTANT

## 2021-01-16 PROCEDURE — 96375 TX/PRO/DX INJ NEW DRUG ADDON: CPT

## 2021-01-16 PROCEDURE — 0241U HB NFCT DS VIR RESP RNA 4 TRGT: CPT | Performed by: PHYSICIAN ASSISTANT

## 2021-01-16 PROCEDURE — 87040 BLOOD CULTURE FOR BACTERIA: CPT | Performed by: PHYSICIAN ASSISTANT

## 2021-01-16 PROCEDURE — 86140 C-REACTIVE PROTEIN: CPT | Performed by: PHYSICIAN ASSISTANT

## 2021-01-16 PROCEDURE — 84145 PROCALCITONIN (PCT): CPT | Performed by: PHYSICIAN ASSISTANT

## 2021-01-16 PROCEDURE — 80053 COMPREHEN METABOLIC PANEL: CPT | Performed by: PHYSICIAN ASSISTANT

## 2021-01-16 PROCEDURE — 96367 TX/PROPH/DG ADDL SEQ IV INF: CPT

## 2021-01-16 PROCEDURE — 85379 FIBRIN DEGRADATION QUANT: CPT | Performed by: PHYSICIAN ASSISTANT

## 2021-01-16 RX ORDER — SODIUM CHLORIDE FOR INHALATION 0.9 %
3 VIAL, NEBULIZER (ML) INHALATION
Status: DISCONTINUED | OUTPATIENT
Start: 2021-01-17 | End: 2021-01-17

## 2021-01-16 RX ORDER — FLUTICASONE PROPIONATE 220 UG/1
1 AEROSOL, METERED RESPIRATORY (INHALATION) DAILY
Status: DISCONTINUED | OUTPATIENT
Start: 2021-01-17 | End: 2021-01-18

## 2021-01-16 RX ORDER — ACETAMINOPHEN 325 MG/1
650 TABLET ORAL EVERY 6 HOURS PRN
Status: DISCONTINUED | OUTPATIENT
Start: 2021-01-16 | End: 2021-01-19 | Stop reason: HOSPADM

## 2021-01-16 RX ORDER — PRAVASTATIN SODIUM 40 MG
80 TABLET ORAL
Status: DISCONTINUED | OUTPATIENT
Start: 2021-01-17 | End: 2021-01-19 | Stop reason: HOSPADM

## 2021-01-16 RX ORDER — NICOTINE 21 MG/24HR
1 PATCH, TRANSDERMAL 24 HOURS TRANSDERMAL DAILY
Status: DISCONTINUED | OUTPATIENT
Start: 2021-01-17 | End: 2021-01-19 | Stop reason: HOSPADM

## 2021-01-16 RX ORDER — ASPIRIN 81 MG/1
81 TABLET ORAL EVERY OTHER DAY
Status: DISCONTINUED | OUTPATIENT
Start: 2021-01-17 | End: 2021-01-19 | Stop reason: HOSPADM

## 2021-01-16 RX ORDER — METHYLPREDNISOLONE SODIUM SUCCINATE 40 MG/ML
40 INJECTION, POWDER, LYOPHILIZED, FOR SOLUTION INTRAMUSCULAR; INTRAVENOUS EVERY 8 HOURS
Status: DISCONTINUED | OUTPATIENT
Start: 2021-01-16 | End: 2021-01-19

## 2021-01-16 RX ORDER — DEXAMETHASONE SODIUM PHOSPHATE 10 MG/ML
10 INJECTION, SOLUTION INTRAMUSCULAR; INTRAVENOUS ONCE
Status: COMPLETED | OUTPATIENT
Start: 2021-01-16 | End: 2021-01-16

## 2021-01-16 RX ORDER — LEVALBUTEROL 1.25 MG/.5ML
1.25 SOLUTION, CONCENTRATE RESPIRATORY (INHALATION)
Status: DISCONTINUED | OUTPATIENT
Start: 2021-01-17 | End: 2021-01-17

## 2021-01-16 RX ORDER — GUAIFENESIN 600 MG
600 TABLET, EXTENDED RELEASE 12 HR ORAL 2 TIMES DAILY
Status: DISCONTINUED | OUTPATIENT
Start: 2021-01-17 | End: 2021-01-19 | Stop reason: HOSPADM

## 2021-01-16 RX ORDER — HEPARIN SODIUM 5000 [USP'U]/ML
5000 INJECTION, SOLUTION INTRAVENOUS; SUBCUTANEOUS EVERY 12 HOURS SCHEDULED
Status: DISCONTINUED | OUTPATIENT
Start: 2021-01-16 | End: 2021-01-19 | Stop reason: HOSPADM

## 2021-01-16 RX ORDER — AZITHROMYCIN 250 MG/1
500 TABLET, FILM COATED ORAL EVERY 24 HOURS
Status: COMPLETED | OUTPATIENT
Start: 2021-01-16 | End: 2021-01-18

## 2021-01-16 RX ORDER — MAGNESIUM SULFATE HEPTAHYDRATE 40 MG/ML
2 INJECTION, SOLUTION INTRAVENOUS ONCE
Status: COMPLETED | OUTPATIENT
Start: 2021-01-16 | End: 2021-01-16

## 2021-01-16 RX ORDER — LISINOPRIL 20 MG/1
20 TABLET ORAL 2 TIMES DAILY
Status: DISCONTINUED | OUTPATIENT
Start: 2021-01-17 | End: 2021-01-19 | Stop reason: HOSPADM

## 2021-01-16 RX ORDER — GABAPENTIN 100 MG/1
200 CAPSULE ORAL
Status: DISCONTINUED | OUTPATIENT
Start: 2021-01-16 | End: 2021-01-19 | Stop reason: HOSPADM

## 2021-01-16 RX ORDER — CEFTRIAXONE 1 G/50ML
1000 INJECTION, SOLUTION INTRAVENOUS ONCE
Status: COMPLETED | OUTPATIENT
Start: 2021-01-16 | End: 2021-01-16

## 2021-01-16 RX ORDER — ALBUTEROL SULFATE 2.5 MG/3ML
2.5 SOLUTION RESPIRATORY (INHALATION) EVERY 4 HOURS PRN
Status: DISCONTINUED | OUTPATIENT
Start: 2021-01-16 | End: 2021-01-17

## 2021-01-16 RX ORDER — PRIMIDONE 50 MG/1
100 TABLET ORAL EVERY 8 HOURS SCHEDULED
Status: DISCONTINUED | OUTPATIENT
Start: 2021-01-16 | End: 2021-01-19 | Stop reason: HOSPADM

## 2021-01-16 RX ORDER — SODIUM CHLORIDE 9 MG/ML
3 INJECTION INTRAVENOUS
Status: DISCONTINUED | OUTPATIENT
Start: 2021-01-16 | End: 2021-01-19 | Stop reason: HOSPADM

## 2021-01-16 RX ORDER — ONDANSETRON 2 MG/ML
4 INJECTION INTRAMUSCULAR; INTRAVENOUS EVERY 6 HOURS PRN
Status: DISCONTINUED | OUTPATIENT
Start: 2021-01-16 | End: 2021-01-19 | Stop reason: HOSPADM

## 2021-01-16 RX ADMIN — SODIUM CHLORIDE 1000 ML: 0.9 INJECTION, SOLUTION INTRAVENOUS at 19:10

## 2021-01-16 RX ADMIN — ALBUTEROL SULFATE 5 MG: 2.5 SOLUTION RESPIRATORY (INHALATION) at 18:09

## 2021-01-16 RX ADMIN — IPRATROPIUM BROMIDE 0.5 MG: 0.5 SOLUTION RESPIRATORY (INHALATION) at 18:09

## 2021-01-16 RX ADMIN — MAGNESIUM SULFATE IN WATER 2 G: 40 INJECTION, SOLUTION INTRAVENOUS at 18:33

## 2021-01-16 RX ADMIN — DEXAMETHASONE SODIUM PHOSPHATE 10 MG: 10 INJECTION, SOLUTION INTRAMUSCULAR; INTRAVENOUS at 18:28

## 2021-01-16 RX ADMIN — CEFTRIAXONE 1000 MG: 1 INJECTION, SOLUTION INTRAVENOUS at 19:11

## 2021-01-16 NOTE — ED PROVIDER NOTES
History  Chief Complaint   Patient presents with    Shortness of Breath     ONGOING FOR THE PAST SEVERAL DAYS- NEBS NOT HELPING, WAS AROUND FRIEND WHO IS +COVID        36-year-old male history of COPD, CAD presents complaining of shortness of breath  Patient reports that he has had frequent COPD exacerbations in the past but that he had a COVID-19 exposure approximately 10 days ago  He reports that the friend that he was in close contact with on masked was recently admitted with severe symptoms of COVID  Patient reports that over the past few days he has had increasing shortness of breath worsened with exertion with only very minimal improvement from his albuterol nebulizers at home  States he is only able to make it 5-10 steps prior to becoming incredibly short of breath  Denies any syncopal episodes, chest pain or any other complaints at this time  Denies history of DVT / PE or any recent lower leg pain or swelling  Prior to Admission Medications   Prescriptions Last Dose Informant Patient Reported? Taking?    Alogliptin Benzoate 25 MG TABS   No No   Sig: Take 0 5 tablets (12 5 mg total) by mouth daily Patient states takes 1/2 of a 25 mg tablet every AM   Patient taking differently: Take 25 mg by mouth daily    Empagliflozin 25 MG TABS   No No   Sig: Take 0 5 tablets (12 5 mg total) by mouth every morning Take one half tablet every morning   albuterol (2 5 mg/3 mL) 0 083 % nebulizer solution   No No   Sig: Take 1 vial (2 5 mg total) by nebulization every 4 (four) hours as needed for wheezing or shortness of breath   albuterol (PROVENTIL HFA,VENTOLIN HFA) 90 mcg/act inhaler   No No   Sig: Inhale 2 puffs every 6 (six) hours as needed for wheezing or shortness of breath   aspirin (ECOTRIN LOW STRENGTH) 81 mg EC tablet   No No   Sig: Take 1 tablet (81 mg total) by mouth every other day   fluticasone (FLOVENT HFA) 220 mcg/act inhaler   Yes No   Sig: Inhale 1 puff daily Rinse mouth after use    gabapentin (NEURONTIN) 100 mg capsule   No No   Sig: Take 2 capsules (200 mg total) by mouth daily at bedtime   glimepiride (AMARYL) 1 mg tablet   No No   Sig: Take 1 tablet (1 mg total) by mouth 2 (two) times a day   guaiFENesin (MUCINEX) 600 mg 12 hr tablet   No No   Sig: Take 1 tablet (600 mg total) by mouth 2 (two) times a day   hydrocortisone 1 % cream   No No   Sig: Apply topically 2 (two) times a day   lisinopril (ZESTRIL) 20 mg tablet   No No   Sig: Take 1 tablet (20 mg total) by mouth 2 (two) times a day   metFORMIN (GLUCOPHAGE) 500 mg tablet   No No   Sig: Take 1 tablet (500 mg total) by mouth daily with dinner   metoprolol tartrate (LOPRESSOR) 25 mg tablet  Spouse/Significant Other Yes No   Sig: Take 25 mg by mouth every 12 (twelve) hours   mometasone (ASMANEX TWISTHALER) 220 MCG/INH inhaler   Yes No   Sig: Inhale 2 puffs 2 (two) times a day Rinse mouth after use  primidone (MYSOLINE) 50 mg tablet   Yes No   Sig: Take 100 mg by mouth every 8 (eight) hours   rosuvastatin (CRESTOR) 20 MG tablet   No No   Sig: Take 1 tablet (20 mg total) by mouth daily after dinner Take one half tablet daily with dinner   Patient taking differently: Take 10 mg by mouth daily after dinner Take one half tablet daily with dinner    saxagliptin (ONGLYZA) 5 MG tablet   No No   Sig: Take 1 tablet (5 mg total) by mouth daily   tiotropium (SPIRIVA) 18 mcg inhalation capsule   No No   Sig: Place 1 capsule (18 mcg total) into inhaler and inhale daily   tiotropium-olodaterol (STIOLTO RESPIMAT) 2 5-2 5 MCG/ACT inhaler   Yes No   Sig: Inhale 2 puffs daily      Facility-Administered Medications: None       Past Medical History:   Diagnosis Date    BPH (benign prostatic hyperplasia)     45 days radiation treatment    COPD (chronic obstructive pulmonary disease)     Coronary artery disease     CABG x4 in 2017    Diabetes mellitus     History of Arterial Duplex of LE 12/26/2017    Likely occlusion of the left superficial femoral artery  Calcific changes bilaterally  Despite these changes, the ankle-brachial index as a measure of peripheral blood flow only mildly impaired   History of echocardiogram 06/12/2017    EF 40%, mild LVH, mild MR     Hyperlipidemia     Hypertension     NSTEMI (non-ST elevated myocardial infarction)     Prostate cancer     prostate     PVD (peripheral vascular disease)     Tremor        Past Surgical History:   Procedure Laterality Date    CARDIAC CATHETERIZATION  03/08/2017    Significant left main plus triple-vessel CAD   CORONARY ARTERY BYPASS GRAFT  03/08/2017    4V CABG:  LIMA to LAD, VG to RI, SVG to PDA to LVBR RCA   EYE SURGERY      shots in eye once a month @ the [place]    PROSTATE BIOPSY         Family History   Problem Relation Age of Onset    Cancer Father     Heart disease Brother      I have reviewed and agree with the history as documented  E-Cigarette/Vaping    E-Cigarette Use Never User      E-Cigarette/Vaping Substances     Social History     Tobacco Use    Smoking status: Current Some Day Smoker    Smokeless tobacco: Never Used    Tobacco comment: only smokes when he drinks beer   Substance Use Topics    Alcohol use: Yes     Frequency: Monthly or less    Drug use: Never       Review of Systems   Constitutional: Negative for chills, fatigue and fever  HENT: Negative for congestion and sore throat  Eyes: Negative for pain  Respiratory: Positive for shortness of breath  Negative for cough, chest tightness and wheezing  Cardiovascular: Negative for chest pain, palpitations and leg swelling  Gastrointestinal: Negative for abdominal pain, constipation, diarrhea, nausea and vomiting  Endocrine: Negative for polyuria  Genitourinary: Negative for dysuria  Musculoskeletal: Negative for arthralgias, back pain, myalgias and neck pain  Skin: Negative for rash  Neurological: Positive for weakness  Negative for dizziness, syncope, light-headedness and headaches     All other systems reviewed and are negative  Physical Exam  Physical Exam  Vitals signs reviewed  Constitutional:       Appearance: He is well-developed  He is ill-appearing  HENT:      Head: Normocephalic and atraumatic  Neck:      Musculoskeletal: Normal range of motion  Cardiovascular:      Rate and Rhythm: Normal rate and regular rhythm  Heart sounds: Normal heart sounds  Pulmonary:      Effort: Pulmonary effort is normal  Tachypnea present  Breath sounds: Normal breath sounds  Abdominal:      General: Bowel sounds are normal       Palpations: Abdomen is soft  Tenderness: There is no abdominal tenderness  Musculoskeletal: Normal range of motion  Skin:     General: Skin is warm and dry  Capillary Refill: Capillary refill takes less than 2 seconds  Neurological:      Mental Status: He is alert and oriented to person, place, and time           Vital Signs  ED Triage Vitals [01/16/21 1716]   Temperature Pulse Respirations Blood Pressure SpO2   (!) 97 3 °F (36 3 °C) 80 (!) 38 (!) 221/95 (!) 86 %      Temp Source Heart Rate Source Patient Position - Orthostatic VS BP Location FiO2 (%)   Temporal Monitor Sitting Right arm --      Pain Score       No Pain           Vitals:    01/16/21 2000 01/16/21 2015 01/16/21 2030 01/16/21 2045   BP:  (!) 174/80 (!) 177/81    Pulse: 74 81 76 77   Patient Position - Orthostatic VS:             Visual Acuity      ED Medications  Medications   sodium chloride (PF) 0 9 % injection 3 mL (has no administration in time range)   sodium chloride 0 9 % bolus 1,000 mL (1,000 mL Intravenous New Bag 1/16/21 1910)   cefTRIAXone (ROCEPHIN) IVPB (premix in dextrose) 1,000 mg 50 mL (0 mg Intravenous Stopped 1/16/21 2057)   albuterol inhalation solution 5 mg (5 mg Nebulization Given 1/16/21 1809)   ipratropium (ATROVENT) 0 02 % inhalation solution 0 5 mg (0 5 mg Nebulization Given 1/16/21 1809)   dexamethasone (PF) (DECADRON) injection 10 mg (10 mg Intravenous Given 1/16/21 1828)   magnesium sulfate 2 g/50 mL IVPB (premix) 2 g (0 g Intravenous Stopped 1/16/21 1906)       Diagnostic Studies  Results Reviewed     Procedure Component Value Units Date/Time    D-dimer, quantitative [[id number]]  (Normal) Collected: 01/16/21 1915    Lab Status: Final result Specimen: Blood from Arm, Right Updated: 01/16/21 1934     D-Dimer, Quant 0 37 ug/ml FEU     COVID19, Influenza A/B, RSV PCR, [facility] [[id number]]  (Normal) Collected: 01/16/21 1832    Lab Status: Final result Specimen: Nasopharyngeal Swab Updated: 01/16/21 1933     SARS-CoV-2 Negative     INFLUENZA A PCR Negative     INFLUENZA B PCR Negative     RSV PCR Negative    Narrative: This test has been authorized by FDA under an EUA (Emergency Use Assay) for use by authorized laboratories  Clinical caution and judgement should be used with the interpretation of these results with consideration of the clinical impression and other laboratory testing  Testing reported as "Positive" or "Negative" has been proven to be accurate according to standard laboratory validation requirements  All testing is performed with control materials showing appropriate reactivity at standard intervals      Protime-INR [[id number]]  (Normal) Collected: 01/16/21 1915    Lab Status: Final result Specimen: Blood from Arm, Right Updated: 01/16/21 1932     Protime 13 6 seconds      INR 1 05    APTT [[id number]]  (Normal) Collected: 01/16/21 1915    Lab Status: Final result Specimen: Blood from Arm, Right Updated: 01/16/21 1932     PTT 33 seconds     C-reactive protein [[id number]]  (Abnormal) Collected: 01/16/21 1818    Lab Status: Final result Specimen: Blood from Arm, Right Updated: 01/16/21 1906     CRP 21 4 mg/L     Comprehensive metabolic panel [[id number]]  (Abnormal) Collected: 01/16/21 1818    Lab Status: Final result Specimen: Blood from Arm, Right Updated: 01/16/21 1906     Sodium 140 mmol/L      Potassium 4 2 mmol/L      Chloride 104 mmol/L      CO2 28 mmol/L      ANION GAP 8 mmol/L      BUN 14 mg/dL      Creatinine 0 87 mg/dL      Glucose 102 mg/dL      Calcium 9 1 mg/dL      AST 13 U/L      ALT 10 U/L      Alkaline Phosphatase 81 U/L      Total Protein 6 8 g/dL      Albumin 4 3 g/dL      Total Bilirubin 0 30 mg/dL      eGFR 85 ml/min/1 73sq m     Narrative:      [place] guidelines for Chronic Kidney Disease (CKD):     Stage 1 with normal or high GFR (GFR > 90 mL/min/1 73 square meters)    Stage 2 Mild CKD (GFR = 60-89 mL/min/1 73 square meters)    Stage 3A Moderate CKD (GFR = 45-59 mL/min/1 73 square meters)    Stage 3B Moderate CKD (GFR = 30-44 mL/min/1 73 square meters)    Stage 4 Severe CKD (GFR = 15-29 mL/min/1 73 square meters)    Stage 5 End Stage CKD (GFR <15 mL/min/1 73 square meters)  Note: GFR calculation is accurate only with a steady state creatinine    Lactic Acid [[id number]]  (Normal) Collected: 01/16/21 1818    Lab Status: Final result Specimen: Blood from Arm, Right Updated: 01/16/21 1902     LACTIC ACID 0 9 mmol/L     Narrative:      Result may be elevated if tourniquet was used during collection      Magnesium [[id number]]  (Normal) Collected: 01/16/21 1818    Lab Status: Final result Specimen: Blood from Arm, Right Updated: 01/16/21 1902     Magnesium 2 3 mg/dL     Troponin I [[id number]]  (Normal) Collected: 01/16/21 1818    Lab Status: Final result Specimen: Blood from Arm, Right Updated: 01/16/21 1900     Troponin I <0 03 ng/mL     CBC and differential [[id number]]  (Abnormal) Collected: 01/16/21 1818    Lab Status: Final result Specimen: Blood from Arm, Right Updated: 01/16/21 1843     WBC 8 10 Thousand/uL      RBC 4 64 Million/uL      Hemoglobin 14 4 g/dL      Hematocrit 42 9 %      MCV 92 fL      MCH 31 1 pg      MCHC 33 7 g/dL      RDW 13 9 %      MPV 9 4 fL      Platelets 825 Thousands/uL      Neutrophils Relative 76 %      Lymphocytes Relative 12 %      Monocytes Relative 6 %      Eosinophils Relative 5 % Basophils Relative 1 %      Neutrophils Absolute 6 20 Thousands/µL      Lymphocytes Absolute 1 00 Thousands/µL      Monocytes Absolute 0 50 Thousand/µL      Eosinophils Absolute 0 40 Thousand/µL      Basophils Absolute 0 10 Thousands/µL     Blood culture #2 [[id number]] Collected: 01/16/21 1829    Lab Status: In process Specimen: Blood from Arm, Right Updated: 01/16/21 1842    Blood culture #1 [[id number]] Collected: 01/16/21 1818    Lab Status: In process Specimen: Blood from Arm, Right Updated: 01/16/21 1842    Procalcitonin with AM Reflex [[id number]] Collected: 01/16/21 1818    Lab Status: In process Specimen: Blood from Arm, Right Updated: 01/16/21 1839    UA w Reflex to Microscopic w Reflex to Culture [[id number]]     Lab Status: No result Specimen: Urine                  XR chest 1 view portable    (Results Pending)              Procedures  ECG 12 Lead Documentation Only    Date/Time: 1/16/2021 5:40 PM  Performed by: Bj Mata PA-C  Authorized by: Bj Mata PA-C     ECG reviewed by me, the ED Provider: yes    Patient location:  ED  Previous ECG:     Previous ECG:  Compared to current    Similarity:  No change    Comparison to cardiac monitor: Yes    Interpretation:     Interpretation: normal    Rate:     ECG rate:  81    ECG rate assessment: normal    Rhythm:     Rhythm: sinus rhythm    Ectopy:     Ectopy: none    QRS:     QRS axis:  Normal  Conduction:     Conduction: normal    ST segments:     ST segments:  Normal  T waves:     T waves: normal    Comments:      No evidence of acute cardiac ischemia             ED Course                                           MDM  Number of Diagnoses or Management Options  COPD exacerbation [facility]):   Diagnosis management comments:  Patient presented with shortness of breath  Differential diagnosis included but was not limited to COVID-19, pulmonary embolism and COPD exacerbation  Patient's COVID test negative  D-dimer negative    Improved with Decadron and albuterol nebulizer  Patient will be admitted for observation and is agreeable to plan  Disposition  Final diagnoses:   COPD exacerbation ([address] )     Time reflects when diagnosis was documented in both MDM as applicable and the Disposition within this note     Time User Action Codes Description Comment    1/16/2021  8:31 PM Aline Mcgovern Add [J44 1] COPD exacerbation [facility])       ED Disposition     ED Disposition Condition Date/Time Comment    Admit Stable Sat Jan 16, 2021  8:31 PM Case was discussed with Tika Duff and the patient's admission status was agreed to be Admission Status: observation status to the service of Dr Charles Mackenzie         Follow-up Information    None         Patient's Medications   Discharge Prescriptions    No medications on file     No discharge procedures on file      PDMP Review       Value Time User    PDMP Reviewed  Yes 9/3/2020 12:26 PM Pras[facility] [place]          ED Provider  Electronically Signed by           Vitaly Morin PA-C  01/16/21 0740

## 2021-01-17 LAB
ANION GAP SERPL CALCULATED.3IONS-SCNC: 8 MMOL/L (ref 4–13)
BUN SERPL-MCNC: 16 MG/DL (ref 7–25)
CALCIUM SERPL-MCNC: 8.7 MG/DL (ref 8.6–10.5)
CHLORIDE SERPL-SCNC: 105 MMOL/L (ref 98–107)
CO2 SERPL-SCNC: 26 MMOL/L (ref 21–31)
CREAT SERPL-MCNC: 0.82 MG/DL (ref 0.7–1.3)
ERYTHROCYTE [DISTWIDTH] IN BLOOD BY AUTOMATED COUNT: 13.9 % (ref 11.5–14.5)
GFR SERPL CREATININE-BSD FRML MDRD: 87 ML/MIN/1.73SQ M
GLUCOSE SERPL-MCNC: 127 MG/DL (ref 65–99)
GLUCOSE SERPL-MCNC: 134 MG/DL (ref 65–140)
GLUCOSE SERPL-MCNC: 145 MG/DL (ref 65–140)
GLUCOSE SERPL-MCNC: 174 MG/DL (ref 65–140)
GLUCOSE SERPL-MCNC: 183 MG/DL (ref 65–140)
GLUCOSE SERPL-MCNC: 260 MG/DL (ref 65–140)
HCT VFR BLD AUTO: 40.6 % (ref 42–47)
HGB BLD-MCNC: 13.7 G/DL (ref 14–18)
MCH RBC QN AUTO: 31.1 PG (ref 26–34)
MCHC RBC AUTO-ENTMCNC: 33.7 G/DL (ref 31–37)
MCV RBC AUTO: 92 FL (ref 81–99)
PLATELET # BLD AUTO: 240 THOUSANDS/UL (ref 149–390)
PMV BLD AUTO: 9.4 FL (ref 8.6–11.7)
POTASSIUM SERPL-SCNC: 4.6 MMOL/L (ref 3.5–5.5)
PROCALCITONIN SERPL-MCNC: 0.07 NG/ML
RBC # BLD AUTO: 4.41 MILLION/UL (ref 4.3–5.9)
SODIUM SERPL-SCNC: 139 MMOL/L (ref 134–143)
WBC # BLD AUTO: 7.1 THOUSAND/UL (ref 4.8–10.8)

## 2021-01-17 PROCEDURE — 94760 N-INVAS EAR/PLS OXIMETRY 1: CPT

## 2021-01-17 PROCEDURE — 82948 REAGENT STRIP/BLOOD GLUCOSE: CPT

## 2021-01-17 PROCEDURE — 80048 BASIC METABOLIC PNL TOTAL CA: CPT | Performed by: PHYSICIAN ASSISTANT

## 2021-01-17 PROCEDURE — 85027 COMPLETE CBC AUTOMATED: CPT | Performed by: PHYSICIAN ASSISTANT

## 2021-01-17 PROCEDURE — 99223 1ST HOSP IP/OBS HIGH 75: CPT | Performed by: PHYSICIAN ASSISTANT

## 2021-01-17 PROCEDURE — 94664 DEMO&/EVAL PT USE INHALER: CPT

## 2021-01-17 RX ORDER — ALBUTEROL SULFATE 90 UG/1
2 AEROSOL, METERED RESPIRATORY (INHALATION) EVERY 4 HOURS PRN
Status: DISCONTINUED | OUTPATIENT
Start: 2021-01-17 | End: 2021-01-19 | Stop reason: HOSPADM

## 2021-01-17 RX ORDER — LEVALBUTEROL INHALATION SOLUTION 0.63 MG/3ML
0.63 SOLUTION RESPIRATORY (INHALATION) EVERY 8 HOURS PRN
Status: DISCONTINUED | OUTPATIENT
Start: 2021-01-17 | End: 2021-01-17

## 2021-01-17 RX ADMIN — ALBUTEROL SULFATE 2 PUFF: 90 AEROSOL, METERED RESPIRATORY (INHALATION) at 12:14

## 2021-01-17 RX ADMIN — FLUTICASONE PROPIONATE 1 PUFF: 220 AEROSOL, METERED RESPIRATORY (INHALATION) at 09:18

## 2021-01-17 RX ADMIN — AZITHROMYCIN MONOHYDRATE 500 MG: 250 TABLET ORAL at 00:31

## 2021-01-17 RX ADMIN — INSULIN LISPRO 1 UNITS: 100 INJECTION, SOLUTION INTRAVENOUS; SUBCUTANEOUS at 11:56

## 2021-01-17 RX ADMIN — TIOTROPIUM BROMIDE 18 MCG: 18 CAPSULE ORAL; RESPIRATORY (INHALATION) at 09:19

## 2021-01-17 RX ADMIN — PRIMIDONE 100 MG: 50 TABLET ORAL at 06:39

## 2021-01-17 RX ADMIN — INSULIN LISPRO 3 UNITS: 100 INJECTION, SOLUTION INTRAVENOUS; SUBCUTANEOUS at 00:47

## 2021-01-17 RX ADMIN — METHYLPREDNISOLONE SODIUM SUCCINATE 40 MG: 40 INJECTION, POWDER, FOR SOLUTION INTRAMUSCULAR; INTRAVENOUS at 00:31

## 2021-01-17 RX ADMIN — HEPARIN SODIUM 5000 UNITS: 5000 INJECTION INTRAVENOUS; SUBCUTANEOUS at 09:17

## 2021-01-17 RX ADMIN — METOPROLOL TARTRATE 25 MG: 25 TABLET, FILM COATED ORAL at 20:59

## 2021-01-17 RX ADMIN — INSULIN LISPRO 1 UNITS: 100 INJECTION, SOLUTION INTRAVENOUS; SUBCUTANEOUS at 21:10

## 2021-01-17 RX ADMIN — GABAPENTIN 200 MG: 100 CAPSULE ORAL at 21:08

## 2021-01-17 RX ADMIN — METHYLPREDNISOLONE SODIUM SUCCINATE 40 MG: 40 INJECTION, POWDER, FOR SOLUTION INTRAMUSCULAR; INTRAVENOUS at 14:19

## 2021-01-17 RX ADMIN — AZITHROMYCIN MONOHYDRATE 500 MG: 250 TABLET ORAL at 22:21

## 2021-01-17 RX ADMIN — ASPIRIN 81 MG: 81 TABLET, COATED ORAL at 09:16

## 2021-01-17 RX ADMIN — HEPARIN SODIUM 5000 UNITS: 5000 INJECTION INTRAVENOUS; SUBCUTANEOUS at 00:32

## 2021-01-17 RX ADMIN — METOPROLOL TARTRATE 25 MG: 25 TABLET, FILM COATED ORAL at 00:32

## 2021-01-17 RX ADMIN — LISINOPRIL 20 MG: 20 TABLET ORAL at 17:05

## 2021-01-17 RX ADMIN — METHYLPREDNISOLONE SODIUM SUCCINATE 40 MG: 40 INJECTION, POWDER, FOR SOLUTION INTRAMUSCULAR; INTRAVENOUS at 09:18

## 2021-01-17 RX ADMIN — METOPROLOL TARTRATE 25 MG: 25 TABLET, FILM COATED ORAL at 09:17

## 2021-01-17 RX ADMIN — PRIMIDONE 100 MG: 50 TABLET ORAL at 14:20

## 2021-01-17 RX ADMIN — GUAIFENESIN 600 MG: 600 TABLET, EXTENDED RELEASE ORAL at 09:17

## 2021-01-17 RX ADMIN — PRIMIDONE 100 MG: 50 TABLET ORAL at 00:31

## 2021-01-17 RX ADMIN — LISINOPRIL 20 MG: 20 TABLET ORAL at 09:17

## 2021-01-17 RX ADMIN — GUAIFENESIN 600 MG: 600 TABLET, EXTENDED RELEASE ORAL at 17:05

## 2021-01-17 RX ADMIN — HEPARIN SODIUM 5000 UNITS: 5000 INJECTION INTRAVENOUS; SUBCUTANEOUS at 20:59

## 2021-01-17 RX ADMIN — PRAVASTATIN SODIUM 80 MG: 40 TABLET ORAL at 17:05

## 2021-01-17 RX ADMIN — PRIMIDONE 100 MG: 50 TABLET ORAL at 21:09

## 2021-01-17 RX ADMIN — GABAPENTIN 200 MG: 100 CAPSULE ORAL at 00:30

## 2021-01-17 RX ADMIN — METHYLPREDNISOLONE SODIUM SUCCINATE 40 MG: 40 INJECTION, POWDER, FOR SOLUTION INTRAMUSCULAR; INTRAVENOUS at 22:21

## 2021-01-17 NOTE — ASSESSMENT & PLAN NOTE
· Placed in observation Medicine  · Give Solu-Medrol 40 mg IV q 8 hours  · Give Xopenex and Atrovent nebulizer treatments t i d   · Placed on O2 and respiratory protocol  · Obtain sputum culture Gram stain  · Give Zithromax 500 mg p o   Daily  · Continue pre-hospital Spiriva 18 mcg inhalation daily  · Give Mucinex 600 mg p o  B i d   · Exacerbation likely secondary to continued smoking  · There was some concern over COVID-19 as patient has significant exposure 10 days ago though has negative COVID test in the ER

## 2021-01-17 NOTE — PROGRESS NOTES
Progress Note - Carlyle Shtety 1945, 77 y o  male MRN: [id number]    Unit/Bed#: -01 Encounter: [id number]    Primary Care Provider: Dolores Lopes DO   Date and time admitted to hospital: 1/16/2021  5:12 PM        * Chronic obstructive pulmonary disease with acute exacerbation (HCC)  Assessment & Plan    · Continue Solu-Medrol 40 mg IV q 8 hours  · Continue Xopenex and Atrovent nebulizer treatments t i d   · Placed on O2 and respiratory protocol  · Pending sputum culture Gram stain  · Continue Zithromax 500 mg p o  Daily for total of 5 days  · Continue pre-hospital Spiriva 18 mcg inhalation daily  · Continue Mucinex 600 mg p o  B i d   · Exacerbation likely secondary to continued smoking  · There was some concern over COVID-19 as patient has significant exposure 10 days ago though has negative COVID test in the ER    Acute on chronic respiratory failure ([address] )  Assessment & Plan  Secondary to COPD exacerbation being treated as per above  Currently patient saturating 90% on 2 L nasal cannula    Obesity  Assessment & Plan  Place on [facility] step 2 diet, encourage healthy weight loss and supportive care    Essential hypertension  Assessment & Plan  Continue pre-hospital Lopressor 25 mg p o  B i d  And lisinopril 20 mg p o  B i d     Other hyperlipidemia  Assessment & Plan  Substitute Pravachol 80 mg p o  Daily for pre-hospital Crestor    Tobacco abuse  Assessment & Plan  Give nicotine 21 mg transdermal daily    Type 2 diabetes mellitus without complication, without long-term current use of insulin [facility])  Assessment & Plan  Lab Results   Component Value Date    HGBA1C 7 1 (H) 08/14/2020       Recent Labs     01/17/21  0044 01/17/21  0641   POCGLU 260* 134       Blood Sugar Average: Last 72 hrs:  (P) 197   Place on [facility] step 2 diet, hold pre-hospital oral antihyperglycemics obtain Accu-Cheks AC and HS with Humalog correction dose a c   And hs      VTE Pharmacologic Prophylaxis:   Pharmacologic: Heparin  Mechanical VTE Prophylaxis in Place: Yes    Patient Centered Rounds: I have performed bedside rounds with nursing staff today  Discussions with Specialists or Other Care Team Provider:  Gaston ortiz nursing    Education and Discussions with Family / Patient:  With patient  Patient did not want me to call any family member    Time Spent for Care: 45 minutes  More than 50% of total time spent on counseling and coordination of care as described above  Current Length of Stay: 0 day(s)    Current Patient Status: Observation   Certification Statement: The patient will continue to require additional inpatient hospital stay due to Antibiotics, oxygen demand    Discharge Plan:  24-48 hours    Code Status: Level 1 - Full Code      Subjective:   Patient seen examined bedside  No acute events overnight  He still complains of shortness of breath but improved    Objective:     Vitals:   Temp (24hrs), Av 8 °F (36 6 °C), Min:97 3 °F (36 3 °C), Max:98 6 °F (37 °C)    Temp:  [97 3 °F (36 3 °C)-98 6 °F (37 °C)] 98 6 °F (37 °C)  HR:  [72-90] 84  Resp:  [17-38] 20  BP: (147-221)/(74-95) 148/80  SpO2:  [86 %-100 %] 97 %  Body mass index is 33 86 kg/m²  Input and Output Summary (last 24 hours): Intake/Output Summary (Last 24 hours) at 2021 1026  Last data filed at 2021 2208  Gross per 24 hour   Intake 1231 67 ml   Output    Net 1231 67 ml       Physical Exam:     Physical Exam  Vitals signs and nursing note reviewed  Constitutional:       General: He is not in acute distress  Appearance: Normal appearance  He is obese  HENT:      Head: Normocephalic  Nose: Nose normal       Mouth/Throat:      Mouth: Mucous membranes are moist    Eyes:      Conjunctiva/sclera: Conjunctivae normal    Cardiovascular:      Rate and Rhythm: Normal rate  Heart sounds: Normal heart sounds  No murmur  Pulmonary:      Effort: Pulmonary effort is normal  No respiratory distress        Breath sounds: Normal breath sounds  No wheezing  Abdominal:      General: There is no distension  Tenderness: There is no abdominal tenderness  There is no guarding  Musculoskeletal:         General: No swelling  Right lower leg: No edema  Skin:     General: Skin is warm  Neurological:      General: No focal deficit present  Mental Status: He is alert and oriented to person, place, and time  Psychiatric:         Mood and Affect: Mood normal            Additional Data:     Labs:    Results from last 7 days   Lab Units 01/17/21  0656 01/16/21  1818   WBC Thousand/uL 7 10 8 10   HEMOGLOBIN g/dL 13 7* 14 4   HEMATOCRIT % 40 6* 42 9   PLATELETS Thousands/uL 240 240   NEUTROS PCT %  --  76*   LYMPHS PCT %  --  12*   MONOS PCT %  --  6   EOS PCT %  --  5     Results from last 7 days   Lab Units 01/17/21  0656 01/16/21  1818   SODIUM mmol/L 139 140   POTASSIUM mmol/L 4 6 4 2   CHLORIDE mmol/L 105 104   CO2 mmol/L 26 28   BUN mg/dL 16 14   CREATININE mg/dL 0 82 0 87   ANION GAP mmol/L 8 8   CALCIUM mg/dL 8 7 9 1   ALBUMIN g/dL  --  4 3   TOTAL BILIRUBIN mg/dL  --  0 30   ALK PHOS U/L  --  81   ALT U/L  --  10   AST U/L  --  13   GLUCOSE RANDOM mg/dL 127* 102*     Results from last 7 days   Lab Units 01/16/21  1915   INR  1 05     Results from last 7 days   Lab Units 01/17/21  0641 01/17/21  0044   POC GLUCOSE mg/dl 134 260*         Results from last 7 days   Lab Units 01/16/21  1818   LACTIC ACID mmol/L 0 9           * I Have Reviewed All Lab Data Listed Above  * Additional Pertinent Lab Tests Reviewed:  [address] Admission Reviewed    Imaging:    Imaging Reports Reviewed Today Include:  Chest x-ray  Imaging Personally Reviewed by Myself Includes:  Chest x-ray    Recent Cultures (last 7 days):           Last 24 Hours Medication List:   Current Facility-Administered Medications   Medication Dose Route Frequency Provider Last Rate    acetaminophen  650 mg Oral Q6H PRN Hugo Krishnamurthy PA-C      albuterol 2 puff Inhalation Q4H PRN Dieter Jones MD      aspirin  81 mg Oral Every Other Day Laurina Inch, PA-C      azithromycin  500 mg Oral Q24H Laurina Inch, PA-C      Empagliflozin  12 5 mg Oral QAM Laurina Inch, PA-C      fluticasone  1 puff Inhalation Daily Laurina Inch, PA-C      gabapentin  200 [facility] Oral HS [facility]      guaiFENesin  600 mg Oral BID [facility]      heparin (porcine)  5,000 Units Subcutaneous Q12H [address] Laurina Inch, PA-C      insulin lispro  1-6 Units Subcutaneous TID [facility] [place] Laurina Inch, PA-C      insulin lispro  1-6 Units Subcutaneous HS [facility]      lisinopril  20 mg Oral BID Laurina Inch, PA-C      methylPREDNISolone sodium succinate  40 mg Intravenous [address], PA-C      metoprolol tartrate  25 mg Oral Q12H [address] [facility]      nicotine  1 patch Transdermal Daily [facility]      ondansetron  4 mg Intravenous Q6H PRN [facility], PA-SHADI      pravastatin  80 mg Oral Daily With [employer], PA-C      primidone  100 mg Oral [facility]      sodium chloride (PF)  3 mL Intravenous Q1H PRN Aissatou Whitlock, PA-SHADI      tiotropium  18 mcg Inhalation Daily [facility], [facility]          Today, Patient Was Seen By: Darci Shine MD    ** Please Note: Dictation voice to text software may have been used in the creation of this document   **

## 2021-01-17 NOTE — ED NOTES
Patient in agreement to remain in hospital - no complaints offered at this time     Michelle Orourke, RN  01/16/21 2051

## 2021-01-17 NOTE — PLAN OF CARE
Problem: Potential for Falls  Goal: Patient will remain free of falls  Description: INTERVENTIONS:  - Assess patient frequently for physical needs  -  Identify cognitive and physical deficits and behaviors that affect risk of falls    -  [place] fall precautions as indicated by assessment   - Educate patient/family on patient safety including physical limitations  - Instruct patient to call for assistance with activity based on assessment  - Modify environment to reduce risk of injury  - Consider OT/PT consult to assist with strengthening/mobility  Outcome: Progressing     Problem: DISCHARGE PLANNING  Goal: Discharge to home or other facility with appropriate resources  Description: INTERVENTIONS:  - Identify barriers to discharge w/patient and caregiver  - Arrange for needed discharge resources and transportation as appropriate  - Identify discharge learning needs (meds, wound care, etc )  - Refer to Case Management Department for coordinating discharge planning if the patient needs post-hospital services based on physician/advanced practitioner order or complex needs related to functional status, cognitive ability, or social support system  Outcome: Progressing     Problem: RESPIRATORY - ADULT  Goal: Achieves optimal ventilation and oxygenation  Description: INTERVENTIONS:  - Assess for changes in respiratory status  - Assess for changes in mentation and behavior  - Position to facilitate oxygenation and minimize respiratory effort  - Oxygen administered by appropriate delivery if ordered  - Initiate smoking cessation education as indicated  - Encourage broncho-pulmonary hygiene including cough, deep breathe, Incentive Spirometry  - Assess the need for suctioning and aspirate as needed  - Assess and instruct to report SOB or any respiratory difficulty  - Respiratory Therapy support as indicated  Outcome: Progressing

## 2021-01-17 NOTE — ASSESSMENT & PLAN NOTE
Lab Results   Component Value Date    HGBA1C 7 1 (H) 08/14/2020       No results for input(s): POCGLU in the last 72 hours  Blood Sugar Average: Last 72 hrs:     Place on [facility] step 2 diet, hold pre-hospital oral antihyperglycemics obtain Accu-Cheks AC and HS with Humalog correction dose a c   And hs

## 2021-01-17 NOTE — ASSESSMENT & PLAN NOTE
Place on [facility] step 2 diet, encourage healthy weight loss and supportive care

## 2021-01-17 NOTE — H&P
H&P- Joshua Galeano 1945, 77 y o  male MRN: [id number]    Unit/Bed#: -01 Encounter: [id number]    Primary Care Provider: Mary Shields DO   Date and time admitted to hospital: 1/16/2021  5:12 PM        * Chronic obstructive pulmonary disease with acute exacerbation ([facility] )  Assessment & Plan  · Placed in observation Medicine  · Give Solu-Medrol 40 mg IV q 8 hours  · Give Xopenex and Atrovent nebulizer treatments t i d   · Placed on O2 and respiratory protocol  · Obtain sputum culture Gram stain  · Give Zithromax 500 mg p o  Daily  · Continue pre-hospital Spiriva 18 mcg inhalation daily  · Give Mucinex 600 mg p o  B i d   · Exacerbation likely secondary to continued smoking  · There was some concern over COVID-19 as patient has significant exposure 10 days ago though has negative COVID test in the ER    Acute on chronic respiratory failure ([facility] )  Assessment & Plan  Secondary to COPD exacerbation being treated as per above    Type 2 diabetes mellitus without complication, without long-term current use of insulin (HCC)  Assessment & Plan  Lab Results   Component Value Date    HGBA1C 7 1 (H) 08/14/2020       No results for input(s): POCGLU in the last 72 hours  Blood Sugar Average: Last 72 hrs:     Place on [facility] step 2 diet, hold pre-hospital oral antihyperglycemics obtain Accu-Cheks AC and HS with Humalog correction dose a c  And hs    Obesity  Assessment & Plan  Place on [facility] step 2 diet, encourage healthy weight loss and supportive care    Essential hypertension  Assessment & Plan  Continue pre-hospital Lopressor 25 mg p o  B i d  And lisinopril 20 mg p o  B i d     Other hyperlipidemia  Assessment & Plan  Substitute Pravachol 80 mg p o   Daily for pre-hospital Crestor    Tobacco abuse  Assessment & Plan  Give nicotine 21 mg transdermal daily      VTE Prophylaxis: Heparin  Code Status:  Level 1  POLST: There is no POLST form on file for this patient (pre-hospital)  Discussion with family:  None present at bedside at time of exam    Anticipated Length of Stay:  Patient will be admitted on an Observation basis with an anticipated length of stay of  < 2 midnights  Justification for Hospital Stay:  COPD exacerbation requiring IV steroids, frequent nebulizer treatments and O2 support    Chief Complaint:   Shortness of breath and cough x2 days    History of Present Illness:    Jennifer Urbano is a 77 y o  male who presents with shortness of breath and cough x2 days  Patient is well-known to us from multiple previous admissions for COPD who presents ER for further evaluation treatment is 2 day history of increasing cough and shortness of breath  Patient reports dyspnea with minimal activity including he states that today while getting dressed he became short of breath  This has been unresponsive to multiple treatments of his rescue inhaler at home  Patient currently follows with Dr Jonathan Holliday from Pulmonary  Patient reports that he has significant COVID exposure approximately 10 days ago  He states that he saw a friend of his who came over and spoke to him through his down car window and neither wearing mask  Patient states that since then his friend has been diagnosed with COVID-19  Patient denies any fever or chills, he states that his cough is productive of clearish whitish sputum which is his baseline  Patient does have a negative COVID test done in the ER earlier today  Review of Systems:  Review of Systems   Constitutional: Positive for activity change  Negative for chills and fever  Respiratory: Positive for cough and shortness of breath  Negative for wheezing  Cardiovascular: Negative for chest pain and palpitations  Gastrointestinal: Negative for diarrhea, nausea and vomiting  Genitourinary: Negative for dysuria, frequency, hematuria and urgency  Neurological: Positive for weakness  Negative for light-headedness and headaches  All other systems reviewed and are negative        Past Medical and Surgical History:   Past Medical History:   Diagnosis Date    BPH (benign prostatic hyperplasia)     45 days radiation treatment    COPD (chronic obstructive pulmonary disease)     Coronary artery disease     CABG x4 in 2017    Diabetes mellitus     History of Arterial Duplex of LE 12/26/2017    Likely occlusion of the left superficial femoral artery  Calcific changes bilaterally  Despite these changes, the ankle-brachial index as a measure of peripheral blood flow only mildly impaired   History of echocardiogram 06/12/2017    EF 40%, mild LVH, mild MR     Hyperlipidemia     Hypertension     NSTEMI (non-ST elevated myocardial infarction)     Prostate cancer     prostate     PVD (peripheral vascular disease)     Tremor        Past Surgical History:   Procedure Laterality Date    CARDIAC CATHETERIZATION  03/08/2017    Significant left main plus triple-vessel CAD   CORONARY ARTERY BYPASS GRAFT  03/08/2017    4V CABG:  LIMA to LAD, VG to RI, SVG to PDA to LVBR RCA   EYE SURGERY      shots in eye once a month @ the [address] [place] [address]         Meds/Allergies:  Prior to Admission medications    Medication Sig Start Date End Date Taking?  Authorizing Provider   albuterol (2 5 mg/3 mL) 0 083 % nebulizer solution Take 1 vial (2 5 mg total) by nebulization every 4 (four) hours as needed for wheezing or shortness of breath 9/3/20   PENG Kerr   albuterol (PROVENTIL HFA,VENTOLIN HFA) 90 mcg/act inhaler Inhale 2 puffs every 6 (six) hours as needed for wheezing or shortness of breath 9/3/20   PENG Kerr   Alogliptin Benzoate 25 MG TABS Take 0 5 tablets (12 5 mg total) by mouth daily Patient states takes 1/2 of a 25 mg tablet every AM  Patient taking differently: Take 25 mg by mouth daily  9/3/20 11/29/20  PENG Kerr   aspirin (ECOTRIN LOW STRENGTH) 81 mg EC tablet Take 1 tablet (81 mg total) by mouth every other day 9/3/20   PENG Hurt Empagliflozin 25 MG TABS Take 0 5 tablets (12 5 mg total) by mouth every morning Take one half tablet every morning 9/3/20   PENG Kerr   fluticasone (FLOVENT HFA) 220 mcg/act inhaler Inhale 1 puff daily Rinse mouth after use  Historical Provider, MD   gabapentin (NEURONTIN) 100 mg capsule Take 2 capsules (200 mg total) by mouth daily at bedtime 9/3/20   PENG Kerr   glimepiride (AMARYL) 1 mg tablet Take 1 tablet (1 mg total) by mouth 2 (two) times a day 9/3/20   PENG Montero   guaiFENesin (MUCINEX) 600 mg 12 hr tablet Take 1 tablet (600 mg total) by mouth 2 (two) times a day 9/3/20   PENG Peters   hydrocortisone 1 % cream Apply topically 2 (two) times a day 9/3/20   PENG Montero   lisinopril (ZESTRIL) 20 mg tablet Take 1 tablet (20 mg total) by mouth 2 (two) times a day 9/3/20 9/3/21  PENG Kerr   metFORMIN (GLUCOPHAGE) 500 mg tablet Take 1 tablet (500 mg total) by mouth daily with dinner 9/3/20   PENG Montero   metoprolol tartrate (LOPRESSOR) 25 mg tablet Take 25 mg by mouth every 12 (twelve) hours    Historical Provider, MD   mometasone (ASMANEX TWISTHALER) 220 MCG/INH inhaler Inhale 2 puffs 2 (two) times a day Rinse mouth after use      Historical Provider, MD   primidone (MYSOLINE) 50 mg tablet Take 100 mg by mouth every 8 (eight) hours    Historical Provider, MD   rosuvastatin (CRESTOR) 20 MG tablet Take 1 tablet (20 mg total) by mouth daily after dinner Take one half tablet daily with dinner  Patient taking differently: Take 10 mg by mouth daily after dinner Take one half tablet daily with dinner  9/3/20   PENG Montero   saxagliptin (ONGLYZA) 5 MG tablet Take 1 tablet (5 mg total) by mouth daily 9/3/20   PENG Montero   tiotropium (SPIRIVA) 18 mcg inhalation capsule Place 1 capsule (18 mcg total) into inhaler and inhale daily 12/2/20   Sebastian Jack MD   tiotropium-olodaterol (STIOLTO RESPIMAT) 2 5-2 5 MCG/ACT inhaler Inhale 2 puffs daily    Historical Provider, MD     I have reviewed home medications with patient personally  Allergies: Allergies   Allergen Reactions    Atorvastatin Myalgia       Social History:  Marital Status: /Civil Union   Occupation:  Retired from [employer]  Patient Pre-hospital Living Situation:  Resides at home with wife, grandson and grandson's dontrelle  Patient Pre-hospital Level of Mobility:  Full with occasional use of a cane  Patient Pre-hospital Diet Restrictions:  None  Substance Use History:   Social History     Substance and Sexual Activity   Alcohol Use Yes    Frequency: Monthly or less     Social History     Tobacco Use   Smoking Status Current Some Day Smoker   Smokeless Tobacco Never Used   Tobacco Comment    only smokes when he drinks beer     Social History     Substance and Sexual Activity   Drug Use Never       Family History:  I have reviewed the patients family history    Physical Exam:   Vitals:   Blood Pressure: 162/81 (01/16/21 2251)  Pulse: 83 (01/16/21 2251)  Temperature: 97 6 °F (36 4 °C) (01/16/21 2251)  Temp Source: Temporal (01/16/21 2251)  Respirations: 22 (01/16/21 2251)  Weight - Scale: 92 3 kg (203 lb 7 8 oz) (01/16/21 2251)  SpO2: 98 % (01/16/21 2251)    Physical Exam  Vitals signs and nursing note reviewed  Constitutional:       General: He is not in acute distress  Appearance: Normal appearance  HENT:      Head: Normocephalic and atraumatic  Right Ear: Tympanic membrane normal       Left Ear: Tympanic membrane normal       Nose: Nose normal       Mouth/Throat:      Mouth: Mucous membranes are moist       Pharynx: No oropharyngeal exudate or posterior oropharyngeal erythema  Eyes:      Extraocular Movements: Extraocular movements intact  Pupils: Pupils are equal, round, and reactive to light  Neck:      Musculoskeletal: Normal range of motion and neck supple     Cardiovascular:      Rate and Rhythm: Normal rate and regular rhythm  Pulses: Normal pulses  Heart sounds: Normal heart sounds  Pulmonary:      Effort: Pulmonary effort is normal  Tachypnea present  No respiratory distress  Breath sounds: Normal breath sounds  Decreased air movement present  Abdominal:      General: Abdomen is flat  Bowel sounds are normal       Palpations: Abdomen is soft  Musculoskeletal: Normal range of motion  Left lower leg: No edema  Skin:     General: Skin is warm and dry  Capillary Refill: Capillary refill takes less than 2 seconds  Neurological:      General: No focal deficit present  Mental Status: He is alert and oriented to person, place, and time  Additional Data:   Lab Results: I have personally reviewed pertinent reports  Results from last 7 days   Lab Units 01/16/21  1818   WBC Thousand/uL 8 10   HEMOGLOBIN g/dL 14 4   HEMATOCRIT % 42 9   PLATELETS Thousands/uL 240   NEUTROS PCT % 76*   LYMPHS PCT % 12*   MONOS PCT % 6   EOS PCT % 5     Results from last 7 days   Lab Units 01/16/21  1818   SODIUM mmol/L 140   POTASSIUM mmol/L 4 2   CHLORIDE mmol/L 104   CO2 mmol/L 28   BUN mg/dL 14   CREATININE mg/dL 0 87   CALCIUM mg/dL 9 1   ALK PHOS U/L 81   ALT U/L 10   AST U/L 13     Results from last 7 days   Lab Units 01/16/21  1915   INR  1 05               Imaging: I have personally reviewed pertinent reports  XR chest 1 view portable    (Results Pending)       EKG, Pathology, and Other Studies Reviewed on Admission:   · EKG: N/A    Epic Records Reviewed: Yes     ** Please Note: This note has been constructed using a voice recognition system   **

## 2021-01-17 NOTE — ASSESSMENT & PLAN NOTE
Lab Results   Component Value Date    HGBA1C 7 1 (H) 08/14/2020       Recent Labs     01/17/21  0044 01/17/21  0641   POCGLU 260* 134       Blood Sugar Average: Last 72 hrs:  (P) 197   Place on [facility] step 2 diet, hold pre-hospital oral antihyperglycemics obtain Accu-Cheks AC and HS with Humalog correction dose a c   And hs

## 2021-01-17 NOTE — ED NOTES
Resting quietly, no respiratory issues with cardiac monitoring in place - patient watching football game on [address] [place] [address]  01/16/21 2050

## 2021-01-17 NOTE — ASSESSMENT & PLAN NOTE
Secondary to COPD exacerbation being treated as per above  Currently patient saturating 90% on 2 L nasal cannula

## 2021-01-17 NOTE — ASSESSMENT & PLAN NOTE
· Continue Solu-Medrol 40 mg IV q 8 hours  · Continue Xopenex and Atrovent nebulizer treatments t i d   · Placed on O2 and respiratory protocol  · Pending sputum culture Gram stain  · Continue Zithromax 500 mg p o   Daily for total of 5 days  · Continue pre-hospital Spiriva 18 mcg inhalation daily  · Continue Mucinex 600 mg p o  B i d   · Exacerbation likely secondary to continued smoking  · There was some concern over COVID-19 as patient has significant exposure 10 days ago though has negative COVID test in the ER

## 2021-01-17 NOTE — NURSING NOTE
Pt awake, alert, and oriented x4  Remains on 3l n/c  Continues to be sob at rest and on exertion  More sob with movement  Lungs clear and diminished b/  Pt states he is feeling somewhat better since his admission  Pt given all ordered inhalers this am, as well as a prn inhaler   made aware of same

## 2021-01-17 NOTE — UTILIZATION REVIEW
Initial Clinical Review    OBSERVATION 1/16/21 @ 2032 - INPATIENT ON 1/17/21 @ 1522    Admission: Date/Time/Statement    Inpatient Admission Orders (From admission, onward)     Ordered        01/17/21 1522  Inpatient Admission  Once                   Orders Placed This Encounter   Procedures    Inpatient Admission     Standing Status:   Standing     Number of Occurrences:   1     Order Specific Question:   Level of Care     Answer:   Med Surg [16]     Order Specific Question:   Estimated length of stay     Answer:   More than 2 Midnights     Order Specific Question:   Certification     Answer:   I certify that inpatient services are medically necessary for this patient for a duration of greater than two midnights  See H&P and MD Progress Notes for additional information about the patient's course of treatment  ED Arrival Information     Expected Arrival Acuity Means of Arrival Escorted By Service Admission Type    - 1/16/2021 17:09 Urgent Wheelchair Self General Medicine Urgent    Arrival Complaint    sob        Chief Complaint   Patient presents with    Shortness of Breath     ONGOING FOR THE PAST SEVERAL DAYS- NEBS NOT HELPING, WAS AROUND FRIEND WHO IS +COVID      Assessment/Plan:  Mr Karina Petty is a 76 yo male who presents to the ED from home with c/o SOB, HERMAN and productive cough (which is his baseline) and weakness x 2 days  He had no improvement with multiple rescue inhaler use at home  He had significant Covid exposure 10 days ago  No fever, chills, negative Covid test   PMH: obesity, HTN, HLD, current tobacco use, type 2 NIDDM, COPD  He is admitted to OBSERVATION status with COPD exacerbation and acute on chronic respiratory failure - IV steroids, Xopenex and Atrovent nebs TID, Oxygen @ 2L NC, sputum cultures, Zithromax po, Mucinex, Sprivia        ED Triage Vitals [01/16/21 1716]   Temperature Pulse Respirations Blood Pressure SpO2   (!) 97 3 °F (36 3 °C) 80 (!) 38 (!) 221/95 (!) 86 %      Temp Source Heart Rate Source Patient Position - Orthostatic VS BP Location FiO2 (%)   Temporal Monitor Sitting Right arm --      Pain Score       No Pain          Wt Readings from Last 1 Encounters:   01/16/21 92 3 kg (203 lb 7 8 oz)     Additional Vital Signs:   01/17/21 0632    80  20      97 %  28  2 L/min  Nasal cannula     01/16/21 2336              28  2 L/min  Nasal cannula     01/16/21 2251  97 6 °F (36 4 °C)  83  22  162/81    98 %  28  2 L/min  Nasal cannula  Lying   01/16/21 2210    90  20  182/82Abnormal      98 %  28  2 L/min  Nasal cannula     BP: just walked to bathroom to have BM at 01/16/21 2210   01/16/21 2100    77  20  179/84Abnormal   120  99 %           01/16/21 2045    77  20      99 %           01/16/21 2030    76  20  177/81Abnormal   117  98 %           01/16/21 2015    81  20  174/80Abnormal     99 %           01/16/21 2000    74  19      98 %           01/16/21 1945    75  20      98 %           01/16/21 1930    72  17      100 %           01/16/21 1915    76  20      98 %           01/16/21 1845    75  24Abnormal   156/79  110  98 %  28  2 L/min  Nasal cannula  Lying   01/16/21 1840                  Nasal cannula     01/16/21 1800    74  25Abnormal   147/74  98  99 %  28  2 L/min  Nasal cannula       Pertinent Labs/Diagnostic Test Results:     1/16 CXR -     1/16 ECG - NSR    Results from last 7 days   Lab Units 01/16/21  1832   SARS-COV-2  Negative     Results from last 7 days   Lab Units 01/17/21  0656 01/16/21  1818   WBC Thousand/uL 7 10 8 10   HEMOGLOBIN g/dL 13 7* 14 4   HEMATOCRIT % 40 6* 42 9   PLATELETS Thousands/uL 240 240   NEUTROS ABS Thousands/µL  --  6 20         Results from last 7 days   Lab Units 01/17/21  0656 01/16/21  1818   SODIUM mmol/L 139 140   POTASSIUM mmol/L 4 6 4 2   CHLORIDE mmol/L 105 104   CO2 mmol/L 26 28   ANION GAP mmol/L 8 8   BUN mg/dL 16 14   CREATININE mg/dL 0 82 0 87   EGFR ml/min/1 73sq m 87 85   CALCIUM mg/dL 8 7 9 1   MAGNESIUM mg/dL  --  2 3     Results from last 7 days   Lab Units 01/16/21  1818   AST U/L 13   ALT U/L 10   ALK PHOS U/L 81   TOTAL PROTEIN g/dL 6 8   ALBUMIN g/dL 4 3   TOTAL BILIRUBIN mg/dL 0 30     Results from last 7 days   Lab Units 01/17/21  0641 01/17/21  0044   POC GLUCOSE mg/dl 134 260*     Results from last 7 days   Lab Units 01/17/21  0656 01/16/21  1818   GLUCOSE RANDOM mg/dL 127* 102*     Results from last 7 days   Lab Units 01/16/21  1818   TROPONIN I ng/mL <0 03     Results from last 7 days   Lab Units 01/16/21  1915   D-DIMER QUANTITATIVE ug/ml FEU 0 37     Results from last 7 days   Lab Units 01/16/21  1915   PROTIME seconds 13 6   INR  1 05   PTT seconds 33     Results from last 7 days   Lab Units 01/16/21  1818   LACTIC ACID mmol/L 0 9     Results from last 7 days   Lab Units 01/16/21  1818   CRP mg/L 21 4*     Results from last 7 days   Lab Units 01/16/21  2208   CLARITY UA  Clear   COLOR UA  Yellow   SPEC GRAV UA  >=1 030*   PH UA  5 0   GLUCOSE UA mg/dl 3+*   KETONES UA mg/dl 15 (1+)*   BLOOD UA  Negative   PROTEIN UA mg/dl Negative   NITRITE UA  Negative   BILIRUBIN UA  Negative   UROBILINOGEN UA E U /dl 0 2   LEUKOCYTES UA  Negative     Results from last 7 days   Lab Units 01/16/21  1832   INFLUENZA A PCR  Negative   INFLUENZA B PCR  Negative   RSV PCR  Negative     ED Treatment:   Medication Administration from 01/16/2021 1709 to 01/16/2021 2256    Date/Time Order Dose Route Action   01/16/2021 1910 sodium chloride 0 9 % bolus 1,000 mL 1,000 mL Intravenous New Bag   01/16/2021 1911 cefTRIAXone (ROCEPHIN) IVPB (premix in dextrose) 1,000 mg 50 mL 1,000 mg Intravenous New Bag   01/16/2021 1809 albuterol inhalation solution 5 mg 5 mg Nebulization Given   01/16/2021 1809 ipratropium (ATROVENT) 0 02 % inhalation solution 0 5 mg 0 5 mg Nebulization Given   01/16/2021 1828 dexamethasone (PF) (DECADRON) injection 10 mg 10 mg Intravenous Given 01/16/2021 1833 magnesium sulfate 2 g/50 mL IVPB (premix) 2 g 2 g Intravenous New Bag        Past Medical History:   Diagnosis Date    BPH (benign prostatic hyperplasia)     45 days radiation treatment    COPD (chronic obstructive pulmonary disease)     Coronary artery disease     CABG x4 in 2017    Diabetes mellitus     History of Arterial Duplex of LE 12/26/2017    Likely occlusion of the left superficial femoral artery  Calcific changes bilaterally  Despite these changes, the ankle-brachial index as a measure of peripheral blood flow only mildly impaired      History of echocardiogram 06/12/2017    EF 40%, mild LVH, mild MR     Hyperlipidemia     Hypertension     NSTEMI (non-ST elevated myocardial infarction)     Prostate cancer     prostate     PVD (peripheral vascular disease)     Tremor      Present on Admission:   Type 2 diabetes mellitus without complication, without long-term current use of insulin ([facility])   Tobacco abuse   Other hyperlipidemia   Obesity   Essential hypertension   Chronic obstructive pulmonary disease with acute exacerbation ([facility])   Acute on chronic respiratory failure ([facility])    Admitting Diagnosis: SOB (shortness of breath) [R06 02]  COPD exacerbation ([facility]) [J44 1]     Age/Sex: 77 y o  male     Admission Orders:    Scheduled Medications:    aspirin, 81 mg, Oral, Every Other Day  azithromycin, 500 mg, Oral, Q24H  Empagliflozin, 12 5 mg, Oral, QAM  fluticasone, 1 puff, Inhalation, Daily  gabapentin, 200 mg, Oral, HS  guaiFENesin, 600 mg, Oral, BID  heparin (porcine), 5,000 Units, Subcutaneous, Q12H ENDER  insulin lispro, 1-6 Units, Subcutaneous, TID AC  insulin lispro, 1-6 Units, Subcutaneous, HS  lisinopril, 20 mg, Oral, BID  methylPREDNISolone sodium succinate, 40 mg, Intravenous, Q8H  metoprolol tartrate, 25 mg, Oral, Q12H ENDER  nicotine, 1 patch, Transdermal, Daily  pravastatin, 80 mg, Oral, Daily With Dinner  primidone, 100 mg, Oral, Q8H ENDER  tiotropium, 18 mcg, Inhalation, Daily      Continuous IV Infusions:     PRN Meds:  acetaminophen, 650 mg, Oral, Q6H PRN  albuterol, 2 5 mg, Nebulization, Q4H PRN  ondansetron, 4 mg, Intravenous, Q6H PRN    OOB as dorina   POC GLUCOSE AC/HS WITH SSI COVERAGE   Blood and sputum cultures  ADA diet   Oxygen @ 2 L NC  IP CONSULT TO PULMONOLOGY    Network Utilization Review Department  ATTENTION: Please call with any questions or concerns to [phone] and carefully listen to the prompts so that you are directed to the right person  All voicemails are confidential   Anayeli Muss all requests for admission clinical reviews, approved or denied determinations and any other requests to dedicated fax number below belonging to the campus where the patient is receiving treatment   List of dedicated fax numbers for the Facilities:  [address] DENIALS (Administrative/Medical Necessity) [phone]   [address] (Maternity/NICU/Pediatrics) [phone]   [address] [place] [address] Dr Louis Crandall 8635 ([facility] "[facility]) [address] [place] [address] Clarence [address] [place], [place] [address] [phone]

## 2021-01-17 NOTE — ED NOTES
Patent was allowed oob to go to bathroom to void and have BM - patient removed oxygen and experiences HERMAN - returned to bed - oxygen applied - oxygen sat 96%     Rachel Dong RN  01/16/21 5471

## 2021-01-18 LAB
ANION GAP SERPL CALCULATED.3IONS-SCNC: 7 MMOL/L (ref 4–13)
ATRIAL RATE: 81 BPM
BUN SERPL-MCNC: 26 MG/DL (ref 7–25)
CALCIUM SERPL-MCNC: 8.7 MG/DL (ref 8.6–10.5)
CHLORIDE SERPL-SCNC: 103 MMOL/L (ref 98–107)
CO2 SERPL-SCNC: 27 MMOL/L (ref 21–31)
CREAT SERPL-MCNC: 0.88 MG/DL (ref 0.7–1.3)
GFR SERPL CREATININE-BSD FRML MDRD: 85 ML/MIN/1.73SQ M
GLUCOSE SERPL-MCNC: 133 MG/DL (ref 65–140)
GLUCOSE SERPL-MCNC: 142 MG/DL (ref 65–99)
GLUCOSE SERPL-MCNC: 188 MG/DL (ref 65–140)
GLUCOSE SERPL-MCNC: 200 MG/DL (ref 65–140)
GLUCOSE SERPL-MCNC: 306 MG/DL (ref 65–140)
P AXIS: 71 DEGREES
POTASSIUM SERPL-SCNC: 4.4 MMOL/L (ref 3.5–5.5)
PR INTERVAL: 178 MS
PROCALCITONIN SERPL-MCNC: 0.08 NG/ML
QRS AXIS: 73 DEGREES
QRSD INTERVAL: 98 MS
QT INTERVAL: 426 MS
QTC INTERVAL: 494 MS
SODIUM SERPL-SCNC: 137 MMOL/L (ref 134–143)
T WAVE AXIS: 42 DEGREES
VENTRICULAR RATE: 81 BPM

## 2021-01-18 PROCEDURE — 80048 BASIC METABOLIC PNL TOTAL CA: CPT | Performed by: FAMILY MEDICINE

## 2021-01-18 PROCEDURE — 84145 PROCALCITONIN (PCT): CPT | Performed by: PHYSICIAN ASSISTANT

## 2021-01-18 PROCEDURE — 99232 SBSQ HOSP IP/OBS MODERATE 35: CPT | Performed by: INTERNAL MEDICINE

## 2021-01-18 PROCEDURE — 94760 N-INVAS EAR/PLS OXIMETRY 1: CPT

## 2021-01-18 PROCEDURE — 93010 ELECTROCARDIOGRAM REPORT: CPT | Performed by: INTERNAL MEDICINE

## 2021-01-18 PROCEDURE — 82948 REAGENT STRIP/BLOOD GLUCOSE: CPT

## 2021-01-18 PROCEDURE — 94640 AIRWAY INHALATION TREATMENT: CPT

## 2021-01-18 PROCEDURE — 94761 N-INVAS EAR/PLS OXIMETRY MLT: CPT

## 2021-01-18 PROCEDURE — 99222 1ST HOSP IP/OBS MODERATE 55: CPT | Performed by: INTERNAL MEDICINE

## 2021-01-18 RX ORDER — LEVALBUTEROL INHALATION SOLUTION 0.63 MG/3ML
0.63 SOLUTION RESPIRATORY (INHALATION) EVERY 6 HOURS
Status: DISCONTINUED | OUTPATIENT
Start: 2021-01-18 | End: 2021-01-18

## 2021-01-18 RX ORDER — FUROSEMIDE 10 MG/ML
40 INJECTION INTRAMUSCULAR; INTRAVENOUS ONCE
Status: COMPLETED | OUTPATIENT
Start: 2021-01-18 | End: 2021-01-18

## 2021-01-18 RX ORDER — FLUTICASONE FUROATE AND VILANTEROL 100; 25 UG/1; UG/1
1 POWDER RESPIRATORY (INHALATION) DAILY
Status: DISCONTINUED | OUTPATIENT
Start: 2021-01-18 | End: 2021-01-19 | Stop reason: HOSPADM

## 2021-01-18 RX ORDER — LEVALBUTEROL INHALATION SOLUTION 0.63 MG/3ML
0.63 SOLUTION RESPIRATORY (INHALATION)
Status: DISCONTINUED | OUTPATIENT
Start: 2021-01-19 | End: 2021-01-19 | Stop reason: HOSPADM

## 2021-01-18 RX ADMIN — PRAVASTATIN SODIUM 80 MG: 40 TABLET ORAL at 16:03

## 2021-01-18 RX ADMIN — PRIMIDONE 100 MG: 50 TABLET ORAL at 15:21

## 2021-01-18 RX ADMIN — LEVALBUTEROL HYDROCHLORIDE 0.63 MG: 0.63 SOLUTION RESPIRATORY (INHALATION) at 15:29

## 2021-01-18 RX ADMIN — INSULIN LISPRO 4 UNITS: 100 INJECTION, SOLUTION INTRAVENOUS; SUBCUTANEOUS at 22:12

## 2021-01-18 RX ADMIN — METHYLPREDNISOLONE SODIUM SUCCINATE 40 MG: 40 INJECTION, POWDER, FOR SOLUTION INTRAMUSCULAR; INTRAVENOUS at 15:21

## 2021-01-18 RX ADMIN — INSULIN LISPRO 2 UNITS: 100 INJECTION, SOLUTION INTRAVENOUS; SUBCUTANEOUS at 16:02

## 2021-01-18 RX ADMIN — METOPROLOL TARTRATE 25 MG: 25 TABLET, FILM COATED ORAL at 09:17

## 2021-01-18 RX ADMIN — METHYLPREDNISOLONE SODIUM SUCCINATE 40 MG: 40 INJECTION, POWDER, FOR SOLUTION INTRAMUSCULAR; INTRAVENOUS at 09:17

## 2021-01-18 RX ADMIN — METOPROLOL TARTRATE 25 MG: 25 TABLET, FILM COATED ORAL at 22:12

## 2021-01-18 RX ADMIN — AZITHROMYCIN MONOHYDRATE 500 MG: 250 TABLET ORAL at 22:28

## 2021-01-18 RX ADMIN — TIOTROPIUM BROMIDE 18 MCG: 18 CAPSULE ORAL; RESPIRATORY (INHALATION) at 09:55

## 2021-01-18 RX ADMIN — IPRATROPIUM BROMIDE 0.5 MG: 0.5 SOLUTION RESPIRATORY (INHALATION) at 11:22

## 2021-01-18 RX ADMIN — HEPARIN SODIUM 5000 UNITS: 5000 INJECTION INTRAVENOUS; SUBCUTANEOUS at 09:17

## 2021-01-18 RX ADMIN — GABAPENTIN 200 MG: 100 CAPSULE ORAL at 22:12

## 2021-01-18 RX ADMIN — GUAIFENESIN 600 MG: 600 TABLET, EXTENDED RELEASE ORAL at 09:17

## 2021-01-18 RX ADMIN — PRIMIDONE 100 MG: 50 TABLET ORAL at 05:40

## 2021-01-18 RX ADMIN — METHYLPREDNISOLONE SODIUM SUCCINATE 40 MG: 40 INJECTION, POWDER, FOR SOLUTION INTRAMUSCULAR; INTRAVENOUS at 22:28

## 2021-01-18 RX ADMIN — INSULIN LISPRO 1 UNITS: 100 INJECTION, SOLUTION INTRAVENOUS; SUBCUTANEOUS at 11:52

## 2021-01-18 RX ADMIN — PRIMIDONE 100 MG: 50 TABLET ORAL at 22:11

## 2021-01-18 RX ADMIN — FLUTICASONE PROPIONATE 1 PUFF: 220 AEROSOL, METERED RESPIRATORY (INHALATION) at 09:19

## 2021-01-18 RX ADMIN — GUAIFENESIN 600 MG: 600 TABLET, EXTENDED RELEASE ORAL at 17:03

## 2021-01-18 RX ADMIN — LEVALBUTEROL HYDROCHLORIDE 0.63 MG: 0.63 SOLUTION RESPIRATORY (INHALATION) at 11:22

## 2021-01-18 RX ADMIN — HEPARIN SODIUM 5000 UNITS: 5000 INJECTION INTRAVENOUS; SUBCUTANEOUS at 22:12

## 2021-01-18 RX ADMIN — FUROSEMIDE 40 MG: 10 INJECTION, SOLUTION INTRAMUSCULAR; INTRAVENOUS at 11:51

## 2021-01-18 RX ADMIN — LISINOPRIL 20 MG: 20 TABLET ORAL at 17:03

## 2021-01-18 RX ADMIN — LISINOPRIL 20 MG: 20 TABLET ORAL at 09:17

## 2021-01-18 RX ADMIN — FLUTICASONE FUROATE AND VILANTEROL TRIFENATATE 1 PUFF: 100; 25 POWDER RESPIRATORY (INHALATION) at 12:09

## 2021-01-18 RX ADMIN — IPRATROPIUM BROMIDE 0.5 MG: 0.5 SOLUTION RESPIRATORY (INHALATION) at 15:29

## 2021-01-18 NOTE — PLAN OF CARE
Problem: Potential for Falls  Goal: Patient will remain free of falls  Description: INTERVENTIONS:  - Assess patient frequently for physical needs  -  Identify cognitive and physical deficits and behaviors that affect risk of falls    -  [place] fall precautions as indicated by assessment   - Educate patient/family on patient safety including physical limitations  - Instruct patient to call for assistance with activity based on assessment  - Modify environment to reduce risk of injury  - Consider OT/PT consult to assist with strengthening/mobility  Outcome: Progressing     Problem: DISCHARGE PLANNING  Goal: Discharge to home or other facility with appropriate resources  Description: INTERVENTIONS:  - Identify barriers to discharge w/patient and caregiver  - Arrange for needed discharge resources and transportation as appropriate  - Identify discharge learning needs (meds, wound care, etc )  - Refer to Case Management Department for coordinating discharge planning if the patient needs post-hospital services based on physician/advanced practitioner order or complex needs related to functional status, cognitive ability, or social support system  Outcome: Progressing     Problem: RESPIRATORY - ADULT  Goal: Achieves optimal ventilation and oxygenation  Description: INTERVENTIONS:  - Assess for changes in respiratory status  - Assess for changes in mentation and behavior  - Position to facilitate oxygenation and minimize respiratory effort  - Oxygen administered by appropriate delivery if ordered  - Initiate smoking cessation education as indicated  - Encourage broncho-pulmonary hygiene including cough, deep breathe, Incentive Spirometry  - Assess the need for suctioning and aspirate as needed  - Assess and instruct to report SOB or any respiratory difficulty  - Respiratory Therapy support as indicated  Outcome: Progressing

## 2021-01-18 NOTE — ASSESSMENT & PLAN NOTE
· Likely multifactorial with COPD exacerbation, possible component of fluid overload, and possibly undiagnosed sleep apnea  · Desaturation study performed today, patient does not qualify for home O2

## 2021-01-18 NOTE — CASE MANAGEMENT
LOS: 1, URR: 34 and Yellow, pt is not a re-admit pr Medicare bundle  CM met with pt at bedside ands explained role  Pt reports he lives with his spouse Mary Jane Mahan in a 2 story house; 4 [address], 14 steps inside  Pt does not use an assistive device to ambulate  He had RW, Cane, nebulizer, inhaler and shower chair at home  Pt is currently on oxygen and does not have same at home  Pt reports he is completely independent with ADLs  He continues to drive  Pt PCP is Cathleen Martinez  He uses [facility] for medications and denies any barriers to obtaining them  Pt reports he has had [facility] in the past  Pt has history of STR on ARU 2017  No history of MH or D&A treatment  Spouse will transport pt home on discharge  CM to follow  Patient/caregiver received discharge checklist   Content reviewed  Patient/caregiver encouraged to participate in discharge plan of care prior to discharge home  CM reviewed d/c planning process including the following: identifying help at home, patient preference for d/c planning needs, availability of treatment team to discuss questions or concerns patient and/or family may have regarding understanding medications and recognizing signs and symptoms once discharged  CM also encouraged patient to follow up with all recommended appointments after discharge  Patient advised of importance for patient and family to participate in managing patients medical well being

## 2021-01-18 NOTE — CONSULTS
Pulmonary Consultation   Tyree Nieves 77 y o  male MRN: [id number]  Unit/Bed#: -01 Encounter: [id number]      Reason for consultation: Acute respiratory insufficiency    Requesting physician: Norberto Nelson PA-C    Impressions:   1  Acute hypoxic respiratory failure, on 2L NC  2  Acute on chronic systolic and diastolic heart failure (I do not feel the BNP is very accurate given obesity)  3  Acute exacerbation of Very severe COPD (FEV1 26%) with frequent exacerbation history on chronic azithromycin therapy   4  Obesity and likely BERONICA    Recommendations:  1  Due to his work of breathing, I recommend the use of BIPAP PRN and qHS to help unload respiratory muscles just during acute illness  2  I do believe he may benefit from a sleep study as an outpatient and a device at night to help with possible BERONICA  3  For now I would continue Solumedrol 40mg IV q8 given work of breathing  Wean slowly  4  D/C Spiriva and flovent  5  Start Xopenex and atrovent every 6 hrs  6  Start Breo Daily   7  I would start him back on Zithromax 250mg M-W-F when completed 500mg x3 days  7  I would also consider 1 dose of Lasix to see if that helps his respiratory status as well  8   Will need outpatient sleep study  9   Will follow with Dr Mustapha Ayers as an outpatient  History of Present Illness   HPI:  Tyree Nieves is a 77 y o  male with frequent exacerbation history of COPD who comes in with worsening dyspnea over 1 week  He does have a productive cough which is his baseline and denied fever, chills night sweats or fatigue  He had noted progressive dyspnea and was using his rescue inhaler several times a day with little help  He tried his best to avoid the hospital due to [address] exposures  He actually did not have any COVID19 exposures to his knowledge  He then developed significant wheezing and work of breathing so he came to the hospital   He denies muscle aches fatigue    He was admitted and started on steroids for acute exacerbation of COPD     He is normally seen by Dr Lyric Hanson  He was managed on stiolto and asmanex as an outpatient  Due to frequent exacerbation history he was on Zithromax M-W-F to prevent further exacerbation  ROS:   Review of Systems   Constitutional: Negative  HENT: Negative  Eyes: Negative  Respiratory: Positive for cough, chest tightness, shortness of breath and wheezing  Cardiovascular: Positive for leg swelling  Gastrointestinal: Negative  Endocrine: Negative  Genitourinary: Negative  Musculoskeletal: Negative  Skin: Negative  Allergic/Immunologic: Negative  Neurological: Negative  Hematological: Negative  Psychiatric/Behavioral: Negative  Historical Information   Past Medical History:   Diagnosis Date    BPH (benign prostatic hyperplasia)     45 days radiation treatment    COPD (chronic obstructive pulmonary disease)     Coronary artery disease     CABG x4 in 2017    Diabetes mellitus     History of Arterial Duplex of LE 12/26/2017    Likely occlusion of the left superficial femoral artery  Calcific changes bilaterally  Despite these changes, the ankle-brachial index as a measure of peripheral blood flow only mildly impaired   History of echocardiogram 06/12/2017    EF 40%, mild LVH, mild MR     Hyperlipidemia     Hypertension     NSTEMI (non-ST elevated myocardial infarction)     Prostate cancer     prostate     PVD (peripheral vascular disease)     Tremor      Past Surgical History:   Procedure Laterality Date    CARDIAC CATHETERIZATION  03/08/2017    Significant left main plus triple-vessel CAD   CORONARY ARTERY BYPASS GRAFT  03/08/2017    4V CABG:  LIMA to LAD, VG to RI, SVG to PDA to LVBR RCA      EYE SURGERY      [facility] in [facility] a [facility]    PROSTATE BIOPSY       Family History   Problem Relation Age of Onset    Cancer Father     Heart disease Brother        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, 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     Social History     Socioeconomic History    Marital status: /Civil Union     Spouse name: None    Number of children: None    Years of education: None    Highest education level: None   Occupational History    None   Social Needs    Financial resource strain: Not hard at all   [place] insecurity     Worry: Never true     Inability: Never true    Transportation needs     Medical: No     Non-medical: No   Tobacco Use    Smoking status: Current Some Day Smoker    Smokeless tobacco: Never Used    Tobacco comment: only smokes when he drinks beer   Substance and Sexual Activity    Alcohol use: Yes     Frequency: Monthly or less    Drug use: Never    Sexual activity: None   Lifestyle    Physical activity     Days per week: None     Minutes per session: None    Stress: None   Relationships    Social connections     Talks on phone: None     Gets together: None     Attends Zoroastrianism service: None     Active member of club or organization: None     Attends meetings of clubs or organizations: None     Relationship status: None    Intimate partner violence     Fear of current or ex partner: None     Emotionally abused: None     Physically abused: None     Forced sexual activity: None   Other Topics Concern    None   Social History Narrative    ** Merged History Encounter **              Meds/Allergies   Current Facility-Administered Medications   Medication Dose Route Frequency    acetaminophen (TYLENOL) tablet 650 mg  650 mg Oral Q6H PRN    albuterol (PROVENTIL HFA,VENTOLIN HFA) inhaler 2 puff  2 puff Inhalation Q4H PRN    aspirin (ECOTRIN LOW STRENGTH) EC tablet 81 mg  81 mg Oral Every Other Day    azithromycin (ZITHROMAX) tablet 500 mg  500 mg Oral Q24H    Empagliflozin TABS 12 5 mg  12 5 mg Oral QAM    fluticasone (FLOVENT HFA) 220 mcg/act inhaler 1 puff  1 puff Inhalation Daily    gabapentin (NEURONTIN) capsule 200 mg  200 mg Oral HS    guaiFENesin (MUCINEX) 12 hr tablet 600 mg  600 mg Oral BID    heparin (porcine) subcutaneous injection 5,000 Units  5,000 Units Subcutaneous Q12H [address]    insulin lispro (HumaLOG) 100 units/mL subcutaneous injection 1-6 Units  1-6 Units Subcutaneous TID AC    insulin lispro (HumaLOG) 100 units/mL subcutaneous injection 1-6 Units  1-6 Units Subcutaneous HS    lisinopril (ZESTRIL) tablet 20 mg  20 mg Oral BID    methylPREDNISolone sodium succinate (Solu-MEDROL) injection 40 mg  40 mg Intravenous Q8H    metoprolol tartrate (LOPRESSOR) tablet 25 mg  25 mg Oral Q12H ENDER    nicotine (NICODERM CQ) 21 mg/24 hr TD 24 hr patch 1 patch  1 patch Transdermal Daily    ondansetron (ZOFRAN) injection 4 mg  4 mg Intravenous Q6H PRN    pravastatin (PRAVACHOL) tablet 80 mg  80 mg Oral Daily With Dinner    primidone (MYSOLINE) tablet 100 mg  100 mg Oral Q8H [address]    sodium chloride (PF) 0 9 % injection 3 mL  3 mL Intravenous Q1H PRN    tiotropium (SPIRIVA) capsule for inhaler 18 mcg  18 mcg Inhalation Daily     Medications Prior to Admission   Medication    albuterol (PROVENTIL HFA,VENTOLIN HFA) 90 mcg/act inhaler    gabapentin (NEURONTIN) 100 mg capsule    metoprolol tartrate (LOPRESSOR) 25 mg tablet    primidone (MYSOLINE) 50 mg tablet    albuterol (2 5 mg/3 mL) 0 083 % nebulizer solution    Alogliptin Benzoate 25 MG TABS    aspirin (ECOTRIN LOW STRENGTH) 81 mg EC tablet    Empagliflozin 25 MG TABS    fluticasone (FLOVENT HFA) 220 mcg/act inhaler    glimepiride (AMARYL) 1 mg tablet    guaiFENesin (MUCINEX) 600 mg 12 hr tablet    hydrocortisone 1 % cream    lisinopril (ZESTRIL) 20 mg tablet    metFORMIN (GLUCOPHAGE) 500 mg tablet    mometasone (ASMANEX TWISTHALER) 220 MCG/INH inhaler    rosuvastatin (CRESTOR) 20 MG tablet    saxagliptin (ONGLYZA) 5 MG tablet    tiotropium (SPIRIVA) 18 mcg inhalation capsule    tiotropium-olodaterol (STIOLTO RESPIMAT) 2 5-2 5 MCG/ACT inhaler     Allergies   Allergen Reactions  Atorvastatin Myalgia       Home medications:  Prior to Admission medications    Medication Sig Start Date End Date Taking? Authorizing Provider   albuterol (PROVENTIL HFA,VENTOLIN HFA) 90 mcg/act inhaler Inhale 2 puffs every 6 (six) hours as needed for wheezing or shortness of breath 9/3/20  Yes PENG Kerr   gabapentin (NEURONTIN) 100 mg capsule Take 2 capsules (200 mg total) by mouth daily at bedtime 9/3/20  Yes PENG Kerr   metoprolol tartrate (LOPRESSOR) 25 mg tablet Take 25 mg by mouth every 12 (twelve) hours   Yes Historical Provider, MD   primidone (MYSOLINE) 50 mg tablet Take 100 mg by mouth every 8 (eight) hours   Yes Historical Provider, MD   albuterol (2 5 mg/3 mL) 0 083 % nebulizer solution Take 1 vial (2 5 mg total) by nebulization every 4 (four) hours as needed for wheezing or shortness of breath 9/3/20   PENG Kerr   Alogliptin Benzoate 25 MG TABS Take 0 5 tablets (12 5 mg total) by mouth daily Patient states takes 1/2 of a 25 mg tablet every AM  Patient taking differently: Take 25 mg by mouth daily  9/3/20 11/29/20  PENG Kerr   aspirin (ECOTRIN LOW STRENGTH) 81 mg EC tablet Take 1 tablet (81 mg total) by mouth every other day 9/3/20   PENG Schrader   Empagliflozin 25 MG TABS Take 0 5 tablets (12 5 mg total) by mouth every morning Take one half tablet every morning 9/3/20   PENG Kerr   fluticasone (FLOVENT HFA) 220 mcg/act inhaler Inhale 1 puff daily Rinse mouth after use      Historical Provider, MD   glimepiride (AMARYL) 1 mg tablet Take 1 tablet (1 mg total) by mouth 2 (two) times a day 9/3/20   PENG Frank   guaiFENesin (MUCINEX) 600 mg 12 hr tablet Take 1 tablet (600 mg total) by mouth 2 (two) times a day 9/3/20   PENG Frank   hydrocortisone 1 % cream Apply topically 2 (two) times a day 9/3/20   PENG Kerr   lisinopril (ZESTRIL) 20 mg tablet Take 1 tablet (20 mg total) by mouth 2 (two) times a day 9/3/20 9/3/21  PENG Kerr   metFORMIN (GLUCOPHAGE) 500 mg tablet Take 1 tablet (500 mg total) by mouth daily with dinner 9/3/20   Rhett Rather PENG Huffman   [facility] TW[facility]) 220 MCG/INH inhaler Inhale 2 puffs 2 (two) times a day Rinse mouth after use  Historical Provider, MD   rosuvastatin (CRESTOR) 20 MG tablet Take 1 tablet (20 mg total) by mouth daily after dinner Take one half tablet daily with dinner  Patient taking differently: Take 10 mg by mouth daily after dinner Take one half tablet daily with dinner  9/3/20   Rhett Rather A PENG Hogan   saxagliptin (ONGLYZA) 5 MG tablet Take 1 tablet (5 mg total) by mouth daily 9/3/20   Rhett Rather A PENG Hogan   tiotropium (SPIRIVA) 18 mcg inhalation capsule Place 1 capsule (18 mcg total) into inhaler and inhale daily 20   Tone Leal MD   tiotropium-olodaterol (STIOLTO RESPIMAT) 2 5-2 5 MCG/ACT inhaler Inhale 2 puffs daily    Historical Provider, MD       Vitals: Tmax Temp (24hrs), Av 4 °F (36 3 °C), Min:96 °F (35 6 °C), Max:98 9 °F (37 2 °C)    Blood pressure 113/82, pulse 66, temperature (!) 96 °F (35 6 °C), temperature source Tympanic, resp  rate 18, height 5' 5" (1 651 m), weight 92 4 kg (203 lb 11 3 oz), SpO2 98 % , 2L NC, Body mass index is 33 9 kg/m²        Intake/Output Summary (Last 24 hours) at 2021 0959  Last data filed at 2021 0835  Gross per 24 hour   Intake 720 ml   Output    Net 720 ml       Physical Exam  General: Pleasant, obese, discheveled, Awake alert and oriented x 3, conversant but with conversational dyspnea, NAD, normal affect  HEENT:  PERRL, Sclera noninjected, nonicteric OU, Nares patent, no nasal flaring, no nasal drainage, Mucous membranes, moist, no oral lesions, normal dentition    NECK: Trachea midline, no accessory muscle use, no stridor, no cervical or supraclavicular adenopathy, JVP not elevated  CARDIAC: Reg, single s1/S2, no m/r/g  PULM: Decreased breath sounds, expiratory wheezing, mild crackles in lung bases  CHEST: No gross deformities, equal chest expansion on inspiration bilaterally  ABD: Normoactive bowel sounds, soft nontender, nondistended, no rebound, no rigidity, no guarding  EXT: No cyanosis, no clubbing, no edema, normal capillary refill  SKIN:  No rashes, no lesions  NEURO: no focal neurologic deficits, AAOx3, moving all extremities appropriately    Labs: I have personally reviewed pertinent lab results    Results from last 7 days   Lab Units 01/17/21  0656 01/16/21  1818   WBC Thousand/uL 7 10 8 10   HEMOGLOBIN g/dL 13 7* 14 4   HEMATOCRIT % 40 6* 42 9   PLATELETS Thousands/uL 240 240   NEUTROS PCT %  --  76*   MONOS PCT %  --  6      Results from last 7 days   Lab Units 01/18/21  0627 01/17/21  0656 01/16/21  1818   POTASSIUM mmol/L 4 4 4 6 4 2   CHLORIDE mmol/L 103 105 104   CO2 mmol/L 27 26 28   BUN mg/dL 26* 16 14   CREATININE mg/dL 0 88 0 82 0 87   CALCIUM mg/dL 8 7 8 7 9 1   ALK PHOS U/L  --   --  81   ALT U/L  --   --  10   AST U/L  --   --  13     Results from last 7 days   Lab Units 01/16/21  1818   MAGNESIUM mg/dL 2 3          Results from last 7 days   Lab Units 01/16/21  1915   INR  1 05   PTT seconds 33     Results from last 7 days   Lab Units 01/16/21  1818   LACTIC ACID mmol/L 0 9     0   Lab Value Date/Time    TROPONINI <0 03 01/16/2021 1818    TROPONINI <0 03 11/29/2020 2305    TROPONINI <0 03 11/12/2020 2044    TROPONINI <0 03 08/13/2020 0601    TROPONINI <0 03 08/13/2020 0224    TROPONINI <0 03 07/22/2020 0351    TROPONINI <0 03 06/30/2020 2107    TROPONINI <0 03 05/14/2020 0747    TROPONINI <0 03 03/12/2020 0115    TROPONINI <0 03 02/19/2020 0320    TROPONINI <0 03 12/28/2019 0234    TROPONINI <0 03 12/27/2019 2124    TROPONINI <0 03 12/27/2019 1840    TROPONINI <0 03 11/21/2019 2041    TROPONINI 0 04 (H) 11/05/2019 1000    TROPONINI <0 03 09/20/2019 0134    TROPONINI <0 03 09/02/2019 1053    TROPONINI <0 03 07/05/2019 1756    TROPONINI <0 03 07/05/2019 1624    TROPONINI <0 03 07/05/2019 1126    TROPONINI <0 03 05/15/2019 1211    TROPONINI <0 03 02/23/2019 0151    TROPONINI <0 03 02/22/2019 2319    TROPONINI <0 03 02/22/2019 2006    TROPONINI <0 03 06/24/2018 0631       Micro:  Lab Results   Component Value Date    BLOODCX Received in Microbiology Lab  Culture in Progress  01/16/2021    BLOODCX Received in Microbiology Lab  Culture in Progress  01/16/2021    BLOODCX No Growth After 5 Days  11/29/2020    SPUTUMCULTUR 2+ Growth of  05/15/2020    SPUTUMCULTUR 4+ Growth of  02/20/2020    SPUTUMCULTUR  02/20/2020     Commensal respiratory may only; No significant growth of Staph aureus/MRSA or Pseudomonas aeruginosa  Imaging and other studies: I have personally reviewed pertinent reports  CXR - no acute cardiopulmonary disease    Pulmonary function testing: FEV1 was 26% of normal      EKG, Pathology, and Other Studies: I have personally reviewed pertinent reports  Echo - SUMMARY  LEFT VENTRICLE:  The ventricle was mildly dilated  Systolic function was moderately reduced  Ejection fraction was estimated to be 30 %  There was moderate diffuse hypokinesis  Features were consistent with a pseudonormal left ventricular filling pattern, with concomitant abnormal relaxation and increased filling pressure (grade 2 diastolic dysfunction)  Doppler parameters were consistent with high ventricular filling pressure  LEFT ATRIUM:  The atrium was mildly dilated   MITRAL VALVE:  There was mild regurgitation        Code Status: Level 1 - Full Code      DO Fabiana Jacobo [facility]

## 2021-01-18 NOTE — RESPIRATORY THERAPY NOTE
Home Oxygen Qualifying Test       Patient name: Charanjit Schmitz        : 1945   Date of Test:  2021  Diagnosis:      Home Oxygen Test:    **Medicare Guidelines require item(s) 1-5 on all ambulatory patients or 1 and 2 on non-ambulatory patients  1   Baseline SPO2 on Room Air at rest  95 %  2   SPO2 during exercise on Room Air 98  %  During exercise monitor SpO2  If SPO2 increases >=89% with ambulation do not add supplemental             oxygen  If <= 88% on room air add O2 via NC and titrate patient  Patient must be ambulated with O2 and titrated to > 88% with exertion  No Oxygen applied    3  SPO2 on Oxygen at rest  %  lpm     4   SPO2 during exercise on Oxygen a liter flow of  lpm     5   Exercise performed:          walking, duration 10 (min), distance 76 (feet)          []  Supplemental Home Oxygen is indicated  [x]  Client does not qualify for home oxygen  Respiratory Additional Notes- Patient tolerated the walk well  He stated he felt a little   Winded towards the end of walk  Patient remains on room air  Dr Estelita Castellano and Hiwot Reddy from   Case management aware       Yovana Patiño, RT

## 2021-01-18 NOTE — ASSESSMENT & PLAN NOTE
· Patient still with shortness of breath worse with exertion  · Will continue IV steroids  · Pulmonology input appreciated  · Patient started on Breo  · Will continue Xopenex/Atrovent  · Desaturation study done today, patient does not qualify for home oxygen  · Continued Zithromax to complete 5 day course  · COVID testing negative

## 2021-01-18 NOTE — PROGRESS NOTES
Progress Note - Negar Carmichael 1945, 77 y o  male MRN: [id number]    Unit/Bed#: -01 Encounter: [id number]    Primary Care Provider: Obdulia Beck DO   Date and time admitted to hospital: 1/16/2021  5:12 PM        * [facility]  Assessment & Plan  · Patient still with shortness of breath worse with exertion  · Will continue IV steroids  · Pulmonology input appreciated  · Patient started on Breo  · Will continue Xopenex/Atrovent  · Desaturation study done today, patient does not qualify for home oxygen  · Continued Zithromax to complete 5 day course  · COVID testing negative    Acute on chronic respiratory failure ([facility] )  Assessment & Plan  · Likely multifactorial with COPD exacerbation, possible component of fluid overload, and possibly undiagnosed sleep apnea  · Desaturation study performed today, patient does not qualify for home O2    Obesity  Assessment & Plan  Counseled on diet/exercise    Essential hypertension  Assessment & Plan  · BP stable  · Continue Lopressor and lisinopril    Tobacco abuse  Assessment & Plan  Continue nicotine patch    Type 2 diabetes mellitus without complication, without long-term current use of insulin ([facility])  Assessment & Plan  · Insulin sliding scale  · Diabetic diet      VTE Prophylaxis:  Heparin    Patient Centered Rounds: I have performed bedside rounds with nursing staff today  Discussions with Specialists or Other Care Team Provider: yes  Education and Discussions with Family / Patient:  Updated patient regarding plan of care    Current Length of Stay: 1 day(s)    Current Patient Status: Inpatient   Certification Statement: The patient will continue to require additional inpatient hospital stay due to COPD exacerbation    Discharge Plan:  Hopeful discharge planning in the next 24-48 hours    Code Status: Level 1 - Full Code    Subjective:   No overnight events noted    Patient had desaturation study, did not qualify for home O2  States he is feeling slightly better than admission  Still with shortness of breath and cough    Objective:     Vitals:   Temp (24hrs), Av 4 °F (36 3 °C), Min:96 °F (35 6 °C), Max:98 9 °F (37 2 °C)    Temp:  [96 °F (35 6 °C)-98 9 °F (37 2 °C)] 96 °F (35 6 °C)  HR:  [66-80] 76  Resp:  [18-22] 18  BP: (113-165)/(64-82) 148/64  SpO2:  [91 %-98 %] 93 %  Body mass index is 33 9 kg/m²  Input and Output Summary (last 24 hours): Intake/Output Summary (Last 24 hours) at 2021 1405  Last data filed at 2021 1233  Gross per 24 hour   Intake 960 ml   Output    Net 960 ml       Physical Exam:   Physical Exam  Constitutional:       General: He is not in acute distress  HENT:      Head: Normocephalic and atraumatic  Nose: Nose normal       Mouth/Throat:      Mouth: Mucous membranes are moist    Eyes:      Extraocular Movements: Extraocular movements intact  Conjunctiva/sclera: Conjunctivae normal    Neck:      Musculoskeletal: Normal range of motion and neck supple  Cardiovascular:      Rate and Rhythm: Normal rate and regular rhythm  Pulmonary:      Comments: Decreased breath sounds bilaterally with tachypnea  Abdominal:      Palpations: Abdomen is soft  Tenderness: There is no abdominal tenderness  Musculoskeletal: Normal range of motion  General: No swelling  Skin:     General: Skin is warm and dry  Neurological:      General: No focal deficit present  Mental Status: He is alert  Cranial Nerves: No cranial nerve deficit     Psychiatric:         Mood and Affect: Mood normal          Behavior: Behavior normal          Additional Data:     Labs:    Results from last 7 days   Lab Units 21  0656 21  1818   WBC Thousand/uL 7 10 8 10   HEMOGLOBIN g/dL 13 7* 14 4   HEMATOCRIT % 40 6* 42 9   PLATELETS Thousands/uL 240 240   NEUTROS PCT %  --  76*   LYMPHS PCT %  --  12*   MONOS PCT %  --  6   EOS PCT %  --  5     Results from last 7 days   Lab Units 01/18/21  0627  01/16/21  1818   SODIUM mmol/L 137   < > 140   POTASSIUM mmol/L 4 4   < > 4 2   CHLORIDE mmol/L 103   < > 104   CO2 mmol/L 27   < > 28   BUN mg/dL 26*   < > 14   CREATININE mg/dL 0 88   < > 0 87   CALCIUM mg/dL 8 7   < > 9 1   ALK PHOS U/L  --   --  81   ALT U/L  --   --  10   AST U/L  --   --  13    < > = values in this interval not displayed  Results from last 7 days   Lab Units 01/16/21  1915   INR  1 05     Results from last 7 days   Lab Units 01/18/21  1030 01/18/21  0638 01/17/21  1949 01/17/21  1542 01/17/21  1154 01/17/21  0641 01/17/21  0044   POC GLUCOSE mg/dl 188* 133 183* 145* 174* 134 260*           * I Have Reviewed All Lab Data Listed Above  * Additional Pertinent Lab Tests Reviewed: [facility] 67 Admission  Reviewed    Imaging:  Imaging Reports Reviewed Today Include:  No new imaging    Recent Cultures (last 7 days):     Results from last 7 days   Lab Units 01/16/21  1829 01/16/21  1818   BLOOD CULTURE  Received in Microbiology Lab  Culture in Progress  Received in Microbiology Lab  Culture in Progress         Last 24 Hours Medication List:   Current Facility-Administered Medications   Medication Dose Route Frequency Provider Last Rate    acetaminophen  650 mg Oral Q6H PRN Kip Koyanagi, PA-C      albuterol  2 puff Inhalation Q4H PRN Li Ventura MD      aspirin  81 mg Oral Every Other Day Kip Koyanagi, PA-C      azithromycin  500 mg Oral Q24H Kip Koyanagi, PA-C      Empagliflozin  12 5 mg Oral QAM Kip Koyanagi, PA-C      fluticasone-vilanterol  1 puff Inhalation Daily Ziitaliaund Stefan DO      gabapentin  200 mg Oral HS Kip Koyanagi, PA-C      guaiFENesin  600 mg Oral BID Kip Koyanagi, PA-C      heparin (porcine)  5,000 Units Subcutaneous Q12H [facility] Kip Koyanagi, PA-C      insulin lispro  1-6 Units Subcutaneous TID [facility] Kip Koyanagi, PA-C      insulin lispro  1-6 Units Subcutaneous HS Kip Koyanagi, PA-C      ipratropium  0 5 mg Nebulization Q6H [place], DO      levalbuterol  0 63 mg Nebulization Q6H [place], DO      lisinopril  20 mg Oral BID Anurag Land PA-C      methylPREDNISolone sodium succinate  40 mg Intravenous [address] [facility]      metoprolol tartrate  25 mg Oral Q12H [address] Anurag LandSPEEDY healy      nicotine  1 patch Transdermal Daily Anurag LandSPEEDY healy      ondansetron  4 mg Intravenous Q6H PRN Anurag Land PA-C      pravastatin  80 mg Oral Daily With Jackson Reyez PA-C      primidone  100 mg Oral Replaced by [facility] Anurag Land PA-C      sodium chloride (PF)  3 mL Intravenous Q1H PRN Majorie Cheadle, PA-C          Today, Patient Was Seen By: Agata Bennett MD    ** Please Note: Dictation voice to text software may have been used in the creation of this document   **

## 2021-01-19 VITALS
TEMPERATURE: 96 F | WEIGHT: 198.63 LBS | DIASTOLIC BLOOD PRESSURE: 80 MMHG | SYSTOLIC BLOOD PRESSURE: 148 MMHG | BODY MASS INDEX: 33.09 KG/M2 | HEART RATE: 57 BPM | OXYGEN SATURATION: 96 % | RESPIRATION RATE: 12 BRPM | HEIGHT: 65 IN

## 2021-01-19 LAB
ANION GAP SERPL CALCULATED.3IONS-SCNC: 10 MMOL/L (ref 4–13)
BUN SERPL-MCNC: 27 MG/DL (ref 7–25)
CALCIUM SERPL-MCNC: 9.4 MG/DL (ref 8.6–10.5)
CHLORIDE SERPL-SCNC: 96 MMOL/L (ref 98–107)
CO2 SERPL-SCNC: 31 MMOL/L (ref 21–31)
CREAT SERPL-MCNC: 0.84 MG/DL (ref 0.7–1.3)
GFR SERPL CREATININE-BSD FRML MDRD: 86 ML/MIN/1.73SQ M
GLUCOSE SERPL-MCNC: 155 MG/DL (ref 65–99)
GLUCOSE SERPL-MCNC: 173 MG/DL (ref 65–140)
GLUCOSE SERPL-MCNC: 267 MG/DL (ref 65–140)
POTASSIUM SERPL-SCNC: 3.8 MMOL/L (ref 3.5–5.5)
SODIUM SERPL-SCNC: 137 MMOL/L (ref 134–143)

## 2021-01-19 PROCEDURE — 99239 HOSP IP/OBS DSCHRG MGMT >30: CPT | Performed by: INTERNAL MEDICINE

## 2021-01-19 PROCEDURE — 80048 BASIC METABOLIC PNL TOTAL CA: CPT | Performed by: INTERNAL MEDICINE

## 2021-01-19 PROCEDURE — 82948 REAGENT STRIP/BLOOD GLUCOSE: CPT

## 2021-01-19 PROCEDURE — 94760 N-INVAS EAR/PLS OXIMETRY 1: CPT

## 2021-01-19 PROCEDURE — 94640 AIRWAY INHALATION TREATMENT: CPT

## 2021-01-19 RX ORDER — PREDNISONE 10 MG/1
TABLET ORAL
Qty: 12 TABLET | Refills: 0 | Status: SHIPPED | OUTPATIENT
Start: 2021-01-19 | End: 2021-01-25

## 2021-01-19 RX ORDER — METHYLPREDNISOLONE SODIUM SUCCINATE 40 MG/ML
40 INJECTION, POWDER, LYOPHILIZED, FOR SOLUTION INTRAMUSCULAR; INTRAVENOUS DAILY
Status: DISCONTINUED | OUTPATIENT
Start: 2021-01-20 | End: 2021-01-19 | Stop reason: HOSPADM

## 2021-01-19 RX ORDER — FUROSEMIDE 10 MG/ML
40 INJECTION INTRAMUSCULAR; INTRAVENOUS ONCE
Status: COMPLETED | OUTPATIENT
Start: 2021-01-19 | End: 2021-01-19

## 2021-01-19 RX ORDER — POTASSIUM CHLORIDE 20 MEQ/1
20 TABLET, EXTENDED RELEASE ORAL ONCE
Status: COMPLETED | OUTPATIENT
Start: 2021-01-19 | End: 2021-01-19

## 2021-01-19 RX ADMIN — FUROSEMIDE 40 MG: 10 INJECTION, SOLUTION INTRAMUSCULAR; INTRAVENOUS at 09:35

## 2021-01-19 RX ADMIN — LEVALBUTEROL HYDROCHLORIDE 0.63 MG: 0.63 SOLUTION RESPIRATORY (INHALATION) at 03:27

## 2021-01-19 RX ADMIN — METOPROLOL TARTRATE 25 MG: 25 TABLET, FILM COATED ORAL at 08:11

## 2021-01-19 RX ADMIN — METHYLPREDNISOLONE SODIUM SUCCINATE 40 MG: 40 INJECTION, POWDER, FOR SOLUTION INTRAMUSCULAR; INTRAVENOUS at 08:11

## 2021-01-19 RX ADMIN — IPRATROPIUM BROMIDE 0.5 MG: 0.5 SOLUTION RESPIRATORY (INHALATION) at 07:38

## 2021-01-19 RX ADMIN — HEPARIN SODIUM 5000 UNITS: 5000 INJECTION INTRAVENOUS; SUBCUTANEOUS at 08:10

## 2021-01-19 RX ADMIN — POTASSIUM CHLORIDE 20 MEQ: 1500 TABLET, EXTENDED RELEASE ORAL at 11:06

## 2021-01-19 RX ADMIN — FLUTICASONE FUROATE AND VILANTEROL TRIFENATATE 1 PUFF: 100; 25 POWDER RESPIRATORY (INHALATION) at 08:11

## 2021-01-19 RX ADMIN — INSULIN LISPRO 1 UNITS: 100 INJECTION, SOLUTION INTRAVENOUS; SUBCUTANEOUS at 08:10

## 2021-01-19 RX ADMIN — LEVALBUTEROL HYDROCHLORIDE 0.63 MG: 0.63 SOLUTION RESPIRATORY (INHALATION) at 07:38

## 2021-01-19 RX ADMIN — GUAIFENESIN 600 MG: 600 TABLET, EXTENDED RELEASE ORAL at 08:11

## 2021-01-19 RX ADMIN — INSULIN LISPRO 3 UNITS: 100 INJECTION, SOLUTION INTRAVENOUS; SUBCUTANEOUS at 11:40

## 2021-01-19 RX ADMIN — LISINOPRIL 20 MG: 20 TABLET ORAL at 08:11

## 2021-01-19 RX ADMIN — IPRATROPIUM BROMIDE 0.5 MG: 0.5 SOLUTION RESPIRATORY (INHALATION) at 03:27

## 2021-01-19 RX ADMIN — ASPIRIN 81 MG: 81 TABLET, COATED ORAL at 08:11

## 2021-01-19 RX ADMIN — PRIMIDONE 100 MG: 50 TABLET ORAL at 05:58

## 2021-01-19 NOTE — DISCHARGE INSTRUCTIONS
How to Stop Smoking   AMBULATORY CARE:   You will improve your health and the health of others around you  if you stop smoking  Your risk for heart and lung disease, cancer, stroke, heart attack, and vision problems will also decrease  Your adolescent can help prevent or stop harm to his or her brain or body  This will help him or her become a healthy adult  You can benefit from quitting no matter how long you have smoked  Prepare to stop smoking:  Nicotine is a highly addictive drug found in cigarettes  Withdrawal symptoms can happen when you stop smoking and make it hard to quit  These include anxiety, depression, irritability, trouble sleeping, and increased appetite  You increase your chances of success if you prepare to quit  · Set a quit date  Pinkydonavon Freddycindy a date that is within the next 2 weeks  Do not pick a day that you think may be stressful or busy  Write down the day or Af[place]ak it on your calender  · Tell friends and family that you plan to quit  Explain that you may have withdrawal symptoms when you try to quit  Ask them to support you  They may be able to encourage you and help reduce your stress to make it easier for you to quit  · Make a list of your reasons for quitting  Put the list somewhere you will see it every day, such as your refrigerator  You can look at the list when you have a craving  · Remove all tobacco and nicotine products from your home, car, and workplace  Also, remove anything else that will tempt you to smoke, such as lighters, matches, or ashtrays  Clean your car, home, and places at work that smell like smoke  The smell of smoke can trigger a craving  · Identify triggers that make you want to smoke  This may include activities, feelings, or people  Also write down 1 way you can deal with each of your triggers  For example, if you want to smoke as soon as you wake up, plan another activity during this time, such as exercise       · Make a plan for how you will quit   Learn about the tools that can help you quit, such as medicine, counseling, or nicotine replacement therapy  Choose at least 2 options to help you quit  · Help your adolescent make a plan to quit  The plan will be more successful if your adolescent makes his or her own decisions  Do not try to pressure him or her to quit immediately or in a certain way  Be supportive and offer help if needed  Tools to help you stop smoking:   · Counseling  from a trained healthcare provider can provide you with support and skills to quit smoking  The provider will also teach you to manage your withdrawal symptoms and cravings  You may receive counseling from one counselor, in group therapy, or through phone therapy called a quit line  · Nicotine replacement therapy (NRT)  such as nicotine patches, gum, or lozenges may help reduce your nicotine cravings  You may get these without a doctor's order  Do not use e-cigarettes or smokeless tobacco in place of cigarettes or to help you quit  They still contain nicotine  · Prescription medicines  such as nasal sprays or nicotine inhalers may help reduce your withdrawal symptoms  Other medicines may also be used to reduce your urge to smoke  Ask your healthcare provider about these medicines  You may need to start certain medicines 2 weeks before your quit date for them to work well  · Hypnosis  is a practice that helps guide you through thoughts and feelings  Hypnosis may help decrease your cravings and make you more willing to quit  · Acupuncture therapy  uses very thin needles to balance energy channels in the body  This is thought to help decrease cravings and symptoms of nicotine withdrawal     · Support groups  let you talk to others who are trying to quit or have already quit  It may be helpful to speak with others about how they quit  Manage your cravings:   · Avoid situations, people, and places that tempt you to smoke    Go to nonsmoking places, such as libraries or restaurants  Understand what tempts you and try to avoid these things  · Keep your hands busy  Hold things such as a stress ball or pen  · Put candy or toothpicks in your mouth  Keep lollipops, sugarless gum, or toothpicks with you at all times  · Do not have alcohol or caffeine  These drinks may tempt you to smoke  Drink healthy liquids such as water or juice instead  · Reward yourself when you resist your cravings  Rewards will motivate you and help you stay positive  · Do an activity that distracts you from your craving  Examples include cleaning, creating art, or gardening  Prevent weight gain after you quit:  You may gain a few pounds after you quit smoking  It is healthier for you to gain a few pounds than to continue to smoke  The following can help you prevent weight gain:  · Eat healthy foods  These include fruits, vegetables, whole-grain breads, low-fat dairy products, beans, lean meats, and fish  Eat healthy snacks, such as low-fat yogurt, if you get hungry between meals  · Drink water before, during, and between meals  This will make your stomach feel full and help prevent you from overeating  Ask your healthcare provider how much liquid to drink each day and which liquids are best for you  · Be physically active  Activity may help reduce your cravings and reduce stress  Take a walk or do some kind of physical activity every day  Ask your healthcare provider which activities are right for you  For more support and information:   · Smokefree  gov  Phone: 2- 842 - 967-9257  Web Address: www smokefrAMERICAN [employer]  [facility] Information is for End User's use only and may not be sold, redistributed or otherwise used for commercial purposes  All illustrations and images included in CareNotes® are the copyrighted property of A D A M , Inc  or Formerly named [facility] Kristen Smalls   The above information is an  only   It is not intended as medical advice for individual conditions or treatments  Talk to your doctor, nurse or pharmacist before following any medical regimen to see if it is safe and effective for you

## 2021-01-19 NOTE — ASSESSMENT & PLAN NOTE
· Likely multifactorial with COPD exacerbation, possible component of fluid overload, and possibly undiagnosed sleep apnea  · Desaturation study performed, patient does not qualify for home O2  · Outpatient follow-up with pulmonology for sleep study and further medication optimization as needed

## 2021-01-19 NOTE — CASE MANAGEMENT
D/c order written, I met with the pt again and he denies any d/c needs, pt drove here and he plans to drive himself home, I" in basket message" was sent for a pulmonary followup  Patient requires a follow up appointment with their primary care physician post discharge  CM called patients primary care office to request a follow up appointment  See appointment details below   PCP Name: Dr Tirado  Appointment date and time: 1/26/2021 4 pm I called the office and the pt had an appointment set up an they are keeping that appoinment  I placed a call to his wife at 11:24 to # [phone] and I made her aware of the d/c for today, she denies any d/c needs, pt and family are in agreement with the d/c and d/c plan home with spouse

## 2021-01-19 NOTE — PLAN OF CARE
Problem: Potential for Falls  Goal: Patient will remain free of falls  Description: INTERVENTIONS:  - Assess patient frequently for physical needs  -  Identify cognitive and physical deficits and behaviors that affect risk of falls    -  [place] fall precautions as indicated by assessment   - Educate patient/family on patient safety including physical limitations  - Instruct patient to call for assistance with activity based on assessment  - Modify environment to reduce risk of injury  - Consider OT/PT consult to assist with strengthening/mobility  Outcome: Progressing     Problem: DISCHARGE PLANNING  Goal: Discharge to home or other facility with appropriate resources  Description: INTERVENTIONS:  - Identify barriers to discharge w/patient and caregiver  - Arrange for needed discharge resources and transportation as appropriate  - Identify discharge learning needs (meds, wound care, etc )  - Refer to Case Management Department for coordinating discharge planning if the patient needs post-hospital services based on physician/advanced practitioner order or complex needs related to functional status, cognitive ability, or social support system  Outcome: Progressing     Problem: RESPIRATORY - ADULT  Goal: Achieves optimal ventilation and oxygenation  Description: INTERVENTIONS:  - Assess for changes in respiratory status  - Assess for changes in mentation and behavior  - Position to facilitate oxygenation and minimize respiratory effort  - Oxygen administered by appropriate delivery if ordered  - Initiate smoking cessation education as indicated  - Encourage broncho-pulmonary hygiene including cough, deep breathe, Incentive Spirometry  - Assess the need for suctioning and aspirate as needed  - Assess and instruct to report SOB or any respiratory difficulty  - Respiratory Therapy support as indicated  Outcome: Progressing

## 2021-01-19 NOTE — DISCHARGE SUMMARY
Discharge- Jerald Geller 1945, 77 y o  male MRN: [id number]    Unit/Bed#: -01 Encounter: [id number]    Primary Care Provider: Get Caceres DO   Date and time admitted to hospital: 1/16/2021  5:12 PM        * Chronic obstructive pulmonary disease with acute exacerbation ([facility] )  Assessment & Plan  · Improving  · Will discharge on prednisone taper  · Pulmonology input appreciated  · Continue home regimen  · Follow-up with pulmonology as outpatient  · Desaturation study done today, patient does not qualify for home oxygen  · Patient completed course of Zithromax  · COVID testing negative    Acute on chronic respiratory failure ([facility] )  Assessment & Plan  · Likely multifactorial with COPD exacerbation, possible component of fluid overload, and possibly undiagnosed sleep apnea  · Desaturation study performed, patient does not qualify for home O2  · Outpatient follow-up with pulmonology for sleep study and further medication optimization as needed    Essential hypertension  Assessment & Plan  · BP stable  · Continue Lopressor and lisinopril    Tobacco abuse  Assessment & Plan  Counseled on smoking cessation    Type 2 diabetes mellitus without complication, without long-term current use of insulin ([place] [id number] )  Assessment & Plan  · Continue home diabetic regimen      Discharging Physician / Practitioner: Sadaf Ortiz MD  PCP: Get Caceres DO  Admission Date:   Admission Orders (From admission, onward)     Ordered        01/17/21 1522  Inpatient Admission  Once         01/16/21 2032  Place in Observation  Once                   Discharge Date: 01/19/21    Resolved Problems  Date Reviewed: 1/16/2021    None          Consultations During Hospital Stay:  · Pulmonology    Procedures Performed:   · None    Significant Findings / Test Results:   · COPD exacerbation    Incidental Findings:   · None     Test Results Pending at Discharge (will require follow up):    · None     Outpatient Tests Requested:  · Routine labs with PCP    Complications:     None    Reason for Admission:  COPD exacerbation    Hospital Course:     Milton Tracy is a 77 y o  male patient who originally presented to the hospital on 1/16/2021 due to shortness of breath  Patient treated for COPD exacerbation  Patient was on IV steroids  Pulmonology was consulted  Medications were optimized  Patient gradually improved  Desaturation study was performed  Patient did not qualify for home oxygen  Pulmonology recommended outpatient sleep study  Patient does follow with pulmonology as outpatient  Patient discharged with prednisone taper  Outpatient follow-up with PCP and pulmonology  Patient also had suspected component of fluid overload  Patient was given a couple of doses of IV Lasix in the hospital which he responded well to  Please see above list of diagnoses and related plan for additional information  Condition at Discharge: stable     Discharge Day Visit / Exam:     Subjective:  No complaints at this time  Patient states he is feeling better today  Shortness of breath has improved  Vitals: Blood Pressure: 148/80 (01/19/21 0728)  Pulse: 57 (01/19/21 0728)  Temperature: (!) 96 °F (35 6 °C) (01/19/21 0728)  Temp Source: Tympanic (01/19/21 0728)  Respirations: 12 (01/19/21 0728)  Height: 5' 5" (165 1 cm) (01/16/21 2251)  Weight - Scale: 90 1 kg (198 lb 10 2 oz) (01/19/21 0559)  SpO2: 96 % (01/19/21 0738)     Exam:   Physical Exam  Constitutional:       General: He is not in acute distress  Appearance: He is obese  HENT:      Head: Normocephalic and atraumatic  Nose: Nose normal       Mouth/Throat:      Mouth: Mucous membranes are moist    Eyes:      Extraocular Movements: Extraocular movements intact  Conjunctiva/sclera: Conjunctivae normal    Neck:      Musculoskeletal: Normal range of motion and neck supple  Cardiovascular:      Rate and Rhythm: Normal rate and regular rhythm     Pulmonary:      Effort: Pulmonary effort is normal  No respiratory distress  Abdominal:      Palpations: Abdomen is soft  Tenderness: There is no abdominal tenderness  Musculoskeletal: Normal range of motion  General: No swelling  Right lower leg: Edema present  Left lower leg: Edema present  Skin:     General: Skin is warm and dry  Neurological:      General: No focal deficit present  Mental Status: He is alert  Cranial Nerves: No cranial nerve deficit  Psychiatric:         Mood and Affect: Mood normal          Behavior: Behavior normal          Discussion with Family: Spoke with patient's wife over the phone regarding discharge plan    Discharge instructions/Information to patient and family:   See after visit summary for information provided to patient and family  Provisions for Follow-Up Care:  See after visit summary for information related to follow-up care and any pertinent home health orders  Disposition:     Home      Planned Readmission:    no     Discharge Statement:  I spent 35 minutes discharging the patient  This time was spent on the day of discharge  I had direct contact with the patient on the day of discharge  Greater than 50% of the total time was spent examining patient, answering all patient questions, arranging and discussing plan of care with patient as well as directly providing post-discharge instructions  Additional time then spent on discharge activities  Discharge Medications:  See after visit summary for reconciled discharge medications provided to patient and family        ** Please Note: This note has been constructed using a voice recognition system **

## 2021-01-19 NOTE — NURSING NOTE
Patient sitting at bedside at present  Lungs auscultate clear, diminished on room air  Denies SOB upon exertion  +1 pitting edema present to bilateral lower extremities  Denies chest pain, offers no other complaints at this time  Call bell and belongings within reach, will continue to monitor

## 2021-01-19 NOTE — ASSESSMENT & PLAN NOTE
· Improving  · Will discharge on prednisone taper  · Pulmonology input appreciated  · Continue home regimen  · Follow-up with pulmonology as outpatient  · Desaturation study done today, patient does not qualify for home oxygen  · Patient completed course of Zithromax  · COVID testing negative

## 2021-01-20 ENCOUNTER — TELEPHONE (OUTPATIENT)
Dept: PULMONOLOGY | Facility: CLINIC | Age: 76
End: 2021-01-20

## 2021-01-20 NOTE — TELEPHONE ENCOUNTER
Called pt to set up 2 week follow up appointment  Pt defers, he has his own Pulmonologist Dr Savanah Simeon and he will be seeing him    I told patient that is fine as ling as he follows up with him     ----- Message from Adamaris Gonzales RN sent at 1/19/2021 12:21 PM EST -----    ----- Message -----  From: Di Sheehan RN  Sent: 1/19/2021  11:17 AM EST  To: [facility] [place]    Pt is being d/c today and he needs a follow up appointment in 2 weeks, please call the pt with an appointment, pt is a dina risk score , Thanks Georgiana Corcoran

## 2021-01-22 LAB
BACTERIA BLD CULT: NORMAL
BACTERIA BLD CULT: NORMAL

## 2021-04-05 ENCOUNTER — HOSPITAL ENCOUNTER (INPATIENT)
Facility: HOSPITAL | Age: 76
LOS: 1 days | Discharge: HOME | DRG: 189 | End: 2021-04-07
Attending: EMERGENCY MEDICINE | Admitting: INTERNAL MEDICINE
Payer: COMMERCIAL

## 2021-04-05 ENCOUNTER — APPOINTMENT (EMERGENCY)
Dept: CT IMAGING | Facility: HOSPITAL | Age: 76
DRG: 189 | End: 2021-04-05
Payer: COMMERCIAL

## 2021-04-05 ENCOUNTER — APPOINTMENT (EMERGENCY)
Dept: RADIOLOGY | Facility: HOSPITAL | Age: 76
DRG: 189 | End: 2021-04-05
Payer: COMMERCIAL

## 2021-04-05 DIAGNOSIS — E11.9 TYPE 2 DIABETES MELLITUS WITHOUT COMPLICATION, WITHOUT LONG-TERM CURRENT USE OF INSULIN (HCC): Chronic | ICD-10-CM

## 2021-04-05 DIAGNOSIS — R77.8 ELEVATED TROPONIN: ICD-10-CM

## 2021-04-05 DIAGNOSIS — J44.1 CHRONIC OBSTRUCTIVE PULMONARY DISEASE WITH ACUTE EXACERBATION (HCC): ICD-10-CM

## 2021-04-05 DIAGNOSIS — E87.2 LACTIC ACIDOSIS: ICD-10-CM

## 2021-04-05 DIAGNOSIS — J96.90 RESPIRATORY FAILURE (HCC): ICD-10-CM

## 2021-04-05 DIAGNOSIS — R06.02 SOB (SHORTNESS OF BREATH): Primary | ICD-10-CM

## 2021-04-05 LAB
ALBUMIN SERPL BCP-MCNC: 4.2 G/DL (ref 3.5–5.7)
ALP SERPL-CCNC: 68 U/L (ref 55–165)
ALT SERPL W P-5'-P-CCNC: 13 U/L (ref 7–52)
ANION GAP SERPL CALCULATED.3IONS-SCNC: 14 MMOL/L (ref 4–13)
APTT PPP: 30 SECONDS (ref 23–37)
AST SERPL W P-5'-P-CCNC: 15 U/L (ref 13–39)
BASOPHILS # BLD AUTO: 0.1 THOUSANDS/ΜL (ref 0–0.1)
BASOPHILS NFR BLD AUTO: 1 % (ref 0–2)
BILIRUB SERPL-MCNC: 0.3 MG/DL (ref 0.2–1)
BNP SERPL-MCNC: 241 PG/ML (ref 1–100)
BUN SERPL-MCNC: 15 MG/DL (ref 7–25)
CALCIUM SERPL-MCNC: 8.5 MG/DL (ref 8.6–10.5)
CHLORIDE SERPL-SCNC: 100 MMOL/L (ref 98–107)
CO2 SERPL-SCNC: 23 MMOL/L (ref 21–31)
CREAT SERPL-MCNC: 1.04 MG/DL (ref 0.7–1.3)
EOSINOPHIL # BLD AUTO: 0.3 THOUSAND/ΜL (ref 0–0.61)
EOSINOPHIL NFR BLD AUTO: 3 % (ref 0–5)
ERYTHROCYTE [DISTWIDTH] IN BLOOD BY AUTOMATED COUNT: 14 % (ref 11.5–14.5)
FLUAV RNA RESP QL NAA+PROBE: NEGATIVE
FLUBV RNA RESP QL NAA+PROBE: NEGATIVE
GFR SERPL CREATININE-BSD FRML MDRD: 70 ML/MIN/1.73SQ M
GLUCOSE SERPL-MCNC: 178 MG/DL (ref 65–99)
HCT VFR BLD AUTO: 45.6 % (ref 42–47)
HGB BLD-MCNC: 15 G/DL (ref 14–18)
INR PPP: 0.99 (ref 0.84–1.19)
LACTATE SERPL-SCNC: 2.3 MMOL/L (ref 0.5–2)
LYMPHOCYTES # BLD AUTO: 1.8 THOUSANDS/ΜL (ref 0.6–4.47)
LYMPHOCYTES NFR BLD AUTO: 16 % (ref 21–51)
MCH RBC QN AUTO: 29.6 PG (ref 26–34)
MCHC RBC AUTO-ENTMCNC: 32.8 G/DL (ref 31–37)
MCV RBC AUTO: 90 FL (ref 81–99)
MONOCYTES # BLD AUTO: 0.8 THOUSAND/ΜL (ref 0.17–1.22)
MONOCYTES NFR BLD AUTO: 7 % (ref 2–12)
NEUTROPHILS # BLD AUTO: 8.6 THOUSANDS/ΜL (ref 1.4–6.5)
NEUTS SEG NFR BLD AUTO: 74 % (ref 42–75)
PLATELET # BLD AUTO: 181 THOUSANDS/UL (ref 149–390)
PMV BLD AUTO: 9.4 FL (ref 8.6–11.7)
POTASSIUM SERPL-SCNC: 4 MMOL/L (ref 3.5–5.5)
PROT SERPL-MCNC: 6.8 G/DL (ref 6.4–8.9)
PROTHROMBIN TIME: 13 SECONDS (ref 11.6–14.5)
RBC # BLD AUTO: 5.05 MILLION/UL (ref 4.3–5.9)
RSV RNA RESP QL NAA+PROBE: NEGATIVE
SARS-COV-2 RNA RESP QL NAA+PROBE: NEGATIVE
SODIUM SERPL-SCNC: 137 MMOL/L (ref 134–143)
TROPONIN I SERPL-MCNC: 0.04 NG/ML
WBC # BLD AUTO: 11.5 THOUSAND/UL (ref 4.8–10.8)

## 2021-04-05 PROCEDURE — 83605 ASSAY OF LACTIC ACID: CPT | Performed by: EMERGENCY MEDICINE

## 2021-04-05 PROCEDURE — 36415 COLL VENOUS BLD VENIPUNCTURE: CPT | Performed by: EMERGENCY MEDICINE

## 2021-04-05 PROCEDURE — 93005 ELECTROCARDIOGRAM TRACING: CPT

## 2021-04-05 PROCEDURE — 80053 COMPREHEN METABOLIC PANEL: CPT | Performed by: EMERGENCY MEDICINE

## 2021-04-05 PROCEDURE — 94640 AIRWAY INHALATION TREATMENT: CPT

## 2021-04-05 PROCEDURE — 87040 BLOOD CULTURE FOR BACTERIA: CPT | Performed by: EMERGENCY MEDICINE

## 2021-04-05 PROCEDURE — 84145 PROCALCITONIN (PCT): CPT | Performed by: EMERGENCY MEDICINE

## 2021-04-05 PROCEDURE — 96375 TX/PRO/DX INJ NEW DRUG ADDON: CPT

## 2021-04-05 PROCEDURE — 0241U HB NFCT DS VIR RESP RNA 4 TRGT: CPT | Performed by: EMERGENCY MEDICINE

## 2021-04-05 PROCEDURE — 94760 N-INVAS EAR/PLS OXIMETRY 1: CPT

## 2021-04-05 PROCEDURE — 96367 TX/PROPH/DG ADDL SEQ IV INF: CPT

## 2021-04-05 PROCEDURE — 94002 VENT MGMT INPAT INIT DAY: CPT

## 2021-04-05 PROCEDURE — 99285 EMERGENCY DEPT VISIT HI MDM: CPT | Performed by: EMERGENCY MEDICINE

## 2021-04-05 PROCEDURE — 84484 ASSAY OF TROPONIN QUANT: CPT | Performed by: EMERGENCY MEDICINE

## 2021-04-05 PROCEDURE — 85730 THROMBOPLASTIN TIME PARTIAL: CPT | Performed by: EMERGENCY MEDICINE

## 2021-04-05 PROCEDURE — 96365 THER/PROPH/DIAG IV INF INIT: CPT

## 2021-04-05 PROCEDURE — 83880 ASSAY OF NATRIURETIC PEPTIDE: CPT | Performed by: EMERGENCY MEDICINE

## 2021-04-05 PROCEDURE — 99285 EMERGENCY DEPT VISIT HI MDM: CPT

## 2021-04-05 PROCEDURE — 85610 PROTHROMBIN TIME: CPT | Performed by: EMERGENCY MEDICINE

## 2021-04-05 PROCEDURE — 71045 X-RAY EXAM CHEST 1 VIEW: CPT

## 2021-04-05 PROCEDURE — 85025 COMPLETE CBC W/AUTO DIFF WBC: CPT | Performed by: EMERGENCY MEDICINE

## 2021-04-05 RX ORDER — CEFEPIME HYDROCHLORIDE 2 G/50ML
2000 INJECTION, SOLUTION INTRAVENOUS ONCE
Status: COMPLETED | OUTPATIENT
Start: 2021-04-05 | End: 2021-04-05

## 2021-04-05 RX ORDER — NITROGLYCERIN 20 MG/100ML
5-200 INJECTION INTRAVENOUS
Status: DISCONTINUED | OUTPATIENT
Start: 2021-04-05 | End: 2021-04-06

## 2021-04-05 RX ORDER — METHYLPREDNISOLONE SODIUM SUCCINATE 125 MG/2ML
125 INJECTION, POWDER, LYOPHILIZED, FOR SOLUTION INTRAMUSCULAR; INTRAVENOUS ONCE
Status: COMPLETED | OUTPATIENT
Start: 2021-04-05 | End: 2021-04-05

## 2021-04-05 RX ADMIN — IPRATROPIUM BROMIDE 0.5 MG: 0.5 SOLUTION RESPIRATORY (INHALATION) at 22:44

## 2021-04-05 RX ADMIN — CEFEPIME HYDROCHLORIDE 2000 MG: 2 INJECTION, SOLUTION INTRAVENOUS at 22:57

## 2021-04-05 RX ADMIN — METHYLPREDNISOLONE SODIUM SUCCINATE 125 MG: 125 INJECTION, POWDER, FOR SOLUTION INTRAMUSCULAR; INTRAVENOUS at 22:53

## 2021-04-05 RX ADMIN — NITROGLYCERIN 20 MCG/MIN: 20 INJECTION INTRAVENOUS at 22:41

## 2021-04-05 RX ADMIN — ALBUTEROL SULFATE 5 MG: 2.5 SOLUTION RESPIRATORY (INHALATION) at 22:44

## 2021-04-05 RX ADMIN — VANCOMYCIN HYDROCHLORIDE 1250 MG: 1 INJECTION, POWDER, LYOPHILIZED, FOR SOLUTION INTRAVENOUS at 23:30

## 2021-04-06 ENCOUNTER — APPOINTMENT (EMERGENCY)
Dept: CT IMAGING | Facility: HOSPITAL | Age: 76
DRG: 189 | End: 2021-04-06
Payer: COMMERCIAL

## 2021-04-06 PROBLEM — D72.823 LEUKEMOID REACTION: Status: ACTIVE | Noted: 2021-04-06

## 2021-04-06 PROBLEM — R53.81 PHYSICAL DECONDITIONING: Status: ACTIVE | Noted: 2021-02-04

## 2021-04-06 PROBLEM — I21.A1 TYPE 2 MI (MYOCARDIAL INFARCTION) (HCC): Status: ACTIVE | Noted: 2021-04-06

## 2021-04-06 PROBLEM — R77.8 ELEVATED TROPONIN I LEVEL: Status: ACTIVE | Noted: 2021-04-06

## 2021-04-06 PROBLEM — E87.2 LACTIC ACIDOSIS: Status: ACTIVE | Noted: 2021-04-06

## 2021-04-06 PROBLEM — R09.02 HYPOXEMIA: Status: ACTIVE | Noted: 2021-02-04

## 2021-04-06 PROBLEM — E11.9 TYPE 2 DIABETES MELLITUS WITHOUT COMPLICATION, WITHOUT LONG-TERM CURRENT USE OF INSULIN (HCC): Status: RESOLVED | Noted: 2017-03-08 | Resolved: 2021-04-06

## 2021-04-06 PROBLEM — R25.1 TREMOR: Status: ACTIVE | Noted: 2019-02-22

## 2021-04-06 PROBLEM — E87.20 LACTIC ACIDOSIS: Status: ACTIVE | Noted: 2021-04-06

## 2021-04-06 LAB
ANION GAP SERPL CALCULATED.3IONS-SCNC: 12 MMOL/L (ref 4–13)
ATRIAL RATE: 112 BPM
ATRIAL RATE: 92 BPM
ATRIAL RATE: 98 BPM
BACTERIA UR QL AUTO: NORMAL /HPF
BILIRUB UR QL STRIP: NEGATIVE
BUN SERPL-MCNC: 17 MG/DL (ref 7–25)
CALCIUM SERPL-MCNC: 8.1 MG/DL (ref 8.6–10.5)
CHLORIDE SERPL-SCNC: 100 MMOL/L (ref 98–107)
CLARITY UR: CLEAR
CO2 SERPL-SCNC: 22 MMOL/L (ref 21–31)
COLOR UR: YELLOW
CREAT SERPL-MCNC: 0.85 MG/DL (ref 0.7–1.3)
ERYTHROCYTE [DISTWIDTH] IN BLOOD BY AUTOMATED COUNT: 14.3 % (ref 11.5–14.5)
GFR SERPL CREATININE-BSD FRML MDRD: 85 ML/MIN/1.73SQ M
GLUCOSE SERPL-MCNC: 149 MG/DL (ref 65–140)
GLUCOSE SERPL-MCNC: 178 MG/DL (ref 65–140)
GLUCOSE SERPL-MCNC: 187 MG/DL (ref 65–140)
GLUCOSE SERPL-MCNC: 240 MG/DL (ref 65–140)
GLUCOSE SERPL-MCNC: 270 MG/DL (ref 65–99)
GLUCOSE UR STRIP-MCNC: ABNORMAL MG/DL
HCT VFR BLD AUTO: 42.9 % (ref 42–47)
HGB BLD-MCNC: 14.1 G/DL (ref 14–18)
HGB UR QL STRIP.AUTO: NEGATIVE
KETONES UR STRIP-MCNC: ABNORMAL MG/DL
L PNEUMO1 AG UR QL IA.RAPID: NEGATIVE
LACTATE SERPL-SCNC: 2.4 MMOL/L (ref 0.5–2)
LACTATE SERPL-SCNC: 3.3 MMOL/L (ref 0.5–2)
LACTATE SERPL-SCNC: 3.5 MMOL/L (ref 0.5–2)
LEUKOCYTE ESTERASE UR QL STRIP: NEGATIVE
LYMPHOCYTES # BLD AUTO: 0.67 THOUSAND/UL (ref 0.6–4.47)
LYMPHOCYTES # BLD AUTO: 6 % (ref 20–51)
MCH RBC QN AUTO: 29.7 PG (ref 26–34)
MCHC RBC AUTO-ENTMCNC: 32.8 G/DL (ref 31–37)
MCV RBC AUTO: 90 FL (ref 81–99)
NEUTS SEG # BLD: 10.43 THOUSAND/UL (ref 1.81–6.82)
NEUTS SEG NFR BLD AUTO: 94 % (ref 42–75)
NITRITE UR QL STRIP: NEGATIVE
NON-SQ EPI CELLS URNS QL MICRO: NORMAL /HPF
P AXIS: 70 DEGREES
P AXIS: 78 DEGREES
P AXIS: 81 DEGREES
PH UR STRIP.AUTO: 5 [PH]
PLATELET # BLD AUTO: 160 THOUSANDS/UL (ref 149–390)
PLATELET BLD QL SMEAR: ADEQUATE
PMV BLD AUTO: 9.8 FL (ref 8.6–11.7)
POTASSIUM SERPL-SCNC: 4.5 MMOL/L (ref 3.5–5.5)
PR INTERVAL: 168 MS
PR INTERVAL: 182 MS
PR INTERVAL: 184 MS
PROCALCITONIN SERPL-MCNC: <0.05 NG/ML
PROT UR STRIP-MCNC: ABNORMAL MG/DL
QRS AXIS: 57 DEGREES
QRS AXIS: 59 DEGREES
QRS AXIS: 60 DEGREES
QRSD INTERVAL: 104 MS
QRSD INTERVAL: 94 MS
QRSD INTERVAL: 96 MS
QT INTERVAL: 338 MS
QT INTERVAL: 382 MS
QT INTERVAL: 416 MS
QTC INTERVAL: 461 MS
QTC INTERVAL: 487 MS
QTC INTERVAL: 514 MS
RBC # BLD AUTO: 4.75 MILLION/UL (ref 4.3–5.9)
RBC #/AREA URNS AUTO: NORMAL /HPF
RBC MORPH BLD: NORMAL
S PNEUM AG UR QL: NEGATIVE
SODIUM SERPL-SCNC: 134 MMOL/L (ref 134–143)
SP GR UR STRIP.AUTO: 1.01 (ref 1–1.03)
T WAVE AXIS: 105 DEGREES
T WAVE AXIS: 109 DEGREES
T WAVE AXIS: 99 DEGREES
TOTAL CELLS COUNTED SPEC: 100
TROPONIN I SERPL-MCNC: 0.11 NG/ML
TROPONIN I SERPL-MCNC: 0.12 NG/ML
TROPONIN I SERPL-MCNC: 0.14 NG/ML
UROBILINOGEN UR QL STRIP.AUTO: 0.2 E.U./DL
VENTRICULAR RATE: 112 BPM
VENTRICULAR RATE: 92 BPM
VENTRICULAR RATE: 98 BPM
WBC # BLD AUTO: 11.1 THOUSAND/UL (ref 4.8–10.8)
WBC #/AREA URNS AUTO: NORMAL /HPF

## 2021-04-06 PROCEDURE — G1004 CDSM NDSC: HCPCS

## 2021-04-06 PROCEDURE — 87449 NOS EACH ORGANISM AG IA: CPT | Performed by: INTERNAL MEDICINE

## 2021-04-06 PROCEDURE — 82948 REAGENT STRIP/BLOOD GLUCOSE: CPT

## 2021-04-06 PROCEDURE — 94664 DEMO&/EVAL PT USE INHALER: CPT

## 2021-04-06 PROCEDURE — 85027 COMPLETE CBC AUTOMATED: CPT | Performed by: INTERNAL MEDICINE

## 2021-04-06 PROCEDURE — 94760 N-INVAS EAR/PLS OXIMETRY 1: CPT

## 2021-04-06 PROCEDURE — 81003 URINALYSIS AUTO W/O SCOPE: CPT | Performed by: EMERGENCY MEDICINE

## 2021-04-06 PROCEDURE — 80048 BASIC METABOLIC PNL TOTAL CA: CPT | Performed by: INTERNAL MEDICINE

## 2021-04-06 PROCEDURE — 81001 URINALYSIS AUTO W/SCOPE: CPT | Performed by: EMERGENCY MEDICINE

## 2021-04-06 PROCEDURE — 93010 ELECTROCARDIOGRAM REPORT: CPT | Performed by: INTERNAL MEDICINE

## 2021-04-06 PROCEDURE — 71275 CT ANGIOGRAPHY CHEST: CPT

## 2021-04-06 PROCEDURE — 94640 AIRWAY INHALATION TREATMENT: CPT

## 2021-04-06 PROCEDURE — 99221 1ST HOSP IP/OBS SF/LOW 40: CPT | Performed by: INTERNAL MEDICINE

## 2021-04-06 PROCEDURE — 93005 ELECTROCARDIOGRAM TRACING: CPT

## 2021-04-06 PROCEDURE — 99223 1ST HOSP IP/OBS HIGH 75: CPT | Performed by: INTERNAL MEDICINE

## 2021-04-06 PROCEDURE — 83605 ASSAY OF LACTIC ACID: CPT | Performed by: INTERNAL MEDICINE

## 2021-04-06 PROCEDURE — 85007 BL SMEAR W/DIFF WBC COUNT: CPT | Performed by: INTERNAL MEDICINE

## 2021-04-06 PROCEDURE — 84484 ASSAY OF TROPONIN QUANT: CPT | Performed by: EMERGENCY MEDICINE

## 2021-04-06 PROCEDURE — 96366 THER/PROPH/DIAG IV INF ADDON: CPT

## 2021-04-06 PROCEDURE — 83605 ASSAY OF LACTIC ACID: CPT | Performed by: EMERGENCY MEDICINE

## 2021-04-06 PROCEDURE — 36415 COLL VENOUS BLD VENIPUNCTURE: CPT | Performed by: EMERGENCY MEDICINE

## 2021-04-06 PROCEDURE — 84484 ASSAY OF TROPONIN QUANT: CPT | Performed by: PHYSICIAN ASSISTANT

## 2021-04-06 PROCEDURE — 84484 ASSAY OF TROPONIN QUANT: CPT | Performed by: INTERNAL MEDICINE

## 2021-04-06 RX ORDER — ONDANSETRON 2 MG/ML
4 INJECTION INTRAMUSCULAR; INTRAVENOUS EVERY 6 HOURS PRN
Status: DISCONTINUED | OUTPATIENT
Start: 2021-04-06 | End: 2021-04-07 | Stop reason: HOSPADM

## 2021-04-06 RX ORDER — LEVALBUTEROL 1.25 MG/.5ML
1.25 SOLUTION, CONCENTRATE RESPIRATORY (INHALATION) EVERY 6 HOURS PRN
Status: DISCONTINUED | OUTPATIENT
Start: 2021-04-06 | End: 2021-04-06

## 2021-04-06 RX ORDER — AZITHROMYCIN 250 MG/1
500 TABLET, FILM COATED ORAL EVERY 24 HOURS
Status: DISCONTINUED | OUTPATIENT
Start: 2021-04-06 | End: 2021-04-07 | Stop reason: HOSPADM

## 2021-04-06 RX ORDER — METHYLPREDNISOLONE SODIUM SUCCINATE 40 MG/ML
40 INJECTION, POWDER, LYOPHILIZED, FOR SOLUTION INTRAMUSCULAR; INTRAVENOUS EVERY 8 HOURS
Status: DISCONTINUED | OUTPATIENT
Start: 2021-04-06 | End: 2021-04-07 | Stop reason: HOSPADM

## 2021-04-06 RX ORDER — DIAPER,BRIEF,INFANT-TODD,DISP
EACH MISCELLANEOUS 2 TIMES DAILY
Status: DISCONTINUED | OUTPATIENT
Start: 2021-04-06 | End: 2021-04-07 | Stop reason: HOSPADM

## 2021-04-06 RX ORDER — GUAIFENESIN 600 MG
600 TABLET, EXTENDED RELEASE 12 HR ORAL 2 TIMES DAILY
Status: DISCONTINUED | OUTPATIENT
Start: 2021-04-06 | End: 2021-04-07 | Stop reason: HOSPADM

## 2021-04-06 RX ORDER — SODIUM CHLORIDE 9 MG/ML
75 INJECTION, SOLUTION INTRAVENOUS ONCE
Status: COMPLETED | OUTPATIENT
Start: 2021-04-06 | End: 2021-04-06

## 2021-04-06 RX ORDER — ALBUTEROL SULFATE 90 UG/1
2 AEROSOL, METERED RESPIRATORY (INHALATION) EVERY 4 HOURS PRN
Status: DISCONTINUED | OUTPATIENT
Start: 2021-04-06 | End: 2021-04-07 | Stop reason: HOSPADM

## 2021-04-06 RX ORDER — ACETAMINOPHEN 325 MG/1
650 TABLET ORAL EVERY 6 HOURS PRN
Status: DISCONTINUED | OUTPATIENT
Start: 2021-04-06 | End: 2021-04-07 | Stop reason: HOSPADM

## 2021-04-06 RX ORDER — ASPIRIN 81 MG/1
81 TABLET ORAL EVERY OTHER DAY
Status: DISCONTINUED | OUTPATIENT
Start: 2021-04-06 | End: 2021-04-06

## 2021-04-06 RX ORDER — HEPARIN SODIUM 5000 [USP'U]/ML
5000 INJECTION, SOLUTION INTRAVENOUS; SUBCUTANEOUS EVERY 12 HOURS SCHEDULED
Status: DISCONTINUED | OUTPATIENT
Start: 2021-04-06 | End: 2021-04-07 | Stop reason: HOSPADM

## 2021-04-06 RX ORDER — PRIMIDONE 50 MG/1
100 TABLET ORAL EVERY 8 HOURS SCHEDULED
Status: DISCONTINUED | OUTPATIENT
Start: 2021-04-06 | End: 2021-04-07 | Stop reason: HOSPADM

## 2021-04-06 RX ORDER — LEVALBUTEROL 1.25 MG/.5ML
1.25 SOLUTION, CONCENTRATE RESPIRATORY (INHALATION)
Status: DISCONTINUED | OUTPATIENT
Start: 2021-04-06 | End: 2021-04-07 | Stop reason: HOSPADM

## 2021-04-06 RX ORDER — SODIUM CHLORIDE FOR INHALATION 0.9 %
3 VIAL, NEBULIZER (ML) INHALATION EVERY 6 HOURS PRN
Status: DISCONTINUED | OUTPATIENT
Start: 2021-04-06 | End: 2021-04-06

## 2021-04-06 RX ORDER — LISINOPRIL 20 MG/1
20 TABLET ORAL 2 TIMES DAILY
Status: DISCONTINUED | OUTPATIENT
Start: 2021-04-06 | End: 2021-04-07 | Stop reason: HOSPADM

## 2021-04-06 RX ORDER — FLUTICASONE PROPIONATE 220 UG/1
1 AEROSOL, METERED RESPIRATORY (INHALATION) DAILY
Status: DISCONTINUED | OUTPATIENT
Start: 2021-04-06 | End: 2021-04-07 | Stop reason: HOSPADM

## 2021-04-06 RX ORDER — PRAVASTATIN SODIUM 40 MG
80 TABLET ORAL
Status: DISCONTINUED | OUTPATIENT
Start: 2021-04-06 | End: 2021-04-07 | Stop reason: HOSPADM

## 2021-04-06 RX ORDER — GABAPENTIN 100 MG/1
200 CAPSULE ORAL
Status: DISCONTINUED | OUTPATIENT
Start: 2021-04-06 | End: 2021-04-07 | Stop reason: HOSPADM

## 2021-04-06 RX ORDER — ASPIRIN 81 MG/1
81 TABLET ORAL DAILY
Status: DISCONTINUED | OUTPATIENT
Start: 2021-04-06 | End: 2021-04-07 | Stop reason: HOSPADM

## 2021-04-06 RX ORDER — SODIUM CHLORIDE 9 MG/ML
125 INJECTION, SOLUTION INTRAVENOUS CONTINUOUS
Status: DISCONTINUED | OUTPATIENT
Start: 2021-04-06 | End: 2021-04-06

## 2021-04-06 RX ADMIN — SODIUM CHLORIDE 125 ML/HR: 0.9 INJECTION, SOLUTION INTRAVENOUS at 04:09

## 2021-04-06 RX ADMIN — LEVALBUTEROL HYDROCHLORIDE 1.25 MG: 1.25 SOLUTION, CONCENTRATE RESPIRATORY (INHALATION) at 20:11

## 2021-04-06 RX ADMIN — PRIMIDONE 100 MG: 50 TABLET ORAL at 22:50

## 2021-04-06 RX ADMIN — IPRATROPIUM BROMIDE 0.5 MG: 0.5 SOLUTION RESPIRATORY (INHALATION) at 20:11

## 2021-04-06 RX ADMIN — METOPROLOL TARTRATE 25 MG: 25 TABLET, FILM COATED ORAL at 08:02

## 2021-04-06 RX ADMIN — PRIMIDONE 100 MG: 50 TABLET ORAL at 14:49

## 2021-04-06 RX ADMIN — INSULIN LISPRO 2 UNITS: 100 INJECTION, SOLUTION INTRAVENOUS; SUBCUTANEOUS at 11:21

## 2021-04-06 RX ADMIN — AZITHROMYCIN MONOHYDRATE 500 MG: 250 TABLET ORAL at 22:50

## 2021-04-06 RX ADMIN — GABAPENTIN 200 MG: 100 CAPSULE ORAL at 22:50

## 2021-04-06 RX ADMIN — HEPARIN SODIUM 5000 UNITS: 5000 INJECTION INTRAVENOUS; SUBCUTANEOUS at 08:02

## 2021-04-06 RX ADMIN — METHYLPREDNISOLONE SODIUM SUCCINATE 40 MG: 40 INJECTION, POWDER, FOR SOLUTION INTRAMUSCULAR; INTRAVENOUS at 07:53

## 2021-04-06 RX ADMIN — PRAVASTATIN SODIUM 80 MG: 40 TABLET ORAL at 17:09

## 2021-04-06 RX ADMIN — SODIUM CHLORIDE 1000 ML: 0.9 INJECTION, SOLUTION INTRAVENOUS at 02:28

## 2021-04-06 RX ADMIN — AZITHROMYCIN MONOHYDRATE 500 MG: 500 INJECTION, POWDER, LYOPHILIZED, FOR SOLUTION INTRAVENOUS at 02:58

## 2021-04-06 RX ADMIN — IPRATROPIUM BROMIDE 0.5 MG: 0.5 SOLUTION RESPIRATORY (INHALATION) at 08:07

## 2021-04-06 RX ADMIN — GUAIFENESIN 600 MG: 600 TABLET, EXTENDED RELEASE ORAL at 17:10

## 2021-04-06 RX ADMIN — METHYLPREDNISOLONE SODIUM SUCCINATE 40 MG: 40 INJECTION, POWDER, FOR SOLUTION INTRAMUSCULAR; INTRAVENOUS at 14:49

## 2021-04-06 RX ADMIN — SODIUM CHLORIDE 75 ML/HR: 0.9 INJECTION, SOLUTION INTRAVENOUS at 11:09

## 2021-04-06 RX ADMIN — HYDROCORTISONE: 10 CREAM TOPICAL at 07:55

## 2021-04-06 RX ADMIN — METHYLPREDNISOLONE SODIUM SUCCINATE 40 MG: 40 INJECTION, POWDER, FOR SOLUTION INTRAMUSCULAR; INTRAVENOUS at 22:51

## 2021-04-06 RX ADMIN — GUAIFENESIN 600 MG: 600 TABLET, EXTENDED RELEASE ORAL at 08:02

## 2021-04-06 RX ADMIN — METOPROLOL TARTRATE 25 MG: 25 TABLET, FILM COATED ORAL at 22:50

## 2021-04-06 RX ADMIN — LEVALBUTEROL HYDROCHLORIDE 1.25 MG: 1.25 SOLUTION, CONCENTRATE RESPIRATORY (INHALATION) at 08:07

## 2021-04-06 RX ADMIN — HEPARIN SODIUM 5000 UNITS: 5000 INJECTION INTRAVENOUS; SUBCUTANEOUS at 22:50

## 2021-04-06 RX ADMIN — LISINOPRIL 20 MG: 20 TABLET ORAL at 08:02

## 2021-04-06 RX ADMIN — INSULIN LISPRO 1 UNITS: 100 INJECTION, SOLUTION INTRAVENOUS; SUBCUTANEOUS at 07:54

## 2021-04-06 RX ADMIN — PRIMIDONE 100 MG: 50 TABLET ORAL at 08:02

## 2021-04-06 RX ADMIN — INSULIN LISPRO 1 UNITS: 100 INJECTION, SOLUTION INTRAVENOUS; SUBCUTANEOUS at 22:52

## 2021-04-06 RX ADMIN — IOHEXOL 100 ML: 350 INJECTION, SOLUTION INTRAVENOUS at 01:00

## 2021-04-06 RX ADMIN — IPRATROPIUM BROMIDE 0.5 MG: 0.5 SOLUTION RESPIRATORY (INHALATION) at 14:00

## 2021-04-06 RX ADMIN — LEVALBUTEROL HYDROCHLORIDE 1.25 MG: 1.25 SOLUTION, CONCENTRATE RESPIRATORY (INHALATION) at 14:00

## 2021-04-06 RX ADMIN — HYDROCORTISONE 1 APPLICATION: 10 CREAM TOPICAL at 17:10

## 2021-04-06 RX ADMIN — FLUTICASONE PROPIONATE 1 PUFF: 220 AEROSOL, METERED RESPIRATORY (INHALATION) at 08:02

## 2021-04-06 RX ADMIN — LISINOPRIL 20 MG: 20 TABLET ORAL at 17:09

## 2021-04-06 RX ADMIN — ASPIRIN 81 MG: 81 TABLET, COATED ORAL at 07:54

## 2021-04-06 RX ADMIN — ASPIRIN 81 MG: 81 TABLET, COATED ORAL at 03:35

## 2021-04-06 NOTE — ED NOTES
Patient resting comfortable  No s/s of distress  States he feels good now  O2 noted to be 91%  Patient states he normally wears 3L via nasal cannula at HS while at home  Same applied here   O2 sat back up to 95-96%     Yan Rai RN  04/06/21 0032

## 2021-04-06 NOTE — ASSESSMENT & PLAN NOTE
Lab Results   Component Value Date    HGBA1C 7 1 (H) 08/14/2020       No results for input(s): POCGLU in the last 72 hours  Blood Sugar Average: Last 72 hrs:     Place on [facility] step 2 diet, will hold pre-hospital oral antihyperglycemics obtain Accu-Cheks AC and HS with Humalog correction dose a c   And hs

## 2021-04-06 NOTE — PLAN OF CARE
Problem: Potential for Falls  Goal: Patient will remain free of falls  Description: INTERVENTIONS:  - Assess patient frequently for physical needs  -  Identify cognitive and physical deficits and behaviors that affect risk of falls    -  [place] fall precautions as indicated by assessment   - Educate patient/family on patient safety including physical limitations  - Instruct patient to call for assistance with activity based on assessment  - Modify environment to reduce risk of injury  - Consider OT/PT consult to assist with strengthening/mobility  Outcome: Progressing

## 2021-04-06 NOTE — ED CARE HANDOFF
Emergency Department Sign Out Note        Sign out and transfer of care from Dr Hampton Schilder  See Separate Emergency Department note  The patient, [place] Apple, was evaluated by the previous provider for Respiratory failure  Workup Completed:  Labs    ED Course / Workup Pending (followup):  00:02  Pt received in sign out to me         0019  Pt is comfortable off Bipap  Awaiting CT scan results for admission         0152  Repeat Lactic acid slightly worse      0153  CTA results reviewed: no infection seen     CTA - CHEST WITH IV CONTRAST - PULMONARY ANGIOGRAM     INDICATION:   PE suspected, high pretest prob  shortness of breath, resp distress, hypoxia      COMPARISON: 12/18/2019      FINDINGS:     PULMONARY ARTERIAL TREE:  No pulmonary embolus is seen       LUNGS:  Lungs are clear  There is no tracheal or endobronchial lesion      PLEURA:  Unremarkable      HEART/GREAT VESSELS:  Unremarkable for patient's age      MEDIASTINUM AND JOSE:  Unremarkable      CHEST WALL AND LOWER NECK:   Unremarkable      VISUALIZED STRUCTURES IN THE UPPER ABDOMEN:  Unremarkable      OSSEOUS STRUCTURES:  No acute fracture or destructive osseous lesion      IMPRESSION:     No evidence of pulmonary embolus  No evidence of acute intrathoracic pathology  [facility] paged      0155  Perico Freed the case  No source, no fever  Does not feel pt is septic, based on work up results and pt's known h/o COPD - he feels that IVF bolus is reasonable, as well as Azithromycin for COPD exacerbation   Will admit to Step down             ED Course as of Apr 06 0152   Tue Apr 06, 2021   0151 LACTIC ACID(!!): 2 4     Procedures  MDM    Disposition  Final diagnoses:   None     ED Disposition     None      Follow-up Information    None       Patient's Medications   Discharge Prescriptions    No medications on file     No discharge procedures on file         ED Provider  Electronically Signed by     Columba Marshall MD  04/06/21 [id number] Jaren Holloway MD  04/06/21 0176

## 2021-04-06 NOTE — ASSESSMENT & PLAN NOTE
Known moderate ischemic cardiomyopathy  Patient has not been interested in an ICD for sudden death prophylaxis and remains with the same philosophy on our conversation today

## 2021-04-06 NOTE — CONSULTS
Sharif Rosales 1945, 77 y o  male MRN: [id number]  Unit/Bed#: ICU 03 Encounter: [id number]  Primary Care Provider: Mauro Tate DO   Date and time admitted to hospital: 4/5/2021 10:20 PM    Inpatient consult to Cardiology  Consult performed by: Shin Tuttle MD  Consult ordered by: Chadwick Cobos PA-C          Type 2 MI (myocardial infarction) [facility])  Assessment & Plan  Rise in troponin in the setting of known severe CAD  Nonetheless this is a supply demand mismatch not indicative of acute coronary thrombosis  Ischemic cardiomyopathy  Assessment & Plan  Known moderate ischemic cardiomyopathy  Patient has not been interested in an ICD for sudden death prophylaxis and remains with the same philosophy on our conversation today  Coronary artery disease involving native coronary artery of native heart without angina pectoris  Assessment & Plan  Prior CABG  Before heart surgery patient had a sense of burning in the chest and there has been no recurrence  * COPD with acute exacerbation [facility])  Assessment & Plan  Still with lots of wheezing  Other summary comments:   I see that the patient is getting saline for sepsis protocol but given significant cardiomyopathy and if anything a higher than normal blood pressure, I would recommend backing off on this  Outpatient Cardiologist:  Salazar Higuera    HPI: Zeynep Brock is a 77y o  year old male who presented with acute onset of dyspnea  He had been cleaning his pots and pans and when he sprayed a  it hit him in the face causing sudden dyspnea  Here on presentation he was quite short of breath  He met sepsis criteria  Troponins jaswant modestly and I am asked to comment further  He did not have any of the heartburn that he experienced prior to CABG  This morning he feels significantly improved although still wheezing    Patient is a long-time cigarette smoker and has backed off in the past year but still sneaks some  To reiterate, he had 4 vessel CABG in March of 2017 with left internal mammary to the LAD, vein graft to the ramus, vein graft to posterior descending artery and the ventricular branch of the right coronary artery  He has had a moderate depression of ejection fraction since then  Biggest limitation has been dyspnea with repeat he admissions but BNP below 200  He seems to respond to steroids  EKG:   Sinus rhythm with nonspecific ST segment changes  No acute changes  MOST  RECENT CARDIAC IMAGING:   Echo 8/2019  - EF 30-40%  Global dysfunction with severe hypokinesis of the apex  Mild MR  Echo 11/12/2020:  EF 30% with global dysfunction  Arterial duplex 12/2017 - likely occlusion of the left SFA  KEI mildly impaired          Review of Systems: a 10 point review of systems was conducted and is negative except for as mentioned in the HPI or as below  Historical Information   Past Medical History:   Diagnosis Date    BPH (benign prostatic hyperplasia)     45 days radiation treatment    COPD (chronic obstructive pulmonary disease)     Coronary artery disease     CABG x4 in 2017    Diabetes mellitus     History of Arterial Duplex of LE 12/26/2017    Likely occlusion of the left superficial femoral artery  Calcific changes bilaterally  Despite these changes, the ankle-brachial index as a measure of peripheral blood flow only mildly impaired   History of echocardiogram 06/12/2017    EF 40%, mild LVH, mild MR     Hyperlipidemia     Hypertension     NSTEMI (non-ST elevated myocardial infarction)     Prostate cancer     prostate     PVD (peripheral vascular disease)     Tremor      Past Surgical History:   Procedure Laterality Date    CARDIAC CATHETERIZATION  03/08/2017    Significant left main plus triple-vessel CAD   CORONARY ARTERY BYPASS GRAFT  03/08/2017    4V CABG:  LIMA to LAD, VG to RI, SVG to PDA to LVBR RCA      EYE SURGERY      shots in eye once a month @ the [facility] History     Substance and Sexual Activity   Alcohol Use Yes    Frequency: Monthly or less    Drinks per session: 3 or 4    Binge frequency: Never     Social History     Substance and Sexual Activity   Drug Use Never     Social History     Tobacco Use   Smoking Status Current Some Day Smoker    Packs/day: 0 50    Years: 60 00    Pack years: 30 00    Types: Cigarettes   Smokeless Tobacco Never Used   Tobacco Comment    only smokes when he drinks beer       Family History:   No longer relevant  Meds/Allergies   all current active meds have been reviewed  Medications Prior to Admission   Medication    albuterol (2 5 mg/3 mL) 0 083 % nebulizer solution    albuterol (PROVENTIL HFA,VENTOLIN HFA) 90 mcg/act inhaler    Alogliptin Benzoate 25 MG TABS    aspirin (ECOTRIN LOW STRENGTH) 81 mg EC tablet    Empagliflozin 25 MG TABS    fluticasone (FLOVENT HFA) 220 mcg/act inhaler    gabapentin (NEURONTIN) 100 mg capsule    glimepiride (AMARYL) 1 mg tablet    guaiFENesin (MUCINEX) 600 mg 12 hr tablet    hydrocortisone 1 % cream    lisinopril (ZESTRIL) 20 mg tablet    metFORMIN (GLUCOPHAGE) 500 mg tablet    metoprolol tartrate (LOPRESSOR) 25 mg tablet    mometasone (ASMANEX TWISTHALER) 220 MCG/INH inhaler    primidone (MYSOLINE) 50 mg tablet    rosuvastatin (CRESTOR) 20 MG tablet    saxagliptin (ONGLYZA) 5 MG tablet    tiotropium (SPIRIVA) 18 mcg inhalation capsule    tiotropium-olodaterol (STIOLTO RESPIMAT) 2 5-2 5 MCG/ACT inhaler       Allergies   Allergen Reactions    Atorvastatin Myalgia       Objective   Vitals: Blood pressure (!) 195/88, pulse 94, temperature 98 8 °F (37 1 °C), temperature source Tympanic, resp  rate (!) 31, height 5' 10" (1 778 m), weight 95 3 kg (210 lb), SpO2 95 %  , Body mass index is 30 13 kg/m² ,   Orthostatic Blood Pressures      Most Recent Value   Blood Pressure  (!) 195/88 filed at 04/06/2021 0700   Patient Position - Orthostatic VS  Sitting filed at 04/05/2021 2526          Systolic (06YYI), OWB:279 , Min:114 , LDA:095     Diastolic (33CMW), EDUARDO:96, Min:56, Max:110              Physical Exam:     General:  Normal appearance in no distress  Eyes:  Anicteric  Oral mucosa:  Moist   Neck:  No JVD  Carotid upstrokes are brisk without bruits  No masses  Chest:  Decreased breath sounds throughout with expiratory wheezing  Well healed midline sternotomy scar  Cardiac:  Normal PMI  Normal S1 and S2  No murmur gallop or rub  Abdomen:  Soft and nontender  No palpable organomegaly or aortic enlargement  Extremities:  No peripheral edema  Musculoskeletal:  Symmetric  Vascular:  Femoral popliteal and pedal pulses are absent  Neuro:  Grossly symmetric  Psych:  Alert and oriented x3          Lab Results:     Troponins:   Results from last 7 days   Lab Units 04/06/21  0425 04/06/21  0148 04/05/21  2233   TROPONIN I ng/mL 0 14* 0 12* 0 04*     BNP:   Results from last 6 Months   Lab Units 04/05/21  2233 11/30/20  0449   BNP pg/mL 241* 92       CBC :   Results from last 7 days   Lab Units 04/05/21  2233   WBC Thousand/uL 11 50*   HEMOGLOBIN g/dL 15 0   HEMATOCRIT % 45 6   MCV fL 90   PLATELETS Thousands/uL 181     TSH:     CMP:   Results from last 7 days   Lab Units 04/05/21  2233   POTASSIUM mmol/L 4 0   CHLORIDE mmol/L 100   CO2 mmol/L 23   BUN mg/dL 15   CREATININE mg/dL 1 04   AST U/L 15   ALT U/L 13   EGFR ml/min/1 73sq m 70     Lipid Profile:     Coags:   Results from last 7 days   Lab Units 04/05/21  2234   INR  0 99

## 2021-04-06 NOTE — RESPIRATORY THERAPY NOTE
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)    Elevated troponin    Lactic acidosis    Leukemoid reaction      Home Pulmonary Medications:    Home Devices/Therapy: (P) Home O2, Other (Comment)(3l/m @ hs, duoneb qid, alb mdi prn)    Past Medical History:   Diagnosis Date    BPH (benign prostatic hyperplasia)     45 days radiation treatment    COPD (chronic obstructive pulmonary disease)     Coronary artery disease     CABG x4 in 2017    Diabetes mellitus     History of Arterial Duplex of LE 12/26/2017    Likely occlusion of the left superficial femoral artery  Calcific changes bilaterally  Despite these changes, the ankle-brachial index as a measure of peripheral blood flow only mildly impaired      History of echocardiogram 06/12/2017    EF 40%, mild LVH, mild MR     Hyperlipidemia     Hypertension     NSTEMI (non-ST elevated myocardial infarction)     Prostate cancer     prostate     PVD (peripheral vascular disease)     Tremor      Social History     Socioeconomic History    Marital status: /Civil Union     Spouse name: None    Number of children: None    Years of education: None    Highest education level: None   Occupational History    None   Social Needs    Financial resource strain: Not hard at all   Bossman-Emily insecurity     Worry: Never true     Inability: Never true    Transportation needs     Medical: No     Non-medical: No   Tobacco Use    Smoking status: Current Some Day Smoker     Packs/day: 0 50     Years: 60 00     Pack years: 30 00     Types: Cigarettes    Smokeless tobacco: Never Used    Tobacco comment: only smokes when he drinks beer   Substance and Sexual Activity    Alcohol use: Yes     Frequency: Monthly or less     Drinks per session: 3 or 4     Binge frequency: Never    Drug use: Never    Sexual activity: Yes     Partners: Female   Lifestyle    Physical activity     Days per week: None     Minutes per session: None    Stress: None   Relationships    Social connections     Talks on phone: None     Gets together: None     Attends [employer] service: None     Active member of club or organization: None     Attends meetings of clubs or organizations: None     Relationship status: None    Intimate partner violence     Fear of current or ex partner: None     Emotionally abused: None     Physically abused: None     Forced sexual activity: None   Other Topics Concern    None   Social History Narrative    ** Merged History Encounter **            Subjective         Objective    Physical Exam:   Assessment Type: (P) Assess only  General Appearance: (P) Alert, Awake  Respiratory Pattern: (P) Normal  Chest Assessment: (P) Chest expansion symmetrical  Bilateral Breath Sounds: (P) Diminished  Cough: (P) None    Vitals:  Blood pressure 159/84, pulse 94, temperature 98 8 °F (37 1 °C), temperature source Tympanic, resp  rate 20, height 5' 10" (1 778 m), weight 95 3 kg (210 lb), SpO2 96 %  Imaging and other studies: I have personally reviewed pertinent reports              Plan    Respiratory Plan: (P) Mild Distress pathway, Vent/NIV/HFNC        Resp Comments: (P) pt assessed as per protocol, pt states he wears 3l/m hs at home and uses duonebs qid and prn alb mdi, pt had worn bipap initially in ed for about 2 hours w/ relief, bipap is now prn

## 2021-04-06 NOTE — ED PROCEDURE NOTE
PROCEDURE  ECG 12 Lead Documentation Only    Date/Time: 4/6/2021 1:29 AM  Performed by: Gómez Murillo MD  Authorized by: Gómez Murillo MD     Indications / Diagnosis:  Sob , elevated troponin   ECG reviewed by me, the ED Provider: yes    Patient location:  ED  Interpretation:     Interpretation: normal    Rate:     ECG rate:  98    ECG rate assessment: normal    Rhythm:     Rhythm: sinus rhythm    Ectopy:     Ectopy: none    QRS:     QRS axis:  Normal    QRS intervals:  Normal  ST segments:     ST segments:  Non-specific  T waves:     T waves: non-specific           Gómez Murillo MD  04/06/21 0130

## 2021-04-06 NOTE — ASSESSMENT & PLAN NOTE
Rise in troponin in the setting of known severe CAD  Nonetheless this is a supply demand mismatch not indicative of acute coronary thrombosis

## 2021-04-06 NOTE — ASSESSMENT & PLAN NOTE
Prior CABG  Before heart surgery patient had a sense of burning in the chest and there has been no recurrence

## 2021-04-06 NOTE — ASSESSMENT & PLAN NOTE
· Likely secondary to metformin use  · Patient received normal saline bolus in the ER  · Will continue monitor with serial lactic acid levels

## 2021-04-06 NOTE — ED PROVIDER NOTES
History  Chief Complaint   Patient presents with    Shortness of Breath     pt was cleaning oven earlier today and states he breathed in the fumes from the oven  and has been having trouble breathing since     HPI       Patient is a pleasant 27-year-old male that reports to the emergency department in respiratory distress  He arrives on CPAP  Patient with a history of COPD, reported history of heart failure as well  The patient notes that he was cleaning his oven today as well and may have breathed in some fumes  This may have triggered his symptoms  However, no massive amount of toxin inhalation to suggest direct inhalation injury  No focal neurological defects  Initially in moderate respiratory distress  Placed on BiPAP, started on nitro drip initially given high blood pressures, these quickly resolved  Patient started on antibiotics given possible infection, sepsis alert called given elevated lactic acid, will continue on BiPAP, will admit, will CT to rule out pneumonia/PE versus other              REVIEWED PRIOR NOTES, SEE BELOW FOR RECENT RELEVANT NOTES  ===================================================================        * Chronic obstructive pulmonary disease with acute exacerbation (HCC)  Assessment & Plan  · Improving  · Will discharge on prednisone taper  · Pulmonology input appreciated  · Continue home regimen  · Follow-up with pulmonology as outpatient  · Desaturation study done today, patient does not qualify for home oxygen  · Patient completed course of Zithromax  · COVID testing negative     Acute on chronic respiratory failure ([address] )  Assessment & Plan  · Likely multifactorial with COPD exacerbation, possible component of fluid overload, and possibly undiagnosed sleep apnea  · Desaturation study performed, patient does not qualify for home O2  · Outpatient follow-up with pulmonology for sleep study and further medication optimization as needed     Essential hypertension  Assessment & Plan  · BP stable  · Continue Lopressor and lisinopril     Tobacco abuse  Assessment & Plan  Counseled on smoking cessation     Type 2 diabetes mellitus without complication, without long-term current use of insulin ([address] )  Assessment & Plan  · Continue home diabetic regimen        Discharging Physician / Practitioner: Clemencia Leos MD  PCP: Bobo Stewart DO  Admission Date:       Admission Orders (From admission, onward)              Ordered          01/17/21 1522   Inpatient Admission  Once           01/16/21 2032   Place in Observation  Once                       Discharge Date: 01/19/21          Resolved Problems  Date Reviewed: 1/16/2021     None             Consultations During Hospital Stay:  · Pulmonology     Procedures Performed:   · None     Significant Findings / Test Results:   · COPD exacerbation     Incidental Findings:   · None      Test Results Pending at Discharge (will require follow up): · None     Outpatient Tests Requested:  · Routine labs with PCP     Complications:    · None     Reason for Admission:  COPD exacerbation     Hospital Course:      Kalli Arevalo is a 77 y o  male patient who originally presented to the hospital on 1/16/2021 due to shortness of breath  Patient treated for COPD exacerbation  Patient was on IV steroids  Pulmonology was consulted  Medications were optimized  Patient gradually improved  Desaturation study was performed  Patient did not qualify for home oxygen  Pulmonology recommended outpatient sleep study  Patient does follow with pulmonology as outpatient  Patient discharged with prednisone taper  Outpatient follow-up with PCP and pulmonology      Patient also had suspected component of fluid overload    Patient was given a couple of doses of IV Lasix in the hospital which he responded well to      Please see above list of diagnoses and related plan for additional information       Condition at Discharge: stable  ===================================================================      Prior to Admission Medications   Prescriptions Last Dose Informant Patient Reported? Taking?    Alogliptin Benzoate 25 MG TABS   No No   Sig: Take 0 5 tablets (12 5 mg total) by mouth daily Patient states takes 1/2 of a 25 mg tablet every AM   Patient taking differently: Take 25 mg by mouth daily    Empagliflozin 25 MG TABS Unknown at Unknown time  No No   Sig: Take 0 5 tablets (12 5 mg total) by mouth every morning Take one half tablet every morning   albuterol (2 5 mg/3 mL) 0 083 % nebulizer solution Unknown at Unknown time  No No   Sig: Take 1 vial (2 5 mg total) by nebulization every 4 (four) hours as needed for wheezing or shortness of breath   albuterol (PROVENTIL HFA,VENTOLIN HFA) 90 mcg/act inhaler Unknown at Unknown time  No No   Sig: Inhale 2 puffs every 6 (six) hours as needed for wheezing or shortness of breath   aspirin (ECOTRIN LOW STRENGTH) 81 mg EC tablet Unknown at Unknown time  No No   Sig: Take 1 tablet (81 mg total) by mouth every other day   fluticasone (FLOVENT HFA) 220 mcg/act inhaler Unknown at Unknown time  Yes No   Sig: Inhale 1 puff daily Rinse mouth after use    gabapentin (NEURONTIN) 100 mg capsule Unknown at Unknown time  No No   Sig: Take 2 capsules (200 mg total) by mouth daily at bedtime   glimepiride (AMARYL) 1 mg tablet Unknown at Unknown time  No No   Sig: Take 1 tablet (1 mg total) by mouth 2 (two) times a day   guaiFENesin (MUCINEX) 600 mg 12 hr tablet Unknown at Unknown time  No No   Sig: Take 1 tablet (600 mg total) by mouth 2 (two) times a day   hydrocortisone 1 % cream Unknown at Unknown time  No No   Sig: Apply topically 2 (two) times a day   lisinopril (ZESTRIL) 20 mg tablet Unknown at Unknown time  No No   Sig: Take 1 tablet (20 mg total) by mouth 2 (two) times a day   metFORMIN (GLUCOPHAGE) 500 mg tablet Unknown at Unknown time  No No   Sig: Take 1 tablet (500 mg total) by mouth daily with dinner metoprolol tartrate (LOPRESSOR) 25 mg tablet Unknown at Unknown time Spouse/Significant Other Yes No   Sig: Take 25 mg by mouth every 12 (twelve) hours   mometasone (ASMANEX TWISTHALER) 220 MCG/INH inhaler Unknown at Unknown time  Yes No   Sig: Inhale 2 puffs 2 (two) times a day Rinse mouth after use  primidone (MYSOLINE) 50 mg tablet Unknown at Unknown time  Yes No   Sig: Take 100 mg by mouth every 8 (eight) hours   rosuvastatin (CRESTOR) 20 MG tablet Unknown at Unknown time  No No   Sig: Take 1 tablet (20 mg total) by mouth daily after dinner Take one half tablet daily with dinner   Patient taking differently: Take 10 mg by mouth daily after dinner Take one half tablet daily with dinner    saxagliptin (ONGLYZA) 5 MG tablet Unknown at Unknown time  No No   Sig: Take 1 tablet (5 mg total) by mouth daily   tiotropium (SPIRIVA) 18 mcg inhalation capsule Unknown at Unknown time  No No   Sig: Place 1 capsule (18 mcg total) into inhaler and inhale daily   tiotropium-olodaterol (STIOLTO RESPIMAT) 2 5-2 5 MCG/ACT inhaler Unknown at Unknown time  Yes No   Sig: Inhale 2 puffs daily      Facility-Administered Medications: None       Past Medical History:   Diagnosis Date    BPH (benign prostatic hyperplasia)     45 days radiation treatment    COPD (chronic obstructive pulmonary disease)     Coronary artery disease     CABG x4 in 2017    Diabetes mellitus     History of Arterial Duplex of LE 12/26/2017    Likely occlusion of the left superficial femoral artery  Calcific changes bilaterally  Despite these changes, the ankle-brachial index as a measure of peripheral blood flow only mildly impaired      History of echocardiogram 06/12/2017    EF 40%, mild LVH, mild MR     Hyperlipidemia     Hypertension     NSTEMI (non-ST elevated myocardial infarction)     Prostate cancer     prostate     PVD (peripheral vascular disease)     Tremor        Past Surgical History:   Procedure Laterality Date    CARDIAC CATHETERIZATION  03/08/2017    Significant left main plus triple-vessel CAD   CORONARY ARTERY BYPASS GRAFT  03/08/2017    4V CABG:  LIMA to LAD, VG to RI, SVG to PDA to LVBR RCA   EYE SURGERY      shots in eye once a month @ the [place]    PROSTATE BIOPSY         Family History   Problem Relation Age of Onset    Cancer Father     Heart disease Brother      I have reviewed and agree with the history as documented  E-Cigarette/Vaping    E-Cigarette Use Never User      E-Cigarette/Vaping Substances    Nicotine No     THC No     CBD No     Flavoring No     Other No     Unknown No      Social History     Tobacco Use    Smoking status: Current Some Day Smoker     Packs/day: 0 50     Years: 60 00     Pack years: 30 00     Types: Cigarettes    Smokeless tobacco: Never Used    Tobacco comment: only smokes when he drinks beer   Substance Use Topics    Alcohol use: Yes     Frequency: Monthly or less     Drinks per session: 3 or 4     Binge frequency: Never    Drug use: Never       Review of Systems   Respiratory: Positive for shortness of breath and wheezing  All other systems reviewed and are negative  Physical Exam  Physical Exam  Vitals signs and nursing note reviewed  Constitutional:       Appearance: He is well-developed  He is not diaphoretic  HENT:      Head: Normocephalic and atraumatic  Right Ear: External ear normal       Left Ear: External ear normal       Nose: No congestion  Eyes:      General:         Right eye: No discharge  Left eye: No discharge  Extraocular Movements: Extraocular movements intact  Neck:      Musculoskeletal: Normal range of motion and neck supple  Cardiovascular:      Rate and Rhythm: Normal rate and regular rhythm  Heart sounds: Normal heart sounds  No murmur  Pulmonary:      Effort: Respiratory distress present  Breath sounds: Wheezing present  Abdominal:      General: There is no distension        Palpations: Abdomen is soft       Tenderness: There is no abdominal tenderness  Musculoskeletal:         General: No tenderness or signs of injury  Skin:     General: Skin is warm and dry  Findings: No erythema  Neurological:      General: No focal deficit present  Mental Status: He is alert and oriented to person, place, and time  Motor: No weakness     Psychiatric:         Mood and Affect: Mood normal          Behavior: Behavior normal          Vital Signs  ED Triage Vitals   Temperature Pulse Respirations Blood Pressure SpO2   04/05/21 2309 04/05/21 2230 04/05/21 2230 04/05/21 2230 04/05/21 2230   98 8 °F (37 1 °C) (!) 108 (!) 26 (!) 192/110 (!) 89 %      Temp Source Heart Rate Source Patient Position - Orthostatic VS BP Location FiO2 (%)   04/05/21 2309 04/05/21 2230 04/05/21 2230 04/05/21 2230 --   Tympanic Monitor Sitting Left arm       Pain Score       04/06/21 0525       No Pain           Vitals:    04/06/21 0605 04/06/21 0700 04/06/21 1100 04/06/21 1506   BP:  (!) 195/88 166/79 139/70   Pulse: 89 94 80 78   Patient Position - Orthostatic VS:    Lying         Visual Acuity      ED Medications  Medications   fluticasone (FLOVENT HFA) 220 mcg/act inhaler 1 puff (1 puff Inhalation Given 4/6/21 0802)   gabapentin (NEURONTIN) capsule 200 mg (has no administration in time range)   guaiFENesin (MUCINEX) 12 hr tablet 600 mg (600 mg Oral Given 4/6/21 0802)   hydrocortisone 1 % cream ( Topical Canceled Entry 4/6/21 0900)   lisinopril (ZESTRIL) tablet 20 mg (20 mg Oral Given 4/6/21 0802)   metoprolol tartrate (LOPRESSOR) tablet 25 mg (25 mg Oral Given 4/6/21 0802)   primidone (MYSOLINE) tablet 100 mg (100 mg Oral Given 4/6/21 1449)   pravastatin (PRAVACHOL) tablet 80 mg (has no administration in time range)   acetaminophen (TYLENOL) tablet 650 mg (has no administration in time range)   ondansetron (ZOFRAN) injection 4 mg (has no administration in time range)   ipratropium (ATROVENT) 0 02 % inhalation solution 0 5 mg (0 5 mg Nebulization Given 4/6/21 1400)   methylPREDNISolone sodium succinate (Solu-MEDROL) injection 40 mg (40 mg Intravenous Given 4/6/21 1449)   azithromycin (ZITHROMAX) tablet 500 mg (has no administration in time range)   heparin (porcine) subcutaneous injection 5,000 Units (5,000 Units Subcutaneous Given 4/6/21 0802)   insulin lispro (HumaLOG) 100 units/mL subcutaneous injection 1-5 Units (2 Units Subcutaneous Given 4/6/21 1121)   insulin lispro (HumaLOG) 100 units/mL subcutaneous injection 1-5 Units (has no administration in time range)   aspirin (ECOTRIN LOW STRENGTH) EC tablet 81 mg ( Oral Canceled Entry 4/6/21 0900)   levalbuterol (XOPENEX) inhalation solution 1 25 mg (1 25 mg Nebulization Given 4/6/21 1400)   albuterol (PROVENTIL HFA,VENTOLIN HFA) inhaler 2 puff (has no administration in time range)   vancomycin (VANCOCIN) 1,250 mg in sodium chloride 0 9 % 250 mL IVPB (0 mg/kg × 90 1 kg Intravenous Stopped 4/6/21 0228)   cefepime (MAXIPIME) IVPB (premix in dextrose) 2,000 mg 50 mL (0 mg Intravenous Stopped 4/5/21 2329)   methylPREDNISolone sodium succinate (Solu-MEDROL) injection 125 mg (125 mg Intravenous Given 4/5/21 2253)   albuterol inhalation solution 5 mg (5 mg Nebulization Given 4/5/21 2244)   ipratropium (ATROVENT) 0 02 % inhalation solution 0 5 mg (0 5 mg Nebulization Given 4/5/21 2244)   iohexol (OMNIPAQUE) 350 MG/ML injection (SINGLE-DOSE) 100 mL (100 mL Intravenous Given 4/6/21 0100)   sodium chloride 0 9 % bolus 1,000 mL (0 mL Intravenous Stopped 4/6/21 0408)   azithromycin (ZITHROMAX) 500 mg in sodium chloride 0 9% 250mL IVPB 500 mg (0 mg Intravenous Stopped 4/6/21 0408)   sodium chloride 0 9 % infusion (75 mL/hr Intravenous New Bag 4/6/21 1109)       Diagnostic Studies  Results Reviewed     Procedure Component Value Units Date/Time    Blood culture #2 [[id number]] Collected: 04/05/21 2233    Lab Status: Preliminary result Specimen: Blood from Arm, Left Updated: 04/06/21 1104     Blood Culture Received in Microbiology Lab  Culture in Progress  Blood culture #1 [[id number]] Collected: 04/05/21 2233    Lab Status: Preliminary result Specimen: Blood from Arm, Left Updated: 04/06/21 1104     Blood Culture Received in Microbiology Lab  Culture in Progress  Procalcitonin with AM Reflex [[id number]]  (Normal) Collected: 04/05/21 2248    Lab Status: Final result Specimen: Blood Updated: 04/06/21 1103     Procalcitonin <0 05 ng/ml     Troponin I [[id number]]  (Abnormal) Collected: 04/06/21 0425    Lab Status: Final result Specimen: Blood from Arm, Left Updated: 04/06/21 0457     Troponin I 0 14 ng/mL     Urine Microscopic [[id number]]  (Normal) Collected: 04/06/21 0142    Lab Status: Final result Specimen: Urine, Clean Catch Updated: 04/06/21 0216     RBC, UA 2-4 /hpf      WBC, UA None Seen /hpf      Epithelial Cells None Seen /hpf      Bacteria, UA None Seen /hpf     Troponin I [[id number]]  (Abnormal) Collected: 04/06/21 0148    Lab Status: Final result Specimen: Blood from Arm, Right Updated: 04/06/21 0214     Troponin I 0 12 ng/mL     UA w Reflex to Microscopic w Reflex to Culture [[id number]]  (Abnormal) Collected: 04/06/21 0142    Lab Status: Final result Specimen: Urine, Clean Catch Updated: 04/06/21 0156     Color, UA Yellow     Clarity, UA Clear     Specific Gravity, UA 1 015     pH, UA 5 0     Leukocytes, UA Negative     Nitrite, UA Negative     Protein, UA 1+ mg/dl      Glucose, UA 3+ mg/dl      Ketones, UA Trace mg/dl      Urobilinogen, UA 0 2 E U /dl      Bilirubin, UA Negative     Blood, UA Negative    Lactic acid 2 Hours [[id number]]  (Abnormal) Collected: 04/06/21 0116    Lab Status: Final result Specimen: Blood from Arm, Right Updated: 04/06/21 0144     LACTIC ACID 2 4 mmol/L     Narrative:      Result may be elevated if tourniquet was used during collection  Result may be elevated if tourniquet was used during collection      COVID19, Influenza A/B, RSV PCR, [facility] [[id number]]  (Normal) Collected: 04/05/21 2233    Lab Status: Final result Specimen: Nares from Nasopharyngeal Swab Updated: 04/05/21 2333     SARS-CoV-2 Negative     INFLUENZA A PCR Negative     INFLUENZA B PCR Negative     RSV PCR Negative    Narrative: This test has been authorized by FDA under an EUA (Emergency Use Assay) for use by authorized laboratories  Clinical caution and judgement should be used with the interpretation of these results with consideration of the clinical impression and other laboratory testing  Testing reported as "Positive" or "Negative" has been proven to be accurate according to standard laboratory validation requirements  All testing is performed with control materials showing appropriate reactivity at standard intervals  Brandi Dueñas [[id number]]  (Normal) Collected: 04/05/21 2234    Lab Status: Final result Specimen: Blood from Arm, Left Updated: 04/05/21 2330     Protime 13 0 seconds      INR 0 99    APTT [[id number]]  (Normal) Collected: 04/05/21 2234    Lab Status: Final result Specimen: Blood from Arm, Left Updated: 04/05/21 2330     PTT 30 seconds     Lactic acid [[id number]]  (Abnormal) Collected: 04/05/21 2233    Lab Status: Final result Specimen: Blood from Arm, Left Updated: 04/05/21 2311     LACTIC ACID 2 3 mmol/L     Narrative:      Result may be elevated if tourniquet was used during collection      B-Type Natriuretic Peptide ([address]) [[id number]]  (Abnormal) Collected: 04/05/21 2233    Lab Status: Final result Specimen: Blood from Arm, Left Updated: 04/05/21 2309      pg/mL     Comprehensive metabolic panel [[id number]]  (Abnormal) Collected: 04/05/21 2233    Lab Status: Final result Specimen: Blood from Arm, Left Updated: 04/05/21 2309     Sodium 137 mmol/L      Potassium 4 0 mmol/L      Chloride 100 mmol/L      CO2 23 mmol/L      ANION GAP 14 mmol/L      BUN 15 mg/dL      Creatinine 1 04 mg/dL      Glucose 178 mg/dL      Calcium 8 5 mg/dL      AST 15 U/L      ALT 13 U/L Alkaline Phosphatase 68 U/L      Total Protein 6 8 g/dL      Albumin 4 2 g/dL      Total Bilirubin 0 30 mg/dL      eGFR 70 ml/min/1 73sq m     Narrative:      National Kidney Disease Foundation guidelines for Chronic Kidney Disease (CKD):     Stage 1 with normal or high GFR (GFR > 90 mL/min/1 73 square meters)    Stage 2 Mild CKD (GFR = 60-89 mL/min/1 73 square meters)    Stage 3A Moderate CKD (GFR = 45-59 mL/min/1 73 square meters)    Stage 3B Moderate CKD (GFR = 30-44 mL/min/1 73 square meters)    Stage 4 Severe CKD (GFR = 15-29 mL/min/1 73 square meters)    Stage 5 End Stage CKD (GFR <15 mL/min/1 73 square meters)  Note: GFR calculation is accurate only with a steady state creatinine    Troponin I [[id number]]  (Abnormal) Collected: 04/05/21 2233    Lab Status: Final result Specimen: Blood from Arm, Left Updated: 04/05/21 2309     Troponin I 0 04 ng/mL     CBC and differential [[id number]]  (Abnormal) Collected: 04/05/21 2233    Lab Status: Final result Specimen: Blood from Arm, Left Updated: 04/05/21 2248     WBC 11 50 Thousand/uL      RBC 5 05 Million/uL      Hemoglobin 15 0 g/dL      Hematocrit 45 6 %      MCV 90 fL      MCH 29 6 pg      MCHC 32 8 g/dL      RDW 14 0 %      MPV 9 4 fL      Platelets 519 Thousands/uL      Neutrophils Relative 74 %      Lymphocytes Relative 16 %      Monocytes Relative 7 %      Eosinophils Relative 3 %      Basophils Relative 1 %      Neutrophils Absolute 8 60 Thousands/µL      Lymphocytes Absolute 1 80 Thousands/µL      Monocytes Absolute 0 80 Thousand/µL      Eosinophils Absolute 0 30 Thousand/µL      Basophils Absolute 0 10 Thousands/µL                  CTA ED chest PE Study   Final Result by Danielle Camacho MD (04/06 2672)      No evidence of pulmonary embolus  No evidence of acute intrathoracic pathology  Workstation performed: [id number]         XR chest 1 view portable   Final Result by Roger Ceron MD (04/06 7493)      No active pulmonary disease  Workstation performed: [id number]                    Procedures  CriticalCare Time  Performed by: Anthony Vasques MD  Authorized by: Anthony Vasques MD     Critical care provider statement:     Critical care time (minutes):  45    Critical care time was exclusive of:  Separately billable procedures and treating other patients and teaching time    Critical care was necessary to treat or prevent imminent or life-threatening deterioration of the following conditions:  Respiratory failure (Respiratory distress requiring BiPAP)    Critical care was time spent personally by me on the following activities:  Blood draw for specimens, obtaining history from patient or surrogate, development of treatment plan with patient or surrogate, evaluation of patient's response to treatment, examination of patient, re-evaluation of patient's condition, ordering and review of laboratory studies, ordering and performing treatments and interventions and review of old charts             ED Course  ED Course as of Apr 06 1510   Mon Apr 05, 2021   2312 Sepsis alert called at this time, possible pneumonia                            Initial Sepsis Screening     [address] Name 04/05/21 2313                Is the patient's history suggestive of a new or worsening infection? (!) Yes (Proceed)  -JK        Suspected source of infection  pneumonia  -JK        Are two or more of the following signs & symptoms of infection both present and new to the patient?   (!) Yes (Proceed)  -JK        Indicate SIRS criteria  Tachycardia > 90 bpm;Tachypnea > 20 resp per min  -JK        If the answer is yes to both questions, suspicion of sepsis is present  --        If severe sepsis is present AND tissue hypoperfusion perists in the hour after fluid resuscitation or lactate > 4, the patient meets criteria for SEPTIC SHOCK  --        Are any of the following organ dysfunction criteria present within 6 hours of suspected infection and SIRS criteria that are NOT considered to be chronic conditions? (!) Yes  -JK        Organ dysfunction  Lactate > 2 0 mmol/L  -JK        Date of presentation of severe sepsis  04/05/21  -JK        Time of presentation of severe sepsis  2313  -JK        Tissue hypoperfusion persists in the hour after crystalloid fluid administration, evidenced, by either:  --        Was hypotension present within one hour of the conclusion of crystalloid fluid administration?  --        Date of presentation of septic shock  --        Time of presentation of septic shock  --          User Key  (r) = Recorded By, (t) = Taken By, (c) = Cosigned By    [address] Name Provider Andi Dumont MD Physician                        MDM    Disposition  Final diagnoses:   SOB (shortness of breath)   Respiratory failure ([address] )   Elevated troponin   Lactic acidosis     Time reflects when diagnosis was documented in both MDM as applicable and the Disposition within this note     Time User Action Codes Description Comment    4/6/2021 12:54 AM Ya Ahr Add [R06 02] SOB (shortness of breath)     4/6/2021 12:55 AM Ya Ahr Add [J96 90] Respiratory failure ([address] )     4/6/2021  1:02 AM Ya Ahr Add [R77 8] Elevated troponin     4/6/2021  2:03 AM Ya Ahr Add [E87 2] Lactic acidosis     4/6/2021  2:33 AM Izola García Add [J44 1] Chronic obstructive pulmonary disease with acute exacerbation [facility])       ED Disposition     ED Disposition Condition Date/Time Comment    Admit Stable Tue Apr 6, 2021  2:02 AM Case was discussed with Estefania Samano and the patient's admission status was agreed to be Admission Status: inpatient status to the service of Dr Naya Hernandez           Follow-up Information    None         Current Discharge Medication List      CONTINUE these medications which have NOT CHANGED    Details   albuterol (2 5 mg/3 mL) 0 083 % nebulizer solution Take 1 vial (2 5 mg total) by nebulization every 4 (four) hours as needed for wheezing or shortness of breath  Qty: 75 mL, Refills: 0    Associated Diagnoses: COPD exacerbation (HCC)      albuterol (PROVENTIL HFA,VENTOLIN HFA) 90 mcg/act inhaler Inhale 2 puffs every 6 (six) hours as needed for wheezing or shortness of breath  Qty:  , Refills: 0    Comments: Substitution to a formulary equivalent within the same pharmaceutical class is authorized    Associated Diagnoses: COPD exacerbation (HCC)      Alogliptin Benzoate 25 MG TABS Take 0 5 tablets (12 5 mg total) by mouth daily Patient states takes 1/2 of a 25 mg tablet every AM  Qty: 15 tablet, Refills: 0    Associated Diagnoses: Type 2 diabetes mellitus without complication, without long-term current use of insulin ([facility] [facility])      aspirin (ECOTRIN LOW STRENGTH) 81 mg EC tablet Take 1 tablet (81 mg total) by mouth every other day  Qty:  , Refills: 0    Associated Diagnoses: COPD exacerbation (HCC)      Empagliflozin 25 MG TABS Take 0 5 tablets (12 5 mg total) by mouth every morning Take one half tablet every morning  Qty: 15 tablet, Refills: 0    Associated Diagnoses: Type 2 diabetes mellitus without complication, without long-term current use of insulin ([facility])      fluticasone (FLOVENT HFA) 220 mcg/act inhaler Inhale 1 puff daily Rinse mouth after use       gabapentin (NEURONTIN) 100 mg capsule Take 2 capsules (200 mg total) by mouth daily at bedtime  Qty: 60 capsule, Refills: 0    Associated Diagnoses: COPD exacerbation (HCC)      glimepiride (AMARYL) 1 mg tablet Take 1 tablet (1 mg total) by mouth 2 (two) times a day  Qty:  , Refills: 0    Associated Diagnoses: Type 2 diabetes mellitus without complication, without long-term current use of insulin ([facility])      guaiFENesin (MUCINEX) 600 mg 12 hr tablet Take 1 tablet (600 mg total) by mouth 2 (two) times a day  Refills: 0    Associated Diagnoses: COPD exacerbation ([facility])      hydrocortisone 1 % cream Apply topically 2 (two) times a day  Qty: 30 g, Refills: 0    Associated Diagnoses: COPD exacerbation ([facility])      lisinopril (ZESTRIL) 20 mg tablet Take 1 tablet (20 mg total) by mouth 2 (two) times a day  Qty: 60 tablet, Refills: 0    Associated Diagnoses: Essential hypertension      metFORMIN (GLUCOPHAGE) 500 mg tablet Take 1 tablet (500 mg total) by mouth daily with dinner  Qty:  , Refills: 0    Associated Diagnoses: Type 2 diabetes mellitus without complication, without long-term current use of insulin (HCC)      metoprolol tartrate (LOPRESSOR) 25 mg tablet Take 25 mg by mouth every 12 (twelve) hours      mometasone (ASMANEX TWISTHALER) 220 MCG/INH inhaler Inhale 2 puffs 2 (two) times a day Rinse mouth after use  primidone (MYSOLINE) 50 mg tablet Take 100 mg by mouth every 8 (eight) hours      rosuvastatin (CRESTOR) 20 MG tablet Take 1 tablet (20 mg total) by mouth daily after dinner Take one half tablet daily with dinner  Qty:  , Refills: 0    Associated Diagnoses: Other hyperlipidemia      saxagliptin (ONGLYZA) 5 MG tablet Take 1 tablet (5 mg total) by mouth daily  Qty:  , Refills: 0    Associated Diagnoses: Type 2 diabetes mellitus without complication, without long-term current use of insulin (HCC)      tiotropium (SPIRIVA) 18 mcg inhalation capsule Place 1 capsule (18 mcg total) into inhaler and inhale daily  Qty: 30 capsule, Refills: 0    Associated Diagnoses: COPD exacerbation (HCC)      tiotropium-olodaterol (STIOLTO RESPIMAT) 2 5-2 5 MCG/ACT inhaler Inhale 2 puffs daily           No discharge procedures on file      PDMP Review       Value Time User    PDMP Reviewed  Yes 9/3/2020 12:26 PM Telly Botello [facility] Provider  Electronically Signed by           Katia Anand MD  04/06/21 5993

## 2021-04-06 NOTE — ASSESSMENT & PLAN NOTE
· Place on telemetry  · Trend cardiac enzymes  · Obtain serial EKGs  · Continue pre-hospital statin, lisinopril and Lopressor  · Give aspirin 81 mg p o  Daily  · Consult Cardiology in a m

## 2021-04-06 NOTE — SEPSIS NOTE
Sepsis Note   Rebeca García 77 y o  male MRN: [id number]  Unit/Bed#: ED 12 Encounter: [id number]      qSOFA     Row Name 04/05/21 2309 04/05/21 2300 04/05/21 2259 04/05/21 2245 04/05/21 2230    Altered mental status GCS < 15  --  --  --  --  --    Respiratory Rate > / =22  1  1  --  1  1    Systolic BP < / =339  --  0  0  --  0    Q Sofa Score  1  1  --  1  1        Initial Sepsis Screening     Row Name 04/05/21 2313                Is the patient's history suggestive of a new or worsening infection? (!) Yes (Proceed)  -JK        Suspected source of infection  pneumonia  -JK        Are two or more of the following signs & symptoms of infection both present and new to the patient? (!) Yes (Proceed)  -JK        Indicate SIRS criteria  Tachycardia > 90 bpm;Tachypnea > 20 resp per min  -JK        If the answer is yes to both questions, suspicion of sepsis is present  --        If severe sepsis is present AND tissue hypoperfusion perists in the hour after fluid resuscitation or lactate > 4, the patient meets criteria for SEPTIC SHOCK  --        Are any of the following organ dysfunction criteria present within 6 hours of suspected infection and SIRS criteria that are NOT considered to be chronic conditions?   (!) Yes  -JK        Organ dysfunction  Lactate > 2 0 mmol/L  -JK        Date of presentation of severe sepsis  04/05/21  -JK        Time of presentation of severe sepsis  2313  -JK        Tissue hypoperfusion persists in the hour after crystalloid fluid administration, evidenced, by either:  --        Was hypotension present within one hour of the conclusion of crystalloid fluid administration?  --        Date of presentation of septic shock  --        Time of presentation of septic shock  --          User Key  (r) = Recorded By, (t) = Taken By, (c) = Cosigned By    [address] Name Provider Pollo Brooks MD Physician

## 2021-04-06 NOTE — ED NOTES
Report given to ICU RN  They are preparing a patient for a procedure and will call when safe to bring patient to unit       Kris Hoff RN  04/06/21 1764

## 2021-04-06 NOTE — H&P
[facility]  H&P- Timmy Hoit 1945, 77 y o  male MRN: [id number]  Unit/Bed#: ED 12 Encounter: [id number]  Primary Care Provider: French Brody DO   Date and time admitted to hospital: 4/5/2021 10:20 PM    * COPD with acute exacerbation ([address] )  Assessment & Plan  · Admit to Medicine  · Will give Solu-Medrol 40 mg IV q 8 hours  · Give Xopenex and Atrovent nebs t i d   · Place on respiratory and O2 protocol  · Give Zithromax 500 mg p o  Daily  · Consult pulmonary in a m  · Continue other pre-hospital respiratory medications    Elevated troponin I level  Assessment & Plan  · Place on telemetry  · Trend cardiac enzymes  · Obtain serial EKGs  · Continue pre-hospital statin, lisinopril and Lopressor  · Give aspirin 81 mg p o  Daily  · Consult Cardiology in a m  Lactic acidosis  Assessment & Plan  · Likely secondary to metformin use  · Patient received normal saline bolus in the ER  · Will continue monitor with serial lactic acid levels    Leukemoid reaction  Assessment & Plan  Likely reaction secondary to hypoxia, will continue monitor with repeat labs in a m  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)  Assessment & Plan  Lab Results   Component Value Date    HGBA1C 7 1 (H) 08/14/2020       No results for input(s): POCGLU in the last 72 hours  Blood Sugar Average: Last 72 hrs:     Place on [facility] step 2 diet, will hold pre-hospital oral antihyperglycemics obtain Accu-Cheks AC and HS with Humalog correction dose a c  And hs    Other hyperlipidemia  Assessment & Plan  Substitute Pravachol 80 mg p o  Daily for pre-hospital Crestor      VTE Prophylaxis: Heparin  Code Status:  Level 1  Discussion with family:  None present at bedside at time of exam    Anticipated Length of Stay:  Patient will be admitted on an Inpatient basis with an anticipated length of stay of  > 2 midnights     Justification for Hospital Stay:  COPD exacerbation requiring O2 support, frequent nebulizers and IV steroids and Pulmonary evaluation    Total Time for Visit, including Counseling / Coordination of Care: 1 hour  Greater than 50% of this total time spent on direct patient counseling and coordination of care  Chief Complaint:   Shortness of breath x1 day    History of Present Illness:    Ekta Gallagher is a 77 y o  male who presents with shortness or breath x1 day  Patient has longstanding history of COPD with multiple previous admissions who presents to the ER with acute onset of shortness of breath after inhaling oven  fumes this evening  Upon arrival in the ER patient's O2 saturation was 89% a was subsequently placed on BiPAP for short period of time  Patient states that his breathing feels much improved at this time  Patient does have a history of chronic hypoxic respiratory failure and is on 2 L nasal cannula around the clock  Patient denies any fever chills, no cough, no recent sick contacts  On routine labs in the ER patient's initial troponin was 0 04 with a repeat of 0 12 patient denies any chest pain, palpitations, lightheadedness, dizziness, nausea or vomiting  Patient has extensive cardiac history and has been recommended for an ICD in the past based on echocardiogram from 10/12/2020 with an estimated ejection fraction of 51% with diastolic dysfunction  Review of Systems:    Review of Systems   Constitutional: Negative for chills and fever  Respiratory: Positive for cough, shortness of breath and wheezing  Cardiovascular: Negative for chest pain and palpitations  Gastrointestinal: Negative for abdominal pain, diarrhea, nausea and vomiting  Genitourinary: Negative for dysuria, frequency, hematuria and urgency  Neurological: Negative for weakness, light-headedness and headaches  All other systems reviewed and are negative        Past Medical and Surgical History:     Past Medical History:   Diagnosis Date    BPH (benign prostatic hyperplasia) 39 days radiation treatment    COPD (chronic obstructive pulmonary disease)     Coronary artery disease     CABG x4 in 2017    Diabetes mellitus     History of Arterial Duplex of LE 12/26/2017    Likely occlusion of the left superficial femoral artery  Calcific changes bilaterally  Despite these changes, the ankle-brachial index as a measure of peripheral blood flow only mildly impaired   History of echocardiogram 06/12/2017    EF 40%, mild LVH, mild MR     Hyperlipidemia     Hypertension     NSTEMI (non-ST elevated myocardial infarction)     Prostate cancer     prostate     PVD (peripheral vascular disease)     Tremor        Past Surgical History:   Procedure Laterality Date    CARDIAC CATHETERIZATION  03/08/2017    Significant left main plus triple-vessel CAD   CORONARY ARTERY BYPASS GRAFT  03/08/2017    4V CABG:  LIMA to LAD, VG to RI, SVG to PDA to LVBR RCA   EYE SURGERY      shots in eye once a month @ the [address] [place] [address]         Meds/Allergies:    Prior to Admission medications    Medication Sig Start Date End Date Taking?  Authorizing Provider   albuterol (2 5 mg/3 mL) 0 083 % nebulizer solution Take 1 vial (2 5 mg total) by nebulization every 4 (four) hours as needed for wheezing or shortness of breath 9/3/20   PENG Kerr   albuterol (PROVENTIL HFA,VENTOLIN HFA) 90 mcg/act inhaler Inhale 2 puffs every 6 (six) hours as needed for wheezing or shortness of breath 9/3/20   PENG Kerr   Alogliptin Benzoate 25 MG TABS Take 0 5 tablets (12 5 mg total) by mouth daily Patient states takes 1/2 of a 25 mg tablet every AM  Patient taking differently: Take 25 mg by mouth daily  9/3/20 11/29/20  PENG Kerr   aspirin (ECOTRIN LOW STRENGTH) 81 mg EC tablet Take 1 tablet (81 mg total) by mouth every other day 9/3/20   Addie Conquest PENG Huffman   Empagliflozin 25 MG TABS Take 0 5 tablets (12 5 mg total) by mouth every morning Take one half tablet every morning 9/3/20   PENG Kerr   fluticasone (FLOVENT HFA) 220 mcg/act inhaler Inhale 1 puff daily Rinse mouth after use  Historical Provider, MD   gabapentin (NEURONTIN) 100 mg capsule Take 2 capsules (200 mg total) by mouth daily at bedtime 9/3/20   PENG Kerr   glimepiride (AMARYL) 1 mg tablet Take 1 tablet (1 mg total) by mouth 2 (two) times a day 9/3/20   PENG Bagley   guaiFENesin (MUCINEX) 600 mg 12 hr tablet Take 1 tablet (600 mg total) by mouth 2 (two) times a day 9/3/20   PENG Clark   hydrocortisone 1 % cream Apply topically 2 (two) times a day 9/3/20   PENG Bagley   lisinopril (ZESTRIL) 20 mg tablet Take 1 tablet (20 mg total) by mouth 2 (two) times a day 9/3/20 9/3/21  PENG Kerr   metFORMIN (GLUCOPHAGE) 500 mg tablet Take 1 tablet (500 mg total) by mouth daily with dinner 9/3/20   PENG Bagley   metoprolol tartrate (LOPRESSOR) 25 mg tablet Take 25 mg by mouth every 12 (twelve) hours    Historical Provider, MD   mometasone (ASMANEX TWISTHALER) 220 MCG/INH inhaler Inhale 2 puffs 2 (two) times a day Rinse mouth after use      Historical Provider, MD   primidone (MYSOLINE) 50 mg tablet Take 100 mg by mouth every 8 (eight) hours    Historical Provider, MD   rosuvastatin (CRESTOR) 20 MG tablet Take 1 tablet (20 mg total) by mouth daily after dinner Take one half tablet daily with dinner  Patient taking differently: Take 10 mg by mouth daily after dinner Take one half tablet daily with dinner  9/3/20   PENG Bagley   saxagliptin (ONGLYZA) 5 MG tablet Take 1 tablet (5 mg total) by mouth daily 9/3/20   PENG Bagley   tiotropium (SPIRIVA) 18 mcg inhalation capsule Place 1 capsule (18 mcg total) into inhaler and inhale daily 12/2/20   Annamaria Evans MD   tiotropium-olodaterol (STIOLTO RESPIMAT) 2 5-2 5 MCG/ACT inhaler Inhale 2 puffs daily    Historical Provider, MD     all medications and allergies reviewed    Allergies: Allergies   Allergen Reactions    Atorvastatin Myalgia       Social History:     Marital Status: /Civil Union   Occupation:  Retired from the [employer]  Patient Pre-hospital Living Situation:  Resides at home with wife, grandson and grandson's dontrelle  Patient Pre-hospital Level of Mobility:  Full with occasional use of a cane  Patient Pre-hospital Diet Restrictions:  None  Substance Use History:   Social History     Substance and Sexual Activity   Alcohol Use Yes    Frequency: Monthly or less     Social History     Tobacco Use   Smoking Status Current Some Day Smoker   Smokeless Tobacco Never Used   Tobacco Comment    only smokes when he drinks beer     Social History     Substance and Sexual Activity   Drug Use Never       Family History:  I have reviewed the patient's family history    Physical Exam:     Vitals:   Blood Pressure: 159/84 (04/06/21 0330)  Pulse: 96 (04/06/21 0330)  Temperature: 98 8 °F (37 1 °C) (04/05/21 2309)  Temp Source: Tympanic (04/05/21 2309)  Respirations: (!) 23 (04/06/21 0330)  Height: 5' 10" (177 8 cm) (04/06/21 0321)  Weight - Scale: 95 3 kg (210 lb) (04/06/21 0321)  SpO2: 96 % (04/06/21 0330)    Physical Exam  Vitals signs and nursing note reviewed  Constitutional:       General: He is not in acute distress  Appearance: Normal appearance  HENT:      Head: Normocephalic and atraumatic  Right Ear: Tympanic membrane normal       Left Ear: Tympanic membrane normal       Nose: Nose normal       Mouth/Throat:      Mouth: Mucous membranes are moist       Pharynx: No oropharyngeal exudate or posterior oropharyngeal erythema  Eyes:      Extraocular Movements: Extraocular movements intact  Pupils: Pupils are equal, round, and reactive to light  Neck:      Musculoskeletal: Normal range of motion and neck supple  Cardiovascular:      Rate and Rhythm: Normal rate and regular rhythm  Pulses: Normal pulses  Heart sounds: Normal heart sounds     Pulmonary: Effort: Pulmonary effort is normal  Tachypnea present  No respiratory distress  Breath sounds: Decreased air movement present  Wheezing present  Abdominal:      General: Abdomen is flat  Bowel sounds are normal       Palpations: Abdomen is soft  Musculoskeletal: Normal range of motion  Right lower leg: No edema  Left lower leg: No edema  Skin:     General: Skin is warm and dry  Capillary Refill: Capillary refill takes less than 2 seconds  Neurological:      General: No focal deficit present  Mental Status: He is alert and oriented to person, place, and time  Additional Data:     Lab Results: I have personally reviewed pertinent reports  Results from last 7 days   Lab Units 04/05/21  2233   WBC Thousand/uL 11 50*   HEMOGLOBIN g/dL 15 0   HEMATOCRIT % 45 6   PLATELETS Thousands/uL 181   NEUTROS PCT % 74   LYMPHS PCT % 16*   MONOS PCT % 7   EOS PCT % 3     Results from last 7 days   Lab Units 04/05/21  2233   SODIUM mmol/L 137   POTASSIUM mmol/L 4 0   CHLORIDE mmol/L 100   CO2 mmol/L 23   BUN mg/dL 15   CREATININE mg/dL 1 04   ANION GAP mmol/L 14*   CALCIUM mg/dL 8 5*   ALBUMIN g/dL 4 2   TOTAL BILIRUBIN mg/dL 0 30   ALK PHOS U/L 68   ALT U/L 13   AST U/L 15   GLUCOSE RANDOM mg/dL 178*     Results from last 7 days   Lab Units 04/05/21  2234   INR  0 99             Results from last 7 days   Lab Units 04/06/21  0116 04/05/21  2233   LACTIC ACID mmol/L 2 4* 2 3*       Imaging: I have personally reviewed pertinent reports  CTA ED chest PE Study   Final Result by Cassandra Saxena MD (04/06 5586)      No evidence of pulmonary embolus  No evidence of acute intrathoracic pathology                    Workstation performed: [id number]         XR chest 1 view portable    (Results Pending)         EKG, Pathology, and Other Studies Reviewed on Admission:   · EKG:  Normal sinus rhythm rate of 98 without ischemia    Epic / Care Everywhere Records Reviewed: Yes    ** Please Note: This note has been constructed using a voice recognition system   **

## 2021-04-06 NOTE — CASE MANAGEMENT
Evaluated the pt at the bedside  Pt was admitted to the hospital for COPD Exacerbation  CM met with pt at bedside ands explained role  Pt reports he lives with his spouse Barbara Jurado in a 2 story house; [address], 14 steps inside  Pt does not use an assistive device to ambulate  He had RW, Cane, nebulizer, inhaler and shower chair at home  Pt is currently on oxygen and does not have same at home  Pt reports he is completely independent with ADLs  He continues to drive  Pt PCP is French Brody  He uses [facility] for medications and denies any barriers to obtaining them  Pt states his neighbor or daughter will transport him home upon discharge  Discussed [facility] services with the pt, however he  Declined same  Patient/caregiver received discharge checklist   Content reviewed  Patient/caregiver encouraged to participate in discharge plan of care prior to discharge home  CM reviewed d/c planning process including the following: identifying help at home, patient preference for d/c planning needs, availability of treatment team to discuss questions or concerns patient and/or family may have regarding understanding medications and recognizing signs and symptoms once discharged  CM also encouraged patient to follow up with all recommended appointments after discharge  Patient advised of importance for patient and family to participate in managing patients medical well being

## 2021-04-06 NOTE — ED NOTES
BiPap on hold due to patient improvement  Patient is to go to CT scan  Will evaluate his ability to maintain O2 without distress prior to sending him to CT  Dr Vinton Romberg aware       Herve Obregon, RN  04/06/21 0825

## 2021-04-06 NOTE — ED PROCEDURE NOTE
PROCEDURE  ECG 12 Lead Documentation Only    Date/Time: 4/6/2021 12:30 AM  Performed by: Sarika No MD  Authorized by: Sarika No MD     Indications / Diagnosis:  Sob  ECG reviewed by me, the ED Provider: yes    Rate:     ECG rate:  112    ECG rate assessment: tachycardic    Rhythm:     Rhythm: sinus tachycardia    QRS:     QRS axis:  Normal    QRS intervals:  Normal  ST segments:     ST segments:  Non-specific  T waves:     T waves: non-specific           Sarika No MD  04/06/21 0864

## 2021-04-06 NOTE — NURSING NOTE
0523- Pt admitted to unit, assuming care of patient at this time  7927- Bedside report given, no longer assuming care of patient

## 2021-04-06 NOTE — ASSESSMENT & PLAN NOTE
· Admit to Medicine  · Will give Solu-Medrol 40 mg IV q 8 hours  · Give Xopenex and Atrovent nebs t i d   · Place on respiratory and O2 protocol  · Give Zithromax 500 mg p o  Daily  · Consult pulmonary in a m    · Continue other pre-hospital respiratory medications

## 2021-04-07 VITALS
OXYGEN SATURATION: 97 % | DIASTOLIC BLOOD PRESSURE: 89 MMHG | SYSTOLIC BLOOD PRESSURE: 171 MMHG | RESPIRATION RATE: 17 BRPM | WEIGHT: 214.07 LBS | TEMPERATURE: 97.3 F | BODY MASS INDEX: 30.65 KG/M2 | HEART RATE: 79 BPM | HEIGHT: 70 IN

## 2021-04-07 LAB
ANION GAP SERPL CALCULATED.3IONS-SCNC: 9 MMOL/L (ref 4–13)
BUN SERPL-MCNC: 23 MG/DL (ref 7–25)
CALCIUM SERPL-MCNC: 8.3 MG/DL (ref 8.6–10.5)
CHLORIDE SERPL-SCNC: 102 MMOL/L (ref 98–107)
CO2 SERPL-SCNC: 26 MMOL/L (ref 21–31)
CREAT SERPL-MCNC: 0.77 MG/DL (ref 0.7–1.3)
ERYTHROCYTE [DISTWIDTH] IN BLOOD BY AUTOMATED COUNT: 14.1 % (ref 11.5–14.5)
GFR SERPL CREATININE-BSD FRML MDRD: 89 ML/MIN/1.73SQ M
GLUCOSE SERPL-MCNC: 167 MG/DL (ref 65–140)
GLUCOSE SERPL-MCNC: 188 MG/DL (ref 65–140)
GLUCOSE SERPL-MCNC: 203 MG/DL (ref 65–99)
GLUCOSE SERPL-MCNC: 250 MG/DL (ref 65–140)
HCT VFR BLD AUTO: 39.6 % (ref 42–47)
HGB BLD-MCNC: 13.4 G/DL (ref 14–18)
MCH RBC QN AUTO: 30.3 PG (ref 26–34)
MCHC RBC AUTO-ENTMCNC: 33.8 G/DL (ref 31–37)
MCV RBC AUTO: 90 FL (ref 81–99)
PLATELET # BLD AUTO: 159 THOUSANDS/UL (ref 149–390)
PMV BLD AUTO: 10 FL (ref 8.6–11.7)
POTASSIUM SERPL-SCNC: 4.6 MMOL/L (ref 3.5–5.5)
PROCALCITONIN SERPL-MCNC: <0.05 NG/ML
RBC # BLD AUTO: 4.41 MILLION/UL (ref 4.3–5.9)
SODIUM SERPL-SCNC: 137 MMOL/L (ref 134–143)
WBC # BLD AUTO: 10.5 THOUSAND/UL (ref 4.8–10.8)

## 2021-04-07 PROCEDURE — 85027 COMPLETE CBC AUTOMATED: CPT | Performed by: FAMILY MEDICINE

## 2021-04-07 PROCEDURE — 99232 SBSQ HOSP IP/OBS MODERATE 35: CPT | Performed by: PHYSICIAN ASSISTANT

## 2021-04-07 PROCEDURE — 94760 N-INVAS EAR/PLS OXIMETRY 1: CPT

## 2021-04-07 PROCEDURE — 82948 REAGENT STRIP/BLOOD GLUCOSE: CPT

## 2021-04-07 PROCEDURE — 84145 PROCALCITONIN (PCT): CPT | Performed by: FAMILY MEDICINE

## 2021-04-07 PROCEDURE — 80048 BASIC METABOLIC PNL TOTAL CA: CPT | Performed by: FAMILY MEDICINE

## 2021-04-07 PROCEDURE — 99239 HOSP IP/OBS DSCHRG MGMT >30: CPT | Performed by: INTERNAL MEDICINE

## 2021-04-07 PROCEDURE — 94640 AIRWAY INHALATION TREATMENT: CPT

## 2021-04-07 RX ORDER — PREDNISONE 10 MG/1
TABLET ORAL
Qty: 20 TABLET | Refills: 0 | Status: SHIPPED | OUTPATIENT
Start: 2021-04-07 | End: 2021-04-15

## 2021-04-07 RX ADMIN — METHYLPREDNISOLONE SODIUM SUCCINATE 40 MG: 40 INJECTION, POWDER, FOR SOLUTION INTRAMUSCULAR; INTRAVENOUS at 05:20

## 2021-04-07 RX ADMIN — METOPROLOL TARTRATE 25 MG: 25 TABLET, FILM COATED ORAL at 08:37

## 2021-04-07 RX ADMIN — METHYLPREDNISOLONE SODIUM SUCCINATE 40 MG: 40 INJECTION, POWDER, FOR SOLUTION INTRAMUSCULAR; INTRAVENOUS at 15:44

## 2021-04-07 RX ADMIN — FLUTICASONE PROPIONATE 1 PUFF: 220 AEROSOL, METERED RESPIRATORY (INHALATION) at 08:44

## 2021-04-07 RX ADMIN — IPRATROPIUM BROMIDE 0.5 MG: 0.5 SOLUTION RESPIRATORY (INHALATION) at 13:49

## 2021-04-07 RX ADMIN — LISINOPRIL 20 MG: 20 TABLET ORAL at 08:37

## 2021-04-07 RX ADMIN — ASPIRIN 81 MG: 81 TABLET, COATED ORAL at 08:37

## 2021-04-07 RX ADMIN — INSULIN LISPRO 1 UNITS: 100 INJECTION, SOLUTION INTRAVENOUS; SUBCUTANEOUS at 07:26

## 2021-04-07 RX ADMIN — INSULIN LISPRO 2 UNITS: 100 INJECTION, SOLUTION INTRAVENOUS; SUBCUTANEOUS at 11:55

## 2021-04-07 RX ADMIN — HEPARIN SODIUM 5000 UNITS: 5000 INJECTION INTRAVENOUS; SUBCUTANEOUS at 08:36

## 2021-04-07 RX ADMIN — PRAVASTATIN SODIUM 80 MG: 40 TABLET ORAL at 15:44

## 2021-04-07 RX ADMIN — LEVALBUTEROL HYDROCHLORIDE 1.25 MG: 1.25 SOLUTION, CONCENTRATE RESPIRATORY (INHALATION) at 07:21

## 2021-04-07 RX ADMIN — ALBUTEROL SULFATE 2 PUFF: 90 AEROSOL, METERED RESPIRATORY (INHALATION) at 05:26

## 2021-04-07 RX ADMIN — IPRATROPIUM BROMIDE 0.5 MG: 0.5 SOLUTION RESPIRATORY (INHALATION) at 07:21

## 2021-04-07 RX ADMIN — GUAIFENESIN 600 MG: 600 TABLET, EXTENDED RELEASE ORAL at 08:37

## 2021-04-07 RX ADMIN — PRIMIDONE 100 MG: 50 TABLET ORAL at 05:20

## 2021-04-07 RX ADMIN — LEVALBUTEROL HYDROCHLORIDE 1.25 MG: 1.25 SOLUTION, CONCENTRATE RESPIRATORY (INHALATION) at 13:49

## 2021-04-07 RX ADMIN — PRIMIDONE 100 MG: 50 TABLET ORAL at 15:44

## 2021-04-07 NOTE — PROGRESS NOTES
[address]  Progress Note - Sammie Fill 1945, 77 y o  male MRN: [id number]  Unit/Bed#: -02 Encounter: [id number]  Primary Care Provider: Angeles Winslow DO   Date and time admitted to hospital: 4/5/2021 10:20 PM    Type 2 MI (myocardial infarction) [facility])  Assessment & Plan  Rise in troponin in the setting of known severe CAD  supply demand mismatch--not indicative of acute coronary thrombosis  Ischemic cardiomyopathy  Assessment & Plan  Known moderate ischemic cardiomyopathy  Not interested in an ICD     Coronary artery disease involving native coronary artery of native heart without angina pectoris  Assessment & Plan  Prior CABG  No current anginal symptoms     * COPD with acute exacerbation [facility])  Assessment & Plan  Still with wheezing  Subjective:  Patient seen and examined at the the bedside  Feeling better  No chest pain or palpitations  No edema  Breathing is labored with wheezing  Objective:   Vitals: Blood pressure 138/79, pulse 76, temperature (!) 96 6 °F (35 9 °C), temperature source Tympanic, resp  rate 20, height 5' 10" (1 778 m), weight 97 1 kg (214 lb 1 1 oz), SpO2 95 % ,   Orthostatic Blood Pressures      Most Recent Value   Blood Pressure  138/79 filed at 04/07/2021 3746   Patient Position - Orthostatic VS  Sitting filed at 04/07/2021 0045            Physical Exam:  Physical Exam   Constitutional: He is oriented to person, place, and time  He appears well-developed and well-nourished  HENT:   Head: Normocephalic and atraumatic  Mouth/Throat: Oropharynx is clear and moist    Eyes: Conjunctivae are normal  No scleral icterus  Neck: Normal range of motion  Neck supple  No JVD present  No thyromegaly present  Cardiovascular: Normal rate, regular rhythm, S1 normal, S2 normal and normal heart sounds  Exam reveals no gallop and no friction rub  No murmur heard  Mid-sternotomy scar    Pulmonary/Chest: No respiratory distress   He has no wheezes  He has no rales  Abdominal: Soft  Bowel sounds are normal  He exhibits distension  There is no abdominal tenderness  Aorta not palpable   Musculoskeletal: Normal range of motion  General: No tenderness, deformity or edema  Neurological: He is alert and oriented to person, place, and time  Skin: Skin is warm and dry  Psychiatric: He has a normal mood and affect  His behavior is normal    Nursing note and vitals reviewed        Medications:    Current Facility-Administered Medications:     acetaminophen (TYLENOL) tablet 650 mg, 650 mg, Oral, Q6H PRN, Lorena Guthrie MD    albuterol (PROVENTIL HFA,VENTOLIN HFA) inhaler 2 puff, 2 puff, Inhalation, Q4H PRN, Lorena Guthrie MD, 2 puff at 04/07/21 0526    aspirin (ECOTRIN LOW STRENGTH) EC tablet 81 mg, 81 mg, Oral, Daily, Lorena Guthrie MD, 81 mg at 04/07/21 0837    azithromycin (ZITHROMAX) tablet 500 mg, 500 mg, Oral, Q24H, Lorena Guthrie MD, 500 mg at 04/06/21 2250    fluticasone (FLOVENT HFA) 220 mcg/act inhaler 1 puff, 1 puff, Inhalation, Daily, Lorena Guthrie MD, 1 puff at 04/07/21 0844    gabapentin (NEURONTIN) capsule 200 mg, 200 mg, Oral, HS, Lorena Guthrie MD, 200 mg at 04/06/21 2250    guaiFENesin (MUCINEX) 12 hr tablet 600 mg, 600 mg, Oral, BID, Lorena Guthrie MD, 600 mg at 04/07/21 0837    heparin (porcine) subcutaneous injection 5,000 Units, 5,000 Units, Subcutaneous, Q12H [address], Lorena Guthrie MD, 5,000 Units at 04/07/21 0836    hydrocortisone 1 % cream, , Topical, BID, Lorena Guthrie MD, 1 application at 99/51/62 1710    insulin lispro (HumaLOG) 100 units/mL subcutaneous injection 1-5 Units, 1-5 Units, Subcutaneous, TID AC, 1 Units at 04/07/21 0726 **AND** Fingerstick Glucose (POCT), , , TID AC, Jimigallito Andnio MD    insulin lispro (HumaLOG) 100 units/mL subcutaneous injection 1-5 Units, 1-5 Units, Subcutaneous, HS, Lorena Guthrie MD, 1 Units at 04/06/21 2279   ipratropium (ATROVENT) 0 02 % inhalation solution 0 5 mg, 0 5 mg, Nebulization, TID, Salma Rivera MD, 0 5 mg at 04/07/21 0721    levalbuterol (Teran Bull) inhalation solution 1 25 mg, 1 25 mg, Nebulization, TID, 1 25 mg at 04/07/21 0721 **AND** [DISCONTINUED] sodium chloride 0 9 % inhalation solution 3 mL, 3 mL, Nebulization, Q6H PRN, Danita Bland MD    lisinopril (ZESTRIL) tablet 20 mg, 20 mg, Oral, BID, Salma Rivera MD, 20 mg at 04/07/21 0837    methylPREDNISolone sodium succinate (Solu-MEDROL) injection 40 mg, 40 mg, Intravenous, Q8H, Salma Rivera MD, 40 mg at 04/07/21 0520    metoprolol tartrate (LOPRESSOR) tablet 25 mg, 25 mg, Oral, Q12H [address], Salma Rivera MD, 25 mg at 04/07/21 0837    ondansetron (ZOFRAN) injection 4 mg, 4 mg, Intravenous, Q6H PRN, Salma Rivera MD    pravastatin (PRAVACHOL) tablet 80 mg, 80 mg, Oral, Daily With John Garcia MD, 80 mg at 04/06/21 1709    primidone (MYSOLINE) tablet 100 mg, 100 mg, Oral, Q8H [address], Salma Rivera MD, 100 mg at 04/07/21 0520     Labs & Results:  Results from last 7 days   Lab Units 04/06/21  1058  04/05/21  2233   TROPONIN I ng/mL 0 11*   < > 0 04*   BNP pg/mL  --   --  241*    < > = values in this interval not displayed       Results from last 7 days   Lab Units 04/07/21  0516   WBC Thousand/uL 10 50   HEMOGLOBIN g/dL 13 4*   HEMATOCRIT % 39 6*   PLATELETS Thousands/uL 159         Results from last 7 days   Lab Units 04/07/21  0516   POTASSIUM mmol/L 4 6   CHLORIDE mmol/L 102   CO2 mmol/L 26   BUN mg/dL 23   CREATININE mg/dL 0 77     Results from last 7 days   Lab Units 04/05/21  2234   INR  0 99

## 2021-04-07 NOTE — ASSESSMENT & PLAN NOTE
· Patient has clinically improved  · Back to baseline oxygen requirement  · Will discharge on prednisone taper  · Continue home inhaler/nebulizer regimen  · Follow-up with pulmonology as outpatient  · No signs of infection  Procalcitonin normal   Blood cultures negative to date    COVID testing negative  · No need for antibiotics on discharge

## 2021-04-07 NOTE — DISCHARGE SUMMARY
[address] [facility]  Discharge- Elroy Silva 1945, 77 y o  male MRN: [id number]  Unit/Bed#: -02 Encounter: [id number]  Primary Care Provider: Shelly Hernández DO   Date and time admitted to hospital: 4/5/2021 10:20 PM    * COPD with acute exacerbation [facility])  Assessment & Plan  · Patient has clinically improved  · Back to baseline oxygen requirement  · Will discharge on prednisone taper  · Continue home inhaler/nebulizer regimen  · Follow-up with pulmonology as outpatient  · No signs of infection  Procalcitonin normal   Blood cultures negative to date  COVID testing negative  · No need for antibiotics on discharge    Acute on chronic respiratory failure (HCC)  Assessment & Plan  · Possibly secondary to COPD exacerbation  · Currently back to baseline O2 requirements    Type 2 MI (myocardial infarction) ([facility] [address] )  Assessment & Plan  · No signs of acute ACS  · Cardiology input appreciated  · Outpatient follow-up with PCP    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 ([facility] [address] )  Assessment & Plan  Continue home diabetic regimen    Other hyperlipidemia  Assessment & Plan  Continue home statin      Discharging Physician / Practitioner: Nataliya Falcon MD  PCP: Shelly Hernández DO  Admission Date:   Admission Orders (From admission, onward)     Ordered        04/06/21 0238  Inpatient Admission  Once                   Discharge Date: 04/07/21    Resolved Problems  Date Reviewed: 4/7/2021    None          Consultations During Hospital Stay:  · Cardiology    Procedures Performed:   · None    Significant Findings / Test Results:   · COPD exacerbation    Incidental Findings:   · None     Test Results Pending at Discharge (will require follow up):    · None     Outpatient Tests Requested:  · Routine labs with PCP    Complications:     None    Reason for Admission:  COPD exacerbation    Hospital Course:     Elroy Silva is a 77 y o  male patient who originally presented to the hospital on 4/5/2021 due to shortness of breath  Patient admitted with suspected COPD exacerbation  Patient also with elevated troponins on admission  Cardiology was consulted and after further evaluation no signs of acute ACS were noted  Likely secondary to type 2 MI from COPD exacerbation  Patient was maintained on Solu-Medrol and nebulizers  Symptoms gradually improved and patient was titrated back to home dose of oxygen  Patient discharged on steroid taper  Advised to follow-up with pulmonology within the next 1-2 weeks for routine follow-up  No signs of any infection and therefore no antibiotics were prescribed on discharge  Please see above list of diagnoses and related plan for additional information  Condition at Discharge: stable     Discharge Day Visit / Exam:     Subjective:  Patient states he is feeling much better today  Still with shortness of breath however much improved from admission  Patient on baseline oxygen requirement at this point  Denies any chest pain  No fever or chills  Tolerating diet  Ambulating well  Vitals: Blood Pressure: 138/79 (04/07/21 0729)  Pulse: 76 (04/07/21 0729)  Temperature: (!) 96 6 °F (35 9 °C) (04/07/21 0729)  Temp Source: Tympanic (04/07/21 0729)  Respirations: 20 (04/07/21 0729)  Height: 5' 10" (177 8 cm) (04/06/21 0321)  Weight - Scale: 97 1 kg (214 lb 1 1 oz) (04/07/21 0557)  SpO2: 93 % (04/07/21 1349)  Exam:   Physical Exam  Constitutional:       General: He is not in acute distress  Appearance: He is obese  HENT:      Head: Normocephalic and atraumatic  Nose: Nose normal       Mouth/Throat:      Mouth: Mucous membranes are moist    Eyes:      Extraocular Movements: Extraocular movements intact  Conjunctiva/sclera: Conjunctivae normal    Neck:      Musculoskeletal: Normal range of motion and neck supple  Cardiovascular:      Rate and Rhythm: Normal rate and regular rhythm     Pulmonary:      Effort: Pulmonary effort is normal  No respiratory distress  Abdominal:      Palpations: Abdomen is soft  Tenderness: There is no abdominal tenderness  Musculoskeletal: Normal range of motion  Comments: Generalized weakness   Skin:     General: Skin is warm and dry  Neurological:      General: No focal deficit present  Mental Status: He is alert  Cranial Nerves: No cranial nerve deficit  Psychiatric:         Mood and Affect: Mood normal          Behavior: Behavior normal          Discussion with Family:  Attempted to call patient's wife Idalia Reynolds over the phone at number provided in chart with no answer    Discharge instructions/Information to patient and family:   See after visit summary for information provided to patient and family  Provisions for Follow-Up Care:  See after visit summary for information related to follow-up care and any pertinent home health orders  Disposition:     Home      Planned Readmission:    No     Discharge Statement:  I spent 35 minutes discharging the patient  This time was spent on the day of discharge  I had direct contact with the patient on the day of discharge  Greater than 50% of the total time was spent examining patient, answering all patient questions, arranging and discussing plan of care with patient as well as directly providing post-discharge instructions  Additional time then spent on discharge activities  Discharge Medications:  See after visit summary for reconciled discharge medications provided to patient and family        ** Please Note: This note has been constructed using a voice recognition system **

## 2021-04-07 NOTE — UTILIZATION REVIEW
Initial Clinical Review    Admission: Date/Time/Statement:   Admission Orders (From admission, onward)     Ordered        04/06/21 0238  Inpatient Admission  Once                   Orders Placed This Encounter   Procedures    Inpatient Admission     Standing Status:   Standing     Number of Occurrences:   1     Order Specific Question:   Level of Care     Answer:   Level 2 Stepdown / HOT [14]     Order Specific Question:   Estimated length of stay     Answer:   More than 2 Midnights     Order Specific Question:   Certification     Answer:   I certify that inpatient services are medically necessary for this patient for a duration of greater than two midnights  See H&P and MD Progress Notes for additional information about the patient's course of treatment  ED Arrival Information     Expected Arrival Acuity Means of Arrival Escorted By Service Admission Type    - 4/5/2021 22:19 [facility]    Arrival Complaint    sob        Chief Complaint   Patient presents with    Shortness of Breath     pt was cleaning oven earlier today and states he breathed in the fumes from the oven  and has been having trouble breathing since     Assessment/Plan:   76 yom to ER from home c/o acute onset SOB after inhaling oven  fumes this evening  Hx COPD (2ltr dependent), CAD/CABG x4, DM, HTN, HLD, NSTEMI, PVD  Presents wheezing,  hypoxic, tachycardic, tachypneic & hypertensive  Placed on BIPAP & later transitioned to 3ltr nc  Admission work-up showing leukocytosis, +troponin, elevated lactic acid & BNP  Admitted to inpatient status for COPD exacerbation  Started on IV steroids & Iv tridil gtt, cardio consulted  Per cardio:   Type 2 MI (myocardial infarction) [facility])  Assessment & Plan  Rise in troponin in the setting of known severe CAD  Nonetheless this is a supply demand mismatch not indicative of acute coronary thrombosis    Ischemic cardiomyopathy  Assessment & Plan  Known moderate ischemic cardiomyopathy  Patient has not been interested in an ICD for sudden death prophylaxis and remains with the same philosophy on our conversation today  ED Triage Vitals   Temperature Pulse Respirations Blood Pressure SpO2   04/05/21 2309 04/05/21 2230 04/05/21 2230 04/05/21 2230 04/05/21 2230   98 8 °F (37 1 °C) (!) 108 (!) 26 (!) 192/110 (!) 89 %      Temp Source Heart Rate Source Patient Position - Orthostatic VS BP Location FiO2 (%)   04/05/21 2309 04/05/21 2230 04/05/21 2230 04/05/21 2230 --   Tympanic Monitor Sitting Left arm       Pain Score       04/06/21 0525       No Pain          Wt Readings from Last 1 Encounters:   04/07/21 97 1 kg (214 lb 1 1 oz)     Additional Vital Signs:   04/06/21 1400  --  --  --  --  --  94 %  32  3 L/min  Nasal cannula  --  --   04/06/21 1100  98 °F (36 7 °C)  80  23Abnormal   166/79  112  97 %  --  --  --  --  --   04/06/21 0807  --  --  --  --  --  95 %  32  3 L/min   Nasal cannula  --  --   Nasal Cannula O2 Flow Rate (L/min): O2 removed, but left running @ bedside PRN   at 04/06/21 0807   04/06/21 0800  97 °F (36 1 °C)Abnormal   --  --  --  --  --  --  --  --  --  --   04/06/21 0700  --  94  31Abnormal   195/88Abnormal   127  97 %  --  --  --  --  --   04/06/21 0605  --  89  22  --  --  96 %  32  3 L/min  Nasal cannula  --  --   04/06/21 0600  --  90  24Abnormal   170/81  117  97 %  --  --  --  --       Pertinent Labs/Diagnostic Test Results:   Results from last 7 days   Lab Units 04/05/21 2233   SARS-COV-2  Negative     Results from last 7 days   Lab Units 04/07/21  0516 04/06/21  1058 04/05/21 2233   WBC Thousand/uL 10 50 11 10* 11 50*   HEMOGLOBIN g/dL 13 4* 14 1 15 0   HEMATOCRIT % 39 6* 42 9 45 6   PLATELETS Thousands/uL 159 160 181   NEUTROS ABS Thousands/µL  --   --  8 60*   TOTAL NEUT ABS Thousand/uL  --  10 43*  --      Results from last 7 days   Lab Units 04/07/21  0516 04/06/21  1058 04/05/21  2233   SODIUM mmol/L 137 134 137 POTASSIUM mmol/L 4 6 4 5 4 0   CHLORIDE mmol/L 102 100 100   CO2 mmol/L 26 22 23   ANION GAP mmol/L 9 12 14*   BUN mg/dL 23 17 15   CREATININE mg/dL 0 77 0 85 1 04   EGFR ml/min/1 73sq m 89 85 70   CALCIUM mg/dL 8 3* 8 1* 8 5*     Results from last 7 days   Lab Units 04/05/21  2233   AST U/L 15   ALT U/L 13   ALK PHOS U/L 68   TOTAL PROTEIN g/dL 6 8   ALBUMIN g/dL 4 2   TOTAL BILIRUBIN mg/dL 0 30     Results from last 7 days   Lab Units 04/07/21  1112 04/07/21  0514 04/06/21  1959 04/06/21  1550 04/06/21  1057 04/06/21  0732   POC GLUCOSE mg/dl 250* 188* 178* 149* 240* 187*     Results from last 7 days   Lab Units 04/07/21  0516 04/06/21  1058 04/05/21  2233   GLUCOSE RANDOM mg/dL 203* 270* 178*     Results from last 7 days   Lab Units 04/06/21  1058 04/06/21  0425 04/06/21  0148 04/05/21  2233   TROPONIN I ng/mL 0 11* 0 14* 0 12* 0 04*     Results from last 7 days   Lab Units 04/05/21  2234   PROTIME seconds 13 0   INR  0 99   PTT seconds 30     Results from last 7 days   Lab Units 04/07/21  0516 04/05/21  2248   PROCALCITONIN ng/ml <0 05 <0 05     Results from last 7 days   Lab Units 04/06/21  1058 04/06/21  0956 04/06/21  0116 04/05/21  2233   LACTIC ACID mmol/L 3 3* 3 5* 2 4* 2 3*     Results from last 7 days   Lab Units 04/05/21  2233   BNP pg/mL 241*     Results from last 7 days   Lab Units 04/06/21  0142   CLARITY UA  Clear   COLOR UA  Yellow   SPEC GRAV UA  1 015   PH UA  5 0   GLUCOSE UA mg/dl 3+*   KETONES UA mg/dl Trace*   BLOOD UA  Negative   PROTEIN UA mg/dl 1+*   NITRITE UA  Negative   BILIRUBIN UA  Negative   UROBILINOGEN UA E U /dl 0 2   LEUKOCYTES UA  Negative   WBC UA /hpf None Seen   RBC UA /hpf 2-4   BACTERIA UA /hpf None Seen   EPITHELIAL CELLS WET PREP /hpf None Seen     Results from last 7 days   Lab Units 04/06/21  1043 04/05/21  2233   STREP PNEUMONIAE ANTIGEN, URINE  Negative  --    LEGIONELLA URINARY ANTIGEN  Negative  --    INFLUENZA A PCR   --  Negative   INFLUENZA B PCR   --  Negative RSV PCR   --  Negative     Results from last 7 days   Lab Units 04/05/21  2233   BLOOD CULTURE  No Growth at 24 hrs  No Growth at 24 hrs  Results from last 7 days   Lab Units 04/06/21  1058   TOTAL COUNTED  100     4/5  Cxr=  No active pulmonary disease  Ekg=  Sinus tachycardia with occasional Premature ventricular complexes  Left atrial enlargement  Septal infarct (cited on or before 29-NOV-2020)  Nonspecific ST-t wave changes    4/6  cta chest pe study=  No evidence of pulmonary embolus  No evidence of acute intrathoracic pathology    Ekg=  Normal sinus rhythm  Left atrial enlargement  Septal infarct (cited on or before 29-NOV-2020)  Nonspecific ST-t wave changes    ED Treatment:   Medication Administration from 04/05/2021 2219 to 04/06/2021 0517       Date/Time Order Dose Route Action     04/05/2021 2259 nitroGLYcerin (TRIDIL) 50 mg in 250 mL infusion (premix) 2 mcg/min Intravenous Rate/Dose Change     04/05/2021 2241 nitroGLYcerin (TRIDIL) 50 mg in 250 mL infusion (premix) 20 mcg/min Intravenous New Bag     04/05/2021 2330 vancomycin (VANCOCIN) 1,250 mg in sodium chloride 0 9 % 250 mL IVPB 1,250 mg Intravenous New Bag     04/05/2021 2257 cefepime (MAXIPIME) IVPB (premix in dextrose) 2,000 mg 50 mL 2,000 mg Intravenous New Bag     04/05/2021 2253 methylPREDNISolone sodium succinate (Solu-MEDROL) injection 125 mg 125 mg Intravenous Given     04/05/2021 2244 albuterol inhalation solution 5 mg 5 mg Nebulization Given     04/05/2021 2244 ipratropium (ATROVENT) 0 02 % inhalation solution 0 5 mg 0 5 mg Nebulization Given     04/06/2021 0100 iohexol (OMNIPAQUE) 350 MG/ML injection (SINGLE-DOSE) 100 mL 100 mL Intravenous Given     04/06/2021 0228 sodium chloride 0 9 % bolus 1,000 mL 1,000 mL Intravenous New Bag     04/06/2021 0409 sodium chloride 0 9 % infusion 125 mL/hr Intravenous New Bag     04/06/2021 0258 azithromycin (ZITHROMAX) 500 mg in sodium chloride 0 9% 250mL IVPB 500 mg 500 mg Intravenous New Bag 04/06/2021 0335 aspirin (ECOTRIN LOW STRENGTH) EC tablet 81 mg 81 mg Oral Given        Past Medical History:   Diagnosis Date    BPH (benign prostatic hyperplasia)     45 days radiation treatment    COPD (chronic obstructive pulmonary disease)     Coronary artery disease     CABG x4 in 2017    Diabetes mellitus     History of Arterial Duplex of LE 12/26/2017    Likely occlusion of the left superficial femoral artery  Calcific changes bilaterally  Despite these changes, the ankle-brachial index as a measure of peripheral blood flow only mildly impaired      History of echocardiogram 06/12/2017    EF 40%, mild LVH, mild MR     Hyperlipidemia     Hypertension     NSTEMI (non-ST elevated myocardial infarction)     Prostate cancer     prostate     PVD (peripheral vascular disease)     Tremor      Present on Admission:   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)   Other hyperlipidemia   Lactic acidosis   Leukemoid reaction   Coronary artery disease involving native coronary artery of native heart without angina pectoris   Ischemic cardiomyopathy   Acute on chronic respiratory failure (HCC)    Admitting Diagnosis: Respiratory failure ([facility]) [J96 90]  Lactic acidosis [E87 2]  SOB (shortness of breath) [R06 02]  Elevated troponin [R77 8]  Chronic obstructive pulmonary disease with acute exacerbation ([facility]) [J44 1]  Age/Sex: 77 y o  male  Admission Orders:  Contact & airborne isolation  Consult cardio  accuchecks  Cont pulse ox  Consult pulmonary  Scd/foot pumps    Scheduled Medications:  aspirin, 81 mg, Oral, Daily  azithromycin, 500 mg, Oral, Q24H  fluticasone, 1 puff, Inhalation, Daily  gabapentin, 200 mg, Oral, HS  guaiFENesin, 600 mg, Oral, BID  heparin (porcine), 5,000 Units, Subcutaneous, Q12H ENDER  hydrocortisone, , Topical, BID  insulin lispro, 1-5 Units, Subcutaneous, TID AC  insulin lispro, 1-5 Units, Subcutaneous, HS  ipratropium, 0 5 mg, Nebulization, TID  levalbuterol, 1 25 mg, Nebulization, TID  lisinopril, 20 mg, Oral, BID  methylPREDNISolone sodium succinate, 40 mg, Intravenous, Q8H  metoprolol tartrate, 25 mg, Oral, Q12H ENDER  pravastatin, 80 mg, Oral, Daily With Dinner  primidone, 100 mg, Oral, Q8H [facility]    Continuous IV Infusions:  nitroGLYcerin (TRIDIL) 50 mg in 250 mL infusion (premix)   Rate: 1 5-60 mL/hr Dose: 5-200 mcg/min  Start: 04/05/21 2230 End: 04/06/21 0926  sodium chloride 0 9 % infusion   Rate: 125 mL/hr   Start: 04/06/21 0215 End: 04/06/21 0926    PRN Meds:  acetaminophen, 650 mg, Oral, Q6H PRN  albuterol, 2 puff, Inhalation, Q4H PRN  ondansetron, 4 mg, Intravenous, Q6H PRN    Network Utilization Review Department  ATTENTION: Please call with any questions or concerns to [phone] and carefully listen to the prompts so that you are directed to the right person  All voicemails are confidential   [facility] all requests for admission clinical reviews, approved or denied determinations and any other requests to dedicated fax number below belonging to the campus where the patient is receiving treatment   List of dedicated fax numbers for the Facilities:  [address] DENIALS (Administrative/Medical Necessity) [phone]   [address] (Maternity/NICU/Pediatrics) [phone]   [address] [place] [address] Dr Louis Crandall [phone] (  [facility] 103) [id number] [address] [address] [address] Manual Butler) [address] [place] [address] [phone]

## 2021-04-07 NOTE — ASSESSMENT & PLAN NOTE
Rise in troponin in the setting of known severe CAD  supply demand mismatch--not indicative of acute coronary thrombosis

## 2021-04-07 NOTE — DISCHARGE INSTR - AVS FIRST PAGE
Dear Kalli Arevalo,     It was our pleasure to care for you here at [facility]  It is our hope that we were always able to exceed the expected standards for your care during your stay  You were hospitalized due to COPD exacerbation  You were cared for on the medical floor by Clemencia Leos MD with the [facility] who covers for your primary care physician (PCP), Bobo Stewart, DO, while you were hospitalized  If you have any questions or concerns related to this hospitalization, you may contact us at [phone]  For follow up as well as any medication refills, we recommend that you follow up with your primary care physician  A registered nurse will reach out to you by phone within a few days after your discharge to answer any additional questions that you may have after going home  However, at this time we provide for you here, the most important instructions / recommendations at discharge:     · Notable Medication Adjustments -   · Prednisone taper, take as directed  · Testing Required after Discharge -   · Routine labs with PCP  · Important follow up information -   · Follow-up with your pulmonologist (lung doctor) in 1-2 weeks  · Other Instructions -   · Please see discharge instructions  · Please review this entire after visit summary as additional general instructions including medication list, appointments, activity, diet, any pertinent wound care, and other additional recommendations from your care team that may be provided for you        Sincerely,     Clemencia Leos MD

## 2021-04-07 NOTE — NURSING NOTE
Patient discharged to home in stable condition  All discharge instructions explained to patient prior to discharge with patient stating verbal understanding of same  Patient then transported to the hospital exit via wheelchair and was escorted there by Omari Bernard

## 2021-04-07 NOTE — PLAN OF CARE
Problem: Potential for Falls  Goal: Patient will remain free of falls  Description: INTERVENTIONS:  - Assess patient frequently for physical needs  -  Identify cognitive and physical deficits and behaviors that affect risk of falls  -  [place] fall precautions as indicated by assessment   - Educate patient/family on patient safety including physical limitations  - Instruct patient to call for assistance with activity based on assessment  - Modify environment to reduce risk of injury  - Consider OT/PT consult to assist with strengthening/mobility  Outcome: Progressing     Problem: NEUROSENSORY - ADULT  Goal: Achieves stable or improved neurological status  Description: INTERVENTIONS  - Monitor and report changes in neurological status  - Monitor vital signs such as temperature, blood pressure, glucose, and any other labs ordered   - Initiate measures to prevent increased intracranial pressure  - Monitor for seizure activity and implement precautions if appropriate      Outcome: Progressing  Goal: Remains free of injury related to seizures activity  Description: INTERVENTIONS  - Maintain airway, patient safety  and administer oxygen as ordered  - Monitor patient for seizure activity, document and report duration and description of seizure to physician/advanced practitioner  - If seizure occurs,  ensure patient safety during seizure  - Reorient patient post seizure  - Seizure pads on all 4 side rails  - Instruct patient/family to notify RN of any seizure activity including if an aura is experienced  - Instruct patient/family to call for assistance with activity based on nursing assessment  - Administer anti-seizure medications if ordered    Outcome: Progressing  Goal: Achieves maximal functionality and self care  Description: INTERVENTIONS  - Monitor swallowing and airway patency with patient fatigue and changes in neurological status  - Encourage and assist patient to increase activity and self care     - Encourage visually impaired, hearing impaired and aphasic patients to use assistive/communication devices  Outcome: Progressing     Problem: CARDIOVASCULAR - ADULT  Goal: Maintains optimal cardiac output and hemodynamic stability  Description: INTERVENTIONS:  - Monitor I/O, vital signs and rhythm  - Monitor for S/S and trends of decreased cardiac output  - Administer and titrate ordered vasoactive medications to optimize hemodynamic stability  - Assess quality of pulses, skin color and temperature  - Assess for signs of decreased coronary artery perfusion  - Instruct patient to report change in severity of symptoms  Outcome: Progressing  Goal: Absence of cardiac dysrhythmias or at baseline rhythm  Description: INTERVENTIONS:  - Continuous cardiac monitoring, vital signs, obtain 12 lead EKG if ordered  - Administer antiarrhythmic and heart rate control medications as ordered  - Monitor electrolytes and administer replacement therapy as ordered  Outcome: Progressing     Problem: RESPIRATORY - ADULT  Goal: Achieves optimal ventilation and oxygenation  Description: INTERVENTIONS:  - Assess for changes in respiratory status  - Assess for changes in mentation and behavior  - Position to facilitate oxygenation and minimize respiratory effort  - Oxygen administered by appropriate delivery if ordered  - Initiate smoking cessation education as indicated  - Encourage broncho-pulmonary hygiene including cough, deep breathe, Incentive Spirometry  - Assess the need for suctioning and aspirate as needed  - Assess and instruct to report SOB or any respiratory difficulty  - Respiratory Therapy support as indicated  Outcome: Progressing     Problem: GASTROINTESTINAL - ADULT  Goal: Minimal or absence of nausea and/or vomiting  Description: INTERVENTIONS:  - Administer IV fluids if ordered to ensure adequate hydration  - Maintain NPO status until nausea and vomiting are resolved  - Nasogastric tube if ordered  - Administer ordered antiemetic medications as needed  - Provide nonpharmacologic comfort measures as appropriate  - Advance diet as tolerated, if ordered  - Consider nutrition services referral to assist patient with adequate nutrition and appropriate food choices  Outcome: Progressing  Goal: Maintains or returns to baseline bowel function  Description: INTERVENTIONS:  - Assess bowel function  - Encourage oral fluids to ensure adequate hydration  - Administer IV fluids if ordered to ensure adequate hydration  - Administer ordered medications as needed  - Encourage mobilization and activity  - Consider nutritional services referral to assist patient with adequate nutrition and appropriate food choices  Outcome: Progressing  Goal: Maintains adequate nutritional intake  Description: INTERVENTIONS:  - Monitor percentage of each meal consumed  - Identify factors contributing to decreased intake, treat as appropriate  - Assist with meals as needed  - Monitor I&O, weight, and lab values if indicated  - Obtain nutrition services referral as needed  Outcome: Progressing     Problem: GENITOURINARY - ADULT  Goal: Maintains or returns to baseline urinary function  Description: INTERVENTIONS:  - Assess urinary function  - Encourage oral fluids to ensure adequate hydration if ordered  - Administer IV fluids as ordered to ensure adequate hydration  - Administer ordered medications as needed  - Offer frequent toileting  - Follow urinary retention protocol if ordered  Outcome: Progressing  Goal: Absence of urinary retention  Description: INTERVENTIONS:  - Assess patients ability to void and empty bladder  - Monitor I/O  - Bladder scan as needed  - Discuss with physician/AP medications to alleviate retention as needed  - Discuss catheterization for long term situations as appropriate  Outcome: Progressing     Problem: METABOLIC, FLUID AND ELECTROLYTES - ADULT  Goal: Electrolytes maintained within normal limits  Description: INTERVENTIONS:  - Monitor labs and assess patient for signs and symptoms of electrolyte imbalances  - Administer electrolyte replacement as ordered  - Monitor response to electrolyte replacements, including repeat lab results as appropriate  - Instruct patient on fluid and nutrition as appropriate  Outcome: Progressing  Goal: Fluid balance maintained  Description: INTERVENTIONS:  - Monitor labs   - Monitor I/O and WT  - Instruct patient on fluid and nutrition as appropriate  - Assess for signs & symptoms of volume excess or deficit  Outcome: Progressing  Goal: Glucose maintained within target range  Description: INTERVENTIONS:  - Monitor Blood Glucose as ordered  - Assess for signs and symptoms of hyperglycemia and hypoglycemia  - Administer ordered medications to maintain glucose within target range  - Assess nutritional intake and initiate nutrition service referral as needed  Outcome: Progressing     Problem: SKIN/TISSUE INTEGRITY - ADULT  Goal: Skin integrity remains intact  Description: INTERVENTIONS  - Identify patients at risk for skin breakdown  - Assess and monitor skin integrity  - Assess and monitor nutrition and hydration status  - Monitor labs (i e  albumin)  - Assess for incontinence   - Turn and reposition patient  - Assist with mobility/ambulation  - Relieve pressure over bony prominences  - Avoid friction and shearing  - Provide appropriate hygiene as needed including keeping skin clean and dry  - Evaluate need for skin moisturizer/barrier cream  - Collaborate with interdisciplinary team (i e  Nutrition, Rehabilitation, etc )   - Patient/family teaching  Outcome: Progressing  Goal: Incision(s), wounds(s) or drain site(s) healing without S/S of infection  Description: INTERVENTIONS  - Assess and document risk factors for skin impairment   - Assess and document dressing, incision, wound bed, drain sites and surrounding tissue  - Consider nutrition services referral as needed  - Oral mucous membranes remain intact  - Provide patient/ family education  Outcome: Progressing  Goal: Oral mucous membranes remain intact  Description: INTERVENTIONS  - Assess oral mucosa and hygiene practices  - Implement preventative oral hygiene regimen  - Implement oral medicated treatments as ordered  - Initiate Nutrition services referral as needed  Outcome: Progressing     Problem: HEMATOLOGIC - ADULT  Goal: Maintains hematologic stability  Description: INTERVENTIONS  - Assess for signs and symptoms of bleeding or hemorrhage  - Monitor labs  - Administer supportive blood products/factors as ordered and appropriate  Outcome: Progressing     Problem: MUSCULOSKELETAL - ADULT  Goal: Maintain or return mobility to safest level of function  Description: INTERVENTIONS:  - Assess patient's ability to carry out ADLs; assess patient's baseline for ADL function and identify physical deficits which impact ability to perform ADLs (bathing, care of mouth/teeth, toileting, grooming, dressing, etc )  - Assess/evaluate cause of self-care deficits   - Assess range of motion  - Assess patient's mobility  - Assess patient's need for assistive devices and provide as appropriate  - Encourage maximum independence but intervene and supervise when necessary  - Involve family in performance of ADLs  - Assess for home care needs following discharge   - Consider OT consult to assist with ADL evaluation and planning for discharge  - Provide patient education as appropriate  Outcome: Progressing     Problem: PAIN - ADULT  Goal: Verbalizes/displays adequate comfort level or baseline comfort level  Description: Interventions:  - Encourage patient to monitor pain and request assistance  - Assess pain using appropriate pain scale  - Administer analgesics based on type and severity of pain and evaluate response  - Implement non-pharmacological measures as appropriate and evaluate response  - Consider cultural and social influences on pain and pain management  - Notify physician/advanced practitioner if interventions unsuccessful or patient reports new pain  Outcome: Progressing     Problem: INFECTION - ADULT  Goal: Absence or prevention of progression during hospitalization  Description: INTERVENTIONS:  - Assess and monitor for signs and symptoms of infection  - Monitor lab/diagnostic results  - Monitor all insertion sites, i e  indwelling lines, tubes, and drains  - Monitor endotracheal if appropriate and nasal secretions for changes in amount and color  - [place] appropriate cooling/warming therapies per order  - Administer medications as ordered  - Instruct and encourage patient and family to use good hand hygiene technique  - Identify and instruct in appropriate isolation precautions for identified infection/condition  Outcome: Progressing  Goal: Absence of fever/infection during neutropenic period  Description: INTERVENTIONS:  - Monitor WBC    Outcome: Progressing     Problem: SAFETY ADULT  Goal: Patient will remain free of falls  Description: INTERVENTIONS:  - Assess patient frequently for physical needs  -  Identify cognitive and physical deficits and behaviors that affect risk of falls    -  [place] fall precautions as indicated by assessment   - Educate patient/family on patient safety including physical limitations  - Instruct patient to call for assistance with activity based on assessment  - Modify environment to reduce risk of injury  - Consider OT/PT consult to assist with strengthening/mobility  Outcome: Progressing  Goal: Maintain or return to baseline ADL function  Description: INTERVENTIONS:  -  Assess patient's ability to carry out ADLs; assess patient's baseline for ADL function and identify physical deficits which impact ability to perform ADLs (bathing, care of mouth/teeth, toileting, grooming, dressing, etc )  - Assess/evaluate cause of self-care deficits   - Assess range of motion  - Assess patient's mobility; develop plan if impaired  - Assess patient's need for assistive devices and provide as appropriate  - Encourage maximum independence but intervene and supervise when necessary  - Involve family in performance of ADLs  - Assess for home care needs following discharge   - Consider OT consult to assist with ADL evaluation and planning for discharge  - Provide patient education as appropriate  Outcome: Progressing  Goal: Maintain or return mobility status to optimal level  Description: INTERVENTIONS:  - Assess patient's baseline mobility status (ambulation, transfers, stairs, etc )    - Identify cognitive and physical deficits and behaviors that affect mobility  - Identify mobility aids required to assist with transfers and/or ambulation (gait belt, sit-to-stand, lift, walker, cane, etc )  - [place] fall precautions as indicated by assessment  - Record patient progress and toleration of activity level on Mobility SBAR; progress patient to next Phase/Stage  - Instruct patient to call for assistance with activity based on assessment  - Consider rehabilitation consult to assist with strengthening/weightbearing, etc   Outcome: Progressing     Problem: DISCHARGE PLANNING  Goal: Discharge to home or other facility with appropriate resources  Description: INTERVENTIONS:  - Identify barriers to discharge w/patient and caregiver  - Arrange for needed discharge resources and transportation as appropriate  - Identify discharge learning needs (meds, wound care, etc )  - Arrange for interpretive services to assist at discharge as needed  - Refer to Case Management Department for coordinating discharge planning if the patient needs post-hospital services based on physician/advanced practitioner order or complex needs related to functional status, cognitive ability, or social support system  Outcome: Progressing     Problem: Knowledge Deficit  Goal: Patient/family/caregiver demonstrates understanding of disease process, treatment plan, medications, and discharge instructions  Description: Complete learning assessment and assess knowledge base    Interventions:  - Provide teaching at level of understanding  - Provide teaching via preferred learning methods  Outcome: Progressing

## 2021-04-07 NOTE — CASE MANAGEMENT
Pt has been evaluated by [facility] and is stable to be discharged  Declines [facility], wife will transport

## 2021-04-11 LAB
BACTERIA BLD CULT: NORMAL
BACTERIA BLD CULT: NORMAL

## 2021-07-17 ENCOUNTER — APPOINTMENT (EMERGENCY)
Dept: RADIOLOGY | Facility: HOSPITAL | Age: 76
End: 2021-07-17
Payer: COMMERCIAL

## 2021-07-17 ENCOUNTER — HOSPITAL ENCOUNTER (EMERGENCY)
Facility: HOSPITAL | Age: 76
Discharge: HOME | End: 2021-07-17
Attending: INTERNAL MEDICINE | Admitting: INTERNAL MEDICINE
Payer: COMMERCIAL

## 2021-07-17 VITALS
OXYGEN SATURATION: 94 % | SYSTOLIC BLOOD PRESSURE: 183 MMHG | BODY MASS INDEX: 30.64 KG/M2 | RESPIRATION RATE: 20 BRPM | HEIGHT: 70 IN | HEART RATE: 86 BPM | DIASTOLIC BLOOD PRESSURE: 84 MMHG | TEMPERATURE: 98.9 F | WEIGHT: 214 LBS

## 2021-07-17 DIAGNOSIS — J44.1 COPD EXACERBATION (HCC): Primary | ICD-10-CM

## 2021-07-17 LAB
ALBUMIN SERPL BCP-MCNC: 3.9 G/DL (ref 3.5–5.7)
ALP SERPL-CCNC: 72 U/L (ref 55–165)
ALT SERPL W P-5'-P-CCNC: 11 U/L (ref 7–52)
ANION GAP SERPL CALCULATED.3IONS-SCNC: 10 MMOL/L (ref 4–13)
APTT PPP: 29 SECONDS (ref 23–37)
AST SERPL W P-5'-P-CCNC: 10 U/L (ref 13–39)
BASOPHILS # BLD AUTO: 0.1 THOUSANDS/ΜL (ref 0–0.1)
BASOPHILS NFR BLD AUTO: 1 % (ref 0–2)
BILIRUB SERPL-MCNC: 0.3 MG/DL (ref 0.2–1)
BNP SERPL-MCNC: 197 PG/ML (ref 1–100)
BUN SERPL-MCNC: 12 MG/DL (ref 7–25)
CALCIUM SERPL-MCNC: 8.4 MG/DL (ref 8.6–10.5)
CHLORIDE SERPL-SCNC: 102 MMOL/L (ref 98–107)
CO2 SERPL-SCNC: 25 MMOL/L (ref 21–31)
CREAT SERPL-MCNC: 0.82 MG/DL (ref 0.7–1.3)
EOSINOPHIL # BLD AUTO: 0.1 THOUSAND/ΜL (ref 0–0.61)
EOSINOPHIL NFR BLD AUTO: 1 % (ref 0–5)
ERYTHROCYTE [DISTWIDTH] IN BLOOD BY AUTOMATED COUNT: 14.2 % (ref 11.5–14.5)
GFR SERPL CREATININE-BSD FRML MDRD: 87 ML/MIN/1.73SQ M
GLUCOSE SERPL-MCNC: 150 MG/DL (ref 65–99)
HCT VFR BLD AUTO: 44.2 % (ref 42–47)
HGB BLD-MCNC: 15 G/DL (ref 14–18)
INR PPP: 0.97 (ref 0.84–1.19)
LYMPHOCYTES # BLD AUTO: 0.7 THOUSANDS/ΜL (ref 0.6–4.47)
LYMPHOCYTES NFR BLD AUTO: 7 % (ref 21–51)
MCH RBC QN AUTO: 30.9 PG (ref 26–34)
MCHC RBC AUTO-ENTMCNC: 34 G/DL (ref 31–37)
MCV RBC AUTO: 91 FL (ref 81–99)
MONOCYTES # BLD AUTO: 0.4 THOUSAND/ΜL (ref 0.17–1.22)
MONOCYTES NFR BLD AUTO: 4 % (ref 2–12)
NEUTROPHILS # BLD AUTO: 9.4 THOUSANDS/ΜL (ref 1.4–6.5)
NEUTS SEG NFR BLD AUTO: 88 % (ref 42–75)
PLATELET # BLD AUTO: 202 THOUSANDS/UL (ref 149–390)
PMV BLD AUTO: 9.2 FL (ref 8.6–11.7)
POTASSIUM SERPL-SCNC: 4.2 MMOL/L (ref 3.5–5.5)
PROT SERPL-MCNC: 6.4 G/DL (ref 6.4–8.9)
PROTHROMBIN TIME: 12.8 SECONDS (ref 11.6–14.5)
RBC # BLD AUTO: 4.86 MILLION/UL (ref 4.3–5.9)
SARS-COV-2 RNA RESP QL NAA+PROBE: NEGATIVE
SODIUM SERPL-SCNC: 137 MMOL/L (ref 134–143)
TROPONIN I SERPL-MCNC: <0.03 NG/ML
WBC # BLD AUTO: 10.7 THOUSAND/UL (ref 4.8–10.8)

## 2021-07-17 PROCEDURE — 94760 N-INVAS EAR/PLS OXIMETRY 1: CPT

## 2021-07-17 PROCEDURE — U0003 INFECTIOUS AGENT DETECTION BY NUCLEIC ACID (DNA OR RNA); SEVERE ACUTE RESPIRATORY SYNDROME CORONAVIRUS 2 (SARS-COV-2) (CORONAVIRUS DISEASE [COVID-19]), AMPLIFIED PROBE TECHNIQUE, MAKING USE OF HIGH THROUGHPUT TECHNOLOGIES AS DESCRIBED BY CMS-2020-01-R: HCPCS | Performed by: INTERNAL MEDICINE

## 2021-07-17 PROCEDURE — 96374 THER/PROPH/DIAG INJ IV PUSH: CPT

## 2021-07-17 PROCEDURE — 85610 PROTHROMBIN TIME: CPT | Performed by: INTERNAL MEDICINE

## 2021-07-17 PROCEDURE — 99285 EMERGENCY DEPT VISIT HI MDM: CPT | Performed by: INTERNAL MEDICINE

## 2021-07-17 PROCEDURE — 93005 ELECTROCARDIOGRAM TRACING: CPT

## 2021-07-17 PROCEDURE — 94644 CONT INHLJ TX 1ST HOUR: CPT

## 2021-07-17 PROCEDURE — 85025 COMPLETE CBC W/AUTO DIFF WBC: CPT | Performed by: INTERNAL MEDICINE

## 2021-07-17 PROCEDURE — U0005 INFEC AGEN DETEC AMPLI PROBE: HCPCS | Performed by: INTERNAL MEDICINE

## 2021-07-17 PROCEDURE — 84484 ASSAY OF TROPONIN QUANT: CPT | Performed by: INTERNAL MEDICINE

## 2021-07-17 PROCEDURE — 36415 COLL VENOUS BLD VENIPUNCTURE: CPT | Performed by: INTERNAL MEDICINE

## 2021-07-17 PROCEDURE — 71045 X-RAY EXAM CHEST 1 VIEW: CPT

## 2021-07-17 PROCEDURE — 83880 ASSAY OF NATRIURETIC PEPTIDE: CPT | Performed by: INTERNAL MEDICINE

## 2021-07-17 PROCEDURE — 80053 COMPREHEN METABOLIC PANEL: CPT | Performed by: INTERNAL MEDICINE

## 2021-07-17 PROCEDURE — 85730 THROMBOPLASTIN TIME PARTIAL: CPT | Performed by: INTERNAL MEDICINE

## 2021-07-17 PROCEDURE — 99285 EMERGENCY DEPT VISIT HI MDM: CPT

## 2021-07-17 RX ORDER — ALBUTEROL SULFATE 2.5 MG/3ML
10 SOLUTION RESPIRATORY (INHALATION) ONCE
Status: COMPLETED | OUTPATIENT
Start: 2021-07-17 | End: 2021-07-17

## 2021-07-17 RX ORDER — CEFUROXIME AXETIL 250 MG/1
250 TABLET ORAL ONCE
Status: COMPLETED | OUTPATIENT
Start: 2021-07-17 | End: 2021-07-17

## 2021-07-17 RX ORDER — CEFUROXIME AXETIL 250 MG/1
250 TABLET ORAL EVERY 12 HOURS SCHEDULED
Qty: 12 TABLET | Refills: 0 | Status: SHIPPED | OUTPATIENT
Start: 2021-07-17 | End: 2021-07-23

## 2021-07-17 RX ORDER — METHYLPREDNISOLONE SODIUM SUCCINATE 125 MG/2ML
80 INJECTION, POWDER, LYOPHILIZED, FOR SOLUTION INTRAMUSCULAR; INTRAVENOUS ONCE
Status: COMPLETED | OUTPATIENT
Start: 2021-07-17 | End: 2021-07-17

## 2021-07-17 RX ORDER — PREDNISONE 10 MG/1
TABLET ORAL
Qty: 20 TABLET | Refills: 0 | Status: SHIPPED | OUTPATIENT
Start: 2021-07-17 | End: 2021-09-17 | Stop reason: HOSPADM

## 2021-07-17 RX ADMIN — CEFUROXIME AXETIL 250 MG: 250 TABLET, FILM COATED ORAL at 19:03

## 2021-07-17 RX ADMIN — ALBUTEROL SULFATE 10 MG: 2.5 SOLUTION RESPIRATORY (INHALATION) at 16:49

## 2021-07-17 RX ADMIN — METHYLPREDNISOLONE SODIUM SUCCINATE 80 MG: 125 INJECTION, POWDER, FOR SOLUTION INTRAMUSCULAR; INTRAVENOUS at 17:40

## 2021-07-17 NOTE — DISCHARGE INSTRUCTIONS
Continue your nebulizers every 2 hours today x2, then every 4 hours through tomorrow  Take prednisone as directed and follow your sugars as they would be elevated  Follow-up with your primary care  Take Mucinex DM 2 times a day over-the-counter to improve mucus flow  Return again if symptoms worsen

## 2021-07-17 NOTE — ED PROVIDER NOTES
History  Chief Complaint   Patient presents with    Shortness of Breath     COPD worsening this am     67-year-old male with known history of coronary artery disease, COPD, diabetes, hypertension presents with increasing shortness of breath for last 3-4 days  He is not know whether he has had a fever  Has not been monitoring her sugars  His nebulizers are becoming less and less effective with his symptoms  Has this cough cough and clear sputum  No recent travel  No on else sick  He has a nebulizer at home, but this is not been effective  To 1 just before he came in the feel any better  Prior to Admission Medications   Prescriptions Last Dose Informant Patient Reported? Taking?    Alogliptin Benzoate 25 MG TABS   No No   Sig: Take 0 5 tablets (12 5 mg total) by mouth daily Patient states takes 1/2 of a 25 mg tablet every AM   Patient taking differently: Take 25 mg by mouth daily    Empagliflozin 25 MG TABS   No No   Sig: Take 0 5 tablets (12 5 mg total) by mouth every morning Take one half tablet every morning   albuterol (2 5 mg/3 mL) 0 083 % nebulizer solution   No No   Sig: Take 1 vial (2 5 mg total) by nebulization every 4 (four) hours as needed for wheezing or shortness of breath   albuterol (PROVENTIL HFA,VENTOLIN HFA) 90 mcg/act inhaler   No No   Sig: Inhale 2 puffs every 6 (six) hours as needed for wheezing or shortness of breath   aspirin (ECOTRIN LOW STRENGTH) 81 mg EC tablet   No No   Sig: Take 1 tablet (81 mg total) by mouth every other day   fluticasone (FLOVENT HFA) 220 mcg/act inhaler   Yes No   Sig: Inhale 1 puff daily Rinse mouth after use    gabapentin (NEURONTIN) 100 mg capsule   No No   Sig: Take 2 capsules (200 mg total) by mouth daily at bedtime   glimepiride (AMARYL) 1 mg tablet   No No   Sig: Take 1 tablet (1 mg total) by mouth 2 (two) times a day   guaiFENesin (MUCINEX) 600 mg 12 hr tablet   No No   Sig: Take 1 tablet (600 mg total) by mouth 2 (two) times a day hydrocortisone 1 % cream   No No   Sig: Apply topically 2 (two) times a day   lisinopril (ZESTRIL) 20 mg tablet   No No   Sig: Take 1 tablet (20 mg total) by mouth 2 (two) times a day   metFORMIN (GLUCOPHAGE) 500 mg tablet   No No   Sig: Take 1 tablet (500 mg total) by mouth daily with dinner   metoprolol tartrate (LOPRESSOR) 25 mg tablet  Spouse/Significant Other Yes No   Sig: Take 25 mg by mouth every 12 (twelve) hours   mometasone (ASMANEX TWISTHALER) 220 MCG/INH inhaler   Yes No   Sig: Inhale 2 puffs 2 (two) times a day Rinse mouth after use  primidone (MYSOLINE) 50 mg tablet   Yes No   Sig: Take 100 mg by mouth every 8 (eight) hours   rosuvastatin (CRESTOR) 20 MG tablet   No No   Sig: Take 1 tablet (20 mg total) by mouth daily after dinner Take one half tablet daily with dinner   Patient taking differently: Take 10 mg by mouth daily after dinner Take one half tablet daily with dinner    saxagliptin (ONGLYZA) 5 MG tablet   No No   Sig: Take 1 tablet (5 mg total) by mouth daily   tiotropium (SPIRIVA) 18 mcg inhalation capsule   No No   Sig: Place 1 capsule (18 mcg total) into inhaler and inhale daily   tiotropium-olodaterol (STIOLTO RESPIMAT) 2 5-2 5 MCG/ACT inhaler   Yes No   Sig: Inhale 2 puffs daily      Facility-Administered Medications: None       Past Medical History:   Diagnosis Date    BPH (benign prostatic hyperplasia)     45 days radiation treatment    COPD (chronic obstructive pulmonary disease)     Coronary artery disease     CABG x4 in 2017    Diabetes mellitus     History of Arterial Duplex of LE 12/26/2017    Likely occlusion of the left superficial femoral artery  Calcific changes bilaterally  Despite these changes, the ankle-brachial index as a measure of peripheral blood flow only mildly impaired      History of echocardiogram 06/12/2017    EF 40%, mild LVH, mild MR     Hyperlipidemia     Hypertension     NSTEMI (non-ST elevated myocardial infarction)     Prostate cancer prostate     PVD (peripheral vascular disease)     Tremor        Past Surgical History:   Procedure Laterality Date    CARDIAC CATHETERIZATION  03/08/2017    Significant left main plus triple-vessel CAD   CORONARY ARTERY BYPASS GRAFT  03/08/2017    4V CABG:  LIMA to LAD, VG to RI, SVG to PDA to LVBR RCA   EYE SURGERY      shots in eye once a month @ the [place]    PROSTATE BIOPSY         Family History   Problem Relation Age of Onset    Cancer Father     Heart disease Brother      I have reviewed and agree with the history as documented  E-Cigarette/Vaping    E-Cigarette Use Never User      E-Cigarette/Vaping Substances    Nicotine No     THC No     CBD No     Flavoring No     Other No     Unknown No      Social History     Tobacco Use    Smoking status: Current Some Day Smoker     Packs/day: 0 50     Years: 60 00     Pack years: 30 00     Types: Cigarettes    Smokeless tobacco: Never Used    Tobacco comment: only smokes when he drinks beer   Vaping Use    Vaping Use: Never used   Substance Use Topics    Alcohol use: Yes    Drug use: Never       Review of Systems   Constitutional: Negative for chills and fever  HENT: Negative for rhinorrhea and sore throat  Eyes: Negative for visual disturbance  Respiratory: Negative for cough and shortness of breath  Cardiovascular: Negative for chest pain and leg swelling  Gastrointestinal: Negative for abdominal pain, diarrhea, nausea and vomiting  Genitourinary: Negative for dysuria  Musculoskeletal: Negative for back pain and myalgias  Skin: Negative for rash  Neurological: Negative for dizziness and headaches  Psychiatric/Behavioral: Negative for confusion  All other systems reviewed and are negative  Physical Exam  Physical Exam  Vitals and nursing note reviewed  Constitutional:       Appearance: He is well-developed  He is obese  HENT:      Head: Normocephalic        Nose: Nose normal       Mouth/Throat:      Mouth: Mucous membranes are moist       Pharynx: No pharyngeal swelling or oropharyngeal exudate  Eyes:      General: No scleral icterus  Conjunctiva/sclera: Conjunctivae normal       Pupils: Pupils are equal, round, and reactive to light  Neck:      Vascular: No JVD  Trachea: No tracheal deviation  Cardiovascular:      Rate and Rhythm: Normal rate and regular rhythm  Heart sounds: Normal heart sounds  No murmur heard  Pulmonary:      Effort: Pulmonary effort is normal  No respiratory distress  Breath sounds: Examination of the right-upper field reveals wheezing  Examination of the left-upper field reveals wheezing  Examination of the right-middle field reveals wheezing  Examination of the left-middle field reveals wheezing  Examination of the right-lower field reveals wheezing  Examination of the left-lower field reveals wheezing  Wheezing present  No rales  Comments: Increased AP diameter  Abdominal:      General: Bowel sounds are normal       Palpations: Abdomen is soft  Tenderness: There is no abdominal tenderness  There is no guarding  Musculoskeletal:         General: No tenderness  Normal range of motion  Cervical back: Normal range of motion and neck supple  Comments: Trace lower extremity edema   Skin:     General: Skin is warm and dry  Neurological:      Mental Status: He is alert and oriented to person, place, and time  Cranial Nerves: No cranial nerve deficit  Sensory: No sensory deficit  Motor: No abnormal muscle tone        Comments: 5/5 motor, nl sens   Psychiatric:         Behavior: Behavior normal          Vital Signs  ED Triage Vitals   Temperature Pulse Respirations Blood Pressure SpO2   07/17/21 1543 07/17/21 1543 07/17/21 1543 07/17/21 1543 07/17/21 1543   98 9 °F (37 2 °C) 78 (!) 24 (!) 182/77 98 %      Temp src Heart Rate Source Patient Position - Orthostatic VS BP Location FiO2 (%)   -- -- 07/17/21 1912 07/17/21 1912 --     Lying Left arm       Pain Score       --                  Vitals:    07/17/21 1630 07/17/21 1800 07/17/21 1830 07/17/21 1912   BP:  170/78 159/72 (!) 183/84   Pulse: 73 80 80 86   Patient Position - Orthostatic VS:    Lying         Visual Acuity      ED Medications  Medications   albuterol inhalation solution 10 mg (10 mg Nebulization Given 7/17/21 1649)   methylPREDNISolone sodium succinate (Solu-MEDROL) injection 80 mg (80 mg Intravenous Given 7/17/21 1740)   cefuroxime (CEFTIN) tablet 250 mg (250 mg Oral Given 7/17/21 1903)       Diagnostic Studies  Results Reviewed     Procedure Component Value Units Date/Time    Novel Coronavirus (Covid-19),PCR [facility] - 2 Hour Stat [[id number]]  (Normal) Collected: 07/17/21 1734    Lab Status: Final result Specimen: Nares from Nasopharyngeal Swab Updated: 07/17/21 1842     SARS-CoV-2 Negative    Narrative: The specimen collection materials, transport medium, and/or testing methodology utilized in the production of these test results have been proven to be reliable in a limited validation with an abbreviated program under the Emergency Utilization Authorization provided by the FDA  Testing reported as "Presumptive positive" will be confirmed with secondary testing to ensure result accuracy  Clinical caution and judgement should be used with the interpretation of these results with consideration of the clinical impression and other laboratory testing  Testing reported as "Positive" or "Negative" has been proven to be accurate according to standard laboratory validation requirements  All testing is performed with control materials showing appropriate reactivity at standard intervals        B-Type Natriuretic Peptide [facility] ONLY) [[id number]]  (Abnormal) Collected: 07/17/21 1736    Lab Status: Final result Specimen: Blood from Arm, Right Updated: 07/17/21 1815      pg/mL     Troponin I [[id number]]  (Normal) Collected: 07/17/21 1643    Lab Status: Final result Specimen: Blood from Arm, Right Updated: 07/17/21 1707     Troponin I <0 03 ng/mL     Comprehensive metabolic panel [[id number]]  (Abnormal) Collected: 07/17/21 1643    Lab Status: Final result Specimen: Blood from Arm, Right Updated: 07/17/21 1707     Sodium 137 mmol/L      Potassium 4 2 mmol/L      Chloride 102 mmol/L      CO2 25 mmol/L      ANION GAP 10 mmol/L      BUN 12 mg/dL      Creatinine 0 82 mg/dL      Glucose 150 mg/dL      Calcium 8 4 mg/dL      AST 10 U/L      ALT 11 U/L      Alkaline Phosphatase 72 U/L      Total Protein 6 4 g/dL      Albumin 3 9 g/dL      Total Bilirubin 0 30 mg/dL      eGFR 87 ml/min/1 73sq m     Narrative:      [place] guidelines for Chronic Kidney Disease (CKD):     Stage 1 with normal or high GFR (GFR > 90 mL/min/1 73 square meters)    Stage 2 Mild CKD (GFR = 60-89 mL/min/1 73 square meters)    Stage 3A Moderate CKD (GFR = 45-59 mL/min/1 73 square meters)    Stage 3B Moderate CKD (GFR = 30-44 mL/min/1 73 square meters)    Stage 4 Severe CKD (GFR = 15-29 mL/min/1 73 square meters)    Stage 5 End Stage CKD (GFR <15 mL/min/1 73 square meters)  Note: GFR calculation is accurate only with a steady state creatinine    Protime-INR [[id number]]  (Normal) Collected: 07/17/21 1643    Lab Status: Final result Specimen: Blood from Arm, Right Updated: 07/17/21 1659     Protime 12 8 seconds      INR 0 97    APTT [[id number]]  (Normal) Collected: 07/17/21 1643    Lab Status: Final result Specimen: Blood from Arm, Right Updated: 07/17/21 1659     PTT 29 seconds     CBC and differential [[id number]]  (Abnormal) Collected: 07/17/21 1643    Lab Status: Final result Specimen: Blood from Arm, Right Updated: 07/17/21 1652     WBC 10 70 Thousand/uL      RBC 4 86 Million/uL      Hemoglobin 15 0 g/dL      Hematocrit 44 2 %      MCV 91 fL      MCH 30 9 pg      MCHC 34 0 g/dL      RDW 14 2 %      MPV 9 2 fL      Platelets 662 Thousands/uL      Neutrophils Relative 88 % Lymphocytes Relative 7 %      Monocytes Relative 4 %      Eosinophils Relative 1 %      Basophils Relative 1 %      Neutrophils Absolute 9 40 Thousands/µL      Lymphocytes Absolute 0 70 Thousands/µL      Monocytes Absolute 0 40 Thousand/µL      Eosinophils Absolute 0 10 Thousand/µL      Basophils Absolute 0 10 Thousands/µL                  XR chest 1 view portable   ED Interpretation by Hector Best DO (07/17 1827)   No acute infiltrates      Final Result by Luli Cardona MD (07/18 1249)      No acute cardiopulmonary disease  Workstation performed: [id number]                    Procedures  ECG 12 Lead Documentation Only    Date/Time: 7/17/2021 4:33 PM  Performed by: Hector Best DO  Authorized by: Hector Best DO     Indications / Diagnosis:  Shortness of breath  ECG reviewed by me, the ED Provider: yes    Patient location:  ED  Previous ECG:     Previous ECG:  Compared to current    Similarity:  No change    Comparison to cardiac monitor: Yes    Interpretation:     Interpretation: abnormal    Rate:     ECG rate:  73    ECG rate assessment: normal    Rhythm:     Rhythm: sinus rhythm    Ectopy:     Ectopy: none    QRS:     QRS axis:  Normal    QRS intervals:  Normal  ST segments:     ST segments:  Non-specific  T waves:     T waves: non-specific    Other findings:     Other findings: poor R wave progression    Comments:      Probably old anterior wall MI             ED Course  ED Course as of Jul 28 1553   Sat Jul 17, 2021   [address] Patient is feeling much better after the 1 hour long mini neb  Laboratory data reviewed  1846 Patient remains very comfortable  Will be discharged home  COVID screen notably negative                  HEART Risk Score      Most Recent Value   Heart Score Risk Calculator   History  1 Filed at: 07/17/2021 1735   ECG  1 Filed at: 07/17/2021 1735   Age  2 Filed at: 07/17/2021 1735   Risk Factors  2 Filed at: 07/17/2021 1735   Troponin  0 Filed at: 07/17/2021 1735   HEART Score  6 Filed at: 07/17/2021 1735                                    MDM    Disposition  Final diagnoses:   COPD exacerbation ([address] )     Time reflects when diagnosis was documented in both MDM as applicable and the Disposition within this note     Time User Action Codes Description Comment    7/17/2021  6:49 PM Kailee Briscoe Add [J44 1] COPD exacerbation [facility])       ED Disposition     ED Disposition Condition Date/Time Comment    Discharge Stable Sat Jul 17, 2021  6:49 PM Grantcesario Monge discharge to home/self care  Follow-up Information     Follow up With Specialties Details Why Ko White 13, DO Family Medicine Call  For follow-up next week if possible Analia [address] [place] [address]  [phone]            Discharge Medication List as of 7/17/2021  6:54 PM      START taking these medications    Details   cefuroxime (CEFTIN) 250 mg tablet Take 1 tablet (250 mg total) by mouth every 12 (twelve) hours for 12 doses, Starting Sat 7/17/2021, Until Fri 7/23/2021, Normal      predniSONE 10 mg tablet 4 pills for 2 days, then 3 pills for 2 days, then 2 pills for 2 days then 1 pill for 2 days  , Normal         CONTINUE these medications which have NOT CHANGED    Details   albuterol (2 5 mg/3 mL) 0 083 % nebulizer solution Take 1 vial (2 5 mg total) by nebulization every 4 (four) hours as needed for wheezing or shortness of breath, Starting u 9/3/2020, No Print      albuterol (PROVENTIL HFA,VENTOLIN HFA) 90 mcg/act inhaler Inhale 2 puffs every 6 (six) hours as needed for wheezing or shortness of breath, Starting Thu 9/3/2020, No Print      Alogliptin Benzoate 25 MG TABS Take 0 5 tablets (12 5 mg total) by mouth daily Patient states takes 1/2 of a 25 mg tablet every AM, Starting Thu 9/3/2020, Until Sun 11/29/2020, No Print      aspirin (ECOTRIN LOW STRENGTH) 81 mg EC tablet Take 1 tablet (81 mg total) by mouth every other day, Starting Thu 9/3/2020, No Print      Empagliflozin 25 MG TABS Take 0 5 tablets (12 5 mg total) by mouth every morning Take one half tablet every morning, Starting Thu 9/3/2020, Normal      fluticasone (FLOVENT HFA) 220 mcg/act inhaler Inhale 1 puff daily Rinse mouth after use , Historical Med      gabapentin (NEURONTIN) 100 mg capsule Take 2 capsules (200 mg total) by mouth daily at bedtime, Starting Thu 9/3/2020, No Print      glimepiride (AMARYL) 1 mg tablet Take 1 tablet (1 mg total) by mouth 2 (two) times a day, Starting Thu 9/3/2020, No Print      guaiFENesin (MUCINEX) 600 mg 12 hr tablet Take 1 tablet (600 mg total) by mouth 2 (two) times a day, Starting Thu 9/3/2020, No Print      hydrocortisone 1 % cream Apply topically 2 (two) times a day, Starting Thu 9/3/2020, No Print      lisinopril (ZESTRIL) 20 mg tablet Take 1 tablet (20 mg total) by mouth 2 (two) times a day, Starting Thu 9/3/2020, Until Fri 9/3/2021, Print      metFORMIN (GLUCOPHAGE) 500 mg tablet Take 1 tablet (500 mg total) by mouth daily with dinner, Starting Thu 9/3/2020, No Print      metoprolol tartrate (LOPRESSOR) 25 mg tablet Take 25 mg by mouth every 12 (twelve) hours, Historical Med      mometasone (ASMANEX TWISTHALER) 220 MCG/INH inhaler Inhale 2 puffs 2 (two) times a day Rinse mouth after use , Historical Med      primidone (MYSOLINE) 50 mg tablet Take 100 mg by mouth every 8 (eight) hours, Historical Med      rosuvastatin (CRESTOR) 20 MG tablet Take 1 tablet (20 mg total) by mouth daily after dinner Take one half tablet daily with dinner, Starting Thu 9/3/2020, No Print      saxagliptin (ONGLYZA) 5 MG tablet Take 1 tablet (5 mg total) by mouth daily, Starting Thu 9/3/2020, No Print      tiotropium (SPIRIVA) 18 mcg inhalation capsule Place 1 capsule (18 mcg total) into inhaler and inhale daily, Starting Wed 12/2/2020, Normal      tiotropium-olodaterol (STIOLTO RESPIMAT) 2 5-2 5 MCG/ACT inhaler Inhale 2 puffs daily, Historical Med           No discharge procedures on file      PDMP Review Value Time User    PDMP Reviewed  Yes 9/3/2020 12:26 PM Ashish Ordonez, [address]           ED Provider  Electronically Signed by           More Bush DO  07/17/21 2004       More Bush DO  07/28/21 1551

## 2021-07-18 LAB
ATRIAL RATE: 73 BPM
P AXIS: 68 DEGREES
PR INTERVAL: 170 MS
QRS AXIS: 60 DEGREES
QRSD INTERVAL: 106 MS
QT INTERVAL: 436 MS
QTC INTERVAL: 480 MS
T WAVE AXIS: 102 DEGREES
VENTRICULAR RATE: 73 BPM

## 2021-07-18 PROCEDURE — 93010 ELECTROCARDIOGRAM REPORT: CPT | Performed by: INTERNAL MEDICINE

## 2021-08-03 ENCOUNTER — HOSPITAL ENCOUNTER (EMERGENCY)
Facility: HOSPITAL | Age: 76
Discharge: HOME | End: 2021-08-03
Attending: EMERGENCY MEDICINE | Admitting: EMERGENCY MEDICINE
Payer: COMMERCIAL

## 2021-08-03 ENCOUNTER — APPOINTMENT (EMERGENCY)
Dept: RADIOLOGY | Facility: HOSPITAL | Age: 76
End: 2021-08-03
Payer: COMMERCIAL

## 2021-08-03 VITALS
BODY MASS INDEX: 30.64 KG/M2 | RESPIRATION RATE: 23 BRPM | OXYGEN SATURATION: 95 % | DIASTOLIC BLOOD PRESSURE: 78 MMHG | HEIGHT: 70 IN | HEART RATE: 69 BPM | SYSTOLIC BLOOD PRESSURE: 160 MMHG | TEMPERATURE: 97.9 F | WEIGHT: 214 LBS

## 2021-08-03 DIAGNOSIS — J44.1 COPD WITH ACUTE EXACERBATION (HCC): Primary | ICD-10-CM

## 2021-08-03 LAB
ALBUMIN SERPL BCP-MCNC: 3.9 G/DL (ref 3.5–5.7)
ALP SERPL-CCNC: 61 U/L (ref 55–165)
ALT SERPL W P-5'-P-CCNC: 10 U/L (ref 7–52)
ANION GAP SERPL CALCULATED.3IONS-SCNC: 11 MMOL/L (ref 4–13)
APTT PPP: 31 SECONDS (ref 23–37)
AST SERPL W P-5'-P-CCNC: 10 U/L (ref 13–39)
BASOPHILS # BLD AUTO: 0.1 THOUSANDS/ΜL (ref 0–0.1)
BASOPHILS NFR BLD AUTO: 1 % (ref 0–2)
BILIRUB SERPL-MCNC: 0.4 MG/DL (ref 0.2–1)
BNP SERPL-MCNC: 446 PG/ML (ref 1–100)
BUN SERPL-MCNC: 13 MG/DL (ref 7–25)
CALCIUM SERPL-MCNC: 8.5 MG/DL (ref 8.6–10.5)
CHLORIDE SERPL-SCNC: 101 MMOL/L (ref 98–107)
CO2 SERPL-SCNC: 24 MMOL/L (ref 21–31)
CREAT SERPL-MCNC: 0.83 MG/DL (ref 0.7–1.3)
EOSINOPHIL # BLD AUTO: 0.1 THOUSAND/ΜL (ref 0–0.61)
EOSINOPHIL NFR BLD AUTO: 1 % (ref 0–5)
ERYTHROCYTE [DISTWIDTH] IN BLOOD BY AUTOMATED COUNT: 14.3 % (ref 11.5–14.5)
GFR SERPL CREATININE-BSD FRML MDRD: 86 ML/MIN/1.73SQ M
GLUCOSE SERPL-MCNC: 291 MG/DL (ref 65–99)
HCT VFR BLD AUTO: 41.6 % (ref 42–47)
HGB BLD-MCNC: 14 G/DL (ref 14–18)
INR PPP: 0.96 (ref 0.84–1.19)
LYMPHOCYTES # BLD AUTO: 0.6 THOUSANDS/ΜL (ref 0.6–4.47)
LYMPHOCYTES NFR BLD AUTO: 6 % (ref 21–51)
MCH RBC QN AUTO: 30.8 PG (ref 26–34)
MCHC RBC AUTO-ENTMCNC: 33.6 G/DL (ref 31–37)
MCV RBC AUTO: 92 FL (ref 81–99)
MONOCYTES # BLD AUTO: 0.3 THOUSAND/ΜL (ref 0.17–1.22)
MONOCYTES NFR BLD AUTO: 3 % (ref 2–12)
NEUTROPHILS # BLD AUTO: 9 THOUSANDS/ΜL (ref 1.4–6.5)
NEUTS SEG NFR BLD AUTO: 89 % (ref 42–75)
PLATELET # BLD AUTO: 171 THOUSANDS/UL (ref 149–390)
PMV BLD AUTO: 10.3 FL (ref 8.6–11.7)
POTASSIUM SERPL-SCNC: 4 MMOL/L (ref 3.5–5.5)
PROT SERPL-MCNC: 6.3 G/DL (ref 6.4–8.9)
PROTHROMBIN TIME: 12.6 SECONDS (ref 11.6–14.5)
RBC # BLD AUTO: 4.54 MILLION/UL (ref 4.3–5.9)
SARS-COV-2 RNA RESP QL NAA+PROBE: NEGATIVE
SODIUM SERPL-SCNC: 136 MMOL/L (ref 134–143)
TROPONIN I SERPL-MCNC: <0.03 NG/ML
WBC # BLD AUTO: 10.2 THOUSAND/UL (ref 4.8–10.8)

## 2021-08-03 PROCEDURE — 85025 COMPLETE CBC W/AUTO DIFF WBC: CPT | Performed by: EMERGENCY MEDICINE

## 2021-08-03 PROCEDURE — 83880 ASSAY OF NATRIURETIC PEPTIDE: CPT | Performed by: EMERGENCY MEDICINE

## 2021-08-03 PROCEDURE — 94640 AIRWAY INHALATION TREATMENT: CPT

## 2021-08-03 PROCEDURE — 96374 THER/PROPH/DIAG INJ IV PUSH: CPT

## 2021-08-03 PROCEDURE — 85730 THROMBOPLASTIN TIME PARTIAL: CPT | Performed by: EMERGENCY MEDICINE

## 2021-08-03 PROCEDURE — 84484 ASSAY OF TROPONIN QUANT: CPT | Performed by: EMERGENCY MEDICINE

## 2021-08-03 PROCEDURE — 36415 COLL VENOUS BLD VENIPUNCTURE: CPT | Performed by: EMERGENCY MEDICINE

## 2021-08-03 PROCEDURE — 99285 EMERGENCY DEPT VISIT HI MDM: CPT

## 2021-08-03 PROCEDURE — U0003 INFECTIOUS AGENT DETECTION BY NUCLEIC ACID (DNA OR RNA); SEVERE ACUTE RESPIRATORY SYNDROME CORONAVIRUS 2 (SARS-COV-2) (CORONAVIRUS DISEASE [COVID-19]), AMPLIFIED PROBE TECHNIQUE, MAKING USE OF HIGH THROUGHPUT TECHNOLOGIES AS DESCRIBED BY CMS-2020-01-R: HCPCS | Performed by: EMERGENCY MEDICINE

## 2021-08-03 PROCEDURE — 85610 PROTHROMBIN TIME: CPT | Performed by: EMERGENCY MEDICINE

## 2021-08-03 PROCEDURE — U0005 INFEC AGEN DETEC AMPLI PROBE: HCPCS | Performed by: EMERGENCY MEDICINE

## 2021-08-03 PROCEDURE — 71045 X-RAY EXAM CHEST 1 VIEW: CPT

## 2021-08-03 PROCEDURE — 93005 ELECTROCARDIOGRAM TRACING: CPT

## 2021-08-03 PROCEDURE — 99285 EMERGENCY DEPT VISIT HI MDM: CPT | Performed by: EMERGENCY MEDICINE

## 2021-08-03 PROCEDURE — 80053 COMPREHEN METABOLIC PANEL: CPT | Performed by: EMERGENCY MEDICINE

## 2021-08-03 RX ORDER — IPRATROPIUM BROMIDE AND ALBUTEROL SULFATE 2.5; .5 MG/3ML; MG/3ML
3 SOLUTION RESPIRATORY (INHALATION) ONCE
Status: COMPLETED | OUTPATIENT
Start: 2021-08-03 | End: 2021-08-03

## 2021-08-03 RX ORDER — AZITHROMYCIN 250 MG/1
500 TABLET, FILM COATED ORAL ONCE
Status: COMPLETED | OUTPATIENT
Start: 2021-08-03 | End: 2021-08-03

## 2021-08-03 RX ORDER — AZITHROMYCIN 250 MG/1
TABLET, FILM COATED ORAL
Qty: 4 TABLET | Refills: 0 | Status: SHIPPED | OUTPATIENT
Start: 2021-08-03 | End: 2021-08-07

## 2021-08-03 RX ORDER — CEFTRIAXONE 1 G/50ML
1000 INJECTION, SOLUTION INTRAVENOUS ONCE
Status: DISCONTINUED | OUTPATIENT
Start: 2021-08-03 | End: 2021-08-03

## 2021-08-03 RX ORDER — PREDNISONE 20 MG/1
40 TABLET ORAL DAILY
Qty: 8 TABLET | Refills: 0 | Status: SHIPPED | OUTPATIENT
Start: 2021-08-03 | End: 2021-08-07

## 2021-08-03 RX ORDER — METHYLPREDNISOLONE SODIUM SUCCINATE 40 MG/ML
40 INJECTION, POWDER, LYOPHILIZED, FOR SOLUTION INTRAMUSCULAR; INTRAVENOUS ONCE
Status: COMPLETED | OUTPATIENT
Start: 2021-08-03 | End: 2021-08-03

## 2021-08-03 RX ORDER — IPRATROPIUM BROMIDE AND ALBUTEROL SULFATE 2.5; .5 MG/3ML; MG/3ML
3 SOLUTION RESPIRATORY (INHALATION)
Status: DISCONTINUED | OUTPATIENT
Start: 2021-08-03 | End: 2021-08-03

## 2021-08-03 RX ADMIN — METHYLPREDNISOLONE SODIUM SUCCINATE 40 MG: 40 INJECTION, POWDER, FOR SOLUTION INTRAMUSCULAR; INTRAVENOUS at 18:00

## 2021-08-03 RX ADMIN — AZITHROMYCIN MONOHYDRATE 500 MG: 250 TABLET ORAL at 19:13

## 2021-08-03 RX ADMIN — IPRATROPIUM BROMIDE AND ALBUTEROL SULFATE 3 ML: .5; 3 SOLUTION RESPIRATORY (INHALATION) at 18:05

## 2021-08-03 NOTE — ED PROVIDER NOTES
History  Chief Complaint   Patient presents with    Shortness of Breath     worsened last night     42-year-old male presents emergency department complaining of shortness of breath  Patient notes that he has been having worsening difficulty over the past few days  He states his nebulizer does not seem to be helping  He has a history of COPD and was recently in the hospital for the same  He notes that he continue to smoke after he was discharged  He states that he uses oxygen at night to help with sleeping but does not use in the day  He came up from the basement and barely could catch his breath today  Prior to Admission Medications   Prescriptions Last Dose Informant Patient Reported? Taking?    Alogliptin Benzoate 25 MG TABS   No No   Sig: Take 0 5 tablets (12 5 mg total) by mouth daily Patient states takes 1/2 of a 25 mg tablet every AM   Patient taking differently: Take 25 mg by mouth daily    Empagliflozin 25 MG TABS   No No   Sig: Take 0 5 tablets (12 5 mg total) by mouth every morning Take one half tablet every morning   albuterol (2 5 mg/3 mL) 0 083 % nebulizer solution   No No   Sig: Take 1 vial (2 5 mg total) by nebulization every 4 (four) hours as needed for wheezing or shortness of breath   albuterol (PROVENTIL HFA,VENTOLIN HFA) 90 mcg/act inhaler   No No   Sig: Inhale 2 puffs every 6 (six) hours as needed for wheezing or shortness of breath   aspirin (ECOTRIN LOW STRENGTH) 81 mg EC tablet   No No   Sig: Take 1 tablet (81 mg total) by mouth every other day   fluticasone (FLOVENT HFA) 220 mcg/act inhaler   Yes No   Sig: Inhale 1 puff daily Rinse mouth after use    gabapentin (NEURONTIN) 100 mg capsule   No No   Sig: Take 2 capsules (200 mg total) by mouth daily at bedtime   glimepiride (AMARYL) 1 mg tablet   No No   Sig: Take 1 tablet (1 mg total) by mouth 2 (two) times a day   guaiFENesin (MUCINEX) 600 mg 12 hr tablet   No No   Sig: Take 1 tablet (600 mg total) by mouth 2 (two) times a day   hydrocortisone 1 % cream   No No   Sig: Apply topically 2 (two) times a day   lisinopril (ZESTRIL) 20 mg tablet   No No   Sig: Take 1 tablet (20 mg total) by mouth 2 (two) times a day   metFORMIN (GLUCOPHAGE) 500 mg tablet   No No   Sig: Take 1 tablet (500 mg total) by mouth daily with dinner   metoprolol tartrate (LOPRESSOR) 25 mg tablet  Spouse/Significant Other Yes No   Sig: Take 25 mg by mouth every 12 (twelve) hours   mometasone (ASMANEX TWISTHALER) 220 MCG/INH inhaler   Yes No   Sig: Inhale 2 puffs 2 (two) times a day Rinse mouth after use  predniSONE 10 mg tablet   No No   Si pills for 2 days, then 3 pills for 2 days, then 2 pills for 2 days then 1 pill for 2 days  primidone (MYSOLINE) 50 mg tablet   Yes No   Sig: Take 100 mg by mouth every 8 (eight) hours   rosuvastatin (CRESTOR) 20 MG tablet   No No   Sig: Take 1 tablet (20 mg total) by mouth daily after dinner Take one half tablet daily with dinner   Patient taking differently: Take 10 mg by mouth daily after dinner Take one half tablet daily with dinner    saxagliptin (ONGLYZA) 5 MG tablet   No No   Sig: Take 1 tablet (5 mg total) by mouth daily   tiotropium (SPIRIVA) 18 mcg inhalation capsule   No No   Sig: Place 1 capsule (18 mcg total) into inhaler and inhale daily   tiotropium-olodaterol (STIOLTO RESPIMAT) 2 5-2 5 MCG/ACT inhaler   Yes No   Sig: Inhale 2 puffs daily      Facility-Administered Medications: None       Past Medical History:   Diagnosis Date    BPH (benign prostatic hyperplasia)     45 days radiation treatment    COPD (chronic obstructive pulmonary disease)     Coronary artery disease     CABG x4 in 2017    Diabetes mellitus     History of Arterial Duplex of LE 2017    Likely occlusion of the left superficial femoral artery  Calcific changes bilaterally  Despite these changes, the ankle-brachial index as a measure of peripheral blood flow only mildly impaired      History of echocardiogram 2017    EF 40%, mild LVH, mild MR     Hyperlipidemia     Hypertension     NSTEMI (non-ST elevated myocardial infarction)     Prostate cancer     prostate     PVD (peripheral vascular disease)     Tremor        Past Surgical History:   Procedure Laterality Date    CARDIAC CATHETERIZATION  03/08/2017    Significant left main plus triple-vessel CAD   CORONARY ARTERY BYPASS GRAFT  03/08/2017    4V CABG:  LIMA to LAD, VG to RI, SVG to PDA to LVBR RCA   EYE SURGERY      shots in eye once a [facility] @ [facility]    PROSTATE BIOPSY         Family History   Problem Relation Age of Onset    Cancer Father     Heart disease Brother      I have reviewed and agree with the history as documented  E-Cigarette/Vaping    E-Cigarette Use Never User      E-Cigarette/Vaping Substances    Nicotine No     THC No     CBD No     Flavoring No     Other No     Unknown No      Social History     Tobacco Use    Smoking status: Current Some Day Smoker     Packs/day: 0 50     Years: 60 00     Pack years: 30 00     Types: Cigarettes    Smokeless tobacco: Never Used    Tobacco comment: only smokes when he drinks beer   Vaping Use    Vaping Use: Never used   Substance Use Topics    Alcohol use: Yes    Drug use: Never       Review of Systems   Constitutional: Positive for activity change and fatigue  Negative for chills and fever  HENT: Negative for ear pain and sore throat  Eyes: Negative for pain and visual disturbance  Respiratory: Positive for cough and shortness of breath  Cardiovascular: Negative for chest pain and palpitations  Gastrointestinal: Negative for abdominal pain and vomiting  Genitourinary: Negative for dysuria and hematuria  Musculoskeletal: Negative for arthralgias and back pain  Skin: Negative for color change and rash  Neurological: Positive for weakness  Negative for seizures and syncope  All other systems reviewed and are negative        Physical Exam  Physical Exam  Constitutional:       General: He is in acute distress  Appearance: Normal appearance  He is normal weight  He is not ill-appearing  HENT:      Head: Normocephalic and atraumatic  Right Ear: External ear normal       Left Ear: External ear normal       Nose: Nose normal       Mouth/Throat:      Mouth: Mucous membranes are moist    Eyes:      Conjunctiva/sclera: Conjunctivae normal    Cardiovascular:      Rate and Rhythm: Normal rate and regular rhythm  Pulses: Normal pulses  Heart sounds: Normal heart sounds  Pulmonary:      Effort: Pulmonary effort is normal       Breath sounds: Examination of the right-upper field reveals wheezing  Examination of the left-upper field reveals wheezing  Examination of the right-middle field reveals wheezing  Examination of the left-middle field reveals wheezing  Examination of the right-lower field reveals wheezing  Examination of the left-lower field reveals wheezing  Decreased breath sounds and wheezing present  No rhonchi  Chest:      Chest wall: No mass  Abdominal:      General: Abdomen is flat  There is no distension  Palpations: Abdomen is soft  There is no mass  Musculoskeletal:         General: No swelling, tenderness or deformity  Normal range of motion  Cervical back: Normal range of motion  Right lower leg: No edema  Left lower leg: No edema  Skin:     General: Skin is warm and dry  Capillary Refill: Capillary refill takes 2 to 3 seconds  Coloration: Skin is not pale  Neurological:      General: No focal deficit present  Mental Status: He is alert and oriented to person, place, and time  Mental status is at baseline     Psychiatric:         Mood and Affect: Mood normal          Vital Signs  ED Triage Vitals   Temperature Pulse Respirations Blood Pressure SpO2   08/03/21 1722 08/03/21 1722 08/03/21 1722 08/03/21 1725 08/03/21 1722   97 9 °F (36 6 °C) 77 (!) 24 (!) 171/79 94 %      Temp src Heart Rate Source Patient Position - Orthostatic VS BP Location FiO2 (%)   -- 08/03/21 1722 08/03/21 1916 -- --    Monitor Sitting        Pain Score       --                  Vitals:    08/03/21 1815 08/03/21 1830 08/03/21 1845 08/03/21 1916   BP:    160/78   Pulse: 67 68 69    Patient Position - Orthostatic VS:    Sitting         Visual Acuity      ED Medications  Medications   methylPREDNISolone sodium succinate (Solu-MEDROL) injection 40 mg (40 mg Intravenous Given 8/3/21 1800)   ipratropium-albuterol (DUO-NEB) 0 5-2 5 mg/3 mL inhalation solution 3 mL (3 mL Nebulization Given 8/3/21 1805)   azithromycin (ZITHROMAX) tablet 500 mg (500 mg Oral Given 8/3/21 1913)       Diagnostic Studies  Results Reviewed     Procedure Component Value Units Date/Time    Novel Coronavirus (Covid-19),PCR [facility] - 2 Hour Stat [[id number]]  (Normal) Collected: 08/03/21 1754    Lab Status: Final result Specimen: Nares from Nose Updated: 08/03/21 1906     SARS-CoV-2 Negative    Narrative: The specimen collection materials, transport medium, and/or testing methodology utilized in the production of these test results have been proven to be reliable in a limited validation with an abbreviated program under the Emergency Utilization Authorization provided by the FDA  Testing reported as "Presumptive positive" will be confirmed with secondary testing to ensure result accuracy  Clinical caution and judgement should be used with the interpretation of these results with consideration of the clinical impression and other laboratory testing  Testing reported as "Positive" or "Negative" has been proven to be accurate according to standard laboratory validation requirements  All testing is performed with control materials showing appropriate reactivity at standard intervals        Comprehensive metabolic panel [[id number]]  (Abnormal) Collected: 08/03/21 1754    Lab Status: Final result Specimen: Blood from Arm, Left Updated: 08/03/21 1843     Sodium 136 mmol/L      Potassium 4 0 mmol/L      Chloride 101 mmol/L      CO2 24 mmol/L      ANION GAP 11 mmol/L      BUN 13 mg/dL      Creatinine 0 83 mg/dL      Glucose 291 mg/dL      Calcium 8 5 mg/dL      AST 10 U/L      ALT 10 U/L      Alkaline Phosphatase 61 U/L      Total Protein 6 3 g/dL      Albumin 3 9 g/dL      Total Bilirubin 0 40 mg/dL      eGFR 86 ml/min/1 73sq m     Narrative:      [place] guidelines for Chronic Kidney Disease (CKD):     Stage 1 with normal or high GFR (GFR > 90 mL/min/1 73 square meters)    Stage 2 Mild CKD (GFR = 60-89 mL/min/1 73 square meters)    Stage 3A Moderate CKD (GFR = 45-59 mL/min/1 73 square meters)    Stage 3B Moderate CKD (GFR = 30-44 mL/min/1 73 square meters)    Stage 4 Severe CKD (GFR = 15-29 mL/min/1 73 square meters)    Stage 5 End Stage CKD (GFR <15 mL/min/1 73 square meters)  Note: GFR calculation is accurate only with a steady state creatinine    B-Type Natriuretic Peptide(BNP) CA, , [facility] Only [[id number]]  (Abnormal) Collected: 08/03/21 1819    Lab Status: Final result Specimen: Blood Updated: 08/03/21 1843      pg/mL     Troponin I [[id number]]  (Normal) Collected: 08/03/21 1754    Lab Status: Final result Specimen: Blood from Arm, Left Updated: 08/03/21 1830     Troponin I <0 03 ng/mL     Protime-INR [[id number]]  (Normal) Collected: 08/03/21 1754    Lab Status: Final result Specimen: Blood from Arm, Left Updated: 08/03/21 1823     Protime 12 6 seconds      INR 0 96    APTT [[id number]]  (Normal) Collected: 08/03/21 1754    Lab Status: Final result Specimen: Blood from Arm, Left Updated: 08/03/21 1823     PTT 31 seconds     CBC and differential [[id number]]  (Abnormal) Collected: 08/03/21 1754    Lab Status: Final result Specimen: Blood from Arm, Left Updated: 08/03/21 1814     WBC 10 20 Thousand/uL      RBC 4 54 Million/uL      Hemoglobin 14 0 g/dL      Hematocrit 41 6 %      MCV 92 fL      MCH 30 8 pg      MCHC 33 6 g/dL      RDW 14 3 %      MPV 10 3 fL      Platelets 438 Thousands/uL      Neutrophils Relative 89 %      Lymphocytes Relative 6 %      Monocytes Relative 3 %      Eosinophils Relative 1 %      Basophils Relative 1 %      Neutrophils Absolute 9 00 Thousands/µL      Lymphocytes Absolute 0 60 Thousands/µL      Monocytes Absolute 0 30 Thousand/µL      Eosinophils Absolute 0 10 Thousand/µL      Basophils Absolute 0 10 Thousands/µL                  XR chest 1 view portable   ED Interpretation by Orlin Lowry DO (08/03 1820)   Potential left basilar infiltrate due to some obscuring of the left heart border inferiorly  Final Result by Haider Iverson MD (08/03 1900)      No acute cardiopulmonary disease  Workstation performed: [id number]                    Procedures  Procedures         ED Course  ED Course as of Aug 03 1929   Tue Aug 03, 2021   1741 Patient apparently was discharged from the ER after doing better after getting some treatment in mid July 1846 Glucose, Random(!): 291   1847 BNP(!): 446   1847 Patient notes that he is feeling better after IV steroids  Will attempt to ambulate the patient  1906 Radiology feels that this chest x-ray is negative for acute pathology, and patient feels better and is able to ambulate in the ED with a sat in the 90s without significant shortness of breath  I will discharge the patient on some steroids recommend he follows up outpatient with a pulmonologist as soon as possible                                                MDM    Disposition  Final diagnoses:   COPD with acute exacerbation ([facility] [address] )     Time reflects when diagnosis was documented in both MDM as applicable and the Disposition within this note     Time User Action Codes Description Comment    8/3/2021  7:07 PM Keith Cerrato Come Add [J44 1] COPD with acute exacerbation [facility])       ED Disposition     ED Disposition Condition Date/Time Comment    Discharge Stable Tue Aug 3, 2021  7:09 PM Radha Bach discharge to home/self care  Follow-up Information    None         Discharge Medication List as of 8/3/2021  7:10 PM      START taking these medications    Details   azithromycin (Zithromax Z-[place]) 250 mg tablet Take 1 tablet daily x 4 days - start 8/4/2021, Normal      !! predniSONE 20 mg tablet Take 2 tablets (40 mg total) by mouth daily for 4 days, Starting Tue 8/3/2021, Until Sat 8/7/2021, Normal       !! - Potential duplicate medications found  Please discuss with provider        CONTINUE these medications which have NOT CHANGED    Details   albuterol (2 5 mg/3 mL) 0 083 % nebulizer solution Take 1 vial (2 5 mg total) by nebulization every 4 (four) hours as needed for wheezing or shortness of breath, Starting Thu 9/3/2020, No Print      albuterol (PROVENTIL HFA,VENTOLIN HFA) 90 mcg/act inhaler Inhale 2 puffs every 6 (six) hours as needed for wheezing or shortness of breath, Starting Thu 9/3/2020, No Print      Alogliptin Benzoate 25 MG TABS Take 0 5 tablets (12 5 mg total) by mouth daily Patient states takes 1/2 of a 25 mg tablet every AM, Starting Thu 9/3/2020, Until Sun 11/29/2020, No Print      aspirin (ECOTRIN LOW STRENGTH) 81 mg EC tablet Take 1 tablet (81 mg total) by mouth every other day, Starting Th 9/3/2020, No Print      Empagliflozin 25 MG TABS Take 0 5 tablets (12 5 mg total) by mouth every morning Take one half tablet every morning, Starting Th 9/3/2020, Normal      fluticasone (FLOVENT HFA) 220 mcg/act inhaler Inhale 1 puff daily Rinse mouth after use , Historical Med      gabapentin (NEURONTIN) 100 mg capsule Take 2 capsules (200 mg total) by mouth daily at bedtime, Starting Thu 9/3/2020, No Print      glimepiride (AMARYL) 1 mg tablet Take 1 tablet (1 mg total) by mouth 2 (two) times a day, Starting Th 9/3/2020, No Print      guaiFENesin (MUCINEX) 600 mg 12 hr tablet Take 1 tablet (600 mg total) by mouth 2 (two) times a day, Starting Th 9/3/2020, No Print      hydrocortisone 1 % cream Apply topically 2 (two) times a day, Starting Thu 9/3/2020, No Print      lisinopril (ZESTRIL) 20 mg tablet Take 1 tablet (20 mg total) by mouth 2 (two) times a day, Starting Thu 9/3/2020, Until Fri 9/3/2021, Print      metFORMIN (GLUCOPHAGE) 500 mg tablet Take 1 tablet (500 mg total) by mouth daily with dinner, Starting Thu 9/3/2020, No Print      metoprolol tartrate (LOPRESSOR) 25 mg tablet Take 25 mg by mouth every 12 (twelve) hours, Historical Med      mometasone (ASMANEX TWISTHALER) 220 MCG/INH inhaler Inhale 2 puffs 2 (two) times a day Rinse mouth after use , Historical Med      !! predniSONE 10 mg tablet 4 pills for 2 days, then 3 pills for 2 days, then 2 pills for 2 days then 1 pill for 2 days  , Normal      primidone (MYSOLINE) 50 mg tablet Take 100 mg by mouth every 8 (eight) hours, Historical Med      rosuvastatin (CRESTOR) 20 MG tablet Take 1 tablet (20 mg total) by mouth daily after dinner Take one half tablet daily with dinner, Starting Thu 9/3/2020, No Print      saxagliptin (ONGLYZA) 5 MG tablet Take 1 tablet (5 mg total) by mouth daily, Starting Thu 9/3/2020, No Print      tiotropium (SPIRIVA) 18 mcg inhalation capsule Place 1 capsule (18 mcg total) into inhaler and inhale daily, Starting Wed 12/2/2020, Normal      tiotropium-olodaterol (STIOLTO RESPIMAT) 2 5-2 5 MCG/ACT inhaler Inhale 2 puffs daily, Historical Med       !! - Potential duplicate medications found  Please discuss with provider  No discharge procedures on file      PDMP Review       Value Time User    PDMP Reviewed  Yes 9/3/2020 12:26 PM Regine Jasso [address]          ED Provider  Electronically Signed by           Zakia Kennedy DO  08/03/21 1930

## 2021-08-03 NOTE — DISCHARGE INSTRUCTIONS
Take prednisone 2 pills a day for the next 4 days, and take with food  Start the medication on 08/04/2021  Take Zithromax 20 50 mg a day for 4 days  Continue using her nebulizer treatments as prescribed  Please follow-up with your family doctor or pulmonologist this week  Please stop smoking as this may exacerbate her symptoms

## 2021-08-03 NOTE — ED NOTES
Patient ambulated approximately 50 ft on room air with sat holding 92-94%       Sam Young RN  08/03/21 8945

## 2021-08-04 LAB
ATRIAL RATE: 72 BPM
P AXIS: 65 DEGREES
PR INTERVAL: 174 MS
QRS AXIS: 61 DEGREES
QRSD INTERVAL: 100 MS
QT INTERVAL: 404 MS
QTC INTERVAL: 442 MS
T WAVE AXIS: 162 DEGREES
VENTRICULAR RATE: 72 BPM

## 2021-08-04 PROCEDURE — 93010 ELECTROCARDIOGRAM REPORT: CPT | Performed by: INTERNAL MEDICINE

## 2021-09-02 ENCOUNTER — HOSPITAL ENCOUNTER (EMERGENCY)
Facility: HOSPITAL | Age: 76
Discharge: HOME | End: 2021-09-02
Attending: EMERGENCY MEDICINE
Payer: COMMERCIAL

## 2021-09-02 ENCOUNTER — APPOINTMENT (EMERGENCY)
Dept: RADIOLOGY | Facility: HOSPITAL | Age: 76
End: 2021-09-02
Payer: COMMERCIAL

## 2021-09-02 VITALS
DIASTOLIC BLOOD PRESSURE: 65 MMHG | BODY MASS INDEX: 30.27 KG/M2 | RESPIRATION RATE: 20 BRPM | TEMPERATURE: 99.4 F | OXYGEN SATURATION: 96 % | HEIGHT: 71 IN | SYSTOLIC BLOOD PRESSURE: 141 MMHG | HEART RATE: 79 BPM

## 2021-09-02 DIAGNOSIS — J44.1 COPD EXACERBATION (HCC): Primary | ICD-10-CM

## 2021-09-02 PROCEDURE — 71045 X-RAY EXAM CHEST 1 VIEW: CPT

## 2021-09-02 PROCEDURE — 99284 EMERGENCY DEPT VISIT MOD MDM: CPT | Performed by: EMERGENCY MEDICINE

## 2021-09-02 PROCEDURE — 99284 EMERGENCY DEPT VISIT MOD MDM: CPT

## 2021-09-02 RX ORDER — PREDNISONE 50 MG/1
50 TABLET ORAL DAILY
Qty: 5 TABLET | Refills: 0 | Status: SHIPPED | OUTPATIENT
Start: 2021-09-02 | End: 2021-09-07

## 2021-09-02 RX ORDER — AZITHROMYCIN 250 MG/1
TABLET, FILM COATED ORAL
Qty: 6 TABLET | Refills: 0 | Status: SHIPPED | OUTPATIENT
Start: 2021-09-02 | End: 2021-09-06

## 2021-09-02 RX ADMIN — PREDNISONE 50 MG: 10 TABLET ORAL at 16:50

## 2021-09-02 NOTE — ED PROVIDER NOTES
History  Chief Complaint   Patient presents with    Shortness of Breath     pt reports 3 day hx of increasing SOB/COPD symptoms, worse with exertion  pt winded ambulating to triage room     71-year-old male presenting with shortness of breath, dry cough  He states it is exactly the same as prior COPD exacerbations  There is no difference between this and his prior COPD exacerbations  He denies any fevers or chills  Shortness of Breath  Severity:  Mild  Onset quality:  Gradual  Timing:  Constant  Progression:  Worsening  Chronicity:  Recurrent  Relieved by: Inhaler  Worsened by:  Nothing  Ineffective treatments:  None tried  Associated symptoms: cough    Associated symptoms: no abdominal pain, no chest pain, no fever, no rash, no sore throat, no vomiting and no wheezing        Prior to Admission Medications   Prescriptions Last Dose Informant Patient Reported? Taking?    Alogliptin Benzoate 25 MG TABS   No No   Sig: Take 0 5 tablets (12 5 mg total) by mouth daily Patient states takes 1/2 of a 25 mg tablet every AM   Patient taking differently: Take 25 mg by mouth daily    Empagliflozin 25 MG TABS   No No   Sig: Take 0 5 tablets (12 5 mg total) by mouth every morning Take one half tablet every morning   albuterol (2 5 mg/3 mL) 0 083 % nebulizer solution   No No   Sig: Take 1 vial (2 5 mg total) by nebulization every 4 (four) hours as needed for wheezing or shortness of breath   albuterol (PROVENTIL HFA,VENTOLIN HFA) 90 mcg/act inhaler   No No   Sig: Inhale 2 puffs every 6 (six) hours as needed for wheezing or shortness of breath   aspirin (ECOTRIN LOW STRENGTH) 81 mg EC tablet   No No   Sig: Take 1 tablet (81 mg total) by mouth every other day   fluticasone (FLOVENT HFA) 220 mcg/act inhaler   Yes No   Sig: Inhale 1 puff daily Rinse mouth after use    gabapentin (NEURONTIN) 100 mg capsule   No No   Sig: Take 2 capsules (200 mg total) by mouth daily at bedtime   glimepiride (AMARYL) 1 mg tablet   No No   Sig: Take 1 tablet (1 mg total) by mouth 2 (two) times a day   guaiFENesin (MUCINEX) 600 mg 12 hr tablet   No No   Sig: Take 1 tablet (600 mg total) by mouth 2 (two) times a day   hydrocortisone 1 % cream   No No   Sig: Apply topically 2 (two) times a day   lisinopril (ZESTRIL) 20 mg tablet   No No   Sig: Take 1 tablet (20 mg total) by mouth 2 (two) times a day   metFORMIN (GLUCOPHAGE) 500 mg tablet   No No   Sig: Take 1 tablet (500 mg total) by mouth daily with dinner   metoprolol tartrate (LOPRESSOR) 25 mg tablet  Spouse/Significant Other Yes No   Sig: Take 25 mg by mouth every 12 (twelve) hours   mometasone (ASMANEX TWISTHALER) 220 MCG/INH inhaler   Yes No   Sig: Inhale 2 puffs 2 (two) times a day Rinse mouth after use  predniSONE 10 mg tablet   No No   Si pills for 2 days, then 3 pills for 2 days, then 2 pills for 2 days then 1 pill for 2 days  primidone (MYSOLINE) 50 mg tablet   Yes No   Sig: Take 100 mg by mouth every 8 (eight) hours   rosuvastatin (CRESTOR) 20 MG tablet   No No   Sig: Take 1 tablet (20 mg total) by mouth daily after dinner Take one half tablet daily with dinner   Patient taking differently: Take 10 mg by mouth daily after dinner Take one half tablet daily with dinner    saxagliptin (ONGLYZA) 5 MG tablet   No No   Sig: Take 1 tablet (5 mg total) by mouth daily   tiotropium (SPIRIVA) 18 mcg inhalation capsule   No No   Sig: Place 1 capsule (18 mcg total) into inhaler and inhale daily   tiotropium-olodaterol (STIOLTO RESPIMAT) 2 5-2 5 MCG/ACT inhaler   Yes No   Sig: Inhale 2 puffs daily      Facility-Administered Medications: None       Past Medical History:   Diagnosis Date    BPH (benign prostatic hyperplasia)     45 days radiation treatment    COPD (chronic obstructive pulmonary disease)     Coronary artery disease     CABG x4 in 2017    Diabetes mellitus     History of Arterial Duplex of LE 2017    Likely occlusion of the left superficial femoral artery    Calcific changes bilaterally  Despite these changes, the ankle-brachial index as a measure of peripheral blood flow only mildly impaired   History of echocardiogram 06/12/2017    EF 40%, mild LVH, mild MR     Hyperlipidemia     Hypertension     NSTEMI (non-ST elevated myocardial infarction)     Prostate cancer     prostate     PVD (peripheral vascular disease)     Tremor        Past Surgical History:   Procedure Laterality Date    CARDIAC CATHETERIZATION  03/08/2017    Significant left main plus triple-vessel CAD   CORONARY ARTERY BYPASS GRAFT  03/08/2017    4V CABG:  LIMA to LAD, VG to RI, SVG to PDA to LVBR RCA   EYE SURGERY      [facility] in eye once a [facility] [address] [facility]    PROSTATE BIOPSY         Family History   Problem Relation Age of Onset    Cancer Father     Heart disease Brother      I have reviewed and agree with the history as documented  E-Cigarette/Vaping    E-Cigarette Use Never User      E-Cigarette/Vaping Substances    Nicotine No     THC No     CBD No     Flavoring No     Other No     Unknown No      Social History     Tobacco Use    Smoking status: Current Some Day Smoker     Packs/day: 0 50     Years: 60 00     Pack years: 30 00     Types: Cigarettes    Smokeless tobacco: Never Used    Tobacco comment: only smokes when he drinks beer   Vaping Use    Vaping Use: Never used   Substance Use Topics    Alcohol use: Yes    Drug use: Never       Review of Systems   Constitutional: Negative for chills and fever  HENT: Negative for congestion, nosebleeds, rhinorrhea and sore throat  Eyes: Negative for pain and visual disturbance  Respiratory: Positive for cough and shortness of breath  Negative for wheezing  Cardiovascular: Negative for chest pain and leg swelling  Gastrointestinal: Negative for abdominal distention, abdominal pain, diarrhea, nausea and vomiting  Genitourinary: Negative for dysuria and frequency  Musculoskeletal: Negative for back pain and joint swelling  Skin: Negative for rash and wound  Neurological: Negative for weakness and numbness  Psychiatric/Behavioral: Negative for decreased concentration and suicidal ideas  Physical Exam  Physical Exam  Vitals reviewed  Constitutional:       Appearance: He is well-developed  HENT:      Head: Normocephalic and atraumatic  Eyes:      Conjunctiva/sclera: Conjunctivae normal       Pupils: Pupils are equal, round, and reactive to light  Neck:      Trachea: No tracheal deviation  Cardiovascular:      Rate and Rhythm: Normal rate and regular rhythm  Heart sounds: Normal heart sounds  No murmur heard  Pulmonary:      Effort: Pulmonary effort is normal  No respiratory distress  Breath sounds: Wheezing present  No rales  Abdominal:      General: Bowel sounds are normal  There is no distension  Palpations: Abdomen is soft  Tenderness: There is no abdominal tenderness  Musculoskeletal:         General: No deformity  Cervical back: Normal range of motion and neck supple  Skin:     General: Skin is warm and dry  Capillary Refill: Capillary refill takes less than 2 seconds  Neurological:      Mental Status: He is alert and oriented to person, place, and time  Sensory: No sensory deficit     Psychiatric:         Judgment: Judgment normal          Vital Signs  ED Triage Vitals [09/02/21 1609]   Temperature Pulse Respirations Blood Pressure SpO2   99 4 °F (37 4 °C) 79 20 141/65 96 %      Temp Source Heart Rate Source Patient Position - Orthostatic VS BP Location FiO2 (%)   Tympanic -- -- -- --      Pain Score       No Pain           Vitals:    09/02/21 1609   BP: 141/65   Pulse: 79         Visual Acuity      ED Medications  Medications   predniSONE tablet 50 mg (50 mg Oral Given 9/2/21 1650)       Diagnostic Studies  Results Reviewed     None                 XR chest 1 view portable    (Results Pending)              Procedures  Procedures         ED Course MDM  Number of Diagnoses or Management Options  COPD exacerbation [facility]): new and requires workup  Diagnosis management comments: Patient is a 60-year-old male presenting with chief complaint of COPD exacerbation, wheezing, cough - chief complaint was listed as "shortness of breath"    I discussed other causes of shortness of breath with patient however he declines further investigation as he states this is exactly the same as his prior COPD exacerbations  His physical exam is also consistent with this diagnosis  Further workup for alternative causes of shortness of breath with exposed patient to unnecessary risks    Will get a chest x-ray, prednisone    Discussed observation admission with patient but he is confident this       Amount and/or Complexity of Data Reviewed  Review and summarize past medical records: yes  Independent visualization of images, tracings, or specimens: yes    Risk of Complications, Morbidity, and/or Mortality  Presenting problems: high  Diagnostic procedures: minimal  Management options: high        Disposition  Final diagnoses:   COPD exacerbation ([address] )     Time reflects when diagnosis was documented in both MDM as applicable and the Disposition within this note     Time User Action Codes Description Comment    9/2/2021  4:42 PM Dianne Fox Add [J44 1] COPD exacerbation [facility])       ED Disposition     ED Disposition Condition Date/Time Comment    Discharge Stable u Sep 2, 2021  4:54 PM Daisy Jimenez discharge to home/self care              Follow-up Information     Follow up With Specialties Details Why Contact Jerri Walters DO Family Medicine Schedule an appointment as soon as possible for a visit   Analia Silva  [phone]            Discharge Medication List as of 9/2/2021  4:54 PM      START taking these medications    Details   azithromycin (ZITHROMAX) 250 mg tablet Take 2 tablets today then 1 tablet daily x 4 days, Normal      !! predniSONE 50 mg tablet Take 1 tablet (50 mg total) by mouth daily for 5 days, Starting Thu 9/2/2021, Until Tue 9/7/2021, Normal       !! - Potential duplicate medications found  Please discuss with provider        CONTINUE these medications which have NOT CHANGED    Details   albuterol (2 5 mg/3 mL) 0 083 % nebulizer solution Take 1 vial (2 5 mg total) by nebulization every 4 (four) hours as needed for wheezing or shortness of breath, Starting Thu 9/3/2020, No Print      albuterol (PROVENTIL HFA,VENTOLIN HFA) 90 mcg/act inhaler Inhale 2 puffs every 6 (six) hours as needed for wheezing or shortness of breath, Starting Thu 9/3/2020, No Print      Alogliptin Benzoate 25 MG TABS Take 0 5 tablets (12 5 mg total) by mouth daily Patient states takes 1/2 of a 25 mg tablet every AM, Starting Thu 9/3/2020, Until Sun 11/29/2020, No Print      aspirin (ECOTRIN LOW STRENGTH) 81 mg EC tablet Take 1 tablet (81 mg total) by mouth every other day, Starting Thu 9/3/2020, No Print      Empagliflozin 25 MG TABS Take 0 5 tablets (12 5 mg total) by mouth every morning Take one half tablet every morning, Starting Thu 9/3/2020, Normal      fluticasone (FLOVENT HFA) 220 mcg/act inhaler Inhale 1 puff daily Rinse mouth after use , Historical Med      gabapentin (NEURONTIN) 100 mg capsule Take 2 capsules (200 mg total) by mouth daily at bedtime, Starting Thu 9/3/2020, No Print      glimepiride (AMARYL) 1 mg tablet Take 1 tablet (1 mg total) by mouth 2 (two) times a day, Starting Thu 9/3/2020, No Print      guaiFENesin (MUCINEX) 600 mg 12 hr tablet Take 1 tablet (600 mg total) by mouth 2 (two) times a day, Starting Thu 9/3/2020, No Print      hydrocortisone 1 % cream Apply topically 2 (two) times a day, Starting Thu 9/3/2020, No Print      lisinopril (ZESTRIL) 20 mg tablet Take 1 tablet (20 mg total) by mouth 2 (two) times a day, Starting Thu 9/3/2020, Until Fri 9/3/2021, Print      metFORMIN (GLUCOPHAGE) 500 mg tablet Take 1 tablet (500 mg total) by mouth daily with dinner, Starting Thu 9/3/2020, No Print      metoprolol tartrate (LOPRESSOR) 25 mg tablet Take 25 mg by mouth every 12 (twelve) hours, Historical Med      mometasone (ASMANEX TWISTHALER) 220 MCG/INH inhaler Inhale 2 puffs 2 (two) times a day Rinse mouth after use , Historical Med      !! predniSONE 10 mg tablet 4 pills for 2 days, then 3 pills for 2 days, then 2 pills for 2 days then 1 pill for 2 days  , Normal      primidone (MYSOLINE) 50 mg tablet Take 100 mg by mouth every 8 (eight) hours, Historical Med      rosuvastatin (CRESTOR) 20 MG tablet Take 1 tablet (20 mg total) by mouth daily after dinner Take one half tablet daily with dinner, Starting Thu 9/3/2020, No Print      saxagliptin (ONGLYZA) 5 MG tablet Take 1 tablet (5 mg total) by mouth daily, Starting Thu 9/3/2020, No Print      tiotropium (SPIRIVA) 18 mcg inhalation capsule Place 1 capsule (18 mcg total) into inhaler and inhale daily, Starting Wed 12/2/2020, Normal      tiotropium-olodaterol (STIOLTO RESPIMAT) 2 5-2 5 MCG/ACT inhaler Inhale 2 puffs daily, Historical Med       !! - Potential duplicate medications found  Please discuss with provider  No discharge procedures on file      PDMP Review       Value Time User    PDMP Reviewed  Yes 9/3/2020 12:26 PM Arjun Soler, [facility] Provider  Electronically Signed by           Oliva Roy DO  09/03/21 [id number]

## 2021-09-16 ENCOUNTER — APPOINTMENT (EMERGENCY)
Dept: RADIOLOGY | Facility: HOSPITAL | Age: 76
End: 2021-09-16
Payer: COMMERCIAL

## 2021-09-16 ENCOUNTER — HOSPITAL ENCOUNTER (OUTPATIENT)
Facility: HOSPITAL | Age: 76
Setting detail: OBSERVATION
Discharge: HOME | End: 2021-09-17
Attending: FAMILY MEDICINE | Admitting: INTERNAL MEDICINE
Payer: COMMERCIAL

## 2021-09-16 DIAGNOSIS — J44.1 COPD EXACERBATION (HCC): Primary | ICD-10-CM

## 2021-09-16 DIAGNOSIS — R09.02 HYPOXIA: ICD-10-CM

## 2021-09-16 DIAGNOSIS — I10 ESSENTIAL HYPERTENSION: ICD-10-CM

## 2021-09-16 DIAGNOSIS — J44.1 COPD WITH ACUTE EXACERBATION (HCC): ICD-10-CM

## 2021-09-16 PROBLEM — R06.02 SOB (SHORTNESS OF BREATH): Status: ACTIVE | Noted: 2021-09-16

## 2021-09-16 LAB
ANION GAP SERPL CALCULATED.3IONS-SCNC: 9 MMOL/L (ref 4–13)
BASOPHILS # BLD AUTO: 0.1 THOUSANDS/ΜL (ref 0–0.1)
BASOPHILS NFR BLD AUTO: 1 % (ref 0–2)
BNP SERPL-MCNC: 272 PG/ML (ref 1–100)
BUN SERPL-MCNC: 16 MG/DL (ref 7–25)
CALCIUM SERPL-MCNC: 8.4 MG/DL (ref 8.6–10.5)
CHLORIDE SERPL-SCNC: 100 MMOL/L (ref 98–107)
CO2 SERPL-SCNC: 29 MMOL/L (ref 21–31)
CREAT SERPL-MCNC: 0.77 MG/DL (ref 0.7–1.3)
EOSINOPHIL # BLD AUTO: 0.2 THOUSAND/ΜL (ref 0–0.61)
EOSINOPHIL NFR BLD AUTO: 2 % (ref 0–5)
ERYTHROCYTE [DISTWIDTH] IN BLOOD BY AUTOMATED COUNT: 13.8 % (ref 11.5–14.5)
GFR SERPL CREATININE-BSD FRML MDRD: 89 ML/MIN/1.73SQ M
GLUCOSE SERPL-MCNC: 168 MG/DL (ref 65–99)
GLUCOSE SERPL-MCNC: 231 MG/DL (ref 65–140)
GLUCOSE SERPL-MCNC: 401 MG/DL (ref 65–140)
HCT VFR BLD AUTO: 39.6 % (ref 42–47)
HGB BLD-MCNC: 13.4 G/DL (ref 14–18)
LYMPHOCYTES # BLD AUTO: 1.4 THOUSANDS/ΜL (ref 0.6–4.47)
LYMPHOCYTES NFR BLD AUTO: 12 % (ref 21–51)
MCH RBC QN AUTO: 30.6 PG (ref 26–34)
MCHC RBC AUTO-ENTMCNC: 33.8 G/DL (ref 31–37)
MCV RBC AUTO: 91 FL (ref 81–99)
MONOCYTES # BLD AUTO: 0.8 THOUSAND/ΜL (ref 0.17–1.22)
MONOCYTES NFR BLD AUTO: 7 % (ref 2–12)
NEUTROPHILS # BLD AUTO: 8.9 THOUSANDS/ΜL (ref 1.4–6.5)
NEUTS SEG NFR BLD AUTO: 78 % (ref 42–75)
PLATELET # BLD AUTO: 152 THOUSANDS/UL (ref 149–390)
PMV BLD AUTO: 9.4 FL (ref 8.6–11.7)
POTASSIUM SERPL-SCNC: 3.3 MMOL/L (ref 3.5–5.5)
RBC # BLD AUTO: 4.36 MILLION/UL (ref 4.3–5.9)
SARS-COV-2 RNA RESP QL NAA+PROBE: NEGATIVE
SODIUM SERPL-SCNC: 138 MMOL/L (ref 134–143)
TROPONIN I SERPL-MCNC: 0.03 NG/ML
WBC # BLD AUTO: 11.4 THOUSAND/UL (ref 4.8–10.8)

## 2021-09-16 PROCEDURE — U0005 INFEC AGEN DETEC AMPLI PROBE: HCPCS | Performed by: FAMILY MEDICINE

## 2021-09-16 PROCEDURE — 93005 ELECTROCARDIOGRAM TRACING: CPT

## 2021-09-16 PROCEDURE — 83880 ASSAY OF NATRIURETIC PEPTIDE: CPT | Performed by: FAMILY MEDICINE

## 2021-09-16 PROCEDURE — 94760 N-INVAS EAR/PLS OXIMETRY 1: CPT

## 2021-09-16 PROCEDURE — 99285 EMERGENCY DEPT VISIT HI MDM: CPT | Performed by: FAMILY MEDICINE

## 2021-09-16 PROCEDURE — 99285 EMERGENCY DEPT VISIT HI MDM: CPT

## 2021-09-16 PROCEDURE — 80048 BASIC METABOLIC PNL TOTAL CA: CPT | Performed by: FAMILY MEDICINE

## 2021-09-16 PROCEDURE — U0003 INFECTIOUS AGENT DETECTION BY NUCLEIC ACID (DNA OR RNA); SEVERE ACUTE RESPIRATORY SYNDROME CORONAVIRUS 2 (SARS-COV-2) (CORONAVIRUS DISEASE [COVID-19]), AMPLIFIED PROBE TECHNIQUE, MAKING USE OF HIGH THROUGHPUT TECHNOLOGIES AS DESCRIBED BY CMS-2020-01-R: HCPCS | Performed by: FAMILY MEDICINE

## 2021-09-16 PROCEDURE — 94644 CONT INHLJ TX 1ST HOUR: CPT

## 2021-09-16 PROCEDURE — 85025 COMPLETE CBC W/AUTO DIFF WBC: CPT | Performed by: FAMILY MEDICINE

## 2021-09-16 PROCEDURE — 82948 REAGENT STRIP/BLOOD GLUCOSE: CPT

## 2021-09-16 PROCEDURE — 99220 PR INITIAL OBSERVATION CARE/DAY 70 MINUTES: CPT | Performed by: INTERNAL MEDICINE

## 2021-09-16 PROCEDURE — 84484 ASSAY OF TROPONIN QUANT: CPT | Performed by: FAMILY MEDICINE

## 2021-09-16 PROCEDURE — 71045 X-RAY EXAM CHEST 1 VIEW: CPT

## 2021-09-16 PROCEDURE — 96374 THER/PROPH/DIAG INJ IV PUSH: CPT

## 2021-09-16 PROCEDURE — 94640 AIRWAY INHALATION TREATMENT: CPT

## 2021-09-16 PROCEDURE — 36415 COLL VENOUS BLD VENIPUNCTURE: CPT | Performed by: FAMILY MEDICINE

## 2021-09-16 RX ORDER — POTASSIUM CHLORIDE 20 MEQ/1
40 TABLET, EXTENDED RELEASE ORAL ONCE
Status: COMPLETED | OUTPATIENT
Start: 2021-09-16 | End: 2021-09-16

## 2021-09-16 RX ORDER — GUAIFENESIN 600 MG
600 TABLET, EXTENDED RELEASE 12 HR ORAL 2 TIMES DAILY
Status: DISCONTINUED | OUTPATIENT
Start: 2021-09-16 | End: 2021-09-17 | Stop reason: HOSPADM

## 2021-09-16 RX ORDER — INSULIN GLARGINE 100 [IU]/ML
20 INJECTION, SOLUTION SUBCUTANEOUS EVERY MORNING
Status: DISCONTINUED | OUTPATIENT
Start: 2021-09-17 | End: 2021-09-16

## 2021-09-16 RX ORDER — ALBUTEROL SULFATE 2.5 MG/3ML
2.5 SOLUTION RESPIRATORY (INHALATION) EVERY 4 HOURS PRN
Status: DISCONTINUED | OUTPATIENT
Start: 2021-09-16 | End: 2021-09-17 | Stop reason: HOSPADM

## 2021-09-16 RX ORDER — METHYLPREDNISOLONE SODIUM SUCCINATE 125 MG/2ML
125 INJECTION, POWDER, LYOPHILIZED, FOR SOLUTION INTRAMUSCULAR; INTRAVENOUS ONCE
Status: COMPLETED | OUTPATIENT
Start: 2021-09-16 | End: 2021-09-16

## 2021-09-16 RX ORDER — GABAPENTIN 100 MG/1
200 CAPSULE ORAL
Status: DISCONTINUED | OUTPATIENT
Start: 2021-09-16 | End: 2021-09-17 | Stop reason: HOSPADM

## 2021-09-16 RX ORDER — FLUTICASONE PROPIONATE 220 UG/1
1 AEROSOL, METERED RESPIRATORY (INHALATION) DAILY
Status: DISCONTINUED | OUTPATIENT
Start: 2021-09-17 | End: 2021-09-17 | Stop reason: HOSPADM

## 2021-09-16 RX ORDER — ATORVASTATIN CALCIUM 40 MG/1
40 TABLET, FILM COATED ORAL
Status: DISCONTINUED | OUTPATIENT
Start: 2021-09-16 | End: 2021-09-16

## 2021-09-16 RX ORDER — METHYLPREDNISOLONE SODIUM SUCCINATE 40 MG/ML
40 INJECTION, POWDER, LYOPHILIZED, FOR SOLUTION INTRAMUSCULAR; INTRAVENOUS EVERY 8 HOURS
Status: DISCONTINUED | OUTPATIENT
Start: 2021-09-16 | End: 2021-09-17 | Stop reason: HOSPADM

## 2021-09-16 RX ORDER — PRAVASTATIN SODIUM 40 MG
80 TABLET ORAL
Status: DISCONTINUED | OUTPATIENT
Start: 2021-09-16 | End: 2021-09-17 | Stop reason: HOSPADM

## 2021-09-16 RX ORDER — NICOTINE 21 MG/24HR
1 PATCH, TRANSDERMAL 24 HOURS TRANSDERMAL DAILY
Status: DISCONTINUED | OUTPATIENT
Start: 2021-09-16 | End: 2021-09-17 | Stop reason: HOSPADM

## 2021-09-16 RX ORDER — BUDESONIDE 0.5 MG/2ML
0.5 INHALANT ORAL
Status: DISCONTINUED | OUTPATIENT
Start: 2021-09-16 | End: 2021-09-17 | Stop reason: HOSPADM

## 2021-09-16 RX ORDER — LISINOPRIL 20 MG/1
20 TABLET ORAL DAILY
Status: DISCONTINUED | OUTPATIENT
Start: 2021-09-16 | End: 2021-09-16

## 2021-09-16 RX ORDER — ASPIRIN 81 MG/1
81 TABLET ORAL EVERY OTHER DAY
Status: DISCONTINUED | OUTPATIENT
Start: 2021-09-18 | End: 2021-09-17 | Stop reason: HOSPADM

## 2021-09-16 RX ORDER — LEVALBUTEROL 1.25 MG/.5ML
1.25 SOLUTION, CONCENTRATE RESPIRATORY (INHALATION)
Status: DISCONTINUED | OUTPATIENT
Start: 2021-09-16 | End: 2021-09-17 | Stop reason: HOSPADM

## 2021-09-16 RX ORDER — AZITHROMYCIN 250 MG/1
500 TABLET, FILM COATED ORAL EVERY 24 HOURS
Status: DISCONTINUED | OUTPATIENT
Start: 2021-09-16 | End: 2021-09-17 | Stop reason: HOSPADM

## 2021-09-16 RX ORDER — LISINOPRIL 20 MG/1
40 TABLET ORAL DAILY
Status: DISCONTINUED | OUTPATIENT
Start: 2021-09-16 | End: 2021-09-17 | Stop reason: HOSPADM

## 2021-09-16 RX ORDER — INSULIN GLARGINE 100 [IU]/ML
30 INJECTION, SOLUTION SUBCUTANEOUS EVERY MORNING
Status: DISCONTINUED | OUTPATIENT
Start: 2021-09-17 | End: 2021-09-17 | Stop reason: HOSPADM

## 2021-09-16 RX ORDER — SODIUM CHLORIDE FOR INHALATION 0.9 %
3 VIAL, NEBULIZER (ML) INHALATION ONCE
Status: COMPLETED | OUTPATIENT
Start: 2021-09-16 | End: 2021-09-16

## 2021-09-16 RX ADMIN — GUAIFENESIN 600 MG: 600 TABLET, EXTENDED RELEASE ORAL at 12:33

## 2021-09-16 RX ADMIN — GABAPENTIN 200 MG: 100 CAPSULE ORAL at 21:04

## 2021-09-16 RX ADMIN — LEVALBUTEROL HYDROCHLORIDE 1.25 MG: 1.25 SOLUTION, CONCENTRATE RESPIRATORY (INHALATION) at 13:45

## 2021-09-16 RX ADMIN — INSULIN LISPRO 12 UNITS: 100 INJECTION, SOLUTION INTRAVENOUS; SUBCUTANEOUS at 21:03

## 2021-09-16 RX ADMIN — ALBUTEROL SULFATE 10 MG: 2.5 SOLUTION RESPIRATORY (INHALATION) at 09:04

## 2021-09-16 RX ADMIN — BUDESONIDE 0.5 MG: 0.5 INHALANT ORAL at 20:50

## 2021-09-16 RX ADMIN — ENOXAPARIN SODIUM 40 MG: 100 INJECTION SUBCUTANEOUS at 12:34

## 2021-09-16 RX ADMIN — METHYLPREDNISOLONE SODIUM SUCCINATE 125 MG: 125 INJECTION, POWDER, FOR SOLUTION INTRAMUSCULAR; INTRAVENOUS at 09:11

## 2021-09-16 RX ADMIN — METHYLPREDNISOLONE SODIUM SUCCINATE 40 MG: 40 INJECTION, POWDER, FOR SOLUTION INTRAMUSCULAR; INTRAVENOUS at 15:57

## 2021-09-16 RX ADMIN — AZITHROMYCIN MONOHYDRATE 500 MG: 250 TABLET ORAL at 12:33

## 2021-09-16 RX ADMIN — LISINOPRIL 40 MG: 20 TABLET ORAL at 15:55

## 2021-09-16 RX ADMIN — POTASSIUM CHLORIDE 40 MEQ: 1500 TABLET, EXTENDED RELEASE ORAL at 11:05

## 2021-09-16 RX ADMIN — ISODIUM CHLORIDE 3 ML: 0.03 SOLUTION RESPIRATORY (INHALATION) at 09:04

## 2021-09-16 RX ADMIN — PRAVASTATIN SODIUM 80 MG: 40 TABLET ORAL at 21:04

## 2021-09-16 RX ADMIN — LEVALBUTEROL HYDROCHLORIDE 1.25 MG: 1.25 SOLUTION, CONCENTRATE RESPIRATORY (INHALATION) at 20:48

## 2021-09-16 RX ADMIN — NICOTINE 1 PATCH: 21 PATCH, EXTENDED RELEASE TRANSDERMAL at 12:34

## 2021-09-16 RX ADMIN — IPRATROPIUM BROMIDE 0.5 MG: 0.5 SOLUTION RESPIRATORY (INHALATION) at 13:44

## 2021-09-16 RX ADMIN — INSULIN LISPRO 8 UNITS: 100 INJECTION, SOLUTION INTRAVENOUS; SUBCUTANEOUS at 15:01

## 2021-09-16 RX ADMIN — IPRATROPIUM BROMIDE 0.5 MG: 0.5 SOLUTION RESPIRATORY (INHALATION) at 20:48

## 2021-09-16 RX ADMIN — IPRATROPIUM BROMIDE 1 MG: 0.5 SOLUTION RESPIRATORY (INHALATION) at 09:05

## 2021-09-16 RX ADMIN — GUAIFENESIN 600 MG: 600 TABLET, EXTENDED RELEASE ORAL at 17:48

## 2021-09-16 NOTE — ASSESSMENT & PLAN NOTE
-continue home lisinopril, will start on daily 40mg daily, patient takes it bid at home which is not recommended

## 2021-09-16 NOTE — H&P
[address]  H&P- Trisha  1945, 77 y o  male MRN: [id number]  Unit/Bed#: ED 06 Encounter: [id number]  Primary Care Provider: Paula Webb DO   Date and time admitted to hospital: 2021  8:11 AM    COPD with acute exacerbation [facility])  Assessment & Plan      77year old man with pmhx of copd not on any oxygen at baseline   -patient reports worsening shortness of breath over the past 3 days  -reports sputum with cough, thicker than his usual  -will start on azithromycin for sputum change  -continue iv steroids, transition to PO steroids tomorrow likely  -currently on 2L NC for mild desaturation, wean oxygen down to room air, ambulate on room air and assess for desaturation  -resp protocol, nebs and pulmicort    SOB (shortness of breath)  Assessment & Plan  Plan as under copd exacerbation    Type 2 diabetes mellitus without complication, without long-term current use of insulin ([facility])  Assessment & Plan  Lab Results   Component Value Date    HGBA1C 7 1 (H) 2020       Recent Labs     21  1355   POCGLU 231*       Blood Sugar Average: Last 72 hrs:  (P) 231   -hold home oral meds  -start on sliding scale  -may need additional basal/bolus insulin    Coronary artery disease involving native coronary artery of native heart without angina pectoris  Assessment & Plan  -continue lopressor    Essential hypertension  Assessment & Plan  -continue home lisinopril, will start on daily 40mg daily, patient takes it bid at home which is not recommended    Other hyperlipidemia  Assessment & Plan  -continue statin therapy while inpatient    Tobacco abuse  Assessment & Plan  -nicotine path      VTE Pharmacologic Prophylaxis: VTE Score: 8 High Risk (Score >/= 5) - Pharmacological DVT Prophylaxis Ordered: heparin  Sequential Compression Devices Ordered  Code Status: Level 1 - Full Code   Discussion with family: Patient declined call to        Anticipated Length of Stay: Patient will be admitted on an observation basis with an anticipated length of stay of less than 2 midnights secondary to copd exacerbation  Total Time for Visit, including Counseling / Coordination of Care: 20 minutes Greater than 50% of this total time spent on direct patient counseling and coordination of care  Chief Complaint: sob    History of Present Illness:  Daisy Jimenez is a 77 y o  male with a PMH of copd, dm type 2, cad/cabg, htn, presents for shortness of breath  Reports three days of worsening shortness of breath at rest and on exertion  Denies chest pain, palpitations, headache, dizziness, syncope  Denies any recent sick contacts or travel  covid test negative in ER  He reports change in his sputum, cough and thicker  He reports compliance with all his home meds  Currently feels btter on 2L NC in the Er after bermeo nebulizer treatment  Being admitted for copd exacerbation  Review of Systems:  Review of Systems   Constitutional: Positive for fatigue  Negative for activity change, appetite change, chills, diaphoresis and fever  HENT: Negative for ear pain and sore throat  Eyes: Negative for pain and visual disturbance  Respiratory: Positive for cough and shortness of breath  Negative for chest tightness and wheezing  Cardiovascular: Negative for chest pain, palpitations and leg swelling  Gastrointestinal: Negative for abdominal distention, abdominal pain, anal bleeding, constipation, diarrhea, nausea and vomiting  Endocrine: Negative for polydipsia, polyphagia and polyuria  Genitourinary: Negative for dysuria and hematuria  Musculoskeletal: Negative for arthralgias, back pain, joint swelling and myalgias  Skin: Negative for color change and rash  Neurological: Negative for dizziness, tremors, seizures, syncope, weakness, light-headedness, numbness and headaches  All other systems reviewed and are negative        Past Medical and Surgical History:   Past Medical History:   Diagnosis Date    BPH (benign prostatic hyperplasia)     45 days radiation treatment    COPD (chronic obstructive pulmonary disease)     Coronary artery disease     CABG x4 in 2017    Diabetes mellitus     History of Arterial Duplex of LE 12/26/2017    Likely occlusion of the left superficial femoral artery  Calcific changes bilaterally  Despite these changes, the ankle-brachial index as a measure of peripheral blood flow only mildly impaired   History of echocardiogram 06/12/2017    EF 40%, mild LVH, mild MR     Hyperlipidemia     Hypertension     NSTEMI (non-ST elevated myocardial infarction)     Prostate cancer     prostate     PVD (peripheral vascular disease)     Tremor        Past Surgical History:   Procedure Laterality Date    CARDIAC CATHETERIZATION  03/08/2017    Significant left main plus triple-vessel CAD   CORONARY ARTERY BYPASS GRAFT  03/08/2017    4V CABG:  LIMA to LAD, VG to RI, SVG to PDA to LVBR RCA   EYE SURGERY      shots in eye once a month @ the [address] [place] [address]         Meds/Allergies:  Prior to Admission medications    Medication Sig Start Date End Date Taking?  Authorizing Provider   albuterol (2 5 mg/3 mL) 0 083 % nebulizer solution Take 1 vial (2 5 mg total) by nebulization every 4 (four) hours as needed for wheezing or shortness of breath 9/3/20   PENG Kerr   albuterol (PROVENTIL HFA,VENTOLIN HFA) 90 mcg/act inhaler Inhale 2 puffs every 6 (six) hours as needed for wheezing or shortness of breath 9/3/20   PENG Kerr   Alogliptin Benzoate 25 MG TABS Take 0 5 tablets (12 5 mg total) by mouth daily Patient states takes 1/2 of a 25 mg tablet every AM  Patient taking differently: Take 25 mg by mouth daily  9/3/20 11/29/20  PENG Kerr   aspirin (ECOTRIN LOW STRENGTH) 81 mg EC tablet Take 1 tablet (81 mg total) by mouth every other day 9/3/20   PENG Petit   Empagliflozin 25 MG TABS Take 0 5 tablets (12 5 mg total) by mouth every morning Take one half tablet every morning 9/3/20   PENG Kerr   fluticasone (FLOVENT HFA) 220 mcg/act inhaler Inhale 1 puff daily Rinse mouth after use  Historical Provider, MD   gabapentin (NEURONTIN) 100 mg capsule Take 2 capsules (200 mg total) by mouth daily at bedtime 9/3/20   PENG Kerr   glimepiride (AMARYL) 1 mg tablet Take 1 tablet (1 mg total) by mouth 2 (two) times a day 9/3/20   PENG Murry   guaiFENesin (MUCINEX) 600 mg 12 hr tablet Take 1 tablet (600 mg total) by mouth 2 (two) times a day 9/3/20   PENG Garnett   hydrocortisone 1 % cream Apply topically 2 (two) times a day 9/3/20   PENG Murry   lisinopril (ZESTRIL) 20 mg tablet Take 1 tablet (20 mg total) by mouth 2 (two) times a day 9/3/20 9/3/21  PENG Kerr   metFORMIN (GLUCOPHAGE) 500 mg tablet Take 1 tablet (500 mg total) by mouth daily with dinner 9/3/20   PENG Murry   metoprolol tartrate (LOPRESSOR) 25 mg tablet Take 25 mg by mouth every 12 (twelve) hours    Historical Provider, MD   mometasone (ASMANEX TWISTHALER) 220 MCG/INH inhaler Inhale 2 puffs 2 (two) times a day Rinse mouth after use  Historical Provider, MD   predniSONE 10 mg tablet 4 pills for 2 days, then 3 pills for 2 days, then 2 pills for 2 days then 1 pill for 2 days   7/17/21   Kate Escobar DO   primidone (MYSOLINE) 50 mg tablet Take 100 mg by mouth every 8 (eight) hours    Historical Provider, MD   rosuvastatin (CRESTOR) 20 MG tablet Take 1 tablet (20 mg total) by mouth daily after dinner Take one half tablet daily with dinner  Patient taking differently: Take 10 mg by mouth daily after dinner Take one half tablet daily with dinner  9/3/20   PENG Murry   saxagliptin (ONGLYZA) 5 MG tablet Take 1 tablet (5 mg total) by mouth daily 9/3/20   PENG Murry   tiotropium (SPIRIVA) 18 mcg inhalation capsule Place 1 capsule (18 mcg total) into inhaler and inhale daily 12/2/20   Becki Luciano MD   tiotropium-olodaterol (STIOLTO RESPIMAT) 2 5-2 5 MCG/ACT inhaler Inhale 2 puffs daily    Historical Provider, MD     I have reviewed home medications with patient personally  Allergies: Allergies   Allergen Reactions    Atorvastatin Myalgia       Social History:  Marital Status: /Civil Union   Occupation: retired  Patient Pre-hospital Living Situation: Home  Patient Pre-hospital Level of Mobility: walks  Patient Pre-hospital Diet Restrictions: none  Substance Use History:   Social History     Substance and Sexual Activity   Alcohol Use Yes     Social History     Tobacco Use   Smoking Status Current Some Day Smoker    Packs/day: 0 25    Years: 60 00    Pack years: 15 00    Types: Cigarettes   Smokeless Tobacco Never Used   Tobacco Comment    only smokes when he drinks beer     Social History     Substance and Sexual Activity   Drug Use Never       Family History:  Family History   Problem Relation Age of Onset    Cancer Father     Heart disease Brother        Physical Exam:     Vitals:   Blood Pressure: 170/73 (09/16/21 1500)  Pulse: 89 (09/16/21 1500)  Temperature: 99 °F (37 2 °C) (09/16/21 0821)  Temp Source: Tympanic (09/16/21 0821)  Respirations: 20 (09/16/21 1500)  Height: 5' 10" (177 8 cm) (09/16/21 4226)  Weight - Scale: 96 2 kg (212 lb) (09/16/21 0821)  SpO2: 94 % (09/16/21 1500)    Physical Exam  Vitals and nursing note reviewed  Constitutional:       General: He is not in acute distress  Appearance: Normal appearance  He is not ill-appearing, toxic-appearing or diaphoretic  HENT:      Head: Normocephalic and atraumatic  Eyes:      General: No scleral icterus  Cardiovascular:      Rate and Rhythm: Normal rate and regular rhythm  Heart sounds: No murmur heard  No friction rub  No gallop  Pulmonary:      Effort: No respiratory distress  Breath sounds: No stridor  Wheezing present  No rhonchi        Comments: 2l NC  Chest:      Chest wall: No tenderness  Abdominal:      General: Abdomen is flat  There is no distension  Palpations: Abdomen is soft  There is no mass  Tenderness: There is no abdominal tenderness  There is no guarding or rebound  Hernia: No hernia is present  Musculoskeletal:         General: No swelling, tenderness, deformity or signs of injury  Left lower leg: No edema  Skin:     Coloration: Skin is not jaundiced or pale  Findings: No bruising, erythema, lesion or rash  Neurological:      Mental Status: He is alert and oriented to person, place, and time  Additional Data:     Lab Results:  Results from last 7 days   Lab Units 09/16/21  0843   WBC Thousand/uL 11 40*   HEMOGLOBIN g/dL 13 4*   HEMATOCRIT % 39 6*   PLATELETS Thousands/uL 152   NEUTROS PCT % 78*   LYMPHS PCT % 12*   MONOS PCT % 7   EOS PCT % 2     Results from last 7 days   Lab Units 09/16/21  0843   SODIUM mmol/L 138   POTASSIUM mmol/L 3 3*   CHLORIDE mmol/L 100   CO2 mmol/L 29   BUN mg/dL 16   CREATININE mg/dL 0 77   ANION GAP mmol/L 9   CALCIUM mg/dL 8 4*   GLUCOSE RANDOM mg/dL 168*         Results from last 7 days   Lab Units 09/16/21  1355   POC GLUCOSE mg/dl 231*               Imaging: Reviewed radiology reports from this admission including: chest xray  XR chest 1 view portable   Final Result by Campos Reardon MD (09/16 8794)      COPD  No acute cardiopulmonary disease  Workstation performed: [id number]             EKG and Other Studies Reviewed on Admission:   · EKG: NSR    ** Please Note: This note has been constructed using a voice recognition system   **

## 2021-09-16 NOTE — RESPIRATORY THERAPY NOTE
RT Protocol Note  Coleen Lu 77 y o  male MRN: [id number]  Unit/Bed#: ED 06 Encounter: [id number]    Assessment    Active Problems:    Tobacco abuse    Other hyperlipidemia    Essential hypertension    Coronary artery disease involving native coronary artery of native heart without angina pectoris    COPD with acute exacerbation (HCC)    Type 2 diabetes mellitus without complication, without long-term current use of insulin (HCC)    SOB (shortness of breath)      Home Pulmonary Medications:  Spiriva MDI Daily  Stiloto Respimat MDI Daily  Albuterol MDI PRN  Albuterol UDN PRN  Flovent MDI  Asmanex MDI    Home Devices/Therapy: (P) Other (Comment) (Spiriva, Stiloto, Albuterol MDI/UDN, Flovent, Asmanex)    Past Medical History:   Diagnosis Date    BPH (benign prostatic hyperplasia)     45 days radiation treatment    COPD (chronic obstructive pulmonary disease)     Coronary artery disease     CABG x4 in 2017    Diabetes mellitus     History of Arterial Duplex of LE 12/26/2017    Likely occlusion of the left superficial femoral artery  Calcific changes bilaterally  Despite these changes, the ankle-brachial index as a measure of peripheral blood flow only mildly impaired      History of echocardiogram 06/12/2017    EF 40%, mild LVH, mild MR     Hyperlipidemia     Hypertension     NSTEMI (non-ST elevated myocardial infarction)     Prostate cancer     prostate     PVD (peripheral vascular disease)     Tremor      Social History     Socioeconomic History    Marital status: /Civil Union     Spouse name: None    Number of children: None    Years of education: None    Highest education level: None   Occupational History    None   Tobacco Use    Smoking status: Current Some Day Smoker     Packs/day: 0 25     Years: 60 00     Pack years: 15 00     Types: Cigarettes    Smokeless tobacco: Never Used    Tobacco comment: only smokes when he drinks beer   Vaping Use    Vaping Use: Never used   Substance and Sexual Activity    Alcohol use: Yes    Drug use: Never    Sexual activity: Yes     Partners: Female   Other Topics Concern    None   Social History Narrative    ** Merged History Encounter **          Social Determinants of Health     Financial Resource Strain: Low Risk     Difficulty of Paying Living Expenses: Not hard at all   Food Insecurity: No Food Insecurity    Worried About Running Out of Food in the Last Year: Never true    [address] in the Last Year: Never true   Transportation Needs: No Transportation Needs    Lack of Transportation (Medical): No    Lack of Transportation (Non-Medical): No   Physical Activity:     Days of Exercise per Week:     Minutes of Exercise per Session:    Stress:     Feeling of Stress :    Social Connections:     Frequency of Communication with Friends and Family:     Frequency of Social Gatherings with Friends and Family:     Attends Spiritism Services:     Active Member of Clubs or Organizations:     Attends Club or Organization Meetings:     Marital Status:    Intimate Partner Violence:     Fear of Current or Ex-Partner:     Emotionally Abused:     Physically Abused:     Sexually Abused:        Subjective         Objective    Physical Exam:   Assessment Type: (P) Pre-treatment  General Appearance: (P) Alert, Awake  Respiratory Pattern: (P) Dyspnea at rest  Chest Assessment: (P) Chest expansion symmetrical  Bilateral Breath Sounds: (P) Diminished, Rales, Expiratory wheezes (Fine exp  whz  throughout and bibasal rales L > R)  Cough: (P) Non-productive, Moist, Strong  O2 Device: (P) RA    Vitals:  Blood pressure (!) 176/81, pulse 82, temperature 99 °F (37 2 °C), temperature source Tympanic, resp  rate 22, height 5' 10" (1 778 m), weight 96 2 kg (212 lb), SpO2 93 %  Imaging and other studies: I have personally reviewed pertinent reports        O2 Device: (P) RA     Plan    Respiratory Plan: (P) Home Bronchodilator Patient pathway        Resp Comments: (P) Pt  stated that he was SOB, but that it has improved  Pt  has a strong, loose, nonproductive cough  Pt  stated that he feels very hot  Pt  does not wear O2 or BiPap/CPAP @ home  Pt  does use the following Respiratory medications @ home: Spiriva MDI Daily, Stiloto Respimat MDI Daily, Albuterol MDI PRN, Albuterol UDN PRN, Flovent MDI and an Asmanex MDI  Will continue to monitor and treat Pt , as ordered

## 2021-09-16 NOTE — ED PROVIDER NOTES
History  Chief Complaint   Patient presents with    Shortness of Breath     HPI  This is a 67-year-old male with history of COPD presented with complain of shortness of breath  He states that he has been short of breath for past 3 days worse this morning which prompted this ED visit  Patient is found to have oxygen saturation of 91% on room air  He denies any chest pain  Patient states that he has been doing a lot of activity because his wife is handicapped  Denies any fever chills nausea vomiting  Denies any contact with COVID person  Denies any recent travel  States he has been using his nebulizer treatment at home  Prior to Admission Medications   Prescriptions Last Dose Informant Patient Reported? Taking?    Alogliptin Benzoate 25 MG TABS   No No   Sig: Take 0 5 tablets (12 5 mg total) by mouth daily Patient states takes 1/2 of a 25 mg tablet every AM   Patient taking differently: Take 25 mg by mouth daily    Empagliflozin 25 MG TABS   No No   Sig: Take 0 5 tablets (12 5 mg total) by mouth every morning Take one half tablet every morning   albuterol (2 5 mg/3 mL) 0 083 % nebulizer solution   No No   Sig: Take 1 vial (2 5 mg total) by nebulization every 4 (four) hours as needed for wheezing or shortness of breath   albuterol (PROVENTIL HFA,VENTOLIN HFA) 90 mcg/act inhaler   No No   Sig: Inhale 2 puffs every 6 (six) hours as needed for wheezing or shortness of breath   aspirin (ECOTRIN LOW STRENGTH) 81 mg EC tablet   No No   Sig: Take 1 tablet (81 mg total) by mouth every other day   fluticasone (FLOVENT HFA) 220 mcg/act inhaler   Yes No   Sig: Inhale 1 puff daily Rinse mouth after use    gabapentin (NEURONTIN) 100 mg capsule   No No   Sig: Take 2 capsules (200 mg total) by mouth daily at bedtime   glimepiride (AMARYL) 1 mg tablet   No No   Sig: Take 1 tablet (1 mg total) by mouth 2 (two) times a day   guaiFENesin (MUCINEX) 600 mg 12 hr tablet   No No   Sig: Take 1 tablet (600 mg total) by mouth 2 (two) times a day   hydrocortisone 1 % cream   No No   Sig: Apply topically 2 (two) times a day   lisinopril (ZESTRIL) 20 mg tablet   No No   Sig: Take 1 tablet (20 mg total) by mouth 2 (two) times a day   metFORMIN (GLUCOPHAGE) 500 mg tablet   No No   Sig: Take 1 tablet (500 mg total) by mouth daily with dinner   metoprolol tartrate (LOPRESSOR) 25 mg tablet  Spouse/Significant Other Yes No   Sig: Take 25 mg by mouth every 12 (twelve) hours   mometasone (ASMANEX TWISTHALER) 220 MCG/INH inhaler   Yes No   Sig: Inhale 2 puffs 2 (two) times a day Rinse mouth after use  predniSONE 10 mg tablet   No No   Si pills for 2 days, then 3 pills for 2 days, then 2 pills for 2 days then 1 pill for 2 days  primidone (MYSOLINE) 50 mg tablet   Yes No   Sig: Take 100 mg by mouth every 8 (eight) hours   rosuvastatin (CRESTOR) 20 MG tablet   No No   Sig: Take 1 tablet (20 mg total) by mouth daily after dinner Take one half tablet daily with dinner   Patient taking differently: Take 10 mg by mouth daily after dinner Take one half tablet daily with dinner    saxagliptin (ONGLYZA) 5 MG tablet   No No   Sig: Take 1 tablet (5 mg total) by mouth daily   tiotropium (SPIRIVA) 18 mcg inhalation capsule   No No   Sig: Place 1 capsule (18 mcg total) into inhaler and inhale daily   tiotropium-olodaterol (STIOLTO RESPIMAT) 2 5-2 5 MCG/ACT inhaler   Yes No   Sig: Inhale 2 puffs daily      Facility-Administered Medications: None       Past Medical History:   Diagnosis Date    BPH (benign prostatic hyperplasia)     45 days radiation treatment    COPD (chronic obstructive pulmonary disease)     Coronary artery disease     CABG x4 in 2017    Diabetes mellitus     History of Arterial Duplex of LE 2017    Likely occlusion of the left superficial femoral artery  Calcific changes bilaterally  Despite these changes, the ankle-brachial index as a measure of peripheral blood flow only mildly impaired      History of echocardiogram 2017 EF 40%, mild LVH, mild MR     Hyperlipidemia     Hypertension     NSTEMI (non-ST elevated myocardial infarction)     Prostate cancer     prostate     PVD (peripheral vascular disease)     Tremor        Past Surgical History:   Procedure Laterality Date    CARDIAC CATHETERIZATION  03/08/2017    Significant left main plus triple-vessel CAD   CORONARY ARTERY BYPASS GRAFT  03/08/2017    4V CABG:  LIMA to LAD, VG to RI, SVG to PDA to LVBR RCA   EYE SURGERY      shots in eye once a month @ the [place]    PROSTATE BIOPSY         Family History   Problem Relation Age of Onset    Cancer Father     Heart disease Brother      I have reviewed and agree with the history as documented  E-Cigarette/Vaping    E-Cigarette Use Never User      E-Cigarette/Vaping Substances    Nicotine No     THC No     CBD No     Flavoring No     Other No     Unknown No      Social History     Tobacco Use    Smoking status: Current Some Day Smoker     Packs/day: 0 25     Years: 60 00     Pack years: 15 00     Types: Cigarettes    Smokeless tobacco: Never Used    Tobacco comment: only smokes when he drinks beer   Vaping Use    Vaping Use: Never used   Substance Use Topics    Alcohol use: Yes    Drug use: Never       Review of Systems   Constitutional: Negative  HENT: Negative  Eyes: Negative  Respiratory: Positive for shortness of breath and wheezing  Gastrointestinal: Negative  Endocrine: Negative  Genitourinary: Negative  Musculoskeletal: Negative  Skin: Negative  Allergic/Immunologic: Negative  Neurological: Negative  Psychiatric/Behavioral: Negative  Physical Exam  Physical Exam  Vitals and nursing note reviewed  Constitutional:       General: He is in acute distress  Appearance: He is well-developed  He is obese  HENT:      Head: Normocephalic and atraumatic  Eyes:      Extraocular Movements: Extraocular movements intact        Pupils: Pupils are equal, round, and reactive to light  Cardiovascular:      Rate and Rhythm: Normal rate and regular rhythm  Pulmonary:      Effort: Tachypnea present  Breath sounds: Wheezing present  Abdominal:      General: Bowel sounds are normal       Palpations: Abdomen is soft  Musculoskeletal:         General: Normal range of motion  Cervical back: Normal range of motion and neck supple  Skin:     General: Skin is warm  Neurological:      General: No focal deficit present  Mental Status: He is alert and oriented to person, place, and time  Vital Signs  ED Triage Vitals [09/16/21 0815]   Temperature Pulse Respirations Blood Pressure SpO2   98 3 °F (36 8 °C) 72 (!) 26 (!) 180/84 94 %      Temp Source Heart Rate Source Patient Position - Orthostatic VS BP Location FiO2 (%)   Tympanic Monitor Lying Right arm --      Pain Score       --           Vitals:    09/16/21 0815 09/16/21 0821 09/16/21 1115 09/16/21 1130   BP: (!) 180/84 (!) 180/84 (!) 184/80 (!) 176/81   Pulse: 72 76 85 82   Patient Position - Orthostatic VS: Lying Lying           Visual Acuity      ED Medications  Medications   methylPREDNISolone sodium succinate (Solu-MEDROL) injection 125 mg (125 mg Intravenous Given 9/16/21 0911)   albuterol inhalation solution 10 mg (10 mg Nebulization Given 9/16/21 0904)     And   ipratropium (ATROVENT) 0 02 % inhalation solution 1 mg (1 mg Nebulization Given 9/16/21 0905)     And   sodium chloride 0 9 % inhalation solution 3 mL (3 mL Nebulization Given 9/16/21 0904)   potassium chloride (K-DUR,KLOR-CON) CR tablet 40 mEq (40 mEq Oral Given 9/16/21 1105)       Diagnostic Studies  Results Reviewed     Procedure Component Value Units Date/Time    Novel Coronavirus (Covid-19),PCR [facility] - 2 Hour Stat [[id number]]  (Normal) Collected: 09/16/21 0829    Lab Status: Final result Specimen: Nares from Nose Updated: 09/16/21 0928     SARS-CoV-2 Negative    Narrative:       The specimen collection materials, transport medium, and/or testing methodology utilized in the production of these test results have been proven to be reliable in a limited validation with an abbreviated program under the Emergency Utilization Authorization provided by the FDA  Testing reported as "Presumptive positive" will be confirmed with secondary testing to ensure result accuracy  Clinical caution and judgement should be used with the interpretation of these results with consideration of the clinical impression and other laboratory testing  Testing reported as "Positive" or "Negative" has been proven to be accurate according to standard laboratory validation requirements  All testing is performed with control materials showing appropriate reactivity at standard intervals        B-Type Natriuretic Peptide(BNP) CA, GH, EA Camp[facility] Only [[id number]]  (Abnormal) Collected: 09/16/21 0843    Lab Status: Final result Specimen: Blood from Arm, Left Updated: 09/16/21 0922      pg/mL     Troponin I [[id number]]  (Normal) Collected: 09/16/21 0843    Lab Status: Final result Specimen: Blood from Arm, Left Updated: 09/16/21 0916     Troponin I 0 03 ng/mL     Basic metabolic panel [[id number]]  (Abnormal) Collected: 09/16/21 0843    Lab Status: Final result Specimen: Blood from Arm, Left Updated: 09/16/21 0914     Sodium 138 mmol/L      Potassium 3 3 mmol/L      Chloride 100 mmol/L      CO2 29 mmol/L      ANION GAP 9 mmol/L      BUN 16 mg/dL      Creatinine 0 77 mg/dL      Glucose 168 mg/dL      Calcium 8 4 mg/dL      eGFR 89 ml/min/1 73sq m     Narrative:      [place] guidelines for Chronic Kidney Disease (CKD):     Stage 1 with normal or high GFR (GFR > 90 mL/min/1 73 square meters)    Stage 2 Mild CKD (GFR = 60-89 mL/min/1 73 square meters)    Stage 3A Moderate CKD (GFR = 45-59 mL/min/1 73 square meters)    Stage 3B Moderate CKD (GFR = 30-44 mL/min/1 73 square meters)    Stage 4 Severe CKD (GFR = 15-29 mL/min/1 73 square meters)   Stage 5 End Stage CKD (GFR <15 mL/min/1 73 square meters)  Note: GFR calculation is accurate only with a steady state creatinine    CBC and differential [[id number]]  (Abnormal) Collected: 09/16/21 0843    Lab Status: Final result Specimen: Blood from Arm, Left Updated: 09/16/21 0902     WBC 11 40 Thousand/uL      RBC 4 36 Million/uL      Hemoglobin 13 4 g/dL      Hematocrit 39 6 %      MCV 91 fL      MCH 30 6 pg      MCHC 33 8 g/dL      RDW 13 8 %      MPV 9 4 fL      Platelets 737 Thousands/uL      Neutrophils Relative 78 %      Lymphocytes Relative 12 %      Monocytes Relative 7 %      Eosinophils Relative 2 %      Basophils Relative 1 %      Neutrophils Absolute 8 90 Thousands/µL      Lymphocytes Absolute 1 40 Thousands/µL      Monocytes Absolute 0 80 Thousand/µL      Eosinophils Absolute 0 20 Thousand/µL      Basophils Absolute 0 10 Thousands/µL                  XR chest 1 view portable   Final Result by Dutch Vera MD (09/16 6593)      COPD  No acute cardiopulmonary disease  Workstation performed: [id number]                    Procedures  Procedures         ED Course  ED Course as of Sep 16 1135   Thu Sep 16, 2021   1039 Pt states he is feeling much better after breathing treatment  States that he does have nebulizer machine at home and can use that  Patient was offered admission opted to go home and do trial for the was a treatment steroid recommending follow-up with PCP return back to the ED symptom does not improve or worsen  Patient verbalized understand the plan DC home  1125 Patient oxygen saturation as down to 89% now placed on 2 L  Discussed with hospitalist  Dr Pilar Evans will admit  SBIRT 22yo+      Most Recent Value   SBIRT (22 yo +)   In order to provide better care to our patients, we are screening all of our patients for alcohol and drug use  Would it be okay to ask you these screening questions?   Yes Filed at: 09/16/2021 0850   Initial Alcohol Screen: US AUDIT-C    1  How often do you have a drink containing alcohol? 4 Filed at: 09/16/2021 0850   2  How many drinks containing alcohol do you have on a typical day you are drinking? 2 Filed at: 09/16/2021 0850   3a  Male UNDER 65: How often do you have five or more drinks on one occasion? 0 Filed at: 09/16/2021 0850   3b  FEMALE Any Age, or MALE 65+: How often do you have 4 or more drinks on one occassion? 0 Filed at: 09/16/2021 0850   Audit-C Score  6 Filed at: 09/16/2021 2500   DEANNA: How many times in the past year have you    Used an illegal drug or used a prescription medication for non-medical reasons? Never Filed at: 09/16/2021 0850                    MDM    Disposition  Final diagnoses:   COPD exacerbation ([facility] [address] )   Hypoxia     Time reflects when diagnosis was documented in both MDM as applicable and the Disposition within this note     Time User Action Codes Description Comment    9/16/2021 11:27 AM Dendavid Valverde Add [J44 1] COPD exacerbation ([facility] [address] )     9/16/2021 11:27 AM Dendavid Sea Add [R09 02] Hypoxia       ED Disposition     ED Disposition Condition Date/Time Comment    Admit Stable Thu Sep 16, 2021 11:27 AM Case was discussed with Dr Jere Beckford and the patient's admission status was agreed to be Admission Status: observation status to the service of Dr Licha Bingham    Follow-up Information    None         Patient's Medications   Discharge Prescriptions    No medications on file     No discharge procedures on file      PDMP Review       Value Time User    PDMP Reviewed  Yes 9/3/2020 12:26 PM Regine Jasso, [address]          ED Provider  Electronically Signed by           Latricia Ortega MD  09/16/21 5245

## 2021-09-16 NOTE — ASSESSMENT & PLAN NOTE
Lab Results   Component Value Date    HGBA1C 7 1 (H) 08/14/2020       Recent Labs     09/16/21  1355   POCGLU 231*       Blood Sugar Average: Last 72 hrs:  (P) 231   -hold home oral meds  -start on sliding scale  -may need additional basal/bolus insulin

## 2021-09-16 NOTE — ASSESSMENT & PLAN NOTE
77year old man with pmhx of copd not on any oxygen at baseline   -patient reports worsening shortness of breath over the past 3 days    -reports sputum with cough, thicker than his usual  -will start on azithromycin for sputum change  -continue iv steroids, transition to PO steroids tomorrow likely  -currently on 2L NC for mild desaturation, wean oxygen down to room air, ambulate on room air and assess for desaturation  -resp protocol, nebs and pulmicort

## 2021-09-16 NOTE — ED NOTES
Patient oxygen level decreased after standing up to use the restroom  Patient oxygen saturation dropped to 88%  Dr Jalyn Jones notified and patient placed on 2 liters nasal cannula        Ebenezer Felix RN  09/16/21 7483

## 2021-09-17 VITALS
OXYGEN SATURATION: 97 % | HEART RATE: 78 BPM | TEMPERATURE: 97.6 F | DIASTOLIC BLOOD PRESSURE: 71 MMHG | WEIGHT: 209 LBS | RESPIRATION RATE: 16 BRPM | BODY MASS INDEX: 29.92 KG/M2 | SYSTOLIC BLOOD PRESSURE: 155 MMHG | HEIGHT: 70 IN

## 2021-09-17 LAB
ATRIAL RATE: 79 BPM
GLUCOSE SERPL-MCNC: 245 MG/DL (ref 65–140)
GLUCOSE SERPL-MCNC: 331 MG/DL (ref 65–140)
P AXIS: 68 DEGREES
PR INTERVAL: 168 MS
QRS AXIS: 67 DEGREES
QRSD INTERVAL: 98 MS
QT INTERVAL: 384 MS
QTC INTERVAL: 440 MS
T WAVE AXIS: 248 DEGREES
VENTRICULAR RATE: 79 BPM

## 2021-09-17 PROCEDURE — 99217 PR OBSERVATION CARE DISCHARGE MANAGEMENT: CPT | Performed by: INTERNAL MEDICINE

## 2021-09-17 PROCEDURE — 94640 AIRWAY INHALATION TREATMENT: CPT

## 2021-09-17 PROCEDURE — 94760 N-INVAS EAR/PLS OXIMETRY 1: CPT

## 2021-09-17 PROCEDURE — 82948 REAGENT STRIP/BLOOD GLUCOSE: CPT

## 2021-09-17 PROCEDURE — 93010 ELECTROCARDIOGRAM REPORT: CPT | Performed by: INTERNAL MEDICINE

## 2021-09-17 RX ORDER — LISINOPRIL 40 MG/1
40 TABLET ORAL DAILY
Qty: 30 TABLET | Refills: 0 | Status: SHIPPED | OUTPATIENT
Start: 2021-09-18 | End: 2022-01-07 | Stop reason: SDUPTHER

## 2021-09-17 RX ORDER — AZITHROMYCIN 500 MG/1
500 TABLET, FILM COATED ORAL EVERY 24 HOURS
Qty: 2 TABLET | Refills: 0 | Status: SHIPPED | OUTPATIENT
Start: 2021-09-17 | End: 2021-09-19

## 2021-09-17 RX ORDER — PREDNISONE 20 MG/1
40 TABLET ORAL DAILY
Qty: 7 TABLET | Refills: 0 | Status: SHIPPED | OUTPATIENT
Start: 2021-09-17 | End: 2021-09-24

## 2021-09-17 RX ADMIN — INSULIN LISPRO 16 UNITS: 100 INJECTION, SOLUTION INTRAVENOUS; SUBCUTANEOUS at 11:27

## 2021-09-17 RX ADMIN — LEVALBUTEROL HYDROCHLORIDE 1.25 MG: 1.25 SOLUTION, CONCENTRATE RESPIRATORY (INHALATION) at 07:19

## 2021-09-17 RX ADMIN — ENOXAPARIN SODIUM 40 MG: 100 INJECTION SUBCUTANEOUS at 08:13

## 2021-09-17 RX ADMIN — INSULIN GLARGINE 30 UNITS: 100 INJECTION, SOLUTION SUBCUTANEOUS at 08:14

## 2021-09-17 RX ADMIN — NICOTINE 1 PATCH: 21 PATCH, EXTENDED RELEASE TRANSDERMAL at 08:14

## 2021-09-17 RX ADMIN — INSULIN LISPRO 8 UNITS: 100 INJECTION, SOLUTION INTRAVENOUS; SUBCUTANEOUS at 08:14

## 2021-09-17 RX ADMIN — METHYLPREDNISOLONE SODIUM SUCCINATE 40 MG: 40 INJECTION, POWDER, FOR SOLUTION INTRAMUSCULAR; INTRAVENOUS at 08:13

## 2021-09-17 RX ADMIN — GUAIFENESIN 600 MG: 600 TABLET, EXTENDED RELEASE ORAL at 08:13

## 2021-09-17 RX ADMIN — METHYLPREDNISOLONE SODIUM SUCCINATE 40 MG: 40 INJECTION, POWDER, FOR SOLUTION INTRAMUSCULAR; INTRAVENOUS at 00:19

## 2021-09-17 RX ADMIN — LISINOPRIL 40 MG: 20 TABLET ORAL at 08:13

## 2021-09-17 RX ADMIN — IPRATROPIUM BROMIDE 0.5 MG: 0.5 SOLUTION RESPIRATORY (INHALATION) at 07:19

## 2021-09-17 RX ADMIN — AZITHROMYCIN MONOHYDRATE 500 MG: 250 TABLET ORAL at 11:31

## 2021-09-17 RX ADMIN — BUDESONIDE 0.5 MG: 0.5 INHALANT ORAL at 07:19

## 2021-09-17 RX ADMIN — FLUTICASONE PROPIONATE 1 PUFF: 220 AEROSOL, METERED RESPIRATORY (INHALATION) at 08:13

## 2021-09-17 NOTE — ASSESSMENT & PLAN NOTE
· Patient notes he has home oxygen and uses up to 4L QHS  · Breathing significantly improved this AM  · Continue home inhalers and nebulizer at ID  · Will give new Rx for 7 day course of steroids and 2 additional days of azithromycin  · Counseled on smoking cessation  · Advised to follow-up outpatient with Pulm who he is already established with

## 2021-09-17 NOTE — NURSING NOTE
Patient awake and alert, sitting at bedside  On 3L n/c, denies SOB at this time  Moist productive cough, expectorating scant amount of clear sputum  Lungs clear, diminished  Patient denies pain, offers no complaints  Call bell and belongings within reach, will continue to monitor

## 2021-09-17 NOTE — PLAN OF CARE
Care plan reviewed and followed  Problem: PAIN - ADULT  Goal: Verbalizes/displays adequate comfort level or baseline comfort level  Description: Interventions:  - Encourage patient to monitor pain and request assistance  - Assess pain using appropriate pain scale  - Administer analgesics based on type and severity of pain and evaluate response  - Implement non-pharmacological measures as appropriate and evaluate response  - Consider cultural and social influences on pain and pain management  - Notify physician/advanced practitioner if interventions unsuccessful or patient reports new pain  Outcome: Progressing     Problem: INFECTION - ADULT  Goal: Absence or prevention of progression during hospitalization  Description: INTERVENTIONS:  - Assess and monitor for signs and symptoms of infection  - Monitor lab/diagnostic results  - Monitor all insertion sites, i e  indwelling lines, tubes, and drains  - Monitor endotracheal if appropriate and nasal secretions for changes in amount and color  - Trail appropriate cooling/warming therapies per order  - Administer medications as ordered  - Instruct and encourage patient and family to use good hand hygiene technique  - Identify and instruct in appropriate isolation precautions for identified infection/condition  Outcome: Progressing  Goal: Absence of fever/infection during neutropenic period  Description: INTERVENTIONS:  - Monitor WBC    Outcome: Progressing     Problem: Knowledge Deficit  Goal: Patient/family/caregiver demonstrates understanding of disease process, treatment plan, medications, and discharge instructions  Description: Complete learning assessment and assess knowledge base    Interventions:  - Provide teaching at level of understanding  - Provide teaching via preferred learning methods  Outcome: Progressing

## 2021-09-17 NOTE — PLAN OF CARE
Problem: Potential for Falls  Goal: Patient will remain free of falls  Description: INTERVENTIONS:  - Educate patient/family on patient safety including physical limitations  - Instruct patient to call for assistance with activity   - Consult OT/PT to assist with strengthening/mobility   - Keep Call bell within reach  - Keep bed low and locked with side rails adjusted as appropriate  - Keep care items and personal belongings within reach  - Initiate and maintain comfort rounds  - Make Fall Risk Sign visible to staff  - Offer Toileting everyHours, in advance of need  - Initiate/Maintain alarm  - Obtain necessary fall risk management equipment:   - Apply yellow socks and bracelet for high fall risk patients  - Consider moving patient to room near nurses station  Outcome: Adequate for Discharge     Problem: PAIN - ADULT  Goal: Verbalizes/displays adequate comfort level or baseline comfort level  Description: Interventions:  - Encourage patient to monitor pain and request assistance  - Assess pain using appropriate pain scale  - Administer analgesics based on type and severity of pain and evaluate response  - Implement non-pharmacological measures as appropriate and evaluate response  - Consider cultural and social influences on pain and pain management  - Notify physician/advanced practitioner if interventions unsuccessful or patient reports new pain  Outcome: Adequate for Discharge     Problem: INFECTION - ADULT  Goal: Absence or prevention of progression during hospitalization  Description: INTERVENTIONS:  - Assess and monitor for signs and symptoms of infection  - Monitor lab/diagnostic results  - Monitor all insertion sites, i e  indwelling lines, tubes, and drains  - Monitor endotracheal if appropriate and nasal secretions for changes in amount and color  - Ranier appropriate cooling/warming therapies per order  - Administer medications as ordered  - Instruct and encourage patient and family to use good hand hygiene technique  - Identify and instruct in appropriate isolation precautions for identified infection/condition  Outcome: Adequate for Discharge  Goal: Absence of fever/infection during neutropenic period  Description: INTERVENTIONS:  - Monitor WBC    Outcome: Adequate for Discharge     Problem: SAFETY ADULT  Goal: Patient will remain free of falls  Description: INTERVENTIONS:  - Educate patient/family on patient safety including physical limitations  - Instruct patient to call for assistance with activity   - Consult OT/PT to assist with strengthening/mobility   - Keep Call bell within reach  - Keep bed low and locked with side rails adjusted as appropriate  - Keep care items and personal belongings within reach  - Initiate and maintain comfort rounds  - Make Fall Risk Sign visible to staff  - Offer Toileting every  Hours, in advance of need  - Initiate/Maintain alarm  - Obtain necessary fall risk management equipment:   - Apply yellow socks and bracelet for high fall risk patients  - Consider moving patient to room near nurses station  Outcome: Adequate for Discharge  Goal: Maintain or return to baseline ADL function  Description: INTERVENTIONS:  -  Assess patient's ability to carry out ADLs; assess patient's baseline for ADL function and identify physical deficits which impact ability to perform ADLs (bathing, care of mouth/teeth, toileting, grooming, dressing, etc )  - Assess/evaluate cause of self-care deficits   - Assess range of motion  - Assess patient's mobility; develop plan if impaired  - Assess patient's need for assistive devices and provide as appropriate  - Encourage maximum independence but intervene and supervise when necessary  - Involve family in performance of ADLs  - Assess for home care needs following discharge   - Consider OT consult to assist with ADL evaluation and planning for discharge  - Provide patient education as appropriate  Outcome: Adequate for Discharge  Goal: Maintains/Returns to pre admission functional level  Description: INTERVENTIONS:  - Perform BMAT or MOVE assessment daily    - Set and communicate daily mobility goal to care team and patient/family/caregiver  - Collaborate with rehabilitation services on mobility goals if consulted  - Perform Range of Motion  times a day  - Reposition patient every  hours  - Dangle patient  times a day  - Stand patient  times a day  - Ambulate patient  times a day  - Out of bed to chair  times a day   - Out of bed for meals  times a day  - Out of bed for toileting  - Record patient progress and toleration of activity level   Outcome: Adequate for Discharge     Problem: DISCHARGE PLANNING  Goal: Discharge to home or other facility with appropriate resources  Description: INTERVENTIONS:  - Identify barriers to discharge w/patient and caregiver  - Arrange for needed discharge resources and transportation as appropriate  - Identify discharge learning needs (meds, wound care, etc )  - Arrange for interpretive services to assist at discharge as needed  - Refer to Case Management Department for coordinating discharge planning if the patient needs post-hospital services based on physician/advanced practitioner order or complex needs related to functional status, cognitive ability, or social support system  Outcome: Adequate for Discharge     Problem: Knowledge Deficit  Goal: Patient/family/caregiver demonstrates understanding of disease process, treatment plan, medications, and discharge instructions  Description: Complete learning assessment and assess knowledge base    Interventions:  - Provide teaching at level of understanding  - Provide teaching via preferred learning methods  Outcome: Adequate for Discharge     Problem: MOBILITY - ADULT  Goal: Maintain or return to baseline ADL function  Description: INTERVENTIONS:  -  Assess patient's ability to carry out ADLs; assess patient's baseline for ADL function and identify physical deficits which impact ability to perform ADLs (bathing, care of mouth/teeth, toileting, grooming, dressing, etc )  - Assess/evaluate cause of self-care deficits   - Assess range of motion  - Assess patient's mobility; develop plan if impaired  - Assess patient's need for assistive devices and provide as appropriate  - Encourage maximum independence but intervene and supervise when necessary  - Involve family in performance of ADLs  - Assess for home care needs following discharge   - Consider OT consult to assist with ADL evaluation and planning for discharge  - Provide patient education as appropriate  Outcome: Adequate for Discharge  Goal: Maintains/Returns to pre admission functional level  Description: INTERVENTIONS:  - Perform BMAT or MOVE assessment daily    - Set and communicate daily mobility goal to care team and patient/family/caregiver  - Collaborate with rehabilitation services on mobility goals if consulted  - Perform Range of Motion  times a day  - Reposition patient every  hours    - Dangle patient  times a day  - Stand patient  times a day  - Ambulate patient  times a day  - Out of bed to chair  times a day   - Out of bed for meals  times a day  - Out of bed for toileting  - Record patient progress and toleration of activity level   Outcome: Adequate for Discharge

## 2021-09-17 NOTE — DISCHARGE SUMMARY
[address] [facility]  Discharge- Meagan Wright 1945, 77 y o  male MRN: [id number]  Unit/Bed#: -01 Encounter: [id number]  Primary Care Provider: Rock Ofe DO   Date and time admitted to hospital: 9/16/2021  8:11 AM    * COPD with acute exacerbation ([facility] [address] )  Assessment & Plan  · Patient notes he has home oxygen and uses up to 4L QHS  · Breathing significantly improved this AM  · Continue home inhalers and nebulizer at ID  · Will give new Rx for 7 day course of steroids and 2 additional days of azithromycin  · Counseled on smoking cessation  · Advised to follow-up outpatient with Pulm who he is already established with     Acute respiratory failure with hypoxia ([facility] )  Assessment & Plan  · He is on 3L NC this AM  · He is chronically on oxygen at home- usually 4L QHS    Type 2 diabetes mellitus without complication, without long-term current use of insulin [facility])  Assessment & Plan  Lab Results   Component Value Date    HGBA1C 7 1 (H) 08/14/2020       Recent Labs     09/16/21  1355 09/16/21  2039 09/17/21  0630   POCGLU 231* 401* 245*     · Resume home medications upon discharge    Coronary artery disease involving native coronary artery of native heart without angina pectoris  Assessment & Plan  · Continue ASA, Pravastatin  · Resume Metoprolol at ID    Essential hypertension  Assessment & Plan  · New Rx provided for Lisinopril 40mg daily   · Resume home Metoprolol    Other hyperlipidemia  Assessment & Plan  · Resume home Crestor     Tobacco abuse  Assessment & Plan  · Counseled on cessation      Medical Problems     Resolved Problems  Date Reviewed: 9/16/2021    None              Discharging Physician / Practitioner: Addison Prieto DO  PCP: Rock Ofe DO  Admission Date:   Admission Orders (From admission, onward)     Ordered        09/16/21 1127  Place in Observation  Once                   Discharge Date: 09/17/21    Consultations During Hospital Stay:  · None    Procedures Performed:   · None    Significant Findings / Test Results:   · CXR was negative for acute cardiopulmonary abnormality    Incidental Findings:   · None     Test Results Pending at Discharge (will require follow up): · None     Outpatient Tests Requested:  · To be determined by outpatient PCP    Complications:  None     Reason for Admission: shortness of breath    Hospital Course:   Sulaiman Betancourt is a 77 y o  male patient who originally presented to the hospital on 9/16/2021 due to shortness of breath  The patient has a PMHx of COPD, is on home oxygen (about 4L QHS) and follows with outpatient pulmonologist  Prior to admission, he noted progressively worsening shortness of breath and increased sputum production  He denied sick contacts, fever, or chills, but did endorse continued tobacco use  Upon arrival, patient noted improvement in his breathing with supplemental oxygen and initiation of steroids, nebulizer, and azithromycin  CXR was negative for PNA and COVID testing was negative as well  The morning following admission, the patient noted significant improvement in his breathing  He also stated that he believed he was back to his baseline and was anxious for discharge home  He was advised to follow-up outpatient for further titration of BP medications as well as with his pulmonologist for titration of COPD medications as needed  Please see above list of diagnoses and related plan for additional information  Condition at Discharge: good    Discharge Day Visit / Exam:   Subjective:  Patient was seen sitting up at the side of the bed eating breakfast  He noted that his breathing was back to his baseline and was anxious to be discharged home     Vitals: Blood Pressure: 155/71 (09/17/21 0847)  Pulse: 78 (09/17/21 0722)  Temperature: (!) 96 7 °F (35 9 °C) (09/17/21 0722)  Temp Source: Temporal (09/17/21 0722)  Respirations: 16 (09/17/21 0722)  Height: 5' 10" (177 8 cm) (09/16/21 1755)  Weight - Scale: 94 8 kg (208 lb 15 9 oz) (09/17/21 0600)  SpO2: 97 % (09/17/21 5633)  Exam:   Physical Exam  Vitals and nursing note reviewed  Constitutional:       Appearance: Normal appearance  HENT:      Head: Normocephalic and atraumatic  Mouth/Throat:      Mouth: Mucous membranes are moist    Eyes:      Extraocular Movements: Extraocular movements intact  Conjunctiva/sclera: Conjunctivae normal    Cardiovascular:      Rate and Rhythm: Normal rate and regular rhythm  Pulses: Normal pulses  Heart sounds: Normal heart sounds  Pulmonary:      Effort: Pulmonary effort is normal       Breath sounds: Normal breath sounds  No wheezing  Comments: 3L NC  Abdominal:      General: Bowel sounds are normal  There is no distension  Palpations: Abdomen is soft  Tenderness: There is no abdominal tenderness  Musculoskeletal:         General: Normal range of motion  Cervical back: Normal range of motion and neck supple  Skin:     General: Skin is warm and dry  Neurological:      General: No focal deficit present  Mental Status: He is alert and oriented to person, place, and time  Psychiatric:         Mood and Affect: Mood normal          Judgment: Judgment normal          Discussion with Family: Patient declined call to   Discharge instructions/Information to patient and family:   See after visit summary for information provided to patient and family  Provisions for Follow-Up Care:  See after visit summary for information related to follow-up care and any pertinent home health orders  Disposition:   Home    Planned Readmission:  None     Discharge Statement:  I spent 30 minutes discharging the patient  This time was spent on the day of discharge  I had direct contact with the patient on the day of discharge   Greater than 50% of the total time was spent examining patient, answering all patient questions, arranging and discussing plan of care with patient as well as directly providing post-discharge instructions  Additional time then spent on discharge activities  Discharge Medications:  See after visit summary for reconciled discharge medications provided to patient and/or family        **Please Note: This note may have been constructed using a voice recognition system**

## 2021-09-17 NOTE — ASSESSMENT & PLAN NOTE
Lab Results   Component Value Date    HGBA1C 7 1 (H) 08/14/2020       Recent Labs     09/16/21  1355 09/16/21  2039 09/17/21  0630   POCGLU 231* 401* 245*     · Resume home medications upon discharge

## 2021-09-17 NOTE — UTILIZATION REVIEW
Initial Clinical Review    Admission: Date/Time/Statement:   Admission Orders (From admission, onward)     Ordered        09/16/21 1127  Place in Observation  Once                   Orders Placed This Encounter   Procedures    Place in Observation     Standing Status:   Standing     Number of Occurrences:   1     Order Specific Question:   Level of Care     Answer:   Med Surg [16]     ED Arrival Information     Expected Arrival Acuity    - 9/16/2021 08:10 Emergent         Means of arrival Escorted by Service Admission type    [facility] Emergency         Arrival complaint    SOB        Chief Complaint   Patient presents with    Shortness of Breath       Initial Presentation: 76 yo male to ED from home admitted observation d/t acute COPD exacerbation  pmhx of copd not on any oxygen at baseline, type 2 DM, CAD, htn, hyperlipidemia  Presents with worsening shortness of breath over the past 3 days  sputum with cough, thicker than his usual WBC 11 40  [email or web address]  IV steroids, po antibiotics  resp protocol, nebs and pulmicort  ED Triage Vitals   Temperature Pulse Respirations Blood Pressure SpO2   09/16/21 0815 09/16/21 0815 09/16/21 0815 09/16/21 0815 09/16/21 0815   98 3 °F (36 8 °C) 72 (!) 26 (!) 180/84 94 %      Temp Source Heart Rate Source Patient Position - Orthostatic VS BP Location FiO2 (%)   09/16/21 0815 09/16/21 0815 09/16/21 0815 09/16/21 0815 --   Tympanic Monitor Lying Right arm       Pain Score       09/16/21 1755       No Pain          Wt Readings from Last 1 Encounters:   09/17/21 94 8 kg (208 lb 15 9 oz)     Additional Vital Signs:   09/17/21 0722  96 7 °F (35 9 °C)Abnormal   78  16  171/81Abnormal   117  97 %  32  3 L/min  Nasal cannula  Lying   09/17/21 0719  --  --  --  --  --  97 %  32  3 L/min    Nasal cannula  --   Nasal Cannula O2 Flow Rate (L/min): O2 decreased to 2 lpm NC @ this time   at 09/17/21 0719   09/16/21 2313  97 °F (36 1 °C)Abnormal   94  18  143/83  --  98 %  32  3 L/min  Nasal cannula  Lying   09/16/21 2050  --  --  --  --  --  99 %  36  4 L/min    Nasal cannula  --   Nasal Cannula O2 Flow Rate (L/min): decreased to 3l/m at 09/16/21 2050 09/16/21 1755  96 9 °F (36 1 °C)Abnormal   99  20  177/80Abnormal   --  93 %  36  4 L/min  Nasal cannula  Sitting   09/16/21 1555  --  --  --  161/71  --  --  --  --  --  --   09/16/21 1500  --  89  20  170/73  105  94 %  --  --  --  --   09/16/21 1400  --  90  20  170/90  126  93 %  --  --  --  --   09/16/21 1345  --  --  --  --  --  95 %  28  2 L/min    Nasal cannula  --   Nasal Cannula O2 Flow Rate (L/min): O2 decreased to 1 lpm NC @ this time   at 09/16/21 1345   09/16/21 1330  --  77  22  159/72  104  96 %  --  --  --  --   09/16/21 1245  --  81  22  179/81Abnormal   117  97 %  --  --  --  --   09/16/21 1130  --  82  22  176/81Abnormal   117  93 %  --  --  --  --   09/16/21 1115  --  85  22  184/80Abnormal   122  91 %  --  --  --  --   09/16/21 0905  --  78  20  --  --  92 %  --  --  None (Room air)  --   09/16/21 0821  99 °F (37 2 °C)  76  22  180/84Abnormal   --  93 %  --  --  None (Room air)  Lying   09/16/21 0820  --  --  --  --  --  --  --  --  None (Room air)  --   09/16/21 0815  98 3 °F (36 8 °C)  72  26Abnormal   180/84Abnormal   121  94 %  --  --  None (Room air)         Pertinent Labs/Diagnostic Test Results:   Results from last 7 days   Lab Units 09/16/21  0829   SARS-COV-2  Negative     Results from last 7 days   Lab Units 09/16/21  0843   WBC Thousand/uL 11 40*   HEMOGLOBIN g/dL 13 4*   HEMATOCRIT % 39 6*   PLATELETS Thousands/uL 152   NEUTROS ABS Thousands/µL 8 90*         Results from last 7 days   Lab Units 09/16/21  0843   SODIUM mmol/L 138   POTASSIUM mmol/L 3 3*   CHLORIDE mmol/L 100   CO2 mmol/L 29   ANION GAP mmol/L 9   BUN mg/dL 16   CREATININE mg/dL 0 77   EGFR ml/min/1 73sq m 89   CALCIUM mg/dL 8 4*         Results from last 7 days   Lab Units 09/17/21  0630 09/16/21  2039 09/16/21  1355   POC GLUCOSE mg/dl 245* 401* 231*     Results from last 7 days   Lab Units 09/16/21  0843   GLUCOSE RANDOM mg/dL 168*       Results from last 7 days   Lab Units 09/16/21  0843   TROPONIN I ng/mL 0 03       Results from last 7 days   Lab Units 09/16/21  0843   BNP pg/mL 272*     9/16 CXR:COPD  No acute cardiopulmonary disease        ED Treatment:   Medication Administration from 09/16/2021 0810 to 09/16/2021 1741       Date/Time Order Dose Route Action Action by Comments     09/16/2021 0911 methylPREDNISolone sodium succinate (Solu-MEDROL) injection 125 mg 125 mg Intravenous Given       09/16/2021 0904 albuterol inhalation solution 10 mg 10 mg Nebulization Given       09/16/2021 0905 ipratropium (ATROVENT) 0 02 % inhalation solution 1 mg 1 mg Nebulization Given       09/16/2021 0904 sodium chloride 0 9 % inhalation solution 3 mL 3 mL Nebulization Given       09/16/2021 1105 potassium chloride (K-DUR,KLOR-CON) CR tablet 40 mEq 40 mEq Oral Given       09/16/2021 1234 nicotine (NICODERM CQ) 21 mg/24 hr TD 24 hr patch 1 patch 1 patch Transdermal Medication Applied       09/16/2021 1234 enoxaparin (LOVENOX) subcutaneous injection 40 mg 40 mg Subcutaneous Given       09/16/2021 1233 guaiFENesin (MUCINEX) 12 hr tablet 600 mg 600 mg Oral Given                  09/16/2021 1344 ipratropium (ATROVENT) 0 02 % inhalation solution 0 5 mg 0 5 mg Nebulization Given       09/16/2021 1557 methylPREDNISolone sodium succinate (Solu-MEDROL) injection 40 mg 40 mg Intravenous Given       09/16/2021 1233 azithromycin (ZITHROMAX) tablet 500 mg 500 mg Oral Given       09/16/2021 1501 insulin lispro (HumaLOG) 100 units/mL subcutaneous injection 4-20 Units 8 Units Subcutaneous Given       09/16/2021 1345 levalbuterol (XOPENEX) inhalation solution 1 25 mg 1 25 mg Nebulization Given       09/16/2021 1555 lisinopril (ZESTRIL) tablet 40 mg 40 mg Oral Given          Past Medical History:   Diagnosis Date    BPH (benign prostatic hyperplasia)     45 days radiation treatment    COPD (chronic obstructive pulmonary disease)     Coronary artery disease     CABG x4 in 2017    Diabetes mellitus     History of Arterial Duplex of LE 12/26/2017    Likely occlusion of the left superficial femoral artery  Calcific changes bilaterally  Despite these changes, the ankle-brachial index as a measure of peripheral blood flow only mildly impaired      History of echocardiogram 06/12/2017    EF 40%, mild LVH, mild MR     Hyperlipidemia     Hypertension     NSTEMI (non-ST elevated myocardial infarction)     Prostate cancer     prostate     PVD (peripheral vascular disease)     Tremor      Present on Admission:   SOB (shortness of breath)   Tobacco abuse   Other hyperlipidemia   Essential hypertension   Coronary artery disease involving native coronary artery of native heart without angina pectoris   COPD with acute exacerbation (HCC)   Type 2 diabetes mellitus without complication, without long-term current use of insulin (HCC)      Admitting Diagnosis: SOB (shortness of breath) [R06 02]  Hypoxia [R09 02]  COPD exacerbation ([facility] [address] ) [J44 1]  Age/Sex: 77 y o  male  Admission Orders:  Scheduled Medications:  [START ON 9/18/2021] aspirin, 81 mg, Oral, Every Other Day  azithromycin, 500 mg, Oral, Q24H  budesonide, 0 5 mg, Nebulization, Q12H  enoxaparin, 40 mg, Subcutaneous, Daily  fluticasone, 1 puff, Inhalation, Daily  gabapentin, 200 mg, Oral, HS  guaiFENesin, 600 mg, Oral, BID  insulin glargine, 30 Units, Subcutaneous, QAM  insulin lispro, 2-12 Units, Subcutaneous, HS  insulin lispro, 4-20 Units, Subcutaneous, TID AC  ipratropium, 0 5 mg, Nebulization, TID  levalbuterol, 1 25 mg, Nebulization, TID  lisinopril, 40 mg, Oral, Daily  methylPREDNISolone sodium succinate, 40 mg, Intravenous, Q8H  nicotine, 1 patch, Transdermal, Daily  pravastatin, 80 mg, Oral, HS      PRN Meds:  albuterol, 2 5 mg, Nebulization, Q4H PRN    CONS CARB DIET  SCD  OOB  I&o  DAILY WEIGHTS  CONTACT AND AIRBORNE ISOLATION    IP CONSULT TO CASE MANAGEMENT    Network Utilization Review Department  ATTENTION: Please call with any questions or concerns to [phone] and carefully listen to the prompts so that you are directed to the right person  All voicemails are confidential   [place] all requests for admission clinical reviews, approved or denied determinations and any other requests to dedicated fax number below belonging to the campus where the patient is receiving treatment   List of dedicated fax numbers for the Facilities:  [address] DENIALS (Administrative/Medical Necessity) [phone]   [address] (Maternity/NICU/Pediatrics) [phone]   [address] [place] [address] [address] [id number] [place] [address] [address] [facility] [place] [address] [phone]

## 2021-09-17 NOTE — UTILIZATION REVIEW
Observation Admission Authorization Request   NOTIFICATION OF OBSERVATION ADMISSION/OBSERVATION AUTHORIZATION REQUEST   SERVICING FACILITY:   [address] [place] [address] [address], [address]  Tax ID: [id number]  NPI: [id number]  Place of Service: On [address] [facility] Code: 22  CPT Code for Observation: CPT   CPT 77985     ATTENDING PROVIDER:  Attending Name and NPI#: Cristi Roa [[id number]]  Address: [facility] [address], [address]  Phone: [phone]     UTILIZATION REVIEW CONTACT:  Candy White, Utilization   Network Utilization Review Department  Phone: [phone]  Fax [phone]  Email: [email or web address]     PHYSICIAN ADVISORY SERVICES:  FOR [id number] REVIEW - MEDICAL NECESSITY DENIAL  Phone: [phone]  Fax: [phone]  Email: [email or web address]     TYPE OF REQUEST:  Observation Status     ADMISSION INFORMATION:  ADMISSION DATE/TIME:   PATIENT DIAGNOSIS CODE/DESCRIPTION:  SOB (shortness of breath) [R06 02]  Hypoxia [R09 02]  COPD exacerbation (HCC) [J44 1]  DISCHARGE DATE/TIME: No discharge date for patient encounter  DISCHARGE DISPOSITION (IF DISCHARGED): Home/Self Care     IMPORTANT INFORMATION:  Please contact the Edmundo Knowles directly with any questions or concerns regarding this request  Department voicemails are confidential     Send requests for admission clinical reviews, concurrent reviews, approvals, and administrative denials due to lack of clinical to fax [phone]

## 2021-09-17 NOTE — CASE MANAGEMENT
Case Management Assessment & Discharge Planning Note    Patient name Johnice Favre  Location /-14 MRN [id number]  : 1945 Date 2021       Current Admission Date: 2021  Current Admission Diagnosis:  COPD with acute exacerbation ([address] )  Previous Admission - Discharge Date:21   LOS (days): 0  Geometric Mean LOS (GMLOS) (days):   Days to GMLOS: Previous Discharge Diagnosis:  There are no discharge diagnoses documented for the most recent discharge  OBJECTIVE:        Bundle(if applicable):    Current admission status: Observation  Referral Reason: Other (Discharge planning)    Preferred Pharmacy:   [facility] [address] [place] [address], [address]  Phone: [phone] Fax: [phone]    Primary Care Provider: Radhika Moreno DO    Primary Insurance: [facility]  Secondary Insurance:     ASSESSMENT:  [address] Agents    There are no active Health Care Agents on file  [address] of Residence: [place]    Readmission Root Cause  30 Day Readmission: No    Patient Information  Mental Status: Alert  During Assessment patient was accompanied by: Not accompanied during assessment  Assessment information provided by[de-identified] Patient  Primary Caregiver: Self  Support Systems: Spouse/significant other  What city do you live in?: [address] [employer] entry access options   Select all that apply : [place]  Number of steps to enter home : 4  Type of Current Residence: [address] [place] home  Living Arrangements: Lives w/ Spouse/significant other  Is patient a ?: Yes  Is patient active with [facility])?: No    Activities of Daily Living Prior to Admission  Functional Status: Independent  Completes ADLs independently?: Yes  Ambulates independently?: Yes  Does patient use assisted devices?: No  Does patient currently own DME?: Yes  What DME does the patient currently own?: Natalie Pollard Cane, Nebulizer, Shower Chair  Does patient have a history of Outpatient Therapy (PT/OT)?: No  Does the patient have a history of Short-Term Rehab?: No  Does patient currently have [address]?: No         Patient Information Continued  Income Source: Pension/intermediate  Does patient have prescription coverage?: Yes  Does patient receive dialysis treatments?: No  Does patient have a history of substance abuse?: No  Does patient have a history of Mental Health Diagnosis?: No         Means of Transportation  Means of Transport to Appts[de-identified] Family transport    DISCHARGE DETAILS:    Discharge planning discussed with[de-identified] patient  Freedom of Choice: Yes         [address]         Is the patient interested in [address] at discharge?: No    DME Referral Provided  Referral made for DME?: No         We would like to be able to fill any required prescriptions on discharge at our [address] [place] [address] and have them delivered to you at discharge in your room    Would you like to participate in this program? : No - Declined    Discharge Destination Plan[de-identified] Home  Transportation at Discharge?: Yes (Daughter or neighbor will transport)

## 2021-11-05 ENCOUNTER — HOSPITAL ENCOUNTER (OUTPATIENT)
Facility: HOSPITAL | Age: 76
Setting detail: OBSERVATION
Discharge: HOME | End: 2021-11-06
Attending: EMERGENCY MEDICINE | Admitting: INTERNAL MEDICINE
Payer: COMMERCIAL

## 2021-11-05 ENCOUNTER — APPOINTMENT (EMERGENCY)
Dept: RADIOLOGY | Facility: HOSPITAL | Age: 76
End: 2021-11-05
Payer: COMMERCIAL

## 2021-11-05 DIAGNOSIS — J44.1 COPD WITH ACUTE EXACERBATION (HCC): ICD-10-CM

## 2021-11-05 DIAGNOSIS — J44.1 COPD EXACERBATION (HCC): Primary | ICD-10-CM

## 2021-11-05 LAB
ANION GAP SERPL CALCULATED.3IONS-SCNC: 9 MMOL/L (ref 4–13)
BASOPHILS # BLD AUTO: 0.1 THOUSANDS/ΜL (ref 0–0.1)
BASOPHILS NFR BLD AUTO: 1 % (ref 0–2)
BNP SERPL-MCNC: 260 PG/ML (ref 1–100)
BUN SERPL-MCNC: 15 MG/DL (ref 7–25)
CALCIUM SERPL-MCNC: 8.8 MG/DL (ref 8.6–10.5)
CHLORIDE SERPL-SCNC: 100 MMOL/L (ref 98–107)
CO2 SERPL-SCNC: 25 MMOL/L (ref 21–31)
CREAT SERPL-MCNC: 0.75 MG/DL (ref 0.7–1.3)
EOSINOPHIL # BLD AUTO: 0.1 THOUSAND/ΜL (ref 0–0.61)
EOSINOPHIL NFR BLD AUTO: 1 % (ref 0–5)
ERYTHROCYTE [DISTWIDTH] IN BLOOD BY AUTOMATED COUNT: 13.8 % (ref 11.5–14.5)
FLUAV RNA RESP QL NAA+PROBE: NEGATIVE
FLUBV RNA RESP QL NAA+PROBE: NEGATIVE
GFR SERPL CREATININE-BSD FRML MDRD: 90 ML/MIN/1.73SQ M
GLUCOSE SERPL-MCNC: 129 MG/DL (ref 65–99)
HCT VFR BLD AUTO: 41.9 % (ref 42–47)
HGB BLD-MCNC: 14.4 G/DL (ref 14–18)
LYMPHOCYTES # BLD AUTO: 1 THOUSANDS/ΜL (ref 0.6–4.47)
LYMPHOCYTES NFR BLD AUTO: 11 % (ref 21–51)
MCH RBC QN AUTO: 31.3 PG (ref 26–34)
MCHC RBC AUTO-ENTMCNC: 34.3 G/DL (ref 31–37)
MCV RBC AUTO: 91 FL (ref 81–99)
MONOCYTES # BLD AUTO: 0.5 THOUSAND/ΜL (ref 0.17–1.22)
MONOCYTES NFR BLD AUTO: 5 % (ref 2–12)
NEUTROPHILS # BLD AUTO: 7.4 THOUSANDS/ΜL (ref 1.4–6.5)
NEUTS SEG NFR BLD AUTO: 82 % (ref 42–75)
PLATELET # BLD AUTO: 168 THOUSANDS/UL (ref 149–390)
PMV BLD AUTO: 9.5 FL (ref 8.6–11.7)
POTASSIUM SERPL-SCNC: 4.6 MMOL/L (ref 3.5–5.5)
RBC # BLD AUTO: 4.6 MILLION/UL (ref 4.3–5.9)
RSV RNA RESP QL NAA+PROBE: NEGATIVE
SARS-COV-2 RNA RESP QL NAA+PROBE: NEGATIVE
SODIUM SERPL-SCNC: 134 MMOL/L (ref 134–143)
TROPONIN I SERPL-MCNC: <0.03 NG/ML
WBC # BLD AUTO: 9 THOUSAND/UL (ref 4.8–10.8)

## 2021-11-05 PROCEDURE — 93005 ELECTROCARDIOGRAM TRACING: CPT

## 2021-11-05 PROCEDURE — 99220 PR INITIAL OBSERVATION CARE/DAY 70 MINUTES: CPT | Performed by: INTERNAL MEDICINE

## 2021-11-05 PROCEDURE — 85025 COMPLETE CBC W/AUTO DIFF WBC: CPT | Performed by: PHYSICIAN ASSISTANT

## 2021-11-05 PROCEDURE — 99285 EMERGENCY DEPT VISIT HI MDM: CPT | Performed by: PHYSICIAN ASSISTANT

## 2021-11-05 PROCEDURE — 0241U HB NFCT DS VIR RESP RNA 4 TRGT: CPT | Performed by: PHYSICIAN ASSISTANT

## 2021-11-05 PROCEDURE — 96374 THER/PROPH/DIAG INJ IV PUSH: CPT

## 2021-11-05 PROCEDURE — 83880 ASSAY OF NATRIURETIC PEPTIDE: CPT | Performed by: PHYSICIAN ASSISTANT

## 2021-11-05 PROCEDURE — 36415 COLL VENOUS BLD VENIPUNCTURE: CPT | Performed by: PHYSICIAN ASSISTANT

## 2021-11-05 PROCEDURE — 71045 X-RAY EXAM CHEST 1 VIEW: CPT

## 2021-11-05 PROCEDURE — 99285 EMERGENCY DEPT VISIT HI MDM: CPT

## 2021-11-05 PROCEDURE — 80048 BASIC METABOLIC PNL TOTAL CA: CPT | Performed by: PHYSICIAN ASSISTANT

## 2021-11-05 PROCEDURE — 84484 ASSAY OF TROPONIN QUANT: CPT | Performed by: PHYSICIAN ASSISTANT

## 2021-11-05 RX ORDER — METHYLPREDNISOLONE SODIUM SUCCINATE 40 MG/ML
40 INJECTION, POWDER, LYOPHILIZED, FOR SOLUTION INTRAMUSCULAR; INTRAVENOUS EVERY 8 HOURS
Status: DISCONTINUED | OUTPATIENT
Start: 2021-11-05 | End: 2021-11-05

## 2021-11-05 RX ORDER — NICOTINE 21 MG/24HR
1 PATCH, TRANSDERMAL 24 HOURS TRANSDERMAL DAILY
Status: DISCONTINUED | OUTPATIENT
Start: 2021-11-06 | End: 2021-11-06 | Stop reason: HOSPADM

## 2021-11-05 RX ORDER — PRAVASTATIN SODIUM 40 MG
80 TABLET ORAL
Status: DISCONTINUED | OUTPATIENT
Start: 2021-11-05 | End: 2021-11-06 | Stop reason: HOSPADM

## 2021-11-05 RX ORDER — GUAIFENESIN 600 MG
600 TABLET, EXTENDED RELEASE 12 HR ORAL 2 TIMES DAILY
Status: DISCONTINUED | OUTPATIENT
Start: 2021-11-05 | End: 2021-11-06 | Stop reason: HOSPADM

## 2021-11-05 RX ORDER — METHYLPREDNISOLONE SODIUM SUCCINATE 125 MG/2ML
80 INJECTION, POWDER, LYOPHILIZED, FOR SOLUTION INTRAMUSCULAR; INTRAVENOUS ONCE
Status: COMPLETED | OUTPATIENT
Start: 2021-11-05 | End: 2021-11-05

## 2021-11-05 RX ORDER — LEVALBUTEROL 1.25 MG/.5ML
1.25 SOLUTION, CONCENTRATE RESPIRATORY (INHALATION)
Status: DISCONTINUED | OUTPATIENT
Start: 2021-11-05 | End: 2021-11-06 | Stop reason: HOSPADM

## 2021-11-05 RX ORDER — SODIUM CHLORIDE 9 MG/ML
3 INJECTION INTRAVENOUS
Status: DISCONTINUED | OUTPATIENT
Start: 2021-11-05 | End: 2021-11-06 | Stop reason: HOSPADM

## 2021-11-05 RX ORDER — PRIMIDONE 50 MG/1
100 TABLET ORAL EVERY 8 HOURS SCHEDULED
Status: DISCONTINUED | OUTPATIENT
Start: 2021-11-05 | End: 2021-11-06 | Stop reason: HOSPADM

## 2021-11-05 RX ORDER — SODIUM CHLORIDE FOR INHALATION 0.9 %
3 VIAL, NEBULIZER (ML) INHALATION
Status: DISCONTINUED | OUTPATIENT
Start: 2021-11-05 | End: 2021-11-06

## 2021-11-05 RX ORDER — ASPIRIN 81 MG/1
81 TABLET ORAL EVERY OTHER DAY
Refills: 0 | Status: DISCONTINUED | OUTPATIENT
Start: 2021-11-05 | End: 2021-11-06 | Stop reason: HOSPADM

## 2021-11-05 RX ORDER — METHYLPREDNISOLONE SODIUM SUCCINATE 40 MG/ML
40 INJECTION, POWDER, LYOPHILIZED, FOR SOLUTION INTRAMUSCULAR; INTRAVENOUS EVERY 8 HOURS
Status: DISCONTINUED | OUTPATIENT
Start: 2021-11-06 | End: 2021-11-06 | Stop reason: HOSPADM

## 2021-11-05 RX ORDER — ALBUTEROL SULFATE 90 UG/1
2 AEROSOL, METERED RESPIRATORY (INHALATION) EVERY 6 HOURS PRN
Status: DISCONTINUED | OUTPATIENT
Start: 2021-11-05 | End: 2021-11-06 | Stop reason: HOSPADM

## 2021-11-05 RX ORDER — AZITHROMYCIN 250 MG/1
500 TABLET, FILM COATED ORAL EVERY 24 HOURS
Status: DISCONTINUED | OUTPATIENT
Start: 2021-11-05 | End: 2021-11-06 | Stop reason: HOSPADM

## 2021-11-05 RX ORDER — LISINOPRIL 20 MG/1
40 TABLET ORAL DAILY
Status: DISCONTINUED | OUTPATIENT
Start: 2021-11-06 | End: 2021-11-06 | Stop reason: HOSPADM

## 2021-11-05 RX ORDER — GABAPENTIN 100 MG/1
200 CAPSULE ORAL
Status: DISCONTINUED | OUTPATIENT
Start: 2021-11-05 | End: 2021-11-06 | Stop reason: HOSPADM

## 2021-11-05 RX ORDER — BUDESONIDE 0.5 MG/2ML
0.5 INHALANT ORAL
Status: DISCONTINUED | OUTPATIENT
Start: 2021-11-05 | End: 2021-11-05

## 2021-11-05 RX ADMIN — IPRATROPIUM BROMIDE 0.5 MG: 0.5 SOLUTION RESPIRATORY (INHALATION) at 19:28

## 2021-11-05 RX ADMIN — ALBUTEROL SULFATE 5 MG: 2.5 SOLUTION RESPIRATORY (INHALATION) at 19:28

## 2021-11-05 RX ADMIN — IPRATROPIUM BROMIDE 0.5 MG: 0.5 SOLUTION RESPIRATORY (INHALATION) at 17:51

## 2021-11-05 RX ADMIN — METHYLPREDNISOLONE SODIUM SUCCINATE 80 MG: 125 INJECTION, POWDER, FOR SOLUTION INTRAMUSCULAR; INTRAVENOUS at 18:15

## 2021-11-05 RX ADMIN — ALBUTEROL SULFATE 5 MG: 2.5 SOLUTION RESPIRATORY (INHALATION) at 17:51

## 2021-11-06 VITALS
BODY MASS INDEX: 29.78 KG/M2 | WEIGHT: 208 LBS | TEMPERATURE: 97.8 F | HEART RATE: 88 BPM | SYSTOLIC BLOOD PRESSURE: 170 MMHG | OXYGEN SATURATION: 93 % | RESPIRATION RATE: 22 BRPM | DIASTOLIC BLOOD PRESSURE: 80 MMHG | HEIGHT: 70 IN

## 2021-11-06 LAB
ANION GAP SERPL CALCULATED.3IONS-SCNC: 12 MMOL/L (ref 4–13)
ATRIAL RATE: 76 BPM
BUN SERPL-MCNC: 20 MG/DL (ref 7–25)
CALCIUM SERPL-MCNC: 9 MG/DL (ref 8.6–10.5)
CHLORIDE SERPL-SCNC: 101 MMOL/L (ref 98–107)
CO2 SERPL-SCNC: 25 MMOL/L (ref 21–31)
CREAT SERPL-MCNC: 0.79 MG/DL (ref 0.7–1.3)
ERYTHROCYTE [DISTWIDTH] IN BLOOD BY AUTOMATED COUNT: 13.7 % (ref 11.5–14.5)
GFR SERPL CREATININE-BSD FRML MDRD: 88 ML/MIN/1.73SQ M
GLUCOSE P FAST SERPL-MCNC: 143 MG/DL (ref 65–99)
GLUCOSE SERPL-MCNC: 139 MG/DL (ref 65–140)
GLUCOSE SERPL-MCNC: 143 MG/DL (ref 65–99)
GLUCOSE SERPL-MCNC: 166 MG/DL (ref 65–140)
HCT VFR BLD AUTO: 43.2 % (ref 42–47)
HGB BLD-MCNC: 14.5 G/DL (ref 14–18)
MCH RBC QN AUTO: 31 PG (ref 26–34)
MCHC RBC AUTO-ENTMCNC: 33.5 G/DL (ref 31–37)
MCV RBC AUTO: 93 FL (ref 81–99)
P AXIS: 76 DEGREES
PLATELET # BLD AUTO: 160 THOUSANDS/UL (ref 149–390)
PMV BLD AUTO: 10 FL (ref 8.6–11.7)
POTASSIUM SERPL-SCNC: 4.3 MMOL/L (ref 3.5–5.5)
PR INTERVAL: 172 MS
PROCALCITONIN SERPL-MCNC: <0.05 NG/ML
QRS AXIS: 73 DEGREES
QRSD INTERVAL: 96 MS
QT INTERVAL: 398 MS
QTC INTERVAL: 447 MS
RBC # BLD AUTO: 4.66 MILLION/UL (ref 4.3–5.9)
SODIUM SERPL-SCNC: 138 MMOL/L (ref 134–143)
T WAVE AXIS: 97 DEGREES
VENTRICULAR RATE: 76 BPM
WBC # BLD AUTO: 7.7 THOUSAND/UL (ref 4.8–10.8)

## 2021-11-06 PROCEDURE — 82948 REAGENT STRIP/BLOOD GLUCOSE: CPT

## 2021-11-06 PROCEDURE — 85027 COMPLETE CBC AUTOMATED: CPT | Performed by: INTERNAL MEDICINE

## 2021-11-06 PROCEDURE — 99217 PR OBSERVATION CARE DISCHARGE MANAGEMENT: CPT | Performed by: INTERNAL MEDICINE

## 2021-11-06 PROCEDURE — 93010 ELECTROCARDIOGRAM REPORT: CPT | Performed by: INTERNAL MEDICINE

## 2021-11-06 PROCEDURE — 97165 OT EVAL LOW COMPLEX 30 MIN: CPT

## 2021-11-06 PROCEDURE — 94640 AIRWAY INHALATION TREATMENT: CPT

## 2021-11-06 PROCEDURE — 97162 PT EVAL MOD COMPLEX 30 MIN: CPT

## 2021-11-06 PROCEDURE — 80048 BASIC METABOLIC PNL TOTAL CA: CPT | Performed by: INTERNAL MEDICINE

## 2021-11-06 PROCEDURE — 84145 PROCALCITONIN (PCT): CPT | Performed by: INTERNAL MEDICINE

## 2021-11-06 PROCEDURE — 94760 N-INVAS EAR/PLS OXIMETRY 1: CPT

## 2021-11-06 RX ORDER — PREDNISONE 20 MG/1
TABLET ORAL
Qty: 9 TABLET | Refills: 0 | Status: SHIPPED | OUTPATIENT
Start: 2021-11-07 | End: 2021-11-13

## 2021-12-09 RX ORDER — CARBOXYMETHYLCELLULOSE SODIUM 5 MG/ML
SOLUTION/ DROPS OPHTHALMIC
COMMUNITY
Start: 2021-09-27 | End: 2022-04-22

## 2021-12-12 PROCEDURE — 99285 EMERGENCY DEPT VISIT HI MDM: CPT

## 2021-12-13 ENCOUNTER — HOSPITAL ENCOUNTER (EMERGENCY)
Facility: HOSPITAL | Age: 76
Discharge: HOME | End: 2021-12-13
Attending: EMERGENCY MEDICINE
Payer: COMMERCIAL

## 2021-12-13 ENCOUNTER — APPOINTMENT (EMERGENCY)
Dept: RADIOLOGY | Facility: HOSPITAL | Age: 76
End: 2021-12-13
Payer: COMMERCIAL

## 2021-12-13 VITALS
BODY MASS INDEX: 29.68 KG/M2 | HEART RATE: 96 BPM | SYSTOLIC BLOOD PRESSURE: 189 MMHG | HEIGHT: 71 IN | TEMPERATURE: 98.3 F | RESPIRATION RATE: 20 BRPM | OXYGEN SATURATION: 96 % | DIASTOLIC BLOOD PRESSURE: 93 MMHG | WEIGHT: 212 LBS

## 2021-12-13 DIAGNOSIS — J44.1 COPD WITH ACUTE EXACERBATION (HCC): Primary | ICD-10-CM

## 2021-12-13 LAB
ANION GAP SERPL CALCULATED.3IONS-SCNC: 11 MMOL/L (ref 4–13)
ATRIAL RATE: 86 BPM
BASOPHILS # BLD AUTO: 0.08 THOUSANDS/ΜL (ref 0–0.1)
BASOPHILS NFR BLD AUTO: 1 % (ref 0–1)
BNP SERPL-MCNC: 195 PG/ML (ref 1–100)
BUN SERPL-MCNC: 13 MG/DL (ref 5–25)
CALCIUM SERPL-MCNC: 8.7 MG/DL (ref 8.4–10.2)
CHLORIDE SERPL-SCNC: 99 MMOL/L (ref 96–108)
CO2 SERPL-SCNC: 28 MMOL/L (ref 21–32)
CREAT SERPL-MCNC: 0.75 MG/DL (ref 0.6–1.3)
EOSINOPHIL # BLD AUTO: 0.24 THOUSAND/ΜL (ref 0–0.61)
EOSINOPHIL NFR BLD AUTO: 3 % (ref 0–6)
ERYTHROCYTE [DISTWIDTH] IN BLOOD BY AUTOMATED COUNT: 13.3 % (ref 11.6–15.1)
FLUAV RNA RESP QL NAA+PROBE: NEGATIVE
FLUBV RNA RESP QL NAA+PROBE: NEGATIVE
GFR SERPL CREATININE-BSD FRML MDRD: 90 ML/MIN/1.73SQ M
GLUCOSE SERPL-MCNC: 94 MG/DL (ref 65–140)
HCT VFR BLD AUTO: 42.8 % (ref 36.5–49.3)
HGB BLD-MCNC: 14.4 G/DL (ref 12–17)
IMM GRANULOCYTES # BLD AUTO: 0.08 THOUSAND/UL (ref 0–0.2)
IMM GRANULOCYTES NFR BLD AUTO: 1 % (ref 0–2)
LYMPHOCYTES # BLD AUTO: 1.49 THOUSANDS/ΜL (ref 0.6–4.47)
LYMPHOCYTES NFR BLD AUTO: 17 % (ref 14–44)
MCH RBC QN AUTO: 30.8 PG (ref 26.8–34.3)
MCHC RBC AUTO-ENTMCNC: 33.6 G/DL (ref 31.4–37.4)
MCV RBC AUTO: 92 FL (ref 82–98)
MONOCYTES # BLD AUTO: 0.6 THOUSAND/ΜL (ref 0.17–1.22)
MONOCYTES NFR BLD AUTO: 7 % (ref 4–12)
NEUTROPHILS # BLD AUTO: 6.3 THOUSANDS/ΜL (ref 1.85–7.62)
NEUTS SEG NFR BLD AUTO: 71 % (ref 43–75)
NRBC BLD AUTO-RTO: 0 /100 WBCS
P AXIS: 77 DEGREES
PLATELET # BLD AUTO: 186 THOUSANDS/UL (ref 149–390)
PMV BLD AUTO: 11.1 FL (ref 8.9–12.7)
POTASSIUM SERPL-SCNC: 3.2 MMOL/L (ref 3.5–5.3)
PR INTERVAL: 174 MS
QRS AXIS: 59 DEGREES
QRSD INTERVAL: 106 MS
QT INTERVAL: 402 MS
QTC INTERVAL: 481 MS
RBC # BLD AUTO: 4.68 MILLION/UL (ref 3.88–5.62)
RSV RNA RESP QL NAA+PROBE: NEGATIVE
SARS-COV-2 RNA RESP QL NAA+PROBE: NEGATIVE
SODIUM SERPL-SCNC: 138 MMOL/L (ref 135–147)
T WAVE AXIS: 86 DEGREES
VENTRICULAR RATE: 86 BPM
WBC # BLD AUTO: 8.79 THOUSAND/UL (ref 4.31–10.16)

## 2021-12-13 PROCEDURE — 83880 ASSAY OF NATRIURETIC PEPTIDE: CPT | Performed by: FAMILY MEDICINE

## 2021-12-13 PROCEDURE — 94760 N-INVAS EAR/PLS OXIMETRY 1: CPT

## 2021-12-13 PROCEDURE — 0241U HB NFCT DS VIR RESP RNA 4 TRGT: CPT | Performed by: FAMILY MEDICINE

## 2021-12-13 PROCEDURE — 36415 COLL VENOUS BLD VENIPUNCTURE: CPT | Performed by: FAMILY MEDICINE

## 2021-12-13 PROCEDURE — 93010 ELECTROCARDIOGRAM REPORT: CPT | Performed by: INTERNAL MEDICINE

## 2021-12-13 PROCEDURE — 80048 BASIC METABOLIC PNL TOTAL CA: CPT | Performed by: FAMILY MEDICINE

## 2021-12-13 PROCEDURE — 85025 COMPLETE CBC W/AUTO DIFF WBC: CPT | Performed by: FAMILY MEDICINE

## 2021-12-13 PROCEDURE — 99284 EMERGENCY DEPT VISIT MOD MDM: CPT | Performed by: EMERGENCY MEDICINE

## 2021-12-13 PROCEDURE — 93005 ELECTROCARDIOGRAM TRACING: CPT

## 2021-12-13 PROCEDURE — 71045 X-RAY EXAM CHEST 1 VIEW: CPT

## 2021-12-13 PROCEDURE — 94644 CONT INHLJ TX 1ST HOUR: CPT

## 2021-12-13 RX ORDER — SODIUM CHLORIDE FOR INHALATION 0.9 %
3 VIAL, NEBULIZER (ML) INHALATION ONCE
Status: COMPLETED | OUTPATIENT
Start: 2021-12-13 | End: 2021-12-13

## 2021-12-13 RX ORDER — PREDNISONE 20 MG/1
40 TABLET ORAL DAILY
Qty: 10 TABLET | Refills: 0 | Status: SHIPPED | OUTPATIENT
Start: 2021-12-13 | End: 2021-12-18

## 2021-12-13 RX ORDER — POTASSIUM CHLORIDE 20 MEQ/1
20 TABLET, EXTENDED RELEASE ORAL ONCE
Status: COMPLETED | OUTPATIENT
Start: 2021-12-13 | End: 2021-12-13

## 2021-12-13 RX ORDER — PREDNISONE 20 MG/1
60 TABLET ORAL ONCE
Status: COMPLETED | OUTPATIENT
Start: 2021-12-13 | End: 2021-12-13

## 2021-12-13 RX ADMIN — POTASSIUM CHLORIDE 20 MEQ: 1500 TABLET, EXTENDED RELEASE ORAL at 09:09

## 2021-12-13 RX ADMIN — ALBUTEROL SULFATE 10 MG: 2.5 SOLUTION RESPIRATORY (INHALATION) at 06:40

## 2021-12-13 RX ADMIN — PREDNISONE 60 MG: 20 TABLET ORAL at 06:32

## 2021-12-13 RX ADMIN — ISODIUM CHLORIDE 3 ML: 0.03 SOLUTION RESPIRATORY (INHALATION) at 06:40

## 2021-12-13 RX ADMIN — IPRATROPIUM BROMIDE 1 MG: 0.5 SOLUTION RESPIRATORY (INHALATION) at 06:41

## 2021-12-14 ENCOUNTER — TELEPHONE (OUTPATIENT)
Dept: FAMILY MEDICINE CLINIC | Facility: CLINIC | Age: 76
End: 2021-12-14

## 2021-12-14 ENCOUNTER — OFFICE VISIT (OUTPATIENT)
Dept: FAMILY MEDICINE CLINIC | Facility: CLINIC | Age: 76
End: 2021-12-14
Payer: COMMERCIAL

## 2021-12-14 VITALS
RESPIRATION RATE: 24 BRPM | OXYGEN SATURATION: 95 % | WEIGHT: 214 LBS | TEMPERATURE: 99.2 F | HEIGHT: 71 IN | BODY MASS INDEX: 29.96 KG/M2 | SYSTOLIC BLOOD PRESSURE: 124 MMHG | DIASTOLIC BLOOD PRESSURE: 60 MMHG | HEART RATE: 81 BPM

## 2021-12-14 DIAGNOSIS — E11.9 TYPE 2 DIABETES MELLITUS WITHOUT COMPLICATION, WITHOUT LONG-TERM CURRENT USE OF INSULIN (HCC): ICD-10-CM

## 2021-12-14 DIAGNOSIS — Z00.00 MEDICARE ANNUAL WELLNESS VISIT, SUBSEQUENT: Primary | ICD-10-CM

## 2021-12-14 DIAGNOSIS — R09.89 DIMINISHED PULSES IN LOWER EXTREMITY: ICD-10-CM

## 2021-12-14 DIAGNOSIS — E11.311: ICD-10-CM

## 2021-12-14 DIAGNOSIS — J44.1 COPD WITH ACUTE EXACERBATION (HCC): ICD-10-CM

## 2021-12-14 DIAGNOSIS — L53.9 ERYTHEMA OF TOE: ICD-10-CM

## 2021-12-14 DIAGNOSIS — L03.116 CELLULITIS OF LEFT FOOT: ICD-10-CM

## 2021-12-14 DIAGNOSIS — R25.1 PILL ROLLING TREMOR: ICD-10-CM

## 2021-12-14 PROCEDURE — 1125F AMNT PAIN NOTED PAIN PRSNT: CPT | Performed by: FAMILY MEDICINE

## 2021-12-14 PROCEDURE — G0439 PPPS, SUBSEQ VISIT: HCPCS | Performed by: FAMILY MEDICINE

## 2021-12-14 PROCEDURE — 3288F FALL RISK ASSESSMENT DOCD: CPT | Performed by: FAMILY MEDICINE

## 2021-12-14 PROCEDURE — 1170F FXNL STATUS ASSESSED: CPT | Performed by: FAMILY MEDICINE

## 2021-12-14 PROCEDURE — 99214 OFFICE O/P EST MOD 30 MIN: CPT | Performed by: FAMILY MEDICINE

## 2021-12-14 PROCEDURE — 1160F RVW MEDS BY RX/DR IN RCRD: CPT | Performed by: FAMILY MEDICINE

## 2021-12-14 PROCEDURE — 3008F BODY MASS INDEX DOCD: CPT | Performed by: FAMILY MEDICINE

## 2021-12-14 PROCEDURE — 3725F SCREEN DEPRESSION PERFORMED: CPT | Performed by: FAMILY MEDICINE

## 2021-12-14 RX ORDER — AZITHROMYCIN 250 MG/1
TABLET, FILM COATED ORAL
COMMUNITY
Start: 2021-11-19 | End: 2022-03-09 | Stop reason: HOSPADM

## 2021-12-14 RX ORDER — CEPHALEXIN 500 MG/1
500 CAPSULE ORAL EVERY 12 HOURS SCHEDULED
Qty: 14 CAPSULE | Refills: 0 | Status: SHIPPED | OUTPATIENT
Start: 2021-12-14 | End: 2021-12-21

## 2021-12-16 ENCOUNTER — HOSPITAL ENCOUNTER (OUTPATIENT)
Dept: NON INVASIVE DIAGNOSTICS | Facility: HOSPITAL | Age: 76
Discharge: HOME | End: 2021-12-16
Payer: COMMERCIAL

## 2021-12-16 DIAGNOSIS — R09.89 DIMINISHED PULSES IN LOWER EXTREMITY: ICD-10-CM

## 2021-12-16 DIAGNOSIS — L53.9 ERYTHEMA OF TOE: ICD-10-CM

## 2021-12-16 DIAGNOSIS — L03.116 CELLULITIS OF LEFT FOOT: ICD-10-CM

## 2021-12-16 PROCEDURE — 93923 UPR/LXTR ART STDY 3+ LVLS: CPT

## 2021-12-16 PROCEDURE — 93923 UPR/LXTR ART STDY 3+ LVLS: CPT | Performed by: SURGERY

## 2021-12-19 DIAGNOSIS — R68.89 ABNORMAL ANKLE BRACHIAL INDEX (ABI): Primary | ICD-10-CM

## 2021-12-23 ENCOUNTER — HOSPITAL ENCOUNTER (EMERGENCY)
Facility: HOSPITAL | Age: 76
Discharge: HOME | End: 2021-12-23
Attending: EMERGENCY MEDICINE
Payer: COMMERCIAL

## 2021-12-23 ENCOUNTER — APPOINTMENT (EMERGENCY)
Dept: RADIOLOGY | Facility: HOSPITAL | Age: 76
End: 2021-12-23
Payer: COMMERCIAL

## 2021-12-23 VITALS
WEIGHT: 214 LBS | RESPIRATION RATE: 16 BRPM | BODY MASS INDEX: 30.27 KG/M2 | OXYGEN SATURATION: 98 % | SYSTOLIC BLOOD PRESSURE: 153 MMHG | HEART RATE: 93 BPM | TEMPERATURE: 98.5 F | DIASTOLIC BLOOD PRESSURE: 68 MMHG

## 2021-12-23 DIAGNOSIS — J44.1 COPD EXACERBATION (HCC): Primary | ICD-10-CM

## 2021-12-23 LAB
2HR DELTA HS TROPONIN: -1 NG/L
ANION GAP SERPL CALCULATED.3IONS-SCNC: 12 MMOL/L (ref 4–13)
BASOPHILS # BLD AUTO: 0.05 THOUSANDS/ΜL (ref 0–0.1)
BASOPHILS NFR BLD AUTO: 1 % (ref 0–1)
BUN SERPL-MCNC: 13 MG/DL (ref 5–25)
CALCIUM SERPL-MCNC: 8.7 MG/DL (ref 8.4–10.2)
CARDIAC TROPONIN I PNL SERPL HS: 11 NG/L
CARDIAC TROPONIN I PNL SERPL HS: 12 NG/L
CHLORIDE SERPL-SCNC: 98 MMOL/L (ref 96–108)
CO2 SERPL-SCNC: 25 MMOL/L (ref 21–32)
CREAT SERPL-MCNC: 0.97 MG/DL (ref 0.6–1.3)
EOSINOPHIL # BLD AUTO: 0.06 THOUSAND/ΜL (ref 0–0.61)
EOSINOPHIL NFR BLD AUTO: 1 % (ref 0–6)
ERYTHROCYTE [DISTWIDTH] IN BLOOD BY AUTOMATED COUNT: 13.3 % (ref 11.6–15.1)
FLUAV RNA RESP QL NAA+PROBE: NEGATIVE
FLUBV RNA RESP QL NAA+PROBE: NEGATIVE
GFR SERPL CREATININE-BSD FRML MDRD: 76 ML/MIN/1.73SQ M
GLUCOSE SERPL-MCNC: 291 MG/DL (ref 65–140)
HCT VFR BLD AUTO: 44.8 % (ref 36.5–49.3)
HGB BLD-MCNC: 14.9 G/DL (ref 12–17)
IMM GRANULOCYTES # BLD AUTO: 0.1 THOUSAND/UL (ref 0–0.2)
IMM GRANULOCYTES NFR BLD AUTO: 1 % (ref 0–2)
LYMPHOCYTES # BLD AUTO: 0.85 THOUSANDS/ΜL (ref 0.6–4.47)
LYMPHOCYTES NFR BLD AUTO: 8 % (ref 14–44)
MCH RBC QN AUTO: 30.9 PG (ref 26.8–34.3)
MCHC RBC AUTO-ENTMCNC: 33.3 G/DL (ref 31.4–37.4)
MCV RBC AUTO: 93 FL (ref 82–98)
MONOCYTES # BLD AUTO: 0.43 THOUSAND/ΜL (ref 0.17–1.22)
MONOCYTES NFR BLD AUTO: 4 % (ref 4–12)
NEUTROPHILS # BLD AUTO: 9.06 THOUSANDS/ΜL (ref 1.85–7.62)
NEUTS SEG NFR BLD AUTO: 85 % (ref 43–75)
NRBC BLD AUTO-RTO: 0 /100 WBCS
PLATELET # BLD AUTO: 187 THOUSANDS/UL (ref 149–390)
PMV BLD AUTO: 11.3 FL (ref 8.9–12.7)
POTASSIUM SERPL-SCNC: 4.1 MMOL/L (ref 3.5–5.3)
RBC # BLD AUTO: 4.82 MILLION/UL (ref 3.88–5.62)
RSV RNA RESP QL NAA+PROBE: NEGATIVE
SARS-COV-2 RNA RESP QL NAA+PROBE: NEGATIVE
SODIUM SERPL-SCNC: 135 MMOL/L (ref 135–147)
WBC # BLD AUTO: 10.55 THOUSAND/UL (ref 4.31–10.16)

## 2021-12-23 PROCEDURE — 99284 EMERGENCY DEPT VISIT MOD MDM: CPT | Performed by: PHYSICIAN ASSISTANT

## 2021-12-23 PROCEDURE — 71045 X-RAY EXAM CHEST 1 VIEW: CPT

## 2021-12-23 PROCEDURE — 94644 CONT INHLJ TX 1ST HOUR: CPT

## 2021-12-23 PROCEDURE — 99285 EMERGENCY DEPT VISIT HI MDM: CPT

## 2021-12-23 PROCEDURE — 94760 N-INVAS EAR/PLS OXIMETRY 1: CPT

## 2021-12-23 PROCEDURE — 93005 ELECTROCARDIOGRAM TRACING: CPT

## 2021-12-23 PROCEDURE — 84484 ASSAY OF TROPONIN QUANT: CPT | Performed by: PHYSICIAN ASSISTANT

## 2021-12-23 PROCEDURE — 0241U HB NFCT DS VIR RESP RNA 4 TRGT: CPT | Performed by: PHYSICIAN ASSISTANT

## 2021-12-23 PROCEDURE — 96374 THER/PROPH/DIAG INJ IV PUSH: CPT

## 2021-12-23 PROCEDURE — 85025 COMPLETE CBC W/AUTO DIFF WBC: CPT | Performed by: PHYSICIAN ASSISTANT

## 2021-12-23 PROCEDURE — 36415 COLL VENOUS BLD VENIPUNCTURE: CPT | Performed by: PHYSICIAN ASSISTANT

## 2021-12-23 PROCEDURE — 80048 BASIC METABOLIC PNL TOTAL CA: CPT | Performed by: PHYSICIAN ASSISTANT

## 2021-12-23 RX ORDER — METHYLPREDNISOLONE SODIUM SUCCINATE 125 MG/2ML
60 INJECTION, POWDER, LYOPHILIZED, FOR SOLUTION INTRAMUSCULAR; INTRAVENOUS ONCE
Status: COMPLETED | OUTPATIENT
Start: 2021-12-23 | End: 2021-12-23

## 2021-12-23 RX ORDER — SODIUM CHLORIDE FOR INHALATION 0.9 %
3 VIAL, NEBULIZER (ML) INHALATION ONCE
Status: COMPLETED | OUTPATIENT
Start: 2021-12-23 | End: 2021-12-23

## 2021-12-23 RX ORDER — SODIUM CHLORIDE 9 MG/ML
3 INJECTION INTRAVENOUS
Status: DISCONTINUED | OUTPATIENT
Start: 2021-12-23 | End: 2021-12-23 | Stop reason: HOSPADM

## 2021-12-23 RX ORDER — METHYLPREDNISOLONE 4 MG/1
TABLET ORAL
Qty: 21 TABLET | Refills: 0 | Status: SHIPPED | OUTPATIENT
Start: 2021-12-23 | End: 2022-03-09 | Stop reason: HOSPADM

## 2021-12-23 RX ADMIN — IPRATROPIUM BROMIDE 1 MG: 0.5 SOLUTION RESPIRATORY (INHALATION) at 17:55

## 2021-12-23 RX ADMIN — ISODIUM CHLORIDE 3 ML: 0.03 SOLUTION RESPIRATORY (INHALATION) at 17:55

## 2021-12-23 RX ADMIN — METHYLPREDNISOLONE SODIUM SUCCINATE 60 MG: 125 INJECTION, POWDER, FOR SOLUTION INTRAMUSCULAR; INTRAVENOUS at 17:45

## 2021-12-23 RX ADMIN — ALBUTEROL SULFATE 10 MG: 2.5 SOLUTION RESPIRATORY (INHALATION) at 17:55

## 2021-12-24 LAB
ATRIAL RATE: 95 BPM
P AXIS: 74 DEGREES
PR INTERVAL: 160 MS
QRS AXIS: 60 DEGREES
QRSD INTERVAL: 100 MS
QT INTERVAL: 360 MS
QTC INTERVAL: 452 MS
T WAVE AXIS: 165 DEGREES
VENTRICULAR RATE: 95 BPM

## 2021-12-24 PROCEDURE — 93010 ELECTROCARDIOGRAM REPORT: CPT | Performed by: INTERNAL MEDICINE

## 2022-01-06 ENCOUNTER — APPOINTMENT (OUTPATIENT)
Dept: LAB | Facility: HOSPITAL | Age: 77
End: 2022-01-06
Payer: COMMERCIAL

## 2022-01-06 ENCOUNTER — RA CDI HCC (OUTPATIENT)
Dept: OTHER | Facility: HOSPITAL | Age: 77
End: 2022-01-06

## 2022-01-06 DIAGNOSIS — E11.9 TYPE 2 DIABETES MELLITUS WITHOUT COMPLICATION, WITHOUT LONG-TERM CURRENT USE OF INSULIN (HCC): ICD-10-CM

## 2022-01-06 LAB
ALBUMIN SERPL BCP-MCNC: 3.6 G/DL (ref 3.5–5)
ALP SERPL-CCNC: 83 U/L (ref 46–116)
ALT SERPL W P-5'-P-CCNC: 20 U/L (ref 12–78)
ANION GAP SERPL CALCULATED.3IONS-SCNC: 7 MMOL/L (ref 4–13)
AST SERPL W P-5'-P-CCNC: 10 U/L (ref 5–45)
BASOPHILS # BLD AUTO: 0.11 THOUSANDS/ΜL (ref 0–0.1)
BASOPHILS NFR BLD AUTO: 1 % (ref 0–1)
BILIRUB SERPL-MCNC: 0.45 MG/DL (ref 0.2–1)
BUN SERPL-MCNC: 11 MG/DL (ref 5–25)
CALCIUM SERPL-MCNC: 8.8 MG/DL (ref 8.3–10.1)
CHLORIDE SERPL-SCNC: 103 MMOL/L (ref 100–108)
CHOLEST SERPL-MCNC: 211 MG/DL
CO2 SERPL-SCNC: 27 MMOL/L (ref 21–32)
CREAT SERPL-MCNC: 0.92 MG/DL (ref 0.6–1.3)
EOSINOPHIL # BLD AUTO: 0.21 THOUSAND/ΜL (ref 0–0.61)
EOSINOPHIL NFR BLD AUTO: 2 % (ref 0–6)
ERYTHROCYTE [DISTWIDTH] IN BLOOD BY AUTOMATED COUNT: 13.1 % (ref 11.6–15.1)
GFR SERPL CREATININE-BSD FRML MDRD: 81 ML/MIN/1.73SQ M
GLUCOSE P FAST SERPL-MCNC: 129 MG/DL (ref 65–99)
HCT VFR BLD AUTO: 46 % (ref 36.5–49.3)
HDLC SERPL-MCNC: 49 MG/DL
HGB BLD-MCNC: 15.3 G/DL (ref 12–17)
IMM GRANULOCYTES # BLD AUTO: 0.08 THOUSAND/UL (ref 0–0.2)
IMM GRANULOCYTES NFR BLD AUTO: 1 % (ref 0–2)
LDLC SERPL CALC-MCNC: 107 MG/DL (ref 0–100)
LYMPHOCYTES # BLD AUTO: 1.38 THOUSANDS/ΜL (ref 0.6–4.47)
LYMPHOCYTES NFR BLD AUTO: 13 % (ref 14–44)
MCH RBC QN AUTO: 30.8 PG (ref 26.8–34.3)
MCHC RBC AUTO-ENTMCNC: 33.3 G/DL (ref 31.4–37.4)
MCV RBC AUTO: 93 FL (ref 82–98)
MONOCYTES # BLD AUTO: 0.7 THOUSAND/ΜL (ref 0.17–1.22)
MONOCYTES NFR BLD AUTO: 7 % (ref 4–12)
NEUTROPHILS # BLD AUTO: 8.25 THOUSANDS/ΜL (ref 1.85–7.62)
NEUTS SEG NFR BLD AUTO: 76 % (ref 43–75)
NRBC BLD AUTO-RTO: 0 /100 WBCS
PLATELET # BLD AUTO: 158 THOUSANDS/UL (ref 149–390)
PMV BLD AUTO: 11.8 FL (ref 8.9–12.7)
POTASSIUM SERPL-SCNC: 3.8 MMOL/L (ref 3.5–5.3)
PROT SERPL-MCNC: 6.9 G/DL (ref 6.4–8.2)
RBC # BLD AUTO: 4.96 MILLION/UL (ref 3.88–5.62)
SODIUM SERPL-SCNC: 137 MMOL/L (ref 136–145)
TRIGL SERPL-MCNC: 277 MG/DL
WBC # BLD AUTO: 10.73 THOUSAND/UL (ref 4.31–10.16)

## 2022-01-06 PROCEDURE — 80053 COMPREHEN METABOLIC PANEL: CPT

## 2022-01-06 PROCEDURE — 85025 COMPLETE CBC W/AUTO DIFF WBC: CPT

## 2022-01-06 PROCEDURE — 83036 HEMOGLOBIN GLYCOSYLATED A1C: CPT

## 2022-01-06 PROCEDURE — 36415 COLL VENOUS BLD VENIPUNCTURE: CPT

## 2022-01-06 PROCEDURE — 80061 LIPID PANEL: CPT

## 2022-01-06 NOTE — PROGRESS NOTES
[facility]  coding opportunities          Number of diagnosis code(s) already on the problem list added to FYI flag: 3     DX: E11 311 Type 2 diabetes mellitus with unspecified diabetic retinopathy with macular edema     DX: J44 9 Chronic obstructive pulmonary disease, unspecified    DX: J96 20 Acute and chronic respiratory failure, unspecified whether with hypoxia or hypercapnia          Patients insurance company: [facility] (Medicare Advantage only)     Visit status: Patient arrived for their scheduled appointment           DX: J44 9 was used, but DX: E11 311 and J96 20 were not used

## 2022-01-07 ENCOUNTER — OFFICE VISIT (OUTPATIENT)
Dept: CARDIOLOGY CLINIC | Facility: CLINIC | Age: 77
End: 2022-01-07
Payer: COMMERCIAL

## 2022-01-07 VITALS
HEIGHT: 71 IN | BODY MASS INDEX: 29.68 KG/M2 | WEIGHT: 212 LBS | SYSTOLIC BLOOD PRESSURE: 134 MMHG | DIASTOLIC BLOOD PRESSURE: 70 MMHG | HEART RATE: 97 BPM

## 2022-01-07 DIAGNOSIS — I25.5 ISCHEMIC CARDIOMYOPATHY: ICD-10-CM

## 2022-01-07 DIAGNOSIS — E78.2 MIXED HYPERLIPIDEMIA: ICD-10-CM

## 2022-01-07 DIAGNOSIS — I10 ESSENTIAL HYPERTENSION: ICD-10-CM

## 2022-01-07 DIAGNOSIS — E78.49 OTHER HYPERLIPIDEMIA: ICD-10-CM

## 2022-01-07 DIAGNOSIS — I25.10 CORONARY ARTERY DISEASE INVOLVING NATIVE CORONARY ARTERY OF NATIVE HEART WITHOUT ANGINA PECTORIS: Primary | ICD-10-CM

## 2022-01-07 PROBLEM — J44.1 COPD WITH ACUTE EXACERBATION (HCC): Status: RESOLVED | Noted: 2019-11-21 | Resolved: 2022-01-07

## 2022-01-07 LAB
EST. AVERAGE GLUCOSE BLD GHB EST-MCNC: 151 MG/DL
HBA1C MFR BLD: 6.9 %

## 2022-01-07 PROCEDURE — 99214 OFFICE O/P EST MOD 30 MIN: CPT | Performed by: INTERNAL MEDICINE

## 2022-01-07 PROCEDURE — 3044F HG A1C LEVEL LT 7.0%: CPT | Performed by: NURSE PRACTITIONER

## 2022-01-07 PROCEDURE — 4010F ACE/ARB THERAPY RXD/TAKEN: CPT | Performed by: NURSE PRACTITIONER

## 2022-01-07 RX ORDER — PREDNISONE 10 MG/1
10 TABLET ORAL DAILY
COMMUNITY
Start: 2021-12-22 | End: 2022-03-09 | Stop reason: HOSPADM

## 2022-01-07 RX ORDER — LISINOPRIL 40 MG/1
40 TABLET ORAL DAILY
Qty: 90 TABLET | Refills: 5 | Status: SHIPPED | OUTPATIENT
Start: 2022-01-07 | End: 2022-06-15 | Stop reason: SDUPTHER

## 2022-01-07 NOTE — ASSESSMENT & PLAN NOTE
Purely by ejection fraction he qualifies for an ICD but given serious comorbidity I do not really believe that an ICD will change his prognosis  Patient also is not interested in prophylactic ICD  Reaffirmed today

## 2022-01-07 NOTE — PROGRESS NOTES
Patient ID: Johnathon Vaughan is a 77 y o  male  Plan:      Ischemic cardiomyopathy  Purely by ejection fraction he qualifies for an ICD but given serious comorbidity I do not really believe that an ICD will change his prognosis  Patient also is not interested in prophylactic ICD  Reaffirmed today  Coronary artery disease involving native coronary artery of native heart without angina pectoris  No current symptoms  Prior CABG  Other hyperlipidemia  Taking as much statin as he can tolerate  Follow up Plan/Other summary comments:  One year EKG and follow-up visit  No changes were made today  HPI:  Patient is seen in follow-up today regarding the above  His biggest limitation is that exertional dyspnea and orthopedic issues  No chest pain or chest pressure  To reiterate, he had 4 vessel CABG in March of 2017 with left internal mammary to the LAD, vein graft to the ramus, vein graft to posterior descending artery and the ventricular branch of the right coronary artery  He has had a moderate depression of ejection fraction since then  Biggest limitation has been dyspnea with repeat he admissions but BNP below 200  He seems to respond to steroids  Most recent or relevant cardiac/vascular testing:     Echo 8/2019  - EF 30-40%  Global dysfunction with severe hypokinesis of the apex  Mild MR  Echo 11/12/2020:  EF 30% with global dysfunction  Arterial duplex 12/2017 - likely occlusion of the left SFA  KEI mildly impaired          Past Surgical History:   Procedure Laterality Date    CARDIAC CATHETERIZATION  03/08/2017    Significant left main plus triple-vessel CAD   CORONARY ARTERY BYPASS GRAFT  03/08/2017    4V CABG:  LIMA to LAD, VG to RI, SVG to PDA to LVBR RCA      EYE SURGERY      shots in eye once a month @ the [address]:   Lab Results   Component Value Date     (L) 05/28/2018    K 3 8 01/06/2022    K 4 2 05/28/2018     01/06/2022    CL 98 05/28/2018    CO2 27 01/06/2022    CO2 24 05/28/2018    BUN 11 01/06/2022    BUN 12 05/28/2018    CREATININE 0 92 01/06/2022    CREATININE 0 83 05/28/2018    EGFR 81 01/06/2022       Lipid Profile:   Lab Results   Component Value Date    TRIG 277 (H) 01/06/2022    HDL 49 01/06/2022         Review of Systems   10  point ROS  was otherwise non pertinent or negative except as per HPI or as below  Gait:  Very slow  Uses a cane  Objective:     /70   Pulse 97   Ht 5' 10 5" (1 791 m)   Wt 96 2 kg (212 lb)   BMI 29 99 kg/m²     PHYSICAL EXAM:     General:  Normal appearance in no distress  Eyes:  Anicteric  Oral mucosa:  Moist   Neck:  No JVD  Carotid upstrokes are brisk without bruits  No masses  Chest:  Decreased breath sounds throughout  Well healed midline sternotomy scar  Cardiac:  Normal PMI  Normal S1 and S2  No murmur gallop or rub  Abdomen:  Soft and nontender  No palpable organomegaly or aortic enlargement  Extremities:  No peripheral edema  Musculoskeletal:  Symmetric  Vascular:  Femoral popliteal and pedal pulses are absent  Neuro:  Grossly symmetric  Psych:  Alert and oriented x3             Current Outpatient Medications:     albuterol (2 5 mg/3 mL) 0 083 % nebulizer solution, Take 1 vial (2 5 mg total) by nebulization every 4 (four) hours as needed for wheezing or shortness of breath, Disp: 75 mL, Rfl: 0    albuterol (PROVENTIL HFA,VENTOLIN HFA) 90 mcg/act inhaler, Inhale 2 puffs every 6 (six) hours as needed for wheezing or shortness of breath, Disp:  , Rfl: 0    Alogliptin Benzoate 25 MG TABS, Take 0 5 tablets (12 5 mg total) by mouth daily Patient states takes 1/2 of a 25 mg tablet every AM (Patient taking differently: Take 25 mg by mouth daily ), Disp: 15 tablet, Rfl: 0    aspirin (ECOTRIN LOW STRENGTH) 81 mg EC tablet, Take 1 tablet (81 mg total) by mouth every other day, Disp:  , Rfl: 0    azithromycin (ZITHROMAX) 250 mg tablet, , Disp: , Rfl:     cyanocobalamin (VITAMIN B-12) 500 MCG tablet, TAKE ONE TABLET BY MOUTH EVERY MORNING *VITAMIN B12*, Disp: , Rfl:     Empagliflozin 25 MG TABS, Take 0 5 tablets (12 5 mg total) by mouth every morning Take one half tablet every morning, Disp: 15 tablet, Rfl: 0    gabapentin (NEURONTIN) 100 mg capsule, Take 2 capsules (200 mg total) by mouth daily at bedtime, Disp: 60 capsule, Rfl: 0    glimepiride (AMARYL) 1 mg tablet, Take 1 tablet (1 mg total) by mouth 2 (two) times a day, Disp:  , Rfl: 0    hydrocortisone 1 % cream, Apply topically 2 (two) times a day, Disp: 30 g, Rfl: 0    lisinopril (ZESTRIL) 40 mg tablet, Take 1 tablet (40 mg total) by mouth daily, Disp: 90 tablet, Rfl: 5    metFORMIN (GLUCOPHAGE) 500 mg tablet, Take 1 tablet (500 mg total) by mouth daily with dinner, Disp:  , Rfl: 0    predniSONE 10 mg tablet, Take 10 mg by mouth daily, Disp: , Rfl:     primidone (MYSOLINE) 50 mg tablet, Take 100 mg by mouth every 8 (eight) hours, Disp: , Rfl:     rosuvastatin (CRESTOR) 20 MG tablet, Take 1 tablet (20 mg total) by mouth daily after dinner Take one half tablet daily with dinner (Patient taking differently: Take 10 mg by mouth daily after dinner Take one half tablet daily with dinner ), Disp:  , Rfl: 0    saxagliptin (ONGLYZA) 5 MG tablet, Take 1 tablet (5 mg total) by mouth daily, Disp:  , Rfl: 0    tiotropium (SPIRIVA) 18 mcg inhalation capsule, Place 1 capsule (18 mcg total) into inhaler and inhale daily, Disp: 30 capsule, Rfl: 0    tiotropium-olodaterol (STIOLTO RESPIMAT) 2 5-2 5 MCG/ACT inhaler, Inhale 2 puffs daily, Disp: , Rfl:     carboxymethylcellulose (REFRESH PLUS) 0 5 % SOLN, INSTILL 1 DROP BOTH EYES FOUR TIMES A DAY FOR DRY EYES (Patient not taking: Reported on 12/14/2021), Disp: , Rfl:     guaiFENesin (MUCINEX) 600 mg 12 hr tablet, Take 1 tablet (600 mg total) by mouth 2 (two) times a day (Patient not taking: Reported on 1/7/2022 ), Disp: , Rfl: 0    methylPREDNISolone 4 MG tablet therapy pack, Use as directed on package, Disp: 21 tablet, Rfl: 0    metoprolol tartrate (LOPRESSOR) 25 mg tablet, Take 25 mg by mouth every 12 (twelve) hours, Disp: , Rfl:     mometasone (ASMANEX TWISTHALER) 220 MCG/INH inhaler, Inhale 2 puffs 2 (two) times a day Rinse mouth after use , Disp: , Rfl:   Allergies   Allergen Reactions    Atorvastatin Myalgia     Past Medical History:   Diagnosis Date    BPH (benign prostatic hyperplasia)     45 days radiation treatment    COPD (chronic obstructive pulmonary disease)     Coronary artery disease     CABG x4 in 2017    Diabetes mellitus     History of Arterial Duplex of LE 12/26/2017    Likely occlusion of the left superficial femoral artery  Calcific changes bilaterally  Despite these changes, the ankle-brachial index as a measure of peripheral blood flow only mildly impaired      History of echocardiogram 06/12/2017    EF 40%, mild LVH, mild MR     Hyperlipidemia     Hypertension     NSTEMI (non-ST elevated myocardial infarction)     Prostate cancer     prostate     PVD (peripheral vascular disease)     Tremor            Social History     Tobacco Use   Smoking Status Current Some Day Smoker    Packs/day: 0 25    Years: 60 00    Pack years: 15 00    Types: Cigarettes   Smokeless Tobacco Never Used   Tobacco Comment    only smokes when he drinks beer

## 2022-01-12 ENCOUNTER — OFFICE VISIT (OUTPATIENT)
Dept: FAMILY MEDICINE CLINIC | Facility: CLINIC | Age: 77
End: 2022-01-12
Payer: COMMERCIAL

## 2022-01-12 VITALS
TEMPERATURE: 98.4 F | SYSTOLIC BLOOD PRESSURE: 104 MMHG | WEIGHT: 212 LBS | HEART RATE: 84 BPM | BODY MASS INDEX: 29.68 KG/M2 | HEIGHT: 71 IN | RESPIRATION RATE: 21 BRPM | DIASTOLIC BLOOD PRESSURE: 70 MMHG | OXYGEN SATURATION: 98 %

## 2022-01-12 DIAGNOSIS — E11.69 HYPERLIPIDEMIA ASSOCIATED WITH TYPE 2 DIABETES MELLITUS (HCC): ICD-10-CM

## 2022-01-12 DIAGNOSIS — Z72.0 TOBACCO USE: ICD-10-CM

## 2022-01-12 DIAGNOSIS — J44.9 CHRONIC OBSTRUCTIVE PULMONARY DISEASE, UNSPECIFIED COPD TYPE (HCC): ICD-10-CM

## 2022-01-12 DIAGNOSIS — E78.5 HYPERLIPIDEMIA ASSOCIATED WITH TYPE 2 DIABETES MELLITUS (HCC): ICD-10-CM

## 2022-01-12 DIAGNOSIS — E11.9 TYPE 2 DIABETES MELLITUS WITHOUT COMPLICATION, WITHOUT LONG-TERM CURRENT USE OF INSULIN (HCC): Primary | ICD-10-CM

## 2022-01-12 DIAGNOSIS — F17.210 SMOKES WITH GREATER THAN 40 PACK YEAR HISTORY: ICD-10-CM

## 2022-01-12 PROBLEM — E11.311: Status: RESOLVED | Noted: 2021-01-16 | Resolved: 2022-01-12

## 2022-01-12 PROCEDURE — 3725F SCREEN DEPRESSION PERFORMED: CPT | Performed by: FAMILY MEDICINE

## 2022-01-12 PROCEDURE — 99214 OFFICE O/P EST MOD 30 MIN: CPT | Performed by: FAMILY MEDICINE

## 2022-01-12 NOTE — PROGRESS NOTES
Assessment/Plan:       Problem List Items Addressed This Visit        Endocrine    Type 2 diabetes mellitus without complication, without long-term current use of insulin ([facility] ) - Primary       Lab Results   Component Value Date    HGBA1C 6 9 (H) 01/06/2022     - Well-controlled, although on several medications/duplicate medications which are prescribed by the [place], he believes an endocrinologist   - Recommend at his upcoming appointment to discuss this, decrease amount of medication he is on          Hyperlipidemia associated with type 2 diabetes mellitus ([facility] )       Lab Results   Component Value Date    HGBA1C 6 9 (H) 01/06/2022     - Continue current regimen             Respiratory    COPD (chronic obstructive pulmonary disease) ([facility] )     - Continue current regimen and follow-up with pulmonology            Other Visit Diagnoses     Tobacco use        Relevant Orders    CT lung screening program    Smokes with greater than 40 pack year history        Relevant Orders    CT lung screening program            Subjective:      Patient ID: Sharon Kumar is a 77 y o  male  HPI     Recent blood work reviewed and normal, except for as outlined below:     Diabetes- A1c 6 9  No hypoglycemic episodes  Currently taking Alogliptin 25 mg daily, Onglyza 5 mg daily, empagliflozin 12 5 mg daily, metformin 500 mg daily, and glimepiride 1 mg BID  Recently ate a pack of cupcakes and glucose went up to 325  GFR 81  COPD- WBC slightly elevated, likely due to prednisone use  Will trend  Recent COPD exacerbation seen in ED 12/23  Using albuterol 2-3 times per day, Stiolto 2 puff daily and Asmanex  Follows with Dr Yamile Rai  Daily prednisone 10 mg daily and azithromycin 3 times weekly  Breathing at baseline, cold weather makes it worse  Has follow-up with pulmonology coming up  No concerns today  Tobacco use- Continued cigarette smoking, not interested in quitting  Agreeable to lung cancer screening       Tobacco Cessation Counseling: Tobacco cessation counseling and education was provided  The patient is sincerely urged to quit consumption of tobacco  He is not ready to quit tobacco  The numerous health risks of tobacco consumption were discussed  If he decides to quit, there are a number of helpful adjunctive aids, and he can see me to discuss nicotine replacement therapy, chantix, or bupropion anytime in the future  Hyperlipidemia- Chol 211, Tri 277, HDL 49,   Currently taking statin  The following portions of the patient's history were reviewed and updated as appropriate: allergies, current medications, past family history, past medical history, past social history, past surgical history, and problem list     Review of Systems   All other systems reviewed and are negative  Objective:      /70 (BP Location: Left arm, Patient Position: Sitting, Cuff Size: Standard)   Pulse 84   Temp 98 4 °F (36 9 °C)   Resp 21   Ht 5' 10 5" (1 791 m)   Wt 96 2 kg (212 lb)   SpO2 98%   BMI 29 99 kg/m²          Physical Exam  Vitals reviewed  Constitutional:       General: He is not in acute distress  Appearance: Normal appearance  He is not ill-appearing, toxic-appearing or diaphoretic  HENT:      Head: Normocephalic and atraumatic  Eyes:      General:         Right eye: No discharge  Left eye: No discharge  Extraocular Movements: Extraocular movements intact  Conjunctiva/sclera: Conjunctivae normal    Cardiovascular:      Rate and Rhythm: Normal rate and regular rhythm  Heart sounds: Normal heart sounds  No murmur heard  No friction rub  No gallop  Pulmonary:      Effort: Pulmonary effort is normal  No respiratory distress  Breath sounds: No stridor  Wheezing present  No rhonchi  Comments: Diminished breath sounds and wheezing throughout  Musculoskeletal:         General: No swelling, tenderness or signs of injury  Right lower leg: Edema present  Left lower leg: Edema present  Comments: Trace pitting edema bilaterally   Skin:     General: Skin is warm  Coloration: Skin is not pale  Findings: No erythema or rash  Neurological:      Mental Status: He is alert and oriented to person, place, and time  Motor: No weakness     Psychiatric:         Mood and Affect: Mood normal          Behavior: Behavior normal              DO Junito Caro [address] [facility]

## 2022-01-14 ENCOUNTER — OFFICE VISIT (OUTPATIENT)
Dept: VASCULAR SURGERY | Facility: CLINIC | Age: 77
End: 2022-01-14
Payer: COMMERCIAL

## 2022-01-14 VITALS
WEIGHT: 211.6 LBS | HEIGHT: 71 IN | TEMPERATURE: 98.1 F | BODY MASS INDEX: 29.62 KG/M2 | DIASTOLIC BLOOD PRESSURE: 60 MMHG | SYSTOLIC BLOOD PRESSURE: 104 MMHG | HEART RATE: 83 BPM | OXYGEN SATURATION: 98 %

## 2022-01-14 DIAGNOSIS — I73.9 PAD (PERIPHERAL ARTERY DISEASE) (HCC): Primary | ICD-10-CM

## 2022-01-14 DIAGNOSIS — R21 RASH OF FOOT: ICD-10-CM

## 2022-01-14 DIAGNOSIS — R68.89 ABNORMAL ANKLE BRACHIAL INDEX (ABI): ICD-10-CM

## 2022-01-14 PROCEDURE — 4004F PT TOBACCO SCREEN RCVD TLK: CPT | Performed by: NURSE PRACTITIONER

## 2022-01-14 PROCEDURE — 1160F RVW MEDS BY RX/DR IN RCRD: CPT | Performed by: NURSE PRACTITIONER

## 2022-01-14 PROCEDURE — 99203 OFFICE O/P NEW LOW 30 MIN: CPT | Performed by: NURSE PRACTITIONER

## 2022-01-14 NOTE — PATIENT INSTRUCTIONS
Peripheral Artery Disease   WHAT YOU NEED TO KNOW:   What is peripheral artery disease (PAD)? PAD is narrow, weak, or blocked arteries  It may affect any arteries outside of your heart and brain  PAD is usually the result of a buildup of fat and cholesterol, also called plaque, along your artery walls  Inflammation, a blood clot, or abnormal cell growth could also block your arteries  PAD prevents normal blood flow to your legs and arms  You are at risk of an amputation if poor blood flow keeps wounds from healing or causes gangrene (tissue death)  Without treatment, PAD can also cause a heart attack or stroke  What increases my risk for PAD? · Smoking cigarettes    · Diabetes    · High blood pressure    · High cholesterol    · Age older than 40 years    · Heart disease or a family history of heart disease    What are the signs and symptoms of PAD? Mild PAD usually does not cause symptoms  As the disease worsens over time, you may have the following:  · Pain or cramps in your leg or hip while you walk    · A numb, weak, or heavy feeling in your legs    · Dry, scaly, red, or pale skin on your legs    · Thick or brittle nails, or hair loss on your arms and legs    · Foot sores that will not heal    · Burning or aching in your feet and toes while resting (this may be worse when you lie down)    How is PAD diagnosed? · Angiography  is a test that shows pictures of the arteries in your arms and legs  You will be given contrast liquid to help the arteries show up better on the pictures  The pictures will be taken with an MRI or CT scan  Tell the healthcare provider if you have ever had an allergic reaction to contrast liquid  Do not enter the MRI room with anything metal  Metal can cause serious injury  Tell a healthcare provider if you have any metal in or on your body  · Doppler ankle brachial index (KEI)  is a test that compares blood pressure in your ankles to blood pressure in your arms   This tells your healthcare provider how well blood is flowing through the arteries in your legs  How is PAD treated? Treatment can help reduce your risk of a heart attack, stroke, or amputation  You may need more than one of the following:  · Medicines  may be given:    ? Antiplatelets , such as aspirin, help prevent blood clots  Take your antiplatelet medicine exactly as directed  These medicines make it more likely for you to bleed or bruise  If you are told to take aspirin, do not take acetaminophen or ibuprofen instead  ? Statin medicine  helps lower your cholesterol and prevents PAD from getting worse  · A supervised exercise program  helps you stay active in normal daily activities  Healthcare providers will help you safely walk or do strength training exercises 3 times a week for 30 to 60 minutes  You will do this for several months, then transition to walking on your own  · Angioplasty  is a procedure to open your artery so blood can flow through normally  A thin tube called a catheter is used to insert a small balloon into your artery  The balloon is inflated to open your blocked artery, and then removed  A tube called a stent may be placed in your artery to hold it open  · Bypass surgery  is used to make a new connection to your artery with a vein from another part of your body, or an artificial graft  The vein or graft is attached to your artery above and below your blockage  This allows blood to flow around the blocked portion of your artery  How can I manage PAD? · Do not smoke  Nicotine and other chemicals in cigarettes and cigars can worsen PAD  They can also increase your risk for a heart attack or stroke  Ask your healthcare provider for information if you currently smoke and need help to quit  E-cigarettes or smokeless tobacco still contain nicotine  Talk to your healthcare provider before you use these products  · Manage other health conditions  Take your medicines as directed  Follow your healthcare provider's instructions if you have high blood pressure or high cholesterol  Perform foot care and check your blood sugar levels as directed if you have diabetes  · Eat heart-healthy foods  Eat whole grains, fruits, and vegetables every day  Limit salt and high-fat foods  Ask your healthcare provider for more information on a heart healthy diet  Ask what a healthy weight is for you  Your healthcare provider can help you create a healthy weight-loss plan, if needed  Call your local emergency number [phone] in the [facility] [facility] if:   · You have any of the following signs of a heart attack:      ? Squeezing, pressure, or pain in your chest    ? You may  also have any of the following:     § Discomfort or pain in your back, neck, jaw, stomach, or arm    § Shortness of breath    § Nausea or vomiting    § Lightheadedness or a sudden cold sweat    · You have any of the following signs of a stroke:      ? Numbness or drooping on one side of your face     ? Weakness in an arm or leg    ? Confusion or difficulty speaking    ? Dizziness, a severe headache, or vision loss    When should I seek immediate care? · You have sores or wounds that will not heal     · You notice black or discolored skin on your arm or leg  · Your skin is cool to the touch  When should I call my doctor? · You have leg pain when you walk 1/8 mile (200 meters) or less, even with treatment  · Your legs are red, dry, or pale, even with treatment  · You have questions or concerns about your condition or care  CARE AGREEMENT:   You have the right to help plan your care  Learn about your health condition and how it may be treated  Discuss treatment options with your healthcare providers to decide what care you want to receive  You always have the right to refuse treatment  The above information is an  only  It is not intended as medical advice for individual conditions or treatments   Talk to your doctor, nurse or pharmacist before following any medical regimen to see if it is safe and effective for you  © Copyright [facility] 2021 Information is for End User's use only and may not be sold, redistributed or otherwise used for commercial purposes   All illustrations and images included in CareNotes® are the copyrighted property of A D A M , Inc  or [address] [place] [address]

## 2022-01-14 NOTE — PROGRESS NOTES
Assessment/Plan:    PAD (peripheral artery disease) ([facility] [address] )  71-year-old male with HTN, HLD, DM, CAD, ICM, CABG X 4, BPH, history of prostate CA, referred for vascular evaluation secondary to abnormal EKI   -LEAD 12/16/21 showed right KEI 0 87/118/69 and left KEI 0 97/181/70  Biphasic arterial waveforms   -denies claudication symptoms  No ischemic rest pain or ischemic tissue loss  -Counseled on PAD   -Encouraged walking routine  -Continue aspirin   -Continue Crestor   -Discoloration and rash like appearance of bilateral hallux of unclear etiology  Will refer to podiatry for evaluation and foot care/nail trimmings  -Will repeat complete arterial duplex in 6 months   -Follow up in the office in 6 months       Diagnoses and all orders for this visit:    PAD (peripheral artery disease) ([facility])  -     VAS lower limb arterial duplex, complete bilateral; Future    Abnormal ankle brachial index (KEI)  -     Ambulatory referral to Vascular Surgery    Rash of foot  -     Ambulatory Referral to Podiatry; Future          Subjective:      Patient ID: Doug Shipley is a 77 y o  male  HPI  Pt is new to our office  He was referred here by Eris Vasquez DO  Pt had a KEI and waveform analysis done 12/16/2021  Pt c/o his left foot great toe and the top of the left foot is turning red  He says that the problem in his left foot and toe started about 6 months ago  He is also starting to notice the the right great toe is also starting to turn red  He denies any pain or open wounds  He denies any pain in his legs when he walks  Pt is taking ASA 81 mg and Rosuvastatin  The following portions of the patient's history were reviewed and updated as appropriate: allergies, current medications, past family history, past medical history, past social history, past surgical history and problem list   Review of systems reviewed    Review of Systems   Constitutional: Negative  HENT: Negative  Eyes: Negative      Respiratory: Positive for shortness of breath (COPD)  Cardiovascular: Positive for leg swelling (right calf)  Gastrointestinal: Negative  Endocrine: Negative  Genitourinary: Negative  Musculoskeletal: Positive for back pain  Skin: Negative  Allergic/Immunologic: Negative  Neurological: Positive for tremors  Hematological: Negative  Psychiatric/Behavioral: The patient is nervous/anxious  Objective:  I have reviewed and made appropriate changes to the review of systems input by the medical assistant      Vitals:    01/14/22 1419   BP: 104/60   BP Location: Left arm   Patient Position: Sitting   Cuff Size: Standard   Pulse: 83   Temp: 98 1 °F (36 7 °C)   TempSrc: Temporal   SpO2: 98%   Weight: 96 kg (211 lb 9 6 oz)   Height: 5' 10 5" (1 791 m)       Patient Active Problem List   Diagnosis    Tobacco abuse    Other hyperlipidemia    Hypertension    Coronary artery disease involving native coronary artery of native heart without angina pectoris    Benign prostatic hyperplasia with urinary retention    [facility]ucopurulent [facility] bronchitis ([facility])    Benign essential tremor    Ambulatory dysfunction    NSTEMI (non-ST elevated myocardial infarction) ([facility])    On intra-aortic balloon pump assist    Prostate cancer ([facility])    S/P CABG x 4    Ischemic cardiomyopathy    History of coronary artery disease    Mixed hyperlipidemia    Acute respiratory failure with hypoxia ([facility])    COPD (chronic obstructive pulmonary disease) ([facility])    Type 2 diabetes mellitus without complication, without long-term current use of insulin ([facility])    Acute on chronic respiratory failure ([facility])    Tinea unguium    Type 2 diabetes mellitus with diabetic neuropathy, unspecified ([facility] [address] )    Other age-related cataract    Central retinal vein occlusion with macular edema    Diabetic polyneuropathy associated with type 2 diabetes mellitus ([facility] [address] )    Hyperlipidemia associated with type 2 diabetes mellitus ([facility])    Nonexudative age-related macular degeneration    Obesity    Ocular hypertension    Onychomycosis due to dermatophyte    Onychomycosis    Poorly controlled diabetes mellitus (HCC)    Hypoxemia    Pill rolling tremor    Physical deconditioning    Type 2 MI (myocardial infarction) (HCC)    Lactic acidosis    Leukemoid reaction    SOB (shortness of breath)    Cellulitis of left foot    Diminished pulses in lower extremity    PAD (peripheral artery disease) (HCC)       Past Surgical History:   Procedure Laterality Date    CARDIAC CATHETERIZATION  03/08/2017    Significant left main plus triple-vessel CAD   CORONARY ARTERY BYPASS GRAFT  03/08/2017    4V CABG:  LIMA to LAD, VG to RI, SVG to PDA to LVBR RCA   EYE SURGERY      shots in eye once a [facility]    PROSTATE BIOPSY         Family History   Problem Relation Age of Onset    Cancer Father     Heart disease Brother        Social History     Socioeconomic History    Marital status: /Civil Union     Spouse name: Not on file    Number of children: Not on file    Years of education: Not on file    Highest education level: Not on file   Occupational History    Not on file   Tobacco Use    Smoking status: Current Some Day Smoker     Packs/day: 0 25     Years: 60 00     Pack years: 15 00     Types: Cigarettes    Smokeless tobacco: Never Used    Tobacco comment: only smokes when he drinks beer   Vaping Use    Vaping Use: Never used   Substance and Sexual Activity    Alcohol use:  Yes    Drug use: Never    Sexual activity: Yes     Partners: Female   Other Topics Concern    Not on file   Social History Narrative    ** Merged History Encounter **          Social Determinants of Health     Financial Resource Strain: Low Risk     Difficulty of Paying Living Expenses: Not hard at all   Food Insecurity: No Food Insecurity    Worried About Running Out of Food in the Last Year: Never true    [place] of Food in the Last Year: Never true Transportation Needs: No Transportation Needs    Lack of Transportation (Medical): No    Lack of Transportation (Non-Medical):  No   Physical Activity: Not on file   Stress: Not on file   Social Connections: Not on file   Intimate Partner Violence: Not on file   Housing Stability: Not on file       Allergies   Allergen Reactions    Atorvastatin Myalgia         Current Outpatient Medications:     albuterol (2 5 mg/3 mL) 0 083 % nebulizer solution, Take 1 vial (2 5 mg total) by nebulization every 4 (four) hours as needed for wheezing or shortness of breath, Disp: 75 mL, Rfl: 0    albuterol (PROVENTIL HFA,VENTOLIN HFA) 90 mcg/act inhaler, Inhale 2 puffs every 6 (six) hours as needed for wheezing or shortness of breath, Disp:  , Rfl: 0    aspirin (ECOTRIN LOW STRENGTH) 81 mg EC tablet, Take 1 tablet (81 mg total) by mouth every other day, Disp:  , Rfl: 0    azithromycin (ZITHROMAX) 250 mg tablet, , Disp: , Rfl:     cyanocobalamin (VITAMIN B-12) 500 MCG tablet, TAKE ONE TABLET BY MOUTH EVERY MORNING *VITAMIN B12*, Disp: , Rfl:     Empagliflozin 25 MG TABS, Take 0 5 tablets (12 5 mg total) by mouth every morning Take one half tablet every morning, Disp: 15 tablet, Rfl: 0    gabapentin (NEURONTIN) 100 mg capsule, Take 2 capsules (200 mg total) by mouth daily at bedtime, Disp: 60 capsule, Rfl: 0    glimepiride (AMARYL) 1 mg tablet, Take 1 tablet (1 mg total) by mouth 2 (two) times a day, Disp:  , Rfl: 0    hydrocortisone 1 % cream, Apply topically 2 (two) times a day, Disp: 30 g, Rfl: 0    lisinopril (ZESTRIL) 40 mg tablet, Take 1 tablet (40 mg total) by mouth daily, Disp: 90 tablet, Rfl: 5    metFORMIN (GLUCOPHAGE) 500 mg tablet, Take 1 tablet (500 mg total) by mouth daily with dinner, Disp:  , Rfl: 0    methylPREDNISolone 4 MG tablet therapy pack, Use as directed on package, Disp: 21 tablet, Rfl: 0    metoprolol tartrate (LOPRESSOR) 25 mg tablet, Take 25 mg by mouth every 12 (twelve) hours, Disp: , Rfl:   mometasone (ASMANEX TWISTHALER) 220 MCG/INH inhaler, Inhale 2 puffs 2 (two) times a day Rinse mouth after use , Disp: , Rfl:     predniSONE 10 mg tablet, Take 10 mg by mouth daily, Disp: , Rfl:     primidone (MYSOLINE) 50 mg tablet, Take 100 mg by mouth every 8 (eight) hours, Disp: , Rfl:     rosuvastatin (CRESTOR) 20 MG tablet, Take 1 tablet (20 mg total) by mouth daily after dinner Take one half tablet daily with dinner (Patient taking differently: Take 10 mg by mouth daily after dinner Take one half tablet daily with dinner ), Disp:  , Rfl: 0    saxagliptin (ONGLYZA) 5 MG tablet, Take 1 tablet (5 mg total) by mouth daily, Disp:  , Rfl: 0    tiotropium-olodaterol (STIOLTO RESPIMAT) 2 5-2 5 MCG/ACT inhaler, Inhale 2 puffs daily, Disp: , Rfl:     Alogliptin Benzoate 25 MG TABS, Take 0 5 tablets (12 5 mg total) by mouth daily Patient states takes 1/2 of a 25 mg tablet every AM (Patient taking differently: Take 25 mg by mouth daily ), Disp: 15 tablet, Rfl: 0    carboxymethylcellulose (REFRESH PLUS) 0 5 % SOLN, INSTILL 1 DROP BOTH EYES FOUR TIMES A DAY FOR DRY EYES (Patient not taking: Reported on 12/14/2021), Disp: , Rfl:     guaiFENesin (MUCINEX) 600 mg 12 hr tablet, Take 1 tablet (600 mg total) by mouth 2 (two) times a day (Patient not taking: Reported on 1/7/2022 ), Disp: , Rfl: 0      /60 (BP Location: Left arm, Patient Position: Sitting, Cuff Size: Standard)   Pulse 83   Temp 98 1 °F (36 7 °C) (Temporal)   Ht 5' 10 5" (1 791 m)   Wt 96 kg (211 lb 9 6 oz)   SpO2 98%   BMI 29 93 kg/m²          Physical Exam  Vitals and nursing note reviewed  Constitutional:       Appearance: He is well-developed  HENT:      Head: Normocephalic and atraumatic  Eyes:      Extraocular Movements: Extraocular movements intact  Cardiovascular:      Pulses:           Femoral pulses are 2+ on the right side and 2+ on the left side  Dorsalis pedis pulses are 0 on the right side and 0 on the left side  Posterior tibial pulses are 0 on the right side and 0 on the left side  Heart sounds: Normal heart sounds  Pulmonary:      Effort: Pulmonary effort is normal    Abdominal:      Palpations: Abdomen is soft  Musculoskeletal:         General: Normal range of motion  Cervical back: Neck supple  Skin:     General: Skin is warm  Comments: Feet cool to touch  Motor intact  Red discoloration of the left hallux and left lateral malleolus and smaller area of similar discoloration on the right hallux  Toenails are thickened   Neurological:      Mental Status: He is alert and oriented to person, place, and time     Psychiatric:         Behavior: Behavior normal

## 2022-01-14 NOTE — ASSESSMENT & PLAN NOTE
32-year-old male with HTN, HLD, DM, CAD, ICM, CABG X 4, BPH, history of prostate CA, referred for vascular evaluation secondary to abnormal KEI   -LEAD 12/16/21 showed right KEI 0 87/118/69 and left KEI 0 97/181/70  Biphasic arterial waveforms   -denies claudication symptoms  No ischemic rest pain or ischemic tissue loss  -Counseled on PAD   -Encouraged walking routine  -Continue aspirin   -Continue Crestor   -Discoloration and rash like appearance of bilateral hallux of unclear etiology    Will refer to podiatry for evaluation and foot care/nail trimmings  -Will repeat complete arterial duplex in 6 months   -Follow up in the office in 6 months

## 2022-01-23 ENCOUNTER — HOSPITAL ENCOUNTER (EMERGENCY)
Facility: HOSPITAL | Age: 77
Discharge: HOME | End: 2022-01-23
Attending: EMERGENCY MEDICINE | Admitting: EMERGENCY MEDICINE
Payer: COMMERCIAL

## 2022-01-23 ENCOUNTER — APPOINTMENT (EMERGENCY)
Dept: RADIOLOGY | Facility: HOSPITAL | Age: 77
End: 2022-01-23
Payer: COMMERCIAL

## 2022-01-23 VITALS
RESPIRATION RATE: 18 BRPM | TEMPERATURE: 98.4 F | WEIGHT: 211 LBS | OXYGEN SATURATION: 91 % | SYSTOLIC BLOOD PRESSURE: 136 MMHG | BODY MASS INDEX: 30.21 KG/M2 | HEIGHT: 70 IN | DIASTOLIC BLOOD PRESSURE: 99 MMHG | HEART RATE: 103 BPM

## 2022-01-23 DIAGNOSIS — J44.1 COPD EXACERBATION (HCC): Primary | ICD-10-CM

## 2022-01-23 LAB
2HR DELTA HS TROPONIN: -2 NG/L
ANION GAP SERPL CALCULATED.3IONS-SCNC: 11 MMOL/L (ref 4–13)
BASOPHILS # BLD AUTO: 0.05 THOUSANDS/ΜL (ref 0–0.1)
BASOPHILS NFR BLD AUTO: 1 % (ref 0–1)
BUN SERPL-MCNC: 12 MG/DL (ref 5–25)
CALCIUM SERPL-MCNC: 9.1 MG/DL (ref 8.4–10.2)
CARDIAC TROPONIN I PNL SERPL HS: 17 NG/L
CARDIAC TROPONIN I PNL SERPL HS: 19 NG/L
CHLORIDE SERPL-SCNC: 100 MMOL/L (ref 96–108)
CO2 SERPL-SCNC: 25 MMOL/L (ref 21–32)
CREAT SERPL-MCNC: 0.82 MG/DL (ref 0.6–1.3)
EOSINOPHIL # BLD AUTO: 0.09 THOUSAND/ΜL (ref 0–0.61)
EOSINOPHIL NFR BLD AUTO: 1 % (ref 0–6)
ERYTHROCYTE [DISTWIDTH] IN BLOOD BY AUTOMATED COUNT: 13.2 % (ref 11.6–15.1)
FLUAV RNA RESP QL NAA+PROBE: NEGATIVE
FLUBV RNA RESP QL NAA+PROBE: NEGATIVE
GFR SERPL CREATININE-BSD FRML MDRD: 86 ML/MIN/1.73SQ M
GLUCOSE SERPL-MCNC: 161 MG/DL (ref 65–140)
HCT VFR BLD AUTO: 44.6 % (ref 36.5–49.3)
HGB BLD-MCNC: 15.3 G/DL (ref 12–17)
IMM GRANULOCYTES # BLD AUTO: 0.09 THOUSAND/UL (ref 0–0.2)
IMM GRANULOCYTES NFR BLD AUTO: 1 % (ref 0–2)
LYMPHOCYTES # BLD AUTO: 0.67 THOUSANDS/ΜL (ref 0.6–4.47)
LYMPHOCYTES NFR BLD AUTO: 7 % (ref 14–44)
MCH RBC QN AUTO: 31.3 PG (ref 26.8–34.3)
MCHC RBC AUTO-ENTMCNC: 34.3 G/DL (ref 31.4–37.4)
MCV RBC AUTO: 91 FL (ref 82–98)
MONOCYTES # BLD AUTO: 0.41 THOUSAND/ΜL (ref 0.17–1.22)
MONOCYTES NFR BLD AUTO: 4 % (ref 4–12)
NEUTROPHILS # BLD AUTO: 8.5 THOUSANDS/ΜL (ref 1.85–7.62)
NEUTS SEG NFR BLD AUTO: 86 % (ref 43–75)
NRBC BLD AUTO-RTO: 0 /100 WBCS
PLATELET # BLD AUTO: 170 THOUSANDS/UL (ref 149–390)
PMV BLD AUTO: 10.8 FL (ref 8.9–12.7)
POTASSIUM SERPL-SCNC: 3.8 MMOL/L (ref 3.5–5.3)
RBC # BLD AUTO: 4.89 MILLION/UL (ref 3.88–5.62)
RSV RNA RESP QL NAA+PROBE: NEGATIVE
SARS-COV-2 RNA RESP QL NAA+PROBE: NEGATIVE
SODIUM SERPL-SCNC: 136 MMOL/L (ref 135–147)
WBC # BLD AUTO: 9.81 THOUSAND/UL (ref 4.31–10.16)

## 2022-01-23 PROCEDURE — 71045 X-RAY EXAM CHEST 1 VIEW: CPT

## 2022-01-23 PROCEDURE — 85025 COMPLETE CBC W/AUTO DIFF WBC: CPT | Performed by: PHYSICIAN ASSISTANT

## 2022-01-23 PROCEDURE — 99285 EMERGENCY DEPT VISIT HI MDM: CPT

## 2022-01-23 PROCEDURE — 99285 EMERGENCY DEPT VISIT HI MDM: CPT | Performed by: PHYSICIAN ASSISTANT

## 2022-01-23 PROCEDURE — 96374 THER/PROPH/DIAG INJ IV PUSH: CPT

## 2022-01-23 PROCEDURE — 0241U HB NFCT DS VIR RESP RNA 4 TRGT: CPT | Performed by: PHYSICIAN ASSISTANT

## 2022-01-23 PROCEDURE — 94640 AIRWAY INHALATION TREATMENT: CPT

## 2022-01-23 PROCEDURE — 84484 ASSAY OF TROPONIN QUANT: CPT | Performed by: PHYSICIAN ASSISTANT

## 2022-01-23 PROCEDURE — 93005 ELECTROCARDIOGRAM TRACING: CPT

## 2022-01-23 PROCEDURE — 36415 COLL VENOUS BLD VENIPUNCTURE: CPT | Performed by: PHYSICIAN ASSISTANT

## 2022-01-23 PROCEDURE — 80048 BASIC METABOLIC PNL TOTAL CA: CPT | Performed by: PHYSICIAN ASSISTANT

## 2022-01-23 RX ORDER — METHYLPREDNISOLONE 4 MG/1
TABLET ORAL
Qty: 21 TABLET | Refills: 0 | Status: SHIPPED | OUTPATIENT
Start: 2022-01-23 | End: 2022-03-09 | Stop reason: HOSPADM

## 2022-01-23 RX ORDER — ALBUTEROL SULFATE 90 UG/1
2 AEROSOL, METERED RESPIRATORY (INHALATION) EVERY 6 HOURS PRN
Refills: 0
Start: 2022-01-23

## 2022-01-23 RX ORDER — SODIUM CHLORIDE 9 MG/ML
3 INJECTION INTRAVENOUS
Status: DISCONTINUED | OUTPATIENT
Start: 2022-01-23 | End: 2022-01-23 | Stop reason: HOSPADM

## 2022-01-23 RX ORDER — ALBUTEROL SULFATE 2.5 MG/3ML
2.5 SOLUTION RESPIRATORY (INHALATION) EVERY 4 HOURS PRN
Qty: 75 ML | Refills: 0
Start: 2022-01-23 | End: 2022-04-22

## 2022-01-23 RX ORDER — ALBUTEROL SULFATE 2.5 MG/3ML
5 SOLUTION RESPIRATORY (INHALATION) ONCE
Status: COMPLETED | OUTPATIENT
Start: 2022-01-23 | End: 2022-01-23

## 2022-01-23 RX ORDER — DEXAMETHASONE SODIUM PHOSPHATE 4 MG/ML
8 INJECTION, SOLUTION INTRA-ARTICULAR; INTRALESIONAL; INTRAMUSCULAR; INTRAVENOUS; SOFT TISSUE ONCE
Status: COMPLETED | OUTPATIENT
Start: 2022-01-23 | End: 2022-01-23

## 2022-01-23 RX ADMIN — DEXAMETHASONE SODIUM PHOSPHATE 8 MG: 4 INJECTION, SOLUTION INTRAMUSCULAR; INTRAVENOUS at 12:35

## 2022-01-23 RX ADMIN — ALBUTEROL SULFATE 5 MG: 2.5 SOLUTION RESPIRATORY (INHALATION) at 13:26

## 2022-01-23 RX ADMIN — IPRATROPIUM BROMIDE 0.5 MG: 0.5 SOLUTION RESPIRATORY (INHALATION) at 13:26

## 2022-01-23 NOTE — ED PROVIDER NOTES
History  Chief Complaint   Patient presents with    Shortness of Breath     patient states he is having a COPD exacerbation     25-year-old male history of poorly controlled COPD, CAD, type 2 diabetes presents complaining shortness of breath  Patient notes that he has had multiple recent COPD exacerbations  Patient states that this feels similar to prior COPD exacerbations  Reports using his nebulizer at home with minimal relief of symptoms  Reports that he uses his "albuterol puffer" multiple times per day and estimates using it on average between 10 and 15 times per week  He reports compliance with his Stioloto inhaler daily  Per med list review, patient does not appear to be taking inhaled corticosteroid  Patient reports he has follow-up with his pulmonologist planned  Denies any fevers, chills, chest pain, nausea, sweats, lower leg pain or swelling or any other complaints at this time  Prior to Admission Medications   Prescriptions Last Dose Informant Patient Reported? Taking?    Alogliptin Benzoate 25 MG TABS   No No   Sig: Take 0 5 tablets (12 5 mg total) by mouth daily Patient states takes 1/2 of a 25 mg tablet every AM   Patient taking differently: Take 25 mg by mouth daily    Empagliflozin 25 MG TABS  Self No No   Sig: Take 0 5 tablets (12 5 mg total) by mouth every morning Take one half tablet every morning   albuterol (2 5 mg/3 mL) 0 083 % nebulizer solution  Self No No   Sig: Take 1 vial (2 5 mg total) by nebulization every 4 (four) hours as needed for wheezing or shortness of breath   albuterol (2 5 mg/3 mL) 0 083 % nebulizer solution   No No   Sig: Take 3 mL (2 5 mg total) by nebulization every 4 (four) hours as needed for wheezing or shortness of breath   albuterol (PROVENTIL HFA,VENTOLIN HFA) 90 mcg/act inhaler  Self No No   Sig: Inhale 2 puffs every 6 (six) hours as needed for wheezing or shortness of breath   albuterol (PROVENTIL HFA,VENTOLIN HFA) 90 mcg/act inhaler   No No   Sig: Inhale 2 puffs every 6 (six) hours as needed for wheezing or shortness of breath   aspirin (ECOTRIN LOW STRENGTH) 81 mg EC tablet  Self No No   Sig: Take 1 tablet (81 mg total) by mouth every other day   azithromycin (ZITHROMAX) 250 mg tablet  Self Yes No   carboxymethylcellulose (REFRESH PLUS) 0 5 % SOLN  Self Yes No   Sig: INSTILL 1 DROP BOTH EYES FOUR TIMES A DAY FOR DRY EYES   Patient not taking: Reported on 12/14/2021   cyanocobalamin (VITAMIN B-12) 500 MCG tablet  Self Yes No   Sig: TAKE ONE TABLET BY MOUTH EVERY MORNING *VITAMIN B12*   gabapentin (NEURONTIN) 100 mg capsule  Self No No   Sig: Take 2 capsules (200 mg total) by mouth daily at bedtime   glimepiride (AMARYL) 1 mg tablet  Self No No   Sig: Take 1 tablet (1 mg total) by mouth 2 (two) times a day   guaiFENesin (MUCINEX) 600 mg 12 hr tablet  Self No No   Sig: Take 1 tablet (600 mg total) by mouth 2 (two) times a day   Patient not taking: Reported on 1/7/2022    hydrocortisone 1 % cream  Self No No   Sig: Apply topically 2 (two) times a day   lisinopril (ZESTRIL) 40 mg tablet  Self No No   Sig: Take 1 tablet (40 mg total) by mouth daily   metFORMIN (GLUCOPHAGE) 500 mg tablet  Self No No   Sig: Take 1 tablet (500 mg total) by mouth daily with dinner   methylPREDNISolone 4 MG tablet therapy pack  Self No No   Sig: Use as directed on package   metoprolol tartrate (LOPRESSOR) 25 mg tablet  Self Yes No   Sig: Take 25 mg by mouth every 12 (twelve) hours   mometasone (ASMANEX TWISTHALER) 220 MCG/INH inhaler  Self Yes No   Sig: Inhale 2 puffs 2 (two) times a day Rinse mouth after use     predniSONE 10 mg tablet  Self Yes No   Sig: Take 10 mg by mouth daily   primidone (MYSOLINE) 50 mg tablet  Self Yes No   Sig: Take 100 mg by mouth every 8 (eight) hours   rosuvastatin (CRESTOR) 20 MG tablet  Self No No   Sig: Take 1 tablet (20 mg total) by mouth daily after dinner Take one half tablet daily with dinner   Patient taking differently: Take 10 mg by mouth daily after dinner Take one half tablet daily with dinner    saxagliptin (ONGLYZA) 5 MG tablet  Self No No   Sig: Take 1 tablet (5 mg total) by mouth daily   tiotropium-olodaterol (STIOLTO RESPIMAT) 2 5-2 5 MCG/ACT inhaler  Self Yes No   Sig: Inhale 2 puffs daily      Facility-Administered Medications: None       Past Medical History:   Diagnosis Date    BPH (benign prostatic hyperplasia)     45 days radiation treatment    COPD (chronic obstructive pulmonary disease)     COPD with acute exacerbation ([facility] [address] ) 11/21/2019    Coronary artery disease     CABG x4 in 2017    Diabetes mellitus     History of Arterial Duplex of LE 12/26/2017    Likely occlusion of the left superficial femoral artery  Calcific changes bilaterally  Despite these changes, the ankle-brachial index as a measure of peripheral blood flow only mildly impaired   History of echocardiogram 06/12/2017    EF 40%, mild LVH, mild MR     Hyperlipidemia     Hypertension     NSTEMI (non-ST elevated myocardial infarction)     Prostate cancer     prostate     PVD (peripheral vascular disease)     Tremor        Past Surgical History:   Procedure Laterality Date    CARDIAC CATHETERIZATION  03/08/2017    Significant left main plus triple-vessel CAD   CORONARY ARTERY BYPASS GRAFT  03/08/2017    4V CABG:  LIMA to LAD, VG to RI, SVG to PDA to LVBR RCA   EYE SURGERY      shots in eye once a month @ the [place]    PROSTATE BIOPSY         Family History   Problem Relation Age of Onset    Cancer Father     Heart disease Brother      I have reviewed and agree with the history as documented      E-Cigarette/Vaping    E-Cigarette Use Never User      E-Cigarette/Vaping Substances    Nicotine No     THC No     CBD No     Flavoring No     Other No     Unknown No      Social History     Tobacco Use    Smoking status: Current Some Day Smoker     Packs/day: 0 25     Years: 60 00     Pack years: 15 00     Types: Cigarettes    Smokeless tobacco: Never Used   Martinez Eddy Tobacco comment: only smokes when he drinks beer   Vaping Use    Vaping Use: Never used   Substance Use Topics    Alcohol use: Yes    Drug use: Never       Review of Systems   Constitutional: Negative for chills, fatigue and fever  HENT: Negative for congestion and sore throat  Eyes: Negative for pain  Respiratory: Positive for shortness of breath and wheezing  Negative for cough and chest tightness  Cardiovascular: Negative for chest pain, palpitations and leg swelling  Gastrointestinal: Negative for abdominal pain, constipation, diarrhea, nausea and vomiting  Endocrine: Negative for polyuria  Genitourinary: Negative for dysuria  Musculoskeletal: Negative for arthralgias, back pain, myalgias and neck pain  Skin: Negative for rash  Neurological: Negative for dizziness, syncope, light-headedness and headaches  All other systems reviewed and are negative  Physical Exam  Physical Exam  Vitals reviewed  Constitutional:       Appearance: Normal appearance  He is well-developed  HENT:      Head: Normocephalic and atraumatic  Mouth/Throat:      Mouth: Mucous membranes are moist    Eyes:      Conjunctiva/sclera: Conjunctivae normal    Cardiovascular:      Rate and Rhythm: Normal rate and regular rhythm  Heart sounds: Normal heart sounds  Pulmonary:      Effort: Tachypnea and accessory muscle usage present  Breath sounds: Wheezing present  Abdominal:      General: Bowel sounds are normal       Palpations: Abdomen is soft  Tenderness: There is no abdominal tenderness  Musculoskeletal:         General: Normal range of motion  Cervical back: Normal range of motion  Skin:     General: Skin is warm and dry  Capillary Refill: Capillary refill takes less than 2 seconds  Neurological:      General: No focal deficit present  Mental Status: He is alert and oriented to person, place, and time     Psychiatric:         Mood and Affect: Mood normal  Behavior: Behavior normal          Vital Signs  ED Triage Vitals [01/23/22 1139]   Temperature Pulse Respirations Blood Pressure SpO2   98 4 °F (36 9 °C) 99 22 126/84 93 %      Temp Source Heart Rate Source Patient Position - Orthostatic VS BP Location FiO2 (%)   Temporal Monitor Sitting Right arm --      Pain Score       No Pain           Vitals:    01/23/22 1139 01/23/22 1459   BP: 126/84 136/99   Pulse: 99 103   Patient Position - Orthostatic VS: Sitting Sitting         Visual Acuity      ED Medications  Medications   dexamethasone (DECADRON) injection 8 mg (8 mg Intravenous Given 1/23/22 1235)   albuterol inhalation solution 5 mg (5 mg Nebulization Given 1/23/22 1326)   ipratropium (ATROVENT) 0 02 % inhalation solution 0 5 mg (0 5 mg Nebulization Given 1/23/22 1326)       Diagnostic Studies  Results Reviewed     Procedure Component Value Units Date/Time    HS Troponin I 2hr [[id number]] Collected: 01/23/22 1423    Lab Status: Final result Specimen: Blood from Arm, Right Updated: 01/23/22 1452     hs TnI 2hr 17 ng/L      Delta 2hr hsTnI -2 ng/L     COVID/FLU/RSV [[id number]]  (Normal) Collected: 01/23/22 1235    Lab Status: Final result Specimen: Nasopharyngeal Swab Updated: 01/23/22 1320     SARS-CoV-2 Negative     INFLUENZA A PCR Negative     INFLUENZA B PCR Negative     RSV PCR Negative    Narrative:      FOR PEDIATRIC PATIENTS - copy/paste COVID Guidelines URL to browser: [email or web address] [email or web address]    SARS-CoV-2 assay is a Nucleic Acid Amplification assay intended for the  qualitative detection of nucleic acid from SARS-CoV-2 in nasopharyngeal  swabs  Results are for the presumptive identification of SARS-CoV-2 RNA  Positive results are indicative of infection with SARS-CoV-2, the virus  causing COVID-19, but do not rule out bacterial infection or co-infection  with other viruses   Laboratories within the [place] and its  territories are required to report all positive results to the appropriate  public health authorities  Negative results do not preclude SARS-CoV-2  infection and should not be used as the sole basis for treatment or other  patient management decisions  Negative results must be combined with  clinical observations, patient history, and epidemiological information  This test has not been FDA cleared or approved  This test has been authorized by FDA under an Emergency Use Authorization  (EUA)  This test is only authorized for the duration of time the  declaration that circumstances exist justifying the authorization of the  emergency use of an in vitro diagnostic tests for detection of SARS-CoV-2  virus and/or diagnosis of COVID-19 infection under section 564(b)(1) of  the Act, 21 U  S C  282VVU-8(K)(9), unless the authorization is terminated  or revoked sooner  The test has been validated but independent review by FDA  and CLIA is pending  Test performed using [employer] GeneXpert: This RT-PCR assay targets N2,  a region unique to SARS-CoV-2  A conserved region in the E-gene was chosen  for pan-Sarbecovirus detection which includes SARS-CoV-2      HS Troponin 0hr (reflex protocol) [[id number]] Collected: 01/23/22 1235    Lab Status: Final result Specimen: Blood from Arm, Right Updated: 01/23/22 1305     hs TnI 0hr 19 ng/L     Basic metabolic panel [[id number]]  (Abnormal) Collected: 01/23/22 1235    Lab Status: Final result Specimen: Blood from Arm, Right Updated: 01/23/22 1257     Sodium 136 mmol/L      Potassium 3 8 mmol/L      Chloride 100 mmol/L      CO2 25 mmol/L      ANION GAP 11 mmol/L      BUN 12 mg/dL      Creatinine 0 82 mg/dL      Glucose 161 mg/dL      Calcium 9 1 mg/dL      eGFR 86 ml/min/1 73sq m     Narrative:      [place] guidelines for Chronic Kidney Disease (CKD):     Stage 1 with normal or high GFR (GFR > 90 mL/min/1 73 square meters)    Stage 2 Mild CKD (GFR = 60-89 mL/min/1 73 square meters)    Stage 3A Moderate CKD (GFR = 45-59 mL/min/1 73 square meters)    Stage 3B Moderate CKD (GFR = 30-44 mL/min/1 73 square meters)    Stage 4 Severe CKD (GFR = 15-29 mL/min/1 73 square meters)    Stage 5 End Stage CKD (GFR <15 mL/min/1 73 square meters)  Note: GFR calculation is accurate only with a steady state creatinine    CBC and differential [[id number]]  (Abnormal) Collected: 01/23/22 1235    Lab Status: Final result Specimen: Blood from Arm, Right Updated: 01/23/22 1243     WBC 9 81 Thousand/uL      RBC 4 89 Million/uL      Hemoglobin 15 3 g/dL      Hematocrit 44 6 %      MCV 91 fL      MCH 31 3 pg      MCHC 34 3 g/dL      RDW 13 2 %      MPV 10 8 fL      Platelets 114 Thousands/uL      nRBC 0 /100 WBCs      Neutrophils Relative 86 %      Immat GRANS % 1 %      Lymphocytes Relative 7 %      Monocytes Relative 4 %      Eosinophils Relative 1 %      Basophils Relative 1 %      Neutrophils Absolute 8 50 Thousands/µL      Immature Grans Absolute 0 09 Thousand/uL      Lymphocytes Absolute 0 67 Thousands/µL      Monocytes Absolute 0 41 Thousand/µL      Eosinophils Absolute 0 09 Thousand/µL      Basophils Absolute 0 05 Thousands/µL                  X-ray chest 1 view portable   ED Interpretation by Niki Larsen PA-C (01/23 1410)   No obvious acute cardiopulmonary disease                 Procedures  ECG 12 Lead Documentation Only    Date/Time: 1/23/2022 2:59 PM  Performed by: Niki Larsen PA-C  Authorized by:  Niki Larsen PA-C     ECG reviewed by me, the ED Provider: yes    Patient location:  ED  Previous ECG:     Previous ECG:  Compared to current    Similarity:  No change    Comparison to cardiac monitor: Yes    Interpretation:     Interpretation: normal    Rate:     ECG rate:  100    ECG rate assessment: normal    Rhythm:     Rhythm: sinus rhythm    Ectopy:     Ectopy: none    QRS:     QRS axis:  Normal  Conduction:     Conduction: normal    ST segments:     ST segments:  Normal  T waves:     T waves: normal    Comments:      No evidence of acute cardiac ischemia             ED Course  ED Course as of 01/23/22 2243   Sun Jan 23, 2022   1449 Ambulatory pulse ox of 97% on room air  Patient feeling much better  Patient was offered admission for further observation, treatment and monitoring  Patient politely declined stating that they felt better and would prefer to be discharged and follow-up with [facility] family doctor  Lengthy discussion with the patient in regards to specific signs and symptoms to watch out for that warrant prompt return to the ED  Patient was informed of the risk of diagnostic uncertainty  Patient expressed their understanding of these instructions, all questions were answered, they were agreeable to plan and very thankful for care  SBIRT 20yo+      Most Recent Value   SBIRT (24 yo +)    In order to provide better care to our patients, we are screening all of our patients for alcohol and drug use  Would it be okay to ask you these screening questions? Yes Filed at: 01/23/2022 1150   Initial Alcohol Screen: US AUDIT-C     1  How often do you have a drink containing alcohol? 1 Filed at: 01/23/2022 1150   2  How many drinks containing alcohol do you have on a typical day you are drinking? 0 Filed at: 01/23/2022 1150   3a  Male UNDER 65: How often do you have five or more drinks on one occasion? 0 Filed at: 01/23/2022 1150   3b  FEMALE Any Age, or MALE 65+: How often do you have 4 or more drinks on one occassion? 0 Filed at: 01/23/2022 1150   Audit-C Score 1 Filed at: 01/23/2022 1150   DEANNA: How many times in the past year have you    Used an illegal drug or used a prescription medication for non-medical reasons?  Never Filed at: 01/23/2022 1150                    MDM  Number of Diagnoses or Management Options  COPD exacerbation ([facility] [address] )  Diagnosis management comments: Patient presented with shortness of breath that felt like similar COPD exacerbations in the past  Delta troponin less than 5  Per [facility]'s ACS guidelines, acute MI is ruled out  Patient ambulated without difficulty  97% with ambulation on room air  Ambulatory referral to pulmonology was given  Return precautions were advised  Admission was offered and patient declined  Disposition  Final diagnoses:   COPD exacerbation ([address] )     Time reflects when diagnosis was documented in both MDM as applicable and the Disposition within this note     Time User Action Codes Description Comment    1/23/2022  2:53 PM Solis Workman Add [J44 1] COPD exacerbation [facility])       ED Disposition     ED Disposition Condition Date/Time Comment    Discharge Stable Sun Jan 23, 2022  2:53 PM Rebeca García discharge to home/self care  Follow-up Information    None         Discharge Medication List as of 1/23/2022  2:56 PM      START taking these medications    Details   !! methylPREDNISolone 4 MG tablet therapy pack Use as directed on package, Normal       !! - Potential duplicate medications found  Please discuss with provider        CONTINUE these medications which have CHANGED    Details   albuterol (2 5 mg/3 mL) 0 083 % nebulizer solution Take 3 mL (2 5 mg total) by nebulization every 4 (four) hours as needed for wheezing or shortness of breath, Starting Sun 1/23/2022, No Print      albuterol (PROVENTIL HFA,VENTOLIN HFA) 90 mcg/act inhaler Inhale 2 puffs every 6 (six) hours as needed for wheezing or shortness of breath, Starting Sun 1/23/2022, No Print         CONTINUE these medications which have NOT CHANGED    Details   Alogliptin Benzoate 25 MG TABS Take 0 5 tablets (12 5 mg total) by mouth daily Patient states takes 1/2 of a 25 mg tablet every AM, Starting Thu 9/3/2020, Until Wed 1/12/2022, No Print      aspirin (ECOTRIN LOW STRENGTH) 81 mg EC tablet Take 1 tablet (81 mg total) by mouth every other day, Starting Thu 9/3/2020, No Print      azithromycin (ZITHROMAX) 250 mg tablet Starting Fri 11/19/2021, Historical Med      carboxymethylcellulose (REFRESH PLUS) 0 5 % SOLN INSTILL 1 DROP BOTH EYES FOUR TIMES A DAY FOR DRY EYES, Historical Med      cyanocobalamin (VITAMIN B-12) 500 MCG tablet TAKE ONE TABLET BY MOUTH EVERY MORNING *VITAMIN B12*, Historical Med      Empagliflozin 25 MG TABS Take 0 5 tablets (12 5 mg total) by mouth every morning Take one half tablet every morning, Starting Thu 9/3/2020, Normal      gabapentin (NEURONTIN) 100 mg capsule Take 2 capsules (200 mg total) by mouth daily at bedtime, Starting Thu 9/3/2020, No Print      glimepiride (AMARYL) 1 mg tablet Take 1 tablet (1 mg total) by mouth 2 (two) times a day, Starting Thu 9/3/2020, No Print      guaiFENesin (MUCINEX) 600 mg 12 hr tablet Take 1 tablet (600 mg total) by mouth 2 (two) times a day, Starting Thu 9/3/2020, No Print      hydrocortisone 1 % cream Apply topically 2 (two) times a day, Starting Thu 9/3/2020, No Print      lisinopril (ZESTRIL) 40 mg tablet Take 1 tablet (40 mg total) by mouth daily, Starting Fri 1/7/2022, Normal      metFORMIN (GLUCOPHAGE) 500 mg tablet Take 1 tablet (500 mg total) by mouth daily with dinner, Starting Thu 9/3/2020, No Print      !! methylPREDNISolone 4 MG tablet therapy pack Use as directed on package, Normal      metoprolol tartrate (LOPRESSOR) 25 mg tablet Take 25 mg by mouth every 12 (twelve) hours, Historical Med      mometasone (ASMANEX TWISTHALER) 220 MCG/INH inhaler Inhale 2 puffs 2 (two) times a day Rinse mouth after use , Historical Med      predniSONE 10 mg tablet Take 10 mg by mouth daily, Starting Wed 12/22/2021, Historical Med      primidone (MYSOLINE) 50 mg tablet Take 100 mg by mouth every 8 (eight) hours, Historical Med      rosuvastatin (CRESTOR) 20 MG tablet Take 1 tablet (20 mg total) by mouth daily after dinner Take one half tablet daily with dinner, Starting Thu 9/3/2020, No Print      saxagliptin (ONGLYZA) 5 MG tablet Take 1 tablet (5 mg total) by mouth daily, Starting Thu 9/3/2020, No Print      tiotropium-olodaterol (STIOLTO RESPIMAT) 2 5-2 5 MCG/ACT inhaler Inhale 2 puffs daily, Historical Med       !! - Potential duplicate medications found  Please discuss with provider                PDMP Review       Value Time User    PDMP Reviewed  Yes 9/3/2020 12:26 PM Maya Fernandez          ED Provider  Electronically Signed by           Sid Jeffries PA-C  01/23/22 8915

## 2022-01-23 NOTE — ED NOTES
Patient ambulated approximately 100 feet with ED tech  Used cane  Steady on feet, no shortness of breath       Andre Chino RN  01/23/22 [id number]

## 2022-01-23 NOTE — DISCHARGE INSTRUCTIONS
Take Medrol Dosepak as directed starting tomorrow  Use albuterol as needed for wheezing  Follow-up with your family doctor and with your pulmonologist   Return to the emergency department with any significant change or worsening of your symptoms

## 2022-01-24 LAB
ATRIAL RATE: 100 BPM
P AXIS: 68 DEGREES
PR INTERVAL: 160 MS
QRS AXIS: 59 DEGREES
QRSD INTERVAL: 98 MS
QT INTERVAL: 386 MS
QTC INTERVAL: 497 MS
T WAVE AXIS: 88 DEGREES
VENTRICULAR RATE: 100 BPM

## 2022-01-24 PROCEDURE — 93010 ELECTROCARDIOGRAM REPORT: CPT | Performed by: INTERNAL MEDICINE

## 2022-03-01 ENCOUNTER — TELEPHONE (OUTPATIENT)
Dept: HEMATOLOGY ONCOLOGY | Facility: CLINIC | Age: 77
End: 2022-03-01

## 2022-03-01 NOTE — TELEPHONE ENCOUNTER
Called Mynor Harden and informed him that his auth for the CT Lung Screening has been approved  So he is all set for his CT on 3/7/2022  Mynor Harden expressed understanding and gratitude

## 2022-03-07 ENCOUNTER — HOSPITAL ENCOUNTER (OUTPATIENT)
Dept: CT IMAGING | Facility: HOSPITAL | Age: 77
Discharge: HOME | End: 2022-03-07
Payer: COMMERCIAL

## 2022-03-07 DIAGNOSIS — F17.210 SMOKES WITH GREATER THAN 40 PACK YEAR HISTORY: ICD-10-CM

## 2022-03-07 DIAGNOSIS — Z72.0 TOBACCO USE: ICD-10-CM

## 2022-03-07 PROCEDURE — 71271 CT THORAX LUNG CANCER SCR C-: CPT

## 2022-03-08 ENCOUNTER — APPOINTMENT (EMERGENCY)
Dept: RADIOLOGY | Facility: HOSPITAL | Age: 77
End: 2022-03-08
Payer: COMMERCIAL

## 2022-03-08 ENCOUNTER — HOSPITAL ENCOUNTER (OUTPATIENT)
Facility: HOSPITAL | Age: 77
Setting detail: OBSERVATION
Discharge: HOME | End: 2022-03-09
Attending: EMERGENCY MEDICINE | Admitting: STUDENT IN AN ORGANIZED HEALTH CARE EDUCATION/TRAINING PROGRAM
Payer: COMMERCIAL

## 2022-03-08 DIAGNOSIS — J44.1 COPD EXACERBATION (HCC): Primary | ICD-10-CM

## 2022-03-08 PROBLEM — R06.02 SOB (SHORTNESS OF BREATH): Status: RESOLVED | Noted: 2021-09-16 | Resolved: 2022-03-08

## 2022-03-08 PROBLEM — G25.0 BENIGN ESSENTIAL TREMOR: Chronic | Status: ACTIVE | Noted: 2019-02-22

## 2022-03-08 PROBLEM — E87.2 LACTIC ACIDOSIS: Status: RESOLVED | Noted: 2021-04-06 | Resolved: 2022-03-08

## 2022-03-08 PROBLEM — E87.20 LACTIC ACIDOSIS: Status: RESOLVED | Noted: 2021-04-06 | Resolved: 2022-03-08

## 2022-03-08 PROBLEM — L03.116 CELLULITIS OF LEFT FOOT: Status: RESOLVED | Noted: 2021-12-14 | Resolved: 2022-03-08

## 2022-03-08 PROBLEM — E11.42 DIABETIC POLYNEUROPATHY ASSOCIATED WITH TYPE 2 DIABETES MELLITUS (HCC): Status: RESOLVED | Noted: 2020-06-08 | Resolved: 2022-03-08

## 2022-03-08 PROBLEM — D72.823 LEUKEMOID REACTION: Status: RESOLVED | Noted: 2021-04-06 | Resolved: 2022-03-08

## 2022-03-08 PROBLEM — E11.40 TYPE 2 DIABETES MELLITUS WITH DIABETIC NEUROPATHY, UNSPECIFIED (HCC): Chronic | Status: ACTIVE | Noted: 2021-01-16

## 2022-03-08 PROBLEM — J96.01 ACUTE RESPIRATORY FAILURE WITH HYPOXIA (HCC): Status: RESOLVED | Noted: 2020-05-14 | Resolved: 2022-03-08

## 2022-03-08 PROBLEM — I21.4 NSTEMI (NON-ST ELEVATED MYOCARDIAL INFARCTION) (HCC): Status: RESOLVED | Noted: 2017-03-08 | Resolved: 2022-03-08

## 2022-03-08 PROBLEM — E78.2 MIXED HYPERLIPIDEMIA: Chronic | Status: ACTIVE | Noted: 2020-03-12

## 2022-03-08 PROBLEM — I21.A1 TYPE 2 MI (MYOCARDIAL INFARCTION) (HCC): Status: RESOLVED | Noted: 2021-04-06 | Resolved: 2022-03-08

## 2022-03-08 LAB
2HR DELTA HS TROPONIN: -3 NG/L
4HR DELTA HS TROPONIN: -5 NG/L
ALBUMIN SERPL BCP-MCNC: 4.2 G/DL (ref 3.5–5)
ALP SERPL-CCNC: 85 U/L (ref 34–104)
ALT SERPL W P-5'-P-CCNC: 6 U/L (ref 7–52)
ANION GAP SERPL CALCULATED.3IONS-SCNC: 10 MMOL/L (ref 4–13)
AST SERPL W P-5'-P-CCNC: 7 U/L (ref 13–39)
BASOPHILS # BLD AUTO: 0.06 THOUSANDS/ΜL (ref 0–0.1)
BASOPHILS NFR BLD AUTO: 1 % (ref 0–1)
BILIRUB SERPL-MCNC: 0.48 MG/DL (ref 0.2–1)
BUN SERPL-MCNC: 12 MG/DL (ref 5–25)
CALCIUM SERPL-MCNC: 8.7 MG/DL (ref 8.4–10.2)
CARDIAC TROPONIN I PNL SERPL HS: 10 NG/L
CARDIAC TROPONIN I PNL SERPL HS: 12 NG/L
CARDIAC TROPONIN I PNL SERPL HS: 15 NG/L
CHLORIDE SERPL-SCNC: 101 MMOL/L (ref 96–108)
CO2 SERPL-SCNC: 26 MMOL/L (ref 21–32)
CREAT SERPL-MCNC: 0.91 MG/DL (ref 0.6–1.3)
EOSINOPHIL # BLD AUTO: 0.34 THOUSAND/ΜL (ref 0–0.61)
EOSINOPHIL NFR BLD AUTO: 5 % (ref 0–6)
ERYTHROCYTE [DISTWIDTH] IN BLOOD BY AUTOMATED COUNT: 13.4 % (ref 11.6–15.1)
GFR SERPL CREATININE-BSD FRML MDRD: 82 ML/MIN/1.73SQ M
GLUCOSE SERPL-MCNC: 116 MG/DL (ref 65–140)
GLUCOSE SERPL-MCNC: 338 MG/DL (ref 65–140)
HCT VFR BLD AUTO: 42.1 % (ref 36.5–49.3)
HGB BLD-MCNC: 14.7 G/DL (ref 12–17)
IMM GRANULOCYTES # BLD AUTO: 0.07 THOUSAND/UL (ref 0–0.2)
IMM GRANULOCYTES NFR BLD AUTO: 1 % (ref 0–2)
LYMPHOCYTES # BLD AUTO: 1.04 THOUSANDS/ΜL (ref 0.6–4.47)
LYMPHOCYTES NFR BLD AUTO: 14 % (ref 14–44)
MCH RBC QN AUTO: 31.5 PG (ref 26.8–34.3)
MCHC RBC AUTO-ENTMCNC: 34.9 G/DL (ref 31.4–37.4)
MCV RBC AUTO: 90 FL (ref 82–98)
MONOCYTES # BLD AUTO: 0.57 THOUSAND/ΜL (ref 0.17–1.22)
MONOCYTES NFR BLD AUTO: 8 % (ref 4–12)
NEUTROPHILS # BLD AUTO: 5.38 THOUSANDS/ΜL (ref 1.85–7.62)
NEUTS SEG NFR BLD AUTO: 71 % (ref 43–75)
NRBC BLD AUTO-RTO: 0 /100 WBCS
PLATELET # BLD AUTO: 203 THOUSANDS/UL (ref 149–390)
PMV BLD AUTO: 11.1 FL (ref 8.9–12.7)
POTASSIUM SERPL-SCNC: 3.7 MMOL/L (ref 3.5–5.3)
PROCALCITONIN SERPL-MCNC: 0.09 NG/ML
PROT SERPL-MCNC: 6.7 G/DL (ref 6.4–8.4)
RBC # BLD AUTO: 4.67 MILLION/UL (ref 3.88–5.62)
SODIUM SERPL-SCNC: 137 MMOL/L (ref 135–147)
WBC # BLD AUTO: 7.46 THOUSAND/UL (ref 4.31–10.16)

## 2022-03-08 PROCEDURE — 99285 EMERGENCY DEPT VISIT HI MDM: CPT

## 2022-03-08 PROCEDURE — 99285 EMERGENCY DEPT VISIT HI MDM: CPT | Performed by: EMERGENCY MEDICINE

## 2022-03-08 PROCEDURE — 94760 N-INVAS EAR/PLS OXIMETRY 1: CPT

## 2022-03-08 PROCEDURE — 84145 PROCALCITONIN (PCT): CPT | Performed by: PHYSICIAN ASSISTANT

## 2022-03-08 PROCEDURE — 96374 THER/PROPH/DIAG INJ IV PUSH: CPT

## 2022-03-08 PROCEDURE — 80053 COMPREHEN METABOLIC PANEL: CPT | Performed by: EMERGENCY MEDICINE

## 2022-03-08 PROCEDURE — 36415 COLL VENOUS BLD VENIPUNCTURE: CPT | Performed by: EMERGENCY MEDICINE

## 2022-03-08 PROCEDURE — 94664 DEMO&/EVAL PT USE INHALER: CPT

## 2022-03-08 PROCEDURE — 93005 ELECTROCARDIOGRAM TRACING: CPT

## 2022-03-08 PROCEDURE — 94645 CONT INHLJ TX EACH ADDL HOUR: CPT

## 2022-03-08 PROCEDURE — 85025 COMPLETE CBC W/AUTO DIFF WBC: CPT | Performed by: EMERGENCY MEDICINE

## 2022-03-08 PROCEDURE — 71045 X-RAY EXAM CHEST 1 VIEW: CPT

## 2022-03-08 PROCEDURE — 84484 ASSAY OF TROPONIN QUANT: CPT | Performed by: EMERGENCY MEDICINE

## 2022-03-08 PROCEDURE — 99220 PR INITIAL OBSERVATION CARE/DAY 70 MINUTES: CPT | Performed by: PHYSICIAN ASSISTANT

## 2022-03-08 PROCEDURE — 82948 REAGENT STRIP/BLOOD GLUCOSE: CPT

## 2022-03-08 PROCEDURE — 94644 CONT INHLJ TX 1ST HOUR: CPT

## 2022-03-08 PROCEDURE — 84484 ASSAY OF TROPONIN QUANT: CPT | Performed by: PHYSICIAN ASSISTANT

## 2022-03-08 RX ORDER — AZITHROMYCIN 250 MG/1
500 TABLET, FILM COATED ORAL EVERY 24 HOURS
Status: DISCONTINUED | OUTPATIENT
Start: 2022-03-09 | End: 2022-03-09 | Stop reason: HOSPADM

## 2022-03-08 RX ORDER — ASPIRIN 81 MG/1
81 TABLET ORAL EVERY OTHER DAY
Status: DISCONTINUED | OUTPATIENT
Start: 2022-03-09 | End: 2022-03-09 | Stop reason: HOSPADM

## 2022-03-08 RX ORDER — ACETAMINOPHEN 325 MG/1
650 TABLET ORAL EVERY 4 HOURS PRN
Status: DISCONTINUED | OUTPATIENT
Start: 2022-03-08 | End: 2022-03-09 | Stop reason: HOSPADM

## 2022-03-08 RX ORDER — AZITHROMYCIN 250 MG/1
500 TABLET, FILM COATED ORAL ONCE
Status: COMPLETED | OUTPATIENT
Start: 2022-03-08 | End: 2022-03-08

## 2022-03-08 RX ORDER — PRAVASTATIN SODIUM 40 MG
80 TABLET ORAL
Status: DISCONTINUED | OUTPATIENT
Start: 2022-03-08 | End: 2022-03-09 | Stop reason: HOSPADM

## 2022-03-08 RX ORDER — LISINOPRIL 20 MG/1
40 TABLET ORAL DAILY
Status: DISCONTINUED | OUTPATIENT
Start: 2022-03-09 | End: 2022-03-09 | Stop reason: HOSPADM

## 2022-03-08 RX ORDER — METHYLPREDNISOLONE SODIUM SUCCINATE 40 MG/ML
40 INJECTION, POWDER, LYOPHILIZED, FOR SOLUTION INTRAMUSCULAR; INTRAVENOUS EVERY 12 HOURS SCHEDULED
Status: DISCONTINUED | OUTPATIENT
Start: 2022-03-08 | End: 2022-03-09 | Stop reason: HOSPADM

## 2022-03-08 RX ORDER — ONDANSETRON 2 MG/ML
4 INJECTION INTRAMUSCULAR; INTRAVENOUS EVERY 6 HOURS PRN
Status: DISCONTINUED | OUTPATIENT
Start: 2022-03-08 | End: 2022-03-09 | Stop reason: HOSPADM

## 2022-03-08 RX ORDER — GABAPENTIN 100 MG/1
200 CAPSULE ORAL
Status: DISCONTINUED | OUTPATIENT
Start: 2022-03-08 | End: 2022-03-09 | Stop reason: HOSPADM

## 2022-03-08 RX ORDER — PRIMIDONE 50 MG/1
100 TABLET ORAL EVERY 8 HOURS SCHEDULED
Status: DISCONTINUED | OUTPATIENT
Start: 2022-03-08 | End: 2022-03-09 | Stop reason: HOSPADM

## 2022-03-08 RX ORDER — SODIUM CHLORIDE FOR INHALATION 0.9 %
3 VIAL, NEBULIZER (ML) INHALATION ONCE
Status: COMPLETED | OUTPATIENT
Start: 2022-03-08 | End: 2022-03-08

## 2022-03-08 RX ORDER — METHYLPREDNISOLONE SODIUM SUCCINATE 125 MG/2ML
125 INJECTION, POWDER, LYOPHILIZED, FOR SOLUTION INTRAMUSCULAR; INTRAVENOUS ONCE
Status: COMPLETED | OUTPATIENT
Start: 2022-03-08 | End: 2022-03-08

## 2022-03-08 RX ORDER — MAGNESIUM HYDROXIDE/ALUMINUM HYDROXICE/SIMETHICONE 120; 1200; 1200 MG/30ML; MG/30ML; MG/30ML
30 SUSPENSION ORAL EVERY 6 HOURS PRN
Status: DISCONTINUED | OUTPATIENT
Start: 2022-03-08 | End: 2022-03-09 | Stop reason: HOSPADM

## 2022-03-08 RX ORDER — NICOTINE 21 MG/24HR
1 PATCH, TRANSDERMAL 24 HOURS TRANSDERMAL DAILY
Status: DISCONTINUED | OUTPATIENT
Start: 2022-03-09 | End: 2022-03-08

## 2022-03-08 RX ADMIN — METOPROLOL TARTRATE 25 MG: 25 TABLET, FILM COATED ORAL at 21:57

## 2022-03-08 RX ADMIN — GABAPENTIN 200 MG: 100 CAPSULE ORAL at 21:56

## 2022-03-08 RX ADMIN — IPRATROPIUM BROMIDE 1 MG: 0.5 SOLUTION RESPIRATORY (INHALATION) at 15:46

## 2022-03-08 RX ADMIN — PRIMIDONE 100 MG: 50 TABLET ORAL at 22:14

## 2022-03-08 RX ADMIN — Medication 3 ML: at 16:49

## 2022-03-08 RX ADMIN — SALMETEROL XINAFOATE 1 PUFF: 50 POWDER, METERED ORAL; RESPIRATORY (INHALATION) at 22:00

## 2022-03-08 RX ADMIN — IPRATROPIUM BROMIDE 1 MG: 0.5 SOLUTION RESPIRATORY (INHALATION) at 16:49

## 2022-03-08 RX ADMIN — AZITHROMYCIN MONOHYDRATE 500 MG: 250 TABLET ORAL at 19:01

## 2022-03-08 RX ADMIN — ALBUTEROL SULFATE 10 MG: 2.5 SOLUTION RESPIRATORY (INHALATION) at 16:49

## 2022-03-08 RX ADMIN — METHYLPREDNISOLONE SODIUM SUCCINATE 40 MG: 40 INJECTION, POWDER, FOR SOLUTION INTRAMUSCULAR; INTRAVENOUS at 21:57

## 2022-03-08 RX ADMIN — PRAVASTATIN SODIUM 80 MG: 40 TABLET ORAL at 20:30

## 2022-03-08 RX ADMIN — ISODIUM CHLORIDE 3 ML: 0.03 SOLUTION RESPIRATORY (INHALATION) at 15:46

## 2022-03-08 RX ADMIN — INSULIN LISPRO 3 UNITS: 100 INJECTION, SOLUTION INTRAVENOUS; SUBCUTANEOUS at 22:01

## 2022-03-08 RX ADMIN — METHYLPREDNISOLONE SODIUM SUCCINATE 125 MG: 125 INJECTION, POWDER, FOR SOLUTION INTRAMUSCULAR; INTRAVENOUS at 15:43

## 2022-03-08 RX ADMIN — ALBUTEROL SULFATE 10 MG: 2.5 SOLUTION RESPIRATORY (INHALATION) at 15:46

## 2022-03-08 NOTE — ED PROVIDER NOTES
History  Chief Complaint   Patient presents with    Shortness of Breath     Patient reports SOB for the last 1  5 days, reports he has COPD  Patient is a 52-year-old male with history of COPD, CAD, diabetes, presenting for evaluation of shortness of breath  Patient says the symptoms have been present for the past day and half  He says that it feels similar to COPD exacerbations that he has had in the past   He denies any chest pain, fevers, chills  He denies any cough or sputum production  Patient has been taking his COPD medications as prescribed  On exam, vitals are within normal limits  He has diffuse wheezing in all lung fields  Prior to Admission Medications   Prescriptions Last Dose Informant Patient Reported? Taking?    Alogliptin Benzoate 25 MG TABS   No No   Sig: Take 0 5 tablets (12 5 mg total) by mouth daily Patient states takes 1/2 of a 25 mg tablet every AM   Patient taking differently: Take 25 mg by mouth daily    Empagliflozin 25 MG TABS  Self No No   Sig: Take 0 5 tablets (12 5 mg total) by mouth every morning Take one half tablet every morning   albuterol (2 5 mg/3 mL) 0 083 % nebulizer solution   No No   Sig: Take 3 mL (2 5 mg total) by nebulization every 4 (four) hours as needed for wheezing or shortness of breath   albuterol (PROVENTIL HFA,VENTOLIN HFA) 90 mcg/act inhaler   No No   Sig: Inhale 2 puffs every 6 (six) hours as needed for wheezing or shortness of breath   aspirin (ECOTRIN LOW STRENGTH) 81 mg EC tablet  Self No No   Sig: Take 1 tablet (81 mg total) by mouth every other day   azithromycin (ZITHROMAX) 250 mg tablet  Self Yes No   carboxymethylcellulose (REFRESH PLUS) 0 5 % SOLN  Self Yes No   Sig: INSTILL 1 DROP BOTH EYES FOUR TIMES A DAY FOR DRY EYES   Patient not taking: Reported on 12/14/2021   cyanocobalamin (VITAMIN B-12) 500 MCG tablet  Self Yes No   Sig: TAKE ONE TABLET BY MOUTH EVERY MORNING *VITAMIN B12*   gabapentin (NEURONTIN) 100 mg capsule  Self No No Sig: Take 2 capsules (200 mg total) by mouth daily at bedtime   glimepiride (AMARYL) 1 mg tablet  Self No No   Sig: Take 1 tablet (1 mg total) by mouth 2 (two) times a day   guaiFENesin (MUCINEX) 600 mg 12 hr tablet  Self No No   Sig: Take 1 tablet (600 mg total) by mouth 2 (two) times a day   Patient not taking: Reported on 1/7/2022    hydrocortisone 1 % cream  Self No No   Sig: Apply topically 2 (two) times a day   lisinopril (ZESTRIL) 40 mg tablet  Self No No   Sig: Take 1 tablet (40 mg total) by mouth daily   metFORMIN (GLUCOPHAGE) 500 mg tablet  Self No No   Sig: Take 1 tablet (500 mg total) by mouth daily with dinner   methylPREDNISolone 4 MG tablet therapy pack  Self No No   Sig: Use as directed on package   methylPREDNISolone 4 MG tablet therapy pack   No No   Sig: Use as directed on package   metoprolol tartrate (LOPRESSOR) 25 mg tablet  Self Yes No   Sig: Take 25 mg by mouth every 12 (twelve) hours   mometasone (ASMANEX TWISTHALER) 220 MCG/INH inhaler  Self Yes No   Sig: Inhale 2 puffs 2 (two) times a day Rinse mouth after use     predniSONE 10 mg tablet  Self Yes No   Sig: Take 10 mg by mouth daily   primidone (MYSOLINE) 50 mg tablet  Self Yes No   Sig: Take 100 mg by mouth every 8 (eight) hours   rosuvastatin (CRESTOR) 20 MG tablet  Self No No   Sig: Take 1 tablet (20 mg total) by mouth daily after dinner Take one half tablet daily with dinner   Patient taking differently: Take 10 mg by mouth daily after dinner Take one half tablet daily with dinner    saxagliptin (ONGLYZA) 5 MG tablet  Self No No   Sig: Take 1 tablet (5 mg total) by mouth daily   tiotropium-olodaterol (STIOLTO RESPIMAT) 2 5-2 5 MCG/ACT inhaler  Self Yes No   Sig: Inhale 2 puffs daily      Facility-Administered Medications: None       Past Medical History:   Diagnosis Date    BPH (benign prostatic hyperplasia)     45 days radiation treatment    COPD (chronic obstructive pulmonary disease)     COPD with acute exacerbation (HCC) 11/21/2019    Coronary artery disease     CABG x4 in 2017    Diabetes mellitus     History of Arterial Duplex of LE 12/26/2017    Likely occlusion of the left superficial femoral artery  Calcific changes bilaterally  Despite these changes, the ankle-brachial index as a measure of peripheral blood flow only mildly impaired   History of echocardiogram 06/12/2017    EF 40%, mild LVH, mild MR     Hyperlipidemia     Hypertension     NSTEMI (non-ST elevated myocardial infarction)     Prostate cancer     prostate     PVD (peripheral vascular disease)     Tremor     Type 2 MI (myocardial infarction) ([facility] [address] ) 4/6/2021       Past Surgical History:   Procedure Laterality Date    CARDIAC CATHETERIZATION  03/08/2017    Significant left main plus triple-vessel CAD   CORONARY ARTERY BYPASS GRAFT  03/08/2017    4V CABG:  LIMA to LAD, VG to RI, SVG to PDA to LVBR RCA   EYE SURGERY      [facility] in eye once a [facility]    PROSTATE BIOPSY         Family History   Problem Relation Age of Onset    Cancer Father     Heart disease Brother      I have reviewed and agree with the history as documented  E-Cigarette/Vaping    E-Cigarette Use Never User      E-Cigarette/Vaping Substances    Nicotine No     THC No     CBD No     Flavoring No     Other No     Unknown No      Social History     Tobacco Use    Smoking status: Current Some Day Smoker     Packs/day: 0 25     Years: 60 00     Pack years: 15 00     Types: Cigarettes    Smokeless tobacco: Never Used    Tobacco comment: only smokes when he drinks beer   Vaping Use    Vaping Use: Never used   Substance Use Topics    Alcohol use: Yes    Drug use: Never       Review of Systems   Constitutional: Negative for chills, fever and unexpected weight change  HENT: Negative for congestion, sore throat and trouble swallowing  Eyes: Negative for pain, discharge and itching  Respiratory: Positive for shortness of breath   Negative for cough, chest tightness and wheezing  Cardiovascular: Negative for chest pain, palpitations and leg swelling  Gastrointestinal: Negative for abdominal pain, blood in stool, diarrhea, nausea and vomiting  Endocrine: Negative for polyuria  Genitourinary: Negative for difficulty urinating, dysuria, frequency and hematuria  Musculoskeletal: Negative for arthralgias and back pain  Neurological: Negative for dizziness, syncope, weakness, light-headedness and headaches  Physical Exam  Physical Exam  Vitals and nursing note reviewed  Constitutional:       General: He is not in acute distress  Appearance: He is well-developed  HENT:      Head: Normocephalic and atraumatic  Right Ear: External ear normal       Left Ear: External ear normal    Eyes:      Conjunctiva/sclera: Conjunctivae normal       Pupils: Pupils are equal, round, and reactive to light  Cardiovascular:      Rate and Rhythm: Normal rate and regular rhythm  Heart sounds: Normal heart sounds  No murmur heard  No friction rub  No gallop  Pulmonary:      Effort: Pulmonary effort is normal  No respiratory distress  Breath sounds: Wheezing (diffuse) present  No rales  Abdominal:      General: Bowel sounds are normal  There is no distension  Palpations: Abdomen is soft  Tenderness: There is no abdominal tenderness  There is no guarding  Musculoskeletal:         General: No tenderness or deformity  Normal range of motion  Cervical back: Normal range of motion  Lymphadenopathy:      Cervical: No cervical adenopathy  Skin:     General: Skin is warm and dry  Neurological:      General: No focal deficit present  Mental Status: He is alert and oriented to person, place, and time  Mental status is at baseline  Cranial Nerves: No cranial nerve deficit  Sensory: No sensory deficit  Motor: No weakness or abnormal muscle tone     Psychiatric:         Behavior: Behavior normal          Vital Signs  ED Triage Vitals   Temperature Pulse Respirations Blood Pressure SpO2   03/08/22 1516 03/08/22 1516 03/08/22 1516 03/08/22 1517 03/08/22 1516   (!) 97 1 °F (36 2 °C) 88 20 160/72 96 %      Temp Source Heart Rate Source Patient Position - Orthostatic VS BP Location FiO2 (%)   03/08/22 1516 03/08/22 1516 03/08/22 1517 03/08/22 1517 --   Temporal Monitor Lying Left arm       Pain Score       03/08/22 2000       No Pain           Vitals:    03/08/22 2100 03/08/22 2300 03/09/22 0628 03/09/22 0741   BP: 156/80 158/70  153/91   Pulse:  90 92 87   Patient Position - Orthostatic VS:  Lying           Visual Acuity      ED Medications  Medications   albuterol inhalation solution 10 mg (10 mg Nebulization Given 3/8/22 1546)     And   ipratropium (ATROVENT) 0 02 % inhalation solution 1 mg (1 mg Nebulization Given 3/8/22 1546)     And   sodium chloride 0 9 % inhalation solution 3 mL (3 mL Nebulization Given 3/8/22 1546)   methylPREDNISolone sodium succinate (Solu-MEDROL) injection 125 mg (125 mg Intravenous Given 3/8/22 1543)   albuterol inhalation solution 10 mg (10 mg Nebulization Given 3/8/22 1649)     And   ipratropium (ATROVENT) 0 02 % inhalation solution 1 mg (1 mg Nebulization Given 3/8/22 1649)     And   sodium chloride 0 9 % inhalation solution 3 mL (3 mL Nebulization Given 3/8/22 1649)   azithromycin (ZITHROMAX) tablet 500 mg (500 mg Oral Given 3/8/22 1901)       Diagnostic Studies  Results Reviewed     Procedure Component Value Units Date/Time    Comprehensive metabolic panel [[id number]]  (Abnormal) Collected: 03/09/22 0437    Lab Status: Final result Specimen: Blood from Arm, Right Updated: 03/09/22 0542     Sodium 135 mmol/L      Potassium 4 1 mmol/L      Chloride 102 mmol/L      CO2 23 mmol/L      ANION GAP 10 mmol/L      BUN 20 mg/dL      Creatinine 0 93 mg/dL      Glucose 201 mg/dL      Calcium 8 6 mg/dL      AST 5 U/L      ALT 7 U/L      Alkaline Phosphatase 77 U/L      Total Protein 6 3 g/dL      Albumin 4 0 g/dL Total Bilirubin 0 45 mg/dL      eGFR 80 ml/min/1 73sq m     Narrative:      [place] guidelines for Chronic Kidney Disease (CKD):     Stage 1 with normal or high GFR (GFR > 90 mL/min/1 73 square meters)    Stage 2 Mild CKD (GFR = 60-89 mL/min/1 73 square meters)    Stage 3A Moderate CKD (GFR = 45-59 mL/min/1 73 square meters)    Stage 3B Moderate CKD (GFR = 30-44 mL/min/1 73 square meters)    Stage 4 Severe CKD (GFR = 15-29 mL/min/1 73 square meters)    Stage 5 End Stage CKD (GFR <15 mL/min/1 73 square meters)  Note: GFR calculation is accurate only with a steady state creatinine    Magnesium [[id number]]  (Normal) Collected: 03/09/22 0437    Lab Status: Final result Specimen: Blood from Arm, Right Updated: 03/09/22 0542     Magnesium 2 1 mg/dL     Phosphorus [[id number]]  (Normal) Collected: 03/09/22 0437    Lab Status: Final result Specimen: Blood from Arm, Right Updated: 03/09/22 0542     Phosphorus 3 2 mg/dL     CBC (With Platelets) [[id number]]  (Normal) Collected: 03/09/22 0437    Lab Status: Final result Specimen: Blood from Arm, Right Updated: 03/09/22 0527     WBC 5 27 Thousand/uL      RBC 4 32 Million/uL      Hemoglobin 13 5 g/dL      Hematocrit 40 5 %      MCV 94 fL      MCH 31 3 pg      MCHC 33 3 g/dL      RDW 13 5 %      Platelets 469 Thousands/uL      MPV 11 8 fL     Procalcitonin with AM Reflex [[id number]]  (Normal) Collected: 03/08/22 1939    Lab Status: Final result Specimen: Blood from Arm, Right Updated: 03/08/22 2019     Procalcitonin 0 09 ng/ml     HS Troponin I 4hr [[id number]]  (Normal) Collected: 03/08/22 1939    Lab Status: Final result Specimen: Blood from Arm, Right Updated: 03/08/22 2016     hs TnI 4hr 10 ng/L      Delta 4hr hsTnI -5 ng/L     HS Troponin I 2hr [[id number]]  (Normal) Collected: 03/08/22 1755    Lab Status: Final result Specimen: Blood from Arm, Right Updated: 03/08/22 1824     hs TnI 2hr 12 ng/L      Delta 2hr hsTnI -3 ng/L     HS Troponin 0hr (reflex protocol) [[id number]]  (Normal) Collected: 03/08/22 1537    Lab Status: Final result Specimen: Blood from Arm, Right Updated: 03/08/22 1607     hs TnI 0hr 15 ng/L     Comprehensive metabolic panel [[id number]]  (Abnormal) Collected: 03/08/22 1537    Lab Status: Final result Specimen: Blood from Arm, Right Updated: 03/08/22 1602     Sodium 137 mmol/L      Potassium 3 7 mmol/L      Chloride 101 mmol/L      CO2 26 mmol/L      ANION GAP 10 mmol/L      BUN 12 mg/dL      Creatinine 0 91 mg/dL      Glucose 116 mg/dL      Calcium 8 7 mg/dL      AST 7 U/L      ALT 6 U/L      Alkaline Phosphatase 85 U/L      Total Protein 6 7 g/dL      Albumin 4 2 g/dL      Total Bilirubin 0 48 mg/dL      eGFR 82 ml/min/1 73sq m     Narrative:      National Kid[employer] Foundation guidelines for Chronic Kidney Disease (CKD):     Stage 1 with normal or high GFR (GFR > 90 mL/min/1 73 square meters)    Stage 2 Mild CKD (GFR = 60-89 mL/min/1 73 square meters)    Stage 3A Moderate CKD (GFR = 45-59 mL/min/1 73 square meters)    Stage 3B Moderate CKD (GFR = 30-44 mL/min/1 73 square meters)    Stage 4 Severe CKD (GFR = 15-29 mL/min/1 73 square meters)    Stage 5 End Stage CKD (GFR <15 mL/min/1 73 square meters)  Note: GFR calculation is accurate only with a steady state creatinine    CBC and differential [[id number]] Collected: 03/08/22 1537    Lab Status: Final result Specimen: Blood from Arm, Right Updated: 03/08/22 1543     WBC 7 46 Thousand/uL      RBC 4 67 Million/uL      Hemoglobin 14 7 g/dL      Hematocrit 42 1 %      MCV 90 fL      MCH 31 5 pg      MCHC 34 9 g/dL      RDW 13 4 %      MPV 11 1 fL      Platelets 754 Thousands/uL      nRBC 0 /100 WBCs      Neutrophils Relative 71 %      Immat GRANS % 1 %      Lymphocytes Relative 14 %      Monocytes Relative 8 %      Eosinophils Relative 5 %      Basophils Relative 1 %      Neutrophils Absolute 5 38 Thousands/µL      Immature Grans Absolute 0 07 Thousand/uL      Lymphocytes Absolute 1 04 Thousands/µL      Monocytes Absolute 0 57 Thousand/µL      Eosinophils Absolute 0 34 Thousand/µL      Basophils Absolute 0 06 Thousands/µL                  XR chest 1 view portable   Final Result by Miya Fernández MD (03/08 1552)      No acute cardiopulmonary disease  Workstation performed: [id number]                    Procedures  Procedures         ED Course  ED Course as of 03/10/22 2208   Tue Mar 08, 2022   1638 Patient's symptoms improved after heart neb, but still admits to some shortness of breath above his baseline  Still has some wheezing on exam   Will give another heart neb, as heart rate is 80 bpm   1828 Patient still has wheezing despite to heart neb treatments  Still does not feel at his baseline in terms of breathing  Will admit to hospital for observation and continued breathing treatments                                             MDM  Number of Diagnoses or Management Options  Diagnosis management comments: 76-year-old male presenting for evaluation of shortness of breath  Progressing over the past day and half  History of COPD, says it feels similar to previous exacerbations  Denies cough, fevers, chills  Denies chest pain  Vitals within normal limits  Has diffuse wheezing on exam   Will obtain cardiac workup  Will give heart neb, Solu-Medrol, will obtain chest x-ray      Disposition  Final diagnoses:   COPD exacerbation ([address] )     Time reflects when diagnosis was documented in both MDM as applicable and the Disposition within this note     Time User Action Codes Description Comment    3/8/2022  6:35 PM Simon Cody [J44 1] COPD exacerbation [facility])       ED Disposition     ED Disposition Condition Date/Time Comment    Admit Stable Tue Mar 8, 2022  6:38 PM Case was discussed with IRINA and the patient's admission status was agreed to be Admission Status: observation status to the service of Dr Sarah Glover           Follow-up Information     Follow up With Specialties Details Why Contact Jerri Small, DO Family Medicine Follow up Please follow-up with your PCP within 7-10 days  Diaz Purvis MD Pulmonary Disease, Pulmonology, Critical Care Medicine Follow up The office should be calling you within 1 week to schedule a follow-up  Please call the office to schedule an appointment if you do not hear from them within 1 week   Pamela4 SREEKANTH Sanchez  [phone]            Discharge Medication List as of 3/9/2022 12:44 PM      CONTINUE these medications which have CHANGED    Details   azithromycin (ZITHROMAX) 500 MG tablet Take 1 tablet (500 mg total) by mouth every 24 hours for 2 doses, Starting Wed 3/9/2022, Until Fri 3/11/2022, Normal      predniSONE 20 mg tablet Take 2 tablets (40 mg total) by mouth daily for 7 days, Starting Wed 3/9/2022, Until Wed 3/16/2022, Normal         CONTINUE these medications which have NOT CHANGED    Details   albuterol (2 5 mg/3 mL) 0 083 % nebulizer solution Take 3 mL (2 5 mg total) by nebulization every 4 (four) hours as needed for wheezing or shortness of breath, Starting Sun 1/23/2022, No Print      albuterol (PROVENTIL HFA,VENTOLIN HFA) 90 mcg/act inhaler Inhale 2 puffs every 6 (six) hours as needed for wheezing or shortness of breath, Starting Sun 1/23/2022, No Print      Alogliptin Benzoate 25 MG TABS Take 0 5 tablets (12 5 mg total) by mouth daily Patient states takes 1/2 of a 25 mg tablet every AM, Starting Thu 9/3/2020, Until Wed 1/12/2022, No Print      aspirin (ECOTRIN LOW STRENGTH) 81 mg EC tablet Take 1 tablet (81 mg total) by mouth every other day, Starting Thu 9/3/2020, No Print      carboxymethylcellulose (REFRESH PLUS) 0 5 % SOLN INSTILL 1 DROP BOTH EYES FOUR TIMES A DAY FOR DRY EYES, Historical Med      cyanocobalamin (VITAMIN B-12) 500 MCG tablet TAKE ONE TABLET BY MOUTH EVERY MORNING *VITAMIN B12*, Historical Med      Empagliflozin 25 MG TABS Take 0 5 tablets (12 5 mg total) by mouth every morning Take one half tablet every morning, Starting Thu 9/3/2020, Normal      gabapentin (NEURONTIN) 100 mg capsule Take 2 capsules (200 mg total) by mouth daily at bedtime, Starting Thu 9/3/2020, No Print      glimepiride (AMARYL) 1 mg tablet Take 1 tablet (1 mg total) by mouth 2 (two) times a day, Starting Thu 9/3/2020, No Print      guaiFENesin (MUCINEX) 600 mg 12 hr tablet Take 1 tablet (600 mg total) by mouth 2 (two) times a day, Starting Thu 9/3/2020, No Print      hydrocortisone 1 % cream Apply topically 2 (two) times a day, Starting Thu 9/3/2020, No Print      lisinopril (ZESTRIL) 40 mg tablet Take 1 tablet (40 mg total) by mouth daily, Starting Fri 1/7/2022, Normal      metFORMIN (GLUCOPHAGE) 500 mg tablet Take 1 tablet (500 mg total) by mouth daily with dinner, Starting Thu 9/3/2020, No Print      metoprolol tartrate (LOPRESSOR) 25 mg tablet Take 25 mg by mouth every 12 (twelve) hours, Historical Med      mometasone (ASMANEX TWISTHALER) 220 MCG/INH inhaler Inhale 2 puffs 2 (two) times a day Rinse mouth after use , Historical Med      primidone (MYSOLINE) 50 mg tablet Take 100 mg by mouth every 8 (eight) hours, Historical Med      rosuvastatin (CRESTOR) 20 MG tablet Take 1 tablet (20 mg total) by mouth daily after dinner Take one half tablet daily with dinner, Starting Thu 9/3/2020, No Print      saxagliptin (ONGLYZA) 5 MG tablet Take 1 tablet (5 mg total) by mouth daily, Starting Thu 9/3/2020, No Print      tiotropium-olodaterol (STIOLTO RESPIMAT) 2 5-2 5 MCG/ACT inhaler Inhale 2 puffs daily, Historical Med         STOP taking these medications       methylPREDNISolone 4 MG tablet therapy pack Comments:   Reason for Stopping:         methylPREDNISolone 4 MG tablet therapy pack Comments:   Reason for Stopping:               Outpatient Discharge Orders   Ambulatory referral to Pulmonology   Standing Status: Future Standing Exp  Date: 03/09/23      Ambulatory Referral to Pulmonary Rehabilitation   Standing Status: Future Standing Exp   Date: 03/09/23      Discharge Diet     Activity as tolerated     Call provider for:  difficulty breathing, headache or visual disturbances       PDMP Review       Value Time User    PDMP Reviewed  Yes 9/3/2020 12:26 PM Lakia Fernandez, [address]          ED Provider  Electronically Signed by           Aliyah Greenberg DO  03/10/22 2794

## 2022-03-09 ENCOUNTER — TRANSITIONAL CARE MANAGEMENT (OUTPATIENT)
Dept: FAMILY MEDICINE CLINIC | Facility: CLINIC | Age: 77
End: 2022-03-09

## 2022-03-09 VITALS
BODY MASS INDEX: 28.91 KG/M2 | DIASTOLIC BLOOD PRESSURE: 91 MMHG | RESPIRATION RATE: 18 BRPM | TEMPERATURE: 97.4 F | HEART RATE: 87 BPM | OXYGEN SATURATION: 94 % | SYSTOLIC BLOOD PRESSURE: 153 MMHG | WEIGHT: 201.94 LBS | HEIGHT: 70 IN

## 2022-03-09 LAB
ALBUMIN SERPL BCP-MCNC: 4 G/DL (ref 3.5–5)
ALP SERPL-CCNC: 77 U/L (ref 34–104)
ALT SERPL W P-5'-P-CCNC: 7 U/L (ref 7–52)
ANION GAP SERPL CALCULATED.3IONS-SCNC: 10 MMOL/L (ref 4–13)
AST SERPL W P-5'-P-CCNC: 5 U/L (ref 13–39)
ATRIAL RATE: 88 BPM
ATRIAL RATE: 89 BPM
ATRIAL RATE: 91 BPM
BILIRUB SERPL-MCNC: 0.45 MG/DL (ref 0.2–1)
BUN SERPL-MCNC: 20 MG/DL (ref 5–25)
CALCIUM SERPL-MCNC: 8.6 MG/DL (ref 8.4–10.2)
CHLORIDE SERPL-SCNC: 102 MMOL/L (ref 96–108)
CO2 SERPL-SCNC: 23 MMOL/L (ref 21–32)
CREAT SERPL-MCNC: 0.93 MG/DL (ref 0.6–1.3)
ERYTHROCYTE [DISTWIDTH] IN BLOOD BY AUTOMATED COUNT: 13.5 % (ref 11.6–15.1)
GFR SERPL CREATININE-BSD FRML MDRD: 80 ML/MIN/1.73SQ M
GLUCOSE SERPL-MCNC: 201 MG/DL (ref 65–140)
GLUCOSE SERPL-MCNC: 242 MG/DL (ref 65–140)
GLUCOSE SERPL-MCNC: 245 MG/DL (ref 65–140)
HCT VFR BLD AUTO: 40.5 % (ref 36.5–49.3)
HGB BLD-MCNC: 13.5 G/DL (ref 12–17)
MAGNESIUM SERPL-MCNC: 2.1 MG/DL (ref 1.9–2.7)
MCH RBC QN AUTO: 31.3 PG (ref 26.8–34.3)
MCHC RBC AUTO-ENTMCNC: 33.3 G/DL (ref 31.4–37.4)
MCV RBC AUTO: 94 FL (ref 82–98)
P AXIS: 74 DEGREES
P AXIS: 78 DEGREES
P AXIS: 84 DEGREES
PHOSPHATE SERPL-MCNC: 3.2 MG/DL (ref 2.3–4.1)
PLATELET # BLD AUTO: 187 THOUSANDS/UL (ref 149–390)
PMV BLD AUTO: 11.8 FL (ref 8.9–12.7)
POTASSIUM SERPL-SCNC: 4.1 MMOL/L (ref 3.5–5.3)
PR INTERVAL: 174 MS
PR INTERVAL: 174 MS
PR INTERVAL: 178 MS
PROCALCITONIN SERPL-MCNC: 0.09 NG/ML
PROT SERPL-MCNC: 6.3 G/DL (ref 6.4–8.4)
QRS AXIS: 53 DEGREES
QRS AXIS: 64 DEGREES
QRS AXIS: 66 DEGREES
QRSD INTERVAL: 100 MS
QRSD INTERVAL: 100 MS
QRSD INTERVAL: 110 MS
QT INTERVAL: 386 MS
QT INTERVAL: 404 MS
QT INTERVAL: 420 MS
QTC INTERVAL: 474 MS
QTC INTERVAL: 491 MS
QTC INTERVAL: 508 MS
RBC # BLD AUTO: 4.32 MILLION/UL (ref 3.88–5.62)
SODIUM SERPL-SCNC: 135 MMOL/L (ref 135–147)
T WAVE AXIS: 104 DEGREES
T WAVE AXIS: 116 DEGREES
T WAVE AXIS: 199 DEGREES
VENTRICULAR RATE: 88 BPM
VENTRICULAR RATE: 89 BPM
VENTRICULAR RATE: 91 BPM
WBC # BLD AUTO: 5.27 THOUSAND/UL (ref 4.31–10.16)

## 2022-03-09 PROCEDURE — 80053 COMPREHEN METABOLIC PANEL: CPT | Performed by: PHYSICIAN ASSISTANT

## 2022-03-09 PROCEDURE — 94640 AIRWAY INHALATION TREATMENT: CPT

## 2022-03-09 PROCEDURE — 85027 COMPLETE CBC AUTOMATED: CPT | Performed by: PHYSICIAN ASSISTANT

## 2022-03-09 PROCEDURE — 93010 ELECTROCARDIOGRAM REPORT: CPT | Performed by: INTERNAL MEDICINE

## 2022-03-09 PROCEDURE — 99217 PR OBSERVATION CARE DISCHARGE MANAGEMENT: CPT

## 2022-03-09 PROCEDURE — 83735 ASSAY OF MAGNESIUM: CPT | Performed by: PHYSICIAN ASSISTANT

## 2022-03-09 PROCEDURE — 82948 REAGENT STRIP/BLOOD GLUCOSE: CPT

## 2022-03-09 PROCEDURE — 94760 N-INVAS EAR/PLS OXIMETRY 1: CPT

## 2022-03-09 PROCEDURE — 84100 ASSAY OF PHOSPHORUS: CPT | Performed by: PHYSICIAN ASSISTANT

## 2022-03-09 PROCEDURE — 84145 PROCALCITONIN (PCT): CPT | Performed by: PHYSICIAN ASSISTANT

## 2022-03-09 RX ORDER — AZITHROMYCIN 500 MG/1
500 TABLET, FILM COATED ORAL EVERY 24 HOURS
Qty: 2 TABLET | Refills: 0 | Status: SHIPPED | OUTPATIENT
Start: 2022-03-09 | End: 2022-03-11

## 2022-03-09 RX ORDER — LEVALBUTEROL 1.25 MG/.5ML
1.25 SOLUTION, CONCENTRATE RESPIRATORY (INHALATION)
Status: DISCONTINUED | OUTPATIENT
Start: 2022-03-09 | End: 2022-03-09 | Stop reason: HOSPADM

## 2022-03-09 RX ORDER — SODIUM CHLORIDE FOR INHALATION 0.9 %
3 VIAL, NEBULIZER (ML) INHALATION
Status: DISCONTINUED | OUTPATIENT
Start: 2022-03-09 | End: 2022-03-09 | Stop reason: HOSPADM

## 2022-03-09 RX ORDER — PREDNISONE 20 MG/1
40 TABLET ORAL DAILY
Qty: 14 TABLET | Refills: 0 | Status: SHIPPED | OUTPATIENT
Start: 2022-03-09 | End: 2022-03-16

## 2022-03-09 RX ADMIN — SALMETEROL XINAFOATE 1 PUFF: 50 POWDER, METERED ORAL; RESPIRATORY (INHALATION) at 08:04

## 2022-03-09 RX ADMIN — LEVALBUTEROL 1.25 MG: 1.25 SOLUTION, CONCENTRATE RESPIRATORY (INHALATION) at 07:25

## 2022-03-09 RX ADMIN — ENOXAPARIN SODIUM 40 MG: 100 INJECTION SUBCUTANEOUS at 08:01

## 2022-03-09 RX ADMIN — INSULIN LISPRO 3 UNITS: 100 INJECTION, SOLUTION INTRAVENOUS; SUBCUTANEOUS at 08:00

## 2022-03-09 RX ADMIN — INSULIN LISPRO 3 UNITS: 100 INJECTION, SOLUTION INTRAVENOUS; SUBCUTANEOUS at 11:56

## 2022-03-09 RX ADMIN — PRIMIDONE 100 MG: 50 TABLET ORAL at 05:50

## 2022-03-09 RX ADMIN — ASPIRIN 81 MG: 81 TABLET, COATED ORAL at 08:02

## 2022-03-09 RX ADMIN — METHYLPREDNISOLONE SODIUM SUCCINATE 40 MG: 40 INJECTION, POWDER, FOR SOLUTION INTRAMUSCULAR; INTRAVENOUS at 08:03

## 2022-03-09 RX ADMIN — Medication 3 ML: at 07:25

## 2022-03-09 RX ADMIN — CYANOCOBALAMIN TAB 500 MCG 250 MCG: 500 TAB at 08:01

## 2022-03-09 RX ADMIN — METOPROLOL TARTRATE 25 MG: 25 TABLET, FILM COATED ORAL at 08:01

## 2022-03-09 RX ADMIN — LISINOPRIL 40 MG: 20 TABLET ORAL at 08:01

## 2022-03-09 RX ADMIN — TIOTROPIUM BROMIDE 18 MCG: 18 CAPSULE ORAL; RESPIRATORY (INHALATION) at 08:04

## 2022-03-09 NOTE — ASSESSMENT & PLAN NOTE
· Patient received IV solu medrol 125 mg and 1 hour long neg x 2 and continues to have wheezing  · Will initiate IV solu medrol 40 mg q 12 hours  · Respiratory protocol     · Continue stiolto inhaler at home, will substitute with salmeterol and Spiriva inhalers   · Monitor oxygen saturation   · Labs in am, procal 0 09, wbc 7 46

## 2022-03-09 NOTE — DISCHARGE INSTR - AVS FIRST PAGE
Dear Clement Cruz,     It was our pleasure to care for you here at [facility]  It is our hope that we were always able to exceed the expected standards for your care during your stay  You were hospitalized due to COPD exacerbation  You were cared for on the medical/surgical floor by Gifty Browning PA-C under the service of Romel Caicedo DO with the [facility] who covers for your primary care physician (PCP), Micheline Grande DO, while you were hospitalized  If you have any questions or concerns related to this hospitalization, you may contact us at [phone]  For follow up as well as any medication refills, we recommend that you follow up with your primary care physician  A registered nurse will reach out to you by phone within a few days after your discharge to answer any additional questions that you may have after going home  However, at this time we provide for you here, the most important instructions / recommendations at discharge:     Notable Medication Adjustments -   Azithromycin 500 mg once daily for the next 3 days  Prednisone 40 mg once daily for the next 7 days  Testing Required after Discharge -   None  Important follow up information -   Please follow-up with outpatient pulmonology office  Other Instructions -   Please maintain regular outpatient PCP follow-up with routine lab work  Please review this entire after visit summary as additional general instructions including medication list, appointments, activity, diet, any pertinent wound care, and other additional recommendations from your care team that may be provided for you        Sincerely,     Gifty Browning PA-C

## 2022-03-09 NOTE — ASSESSMENT & PLAN NOTE
· Patient received IV solu medrol 125 mg and 1 hour long neg x 2 and continues to have wheezing  Symptoms much improved today  · No leukocytosis  · Procalcitonin normal x2  · Status post IV Solu-Medrol and nebulized inhalers  · Discharge on pre-admission inhaler regimen, 40 mg p o   Prednisone x7 days, and azithromycin 500 mg x3 days  · Ambulatory referrals provided for Pulmonology and Pulmonary Rehab

## 2022-03-09 NOTE — PLAN OF CARE
Problem: MOBILITY - ADULT  Goal: Maintain or return to baseline ADL function  Description: INTERVENTIONS:  -  Assess patient's ability to carry out ADLs; assess patient's baseline for ADL function and identify physical deficits which impact ability to perform ADLs (bathing, care of mouth/teeth, toileting, grooming, dressing, etc )  - Assess/evaluate cause of self-care deficits   - Assess range of motion  - Assess patient's mobility; develop plan if impaired  - Assess patient's need for assistive devices and provide as appropriate  - Encourage maximum independence but intervene and supervise when necessary  - Involve family in performance of ADLs  - Assess for home care needs following discharge   - Consider OT consult to assist with ADL evaluation and planning for discharge  - Provide patient education as appropriate  3/9/2022 0114 by Talib Lao RN  Outcome: Progressing  3/9/2022 0114 by Talib Lao RN  Outcome: Progressing  3/8/2022 2143 by Talib Lao RN  Outcome: Progressing  3/8/2022 2142 by Talib Lao RN  Outcome: Progressing  Goal: Maintains/Returns to pre admission functional level  Description: INTERVENTIONS:  - Perform BMAT or MOVE assessment daily    - Set and communicate daily mobility goal to care team and patient/family/caregiver  - Collaborate with rehabilitation services on mobility goals if consulted  - Perform Range of Motion  times a day  - Reposition patient everyhours    - Dangle patienttimes a day  - Stand patient times a day  - Ambulate patient 2  times a day  - Out of bed to chair 2  times a day   - Out of bed for meals 2 times a day  - Out of bed for toileting  - Record patient progress and toleration of activity level   3/9/2022 0114 by Talib Lao RN  Outcome: Progressing  3/9/2022 0114 by Talib Lao RN  Outcome: Progressing  3/8/2022 2143 by Talib Lao RN  Outcome: Progressing  3/8/2022 2142 by Talib Lao RN  Outcome: Progressing     Problem: Prexisting or High Potential for Compromised Skin Integrity  Goal: Skin integrity is maintained or improved  Description: INTERVENTIONS:  - Identify patients at risk for skin breakdown  - Assess and monitor skin integrity  - Assess and monitor nutrition and hydration status  - Monitor labs   - Assess for incontinence   - Turn and reposition patient  - Assist with mobility/ambulation  - Relieve pressure over bony prominences  - Avoid friction and shearing  - Provide appropriate hygiene as needed including keeping skin clean and dry  - Evaluate need for skin moisturizer/barrier cream  - Collaborate with interdisciplinary team   - Patient/family teaching  - Consider wound care consult   3/9/2022 0114 by Jean-Pierre Gao RN  Outcome: Progressing  3/9/2022 0114 by Jean-Pierre Gao RN  Outcome: Progressing  3/8/2022 2143 by Jean-Pierre Gao RN  Outcome: Progressing  3/8/2022 2142 by Jean-Pierre Gao RN  Outcome: Progressing     Problem: Potential for Falls  Goal: Patient will remain free of falls  Description: INTERVENTIONS:  - Educate patient/family on patient safety including physical limitations  - Instruct patient to call for assistance with activity   - Consult OT/PT to assist with strengthening/mobility   - Keep Call bell within reach  - Keep bed low and locked with side rails adjusted as appropriate  - Keep care items and personal belongings within reach  - Initiate and maintain comfort rounds  - Make Fall Risk Sign visible to staff  - Offer Toileting everyHours, in advance of need  - Initiate/Maintain  BED larm  - Obtain necessary fall risk management equipment:   - Apply yellow socks and bracelet for high fall risk patients  - Consider moving patient to room near nurses station  3/9/2022 0114 by Jean-Pierre Gao RN  Outcome: Progressing  3/9/2022 0114 by Jean-Pierre Gao RN  Outcome: Progressing  3/8/2022 2143 by Jean-Pierre Gao RN  Outcome: Progressing  3/8/2022 2142 by Miguel Angel Coombs RN  Outcome: Progressing     Problem: PAIN - ADULT  Goal: Verbalizes/displays adequate comfort level or baseline comfort level  Description: Interventions:  - Encourage patient to monitor pain and request assistance  - Assess pain using appropriate pain scale  - Administer analgesics based on type and severity of pain and evaluate response  - Implement non-pharmacological measures as appropriate and evaluate response  - Consider cultural and social influences on pain and pain management  - Notify physician/advanced practitioner if interventions unsuccessful or patient reports new pain  3/9/2022 0114 by Miguel Angel Coombs RN  Outcome: Progressing  3/9/2022 0114 by Miguel Angel Coombs RN  Outcome: Progressing  3/8/2022 2143 by Miguel Angel Coombs RN  Outcome: Progressing     Problem: INFECTION - ADULT  Goal: Absence or prevention of progression during hospitalization  Description: INTERVENTIONS:  - Assess and monitor for signs and symptoms of infection  - Monitor lab/diagnostic results  - Monitor all insertion sites, i e  indwelling lines, tubes, and drains  - Monitor endotracheal if appropriate and nasal secretions for changes in amount and color  - [place] appropriate cooling/warming therapies per order  - Administer medications as ordered  - Instruct and encourage patient and family to use good hand hygiene technique  - Identify and instruct in appropriate isolation precautions for identified infection/condition  3/9/2022 0114 by Miguel Angel Coombs RN  Outcome: Progressing  3/9/2022 0114 by Miguel Angel Coombs RN  Outcome: Progressing  3/8/2022 2143 by Miguel Angel Coombs RN  Outcome: Progressing  Goal: Absence of fever/infection during neutropenic period  Description: INTERVENTIONS:  - Monitor WBC    3/9/2022 0114 by Miguel Angel Coombs RN  Outcome: Progressing  3/9/2022 0114 by Miguel Angel Coombs RN  Outcome: Progressing  3/8/2022 2143 by Miguel Angel Coombs RN  Outcome: Progressing     Problem: SAFETY ADULT  Goal: Maintain or return to baseline ADL function  Description: INTERVENTIONS:  -  Assess patient's ability to carry out ADLs; assess patient's baseline for ADL function and identify physical deficits which impact ability to perform ADLs (bathing, care of mouth/teeth, toileting, grooming, dressing, etc )  - Assess/evaluate cause of self-care deficits   - Assess range of motion  - Assess patient's mobility; develop plan if impaired  - Assess patient's need for assistive devices and provide as appropriate  - Encourage maximum independence but intervene and supervise when necessary  - Involve family in performance of ADLs  - Assess for home care needs following discharge   - Consider OT consult to assist with ADL evaluation and planning for discharge  - Provide patient education as appropriate  3/9/2022 0114 by Millie Aranda RN  Outcome: Progressing  3/9/2022 0114 by Millie Aranda RN  Outcome: Progressing  3/8/2022 2143 by Millie Aranda RN  Outcome: Progressing  3/8/2022 2142 by Millie Aranda RN  Outcome: Progressing  Goal: Maintains/Returns to pre admission functional level  Description: INTERVENTIONS:  - Perform BMAT or MOVE assessment daily    - Set and communicate daily mobility goal to care team and patient/family/caregiver  - Collaborate with rehabilitation services on mobility goals if consulted  - Perform Range of Motion times a day  - Reposition patient every hours    - Dangle patient  times a day  - Stand patient times a day  - Ambulate patient times a day  - Out of bed to chair times a day   - Out of bed for mealmes a day  - Out of bed for toileting  - Record patient progress and toleration of activity level   3/9/2022 0114 by Millie Aranda RN  Outcome: Progressing  3/9/2022 0114 by Millie Aranda RN  Outcome: Progressing  3/8/2022 2143 by Millie Aranda RN  Outcome: Progressing  3/8/2022 2142 by Millie Aranda RN  Outcome: Progressing  Goal: Patient will remain free of falls  Description: INTERVENTIONS:  - Educate patient/family on patient safety including physical limitations  - Instruct patient to call for assistance with activity   - Consult OT/PT to assist with strengthening/mobility   - Keep Call bell within reach  - Keep bed low and locked with side rails adjusted as appropriate  - Keep care items and personal belongings within reach  - Initiate and maintain comfort rounds  - Make Fall Risk Sign visible to staff  - Offer Toileting every Hours, in advance of need  - Initiate/Maintain  BED alarm  - Obtain necessary fall risk management equipment:   - Apply yellow socks and bracelet for high fall risk patients  - Consider moving patient to room near nurses station  3/9/2022 0114 by Ghazal Mcgarry RN  Outcome: Progressing  3/9/2022 0114 by Ghazal Mcgarry RN  Outcome: Progressing  3/8/2022 2143 by Ghazal Mcgarry RN  Outcome: Progressing  3/8/2022 2142 by Ghazal Mcgarry RN  Outcome: Progressing     Problem: DISCHARGE PLANNING  Goal: Discharge to home or other facility with appropriate resources  Description: INTERVENTIONS:  - Identify barriers to discharge w/patient and caregiver  - Arrange for needed discharge resources and transportation as appropriate  - Identify discharge learning needs (meds, wound care, etc )  - Arrange for interpretive services to assist at discharge as needed  - Refer to Case Management Department for coordinating discharge planning if the patient needs post-hospital services based on physician/advanced practitioner order or complex needs related to functional status, cognitive ability, or social support system  3/9/2022 0114 by Ghazal Mcgarry RN  Outcome: Progressing  3/9/2022 0114 by Ghazal Mcgarry RN  Outcome: Progressing  3/8/2022 2143 by Ghazal Mcgarry RN  Outcome: Progressing     Problem: Knowledge Deficit  Goal: Patient/family/caregiver demonstrates understanding of disease process, treatment plan, medications, and discharge instructions  Description: Complete learning assessment and assess knowledge base  Interventions:  - Provide teaching at level of understanding  - Provide teaching via preferred learning methods  3/9/2022 0114 by Gisselle Davison RN  Outcome: Progressing  3/9/2022 0114 by Gisselle Davison RN  Outcome: Progressing  3/8/2022 2143 by Gisselle Davison RN  Outcome: Progressing     Problem: Nutrition/Hydration-ADULT  Goal: Nutrient/Hydration intake appropriate for improving, restoring or maintaining nutritional needs  Description: Monitor and assess patient's nutrition/hydration status for malnutrition  Collaborate with interdisciplinary team and initiate plan and interventions as ordered  Monitor patient's weight and dietary intake as ordered or per policy  Utilize nutrition screening tool and intervene as necessary  Determine patient's food preferences and provide high-protein, high-caloric foods as appropriate       INTERVENTIONS:  - Monitor oral intake, urinary output, labs, and treatment plans  - Assess nutrition and hydration status and recommend course of action  - Evaluate amount of meals eaten  - Assist patient with eating if necessary   - Allow adequate time for meals  - Recommend/ encourage appropriate diets, oral nutritional supplements, and vitamin/mineral supplements  - Order, calculate, and assess calorie counts as needed  - Recommend, monitor, and adjust tube feedings and TPN/PPN based on assessed needs  - Assess need for intravenous fluids  - Provide specific nutrition/hydration education as appropriate  - Include patient/family/caregiver in decisions related to nutrition  3/9/2022 0114 by Gisselle Davison RN  Outcome: Progressing  3/9/2022 0114 by Gisselle Davison RN  Outcome: Progressing  3/8/2022 2143 by Gisselle Davison RN  Outcome: Progressing     Problem: RESPIRATORY - ADULT  Goal: Achieves optimal ventilation and oxygenation  Description: INTERVENTIONS:  - Assess for changes in respiratory status  - Assess for changes in mentation and behavior  - Position to facilitate oxygenation and minimize respiratory effort  - Oxygen administered by appropriate delivery if ordered  - Initiate smoking cessation education as indicated  - Encourage broncho-pulmonary hygiene including cough, deep breathe, Incentive Spirometry  - Assess the need for suctioning and aspirate as needed  - Assess and instruct to report SOB or any respiratory difficulty  - Respiratory Therapy support as indicated  3/9/2022 0114 by Jean-Pierre Gao RN  Outcome: Progressing  3/9/2022 0114 by Jean-Pierre Gao RN  Outcome: Progressing  3/8/2022 2143 by Jean-Pierre Gao RN  Outcome: Progressing     Problem: METABOLIC, FLUID AND ELECTROLYTES - ADULT  Goal: Electrolytes maintained within normal limits  Description: INTERVENTIONS:  - Monitor labs and assess patient for signs and symptoms of electrolyte imbalances  - Administer electrolyte replacement as ordered  - Monitor response to electrolyte replacements, including repeat lab results as appropriate  - Instruct patient on fluid and nutrition as appropriate  3/9/2022 0114 by Jean-Pierre Gao RN  Outcome: Progressing  3/9/2022 0114 by Jean-Pierre Gao RN  Outcome: Progressing  3/8/2022 2143 by Jean-Pierre Gao RN  Outcome: Progressing  Goal: Fluid balance maintained  Description: INTERVENTIONS:  - Monitor labs   - Monitor I/O and WT  - Instruct patient on fluid and nutrition as appropriate  - Assess for signs & symptoms of volume excess or deficit  3/9/2022 0114 by Jean-Pierre Gao RN  Outcome: Progressing  3/9/2022 0114 by Jean-Pierre Gao RN  Outcome: Progressing  3/8/2022 2143 by Jean-Pierre Gao RN  Outcome: Progressing  Goal: Glucose maintained within target range  Description: INTERVENTIONS:  - Monitor Blood Glucose as ordered  - Assess for signs and symptoms of hyperglycemia and hypoglycemia  - Administer ordered medications to maintain glucose within target range  - Assess nutritional intake and initiate nutrition service referral as needed  3/9/2022 0114 by Delma Canas, NATALIA  Outcome: Progressing  3/9/2022 0114 by Delma Canas RN  Outcome: Progressing  3/8/2022 2143 by Delma Canas, RN  Outcome: Progressing

## 2022-03-09 NOTE — H&P
[address] 1945, 77 y o  male MRN: [id number]  Unit/Bed#: -Yamila Encounter: [id number]  Primary Care Provider: Chanda Saint, DO   Date and time admitted to hospital: 3/8/2022  3:12 PM    * Chronic obstructive pulmonary disease with acute exacerbation ([address] )  Assessment & Plan  · Patient received IV solu medrol 125 mg and 1 hour long neg x 2 and continues to have wheezing  · Will initiate IV solu medrol 40 mg q 12 hours  · Respiratory protocol  · Continue stiolto inhaler at home, will substitute with salmeterol and Spiriva inhalers   · Monitor oxygen saturation   · Labs in am, procal 0 09, wbc 7 46    Type 2 diabetes mellitus with diabetic neuropathy, unspecified (HCC)  Assessment & Plan  Lab Results   Component Value Date    HGBA1C 6 9 (H) 01/06/2022       No results for input(s): POCGLU in the last 72 hours  Blood Sugar Average: Last 72 hrs:  · hold PO medications  · Insulin sliding scale with accu checks  · Monitor blood glucose trends  · Continue home regimen of Gabapentin     Mixed hyperlipidemia  Assessment & Plan  · Continue statin therapy     Benign essential tremor  Assessment & Plan  · Continue primidone    Coronary artery disease involving native coronary artery of native heart without angina pectoris  Assessment & Plan  · Continue home regimen of aspirin, statin, lopressor, and lisinopril   · History of CABG x4    Tobacco abuse  Assessment & Plan  · Nicotine patch 7 mg      VTE Pharmacologic Prophylaxis: VTE Score: 7 High Risk (Score >/= 5) - Pharmacological DVT Prophylaxis Ordered: enoxaparin (Lovenox)  Sequential Compression Devices Ordered  Code Status: Level 1 - Full Code   Discussion with patient    Anticipated Length of Stay: Patient will be admitted on an observation basis with an anticipated length of stay of less than 2 midnights secondary to IV steroids, respiratory monitoring      Chief Complaint:  Shortness of breath    History of Present Illness:  Christelle Vila is a 77 y o  male with a PMH of CAD with history of CABG x4, hypertension, diabetes mellitus type 2 with neuropathy not on insulin, BPH, COPD who presents with shortness of Breath  Patient reported worsening shortness of Breath for the last 5 days, feels similar to his known COPD  He was given 2 nebs and steroids and azithromycin in the ED  Seen in ED in January, still smokes, had 2 cigarettes today  He reports several days of SOB, that he let it go, last few days was worse, this afternoon after lunch he didn't feel well and came to ED  Wears 2 L oxygen at night  This morning when he went to bathroom, he coughed up some mucus, was green in color  Review of Systems:  Review of Systems   Constitutional: Positive for chills  Negative for fever  HENT: Negative for rhinorrhea, sore throat and trouble swallowing  Eyes: Negative for photophobia and discharge  Respiratory: Positive for cough, shortness of breath and wheezing  Cardiovascular: Negative for chest pain and leg swelling  Gastrointestinal: Negative for abdominal pain, diarrhea, nausea and vomiting  Genitourinary: Negative for dysuria and hematuria  Musculoskeletal: Positive for back pain  Negative for neck pain  Skin: Negative for rash and wound  Neurological: Negative for dizziness, weakness and headaches  Psychiatric/Behavioral: Negative for agitation and confusion  Past Medical and Surgical History:   Past Medical History:   Diagnosis Date    BPH (benign prostatic hyperplasia)     45 days radiation treatment    COPD (chronic obstructive pulmonary disease)     [facility] 11[facility]21/2019    Coronary artery disease     CABG x4 in 2017    Diabetes mellitus     History of Arterial Duplex of LE 12/26/2017    Likely occlusion of the left superficial femoral artery  Calcific changes bilaterally    Despite these changes, the ankle-brachial index as a measure of peripheral blood flow only mildly impaired   History of echocardiogram 06/12/2017    EF 40%, mild LVH, mild MR     Hyperlipidemia     Hypertension     NSTEMI (non-ST elevated myocardial infarction)     Prostate cancer     prostate     PVD (peripheral vascular disease)     Tremor     Type 2 MI (myocardial infarction) ([facility] ) 4/6/2021       Past Surgical History:   Procedure Laterality Date    CARDIAC CATHETERIZATION  03/08/2017    Significant left main plus triple-vessel CAD   CORONARY ARTERY BYPASS GRAFT  03/08/2017    4V CABG:  LIMA to LAD, VG to RI, SVG to PDA to LVBR RCA   EYE SURGERY      shots in eye once a month @ the [address] [place] [address]         Meds/Allergies:  Prior to Admission medications    Medication Sig Start Date End Date Taking?  Authorizing Provider   albuterol (2 5 mg/3 mL) 0 083 % nebulizer solution Take 3 mL (2 5 mg total) by nebulization every 4 (four) hours as needed for wheezing or shortness of breath 1/23/22   Phillip Del Cid PA-C   albuterol (PROVENTIL HFA,VENTOLIN HFA) 90 mcg/act inhaler Inhale 2 puffs every 6 (six) hours as needed for wheezing or shortness of breath 1/23/22   Destini Christianson PA-C   Alogliptin Benzoate 25 MG TABS Take 0 5 tablets (12 5 mg total) by mouth daily Patient states takes 1/2 of a 25 mg tablet every AM  Patient taking differently: Take 25 mg by mouth daily  9/3/20 1/12/22  PENG Kerr   aspirin (ECOTRIN LOW STRENGTH) 81 mg EC tablet Take 1 tablet (81 mg total) by mouth every other day 9/3/20   PENG Salgado   azithromycin (ZITHROMAX) 250 mg tablet  11/19/21   Historical Provider, MD   carboxymethylcellulose (REFRESH PLUS) 0 5 % SOLN INSTILL 1 DROP BOTH EYES FOUR TIMES A DAY FOR DRY EYES  Patient not taking: Reported on 12/14/2021 9/27/21   Historical Provider, MD   cyanocobalamin (VITAMIN B-12) 500 MCG tablet TAKE ONE TABLET BY MOUTH EVERY MORNING *VITAMIN B12* 11/8/21   Historical Provider, MD   Empagliflozin 25 MG TABS Take 0 5 tablets (12 5 mg total) by mouth every morning Take one half tablet every morning 9/3/20   PENG Kerr   gabapentin (NEURONTIN) 100 mg capsule Take 2 capsules (200 mg total) by mouth daily at bedtime 9/3/20   PENG Kerr   glimepiride (AMARYL) 1 mg tablet Take 1 tablet (1 mg total) by mouth 2 (two) times a day 9/3/20   PENG Prado   guaiFENesin (MUCINEX) 600 mg 12 hr tablet Take 1 tablet (600 mg total) by mouth 2 (two) times a day  Patient not taking: Reported on 1/7/2022  9/3/20   PENG Prado   hydrocortisone 1 % cream Apply topically 2 (two) times a day 9/3/20   PENG Monten[place]   lisinopril (ZESTRIL) 40 mg tablet Take 1 tablet (40 mg total) by mouth daily 1/7/22   Darian Dillard MD   metFORMIN (GLUCOPHAGE) 500 mg tablet Take 1 tablet (500 mg total) by mouth daily with dinner 9/3/20   PENG [place]   methylPREDNISolone 4 MG tablet therapy pack Use as directed on package 12/23/21   Sadie Kay PA-C   methylPREDNISolone 4 MG tablet therapy pack Use as directed on package 1/23/22   Jacques Del Cid PA-C   metoprolol tartrate (LOPRESSOR) 25 mg tablet Take 25 mg by mouth every 12 (twelve) hours    Historical Provider, MD   mometasone (ASMANEX TWISTHALER) 220 MCG/INH inhaler Inhale 2 puffs 2 (two) times a day Rinse mouth after use      Historical Provider, MD   predniSONE 10 mg tablet Take 10 mg by mouth daily 12/22/21   Historical Provider, MD   primidone (MYSOLINE) 50 mg tablet Take 100 mg by mouth every 8 (eight) hours    Historical Provider, MD   rosuvastatin (CRESTOR) 20 MG tablet Take 1 tablet (20 mg total) by mouth daily after dinner Take one half tablet daily with dinner  Patient taking differently: Take 10 mg by mouth daily after dinner Take one half tablet daily with dinner  9/3/20   PENG Prado   saxagliptin (ONGLYZA) 5 MG tablet Take 1 tablet (5 mg total) by mouth daily 9/3/20   PENG Mejia   tiotropium-olodaterol (Maeve Christiansen) 2 5-2 5 MCG/ACT inhaler Inhale 2 puffs daily    Historical Provider, MD     I have reviewed home medications with patient personally  Allergies: Allergies   Allergen Reactions    Atorvastatin Myalgia       Social History:  Marital Status: /Civil Union   Occupation:  Retired  Patient Pre-hospital Living Situation: Home  Patient Pre-hospital Level of Mobility: walks with cane when out  Patient Pre-hospital Diet Restrictions: none  Substance Use History:   Social History     Substance and Sexual Activity   Alcohol Use Yes     Social History     Tobacco Use   Smoking Status Current Some Day Smoker    Packs/day: 0 25    Years: 60 00    Pack years: 15 00    Types: Cigarettes   Smokeless Tobacco Never Used   Tobacco Comment    only smokes when he drinks beer     Social History     Substance and Sexual Activity   Drug Use Never       Family History:  Family History   Problem Relation Age of Onset    Cancer Father     Heart disease Brother        Physical Exam:     Vitals:   Blood Pressure: (!) 176/88 (03/08/22 2031)  Pulse: 96 (03/08/22 2031)  Temperature: (!) 97 1 °F (36 2 °C) (03/08/22 1516)  Temp Source: Temporal (03/08/22 1516)  Respirations: 20 (03/08/22 2031)  SpO2: 96 % (03/08/22 2031)    Physical Exam  Vitals reviewed  Constitutional:       General: He is not in acute distress  Appearance: Normal appearance  Comments: Elderly  male   HENT:      Head: Normocephalic and atraumatic  Right Ear: External ear normal       Left Ear: External ear normal       Nose: Nose normal    Eyes:      General:         Right eye: No discharge  Left eye: No discharge  Conjunctiva/sclera: Conjunctivae normal    Cardiovascular:      Rate and Rhythm: Normal rate and regular rhythm  Heart sounds: Normal heart sounds  No murmur heard  Pulmonary:      Effort: Pulmonary effort is normal  No respiratory distress  Breath sounds: Wheezing present  No rales  Abdominal:      General: Bowel sounds are normal  There is no distension  Palpations: Abdomen is soft  Tenderness: There is no abdominal tenderness  There is no guarding  Musculoskeletal:         General: Normal range of motion  Cervical back: Normal range of motion  Skin:     General: Skin is warm and dry  Neurological:      Mental Status: He is alert and oriented to person, place, and time  Mental status is at baseline  Psychiatric:         Mood and Affect: Mood normal          Behavior: Behavior normal          Thought Content: Thought content normal          Judgment: Judgment normal           Additional Data:     Lab Results:  Results from last 7 days   Lab Units 03/08/22  1537   WBC Thousand/uL 7 46   HEMOGLOBIN g/dL 14 7   HEMATOCRIT % 42 1   PLATELETS Thousands/uL 203   NEUTROS PCT % 71   LYMPHS PCT % 14   MONOS PCT % 8   EOS PCT % 5     Results from last 7 days   Lab Units 03/08/22  1537   SODIUM mmol/L 137   POTASSIUM mmol/L 3 7   CHLORIDE mmol/L 101   CO2 mmol/L 26   BUN mg/dL 12   CREATININE mg/dL 0 91   ANION GAP mmol/L 10   CALCIUM mg/dL 8 7   ALBUMIN g/dL 4 2   TOTAL BILIRUBIN mg/dL 0 48   ALK PHOS U/L 85   ALT U/L 6*   AST U/L 7*   GLUCOSE RANDOM mg/dL 116                 Results from last 7 days   Lab Units 03/08/22  1939   PROCALCITONIN ng/ml 0 09       Imaging: Reviewed radiology reports from this admission including: chest xray  XR chest 1 view portable   Final Result by Laila Stafford MD (03/08 1552)      No acute cardiopulmonary disease  Workstation performed: [id number]             ** Please Note: This note has been constructed using a voice recognition system   **

## 2022-03-09 NOTE — DISCHARGE SUMMARY
[address]  Discharge- Samson Aldana 1945, 77 y o  male MRN: [id number]  Unit/Bed#: -Yamila Encounter: [id number]  Primary Care Provider: Tim Estevez DO   Date and time admitted to hospital: 3/8/2022  3:12 PM    * Chronic obstructive pulmonary disease with acute exacerbation ([address] )  Assessment & Plan  · Patient received IV solu medrol 125 mg and 1 hour long neg x 2 and continues to have wheezing  Symptoms much improved today  · No leukocytosis  · Procalcitonin normal x2  · Status post IV Solu-Medrol and nebulized inhalers  · Discharge on pre-admission inhaler regimen, 40 mg p o  Prednisone x7 days, and azithromycin 500 mg x3 days  · Ambulatory referrals provided for Pulmonology and Pulmonary Rehab       Type 2 diabetes mellitus with diabetic neuropathy, unspecified [facility])  Assessment & Plan  Lab Results   Component Value Date    HGBA1C 6 9 (H) 01/06/2022       Recent Labs     03/08/22  2201 03/09/22  0537 03/09/22  1044   POCGLU 338* 245* 242*       Blood Sugar Average: Last 72 hrs:  · (P) 275   · Resume all p o   Hypoglycemic agents and home regimen of gabapentin  · Follow-up with PCP in the outpatient setting for tighter blood sugar management    Mixed hyperlipidemia  Assessment & Plan  · Discharge on statin therapy     Benign essential tremor  Assessment & Plan  · Discharge on primidone    Coronary artery disease involving native coronary artery of native heart without angina pectoris  Assessment & Plan  · Discharge on home regimen of aspirin, statin, lopressor, and lisinopril   · History of CABG x4    Tobacco abuse  Assessment & Plan  · Nicotine patch 7 mg while inpatient  · Encouraged tobacco cessation      Medical Problems             Resolved Problems  Date Reviewed: 3/9/2022    None              Discharging Physician / Practitioner: Fariba Romero PA-C  PCP: Tim Estevez DO  Admission Date:   Admission Orders (From admission, onward)     Ordered        03/08/22 38 Camila Argenis Sotomayor in Observation  Once                      Discharge Date: 03/09/22    Consultations During Hospital Stay:  · None    Procedures Performed:   · None    Significant Findings / Test Results:   · Chest x-ray:  No acute cardiopulmonary disease    Incidental Findings:   · None     Test Results Pending at Discharge (will require follow up):   · CT lung screening program     Outpatient Tests Requested:  · None    Complications:  None    Reason for Admission:  COPD exacerbation    Hospital Course:   Angelita Groves is a 77 y o  male patient with past medical history significant for CAD status post CABG x4, hypertension, diabetes type 2 with neuropathy not currently on insulin, BPH, COPD who originally presented to the hospital on 3/8/2022 due to shortness of breath  Please refer to the initial history and physical as outlined by Tanisha Huston PA-C for additional presenting features  In brief, patient was diagnosed with a COPD exacerbation, and treated with IV steroids, azithromycin, and nebulized inhalers  Symptoms significantly improved on the day of discharge, and patient was discharged on 3 additional days of azithromycin, a 7 day course of 40 mg oral prednisone, and may resume pre-admission COPD inhaler regimen  He was also provided ambulatory referral stew pulmonology and pulmonary rehab  CT lung screening pending at the time of discharge and patient was instructed to follow-up in the outpatient setting for final results  Patient was medically stable for discharge on 03/09/2022  This is a brief discharge summary; please refer to the above assessment/plan as well as the remainder of the patient's medical history for further details  Please see above list of diagnoses and related plan for additional information  Condition at Discharge: good    Discharge Day Visit / Exam:   Subjective:  Patient seen and examined resting comfortably in bed, in no apparent distress  No acute events overnight  States that he feels much better today  Agreeable to going home  Vitals: Blood Pressure: 153/91 (03/09/22 0741)  Pulse: 87 (03/09/22 0741)  Temperature: (!) 97 4 °F (36 3 °C) (03/09/22 0741)  Temp Source: Oral (03/08/22 2300)  Respirations: 18 (03/09/22 0741)  Height: 5' 10" (177 8 cm) (03/08/22 2000)  Weight - Scale: 91 6 kg (201 lb 15 1 oz) (1 pillow and 1 sheet) (03/09/22 0546)  SpO2: 94 % (03/09/22 0741)  Exam:   Physical Exam  Vitals and nursing note reviewed  Constitutional:       General: He is not in acute distress  Appearance: He is normal weight  He is not toxic-appearing  HENT:      Head: Normocephalic and atraumatic  Mouth/Throat:      Mouth: Mucous membranes are moist    Eyes:      Conjunctiva/sclera: Conjunctivae normal    Cardiovascular:      Rate and Rhythm: Normal rate and regular rhythm  Pulses: Normal pulses  Heart sounds: Normal heart sounds  Pulmonary:      Effort: Pulmonary effort is normal  No respiratory distress  Breath sounds: Wheezing (Faint expiratory wheezing throughout) present  No rhonchi or rales  Abdominal:      General: Bowel sounds are normal  There is no distension  Palpations: Abdomen is soft  Tenderness: There is no abdominal tenderness  Musculoskeletal:      Cervical back: Neck supple  Right lower leg: No edema  Left lower leg: No edema  Skin:     General: Skin is warm and dry  Neurological:      Mental Status: He is alert and oriented to person, place, and time  Cranial Nerves: No cranial nerve deficit  Sensory: No sensory deficit  Motor: No weakness  Coordination: Coordination normal    Psychiatric:         Mood and Affect: Mood normal          Behavior: Behavior normal          Thought Content: Thought content normal           Discussion with Family: Patient declined call to        Discharge instructions/Information to patient and family:   See after visit summary for information provided to patient and family  Provisions for Follow-Up Care:  See after visit summary for information related to follow-up care and any pertinent home health orders  Disposition:   Home    Planned Readmission:  None     Discharge Statement:  I spent 70 minutes discharging the patient  This time was spent on the day of discharge  I had direct contact with the patient on the day of discharge  Greater than 50% of the total time was spent examining patient, answering all patient questions, arranging and discussing plan of care with patient as well as directly providing post-discharge instructions  Additional time then spent on discharge activities  Discharge Medications:  See after visit summary for reconciled discharge medications provided to patient and/or family        **Please Note: This note may have been constructed using a voice recognition system**

## 2022-03-09 NOTE — CASE MANAGEMENT
Case Management Assessment & Discharge Planning Note    Patient name Vicenta Snowden  Location /-87 MRN [id number]  : 1945 Date 3/9/2022       Current Admission Date: 3/8/2022  Current Admission Diagnosis:Chronic obstructive pulmonary disease with acute exacerbation [facility])   Patient Active Problem List    Diagnosis Date Noted    PAD (peripheral artery disease) ([facility] ) 2022    Diminished pulses in lower extremity 2021    Physical deconditioning 2021    Tinea unguium 2021    Type 2 diabetes mellitus with diabetic neuropathy, unspecified ([facility] ) 2021    Other age-related cataract 2021    Central retinal vein occlusion with macular edema 2021    Nonexudative age-related macular degeneration 2021    Obesity 2021    Ocular hypertension 2021    Onychomycosis due to dermatophyte 2021    Acute on chronic respiratory failure ([facility] ) 08/15/2020    Chronic obstructive pulmonary disease with acute exacerbation ([facility] ) 2020    Mixed hyperlipidemia 2020    Ischemic cardiomyopathy 2019    Coronary artery disease involving native coronary artery of native heart without angina pectoris 2019    Benign prostatic hyperplasia with urinary retention 2019    Mucopurulent chronic bronchitis ([facility] ) 2019    Benign essential tremor 2019    Pill rolling tremor 2019    Prostate cancer ([facility] ) 2018    Ambulatory dysfunction 2017    On intra-aortic balloon pump assist 03/10/2017    S/P CABG x 4 2017    Tobacco abuse 2017      LOS (days): 0  Geometric Mean LOS (GMLOS) (days):   Days to GMLOS:     OBJECTIVE:              Current admission status: Observation  Referral Reason: Other (Discharge planning)    Preferred Pharmacy:   [facility] [address] [facility]AYLIN CHRIS [address]  [facility] [address]  Phone: [phone] Fax: [phone]    Primary Care Provider: Lydia Roberts DO    Primary Insurance: [facility]  Secondary Insurance:     ASSESSMENT:  [address] Proxies    There are no active Health Care Proxies on file  Advance Directives  Does patient have a [address]?: No  Was patient offered paperwork?: Yes  Does patient currently have a Health Care decision maker?: No  Does patient have Advance Directives?: No  Was patient offered paperwork?: Yes  Primary Contact: Suzanne Holcomb - spouse         Readmission Root Cause  30 Day Readmission: No    Patient Information  Admitted from[de-identified] Home  Mental Status: Alert  During Assessment patient was accompanied by: Not accompanied during assessment  Assessment information provided by[de-identified] Patient  Primary Caregiver: Self  Support Systems: Spouse/significant other  [place] of Residence: [address] do you live in?: Via [employer] entry access options   Select all that apply : Stairs  Number of steps to enter home : 4  Type of Current Residence: 2 story home  Upon entering residence, is there a bedroom on the main floor (no further steps)?: No  A bedroom is located on the following floor levels of residence (select all that apply):: 2nd Floor  Upon entering residence, is there a bathroom on the main floor (no further steps)?: No  Indicate which floors of current residence have a bathroom (select all the apply):: 2nd Floor  Number of steps to 2nd floor from main floor:  (14)  In the last 12 months, was there a time when you were not able to pay the mortgage or rent on time?: No  In the last 12 months, how many places have you lived?: 1  In the last 12 months, was there a time when you did not have a steady place to sleep or slept in a shelter (including now)?: No  Homeless/housing insecurity resource given?: N/A  Living Arrangements: Lives w/ Spouse/significant other  Is patient a ?: No    Activities of Daily Living Prior to Admission  Functional Status: Independent  Completes ADLs independently?: Yes  Ambulates independently?: No  Level of ambulatory dependence: Assistance  Does patient use assisted devices?: Yes  Assisted Devices (DME) used: Straight Cane,Walker  Does patient currently own DME?: Yes  What DME does the patient currently own?: Shower Chair  Does patient have a history of Outpatient Therapy (PT/OT)?: No  Does the patient have a history of Short-Term Rehab?: No  Does patient have a history of HHC?: No  Does patient currently have [address]?: No         Patient Information Continued  Income Source: Pension/long-term  Does patient have prescription coverage?: Yes  Within the past 12 months, you worried that your food would run out before you got the money to buy more : Never true  Within the past 12 months, the food you bought just didnt last and you didnt have money to get more : Never true  Food insecurity resource given?: N/A  Does patient receive dialysis treatments?: No  Does patient have a history of substance abuse?: No  Does patient have a history of Mental Health Diagnosis?: No         Means of Transportation  Means of Transport to Appts[de-identified] Drives Self  In the past 12 months, has lack of transportation kept you from medical appointments or from getting medications?: No  In the past 12 months, has lack of transportation kept you from meetings, work, or from getting things needed for daily living?: No  Was application for public transport provided?: N/A        DISCHARGE DETAILS:    Discharge planning discussed with[de-identified] Patient  Freedom of Choice: Yes  Comments - Freedom of Choice: Pt denies any current discharge needs  CM to follow to assess for same                       Requested [address] [facility] [address]         Is the patient interested in [address] at discharge?: No    DME Referral Provided  Referral made for DME?: No         Would you like to participate in our [address] service program?  : No - Declined    Treatment Team Recommendation: Home  Discharge Destination Plan[de-identified] Home  Transport at Discharge : Family

## 2022-03-09 NOTE — UTILIZATION REVIEW
Initial Clinical Review    Admission: Date/Time/Statement:   Admission Orders (From admission, onward)     Ordered        03/08/22 1839  Place in Observation  Once                      Orders Placed This Encounter   Procedures    Place in Observation     Standing Status:   Standing     Number of Occurrences:   1     Order Specific Question:   Level of Care     Answer:   Med Surg [16]     ED Arrival Information     Expected Arrival Acuity    - 3/8/2022 14:57 Urgent         Means of arrival Escorted by Service Admission type    Walk-In Self Hospitalist Urgent         Arrival complaint    extreme SOB        Chief Complaint   Patient presents with    Shortness of Breath     Patient reports SOB for the last 1  5 days, reports he has COPD  Initial Presentation:   43-year-old male with history of COPD, CAD, diabetes, presenting for evaluation of shortness of breath  Patient says the symptoms have been present for the past day and half  He says that it feels similar to COPD exacerbations that he has had in the past   He denies any chest pain, fevers, chills  He denies any cough or sputum production  Patient has been taking his COPD medications as prescribed  On exam, vitals are within normal limits  He has diffuse wheezing in all lung fields  Chronic obstructive pulmonary disease with acute exacerbation (HCC)  Assessment & Plan  · Patient received IV solu medrol 125 mg and 1 hour long neg x 2 and continues to have wheezing  · Will initiate IV solu medrol 40 mg q 12 hours  · Respiratory protocol     · Continue stiolto inhaler at home, will substitute with salmeterol and Spiriva inhalers   · Monitor oxygen saturation   · Labs in am, procal 0 09, wbc 7 46      ED Triage Vitals   Temperature Pulse Respirations Blood Pressure SpO2   03/08/22 1516 03/08/22 1516 03/08/22 1516 03/08/22 1517 03/08/22 1516   (!) 97 1 °F (36 2 °C) 88 20 160/72 96 %      Temp Source Heart Rate Source Patient Position - Orthostatic VS BP Location FiO2 (%)   03/08/22 1516 03/08/22 1516 03/08/22 1517 03/08/22 1517 --   Temporal Monitor Lying Left arm       Pain Score       03/08/22 2000       No Pain          Wt Readings from Last 1 Encounters:   03/09/22 91 6 kg (201 lb 15 1 oz)     Additional Vital Signs:   03/09/22 0628 -- 92 20 -- -- 94 % None (Room air) --   03/08/22 2300 98 °F (36 7 °C) 90 20 158/70 -- 94 % None (Room air) Lying   03/08/22 2100 -- -- -- 156/80 -- -- -- --   03/08/22 20:31:28 97 4 °F (36 3 °C) Abnormal  96 20 176/88 Abnormal  117 96 % None (Room air) Lying   03/08/22 2000 98 °F (36 7 °C) 94 20 160/70 -- 96 % None (Room air) Lying   03/08/22 1800 -- 89 18 162/69 99 96 % None (Room air) Lying     Pertinent Labs/Diagnostic Test Results:   XR chest 1 view portable   Final Result by Germaine Yuan MD (03/08 1552)      No acute cardiopulmonary disease       Results from last 7 days   Lab Units 03/09/22  0437 03/08/22  1537   WBC Thousand/uL 5 27 7 46   HEMOGLOBIN g/dL 13 5 14 7   HEMATOCRIT % 40 5 42 1   PLATELETS Thousands/uL 187 203   NEUTROS ABS Thousands/µL  --  5 38     Results from last 7 days   Lab Units 03/09/22  0437 03/08/22  1537   SODIUM mmol/L 135 137   POTASSIUM mmol/L 4 1 3 7   CHLORIDE mmol/L 102 101   CO2 mmol/L 23 26   ANION GAP mmol/L 10 10   BUN mg/dL 20 12   CREATININE mg/dL 0 93 0 91   EGFR ml/min/1 73sq m 80 82   CALCIUM mg/dL 8 6 8 7   MAGNESIUM mg/dL 2 1  --    PHOSPHORUS mg/dL 3 2  --      Results from last 7 days   Lab Units 03/09/22  0437 03/08/22  1537   AST U/L 5* 7*   ALT U/L 7 6*   ALK PHOS U/L 77 85   TOTAL PROTEIN g/dL 6 3* 6 7   ALBUMIN g/dL 4 0 4 2   TOTAL BILIRUBIN mg/dL 0 45 0 48     Results from last 7 days   Lab Units 03/09/22  0537 03/08/22  2201   POC GLUCOSE mg/dl 245* 338*     Results from last 7 days   Lab Units 03/09/22  0437 03/08/22  1537   GLUCOSE RANDOM mg/dL 201* 116     Results from last 7 days   Lab Units 03/08/22  1939 03/08/22  1755 03/08/22  1537   HS TNI 0HR ng/L  --   --  15 HS TNI 2HR ng/L  --  12  --    HSTNI D2 ng/L  --  -3  --    HS TNI 4HR ng/L 10  --   --    HSTNI D4 ng/L -5  --   --      Results from last 7 days   Lab Units 03/09/22  0437 03/08/22  1939   PROCALCITONIN ng/ml 0 09 0 09     ED Treatment:   Medication Administration from 03/08/2022 1457 to 03/08/2022 2007       Date/Time Order Dose Route Action     03/08/2022 1546 albuterol inhalation solution 10 mg 10 mg Nebulization Given     03/08/2022 1546 ipratropium (ATROVENT) 0 02 % inhalation solution 1 mg 1 mg Nebulization Given     03/08/2022 1546 sodium chloride 0 9 % inhalation solution 3 mL 3 mL Nebulization Given     03/08/2022 1543 methylPREDNISolone sodium succinate (Solu-MEDROL) injection 125 mg 125 mg Intravenous Given     03/08/2022 1649 albuterol inhalation solution 10 mg 10 mg Nebulization Given     03/08/2022 1649 ipratropium (ATROVENT) 0 02 % inhalation solution 1 mg 1 mg Nebulization Given     03/08/2022 1649 sodium chloride 0 9 % inhalation solution 3 mL 3 mL Nebulization Given     03/08/2022 1901 azithromycin (ZITHROMAX) tablet 500 mg 500 mg Oral Given        Past Medical History:   Diagnosis Date    BPH (benign prostatic hyperplasia)     45 days radiation treatment    COPD (chronic obstructive pulmonary disease)     COPD with acute exacerbation ([facility] ) 11/21/2019    Coronary artery disease     CABG x4 in 2017    Diabetes mellitus     History of Arterial Duplex of LE 12/26/2017    Likely occlusion of the left superficial femoral artery  Calcific changes bilaterally  Despite these changes, the ankle-brachial index as a measure of peripheral blood flow only mildly impaired      History of echocardiogram 06/12/2017    EF 40%, mild LVH, mild MR     Hyperlipidemia     Hypertension     NSTEMI (non-ST elevated myocardial infarction)     Prostate cancer     prostate     PVD (peripheral vascular disease)     Tremor     Type 2 MI (myocardial infarction) ([facility] ) 4/6/2021     Present on Admission:   Type 2 diabetes mellitus with diabetic neuropathy, unspecified ([facility] )   Mixed hyperlipidemia   Coronary artery disease involving native coronary artery of native heart without angina pectoris   Chronic obstructive pulmonary disease with acute exacerbation (HCC)   Benign essential tremor   Tobacco abuse    Admitting Diagnosis: SOB (shortness of breath) [R06 02]  COPD exacerbation (HCC) [J44 1]  Age/Sex: 77 y o  male  Admission Orders:  scd  accuchecks    Scheduled Medications:  aspirin, 81 mg, Oral, Every Other Day  azithromycin, 500 mg, Oral, Q24H  cyanocobalamin, 250 mcg, Oral, Daily  enoxaparin, 40 mg, Subcutaneous, Daily  gabapentin, 200 mg, Oral, HS  insulin lispro, 1-5 Units, Subcutaneous, HS  insulin lispro, 1-6 Units, Subcutaneous, TID AC  levalbuterol, 1 25 mg, Nebulization, TID   And  sodium chloride, 3 mL, Nebulization, TID  lisinopril, 40 mg, Oral, Daily  methylPREDNISolone sodium succinate, 40 mg, Intravenous, Q12H ENDER  metoprolol tartrate, 25 mg, Oral, Q12H ENDER  nicotine, 7 mg, Transdermal, Daily  pravastatin, 80 mg, Oral, Daily With Dinner  primidone, 100 mg, Oral, Q8H [facility]  salmeterol, 1 puff, Inhalation, Q12H [facility]  tiotropium, 18 mcg, Inhalation, Daily      Continuous IV Infusions:     PRN Meds:  acetaminophen, 650 mg, Oral, Q4H PRN  aluminum-magnesium hydroxide-simethicone, 30 mL, Oral, Q6H PRN  ondansetron, 4 mg, Intravenous, Q6H PRN        IP CONSULT TO CASE MANAGEMENT    Network Utilization Review Department  ATTENTION: Please call with any questions or concerns to [phone] and carefully listen to the prompts so that you are directed to the right person  All voicemails are confidential   Farrel Bodily all requests for admission clinical reviews, approved or denied determinations and any other requests to dedicated fax number below belonging to the campus where the patient is receiving treatment   List of dedicated fax numbers for the Facilities:  FACILITY NAME UR FAX NUMBER   ADMISSION DENIALS (Administrative/Medical Necessity) [phone]   [address] (Maternity/NICU/Pediatrics) 261 [facility] 40 125 [facility]  [phone] [address] [address] [place] [address] [address] [facility] [address] [facility] [phone]

## 2022-03-09 NOTE — DISCHARGE INSTRUCTIONS
COPD (Chronic Obstructive Pulmonary Disease)   WHAT YOU NEED TO KNOW:   COPD (chronic obstructive pulmonary disease) can get worse quickly  Your healthcare providers will help you create a care plan to use at home  The plan will give directions on how to prevent or manage shortness of breath  Your family members or anyone who cares for you will also get directions to help you  DISCHARGE INSTRUCTIONS:   Call your local emergency number (911 in the [address] [facility]) if:   · You feel lightheaded, short of breath, and have chest pain  Seek care immediately if:   · You cough up blood  · You are confused, dizzy, or feel faint  · Your arm or leg feels warm, tender, and painful  It may look swollen and red  Call your doctor if:   · You have increased shortness of breath  · You need more medicine than usual to control your symptoms  · You are coughing or wheezing more than usual     · You are coughing up more mucus, or it has a new color or odor  · You gain more than 3 pounds in a week  · You have a fever, a runny or stuffy nose, and a sore throat, or other cold or flu symptoms  · Your skin, lips, or nails start to turn blue  · You have swelling in your legs or ankles  · You are very tired or weak for more than a day  · You notice changes in your mood, or changes in your ability to think or concentrate  · You have questions or concerns about your condition or care  Medicines:   · Short-acting bronchodilators  may be called rescue inhalers or relievers  They relieve sudden, severe symptoms and start to work right away  · Long-acting bronchodilators  may be called controllers or maintenance medicine  This medicine helps open the airway over time, and is used to decrease and prevent breathing problems  Long-acting bronchodilators should not be used to treat sudden, severe symptoms, such as trouble breathing      · Antibiotics may be given for up to 5 days to treat a bacterial infection during an exacerbation  · Take your medicine as directed  Contact your healthcare provider if you think your medicine is not helping or if you have side effects  Tell him or her if you are allergic to any medicine  Keep a list of the medicines, vitamins, and herbs you take  Include the amounts, and when and why you take them  Bring the list or the pill bottles to follow-up visits  Carry your medicine list with you in case of an emergency  Help make breathing easier:   · Use pursed-lip breathing any time you feel short of breath  Take a deep breath in through your nose  Slowly breathe out through your mouth with your lips pursed  Try to take 2 times as long to breathe out as to breathe in  This helps you get rid of as much air from your lungs as possible  You can also practice this breathing pattern while you bend, lift, climb stairs, or exercise  It slows down your breathing and helps move more air in and out of your lungs  · Avoid anything that makes your symptoms worse  Stay out of high altitudes and places with high humidity  Stay inside, or cover your mouth and nose with a scarf when you are outside in cold weather  Stay inside on days when air pollution or pollen counts are high  Do not use aerosol sprays such as deodorant, bug spray, and hairspray  · Exercise as directed  Your healthcare provider may recommend at least 20 minutes of exercise each day to help increase your energy and decrease shortness of breath  Talk to your provider about the best exercise plan for you  Manage COPD and help prevent exacerbations:  COPD is a serious condition that gets worse over time  A COPD exacerbation means your symptoms suddenly get worse  It is important to prevent exacerbations  An exacerbation can cause more lung damage  COPD cannot be cured, but you can take action to feel better and prevent exacerbations:  · Do not smoke    Nicotine and other chemicals in cigarettes and cigars can cause lung damage and make your COPD worse  Ask your healthcare provider for information if you currently smoke and need help to quit  E-cigarettes or smokeless tobacco still contain nicotine  Talk to your healthcare provider before you use these products  · Avoid secondhand smoke  This is smoke another person exhales  Even if you have never smoked or have quit, it is important to avoid secondhand smoke  This smoke can also cause lung damage or trigger an exacerbation  · Go to pulmonary rehabilitation (rehab) if directed  Rehab is a program run by specialists who help you learn to manage COPD  Examples include a pulmonologist (lung specialist), dietitian, or exercise therapist  The specialists will help you make a plan to avoid triggers that cause an exacerbation  · Take your medicines as directed  Refill your medicines before you are out so that you do not miss a dose  Ask your healthcare provider if you have any questions on how to take your medicines  · Protect yourself from germs  Germs can get into your lungs and cause an infection  An infection in your lungs can create more mucus and make it harder to breathe  An infection can also create swelling in your airway and prevent air from getting in  You can decrease your risk for infection by doing the following:         ? Wash your hands often with soap and water  Carry germ-killing gel with you  You can use the gel to clean your hands when soap and water are not available  ? Do not touch your eyes, nose, or mouth unless you have washed your hands first     ? Always cover your mouth when you cough  Cough into a tissue or your shirtsleeve so you do not spread germs from your hands  ? Try to avoid people who have a cold or the flu  If you are sick, stay away from others as much as possible  ? Ask about vaccines you may need  Influenza (the flu), pneumonia, and COVID-19 can become life-threatening for a person who has COPD   Get a yearly flu vaccine as soon as recommended, usually in September or October  The pneumonia vaccine may be given every 5 years, or as directed  COVID-19 vaccines are available in shots given in 1 or 2 doses  Your healthcare provider can tell you if you should also get other vaccines, and when to get them  · Drink liquids as directed  You may need to drink more liquid than usual  Liquid will help to keep your air passages moist and help you cough up mucus  Ask how much liquid to drink each day and which liquids are best for you  Follow up with your doctor as directed: You may need more tests  Your doctor may refer you to a specialist, depending on your needs  Some specialist services may be available through your pulmonary rehab program  Write down your questions so you remember to ask them during your visits  © Copyright [employer] 2022 Information is for End User's use only and may not be sold, redistributed or otherwise used for commercial purposes  All illustrations and images included in CareNotes® are the copyrighted property of A D A M , Inc  or [employer]  The above information is an  only  It is not intended as medical advice for individual conditions or treatments  Talk to your doctor, nurse or pharmacist before following any medical regimen to see if it is safe and effective for you  Cigarette Smoking and Your Health   AMBULATORY CARE:   Risks to your health if you smoke:  Nicotine and other chemicals found in tobacco and e-cigarettes can damage every cell in your body  Even if you are a light smoker, you have an increased risk for cancer, heart disease, and lung disease  If you are pregnant or have diabetes, smoking increases your risk for complications  Nicotine can affect an adolescent's developing brain  This can lead to trouble thinking, learning, or paying attention    Benefits to your health if you stop smoking:   · You decrease respiratory symptoms such as coughing, wheezing, and shortness of breath  · You reduce your risk for cancers of the lung, mouth, throat, kidney, bladder, pancreas, stomach, and cervix  If you already have cancer, you increase the benefits of chemotherapy  You also reduce your risk for cancer returning or a second cancer from developing  · You reduce your risk for heart disease, blood clots, heart attack, and stroke  · You reduce your risk for lung infections, and diseases such as pneumonia, asthma, chronic bronchitis, and emphysema  · Your circulation improves  More oxygen can be delivered to your body  If you have diabetes, you lower your risk for complications, such as kidney, artery, and eye diseases  You also lower your risk for nerve damage  Nerve damage can lead to amputations, poor vision, and blindness  · You improve your body's ability to heal and to fight infections  · An adolescent can help his or her brain and body develop in a healthy way  Talk to your adolescent about all the health risks of nicotine  If you can, start talking about nicotine when your child is younger than 12 years  This may make it easier for him or her not to start using nicotine as a teenager or adult  Explain to him or her that it is best never to start  It can be hard to try to quit later  Benefits to the health of others if you stop smoking:  Tobacco is harmful to nonsmokers who breathe in your secondhand smoke  The following are ways the health of others around you may improve when you stop smoking:  · You lower the risks for lung cancer and heart disease in nonsmoking adults  · If you are pregnant, you lower the risk for miscarriage, early delivery, low birth weight, and stillbirth  You also lower your baby's risk for SIDS, obesity, developmental delay, and neurobehavioral problems, such as ADHD  · If you have children, you lower their risk for ear infections, colds, pneumonia, bronchitis, and asthma      Follow up with your doctor as directed:  Write down your questions so you remember to ask them during your visits  For support and more information:   · American [employer] Association  [address] [facility]  [place] , [facility] [address]  Phone: [facility] [address]  Phone: 0- 766 - 176-8305  Web Address: Pablo clarke    · Smokefree  gov  Phone: 7- 865 - 205-7889  Web Address: www smokefree  [facility] 2022 Information is for End User's use only and may not be sold, redistributed or otherwise used for commercial purposes  All illustrations and images included in CareNotes® are the copyrighted property of A D A M , Inc  or [address] [place] [address]  The above information is an  only  It is not intended as medical advice for individual conditions or treatments  Talk to your doctor, nurse or pharmacist before following any medical regimen to see if it is safe and effective for you

## 2022-03-09 NOTE — ASSESSMENT & PLAN NOTE
Lab Results   Component Value Date    HGBA1C 6 9 (H) 01/06/2022       Recent Labs     03/08/22  2201 03/09/22  0537 03/09/22  1044   POCGLU 338* 245* 242*       Blood Sugar Average: Last 72 hrs:  · (P) 275   · Resume all p o   Hypoglycemic agents and home regimen of gabapentin  · Follow-up with PCP in the outpatient setting for tighter blood sugar management

## 2022-03-09 NOTE — ASSESSMENT & PLAN NOTE
Lab Results   Component Value Date    HGBA1C 6 9 (H) 01/06/2022       No results for input(s): POCGLU in the last 72 hours      Blood Sugar Average: Last 72 hrs:  · hold PO medications  · Insulin sliding scale with accu checks  · Monitor blood glucose trends  · Continue home regimen of Gabapentin

## 2022-03-09 NOTE — NURSING NOTE
Received from ed  To bed  Used cane to ambulate  R/a status  To masimo monitor  02 sat 95% r/a  resp easy  Lungs clear  Denies cough  heart is regular no edema  Plus 2 pedal /radial pulses  Abdomen is soft/obese and non tender  Bsx4  lbm today and kayla  Call bell given and bed alarm on  No distress and no complaints

## 2022-03-09 NOTE — PLAN OF CARE
Problem: MOBILITY - ADULT  Goal: Maintain or return to baseline ADL function  Description: INTERVENTIONS:  -  Assess patient's ability to carry out ADLs; assess patient's baseline for ADL function and identify physical deficits which impact ability to perform ADLs (bathing, care of mouth/teeth, toileting, grooming, dressing, etc )  - Assess/evaluate cause of self-care deficits   - Assess range of motion  - Assess patient's mobility; develop plan if impaired  - Assess patient's need for assistive devices and provide as appropriate  - Encourage maximum independence but intervene and supervise when necessary  - Involve family in performance of ADLs  - Assess for home care needs following discharge   - Consider OT consult to assist with ADL evaluation and planning for discharge  - Provide patient education as appropriate  Outcome: Adequate for Discharge  Goal: Maintains/Returns to pre admission functional level  Description: INTERVENTIONS:  - Perform BMAT or MOVE assessment daily    - Set and communicate daily mobility goal to care team and patient/family/caregiver     - Collaborate with rehabilitation services on mobility goals if consulted  - Out of bed for toileting  - Record patient progress and toleration of activity level   Outcome: Adequate for Discharge     Problem: Prexisting or High Potential for Compromised Skin Integrity  Goal: Skin integrity is maintained or improved  Description: INTERVENTIONS:  - Identify patients at risk for skin breakdown  - Assess and monitor skin integrity  - Assess and monitor nutrition and hydration status  - Monitor labs   - Assess for incontinence   - Turn and reposition patient  - Assist with mobility/ambulation  - Relieve pressure over bony prominences  - Avoid friction and shearing  - Provide appropriate hygiene as needed including keeping skin clean and dry  - Evaluate need for skin moisturizer/barrier cream  - Collaborate with interdisciplinary team   - Patient/family teaching  - Consider wound care consult   Outcome: Adequate for Discharge     Problem: Potential for Falls  Goal: Patient will remain free of falls  Description: INTERVENTIONS:  - Educate patient/family on patient safety including physical limitations  - Instruct patient to call for assistance with activity   - Consult OT/PT to assist with strengthening/mobility   - Keep Call bell within reach  - Keep bed low and locked with side rails adjusted as appropriate  - Keep care items and personal belongings within reach  - Initiate and maintain comfort rounds  - Make Fall Risk Sign visible to staff  - Apply yellow socks and bracelet for high fall risk patients  - Consider moving patient to room near nurses station  Outcome: Adequate for Discharge     Problem: PAIN - ADULT  Goal: Verbalizes/displays adequate comfort level or baseline comfort level  Description: Interventions:  - Encourage patient to monitor pain and request assistance  - Assess pain using appropriate pain scale  - Administer analgesics based on type and severity of pain and evaluate response  - Implement non-pharmacological measures as appropriate and evaluate response  - Consider cultural and social influences on pain and pain management  - Notify physician/advanced practitioner if interventions unsuccessful or patient reports new pain  Outcome: Adequate for Discharge     Problem: INFECTION - ADULT  Goal: Absence or prevention of progression during hospitalization  Description: INTERVENTIONS:  - Assess and monitor for signs and symptoms of infection  - Monitor lab/diagnostic results  - Monitor all insertion sites, i e  indwelling lines, tubes, and drains  - Monitor endotracheal if appropriate and nasal secretions for changes in amount and color  - Tyrone appropriate cooling/warming therapies per order  - Administer medications as ordered  - Instruct and encourage patient and family to use good hand hygiene technique  - Identify and instruct in appropriate isolation precautions for identified infection/condition  Outcome: Adequate for Discharge  Goal: Absence of fever/infection during neutropenic period  Description: INTERVENTIONS:  - Monitor WBC    Outcome: Adequate for Discharge     Problem: SAFETY ADULT  Goal: Maintain or return to baseline ADL function  Description: INTERVENTIONS:  -  Assess patient's ability to carry out ADLs; assess patient's baseline for ADL function and identify physical deficits which impact ability to perform ADLs (bathing, care of mouth/teeth, toileting, grooming, dressing, etc )  - Assess/evaluate cause of self-care deficits   - Assess range of motion  - Assess patient's mobility; develop plan if impaired  - Assess patient's need for assistive devices and provide as appropriate  - Encourage maximum independence but intervene and supervise when necessary  - Involve family in performance of ADLs  - Assess for home care needs following discharge   - Consider OT consult to assist with ADL evaluation and planning for discharge  - Provide patient education as appropriate  Outcome: Adequate for Discharge  Goal: Maintains/Returns to pre admission functional level  Description: INTERVENTIONS:  - Perform BMAT or MOVE assessment daily    - Set and communicate daily mobility goal to care team and patient/family/caregiver     - Collaborate with rehabilitation services on mobility goals if consulted  - Out of bed for toileting  - Record patient progress and toleration of activity level   Outcome: Adequate for Discharge  Goal: Patient will remain free of falls  Description: INTERVENTIONS:  - Educate patient/family on patient safety including physical limitations  - Instruct patient to call for assistance with activity   - Consult OT/PT to assist with strengthening/mobility   - Keep Call bell within reach  - Keep bed low and locked with side rails adjusted as appropriate  - Keep care items and personal belongings within reach  - Initiate and maintain comfort rounds  - Make Fall Risk Sign visible to staff  - Apply yellow socks and bracelet for high fall risk patients  - Consider moving patient to room near nurses station  Outcome: Adequate for Discharge     Problem: DISCHARGE PLANNING  Goal: Discharge to home or other facility with appropriate resources  Description: INTERVENTIONS:  - Identify barriers to discharge w/patient and caregiver  - Arrange for needed discharge resources and transportation as appropriate  - Identify discharge learning needs (meds, wound care, etc )  - Arrange for interpretive services to assist at discharge as needed  - Refer to Case Management Department for coordinating discharge planning if the patient needs post-hospital services based on physician/advanced practitioner order or complex needs related to functional status, cognitive ability, or social support system  Outcome: Adequate for Discharge     Problem: Knowledge Deficit  Goal: Patient/family/caregiver demonstrates understanding of disease process, treatment plan, medications, and discharge instructions  Description: Complete learning assessment and assess knowledge base  Interventions:  - Provide teaching at level of understanding  - Provide teaching via preferred learning methods  Outcome: Adequate for Discharge     Problem: Nutrition/Hydration-ADULT  Goal: Nutrient/Hydration intake appropriate for improving, restoring or maintaining nutritional needs  Description: Monitor and assess patient's nutrition/hydration status for malnutrition  Collaborate with interdisciplinary team and initiate plan and interventions as ordered  Monitor patient's weight and dietary intake as ordered or per policy  Utilize nutrition screening tool and intervene as necessary  Determine patient's food preferences and provide high-protein, high-caloric foods as appropriate       INTERVENTIONS:  - Monitor oral intake, urinary output, labs, and treatment plans  - Assess nutrition and hydration status and recommend course of action  - Evaluate amount of meals eaten  - Assist patient with eating if necessary   - Allow adequate time for meals  - Recommend/ encourage appropriate diets, oral nutritional supplements, and vitamin/mineral supplements  - Order, calculate, and assess calorie counts as needed  - Recommend, monitor, and adjust tube feedings and TPN/PPN based on assessed needs  - Assess need for intravenous fluids  - Provide specific nutrition/hydration education as appropriate  - Include patient/family/caregiver in decisions related to nutrition  Outcome: Adequate for Discharge     Problem: RESPIRATORY - ADULT  Goal: Achieves optimal ventilation and oxygenation  Description: INTERVENTIONS:  - Assess for changes in respiratory status  - Assess for changes in mentation and behavior  - Position to facilitate oxygenation and minimize respiratory effort  - Oxygen administered by appropriate delivery if ordered  - Initiate smoking cessation education as indicated  - Encourage broncho-pulmonary hygiene including cough, deep breathe, Incentive Spirometry  - Assess the need for suctioning and aspirate as needed  - Assess and instruct to report SOB or any respiratory difficulty  - Respiratory Therapy support as indicated  Outcome: Adequate for Discharge     Problem: METABOLIC, FLUID AND ELECTROLYTES - ADULT  Goal: Electrolytes maintained within normal limits  Description: INTERVENTIONS:  - Monitor labs and assess patient for signs and symptoms of electrolyte imbalances  - Administer electrolyte replacement as ordered  - Monitor response to electrolyte replacements, including repeat lab results as appropriate  - Instruct patient on fluid and nutrition as appropriate  Outcome: Adequate for Discharge  Goal: Fluid balance maintained  Description: INTERVENTIONS:  - Monitor labs   - Monitor I/O and WT  - Instruct patient on fluid and nutrition as appropriate  - Assess for signs & symptoms of volume excess or deficit  Outcome: Adequate for Discharge  Goal: Glucose maintained within target range  Description: INTERVENTIONS:  - Monitor Blood Glucose as ordered  - Assess for signs and symptoms of hyperglycemia and hypoglycemia  - Administer ordered medications to maintain glucose within target range  - Assess nutritional intake and initiate nutrition service referral as needed  Outcome: Adequate for Discharge

## 2022-03-10 ENCOUNTER — TRANSITIONAL CARE MANAGEMENT (OUTPATIENT)
Dept: FAMILY MEDICINE CLINIC | Facility: CLINIC | Age: 77
End: 2022-03-10

## 2022-03-17 ENCOUNTER — TELEPHONE (OUTPATIENT)
Dept: CARDIAC REHAB | Facility: HOSPITAL | Age: 77
End: 2022-03-17

## 2022-03-18 ENCOUNTER — APPOINTMENT (EMERGENCY)
Dept: RADIOLOGY | Facility: HOSPITAL | Age: 77
DRG: 190 | End: 2022-03-18
Payer: COMMERCIAL

## 2022-03-18 ENCOUNTER — HOSPITAL ENCOUNTER (INPATIENT)
Facility: HOSPITAL | Age: 77
LOS: 3 days | Discharge: HOME | DRG: 190 | End: 2022-03-21
Attending: EMERGENCY MEDICINE | Admitting: INTERNAL MEDICINE
Payer: COMMERCIAL

## 2022-03-18 DIAGNOSIS — J44.1 CHRONIC OBSTRUCTIVE PULMONARY DISEASE WITH ACUTE EXACERBATION (HCC): Primary | ICD-10-CM

## 2022-03-18 PROBLEM — E11.10 DIABETIC KETOACIDOSIS WITHOUT COMA ASSOCIATED WITH TYPE 2 DIABETES MELLITUS (HCC): Status: ACTIVE | Noted: 2021-04-06

## 2022-03-18 PROBLEM — E87.29 HIGH ANION GAP METABOLIC ACIDOSIS: Status: ACTIVE | Noted: 2021-04-06

## 2022-03-18 LAB
2HR DELTA HS TROPONIN: -9 NG/L
4HR DELTA HS TROPONIN: -90 NG/L
ALBUMIN SERPL BCP-MCNC: 4 G/DL (ref 3.5–5)
ALP SERPL-CCNC: 74 U/L (ref 34–104)
ALT SERPL W P-5'-P-CCNC: 16 U/L (ref 7–52)
ANION GAP SERPL CALCULATED.3IONS-SCNC: 19 MMOL/L (ref 4–13)
ANION GAP SERPL CALCULATED.3IONS-SCNC: 19 MMOL/L (ref 4–13)
APTT PPP: 33 SECONDS (ref 23–37)
AST SERPL W P-5'-P-CCNC: 9 U/L (ref 13–39)
BASE EX.OXY STD BLDV CALC-SCNC: 87.3 % (ref 60–80)
BASE EXCESS BLDV CALC-SCNC: -4.8 MMOL/L
BASOPHILS # BLD MANUAL: 0 THOUSAND/UL (ref 0–0.1)
BASOPHILS NFR MAR MANUAL: 0 % (ref 0–1)
BETA-HYDROXYBUTYRATE: 4.2 MMOL/L
BILIRUB SERPL-MCNC: 0.48 MG/DL (ref 0.2–1)
BNP SERPL-MCNC: 392 PG/ML (ref 1–100)
BUN SERPL-MCNC: 33 MG/DL (ref 5–25)
BUN SERPL-MCNC: 33 MG/DL (ref 5–25)
CALCIUM SERPL-MCNC: 8.8 MG/DL (ref 8.4–10.2)
CALCIUM SERPL-MCNC: 8.9 MG/DL (ref 8.4–10.2)
CARDIAC TROPONIN I PNL SERPL HS: 492 NG/L
CARDIAC TROPONIN I PNL SERPL HS: 506 NG/L
CARDIAC TROPONIN I PNL SERPL HS: 573 NG/L
CARDIAC TROPONIN I PNL SERPL HS: 582 NG/L
CHLORIDE SERPL-SCNC: 96 MMOL/L (ref 96–108)
CHLORIDE SERPL-SCNC: 96 MMOL/L (ref 96–108)
CO2 SERPL-SCNC: 20 MMOL/L (ref 21–32)
CO2 SERPL-SCNC: 20 MMOL/L (ref 21–32)
CREAT SERPL-MCNC: 0.96 MG/DL (ref 0.6–1.3)
CREAT SERPL-MCNC: 1 MG/DL (ref 0.6–1.3)
EOSINOPHIL # BLD MANUAL: 0 THOUSAND/UL (ref 0–0.4)
EOSINOPHIL NFR BLD MANUAL: 0 % (ref 0–6)
ERYTHROCYTE [DISTWIDTH] IN BLOOD BY AUTOMATED COUNT: 14 % (ref 11.6–15.1)
FLUAV RNA RESP QL NAA+PROBE: NEGATIVE
FLUBV RNA RESP QL NAA+PROBE: NEGATIVE
GFR SERPL CREATININE-BSD FRML MDRD: 73 ML/MIN/1.73SQ M
GFR SERPL CREATININE-BSD FRML MDRD: 77 ML/MIN/1.73SQ M
GLUCOSE SERPL-MCNC: 160 MG/DL (ref 65–140)
GLUCOSE SERPL-MCNC: 189 MG/DL (ref 65–140)
GLUCOSE SERPL-MCNC: 197 MG/DL (ref 65–140)
GLUCOSE SERPL-MCNC: 205 MG/DL (ref 65–140)
GLUCOSE SERPL-MCNC: 327 MG/DL (ref 65–140)
HCO3 BLDV-SCNC: 20.1 MMOL/L (ref 24–30)
HCT VFR BLD AUTO: 46.7 % (ref 36.5–49.3)
HGB BLD-MCNC: 15.2 G/DL (ref 12–17)
INR PPP: 1.05 (ref 0.84–1.19)
LYMPHOCYTES # BLD AUTO: 0.65 THOUSAND/UL (ref 0.6–4.47)
LYMPHOCYTES # BLD AUTO: 7 % (ref 14–44)
MAGNESIUM SERPL-MCNC: 2.4 MG/DL (ref 1.9–2.7)
MCH RBC QN AUTO: 31.3 PG (ref 26.8–34.3)
MCHC RBC AUTO-ENTMCNC: 32.5 G/DL (ref 31.4–37.4)
MCV RBC AUTO: 96 FL (ref 82–98)
MONOCYTES # BLD AUTO: 0.09 THOUSAND/UL (ref 0–1.22)
MONOCYTES NFR BLD: 1 % (ref 4–12)
NEUTROPHILS # BLD MANUAL: 8.59 THOUSAND/UL (ref 1.85–7.62)
NEUTS BAND NFR BLD MANUAL: 1 % (ref 0–8)
NEUTS SEG NFR BLD AUTO: 91 % (ref 43–75)
O2 CT BLDV-SCNC: 18.4 ML/DL
PCO2 BLDV: 36.9 MM HG (ref 42–50)
PH BLDV: 7.35 [PH] (ref 7.3–7.4)
PHOSPHATE SERPL-MCNC: 4.1 MG/DL (ref 2.3–4.1)
PLATELET # BLD AUTO: 256 THOUSANDS/UL (ref 149–390)
PLATELET BLD QL SMEAR: ADEQUATE
PMV BLD AUTO: 11.1 FL (ref 8.9–12.7)
PO2 BLDV: 58.3 MM HG (ref 35–45)
POTASSIUM SERPL-SCNC: 3.9 MMOL/L (ref 3.5–5.3)
POTASSIUM SERPL-SCNC: 4.1 MMOL/L (ref 3.5–5.3)
PROCALCITONIN SERPL-MCNC: 1.03 NG/ML
PROT SERPL-MCNC: 6.8 G/DL (ref 6.4–8.4)
PROTHROMBIN TIME: 13.6 SECONDS (ref 11.6–14.5)
RBC # BLD AUTO: 4.85 MILLION/UL (ref 3.88–5.62)
RBC MORPH BLD: NORMAL
RSV RNA RESP QL NAA+PROBE: NEGATIVE
SARS-COV-2 RNA RESP QL NAA+PROBE: NEGATIVE
SODIUM SERPL-SCNC: 135 MMOL/L (ref 135–147)
SODIUM SERPL-SCNC: 135 MMOL/L (ref 135–147)
WBC # BLD AUTO: 9.34 THOUSAND/UL (ref 4.31–10.16)

## 2022-03-18 PROCEDURE — 99285 EMERGENCY DEPT VISIT HI MDM: CPT | Performed by: EMERGENCY MEDICINE

## 2022-03-18 PROCEDURE — 99223 1ST HOSP IP/OBS HIGH 75: CPT | Performed by: NURSE PRACTITIONER

## 2022-03-18 PROCEDURE — 71045 X-RAY EXAM CHEST 1 VIEW: CPT

## 2022-03-18 PROCEDURE — 85027 COMPLETE CBC AUTOMATED: CPT | Performed by: EMERGENCY MEDICINE

## 2022-03-18 PROCEDURE — 80048 BASIC METABOLIC PNL TOTAL CA: CPT | Performed by: PHYSICIAN ASSISTANT

## 2022-03-18 PROCEDURE — 85610 PROTHROMBIN TIME: CPT | Performed by: EMERGENCY MEDICINE

## 2022-03-18 PROCEDURE — 82805 BLOOD GASES W/O2 SATURATION: CPT | Performed by: EMERGENCY MEDICINE

## 2022-03-18 PROCEDURE — 83735 ASSAY OF MAGNESIUM: CPT | Performed by: PHYSICIAN ASSISTANT

## 2022-03-18 PROCEDURE — 36415 COLL VENOUS BLD VENIPUNCTURE: CPT | Performed by: EMERGENCY MEDICINE

## 2022-03-18 PROCEDURE — 84484 ASSAY OF TROPONIN QUANT: CPT | Performed by: NURSE PRACTITIONER

## 2022-03-18 PROCEDURE — 93005 ELECTROCARDIOGRAM TRACING: CPT

## 2022-03-18 PROCEDURE — 84145 PROCALCITONIN (PCT): CPT | Performed by: NURSE PRACTITIONER

## 2022-03-18 PROCEDURE — 85007 BL SMEAR W/DIFF WBC COUNT: CPT | Performed by: EMERGENCY MEDICINE

## 2022-03-18 PROCEDURE — 84100 ASSAY OF PHOSPHORUS: CPT | Performed by: PHYSICIAN ASSISTANT

## 2022-03-18 PROCEDURE — 83880 ASSAY OF NATRIURETIC PEPTIDE: CPT | Performed by: EMERGENCY MEDICINE

## 2022-03-18 PROCEDURE — 85730 THROMBOPLASTIN TIME PARTIAL: CPT | Performed by: EMERGENCY MEDICINE

## 2022-03-18 PROCEDURE — 84484 ASSAY OF TROPONIN QUANT: CPT | Performed by: EMERGENCY MEDICINE

## 2022-03-18 PROCEDURE — 82948 REAGENT STRIP/BLOOD GLUCOSE: CPT

## 2022-03-18 PROCEDURE — 80053 COMPREHEN METABOLIC PANEL: CPT | Performed by: EMERGENCY MEDICINE

## 2022-03-18 PROCEDURE — 82010 KETONE BODYS QUAN: CPT | Performed by: EMERGENCY MEDICINE

## 2022-03-18 PROCEDURE — 99285 EMERGENCY DEPT VISIT HI MDM: CPT

## 2022-03-18 PROCEDURE — 94760 N-INVAS EAR/PLS OXIMETRY 1: CPT

## 2022-03-18 PROCEDURE — 96374 THER/PROPH/DIAG INJ IV PUSH: CPT

## 2022-03-18 PROCEDURE — 94664 DEMO&/EVAL PT USE INHALER: CPT

## 2022-03-18 PROCEDURE — 94644 CONT INHLJ TX 1ST HOUR: CPT

## 2022-03-18 PROCEDURE — 0241U HB NFCT DS VIR RESP RNA 4 TRGT: CPT | Performed by: EMERGENCY MEDICINE

## 2022-03-18 PROCEDURE — 87040 BLOOD CULTURE FOR BACTERIA: CPT | Performed by: NURSE PRACTITIONER

## 2022-03-18 RX ORDER — GUAIFENESIN 600 MG
600 TABLET, EXTENDED RELEASE 12 HR ORAL 2 TIMES DAILY
Status: DISCONTINUED | OUTPATIENT
Start: 2022-03-18 | End: 2022-03-21 | Stop reason: HOSPADM

## 2022-03-18 RX ORDER — SODIUM CHLORIDE FOR INHALATION 0.9 %
3 VIAL, NEBULIZER (ML) INHALATION ONCE
Status: COMPLETED | OUTPATIENT
Start: 2022-03-18 | End: 2022-03-18

## 2022-03-18 RX ORDER — PRIMIDONE 50 MG/1
100 TABLET ORAL EVERY 8 HOURS SCHEDULED
Status: DISCONTINUED | OUTPATIENT
Start: 2022-03-18 | End: 2022-03-21 | Stop reason: HOSPADM

## 2022-03-18 RX ORDER — SODIUM CHLORIDE 9 MG/ML
100 INJECTION, SOLUTION INTRAVENOUS CONTINUOUS
Status: DISCONTINUED | OUTPATIENT
Start: 2022-03-19 | End: 2022-03-19

## 2022-03-18 RX ORDER — ALBUTEROL SULFATE 90 UG/1
2 AEROSOL, METERED RESPIRATORY (INHALATION) EVERY 6 HOURS PRN
Status: DISCONTINUED | OUTPATIENT
Start: 2022-03-18 | End: 2022-03-21 | Stop reason: HOSPADM

## 2022-03-18 RX ORDER — GABAPENTIN 100 MG/1
200 CAPSULE ORAL
Status: DISCONTINUED | OUTPATIENT
Start: 2022-03-18 | End: 2022-03-21 | Stop reason: HOSPADM

## 2022-03-18 RX ORDER — ALBUTEROL SULFATE 2.5 MG/3ML
2.5 SOLUTION RESPIRATORY (INHALATION) EVERY 4 HOURS PRN
Status: DISCONTINUED | OUTPATIENT
Start: 2022-03-18 | End: 2022-03-18

## 2022-03-18 RX ORDER — ACETAMINOPHEN 325 MG/1
650 TABLET ORAL EVERY 6 HOURS PRN
Status: DISCONTINUED | OUTPATIENT
Start: 2022-03-18 | End: 2022-03-21 | Stop reason: HOSPADM

## 2022-03-18 RX ORDER — METHYLPREDNISOLONE SODIUM SUCCINATE 125 MG/2ML
125 INJECTION, POWDER, LYOPHILIZED, FOR SOLUTION INTRAMUSCULAR; INTRAVENOUS ONCE
Status: COMPLETED | OUTPATIENT
Start: 2022-03-18 | End: 2022-03-18

## 2022-03-18 RX ORDER — SODIUM CHLORIDE FOR INHALATION 0.9 %
3 VIAL, NEBULIZER (ML) INHALATION
Status: DISCONTINUED | OUTPATIENT
Start: 2022-03-18 | End: 2022-03-19

## 2022-03-18 RX ORDER — LISINOPRIL 20 MG/1
40 TABLET ORAL DAILY
Status: DISCONTINUED | OUTPATIENT
Start: 2022-03-19 | End: 2022-03-21 | Stop reason: HOSPADM

## 2022-03-18 RX ORDER — METHYLPREDNISOLONE SODIUM SUCCINATE 40 MG/ML
40 INJECTION, POWDER, LYOPHILIZED, FOR SOLUTION INTRAMUSCULAR; INTRAVENOUS EVERY 8 HOURS
Status: DISCONTINUED | OUTPATIENT
Start: 2022-03-19 | End: 2022-03-21

## 2022-03-18 RX ORDER — HEPARIN SODIUM 5000 [USP'U]/ML
5000 INJECTION, SOLUTION INTRAVENOUS; SUBCUTANEOUS EVERY 8 HOURS SCHEDULED
Status: DISCONTINUED | OUTPATIENT
Start: 2022-03-18 | End: 2022-03-21 | Stop reason: HOSPADM

## 2022-03-18 RX ORDER — LEVALBUTEROL 1.25 MG/.5ML
1.25 SOLUTION, CONCENTRATE RESPIRATORY (INHALATION)
Status: DISCONTINUED | OUTPATIENT
Start: 2022-03-18 | End: 2022-03-21 | Stop reason: HOSPADM

## 2022-03-18 RX ORDER — AZITHROMYCIN 250 MG/1
500 TABLET, FILM COATED ORAL EVERY 24 HOURS
Status: COMPLETED | OUTPATIENT
Start: 2022-03-18 | End: 2022-03-20

## 2022-03-18 RX ORDER — ASPIRIN 81 MG/1
81 TABLET ORAL EVERY OTHER DAY
Status: DISCONTINUED | OUTPATIENT
Start: 2022-03-18 | End: 2022-03-21 | Stop reason: HOSPADM

## 2022-03-18 RX ORDER — FLUTICASONE PROPIONATE 220 UG/1
2 AEROSOL, METERED RESPIRATORY (INHALATION)
Status: DISCONTINUED | OUTPATIENT
Start: 2022-03-18 | End: 2022-03-19

## 2022-03-18 RX ADMIN — AZITHROMYCIN MONOHYDRATE 500 MG: 250 TABLET ORAL at 18:35

## 2022-03-18 RX ADMIN — GUAIFENESIN 600 MG: 600 TABLET, EXTENDED RELEASE ORAL at 18:35

## 2022-03-18 RX ADMIN — METHYLPREDNISOLONE SODIUM SUCCINATE 125 MG: 125 INJECTION, POWDER, FOR SOLUTION INTRAMUSCULAR; INTRAVENOUS at 16:11

## 2022-03-18 RX ADMIN — HEPARIN SODIUM 5000 UNITS: 5000 INJECTION INTRAVENOUS; SUBCUTANEOUS at 22:10

## 2022-03-18 RX ADMIN — SODIUM CHLORIDE 1000 ML: 0.9 INJECTION, SOLUTION INTRAVENOUS at 23:58

## 2022-03-18 RX ADMIN — SALMETEROL XINAFOATE 1 PUFF: 50 POWDER, METERED ORAL; RESPIRATORY (INHALATION) at 21:02

## 2022-03-18 RX ADMIN — SODIUM CHLORIDE 1000 ML: 0.9 INJECTION, SOLUTION INTRAVENOUS at 16:37

## 2022-03-18 RX ADMIN — ISODIUM CHLORIDE 3 ML: 0.03 SOLUTION RESPIRATORY (INHALATION) at 19:49

## 2022-03-18 RX ADMIN — ALBUTEROL SULFATE 10 MG: 2.5 SOLUTION RESPIRATORY (INHALATION) at 16:14

## 2022-03-18 RX ADMIN — ASPIRIN 81 MG: 81 TABLET, COATED ORAL at 18:35

## 2022-03-18 RX ADMIN — LEVALBUTEROL 1.25 MG: 1.25 SOLUTION, CONCENTRATE RESPIRATORY (INHALATION) at 19:49

## 2022-03-18 RX ADMIN — METOPROLOL TARTRATE 25 MG: 25 TABLET, FILM COATED ORAL at 20:56

## 2022-03-18 RX ADMIN — ISODIUM CHLORIDE 3 ML: 0.03 SOLUTION RESPIRATORY (INHALATION) at 16:15

## 2022-03-18 RX ADMIN — PRIMIDONE 100 MG: 50 TABLET ORAL at 22:09

## 2022-03-18 RX ADMIN — FLUTICASONE PROPIONATE 2 PUFF: 220 AEROSOL, METERED RESPIRATORY (INHALATION) at 21:07

## 2022-03-18 RX ADMIN — IPRATROPIUM BROMIDE 1 MG: 0.5 SOLUTION RESPIRATORY (INHALATION) at 16:14

## 2022-03-18 RX ADMIN — GABAPENTIN 200 MG: 100 CAPSULE ORAL at 22:09

## 2022-03-18 RX ADMIN — SODIUM CHLORIDE 2 UNITS/HR: 9 INJECTION, SOLUTION INTRAVENOUS at 21:00

## 2022-03-18 NOTE — ASSESSMENT & PLAN NOTE
· HS troponin: 582  · EKG nonischemic  · ER physician discussed with Cardiology-suspect secondary to demand  · Patient denies chest pain  · Serial troponins and EKGs

## 2022-03-18 NOTE — ED PROVIDER NOTES
History  Chief Complaint   Patient presents with    Weakness - Generalized     Patient reports generalized weakness for the past 4 days  Patient denies fevers  Patient also reports shortness of breath      Patient is a 78-year-old male with a history COPD, diabetes, hypertension, hyperlipidemia, CAD, who presents for evaluation of generalized weakness and shortness of breath  Patient says the symptoms have been progressing last 2 days but got acutely worse while he is at the [facility] today  The patient was seen by myself on 03/08 for shortness of breath and was admitted for a COPD exacerbation  He was scheduled to follow-up with pulmonary rehab but has yet to see them  The patient says that over the last few days he has been having some "flu-like symptoms " He says that he has had generalized weakness, body aches  He denies any fevers or chills  He does admit to a mild cough  He denies any chest pain, lightheadedness or dizziness  He says the symptoms feel similar to when he was here 10 days ago for his COPD exacerbation  He says that he has been taking his medications as prescribed  Prior to Admission Medications   Prescriptions Last Dose Informant Patient Reported? Taking?    Alogliptin Benzoate 25 MG TABS   No No   Sig: Take 0 5 tablets (12 5 mg total) by mouth daily Patient states takes 1/2 of a 25 mg tablet every AM   Patient taking differently: Take 25 mg by mouth daily    Empagliflozin 25 MG TABS  Self No No   Sig: Take 0 5 tablets (12 5 mg total) by mouth every morning Take one half tablet every morning   albuterol (2 5 mg/3 mL) 0 083 % nebulizer solution   No No   Sig: Take 3 mL (2 5 mg total) by nebulization every 4 (four) hours as needed for wheezing or shortness of breath   albuterol (PROVENTIL HFA,VENTOLIN HFA) 90 mcg/act inhaler   No No   Sig: Inhale 2 puffs every 6 (six) hours as needed for wheezing or shortness of breath   aspirin (ECOTRIN LOW STRENGTH) 81 mg EC tablet  Self No No   Sig: Take 1 tablet (81 mg total) by mouth every other day   carboxymethylcellulose (REFRESH PLUS) 0 5 % SOLN  Self Yes No   Sig: INSTILL 1 DROP BOTH EYES FOUR TIMES A DAY FOR DRY EYES   Patient not taking: Reported on 12/14/2021   cyanocobalamin (VITAMIN B-12) 500 MCG tablet  Self Yes No   Sig: TAKE ONE TABLET BY MOUTH EVERY MORNING *VITAMIN B12*   gabapentin (NEURONTIN) 100 mg capsule  Self No No   Sig: Take 2 capsules (200 mg total) by mouth daily at bedtime   glimepiride (AMARYL) 1 mg tablet  Self No No   Sig: Take 1 tablet (1 mg total) by mouth 2 (two) times a day   guaiFENesin (MUCINEX) 600 mg 12 hr tablet  Self No No   Sig: Take 1 tablet (600 mg total) by mouth 2 (two) times a day   Patient not taking: Reported on 1/7/2022    hydrocortisone 1 % cream  Self No No   Sig: Apply topically 2 (two) times a day   lisinopril (ZESTRIL) 40 mg tablet  Self No No   Sig: Take 1 tablet (40 mg total) by mouth daily   metFORMIN (GLUCOPHAGE) 500 mg tablet  Self No No   Sig: Take 1 tablet (500 mg total) by mouth daily with dinner   metoprolol tartrate (LOPRESSOR) 25 mg tablet  Self Yes No   Sig: Take 25 mg by mouth every 12 (twelve) hours   mometasone (ASMANEX TWISTHALER) 220 MCG/INH inhaler  Self Yes No   Sig: Inhale 2 puffs 2 (two) times a day Rinse mouth after use     primidone (MYSOLINE) 50 mg tablet  Self Yes No   Sig: Take 100 mg by mouth every 8 (eight) hours   rosuvastatin (CRESTOR) 20 MG tablet  Self No No   Sig: Take 1 tablet (20 mg total) by mouth daily after dinner Take one half tablet daily with dinner   Patient taking differently: Take 10 mg by mouth daily after dinner Take one half tablet daily with dinner    saxagliptin (ONGLYZA) 5 MG tablet  Self No No   Sig: Take 1 tablet (5 mg total) by mouth daily   tiotropium-olodaterol (STIOLTO RESPIMAT) 2 5-2 5 MCG/ACT inhaler  Self Yes No   Sig: Inhale 2 puffs daily      Facility-Administered Medications: None       Past Medical History:   Diagnosis Date    BPH (benign prostatic hyperplasia)     45 days radiation treatment    COPD (chronic obstructive pulmonary disease)     [facility]    Coronary artery disease     CABG x4 in 2017    Diabetes mellitus     History of Arterial Duplex of LE 12/26/2017    Likely occlusion of the left superficial femoral artery  Calcific changes bilaterally  Despite these changes, the ankle-brachial index as a measure of peripheral blood flow only mildly impaired   History of echocardiogram 06/12/2017    EF 40%, mild LVH, mild MR     Hyperlipidemia     Hypertension     NSTEMI (non-ST elevated myocardial infarction)     Prostate cancer     prostate     PVD (peripheral vascular disease)     Tremor     Type 2 MI [facility] infarction[facility] ([facility] ) 4/6/2021       Past Surgical History:   Procedure Laterality Date    CARDIAC CATHETERIZATION  03/08/2017    Significant left main plus triple-vessel CAD   CORONARY ARTERY BYPASS GRAFT  03/08/2017    4V CABG:  LIMA to LAD, VG to RI, SVG to PDA to LVBR RCA   EYE SURGERY      shots in eye once a month @ the [place]    PROSTATE BIOPSY         Family History   Problem Relation Age of Onset    Cancer Father     Heart disease Brother      I have reviewed and agree with the history as documented  E-Cigarette/Vaping    E-Cigarette Use Never User      E-Cigarette/Vaping Substances    Nicotine No     THC No     CBD No     Flavoring No     Other No     Unknown No      Social History     Tobacco Use    Smoking status: Current Some Day Smoker     Packs/day: 0 25     Years: 60 00     Pack years: 15 00     Types: Cigarettes    Smokeless tobacco: Never Used    Tobacco comment: only smokes when he drinks beer   Vaping Use    Vaping Use: Never used   Substance Use Topics    Alcohol use: Yes    Drug use: Never       Review of Systems   Constitutional: Negative for chills, fever and unexpected weight change     HENT: Negative for congestion, sore throat and trouble swallowing  Eyes: Negative for pain, discharge and itching  Respiratory: Positive for cough and shortness of breath  Negative for chest tightness and wheezing  Cardiovascular: Negative for chest pain, palpitations and leg swelling  Gastrointestinal: Negative for abdominal pain, blood in stool, diarrhea, nausea and vomiting  Endocrine: Negative for polyuria  Genitourinary: Negative for difficulty urinating, dysuria, frequency and hematuria  Musculoskeletal: Negative for arthralgias and back pain  Neurological: Positive for weakness (generalized)  Negative for dizziness, syncope, light-headedness and headaches  Physical Exam  Physical Exam  Vitals and nursing note reviewed  Constitutional:       General: He is not in acute distress  Appearance: He is well-developed  HENT:      Head: Normocephalic and atraumatic  Right Ear: External ear normal       Left Ear: External ear normal    Eyes:      Conjunctiva/sclera: Conjunctivae normal       Pupils: Pupils are equal, round, and reactive to light  Cardiovascular:      Rate and Rhythm: Normal rate and regular rhythm  Heart sounds: Normal heart sounds  No murmur heard  No friction rub  No gallop  Pulmonary:      Effort: Pulmonary effort is normal  No respiratory distress  Breath sounds: Wheezing present  No rales  Abdominal:      General: Bowel sounds are normal  There is no distension  Palpations: Abdomen is soft  Tenderness: There is no abdominal tenderness  There is no guarding  Musculoskeletal:         General: No tenderness or deformity  Normal range of motion  Cervical back: Normal range of motion  Lymphadenopathy:      Cervical: No cervical adenopathy  Skin:     General: Skin is warm and dry  Neurological:      General: No focal deficit present  Mental Status: He is alert and oriented to person, place, and time  Mental status is at baseline  Cranial Nerves: No cranial nerve deficit  Sensory: No sensory deficit  Motor: No weakness or abnormal muscle tone     Psychiatric:         Behavior: Behavior normal          Vital Signs  ED Triage Vitals   Temperature Pulse Respirations Blood Pressure SpO2   03/18/22 1551 03/18/22 1551 03/18/22 1551 03/18/22 1551 03/18/22 1551   98 2 °F (36 8 °C) 89 20 122/58 92 %      Temp src Heart Rate Source Patient Position - Orthostatic VS BP Location FiO2 (%)   -- 03/18/22 1700 03/18/22 1551 03/18/22 1551 --    Monitor Sitting Left arm       Pain Score       03/18/22 1551       No Pain           Vitals:    03/19/22 0800 03/19/22 1130 03/19/22 1330 03/19/22 1500   BP: 158/72 142/76 139/66 147/83   Pulse: 79 76 86 81   Patient Position - Orthostatic VS:  Lying Lying Lying         Visual Acuity      ED Medications  Medications   albuterol (PROVENTIL HFA,VENTOLIN HFA) inhaler 2 puff (has no administration in time range)   aspirin (ECOTRIN LOW STRENGTH) EC tablet 81 mg (81 mg Oral Given 3/18/22 1835)   cyanocobalamin (VITAMIN B-12) tablet 500 mcg (500 mcg Oral Given 3/19/22 0930)   gabapentin (NEURONTIN) capsule 200 mg (200 mg Oral Given 3/18/22 2209)   guaiFENesin (MUCINEX) 12 hr tablet 600 mg (600 mg Oral Given 3/19/22 0930)   lisinopril (ZESTRIL) tablet 40 mg (40 mg Oral Given 3/19/22 0932)   metoprolol tartrate (LOPRESSOR) tablet 25 mg (25 mg Oral Given 3/19/22 0932)   primidone (MYSOLINE) tablet 100 mg (100 mg Oral Given 3/19/22 1329)   acetaminophen (TYLENOL) tablet 650 mg (has no administration in time range)   methylPREDNISolone sodium succinate (Solu-MEDROL) injection 40 mg (40 mg Intravenous Given 3/19/22 1327)   heparin (porcine) subcutaneous injection 5,000 Units (5,000 Units Subcutaneous Given 3/19/22 1327)   insulin regular (HumuLIN R,NovoLIN R) 1 Units/mL in sodium chloride 0 9 % 100 mL infusion (3 Units/hr Intravenous Rate/Dose Change 3/19/22 1407)   azithromycin (ZITHROMAX) tablet 500 mg (500 mg Oral Given 3/18/22 2948)   levalbuterol (Dorothe Aris) inhalation solution 1 25 mg ( Nebulization Canceled Entry 3/19/22 1400)   ipratropium (ATROVENT) 0 02 % inhalation solution 0 5 mg ( Nebulization Canceled Entry 3/19/22 1400)   budesonide (PULMICORT) inhalation solution 0 5 mg (0 5 mg Nebulization Not Given 3/19/22 1212)   dextrose 5 % and sodium chloride 0 45 % infusion (75 mL/hr Intravenous New Bag 3/19/22 1257)   albuterol inhalation solution 10 mg (10 mg Nebulization Given 3/18/22 1614)     And   ipratropium (ATROVENT) 0 02 % inhalation solution 1 mg (1 mg Nebulization Given 3/18/22 1614)     And   sodium chloride 0 9 % inhalation solution 3 mL (3 mL Nebulization Given 3/18/22 1615)   methylPREDNISolone sodium succinate (Solu-MEDROL) injection 125 mg (125 mg Intravenous Given 3/18/22 1611)   sodium chloride 0 9 % bolus 1,000 mL (0 mL Intravenous Stopped 3/18/22 1646)   sodium chloride 0 9 % bolus 1,000 mL (0 mL Intravenous Stopped 3/19/22 0158)   sodium chloride 0 9 % bolus 1,000 mL (0 mL Intravenous Stopped 3/19/22 0451)   insulin glargine (LANTUS) subcutaneous injection 13 Units 0 13 mL (13 Units Subcutaneous Given 3/19/22 0931)       Diagnostic Studies  Results Reviewed     Procedure Component Value Units Date/Time    Basic metabolic panel [[id number]]  (Abnormal) Collected: 03/19/22 1402    Lab Status: Final result Specimen: Blood from Arm, Left Updated: 03/19/22 1434     Sodium 134 mmol/L      Potassium 4 1 mmol/L      Chloride 102 mmol/L      CO2 21 mmol/L      ANION GAP 11 mmol/L      BUN 26 mg/dL      Creatinine 0 83 mg/dL      Glucose 217 mg/dL      Calcium 8 1 mg/dL      eGFR 86 ml/min/1 73sq m     Narrative:      [place] guidelines for Chronic Kidney Disease (CKD):     Stage 1 with normal or high GFR (GFR > 90 mL/min/1 73 square meters)    Stage 2 Mild CKD (GFR = 60-89 mL/min/1 73 square meters)    Stage 3A Moderate CKD (GFR = 45-59 mL/min/1 73 square meters)    Stage 3B Moderate CKD (GFR = 30-44 mL/min/1 73 square meters)    Stage 4 Severe CKD (GFR = 15-29 mL/min/1 73 square meters)    Stage 5 End Stage CKD (GFR <15 mL/min/1 73 square meters)  Note: GFR calculation is accurate only with a steady state creatinine    Fingerstick Glucose (POCT) [[id number]]  (Abnormal) Collected: 03/19/22 1401    Lab Status: Final result Updated: 03/19/22 1403     POC Glucose 228 mg/dl     Fingerstick Glucose (POCT) [[id number]]  (Abnormal) Collected: 03/19/22 1326    Lab Status: Final result Updated: 03/19/22 1336     POC Glucose 256 mg/dl     Basic metabolic panel [[id number]]     Lab Status: No result Specimen: Blood     Basic metabolic panel [[id number]]  (Abnormal) Collected: 03/19/22 1117    Lab Status: Final result Specimen: Blood from Arm, Left Updated: 03/19/22 1138     Sodium 133 mmol/L      Potassium 4 4 mmol/L      Chloride 101 mmol/L      CO2 17 mmol/L      ANION GAP 15 mmol/L      BUN 28 mg/dL      Creatinine 0 72 mg/dL      Glucose 162 mg/dL      Calcium 8 1 mg/dL      eGFR 91 ml/min/1 73sq m     Narrative:      [place] guidelines for Chronic Kidney Disease (CKD):     Stage 1 with normal or high GFR (GFR > 90 mL/min/1 73 square meters)    Stage 2 Mild CKD (GFR = 60-89 mL/min/1 73 square meters)    Stage 3A Moderate CKD (GFR = 45-59 mL/min/1 73 square meters)    Stage 3B Moderate CKD (GFR = 30-44 mL/min/1 73 square meters)    Stage 4 Severe CKD (GFR = 15-29 mL/min/1 73 square meters)    Stage 5 End Stage CKD (GFR <15 mL/min/1 73 square meters)  Note: GFR calculation is accurate only with a steady state creatinine    Fingerstick Glucose (POCT) [[id number]]  (Abnormal) Collected: 03/19/22 1119    Lab Status: Final result Updated: 03/19/22 1120     POC Glucose 166 mg/dl     CBC and differential [[id number]]  (Abnormal) Collected: 03/19/22 0610    Lab Status: Final result Specimen: Blood from Arm, Right Updated: 03/19/22 0657     WBC 9 49 Thousand/uL      RBC 4 58 Million/uL      Hemoglobin 14 2 g/dL Hematocrit 43 8 %      MCV 96 fL      MCH 31 0 pg      MCHC 32 4 g/dL      RDW 13 9 %      MPV 11 2 fL      Platelets 212 Thousands/uL      nRBC 0 /100 WBCs      Neutrophils Relative 84 %      Immat GRANS % 1 %      Lymphocytes Relative 10 %      Monocytes Relative 4 %      Eosinophils Relative 0 %      Basophils Relative 0 %      Neutrophils Absolute 7 97 Thousands/µL      Immature Grans Absolute 0 12 Thousand/uL      Lymphocytes Absolute 0 97 Thousands/µL      Monocytes Absolute 0 39 Thousand/µL      Eosinophils Absolute 0 01 Thousand/µL      Basophils Absolute 0 03 Thousands/µL     Fingerstick Glucose (POCT) [[id number]]  (Normal) Collected: 03/19/22 0652    Lab Status: Final result Updated: 03/19/22 0654     POC Glucose 118 mg/dl     Procalcitonin, Next Day AM Collection [[id number]]  (Abnormal) Collected: 03/19/22 0610    Lab Status: Final result Specimen: Blood from Arm, Right Updated: 03/19/22 0651     Procalcitonin 0 68 ng/ml     High Sensitivity Troponin I Random [[id number]]  (Abnormal) Collected: 03/19/22 0610    Lab Status: Final result Specimen: Blood from Arm, Right Updated: 03/19/22 0649     HS TnI random 550 ng/L     Basic metabolic panel [[id number]]  (Abnormal) Collected: 03/19/22 0610    Lab Status: Final result Specimen: Blood from Arm, Right Updated: 03/19/22 0641     Sodium 136 mmol/L      Potassium 4 0 mmol/L      Chloride 101 mmol/L      CO2 21 mmol/L      ANION GAP 14 mmol/L      BUN 29 mg/dL      Creatinine 0 81 mg/dL      Glucose 111 mg/dL      Calcium 8 3 mg/dL      eGFR 86 ml/min/1 73sq m     Narrative:      [facility] guidelines for Chronic Kidney Disease (CKD):     Stage 1 with normal or high GFR (GFR > 90 mL/min/1 73 square meters)    Stage 2 Mild CKD (GFR = 60-89 mL/min/1 73 square meters)    Stage 3A Moderate CKD (GFR = 45-59 mL/min/1 73 square meters)    Stage 3B Moderate CKD (GFR = 30-44 mL/min/1 73 square meters)    Stage 4 Severe CKD (GFR = 15-29 mL/min/1 73 square meters)    Stage 5 End Stage CKD (GFR <15 mL/min/1 73 square meters)  Note: GFR calculation is accurate only with a steady state creatinine    Fingerstick Glucose (POCT) [[id number]]  (Normal) Collected: 03/19/22 0445    Lab Status: Final result Updated: 03/19/22 0447     POC Glucose 134 mg/dl     Fingerstick Glucose (POCT) [[id number]]  (Normal) Collected: 03/19/22 0253    Lab Status: Final result Updated: 03/19/22 0315     POC Glucose 135 mg/dl     Fingerstick Glucose (POCT) [[id number]]  (Normal) Collected: 03/19/22 0107    Lab Status: Final result Updated: 03/19/22 0115     POC Glucose 132 mg/dl     Blood culture [[id number]] Collected: 03/18/22 1811    Lab Status: Preliminary result Specimen: Blood from Hand, Left Updated: 03/19/22 0001     Blood Culture Received in Microbiology Lab  Culture in Progress  Blood culture [[id number]] Collected: 03/18/22 1808    Lab Status: Preliminary result Specimen: Blood from Hand, Right Updated: 03/19/22 0001     Blood Culture Received in Microbiology Lab  Culture in Progress      HS Troponin 0hr (reflex protocol) [[id number]]  (Abnormal) Collected: 03/18/22 2315    Lab Status: Final result Specimen: Blood from Arm, Right Updated: 03/18/22 2342     hs TnI 0hr 506 ng/L     Fingerstick Glucose (POCT) [[id number]]  (Abnormal) Collected: 03/18/22 2310    Lab Status: Final result Updated: 03/18/22 2317     POC Glucose 160 mg/dl     Fingerstick Glucose (POCT) [[id number]]  (Abnormal) Collected: 03/18/22 2212    Lab Status: Final result Updated: 03/18/22 2214     POC Glucose 189 mg/dl     Basic metabolic panel [[id number]]  (Abnormal) Collected: 03/18/22 2032    Lab Status: Final result Specimen: Blood from Arm, Right Updated: 03/18/22 2113     Sodium 135 mmol/L      Potassium 3 9 mmol/L      Chloride 96 mmol/L      CO2 20 mmol/L      ANION GAP 19 mmol/L      BUN 33 mg/dL      Creatinine 0 96 mg/dL      Glucose 205 mg/dL      Calcium 8 8 mg/dL      eGFR 77 ml/min/1 73sq m     Narrative:      [employer] guidelines for Chronic Kidney Disease (CKD):     Stage 1 with normal or high GFR (GFR > 90 mL/min/1 73 square meters)    Stage 2 Mild CKD (GFR = 60-89 mL/min/1 73 square meters)    Stage 3A Moderate CKD (GFR = 45-59 mL/min/1 73 square meters)    Stage 3B Moderate CKD (GFR = 30-44 mL/min/1 73 square meters)    Stage 4 Severe CKD (GFR = 15-29 mL/min/1 73 square meters)    Stage 5 End Stage CKD (GFR <15 mL/min/1 73 square meters)  Note: GFR calculation is accurate only with a steady state creatinine    Phosphorus [[id number]]  (Normal) Collected: 03/18/22 2032    Lab Status: Final result Specimen: Blood from Arm, Right Updated: 03/18/22 2113     Phosphorus 4 1 mg/dL     Magnesium [[id number]]  (Normal) Collected: 03/18/22 2032    Lab Status: Final result Specimen: Blood from Arm, Right Updated: 03/18/22 2113     Magnesium 2 4 mg/dL     Fingerstick Glucose (POCT) [[id number]]  (Abnormal) Collected: 03/18/22 2034    Lab Status: Final result Updated: 03/18/22 2036     POC Glucose 197 mg/dl     HS Troponin I 4hr [[id number]]  (Abnormal) Collected: 03/18/22 1952    Lab Status: Final result Specimen: Blood from Arm, Right Updated: 03/18/22 2021     hs TnI 4hr 492 ng/L      Delta 4hr hsTnI -90 ng/L     Procalcitonin [[id number]]  (Abnormal) Collected: 03/18/22 1811    Lab Status: Final result Specimen: Blood from Hand, Left Updated: 03/18/22 1847     Procalcitonin 1 03 ng/ml     HS Troponin I 2hr [[id number]]  (Abnormal) Collected: 03/18/22 1759    Lab Status: Final result Specimen: Blood from Arm, Right Updated: 03/18/22 1831     hs TnI 2hr 573 ng/L      Delta 2hr hsTnI -9 ng/L     Sputum culture and Gram stain [[id number]]     Lab Status: No result Specimen: Sputum     B-Type Natriuretic Peptide(BNP) CA, GH, EA Campuses Only [[id number]]  (Abnormal) Collected: 03/18/22 1604    Lab Status: Final result Specimen: Blood from Arm, Right Updated: 03/18/22 7601      pg/mL     Beta Hydroxybutyrate [[id number]]  (Abnormal) Collected: 03/18/22 1636    Lab Status: Final result Specimen: Blood from Arm, Right Updated: 03/18/22 1651     BETA-HYDROXYBUTYRATE 4 2 mmol/L     COVID/FLU/RSV - 2 hour TAT [[id number]]  (Normal) Collected: 03/18/22 1604    Lab Status: Final result Specimen: Nares from Nose Updated: 03/18/22 1648     SARS-CoV-2 Negative     INFLUENZA A PCR Negative     INFLUENZA B PCR Negative     RSV PCR Negative    Narrative:      FOR PEDIATRIC PATIENTS - copy/paste COVID Guidelines URL to browser: [email or web address]    SARS-CoV-2 assay is a Nucleic Acid Amplification assay intended for the  qualitative detection of nucleic acid from SARS-CoV-2 in nasopharyngeal  swabs  Results are for the presumptive identification of SARS-CoV-2 RNA  Positive results are indicative of infection with SARS-CoV-2, the virus  causing COVID-19, but do not rule out bacterial infection or co-infection  with other viruses  Laboratories within the [place] and its  territories are required to report all positive results to the appropriate  public health authorities  Negative results do not preclude SARS-CoV-2  infection and should not be used as the sole basis for treatment or other  patient management decisions  Negative results must be combined with  clinical observations, patient history, and epidemiological information  This test has not been FDA cleared or approved  This test has been authorized by FDA under an Emergency Use Authorization  (EUA)  This test is only authorized for the duration of time the  declaration that circumstances exist justifying the authorization of the  emergency use of an in vitro diagnostic tests for detection of SARS-CoV-2  virus and/or diagnosis of COVID-19 infection under section 564(b)(1) of  the Act, 21 U  S C  653OZB-6(I)(0), unless the authorization is terminated  or revoked sooner   The test has been validated but independent review by FDA  and CLIA is pending  Test performed using [employer] Gene[employer]pert: This RT-PCR assay targets N2,  a region unique to SARS-CoV-2  A conserved region in the E-gene was chosen  for pan-Sarbecovirus detection which includes SARS-CoV-2  Blood gas, venous [[id number]]  (Abnormal) Collected: 03/18/22 1636    Lab Status: Final result Specimen: Blood from Arm, Right Updated: 03/18/22 1644     pH, Daniel 7 354     pCO2, Daniel 36 9 mm Hg      pO2, Daniel 58 3 mm Hg      HCO3, Daniel 20 1 mmol/L      Base Excess, Daniel -4 8 mmol/L      O2 Content, Daniel 18 4 ml/dL      O2 HGB, VENOUS 87 3 %     HS Troponin 0hr (reflex protocol) [[id number]]  (Abnormal) Collected: 03/18/22 1604    Lab Status: Final result Specimen: Blood from Arm, Right Updated: 03/18/22 1636     hs TnI 0hr 582 ng/L     Manual Differential(PHLEBS Do Not Order) [[id number]]  (Abnormal) Collected: 03/18/22 1604    Lab Status: Final result Specimen: Blood from Arm, Right Updated: 03/18/22 1634     Segmented % 91 %      Bands % 1 %      Lymphocytes % 7 %      Monocytes % 1 %      Eosinophils, % 0 %      Basophils % 0 %      Absolute Neutrophils 8 59 Thousand/uL      Lymphocytes Absolute 0 65 Thousand/uL      Monocytes Absolute 0 09 Thousand/uL      Eosinophils Absolute 0 00 Thousand/uL      Basophils Absolute 0 00 Thousand/uL      Total Counted --     RBC Morphology Normal     Platelet Estimate Adequate    CBC and differential [[id number]]  (Normal) Collected: 03/18/22 1604    Lab Status: Final result Specimen: Blood from Arm, Right Updated: 03/18/22 1634     WBC 9 34 Thousand/uL      RBC 4 85 Million/uL      Hemoglobin 15 2 g/dL      Hematocrit 46 7 %      MCV 96 fL      MCH 31 3 pg      MCHC 32 5 g/dL      RDW 14 0 %      MPV 11 1 fL      Platelets 957 Thousands/uL     Narrative: This is an appended report  These results have been appended to a previously verified report      Comprehensive metabolic panel [[id number]]  (Abnormal) Collected: 03/18/22 1604    Lab Status: Final result Specimen: Blood from Arm, Right Updated: 03/18/22 1629     Sodium 135 mmol/L      Potassium 4 1 mmol/L      Chloride 96 mmol/L      CO2 20 mmol/L      ANION GAP 19 mmol/L      BUN 33 mg/dL      Creatinine 1 00 mg/dL      Glucose 327 mg/dL      Calcium 8 9 mg/dL      AST 9 U/L      ALT 16 U/L      Alkaline Phosphatase 74 U/L      Total Protein 6 8 g/dL      Albumin 4 0 g/dL      Total Bilirubin 0 48 mg/dL      eGFR 73 ml/min/1 73sq m     Narrative:      [facility] guidelines for Chronic Kidney Disease (CKD):     Stage 1 with normal or high GFR (GFR > 90 mL/min/1 73 square meters)    Stage 2 Mild CKD (GFR = 60-89 mL/min/1 73 square meters)    Stage 3A Moderate CKD (GFR = 45-59 mL/min/1 73 square meters)    Stage 3B Moderate CKD (GFR = 30-44 mL/min/1 73 square meters)    Stage 4 Severe CKD (GFR = 15-29 mL/min/1 73 square meters)    Stage 5 End Stage CKD (GFR <15 mL/min/1 73 square meters)  Note: GFR calculation is accurate only with a steady state creatinine    Protime-INR [[id number]]  (Normal) Collected: 03/18/22 1604    Lab Status: Final result Specimen: Blood from Arm, Right Updated: 03/18/22 1624     Protime 13 6 seconds      INR 1 05    APTT [[id number]]  (Normal) Collected: 03/18/22 1604    Lab Status: Final result Specimen: Blood from Arm, Right Updated: 03/18/22 1624     PTT 33 seconds                  XR chest 1 view portable   Final Result by Kishore Major MD (03/18 1700)      No acute cardiopulmonary disease  Workstation performed: [id number]                    Procedures  Procedures         ED Course  ED Course as of 03/19/22 1543   Fri Mar 18, 2022   1638 hs TnI 0hr(!): 21  Patient continues to deny any chest pain at this time  EKG shows no acute ischemic changes  Mary 605 with Dr Sean Carvalho of cardiology who reviewed the EKG  He is not concerned about acute ACS at this time  SBIRT 20yo+      Most Recent Value   SBIRT (22 yo +)    In order to provide better care to our patients, we are screening all of our patients for alcohol and drug use  Would it be okay to ask you these screening questions? Yes Filed at: 03/18/2022 1646   Initial Alcohol Screen: US AUDIT-C     1  How often do you have a drink containing alcohol? 0 Filed at: 03/18/2022 1646   2  How many drinks containing alcohol do you have on a typical day you are drinking? 0 Filed at: 03/18/2022 1646   3a  Male UNDER 65: How often do you have five or more drinks on one occasion? 0 Filed at: 03/18/2022 1646   3b  FEMALE Any Age, or MALE 65+: How often do you have 4 or more drinks on one occassion? 0 Filed at: 03/18/2022 1646   Audit-C Score 0 Filed at: 03/18/2022 1646   DEANNA: How many times in the past year have you    Used an illegal drug or used a prescription medication for non-medical reasons? Never Filed at: 03/18/2022 1646                    MDM  Number of Diagnoses or Management Options  Chronic obstructive pulmonary disease with acute exacerbation [facility])  Diagnosis management comments: 73-year-old male with history of COPD, CAD presenting for evaluation of generalized weakness, shortness of breath  Feels similar to previous COPD exacerbations  Was admitted 10 days ago for the same  Denies fevers, chills, worsening cough  Denies chest pain  Vitals within normal limits  Has diffuse wheezing on exam   Symptoms likely secondary to COPD  Will obtain cardiac workup, chest x-ray  Will give heart neb and Solu-Medrol  Will check for COVID given myalgias and generalized weakness  Patient's troponin elevated at 587  Patient denies chest pain, no ischemic changes on EKG  Spoke with Dr Jillian Palma of cardiology who was not concerned about acute ACS  Patient also has a it on gap of 19, elevated beta hydroxybutyrate of 4, but normal pH    Spoke with ICU who is that he was okay for subcu insulin and IV fluids  Patient admitted the hospital       Disposition  Final diagnoses:   Chronic obstructive pulmonary disease with acute exacerbation ([address] )     Time reflects when diagnosis was documented in both MDM as applicable and the Disposition within this note     Time User Action Codes Description Comment    3/18/2022  5:55 PM Indigo Noel Add [J44 1] Chronic obstructive pulmonary disease with acute exacerbation [facility])       ED Disposition     ED Disposition Condition Date/Time Comment    Admit  Sat Mar 19, 2022  3:43 PM       Follow-up Information    None         Patient's Medications   Discharge Prescriptions    No medications on file       No discharge procedures on file      PDMP Review       Value Time User    PDMP Reviewed  Yes 9/3/2020 12:26 PM Latrice Sanchez, [facility]          ED Provider  Electronically Signed by           Tiubrcio Louis DO  03/19/22 5115

## 2022-03-18 NOTE — RESPIRATORY THERAPY NOTE
RT Protocol Note  Edwar Bautista 77 y o  male MRN: [id number]  Unit/Bed#: ED 27 Encounter: [id number]    Assessment    Active Problems:    Coronary artery disease involving native coronary artery of native heart without angina pectoris    Benign essential tremor    Chronic obstructive pulmonary disease with acute exacerbation (HCC)    Type 2 diabetes mellitus with diabetic neuropathy, unspecified (HCC)    Type 2 MI (myocardial infarction) ([place] [address] )    Diabetic ketoacidosis without coma associated with type 2 diabetes mellitus ([facility])      Home Pulmonary Medications:  Nebs prn, albuterol MDI prn, asmanex, stiolto       Past Medical History:   Diagnosis Date    BPH (benign prostatic hyperplasia)     45 days radiation treatment    COPD (chronic obstructive pulmonary disease)     COPD with acute exacerbation ([place] [address] ) 11/21/2019    Coronary artery disease     CABG x4 in 2017    Diabetes mellitus     History of Arterial Duplex of LE 12/26/2017    Likely occlusion of the left superficial femoral artery  Calcific changes bilaterally  Despite these changes, the ankle-brachial index as a measure of peripheral blood flow only mildly impaired      History of echocardiogram 06/12/2017    EF 40%, mild LVH, mild MR     Hyperlipidemia     Hypertension     NSTEMI (non-ST elevated myocardial infarction)     Prostate cancer     prostate     PVD (peripheral vascular disease)     Tremor     Type 2 MI (myocardial infarction) ([place] [address] ) 4/6/2021     Social History     Socioeconomic History    Marital status: /Civil Union     Spouse name: None    Number of children: None    Years of education: None    Highest education level: None   Occupational History    None   Tobacco Use    Smoking status: Current Some Day Smoker     Packs/day: 0 25     Years: 60 00     Pack years: 15 00     Types: Cigarettes    Smokeless tobacco: Never Used    Tobacco comment: only smokes when he drinks beer   Vaping Use    Vaping Use: Never used Substance and Sexual Activity    Alcohol use: Yes    Drug use: Never    Sexual activity: Yes     Partners: Female   Other Topics Concern    None   Social History Narrative    ** Merged History Encounter **          Social Determinants of Health     Financial Resource Strain: Low Risk     Difficulty of Paying Living Expenses: Not hard at all   Food Insecurity: No Food Insecurity    Worried About Running Out of Food in the Last Year: Never true    [address] in the Last Year: Never true   Transportation Needs: No Transportation Needs    Lack of Transportation (Medical): No    Lack of Transportation (Non-Medical): No   Physical Activity: Not on file   Stress: Not on file   Social Connections: Not on file   Intimate Partner Violence: Not on file   Housing Stability: Low Risk     Unable to Pay for Housing in the Last Year: No    Number of Places Lived in the Last Year: 1    Unstable Housing in the Last Year: No       Subjective         Objective    Physical Exam:   Assessment Type: Assess only  General Appearance: Awake,Alert  Respiratory Pattern: Dyspnea at rest  Chest Assessment: Chest expansion symmetrical  Bilateral Breath Sounds: Expiratory wheezes  O2 Device: (P) rma    Vitals:  Blood pressure 136/65, pulse 87, temperature 98 2 °F (36 8 °C), resp  rate 17, height 5' 10" (1 778 m), SpO2 96 %  Imaging and other studies: I have personally reviewed pertinent reports  O2 Device: (P) rma     Plan    Respiratory Plan: Mild Distress pathway        Resp Comments: (P) Pt  admitted with SOB  Hx of COPD  Pt  uses nebs and inhalers at home  CXR is clear  Pt with expiratory wheezing  Will continue with UDN TID at this time

## 2022-03-18 NOTE — ASSESSMENT & PLAN NOTE
Lab Results   Component Value Date    HGBA1C 6 9 (H) 01/06/2022       No results for input(s): POCGLU in the last 72 hours      Blood Sugar Average: Last 72 hrs:     · Glucose 327  · Hold home oral anti diabetic agents  · Patient on insulin infusion for DKA  · Continue Neurontin

## 2022-03-18 NOTE — ASSESSMENT & PLAN NOTE
· Presents with shortness of breath and wheezing  · Received nebulizers and steroids in the ER  · Chest x-ray:  No acute cardiopulmonary disease  · Respiratory protocol  · Oxygen protocol  · Azithromycin  · Blood cultures pending  · Check procalcitonin  · Sputum culture  · Continue Mucinex

## 2022-03-18 NOTE — ASSESSMENT & PLAN NOTE
· Anion gap 19, CO2  · VBG pH 7 354  · Beta hydroxybutyrate 4 2  · Insulin infusion protocol  · When gap closes, would overlap with Lantus, check BMP 2 hours later  If gap remains closed, can stop insulin infusion and patient may start diabetic diet

## 2022-03-18 NOTE — H&P
[address]  H&P- Doug Shipley 1945, 77 y o  male MRN: [id number]  Unit/Bed#: ED 27 Encounter: [id number]  Primary Care Provider: Carmel Kuhn DO   Date and time admitted to hospital: 3/18/2022  3:46 PM    * Chronic obstructive pulmonary disease with acute exacerbation ([place] [address] )  Assessment & Plan  · Presents with shortness of breath and wheezing  · Received nebulizers and steroids in the ER  · Chest x-ray:  No acute cardiopulmonary disease  · Respiratory protocol  · Oxygen protocol  · Azithromycin  · Blood cultures pending  · Check procalcitonin  · Sputum culture  · Continue Mucinex    Diabetic ketoacidosis without coma associated with type 2 diabetes mellitus ([place] [address] )  Assessment & Plan  · Anion gap 19, CO2  · VBG pH 7 354  · Beta hydroxybutyrate 4 2  · Insulin infusion protocol  · When gap closes, would overlap with Lantus, check BMP 2 hours later  If gap remains closed, can stop insulin infusion and patient may start diabetic diet  Type 2 MI (myocardial infarction) ([place] [address] )  Assessment & Plan  · HS troponin: 582  · EKG nonischemic  · ER physician discussed with Cardiology-suspect secondary to demand  · Patient denies chest pain  · Serial troponins and EKGs    Type 2 diabetes mellitus with diabetic neuropathy, unspecified ([place] [address] )  Assessment & Plan  Lab Results   Component Value Date    HGBA1C 6 9 (H) 01/06/2022       No results for input(s): POCGLU in the last 72 hours      Blood Sugar Average: Last 72 hrs:     · Glucose 327  · Hold home oral anti diabetic agents  · Patient on insulin infusion for DKA  · Continue Neurontin      Benign essential tremor  Assessment & Plan  · At baseline  · Continue primidone    Coronary artery disease involving native coronary artery of native heart without angina pectoris  Assessment & Plan  · History of CABG x4  · Continue aspirin  · Monitor for chest pain    VTE Prophylaxis: Heparin  / sequential compression device   Code Status: Full  POLST: POLST form is not discussed and not completed at this time  Discussion with family:  Patient    Anticipated Length of Stay:  Patient will be admitted on an Inpatient basis with an anticipated length of stay of  greater than 2 midnights  Justification for Hospital Stay:  Per plan above    Total Time for Visit, including Counseling / Coordination of Care: 45 minutes  Greater than 50% of this total time spent on direct patient counseling and coordination of care  Chief Complaint:   Shortness of breath    History of Present Illness:    Talib Brandt is a 77 y o  male with history of COPD, non-insulin-dependent type 2 diabetes, essential hypertension, hyperlipidemia, CAD status post CABG x4 who presents with shortness of breath  He says the shortness of breath started about a day and half ago and worsened over the past day  He says he feels similar to when he has had COPD exacerbations in the past   He also reports a decreased appetite and fatigue  He denies fever or chills, diaphoresis, chest pain, abdominal pain, dizziness, or syncope  Review of Systems:    Review of Systems   Constitutional: Positive for appetite change and fatigue  Negative for chills and fever  HENT: Negative for congestion  Eyes: Negative for visual disturbance  Respiratory: Positive for cough and shortness of breath  Cardiovascular: Negative for chest pain, palpitations and leg swelling  Gastrointestinal: Negative for abdominal distention, abdominal pain, blood in stool, constipation, diarrhea, nausea and vomiting  Genitourinary: Negative for dysuria and flank pain  Musculoskeletal: Negative for arthralgias and myalgias  Skin: Negative for pallor and rash  Neurological: Negative for dizziness, seizures, syncope, weakness, numbness and headaches  Psychiatric/Behavioral: Negative for confusion  All other systems reviewed and are negative        Past Medical and Surgical History:     Past Medical History:   Diagnosis Date    BPH (benign prostatic hyperplasia)     45 days radiation treatment    COPD (chronic obstructive pulmonary disease)     [facility]2019    Coronary artery disease     CABG x4 in 2017    Diabetes mellitus     History of Arterial Duplex of LE 12/26/2017    Likely occlusion of the left superficial femoral artery  Calcific changes bilaterally  Despite these changes, the ankle-brachial index as a measure of peripheral blood flow only mildly impaired   History of echocardiogram 06/12/2017    EF 40%, mild LVH, mild MR     Hyperlipidemia     Hypertension     NSTEMI (non-ST elevated myocardial infarction)     Prostate cancer     prostate     PVD (peripheral vascular disease)     Tremor     [facility] ) 4/6/2021       Past Surgical History:   Procedure Laterality Date    CARDIAC CATHETERIZATION  03/08/2017    Significant left main plus triple-vessel CAD   CORONARY ARTERY BYPASS GRAFT  03/08/2017    4V CABG:  LIMA to LAD, VG to RI, SVG to PDA to LVBR RCA   EYE SURGERY      shots in eye once a month @ the [address] [place] [address]         Meds/Allergies:    Prior to Admission medications    Medication Sig Start Date End Date Taking?  Authorizing Provider   albuterol (2 5 mg/3 mL) 0 083 % nebulizer solution Take 3 mL (2 5 mg total) by nebulization every 4 (four) hours as needed for wheezing or shortness of breath 1/23/22   Ki Del Cid PA-C   albuterol (PROVENTIL HFA,VENTOLIN HFA) 90 mcg/act inhaler Inhale 2 puffs every 6 (six) hours as needed for wheezing or shortness of breath 1/23/22   Mariely Mercado PA-C   Alogliptin Benzoate 25 MG TABS Take 0 5 tablets (12 5 mg total) by mouth daily Patient states takes 1/2 of a 25 mg tablet every AM  Patient taking differently: Take 25 mg by mouth daily  9/3/20 1/12/22  PENG Kerr   aspirin (ECOTRIN LOW STRENGTH) 81 mg EC tablet Take 1 tablet (81 mg total) by mouth every other day 9/3/20   PENG Kerr   carboxymethylcellulose (REFRESH PLUS) 0 5 % SOLN INSTILL 1 DROP BOTH EYES FOUR TIMES A DAY FOR DRY EYES  Patient not taking: Reported on 12/14/2021 9/27/21   Historical Provider, MD   cyanocobalamin (VITAMIN B-12) 500 MCG tablet TAKE ONE TABLET BY MOUTH EVERY MORNING *VITAMIN B12* 11/8/21   Historical Provider, MD   Empagliflozin 25 MG TABS Take 0 5 tablets (12 5 mg total) by mouth every morning Take one half tablet every morning 9/3/20   PENG Kerr   gabapentin (NEURONTIN) 100 mg capsule Take 2 capsules (200 mg total) by mouth daily at bedtime 9/3/20   PENG Kerr   glimepiride (AMARYL) 1 mg tablet Take 1 tablet (1 mg total) by mouth 2 (two) times a day 9/3/20   PENG Jones   guaiFENesin (MUCINEX) 600 mg 12 hr tablet Take 1 tablet (600 mg total) by mouth 2 (two) times a day  Patient not taking: Reported on 1/7/2022  9/3/20   PENG Jones   hydrocortisone 1 % cream Apply topically 2 (two) times a day 9/3/20   PENG Jones   lisinopril (ZESTRIL) 40 mg tablet Take 1 tablet (40 mg total) by mouth daily 1/7/22   Edyta Freeman MD   metFORMIN (GLUCOPHAGE) 500 mg tablet Take 1 tablet (500 mg total) by mouth daily with dinner 9/3/20   PENG Jones   metoprolol tartrate (LOPRESSOR) 25 mg tablet Take 25 mg by mouth every 12 (twelve) hours    Historical Provider, MD   mometasone (ASMANEX TWISTHALER) 220 MCG/INH inhaler Inhale 2 puffs 2 (two) times a day Rinse mouth after use      Historical Provider, MD   primidone (MYSOLINE) 50 mg tablet Take 100 mg by mouth every 8 (eight) hours    Historical Provider, MD   rosuvastatin (CRESTOR) 20 MG tablet Take 1 tablet (20 mg total) by mouth daily after dinner Take one half tablet daily with dinner  Patient taking differently: Take 10 mg by mouth daily after dinner Take one half tablet daily with dinner  9/3/20   PENG Kerr   saxagliptin (ONGLYZA) 5 MG tablet Take 1 tablet (5 mg total) by mouth daily 9/3/20   PENG Almonte   tiotropium-olodaterol (STIOLTO RESPIMAT) 2 5-2 5 MCG/ACT inhaler Inhale 2 puffs daily    Historical Provider, MD     I have reviewed home medications with patient personally  Allergies: Allergies   Allergen Reactions    Atorvastatin Myalgia       Social History:     Marital Status: /Civil Union   Occupation:  Retired  Patient Pre-hospital Living Situation:  Home  Patient Pre-hospital Level of Mobility:  Independent  Patient Pre-hospital Diet Restrictions:  None  Substance Use History:   Social History     Substance and Sexual Activity   Alcohol Use Yes     Social History     Tobacco Use   Smoking Status Current Some Day Smoker    Packs/day: 0 25    Years: 60 00    Pack years: 15 00    Types: Cigarettes   Smokeless Tobacco Never Used   Tobacco Comment    only smokes when he drinks beer     Social History     Substance and Sexual Activity   Drug Use Never       Family History:    Family History   Problem Relation Age of Onset    Cancer Father     Heart disease Brother        Physical Exam:     Vitals:   Blood Pressure: 136/65 (03/18/22 1730)  Pulse: 87 (03/18/22 1730)  Temperature: 98 2 °F (36 8 °C) (03/18/22 1551)  Respirations: 17 (03/18/22 1730)  Height: 5' 10" (177 8 cm) (03/18/22 1551)  SpO2: 96 % (03/18/22 1730)    Physical Exam  Vitals and nursing note reviewed  HENT:      Head: Normocephalic and atraumatic  Mouth/Throat:      Pharynx: Oropharynx is clear  Eyes:      Pupils: Pupils are equal, round, and reactive to light  Cardiovascular:      Rate and Rhythm: Normal rate  Pulmonary:      Effort: Pulmonary effort is normal  No respiratory distress  Breath sounds: Wheezing present  Abdominal:      General: Bowel sounds are normal       Palpations: Abdomen is soft  Tenderness: There is no abdominal tenderness  Musculoskeletal:      Cervical back: Neck supple  Skin:     General: Skin is warm and dry  Capillary Refill: Capillary refill takes less than 2 seconds  Neurological:      General: No focal deficit present  Mental Status: He is alert  Additional Data:     Lab Results: I have personally reviewed pertinent reports  Results from last 7 days   Lab Units 03/18/22  1604   WBC Thousand/uL 9 34   HEMOGLOBIN g/dL 15 2   HEMATOCRIT % 46 7   PLATELETS Thousands/uL 256   BANDS PCT % 1   LYMPHO PCT % 7*   MONO PCT % 1*   EOS PCT % 0     Results from last 7 days   Lab Units 03/18/22  1604   SODIUM mmol/L 135   POTASSIUM mmol/L 4 1   CHLORIDE mmol/L 96   CO2 mmol/L 20*   BUN mg/dL 33*   CREATININE mg/dL 1 00   ANION GAP mmol/L 19*   CALCIUM mg/dL 8 9   ALBUMIN g/dL 4 0   TOTAL BILIRUBIN mg/dL 0 48   ALK PHOS U/L 74   ALT U/L 16   AST U/L 9*   GLUCOSE RANDOM mg/dL 327*     Results from last 7 days   Lab Units 03/18/22  1604   INR  1 05                   Imaging: I have personally reviewed pertinent reports  XR chest 1 view portable   Final Result by Kishore Major MD (03/18 1700)      No acute cardiopulmonary disease  Workstation performed: [id number]             EKG, Pathology, and Other Studies Reviewed on Admission:   · EKG:  Sinus rhythm rate of 87 with no evidence of acute infarct  Allscripts / Epic Records Reviewed: Yes     ** Please Note: This note has been constructed using a voice recognition system   **

## 2022-03-19 LAB
ANION GAP SERPL CALCULATED.3IONS-SCNC: 11 MMOL/L (ref 4–13)
ANION GAP SERPL CALCULATED.3IONS-SCNC: 12 MMOL/L (ref 4–13)
ANION GAP SERPL CALCULATED.3IONS-SCNC: 14 MMOL/L (ref 4–13)
ANION GAP SERPL CALCULATED.3IONS-SCNC: 15 MMOL/L (ref 4–13)
ATRIAL RATE: 74 BPM
ATRIAL RATE: 80 BPM
ATRIAL RATE: 83 BPM
ATRIAL RATE: 83 BPM
ATRIAL RATE: 87 BPM
BASOPHILS # BLD AUTO: 0.03 THOUSANDS/ΜL (ref 0–0.1)
BASOPHILS NFR BLD AUTO: 0 % (ref 0–1)
BUN SERPL-MCNC: 25 MG/DL (ref 5–25)
BUN SERPL-MCNC: 26 MG/DL (ref 5–25)
BUN SERPL-MCNC: 28 MG/DL (ref 5–25)
BUN SERPL-MCNC: 29 MG/DL (ref 5–25)
CALCIUM SERPL-MCNC: 8.1 MG/DL (ref 8.4–10.2)
CALCIUM SERPL-MCNC: 8.1 MG/DL (ref 8.4–10.2)
CALCIUM SERPL-MCNC: 8.3 MG/DL (ref 8.4–10.2)
CALCIUM SERPL-MCNC: 8.6 MG/DL (ref 8.4–10.2)
CARDIAC TROPONIN I PNL SERPL HS: 550 NG/L (ref 8–18)
CHLORIDE SERPL-SCNC: 101 MMOL/L (ref 96–108)
CHLORIDE SERPL-SCNC: 101 MMOL/L (ref 96–108)
CHLORIDE SERPL-SCNC: 102 MMOL/L (ref 96–108)
CHLORIDE SERPL-SCNC: 102 MMOL/L (ref 96–108)
CO2 SERPL-SCNC: 17 MMOL/L (ref 21–32)
CO2 SERPL-SCNC: 21 MMOL/L (ref 21–32)
CREAT SERPL-MCNC: 0.72 MG/DL (ref 0.6–1.3)
CREAT SERPL-MCNC: 0.77 MG/DL (ref 0.6–1.3)
CREAT SERPL-MCNC: 0.81 MG/DL (ref 0.6–1.3)
CREAT SERPL-MCNC: 0.83 MG/DL (ref 0.6–1.3)
EOSINOPHIL # BLD AUTO: 0.01 THOUSAND/ΜL (ref 0–0.61)
EOSINOPHIL NFR BLD AUTO: 0 % (ref 0–6)
ERYTHROCYTE [DISTWIDTH] IN BLOOD BY AUTOMATED COUNT: 13.9 % (ref 11.6–15.1)
GFR SERPL CREATININE-BSD FRML MDRD: 86 ML/MIN/1.73SQ M
GFR SERPL CREATININE-BSD FRML MDRD: 86 ML/MIN/1.73SQ M
GFR SERPL CREATININE-BSD FRML MDRD: 88 ML/MIN/1.73SQ M
GFR SERPL CREATININE-BSD FRML MDRD: 91 ML/MIN/1.73SQ M
GLUCOSE SERPL-MCNC: 111 MG/DL (ref 65–140)
GLUCOSE SERPL-MCNC: 118 MG/DL (ref 65–140)
GLUCOSE SERPL-MCNC: 132 MG/DL (ref 65–140)
GLUCOSE SERPL-MCNC: 134 MG/DL (ref 65–140)
GLUCOSE SERPL-MCNC: 135 MG/DL (ref 65–140)
GLUCOSE SERPL-MCNC: 158 MG/DL (ref 65–140)
GLUCOSE SERPL-MCNC: 162 MG/DL (ref 65–140)
GLUCOSE SERPL-MCNC: 166 MG/DL (ref 65–140)
GLUCOSE SERPL-MCNC: 169 MG/DL (ref 65–140)
GLUCOSE SERPL-MCNC: 217 MG/DL (ref 65–140)
GLUCOSE SERPL-MCNC: 228 MG/DL (ref 65–140)
GLUCOSE SERPL-MCNC: 256 MG/DL (ref 65–140)
GLUCOSE SERPL-MCNC: 262 MG/DL (ref 65–140)
HCT VFR BLD AUTO: 43.8 % (ref 36.5–49.3)
HGB BLD-MCNC: 14.2 G/DL (ref 12–17)
IMM GRANULOCYTES # BLD AUTO: 0.12 THOUSAND/UL (ref 0–0.2)
IMM GRANULOCYTES NFR BLD AUTO: 1 % (ref 0–2)
LYMPHOCYTES # BLD AUTO: 0.97 THOUSANDS/ΜL (ref 0.6–4.47)
LYMPHOCYTES NFR BLD AUTO: 10 % (ref 14–44)
MCH RBC QN AUTO: 31 PG (ref 26.8–34.3)
MCHC RBC AUTO-ENTMCNC: 32.4 G/DL (ref 31.4–37.4)
MCV RBC AUTO: 96 FL (ref 82–98)
MONOCYTES # BLD AUTO: 0.39 THOUSAND/ΜL (ref 0.17–1.22)
MONOCYTES NFR BLD AUTO: 4 % (ref 4–12)
NEUTROPHILS # BLD AUTO: 7.97 THOUSANDS/ΜL (ref 1.85–7.62)
NEUTS SEG NFR BLD AUTO: 84 % (ref 43–75)
NRBC BLD AUTO-RTO: 0 /100 WBCS
P AXIS: 69 DEGREES
P AXIS: 70 DEGREES
P AXIS: 75 DEGREES
P AXIS: 76 DEGREES
P AXIS: 78 DEGREES
PLATELET # BLD AUTO: 225 THOUSANDS/UL (ref 149–390)
PMV BLD AUTO: 11.2 FL (ref 8.9–12.7)
POTASSIUM SERPL-SCNC: 4 MMOL/L (ref 3.5–5.3)
POTASSIUM SERPL-SCNC: 4 MMOL/L (ref 3.5–5.3)
POTASSIUM SERPL-SCNC: 4.1 MMOL/L (ref 3.5–5.3)
POTASSIUM SERPL-SCNC: 4.4 MMOL/L (ref 3.5–5.3)
PR INTERVAL: 154 MS
PR INTERVAL: 154 MS
PR INTERVAL: 156 MS
PR INTERVAL: 160 MS
PR INTERVAL: 164 MS
PROCALCITONIN SERPL-MCNC: 0.68 NG/ML
QRS AXIS: 65 DEGREES
QRS AXIS: 69 DEGREES
QRS AXIS: 78 DEGREES
QRS AXIS: 87 DEGREES
QRS AXIS: 90 DEGREES
QRSD INTERVAL: 100 MS
QRSD INTERVAL: 102 MS
QRSD INTERVAL: 96 MS
QRSD INTERVAL: 96 MS
QRSD INTERVAL: 98 MS
QT INTERVAL: 420 MS
QT INTERVAL: 430 MS
QT INTERVAL: 440 MS
QT INTERVAL: 444 MS
QT INTERVAL: 466 MS
QTC INTERVAL: 505 MS
QTC INTERVAL: 505 MS
QTC INTERVAL: 512 MS
QTC INTERVAL: 517 MS
QTC INTERVAL: 517 MS
RBC # BLD AUTO: 4.58 MILLION/UL (ref 3.88–5.62)
SODIUM SERPL-SCNC: 133 MMOL/L (ref 135–147)
SODIUM SERPL-SCNC: 134 MMOL/L (ref 135–147)
SODIUM SERPL-SCNC: 135 MMOL/L (ref 135–147)
SODIUM SERPL-SCNC: 136 MMOL/L (ref 135–147)
T WAVE AXIS: 52 DEGREES
T WAVE AXIS: 53 DEGREES
T WAVE AXIS: 54 DEGREES
T WAVE AXIS: 65 DEGREES
T WAVE AXIS: 77 DEGREES
VENTRICULAR RATE: 74 BPM
VENTRICULAR RATE: 80 BPM
VENTRICULAR RATE: 83 BPM
VENTRICULAR RATE: 83 BPM
VENTRICULAR RATE: 87 BPM
WBC # BLD AUTO: 9.49 THOUSAND/UL (ref 4.31–10.16)

## 2022-03-19 PROCEDURE — 80048 BASIC METABOLIC PNL TOTAL CA: CPT | Performed by: NURSE PRACTITIONER

## 2022-03-19 PROCEDURE — 85025 COMPLETE CBC W/AUTO DIFF WBC: CPT | Performed by: NURSE PRACTITIONER

## 2022-03-19 PROCEDURE — 82948 REAGENT STRIP/BLOOD GLUCOSE: CPT

## 2022-03-19 PROCEDURE — 94640 AIRWAY INHALATION TREATMENT: CPT

## 2022-03-19 PROCEDURE — 94760 N-INVAS EAR/PLS OXIMETRY 1: CPT

## 2022-03-19 PROCEDURE — 84484 ASSAY OF TROPONIN QUANT: CPT | Performed by: PHYSICIAN ASSISTANT

## 2022-03-19 PROCEDURE — 99233 SBSQ HOSP IP/OBS HIGH 50: CPT | Performed by: NURSE PRACTITIONER

## 2022-03-19 PROCEDURE — 93010 ELECTROCARDIOGRAM REPORT: CPT | Performed by: INTERNAL MEDICINE

## 2022-03-19 PROCEDURE — 99223 1ST HOSP IP/OBS HIGH 75: CPT | Performed by: INTERNAL MEDICINE

## 2022-03-19 PROCEDURE — 93005 ELECTROCARDIOGRAM TRACING: CPT

## 2022-03-19 PROCEDURE — 84145 PROCALCITONIN (PCT): CPT | Performed by: NURSE PRACTITIONER

## 2022-03-19 PROCEDURE — 36415 COLL VENOUS BLD VENIPUNCTURE: CPT | Performed by: NURSE PRACTITIONER

## 2022-03-19 RX ORDER — INSULIN GLARGINE 100 [IU]/ML
13 INJECTION, SOLUTION SUBCUTANEOUS
Status: DISCONTINUED | OUTPATIENT
Start: 2022-03-20 | End: 2022-03-21 | Stop reason: HOSPADM

## 2022-03-19 RX ORDER — BUDESONIDE 0.5 MG/2ML
0.5 INHALANT ORAL
Status: DISCONTINUED | OUTPATIENT
Start: 2022-03-19 | End: 2022-03-21 | Stop reason: HOSPADM

## 2022-03-19 RX ORDER — INSULIN GLARGINE 100 [IU]/ML
13 INJECTION, SOLUTION SUBCUTANEOUS ONCE
Status: COMPLETED | OUTPATIENT
Start: 2022-03-19 | End: 2022-03-19

## 2022-03-19 RX ORDER — DEXTROSE AND SODIUM CHLORIDE 5; .45 G/100ML; G/100ML
75 INJECTION, SOLUTION INTRAVENOUS CONTINUOUS
Status: DISCONTINUED | OUTPATIENT
Start: 2022-03-19 | End: 2022-03-19

## 2022-03-19 RX ADMIN — METHYLPREDNISOLONE SODIUM SUCCINATE 40 MG: 40 INJECTION, POWDER, FOR SOLUTION INTRAMUSCULAR; INTRAVENOUS at 13:27

## 2022-03-19 RX ADMIN — METHYLPREDNISOLONE SODIUM SUCCINATE 40 MG: 40 INJECTION, POWDER, FOR SOLUTION INTRAMUSCULAR; INTRAVENOUS at 06:05

## 2022-03-19 RX ADMIN — HEPARIN SODIUM 5000 UNITS: 5000 INJECTION INTRAVENOUS; SUBCUTANEOUS at 06:07

## 2022-03-19 RX ADMIN — DEXTROSE AND SODIUM CHLORIDE 75 ML/HR: 5; .45 INJECTION, SOLUTION INTRAVENOUS at 12:57

## 2022-03-19 RX ADMIN — METOPROLOL TARTRATE 25 MG: 25 TABLET, FILM COATED ORAL at 22:00

## 2022-03-19 RX ADMIN — INSULIN LISPRO 1 UNITS: 100 INJECTION, SOLUTION INTRAVENOUS; SUBCUTANEOUS at 17:59

## 2022-03-19 RX ADMIN — GABAPENTIN 200 MG: 100 CAPSULE ORAL at 22:01

## 2022-03-19 RX ADMIN — LISINOPRIL 40 MG: 20 TABLET ORAL at 09:32

## 2022-03-19 RX ADMIN — LEVALBUTEROL 1.25 MG: 1.25 SOLUTION, CONCENTRATE RESPIRATORY (INHALATION) at 20:51

## 2022-03-19 RX ADMIN — INSULIN GLARGINE 13 UNITS: 100 INJECTION, SOLUTION SUBCUTANEOUS at 09:31

## 2022-03-19 RX ADMIN — SODIUM CHLORIDE 1000 ML: 0.9 INJECTION, SOLUTION INTRAVENOUS at 02:51

## 2022-03-19 RX ADMIN — PRIMIDONE 100 MG: 50 TABLET ORAL at 06:10

## 2022-03-19 RX ADMIN — FLUTICASONE PROPIONATE 2 PUFF: 220 AEROSOL, METERED RESPIRATORY (INHALATION) at 09:31

## 2022-03-19 RX ADMIN — CYANOCOBALAMIN TAB 500 MCG 500 MCG: 500 TAB at 09:30

## 2022-03-19 RX ADMIN — GUAIFENESIN 600 MG: 600 TABLET, EXTENDED RELEASE ORAL at 17:35

## 2022-03-19 RX ADMIN — SALMETEROL XINAFOATE 1 PUFF: 50 POWDER, METERED ORAL; RESPIRATORY (INHALATION) at 09:32

## 2022-03-19 RX ADMIN — BUDESONIDE 0.5 MG: 0.5 INHALANT ORAL at 20:51

## 2022-03-19 RX ADMIN — LEVALBUTEROL 1.25 MG: 1.25 SOLUTION, CONCENTRATE RESPIRATORY (INHALATION) at 08:28

## 2022-03-19 RX ADMIN — IPRATROPIUM BROMIDE 0.5 MG: 0.5 SOLUTION RESPIRATORY (INHALATION) at 20:50

## 2022-03-19 RX ADMIN — INSULIN LISPRO 2 UNITS: 100 INJECTION, SOLUTION INTRAVENOUS; SUBCUTANEOUS at 22:02

## 2022-03-19 RX ADMIN — SODIUM CHLORIDE 100 ML/HR: 0.9 INJECTION, SOLUTION INTRAVENOUS at 06:12

## 2022-03-19 RX ADMIN — PRIMIDONE 100 MG: 50 TABLET ORAL at 22:05

## 2022-03-19 RX ADMIN — ISODIUM CHLORIDE 3 ML: 0.03 SOLUTION RESPIRATORY (INHALATION) at 08:28

## 2022-03-19 RX ADMIN — HEPARIN SODIUM 5000 UNITS: 5000 INJECTION INTRAVENOUS; SUBCUTANEOUS at 22:01

## 2022-03-19 RX ADMIN — PRIMIDONE 100 MG: 50 TABLET ORAL at 13:29

## 2022-03-19 RX ADMIN — GUAIFENESIN 600 MG: 600 TABLET, EXTENDED RELEASE ORAL at 09:30

## 2022-03-19 RX ADMIN — METOPROLOL TARTRATE 25 MG: 25 TABLET, FILM COATED ORAL at 09:32

## 2022-03-19 RX ADMIN — TIOTROPIUM BROMIDE 18 MCG: 18 CAPSULE ORAL; RESPIRATORY (INHALATION) at 09:32

## 2022-03-19 RX ADMIN — LEVALBUTEROL 1.25 MG: 1.25 SOLUTION, CONCENTRATE RESPIRATORY (INHALATION) at 12:46

## 2022-03-19 RX ADMIN — IPRATROPIUM BROMIDE 0.5 MG: 0.5 SOLUTION RESPIRATORY (INHALATION) at 12:46

## 2022-03-19 RX ADMIN — METHYLPREDNISOLONE SODIUM SUCCINATE 40 MG: 40 INJECTION, POWDER, FOR SOLUTION INTRAMUSCULAR; INTRAVENOUS at 22:03

## 2022-03-19 RX ADMIN — HEPARIN SODIUM 5000 UNITS: 5000 INJECTION INTRAVENOUS; SUBCUTANEOUS at 13:27

## 2022-03-19 RX ADMIN — AZITHROMYCIN MONOHYDRATE 500 MG: 250 TABLET ORAL at 17:35

## 2022-03-19 NOTE — PROGRESS NOTES
[facility]  Progress Note - Vicenta Snowden 1945, 77 y o  male MRN: [id number]  Unit/Bed#: ED 27 Encounter: [id number]  Primary Care Provider: Gilbert Sprague DO   Date and time admitted to hospital: 3/18/2022  3:46 PM    * Chronic obstructive pulmonary disease with acute exacerbation ([facility] )  Assessment & Plan  · Presents with shortness of breath and wheezing  · Received nebulizers and steroids in the ER  · Chest x-ray:  No acute cardiopulmonary disease  · Respiratory protocol  · Oxygen protocol  · Azithromycin  · Blood cultures pending  · Check procalcitonin  · Sputum culture  · Continue Mucinex    Diabetic ketoacidosis without coma associated with type 2 diabetes mellitus ([facility] )  Assessment & Plan  · Anion gap 19, CO2  · VBG pH 7 354  · Beta hydroxybutyrate 4 2  · Insulin infusion protocol  · Will give Lantus 13 units and recheck BMP 2 hours later    If anion gap remains closed, will start oral intake, discontinue insulin infusion, and start insulin sliding scale    Type 2 MI (myocardial infarction) (HCC)  Assessment & Plan  · HS troponin: 582  · EKG nonischemic  · ER physician discussed with Cardiology-suspect secondary to demand  · Serial troponins and EKGs  · Troponin is remaining flat, although in the 500s  · Continues to deny chest pain  · Repeat EKGs without evidence of acute infarct    Type 2 diabetes mellitus with diabetic neuropathy, unspecified [facility])  Assessment & Plan  Lab Results   Component Value Date    HGBA1C 6 9 (H) 01/06/2022       Recent Labs     03/19/22  0107 03/19/22  0253 03/19/22  0445 03/19/22  0652   POCGLU 132 135 134 118       Blood Sugar Average: Last 72 hrs:  (P) 152 [id number]   · Glucose 327  · Hold home oral anti diabetic agents  · Patient on insulin infusion for DKA-will transition to Lantus and sliding scale insulin  · Continue Neurontin      Benign essential tremor  Assessment & Plan  · At baseline  · Continue primidone    Coronary artery disease involving native coronary artery of native heart without angina pectoris  Assessment & Plan  · History of CABG x 4  · Continue aspirin  · Monitor for chest pain      VTE Pharmacologic Prophylaxis:   Pharmacologic: Heparin  Patient Centered Rounds: I have performed bedside rounds with nursing staff today  Discussions with Specialists or Other Care Team Provider: RN, attending    Education and Discussions with Family / Patient:  Patient    Time Spent for Care: 30 minutes  More than 50% of total time spent on counseling and coordination of care as described above  Current Length of Stay: 1 day(s)    Current Patient Status: Inpatient   Certification Statement: The patient will continue to require additional inpatient hospital stay due to per plan above    Discharge Plan:  Home, maybe tomorrow  Code Status: Level 1 - Full Code      Subjective:   Patient seen and examined  He says his shortness of breath is improved  Denies chest pain  No other complaints offered  Objective:     Vitals:   Temp (24hrs), Av 2 °F (36 8 °C), Min:98 2 °F (36 8 °C), Max:98 2 °F (36 8 °C)    Temp:  [98 2 °F (36 8 °C)] 98 2 °F (36 8 °C)  HR:  [70-90] 79  Resp:  [17-26] 26  BP: (117-169)/(58-82) 158/72  SpO2:  [92 %-98 %] 97 %  Body mass index is 28 98 kg/m²  Input and Output Summary (last 24 hours): Intake/Output Summary (Last 24 hours) at 3/19/2022 0850  Last data filed at 3/19/2022 0451  Gross per 24 hour   Intake 2000 ml   Output --   Net 2000 ml       Physical Exam:     Physical Exam  Vitals and nursing note reviewed  HENT:      Head: Normocephalic and atraumatic  Mouth/Throat:      Pharynx: Oropharynx is clear  Eyes:      Pupils: Pupils are equal, round, and reactive to light  Cardiovascular:      Rate and Rhythm: Normal rate  Pulmonary:      Effort: Pulmonary effort is normal  No respiratory distress  Breath sounds: Wheezing present     Abdominal:      General: Bowel sounds are normal  Palpations: Abdomen is soft  Tenderness: There is no abdominal tenderness  Musculoskeletal:      Cervical back: Neck supple  Skin:     General: Skin is warm and dry  Capillary Refill: Capillary refill takes less than 2 seconds  Neurological:      General: No focal deficit present  Mental Status: He is alert  Additional Data:     Labs:    Results from last 7 days   Lab Units 03/19/22  0610 03/18/22  1604 03/18/22  1604   WBC Thousand/uL 9 49   < > 9 34   HEMOGLOBIN g/dL 14 2   < > 15 2   HEMATOCRIT % 43 8   < > 46 7   PLATELETS Thousands/uL 225   < > 256   BANDS PCT %  --   --  1   NEUTROS PCT % 84*  --   --    LYMPHS PCT % 10*  --   --    LYMPHO PCT %  --   --  7*   MONOS PCT % 4  --   --    MONO PCT %  --   --  1*   EOS PCT % 0  --  0    < > = values in this interval not displayed  Results from last 7 days   Lab Units 03/19/22  0610 03/18/22  2032 03/18/22  1604   SODIUM mmol/L 136   < > 135   POTASSIUM mmol/L 4 0   < > 4 1   CHLORIDE mmol/L 101   < > 96   CO2 mmol/L 21   < > 20*   BUN mg/dL 29*   < > 33*   CREATININE mg/dL 0 81   < > 1 00   ANION GAP mmol/L 14*   < > 19*   CALCIUM mg/dL 8 3*   < > 8 9   ALBUMIN g/dL  --   --  4 0   TOTAL BILIRUBIN mg/dL  --   --  0 48   ALK PHOS U/L  --   --  74   ALT U/L  --   --  16   AST U/L  --   --  9*   GLUCOSE RANDOM mg/dL 111   < > 327*    < > = values in this interval not displayed  Results from last 7 days   Lab Units 03/18/22  1604   INR  1 05     Results from last 7 days   Lab Units 03/19/22  0652 03/19/22  0445 03/19/22  0253 03/19/22  0107 03/18/22  2310 03/18/22 2212 03/18/22  2034   POC GLUCOSE mg/dl 118 134 135 132 160* 189* 197*         Results from last 7 days   Lab Units 03/19/22  0610 03/18/22  1811   PROCALCITONIN ng/ml 0 68* 1 03*           * I Have Reviewed All Lab Data Listed Above  * Additional Pertinent Lab Tests Reviewed:  All [place] Admission Reviewed    Recent Cultures (last 7 days):     Results from last 7 days   Lab Units 03/18/22  1811 03/18/22  1808   BLOOD CULTURE  Received in Microbiology Lab  Culture in Progress  Received in Microbiology Lab  Culture in Progress  Last 24 Hours Medication List:   Current Facility-Administered Medications   Medication Dose Route Frequency Provider Last Rate    acetaminophen  650 mg Oral Q6H PRN PENG Anderson      albuterol  2 puff Inhalation Q6H PRN PENG Anderson      aspirin  81 mg Oral Every Other Day PENG Anderson      azithromycin  500 mg Oral Q24H PENG Anderson      cyanocobalamin  500 mcg Oral Daily PENG Anderson      fluticasone  2 puff Inhalation BID PENG Anderson      gabapentin  200 mg Oral HS PENG Anderson      guaiFENesin  600 mg Oral BID PENG Anderson      heparin (porcine)  5,000 Units Subcutaneous [facility] PENG Anderson      insulin glargine  13 Units Subcutaneous Once PENG Anderson      insulin regular (HumuLIN R,NovoLIN R) infusion  0 3-21 Units/hr Intravenous Titrated PENG Anderson 0 5 Units/hr (03/19/22 0100)    levalbuterol  1 25 mg Nebulization TID Makayla Duckworth MD      lisinopril  40 mg Oral Daily PENG Anderson      methylPREDNISolone sodium succinate  40 mg Intravenous Q8H PENG Anderson      metoprolol tartrate  25 mg Oral Q12H [facility] PENG Anderson      primidone  100 mg Oral [facility] PENG Anderson      salmeterol  1 puff Inhalation Q12H [facility] PENG Anderson      sodium chloride  100 mL/hr Intravenous Continuous Bhumi Perez PA-C 100 mL/hr (03/19/22 0612)    sodium chloride  3 mL Nebulization TID Makayla Duckworth MD      tiotropium  18 mcg Inhalation Daily PENG Anderson          Today, Patient Was Seen By: PENG Anderson    ** Please Note: Dictation voice to text software may have been used in the creation of this document   **

## 2022-03-19 NOTE — CONSULTS
PULMONOLOGY CONSULT NOTE     Name: Ingrid Chaudhry   Age & Sex: 77 y o  male   MRN: [id number]  Unit/Bed#: ED 27   Encounter: [id number]        Reason for consultation: COPD Exacerbation    Requesting physician: Internal Medicine    Assessment and Plan:  Acute exacerbation of COPD  Very severe COPD with frequent exacerbations  Tobacco use disorder    Continue solumedrol 40 mg q8  Continue Xopenex/Atrovent neb TID  Add Pulmicort BID neb  On discharge can resume Stiolto, Asmanex and PRN albuterol  Smoking cessation counseling  Follow-up within 4-6 weeks outpatient  Would benefit from pulmonary rehab  Discussed with PENG Sim from primary team      History of Present Illness   HPI:  Ingrid Chaudhry is a 77 y o  male with PMHx of COPD with frequent exacerbations, nocturnal hypoxemia on 2 lpm O2 at night, hypertension, type 2 diabetes not on insulin, CAD s/p CABG x 4, PAD, ICM, history prostate cancer who presents with worsening shortness of breath and cough for 2-3 days  Previously had had flu-like symptoms- myalgias, fatigue for a week  He reports chills but no fevers  Decreased PO intake  He was found to be in mild DKA in ED and started on insulin gtt with subsequent resolution of his anion gap  He is a current smoker 1-2 cigarettes a day with dinner- used to smoke much more heavily  He is on Stiolto at home 2 puffs once daily and asmanex 2 puffs twice daily  Also with PRN albuterol inhaler and nebulizer  Last seen by Dr Marisol Minor with pulmonary at [facility] last seen 4/2021    Review of systems:  12 point review of systems was completed and was otherwise negative except as listed in HPI        Historical Information   Past Medical History:   Diagnosis Date    BPH (benign prostatic hyperplasia)     45 days radiation treatment    COPD (chronic obstructive pulmonary disease)     COPD with acute exacerbation ([facility] ) 11/21/2019    Coronary artery disease     CABG x4 in 2017    Diabetes mellitus     History of Arterial Duplex of LE 12/26/2017    Likely occlusion of the left superficial femoral artery  Calcific changes bilaterally  Despite these changes, the ankle-brachial index as a measure of peripheral blood flow only mildly impaired   History of echocardiogram 06/12/2017    EF 40%, mild LVH, mild MR     Hyperlipidemia     Hypertension     NSTEMI (non-ST elevated myocardial infarction)     Prostate cancer     prostate     PVD (peripheral vascular disease)     Tremor     Type 2 MI (myocardial infarction) ([facility] [address] ) 4/6/2021     Past Surgical History:   Procedure Laterality Date    CARDIAC CATHETERIZATION  03/08/2017    Significant left main plus triple-vessel CAD   CORONARY ARTERY BYPASS GRAFT  03/08/2017    4V CABG:  LIMA to LAD, VG to RI, SVG to PDA to LVBR RCA      EYE SURGERY      shots in eye once a month @ the [place]    PROSTATE BIOPSY       Family History   Problem Relation Age of Onset    Cancer Father     Heart disease Brother            Social History:   Social History     Tobacco Use   Smoking Status Current Some Day Smoker    Packs/day: 0 25    Years: 60 00    Pack years: 15 00    Types: Cigarettes   Smokeless Tobacco Never Used   Tobacco Comment    only smokes when he drinks beer         Meds/Allergies   Current Facility-Administered Medications   Medication Dose Route Frequency    acetaminophen (TYLENOL) tablet 650 mg  650 mg Oral Q6H PRN    albuterol (PROVENTIL HFA,VENTOLIN HFA) inhaler 2 puff  2 puff Inhalation Q6H PRN    aspirin (ECOTRIN LOW STRENGTH) EC tablet 81 mg  81 mg Oral Every Other Day    azithromycin (ZITHROMAX) tablet 500 mg  500 mg Oral Q24H    budesonide (PULMICORT) inhalation solution 0 5 mg  0 5 mg Nebulization Q12H    cyanocobalamin (VITAMIN B-12) tablet 500 mcg  500 mcg Oral Daily    dextrose 5 % and sodium chloride 0 45 % infusion  75 mL/hr Intravenous Continuous    gabapentin (NEURONTIN) capsule 200 mg  200 mg Oral HS    guaiFENesin (MUCINEX) 12 hr tablet 600 mg  600 mg Oral BID    heparin (porcine) subcutaneous injection 5,000 Units  5,000 Units Subcutaneous Q8H [address]    insulin regular (HumuLIN R,NovoLIN R) 1 Units/mL in sodium chloride 0 9 % 100 mL infusion  0 3-21 Units/hr Intravenous Titrated    ipratropium (ATROVENT) 0 02 % inhalation solution 0 5 mg  0 5 mg Nebulization TID    levalbuterol (XOPENEX) inhalation solution 1 25 mg  1 25 mg Nebulization TID    lisinopril (ZESTRIL) tablet 40 mg  40 mg Oral Daily    methylPREDNISolone sodium succinate (Solu-MEDROL) injection 40 mg  40 mg Intravenous Q8H    metoprolol tartrate (LOPRESSOR) tablet 25 mg  25 mg Oral Q12H [address]    primidone (MYSOLINE) tablet 100 mg  100 mg Oral Q8H [address]     (Not in a hospital admission)    Allergies   Allergen Reactions    Atorvastatin Myalgia       Vitals: Blood pressure 139/66, pulse 86, temperature 98 2 °F (36 8 °C), resp  rate 18, height 5' 10" (1 778 m), SpO2 93 %  , Room air, Body mass index is 28 98 kg/m²  Intake/Output Summary (Last 24 hours) at 3/19/2022 1517  Last data filed at 3/19/2022 0451  Gross per 24 hour   Intake 2000 ml   Output --   Net 2000 ml       Physical Exam  Vitals and nursing note reviewed  Constitutional:       Appearance: He is well-developed  He is obese  HENT:      Head: Normocephalic and atraumatic  Eyes:      Conjunctiva/sclera: Conjunctivae normal    Cardiovascular:      Rate and Rhythm: Normal rate and regular rhythm  Heart sounds: No murmur heard  Pulmonary:      Effort: Pulmonary effort is normal  No respiratory distress  Breath sounds: Wheezing present  Abdominal:      Palpations: Abdomen is soft  Tenderness: There is no abdominal tenderness  Musculoskeletal:      Cervical back: Neck supple  Skin:     General: Skin is warm and dry  Neurological:      General: No focal deficit present  Mental Status: He is alert and oriented to person, place, and time  Labs:  I have personally reviewed pertinent lab results  Laboratory and Diagnostics  Results from last 7 days   Lab Units 03/19/22  0610 03/18/22  1604   WBC Thousand/uL 9 49 9 34   HEMOGLOBIN g/dL 14 2 15 2   HEMATOCRIT % 43 8 46 7   PLATELETS Thousands/uL 225 256   NEUTROS PCT % 84*  --    BANDS PCT %  --  1   MONOS PCT % 4  --    MONO PCT %  --  1*     Results from last 7 days   Lab Units 03/19/22  1402 03/19/22  1117 03/19/22  0610 03/18/22 2032 03/18/22  1604   SODIUM mmol/L 134* 133* 136 135 135   POTASSIUM mmol/L 4 1 4 4 4 0 3 9 4 1   CHLORIDE mmol/L 102 101 101 96 96   CO2 mmol/L 21 17* 21 20* 20*   ANION GAP mmol/L 11 15* 14* 19* 19*   BUN mg/dL 26* 28* 29* 33* 33*   CREATININE mg/dL 0 83 0 72 0 81 0 96 1 00   CALCIUM mg/dL 8 1* 8 1* 8 3* 8 8 8 9   GLUCOSE RANDOM mg/dL 217* 162* 111 205* 327*   ALT U/L  --   --   --   --  16   AST U/L  --   --   --   --  9*   ALK PHOS U/L  --   --   --   --  74   ALBUMIN g/dL  --   --   --   --  4 0   TOTAL BILIRUBIN mg/dL  --   --   --   --  0 48     Results from last 7 days   Lab Units 03/18/22  2032   MAGNESIUM mg/dL 2 4   PHOSPHORUS mg/dL 4 1      Results from last 7 days   Lab Units 03/18/22  1604   INR  1 05   PTT seconds 33                              Results from last 7 days   Lab Units 03/19/22  0610 03/18/22  1811   PROCALCITONIN ng/ml 0 68* 1 03*       Microbiology:  Results from last 7 days   Lab Units 03/18/22  1811 03/18/22  1808   BLOOD CULTURE  Received in Microbiology Lab  Culture in Progress  Received in Microbiology Lab  Culture in Progress  ABG: No results found for: PHART, EKM6TQB, PO2ART, RFM4CQD, T2QRRJVF, BEART, SOURCE      Imaging and other studies: I have personally reviewed pertinent reports  and I have personally reviewed pertinent films in PACS  XR chest 1 view portable    Result Date: 3/18/2022  Impression: No acute cardiopulmonary disease  Workstation performed: [id number]     CT Chest 3/7/22  No nodules    Discoid scarring lingula      Pulmonary function testing: I don't have the report but per Dr Jose Draft note at [facility] he has very severe COPD on PFT    EKG, Pathology, and Other Studies: I have personally reviewed pertinent reports          Code Status: Level 1 - Full Code        Layo Gaffney MD  Attending Physician  Pulmonary and Critical Care Medicine

## 2022-03-19 NOTE — ASSESSMENT & PLAN NOTE
Lab Results   Component Value Date    HGBA1C 6 9 (H) 01/06/2022       Recent Labs     03/19/22  0107 03/19/22  0253 03/19/22  0445 03/19/22  0652   POCGLU 132 135 134 118       Blood Sugar Average: Last 72 hrs:  (P) 152 [id number]   · Glucose 327  · Hold home oral anti diabetic agents  · Patient on insulin infusion for DKA-will transition to Lantus and sliding scale insulin  · Continue Neurontin

## 2022-03-19 NOTE — ASSESSMENT & PLAN NOTE
· Anion gap 19, CO2  · VBG pH 7 354  · Beta hydroxybutyrate 4 2  · Insulin infusion protocol  · Will give Lantus 13 units and recheck BMP 2 hours later    If anion gap remains closed, will start oral intake, discontinue insulin infusion, and start insulin sliding scale

## 2022-03-19 NOTE — UTILIZATION REVIEW
Initial Clinical Review    Admission: Date/Time/Statement:   Admission Orders (From admission, onward)     Ordered        03/18/22 1756  Inpatient Admission  Once                      Orders Placed This Encounter   Procedures    Inpatient Admission     Standing Status:   Standing     Number of Occurrences:   1     Order Specific Question:   Level of Care     Answer:   Med Surg [16]     Order Specific Question:   Estimated length of stay     Answer:   More than 2 Midnights     Order Specific Question:   Certification     Answer:   I certify that inpatient services are medically necessary for this patient for a duration of greater than two midnights  See H&P and MD Progress Notes for additional information about the patient's course of treatment  ED Arrival Information     Expected Arrival Acuity    - 3/18/2022 15:34 Urgent         Means of arrival Escorted by Service Admission type    [facility] Urgent         Arrival complaint    sob weakness        Chief Complaint   Patient presents with    Weakness - Generalized     Patient reports generalized weakness for the past 4 days  Patient denies fevers  Patient also reports shortness of breath        Initial Presentation: 77year old male to the ED from home with complaints of generalized weakness for 4 days  Admitted to CRITICAL CARE step down for COPD with exacerbation, DKA, MI  H/O COPD, DMII, htn, HLD, cad and CABG  Arrives tachypneic, with wheezing lung sounds  Admitted 3/8/22 for COPD exacerbation  Has been taking meds as prescribed  CXR shows no acute abnormality  Anion gap 19, VBG ph 7 354, beta hydroxybutyrate 4 2  IV insulin drip started  Check BMP frequently  Troponin elevated likely due to demand  Continue to trend  BLood cultures pending  Check procal      Date: 3/19   Day 2:   Respiratory status improved  Continues with wheezing  Insulin drip continues       ED Triage Vitals   Temperature Pulse Respirations Blood Pressure SpO2 03/18/22 1551 03/18/22 1551 03/18/22 1551 03/18/22 1551 03/18/22 1551   98 2 °F (36 8 °C) 89 20 122/58 92 %      Temp src Heart Rate Source Patient Position - Orthostatic VS BP Location FiO2 (%)   -- 03/18/22 1700 03/18/22 1551 03/18/22 1551 --    Monitor Sitting Left arm       Pain Score       03/18/22 1551       No Pain          Wt Readings from Last 1 Encounters:   03/09/22 91 6 kg (201 lb 15 1 oz)     Additional Vital Signs:   Time Temp Pulse Resp BP MAP (mmHg) SpO2 O2 Device Patient Position - Orthostatic VS   03/19/22 0828 -- -- -- -- -- 97 % None (Room air) --   03/19/22 0800 -- 79 -- 158/72 104 97 % -- --   03/19/22 0700 -- 77 -- 157/80 112 95 % -- --   03/19/22 0500 -- 76 26 Abnormal  169/70 101 95 % None (Room air) --   03/19/22 0400 -- 74 20 158/82 110 97 % None (Room air) --   03/18/22 2200 -- 70 22 139/67 97 96 % None (Room air) --   03/18/22 2056 -- 90 -- 131/66 -- -- -- --   03/18/22 1830 -- 83 17 117/65 86 94 % -- --   03/18/22 1730 -- 87 17 136/65 93 96 % -- --   03/18/22 1700 -- 81 17 134/59 85 98 % -- --   03/18/22 1615 -- -- -- -- -- 94 % None (Room air) --   03/18/22 1551 98 2 °F (36 8 °C) 89 20 122/58 -- 92 % None (Room air) Sitting       Pertinent Labs/Diagnostic Test Results:   3/19 EKG:Normal sinus rhythm  Nonspecific ST-t wave changes  Prolonged QT  Abnormal ECG  When compared with ECG of 18-MAR-2022 19:49, (unconfirmed)  No significant change was found     XR chest 1 view portable   Final Result by Echo Mead MD (03/18 1700)      No acute cardiopulmonary disease                    Workstation performed: [id number]           Results from last 7 days   Lab Units 03/18/22  1604   SARS-COV-2  Negative     Results from last 7 days   Lab Units 03/19/22  0610 03/18/22  1604   WBC Thousand/uL 9 49 9 34   HEMOGLOBIN g/dL 14 2 15 2   HEMATOCRIT % 43 8 46 7   PLATELETS Thousands/uL 225 256   NEUTROS ABS Thousands/µL 7 97*  --    BANDS PCT %  --  1         Results from last 7 days   Lab Units 03/19/22  0610 03/18/22 2032 03/18/22  1604   SODIUM mmol/L 136 135 135   POTASSIUM mmol/L 4 0 3 9 4 1   CHLORIDE mmol/L 101 96 96   CO2 mmol/L 21 20* 20*   ANION GAP mmol/L 14* 19* 19*   BUN mg/dL 29* 33* 33*   CREATININE mg/dL 0 81 0 96 1 00   EGFR ml/min/1 73sq m 86 77 73   CALCIUM mg/dL 8 3* 8 8 8 9   MAGNESIUM mg/dL  --  2 4  --    PHOSPHORUS mg/dL  --  4 1  --      Results from last 7 days   Lab Units 03/18/22  1604   AST U/L 9*   ALT U/L 16   ALK PHOS U/L 74   TOTAL PROTEIN g/dL 6 8   ALBUMIN g/dL 4 0   TOTAL BILIRUBIN mg/dL 0 48     Results from last 7 days   Lab Units 03/19/22  0652 03/19/22  0445 03/19/22  0253 03/19/22  0107 03/18/22  2310 03/18/22 2212 03/18/22 2034   POC GLUCOSE mg/dl 118 134 135 132 160* 189* 197*     Results from last 7 days   Lab Units 03/19/22  0610 03/18/22 2032 03/18/22  1604   GLUCOSE RANDOM mg/dL 111 205* 327*             BETA-HYDROXYBUTYRATE   Date Value Ref Range Status   03/18/2022 4 2 (H) <0 6 mmol/L Final          Results from last 7 days   Lab Units 03/18/22  1636   PH GRETCHEN  7 354   PCO2 GRETCHEN mm Hg 36 9*   PO2 GRETCHEN mm Hg 58 3*   HCO3 GRETCHEN mmol/L 20 1*   BASE EXC GRETCHEN mmol/L -4 8   O2 CONTENT GRETCHEN ml/dL 18 4   O2 HGB, VENOUS % 87 3*             Results from last 7 days   Lab Units 03/18/22  2315 03/18/22  1952 03/18/22  1759 03/18/22  1604   HS TNI 0HR ng/L 506*  --   --  582*   HS TNI 2HR ng/L  --   --  573*  --    HSTNI D2 ng/L  --   --  -9  --    HS TNI 4HR ng/L  --  492*  --   --    HSTNI D4 ng/L  --  -90  --   --          Results from last 7 days   Lab Units 03/18/22  1604   PROTIME seconds 13 6   INR  1 05   PTT seconds 33         Results from last 7 days   Lab Units 03/19/22  0610 03/18/22  1811   PROCALCITONIN ng/ml 0 68* 1 03*       Results from last 7 days   Lab Units 03/18/22  1604   BNP pg/mL 392*         Results from last 7 days   Lab Units 03/18/22  1604   INFLUENZA A PCR  Negative   INFLUENZA B PCR  Negative   RSV PCR  Negative           Results from last 7 days   Lab Units 03/18/22  1811 03/18/22  1808   BLOOD CULTURE  Received in Microbiology Lab  Culture in Progress  Received in Microbiology Lab  Culture in Progress         ED Treatment:   Medication Administration from 03/18/2022 1533 to 03/19/2022 1039       Date/Time Order Dose Route Action     03/18/2022 1614 albuterol inhalation solution 10 mg 10 mg Nebulization Given     03/18/2022 1614 ipratropium (ATROVENT) 0 02 % inhalation solution 1 mg 1 mg Nebulization Given     03/18/2022 1615 sodium chloride 0 9 % inhalation solution 3 mL 3 mL Nebulization Given     03/18/2022 1611 methylPREDNISolone sodium succinate (Solu-MEDROL) injection 125 mg 125 mg Intravenous Given     03/18/2022 1637 sodium chloride 0 9 % bolus 1,000 mL 1,000 mL Intravenous New Bag     03/18/2022 1835 aspirin (ECOTRIN LOW STRENGTH) EC tablet 81 mg 81 mg Oral Given     03/19/2022 0930 cyanocobalamin (VITAMIN B-12) tablet 500 mcg 500 mcg Oral Given     03/18/2022 2209 gabapentin (NEURONTIN) capsule 200 mg 200 mg Oral Given     03/19/2022 0930 guaiFENesin (MUCINEX) 12 hr tablet 600 mg 600 mg Oral Given     03/18/2022 1835 guaiFENesin (MUCINEX) 12 hr tablet 600 mg 600 mg Oral Given     03/19/2022 0932 lisinopril (ZESTRIL) tablet 40 mg 40 mg Oral Given     03/19/2022 0932 metoprolol tartrate (LOPRESSOR) tablet 25 mg 25 mg Oral Given     03/18/2022 2056 metoprolol tartrate (LOPRESSOR) tablet 25 mg 25 mg Oral Given     03/19/2022 0931 fluticasone (FLOVENT HFA) 220 mcg/act inhaler 2 puff 2 puff Inhalation Given     03/18/2022 2107 fluticasone (FLOVENT HFA) 220 mcg/act inhaler 2 puff 2 puff Inhalation Given     03/19/2022 0610 primidone (MYSOLINE) tablet 100 mg 100 mg Oral Given     03/18/2022 2209 primidone (MYSOLINE) tablet 100 mg 100 mg Oral Given     03/19/2022 0932 tiotropium (SPIRIVA) capsule for inhaler 18 mcg 18 mcg Inhalation Given     03/19/2022 0932 salmeterol (Serevent Diskus) 50 mcg/dose inhaler 1 puff 1 puff Inhalation Given     03/18/2022 2102 salmeterol (Serevent Diskus) 50 mcg/dose inhaler 1 puff 1 puff Inhalation Given     03/19/2022 0605 methylPREDNISolone sodium succinate (Solu-MEDROL) injection 40 mg 40 mg Intravenous Given     03/19/2022 0607 heparin (porcine) subcutaneous injection 5,000 Units 5,000 Units Subcutaneous Given     03/18/2022 2210 heparin (porcine) subcutaneous injection 5,000 Units 5,000 Units Subcutaneous Given     03/18/2022 2100 insulin regular (HumuLIN R,NovoLIN R) 1 Units/mL in sodium chloride 0 9 % 100 mL infusion 2 Units/hr Intravenous New Bag     03/18/2022 1835 azithromycin (ZITHROMAX) tablet 500 mg 500 mg Oral Given     03/19/2022 0828 levalbuterol (XOPENEX) inhalation solution 1 25 mg 1 25 mg Nebulization Given     03/18/2022 1949 levalbuterol (XOPENEX) inhalation solution 1 25 mg 1 25 mg Nebulization Given     03/19/2022 0828 sodium chloride 0 9 % inhalation solution 3 mL 3 mL Nebulization Given     03/18/2022 1949 sodium chloride 0 9 % inhalation solution 3 mL 3 mL Nebulization Given     03/18/2022 2358 sodium chloride 0 9 % bolus 1,000 mL 1,000 mL Intravenous New Bag     03/19/2022 0251 sodium chloride 0 9 % bolus 1,000 mL 1,000 mL Intravenous New Bag     03/19/2022 0612 sodium chloride 0 9 % infusion 100 mL/hr Intravenous New Bag     03/19/2022 0931 insulin glargine (LANTUS) subcutaneous injection 13 Units 0 13 mL 13 Units Subcutaneous Given        Past Medical History:   Diagnosis Date    BPH (benign prostatic hyperplasia)     45 days radiation treatment    COPD (chronic obstructive pulmonary disease)     COPD with acute exacerbation ([facility] [address] ) 11/21/2019    Coronary artery disease     CABG x4 in 2017    Diabetes mellitus     History of Arterial Duplex of LE 12/26/2017    Likely occlusion of the left superficial femoral artery  Calcific changes bilaterally  Despite these changes, the ankle-brachial index as a measure of peripheral blood flow only mildly impaired      History of echocardiogram 06/12/2017    EF 40%, mild LVH, mild MR     Hyperlipidemia     Hypertension     NSTEMI (non-ST elevated myocardial infarction)     Prostate cancer     prostate     PVD (peripheral vascular disease)     Tremor     Type 2 MI (myocardial infarction) ([facility] [address] ) 4/6/2021       Admitting Diagnosis: SOB (shortness of breath) [R06 02]  Age/Sex: 77 y o  male  Admission Orders:  SCDS  Tele  Up with assist  Clear liquid  Scheduled Medications:  aspirin, 81 mg, Oral, Every Other Day  azithromycin, 500 mg, Oral, Q24H  cyanocobalamin, 500 mcg, Oral, Daily  fluticasone, 2 puff, Inhalation, BID  gabapentin, 200 mg, Oral, HS  guaiFENesin, 600 mg, Oral, BID  heparin (porcine), 5,000 Units, Subcutaneous, Q8H ENDER  levalbuterol, 1 25 mg, Nebulization, TID  lisinopril, 40 mg, Oral, Daily  methylPREDNISolone sodium succinate, 40 mg, Intravenous, Q8H  metoprolol tartrate, 25 mg, Oral, Q12H ENDER  primidone, 100 mg, Oral, Q8H ENDER  salmeterol, 1 puff, Inhalation, Q12H ENDER  sodium chloride, 3 mL, Nebulization, TID  tiotropium, 18 mcg, Inhalation, Daily      Continuous IV Infusions:  insulin regular (HumuLIN R,NovoLIN R) infusion, 0 3-21 Units/hr, Intravenous, Titrated  sodium chloride, 100 mL/hr, Intravenous, Continuous      PRN Meds:  acetaminophen, 650 mg, Oral, Q6H PRN  albuterol, 2 puff, Inhalation, Q6H PRN        IP CONSULT TO PULMONOLOGY    Network Utilization Review Department  ATTENTION: Please call with any questions or concerns to [phone] and carefully listen to the prompts so that you are directed to the right person  All voicemails are confidential   Charmaine Skipper all requests for admission clinical reviews, approved or denied determinations and any other requests to dedicated fax number below belonging to the campus where the patient is receiving treatment   List of dedicated fax numbers for the Facilities:  FACILITY NAME UR FAX NUMBER   ADMISSION DENIALS (Administrative/Medical Necessity) [phone]   [address] (Maternity/NICU/Pediatrics) [address] [facility],[address] [facility] [address] [place] [address]  [phone]   [facility] [address] [id number] [place] [address] [address] [facility] [place] [address] [phone]

## 2022-03-19 NOTE — ASSESSMENT & PLAN NOTE
· HS troponin: 582  · EKG nonischemic  · ER physician discussed with Cardiology-suspect secondary to demand  · Serial troponins and EKGs  · Troponin is remaining flat, although in the 500s  · Continues to deny chest pain  · Repeat EKGs without evidence of acute infarct

## 2022-03-20 LAB
ANION GAP SERPL CALCULATED.3IONS-SCNC: 11 MMOL/L (ref 4–13)
BASOPHILS # BLD AUTO: 0.06 THOUSANDS/ΜL (ref 0–0.1)
BASOPHILS NFR BLD AUTO: 0 % (ref 0–1)
BUN SERPL-MCNC: 27 MG/DL (ref 5–25)
CALCIUM SERPL-MCNC: 8.9 MG/DL (ref 8.4–10.2)
CHLORIDE SERPL-SCNC: 102 MMOL/L (ref 96–108)
CO2 SERPL-SCNC: 23 MMOL/L (ref 21–32)
CREAT SERPL-MCNC: 0.86 MG/DL (ref 0.6–1.3)
EOSINOPHIL # BLD AUTO: 0.01 THOUSAND/ΜL (ref 0–0.61)
EOSINOPHIL NFR BLD AUTO: 0 % (ref 0–6)
ERYTHROCYTE [DISTWIDTH] IN BLOOD BY AUTOMATED COUNT: 13.9 % (ref 11.6–15.1)
GFR SERPL CREATININE-BSD FRML MDRD: 84 ML/MIN/1.73SQ M
GLUCOSE SERPL-MCNC: 135 MG/DL (ref 65–140)
GLUCOSE SERPL-MCNC: 149 MG/DL (ref 65–140)
GLUCOSE SERPL-MCNC: 170 MG/DL (ref 65–140)
GLUCOSE SERPL-MCNC: 172 MG/DL (ref 65–140)
GLUCOSE SERPL-MCNC: 255 MG/DL (ref 65–140)
HCT VFR BLD AUTO: 42.1 % (ref 36.5–49.3)
HGB BLD-MCNC: 14.6 G/DL (ref 12–17)
IMM GRANULOCYTES # BLD AUTO: 0.2 THOUSAND/UL (ref 0–0.2)
IMM GRANULOCYTES NFR BLD AUTO: 2 % (ref 0–2)
LYMPHOCYTES # BLD AUTO: 1.1 THOUSANDS/ΜL (ref 0.6–4.47)
LYMPHOCYTES NFR BLD AUTO: 8 % (ref 14–44)
MCH RBC QN AUTO: 31.7 PG (ref 26.8–34.3)
MCHC RBC AUTO-ENTMCNC: 34.7 G/DL (ref 31.4–37.4)
MCV RBC AUTO: 91 FL (ref 82–98)
MONOCYTES # BLD AUTO: 0.42 THOUSAND/ΜL (ref 0.17–1.22)
MONOCYTES NFR BLD AUTO: 3 % (ref 4–12)
NEUTROPHILS # BLD AUTO: 11.8 THOUSANDS/ΜL (ref 1.85–7.62)
NEUTS SEG NFR BLD AUTO: 87 % (ref 43–75)
NRBC BLD AUTO-RTO: 0 /100 WBCS
PLATELET # BLD AUTO: 257 THOUSANDS/UL (ref 149–390)
PMV BLD AUTO: 11.1 FL (ref 8.9–12.7)
POTASSIUM SERPL-SCNC: 4.1 MMOL/L (ref 3.5–5.3)
RBC # BLD AUTO: 4.61 MILLION/UL (ref 3.88–5.62)
SODIUM SERPL-SCNC: 136 MMOL/L (ref 135–147)
WBC # BLD AUTO: 13.59 THOUSAND/UL (ref 4.31–10.16)

## 2022-03-20 PROCEDURE — 99232 SBSQ HOSP IP/OBS MODERATE 35: CPT | Performed by: INTERNAL MEDICINE

## 2022-03-20 PROCEDURE — 99233 SBSQ HOSP IP/OBS HIGH 50: CPT | Performed by: NURSE PRACTITIONER

## 2022-03-20 PROCEDURE — 36415 COLL VENOUS BLD VENIPUNCTURE: CPT | Performed by: NURSE PRACTITIONER

## 2022-03-20 PROCEDURE — 82948 REAGENT STRIP/BLOOD GLUCOSE: CPT

## 2022-03-20 PROCEDURE — 94640 AIRWAY INHALATION TREATMENT: CPT

## 2022-03-20 PROCEDURE — 85025 COMPLETE CBC W/AUTO DIFF WBC: CPT | Performed by: NURSE PRACTITIONER

## 2022-03-20 PROCEDURE — 80048 BASIC METABOLIC PNL TOTAL CA: CPT | Performed by: NURSE PRACTITIONER

## 2022-03-20 PROCEDURE — 94760 N-INVAS EAR/PLS OXIMETRY 1: CPT

## 2022-03-20 RX ORDER — LABETALOL 20 MG/4 ML (5 MG/ML) INTRAVENOUS SYRINGE
10 ONCE
Status: COMPLETED | OUTPATIENT
Start: 2022-03-20 | End: 2022-03-20

## 2022-03-20 RX ADMIN — INSULIN LISPRO 3 UNITS: 100 INJECTION, SOLUTION INTRAVENOUS; SUBCUTANEOUS at 11:54

## 2022-03-20 RX ADMIN — GABAPENTIN 200 MG: 100 CAPSULE ORAL at 22:48

## 2022-03-20 RX ADMIN — INSULIN LISPRO 4 UNITS: 100 INJECTION, SOLUTION INTRAVENOUS; SUBCUTANEOUS at 08:33

## 2022-03-20 RX ADMIN — BUDESONIDE 0.5 MG: 0.5 INHALANT ORAL at 08:12

## 2022-03-20 RX ADMIN — ALBUTEROL SULFATE 2 PUFF: 90 AEROSOL, METERED RESPIRATORY (INHALATION) at 17:56

## 2022-03-20 RX ADMIN — INSULIN LISPRO 4 UNITS: 100 INJECTION, SOLUTION INTRAVENOUS; SUBCUTANEOUS at 11:55

## 2022-03-20 RX ADMIN — PRIMIDONE 100 MG: 50 TABLET ORAL at 22:46

## 2022-03-20 RX ADMIN — METHYLPREDNISOLONE SODIUM SUCCINATE 40 MG: 40 INJECTION, POWDER, FOR SOLUTION INTRAMUSCULAR; INTRAVENOUS at 13:36

## 2022-03-20 RX ADMIN — INSULIN LISPRO 1 UNITS: 100 INJECTION, SOLUTION INTRAVENOUS; SUBCUTANEOUS at 17:14

## 2022-03-20 RX ADMIN — INSULIN GLARGINE 13 UNITS: 100 INJECTION, SOLUTION SUBCUTANEOUS at 23:01

## 2022-03-20 RX ADMIN — ALBUTEROL SULFATE 2 PUFF: 90 AEROSOL, METERED RESPIRATORY (INHALATION) at 11:45

## 2022-03-20 RX ADMIN — PRIMIDONE 100 MG: 50 TABLET ORAL at 13:36

## 2022-03-20 RX ADMIN — HEPARIN SODIUM 5000 UNITS: 5000 INJECTION INTRAVENOUS; SUBCUTANEOUS at 22:47

## 2022-03-20 RX ADMIN — LEVALBUTEROL 1.25 MG: 1.25 SOLUTION, CONCENTRATE RESPIRATORY (INHALATION) at 13:08

## 2022-03-20 RX ADMIN — GUAIFENESIN 600 MG: 600 TABLET, EXTENDED RELEASE ORAL at 17:14

## 2022-03-20 RX ADMIN — LABETALOL HYDROCHLORIDE 10 MG: 5 INJECTION, SOLUTION INTRAVENOUS at 17:49

## 2022-03-20 RX ADMIN — LISINOPRIL 40 MG: 20 TABLET ORAL at 08:33

## 2022-03-20 RX ADMIN — HEPARIN SODIUM 5000 UNITS: 5000 INJECTION INTRAVENOUS; SUBCUTANEOUS at 05:22

## 2022-03-20 RX ADMIN — ASPIRIN 81 MG: 81 TABLET, COATED ORAL at 08:28

## 2022-03-20 RX ADMIN — IPRATROPIUM BROMIDE 0.5 MG: 0.5 SOLUTION RESPIRATORY (INHALATION) at 20:58

## 2022-03-20 RX ADMIN — METHYLPREDNISOLONE SODIUM SUCCINATE 40 MG: 40 INJECTION, POWDER, FOR SOLUTION INTRAMUSCULAR; INTRAVENOUS at 05:22

## 2022-03-20 RX ADMIN — BUDESONIDE 0.5 MG: 0.5 INHALANT ORAL at 20:58

## 2022-03-20 RX ADMIN — LEVALBUTEROL 1.25 MG: 1.25 SOLUTION, CONCENTRATE RESPIRATORY (INHALATION) at 20:58

## 2022-03-20 RX ADMIN — AZITHROMYCIN MONOHYDRATE 500 MG: 250 TABLET ORAL at 17:15

## 2022-03-20 RX ADMIN — LEVALBUTEROL 1.25 MG: 1.25 SOLUTION, CONCENTRATE RESPIRATORY (INHALATION) at 08:12

## 2022-03-20 RX ADMIN — METHYLPREDNISOLONE SODIUM SUCCINATE 40 MG: 40 INJECTION, POWDER, FOR SOLUTION INTRAMUSCULAR; INTRAVENOUS at 22:42

## 2022-03-20 RX ADMIN — CYANOCOBALAMIN TAB 500 MCG 500 MCG: 500 TAB at 08:28

## 2022-03-20 RX ADMIN — HEPARIN SODIUM 5000 UNITS: 5000 INJECTION INTRAVENOUS; SUBCUTANEOUS at 13:36

## 2022-03-20 RX ADMIN — IPRATROPIUM BROMIDE 0.5 MG: 0.5 SOLUTION RESPIRATORY (INHALATION) at 08:12

## 2022-03-20 RX ADMIN — ALBUTEROL SULFATE 2 PUFF: 90 AEROSOL, METERED RESPIRATORY (INHALATION) at 02:59

## 2022-03-20 RX ADMIN — PRIMIDONE 100 MG: 50 TABLET ORAL at 05:22

## 2022-03-20 RX ADMIN — INSULIN LISPRO 4 UNITS: 100 INJECTION, SOLUTION INTRAVENOUS; SUBCUTANEOUS at 17:15

## 2022-03-20 RX ADMIN — METOPROLOL TARTRATE 25 MG: 25 TABLET, FILM COATED ORAL at 22:44

## 2022-03-20 RX ADMIN — GUAIFENESIN 600 MG: 600 TABLET, EXTENDED RELEASE ORAL at 08:32

## 2022-03-20 RX ADMIN — METOPROLOL TARTRATE 25 MG: 25 TABLET, FILM COATED ORAL at 08:28

## 2022-03-20 RX ADMIN — IPRATROPIUM BROMIDE 0.5 MG: 0.5 SOLUTION RESPIRATORY (INHALATION) at 13:08

## 2022-03-20 RX ADMIN — INSULIN LISPRO 1 UNITS: 100 INJECTION, SOLUTION INTRAVENOUS; SUBCUTANEOUS at 22:43

## 2022-03-20 NOTE — ASSESSMENT & PLAN NOTE
· Anion gap 19, CO2  · VBG pH 7 354  · Beta hydroxybutyrate 4 2  · Insulin infusion protocol  · Will give Lantus 13 units and recheck BMP 2 hours later    If anion gap remains closed, will start oral intake, discontinue insulin infusion, and start insulin sliding scale  · Patient may need to start insulin, have medication adjustment  · Empagliflozin could be a cause of euglycemic DKA

## 2022-03-20 NOTE — ED NOTES
Insulin requested from Pharmacy        Julia Hope RN  03/19/22 8144
[facility]  03/18/22 2036
Normal Saline stopped at this time per Dr Yuliet Priest, RN  03/18/22 2506
Pt short of breath standing to void, o2 sat 89%, albuterol inhaler given, states he wears )2 at 2lpm at home-NC applied at 2 lpm  O2 sat then 93%        Felisa Polanco RN  03/20/22 5552
Sitting on bed  Denies questions or needs        Bayron Aguirre RN  03/19/22 7879
Improved.

## 2022-03-20 NOTE — ASSESSMENT & PLAN NOTE
Lab Results   Component Value Date    HGBA1C 6 9 (H) 01/06/2022       Recent Labs     03/19/22  1401 03/19/22  1734 03/19/22  2138 03/20/22  0737   POCGLU 228* 169* 262* 135       Blood Sugar Average: Last 72 hrs:  (P) 175 [id number]   · Glucose 327  · Hold home oral anti diabetic agents  · Patient on insulin infusion for DKA-will transition to Lantus, mealtime and sliding scale insulin  · Continue Neurontin  · Diabetic Education  · Case management for post acute home needs

## 2022-03-20 NOTE — PLAN OF CARE
Problem: Potential for Falls  Goal: Patient will remain free of falls  Description: INTERVENTIONS:  - Educate patient/family on patient safety including physical limitations  - Instruct patient to call for assistance with activity   - Consult OT/PT to assist with strengthening/mobility   - Keep Call bell within reach  - Keep bed low and locked with side rails adjusted as appropriate  - Keep care items and personal belongings within reach  - Initiate and maintain comfort rounds  - Make Fall Risk Sign visible to staff  - Offer Toileting every 2 Hours, in advance of need  - Initiate/Maintain bed alarm  - Obtain necessary fall risk management equipment: bed   - Apply yellow socks and bracelet for high fall risk patients  - Consider moving patient to room near nurses station  3/20/2022 1211 by Trisha Raza RN  Outcome: Progressing  3/20/2022 1211 by Trisha Raza RN  Outcome: Progressing     Problem: PAIN - ADULT  Goal: Verbalizes/displays adequate comfort level or baseline comfort level  Description: Interventions:  - Encourage patient to monitor pain and request assistance  - Assess pain using appropriate pain scale  - Administer analgesics based on type and severity of pain and evaluate response  - Implement non-pharmacological measures as appropriate and evaluate response  - Consider cultural and social influences on pain and pain management  - Notify physician/advanced practitioner if interventions unsuccessful or patient reports new pain  3/20/2022 1211 by Trisha Raza RN  Outcome: Progressing  3/20/2022 1211 by Trisha Raza RN  Outcome: Progressing     Problem: INFECTION - ADULT  Goal: Absence or prevention of progression during hospitalization  Description: INTERVENTIONS:  - Assess and monitor for signs and symptoms of infection  - Monitor lab/diagnostic results  - Monitor all insertion sites, i e  indwelling lines, tubes, and drains  - Monitor endotracheal if appropriate and nasal secretions for changes in amount and color  - Cassandra appropriate cooling/warming therapies per order  - Administer medications as ordered  - Instruct and encourage patient and family to use good hand hygiene technique  - Identify and instruct in appropriate isolation precautions for identified infection/condition  3/20/2022 1211 by Pan Garcia RN  Outcome: Progressing  3/20/2022 1211 by Pan Garcia RN  Outcome: Progressing  Goal: Absence of fever/infection during neutropenic period  Description: INTERVENTIONS:  - Monitor WBC    3/20/2022 1211 by Pan Garcia RN  Outcome: Progressing  3/20/2022 1211 by Pan Garcia RN  Outcome: Progressing     Problem: SAFETY ADULT  Goal: Patient will remain free of falls  Description: INTERVENTIONS:  - Educate patient/family on patient safety including physical limitations  - Instruct patient to call for assistance with activity   - Consult OT/PT to assist with strengthening/mobility   - Keep Call bell within reach  - Keep bed low and locked with side rails adjusted as appropriate  - Keep care items and personal belongings within reach  - Initiate and maintain comfort rounds  - Make Fall Risk Sign visible to staff  - Offer Toileting every 2 Hours, in advance of need  - Initiate/Maintain bed alarm  - Obtain necessary fall risk management equipment: bed   - Apply yellow socks and bracelet for high fall risk patients  - Consider moving patient to room near nurses station  3/20/2022 1211 by Pan Garcia RN  Outcome: Progressing  3/20/2022 1211 by Pan Garcia RN  Outcome: Progressing  Goal: Maintain or return to baseline ADL function  Description: INTERVENTIONS:  -  Assess patient's ability to carry out ADLs; assess patient's baseline for ADL function and identify physical deficits which impact ability to perform ADLs (bathing, care of mouth/teeth, toileting, grooming, dressing, etc )  - Assess/evaluate cause of self-care deficits   - Assess range of motion  - Assess patient's mobility; develop plan if impaired  - Assess patient's need for assistive devices and provide as appropriate  - Encourage maximum independence but intervene and supervise when necessary  - Involve family in performance of ADLs  - Assess for home care needs following discharge   - Consider OT consult to assist with ADL evaluation and planning for discharge  - Provide patient education as appropriate  3/20/2022 1211 by Apolinar Colbert RN  Outcome: Progressing  3/20/2022 1211 by Apolinar Colbert RN  Outcome: Progressing  Goal: Maintains/Returns to pre admission functional level  Description: INTERVENTIONS:  - Perform BMAT or MOVE assessment daily    - Set and communicate daily mobility goal to care team and patient/family/caregiver  - Collaborate with rehabilitation services on mobility goals if consulted  - Perform Range of Motion 3 times a day  - Reposition patient every 2 hours    - Dangle patient 3 times a day  - Stand patient 3 times a day  - Ambulate patient 3 times a day  - Out of bed to chair 3 times a day   - Out of bed for meals 3 times a day  - Out of bed for toileting  - Record patient progress and toleration of activity level   3/20/2022 1211 by Apolinar Colbert RN  Outcome: Progressing  3/20/2022 1211 by Apolinar Colbert RN  Outcome: Progressing     Problem: DISCHARGE PLANNING  Goal: Discharge to home or other facility with appropriate resources  Description: INTERVENTIONS:  - Identify barriers to discharge w/patient and caregiver  - Arrange for needed discharge resources and transportation as appropriate  - Identify discharge learning needs (meds, wound care, etc )  - Arrange for interpretive services to assist at discharge as needed  - Refer to Case Management Department for coordinating discharge planning if the patient needs post-hospital services based on physician/advanced practitioner order or complex needs related to functional status, cognitive ability, or social support system  3/20/2022 1211 by Coleen Valverde RN  Outcome: Progressing  3/20/2022 1211 by Coleen Valverde RN  Outcome: Progressing     Problem: Knowledge Deficit  Goal: Patient/family/caregiver demonstrates understanding of disease process, treatment plan, medications, and discharge instructions  Description: Complete learning assessment and assess knowledge base    Interventions:  - Provide teaching at level of understanding  - Provide teaching via preferred learning methods  3/20/2022 1211 by Coleen Valverde RN  Outcome: Progressing  3/20/2022 1211 by Coleen Valverde RN  Outcome: Progressing     Problem: RESPIRATORY - ADULT  Goal: Achieves optimal ventilation and oxygenation  Description: INTERVENTIONS:  - Assess for changes in respiratory status  - Assess for changes in mentation and behavior  - Position to facilitate oxygenation and minimize respiratory effort  - Oxygen administered by appropriate delivery if ordered  - Initiate smoking cessation education as indicated  - Encourage broncho-pulmonary hygiene including cough, deep breathe, Incentive Spirometry  - Assess the need for suctioning and aspirate as needed  - Assess and instruct to report SOB or any respiratory difficulty  - Respiratory Therapy support as indicated  3/20/2022 1211 by Coleen Valverde RN  Outcome: Progressing  3/20/2022 1211 by Coleen Valverde RN  Outcome: Progressing

## 2022-03-20 NOTE — PROGRESS NOTES
[facility] 46  Progress Note - Goldstein Mems 1945, 77 y o  male MRN: [id number]  Unit/Bed#: APU 04 Encounter: [id number]  Primary Care Provider: Magan Doshi DO   Date and time admitted to hospital: 3/18/2022  3:46 PM    * Chronic obstructive pulmonary disease with acute exacerbation ([facility] )  Assessment & Plan  · Presents with shortness of breath and wheezing  · Received nebulizers and steroids in the ER  · Chest x-ray:  No acute cardiopulmonary disease  · Respiratory protocol  · Oxygen protocol  · Azithromycin  · Blood cultures pending  · Check procalcitonin  · Sputum culture  · Continue Mucinex    Diabetic ketoacidosis without coma associated with type 2 diabetes mellitus (HCC)  Assessment & Plan  · Anion gap 19, CO2  · VBG pH 7 354  · Beta hydroxybutyrate 4 2  · Insulin infusion protocol  · Will give Lantus 13 units and recheck BMP 2 hours later    If anion gap remains closed, will start oral intake, discontinue insulin infusion, and start insulin sliding scale  · Patient may need to start insulin, have medication adjustment  · Empagliflozin could be a cause of euglycemic DKA    Type 2 MI (myocardial infarction) ([facility] )  Assessment & Plan  · HS troponin: 582  · EKG nonischemic  · ER physician discussed with Cardiology-suspect secondary to demand  · Serial troponins and EKGs  · Troponin is remaining flat, although in the 500s  · Continues to deny chest pain  · Repeat EKGs without evidence of acute infarct    Type 2 diabetes mellitus with diabetic neuropathy, unspecified [facility])  Assessment & Plan  Lab Results   Component Value Date    HGBA1C 6 9 (H) 01/06/2022       Recent Labs     03/19/22  1401 03/19/22  1734 03/19/22  2138 03/20/22  0737   POCGLU 228* 169* 262* 135       Blood Sugar Average: Last 72 hrs:  (P) 175 [id number]   · Glucose 327  · Hold home oral anti diabetic agents  · Patient on insulin infusion for DKA-will transition to Lantus, mealtime and sliding scale insulin  · Continue Neurontin  · Diabetic Education  · Case management for post acute home needs      Benign essential tremor  Assessment & Plan  · At baseline  · Continue primidone    Coronary artery disease involving native coronary artery of native heart without angina pectoris  Assessment & Plan  · History of CABG x 4  · Continue aspirin  · Monitor for chest pain    VTE Pharmacologic Prophylaxis:   Pharmacologic: Heparin  Patient Centered Rounds: I have performed bedside rounds with nursing staff today  Discussions with Specialists or Other Care Team Provider:  Attending    Education and Discussions with Family / Patient:  Patient    Time Spent for Care: 30 minutes  More than 50% of total time spent on counseling and coordination of care as described above  Current Length of Stay: 2 day(s)    Current Patient Status: Inpatient   Certification Statement: The patient will continue to require additional inpatient hospital stay due to per plan above    Discharge Plan:  Pending clinical progress, maybe tomorrow     Code Status: Level 1 - Full Code      Subjective:   Patient seen and examined  He says he feels better  Denies chest pain  Objective:     Vitals:   Temp (24hrs), Av 5 °F (36 4 °C), Min:97 5 °F (36 4 °C), Max:97 5 °F (36 4 °C)    Temp:  [97 5 °F (36 4 °C)] 97 5 °F (36 4 °C)  HR:  [] 90  Resp:  [18-20] 20  BP: (139-179)/(66-98) 143/78  SpO2:  [91 %-99 %] 96 %  Body mass index is 28 98 kg/m²  Input and Output Summary (last 24 hours): Intake/Output Summary (Last 24 hours) at 3/20/2022 1230  Last data filed at 3/20/2022 0300  Gross per 24 hour   Intake --   Output 700 ml   Net -700 ml       Physical Exam:     Physical Exam  Vitals and nursing note reviewed  HENT:      Head: Normocephalic and atraumatic  Mouth/Throat:      Pharynx: Oropharynx is clear  Eyes:      Pupils: Pupils are equal, round, and reactive to light  Cardiovascular:      Rate and Rhythm: Normal rate  Pulmonary:      Effort: Pulmonary effort is normal  No respiratory distress  Breath sounds: Wheezing present  Comments: Occasional wheeze  Abdominal:      General: Bowel sounds are normal       Palpations: Abdomen is soft  Tenderness: There is no abdominal tenderness  Musculoskeletal:      Cervical back: Neck supple  Skin:     General: Skin is warm and dry  Capillary Refill: Capillary refill takes less than 2 seconds  Neurological:      General: No focal deficit present  Mental Status: He is alert  Additional Data:     Labs:    Results from last 7 days   Lab Units 03/20/22  0522 03/19/22  0610 03/18/22  1604   WBC Thousand/uL 13 59*   < > 9 34   HEMOGLOBIN g/dL 14 6   < > 15 2   HEMATOCRIT % 42 1   < > 46 7   PLATELETS Thousands/uL 257   < > 256   BANDS PCT %  --   --  1   NEUTROS PCT % 87*   < >  --    LYMPHS PCT % 8*   < >  --    LYMPHO PCT %  --   --  7*   MONOS PCT % 3*   < >  --    MONO PCT %  --   --  1*   EOS PCT % 0   < > 0    < > = values in this interval not displayed  Results from last 7 days   Lab Units 03/20/22  0522 03/18/22 2032 03/18/22  1604   SODIUM mmol/L 136   < > 135   POTASSIUM mmol/L 4 1   < > 4 1   CHLORIDE mmol/L 102   < > 96   CO2 mmol/L 23   < > 20*   BUN mg/dL 27*   < > 33*   CREATININE mg/dL 0 86   < > 1 00   ANION GAP mmol/L 11   < > 19*   CALCIUM mg/dL 8 9   < > 8 9   ALBUMIN g/dL  --   --  4 0   TOTAL BILIRUBIN mg/dL  --   --  0 48   ALK PHOS U/L  --   --  74   ALT U/L  --   --  16   AST U/L  --   --  9*   GLUCOSE RANDOM mg/dL 149*   < > 327*    < > = values in this interval not displayed       Results from last 7 days   Lab Units 03/18/22  1604   INR  1 05     Results from last 7 days   Lab Units 03/20/22  1125 03/20/22  0737 03/19/22  2138 03/19/22  1734 03/19/22  1401 03/19/22  1326 03/19/22  1119 03/19/22  0652 03/19/22  0445 03/19/22  0253 03/19/22  0107 03/18/22  2310   POC GLUCOSE mg/dl 255* 135 262* 169* 228* 256* 166* 118 134 135 132 160*         Results from last 7 days   Lab Units 03/19/22  0610 03/18/22  1811   PROCALCITONIN ng/ml 0 68* 1 03*           * I Have Reviewed All Lab Data Listed Above  * Additional Pertinent Lab Tests Reviewed: Sophia Avilez Admission Reviewed    Recent Cultures (last 7 days):     Results from last 7 days   Lab Units 03/18/22  1811 03/18/22  1808   BLOOD CULTURE  No Growth at 24 hrs  No Growth at 24 hrs         Last 24 Hours Medication List:   Current Facility-Administered Medications   Medication Dose Route Frequency Provider Last Rate    acetaminophen  650 mg Oral Q6H PRN Isa Hotter, CRNP      albuterol  2 puff Inhalation Q6H PRN Isa Hotter, CRNP      aspirin  81 mg Oral Every Other Day Isa Hotter, CRNP      azithromycin  500 mg Oral Q24H Isa Hotter, CRNP      budesonide  0 5 mg Nebulization Q12H Seven Ordoñez MD      cyanocobalamin  500 mcg Oral Daily Isa Hotter, CRNP      gabapentin  200 mg Oral HS Isa Hotter, CRNP      guaiFENesin  600 mg Oral BID Isa Hotter, CRNP      heparin (porcine)  5,000 Units Subcutaneous [facility] Isa Hotter, CRNP      insulin glargine  13 Units Subcutaneous HS Isa Hotter, CRNP      insulin lispro  1-5 Units Subcutaneous HS Tomi Frankel [place], [place]      insulin lispro  1-6 Units Subcutaneous TID  Skylar Eduardo [place], [place]      insulin lispro  4 Units Subcutaneous Daily With Breakfast Isa Hotter, CRNP      insulin lispro  4 Units Subcutaneous Daily With Dinner Isa Hotter, CRNP      insulin lispro  4 Units Subcutaneous Daily With Lunch Isa Hotter, CRNP      ipratropium  0 5 mg Nebulization TID Seven Ordoñez MD      levalbuterol  1 25 mg Nebulization TID Madelin Wood MD      lisinopril  40 mg Oral Daily Isa Hotter, CRNP      methylPREDNISolone sodium succinate  40 mg Intravenous Q8H Isa Hotter, CRNP      metoprolol tartrate  25 mg Oral Q12H 47 [facility] Vahid Manriquez  primidone  100 mg Oral [facility] PENG Chapman          Today, Patient Was Seen By: PENG Chapman    ** Please Note: Dictation voice to text software may have been used in the creation of this document   **

## 2022-03-20 NOTE — NURSING NOTE
Pt received from the er to apu 4, oriented to the unit and the callbell, pulmonologist in to see and eval the pt, aware of ongoing addison and rest, prn inhaler given with + results, placed on the monitor , nsr

## 2022-03-20 NOTE — PROGRESS NOTES
Progress Note - Pulmonary   Adelina Garcia 77 y o  male MRN: [id number]  Unit/Bed#: APU 04 Encounter: [id number]    Assessment and Plan:  Acute exacerbation of COPD  Acute hypoxemic respiratory failure  Very severe COPD with frequent exacerbations  Tobacco use disorder     Continue solumedrol 40 mg q8  Continue Xopenex/Atrovent neb TID  Continue Pulmicort BID neb  On discharge can resume Stiolto, Asmanex and PRN albuterol  Smoking cessation counseling  Follow-up within 4-6 weeks outpatient  Would benefit from pulmonary rehab  Subjective:   Complains of wheeze and shortness of breath    Objective:     Vitals: Blood pressure 143/78, pulse 90, temperature 97 5 °F (36 4 °C), temperature source Tympanic, resp  rate 20, height 5' 10" (1 778 m), SpO2 98 %  ,Body mass index is 28 98 kg/m²  Intake/Output Summary (Last 24 hours) at 3/20/2022 1342  Last data filed at 3/20/2022 0300  Gross per 24 hour   Intake --   Output 700 ml   Net -700 ml       Invasive Devices  Report    Peripheral Intravenous Line            Peripheral IV 01/23/22 Right Wrist 56 days    Peripheral IV 03/18/22 Right Antecubital 1 day    Peripheral IV 03/19/22 Right Hand 1 day                General:  Patient is awake, alert, non-toxic  In mild respiratory distress (accessory muscle use)  Eyes: PERRL, no scleral icterus  Neck: No JVD  CV:  Regular, +S1 and S2, No murmurs, gallops or rubs appreciated  Lungs: Wheezing bilaterally  Abdomen: Soft, +BS, Non-tender, non-distended  Extremities: No clubbing, cyanosis or edema  Neuro: No focal motor/sensory deficits  Skin: Warm, No rashes or ulcerations      Labs: I have personally reviewed pertinent lab results  Imaging and other studies: I have personally reviewed pertinent reports     and I have personally reviewed pertinent films in PACS

## 2022-03-21 ENCOUNTER — TRANSITIONAL CARE MANAGEMENT (OUTPATIENT)
Dept: FAMILY MEDICINE CLINIC | Facility: CLINIC | Age: 77
End: 2022-03-21

## 2022-03-21 VITALS
DIASTOLIC BLOOD PRESSURE: 93 MMHG | RESPIRATION RATE: 18 BRPM | SYSTOLIC BLOOD PRESSURE: 163 MMHG | TEMPERATURE: 96 F | HEIGHT: 70 IN | HEART RATE: 78 BPM | BODY MASS INDEX: 28.98 KG/M2 | OXYGEN SATURATION: 97 %

## 2022-03-21 LAB
ANION GAP SERPL CALCULATED.3IONS-SCNC: 9 MMOL/L (ref 4–13)
BASOPHILS # BLD AUTO: 0.11 THOUSANDS/ΜL (ref 0–0.1)
BASOPHILS NFR BLD AUTO: 1 % (ref 0–1)
BUN SERPL-MCNC: 28 MG/DL (ref 5–25)
CALCIUM SERPL-MCNC: 9.1 MG/DL (ref 8.4–10.2)
CHLORIDE SERPL-SCNC: 101 MMOL/L (ref 96–108)
CO2 SERPL-SCNC: 28 MMOL/L (ref 21–32)
CREAT SERPL-MCNC: 0.86 MG/DL (ref 0.6–1.3)
EOSINOPHIL # BLD AUTO: 0.01 THOUSAND/ΜL (ref 0–0.61)
EOSINOPHIL NFR BLD AUTO: 0 % (ref 0–6)
ERYTHROCYTE [DISTWIDTH] IN BLOOD BY AUTOMATED COUNT: 14.1 % (ref 11.6–15.1)
GFR SERPL CREATININE-BSD FRML MDRD: 84 ML/MIN/1.73SQ M
GLUCOSE SERPL-MCNC: 137 MG/DL (ref 65–140)
GLUCOSE SERPL-MCNC: 148 MG/DL (ref 65–140)
HCT VFR BLD AUTO: 43.2 % (ref 36.5–49.3)
HGB BLD-MCNC: 14.6 G/DL (ref 12–17)
IMM GRANULOCYTES # BLD AUTO: 0.36 THOUSAND/UL (ref 0–0.2)
IMM GRANULOCYTES NFR BLD AUTO: 2 % (ref 0–2)
LYMPHOCYTES # BLD AUTO: 0.89 THOUSANDS/ΜL (ref 0.6–4.47)
LYMPHOCYTES NFR BLD AUTO: 5 % (ref 14–44)
MCH RBC QN AUTO: 31.4 PG (ref 26.8–34.3)
MCHC RBC AUTO-ENTMCNC: 33.8 G/DL (ref 31.4–37.4)
MCV RBC AUTO: 93 FL (ref 82–98)
MONOCYTES # BLD AUTO: 0.44 THOUSAND/ΜL (ref 0.17–1.22)
MONOCYTES NFR BLD AUTO: 3 % (ref 4–12)
NEUTROPHILS # BLD AUTO: 14.71 THOUSANDS/ΜL (ref 1.85–7.62)
NEUTS SEG NFR BLD AUTO: 89 % (ref 43–75)
NRBC BLD AUTO-RTO: 0 /100 WBCS
PLATELET # BLD AUTO: 234 THOUSANDS/UL (ref 149–390)
PMV BLD AUTO: 11.4 FL (ref 8.9–12.7)
POTASSIUM SERPL-SCNC: 4.3 MMOL/L (ref 3.5–5.3)
RBC # BLD AUTO: 4.65 MILLION/UL (ref 3.88–5.62)
SODIUM SERPL-SCNC: 138 MMOL/L (ref 135–147)
WBC # BLD AUTO: 16.52 THOUSAND/UL (ref 4.31–10.16)

## 2022-03-21 PROCEDURE — 82948 REAGENT STRIP/BLOOD GLUCOSE: CPT

## 2022-03-21 PROCEDURE — 94640 AIRWAY INHALATION TREATMENT: CPT

## 2022-03-21 PROCEDURE — 99239 HOSP IP/OBS DSCHRG MGMT >30: CPT | Performed by: HOSPITALIST

## 2022-03-21 PROCEDURE — 85025 COMPLETE CBC W/AUTO DIFF WBC: CPT | Performed by: NURSE PRACTITIONER

## 2022-03-21 PROCEDURE — 94760 N-INVAS EAR/PLS OXIMETRY 1: CPT

## 2022-03-21 PROCEDURE — 80048 BASIC METABOLIC PNL TOTAL CA: CPT | Performed by: NURSE PRACTITIONER

## 2022-03-21 RX ORDER — PREDNISONE 20 MG/1
40 TABLET ORAL DAILY
Status: DISCONTINUED | OUTPATIENT
Start: 2022-03-22 | End: 2022-03-21 | Stop reason: HOSPADM

## 2022-03-21 RX ORDER — AMOXICILLIN AND CLAVULANATE POTASSIUM 875; 125 MG/1; MG/1
1 TABLET, FILM COATED ORAL EVERY 12 HOURS SCHEDULED
Status: DISCONTINUED | OUTPATIENT
Start: 2022-03-21 | End: 2022-03-21 | Stop reason: HOSPADM

## 2022-03-21 RX ORDER — AMOXICILLIN AND CLAVULANATE POTASSIUM 875; 125 MG/1; MG/1
1 TABLET, FILM COATED ORAL EVERY 12 HOURS SCHEDULED
Qty: 10 TABLET | Refills: 0 | Status: SHIPPED | OUTPATIENT
Start: 2022-03-21 | End: 2022-03-26

## 2022-03-21 RX ORDER — PREDNISONE 10 MG/1
40 TABLET ORAL DAILY
Qty: 30 TABLET | Refills: 0 | Status: SHIPPED | OUTPATIENT
Start: 2022-03-22 | End: 2022-04-22

## 2022-03-21 RX ADMIN — PRIMIDONE 100 MG: 50 TABLET ORAL at 05:27

## 2022-03-21 RX ADMIN — LEVALBUTEROL 1.25 MG: 1.25 SOLUTION, CONCENTRATE RESPIRATORY (INHALATION) at 07:16

## 2022-03-21 RX ADMIN — BUDESONIDE 0.5 MG: 0.5 INHALANT ORAL at 07:16

## 2022-03-21 RX ADMIN — IPRATROPIUM BROMIDE 0.5 MG: 0.5 SOLUTION RESPIRATORY (INHALATION) at 07:16

## 2022-03-21 RX ADMIN — HEPARIN SODIUM 5000 UNITS: 5000 INJECTION INTRAVENOUS; SUBCUTANEOUS at 05:26

## 2022-03-21 RX ADMIN — CYANOCOBALAMIN TAB 500 MCG 500 MCG: 500 TAB at 09:12

## 2022-03-21 RX ADMIN — ALBUTEROL SULFATE 2 PUFF: 90 AEROSOL, METERED RESPIRATORY (INHALATION) at 00:17

## 2022-03-21 RX ADMIN — INSULIN LISPRO 4 UNITS: 100 INJECTION, SOLUTION INTRAVENOUS; SUBCUTANEOUS at 09:12

## 2022-03-21 RX ADMIN — GUAIFENESIN 600 MG: 600 TABLET, EXTENDED RELEASE ORAL at 09:12

## 2022-03-21 RX ADMIN — METHYLPREDNISOLONE SODIUM SUCCINATE 40 MG: 40 INJECTION, POWDER, FOR SOLUTION INTRAMUSCULAR; INTRAVENOUS at 05:27

## 2022-03-21 RX ADMIN — LISINOPRIL 40 MG: 20 TABLET ORAL at 09:12

## 2022-03-21 RX ADMIN — METOPROLOL TARTRATE 25 MG: 25 TABLET, FILM COATED ORAL at 09:12

## 2022-03-21 RX ADMIN — AMOXICILLIN AND CLAVULANATE POTASSIUM 1 TABLET: 875; 125 TABLET, FILM COATED ORAL at 10:42

## 2022-03-21 NOTE — ASSESSMENT & PLAN NOTE
· Resolved  · Patient had DKA with euglycemia - question Empgliflozin related  · Status post treatment with IV fluids, and insulin infusion  · DC home on pre-admit meds at pre-admit dosages  · Patient was counseled on monitoring blood sugars closely in the setting of being discharged on a steroid taper course

## 2022-03-21 NOTE — PLAN OF CARE
Problem: Potential for Falls  Goal: Patient will remain free of falls  Description: INTERVENTIONS:  - Educate patient/family on patient safety including physical limitations  - Instruct patient to call for assistance with activity   - Consult OT/PT to assist with strengthening/mobility   - Keep Call bell within reach  - Keep bed low and locked with side rails adjusted as appropriate  - Keep care items and personal belongings within reach  - Initiate and maintain comfort rounds  - Make Fall Risk Sign visible to staff  - Offer Toileting every  Hours, in advance of need  - Initiate/Maintain alarm  - Obtain necessary fall risk management equipment:   - Apply yellow socks and bracelet for high fall risk patients  - Consider moving patient to room near nurses station  3/21/2022 1048 by Isabella Kaur RN  Outcome: Adequate for Discharge  3/21/2022 0914 by Isabella Kaur RN  Outcome: Progressing     Problem: PAIN - ADULT  Goal: Verbalizes/displays adequate comfort level or baseline comfort level  Description: Interventions:  - Encourage patient to monitor pain and request assistance  - Assess pain using appropriate pain scale  - Administer analgesics based on type and severity of pain and evaluate response  - Implement non-pharmacological measures as appropriate and evaluate response  - Consider cultural and social influences on pain and pain management  - Notify physician/advanced practitioner if interventions unsuccessful or patient reports new pain  3/21/2022 1048 by Isabella Kaur RN  Outcome: Adequate for Discharge  3/21/2022 0914 by Isabella Kaur RN  Outcome: Progressing     Problem: INFECTION - ADULT  Goal: Absence or prevention of progression during hospitalization  Description: INTERVENTIONS:  - Assess and monitor for signs and symptoms of infection  - Monitor lab/diagnostic results  - Monitor all insertion sites, i e  indwelling lines, tubes, and drains  - Monitor endotracheal if appropriate and nasal secretions for changes in amount and color  - [place] appropriate cooling/warming therapies per order  - Administer medications as ordered  - Instruct and encourage patient and family to use good hand hygiene technique  - Identify and instruct in appropriate isolation precautions for identified infection/condition  3/21/2022 1048 by Erika Reyes RN  Outcome: Adequate for Discharge  3/21/2022 0914 by Erika Reyes RN  Outcome: Progressing     Problem: SAFETY ADULT  Goal: Patient will remain free of falls  Description: INTERVENTIONS:  - Educate patient/family on patient safety including physical limitations  - Instruct patient to call for assistance with activity   - Consult OT/PT to assist with strengthening/mobility   - Keep Call bell within reach  - Keep bed low and locked with side rails adjusted as appropriate  - Keep care items and personal belongings within reach  - Initiate and maintain comfort rounds  - Make Fall Risk Sign visible to staff  - Offer Toileting every  Hours, in advance of need  - Initiate/Maintain alarm  - Obtain necessary fall risk management equipment:  - Apply yellow socks and bracelet for high fall risk patients  - Consider moving patient to room near nurses station  3/21/2022 1048 by Erika Reyes RN  Outcome: Adequate for Discharge  3/21/2022 0914 by Erika Reyes RN  Outcome: Progressing  Goal: Maintain or return to baseline ADL function  Description: INTERVENTIONS:  -  Assess patient's ability to carry out ADLs; assess patient's baseline for ADL function and identify physical deficits which impact ability to perform ADLs (bathing, care of mouth/teeth, toileting, grooming, dressing, etc )  - Assess/evaluate cause of self-care deficits   - Assess range of motion  - Assess patient's mobility; develop plan if impaired  - Assess patient's need for assistive devices and provide as appropriate  - Encourage maximum independence but intervene and supervise when necessary  - Involve family in performance of ADLs  - Assess for home care needs following discharge   - Consider OT consult to assist with ADL evaluation and planning for discharge  - Provide patient education as appropriate  3/21/2022 1048 by Herminia Clemens RN  Outcome: Adequate for Discharge  3/21/2022 0914 by Herminia Clemens RN  Outcome: Progressing  Goal: Maintains/Returns to pre admission functional level  Description: INTERVENTIONS:  - Perform BMAT or MOVE assessment daily    - Set and communicate daily mobility goal to care team and patient/family/caregiver  - Collaborate with rehabilitation services on mobility goals if consulted  - Perform Range of Motion  times a day  - Reposition patient every  hours    - Dangle patient  times a day  - Stand patient  times a day  - Ambulate patient  times a day  - Out of bed to chair  times a day   - Out of bed for meals  times a day  - Out of bed for toileting  - Record patient progress and toleration of activity level   3/21/2022 1048 by Herminia Clemens RN  Outcome: Adequate for Discharge  3/21/2022 0914 by Herminia Clemens RN  Outcome: Progressing     Problem: DISCHARGE PLANNING  Goal: Discharge to home or other facility with appropriate resources  Description: INTERVENTIONS:  - Identify barriers to discharge w/patient and caregiver  - Arrange for needed discharge resources and transportation as appropriate  - Identify discharge learning needs (meds, wound care, etc )  - Arrange for interpretive services to assist at discharge as needed  - Refer to Case Management Department for coordinating discharge planning if the patient needs post-hospital services based on physician/advanced practitioner order or complex needs related to functional status, cognitive ability, or social support system  3/21/2022 1048 by Herminia Clemens RN  Outcome: Adequate for Discharge  3/21/2022 0914 by Herminia Clemens RN  Outcome: Progressing     Problem: Knowledge Deficit  Goal: Patient/family/caregiver demonstrates understanding of disease process, treatment plan, medications, and discharge instructions  Description: Complete learning assessment and assess knowledge base    Interventions:  - Provide teaching at level of understanding  - Provide teaching via preferred learning methods  3/21/2022 1048 by Salvatore Pedro RN  Outcome: Adequate for Discharge  3/21/2022 0914 by Salvatore Pedro RN  Outcome: Progressing     Problem: RESPIRATORY - ADULT  Goal: Achieves optimal ventilation and oxygenation  Description: INTERVENTIONS:  - Assess for changes in respiratory status  - Assess for changes in mentation and behavior  - Position to facilitate oxygenation and minimize respiratory effort  - Oxygen administered by appropriate delivery if ordered  - Initiate smoking cessation education as indicated  - Encourage broncho-pulmonary hygiene including cough, deep breathe, Incentive Spirometry  - Assess the need for suctioning and aspirate as needed  - Assess and instruct to report SOB or any respiratory difficulty  - Respiratory Therapy support as indicated  3/21/2022 1048 by Salvatore Pedro RN  Outcome: Adequate for Discharge  3/21/2022 0914 by Salvatore Pedro RN  Outcome: Progressing

## 2022-03-21 NOTE — NURSING NOTE
Patient discharged to home  IV removed with catheter tips intact, DSD and pressure applied to each  All belongings returned to patient upon discharge  Patient verbalized understanding of discharge instructions

## 2022-03-21 NOTE — ASSESSMENT & PLAN NOTE
Lab Results   Component Value Date    HGBA1C 6 9 (H) 01/06/2022       Recent Labs     03/20/22  1125 03/20/22  1611 03/20/22 2058 03/21/22  0708   POCGLU 255* 170* 172* 148*       Blood Sugar Average: Last 72 hrs:  (P) 178   · DC home on pre-admit meds at pre-admit dosages

## 2022-03-21 NOTE — ASSESSMENT & PLAN NOTE
· History of CABG x 4  · Stable  · DC home on pre-admission cardiac based medications at the preadmission dosages which include aspirin, metoprolol, lisinopril, and Crestor

## 2022-03-21 NOTE — ASSESSMENT & PLAN NOTE
· Resolved  · Secondary acute bacterial bronchitis  · Procalcitonin testing results appreciated  · Status post treatment with IV Solu-Medrol, and Zithromax  · Blood cultures x2 sets are negative at 48 hours  · Testing for COVID-19, RSV, and Haemophilus influenza A and B - are all negative  · Will discharge home on a prednisone taper of 40 mg daily by mouth 3 days to be reduced by 10 mg every 3 days until the course has been completed  · Additionally, will prescribe Augmentin for 5 days for an acute bacterial bronchitis  · No further inpatient testing, treatment, and or workup is warranted, outpatient follow-up PCP and Pulmonary  · Patient has an established follow-up visit with Pulmonary for early next month  · DC home on all other pre-admit meds at pre-admit dosages

## 2022-03-21 NOTE — PLAN OF CARE
Problem: Potential for Falls  Goal: Patient will remain free of falls  Description: INTERVENTIONS:  - Educate patient/family on patient safety including physical limitations  - Instruct patient to call for assistance with activity   - Consult OT/PT to assist with strengthening/mobility   - Keep Call bell within reach  - Keep bed low and locked with side rails adjusted as appropriate  - Keep care items and personal belongings within reach  - Initiate and maintain comfort rounds  - Make Fall Risk Sign visible to staff  - Offer Toileting every 2 Hours, in advance of need  - Initiate/Maintain bed alarm  - Apply yellow socks and bracelet for high fall risk patients  - Consider moving patient to room near nurses station  Outcome: Progressing     Problem: PAIN - ADULT  Goal: Verbalizes/displays adequate comfort level or baseline comfort level  Description: Interventions:  - Encourage patient to monitor pain and request assistance  - Assess pain using appropriate pain scale  - Administer analgesics based on type and severity of pain and evaluate response  - Implement non-pharmacological measures as appropriate and evaluate response  - Consider cultural and social influences on pain and pain management  - Notify physician/advanced practitioner if interventions unsuccessful or patient reports new pain  Outcome: Progressing     Problem: INFECTION - ADULT  Goal: Absence or prevention of progression during hospitalization  Description: INTERVENTIONS:  - Assess and monitor for signs and symptoms of infection  - Monitor lab/diagnostic results  - Monitor all insertion sites, i e  indwelling lines, tubes, and drains  - Monitor endotracheal if appropriate and nasal secretions for changes in amount and color  - [place] appropriate cooling/warming therapies per order  - Administer medications as ordered  - Instruct and encourage patient and family to use good hand hygiene technique  - Identify and instruct in appropriate isolation precautions for identified infection/condition  Outcome: Progressing     Problem: SAFETY ADULT  Goal: Patient will remain free of falls  Description: INTERVENTIONS:  - Educate patient/family on patient safety including physical limitations  - Instruct patient to call for assistance with activity   - Consult OT/PT to assist with strengthening/mobility   - Keep Call bell within reach  - Keep bed low and locked with side rails adjusted as appropriate  - Keep care items and personal belongings within reach  - Initiate and maintain comfort rounds  - Make Fall Risk Sign visible to staff  - Offer Toileting every 2 Hours, in advance of need  - Initiate/Maintain bed alarm  - Apply yellow socks and bracelet for high fall risk patients  - Consider moving patient to room near nurses station  Outcome: Progressing  Goal: Maintain or return to baseline ADL function  Description: INTERVENTIONS:  -  Assess patient's ability to carry out ADLs; assess patient's baseline for ADL function and identify physical deficits which impact ability to perform ADLs (bathing, care of mouth/teeth, toileting, grooming, dressing, etc )  - Assess/evaluate cause of self-care deficits   - Assess range of motion  - Assess patient's mobility; develop plan if impaired  - Assess patient's need for assistive devices and provide as appropriate  - Encourage maximum independence but intervene and supervise when necessary  - Involve family in performance of ADLs  - Assess for home care needs following discharge   - Consider OT consult to assist with ADL evaluation and planning for discharge  - Provide patient education as appropriate  Outcome: Progressing  Goal: Maintains/Returns to pre admission functional level  Description: INTERVENTIONS:  - Perform BMAT or MOVE assessment daily    - Set and communicate daily mobility goal to care team and patient/family/caregiver     - Collaborate with rehabilitation services on mobility goals if consulted  - Ambulate patient 3 times a day  - Out of bed for toileting  - Record patient progress and toleration of activity level   Outcome: Progressing     Problem: DISCHARGE PLANNING  Goal: Discharge to home or other facility with appropriate resources  Description: INTERVENTIONS:  - Identify barriers to discharge w/patient and caregiver  - Arrange for needed discharge resources and transportation as appropriate  - Identify discharge learning needs (meds, wound care, etc )  - Arrange for interpretive services to assist at discharge as needed  - Refer to Case Management Department for coordinating discharge planning if the patient needs post-hospital services based on physician/advanced practitioner order or complex needs related to functional status, cognitive ability, or social support system  Outcome: Progressing     Problem: Knowledge Deficit  Goal: Patient/family/caregiver demonstrates understanding of disease process, treatment plan, medications, and discharge instructions  Description: Complete learning assessment and assess knowledge base    Interventions:  - Provide teaching at level of understanding  - Provide teaching via preferred learning methods  Outcome: Progressing     Problem: RESPIRATORY - ADULT  Goal: Achieves optimal ventilation and oxygenation  Description: INTERVENTIONS:  - Assess for changes in respiratory status  - Assess for changes in mentation and behavior  - Position to facilitate oxygenation and minimize respiratory effort  - Oxygen administered by appropriate delivery if ordered  - Initiate smoking cessation education as indicated  - Encourage broncho-pulmonary hygiene including cough, deep breathe, Incentive Spirometry  - Assess the need for suctioning and aspirate as needed  - Assess and instruct to report SOB or any respiratory difficulty  - Respiratory Therapy support as indicated  Outcome: Progressing

## 2022-03-21 NOTE — DISCHARGE SUMMARY
[facility]  Discharge- Braden Stephens 1945, 69 y o  male MRN: [id number]  Unit/Bed#: [facility] 04 Encounter: [id number]  Primary Care Provider: Diana Gaffney DO   Date and time admitted to hospital: 3/18/2022  3:46 PM    * Chronic obstructive pulmonary disease with acute exacerbation ([facility] )  Assessment & Plan  · Resolved  · Secondary acute bacterial bronchitis  · Procalcitonin testing results appreciated  · Status post treatment with IV Solu-Medrol, and Zithromax  · Blood cultures x2 sets are negative at 48 hours  · Testing for COVID-19, RSV, and Haemophilus influenza A and B - are all negative  · Will discharge home on a prednisone taper of 40 mg daily by mouth 3 days to be reduced by 10 mg every 3 days until the course has been completed  · Additionally, will prescribe Augmentin for 5 days for an acute bacterial bronchitis  · No further inpatient testing, treatment, and or workup is warranted, outpatient follow-up PCP and Pulmonary  · Patient has an established follow-up visit with Pulmonary for early next month  · DC home on all other pre-admit meds at pre-admit dosages  · Additionally, patient uses 2 L of supplemental oxygen via nasal cannula at home primarily at bedtime, he can continue the same    Type 2 MI (myocardial infarction) ([facility] )  Assessment & Plan  · Troponin trend as follows 573, 492, 506  · No true rise and or fall  · Patient denies chest pain  · EKG:-no changes in any leads suggestive of ischemia  · Non MI (cardiac) related elevated troponinemia in the setting of having an acute exacerbation of COPD  · No further inpatient testing, treatment, and or workup is needed  · Outpatient follow-up with PCP    Coronary artery disease involving native coronary artery of native heart without angina pectoris  Assessment & Plan  · History of CABG x 4  · Stable  · DC home on pre-admission cardiac based medications at the preadmission dosages which include aspirin, metoprolol, lisinopril, and Crestor    Diabetic ketoacidosis without coma associated with type 2 diabetes mellitus ([facility] )  Assessment & Plan  · Resolved  · Patient had DKA with euglycemia - question Empgliflozin related  · Status post treatment with IV fluids, and insulin infusion  · DC home on pre-admit meds at pre-admit dosages  · Patient was counseled on monitoring blood sugars closely in the setting of being discharged on a steroid taper course    Type 2 diabetes mellitus with diabetic neuropathy, unspecified [facility])  Assessment & Plan  Lab Results   Component Value Date    HGBA1C 6 9 (H) 01/06/2022       Recent Labs     03/20/22  1125 03/20/22  1611 03/20/22 2058 03/21/22  0708   POCGLU 255* 170* 172* 148*       Blood Sugar Average: Last 72 hrs:  (P) 178   · DC home on pre-admit meds at pre-admit dosages      Benign essential tremor  Assessment & Plan  · At baseline  · Continue primidone post discharge        Discharging Physician / Practitioner: Jaqueline Horne MD  PCP: Mary Delaney DO  Admission Date:   Admission Orders (From admission, onward)     Ordered        03/18/22 1756  Inpatient Admission  Once                      Discharge Date: 03/21/22    Medical Problems             Resolved Problems  Date Reviewed: 3/20/2022    None                Consultations During Hospital Stay:  · Pulmonary    Procedures Performed:   · None    Significant Findings / Test Results:   · Chest x-ray-no acute cardiopulmonary disease    Incidental Findings:   · Now     Test Results Pending at Discharge (will require follow up): · None     Outpatient Tests Requested:  · None    Complications:     None    Reason for Admission:  Acute exacerbation of COPD    Hospital Course:     Ingrid Chaudhry is a 69 y o  male patient who originally presented to the hospital on 3/18/2022 due to shortness of breath    Please refer to the initial history and physical examination completed by Santino KHOURY for the initial presenting features and complaints  In brief, the patient is a 60-year-old male, with a longstanding history of COPD, who is dependent on 2 L of supplemental oxygen via nasal cannula primarily at night, and is a patient that presented to the emergency room with a chief complaint of shortness of breath  He was diagnosed with an acute exacerbation of COPD  He was admitted to [facility] surge  He was noted to have minimally elevated troponins  It deemed that the troponins were noncardiac related, and was secondary to his acute exacerbation of COPD  A pulmonary consultation was obtained  Patient was treated with a couple of days worth of IV Solu-Medrol  He was also initially treated with Zithromax  During the course of his hospital stay, he felt much better  He felt well on Monday 03/21/2022, and was deemed medically stable for discharge  He was discharged home on a prednisone taper, and was also given a prescription to complete a few days worth of Augmentin  No changes were made to any of his other pre-admission medications, and or to the preadmission dosages  Also, during his stay, he was treated for DKA  Please refer to the assessment/plan portion of this discharge summary as outlined above for the remainder of the details in regard to his stay  Please see above list of diagnoses and related plan for additional information  Condition at Discharge: good     Discharge Day Visit / Exam:     Subjective:  Patient seen and examined, feels well, no pain, no distress, wants to go home because he feels that she is not getting adequate rest here in the hospital   Vitals: Blood Pressure: 163/93 (03/21/22 0718)  Pulse: 78 (03/21/22 0718)  Temperature: (!) 96 °F (35 6 °C) (03/21/22 0718)  Temp Source: Temporal (03/21/22 0718)  Respirations: 18 (03/21/22 0718)  Height: 5' 10" (177 8 cm) (03/18/22 1551)  SpO2: 97 % (03/21/22 0718)  Exam:   Physical Exam  Vitals and nursing note reviewed     Constitutional:       General: He is not in acute distress  Appearance: Normal appearance  He is not ill-appearing  HENT:      Head: Normocephalic and atraumatic  Nose: Nose normal    Eyes:      Extraocular Movements: Extraocular movements intact  Pupils: Pupils are equal, round, and reactive to light  Cardiovascular:      Rate and Rhythm: Normal rate and regular rhythm  Pulses: Normal pulses  Heart sounds: Normal heart sounds  No murmur heard  No friction rub  No gallop  Pulmonary:      Effort: Pulmonary effort is normal       Breath sounds: Normal breath sounds  Comments: Faint bilateral expiratory wheezing appreciated in all fields, remainder of the lung fields are clear  Abdominal:      General: There is no distension  Palpations: Abdomen is soft  There is no mass  Tenderness: There is no abdominal tenderness  There is no guarding or rebound  Musculoskeletal:         General: No swelling or tenderness  Normal range of motion  Cervical back: Normal range of motion and neck supple  No rigidity  No muscular tenderness  Right lower leg: No edema  Left lower leg: No edema  Skin:     General: Skin is warm  Capillary Refill: Capillary refill takes less than 2 seconds  Findings: No erythema or rash  Neurological:      General: No focal deficit present  Mental Status: He is alert and oriented to person, place, and time  Mental status is at baseline  Psychiatric:         Mood and Affect: Mood normal          Behavior: Behavior normal          Discussion with Family:  No family members were present at the time of my discharge evaluation, nor did the patient ask, and or want me to call anyone    Discharge instructions/Information to patient and family:   See after visit summary for information provided to patient and family  Provisions for Follow-Up Care:  See after visit summary for information related to follow-up care and any pertinent home health orders        Disposition: Home      Planned Readmission:    None     Discharge Statement:  I spent 45 minutes discharging the patient  This time was spent on the day of discharge  I had direct contact with the patient on the day of discharge  Greater than 50% of the total time was spent examining patient, answering all patient questions, arranging and discussing plan of care with patient as well as directly providing post-discharge instructions  Additional time then spent on discharge activities  Discharge Medications:  See after visit summary for reconciled discharge medications provided to patient and family        ** Please Note: This note has been constructed using a voice recognition system **

## 2022-03-21 NOTE — PLAN OF CARE
No show for appointment with  [facility] today.  Called and spoke with Patient's caregiver, Miss Mancia.  There was trouble with transportation today.  Appointment rescheduled to 11/19/18.     Problem: Potential for Falls  Goal: Patient will remain free of falls  Description: INTERVENTIONS:  - Educate patient/family on patient safety including physical limitations  - Instruct patient to call for assistance with activity   - Consult OT/PT to assist with strengthening/mobility   - Keep Call bell within reach  - Keep bed low and locked with side rails adjusted as appropriate  - Keep care items and personal belongings within reach  - Initiate and maintain comfort rounds  - Make Fall Risk Sign visible to staff  - Offer Toileting every  Hours, in advance of need  - Initiate/Maintain alarm  - Obtain necessary fall risk management equipment:   - Apply yellow socks and bracelet for high fall risk patients  - Consider moving patient to room near nurses station  Outcome: Progressing     Problem: PAIN - ADULT  Goal: Verbalizes/displays adequate comfort level or baseline comfort level  Description: Interventions:  - Encourage patient to monitor pain and request assistance  - Assess pain using appropriate pain scale  - Administer analgesics based on type and severity of pain and evaluate response  - Implement non-pharmacological measures as appropriate and evaluate response  - Consider cultural and social influences on pain and pain management  - Notify physician/advanced practitioner if interventions unsuccessful or patient reports new pain  Outcome: Progressing     Problem: INFECTION - ADULT  Goal: Absence or prevention of progression during hospitalization  Description: INTERVENTIONS:  - Assess and monitor for signs and symptoms of infection  - Monitor lab/diagnostic results  - Monitor all insertion sites, i e  indwelling lines, tubes, and drains  - Monitor endotracheal if appropriate and nasal secretions for changes in amount and color  - [place] appropriate cooling/warming therapies per order  - Administer medications as ordered  - Instruct and encourage patient and family to use good hand hygiene technique  - Identify and instruct in appropriate isolation precautions for identified infection/condition  Outcome: Progressing     Problem: SAFETY ADULT  Goal: Patient will remain free of falls  Description: INTERVENTIONS:  - Educate patient/family on patient safety including physical limitations  - Instruct patient to call for assistance with activity   - Consult OT/PT to assist with strengthening/mobility   - Keep Call bell within reach  - Keep bed low and locked with side rails adjusted as appropriate  - Keep care items and personal belongings within reach  - Initiate and maintain comfort rounds  - Make Fall Risk Sign visible to staff  - Offer Toileting every  Hours, in advance of need  - Initiate/Maintain alarm  - Obtain necessary fall risk management equipment:   - Apply yellow socks and bracelet for high fall risk patients  - Consider moving patient to room near nurses station  Outcome: Progressing  Goal: Maintain or return to baseline ADL function  Description: INTERVENTIONS:  -  Assess patient's ability to carry out ADLs; assess patient's baseline for ADL function and identify physical deficits which impact ability to perform ADLs (bathing, care of mouth/teeth, toileting, grooming, dressing, etc )  - Assess/evaluate cause of self-care deficits   - Assess range of motion  - Assess patient's mobility; develop plan if impaired  - Assess patient's need for assistive devices and provide as appropriate  - Encourage maximum independence but intervene and supervise when necessary  - Involve family in performance of ADLs  - Assess for home care needs following discharge   - Consider OT consult to assist with ADL evaluation and planning for discharge  - Provide patient education as appropriate  Outcome: Progressing  Goal: Maintains/Returns to pre admission functional level  Description: INTERVENTIONS:  - Perform BMAT or MOVE assessment daily    - Set and communicate daily mobility goal to care team and patient/family/caregiver  - Collaborate with rehabilitation services on mobility goals if consulted  - Perform Range of Motion  times a day  - Reposition patient every  hours  - Dangle patient  times a day  - Stand patient  times a day  - Ambulate patient  times a day  - Out of bed to chair times a day   - Out of bed for meals  times a day  - Out of bed for toileting  - Record patient progress and toleration of activity level   Outcome: Progressing     Problem: DISCHARGE PLANNING  Goal: Discharge to home or other facility with appropriate resources  Description: INTERVENTIONS:  - Identify barriers to discharge w/patient and caregiver  - Arrange for needed discharge resources and transportation as appropriate  - Identify discharge learning needs (meds, wound care, etc )  - Arrange for interpretive services to assist at discharge as needed  - Refer to Case Management Department for coordinating discharge planning if the patient needs post-hospital services based on physician/advanced practitioner order or complex needs related to functional status, cognitive ability, or social support system  Outcome: Progressing     Problem: Knowledge Deficit  Goal: Patient/family/caregiver demonstrates understanding of disease process, treatment plan, medications, and discharge instructions  Description: Complete learning assessment and assess knowledge base    Interventions:  - Provide teaching at level of understanding  - Provide teaching via preferred learning methods  Outcome: Progressing     Problem: RESPIRATORY - ADULT  Goal: Achieves optimal ventilation and oxygenation  Description: INTERVENTIONS:  - Assess for changes in respiratory status  - Assess for changes in mentation and behavior  - Position to facilitate oxygenation and minimize respiratory effort  - Oxygen administered by appropriate delivery if ordered  - Initiate smoking cessation education as indicated  - Encourage broncho-pulmonary hygiene including cough, deep breathe, Incentive Spirometry  - Assess the need for suctioning and aspirate as needed  - Assess and instruct to report SOB or any respiratory difficulty  - Respiratory Therapy support as indicated  Outcome: Progressing

## 2022-03-21 NOTE — ASSESSMENT & PLAN NOTE
· Troponin trend as follows 573, 492, 506  · No true rise and or fall  · Patient denies chest pain  · EKG:-no changes in any leads suggestive of ischemia  · Non MI (cardiac) related elevated troponinemia in the setting of having an acute exacerbation of COPD  · No further inpatient testing, treatment, and or workup is needed  · Outpatient follow-up with PCP

## 2022-03-21 NOTE — NURSING NOTE
Patient sitting at bedside  Physician in to see patient  Patient to be discharged home today  Patient on 2L, denies SOB  Offers no complaints at this time  Call bell and belongings within reach

## 2022-03-21 NOTE — DISCHARGE INSTR - AVS FIRST PAGE
Dear Timmy Ferrara,     It was our pleasure to care for you here at [facility] [place]  It is our hope that we were always able to exceed the expected standards for your care during your stay  You were hospitalized due to an acute exacerbation of COPD  You were cared for on the medical/surgical floor by Kalli Flannery MD with the [facility] who covers for your primary care physician (PCP), Josué Valenzuela DO, while you were hospitalized  If you have any questions or concerns related to this hospitalization, you may contact us at [phone]  For follow up as well as any medication refills, we recommend that you follow up with your primary care physician  A registered nurse will reach out to you by phone within a few days after your discharge to answer any additional questions that you may have after going home    However, at this time we provide for you here, the most important instructions / recommendations at discharge:     Notable Medication Adjustments -   New prescription prednisone-take 40 mg by mouth for 3 days, then take 30 mg by mouth for 3 days, then take 20 mg by mouth for 3 days, then take 10 mg daily by mouth for 3 days, then stop  New prescription Augmentin-complete the antibiotic course  Okay to resume all other pre-admission medications, at the preadmission dosages  Testing Required after Discharge -   To be further determined in the outpatient setting by your primary care provider, and or by your pulmonologist  Important follow up information -   Please follow-up with the providers as outlined in this discharge package  Other Instructions -   Please maintain a healthy diabetic diet  Please monitor your blood sugars closely while completing the steroid course  Please call your primary care provider for a blood sugar reading of greater than 450 at any given point  Please review this entire after visit summary as additional general instructions including medication list, appointments, activity, diet, any pertinent wound care, and other additional recommendations from your care team that may be provided for you        Sincerely,     Jean-Pierre Araya MD

## 2022-03-23 ENCOUNTER — OFFICE VISIT (OUTPATIENT)
Dept: PULMONOLOGY | Facility: CLINIC | Age: 77
End: 2022-03-23
Payer: COMMERCIAL

## 2022-03-23 VITALS
HEIGHT: 70 IN | BODY MASS INDEX: 28.8 KG/M2 | SYSTOLIC BLOOD PRESSURE: 142 MMHG | OXYGEN SATURATION: 95 % | WEIGHT: 201.2 LBS | DIASTOLIC BLOOD PRESSURE: 80 MMHG | TEMPERATURE: 98 F | RESPIRATION RATE: 20 BRPM | HEART RATE: 97 BPM

## 2022-03-23 DIAGNOSIS — J41.1 MUCOPURULENT CHRONIC BRONCHITIS (HCC): ICD-10-CM

## 2022-03-23 DIAGNOSIS — Z72.0 TOBACCO ABUSE: ICD-10-CM

## 2022-03-23 DIAGNOSIS — J44.1 CHRONIC OBSTRUCTIVE PULMONARY DISEASE WITH ACUTE EXACERBATION (HCC): Primary | ICD-10-CM

## 2022-03-23 LAB
BACTERIA BLD CULT: NORMAL
BACTERIA BLD CULT: NORMAL

## 2022-03-23 PROCEDURE — 99215 OFFICE O/P EST HI 40 MIN: CPT | Performed by: INTERNAL MEDICINE

## 2022-03-23 PROCEDURE — 99406 BEHAV CHNG SMOKING 3-10 MIN: CPT | Performed by: INTERNAL MEDICINE

## 2022-03-23 PROCEDURE — 1111F DSCHRG MED/CURRENT MED MERGE: CPT | Performed by: INTERNAL MEDICINE

## 2022-03-23 PROCEDURE — 4004F PT TOBACCO SCREEN RCVD TLK: CPT | Performed by: INTERNAL MEDICINE

## 2022-03-23 PROCEDURE — 1160F RVW MEDS BY RX/DR IN RCRD: CPT | Performed by: INTERNAL MEDICINE

## 2022-03-23 PROCEDURE — 94618 PULMONARY STRESS TESTING: CPT | Performed by: INTERNAL MEDICINE

## 2022-03-23 RX ORDER — AZITHROMYCIN 500 MG/1
500 TABLET, FILM COATED ORAL 3 TIMES WEEKLY
Qty: 12 TABLET | Refills: 5 | Status: SHIPPED | OUTPATIENT
Start: 2022-03-23 | End: 2022-04-22

## 2022-03-23 RX ORDER — BUDESONIDE 0.5 MG/2ML
0.5 INHALANT ORAL 2 TIMES DAILY
Qty: 120 ML | Refills: 11 | Status: SHIPPED | OUTPATIENT
Start: 2022-03-23 | End: 2022-07-16

## 2022-03-23 RX ORDER — PRENATAL VIT 91/IRON/FOLIC/DHA 28-975-200
COMBINATION PACKAGE (EA) ORAL
COMMUNITY
Start: 2022-02-08

## 2022-03-23 RX ORDER — FORMOTEROL FUMARATE 20 UG/2ML
20 SOLUTION RESPIRATORY (INHALATION) 2 TIMES DAILY
Qty: 120 ML | Refills: 11 | Status: SHIPPED | OUTPATIENT
Start: 2022-03-23 | End: 2022-04-06

## 2022-03-23 NOTE — PROGRESS NOTES
Pulmonary Follow Up Note   Ingrid Chaudhry 69 y o  male MRN: [id number]  3/23/2022    Assessment:  Very severe COPD   · Gold stage IV D, last FEV1 was 26% and an in office spirometer in 2019   · Four exacerbations/hospitalized within the past 6 months   · Currently on Asmanex/Stiolto, using p r n  nebs frequently  · Actively smokes about 3 cigarettes per day    Plan:   · 6 minute walk test in the office today showing exertional hypoxemia, required 1 LPM  · Switched to nebulized inhalers only, Perforomist/budesonide q 12, Atrovent t i d   · Continue albuterol HFA/nebs q 6 p r n  · Added azithromycin 500 mg 3 times per week, given the frequent exacerbation normal QTC on last EKG less than 500  · Will discuss pulmonary rehab at next visit, not interested at this point  · Check complete pulmonary function test/BD response, for possible need for intervention LVR/EBV      Active tobacco abuse   · About 50 pack year history totally   · Cut down significantly recently to 3 cigarettes per day  · Counseled for about 5 minutes, need to stop completely given the frequent exacerbation/progressive lung function loss  · Not interested in nicotine replacement therapy, states that he will continue to try cutting down on his own  · Next due for lung cancer screening CT chest in 03/2023      Chronic hypoxemic respiratory failure   · Likely from advanced heart failure/severe COPD  · No hypoxemia at rest, required at 1 LPM exertion    Return in about 8 weeks (around 5/19/2022)  History of Present Illness     Follow up for: HFU/COPD    Background:  69 y o  male with a h/o BPH, prostate cancer, COPD, CAD/CABG, combined systolic/diastolic CHF,DM2, HTN, PVD, active tobacco abuse, possible BERONICA     09/2021-COPD exacerbation/hospitalized  11/2021-the same  A early 03/2022-the same    Hospitalized from 3/18, 2 3/21 for COPD exacerbation  Treated with IV steroids/Zithromax then discharged on Augmentin and a steroid taper    Also treated for sepsis, suspected DKA  Interval History  Since last discharge, feeling better compared to the admission time however still with dyspnea on minimal exertion  Uses a cane to ambulate, reported mainly ambulatory dysfunction due to neuropathy, musculoskeletal pain  Intermittently having minimally productive cough, sometimes dyspnea at rest   Current therapy includes Asmanex/Stiolto Respimat, p r n  albuterol HFA and nebs which he at least 3 times per day  Actively smokes about 3 cigarettes per day, approximately 50 pack year history  Review of Systems  As per hpi, all other systems reviewed and were negative    Studies:    Imaging and other studies: I have personally reviewed pertinent films in PACS    Low-dose CT chest 03/07/2022-no suspicious nodules, discoid scarring at the lingula  Pulmonary function testing:   PFT 09/05/2019-ratio 52%, FEV1 0 82 L/26%, FVC 1 58 L/37%    6 minute walk test 3/20 03/2022-able to walk about 75 m with using a cane for 5 minutes  Lowest SpO2 at 87% on room air, required 1 LPM to end exercise with SpO2 of 94% on 1 LPM/24% FiO2, maximal heart rate 103    EKG, Pathology, and Other Studies: I have personally reviewed pertinent reports  TTE 10/12/2020-mildly dilated LV, EF 30%, moderate diffuse hypokinesis, grade 1 to diastolic dysfunction  TAPSE 1 82    Past medical, surgical, social and family histories reviewed  Medications/Allergies: Reviewed      Vitals: Blood pressure 142/80, pulse 97, temperature 98 °F (36 7 °C), temperature source Temporal, resp  rate 20, height 5' 10" (1 778 m), weight 91 3 kg (201 lb 3 2 oz), SpO2 95 %  Body mass index is 28 87 kg/m²  Oxygen Therapy  SpO2: 95 %      Physical Exam  Body mass index is 28 87 kg/m²     Gen: not in acute distress  Neck/Eyes: supple, PERRL  Ear: normal appearance, no significant hearing impairment  Nose:  normal nasal mucosa, no drainage  Mouth:  unremarkable/normal appearance of lips, teeth and gums  Oropharynx: mucosa is moist, no focal lesions or erythema  Salivary glands: soft nontender  Chest: normal respiratory efforts, scattered end expiratory wheeze  CV:  Distant heart sounds,1+ pitting above ankle edema  Abdomen: soft, non tender  Extremities:  No observed deformity   Skin: unremarkable  Neuro: AAO X3, no focal motor deficit        Labs:  Lab Results   Component Value Date    WBC 16 52 (H) 03/21/2022    HGB 14 6 03/21/2022    HCT 43 2 03/21/2022    MCV 93 03/21/2022     03/21/2022     Lab Results   Component Value Date    CALCIUM 9 1 03/21/2022     (L) 05/28/2018    K 4 3 03/21/2022    CO2 28 03/21/2022     03/21/2022    BUN 28 (H) 03/21/2022    CREATININE 0 86 03/21/2022     No results found for: IGE  Lab Results   Component Value Date    ALT 16 03/18/2022    AST 9 (L) 03/18/2022    ALKPHOS 74 03/18/2022    BILITOT 0 5 05/28/2018           Portions of the record may have been created with voice recognition software  Occasional wrong word or "sound a like" substitutions may have occurred due to the inherent limitations of voice recognition software  Read the chart carefully and recognize, using context, where substitutions have occurred    WENDY Swartz [facility]

## 2022-03-24 ENCOUNTER — OFFICE VISIT (OUTPATIENT)
Dept: FAMILY MEDICINE CLINIC | Facility: CLINIC | Age: 77
End: 2022-03-24
Payer: COMMERCIAL

## 2022-03-24 ENCOUNTER — TELEPHONE (OUTPATIENT)
Dept: FAMILY MEDICINE CLINIC | Facility: CLINIC | Age: 77
End: 2022-03-24

## 2022-03-24 VITALS
HEIGHT: 70 IN | HEART RATE: 80 BPM | OXYGEN SATURATION: 98 % | RESPIRATION RATE: 19 BRPM | SYSTOLIC BLOOD PRESSURE: 120 MMHG | DIASTOLIC BLOOD PRESSURE: 70 MMHG | TEMPERATURE: 98.3 F | WEIGHT: 200.2 LBS | BODY MASS INDEX: 28.66 KG/M2

## 2022-03-24 DIAGNOSIS — G25.0 BENIGN ESSENTIAL TREMOR: Chronic | ICD-10-CM

## 2022-03-24 DIAGNOSIS — I21.A1 TYPE 2 MI (MYOCARDIAL INFARCTION) (HCC): ICD-10-CM

## 2022-03-24 DIAGNOSIS — C61 PROSTATE CANCER (HCC): ICD-10-CM

## 2022-03-24 DIAGNOSIS — E11.10 DIABETIC KETOACIDOSIS WITHOUT COMA ASSOCIATED WITH TYPE 2 DIABETES MELLITUS (HCC): ICD-10-CM

## 2022-03-24 DIAGNOSIS — J96.01 ACUTE RESPIRATORY FAILURE WITH HYPOXIA (HCC): ICD-10-CM

## 2022-03-24 DIAGNOSIS — E11.9 TYPE 2 DIABETES MELLITUS WITHOUT COMPLICATION, WITHOUT LONG-TERM CURRENT USE OF INSULIN (HCC): ICD-10-CM

## 2022-03-24 DIAGNOSIS — J44.1 CHRONIC OBSTRUCTIVE PULMONARY DISEASE WITH ACUTE EXACERBATION (HCC): Primary | ICD-10-CM

## 2022-03-24 PROCEDURE — 1111F DSCHRG MED/CURRENT MED MERGE: CPT | Performed by: INTERNAL MEDICINE

## 2022-03-24 PROCEDURE — 99496 TRANSJ CARE MGMT HIGH F2F 7D: CPT | Performed by: INTERNAL MEDICINE

## 2022-03-24 NOTE — TELEPHONE ENCOUNTER
Called the [place] at [phone] about patients up to date med list and DM eye exam  She is going to fax them over to us

## 2022-03-24 NOTE — PROGRESS NOTES
[facility]        NAME: Rhea De Los Santos is a 69 y o  male  : 1945    MRN: [id number]  DATE: 2022  TIME: 11:01 AM    Assessment and Plan   1  Chronic obstructive pulmonary disease with acute exacerbation (HCC)  - diffuse wheezing on exam, some tachypnea as well but no distress  He states this is improving  2  Type 2 diabetes mellitus without complication, without long-term current use of insulin (HCC)  -A1c 6 9 in January, he is not sure what diabetic medications he's taking besides metformin, possibly alogliptin and saxagliptin but doesn't think he's taking jardiance  Will contact [address] [place] [address] Kassie Sandoval for updated med list and advised pt to bring all pills to follow up with PCP for reconciliation  Recommend he stops everything but metformin and amaryl at this time until seeing PCP  -    metFORMIN (GLUCOPHAGE) 500 mg tablet; Take 1 tablet (500 mg total) by mouth 2 (two) times a day with meals    3  Diabetic ketoacidosis without coma associated with type 2 diabetes mellitus ([facility] [address] )  - appears that his BG was 300s on admission with elevated AG and low bicarb, elevated serum ketones as well  It is unclear if he's taking jardiance but would stop if he is, recommend he brings meds to PCP follow up next month  4  Type 2 MI (myocardial infarction) ([facility] [address] )  - he has history of CABG, denies chest pain or angina, likely had non-cardiac MI in setting of acute respiratory distress and DKA     5  Benign essential tremor    6  Acute respiratory failure with hypoxia (HCC)  - uses O2 at night at home, not continuously    7  Prostate cancer [facility])  - s/p radiation therapy, last PSA was 2019        Chief Complaint     Chief Complaint   Patient presents with    Transition of Care Management         History of Present Illness       Here for hospital follow up  Went to hospital last  for shortness of breath, had flu-like symptoms with body aches   Diagnosed with acute bacterial bronchitis and COPD exacerbation  Discharged with Augmentin and prednisone taper  Feeling better today but breathing not back to baseline  COPD: saw pulmonary yesterday, started on budesonide nebs, ppx azithromycin once he finishes augmentin  He continues to cut down on cigarettes, currently 3 per day  Uses O2 at night and at home with exertion  TCM Call (since 2/21/2022)     Date and time call was made  3/21/2022  2:26 PM    Patient was hospitialized at  [address]        Date of Admission  03/18/22    Date of discharge  03/21/22    Diagnosis  chronic obstructive pulmonayr disease witha cute exacerbation    Disposition  Home    Current Symptoms  None      TCM Call (since 2/21/2022)     Post hospital issues  None    Scheduled for follow up? Yes    Did you obtain your prescribed medications  Yes    Do you need help managing your prescriptions or medications  No    Is transportation to your appointment needed  No    I have advised the patient to call PCP with any new or worsening symptoms    Yesi HENRY    Living Arrangements  Spouse or Significiant other    Support System  Spouse    The type of support provided  Emotional; Physical; Other (comment)    Do you have social support  Yes, as much as I need            Review of Systems   Review of Systems   Constitutional: Positive for fatigue  Negative for appetite change, chills and fever  Respiratory: Positive for cough, shortness of breath and wheezing  Negative for chest tightness  Cardiovascular: Negative for chest pain, palpitations and leg swelling  Gastrointestinal: Negative for abdominal pain, constipation, diarrhea, nausea and vomiting  Neurological: Negative for dizziness, light-headedness and headaches           Current Medications       Current Outpatient Medications:     albuterol (PROVENTIL HFA,VENTOLIN HFA) 90 mcg/act inhaler, Inhale 2 puffs every 6 (six) hours as needed for wheezing or shortness of breath, Disp: , Rfl: 0   amoxicillin-clavulanate (AUGMENTIN) 875-125 mg per tablet, Take 1 tablet by mouth every 12 (twelve) hours for 10 doses, Disp: 10 tablet, Rfl: 0    aspirin (ECOTRIN LOW STRENGTH) 81 mg EC tablet, Take 1 tablet (81 mg total) by mouth every other day, Disp:  , Rfl: 0    azithromycin (ZITHROMAX) 500 MG tablet, Take 1 tablet (500 mg total) by mouth 3 (three) times a week, Disp: 12 tablet, Rfl: 5    budesonide (Pulmicort) 0 5 mg/2 mL nebulizer solution, Take 2 mL (0 5 mg total) by nebulization 2 (two) times a day Rinse mouth after use , Disp: 120 mL, Rfl: 11    cyanocobalamin (VITAMIN B-12) 500 MCG tablet, TAKE ONE TABLET BY MOUTH EVERY MORNING *VITAMIN B12*, Disp: , Rfl:     formoterol (PERFOROMIST) 20 MCG/2ML nebulizer solution, Take 2 mL (20 mcg total) by nebulization 2 (two) times a day, Disp: 120 mL, Rfl: 11    gabapentin (NEURONTIN) 100 mg capsule, Take 2 capsules (200 mg total) by mouth daily at bedtime, Disp: 60 capsule, Rfl: 0    glimepiride (AMARYL) 1 mg tablet, Take 1 tablet (1 mg total) by mouth 2 (two) times a day, Disp:  , Rfl: 0    hydrocortisone 1 % cream, Apply topically 2 (two) times a day, Disp: 30 g, Rfl: 0    ipratropium (ATROVENT) 0 02 % nebulizer solution, Take 2 5 mL (0 5 mg total) by nebulization 3 (three) times a day, Disp: 300 mL, Rfl: 11    lisinopril (ZESTRIL) 40 mg tablet, Take 1 tablet (40 mg total) by mouth daily, Disp: 90 tablet, Rfl: 5    metFORMIN (GLUCOPHAGE) 500 mg tablet, Take 1 tablet (500 mg total) by mouth 2 (two) times a day with meals, Disp: 60 tablet, Rfl: 1    metoprolol tartrate (LOPRESSOR) 25 mg tablet, Take 25 mg by mouth every 12 (twelve) hours, Disp: , Rfl:     predniSONE 10 mg tablet, Take 4 tablets (40 mg total) by mouth daily X3 days, then take 3 tablets (30 mg total) by mouth daily x3 days, then take 2 tablets (20 mg total) by mouth daily x3 days then take 1 tablet (mg total) by mouth daily x3 days then stop, Disp: 30 tablet, Rfl: 0    primidone (MYSOLINE) 50 mg tablet, Take 100 mg by mouth every 8 (eight) hours, Disp: , Rfl:     rosuvastatin (CRESTOR) 20 MG tablet, Take 1 tablet (20 mg total) by mouth daily after dinner Take one half tablet daily with dinner (Patient taking differently: Take 10 mg by mouth daily after dinner Take one half tablet daily with dinner ), Disp:  , Rfl: 0    terbinafine (LamISIL) 1 % cream, APPLY THIN LAYER TOPICALLY TWICE A DAY FOR FUNGAL INFECTION, Disp: , Rfl:     albuterol (2 5 mg/3 mL) 0 083 % nebulizer solution, Take 3 mL (2 5 mg total) by nebulization every 4 (four) hours as needed for wheezing or shortness of breath (Patient not taking: Reported on 3/24/2022 ), Disp: 75 mL, Rfl: 0    carboxymethylcellulose (REFRESH PLUS) 0 5 % SOLN, INSTILL 1 DROP BOTH EYES FOUR TIMES A DAY FOR DRY EYES (Patient not taking: Reported on 12/14/2021), Disp: , Rfl:     guaiFENesin (MUCINEX) 600 mg 12 hr tablet, Take 1 tablet (600 mg total) by mouth 2 (two) times a day (Patient not taking: Reported on 1/7/2022 ), Disp: , Rfl: 0    Current Allergies     Allergies as of 03/24/2022 - Reviewed 03/24/2022   Allergen Reaction Noted    Atorvastatin Myalgia 07/17/2017            The following portions of the patient's history were reviewed and updated as appropriate: allergies, current medications, past family history, past medical history, past social history, past surgical history and problem list      Past Medical History:   Diagnosis Date    BPH (benign prostatic hyperplasia)     45 days radiation treatment    COPD (chronic obstructive pulmonary disease)     COPD with acute exacerbation ([facility] [address] ) 11/21/2019    Coronary artery disease     CABG x4 in 2017    Diabetes mellitus     History of Arterial Duplex of LE 12/26/2017    Likely occlusion of the left superficial femoral artery  Calcific changes bilaterally  Despite these changes, the ankle-brachial index as a measure of peripheral blood flow only mildly impaired      History of echocardiogram 2017    EF 40%, mild LVH, mild MR     Hyperlipidemia     Hypertension     NSTEMI (non-ST elevated myocardial infarction)     Prostate cancer     prostate     PVD (peripheral vascular disease)     Tremor     Type 2 MI (myocardial infarction) ([facility] [address] ) 2021       Past Surgical History:   Procedure Laterality Date    CARDIAC CATHETERIZATION  2017    Significant left main plus triple-vessel CAD   CORONARY ARTERY BYPASS GRAFT  2017    4V CABG:  LIMA to LAD, VG to RI, SVG to PDA to LVBR RCA   EYE SURGERY      shots in eye once a month @ the [place]    PROSTATE BIOPSY         Family History   Problem Relation Age of Onset    Cancer Father     Heart disease Brother          Medications have been verified  Objective   /70 (BP Location: Left arm, Patient Position: Sitting, Cuff Size: Standard)   Pulse 80   Temp 98 3 °F (36 8 °C)   Resp 19   Ht 5' 10" (1 778 m)   Wt 90 8 kg (200 lb 3 2 oz)   SpO2 98%   BMI 28 73 kg/m²        Physical Exam     Physical Exam  Vitals reviewed  Constitutional:       General: He is not in acute distress  Appearance: He is obese  Cardiovascular:      Rate and Rhythm: Normal rate and regular rhythm  Heart sounds: S1 normal and S2 normal  No murmur heard  Pulmonary:      Effort: Tachypnea and prolonged expiration present  No respiratory distress  Breath sounds: Examination of the right-upper field reveals wheezing  Examination of the left-upper field reveals wheezing  Examination of the right-lower field reveals wheezing  Examination of the left-lower field reveals wheezing  Wheezing and rhonchi present  Abdominal:      General: Abdomen is flat  Bowel sounds are normal       Palpations: Abdomen is soft  Tenderness: There is no abdominal tenderness  There is no guarding or rebound  Musculoskeletal:      Right lower le+ Edema present  Left lower le+ Edema present     Neurological:      Mental Status: He is alert  Gait: Gait abnormal (ambulates with cane)  Psychiatric:         Mood and Affect: Mood and affect normal          Speech: Speech normal          Behavior: Behavior normal  Behavior is cooperative               Results:  Lab Results   Component Value Date    SODIUM 138 03/21/2022    K 4 3 03/21/2022     03/21/2022    CO2 28 03/21/2022    BUN 28 (H) 03/21/2022    CREATININE 0 86 03/21/2022    GLUC 137 03/21/2022    CALCIUM 9 1 03/21/2022       Lab Results   Component Value Date    HGBA1C 6 9 (H) 01/06/2022       Lab Results   Component Value Date    WBC 16 52 (H) 03/21/2022    HGB 14 6 03/21/2022    HCT 43 2 03/21/2022    MCV 93 03/21/2022     03/21/2022

## 2022-03-24 NOTE — PATIENT INSTRUCTIONS
Please bring all medications to your follow up with Dr Cortez Rivas so we can reconcile them, [facility]   Stop Jardiance and saxagliptin, increase metformin to 1 tab twice daily

## 2022-03-29 DIAGNOSIS — Z95.1 S/P CABG X 4: ICD-10-CM

## 2022-03-29 DIAGNOSIS — G25.0 BENIGN ESSENTIAL TREMOR: ICD-10-CM

## 2022-03-29 DIAGNOSIS — I21.A1 TYPE 2 MI (MYOCARDIAL INFARCTION) (HCC): Primary | ICD-10-CM

## 2022-03-29 DIAGNOSIS — E11.9 TYPE 2 DIABETES MELLITUS WITHOUT COMPLICATION, WITHOUT LONG-TERM CURRENT USE OF INSULIN (HCC): ICD-10-CM

## 2022-03-29 DIAGNOSIS — I25.10 CORONARY ARTERY DISEASE INVOLVING NATIVE CORONARY ARTERY OF NATIVE HEART WITHOUT ANGINA PECTORIS: ICD-10-CM

## 2022-03-29 NOTE — TELEPHONE ENCOUNTER
Patient requesting refill(s) of: metformin 500 mg BID    Paying copay through the [place] where wife gets same meds at [facility] for nothing  Wants sent there         Patient requesting refill(s) of: metoprolol tartrate 25 mg Q12H    Last filled: historical provider  Last appt:3/24/2022  Next appt:4/22/2022  Pharm[facility]: [facility]

## 2022-03-31 ENCOUNTER — HOSPITAL ENCOUNTER (OUTPATIENT)
Dept: PULMONOLOGY | Facility: HOSPITAL | Age: 77
Discharge: HOME | End: 2022-03-31
Attending: INTERNAL MEDICINE
Payer: COMMERCIAL

## 2022-03-31 DIAGNOSIS — J44.1 CHRONIC OBSTRUCTIVE PULMONARY DISEASE WITH ACUTE EXACERBATION (HCC): ICD-10-CM

## 2022-03-31 PROCEDURE — 94729 DIFFUSING CAPACITY: CPT | Performed by: INTERNAL MEDICINE

## 2022-03-31 PROCEDURE — 94726 PLETHYSMOGRAPHY LUNG VOLUMES: CPT | Performed by: INTERNAL MEDICINE

## 2022-03-31 PROCEDURE — 94760 N-INVAS EAR/PLS OXIMETRY 1: CPT

## 2022-03-31 PROCEDURE — 94060 EVALUATION OF WHEEZING: CPT | Performed by: INTERNAL MEDICINE

## 2022-03-31 PROCEDURE — 94729 DIFFUSING CAPACITY: CPT

## 2022-03-31 PROCEDURE — 94726 PLETHYSMOGRAPHY LUNG VOLUMES: CPT

## 2022-03-31 PROCEDURE — 94060 EVALUATION OF WHEEZING: CPT

## 2022-03-31 RX ORDER — ALBUTEROL SULFATE 2.5 MG/3ML
2.5 SOLUTION RESPIRATORY (INHALATION) ONCE AS NEEDED
Status: COMPLETED | OUTPATIENT
Start: 2022-03-31 | End: 2022-03-31

## 2022-03-31 RX ADMIN — ALBUTEROL SULFATE 2.5 MG: 2.5 SOLUTION RESPIRATORY (INHALATION) at 13:35

## 2022-04-06 ENCOUNTER — TELEPHONE (OUTPATIENT)
Dept: PULMONOLOGY | Facility: CLINIC | Age: 77
End: 2022-04-06

## 2022-04-06 DIAGNOSIS — J41.1 MUCOPURULENT CHRONIC BRONCHITIS (HCC): ICD-10-CM

## 2022-04-06 DIAGNOSIS — J44.1 CHRONIC OBSTRUCTIVE PULMONARY DISEASE WITH ACUTE EXACERBATION (HCC): Primary | ICD-10-CM

## 2022-04-06 RX ORDER — ARFORMOTEROL TARTRATE 15 UG/2ML
15 SOLUTION RESPIRATORY (INHALATION) 2 TIMES DAILY
Qty: 120 ML | Refills: 5 | Status: SHIPPED | OUTPATIENT
Start: 2022-04-06 | End: 2022-04-28 | Stop reason: HOSPADM

## 2022-04-06 NOTE — TELEPHONE ENCOUNTER
Patients wife calling saying Mitzi Padgett saw Dr Ramos Stephens and he prescribed him nebulizer medications  She states he has 2 of them but the Perforomist is not covered  She would like to know if there is something cheaper for him to take  Please advise   [phone]

## 2022-04-06 NOTE — TELEPHONE ENCOUNTER
Please let patient know that I sent Eliana Ordonez instead of Performist to [facility] DR JAYLIN CHRIS  It says it is covered on our end

## 2022-04-07 ENCOUNTER — DOCUMENTATION (OUTPATIENT)
Dept: PULMONOLOGY | Facility: CLINIC | Age: 77
End: 2022-04-07

## 2022-04-07 NOTE — PROGRESS NOTES
Prior authorization started for Arformoterol Tartrate nebulizer solution through [address] [place] [address]

## 2022-04-22 ENCOUNTER — APPOINTMENT (OUTPATIENT)
Dept: RADIOLOGY | Facility: CLINIC | Age: 77
End: 2022-04-22
Payer: COMMERCIAL

## 2022-04-22 ENCOUNTER — OFFICE VISIT (OUTPATIENT)
Dept: FAMILY MEDICINE CLINIC | Facility: CLINIC | Age: 77
End: 2022-04-22
Payer: COMMERCIAL

## 2022-04-22 VITALS
HEART RATE: 78 BPM | DIASTOLIC BLOOD PRESSURE: 60 MMHG | RESPIRATION RATE: 18 BRPM | HEIGHT: 70 IN | BODY MASS INDEX: 28.35 KG/M2 | TEMPERATURE: 98.6 F | OXYGEN SATURATION: 96 % | SYSTOLIC BLOOD PRESSURE: 112 MMHG | WEIGHT: 198 LBS

## 2022-04-22 DIAGNOSIS — E78.49 OTHER HYPERLIPIDEMIA: Chronic | ICD-10-CM

## 2022-04-22 DIAGNOSIS — J44.1 COPD EXACERBATION (HCC): ICD-10-CM

## 2022-04-22 DIAGNOSIS — E11.9 TYPE 2 DIABETES MELLITUS WITHOUT COMPLICATION, WITHOUT LONG-TERM CURRENT USE OF INSULIN (HCC): ICD-10-CM

## 2022-04-22 DIAGNOSIS — E11.40 TYPE 2 DIABETES MELLITUS WITH DIABETIC NEUROPATHY, WITHOUT LONG-TERM CURRENT USE OF INSULIN (HCC): Primary | ICD-10-CM

## 2022-04-22 DIAGNOSIS — R53.83 FATIGUE, UNSPECIFIED TYPE: ICD-10-CM

## 2022-04-22 PROCEDURE — 4004F PT TOBACCO SCREEN RCVD TLK: CPT | Performed by: FAMILY MEDICINE

## 2022-04-22 PROCEDURE — 1160F RVW MEDS BY RX/DR IN RCRD: CPT | Performed by: FAMILY MEDICINE

## 2022-04-22 PROCEDURE — 99215 OFFICE O/P EST HI 40 MIN: CPT | Performed by: FAMILY MEDICINE

## 2022-04-22 PROCEDURE — 71046 X-RAY EXAM CHEST 2 VIEWS: CPT

## 2022-04-22 RX ORDER — ROSUVASTATIN CALCIUM 10 MG/1
10 TABLET, COATED ORAL DAILY
Qty: 90 TABLET | Refills: 3 | Status: SHIPPED | OUTPATIENT
Start: 2022-04-22

## 2022-04-22 RX ORDER — GABAPENTIN 100 MG/1
200 CAPSULE ORAL
Qty: 180 CAPSULE | Refills: 3 | Status: SHIPPED | OUTPATIENT
Start: 2022-04-22 | End: 2022-06-29 | Stop reason: SDUPTHER

## 2022-04-22 RX ORDER — PREDNISONE 20 MG/1
40 TABLET ORAL DAILY
Qty: 10 TABLET | Refills: 0 | Status: SHIPPED | OUTPATIENT
Start: 2022-04-22 | End: 2022-04-28 | Stop reason: HOSPADM

## 2022-04-22 RX ORDER — ROSUVASTATIN CALCIUM 10 MG/1
10 TABLET, COATED ORAL DAILY
Qty: 90 TABLET | Refills: 3 | Status: SHIPPED | OUTPATIENT
Start: 2022-04-22 | End: 2022-04-22 | Stop reason: SDUPTHER

## 2022-04-22 RX ORDER — GLIMEPIRIDE 1 MG/1
TABLET ORAL
Refills: 0
Start: 2022-04-22

## 2022-04-22 RX ORDER — IPRATROPIUM BROMIDE AND ALBUTEROL SULFATE 2.5; .5 MG/3ML; MG/3ML
3 SOLUTION RESPIRATORY (INHALATION)
Status: DISCONTINUED | OUTPATIENT
Start: 2022-04-22 | End: 2022-04-23

## 2022-04-22 NOTE — PROGRESS NOTES
Assessment/Plan:       Problem List Items Addressed This Visit        Endocrine    Type 2 diabetes mellitus with diabetic neuropathy, unspecified ([facility] [address] ) - Primary (Chronic)       Lab Results   Component Value Date    HGBA1C 6 9 (H) 01/06/2022     - POC A1c today increased to 7 4   - Admits to eating more candy recently   - GFR 84  - Increase metformin 1,000 mg BID   - Continue glimepiride 2 mg with breakfast 1 mg with dinner   - Consider decreasing glimepiride next if needed          Relevant Medications    metFORMIN (GLUCOPHAGE) 1000 MG tablet    glimepiride (AMARYL) 1 mg tablet    Other Relevant Orders    CBC and differential    Comprehensive metabolic panel    TSH, 3rd generation with Free T4 reflex       Respiratory    COPD exacerbation (HCC)     - Air movement improved after Duoneb but still significant wheezing    - Chest XR today given bibasilar crackles, he reports no cough or fever   - Discussed to  Brovana neb as recommended by pulm   - Prednisone burst provided, any worsening symptoms over weekend ED  - Will call on Monday to see how he's feeling          Relevant Medications    gabapentin (NEURONTIN) 100 mg capsule    predniSONE 20 mg tablet    Other Relevant Orders    XR chest pa & lateral       Other    Other hyperlipidemia    Fatigue    Relevant Orders    CBC and differential    Comprehensive metabolic panel    TSH, 3rd generation with Free T4 reflex    Ambulatory Referral to Sleep Medicine      Other Visit Diagnoses     Type 2 diabetes mellitus without complication, without long-term current use of insulin (HCC)        Relevant Medications    metFORMIN (GLUCOPHAGE) 1000 MG tablet    glimepiride (AMARYL) 1 mg tablet            I have spent 40 minutes with Patient  today in which greater than 50% of this time was spent in counseling/coordination of care regarding Diagnostic results, Risks and benefits of tx options, Intructions for management, Risk factor reductions and Impressions  Subjective:      Patient ID: Flavio Richey is a 69 y o  male  HPI     Here today for follow-up  He brought all of his medications with him, reconciled and chart updated  COPD- He reports budesonide causes him to gasp for air during treatment, broke out in sweat once with using it  He has not been using Perforomist as it was too expensive, then never received Roshni Master so has just been using budesonide neb only BID  He follows with pulmonology  Currently taking azithromycin 3 times weekly  Off prednisone  Uses albuterol inhaler rescue, Atrovent neb TID  He reports shortness of breath seems to be increasing, dyspneic just walking from waiting room to exam room  He denies chest pains  He reports increased fatigue, rested when he wakes up but then hour later ready for nap  The following portions of the patient's history were reviewed and updated as appropriate: allergies, current medications, past family history, past medical history, past social history, past surgical history, and problem list     Review of Systems   All other systems reviewed and are negative  Objective:      /60   Pulse 78   Temp 98 6 °F (37 °C) (Tympanic)   Resp 18   Ht 5' 10" (1 778 m)   Wt 89 8 kg (198 lb)   SpO2 96%   BMI 28 41 kg/m²          Physical Exam  Vitals reviewed  Constitutional:       General: He is not in acute distress  Appearance: Normal appearance  He is not ill-appearing, toxic-appearing or diaphoretic  HENT:      Head: Normocephalic and atraumatic  Eyes:      General:         Right eye: No discharge  Left eye: No discharge  Extraocular Movements: Extraocular movements intact  Conjunctiva/sclera: Conjunctivae normal    Cardiovascular:      Rate and Rhythm: Normal rate and regular rhythm  Heart sounds: Normal heart sounds  No murmur heard  No friction rub  No gallop  Pulmonary:      Effort: Pulmonary effort is normal  No respiratory distress  Breath sounds: No stridor  Wheezing present  No rhonchi  Comments: Diminished air movement initially, crackles bibasilar, wheezing appreciated following Duo-Neb treatment in office  Musculoskeletal:         General: No swelling, tenderness or signs of injury  Right lower le+ Edema present  Left lower le+ Edema present  Skin:     General: Skin is warm  Coloration: Skin is not pale  Findings: No erythema or rash  Neurological:      Mental Status: He is alert and oriented to person, place, and time  Motor: No weakness     Psychiatric:         Mood and Affect: Mood normal          Behavior: Behavior normal              DO Junito Abbasi [address] [place] [address] Primary Care

## 2022-04-23 ENCOUNTER — TELEPHONE (OUTPATIENT)
Dept: FAMILY MEDICINE CLINIC | Facility: CLINIC | Age: 77
End: 2022-04-23

## 2022-04-23 PROBLEM — E78.49 OTHER HYPERLIPIDEMIA: Status: ACTIVE | Noted: 2020-03-12

## 2022-04-23 PROBLEM — R53.83 FATIGUE: Status: ACTIVE | Noted: 2022-04-23

## 2022-04-23 NOTE — TELEPHONE ENCOUNTER
Please call Monday morning to see how patient is feeling, and to make sure he picked up all of his medications  Also, please enter Duo-neb that was administered during visit on Friday, and POC A1c 7 4

## 2022-04-23 NOTE — ASSESSMENT & PLAN NOTE
Lab Results   Component Value Date    HGBA1C 6 9 (H) 01/06/2022     - POC A1c today increased to 7 4   - Admits to eating more candy recently   - GFR 84  - Increase metformin 1,000 mg BID   - Continue glimepiride 2 mg with breakfast 1 mg with dinner   - Consider decreasing glimepiride next if needed

## 2022-04-23 NOTE — ASSESSMENT & PLAN NOTE
- Air movement improved after Duoneb but still significant wheezing    - Chest XR today given bibasilar crackles, he reports no cough or fever   - Discussed to  Brovana neb as recommended by pulm   - Prednisone burst provided, any worsening symptoms over weekend ED  - Will call on Monday to see how he's feeling

## 2022-04-24 ENCOUNTER — HOSPITAL ENCOUNTER (INPATIENT)
Facility: HOSPITAL | Age: 77
LOS: 3 days | Discharge: HOME | DRG: 871 | End: 2022-04-28
Attending: EMERGENCY MEDICINE | Admitting: INTERNAL MEDICINE
Payer: COMMERCIAL

## 2022-04-24 ENCOUNTER — APPOINTMENT (EMERGENCY)
Dept: RADIOLOGY | Facility: HOSPITAL | Age: 77
DRG: 871 | End: 2022-04-24
Payer: COMMERCIAL

## 2022-04-24 DIAGNOSIS — J44.1 COPD EXACERBATION (HCC): Primary | ICD-10-CM

## 2022-04-24 DIAGNOSIS — I50.9 CONGESTIVE HEART FAILURE, UNSPECIFIED HF CHRONICITY, UNSPECIFIED HEART FAILURE TYPE (HCC): ICD-10-CM

## 2022-04-24 DIAGNOSIS — R77.8 TROPONIN LEVEL ELEVATED: ICD-10-CM

## 2022-04-24 DIAGNOSIS — R06.03 RESPIRATORY DISTRESS: ICD-10-CM

## 2022-04-24 DIAGNOSIS — J18.9 PNEUMONIA: ICD-10-CM

## 2022-04-24 DIAGNOSIS — I25.10 CORONARY ARTERY DISEASE INVOLVING NATIVE CORONARY ARTERY OF NATIVE HEART WITHOUT ANGINA PECTORIS: Chronic | ICD-10-CM

## 2022-04-24 PROBLEM — A41.9 SEPSIS (HCC): Status: ACTIVE | Noted: 2022-04-24

## 2022-04-24 LAB
2HR DELTA HS TROPONIN: 97 NG/L
4HR DELTA HS TROPONIN: 206 NG/L
ALBUMIN SERPL BCP-MCNC: 4.1 G/DL (ref 3.5–5)
ALP SERPL-CCNC: 96 U/L (ref 34–104)
ALT SERPL W P-5'-P-CCNC: 9 U/L (ref 7–52)
ANION GAP SERPL CALCULATED.3IONS-SCNC: 9 MMOL/L (ref 4–13)
APTT PPP: 30 SECONDS (ref 23–37)
AST SERPL W P-5'-P-CCNC: 14 U/L (ref 13–39)
BACTERIA UR QL AUTO: NORMAL /HPF
BASOPHILS # BLD AUTO: 0.15 THOUSANDS/ΜL (ref 0–0.1)
BASOPHILS NFR BLD AUTO: 1 % (ref 0–1)
BILIRUB SERPL-MCNC: 0.28 MG/DL (ref 0.2–1)
BILIRUB UR QL STRIP: NEGATIVE
BNP SERPL-MCNC: 354 PG/ML (ref 0–100)
BUN SERPL-MCNC: 15 MG/DL (ref 5–25)
CALCIUM SERPL-MCNC: 8.5 MG/DL (ref 8.4–10.2)
CARDIAC TROPONIN I PNL SERPL HS: 140 NG/L
CARDIAC TROPONIN I PNL SERPL HS: 249 NG/L
CARDIAC TROPONIN I PNL SERPL HS: 43 NG/L
CHLORIDE SERPL-SCNC: 98 MMOL/L (ref 96–108)
CLARITY UR: ABNORMAL
CO2 SERPL-SCNC: 29 MMOL/L (ref 21–32)
COLOR UR: YELLOW
CREAT SERPL-MCNC: 1.01 MG/DL (ref 0.6–1.3)
EOSINOPHIL # BLD AUTO: 0.18 THOUSAND/ΜL (ref 0–0.61)
EOSINOPHIL NFR BLD AUTO: 1 % (ref 0–6)
ERYTHROCYTE [DISTWIDTH] IN BLOOD BY AUTOMATED COUNT: 13.3 % (ref 11.6–15.1)
FINE GRAN CASTS URNS QL MICRO: NORMAL /LPF
GFR SERPL CREATININE-BSD FRML MDRD: 71 ML/MIN/1.73SQ M
GLUCOSE SERPL-MCNC: 287 MG/DL (ref 65–140)
GLUCOSE SERPL-MCNC: 337 MG/DL (ref 65–140)
GLUCOSE SERPL-MCNC: 349 MG/DL (ref 65–140)
GLUCOSE UR STRIP-MCNC: ABNORMAL MG/DL
HCT VFR BLD AUTO: 41.9 % (ref 36.5–49.3)
HGB BLD-MCNC: 13.9 G/DL (ref 12–17)
HGB UR QL STRIP.AUTO: NEGATIVE
IMM GRANULOCYTES # BLD AUTO: 0.18 THOUSAND/UL (ref 0–0.2)
IMM GRANULOCYTES NFR BLD AUTO: 1 % (ref 0–2)
INR PPP: 0.96 (ref 0.84–1.19)
KETONES UR STRIP-MCNC: NEGATIVE MG/DL
LACTATE SERPL-SCNC: 2.9 MMOL/L (ref 0.5–2)
LACTATE SERPL-SCNC: 3.1 MMOL/L (ref 0.5–2)
LEUKOCYTE ESTERASE UR QL STRIP: NEGATIVE
LYMPHOCYTES # BLD AUTO: 2.22 THOUSANDS/ΜL (ref 0.6–4.47)
LYMPHOCYTES NFR BLD AUTO: 10 % (ref 14–44)
MCH RBC QN AUTO: 31.8 PG (ref 26.8–34.3)
MCHC RBC AUTO-ENTMCNC: 33.2 G/DL (ref 31.4–37.4)
MCV RBC AUTO: 96 FL (ref 82–98)
MONOCYTES # BLD AUTO: 0.71 THOUSAND/ΜL (ref 0.17–1.22)
MONOCYTES NFR BLD AUTO: 3 % (ref 4–12)
NEUTROPHILS # BLD AUTO: 19.95 THOUSANDS/ΜL (ref 1.85–7.62)
NEUTS SEG NFR BLD AUTO: 84 % (ref 43–75)
NITRITE UR QL STRIP: NEGATIVE
NON-SQ EPI CELLS URNS QL MICRO: NORMAL /HPF
NRBC BLD AUTO-RTO: 0 /100 WBCS
PH UR STRIP.AUTO: 6 [PH]
PLATELET # BLD AUTO: 304 THOUSANDS/UL (ref 149–390)
PMV BLD AUTO: 12.2 FL (ref 8.9–12.7)
POTASSIUM SERPL-SCNC: 4.7 MMOL/L (ref 3.5–5.3)
PROCALCITONIN SERPL-MCNC: 0.08 NG/ML
PROT SERPL-MCNC: 6.9 G/DL (ref 6.4–8.4)
PROT UR STRIP-MCNC: ABNORMAL MG/DL
PROTHROMBIN TIME: 12.7 SECONDS (ref 11.6–14.5)
RBC # BLD AUTO: 4.37 MILLION/UL (ref 3.88–5.62)
RBC #/AREA URNS AUTO: NORMAL /HPF
SODIUM SERPL-SCNC: 136 MMOL/L (ref 135–147)
SP GR UR STRIP.AUTO: 1.02 (ref 1–1.03)
UROBILINOGEN UR QL STRIP.AUTO: 0.2 E.U./DL
WBC # BLD AUTO: 23.39 THOUSAND/UL (ref 4.31–10.16)
WBC #/AREA URNS AUTO: NORMAL /HPF

## 2022-04-24 PROCEDURE — 96361 HYDRATE IV INFUSION ADD-ON: CPT

## 2022-04-24 PROCEDURE — 84484 ASSAY OF TROPONIN QUANT: CPT | Performed by: EMERGENCY MEDICINE

## 2022-04-24 PROCEDURE — 83605 ASSAY OF LACTIC ACID: CPT | Performed by: EMERGENCY MEDICINE

## 2022-04-24 PROCEDURE — 85025 COMPLETE CBC W/AUTO DIFF WBC: CPT | Performed by: EMERGENCY MEDICINE

## 2022-04-24 PROCEDURE — 96365 THER/PROPH/DIAG IV INF INIT: CPT

## 2022-04-24 PROCEDURE — 83605 ASSAY OF LACTIC ACID: CPT | Performed by: INTERNAL MEDICINE

## 2022-04-24 PROCEDURE — 81003 URINALYSIS AUTO W/O SCOPE: CPT | Performed by: INTERNAL MEDICINE

## 2022-04-24 PROCEDURE — 82948 REAGENT STRIP/BLOOD GLUCOSE: CPT

## 2022-04-24 PROCEDURE — 94760 N-INVAS EAR/PLS OXIMETRY 1: CPT

## 2022-04-24 PROCEDURE — 94644 CONT INHLJ TX 1ST HOUR: CPT

## 2022-04-24 PROCEDURE — 85730 THROMBOPLASTIN TIME PARTIAL: CPT | Performed by: EMERGENCY MEDICINE

## 2022-04-24 PROCEDURE — 36415 COLL VENOUS BLD VENIPUNCTURE: CPT | Performed by: EMERGENCY MEDICINE

## 2022-04-24 PROCEDURE — 84484 ASSAY OF TROPONIN QUANT: CPT | Performed by: INTERNAL MEDICINE

## 2022-04-24 PROCEDURE — 85610 PROTHROMBIN TIME: CPT | Performed by: EMERGENCY MEDICINE

## 2022-04-24 PROCEDURE — 99291 CRITICAL CARE FIRST HOUR: CPT | Performed by: EMERGENCY MEDICINE

## 2022-04-24 PROCEDURE — 87040 BLOOD CULTURE FOR BACTERIA: CPT | Performed by: EMERGENCY MEDICINE

## 2022-04-24 PROCEDURE — 80053 COMPREHEN METABOLIC PANEL: CPT | Performed by: EMERGENCY MEDICINE

## 2022-04-24 PROCEDURE — 84145 PROCALCITONIN (PCT): CPT | Performed by: EMERGENCY MEDICINE

## 2022-04-24 PROCEDURE — 94664 DEMO&/EVAL PT USE INHALER: CPT

## 2022-04-24 PROCEDURE — 99220 PR INITIAL OBSERVATION CARE/DAY 70 MINUTES: CPT | Performed by: INTERNAL MEDICINE

## 2022-04-24 PROCEDURE — 71045 X-RAY EXAM CHEST 1 VIEW: CPT

## 2022-04-24 PROCEDURE — 83880 ASSAY OF NATRIURETIC PEPTIDE: CPT | Performed by: EMERGENCY MEDICINE

## 2022-04-24 PROCEDURE — 81001 URINALYSIS AUTO W/SCOPE: CPT | Performed by: INTERNAL MEDICINE

## 2022-04-24 PROCEDURE — 94640 AIRWAY INHALATION TREATMENT: CPT

## 2022-04-24 PROCEDURE — 99285 EMERGENCY DEPT VISIT HI MDM: CPT

## 2022-04-24 PROCEDURE — 87636 SARSCOV2 & INF A&B AMP PRB: CPT | Performed by: EMERGENCY MEDICINE

## 2022-04-24 PROCEDURE — 93005 ELECTROCARDIOGRAM TRACING: CPT

## 2022-04-24 PROCEDURE — 94002 VENT MGMT INPAT INIT DAY: CPT

## 2022-04-24 RX ORDER — SODIUM CHLORIDE 9 MG/ML
75 INJECTION, SOLUTION INTRAVENOUS CONTINUOUS
Status: DISCONTINUED | OUTPATIENT
Start: 2022-04-24 | End: 2022-04-25

## 2022-04-24 RX ORDER — METHYLPREDNISOLONE SODIUM SUCCINATE 40 MG/ML
40 INJECTION, POWDER, LYOPHILIZED, FOR SOLUTION INTRAMUSCULAR; INTRAVENOUS EVERY 8 HOURS
Status: DISCONTINUED | OUTPATIENT
Start: 2022-04-24 | End: 2022-04-26

## 2022-04-24 RX ORDER — ACETAMINOPHEN 325 MG/1
650 TABLET ORAL EVERY 4 HOURS PRN
Status: DISCONTINUED | OUTPATIENT
Start: 2022-04-24 | End: 2022-04-28 | Stop reason: HOSPADM

## 2022-04-24 RX ORDER — ASPIRIN 81 MG/1
81 TABLET ORAL EVERY OTHER DAY
Status: DISCONTINUED | OUTPATIENT
Start: 2022-04-24 | End: 2022-04-28 | Stop reason: HOSPADM

## 2022-04-24 RX ORDER — NITROGLYCERIN 0.4 MG/1
0.4 TABLET SUBLINGUAL ONCE
Status: COMPLETED | OUTPATIENT
Start: 2022-04-24 | End: 2022-04-24

## 2022-04-24 RX ORDER — CEFTRIAXONE 1 G/50ML
1000 INJECTION, SOLUTION INTRAVENOUS EVERY 24 HOURS
Status: DISCONTINUED | OUTPATIENT
Start: 2022-04-25 | End: 2022-04-28 | Stop reason: HOSPADM

## 2022-04-24 RX ORDER — ALBUTEROL SULFATE 90 UG/1
2 AEROSOL, METERED RESPIRATORY (INHALATION) EVERY 6 HOURS PRN
Status: DISCONTINUED | OUTPATIENT
Start: 2022-04-24 | End: 2022-04-24

## 2022-04-24 RX ORDER — PRAVASTATIN SODIUM 40 MG
80 TABLET ORAL
Status: DISCONTINUED | OUTPATIENT
Start: 2022-04-24 | End: 2022-04-28 | Stop reason: HOSPADM

## 2022-04-24 RX ORDER — NICOTINE 21 MG/24HR
1 PATCH, TRANSDERMAL 24 HOURS TRANSDERMAL DAILY
Status: DISCONTINUED | OUTPATIENT
Start: 2022-04-24 | End: 2022-04-24

## 2022-04-24 RX ORDER — BUDESONIDE 0.5 MG/2ML
0.5 INHALANT ORAL
Status: DISCONTINUED | OUTPATIENT
Start: 2022-04-24 | End: 2022-04-24

## 2022-04-24 RX ORDER — METHYLPREDNISOLONE SOD SUCC 125 MG
1 VIAL (EA) INJECTION ONCE
Status: COMPLETED | OUTPATIENT
Start: 2022-04-24 | End: 2022-04-24

## 2022-04-24 RX ORDER — CEFEPIME HYDROCHLORIDE 2 G/50ML
2000 INJECTION, SOLUTION INTRAVENOUS ONCE
Status: COMPLETED | OUTPATIENT
Start: 2022-04-24 | End: 2022-04-24

## 2022-04-24 RX ORDER — LISINOPRIL 20 MG/1
40 TABLET ORAL DAILY
Status: DISCONTINUED | OUTPATIENT
Start: 2022-04-24 | End: 2022-04-24

## 2022-04-24 RX ORDER — ALBUTEROL SULFATE 2.5 MG/3ML
1 SOLUTION RESPIRATORY (INHALATION) ONCE
Status: COMPLETED | OUTPATIENT
Start: 2022-04-24 | End: 2022-04-24

## 2022-04-24 RX ORDER — HYDRALAZINE HYDROCHLORIDE 20 MG/ML
5 INJECTION INTRAMUSCULAR; INTRAVENOUS EVERY 6 HOURS PRN
Status: DISCONTINUED | OUTPATIENT
Start: 2022-04-24 | End: 2022-04-28 | Stop reason: HOSPADM

## 2022-04-24 RX ORDER — GABAPENTIN 100 MG/1
200 CAPSULE ORAL
Status: DISCONTINUED | OUTPATIENT
Start: 2022-04-24 | End: 2022-04-28 | Stop reason: HOSPADM

## 2022-04-24 RX ORDER — LEVALBUTEROL 1.25 MG/.5ML
1.25 SOLUTION, CONCENTRATE RESPIRATORY (INHALATION)
Status: DISCONTINUED | OUTPATIENT
Start: 2022-04-24 | End: 2022-04-28 | Stop reason: HOSPADM

## 2022-04-24 RX ORDER — FLUTICASONE FUROATE AND VILANTEROL 200; 25 UG/1; UG/1
1 POWDER RESPIRATORY (INHALATION) DAILY
Status: DISCONTINUED | OUTPATIENT
Start: 2022-04-24 | End: 2022-04-26

## 2022-04-24 RX ORDER — ALBUTEROL SULFATE 2.5 MG/3ML
2.5 SOLUTION RESPIRATORY (INHALATION) EVERY 4 HOURS PRN
Status: DISCONTINUED | OUTPATIENT
Start: 2022-04-24 | End: 2022-04-28 | Stop reason: HOSPADM

## 2022-04-24 RX ORDER — NICOTINE 21 MG/24HR
1 PATCH, TRANSDERMAL 24 HOURS TRANSDERMAL DAILY
Status: DISCONTINUED | OUTPATIENT
Start: 2022-04-24 | End: 2022-04-28 | Stop reason: HOSPADM

## 2022-04-24 RX ORDER — GUAIFENESIN 600 MG
600 TABLET, EXTENDED RELEASE 12 HR ORAL 2 TIMES DAILY
Status: DISCONTINUED | OUTPATIENT
Start: 2022-04-24 | End: 2022-04-28 | Stop reason: HOSPADM

## 2022-04-24 RX ORDER — ONDANSETRON 2 MG/ML
4 INJECTION INTRAMUSCULAR; INTRAVENOUS EVERY 6 HOURS PRN
Status: DISCONTINUED | OUTPATIENT
Start: 2022-04-24 | End: 2022-04-28 | Stop reason: HOSPADM

## 2022-04-24 RX ORDER — SODIUM CHLORIDE FOR INHALATION 0.9 %
3 VIAL, NEBULIZER (ML) INHALATION ONCE
Status: COMPLETED | OUTPATIENT
Start: 2022-04-24 | End: 2022-04-24

## 2022-04-24 RX ADMIN — NICOTINE 1 PATCH: 21 PATCH, EXTENDED RELEASE TRANSDERMAL at 17:09

## 2022-04-24 RX ADMIN — INSULIN LISPRO 5 UNITS: 100 INJECTION, SOLUTION INTRAVENOUS; SUBCUTANEOUS at 20:51

## 2022-04-24 RX ADMIN — FLUTICASONE FUROATE AND VILANTEROL TRIFENATATE 1 PUFF: 200; 25 POWDER RESPIRATORY (INHALATION) at 20:47

## 2022-04-24 RX ADMIN — PRAVASTATIN SODIUM 80 MG: 40 TABLET ORAL at 17:06

## 2022-04-24 RX ADMIN — SODIUM CHLORIDE 1000 ML: 0.9 INJECTION, SOLUTION INTRAVENOUS at 14:21

## 2022-04-24 RX ADMIN — IPRATROPIUM BROMIDE 1 MG: 0.5 SOLUTION RESPIRATORY (INHALATION) at 12:59

## 2022-04-24 RX ADMIN — GABAPENTIN 200 MG: 100 CAPSULE ORAL at 22:44

## 2022-04-24 RX ADMIN — ENOXAPARIN SODIUM 40 MG: 100 INJECTION SUBCUTANEOUS at 17:10

## 2022-04-24 RX ADMIN — ISODIUM CHLORIDE 3 ML: 0.03 SOLUTION RESPIRATORY (INHALATION) at 12:59

## 2022-04-24 RX ADMIN — SODIUM CHLORIDE 75 ML/HR: 0.9 INJECTION, SOLUTION INTRAVENOUS at 17:15

## 2022-04-24 RX ADMIN — LEVALBUTEROL HYDROCHLORIDE 1.25 MG: 1.25 SOLUTION, CONCENTRATE RESPIRATORY (INHALATION) at 21:59

## 2022-04-24 RX ADMIN — GUAIFENESIN 600 MG: 600 TABLET, EXTENDED RELEASE ORAL at 17:08

## 2022-04-24 RX ADMIN — METHYLPREDNISOLONE SODIUM SUCCINATE 40 MG: 40 INJECTION, POWDER, FOR SOLUTION INTRAMUSCULAR; INTRAVENOUS at 22:44

## 2022-04-24 RX ADMIN — ASPIRIN 81 MG: 81 TABLET, COATED ORAL at 17:08

## 2022-04-24 RX ADMIN — ALBUTEROL SULFATE 10 MG: 2.5 SOLUTION RESPIRATORY (INHALATION) at 12:59

## 2022-04-24 RX ADMIN — CEFEPIME HYDROCHLORIDE 2000 MG: 2 INJECTION, SOLUTION INTRAVENOUS at 14:17

## 2022-04-24 RX ADMIN — NITROGLYCERIN 0.4 MG: 0.4 TABLET SUBLINGUAL at 13:09

## 2022-04-24 RX ADMIN — IPRATROPIUM BROMIDE 0.5 MG: 0.5 SOLUTION RESPIRATORY (INHALATION) at 21:59

## 2022-04-24 NOTE — RESPIRATORY THERAPY NOTE
resp care      04/24/22 1304   Respiratory Assessment   Resp Comments Pt  arrived on cpap via EMS  Pt  placed on bipap at this time and heart neb started      Additional Assessments   SpO2 99 %

## 2022-04-24 NOTE — H&P
L[facility] 46  H&P- Flavio Rossi 1945, 69 y o  male MRN: [id number]  Unit/Bed#: ED 26 Encounter: [id number]  Primary Care Provider: Klever Booth DO   Date and time admitted to hospital: 4/24/2022 12:45 PM    * COPD exacerbation ([place] [address] )  Assessment & Plan  · Patient presents with shortness of breath  · Likely COPD exacerbation secondary to community-acquired pneumonia  · Solu-Medrol 40 mg IV q8 hours  · Breo Ellipta  · Mucinex  · Atrovent  · Respiratory and airway clearance protocols  · IV antibiotics as below in the setting of bacterial consolidation on chest x-ray    Pneumonia  Assessment & Plan  · Chest x-ray with right lower lobe pneumonia  · Given cefepime in the ED  · Will initiate ceftriaxone 1000 mg IV q24 hours  · Deescalate antibiotics as appropriate  · Follow-up sputum cultures and Gram stain    Sepsis ([place] [address] )  Assessment & Plan  · Present on admission as evidenced by tachycardia and leukocytosis  · Secondary to community-acquired pneumonia  · Ceftriaxone as above  · IV fluid resuscitation  · Trend fever curve and WBC count and lactate until cleared  · Follow-up blood cultures    Type 2 diabetes mellitus with diabetic neuropathy, unspecified ([place] [address] )  Assessment & Plan  Lab Results   Component Value Date    HGBA1C 6 9 (H) 01/06/2022       No results for input(s): POCGLU in the last 72 hours      Blood Sugar Average: Last 72 hrs:     · Blood glucose elevated on presentation likely secondary to pneumonia  · Hold home oral hypoglycemic agents  · Sliding scale insulin and Accu-Cheks while inpatient    Acute on chronic respiratory failure (HCC)  Assessment & Plan  · Patient utilizes 2 L of nasal cannula supplemental O2 at night  · Requiring 2 L of nasal cannula O2 in the ED  · Likely secondary to COPD exacerbation and pneumonia  · Wean O2 as tolerated  · Goal SpO2 > 88%    Obesity  Assessment & Plan  · Present on admission as evidenced by BMI of 28  · Encouraged lifestyle modifications and weight loss      VTE Prophylaxis: Lovenox  Code Status: Level 1 Full Code    Anticipated Length of Stay:  Patient will be admitted on an Observation basis with an anticipated length of stay of  2 midnights  Justification for Hospital Stay:  COPD exacerbation secondary to community-acquired pneumonia    Total Time for Visit, including Counseling / Coordination of Care: 60 minutes  Greater than 50% of this total time spent on direct patient counseling and coordination of care  Chief Complaint:  Shortness of breath      History of Present Illness:    Brissa Staley is a 69 y o  male with a past medical history significant for COPD, chronic respiratory failure on 2 L nasal cannula O2 at night, obesity, type 2 diabetes mellitus who presents with complaints of shortness of breath  Chest x-ray in the ED with right lower lobe consolidation  Patient met SIRS criteria in the setting of tachycardia and leukocytosis  Source of infection likely pneumonia triggering COPD exacerbation  The patient states he feels much better following breathing treatments in the ED  He denies chest pain, diaphoresis, nausea/vomiting/diarrhea, fevers/chills  No cough  No increase in sputum production  Review of Systems:    Review of Systems   Constitutional: Negative for chills and fever  HENT: Negative for ear pain and sore throat  Eyes: Negative for pain and visual disturbance  Respiratory: Positive for shortness of breath  Negative for cough  Cardiovascular: Negative for chest pain and palpitations  Gastrointestinal: Negative for abdominal pain and vomiting  Genitourinary: Negative for dysuria and hematuria  Musculoskeletal: Negative for arthralgias and back pain  Skin: Negative for color change and rash  Neurological: Negative for seizures and syncope  All other systems reviewed and are negative        Past Medical and Surgical History:     Past Medical History:   Diagnosis Date    BPH (benign prostatic hyperplasia)     45 days radiation treatment    COPD (chronic obstructive pulmonary disease)     [facility]    Coronary artery disease     CABG x4 in 2017    Diabetes mellitus     History of Arterial Duplex of LE 12/26/2017    Likely occlusion of the left superficial femoral artery  Calcific changes bilaterally  Despite these changes, the ankle-brachial index as a measure of peripheral blood flow only mildly impaired   History of echocardiogram 06/12/2017    EF 40%, mild LVH, mild MR     Hyperlipidemia     Hypertension     NSTEMI (non-ST elevated myocardial infarction)     Prostate cancer     prostate     PVD (peripheral vascular disease)     Tremor     Type 2 MI [facility] ) 4/6/2021       Past Surgical History:   Procedure Laterality Date    CARDIAC CATHETERIZATION  03/08/2017    Significant left main plus triple-vessel CAD   CORONARY ARTERY BYPASS GRAFT  03/08/2017    4V CABG:  LIMA to LAD, VG to RI, SVG to PDA to LVBR RCA   EYE SURGERY      shots in eye once a month @ the [address] [place] [address]         Meds/Allergies:    Prior to Admission medications    Medication Sig Start Date End Date Taking?  Authorizing Provider   albuterol (PROVENTIL HFA,VENTOLIN HFA) 90 mcg/act inhaler Inhale 2 puffs every 6 (six) hours as needed for wheezing or shortness of breath 1/23/22   Moshe Del Cid PA-C   arformoterol (BROVANA) 15 mcg/2 mL nebulizer solution Take 2 mL (15 mcg total) by nebulization 2 (two) times a day 4/6/22   Javier Kan PA-C   aspirin (ECOTRIN LOW STRENGTH) 81 mg EC tablet Take 1 tablet (81 mg total) by mouth every other day 9/3/20   PENG Gleason   budesonide (Pulmicort) 0 5 mg/2 mL nebulizer solution Take 2 mL (0 5 mg total) by nebulization 2 (two) times a day Rinse mouth after use  3/23/22 4/22/22  Maggie Meyer MD   cyanocobalamin (VITAMIN B-12) 500 MCG tablet TAKE ONE TABLET BY MOUTH EVERY MORNING *VITAMIN B12* 11/8/21   Historical Provider, MD   gabapentin (NEURONTIN) 100 mg capsule Take 2 capsules (200 mg total) by mouth daily at bedtime 4/22/22 5/22/22  [facility], DO   glimepiride (AMARYL) 1 mg tablet Take 2 tablets (2 mg total) by mouth daily with breakfast AND 1 tablet (1 mg total) daily with dinner  4/22/22   [facility], DO   ipratropium (ATROVENT) 0 02 % nebulizer solution Take 2 5 mL (0 5 mg total) by nebulization 3 (three) times a day 3/23/22 4/22/22  Terrence Bright MD   lisinopril (ZESTRIL) 40 mg tablet Take 1 tablet (40 mg total) by mouth daily 1/7/22   Sofía Armstrong MD   metFORMIN (GLUCOPHAGE) 1000 MG tablet Take 1 tablet (1,000 mg total) by mouth 2 (two) times a day with meals 4/22/22   [facility], DO   metoprolol tartrate (LOPRESSOR) 25 mg tablet Take 1 tablet (25 mg total) by mouth every 12 (twelve) hours 3/29/22   [facility], DO   Multiple Vitamins-Minerals (PRESERVISION AREDS 2 PO) Take by mouth    Historical Provider, MD   predniSONE 20 mg tablet Take 2 tablets (40 mg total) by mouth daily for 5 days 4/22/22 4/27/22  [facility], DO   primidone (MYSOLINE) 50 mg tablet Take 100 mg by mouth 2 (two) times a day     Historical Provider, MD   rosuvastatin (CRESTOR) 10 MG tablet Take 1 tablet (10 mg total) by mouth daily 4/22/22   [facility], DO   terbinafine (LamISIL) 1 % cream APPLY THIN LAYER TOPICALLY TWICE A DAY FOR FUNGAL INFECTION 2/8/22   Historical Provider, MD       Allergies:    Allergies   Allergen Reactions    Atorvastatin Myalgia       Social History:     Marital Status: /Civil Union   Substance Use History:   Social History     Substance and Sexual Activity   Alcohol Use Yes     Social History     Tobacco Use   Smoking Status Current Some Day Smoker    Packs/day: 0 25    Years: 60 00    Pack years: 15 00    Types: Cigarettes   Smokeless Tobacco Never Used   Tobacco Comment    only smokes when he drinks beer     Social History     Substance and Sexual Activity   Drug Use Never       Family History:    Pertinent family history reviewed    Physical Exam:     Vitals:   Blood Pressure: 123/59 (04/24/22 1500)  Pulse: 100 (04/24/22 1500)  Temperature: 97 9 °F (36 6 °C) (04/24/22 1250)  Temp Source: Tympanic (04/24/22 1250)  Respirations: 18 (04/24/22 1500)  Weight - Scale: 89 8 kg (198 lb) (04/24/22 1250)  SpO2: 93 % (04/24/22 1500)    Physical Exam  Vitals and nursing note reviewed  Constitutional:       Appearance: He is well-developed  He is obese  HENT:      Head: Normocephalic and atraumatic  Eyes:      Conjunctiva/sclera: Conjunctivae normal    Cardiovascular:      Rate and Rhythm: Normal rate and regular rhythm  Heart sounds: No murmur heard  Pulmonary:      Effort: Pulmonary effort is normal  No respiratory distress  Breath sounds: Wheezing present  Abdominal:      Palpations: Abdomen is soft  Tenderness: There is no abdominal tenderness  Musculoskeletal:      Cervical back: Neck supple  Skin:     General: Skin is warm and dry  Neurological:      Mental Status: He is alert            Additional Data:     Lab Results: I have reviewed pertinent results     Results from last 7 days   Lab Units 04/24/22  1319   WBC Thousand/uL 23 39*   HEMOGLOBIN g/dL 13 9   HEMATOCRIT % 41 9   PLATELETS Thousands/uL 304   NEUTROS PCT % 84*   LYMPHS PCT % 10*   MONOS PCT % 3*   EOS PCT % 1     Results from last 7 days   Lab Units 04/24/22  1319   SODIUM mmol/L 136   POTASSIUM mmol/L 4 7   CHLORIDE mmol/L 98   CO2 mmol/L 29   BUN mg/dL 15   CREATININE mg/dL 1 01   ANION GAP mmol/L 9   CALCIUM mg/dL 8 5   ALBUMIN g/dL 4 1   TOTAL BILIRUBIN mg/dL 0 28   ALK PHOS U/L 96   ALT U/L 9   AST U/L 14   GLUCOSE RANDOM mg/dL 349*     Results from last 7 days   Lab Units 04/24/22  1319   INR  0 96             Results from last 7 days   Lab Units 04/24/22  1405 04/24/22  1319   LACTIC ACID mmol/L 2 9*  -- PROCALCITONIN ng/ml  --  0 08       Imaging: I have reviewed pertinent imaging     XR chest portable   Final Result by Segun Benítez MD (04/24 4919)      Worsening right lower lobe airspace opacity, in keeping with pneumonia  The study was marked in [facility] for immediate notification  Workstation performed: [id number]             EKG, Pathology, and Other Studies Reviewed on Admission:   · EKG: Reviewed     Allscripts / Epic Records Reviewed    ** Please Note: This note has been constructed using a voice recognition system   **

## 2022-04-24 NOTE — ED PROVIDER NOTES
History  Chief Complaint   Patient presents with    Shortness of Breath     onset a couple days ago , Progressively worsening over the last 24 hours     Patient is a 71-year-old male with history of COPD, who presents for evaluation of respiratory distress  Patient has been having progressive shortness of breath over the last few days that got acutely worse today  He saw his family doctor 2 days ago who ordered a chest x-ray  Patient says the symptoms had been stable with his breathing treatments  Patient has been seen here multiple times for COPD in the past   He says that he wears oxygen only at night  He denies cough, fevers, chills, chest pain, abdominal pain  The patient received Solu-Medrol and a DuoNeb by EMS  On arrival, patient is on CPAP, he is tachypneic with belly breathing  He has diffuse wheezing throughout  He is tachycardic and hypertensive  Prior to Admission Medications   Prescriptions Last Dose Informant Patient Reported? Taking? Multiple Vitamins-Minerals (PRESERVISION AREDS 2 PO) 4/24/2022 at Unknown time  Yes Yes   Sig: Take by mouth   albuterol (PROVENTIL HFA,VENTOLIN HFA) 90 mcg/act inhaler 4/25/2022 at Unknown time Self No Yes   Sig: Inhale 2 puffs every 6 (six) hours as needed for wheezing or shortness of breath   arformoterol (BROVANA) 15 mcg/2 mL nebulizer solution Not Taking at Unknown time  No No   Sig: Take 2 mL (15 mcg total) by nebulization 2 (two) times a day   Patient not taking: Reported on 4/25/2022    aspirin (ECOTRIN LOW STRENGTH) 81 mg EC tablet 4/24/2022 at Unknown time Self No Yes   Sig: Take 1 tablet (81 mg total) by mouth every other day   budesonide (Pulmicort) 0 5 mg/2 mL nebulizer solution   No No   Sig: Take 2 mL (0 5 mg total) by nebulization 2 (two) times a day Rinse mouth after use     cyanocobalamin (VITAMIN B-12) 500 MCG tablet 4/24/2022 at Unknown time Self Yes Yes   Sig: TAKE ONE TABLET BY MOUTH EVERY MORNING *VITAMIN B12*   gabapentin (NEURONTIN) 100 mg capsule 4/24/2022 at Unknown time  No Yes   Sig: Take 2 capsules (200 mg total) by mouth daily at bedtime   glimepiride (AMARYL) 1 mg tablet 4/24/2022 at Unknown time  No Yes   Sig: Take 2 tablets (2 mg total) by mouth daily with breakfast AND 1 tablet (1 mg total) daily with dinner  ipratropium (ATROVENT) 0 02 % nebulizer solution   No No   Sig: Take 2 5 mL (0 5 mg total) by nebulization 3 (three) times a day   lisinopril (ZESTRIL) 40 mg tablet 4/24/2022 at Unknown time Self No Yes   Sig: Take 1 tablet (40 mg total) by mouth daily   metFORMIN (GLUCOPHAGE) 1000 MG tablet 4/24/2022 at Unknown time  No Yes   Sig: Take 1 tablet (1,000 mg total) by mouth 2 (two) times a day with meals   metoprolol tartrate (LOPRESSOR) 25 mg tablet 4/24/2022 at Unknown time  No Yes   Sig: Take 1 tablet (25 mg total) by mouth every 12 (twelve) hours   predniSONE 20 mg tablet 4/25/2022 at Unknown time  No Yes   Sig: Take 2 tablets (40 mg total) by mouth daily for 5 days   primidone (MYSOLINE) 50 mg tablet 4/25/2022 at Unknown time Self Yes Yes   Sig: Take 100 mg by mouth 2 (two) times a day    rosuvastatin (CRESTOR) 10 MG tablet 4/25/2022 at Unknown time  No Yes   Sig: Take 1 tablet (10 mg total) by mouth daily   terbinafine (LamISIL) 1 % cream 4/24/2022 at Unknown time Self Yes Yes   Sig: APPLY THIN LAYER TOPICALLY TWICE A DAY FOR FUNGAL INFECTION      Facility-Administered Medications: None       Past Medical History:   Diagnosis Date    BPH (benign prostatic hyperplasia)     45 days radiation treatment    COPD (chronic obstructive pulmonary disease)     COPD with acute exacerbation ([facility] [address] ) 11/21/2019    Coronary artery disease     CABG x4 in 2017    Diabetes mellitus     History of Arterial Duplex of LE 12/26/2017    Likely occlusion of the left superficial femoral artery  Calcific changes bilaterally  Despite these changes, the ankle-brachial index as a measure of peripheral blood flow only mildly impaired   History of echocardiogram 06/12/2017    EF 40%, mild LVH, mild MR     Hyperlipidemia     Hypertension     NSTEMI (non-ST elevated myocardial infarction)     Prostate cancer     prostate     PVD (peripheral vascular disease)     Tremor     Type 2 MI (myocardial infarction) ([facility] [address] ) 4/6/2021       Past Surgical History:   Procedure Laterality Date    CARDIAC CATHETERIZATION  03/08/2017    Significant left main plus triple-vessel CAD   CORONARY ARTERY BYPASS GRAFT  03/08/2017    4V CABG:  LIMA to LAD, VG to RI, SVG to PDA to LVBR RCA   EYE SURGERY      shots in eye once a month @ the [place]    PROSTATE BIOPSY         Family History   Problem Relation Age of Onset    Cancer Father     Heart disease Brother      I have reviewed and agree with the history as documented  E-Cigarette/Vaping    E-Cigarette Use Never User      E-Cigarette/Vaping Substances    Nicotine No     THC No     CBD No     Flavoring No     Other No     Unknown No      Social History     Tobacco Use    Smoking status: Current Some Day Smoker     Packs/day: 0 25     Years: 60 00     Pack years: 15 00     Types: Cigarettes    Smokeless tobacco: Never Used    Tobacco comment: only smokes when he drinks beer   Vaping Use    Vaping Use: Never used   Substance Use Topics    Alcohol use: Yes    Drug use: Never       Review of Systems   Constitutional: Negative for chills, fever and unexpected weight change  HENT: Negative for congestion, sore throat and trouble swallowing  Eyes: Negative for pain, discharge and itching  Respiratory: Positive for shortness of breath  Negative for cough, chest tightness and wheezing  Cardiovascular: Negative for chest pain, palpitations and leg swelling  Gastrointestinal: Negative for abdominal pain, blood in stool, diarrhea, nausea and vomiting  Endocrine: Negative for polyuria  Genitourinary: Negative for difficulty urinating, dysuria, frequency and hematuria     Musculoskeletal: Negative for arthralgias and back pain  Neurological: Negative for dizziness, syncope, weakness, light-headedness and headaches  Physical Exam  Physical Exam  Vitals and nursing note reviewed  Constitutional:       General: He is in acute distress  Appearance: He is well-developed  HENT:      Head: Normocephalic and atraumatic  Right Ear: External ear normal       Left Ear: External ear normal    Eyes:      Conjunctiva/sclera: Conjunctivae normal       Pupils: Pupils are equal, round, and reactive to light  Cardiovascular:      Rate and Rhythm: Normal rate and regular rhythm  Heart sounds: Normal heart sounds  No murmur heard  No friction rub  No gallop  Pulmonary:      Effort: Tachypnea, accessory muscle usage and respiratory distress present  Breath sounds: Wheezing present  No rales  Abdominal:      General: Bowel sounds are normal  There is no distension  Palpations: Abdomen is soft  Tenderness: There is no abdominal tenderness  There is no guarding  Musculoskeletal:         General: No tenderness or deformity  Normal range of motion  Cervical back: Normal range of motion  Lymphadenopathy:      Cervical: No cervical adenopathy  Skin:     General: Skin is warm and dry  Neurological:      General: No focal deficit present  Mental Status: He is alert and oriented to person, place, and time  Mental status is at baseline  Cranial Nerves: No cranial nerve deficit  Sensory: No sensory deficit  Motor: No weakness or abnormal muscle tone     Psychiatric:         Behavior: Behavior normal          Vital Signs  ED Triage Vitals   Temperature Pulse Respirations Blood Pressure SpO2   04/24/22 1250 04/24/22 1250 04/24/22 1250 04/24/22 1253 04/24/22 1250   97 9 °F (36 6 °C) (!) 137 (!) 24 (!) 188/112 100 %      Temp Source Heart Rate Source Patient Position - Orthostatic VS BP Location FiO2 (%)   04/24/22 1250 04/24/22 1250 04/25/22 1445 04/25/22 1445 04/25/22 1830   Tympanic Monitor Lying Left arm 40      Pain Score       04/24/22 1250       No Pain           Vitals:    04/27/22 0900 04/27/22 0910 04/27/22 1000 04/27/22 1100   BP:   152/75    Pulse: 104 104 93 91   Patient Position - Orthostatic VS:             Visual Acuity  Visual Acuity      Most Recent Value   L Pupil Size (mm) 3   R Pupil Size (mm) 3   L Pupil Shape Round   R Pupil Shape Round          ED Medications  Medications   aspirin (ECOTRIN LOW STRENGTH) EC tablet 81 mg (81 mg Oral Given 4/26/22 0916)   gabapentin (NEURONTIN) capsule 200 mg (200 mg Oral Given 4/26/22 2133)   pravastatin (PRAVACHOL) tablet 80 mg (80 mg Oral Given 4/26/22 1637)   acetaminophen (TYLENOL) tablet 650 mg (has no administration in time range)   ondansetron (ZOFRAN) injection 4 mg (has no administration in time range)   enoxaparin (LOVENOX) subcutaneous injection 40 mg (40 mg Subcutaneous Given 4/27/22 0840)   nicotine (NICODERM CQ) 21 mg/24 hr TD 24 hr patch 1 patch (1 patch Transdermal Medication Applied 4/27/22 0841)   guaiFENesin (MUCINEX) 12 hr tablet 600 mg (600 mg Oral Given 4/27/22 0840)   ipratropium (ATROVENT) 0 02 % inhalation solution 0 5 mg (0 5 mg Nebulization Given 4/27/22 1322)   albuterol inhalation solution 2 5 mg (2 5 mg Nebulization Given 4/25/22 1731)   cefTRIAXone (ROCEPHIN) IVPB (premix in dextrose) 1,000 mg 50 mL (0 mg Intravenous Stopped 4/26/22 1918)   insulin lispro (HumaLOG) 100 units/mL subcutaneous injection 1-6 Units (3 Units Subcutaneous Given 4/27/22 1311)   insulin lispro (HumaLOG) 100 units/mL subcutaneous injection 1-6 Units (2 Units Subcutaneous Given 4/26/22 2133)   hydrALAZINE (APRESOLINE) injection 5 mg (5 mg Intravenous Given 4/26/22 1519)   levalbuterol (XOPENEX) inhalation solution 1 25 mg (1 25 mg Nebulization Given 4/27/22 1323)   metoprolol tartrate (LOPRESSOR) tablet 25 mg (25 mg Oral Given 4/27/22 5216)   insulin glargine (LANTUS) subcutaneous injection 10 Units 0 1 mL (10 Units Subcutaneous Given 4/26/22 2132)   metoprolol (LOPRESSOR) injection 5 mg (5 mg Intravenous Not Given 4/26/22 2135)   methylPREDNISolone sodium succinate (Solu-MEDROL) injection 40 mg (40 mg Intravenous Given 4/27/22 0841)   formoterol (PERFOROMIST) nebulizer solution 20 mcg (20 mcg Nebulization Given 4/27/22 0700)   lisinopril (ZESTRIL) tablet 40 mg (40 mg Oral Given 4/27/22 0840)   digoxin (LANOXIN) tablet 125 mcg (125 mcg Oral Given 4/27/22 0910)   albuterol (FOR EMS ONLY) (2 5 mg/3 mL) 0 083 % inhalation solution 2 5 mg (0 mg Does not apply Given to EMS 4/24/22 1423)   methylPREDNISolone sodium succinate (FOR EMS ONLY) (Solu-MEDROL) 125 MG injection 125 mg (0 mg Does not apply Given to EMS 4/24/22 1423)   sodium chloride 0 9 % bolus 1,000 mL (0 mL Intravenous Stopped 4/24/22 1521)   albuterol inhalation solution 10 mg (10 mg Nebulization Given 4/24/22 1259)     And   ipratropium (ATROVENT) 0 02 % inhalation solution 1 mg (1 mg Nebulization Given 4/24/22 1259)     And   sodium chloride 0 9 % inhalation solution 3 mL (3 mL Nebulization Given 4/24/22 1259)   nitroglycerin (NITROSTAT) SL tablet 0 4 mg (0 4 mg Sublingual Given 4/24/22 1309)   cefepime (MAXIPIME) IVPB (premix in dextrose) 2,000 mg 50 mL (0 mg Intravenous Stopped 4/24/22 1447)   furosemide (LASIX) injection 40 mg (40 mg Intravenous Given 4/25/22 1821)   levalbuterol (XOPENEX) inhalation solution 0 63 mg (0 63 mg Nebulization Given 4/26/22 1703)   metoprolol (LOPRESSOR) injection 5 mg (5 mg Intravenous Given 4/26/22 1940)   sodium chloride 0 9 % bolus 500 mL (0 mL Intravenous Stopped 4/26/22 2339)   LORazepam (ATIVAN) injection 1 mg (1 mg Intravenous Given 4/26/22 2106)   metoprolol (LOPRESSOR) injection 5 mg (5 mg Intravenous Given 4/26/22 2100)   levalbuterol (XOPENEX) inhalation solution 0 63 mg (0 63 mg Nebulization Given 4/26/22 9479)       Diagnostic Studies  Results Reviewed     Procedure Component Value Units Date/Time    Sputum culture and Gram stain [[id number]]  (Abnormal) Collected: 04/25/22 2034    Lab Status: Preliminary result Specimen: Expectorated Sputum Updated: 04/27/22 0924     Sputum Culture Culture results to follow  Gram Stain Result 1+ Epithelial cells per low power field      No polys seen      4+ Gram positive cocci in pairs, chains and clusters      3+ Gram positive rods      3+ Gram negative rods    Blood culture #1 [[id number]] Collected: 04/24/22 1319    Lab Status: Preliminary result Specimen: Blood from Arm, Right Updated: 04/27/22 0001     Blood Culture No Growth at 48 hrs  Blood culture #2 [[id number]] Collected: 04/24/22 1319    Lab Status: Preliminary result Specimen: Blood from Arm, Left Updated: 04/27/22 0001     Blood Culture No Growth at 48 hrs  COVID/FLU - 24 hour TAT [[id number]]  (Normal) Collected: 04/24/22 1411    Lab Status: Final result Specimen: Nares from Nasopharyngeal Swab Updated: 04/25/22 1240     SARS-CoV-2 Negative     INFLUENZA A PCR Negative     INFLUENZA B PCR Negative    Narrative:      FOR PEDIATRIC PATIENTS - copy/paste COVID Guidelines URL to browser: [email or web address]    SARS-CoV-2 assay is a Nucleic Acid Amplification assay intended for the  qualitative detection of nucleic acid from SARS-CoV-2 in nasopharyngeal  swabs  Results are for the presumptive identification of SARS-CoV-2 RNA  Positive results are indicative of infection with SARS-CoV-2, the virus  causing COVID-19, but do not rule out bacterial infection or co-infection  with other viruses  Laboratories within the [place] and its  territories are required to report all positive results to the appropriate  public health authorities  Negative results do not preclude SARS-CoV-2  infection and should not be used as the sole basis for treatment or other  patient management decisions   Negative results must be combined with  clinical observations, patient history, and epidemiological information  This test has not been FDA cleared or approved  This test has been authorized by FDA under an Emergency Use Authorization  (EUA)  This test is only authorized for the duration of time the  declaration that circumstances exist justifying the authorization of the  emergency use of an in vitro diagnostic tests for detection of SARS-CoV-2  virus and/or diagnosis of COVID-19 infection under section 564(b)(1) of  the Act, 21 U  S C  881PIX-9(P)(5), unless the authorization is terminated  or revoked sooner  The test has been validated but independent review by FDA  and CLIA is pending  Test performed using the Roche rene 6800 System: This RT-PCR assay  targets ORF1, a region unique to SARS-CoV-2  A conserved region in the  E-gene was chosen for pan-Sarbecovirus detection which includes  SARS-CoV-2        Fingerstick Glucose (POCT) [[id number]]  (Abnormal) Collected: 04/25/22 1050    Lab Status: Final result Updated: 04/25/22 1052     POC Glucose 342 mg/dl     Fingerstick Glucose (POCT) [[id number]]  (Abnormal) Collected: 04/25/22 0707    Lab Status: Final result Updated: 04/25/22 0713     POC Glucose 210 mg/dl     CBC and differential [[id number]]  (Abnormal) Collected: 04/25/22 0531    Lab Status: Final result Specimen: Blood from Arm, Right Updated: 04/25/22 0634     WBC 9 15 Thousand/uL      RBC 3 69 Million/uL      Hemoglobin 11 6 g/dL      Hematocrit 35 2 %      MCV 95 fL      MCH 31 4 pg      MCHC 33 0 g/dL      RDW 13 2 %      MPV 11 8 fL      Platelets 036 Thousands/uL      nRBC 0 /100 WBCs      Neutrophils Relative 89 %      Immat GRANS % 1 %      Lymphocytes Relative 6 %      Monocytes Relative 3 %      Eosinophils Relative 1 %      Basophils Relative 0 %      Neutrophils Absolute 8 24 Thousands/µL      Immature Grans Absolute 0 07 Thousand/uL      Lymphocytes Absolute 0 55 Thousands/µL      Monocytes Absolute 0 23 Thousand/µL      Eosinophils Absolute 0 05 Thousand/µL Basophils Absolute 0 01 Thousands/µL     Procalcitonin, Next Day AM Collection [[id number]]  (Abnormal) Collected: 04/25/22 0531    Lab Status: Final result Specimen: Blood from Arm, Right Updated: 04/25/22 0606     Procalcitonin 0 73 ng/ml     Basic metabolic panel [[id number]]  (Abnormal) Collected: 04/25/22 0531    Lab Status: Final result Specimen: Blood from Arm, Right Updated: 04/25/22 0600     Sodium 135 mmol/L      Potassium 4 5 mmol/L      Chloride 99 mmol/L      CO2 27 mmol/L      ANION GAP 9 mmol/L      BUN 17 mg/dL      Creatinine 0 78 mg/dL      Glucose 207 mg/dL      Calcium 8 4 mg/dL      eGFR 87 ml/min/1 73sq m     Narrative:      [facility] guidelines for Chronic Kidney Disease (CKD):     Stage 1 with normal or high GFR (GFR > 90 mL/min/1 73 square meters)    Stage 2 Mild CKD (GFR = 60-89 mL/min/1 73 square meters)    Stage 3A Moderate CKD (GFR = 45-59 mL/min/1 73 square meters)    Stage 3B Moderate CKD (GFR = 30-44 mL/min/1 73 square meters)    Stage 4 Severe CKD (GFR = 15-29 mL/min/1 73 square meters)    Stage 5 End Stage CKD (GFR <15 mL/min/1 73 square meters)  Note: GFR calculation is accurate only with a steady state creatinine    Fingerstick Glucose (POCT) [[id number]]  (Abnormal) Collected: 04/24/22 2049    Lab Status: Final result Updated: 04/24/22 2050     POC Glucose 337 mg/dl     HS Troponin I 4hr [[id number]]  (Abnormal) Collected: 04/24/22 1811    Lab Status: Final result Specimen: Blood from Arm, Left Updated: 04/24/22 1902     hs TnI 4hr 249 ng/L      Delta 4hr hsTnI 206 ng/L     Fingerstick Glucose (POCT) [[id number]]  (Abnormal) Collected: 04/24/22 1617    Lab Status: Final result Updated: 04/24/22 1622     POC Glucose 287 mg/dl     HS Troponin I 2hr [[id number]]  (Abnormal) Collected: 04/24/22 1545    Lab Status: Final result Specimen: Blood from Arm, Right Updated: 04/24/22 1621     hs TnI 2hr 140 ng/L      Delta 2hr hsTnI 97 ng/L     Lactic acid 2 Hours [[id number]]  (Abnormal) Collected: 04/24/22 1545    Lab Status: Final result Specimen: Blood from Arm, Right Updated: 04/24/22 1621     LACTIC ACID 3 1 mmol/L     Narrative:      Result may be elevated if tourniquet was used during collection  Urine Microscopic [[id number]] Collected: 04/24/22 1547    Lab Status: Final result Specimen: Urine, Clean Catch Updated: 04/24/22 1613     RBC, UA None Seen /hpf      WBC, UA 0-1 /hpf      Epithelial Cells None Seen /hpf      Bacteria, UA None Seen /hpf      Fine granular casts 0-1 /lpf     UA w Reflex to Microscopic w Reflex to Culture [[id number]]  (Abnormal) Collected: 04/24/22 1547    Lab Status: Final result Specimen: Urine, Clean Catch Updated: 04/24/22 1559     Color, UA Yellow     Clarity, UA Slightly Cloudy     Specific [place], UA 1 020     pH, UA 6 0     Leukocytes, UA Negative     Nitrite, UA Negative     Protein, UA 1+ mg/dl      Glucose, UA 3+ mg/dl      Ketones, UA Negative mg/dl      Urobilinogen, UA 0 2 E U /dl      Bilirubin, UA Negative     Blood, UA Negative    Lactic acid [[id number]]  (Abnormal) Collected: 04/24/22 1405    Lab Status: Final result Specimen: Blood from Hand, Right Updated: 04/24/22 1458     LACTIC ACID 2 9 mmol/L     Narrative:      Result may be elevated if tourniquet was used during collection      Procalcitonin [[id number]]  (Normal) Collected: 04/24/22 1319    Lab Status: Final result Specimen: Blood from Arm, Left Updated: 04/24/22 1418     Procalcitonin 0 08 ng/ml     B-Type Natriuretic Peptide(BNP) CA, , [facility] Camp[facility] Only [[id number]]  (Abnormal) Collected: 04/24/22 1319    Lab Status: Final result Specimen: Blood from Arm, Right Updated: 04/24/22 1414      pg/mL     HS Troponin 0hr (reflex protocol) [[id number]]  (Normal) Collected: 04/24/22 1319    Lab Status: Final result Specimen: Blood from Arm, Right Updated: 04/24/22 1407     hs TnI 0hr 43 ng/L     Comprehensive metabolic panel [[id number]]  (Abnormal) Collected: 04/24/22 1319    Lab Status: Final result Specimen: Blood from Arm, Left Updated: 04/24/22 1404     Sodium 136 mmol/L      Potassium 4 7 mmol/L      Chloride 98 mmol/L      CO2 29 mmol/L      ANION GAP 9 mmol/L      BUN 15 mg/dL      Creatinine 1 01 mg/dL      Glucose 349 mg/dL      Calcium 8 5 mg/dL      AST 14 U/L      ALT 9 U/L      Alkaline Phosphatase 96 U/L      Total Protein 6 9 g/dL      Albumin 4 1 g/dL      Total Bilirubin 0 28 mg/dL      eGFR 71 ml/min/1 73sq m     Narrative:      [employer] guidelines for Chronic Kidney Disease (CKD):     Stage 1 with normal or high GFR (GFR > 90 mL/min/1 73 square meters)    Stage 2 Mild CKD (GFR = 60-89 mL/min/1 73 square meters)    Stage 3A Moderate CKD (GFR = 45-59 mL/min/1 73 square meters)    Stage 3B Moderate CKD (GFR = 30-44 mL/min/1 73 square meters)    Stage 4 Severe CKD (GFR = 15-29 mL/min/1 73 square meters)    Stage 5 End Stage CKD (GFR <15 mL/min/1 73 square meters)  Note: GFR calculation is accurate only with a steady state creatinine    Protime-INR [[id number]]  (Normal) Collected: 04/24/22 1319    Lab Status: Final result Specimen: Blood from Arm, Left Updated: 04/24/22 1353     Protime 12 7 seconds      INR 0 96    APTT [[id number]]  (Normal) Collected: 04/24/22 1319    Lab Status: Final result Specimen: Blood from Arm, Left Updated: 04/24/22 1353     PTT 30 seconds     CBC and differential [[id number]]  (Abnormal) Collected: 04/24/22 1319    Lab Status: Final result Specimen: Blood from Arm, Left Updated: 04/24/22 1341     WBC 23 39 Thousand/uL      RBC 4 37 Million/uL      Hemoglobin 13 9 g/dL      Hematocrit 41 9 %      MCV 96 fL      MCH 31 8 pg      MCHC 33 2 g/dL      RDW 13 3 %      MPV 12 2 fL      Platelets 843 Thousands/uL      nRBC 0 /100 WBCs      Neutrophils Relative 84 %      Immat GRANS % 1 %      Lymphocytes Relative 10 %      Monocytes Relative 3 %      Eosinophils Relative 1 %      Basophils Relative 1 % Neutrophils Absolute 19 95 Thousands/µL      Immature Grans Absolute 0 18 Thousand/uL      Lymphocytes Absolute 2 22 Thousands/µL      Monocytes Absolute 0 71 Thousand/µL      Eosinophils Absolute 0 18 Thousand/µL      Basophils Absolute 0 15 Thousands/µL                  XR chest portable   Final Result by Lucas Valadez MD (04/24 [id number])      Worsening right lower lobe airspace opacity, in keeping with pneumonia  The study was marked in [facility] for immediate notification  Workstation performed: [id number]                    Procedures  CriticalCare Time  Performed by: Brendon Dominguez DO  Authorized by: Brendon Dominguez DO     Critical care provider statement:     Critical care time (minutes):  39    Critical care time was exclusive of:  Separately billable procedures and treating other patients and teaching time    Critical care was necessary to treat or prevent imminent or life-threatening deterioration of the following conditions:  Respiratory failure    Critical care was time spent personally by me on the following activities:  Blood draw for specimens, obtaining history from patient or surrogate, development of treatment plan with patient or surrogate, discussions with consultants, evaluation of patient's response to treatment, examination of patient, interpretation of cardiac output measurements, ordering and performing treatments and interventions, ordering and review of laboratory studies, ordering and review of radiographic studies, re-evaluation of patient's condition and review of old charts    I assumed direction of critical care for this patient from another provider in my specialty: no               ED Course  ED Course as of 04/27/22 1414   Sun Apr 24, 2022   1356 Patient's symptoms improved after medications  Work of breathing has greatly decreased  1457 Patient taken off BiPAP  Is maintaining oxygen levels in the mid 90s on room air    Does not feel like he has increased work of breathing   1503 To drop down to 88, will placed on 2 L                               SBIRT 20yo+      Most Recent Value   SBIRT (25 yo +)    In order to provide better care to our patients, we are screening all of our patients for alcohol and drug use  Would it be okay to ask you these screening questions? Yes Filed at: 04/25/2022 1932   Initial Alcohol Screen: US AUDIT-C     1  How often do you have a drink containing alcohol? 1 Filed at: 04/25/2022 0520   2  How many drinks containing alcohol do you have on a typical day you are drinking? 3 Filed at: 04/25/2022 0520   3a  Male UNDER 65: How often do you have five or more drinks on one occasion? 0 Filed at: 04/25/2022 0520   3b  FEMALE Any Age, or MALE 65+: How often do you have 4 or more drinks on one occassion? 0 Filed at: 04/25/2022 0520   Audit-C Score 4 Filed at: 04/25/2022 2148   DEANNA: How many times in the past year have you    Used an illegal drug or used a prescription medication for non-medical reasons? Never Filed at: 04/25/2022 0520                    MDM  Number of Diagnoses or Management Options  COPD exacerbation ([facility] [address] )  Pneumonia  Respiratory distress  Diagnosis management comments: 56-year-old male presenting for shortness of breath  Progressing last few days  Acutely worse today  Is on CPAP on arrival   History of COPD  Patient says it feels similar  Patient was transitioned to BiPAP here  Patient initially hypertensive and tachycardic  Patient given heart neb, given sublingual nitro given hypertension and tachycardia  Septic workup obtained  Chest x-ray shows what appears to be a right sided pneumonia  Patient's breathing symptoms improved after medications, patient taken off BiPAP  Admitted to Medicine        Disposition  Final diagnoses:   COPD exacerbation ([address] )   Pneumonia   Respiratory distress     Time reflects when diagnosis was documented in both MDM as applicable and the Disposition within this note     Time User Action Codes Description Comment    4/24/2022  3:26 PM Randa Gaucher Add [J44 1] COPD exacerbation ([address] )     4/24/2022  3:26 PM Randa Gaucher Add [J18 9] Pneumonia     4/24/2022  3:26 PM Randa Gaucher Add [R06 03] Respiratory distress     4/25/2022 10:14 AM James Benítez Add [I25 10] Coronary artery disease involving native coronary artery of native heart without angina pectoris     4/25/2022 10:14 AM James Benítez Add [R77 8] Troponin level elevated       ED Disposition     ED Disposition Condition Date/Time Comment    Admit Stable Sun Apr 24, 2022  3:25 PM Case was discussed with IRINA and the patient's admission status was agreed to be Admission Status: observation status to the service of Dr Dwight Erwin   Follow-up Information    None         Current Discharge Medication List      CONTINUE these medications which have NOT CHANGED    Details   albuterol (PROVENTIL HFA,VENTOLIN HFA) 90 mcg/act inhaler Inhale 2 puffs every 6 (six) hours as needed for wheezing or shortness of breath  Refills: 0    Comments: Substitution to a formulary equivalent within the same pharmaceutical class is authorized  Associated Diagnoses: COPD exacerbation (HCC)      aspirin (ECOTRIN LOW STRENGTH) 81 mg EC tablet Take 1 tablet (81 mg total) by mouth every other day  Qty:  , Refills: 0    Associated Diagnoses: COPD exacerbation (HCC)      cyanocobalamin (VITAMIN B-12) 500 MCG tablet TAKE ONE TABLET BY MOUTH EVERY MORNING *VITAMIN B12*      gabapentin (NEURONTIN) 100 mg capsule Take 2 capsules (200 mg total) by mouth daily at bedtime  Qty: 180 capsule, Refills: 3    Associated Diagnoses: COPD exacerbation (HCC)      glimepiride (AMARYL) 1 mg tablet Take 2 tablets (2 mg total) by mouth daily with breakfast AND 1 tablet (1 mg total) daily with dinner    Refills: 0    Associated Diagnoses: Type 2 diabetes mellitus without complication, without long-term current use of insulin (HCC)      lisinopril (ZESTRIL) 40 mg tablet Take 1 tablet (40 mg total) by mouth daily  Qty: 90 tablet, Refills: 5    Associated Diagnoses: Essential hypertension      metFORMIN (GLUCOPHAGE) 1000 MG tablet Take 1 tablet (1,000 mg total) by mouth 2 (two) times a day with meals  Qty: 180 tablet, Refills: 3    Associated Diagnoses: Type 2 diabetes mellitus without complication, without long-term current use of insulin (HCC)      metoprolol tartrate (LOPRESSOR) 25 mg tablet Take 1 tablet (25 mg total) by mouth every 12 (twelve) hours  Qty: 60 tablet, Refills: 5    Associated Diagnoses: Coronary artery disease involving native coronary artery of native heart without angina pectoris; S/P CABG x 4      Multiple Vitamins-Minerals (PRESERVISION AREDS 2 PO) Take by mouth      predniSONE 20 mg tablet Take 2 tablets (40 mg total) by mouth daily for 5 days  Qty: 10 tablet, Refills: 0    Associated Diagnoses: COPD exacerbation (HCC)      primidone (MYSOLINE) 50 mg tablet Take 100 mg by mouth 2 (two) times a day       rosuvastatin (CRESTOR) 10 MG tablet Take 1 tablet (10 mg total) by mouth daily  Qty: 90 tablet, Refills: 3    Associated Diagnoses: Other hyperlipidemia      terbinafine (LamISIL) 1 % cream APPLY THIN LAYER TOPICALLY TWICE A DAY FOR FUNGAL INFECTION      arformoterol (BROVANA) 15 mcg/2 mL nebulizer solution Take 2 mL (15 mcg total) by nebulization 2 (two) times a day  Qty: 120 mL, Refills: 5    Associated Diagnoses: Mucopurulent chronic bronchitis (HCC)      budesonide (Pulmicort) 0 5 mg/2 mL nebulizer solution Take 2 mL (0 5 mg total) by nebulization 2 (two) times a day Rinse mouth after use    Qty: 120 mL, Refills: 11    Associated Diagnoses: Chronic obstructive pulmonary disease with acute exacerbation (HCC)      ipratropium (ATROVENT) 0 02 % nebulizer solution Take 2 5 mL (0 5 mg total) by nebulization 3 (three) times a day  Qty: 300 mL, Refills: 11    Associated Diagnoses: Chronic obstructive pulmonary disease with acute exacerbation (HCC)             No discharge procedures on file      PDMP Review       Value Time User    PDMP Reviewed  Yes 9/3/2020 12:26 PM Zakia Lainez, 10 S[address] Xiong          ED Provider  Electronically Signed by           Akanksha Conrad DO  04/27/22 4211

## 2022-04-24 NOTE — ASSESSMENT & PLAN NOTE
· Present on admission as evidenced by BMI of 28  · Encouraged lifestyle modifications and weight loss

## 2022-04-24 NOTE — ASSESSMENT & PLAN NOTE
· Patient utilizes 2 L of nasal cannula supplemental O2 at night  · Requiring 2 L of nasal cannula O2 in the ED  · Likely secondary to COPD exacerbation and pneumonia  · Wean O2 as tolerated  · Goal SpO2 > 88%

## 2022-04-24 NOTE — ASSESSMENT & PLAN NOTE
· Chest x-ray with right lower lobe pneumonia  · Given cefepime in the ED  · Will initiate ceftriaxone 1000 mg IV q24 hours  · Deescalate antibiotics as appropriate  · Follow-up sputum cultures and Gram stain

## 2022-04-24 NOTE — ASSESSMENT & PLAN NOTE
Lab Results   Component Value Date    HGBA1C 6 9 (H) 01/06/2022       No results for input(s): POCGLU in the last 72 hours      Blood Sugar Average: Last 72 hrs:     · Blood glucose elevated on presentation likely secondary to pneumonia  · Hold home oral hypoglycemic agents  · Sliding scale insulin and Accu-Cheks while inpatient

## 2022-04-24 NOTE — ASSESSMENT & PLAN NOTE
· Present on admission as evidenced by tachycardia and leukocytosis  · Secondary to community-acquired pneumonia  · Ceftriaxone as above  · IV fluid resuscitation  · Trend fever curve and WBC count and lactate until cleared  · Follow-up blood cultures

## 2022-04-24 NOTE — RESPIRATORY THERAPY NOTE
RT Protocol Note  Jett Hendrix 69 y o  male MRN: [id number]  Unit/Bed#: TRUMAN Encounter: [id number]    Assessment    Principal Problem:    COPD exacerbation ([place] [address] )  Active Problems:    Acute on chronic respiratory failure ([place] [address] )    Type 2 diabetes mellitus with diabetic neuropathy, unspecified (HCC)    Obesity    Pneumonia    Sepsis ([place] [address] )      Home Pulmonary Medications:  atrovent TID, pulmicort BID, Albuterol mDI prn  Home Devices/Therapy: (P) Home O2 (at HS)    Past Medical History:   Diagnosis Date    BPH (benign prostatic hyperplasia)     45 days radiation treatment    COPD (chronic obstructive pulmonary disease)     COPD with acute exacerbation ([place] [address] ) 11/21/2019    Coronary artery disease     CABG x4 in 2017    Diabetes mellitus     History of Arterial Duplex of LE 12/26/2017    Likely occlusion of the left superficial femoral artery  Calcific changes bilaterally  Despite these changes, the ankle-brachial index as a measure of peripheral blood flow only mildly impaired   History of echocardiogram 06/12/2017    EF 40%, mild LVH, mild MR     Hyperlipidemia     Hypertension     NSTEMI (non-ST elevated myocardial infarction)     Prostate cancer     prostate     PVD (peripheral vascular disease)     Tremor     Type 2 MI (myocardial infarction) ([place] [address] ) 4/6/2021     Social History     Socioeconomic History    Marital status: /Civil Union     Spouse name: None    Number of children: None    Years of education: None    Highest education level: None   Occupational History    None   Tobacco Use    Smoking status: Current Some Day Smoker     Packs/day: 0 25     Years: 60 00     Pack years: 15 00     Types: Cigarettes    Smokeless tobacco: Never Used    Tobacco comment: only smokes when he drinks beer   Vaping Use    Vaping Use: Never used   Substance and Sexual Activity    Alcohol use:  Yes    Drug use: Never    Sexual activity: Yes     Partners: Female   Other Topics Concern    None Social History Narrative    ** Merged History Encounter **          Social Determinants of Health     Financial Resource Strain: Not on file   Food Insecurity: No Food Insecurity    Worried About Running Out of Food in the Last Year: Never true    [place] of Food in the Last Year: Never true   Transportation Needs: No Transportation Needs    Lack of Transportation (Medical): No    Lack of Transportation (Non-Medical): No   Physical Activity: Not on file   Stress: Not on file   Social Connections: Not on file   Intimate Partner Violence: Not on file   Housing Stability: Low Risk     Unable to Pay for Housing in the Last Year: No    Number of Places Lived in the Last Year: 1    Unstable Housing in the Last Year: No       Subjective         Objective    Physical Exam:   Assessment Type: (P) Assess only  General Appearance: (P) Alert,Awake  Respiratory Pattern: (P) Dyspnea at rest  Chest Assessment: (P) Chest expansion symmetrical  Bilateral Breath Sounds: (P) Diminished,Expiratory wheezes  Cough: (P) Non-productive  O2 Device: (P) nc    Vitals:  Blood pressure 123/59, pulse 100, temperature 97 9 °F (36 6 °C), temperature source Tympanic, resp  rate 18, weight 89 8 kg (198 lb), SpO2 93 %  Imaging and other studies: I have personally reviewed pertinent reports  O2 Device: (P) nc     Plan    Respiratory Plan: (P) Moderate/Severe Distress pathway        Resp Comments: (P) Pt  admitted for COPD exacerbation  Pt uses nebs TID at home  States he has pulmicort but makes him SOB when he takes it  Will order udn tid at this time  Pt SOB has resided with only mild expiratory wheezing

## 2022-04-25 ENCOUNTER — TELEPHONE (OUTPATIENT)
Dept: PULMONOLOGY | Facility: CLINIC | Age: 77
End: 2022-04-25

## 2022-04-25 PROBLEM — R77.8 ELEVATED TROPONIN: Status: ACTIVE | Noted: 2022-04-25

## 2022-04-25 PROBLEM — R65.10 SIRS (SYSTEMIC INFLAMMATORY RESPONSE SYNDROME) (HCC): Status: ACTIVE | Noted: 2022-04-24

## 2022-04-25 PROBLEM — I50.9 CONGESTIVE HEART FAILURE (HCC): Status: ACTIVE | Noted: 2022-04-25

## 2022-04-25 LAB
ANION GAP SERPL CALCULATED.3IONS-SCNC: 9 MMOL/L (ref 4–13)
ATRIAL RATE: 129 BPM
ATRIAL RATE: 94 BPM
BASOPHILS # BLD AUTO: 0.01 THOUSANDS/ΜL (ref 0–0.1)
BASOPHILS NFR BLD AUTO: 0 % (ref 0–1)
BUN SERPL-MCNC: 17 MG/DL (ref 5–25)
CALCIUM SERPL-MCNC: 8.4 MG/DL (ref 8.4–10.2)
CHLORIDE SERPL-SCNC: 99 MMOL/L (ref 96–108)
CO2 SERPL-SCNC: 27 MMOL/L (ref 21–32)
CREAT SERPL-MCNC: 0.78 MG/DL (ref 0.6–1.3)
EOSINOPHIL # BLD AUTO: 0.05 THOUSAND/ΜL (ref 0–0.61)
EOSINOPHIL NFR BLD AUTO: 1 % (ref 0–6)
ERYTHROCYTE [DISTWIDTH] IN BLOOD BY AUTOMATED COUNT: 13.2 % (ref 11.6–15.1)
FLUAV RNA RESP QL NAA+PROBE: NEGATIVE
FLUBV RNA RESP QL NAA+PROBE: NEGATIVE
GFR SERPL CREATININE-BSD FRML MDRD: 87 ML/MIN/1.73SQ M
GLUCOSE SERPL-MCNC: 166 MG/DL (ref 65–140)
GLUCOSE SERPL-MCNC: 207 MG/DL (ref 65–140)
GLUCOSE SERPL-MCNC: 210 MG/DL (ref 65–140)
GLUCOSE SERPL-MCNC: 255 MG/DL (ref 65–140)
GLUCOSE SERPL-MCNC: 342 MG/DL (ref 65–140)
HCT VFR BLD AUTO: 35.2 % (ref 36.5–49.3)
HGB BLD-MCNC: 11.6 G/DL (ref 12–17)
IMM GRANULOCYTES # BLD AUTO: 0.07 THOUSAND/UL (ref 0–0.2)
IMM GRANULOCYTES NFR BLD AUTO: 1 % (ref 0–2)
LYMPHOCYTES # BLD AUTO: 0.55 THOUSANDS/ΜL (ref 0.6–4.47)
LYMPHOCYTES NFR BLD AUTO: 6 % (ref 14–44)
MCH RBC QN AUTO: 31.4 PG (ref 26.8–34.3)
MCHC RBC AUTO-ENTMCNC: 33 G/DL (ref 31.4–37.4)
MCV RBC AUTO: 95 FL (ref 82–98)
MONOCYTES # BLD AUTO: 0.23 THOUSAND/ΜL (ref 0.17–1.22)
MONOCYTES NFR BLD AUTO: 3 % (ref 4–12)
NEUTROPHILS # BLD AUTO: 8.24 THOUSANDS/ΜL (ref 1.85–7.62)
NEUTS SEG NFR BLD AUTO: 89 % (ref 43–75)
NRBC BLD AUTO-RTO: 0 /100 WBCS
P AXIS: 61 DEGREES
P AXIS: 76 DEGREES
PLATELET # BLD AUTO: 199 THOUSANDS/UL (ref 149–390)
PMV BLD AUTO: 11.8 FL (ref 8.9–12.7)
POTASSIUM SERPL-SCNC: 4.5 MMOL/L (ref 3.5–5.3)
PR INTERVAL: 154 MS
PR INTERVAL: 168 MS
PROCALCITONIN SERPL-MCNC: 0.73 NG/ML
QRS AXIS: 39 DEGREES
QRS AXIS: 45 DEGREES
QRSD INTERVAL: 100 MS
QRSD INTERVAL: 94 MS
QT INTERVAL: 292 MS
QT INTERVAL: 378 MS
QTC INTERVAL: 427 MS
QTC INTERVAL: 472 MS
RBC # BLD AUTO: 3.69 MILLION/UL (ref 3.88–5.62)
SARS-COV-2 RNA RESP QL NAA+PROBE: NEGATIVE
SODIUM SERPL-SCNC: 135 MMOL/L (ref 135–147)
T WAVE AXIS: 104 DEGREES
T WAVE AXIS: 106 DEGREES
VENTRICULAR RATE: 129 BPM
VENTRICULAR RATE: 94 BPM
WBC # BLD AUTO: 9.15 THOUSAND/UL (ref 4.31–10.16)

## 2022-04-25 PROCEDURE — 93010 ELECTROCARDIOGRAM REPORT: CPT | Performed by: INTERNAL MEDICINE

## 2022-04-25 PROCEDURE — 36415 COLL VENOUS BLD VENIPUNCTURE: CPT | Performed by: INTERNAL MEDICINE

## 2022-04-25 PROCEDURE — 82948 REAGENT STRIP/BLOOD GLUCOSE: CPT

## 2022-04-25 PROCEDURE — 84145 PROCALCITONIN (PCT): CPT | Performed by: INTERNAL MEDICINE

## 2022-04-25 PROCEDURE — 94760 N-INVAS EAR/PLS OXIMETRY 1: CPT

## 2022-04-25 PROCEDURE — 94002 VENT MGMT INPAT INIT DAY: CPT

## 2022-04-25 PROCEDURE — 99223 1ST HOSP IP/OBS HIGH 75: CPT | Performed by: INTERNAL MEDICINE

## 2022-04-25 PROCEDURE — 99291 CRITICAL CARE FIRST HOUR: CPT | Performed by: NURSE PRACTITIONER

## 2022-04-25 PROCEDURE — 80048 BASIC METABOLIC PNL TOTAL CA: CPT | Performed by: INTERNAL MEDICINE

## 2022-04-25 PROCEDURE — 94640 AIRWAY INHALATION TREATMENT: CPT

## 2022-04-25 PROCEDURE — 99232 SBSQ HOSP IP/OBS MODERATE 35: CPT | Performed by: INTERNAL MEDICINE

## 2022-04-25 PROCEDURE — 85025 COMPLETE CBC W/AUTO DIFF WBC: CPT | Performed by: INTERNAL MEDICINE

## 2022-04-25 PROCEDURE — 87070 CULTURE OTHR SPECIMN AEROBIC: CPT | Performed by: INTERNAL MEDICINE

## 2022-04-25 PROCEDURE — 87205 SMEAR GRAM STAIN: CPT | Performed by: INTERNAL MEDICINE

## 2022-04-25 RX ORDER — INSULIN GLARGINE 100 [IU]/ML
10 INJECTION, SOLUTION SUBCUTANEOUS
Status: DISCONTINUED | OUTPATIENT
Start: 2022-04-25 | End: 2022-04-28 | Stop reason: HOSPADM

## 2022-04-25 RX ORDER — FUROSEMIDE 10 MG/ML
40 INJECTION INTRAMUSCULAR; INTRAVENOUS ONCE
Status: COMPLETED | OUTPATIENT
Start: 2022-04-25 | End: 2022-04-25

## 2022-04-25 RX ORDER — METOPROLOL TARTRATE 5 MG/5ML
5 INJECTION INTRAVENOUS EVERY 6 HOURS PRN
Status: DISCONTINUED | OUTPATIENT
Start: 2022-04-25 | End: 2022-04-28 | Stop reason: HOSPADM

## 2022-04-25 RX ADMIN — METHYLPREDNISOLONE SODIUM SUCCINATE 40 MG: 40 INJECTION, POWDER, FOR SOLUTION INTRAMUSCULAR; INTRAVENOUS at 21:57

## 2022-04-25 RX ADMIN — HYDRALAZINE HYDROCHLORIDE 5 MG: 20 INJECTION INTRAMUSCULAR; INTRAVENOUS at 23:29

## 2022-04-25 RX ADMIN — LEVALBUTEROL HYDROCHLORIDE 1.25 MG: 1.25 SOLUTION, CONCENTRATE RESPIRATORY (INHALATION) at 20:28

## 2022-04-25 RX ADMIN — INSULIN LISPRO 2 UNITS: 100 INJECTION, SOLUTION INTRAVENOUS; SUBCUTANEOUS at 07:07

## 2022-04-25 RX ADMIN — INSULIN GLARGINE 10 UNITS: 100 INJECTION, SOLUTION SUBCUTANEOUS at 21:57

## 2022-04-25 RX ADMIN — METOPROLOL TARTRATE 5 MG: 5 INJECTION INTRAVENOUS at 17:36

## 2022-04-25 RX ADMIN — PRAVASTATIN SODIUM 80 MG: 40 TABLET ORAL at 15:35

## 2022-04-25 RX ADMIN — LEVALBUTEROL HYDROCHLORIDE 1.25 MG: 1.25 SOLUTION, CONCENTRATE RESPIRATORY (INHALATION) at 13:52

## 2022-04-25 RX ADMIN — FLUTICASONE FUROATE AND VILANTEROL TRIFENATATE 1 PUFF: 200; 25 POWDER RESPIRATORY (INHALATION) at 08:00

## 2022-04-25 RX ADMIN — METOPROLOL TARTRATE 5 MG: 5 INJECTION INTRAVENOUS at 21:58

## 2022-04-25 RX ADMIN — INSULIN LISPRO 5 UNITS: 100 INJECTION, SOLUTION INTRAVENOUS; SUBCUTANEOUS at 12:42

## 2022-04-25 RX ADMIN — IPRATROPIUM BROMIDE 0.5 MG: 0.5 SOLUTION RESPIRATORY (INHALATION) at 08:26

## 2022-04-25 RX ADMIN — GUAIFENESIN 600 MG: 600 TABLET, EXTENDED RELEASE ORAL at 08:00

## 2022-04-25 RX ADMIN — NICOTINE 1 PATCH: 21 PATCH, EXTENDED RELEASE TRANSDERMAL at 08:05

## 2022-04-25 RX ADMIN — METOPROLOL TARTRATE 25 MG: 25 TABLET, FILM COATED ORAL at 15:35

## 2022-04-25 RX ADMIN — IPRATROPIUM BROMIDE 0.5 MG: 0.5 SOLUTION RESPIRATORY (INHALATION) at 20:28

## 2022-04-25 RX ADMIN — LEVALBUTEROL HYDROCHLORIDE 1.25 MG: 1.25 SOLUTION, CONCENTRATE RESPIRATORY (INHALATION) at 08:26

## 2022-04-25 RX ADMIN — ENOXAPARIN SODIUM 40 MG: 100 INJECTION SUBCUTANEOUS at 08:00

## 2022-04-25 RX ADMIN — INSULIN LISPRO 3 UNITS: 100 INJECTION, SOLUTION INTRAVENOUS; SUBCUTANEOUS at 21:57

## 2022-04-25 RX ADMIN — GABAPENTIN 200 MG: 100 CAPSULE ORAL at 21:57

## 2022-04-25 RX ADMIN — ALBUTEROL SULFATE 2.5 MG: 2.5 SOLUTION RESPIRATORY (INHALATION) at 17:31

## 2022-04-25 RX ADMIN — METHYLPREDNISOLONE SODIUM SUCCINATE 40 MG: 40 INJECTION, POWDER, FOR SOLUTION INTRAMUSCULAR; INTRAVENOUS at 05:27

## 2022-04-25 RX ADMIN — FUROSEMIDE 40 MG: 10 INJECTION, SOLUTION INTRAMUSCULAR; INTRAVENOUS at 18:21

## 2022-04-25 RX ADMIN — GUAIFENESIN 600 MG: 600 TABLET, EXTENDED RELEASE ORAL at 21:57

## 2022-04-25 RX ADMIN — CEFTRIAXONE 1000 MG: 1 INJECTION, SOLUTION INTRAVENOUS at 15:34

## 2022-04-25 RX ADMIN — IPRATROPIUM BROMIDE 0.5 MG: 0.5 SOLUTION RESPIRATORY (INHALATION) at 13:52

## 2022-04-25 RX ADMIN — METHYLPREDNISOLONE SODIUM SUCCINATE 40 MG: 40 INJECTION, POWDER, FOR SOLUTION INTRAMUSCULAR; INTRAVENOUS at 15:35

## 2022-04-25 NOTE — UTILIZATION REVIEW
OBSERVATION 4/24/22 @ 1527 CONVERTED TO INPATIENT 4/25/22 @1124 FOR PNEUMONIA REQUIRING CONTINUED BREATHING TREATMENTS  Initial Clinical Review    Admission: Date/Time/Statement:     04/25/22 1125  Inpatient Admission  Once        Transfer Service: Hospitalist       Question Answer Comment   Level of Care Med Surg    Estimated length of stay More than 2 Midnights    Certification I certify that inpatient services are medically necessary for this patient for a duration of greater than two midnights  See H&P and MD Progress Notes for additional information about the patient's course of treatment  04/25/22 1124       ED Arrival Information     Expected Arrival Acuity    4/24/2022 12:41 4/24/2022 12:45 Emergent         Means of arrival Escorted by Service Admission type    Ambulance [facility] Emergency         Arrival complaint    SOB        Chief Complaint   Patient presents with    Shortness of Breath     onset a couple days ago , Progressively worsening over the last 24 hours       Initial Presentation: 69 y o  male to the ED from home via EMS with complaints of shortness of breath worsening over the past 24 hours  Admitted under observation then converted to inpatient for COPD exacerbation, pneumonia, sepsis, DMII  H/O COPD and has been taking breathing treatments at home, had CXR 2 days prior  EMS gave him IV solumedrol and Duoneb  Arrives tachypneic, tachycardic, hypertensive with wheezing throughout  Continue with IV steroids, atrovent  CXR shows right lower lobe pneumonia  Started on IV abx  Sputum cultures pending  He wears 2LNC at night and is now requiring 4 LNC       Date:4/25      ED Triage Vitals   Temperature Pulse Respirations Blood Pressure SpO2   04/24/22 1250 04/24/22 1250 04/24/22 1250 04/24/22 1253 04/24/22 1250   97 9 °F (36 6 °C) (!) 137 (!) 24 (!) 188/112 100 %      Temp Source Heart Rate Source Patient Position - Orthostatic VS BP Location FiO2 (%)   04/24/22 1250 04/24/22 1250 -- -- --   Tympanic Monitor         Pain Score       04/24/22 1250       No Pain          Wt Readings from Last 1 Encounters:   04/24/22 89 8 kg (198 lb)     Additional Vital Signs:   Time Temp Pulse Resp BP MAP (mmHg) SpO2 Calculated FIO2 (%) - Nasal Cannula Nasal Cannula O2 Flow Rate (L/min) O2 Device O2 Interface Device   04/25/22 0530 -- 96 22 146/67 96 97 % 36 4 L/min Nasal cannula --   04/25/22 0100 -- 109 Abnormal  26 Abnormal  151/68 98 95 % 36 4 L/min Nasal cannula --   04/24/22 2202 -- -- -- -- -- 96 % -- -- -- --   04/24/22 2100 -- -- -- -- -- 96 % -- -- -- --   04/24/22 1730 -- 101 18 158/71 107 93 % -- -- -- --   04/24/22 1628 -- 92 -- -- -- 94 % -- -- -- --   04/24/22 1500 -- 100 18 123/59 85 93 % -- -- -- --   04/24/22 1344 -- 105 -- 118/65 -- -- -- -- -- --   04/24/22 1253 -- -- -- 188/112 Abnormal  -- -- -- -- -- --   04/24/22 1250 97 9 °F (36 6 °C) 137 Abnormal  24 Abnormal  -- -- 100 % -- -- Other (comment)  --       Pertinent Labs/Diagnostic Test Results:   4/24 EKG: Possible Sinus tachycardia with occasional Premature ventricular complexes  Possible Left atrial enlargement  Anterior infarct (cited on or before 24-APR-2022)  Abnormal ECG  When compared with ECG of 19-MAR-2022 06:18,  Premature ventricular complexes are now Present  Vent  rate has increased BY  49 BPM  Nonspecific T wave abnormality, worse in Inferior leads  XR chest portable   Final Result by Erika Layton MD (04/24 1878)      Worsening right lower lobe airspace opacity, in keeping with pneumonia  The study was marked in [facility] for immediate notification                    Workstation performed: [id number]               Results from last 7 days   Lab Units 04/25/22  0531 04/24/22  1319   WBC Thousand/uL 9 15 23 39*   HEMOGLOBIN g/dL 11 6* 13 9   HEMATOCRIT % 35 2* 41 9   PLATELETS Thousands/uL 199 304   NEUTROS ABS Thousands/µL 8 24* 19 95*         Results from last 7 days   Lab Units 04/25/22  0531 04/24/22  1319   SODIUM mmol/L 135 136   POTASSIUM mmol/L 4 5 4 7   CHLORIDE mmol/L 99 98   CO2 mmol/L 27 29   ANION GAP mmol/L 9 9   BUN mg/dL 17 15   CREATININE mg/dL 0 78 1 01   EGFR ml/min/1 73sq m 87 71   CALCIUM mg/dL 8 4 8 5     Results from last 7 days   Lab Units 04/24/22  1319   AST U/L 14   ALT U/L 9   ALK PHOS U/L 96   TOTAL PROTEIN g/dL 6 9   ALBUMIN g/dL 4 1   TOTAL BILIRUBIN mg/dL 0 28     Results from last 7 days   Lab Units 04/25/22  0707 04/24/22  2049 04/24/22  1617   POC GLUCOSE mg/dl 210* 337* 287*     Results from last 7 days   Lab Units 04/25/22  0531 04/24/22  1319   GLUCOSE RANDOM mg/dL 207* 349*       BETA-HYDROXYBUTYRATE   Date Value Ref Range Status   03/18/2022 4 2 (H) <0 6 mmol/L Final          Results from last 7 days   Lab Units 04/24/22  1811 04/24/22  1545 04/24/22  1319   HS TNI 0HR ng/L  --   --  43   HS TNI 2HR ng/L  --  140*  --    HSTNI D2 ng/L  --  97*  --    HS TNI 4HR ng/L 249*  --   --    HSTNI D4 ng/L 206*  --   --        Results from last 7 days   Lab Units 04/24/22  1319   PROTIME seconds 12 7   INR  0 96   PTT seconds 30     Results from last 7 days   Lab Units 04/25/22  0531 04/24/22  1319   PROCALCITONIN ng/ml 0 73* 0 08     Results from last 7 days   Lab Units 04/24/22  1545 04/24/22  1405   LACTIC ACID mmol/L 3 1* 2 9*     Results from last 7 days   Lab Units 04/24/22  1319   BNP pg/mL 354*     Results from last 7 days   Lab Units 04/24/22  1547   CLARITY UA  Slightly Cloudy*   COLOR UA  Yellow   SPEC GRAV UA  1 020   PH UA  6 0   GLUCOSE UA mg/dl 3+*   KETONES UA mg/dl Negative   BLOOD UA  Negative   PROTEIN UA mg/dl 1+*   NITRITE UA  Negative   BILIRUBIN UA  Negative   UROBILINOGEN UA E U /dl 0 2   LEUKOCYTES UA  Negative   WBC UA /hpf 0-1   RBC UA /hpf None Seen   BACTERIA UA /hpf None Seen   EPITHELIAL CELLS WET PREP /hpf None Seen     Results from last 7 days   Lab Units 04/24/22  1319   BLOOD CULTURE  Received in Microbiology Lab  Culture in Progress  Received in Microbiology Lab  Culture in Progress       ED Treatment:   Medication Administration from 04/24/2022 1245 to 04/25/2022 0742       Date/Time Order Dose Route Action Comments     04/24/2022 1421 sodium chloride 0 9 % bolus 1,000 mL 1,000 mL Intravenous New Bag      04/24/2022 1259 albuterol inhalation solution 10 mg 10 mg Nebulization Given      04/24/2022 1259 ipratropium (ATROVENT) 0 02 % inhalation solution 1 mg 1 mg Nebulization Given      04/24/2022 1259 sodium chloride 0 9 % inhalation solution 3 mL 3 mL Nebulization Given      04/24/2022 1309 nitroglycerin (NITROSTAT) SL tablet 0 4 mg 0 4 mg Sublingual Given      04/24/2022 1417 cefepime (MAXIPIME) IVPB (premix in dextrose) 2,000 mg 50 mL 2,000 mg Intravenous New Bag      04/24/2022 1708 aspirin (ECOTRIN LOW STRENGTH) EC tablet 81 mg 81 mg Oral Given      04/24/2022 2244 gabapentin (NEURONTIN) capsule 200 mg 200 mg Oral Given      04/24/2022 1706 pravastatin (PRAVACHOL) tablet 80 mg 80 mg Oral Given      04/24/2022 1710 enoxaparin (LOVENOX) subcutaneous injection 40 mg 40 mg Subcutaneous Given      04/24/2022 1709 nicotine (NICODERM CQ) 21 mg/24 hr TD 24 hr patch 1 patch 1 patch Transdermal Medication Applied      04/24/2022 1708 guaiFENesin (MUCINEX) 12 hr tablet 600 mg 600 mg Oral Given      04/24/2022 2159 ipratropium (ATROVENT) 0 02 % inhalation solution 0 5 mg 0 5 mg Nebulization Given      04/25/2022 0527 methylPREDNISolone sodium succinate (Solu-MEDROL) injection 40 mg 40 mg Intravenous Given      04/24/2022 2244 methylPREDNISolone sodium succinate (Solu-MEDROL) injection 40 mg 40 mg Intravenous Given      04/25/2022 0707 insulin lispro (HumaLOG) 100 units/mL subcutaneous injection 1-6 Units 2 Units Subcutaneous Given      04/24/2022 2051 insulin lispro (HumaLOG) 100 units/mL subcutaneous injection 1-6 Units 5 Units Subcutaneous Given      04/24/2022 2047 fluticasone-vilanterol (BREO ELLIPTA) 200-25 MCG/INH inhaler 1 puff 1 puff Inhalation Given      04/24/2022 1715 sodium chloride 0 9 % infusion 75 mL/hr Intravenous New Bag      04/24/2022 2158 levalbuterol (Billye Bathe) inhalation solution 1 25 mg 1 25 mg Nebulization Given         Past Medical History:   Diagnosis Date    BPH (benign prostatic hyperplasia)     45 days radiation treatment    COPD (chronic obstructive pulmonary disease)     [facility]    Coronary artery disease     CABG x4 in 2017    Diabetes mellitus     History of Arterial Duplex of LE 12/26/2017    Likely occlusion of the left superficial femoral artery  Calcific changes bilaterally  Despite these changes, the ankle-brachial index as a measure of peripheral blood flow only mildly impaired      History of echocardiogram 06/12/2017    EF 40%, mild LVH, mild MR     Hyperlipidemia     Hypertension     NSTEMI (non-ST elevated myocardial infarction)     Prostate cancer     prostate     PVD (peripheral vascular disease)     Tremor     Type 2 MI (myocardial infarction) ([facility] ) 4/6/2021         Admitting Diagnosis: SOB (shortness of breath) [R06 02]  Age/Sex: 69 y o  male  Admission Orders:  UP and OOB  SCDs    Scheduled Medications:  aspirin, 81 mg, Oral, Every Other Day  cefTRIAXone, 1,000 mg, Intravenous, Q24H  enoxaparin, 40 mg, Subcutaneous, Daily  fluticasone-vilanterol, 1 puff, Inhalation, Daily  gabapentin, 200 mg, Oral, HS  guaiFENesin, 600 mg, Oral, BID  insulin lispro, 1-6 Units, Subcutaneous, TID AC  insulin lispro, 1-6 Units, Subcutaneous, HS  ipratropium, 0 5 mg, Nebulization, TID  levalbuterol, 1 25 mg, Nebulization, TID  methylPREDNISolone sodium succinate, 40 mg, Intravenous, Q8H  nicotine, 1 patch, Transdermal, Daily  pravastatin, 80 mg, Oral, Daily With Dinner      Continuous IV Infusions:  sodium chloride, 75 mL/hr, Intravenous, Continuous      PRN Meds:  acetaminophen, 650 mg, Oral, Q4H PRN  albuterol, 2 5 mg, Nebulization, Q4H PRN  hydrALAZINE, 5 mg, Intravenous, Q6H PRN  ondansetron, 4 mg, Intravenous, Q6H PRN        IP CONSULT TO CASE MANAGEMENT    Network Utilization Review Department  ATTENTION: Please call with any questions or concerns to [phone] and carefully listen to the prompts so that you are directed to the right person  All voicemails are confidential   Pireslandy Salazar all requests for admission clinical reviews, approved or denied determinations and any other requests to dedicated fax number below belonging to the campus where the patient is receiving treatment   List of dedicated fax numbers for the Facilities:  [address] DENIALS (Administrative/Medical Necessity) [phone]   [address] (Maternity/NICU/Pediatrics) [phone]   [address]  [address] [place] [address] [id number] [place] [address] [address] [facility] [place] [address] [phone]

## 2022-04-25 NOTE — TELEPHONE ENCOUNTER
Called and spoke with patient he stated that he is in the ED and is currently about to be admitted, he  all of his medications but had a breathing attack which caused him to seek Ed,

## 2022-04-25 NOTE — ASSESSMENT & PLAN NOTE
· Present on admission as evidenced by tachycardia and leukocytosis  · Secondary to community-acquired pneumonia  · Will continue ceftriaxone day 2  · IV fluid resuscitation  · Follow-up blood cultures  · Will trend procalcitonin

## 2022-04-25 NOTE — CONSULTS
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    Recommendations  Continue solumedrol 40 mg q8  Continue Xopenex and Atrovent TID  Can hold budesonide while on IV steroids  Start Perforomist BID (LABA)- supposed to be home med  Currently on ceftriaxone for CAP- check sputum culture, check legionella urine antigen, check strep pneumon antigen  Check ABG  Wean O2 for goal SPO2 >88%    On discharge can resume TID Atrovent neb, Budesonide BID neb (ICS), and should be on perforomist (LABA) BID neb  Can use PRN Albuterol neb and inhaler  Should resume his MWF Azithromycin  Should follow-up closely with Dr Radha Guerrero as an outpatient  Discussed with Dr Suzanne Hightower with [facility]        History of Present Illness   HPI:  Sita Escobar is a 69 y o  male with PMHx of COPD, chronic hypoxemic respiratory failure, CAD history of CABG, combined systolic/diastolic heart failure, diabetes presents with shortness of breath and cough  Treated for pneumonia and COPD exacerbation  He feels a bit better  Recently saw outpatient pulmonologist- has had multiple exacerbations  He was switched from asmanex/stiolto inhalers to perforomist and budesonide BID (he does not think he is on perforomist) as well as atrovent neb TID and Prn albuterol neb and inhaler  He was started on MWF Azithromycin  He stopped smoking althogether last month- was on 3 cigs a day prior to that for a while        Review of systems:  12 point review of systems was completed and was otherwise negative except as listed in HPI       Historical Information   Past Medical History:   Diagnosis Date    BPH (benign prostatic hyperplasia)     45 days radiation treatment    COPD (chronic obstructive pulmonary disease)     COPD with acute exacerbation ([facility] [address] ) 11/21/2019    Coronary artery disease     CABG x4 in 2017    Diabetes mellitus     History of Arterial Duplex of LE 12/26/2017    Likely occlusion of the left superficial femoral artery  Calcific changes bilaterally  Despite these changes, the ankle-brachial index as a measure of peripheral blood flow only mildly impaired   History of echocardiogram 06/12/2017    EF 40%, mild LVH, mild MR     Hyperlipidemia     Hypertension     NSTEMI (non-ST elevated myocardial infarction)     Prostate cancer     prostate     PVD (peripheral vascular disease)     Tremor     Type 2 MI (myocardial infarction) ([facility] [address] ) 4/6/2021     Past Surgical History:   Procedure Laterality Date    CARDIAC CATHETERIZATION  03/08/2017    Significant left main plus triple-vessel CAD   CORONARY ARTERY BYPASS GRAFT  03/08/2017    4V CABG:  LIMA to LAD, VG to RI, SVG to PDA to LVBR RCA      EYE SURGERY      shots in eye once a month @ the [facility]    PROSTATE BIOPSY       Family History   Problem Relation Age of Onset    Cancer Father     Heart disease Brother            Social History:   Social History     Tobacco Use   Smoking Status Current Some Day Smoker    Packs/day: 0 25    Years: 60 00    Pack years: 15 00    Types: Cigarettes   Smokeless Tobacco Never Used   Tobacco Comment    only smokes when he drinks beer         Meds/Allergies   Current Facility-Administered Medications   Medication Dose Route Frequency    acetaminophen (TYLENOL) tablet 650 mg  650 mg Oral Q4H PRN    albuterol inhalation solution 2 5 mg  2 5 mg Nebulization Q4H PRN    aspirin (ECOTRIN LOW STRENGTH) EC tablet 81 mg  81 mg Oral Every Other Day    cefTRIAXone (ROCEPHIN) IVPB (premix in dextrose) 1,000 mg 50 mL  1,000 mg Intravenous Q24H    enoxaparin (LOVENOX) subcutaneous injection 40 mg  40 mg Subcutaneous Daily    fluticasone-vilanterol (BREO ELLIPTA) 200-25 MCG/INH inhaler 1 puff  1 puff Inhalation Daily    gabapentin (NEURONTIN) capsule 200 mg  200 mg Oral HS    guaiFENesin (MUCINEX) 12 hr tablet 600 mg  600 mg Oral BID    hydrALAZINE (APRESOLINE) injection 5 mg  5 mg Intravenous Q6H PRN    insulin glargine (LANTUS) subcutaneous injection 10 Units 0 1 mL  10 Units Subcutaneous HS    insulin lispro (HumaLOG) 100 units/mL subcutaneous injection 1-6 Units  1-6 Units Subcutaneous TID AC    insulin lispro (HumaLOG) 100 units/mL subcutaneous injection 1-6 Units  1-6 Units Subcutaneous HS    ipratropium (ATROVENT) 0 02 % inhalation solution 0 5 mg  0 5 mg Nebulization TID    levalbuterol (XOPENEX) inhalation solution 1 25 mg  1 25 mg Nebulization TID    methylPREDNISolone sodium succinate (Solu-MEDROL) injection 40 mg  40 mg Intravenous Q8H    metoprolol tartrate (LOPRESSOR) tablet 25 mg  25 mg Oral Q12H ENDER    nicotine (NICODERM CQ) 21 mg/24 hr TD 24 hr patch 1 patch  1 patch Transdermal Daily    ondansetron (ZOFRAN) injection 4 mg  4 mg Intravenous Q6H PRN    pravastatin (PRAVACHOL) tablet 80 mg  80 mg Oral Daily With Dinner     Medications Prior to Admission   Medication    albuterol (PROVENTIL HFA,VENTOLIN HFA) 90 mcg/act inhaler    aspirin (ECOTRIN LOW STRENGTH) 81 mg EC tablet    cyanocobalamin (VITAMIN B-12) 500 MCG tablet    gabapentin (NEURONTIN) 100 mg capsule    glimepiride (AMARYL) 1 mg tablet    lisinopril (ZESTRIL) 40 mg tablet    metFORMIN (GLUCOPHAGE) 1000 MG tablet    metoprolol tartrate (LOPRESSOR) 25 mg tablet    Multiple Vitamins-Minerals (PRESERVISION AREDS 2 PO)    predniSONE 20 mg tablet    primidone (MYSOLINE) 50 mg tablet    rosuvastatin (CRESTOR) 10 MG tablet    terbinafine (LamISIL) 1 % cream    arformoterol (BROVANA) 15 mcg/2 mL nebulizer solution    budesonide (Pulmicort) 0 5 mg/2 mL nebulizer solution    ipratropium (ATROVENT) 0 02 % nebulizer solution     Allergies   Allergen Reactions    Atorvastatin Myalgia       Vitals: Blood pressure 165/86, pulse (!) 123, temperature 97 7 °F (36 5 °C), temperature source Tympanic, resp  rate (!) 24, height 5' 10 5" (1 791 m), weight 91 5 kg (201 lb 11 5 oz), SpO2 97 %  , 2 lpm of O2, Body mass index is 28 53 kg/m²  Intake/Output Summary (Last 24 hours) at 4/25/2022 1611  Last data filed at 4/25/2022 1339  Gross per 24 hour   Intake --   Output 300 ml   Net -300 ml       Physical Exam  Vitals and nursing note reviewed  Constitutional:       Appearance: He is well-developed  He is ill-appearing  HENT:      Head: Normocephalic and atraumatic  Mouth/Throat:      Mouth: Mucous membranes are moist       Pharynx: No pharyngeal swelling  Eyes:      Conjunctiva/sclera: Conjunctivae normal    Cardiovascular:      Rate and Rhythm: Normal rate and regular rhythm  Heart sounds: No murmur heard  Pulmonary:      Effort: Accessory muscle usage and respiratory distress present  Breath sounds: Wheezing (poor air movement, scant wheeze) present  Abdominal:      Palpations: Abdomen is soft  Tenderness: There is no abdominal tenderness  Musculoskeletal:      Cervical back: Neck supple  Right lower leg: No edema  Left lower leg: No edema  Skin:     General: Skin is warm and dry  Neurological:      General: No focal deficit present  Mental Status: He is alert  Psychiatric:         Mood and Affect: Mood normal          Behavior: Behavior normal          Labs: I have personally reviewed pertinent lab results    Laboratory and Diagnostics  Results from last 7 days   Lab Units 04/25/22  0531 04/24/22  1319   WBC Thousand/uL 9 15 23 39*   HEMOGLOBIN g/dL 11 6* 13 9   HEMATOCRIT % 35 2* 41 9   PLATELETS Thousands/uL 199 304   NEUTROS PCT % 89* 84*   MONOS PCT % 3* 3*     Results from last 7 days   Lab Units 04/25/22  0531 04/24/22  1319   SODIUM mmol/L 135 136   POTASSIUM mmol/L 4 5 4 7   CHLORIDE mmol/L 99 98   CO2 mmol/L 27 29   ANION GAP mmol/L 9 9   BUN mg/dL 17 15   CREATININE mg/dL 0 78 1 01   CALCIUM mg/dL 8 4 8 5   GLUCOSE RANDOM mg/dL 207* 349*   ALT U/L  --  9   AST U/L  --  14   ALK PHOS U/L  --  96   ALBUMIN g/dL  --  4 1   TOTAL BILIRUBIN mg/dL  --  0 28          Results from last 7 days   Lab Units 04/24/22  1319   INR  0 96   PTT seconds 30          Results from last 7 days   Lab Units 04/24/22  1545 04/24/22  1405   LACTIC ACID mmol/L 3 1* 2 9*                     Results from last 7 days   Lab Units 04/25/22  0531 04/24/22  1319   PROCALCITONIN ng/ml 0 73* 0 08       Microbiology:  Results from last 7 days   Lab Units 04/24/22  1319   BLOOD CULTURE  Received in Microbiology Lab  Culture in Progress  Received in Microbiology Lab  Culture in Progress  ABG: No results found for: PHART, BDS1FOU, PO2ART, HUZ7CVE, R7ZSEICH, BEART, SOURCE      Imaging and other studies: I have personally reviewed pertinent reports  and I have personally reviewed pertinent films in PACS  XR chest portable    Result Date: 4/24/2022  Impression: Worsening right lower lobe airspace opacity, in keeping with pneumonia  The study was marked in [facility] for immediate notification  Workstation performed: [id number]     CXR 4/22-  R basilar pneumonia    LDCT Chest 3/7 22  Lung RADS1    Scarring in lingula    Pulmonary function testing: 3/31/22    Results:  FEV1/FVC Ratio:  45%, FEV1 0 69 L/22%, FVC 1 52 L/37%  After administration of BD:  FEV1 0 90 L/30%/+30% increase, FVC 1 84 L/45%/+20% increase     Lung volumes by body plethysmography: TLC 88%, %, DLCO 43%        Interpretation:  · Spirometry showing a very severe obstructive ventilatory defect   · Significant response after administration of bronchodilator according to the ats standards  · Increased lung volumes consistent with a severe air trapping   · Moderately reduced diffusion capacity  · Increased airway resistance as indicated by specific conductance        EKG, Pathology, and Other Studies: I have personally reviewed pertinent reports          Code Status: Level 1 - Full Code        Magalis Wylie MD  Attending Physician  Pulmonary and Critical Care Medicine

## 2022-04-25 NOTE — PROGRESS NOTES
Pt transported via bed after report received by Lisa Cazares  Pt cell phone and clothing brought with pt

## 2022-04-25 NOTE — NURSING NOTE
Patient resting in bed when he suddenly became tachypneic, diaphoretic, and having tachycardia  Hospitalist and respiratory made aware  IV metoprolol given as ordered, bipap ordered and placed at this time  Med surg charge nurse and nursing supervisor made aware

## 2022-04-25 NOTE — TELEPHONE ENCOUNTER
Spoke to patient's wife Cherie Sims  to schedule appointment for follow up on May 23, 2022 at [address] with Dr Ramos Stephens

## 2022-04-25 NOTE — PROGRESS NOTES
[address]  Progress Note - Tyree Nieves 1945, 69 y o  male MRN: [id number]  Unit/Bed#: [facility] 16 Encounter: [id number]  Primary Care Provider: Harley Dodd DO   Date and time admitted to hospital: 4/24/2022 12:45 PM    Sepsis [facility])  Assessment & Plan  · Present on admission as evidenced by tachycardia and leukocytosis  · Secondary to community-acquired pneumonia  · Will continue ceftriaxone day 2  · IV fluid resuscitation  · Follow-up blood cultures  · Will trend procalcitonin    Acute on chronic respiratory failure (HCC)  Assessment & Plan  · Patient utilizes 2 L of nasal cannula supplemental O2 at night  · Requiring 4 L of nasal cannula O2 in the ED  · Likely secondary to COPD exacerbation and pneumonia  · Wean O2 as tolerated  · Goal SpO2 > 88%    Pneumonia  Assessment & Plan  · Chest x-ray with right lower lobe pneumonia  · Given cefepime in the ED  · Will initiate ceftriaxone 1000 mg IV q24 hours  · Deescalate antibiotics as appropriate  · Follow-up sputum cultures and Gram stain    Obesity  Assessment & Plan  · Present on admission as evidenced by BMI of 28  · Encouraged lifestyle modifications and weight loss    Type 2 diabetes mellitus with diabetic neuropathy, unspecified (HCC)  Assessment & Plan  · Blood glucose elevated on presentation likely secondary to pneumonia/steroids  · Hold home oral hypoglycemic agents  · Sliding scale insulin and Accu-Cheks while inpatient    * COPD exacerbation (HCC)  Assessment & Plan  · Patient presents with shortness of breath  · Likely COPD exacerbation secondary to community-acquired pneumonia  · Solu-Medrol 40 mg IV q8 hours  · Breo Ellipta  · Mucinex  · Atrovent  · Respiratory and airway clearance protocols  · IV antibiotics as below in the setting of bacterial consolidation on chest x-ray  · Patient is on Pulmicort at home which she feels is causing his COPD to act up more and has not been helping  · Will consult pulmonology to help with medication optimization    Elevated troponins  Cardiology consult pending  Likely secondary to sepsis however given patient's history will follow-up with cardiology      VTE Prophylaxis:  Enoxaparin (Lovenox)    Patient Centered Rounds: I have performed bedside rounds with nursing staff today  Discussions with Specialists or Other Care Team Provider: yes  Education and Discussions with Family / Patient:  Updated patient regarding plan of care, also spoke with patient's sister at bedside regarding plan of care    Current Length of Stay: 0 day(s)    Current Patient Status: Inpatient   Certification Statement: The patient will continue to require additional inpatient hospital stay due to Pneumonia    Discharge Plan:  Pending hospital course    Code Status: Level 1 - Full Code    Subjective:   Patient states his shortness of breath has significantly improved  No fever or chills  Cough present but not productive of a significant amount of sputum    Objective:     Vitals:   Temp (24hrs), Av 6 °F (36 4 °C), Min:97 6 °F (36 4 °C), Max:97 6 °F (36 4 °C)    Temp:  [97 6 °F (36 4 °C)] 97 6 °F (36 4 °C)  HR:  [] 118  Resp:  [18-26] 22  BP: (123-172)/(59-84) 156/84  SpO2:  [92 %-99 %] 98 %  Body mass index is 28 53 kg/m²  Input and Output Summary (last 24 hours): Intake/Output Summary (Last 24 hours) at 2022 1459  Last data filed at 2022 1339  Gross per 24 hour   Intake 1000 ml   Output 300 ml   Net 700 ml       Physical Exam:   Physical Exam  Constitutional:       General: He is not in acute distress  HENT:      Head: Normocephalic and atraumatic  Nose: Nose normal       Mouth/Throat:      Mouth: Mucous membranes are moist    Eyes:      Extraocular Movements: Extraocular movements intact  Conjunctiva/sclera: Conjunctivae normal    Cardiovascular:      Rate and Rhythm: Regular rhythm  Tachycardia present     Pulmonary:      Effort: Pulmonary effort is normal  No respiratory distress  Breath sounds: Wheezing and rales present  Abdominal:      Palpations: Abdomen is soft  Tenderness: There is no abdominal tenderness  Musculoskeletal:         General: Normal range of motion  Cervical back: Normal range of motion and neck supple  Skin:     General: Skin is warm and dry  Neurological:      General: No focal deficit present  Mental Status: He is alert  Mental status is at baseline  Cranial Nerves: No cranial nerve deficit  Psychiatric:         Mood and Affect: Mood normal          Behavior: Behavior normal          Additional Data:     Labs:    Results from last 7 days   Lab Units 04/25/22  0531   WBC Thousand/uL 9 15   HEMOGLOBIN g/dL 11 6*   HEMATOCRIT % 35 2*   PLATELETS Thousands/uL 199   NEUTROS PCT % 89*   LYMPHS PCT % 6*   MONOS PCT % 3*   EOS PCT % 1     Results from last 7 days   Lab Units 04/25/22  0531 04/24/22  1319 04/24/22  1319   SODIUM mmol/L 135   < > 136   POTASSIUM mmol/L 4 5   < > 4 7   CHLORIDE mmol/L 99   < > 98   CO2 mmol/L 27   < > 29   BUN mg/dL 17   < > 15   CREATININE mg/dL 0 78   < > 1 01   CALCIUM mg/dL 8 4   < > 8 5   ALK PHOS U/L  --   --  96   ALT U/L  --   --  9   AST U/L  --   --  14    < > = values in this interval not displayed  Results from last 7 days   Lab Units 04/24/22  1319   INR  0 96     Results from last 7 days   Lab Units 04/25/22  1050 04/25/22  0707 04/24/22  2049 04/24/22  1617   POC GLUCOSE mg/dl 342* 210* 337* 287*           * I Have Reviewed All Lab Data Listed Above  * Additional Pertinent Lab Tests Reviewed: [facility] Admission  Reviewed    Imaging:  Imaging Reports Reviewed Today Include:  No new imaging    Recent Cultures (last 7 days):     Results from last 7 days   Lab Units 04/24/22  1319   BLOOD CULTURE  Received in Microbiology Lab  Culture in Progress  Received in Microbiology Lab  Culture in Progress         Last 24 Hours Medication List:   Current Facility-Administered Medications   Medication Dose Route Frequency Provider Last Rate    acetaminophen  650 mg Oral Q4H PRN Alfredo Rang, DO      albuterol  2 5 mg Nebulization Q4H PRN Alfredo Rang, DO      aspirin  81 mg Oral Every Other Day Alfredo Rang, DO      cefTRIAXone  1,000 mg Intravenous Q24H Alfredo Rang, DO      enoxaparin  40 mg Subcutaneous Daily Alfredo Rang, DO      fluticasone-vilanterol  1 puff Inhalation Daily Alfredo Rang, DO      gabapentin  200 mg Oral HS Alfredo Rang, DO      guaiFENesin  600 mg Oral BID Alfredo Rang, DO      hydrALAZINE  5 mg Intravenous Q6H PRN Alfredo Rang, DO      insulin glargine  10 Units Subcutaneous HS Vanessa Cui MD      insulin lispro  1-6 Units Subcutaneous TID AC Alfredo Rang, DO      insulin lispro  1-6 Units Subcutaneous HS Alfredo Rang, DO      ipratropium  0 5 mg Nebulization TID Alfredo Rang, DO      levalbuterol  1 25 mg Nebulization TID Alfredo Rang, DO      methylPREDNISolone sodium succinate  40 mg Intravenous Q8H Alfredo Rang, DO      metoprolol tartrate  25 mg Oral Q12H Domenic Mckeon MD      nicotine  1 patch Transdermal Daily Alfredo Rang, DO      ondansetron  4 mg Intravenous Q6H PRN Alfredo Rang, DO      pravastatin  80 mg Oral Daily With [facility], DO          Today, Patient Was Seen By: Vanessa Cui MD    ** Please Note: Dictation voice to text software may have been used in the creation of this document   **

## 2022-04-25 NOTE — ASSESSMENT & PLAN NOTE
· Likely secondary to sepsis  · Patient states he does have a history of advanced heart disease and follows with cardiology as outpatient  · Denies any chest pain  · Cardiology input appreciated

## 2022-04-25 NOTE — ASSESSMENT & PLAN NOTE
· Patient utilizes 2 L of nasal cannula supplemental O2 at night  · Requiring 4 L of nasal cannula O2 in the ED  · Likely secondary to COPD exacerbation and pneumonia  · Wean O2 as tolerated  · Goal SpO2 > 88%

## 2022-04-25 NOTE — PLAN OF CARE
Problem: PAIN - ADULT  Goal: Verbalizes/displays adequate comfort level or baseline comfort level  Description: Interventions:  - Encourage patient to monitor pain and request assistance  - Assess pain using appropriate pain scale  - Administer analgesics based on type and severity of pain and evaluate response  - Implement non-pharmacological measures as appropriate and evaluate response  - Consider cultural and social influences on pain and pain management  - Notify physician/advanced practitioner if interventions unsuccessful or patient reports new pain  Outcome: Progressing     Problem: INFECTION - ADULT  Goal: Absence or prevention of progression during hospitalization  Description: INTERVENTIONS:  - Assess and monitor for signs and symptoms of infection  - Monitor lab/diagnostic results  - Monitor all insertion sites, i e  indwelling lines, tubes, and drains  - Monitor endotracheal if appropriate and nasal secretions for changes in amount and color  - [place] appropriate cooling/warming therapies per order  - Administer medications as ordered  - Instruct and encourage patient and family to use good hand hygiene technique  - Identify and instruct in appropriate isolation precautions for identified infection/condition  Outcome: Progressing     Problem: INFECTION - ADULT  Goal: Absence of fever/infection during neutropenic period  Description: INTERVENTIONS:  - Monitor WBC    Outcome: Progressing

## 2022-04-25 NOTE — ASSESSMENT & PLAN NOTE
· Patient presents with shortness of breath  · Likely COPD exacerbation secondary to community-acquired pneumonia  · Solu-Medrol 40 mg IV q8 hours  · Breo Ellipta  · Mucinex  · Atrovent  · Respiratory and airway clearance protocols  · IV antibiotics as below in the setting of bacterial consolidation on chest x-ray  · Patient is on Pulmicort at home which she feels is causing his COPD to act up more and has not been helping  · Will consult pulmonology to help with medication optimization

## 2022-04-25 NOTE — ASSESSMENT & PLAN NOTE
· Blood glucose elevated on presentation likely secondary to pneumonia/steroids  · Hold home oral hypoglycemic agents  · Sliding scale insulin and Accu-Cheks while inpatient

## 2022-04-25 NOTE — PLAN OF CARE
Problem: PAIN - ADULT  Goal: Verbalizes/displays adequate comfort level or baseline comfort level  Description: Interventions:  - Encourage patient to monitor pain and request assistance  - Assess pain using appropriate pain scale  - Administer analgesics based on type and severity of pain and evaluate response  - Implement non-pharmacological measures as appropriate and evaluate response  - Consider cultural and social influences on pain and pain management  - Notify physician/advanced practitioner if interventions unsuccessful or patient reports new pain  Outcome: Progressing     Problem: INFECTION - ADULT  Goal: Absence or prevention of progression during hospitalization  Description: INTERVENTIONS:  - Assess and monitor for signs and symptoms of infection  - Monitor lab/diagnostic results  - Monitor all insertion sites, i e  indwelling lines, tubes, and drains  - Monitor endotracheal if appropriate and nasal secretions for changes in amount and color  - [place] appropriate cooling/warming therapies per order  - Administer medications as ordered  - Instruct and encourage patient and family to use good hand hygiene technique  - Identify and instruct in appropriate isolation precautions for identified infection/condition  Outcome: Progressing  Goal: Absence of fever/infection during neutropenic period  Description: INTERVENTIONS:  - Monitor WBC    Outcome: Progressing

## 2022-04-26 LAB
ANION GAP SERPL CALCULATED.3IONS-SCNC: 7 MMOL/L (ref 4–13)
BASE EXCESS BLDA CALC-SCNC: 3 MMOL/L (ref -2–3)
BUN SERPL-MCNC: 22 MG/DL (ref 5–25)
CA-I BLD-SCNC: 1.13 MMOL/L (ref 1.12–1.32)
CALCIUM SERPL-MCNC: 8.6 MG/DL (ref 8.4–10.2)
CARDIAC TROPONIN I PNL SERPL HS: 72 NG/L
CHLORIDE SERPL-SCNC: 99 MMOL/L (ref 96–108)
CO2 SERPL-SCNC: 31 MMOL/L (ref 21–32)
CREAT SERPL-MCNC: 0.83 MG/DL (ref 0.6–1.3)
ERYTHROCYTE [DISTWIDTH] IN BLOOD BY AUTOMATED COUNT: 13.2 % (ref 11.6–15.1)
GFR SERPL CREATININE-BSD FRML MDRD: 85 ML/MIN/1.73SQ M
GLUCOSE SERPL-MCNC: 167 MG/DL (ref 65–140)
GLUCOSE SERPL-MCNC: 213 MG/DL (ref 65–140)
GLUCOSE SERPL-MCNC: 214 MG/DL (ref 65–140)
GLUCOSE SERPL-MCNC: 215 MG/DL (ref 65–140)
GLUCOSE SERPL-MCNC: 334 MG/DL (ref 65–140)
GLUCOSE SERPL-MCNC: 374 MG/DL (ref 65–140)
HCO3 BLDA-SCNC: 26.7 MMOL/L (ref 22–28)
HCT VFR BLD AUTO: 35.4 % (ref 36.5–49.3)
HCT VFR BLD CALC: 36 % (ref 36.5–49.3)
HGB BLD-MCNC: 11.9 G/DL (ref 12–17)
HGB BLDA-MCNC: 12.2 G/DL (ref 12–17)
L PNEUMO1 AG UR QL IA.RAPID: NEGATIVE
MCH RBC QN AUTO: 31.9 PG (ref 26.8–34.3)
MCHC RBC AUTO-ENTMCNC: 33.6 G/DL (ref 31.4–37.4)
MCV RBC AUTO: 95 FL (ref 82–98)
PCO2 BLD: 28 MMOL/L (ref 21–32)
PCO2 BLD: 38.2 MM HG (ref 36–44)
PH BLD: 7.45 [PH] (ref 7.35–7.45)
PLATELET # BLD AUTO: 208 THOUSANDS/UL (ref 149–390)
PMV BLD AUTO: 11.6 FL (ref 8.9–12.7)
PO2 BLD: 90 MM HG (ref 75–129)
POTASSIUM BLD-SCNC: 4.3 MMOL/L (ref 3.5–5.3)
POTASSIUM SERPL-SCNC: 4.2 MMOL/L (ref 3.5–5.3)
PROCALCITONIN SERPL-MCNC: 0.54 NG/ML
RBC # BLD AUTO: 3.73 MILLION/UL (ref 3.88–5.62)
S PNEUM AG UR QL: NEGATIVE
SAO2 % BLD FROM PO2: 97 % (ref 60–85)
SODIUM BLD-SCNC: 133 MMOL/L (ref 136–145)
SODIUM SERPL-SCNC: 137 MMOL/L (ref 135–147)
SPECIMEN SOURCE: ABNORMAL
WBC # BLD AUTO: 12.15 THOUSAND/UL (ref 4.31–10.16)

## 2022-04-26 PROCEDURE — 82948 REAGENT STRIP/BLOOD GLUCOSE: CPT

## 2022-04-26 PROCEDURE — 80048 BASIC METABOLIC PNL TOTAL CA: CPT | Performed by: NURSE PRACTITIONER

## 2022-04-26 PROCEDURE — 94640 AIRWAY INHALATION TREATMENT: CPT

## 2022-04-26 PROCEDURE — 87449 NOS EACH ORGANISM AG IA: CPT | Performed by: INTERNAL MEDICINE

## 2022-04-26 PROCEDURE — 85014 HEMATOCRIT: CPT

## 2022-04-26 PROCEDURE — 84132 ASSAY OF SERUM POTASSIUM: CPT

## 2022-04-26 PROCEDURE — 85027 COMPLETE CBC AUTOMATED: CPT | Performed by: INTERNAL MEDICINE

## 2022-04-26 PROCEDURE — 94760 N-INVAS EAR/PLS OXIMETRY 1: CPT

## 2022-04-26 PROCEDURE — 84145 PROCALCITONIN (PCT): CPT | Performed by: INTERNAL MEDICINE

## 2022-04-26 PROCEDURE — 99233 SBSQ HOSP IP/OBS HIGH 50: CPT | Performed by: INTERNAL MEDICINE

## 2022-04-26 PROCEDURE — 82330 ASSAY OF CALCIUM: CPT

## 2022-04-26 PROCEDURE — 82803 BLOOD GASES ANY COMBINATION: CPT

## 2022-04-26 PROCEDURE — 99223 1ST HOSP IP/OBS HIGH 75: CPT | Performed by: INTERNAL MEDICINE

## 2022-04-26 PROCEDURE — 84484 ASSAY OF TROPONIN QUANT: CPT | Performed by: NURSE PRACTITIONER

## 2022-04-26 PROCEDURE — 36600 WITHDRAWAL OF ARTERIAL BLOOD: CPT

## 2022-04-26 PROCEDURE — 84295 ASSAY OF SERUM SODIUM: CPT

## 2022-04-26 PROCEDURE — 94002 VENT MGMT INPAT INIT DAY: CPT

## 2022-04-26 PROCEDURE — 82947 ASSAY GLUCOSE BLOOD QUANT: CPT

## 2022-04-26 RX ORDER — FORMOTEROL FUMARATE 20 UG/2ML
20 SOLUTION RESPIRATORY (INHALATION)
Status: DISCONTINUED | OUTPATIENT
Start: 2022-04-26 | End: 2022-04-28 | Stop reason: HOSPADM

## 2022-04-26 RX ORDER — METOPROLOL TARTRATE 5 MG/5ML
5 INJECTION INTRAVENOUS ONCE
Status: COMPLETED | OUTPATIENT
Start: 2022-04-26 | End: 2022-04-26

## 2022-04-26 RX ORDER — LEVALBUTEROL INHALATION SOLUTION 0.63 MG/3ML
0.63 SOLUTION RESPIRATORY (INHALATION) ONCE
Status: COMPLETED | OUTPATIENT
Start: 2022-04-26 | End: 2022-04-26

## 2022-04-26 RX ORDER — LORAZEPAM 2 MG/ML
1 INJECTION INTRAMUSCULAR ONCE
Status: COMPLETED | OUTPATIENT
Start: 2022-04-26 | End: 2022-04-26

## 2022-04-26 RX ORDER — LISINOPRIL 20 MG/1
40 TABLET ORAL DAILY
Status: DISCONTINUED | OUTPATIENT
Start: 2022-04-26 | End: 2022-04-28 | Stop reason: HOSPADM

## 2022-04-26 RX ORDER — METHYLPREDNISOLONE SODIUM SUCCINATE 40 MG/ML
40 INJECTION, POWDER, LYOPHILIZED, FOR SOLUTION INTRAMUSCULAR; INTRAVENOUS EVERY 12 HOURS SCHEDULED
Status: DISCONTINUED | OUTPATIENT
Start: 2022-04-26 | End: 2022-04-27

## 2022-04-26 RX ADMIN — LISINOPRIL 40 MG: 20 TABLET ORAL at 10:03

## 2022-04-26 RX ADMIN — ASPIRIN 81 MG: 81 TABLET, COATED ORAL at 09:16

## 2022-04-26 RX ADMIN — IPRATROPIUM BROMIDE 0.5 MG: 0.5 SOLUTION RESPIRATORY (INHALATION) at 07:16

## 2022-04-26 RX ADMIN — METOPROLOL TARTRATE 5 MG: 5 INJECTION INTRAVENOUS at 16:37

## 2022-04-26 RX ADMIN — ENOXAPARIN SODIUM 40 MG: 100 INJECTION SUBCUTANEOUS at 09:16

## 2022-04-26 RX ADMIN — INSULIN LISPRO 2 UNITS: 100 INJECTION, SOLUTION INTRAVENOUS; SUBCUTANEOUS at 21:33

## 2022-04-26 RX ADMIN — METOPROLOL TARTRATE 25 MG: 25 TABLET, FILM COATED ORAL at 09:16

## 2022-04-26 RX ADMIN — HYDRALAZINE HYDROCHLORIDE 5 MG: 20 INJECTION INTRAMUSCULAR; INTRAVENOUS at 15:19

## 2022-04-26 RX ADMIN — METOPROLOL TARTRATE 5 MG: 1 INJECTION, SOLUTION INTRAVENOUS at 21:00

## 2022-04-26 RX ADMIN — FORMOTEROL FUMARATE DIHYDRATE 20 MCG: 20 SOLUTION RESPIRATORY (INHALATION) at 19:21

## 2022-04-26 RX ADMIN — IPRATROPIUM BROMIDE 0.5 MG: 0.5 SOLUTION RESPIRATORY (INHALATION) at 19:06

## 2022-04-26 RX ADMIN — INSULIN LISPRO 2 UNITS: 100 INJECTION, SOLUTION INTRAVENOUS; SUBCUTANEOUS at 07:34

## 2022-04-26 RX ADMIN — LEVALBUTEROL HYDROCHLORIDE 0.63 MG: 0.63 SOLUTION RESPIRATORY (INHALATION) at 22:59

## 2022-04-26 RX ADMIN — GUAIFENESIN 600 MG: 600 TABLET, EXTENDED RELEASE ORAL at 09:16

## 2022-04-26 RX ADMIN — LEVALBUTEROL HYDROCHLORIDE 1.25 MG: 1.25 SOLUTION, CONCENTRATE RESPIRATORY (INHALATION) at 13:10

## 2022-04-26 RX ADMIN — METHYLPREDNISOLONE SODIUM SUCCINATE 40 MG: 40 INJECTION, POWDER, FOR SOLUTION INTRAMUSCULAR; INTRAVENOUS at 05:22

## 2022-04-26 RX ADMIN — GUAIFENESIN 600 MG: 600 TABLET, EXTENDED RELEASE ORAL at 17:58

## 2022-04-26 RX ADMIN — PRAVASTATIN SODIUM 80 MG: 40 TABLET ORAL at 16:37

## 2022-04-26 RX ADMIN — NICOTINE 1 PATCH: 21 PATCH, EXTENDED RELEASE TRANSDERMAL at 07:37

## 2022-04-26 RX ADMIN — METHYLPREDNISOLONE SODIUM SUCCINATE 40 MG: 40 INJECTION, POWDER, FOR SOLUTION INTRAMUSCULAR; INTRAVENOUS at 21:07

## 2022-04-26 RX ADMIN — INSULIN LISPRO 1 UNITS: 100 INJECTION, SOLUTION INTRAVENOUS; SUBCUTANEOUS at 16:37

## 2022-04-26 RX ADMIN — METOPROLOL TARTRATE 25 MG: 25 TABLET, FILM COATED ORAL at 21:33

## 2022-04-26 RX ADMIN — METOPROLOL TARTRATE 5 MG: 5 INJECTION INTRAVENOUS at 19:40

## 2022-04-26 RX ADMIN — CEFTRIAXONE 1000 MG: 1 INJECTION, SOLUTION INTRAVENOUS at 15:19

## 2022-04-26 RX ADMIN — SODIUM CHLORIDE 500 ML: 0.9 INJECTION, SOLUTION INTRAVENOUS at 21:32

## 2022-04-26 RX ADMIN — LEVALBUTEROL HYDROCHLORIDE 1.25 MG: 1.25 SOLUTION, CONCENTRATE RESPIRATORY (INHALATION) at 19:05

## 2022-04-26 RX ADMIN — LORAZEPAM 1 MG: 2 INJECTION INTRAMUSCULAR; INTRAVENOUS at 21:06

## 2022-04-26 RX ADMIN — LEVALBUTEROL HYDROCHLORIDE 1.25 MG: 1.25 SOLUTION, CONCENTRATE RESPIRATORY (INHALATION) at 07:16

## 2022-04-26 RX ADMIN — INSULIN LISPRO 5 UNITS: 100 INJECTION, SOLUTION INTRAVENOUS; SUBCUTANEOUS at 11:46

## 2022-04-26 RX ADMIN — INSULIN GLARGINE 10 UNITS: 100 INJECTION, SOLUTION SUBCUTANEOUS at 21:32

## 2022-04-26 RX ADMIN — IPRATROPIUM BROMIDE 0.5 MG: 0.5 SOLUTION RESPIRATORY (INHALATION) at 13:10

## 2022-04-26 RX ADMIN — METOPROLOL TARTRATE 5 MG: 5 INJECTION INTRAVENOUS at 07:43

## 2022-04-26 RX ADMIN — LEVALBUTEROL HYDROCHLORIDE 0.63 MG: 0.63 SOLUTION RESPIRATORY (INHALATION) at 17:03

## 2022-04-26 RX ADMIN — GABAPENTIN 200 MG: 100 CAPSULE ORAL at 21:33

## 2022-04-26 RX ADMIN — HYDRALAZINE HYDROCHLORIDE 5 MG: 20 INJECTION INTRAMUSCULAR; INTRAVENOUS at 06:05

## 2022-04-26 NOTE — ASSESSMENT & PLAN NOTE
· Wears 1-2L NC HS at baseline  · Currently on Bipap, will transition to NC when appropriate  · Likely in the setting of COPD exacerbation/possible pneumonia with history of heart failure  · Did receive 40IV lasix x1, patient does not examine fluid overloaded  · Wean oxygen for goal saturation > 88%

## 2022-04-26 NOTE — ASSESSMENT & PLAN NOTE
· Likely type 2 MI due to demand  · Continue to trend  · Patient denies chest pain  · EKG with no changes, continue to monitor  · Cardiology consult pending

## 2022-04-26 NOTE — ASSESSMENT & PLAN NOTE
Type 2 MI in the setting of sepsis and resp distress  EKG with T wave inversions  Pt asymptomatic  Continue current medications

## 2022-04-26 NOTE — ASSESSMENT & PLAN NOTE
· Likely type 2 MI due to demand  · Continue to trend  · Patient denies chest pain  · EKG with no changes, continue to monitor

## 2022-04-26 NOTE — RESPIRATORY THERAPY NOTE
04/25/22 2056   Respiratory Assessment   Resp Comments pt off bipap for trial dorina NC @ 2 l/m rn aware

## 2022-04-26 NOTE — ASSESSMENT & PLAN NOTE
· Met SIRS criteria on admission with tachycardia and tachypnea   · In the setting of COPD exacerbation/pneumonia   · See plan above

## 2022-04-26 NOTE — PLAN OF CARE
Problem: PAIN - ADULT  Goal: Verbalizes/displays adequate comfort level or baseline comfort level  Description: Interventions:  - Encourage patient to monitor pain and request assistance  - Assess pain using appropriate pain scale  - Administer analgesics based on type and severity of pain and evaluate response  - Implement non-pharmacological measures as appropriate and evaluate response  - Consider cultural and social influences on pain and pain management  - Notify physician/advanced practitioner if interventions unsuccessful or patient reports new pain  Outcome: Progressing     Problem: INFECTION - ADULT  Goal: Absence or prevention of progression during hospitalization  Description: INTERVENTIONS:  - Assess and monitor for signs and symptoms of infection  - Monitor lab/diagnostic results  - Monitor all insertion sites, i e  indwelling lines, tubes, and drains  - Monitor endotracheal if appropriate and nasal secretions for changes in amount and color  - Datil appropriate cooling/warming therapies per order  - Administer medications as ordered  - Instruct and encourage patient and family to use good hand hygiene technique  - Identify and instruct in appropriate isolation precautions for identified infection/condition  Outcome: Progressing  Goal: Absence of fever/infection during neutropenic period  Description: INTERVENTIONS:  - Monitor WBC    Outcome: Progressing     Problem: SAFETY ADULT  Goal: Patient will remain free of falls  Description: INTERVENTIONS:  - Educate patient/family on patient safety including physical limitations  - Instruct patient to call for assistance with activity   - Consult OT/PT to assist with strengthening/mobility   - Keep Call bell within reach  - Keep bed low and locked with side rails adjusted as appropriate  - Keep care items and personal belongings within reach  - Initiate and maintain comfort rounds  - Make Fall Risk Sign visible to staff  - Offer Toileting every 2 Hours, in advance of need  - Initiate/Maintain fall alarm  - Obtain necessary fall risk management equipment: fall alarm  - Apply yellow socks and bracelet for high fall risk patients  - Consider moving patient to room near nurses station  Outcome: Progressing  Goal: Maintain or return to baseline ADL function  Description: INTERVENTIONS:  -  Assess patient's ability to carry out ADLs; assess patient's baseline for ADL function and identify physical deficits which impact ability to perform ADLs (bathing, care of mouth/teeth, toileting, grooming, dressing, etc )  - Assess/evaluate cause of self-care deficits   - Assess range of motion  - Assess patient's mobility; develop plan if impaired  - Assess patient's need for assistive devices and provide as appropriate  - Encourage maximum independence but intervene and supervise when necessary  - Involve family in performance of ADLs  - Assess for home care needs following discharge   - Consider OT consult to assist with ADL evaluation and planning for discharge  - Provide patient education as appropriate  Outcome: Progressing  Goal: Maintains/Returns to pre admission functional level  Description: INTERVENTIONS:  - Perform BMAT or MOVE assessment daily    - Set and communicate daily mobility goal to care team and patient/family/caregiver  - Collaborate with rehabilitation services on mobility goals if consulted  - Perform Range of Motion 3 times a day  - Reposition patient every 2 hours    - Dangle patient 3 times a day  - Stand patient 3 times a day  - Ambulate patient 3 times a day  - Out of bed to chair 3 times a day   - Out of bed for meals 3 times a day  - Out of bed for toileting  - Record patient progress and toleration of activity level   Outcome: Progressing     Problem: DISCHARGE PLANNING  Goal: Discharge to home or other facility with appropriate resources  Description: INTERVENTIONS:  - Identify barriers to discharge w/patient and caregiver  - Arrange for needed discharge resources and transportation as appropriate  - Identify discharge learning needs (meds, wound care, etc )  - Arrange for interpretive services to assist at discharge as needed  - Refer to Case Management Department for coordinating discharge planning if the patient needs post-hospital services based on physician/advanced practitioner order or complex needs related to functional status, cognitive ability, or social support system  Outcome: Progressing     Problem: Knowledge Deficit  Goal: Patient/family/caregiver demonstrates understanding of disease process, treatment plan, medications, and discharge instructions  Description: Complete learning assessment and assess knowledge base    Interventions:  - Provide teaching at level of understanding  - Provide teaching via preferred learning methods  Outcome: Progressing     Problem: Potential for Falls  Goal: Patient will remain free of falls  Description: INTERVENTIONS:  - Educate patient/family on patient safety including physical limitations  - Instruct patient to call for assistance with activity   - Consult OT/PT to assist with strengthening/mobility   - Keep Call bell within reach  - Keep bed low and locked with side rails adjusted as appropriate  - Keep care items and personal belongings within reach  - Initiate and maintain comfort rounds  - Make Fall Risk Sign visible to staff  - Offer Toileting every 2 Hours, in advance of need  - Initiate/Maintain fall alarm  - Obtain necessary fall risk management equipment: fall alarm  - Apply yellow socks and bracelet for high fall risk patients  - Consider moving patient to room near nurses station  Outcome: Progressing     Problem: MOBILITY - ADULT  Goal: Maintain or return to baseline ADL function  Description: INTERVENTIONS:  -  Assess patient's ability to carry out ADLs; assess patient's baseline for ADL function and identify physical deficits which impact ability to perform ADLs (bathing, care of mouth/teeth, toileting, grooming, dressing, etc )  - Assess/evaluate cause of self-care deficits   - Assess range of motion  - Assess patient's mobility; develop plan if impaired  - Assess patient's need for assistive devices and provide as appropriate  - Encourage maximum independence but intervene and supervise when necessary  - Involve family in performance of ADLs  - Assess for home care needs following discharge   - Consider OT consult to assist with ADL evaluation and planning for discharge  - Provide patient education as appropriate  Outcome: Progressing  Goal: Maintains/Returns to pre admission functional level  Description: INTERVENTIONS:  - Perform BMAT or MOVE assessment daily    - Set and communicate daily mobility goal to care team and patient/family/caregiver  - Collaborate with rehabilitation services on mobility goals if consulted  - Perform Range of Motion 3 times a day  - Reposition patient every 2 hours    - Dangle patient 3 times a day  - Stand patient 3 times a day  - Ambulate patient 3 times a day  - Out of bed to chair 3 times a day   - Out of bed for meals 3 times a day  - Out of bed for toileting  - Record patient progress and toleration of activity level   Outcome: Progressing

## 2022-04-26 NOTE — RESPIRATORY THERAPY NOTE
04/25/22 6335   Respiratory Assessment   Assessment Type Assess only   General Appearance Alert; Awake   Respiratory Pattern Assisted;Spontaneous   Chest Assessment Chest expansion symmetrical   Bilateral Breath Sounds Diminished;Clear   Resp Comments pt placed on bipap for hs dorina well, 12/6 30% no changes amde will cont to monitor    O2 Device bipap    Non-Invasive Information   O2 Interface Device Full face mask   Non-Invasive Ventilation Mode BiPAP  (v60)   SpO2 98 %   $ Pulse Oximetry Spot Check Charge Completed   Non-Invasive Settings   IPAP (cm) 12 cm   EPAP (cm) 6 cm   Rate (Set) 12   FiO2 (%) 30   Flow (lpm) 66   Pressure Support (cm H2O) 6   Rise Time 2   Inspiratory Time (Set) 1 25   Non-Invasive Readings   Total Rate 22   MV (Mech) 18   Peak Pressure (Obs) 14   Spontaneous Vt (mL) 769   I/E Ratio (Obs) 1:3   Leak (lpm) 12   Skin Intervention Skin intact   Non-Invasive Alarms   Insp Pressure High (cm H20) 40   Insp Pressure Low (cm H20) 4   Low Insp Pressure Time (sec) 20 sec   MV Low (L/min) 3   Vt High (mL) 1100   Vt Low (mL) 200   High Resp Rate (BPM) 40 BPM   Low Resp Rate (BPM) 8 BPM   Apnea Interval (sec) 20   Apnea Pressure 12

## 2022-04-26 NOTE — ASSESSMENT & PLAN NOTE
· Echo 2020 demonstrated EF 30% with moderate diffuse hypokinesis and G2DD as well as mild MR and trace TR   · Received 40 IV Lasix on admission, does not take diuretics at home  · Patient follows with Dr Marimar Mcmanus as outpatient has declined placement of ICD in the past  ·  on admission  · Continue beta blocker  · Cardiology consult pending

## 2022-04-26 NOTE — ASSESSMENT & PLAN NOTE
· Very severe COPD, Gold stage IV D, last FEV1 was 26% in 2019  · Follows with Dr Janna Roman as outpatient  · Only recently quit smoking with 50 pack year history  · Presented with increased work of breathing, bilateral wheezing, was given 125mg Solu Medrol by EMS and placed on Bipap   · Had improvement in respiratory status was removed from [facility] initially, today had respiratory distress requiring reinitiation of Bipap  · CXR demonstrated worsening right lower lobe airspace opacity consistent with pneumonia    · Initiated on Ceftriaxone now D3  · Procal 0 73, continue to trend  · Continue solumedrol 40mg Q8  · Continue nebs per pulmonary recommendations  · Maintain oxygen saturation > 88%

## 2022-04-26 NOTE — PROGRESS NOTES
[address]  ICU Acceptance/Transfer Note - Whitney Scriver 1945, 69 y o  male MRN: [id number]  Unit/Bed#: ICU 03-01 Encounter: [id number]  Primary Care Provider: Parker Mathis DO   Date and time admitted to hospital: 4/24/2022 12:45 PM    * COPD exacerbation [facility])  Assessment & Plan  · Very severe COPD, Gold stage IV D, last FEV1 was 26% in 2019  · Follows with Dr Kyrie Reynaga as outpatient  · Only recently quit smoking with 50 pack year history  · Presented with increased work of breathing, bilateral wheezing, was given 125mg Solu Medrol by EMS and placed on Bipap   · Had improvement in respiratory status was removed from [facility] initially, today had respiratory distress requiring reinitiation of Bipap  · CXR demonstrated worsening right lower lobe airspace opacity consistent with pneumonia    · Initiated on Ceftriaxone now D2  · Procal 0 73, continue to trend  · Continue solumedrol 40mg Q8  · Continue nebs per pulmonary recommendations  · Maintain oxygen saturation > 88%    Acute on chronic respiratory failure ([facility] [address] )  Assessment & Plan  · Wears 1-2L NC HS at baseline  · Currently on Bipap, will transition to NC when appropriate  · Likely in the setting of COPD exacerbation/possible pneumonia with history of heart failure  · Did receive 40IV lasix x1, patient does not examine fluid overloaded  · Wean oxygen for goal saturation > 88%    Elevated troponin  Assessment & Plan  · Likely type 2 MI due to demand  · Continue to trend  · Patient denies chest pain  · EKG with no changes, continue to monitor    SIRS (systemic inflammatory response syndrome) ([facility] [address] )  Assessment & Plan  · Met SIRS criteria on admission with tachycardia and tachypnea   · In the setting of COPD exacerbation/pneumonia   · See plan above    Pneumonia  Assessment & Plan  · CXR demonstrated worsening right lower lobe airspace opacity, consistent with pneumonia  · Ceftriaxone, D2   · Procal 0 73, continue to trend  · Monitor WBC and fever curve    Obesity  Assessment & Plan  · Lifestyle modification   · Nutrition consult    Type 2 diabetes mellitus with diabetic neuropathy, unspecified [facility])  Assessment & Plan  Lab Results   Component Value Date    HGBA1C 6 9 (H) 01/06/2022       Recent Labs     04/24/22  2049 04/25/22  0707 04/25/22  1050 04/25/22  1538   POCGLU 337* 210* 342* 166*       Blood Sugar Average: Last 72 hrs:  (P) 268 4     · Takes Amaryl and Metformin at home, will hold while inpatient  · Initiate Lantus 10HS with SSI  · Monitor glucose  · Diabetic diet  · Hgb A1C in January as above    Other hyperlipidemia  Assessment & Plan  · Continue statin therapy    Ischemic cardiomyopathy  Assessment & Plan  · Echo 2020 demonstrated EF 30% with moderate diffuse hypokinesis and G2DD as well as mild MR and trace TR   · Received 40 IV Lasix on admission, does not take diuretics at home  · Patient follows with Dr Khloe Ashraf as outpatient has declined placement of ICD in the past  ·  on admission  · Continue beta blocker    Coronary artery disease involving native coronary artery of native heart without angina pectoris  Assessment & Plan  · History of CABG x4 2017  · Monitor EKG  · Continue to trend troponins        ----------------------------------------------------------------------------------------  HPI: 70 yo male with PMH significant for HTN, HLD, CAD, ischemic cardiomyopathy, type 2 DM, severe COPD and chronic respiratory failure who presented 4/24 with complaints of SOB  Received 125mg Solu Medrol by EMS  CXR demonstrated right lower lobe consolidation  Met SIRS criteria due to tachycardia and tachypnea and was placed on ceftriaxone  Given 40 IV Lasix x1  Required Bipap briefly with improvement and transitioned to NC  24hr events: Today had increased work of breathing, was placed on Bipap and referred to the step down unit for further monitoring and management       Patient appropriate for transfer out of the ICU today?: No  Disposition: Transfer to Stepdown Level 1   Code Status: Level 1 - Full Code  ---------------------------------------------------------------------------------------  SUBJECTIVE  Offers no complaints, "I feel good, I guess I have pneumonia"    Review of Systems   Constitutional: Negative for chills and fatigue  HENT: Negative  Eyes: Negative  Respiratory: Positive for wheezing  Negative for shortness of breath  Cardiovascular: Negative for chest pain  Gastrointestinal: Negative for abdominal pain  Endocrine: Negative  Genitourinary: Negative for difficulty urinating  Musculoskeletal: Negative  Skin: Negative  Allergic/Immunologic: Negative  Neurological: Negative for dizziness and light-headedness  Hematological: Negative  Psychiatric/Behavioral: Negative  Review of systems was reviewed and negative unless stated above in HPI/24-hour events   ---------------------------------------------------------------------------------------  OBJECTIVE    Vitals   Vitals:    22 1743 22 1830 22   BP: 145/97 145/83     BP Location: Left arm Left arm     Pulse: 104 101 93 98   Resp: (!) 28 (!) 32 22 22   Temp:       TempSrc:       SpO2: 98% 97%  97%   Weight:  89 4 kg (197 lb 1 5 oz)     Height:         Temp (24hrs), Av 7 °F (36 5 °C), Min:97 6 °F (36 4 °C), Max:97 7 °F (36 5 °C)  Current: Temperature: 97 7 °F (36 5 °C)          Respiratory:  SpO2: SpO2: 97 %, SpO2 Activity: SpO2 Activity: At Rest, SpO2 Device: O2 Device: BiPAP  Nasal Cannula O2 Flow Rate (L/min): 3 L/min    Invasive/non-invasive ventilation settings   Respiratory  Report   Lab Data (Last 4 hours)    None         O2/Vent Data (Last 4 hours)       174        Non-Invasive Ventilation Mode BiPAP BiPAP  Comment: v60                  Physical Exam  Vitals and nursing note reviewed  HENT:      Head: Normocephalic  Mouth/Throat:      Mouth: Mucous membranes are dry  Pharynx: Oropharynx is clear  Eyes:      Pupils: Pupils are equal, round, and reactive to light  Cardiovascular:      Rate and Rhythm: Normal rate and regular rhythm  Pulses: Normal pulses  Heart sounds: Normal heart sounds  Pulmonary:      Effort: Pulmonary effort is normal       Breath sounds: Wheezing present  Abdominal:      General: Bowel sounds are normal       Palpations: Abdomen is soft  Musculoskeletal:         General: Normal range of motion  Cervical back: Normal range of motion  Skin:     General: Skin is warm and dry  Neurological:      General: No focal deficit present  Mental Status: He is alert and oriented to person, place, and time  Laboratory and Diagnostics:  Results from last 7 days   Lab Units 04/25/22  0531 04/24/22  1319   WBC Thousand/uL 9 15 23 39*   HEMOGLOBIN g/dL 11 6* 13 9   HEMATOCRIT % 35 2* 41 9   PLATELETS Thousands/uL 199 304   NEUTROS PCT % 89* 84*   MONOS PCT % 3* 3*     Results from last 7 days   Lab Units 04/25/22  0531 04/24/22  1319   SODIUM mmol/L 135 136   POTASSIUM mmol/L 4 5 4 7   CHLORIDE mmol/L 99 98   CO2 mmol/L 27 29   ANION GAP mmol/L 9 9   BUN mg/dL 17 15   CREATININE mg/dL 0 78 1 01   CALCIUM mg/dL 8 4 8 5   GLUCOSE RANDOM mg/dL 207* 349*   ALT U/L  --  9   AST U/L  --  14   ALK PHOS U/L  --  96   ALBUMIN g/dL  --  4 1   TOTAL BILIRUBIN mg/dL  --  0 28          Results from last 7 days   Lab Units 04/24/22  1319   INR  0 96   PTT seconds 30          Results from last 7 days   Lab Units 04/24/22  1545 04/24/22  1405   LACTIC ACID mmol/L 3 1* 2 9*     ABG:    VBG:    Results from last 7 days   Lab Units 04/25/22  0531 04/24/22  1319   PROCALCITONIN ng/ml 0 73* 0 08       Micro  Results from last 7 days   Lab Units 04/24/22  1319   BLOOD CULTURE  Received in Microbiology Lab  Culture in Progress  Received in Microbiology Lab  Culture in Progress         EKG: normal sinus rhythm  Imaging: I have personally reviewed pertinent reports  and I have personally reviewed pertinent films in PACS    Intake and Output  I/O       04/24 0701  04/25 0700 04/25 0701  04/26 0700    IV Piggyback 1050     Total Intake(mL/kg) 1050 (11 7)     Urine (mL/kg/hr)  1750 (1 4)    Total Output  1750    Net +1050 -1750                Height and Weights   Height: 5' 10 5" (179 1 cm)  IBW (Ideal Body Weight): 74 15 kg  Body mass index is 27 88 kg/m²  Weight (last 2 days)     Date/Time Weight    04/25/22 1830 89 4 (197 09)    04/25/22 1445 91 5 (201 72)    04/24/22 1250 89 8 (198)            Nutrition       Diet Orders   (From admission, onward)             Start     Ordered    04/25/22 1540  Diet [place]/CHO Controlled; Consistent Carbohydrate Diet Level 3 (6 carb servings/90 grams CHO/meal); Dysphagia 3-Dental Soft; Thin Liquid  Diet effective now        References:    Nutrtion Support Algorithm Enteral vs  Parenteral   Question Answer Comment   Diet Type [place]/CHO Controlled    [place]/CHO Controlled Consistent Carbohydrate Diet Level 3 (6 carb servings/90 grams CHO/meal)    Other Restriction(s): Dysphagia 3-Dental Soft    Liquid Modifier Thin Liquid    RD to adjust diet per protocol?  Yes        04/25/22 1541                  Active Medications  Scheduled Meds:  Current Facility-Administered Medications   Medication Dose Route Frequency Provider Last Rate    acetaminophen  650 mg Oral Q4H PRN Ray Luque, DO      albuterol  2 5 mg Nebulization Q4H PRN Ray Luque, DO      aspirin  81 mg Oral Every Other Day Ray Luque DO      cefTRIAXone  1,000 mg Intravenous Q24H Ray Luque DO 1,000 mg (04/25/22 1534)    enoxaparin  40 mg Subcutaneous Daily Ray Luque, DO      fluticasone-vilanterol  1 puff Inhalation Daily Ray Luque, DO      gabapentin  200 mg Oral HS Ray Luque DO      guaiFENesin  600 mg Oral BID Ray Luque, DO      hydrALAZINE  5 mg Intravenous Q6H PRN Ray Luque, DO      insulin glargine  10 Units Subcutaneous HS Vera Adams MD      insulin lispro  1-6 Units Subcutaneous TID AC Hudson Cooks, DO      insulin lispro  1-6 Units Subcutaneous HS Hudson Cooks, DO      ipratropium  0 5 mg Nebulization TID Hudson Cooks, DO      levalbuterol  1 25 mg Nebulization TID Hudson Cooks, DO      methylPREDNISolone sodium succinate  40 mg Intravenous Q8H Rosa Cooks, DO      metoprolol  5 mg Intravenous Q6H PRN Perla Alsa PA-C      metoprolol tartrate  25 mg Oral Q12H Domenic Mckeon MD      nicotine  1 patch Transdermal Daily Hudson Cooks, DO      ondansetron  4 mg Intravenous Q6H PRN Hudson Cooks, DO      pravastatin  80 mg Oral Daily With [employer], DO       Continuous Infusions:     PRN Meds:   acetaminophen, 650 mg, Q4H PRN  albuterol, 2 5 mg, Q4H PRN  hydrALAZINE, 5 mg, Q6H PRN  metoprolol, 5 mg, Q6H PRN  ondansetron, 4 mg, Q6H PRN        Invasive Devices Review  Invasive Devices  Report    Peripheral Intravenous Line            Peripheral IV 04/24/22 Left Hand 1 day    Peripheral IV 04/24/22 Right Antecubital 1 day                Rationale for remaining devices: n/a  ---------------------------------------------------------------------------------------  Advance Directive and Living Will:      Power of :    POLST:    ---------------------------------------------------------------------------------------  Care Time Delivered:   Upon my evaluation, this patient had a high probability of imminent or life-threatening deterioration due to acute on chronic respiratory failure , which required my direct attention, intervention, and personal management  I have personally provided 30 minutes (2000 to 2030) of critical care time, exclusive of procedures, teaching, family meetings, and any prior time recorded by providers other than myself  PENG [facility]      Portions of the record may have been created with voice recognition software  Occasional wrong word or "sound a like" substitutions may have occurred due to the inherent limitations of voice recognition software    Read the chart carefully and recognize, using context, where substitutions have occurred

## 2022-04-26 NOTE — CONSULTS
[address] [facility] 1945, 69 y o  male MRN: [id number]  Unit/Bed#: ICU 03-01 Encounter: [id number]  Primary Care Provider: Britany Neal DO   Date and time admitted to hospital: 4/24/2022 12:45 PM    Inpatient consult to Cardiology  Consult performed by: PENG Gómez  Consult ordered by: Mikaela Esposito MD          NSTEMI (non-ST elevated myocardial infarction) [facility])  Assessment & Plan  Type 2 MI in the setting of sepsis and resp distress  EKG with T wave inversions  Pt asymptomatic  Continue current medications      Pneumonia  Assessment & Plan  Primary reason for presentation  Antibiotics and treatment per Critical Care    Acute on chronic respiratory failure [facility])  Assessment & Plan  Related to COPD exacerbation and pneumonia  Management per critical care    Ischemic cardiomyopathy  Assessment & Plan  EF 30%  Continue ace, BB  Pt declined ICD    Coronary artery disease involving native coronary artery of native heart without angina pectoris  Assessment & Plan  Prior CABG  Continue BB, statin, asa    * COPD exacerbation ([address] )  Assessment & Plan  Management per critical care    Other summary comments:   Cardiology consulted for trop elevation in the setting of hypoxia due to COPD exacerbation and pneumonia  Minor EKG changes noted but pt is asymptomatic  Continue medical management of acute issues  He is on appropriate medications for ischemic cardiomyopathy and is not interested in an ICD, therefore, no indication for echo at this time  Pt reports feeling better this AM and reports that his breathing has significantly improved  He is mildly tachycardiac this morning-if persistent, consider additional testing to rule out PE  Consider increasing beta blocker cautiously given his COPD  Continue ACE for ischemic cardiomyopathy  He does not appear volume overloaded and BNP was not significantly elevated    Hold off on diuretics for now       Cardiology will remain available if needed  Pt will be seen in outpatient follow up and he can discuss pharmacological stress testing with his primary cardiologist once recovered from his acute issues  At this time, he is unsure whether or not he would want to have a stress test        Outpatient Cardiologist:  Dr Shari Conklin    HPI: Melida Montejo is a 69y o  year old male who presented with worsening shortness of breath  Chest x-ray in the ER demonstrated a right lower lobe consolidation  He met SIRS criteria given tachycardia and leukocytosis  He was suspected of having pneumonia and COPD exacerbation  Antibiotics have been initiated by the medical team   He has been evaluated by Pulmonary regarding his COPD  Last evening, the patient was resting in bed when he became tachypneic, tachycardic, and diaphoretic  He was given IV metoprolol, IV Lasix, and put on BiPAP  Since then, he has been transitioned back to nasal cannula  He was noted to have a troponin elevation, thus Cardiology was consulted  At the time of my evaluation, Will Jack is OOB in the chair  He denies chest pain, pressure, tightness, burning  He notes that his breathing has improved significantly since presentation  Cardiac history is significant for CAD with prior 4 vessel CABG 03/2017 (LIMA-LAD, SVG-RI, SVG-PDA and ventricular branch of the RCA), ischemic cardiomyopathy  Given his advanced COPD, he declined an ICD  EKG:  Sinus rhythm, possible anterior infarct, age undetermined; ST and T-wave abnormalities      MOST  RECENT CARDIAC IMAGING:   Echo 11/12/2020:  EF 30% with global dysfunction  Echo 8/2019  - EF 30-40%  Global dysfunction with severe hypokinesis of the apex  Mild MR  Arterial duplex 12/2017 - likely occlusion of the left SFA  KEI mildly impaired        Review of Systems: a 10 point review of systems was conducted and is negative except for as mentioned in the HPI or as below          Historical Information   Past Medical History:   Diagnosis Date    BPH (benign prostatic hyperplasia)     45 days radiation treatment    COPD (chronic obstructive pulmonary disease)     COPD with acute exacerbation ([facility] ) 11/21/2019    Coronary artery disease     CABG x4 in 2017    Diabetes mellitus     History of Arterial Duplex of LE 12/26/2017    Likely occlusion of the left superficial femoral artery  Calcific changes bilaterally  Despite these changes, the ankle-brachial index as a measure of peripheral blood flow only mildly impaired   History of echocardiogram 06/12/2017    EF 40%, mild LVH, mild MR     Hyperlipidemia     Hypertension     NSTEMI (non-ST elevated myocardial infarction)     Prostate cancer     prostate     PVD (peripheral vascular disease)     Tremor     Type 2 MI (myocardial infarction) ([facility] [address] ) 4/6/2021     Past Surgical History:   Procedure Laterality Date    CARDIAC CATHETERIZATION  03/08/2017    Significant left main plus triple-vessel CAD   CORONARY ARTERY BYPASS GRAFT  03/08/2017    4V CABG:  LIMA to LAD, VG to RI, SVG to PDA to LVBR RCA      EYE SURGERY      shots in eye once a month @ the [facility] History     Substance and Sexual Activity   Alcohol Use Yes     Social History     Substance and Sexual Activity   Drug Use Never     Social History     Tobacco Use   Smoking Status Current Some Day Smoker    Packs/day: 0 25    Years: 60 00    Pack years: 15 00    Types: Cigarettes   Smokeless Tobacco Never Used   Tobacco Comment    only smokes when he drinks beer       Family History:  Significant for heart disease in his brother      Meds/Allergies   all current active meds have been reviewed  Medications Prior to Admission   Medication    albuterol (PROVENTIL HFA,VENTOLIN HFA) 90 mcg/act inhaler    aspirin (ECOTRIN LOW STRENGTH) 81 mg EC tablet    cyanocobalamin (VITAMIN B-12) 500 MCG tablet    gabapentin (NEURONTIN) 100 mg capsule    glimepiride (AMARYL) 1 mg tablet    lisinopril (ZESTRIL) 40 mg tablet    metFORMIN (GLUCOPHAGE) 1000 MG tablet    metoprolol tartrate (LOPRESSOR) 25 mg tablet    Multiple Vitamins-Minerals (PRESERVISION AREDS 2 PO)    predniSONE 20 mg tablet    primidone (MYSOLINE) 50 mg tablet    rosuvastatin (CRESTOR) 10 MG tablet    terbinafine (LamISIL) 1 % cream    arformoterol (BROVANA) 15 mcg/2 mL nebulizer solution    budesonide (Pulmicort) 0 5 mg/2 mL nebulizer solution    ipratropium (ATROVENT) 0 02 % nebulizer solution       Allergies   Allergen Reactions    Atorvastatin Myalgia       Objective   Vitals: Blood pressure 150/83, pulse 104, temperature (!) 97 3 °F (36 3 °C), temperature source Tympanic, resp  rate (!) 26, height 5' 10 5" (1 791 m), weight 88 9 kg (195 lb 15 8 oz), SpO2 92 %  , Body mass index is 27 72 kg/m² ,   Orthostatic Blood Pressures      Most Recent Value   Blood Pressure 150/83 filed at 2022 0900   Patient Position - Orthostatic VS Lying filed at 2022 6551          Systolic (29YKZ), AHA:209 , Min:142 , WN     Diastolic (76CID), THW:83, Min:63, Max:112      Physical Exam:    General:  Normal appearance in no distress  Eyes:  Anicteric  Oral mucosa:  Moist   Neck:  No JVD  Carotid upstrokes are brisk without bruits  No masses  Chest:  Decreased with faint wheezes througout  Cardiac:  No palpable PMI  Normal S1 and S2  No murmur gallop or rub  Abdomen:  Soft and nontender  No palpable organomegaly or aortic enlargement  Extremities:  No peripheral edema  Musculoskeletal:  Symmetric  Vascular:  Pedal pulses are intact  Neuro:  Grossly symmetric  Psych:  Alert and oriented x3          Lab Results:     Troponins:    Results from last 7 days   Lab Units 22  0522 22  1811 22  1545 22  1319   HS TNI 0HR ng/L 72*  --   --    < >   HS TNI 2HR ng/L  --   --  140*  --    HSTNI D2 ng/L  --   --  97*  --    HS TNI 4HR ng/L  --  249*  --   --    HSTNI D4 ng/L  -- 206*  --   --     < > = values in this interval not displayed  BNP:   Results from last 6 Months   Lab Units 04/24/22  1319 03/18/22  1604   BNP pg/mL 354* 392*       CBC :   Results from last 7 days   Lab Units 04/26/22  0934 04/26/22  0522 04/25/22  0531 04/25/22  0531   WBC Thousand/uL  --  12 15*  --  9 15   HEMOGLOBIN g/dL  --  11 9*   < > 11 6*   I STAT HEMOGLOBIN g/dl 12 2  --   --   --    HEMATOCRIT %  --  35 4*   < > 35 2*   HEMATOCRIT, ISTAT % 36*  --   --   --    MCV fL  --  95  --  95   PLATELETS Thousands/uL  --  208  --  199    < > = values in this interval not displayed  TSH:     CMP:   Results from last 7 days   Lab Units 04/26/22  0934 04/26/22  0522 04/25/22  0531 04/25/22  0531 04/24/22  1319 04/24/22  1319   POTASSIUM mmol/L  --  4 2  --  4 5   < > 4 7   CHLORIDE mmol/L  --  99  --  99   < > 98   CO2 mmol/L  --  31   < > 27   < > 29   CO2, I-STAT mmol/L 28  --   --   --   --   --    BUN mg/dL  --  22  --  17   < > 15   CREATININE mg/dL  --  0 83  --  0 78   < > 1 01   GLUCOSE, ISTAT mg/dl 374*  --   --   --   --   --    AST U/L  --   --   --   --   --  14   ALT U/L  --   --   --   --   --  9   EGFR ml/min/1 73sq m  --  85  --  87   < > 71    < > = values in this interval not displayed       Lipid Profile:     Coags:   Results from last 7 days   Lab Units 04/24/22  1319   INR  0 96

## 2022-04-26 NOTE — RESPIRATORY THERAPY NOTE
04/25/22 2029   Respiratory Assessment   Assessment Type Pre-treatment   General Appearance Alert; Awake   Respiratory Pattern Assisted;Spontaneous   Chest Assessment Chest expansion symmetrical   Bilateral Breath Sounds Diminished;Clear   Resp Comments pt dorina bipap well ABg cancelled, 12/6 40% will given tx then trial off bipap, ordered for hs , pt aware will cont to monitor    O2 Device bipap    Subjective Data pt wears o2 2 l/m cont at home    Non-Invasive Information   O2 Interface Device Full face mask   Non-Invasive Ventilation Mode BiPAP  (v60)   SpO2 97 %   $ Pulse Oximetry Spot Check Charge Completed   Non-Invasive Settings   IPAP (cm) 12 cm   EPAP (cm) 6 cm   Rate (Set) 12   FiO2 (%) 30   Flow (lpm) 35   Pressure Support (cm H2O) 6   Rise Time 2   Inspiratory Time (Set) 1   Non-Invasive Readings   Total Rate 22   MV (Mech) 21   Peak Pressure (Obs) 13   Spontaneous Vt (mL) 796   I/E Ratio (Obs) 1:4   Leak (lpm) 19   Skin Intervention Skin intact   Non-Invasive Alarms   Insp Pressure High (cm H20) 40   Insp Pressure Low (cm H20) 4   Low Insp Pressure Time (sec) 20 sec   MV Low (L/min) 3   Vt High (mL) 1100   Vt Low (mL) 200   High Resp Rate (BPM) 40 BPM   Low Resp Rate (BPM) 8 BPM   Apnea Interval (sec) 20   Apnea Pressure 12

## 2022-04-26 NOTE — ASSESSMENT & PLAN NOTE
Lab Results   Component Value Date    HGBA1C 6 9 (H) 01/06/2022       Recent Labs     04/24/22  2049 04/25/22  0707 04/25/22  1050 04/25/22  1538   POCGLU 337* 210* 342* 166*       Blood Sugar Average: Last 72 hrs:  (P) 268 4     · Takes Amaryl and Metformin at home, will hold while inpatient  · Initiate Lantus 10HS with SSI  · Monitor glucose  · Diabetic diet  · Hgb A1C in January as above

## 2022-04-26 NOTE — ASSESSMENT & PLAN NOTE
· Met SIRS criteria on admission with tachycardia and tachypnea   · In the setting of COPD exacerbation/pneumonia   · COVID negative  · Blood cultures pending  · Sputum culture pending  · See plan above

## 2022-04-26 NOTE — UTILIZATION REVIEW
Continued Stay Review    Date: 4/26/22                         Current Patient Class: inpatient  Current Level of Care: med surg    HPI:77 y o  male initially admitted on 4/24/22 to observation and converted to inpatient on 4/25/22 due to acute on chronic respiratory failure/acute exacerbation of COPD/RLL pneumonia  Uses home oxygen at HS  Assessment/Plan:  4/26/22:  Has been able to transition to nasal cannula  Feels breathing improving  On exam lungs faint wheezing  Tachycardic  Wbc 12 15  Glucose 214  Plan is continue IV solu medrol and change to every 12 hours, continue scheduled nebulizers, Performist BID, ceftriaxone  Wean oxygen and check abg, if pCO2 > 52 then overnight pulse oximetry  restart lisinopril and continue metoprolol  Home Amaryl and metformin on hold  Started on Lantus with SSI    Per cardiology 4/26/22 - patient has NSTEMI type 2 with sepsis/pneumonia  Acute on chronic respiratory failure due  To pneumonia and COPD exacerbation  PMH also includes cardiomyopathy with EF of 30% and patient has refused ICD, CAD with CABG  Continue current medications, including ace  No diuretics  Careful increase of beta blocker with COPD       Vital Signs:   04/26/22 1059 97 7 °F (36 5 °C) -- -- -- -- -- -- -- -- -- -- -- --   04/26/22 1000 -- 107 Abnormal  23 Abnormal  -- -- 94 % -- 28 -- 2 L/min Nasal cannula -- --   04/26/22 0900 -- 104 26 Abnormal  150/83 109 92 % -- -- -- -- Nasal cannula -- --   04/26/22 0800 -- 93 28 Abnormal  154/88 115 93 % -- 28 2 L/min 2 L/min Nasal cannula -- --   04/26/22 0716 97 3 °F (36 3 °C) Abnormal  -- -- -- -- 96 % -- -- -- -- -- -- --   04/26/22 0700 -- 110 Abnormal  32 Abnormal  -- -- 95 % -- -- -- -- -- -- --   04/26/22 0600 -- 112 Abnormal  42 Abnormal  185/110 Abnormal  139 92 % -- -- -- -- -- -- --   04/26/22 0400 98 °F (36 7 °C) 105 29 Abnormal  161/87 118 94 % -- -- -- -- -- -- --   04/26/22 0200 -- -- -- -- -- 93 % -- 28 -- 2 L/min Nasal cannula -- -- 04/26/22 0145 -- -- -- -- -- 94 % -- 28 -- 2 L/min Nasal cannula -- --   04/26/22 0000 98 2 °F (36 8 °C) 96 21 142/63 90 95 % -- -- -- -- BiPAP -- --   04/25/22 2329 -- -- -- 181/83 Abnormal  -- -- -- -- -- -- -- -- --   04/25/22 2312 -- 109 Abnormal  22 -- -- 98 % 30 -- -- -- BiPAP Full face mask --   04/25/22 2056 -- -- -- -- -- 97 % -- 28 -- 2 L/min Nasal cannula  -- --   04/25/22 2029 -- 98 22 -- -- 97 % 30 -- -- -- BiPAP Full face mask        Pertinent Labs/Diagnostic Results:   XR chest portable   Final Result by Wang Su MD (04/24 1449)      Worsening right lower lobe airspace opacity, in keeping with pneumonia  The study was marked in [facility] for immediate notification  Workstation performed: [id number]           4/24/22 ecg Possible Sinus tachycardia with occasional Premature ventricular complexes  Possible Left atrial enlargement  Anterior infarct (cited on or before 24-APR-2022)  Abnormal ECG  When compared with ECG of 19-MAR-2022 06:18,  Premature ventricular complexes are now Present  Vent   rate has increased BY  49 BPM  Nonspecific T wave abnormality, worse in Inferior leads    4/24/22 ecg Normal sinus rhythm  Possible Anterior infarct (cited on or before 24-APR-2022)  ST & T wave abnormality, consider lateral ischemia  Abnormal ECG  When compared with ECG of 24-APR-2022 12:53, (unconfirmed)  Premature ventricular complexes are no longer Present  T wave inversion more evident in Lateral leads  Results from last 7 days   Lab Units 04/24/22  1411   SARS-COV-2  Negative     Results from last 7 days   Lab Units 04/26/22  0934 04/26/22  0522 04/25/22  0531 04/24/22  1319   WBC Thousand/uL  --  12 15* 9 15 23 39*   HEMOGLOBIN g/dL  --  11 9* 11 6* 13 9   I STAT HEMOGLOBIN g/dl 12 2  --   --   --    HEMATOCRIT %  --  35 4* 35 2* 41 9   HEMATOCRIT, ISTAT % 36*  --   --   --    PLATELETS Thousands/uL  --  208 199 304   NEUTROS ABS Thousands/µL  --   --  8 24* 19 95*     Results from last 7 days   Lab Units 04/26/22  0934 04/26/22  0522 04/25/22  0531 04/24/22  1319   SODIUM mmol/L  --  137 135 136   POTASSIUM mmol/L  --  4 2 4 5 4 7   CHLORIDE mmol/L  --  99 99 98   CO2 mmol/L  --  31 27 29   CO2, I-STAT mmol/L 28  --   --   --    ANION GAP mmol/L  --  7 9 9   BUN mg/dL  --  22 17 15   CREATININE mg/dL  --  0 83 0 78 1 01   EGFR ml/min/1 73sq m  --  85 87 71   CALCIUM mg/dL  --  8 6 8 4 8 5   CALCIUM, IONIZED, ISTAT mmol/L 1 13  --   --   --      Results from last 7 days   Lab Units 04/24/22  1319   AST U/L 14   ALT U/L 9   ALK PHOS U/L 96   TOTAL PROTEIN g/dL 6 9   ALBUMIN g/dL 4 1   TOTAL BILIRUBIN mg/dL 0 28     Results from last 7 days   Lab Units 04/26/22  1119 04/26/22  0734 04/25/22  2147 04/25/22  1538 04/25/22  1050 04/25/22  0707 04/24/22  2049 04/24/22  1617   POC GLUCOSE mg/dl 334* 215* 255* 166* 342* 210* 337* 287*     Results from last 7 days   Lab Units 04/26/22  0522 04/25/22  0531 04/24/22  1319   GLUCOSE RANDOM mg/dL 214* 207* 349*     BETA-HYDROXYBUTYRATE   Date Value Ref Range Status   03/18/2022 4 2 (H) <0 6 mmol/L Final      Results from last 7 days   Lab Units 04/26/22  0934   I STAT BASE EXC mmol/L 3   I STAT O2 SAT % 97*   ISTAT PH ART  7 452*   I STAT ART PCO2 mm HG 38 2   I STAT ART PO2 mm HG 90 0   I STAT ART HCO3 mmol/L 26 7     Results from last 7 days   Lab Units 04/26/22  0522 04/24/22  1811 04/24/22  1545 04/24/22  1319   HS TNI 0HR ng/L 72*  --   --  43   HS TNI 2HR ng/L  --   --  140*  --    HSTNI D2 ng/L  --   --  97*  --    HS TNI 4HR ng/L  --  249*  --   --    HSTNI D4 ng/L  --  206*  --   --      Results from last 7 days   Lab Units 04/24/22  1319   PROTIME seconds 12 7   INR  0 96   PTT seconds 30     Results from last 7 days   Lab Units 04/26/22  0522 04/25/22  0531 04/24/22  1319   PROCALCITONIN ng/ml 0 54* 0 73* 0 08     Results from last 7 days   Lab Units 04/24/22  1545 04/24/22  1405   LACTIC ACID mmol/L 3 1* 2 9*     Results from last 7 days Lab Units 04/24/22  1319   BNP pg/mL 354*         Results from last 7 days   Lab Units 04/24/22  1547   CLARITY UA  Slightly Cloudy*   COLOR UA  Yellow   SPEC GRAV UA  1 020   PH UA  6 0   GLUCOSE UA mg/dl 3+*   KETONES UA mg/dl Negative   BLOOD UA  Negative   PROTEIN UA mg/dl 1+*   NITRITE UA  Negative   BILIRUBIN UA  Negative   UROBILINOGEN UA E U /dl 0 2   LEUKOCYTES UA  Negative   WBC UA /hpf 0-1   RBC UA /hpf None Seen   BACTERIA UA /hpf None Seen   EPITHELIAL CELLS WET PREP /hpf None Seen     Results from last 7 days   Lab Units 04/24/22  1411   INFLUENZA A PCR  Negative   INFLUENZA B PCR  Negative     Results from last 7 days   Lab Units 04/25/22 2034 04/24/22  1319   BLOOD CULTURE   --  No Growth at 24 hrs  No Growth at 24 hrs     SPUTUM CULTURE  Culture too young- will reincubate  --    GRAM STAIN RESULT  1+ Epithelial cells per low power field*  No polys seen*  4+ Gram positive cocci in pairs, chains and clusters*  3+ Gram positive rods*  3+ Gram negative rods*  --          Medications:   Scheduled Medications:  aspirin, 81 mg, Oral, Every Other Day  cefTRIAXone, 1,000 mg, Intravenous, Q24H  enoxaparin, 40 mg, Subcutaneous, Daily  formoterol, 20 mcg, Nebulization, Q12H  gabapentin, 200 mg, Oral, HS  guaiFENesin, 600 mg, Oral, BID  insulin glargine, 10 Units, Subcutaneous, HS  insulin lispro, 1-6 Units, Subcutaneous, TID AC  insulin lispro, 1-6 Units, Subcutaneous, HS  ipratropium, 0 5 mg, Nebulization, TID  levalbuterol, 1 25 mg, Nebulization, TID  lisinopril, 40 mg, Oral, Daily  methylPREDNISolone sodium succinate, 40 mg, Intravenous, Q12H ENDER  metoprolol tartrate, 25 mg, Oral, Q12H ENDER  nicotine, 1 patch, Transdermal, Daily  pravastatin, 80 mg, Oral, Daily With Dinner      Continuous IV Infusions:     PRN Meds:  acetaminophen, 650 mg, Oral, Q4H PRN  albuterol, 2 5 mg, Nebulization, Q4H PRN  hydrALAZINE, 5 mg, Intravenous, Q6H PRN  metoprolol, 5 mg, Intravenous, Q6H PRN  ondansetron, 4 mg, Intravenous, Q6H PRN        Discharge Plan: to be determined  Network Utilization Review Department  ATTENTION: Please call with any questions or concerns to [phone] and carefully listen to the prompts so that you are directed to the right person  All voicemails are confidential   Edgard Coppola all requests for admission clinical reviews, approved or denied determinations and any other requests to dedicated fax number below belonging to the campus where the patient is receiving treatment   List of dedicated fax numbers for the Facilities:  [address] DENIALS (Administrative/Medical Necessity) [phone]   [address] (Maternity/NICU/Pediatrics) [phone]   [address]  [address] [address] [facility] [place] [id number] [address] [facility] [facility] [phone]

## 2022-04-26 NOTE — PROGRESS NOTES
L[facility] 46  Progress Note - Negar Carmichael 1945, 69 y o  male MRN: [id number]  Unit/Bed#: ICU 03-01 Encounter: [id number]  Primary Care Provider: Britany Neal DO   Date and time admitted to hospital: 4/24/2022 12:45 PM    * COPD exacerbation [facility])  Assessment & Plan  · Very severe COPD, Gold stage IV D, last FEV1 was 26% in 2019  · Follows with Dr Leta Hairston as outpatient  · Only recently quit smoking with 50 pack year history  · Presented with increased work of breathing, bilateral wheezing, was given 125mg Solu Medrol by EMS and placed on Bipap   · Had improvement in respiratory status was removed from [facility] initially, today had respiratory distress requiring reinitiation of Bipap  · CXR demonstrated worsening right lower lobe airspace opacity consistent with pneumonia    · Initiated on Ceftriaxone now D3  · Procal 0 73, continue to trend  · Continue solumedrol 40mg Q8  · Continue nebs per pulmonary recommendations  · Maintain oxygen saturation > 88%    Acute on chronic respiratory failure (HCC)  Assessment & Plan  · Wears 1-2L NC HS at baseline  · Transitioned to baseline NC  · Bipap PRN  · Likely in the setting of COPD exacerbation/possible pneumonia with history of heart failure  · Did receive 40IV lasix x1, patient does not examine fluid overloaded  · Wean oxygen for goal saturation > 88%    Elevated troponin  Assessment & Plan  · Likely type 2 MI due to demand  · Continue to trend  · Patient denies chest pain  · EKG with no changes, continue to monitor  · Cardiology consult pending    SIRS (systemic inflammatory response syndrome) ([facility] [address] )  Assessment & Plan  · Met SIRS criteria on admission with tachycardia and tachypnea   · In the setting of COPD exacerbation/pneumonia   · COVID negative  · Blood cultures pending  · Sputum culture pending  · See plan above    Pneumonia  Assessment & Plan  · CXR demonstrated worsening right lower lobe airspace opacity, consistent with pneumonia  · Ceftriaxone, D3  · Procal 0 73, continue to trend  · Sputum culture pending  · Monitor WBC and fever curve    Obesity  Assessment & Plan  · Lifestyle modification   · Nutrition consult    Type 2 diabetes mellitus with diabetic neuropathy, unspecified [facility])  Assessment & Plan  Lab Results   Component Value Date    HGBA1C 6 9 (H) 01/06/2022       Recent Labs     04/25/22  0707 04/25/22  1050 04/25/22  1538 04/25/22  2147   POCGLU 210* 342* 166* 255*       Blood Sugar Average: Last 72 hrs:  (P) 259 [id number]     · Takes Amaryl and Metformin at home, will hold while inpatient  · Initiate Lantus 10HS with SSI  · Adjust algorithm as appropriate  · Monitor glucose  · Diabetic diet  · Hgb A1C in January as above    Other hyperlipidemia  Assessment & Plan  · Continue statin     Ischemic cardiomyopathy  Assessment & Plan  · Echo 2020 demonstrated EF 30% with moderate diffuse hypokinesis and G2DD as well as mild MR and trace TR   · Received 40 IV Lasix on admission, does not take diuretics at home  · Patient follows with Dr Yandel Thakur as outpatient has declined placement of ICD in the past  ·  on admission  · Continue beta blocker  · Cardiology consult pending    Coronary artery disease involving native coronary artery of native heart without angina pectoris  Assessment & Plan  · History of CABG x4 2017  · Monitor EKG  · Continue to trend troponins  · Continue beta blocker    ----------------------------------------------------------------------------------------  HPI/24hr events: Transitioned off Bipap to NC  Patient appropriate for transfer out of the ICU today?: Patient does not meet criteria for referral to the ICU Follow-Up Clinic; referral has not been made      Disposition: Transfer to Med-Surg   Code Status: Level 1 - Full Code  ---------------------------------------------------------------------------------------  SUBJECTIVE  Offers no complaints     Review of Systems   Constitutional: Negative  HENT: Negative  Eyes: Negative  Respiratory: Negative for cough and shortness of breath  Cardiovascular: Negative for chest pain  Gastrointestinal: Negative for nausea and vomiting  Genitourinary: Negative  Musculoskeletal: Negative  Skin: Negative  Neurological: Negative for dizziness and light-headedness  Hematological: Negative  Psychiatric/Behavioral: Negative  All other systems reviewed and are negative  Review of systems was reviewed and negative unless stated above in HPI/24-hour events   ---------------------------------------------------------------------------------------  OBJECTIVE    Vitals   Vitals:    22 0145 22 0200 22 0400 22 0518   BP:   161/87    BP Location:       Pulse:   105    Resp:   (!) 29    Temp:   98 °F (36 7 °C)    TempSrc:   Oral    SpO2: 94% 93% 94%    Weight:    88 9 kg (195 lb 15 8 oz)   Height:         Temp (24hrs), Av 8 °F (36 6 °C), Min:97 6 °F (36 4 °C), Max:98 2 °F (36 8 °C)  Current: Temperature: 98 °F (36 7 °C)          Respiratory:  SpO2: SpO2: 94 %, SpO2 Activity: SpO2 Activity: At Rest, SpO2 Device: O2 Device: Nasal cannula  Nasal Cannula O2 Flow Rate (L/min): 2 L/min    Invasive/non-invasive ventilation settings   Respiratory  Report   Lab Data (Last 4 hours)    None         O2/Vent Data (Last 4 hours)    None                Physical Exam  Vitals and nursing note reviewed  HENT:      Head: Normocephalic  Mouth/Throat:      Mouth: Mucous membranes are dry  Pharynx: Oropharynx is clear  Eyes:      Pupils: Pupils are equal, round, and reactive to light  Cardiovascular:      Rate and Rhythm: Regular rhythm  Tachycardia present  Pulses: Normal pulses  Heart sounds: Normal heart sounds  Pulmonary:      Effort: Pulmonary effort is normal       Breath sounds: Wheezing present  Abdominal:      General: Bowel sounds are normal       Palpations: Abdomen is soft     Musculoskeletal: General: Normal range of motion  Cervical back: Normal range of motion  Skin:     General: Skin is warm and dry  Neurological:      General: No focal deficit present  Mental Status: He is alert and oriented to person, place, and time  Laboratory and Diagnostics:  Results from last 7 days   Lab Units 04/26/22  0522 04/25/22  0531 04/24/22  1319   WBC Thousand/uL 12 15* 9 15 23 39*   HEMOGLOBIN g/dL 11 9* 11 6* 13 9   HEMATOCRIT % 35 4* 35 2* 41 9   PLATELETS Thousands/uL 208 199 304   NEUTROS PCT %  --  89* 84*   MONOS PCT %  --  3* 3*     Results from last 7 days   Lab Units 04/25/22  0531 04/24/22  1319   SODIUM mmol/L 135 136   POTASSIUM mmol/L 4 5 4 7   CHLORIDE mmol/L 99 98   CO2 mmol/L 27 29   ANION GAP mmol/L 9 9   BUN mg/dL 17 15   CREATININE mg/dL 0 78 1 01   CALCIUM mg/dL 8 4 8 5   GLUCOSE RANDOM mg/dL 207* 349*   ALT U/L  --  9   AST U/L  --  14   ALK PHOS U/L  --  96   ALBUMIN g/dL  --  4 1   TOTAL BILIRUBIN mg/dL  --  0 28          Results from last 7 days   Lab Units 04/24/22  1319   INR  0 96   PTT seconds 30          Results from last 7 days   Lab Units 04/24/22  1545 04/24/22  1405   LACTIC ACID mmol/L 3 1* 2 9*     ABG:    VBG:    Results from last 7 days   Lab Units 04/25/22  0531 04/24/22  1319   PROCALCITONIN ng/ml 0 73* 0 08       Micro  Results from last 7 days   Lab Units 04/24/22  1319   BLOOD CULTURE  No Growth at 24 hrs  No Growth at 24 hrs  EKG: sinus tachycardia  Imaging: I have personally reviewed pertinent reports  and I have personally reviewed pertinent films in PACS    Intake and Output  I/O       04/24 0701  04/25 0700 04/25 0701  04/26 0700    IV Piggyback 1050     Total Intake(mL/kg) 1050 (11 7)     Urine (mL/kg/hr)  1750 (0 8)    Total Output  1750    Net +1050 -1750                Height and Weights   Height: 5' 10 5" (179 1 cm)  IBW (Ideal Body Weight): 74 15 kg  Body mass index is 27 72 kg/m²    Weight (last 2 days)     Date/Time Weight 04/26/22 0518 88 9 (195 99)    04/25/22 1830 89 4 (197 09)    04/25/22 1445 91 5 (201 72)    04/24/22 1250 89 8 (198)            Nutrition       Diet Orders   (From admission, onward)             Start     Ordered    04/25/22 1540  Diet [place]/CHO Controlled; Consistent Carbohydrate Diet Level 3 (6 carb servings/90 grams CHO/meal); Dysphagia 3-Dental Soft; Thin Liquid  Diet effective now        References:    Nutrtion Support Algorithm Enteral vs  Parenteral   Question Answer Comment   Diet Type [place]/CHO Controlled    [place]/CHO Controlled Consistent Carbohydrate Diet Level 3 (6 carb servings/90 grams CHO/meal)    Other Restriction(s): Dysphagia 3-Dental Soft    Liquid Modifier Thin Liquid    RD to adjust diet per protocol?  Yes        04/25/22 1541                  Active Medications  Scheduled Meds:  Current Facility-Administered Medications   Medication Dose Route Frequency Provider Last Rate    acetaminophen  650 mg Oral Q4H PRN Paulo Gimenez, DO      albuterol  2 5 mg Nebulization Q4H PRN Paulo Gimenez DO      aspirin  81 mg Oral Every Other Day Paulo Gimenez DO      cefTRIAXone  1,000 mg Intravenous Q24H Paulo Gimenez DO 1,000 mg (04/25/22 1534)    enoxaparin  40 mg Subcutaneous Daily Paulo Gimenez DO      fluticasone-vilanterol  1 puff Inhalation Daily Paulo Gimenez DO      gabapentin  200 mg Oral HS Paulo Gimenez DO      guaiFENesin  600 mg Oral BID Paulo Gimenez DO      hydrALAZINE  5 mg Intravenous Q6H PRN Paulo Gimenez DO      insulin glargine  10 Units Subcutaneous HS Tricia Crawford MD      insulin lispro  1-6 Units Subcutaneous TID AC Paulo Gimenez, DO      insulin lispro  1-6 Units Subcutaneous HS Paulo Gimenez DO      ipratropium  0 5 mg Nebulization TID Paulo Gimenez DO      levalbuterol  1 25 mg Nebulization TID Paulo Gimenez DO      methylPREDNISolone sodium succinate  40 mg Intravenous Q8H Paulo Gimenez DO      metoprolol  5 mg Intravenous Q6H PRN Catherine De Dios PA-C      metoprolol tartrate  25 mg Oral Q12H Domenic Mckeon MD      nicotine  1 patch Transdermal Daily Leia Griffith [place]      ondansetron  4 mg Intravenous Q6H PRN Leia Griffith DO      pravastatin  80 mg Oral Daily With [employer], DO       Continuous Infusions:     PRN Meds:   acetaminophen, 650 mg, Q4H PRN  albuterol, 2 5 mg, Q4H PRN  hydrALAZINE, 5 mg, Q6H PRN  metoprolol, 5 mg, Q6H PRN  ondansetron, 4 mg, Q6H PRN        Invasive Devices Review  Invasive Devices  Report    Peripheral Intravenous Line            Peripheral IV 04/24/22 Left Hand 1 day    Peripheral IV 04/24/22 Right Antecubital 1 day                Rationale for remaining devices: n/a  ---------------------------------------------------------------------------------------  Advance Directive and Living Will:      Power of :    POLST:    ---------------------------------------------------------------------------------------  Care Time Delivered:   Upon my evaluation, this patient had a high probability of imminent or life-threatening deterioration due to acute on chronic respiratory failure , which required my direct attention, intervention, and personal management  I have personally provided 20 minutes (0300 to 0320) of critical care time, exclusive of procedures, teaching, family meetings, and any prior time recorded by providers other than myself  PENG Quinteros      Portions of the record may have been created with voice recognition software  Occasional wrong word or "sound a like" substitutions may have occurred due to the inherent limitations of voice recognition software    Read the chart carefully and recognize, using context, where substitutions have occurred

## 2022-04-26 NOTE — ASSESSMENT & PLAN NOTE
· Echo 2020 demonstrated EF 30% with moderate diffuse hypokinesis and G2DD as well as mild MR and trace TR   · Received 40 IV Lasix on admission, does not take diuretics at home  · Patient follows with Dr Brigette Wright as outpatient has declined placement of ICD in the past  ·  on admission  · Continue beta blocker

## 2022-04-26 NOTE — QUICK NOTE
Danny Lambert is a 71-year-old male who was admitted to critical care service for COPD exacerbation and pneumonia  The patient was deemed medically stable for transfer to [facility] floor    The patient will be seen by the hospitalist service on 04/27/2022

## 2022-04-26 NOTE — ASSESSMENT & PLAN NOTE
· Wears 1-2L NC HS at baseline  · Transitioned to baseline NC  · Bipap PRN  · Likely in the setting of COPD exacerbation/possible pneumonia with history of heart failure  · Did receive 40IV lasix x1, patient does not examine fluid overloaded  · Wean oxygen for goal saturation > 88%

## 2022-04-26 NOTE — ASSESSMENT & PLAN NOTE
· Very severe COPD, Gold stage IV D, last FEV1 was 26% in 2019  · Follows with Dr Sherine Hair as outpatient  · Only recently quit smoking with 50 pack year history  · Presented with increased work of breathing, bilateral wheezing, was given 125mg Solu Medrol by EMS and placed on Bipap   · Had improvement in respiratory status was removed from [facility] initially, today had respiratory distress requiring reinitiation of Bipap  · CXR demonstrated worsening right lower lobe airspace opacity consistent with pneumonia    · Initiated on Ceftriaxone now D2  · Procal 0 73, continue to trend  · Continue solumedrol 40mg Q8  · Continue nebs per pulmonary recommendations  · Maintain oxygen saturation > 88%

## 2022-04-26 NOTE — RESPIRATORY THERAPY NOTE
04/26/22 0145   Respiratory Assessment   Resp Comments pt could no longer dorina bipap, rn remoived placed on NC 2 l/m as pt wears hs 2 l/m AT HOME

## 2022-04-26 NOTE — ASSESSMENT & PLAN NOTE
· CXR demonstrated worsening right lower lobe airspace opacity, consistent with pneumonia  · Ceftriaxone, D3  · Procal 0 73, continue to trend  · Sputum culture pending  · Monitor WBC and fever curve

## 2022-04-26 NOTE — ASSESSMENT & PLAN NOTE
Lab Results   Component Value Date    HGBA1C 6 9 (H) 01/06/2022       Recent Labs     04/25/22  0707 04/25/22  1050 04/25/22  1538 04/25/22  2147   POCGLU 210* 342* 166* 255*       Blood Sugar Average: Last 72 hrs:  (P) 294 [id number]     · Takes Amaryl and Metformin at home, will hold while inpatient  · Initiate Lantus 10HS with SSI  · Adjust algorithm as appropriate  · Monitor glucose  · Diabetic diet  · Hgb A1C in January as above

## 2022-04-26 NOTE — ASSESSMENT & PLAN NOTE
See e-advice encounter from 11/29/21 for follow up on this.   · CXR demonstrated worsening right lower lobe airspace opacity, consistent with pneumonia  · Ceftriaxone, D2   · Procal 0 73, continue to trend  · Monitor WBC and fever curve

## 2022-04-27 PROBLEM — J18.9 SEPSIS DUE TO PNEUMONIA (HCC): Status: ACTIVE | Noted: 2022-04-24

## 2022-04-27 LAB
ANION GAP SERPL CALCULATED.3IONS-SCNC: 7 MMOL/L (ref 4–13)
BUN SERPL-MCNC: 25 MG/DL (ref 5–25)
CALCIUM SERPL-MCNC: 8.1 MG/DL (ref 8.4–10.2)
CHLORIDE SERPL-SCNC: 98 MMOL/L (ref 96–108)
CO2 SERPL-SCNC: 27 MMOL/L (ref 21–32)
CREAT SERPL-MCNC: 0.74 MG/DL (ref 0.6–1.3)
ERYTHROCYTE [DISTWIDTH] IN BLOOD BY AUTOMATED COUNT: 13.7 % (ref 11.6–15.1)
GFR SERPL CREATININE-BSD FRML MDRD: 89 ML/MIN/1.73SQ M
GLUCOSE SERPL-MCNC: 188 MG/DL (ref 65–140)
GLUCOSE SERPL-MCNC: 189 MG/DL (ref 65–140)
GLUCOSE SERPL-MCNC: 231 MG/DL (ref 65–140)
GLUCOSE SERPL-MCNC: 259 MG/DL (ref 65–140)
GLUCOSE SERPL-MCNC: 278 MG/DL (ref 65–140)
HCT VFR BLD AUTO: 37.1 % (ref 36.5–49.3)
HGB BLD-MCNC: 11.8 G/DL (ref 12–17)
MAGNESIUM SERPL-MCNC: 2.1 MG/DL (ref 1.9–2.7)
MCH RBC QN AUTO: 31.1 PG (ref 26.8–34.3)
MCHC RBC AUTO-ENTMCNC: 31.8 G/DL (ref 31.4–37.4)
MCV RBC AUTO: 98 FL (ref 82–98)
PLATELET # BLD AUTO: 194 THOUSANDS/UL (ref 149–390)
PMV BLD AUTO: 11.6 FL (ref 8.9–12.7)
POTASSIUM SERPL-SCNC: 4.7 MMOL/L (ref 3.5–5.3)
RBC # BLD AUTO: 3.8 MILLION/UL (ref 3.88–5.62)
SODIUM SERPL-SCNC: 132 MMOL/L (ref 135–147)
WBC # BLD AUTO: 13.62 THOUSAND/UL (ref 4.31–10.16)

## 2022-04-27 PROCEDURE — 85027 COMPLETE CBC AUTOMATED: CPT | Performed by: PHYSICIAN ASSISTANT

## 2022-04-27 PROCEDURE — 99232 SBSQ HOSP IP/OBS MODERATE 35: CPT | Performed by: INTERNAL MEDICINE

## 2022-04-27 PROCEDURE — 82948 REAGENT STRIP/BLOOD GLUCOSE: CPT

## 2022-04-27 PROCEDURE — 94760 N-INVAS EAR/PLS OXIMETRY 1: CPT

## 2022-04-27 PROCEDURE — 94640 AIRWAY INHALATION TREATMENT: CPT

## 2022-04-27 PROCEDURE — 80048 BASIC METABOLIC PNL TOTAL CA: CPT | Performed by: PHYSICIAN ASSISTANT

## 2022-04-27 PROCEDURE — 83735 ASSAY OF MAGNESIUM: CPT | Performed by: PHYSICIAN ASSISTANT

## 2022-04-27 RX ORDER — PREDNISONE 20 MG/1
40 TABLET ORAL DAILY
Status: DISCONTINUED | OUTPATIENT
Start: 2022-04-28 | End: 2022-04-28 | Stop reason: HOSPADM

## 2022-04-27 RX ORDER — METHYLPREDNISOLONE SODIUM SUCCINATE 40 MG/ML
40 INJECTION, POWDER, LYOPHILIZED, FOR SOLUTION INTRAMUSCULAR; INTRAVENOUS EVERY 12 HOURS SCHEDULED
Status: DISCONTINUED | OUTPATIENT
Start: 2022-04-27 | End: 2022-04-27

## 2022-04-27 RX ORDER — METHYLPREDNISOLONE SODIUM SUCCINATE 40 MG/ML
40 INJECTION, POWDER, LYOPHILIZED, FOR SOLUTION INTRAMUSCULAR; INTRAVENOUS ONCE
Status: COMPLETED | OUTPATIENT
Start: 2022-04-27 | End: 2022-04-27

## 2022-04-27 RX ORDER — DIGOXIN 125 MCG
125 TABLET ORAL DAILY
Status: DISCONTINUED | OUTPATIENT
Start: 2022-04-27 | End: 2022-04-28 | Stop reason: HOSPADM

## 2022-04-27 RX ADMIN — METHYLPREDNISOLONE SODIUM SUCCINATE 40 MG: 40 INJECTION, POWDER, FOR SOLUTION INTRAMUSCULAR; INTRAVENOUS at 08:41

## 2022-04-27 RX ADMIN — LISINOPRIL 40 MG: 20 TABLET ORAL at 08:40

## 2022-04-27 RX ADMIN — METHYLPREDNISOLONE SODIUM SUCCINATE 40 MG: 40 INJECTION, POWDER, FOR SOLUTION INTRAMUSCULAR; INTRAVENOUS at 20:00

## 2022-04-27 RX ADMIN — INSULIN LISPRO 4 UNITS: 100 INJECTION, SOLUTION INTRAVENOUS; SUBCUTANEOUS at 16:04

## 2022-04-27 RX ADMIN — GUAIFENESIN 600 MG: 600 TABLET, EXTENDED RELEASE ORAL at 08:40

## 2022-04-27 RX ADMIN — IPRATROPIUM BROMIDE 0.5 MG: 0.5 SOLUTION RESPIRATORY (INHALATION) at 07:00

## 2022-04-27 RX ADMIN — INSULIN LISPRO 3 UNITS: 100 INJECTION, SOLUTION INTRAVENOUS; SUBCUTANEOUS at 21:55

## 2022-04-27 RX ADMIN — GABAPENTIN 200 MG: 100 CAPSULE ORAL at 21:55

## 2022-04-27 RX ADMIN — METOPROLOL TARTRATE 25 MG: 25 TABLET, FILM COATED ORAL at 20:00

## 2022-04-27 RX ADMIN — INSULIN LISPRO 1 UNITS: 100 INJECTION, SOLUTION INTRAVENOUS; SUBCUTANEOUS at 08:08

## 2022-04-27 RX ADMIN — INSULIN LISPRO 3 UNITS: 100 INJECTION, SOLUTION INTRAVENOUS; SUBCUTANEOUS at 13:11

## 2022-04-27 RX ADMIN — NICOTINE 1 PATCH: 21 PATCH, EXTENDED RELEASE TRANSDERMAL at 08:41

## 2022-04-27 RX ADMIN — LEVALBUTEROL HYDROCHLORIDE 1.25 MG: 1.25 SOLUTION, CONCENTRATE RESPIRATORY (INHALATION) at 20:06

## 2022-04-27 RX ADMIN — INSULIN GLARGINE 10 UNITS: 100 INJECTION, SOLUTION SUBCUTANEOUS at 21:55

## 2022-04-27 RX ADMIN — METOPROLOL TARTRATE 25 MG: 25 TABLET, FILM COATED ORAL at 08:41

## 2022-04-27 RX ADMIN — LEVALBUTEROL HYDROCHLORIDE 1.25 MG: 1.25 SOLUTION, CONCENTRATE RESPIRATORY (INHALATION) at 13:23

## 2022-04-27 RX ADMIN — ENOXAPARIN SODIUM 40 MG: 100 INJECTION SUBCUTANEOUS at 08:40

## 2022-04-27 RX ADMIN — IPRATROPIUM BROMIDE 0.5 MG: 0.5 SOLUTION RESPIRATORY (INHALATION) at 13:22

## 2022-04-27 RX ADMIN — PRAVASTATIN SODIUM 80 MG: 40 TABLET ORAL at 15:59

## 2022-04-27 RX ADMIN — IPRATROPIUM BROMIDE 0.5 MG: 0.5 SOLUTION RESPIRATORY (INHALATION) at 20:06

## 2022-04-27 RX ADMIN — GUAIFENESIN 600 MG: 600 TABLET, EXTENDED RELEASE ORAL at 17:13

## 2022-04-27 RX ADMIN — FORMOTEROL FUMARATE DIHYDRATE 20 MCG: 20 SOLUTION RESPIRATORY (INHALATION) at 07:00

## 2022-04-27 RX ADMIN — CEFTRIAXONE 1000 MG: 1 INJECTION, SOLUTION INTRAVENOUS at 15:08

## 2022-04-27 RX ADMIN — FORMOTEROL FUMARATE DIHYDRATE 20 MCG: 20 SOLUTION RESPIRATORY (INHALATION) at 20:06

## 2022-04-27 RX ADMIN — LEVALBUTEROL HYDROCHLORIDE 1.25 MG: 1.25 SOLUTION, CONCENTRATE RESPIRATORY (INHALATION) at 07:00

## 2022-04-27 RX ADMIN — DIGOXIN 125 MCG: 125 TABLET ORAL at 09:10

## 2022-04-27 NOTE — ASSESSMENT & PLAN NOTE
· Present on admission as evidenced by tachycardia and leukocytosis  · Secondary to community-acquired pneumonia  · Will continue ceftriaxone day 3  · IV fluid resuscitation as needed  · Blood cultures negative to date  · Will trend procalcitonin  · Follow-up sputum culture

## 2022-04-27 NOTE — PLAN OF CARE
Problem: PAIN - ADULT  Goal: Verbalizes/displays adequate comfort level or baseline comfort level  Description: Interventions:  - Encourage patient to monitor pain and request assistance  - Assess pain using appropriate pain scale  - Administer analgesics based on type and severity of pain and evaluate response  - Implement non-pharmacological measures as appropriate and evaluate response  - Consider cultural and social influences on pain and pain management  - Notify physician/advanced practitioner if interventions unsuccessful or patient reports new pain  Outcome: Progressing     Problem: INFECTION - ADULT  Goal: Absence of fever/infection during neutropenic period  Description: INTERVENTIONS:  - Monitor WBC    Outcome: Progressing     Problem: SAFETY ADULT  Goal: Patient will remain free of falls  Description: INTERVENTIONS:  - Educate patient/family on patient safety including physical limitations  - Instruct patient to call for assistance with activity   - Consult OT/PT to assist with strengthening/mobility   - Keep Call bell within reach  - Keep bed low and locked with side rails adjusted as appropriate  - Keep care items and personal belongings within reach  - Initiate and maintain comfort rounds  - Make Fall Risk Sign visible to staff  - Offer Toileting every 2 Hours, in advance of need  - Initiate/Maintain fall alarm  - Obtain necessary fall risk management equipment: fall alarm  - Apply yellow socks and bracelet for high fall risk patients  - Consider moving patient to room near nurses station  Outcome: Progressing     Problem: SAFETY ADULT  Goal: Maintain or return to baseline ADL function  Description: INTERVENTIONS:  -  Assess patient's ability to carry out ADLs; assess patient's baseline for ADL function and identify physical deficits which impact ability to perform ADLs (bathing, care of mouth/teeth, toileting, grooming, dressing, etc )  - Assess/evaluate cause of self-care deficits   - Assess range of motion  - Assess patient's mobility; develop plan if impaired  - Assess patient's need for assistive devices and provide as appropriate  - Encourage maximum independence but intervene and supervise when necessary  - Involve family in performance of ADLs  - Assess for home care needs following discharge   - Consider OT consult to assist with ADL evaluation and planning for discharge  - Provide patient education as appropriate  Outcome: Progressing     Problem: SAFETY ADULT  Goal: Maintains/Returns to pre admission functional level  Description: INTERVENTIONS:  - Perform BMAT or MOVE assessment daily    - Set and communicate daily mobility goal to care team and patient/family/caregiver  - Collaborate with rehabilitation services on mobility goals if consulted  - Perform Range of Motion 3 times a day  - Reposition patient every 2 hours    - Dangle patient 3 times a day  - Stand patient 3 times a day  - Ambulate patient 3 times a day  - Out of bed to chair 3 times a day   - Out of bed for meals 3 times a day  - Out of bed for toileting  - Record patient progress and toleration of activity level   Outcome: Progressing     Problem: Potential for Falls  Goal: Patient will remain free of falls  Description: INTERVENTIONS:  - Educate patient/family on patient safety including physical limitations  - Instruct patient to call for assistance with activity   - Consult OT/PT to assist with strengthening/mobility   - Keep Call bell within reach  - Keep bed low and locked with side rails adjusted as appropriate  - Keep care items and personal belongings within reach  - Initiate and maintain comfort rounds  - Make Fall Risk Sign visible to staff  - Offer Toileting every 2 Hours, in advance of need  - Initiate/Maintain fall alarm  - Obtain necessary fall risk management equipment: fall alarm  - Apply yellow socks and bracelet for high fall risk patients  - Consider moving patient to room near nurses station  Outcome: Progressing     Problem: MOBILITY - ADULT  Goal: Maintain or return to baseline ADL function  Description: INTERVENTIONS:  -  Assess patient's ability to carry out ADLs; assess patient's baseline for ADL function and identify physical deficits which impact ability to perform ADLs (bathing, care of mouth/teeth, toileting, grooming, dressing, etc )  - Assess/evaluate cause of self-care deficits   - Assess range of motion  - Assess patient's mobility; develop plan if impaired  - Assess patient's need for assistive devices and provide as appropriate  - Encourage maximum independence but intervene and supervise when necessary  - Involve family in performance of ADLs  - Assess for home care needs following discharge   - Consider OT consult to assist with ADL evaluation and planning for discharge  - Provide patient education as appropriate  Outcome: Progressing     Problem: MOBILITY - ADULT  Goal: Maintains/Returns to pre admission functional level  Description: INTERVENTIONS:  - Perform BMAT or MOVE assessment daily    - Set and communicate daily mobility goal to care team and patient/family/caregiver  - Collaborate with rehabilitation services on mobility goals if consulted  - Perform Range of Motion 3 times a day  - Reposition patient every 2 hours    - Dangle patient 3 times a day  - Stand patient 3 times a day  - Ambulate patient 3 times a day  - Out of bed to chair 3 times a day   - Out of bed for meals 3 times a day  - Out of bed for toileting  - Record patient progress and toleration of activity level   Outcome: Progressing

## 2022-04-27 NOTE — ASSESSMENT & PLAN NOTE
· Blood glucose elevated on presentation likely secondary to pneumonia/steroids  · Hold home oral hypoglycemic agents  · Sliding scale insulin and Accu-Cheks while inpatient  · Lantus 10 units at bedtime

## 2022-04-27 NOTE — RESPIRATORY THERAPY NOTE
04/27/22 0230   Respiratory Assessment   Assessment Type Assess only   General Appearance Sleeping   Respiratory Pattern Shallow; Tachypneic   Chest Assessment Chest expansion symmetrical   Bilateral Breath Sounds Diminished;Clear   Resp Comments pt tirated on vapotherm to 30% dorina well will cont to monitor, pt no longer labored   O2 Device vapotherm    Additional Assessments   Pulse 93   Respirations 21   SpO2 94 %   Position Bermudez's

## 2022-04-27 NOTE — PROGRESS NOTES
[facility] 46  Progress Note - Mariely Listen 1945, 69 y o  male MRN: [id number]  Unit/Bed#: ICU 03-01 Encounter: [id number]  Primary Care Provider: Julius Denis DO   Date and time admitted to hospital: 4/24/2022 12:45 PM    Sepsis due to pneumonia [facility])  Assessment & Plan  · Present on admission as evidenced by tachycardia and leukocytosis  · Secondary to community-acquired pneumonia  · Will continue ceftriaxone day 3  · IV fluid resuscitation as needed  · Blood cultures negative to date  · Will trend procalcitonin  · Follow-up sputum culture    Acute on chronic respiratory failure ([facility] 75 )  Assessment & Plan  · Patient utilizes 2 L of nasal cannula supplemental O2 at night  · Requiring 4 L of nasal cannula O2 in the ED  · Likely secondary to COPD exacerbation and pneumonia  · Patient was briefly transferred to the ICU for BiPAP therapy, doing well now and has been transitioned back to med surg  · ABG result appreciated  · Wean O2 as tolerated  · Goal SpO2 > 88%    Elevated troponin  Assessment & Plan  · Likely secondary to sepsis  · Patient states he does have a history of advanced heart disease and follows with cardiology as outpatient  · Denies any chest pain  · Cardiology input appreciated    Pneumonia  Assessment & Plan  · Chest x-ray with right lower lobe pneumonia  · Given cefepime in the ED  · Will continue ceftriaxone 1000 mg IV q24 hours  · Deescalate antibiotics as appropriate  · Follow-up sputum cultures and Gram stain    Obesity  Assessment & Plan  · Present on admission as evidenced by BMI of 28  · Encouraged lifestyle modifications and weight loss    Type 2 diabetes mellitus with diabetic neuropathy, unspecified (RUST 75 )  Assessment & Plan  · Blood glucose elevated on presentation likely secondary to pneumonia/steroids  · Hold home oral hypoglycemic agents  · Sliding scale insulin and Accu-Cheks while inpatient  · Lantus 10 units at bedtime    Ischemic cardiomyopathy  Assessment & Plan  · Patient has declined ICD in the past per cardiology  · Will continue metoprolol 25 mg twice daily, lisinopril 40 mg daily  · Digoxin added as heart rate has been uncontrolled at rest    * COPD exacerbation (HCC)  Assessment & Plan  · Patient presents with shortness of breath  · Likely COPD exacerbation secondary to community-acquired pneumonia  · Will continue IV Solu-Medrol  · Continue nebulizers  · Respiratory and airway clearance protocols  · Will continue IV antibiotics as above  · Patient is on Pulmicort at home which he feels is causing his COPD to act up more and has not been helping  · Pulmonology following    VTE Prophylaxis:  Enoxaparin (Lovenox)    Patient Centered Rounds: I have performed bedside rounds with nursing staff today  Discussions with Specialists or Other Care Team Provider: yes  Education and Discussions with Family / Patient:  Updated the patient regarding plan of care    Current Length of Stay: 2 day(s)    Current Patient Status: Inpatient   Certification Statement: The patient will continue to require additional inpatient hospital stay due to COPD exacerbation    Discharge Plan:  Pending hospital course    Code Status: Level 1 - Full Code    Subjective:   No overnight events noted  patient no longer requiring BiPAP  Saturating well on nasal cannula    Objective:     Vitals:   Temp (24hrs), Av 8 °F (36 6 °C), Min:97 1 °F (36 2 °C), Max:98 1 °F (36 7 °C)    Temp:  [97 1 °F (36 2 °C)-98 1 °F (36 7 °C)] 98 1 °F (36 7 °C)  HR:  [] 91  Resp:  [16-39] 33  BP: (126-157)/(68-94) 152/75  SpO2:  [93 %-97 %] 94 %  Body mass index is 27 76 kg/m²  Input and Output Summary (last 24 hours):        Intake/Output Summary (Last 24 hours) at 2022 1657  Last data filed at 2022 1401  Gross per 24 hour   Intake 1370 ml   Output 905 ml   Net 465 ml       Physical Exam:   Physical Exam  Constitutional:       General: He is not in acute distress  HENT:      Head: Normocephalic and atraumatic  Nose: Nose normal       Mouth/Throat:      Mouth: Mucous membranes are moist    Eyes:      Extraocular Movements: Extraocular movements intact  Conjunctiva/sclera: Conjunctivae normal    Cardiovascular:      Rate and Rhythm: Tachycardia present  Pulmonary:      Effort: Pulmonary effort is normal  No respiratory distress  Breath sounds: Wheezing present  Abdominal:      Palpations: Abdomen is soft  Tenderness: There is no abdominal tenderness  Musculoskeletal:         General: Normal range of motion  Cervical back: Normal range of motion and neck supple  Skin:     General: Skin is warm and dry  Neurological:      General: No focal deficit present  Mental Status: He is alert  Mental status is at baseline  Cranial Nerves: No cranial nerve deficit  Psychiatric:         Mood and Affect: Mood normal          Behavior: Behavior normal          Additional Data:     Labs:    Results from last 7 days   Lab Units 04/27/22  0541 04/26/22 0522 04/25/22  0531   WBC Thousand/uL 13 62*   < > 9 15   HEMOGLOBIN g/dL 11 8*   < > 11 6*   I STAT HEMOGLOBIN   --    < >  --    HEMATOCRIT % 37 1   < > 35 2*   HEMATOCRIT, ISTAT   --    < >  --    PLATELETS Thousands/uL 194   < > 199   NEUTROS PCT %  --   --  89*   LYMPHS PCT %  --   --  6*   MONOS PCT %  --   --  3*   EOS PCT %  --   --  1    < > = values in this interval not displayed       Results from last 7 days   Lab Units 04/27/22 0541 04/26/22  0934 04/26/22 0522 04/25/22  0531 04/24/22  1319   SODIUM mmol/L 132*  --    < >   < > 136   POTASSIUM mmol/L 4 7  --    < >   < > 4 7   CHLORIDE mmol/L 98  --    < >   < > 98   CO2 mmol/L 27  --    < >   < > 29   CO2, I-STAT mmol/L  --  28  --   --   --    BUN mg/dL 25  --    < >   < > 15   CREATININE mg/dL 0 74  --    < >   < > 1 01   CALCIUM mg/dL 8 1*  --    < >   < > 8 5   ALK PHOS U/L  --   --   --   --  96   ALT U/L  --   --   -- --  9   AST U/L  --   --   --   --  14   GLUCOSE, ISTAT mg/dl  --  374*  --   --   --     < > = values in this interval not displayed  Results from last 7 days   Lab Units 04/24/22  1319   INR  0 96     Results from last 7 days   Lab Units 04/27/22  1601 04/27/22  1107 04/27/22  0706 04/26/22  2130 04/26/22  1613 04/26/22  1119 04/26/22  0734 04/25/22  2147 04/25/22  1538 04/25/22  1050 04/25/22  0707 04/24/22  2049   POC GLUCOSE mg/dl 278* 231* 188* 213* 167* 334* 215* 255* 166* 342* 210* 337*           * I Have Reviewed All Lab Data Listed Above  * Additional Pertinent Lab Tests Reviewed: [facility] Admission  Reviewed    Imaging:  Imaging Reports Reviewed Today Include:  No new imaging    Recent Cultures (last 7 days):     Results from last 7 days   Lab Units 04/26/22  1042 04/25/22  2034 04/24/22  1319   BLOOD CULTURE   --   --  No Growth at 48 hrs  No Growth at 48 hrs  SPUTUM CULTURE   --  Culture results to follow    --    GRAM STAIN RESULT   --  1+ Epithelial cells per low power field*  No polys seen*  4+ Gram positive cocci in pairs, chains and clusters*  3+ Gram positive rods*  3+ Gram negative rods*  --    LEGIONELLA URINARY ANTIGEN  Negative  --   --        Last 24 Hours Medication List:   Current Facility-Administered Medications   Medication Dose Route Frequency Provider Last Rate    acetaminophen  650 mg Oral Q4H PRN Shabana Jones PA-C      albuterol  2 5 mg Nebulization Q4H PRN Shabana Jones PA-C      aspirin  81 mg Oral Every Other Day Shabana Jones PA-C      cefTRIAXone  1,000 mg Intravenous Q24H Shabana Jones PA-C 1,000 mg (04/27/22 1508)    digoxin  125 mcg Oral Daily Genesis Dan PA-C      enoxaparin  40 mg Subcutaneous [facility]      formoterol  20 mcg Nebulization Q12H [place], [place]      gabapentin  200 mg Oral HS Shabana Jones PA-C      guaiFENesin  600 mg Oral BID Roger Cyr [place]      hydrALAZINE  5 mg Intravenous Q6H PRN Roger Cyr PA-C      insulin glargine  10 Units Subcutaneous HS [facility]      insulin lispro  1-6 Units Subcutaneous TID AC [place], [place]      insulin lispro  1-6 Units Subcutaneous HS [facility]      ipratropium  0 5 mg Nebulization TID [facility] [place]      levalbuterol  1 25 mg Nebulization TID Roger Cyr PA-C      lisinopril  40 mg Oral Daily [place], [place]      methylPREDNISolone sodium succinate  40 mg Intravenous Once Lesa Fox MD      metoprolol  5 mg Intravenous Q6H PRN Roger Cyr PA-C      metoprolol tartrate  25 mg Oral Q12H [address] [place], [place]      nicotine  1 patch Transdermal Daily [place], [place]      ondansetron  4 mg Intravenous Q6H PRN Roger Cyr PA-C      pravastatin  80 mg Oral Daily With [address] [facility] [address] [place], [place]      [START ON 4/28/2022] predniSONE  40 mg Oral Daily Lesa Fox MD          Today, Patient Was Seen By: Luz Heaton MD    ** Please Note: Dictation voice to text software may have been used in the creation of this document   **

## 2022-04-27 NOTE — ASSESSMENT & PLAN NOTE
Related to COPD exacerbation and pneumona  Had an episode of exacerbation last night with tachycardia and tachypnea  Management per critical care

## 2022-04-27 NOTE — ASSESSMENT & PLAN NOTE
· Chest x-ray with right lower lobe pneumonia  · Given cefepime in the ED  · Will continue ceftriaxone 1000 mg IV q24 hours  · Deescalate antibiotics as appropriate  · Follow-up sputum cultures and Gram stain

## 2022-04-27 NOTE — ASSESSMENT & PLAN NOTE
Type 2 MI in the setting of sepsis and resp distress  EKG with T wave inversions  Pt asymptomatic  Continue current medications  See medication adjustments

## 2022-04-27 NOTE — ASSESSMENT & PLAN NOTE
Prior CABG  With ischemic cardiomyopathy   Last known EF 30% on echo 10/2020  Continue BB, statin, asa

## 2022-04-27 NOTE — PROGRESS NOTES
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  Diabetes- type 2  Hypertension        Can switch to prednisone 40 mg tomorrow  Continue Xopenex and Atrovent TID  Can hold budesonide while on IV steroids  Perforomist BID (LABA)  Ceftriaxone day 3- follow sputum culture, urine legionella and strep pneumo antigen negative  Patient did not have hypercapnia on ABG- does not qualify for home NIPPV  Wean O2 for goal SPO2 >88%- now at baseline O2    When discharged should be on prednisone taper over 2 weeks and continue outpatient  Regimen of Budesonide BID, Perforomist BID, Atrovent neb TID, Albuterol neb/inhaler PRN, Azithromycin MWF and will follow-up in our clinic    DVT Px, statin, nicotine patch    Subjective:   Feels much better  94% on 2 lpm    Objective:     Vitals: Blood pressure 152/75, pulse 91, temperature 98 1 °F (36 7 °C), temperature source Tympanic, resp  rate (!) 33, height 5' 10 5" (1 791 m), weight 89 kg (196 lb 3 4 oz), SpO2 94 %  ,Body mass index is 27 76 kg/m²        Intake/Output Summary (Last 24 hours) at 4/27/2022 1527  Last data filed at 4/27/2022 1401  Gross per 24 hour   Intake 1370 ml   Output 905 ml   Net 465 ml       Invasive Devices  Report    Peripheral Intravenous Line            Peripheral IV 04/24/22 Right Antecubital 3 days    Peripheral IV 04/27/22 Distal;Left;Upper;Ventral (anterior) Antecubital <1 day              General:  Patient is awake, alert, non-toxic and in no acute respiratory distress  Eyes: PERRL, no scleral icterus  Neck: No JVD  CV:  Regular, +S1 and S2, No murmurs, gallops or rubs appreciated  Lungs: Diminished bilaterally, scant wheeze  Abdomen: Soft, +BS, Non-tender, non-distended  Extremities: No clubbing, cyanosis or edema  Neuro: No focal motor/sensory deficits  Skin: Warm and dry      Labs: I have personally reviewed pertinent lab results  Imaging and other studies: I have personally reviewed pertinent reports     and I have personally reviewed pertinent films in PACS

## 2022-04-27 NOTE — PLAN OF CARE
Problem: PAIN - ADULT  Goal: Verbalizes/displays adequate comfort level or baseline comfort level  Description: Interventions:  - Encourage patient to monitor pain and request assistance  - Assess pain using appropriate pain scale  - Administer analgesics based on type and severity of pain and evaluate response  - Implement non-pharmacological measures as appropriate and evaluate response  - Consider cultural and social influences on pain and pain management  - Notify physician/advanced practitioner if interventions unsuccessful or patient reports new pain  Outcome: Progressing     Problem: INFECTION - ADULT  Goal: Absence or prevention of progression during hospitalization  Description: INTERVENTIONS:  - Assess and monitor for signs and symptoms of infection  - Monitor lab/diagnostic results  - Monitor all insertion sites, i e  indwelling lines, tubes, and drains  - Monitor endotracheal if appropriate and nasal secretions for changes in amount and color  - [place] appropriate cooling/warming therapies per order  - Administer medications as ordered  - Instruct and encourage patient and family to use good hand hygiene technique  - Identify and instruct in appropriate isolation precautions for identified infection/condition  Outcome: Progressing  Goal: Absence of fever/infection during neutropenic period  Description: INTERVENTIONS:  - Monitor WBC    Outcome: Progressing     Problem: SAFETY ADULT  Goal: Patient will remain free of falls  Description: INTERVENTIONS:  - Educate patient/family on patient safety including physical limitations  - Instruct patient to call for assistance with activity   - Consult OT/PT to assist with strengthening/mobility   - Keep Call bell within reach  - Keep bed low and locked with side rails adjusted as appropriate  - Keep care items and personal belongings within reach  - Initiate and maintain comfort rounds  - Make Fall Risk Sign visible to staff  - Offer Toileting every 2 Hours, in advance of need  - Initiate/Maintain fall alarm  - Obtain necessary fall risk management equipment: fall alarm  - Apply yellow socks and bracelet for high fall risk patients  - Consider moving patient to room near nurses station  Outcome: Progressing  Goal: Maintain or return to baseline ADL function  Description: INTERVENTIONS:  -  Assess patient's ability to carry out ADLs; assess patient's baseline for ADL function and identify physical deficits which impact ability to perform ADLs (bathing, care of mouth/teeth, toileting, grooming, dressing, etc )  - Assess/evaluate cause of self-care deficits   - Assess range of motion  - Assess patient's mobility; develop plan if impaired  - Assess patient's need for assistive devices and provide as appropriate  - Encourage maximum independence but intervene and supervise when necessary  - Involve family in performance of ADLs  - Assess for home care needs following discharge   - Consider OT consult to assist with ADL evaluation and planning for discharge  - Provide patient education as appropriate  Outcome: Progressing  Goal: Maintains/Returns to pre admission functional level  Description: INTERVENTIONS:  - Perform BMAT or MOVE assessment daily    - Set and communicate daily mobility goal to care team and patient/family/caregiver  - Collaborate with rehabilitation services on mobility goals if consulted  - Perform Range of Motion 3 times a day  - Reposition patient every 2 hours    - Dangle patient 3 times a day  - Stand patient 3 times a day  - Ambulate patient 3 times a day  - Out of bed to chair 3 times a day   - Out of bed for meals 3 times a day  - Out of bed for toileting  - Record patient progress and toleration of activity level   Outcome: Progressing     Problem: DISCHARGE PLANNING  Goal: Discharge to home or other facility with appropriate resources  Description: INTERVENTIONS:  - Identify barriers to discharge w/patient and caregiver  - Arrange for needed discharge resources and transportation as appropriate  - Identify discharge learning needs (meds, wound care, etc )  - Arrange for interpretive services to assist at discharge as needed  - Refer to Case Management Department for coordinating discharge planning if the patient needs post-hospital services based on physician/advanced practitioner order or complex needs related to functional status, cognitive ability, or social support system  Outcome: Progressing     Problem: Knowledge Deficit  Goal: Patient/family/caregiver demonstrates understanding of disease process, treatment plan, medications, and discharge instructions  Description: Complete learning assessment and assess knowledge base    Interventions:  - Provide teaching at level of understanding  - Provide teaching via preferred learning methods  Outcome: Progressing     Problem: Potential for Falls  Goal: Patient will remain free of falls  Description: INTERVENTIONS:  - Educate patient/family on patient safety including physical limitations  - Instruct patient to call for assistance with activity   - Consult OT/PT to assist with strengthening/mobility   - Keep Call bell within reach  - Keep bed low and locked with side rails adjusted as appropriate  - Keep care items and personal belongings within reach  - Initiate and maintain comfort rounds  - Make Fall Risk Sign visible to staff  - Offer Toileting every 2 Hours, in advance of need  - Initiate/Maintain fall alarm  - Obtain necessary fall risk management equipment: fall alarm  - Apply yellow socks and bracelet for high fall risk patients  - Consider moving patient to room near nurses station  Outcome: Progressing     Problem: MOBILITY - ADULT  Goal: Maintain or return to baseline ADL function  Description: INTERVENTIONS:  -  Assess patient's ability to carry out ADLs; assess patient's baseline for ADL function and identify physical deficits which impact ability to perform ADLs (bathing, care of mouth/teeth, toileting, grooming, dressing, etc )  - Assess/evaluate cause of self-care deficits   - Assess range of motion  - Assess patient's mobility; develop plan if impaired  - Assess patient's need for assistive devices and provide as appropriate  - Encourage maximum independence but intervene and supervise when necessary  - Involve family in performance of ADLs  - Assess for home care needs following discharge   - Consider OT consult to assist with ADL evaluation and planning for discharge  - Provide patient education as appropriate  Outcome: Progressing  Goal: Maintains/Returns to pre admission functional level  Description: INTERVENTIONS:  - Perform BMAT or MOVE assessment daily    - Set and communicate daily mobility goal to care team and patient/family/caregiver  - Collaborate with rehabilitation services on mobility goals if consulted  - Perform Range of Motion 3 times a day  - Reposition patient every 2 hours    - Dangle patient 3 times a day  - Stand patient 3 times a day  - Ambulate patient 3 times a day  - Out of bed to chair 3 times a day   - Out of bed for meals 3 times a day  - Out of bed for toileting  - Record patient progress and toleration of activity level   Outcome: Progressing

## 2022-04-27 NOTE — NURSING NOTE
RN to see pt  Pt states he needed to use the urinal  RN encouraged pt not to attempt to stand to void due to high heart rate  Pt refused and insisted on standing to urinate  Pt became very dyspneic, in tripod position, and pale in appearance  RN notified AP and RT  Given 5mg IV Metoprolol x2 and 1mg of Ativan, see MAR for more details  EKG obtained  RN expressed to pt that he would stay in bed for the remainder of the night  Side rails up x3, call bell and beside table within reach, bed alarm on  Will continue to monitor

## 2022-04-27 NOTE — ASSESSMENT & PLAN NOTE
· Patient presents with shortness of breath  · Likely COPD exacerbation secondary to community-acquired pneumonia  · Will continue IV Solu-Medrol  · Continue nebulizers  · Respiratory and airway clearance protocols  · Will continue IV antibiotics as above  · Patient is on Pulmicort at home which he feels is causing his COPD to act up more and has not been helping  · Pulmonology following

## 2022-04-27 NOTE — RESPIRATORY THERAPY NOTE
04/26/22 2795   Respiratory Assessment   Assessment Type Post-treatment   General Appearance Awake; Alert   Respiratory Pattern Tachypneic   Chest Assessment Chest expansion symmetrical   Bilateral Breath Sounds Diminished;Scattered; Expiratory wheezes   Resp Comments pt placed on vapotherm for trail for increased WOb, will cont to monitor    O2 Device vapotherm    Non-Invasive Information   O2 Interface Device HFNC prongs   Non-Invasive Ventilation Mode HFNC (High flow)   $ Continous NIV Initial   SpO2 97 %   $ Pulse Oximetry Spot Check Charge Completed   Non-Invasive Settings   FiO2 (%) 35   Flow (lpm) 35   Humidification   (heater )   Temperature (Set) 33   Non-Invasive Readings   Total Rate 26   Heater Temperature (Obs) 33   Skin Intervention Skin intact

## 2022-04-27 NOTE — ASSESSMENT & PLAN NOTE
EF 30% on echo 10/2020  Continue ace, BB  Pt declined ICD  Given resting tachycardia, and significant tachycardia minimal exertion in the setting of ischemic cardiomyopathy digoxin will be used to control heart rate   I don not want to increase beta-blocker at this time given significant amount of wheezing

## 2022-04-27 NOTE — PROGRESS NOTES
[address]  Progress Note - Maia Amaro 1945, 69 y o  male MRN: [id number]  Unit/Bed#: ICU 03-01 Encounter: [id number]  Primary Care Provider: Keily Riley DO   Date and time admitted to hospital: 4/24/2022 12:45 PM    Pneumonia  Assessment & Plan  Primary reason for presentation  Antibiotics and treatment per Medical team     Acute on chronic respiratory failure [facility])  Assessment & Plan  Related to COPD exacerbation and pneumona  Had an episode of exacerbation last night with tachycardia and tachypnea  Management per critical care    Ischemic cardiomyopathy  Assessment & Plan  EF 30% on echo 10/2020  Continue ace, BB  Pt declined ICD  Given resting tachycardia, and significant tachycardia minimal exertion in the setting of ischemic cardiomyopathy digoxin will be used to control heart rate   I don not want to increase beta-blocker at this time given significant amount of wheezing    NSTEMI (non-ST elevated myocardial infarction) ([facility] )  Assessment & Plan  Type 2 MI in the setting of sepsis and resp distress  EKG with T wave inversions  Pt asymptomatic  Continue current medications  See medication adjustments       Coronary artery disease involving native coronary artery of native heart without angina pectoris  Assessment & Plan  Prior CABG  With ischemic cardiomyopathy   Last known EF 30% on echo 10/2020  Continue BB, statin, asa    * COPD exacerbation ([facility] )  Assessment & Plan  Management per critical care      Subjective:  Patient seen and examined at the bedside  Patient had an episode overnight where he sat at the side of the bed to urinate and became tachycardic at a rate of 150 and tachypneic  Patient was treated with Lopressor which controlled patient's heart rate somewhat  However, patient is currently wheezing   He is feeling better this morning  Heart rate is 100-110 and regular  No chest pain dyspnea, or palpitations  Trace to mild edema   HR continues to elevated significantly with minimal activity  Objective:   Vitals: Blood pressure 152/75, pulse 93, temperature (!) 97 1 °F (36 2 °C), temperature source Tympanic, resp  rate (!) 31, height 5' 10 5" (1 791 m), weight 89 kg (196 lb 3 4 oz), SpO2 96 % ,   Orthostatic Blood Pressures      Most Recent Value   Blood Pressure 152/75 filed at 04/27/2022 1000   Patient Position - Orthostatic VS Lying filed at 04/27/2022 0800          Telemetry:  Sinus tachycardia    Physical Exam:  Physical Exam  Vitals and nursing note reviewed  Constitutional:       Appearance: He is well-developed  He is morbidly obese  HENT:      Head: Normocephalic and atraumatic  Mouth/Throat:      Mouth: Mucous membranes are moist    Eyes:      General: No scleral icterus  Conjunctiva/sclera: Conjunctivae normal    Neck:      Thyroid: No thyromegaly  Vascular: JVD: Unable to assess  Comments: NC O2 in place   Cardiovascular:      Rate and Rhythm: Normal rate and regular rhythm  Heart sounds: S1 normal and S2 normal  Murmur heard  Systolic murmur is present with a grade of 2/6  No friction rub  No gallop  Comments: Midsternal scar  Pulmonary:      Effort: No respiratory distress  Breath sounds: Wheezing present  No rales  Abdominal:      General: Bowel sounds are normal  There is no distension  Palpations: Abdomen is soft  Tenderness: There is no abdominal tenderness  Comments: Aorta not palpable   Musculoskeletal:         General: No tenderness or deformity  Normal range of motion  Cervical back: Normal range of motion and neck supple  Right lower leg: No edema  Left lower leg: No edema  Skin:     General: Skin is warm and dry  Neurological:      General: No focal deficit present  Mental Status: He is alert and oriented to person, place, and time     Psychiatric:         Mood and Affect: Mood normal          Behavior: Behavior normal          Judgment: Judgment normal          Medications:    Current Facility-Administered Medications:     acetaminophen (TYLENOL) tablet 650 mg, 650 mg, Oral, Q4H PRN, Marvin Silver, PA-C    albuterol inhalation solution 2 5 mg, 2 5 mg, Nebulization, Q4H PRN, Marvin Silver, PA-C, 2 5 mg at 04/25/22 1731    aspirin (ECOTRIN LOW STRENGTH) EC tablet 81 mg, 81 mg, Oral, Every Other Day, Marvin Silver, PA-C, 81 mg at 04/26/22 0916    cefTRIAXone (ROCEPHIN) IVPB (premix in dextrose) 1,000 mg 50 mL, 1,000 mg, Intravenous, Q24H, Marvin Silver, PA-C, Stopped at 04/26/22 1918    digoxin (LANOXIN) tablet 125 mcg, 125 mcg, Oral, Daily, Sadie Payne, PA-C, 125 mcg at 04/27/22 0910    enoxaparin (LOVENOX) subcutaneous injection 40 mg, 40 mg, Subcutaneous, Daily, Marvin Aponte, PA-C, 40 mg at 04/27/22 0840    formoterol (PERFOROMIST) nebulizer solution 20 mcg, 20 mcg, Nebulization, Q12H, Marvin Fadi, PA-C, 20 mcg at 04/27/22 0700    gabapentin (NEURONTIN) capsule 200 mg, 200 mg, Oral, HS, Skylar Booker, PA-C, 200 mg at 04/26/22 2133    guaiFENesin (MUCINEX) 12 hr tablet 600 mg, 600 mg, Oral, BID, Skylar Booker, PA-C, 600 mg at 04/27/22 0840    hydrALAZINE (APRESOLINE) injection 5 mg, 5 mg, Intravenous, Q6H PRN, Marvin Silver, PA-C, 5 mg at 04/26/22 1519    insulin glargine (LANTUS) subcutaneous injection 10 Units 0 1 mL, 10 Units, Subcutaneous, HS, Marvin Silver, PA-C, 10 Units at 04/26/22 2132    insulin lispro (HumaLOG) 100 units/mL subcutaneous injection 1-6 Units, 1-6 Units, Subcutaneous, TID AC, 1 Units at 04/27/22 0808 **AND** Fingerstick Glucose (POCT), , , TID AC, Skylar Ochoa PA-C    insulin lispro (HumaLOG) 100 units/mL subcutaneous injection 1-6 Units, 1-6 Units, Subcutaneous, HS, Marvin Aponte PA-C, 2 Units at 04/26/22 2133    ipratropium (ATROVENT) 0 02 % inhalation solution 0 5 mg, 0 5 mg, Nebulization, TID, SUN Mcneal-C, 0 5 mg at 04/27/22 0700    levalbuterol [facility]) inhalation solution 1 25 mg, 1 25 mg, Nebulization, TID, SUN Mcneal-C, 1 25 mg at 04/27/22 0700    lisinopril (ZESTRIL) tablet 40 mg, 40 mg, Oral, Daily, SUN Mcneal-C, 40 mg at 04/27/22 0840    methylPREDNISolone sodium succinate (Solu-MEDROL) injection 40 mg, 40 mg, Intravenous, Q12H ENDER, SUN Barrios-C, 40 mg at 04/27/22 0841    metoprolol (LOPRESSOR) injection 5 mg, 5 mg, Intravenous, Q6H PRN, SUN Mcneal-C, 5 mg at 04/26/22 1637    metoprolol tartrate (LOPRESSOR) tablet 25 mg, 25 mg, Oral, Q12H [address], SUN Barrios-C, 25 mg at 04/27/22 0841    nicotine (NICODERM CQ) 21 mg/24 hr TD 24 hr patch 1 patch, 1 patch, Transdermal, Daily, SUN Mcneal-SHADI, 1 patch at 04/27/22 0841    ondansetron (ZOFRAN) injection 4 mg, 4 mg, Intravenous, Q6H PRN, Humphrey Jeffers PA-C    pravastatin (PRAVACHOL) tablet 80 mg, 80 mg, Oral, Daily With Dinner, SUN Mcneal-C, 80 mg at 04/26/22 1637     Labs & Results:  Results from last 7 days   Lab Units 04/24/22  1319   BNP pg/mL 354*     Results from last 7 days   Lab Units 04/27/22  0541   WBC Thousand/uL 13 62*   HEMOGLOBIN g/dL 11 8*   HEMATOCRIT % 37 1   PLATELETS Thousands/uL 194         Results from last 7 days   Lab Units 04/27/22  0541 04/26/22  0934 04/26/22  0522   POTASSIUM mmol/L 4 7  --    < >   CHLORIDE mmol/L 98  --    < >   CO2 mmol/L 27  --    < >   CO2, I-STAT mmol/L  --  28  --    BUN mg/dL 25  --    < >   CREATININE mg/dL 0 74  --    < >   GLUCOSE, ISTAT mg/dl  --  374*  --     < > = values in this interval not displayed       Results from last 7 days   Lab Units 04/24/22  1319   INR  0 96

## 2022-04-27 NOTE — ASSESSMENT & PLAN NOTE
· Patient has declined ICD in the past per cardiology  · Will continue metoprolol 25 mg twice daily, lisinopril 40 mg daily  · Digoxin added as heart rate has been uncontrolled at rest

## 2022-04-27 NOTE — ASSESSMENT & PLAN NOTE
· Patient utilizes 2 L of nasal cannula supplemental O2 at night  · Requiring 4 L of nasal cannula O2 in the ED  · Likely secondary to COPD exacerbation and pneumonia  · Patient was briefly transferred to the ICU for BiPAP therapy, doing well now and has been transitioned back to med surg  · ABG result appreciated  · Wean O2 as tolerated  · Goal SpO2 > 88%

## 2022-04-28 VITALS
WEIGHT: 194.45 LBS | HEIGHT: 71 IN | TEMPERATURE: 96.8 F | BODY MASS INDEX: 27.22 KG/M2 | DIASTOLIC BLOOD PRESSURE: 75 MMHG | SYSTOLIC BLOOD PRESSURE: 165 MMHG | RESPIRATION RATE: 23 BRPM | OXYGEN SATURATION: 92 % | HEART RATE: 88 BPM

## 2022-04-28 LAB
ANION GAP SERPL CALCULATED.3IONS-SCNC: 6 MMOL/L (ref 4–13)
BACTERIA SPT RESP CULT: ABNORMAL
BUN SERPL-MCNC: 25 MG/DL (ref 5–25)
CALCIUM SERPL-MCNC: 8.3 MG/DL (ref 8.4–10.2)
CHLORIDE SERPL-SCNC: 98 MMOL/L (ref 96–108)
CO2 SERPL-SCNC: 32 MMOL/L (ref 21–32)
CREAT SERPL-MCNC: 0.61 MG/DL (ref 0.6–1.3)
ERYTHROCYTE [DISTWIDTH] IN BLOOD BY AUTOMATED COUNT: 13.3 % (ref 11.6–15.1)
GFR SERPL CREATININE-BSD FRML MDRD: 97 ML/MIN/1.73SQ M
GLUCOSE SERPL-MCNC: 246 MG/DL (ref 65–140)
GLUCOSE SERPL-MCNC: 259 MG/DL (ref 65–140)
GLUCOSE SERPL-MCNC: 323 MG/DL (ref 65–140)
GLUCOSE SERPL-MCNC: 326 MG/DL (ref 65–140)
GRAM STN SPEC: ABNORMAL
HCT VFR BLD AUTO: 36.9 % (ref 36.5–49.3)
HGB BLD-MCNC: 11.8 G/DL (ref 12–17)
MCH RBC QN AUTO: 30.9 PG (ref 26.8–34.3)
MCHC RBC AUTO-ENTMCNC: 32 G/DL (ref 31.4–37.4)
MCV RBC AUTO: 97 FL (ref 82–98)
PLATELET # BLD AUTO: 206 THOUSANDS/UL (ref 149–390)
PMV BLD AUTO: 11.5 FL (ref 8.9–12.7)
POTASSIUM SERPL-SCNC: 4.6 MMOL/L (ref 3.5–5.3)
PROCALCITONIN SERPL-MCNC: 0.47 NG/ML
RBC # BLD AUTO: 3.82 MILLION/UL (ref 3.88–5.62)
SODIUM SERPL-SCNC: 136 MMOL/L (ref 135–147)
WBC # BLD AUTO: 9.14 THOUSAND/UL (ref 4.31–10.16)

## 2022-04-28 PROCEDURE — 99239 HOSP IP/OBS DSCHRG MGMT >30: CPT | Performed by: INTERNAL MEDICINE

## 2022-04-28 PROCEDURE — 99232 SBSQ HOSP IP/OBS MODERATE 35: CPT | Performed by: INTERNAL MEDICINE

## 2022-04-28 PROCEDURE — 85027 COMPLETE CBC AUTOMATED: CPT | Performed by: INTERNAL MEDICINE

## 2022-04-28 PROCEDURE — 84145 PROCALCITONIN (PCT): CPT | Performed by: INTERNAL MEDICINE

## 2022-04-28 PROCEDURE — 80048 BASIC METABOLIC PNL TOTAL CA: CPT | Performed by: INTERNAL MEDICINE

## 2022-04-28 PROCEDURE — 94761 N-INVAS EAR/PLS OXIMETRY MLT: CPT

## 2022-04-28 PROCEDURE — 94760 N-INVAS EAR/PLS OXIMETRY 1: CPT

## 2022-04-28 PROCEDURE — 94640 AIRWAY INHALATION TREATMENT: CPT

## 2022-04-28 PROCEDURE — 82948 REAGENT STRIP/BLOOD GLUCOSE: CPT

## 2022-04-28 RX ORDER — AZITHROMYCIN 250 MG/1
250 TABLET, FILM COATED ORAL 3 TIMES WEEKLY
Qty: 30 TABLET | Refills: 0 | Status: SHIPPED | OUTPATIENT
Start: 2022-04-29 | End: 2022-05-29

## 2022-04-28 RX ORDER — DIGOXIN 125 MCG
125 TABLET ORAL DAILY
Qty: 30 TABLET | Refills: 0 | Status: SHIPPED | OUTPATIENT
Start: 2022-04-29 | End: 2022-06-15 | Stop reason: SDUPTHER

## 2022-04-28 RX ORDER — FORMOTEROL FUMARATE 20 UG/2ML
20 SOLUTION RESPIRATORY (INHALATION)
Qty: 120 ML | Refills: 0 | Status: SHIPPED | OUTPATIENT
Start: 2022-04-28 | End: 2022-05-02

## 2022-04-28 RX ORDER — CEFDINIR 300 MG/1
300 CAPSULE ORAL EVERY 12 HOURS SCHEDULED
Qty: 6 CAPSULE | Refills: 0 | Status: SHIPPED | OUTPATIENT
Start: 2022-04-28 | End: 2022-05-01

## 2022-04-28 RX ORDER — PREDNISONE 10 MG/1
TABLET ORAL
Qty: 30 TABLET | Refills: 0 | Status: SHIPPED | OUTPATIENT
Start: 2022-04-29 | End: 2022-05-11

## 2022-04-28 RX ADMIN — GUAIFENESIN 600 MG: 600 TABLET, EXTENDED RELEASE ORAL at 08:48

## 2022-04-28 RX ADMIN — IPRATROPIUM BROMIDE 0.5 MG: 0.5 SOLUTION RESPIRATORY (INHALATION) at 07:43

## 2022-04-28 RX ADMIN — CEFTRIAXONE 1000 MG: 1 INJECTION, SOLUTION INTRAVENOUS at 14:24

## 2022-04-28 RX ADMIN — ENOXAPARIN SODIUM 40 MG: 100 INJECTION SUBCUTANEOUS at 08:48

## 2022-04-28 RX ADMIN — METOPROLOL TARTRATE 25 MG: 25 TABLET, FILM COATED ORAL at 08:49

## 2022-04-28 RX ADMIN — INSULIN LISPRO 3 UNITS: 100 INJECTION, SOLUTION INTRAVENOUS; SUBCUTANEOUS at 08:49

## 2022-04-28 RX ADMIN — ASPIRIN 81 MG: 81 TABLET, COATED ORAL at 08:48

## 2022-04-28 RX ADMIN — DIGOXIN 125 MCG: 125 TABLET ORAL at 08:48

## 2022-04-28 RX ADMIN — PREDNISONE 40 MG: 20 TABLET ORAL at 08:50

## 2022-04-28 RX ADMIN — IPRATROPIUM BROMIDE 0.5 MG: 0.5 SOLUTION RESPIRATORY (INHALATION) at 13:27

## 2022-04-28 RX ADMIN — INSULIN LISPRO 5 UNITS: 100 INJECTION, SOLUTION INTRAVENOUS; SUBCUTANEOUS at 11:57

## 2022-04-28 RX ADMIN — NICOTINE 1 PATCH: 21 PATCH, EXTENDED RELEASE TRANSDERMAL at 08:53

## 2022-04-28 RX ADMIN — FORMOTEROL FUMARATE DIHYDRATE 20 MCG: 20 SOLUTION RESPIRATORY (INHALATION) at 07:43

## 2022-04-28 RX ADMIN — LISINOPRIL 40 MG: 20 TABLET ORAL at 08:49

## 2022-04-28 RX ADMIN — LEVALBUTEROL HYDROCHLORIDE 1.25 MG: 1.25 SOLUTION, CONCENTRATE RESPIRATORY (INHALATION) at 07:43

## 2022-04-28 RX ADMIN — LEVALBUTEROL HYDROCHLORIDE 1.25 MG: 1.25 SOLUTION, CONCENTRATE RESPIRATORY (INHALATION) at 13:27

## 2022-04-28 NOTE — PROGRESS NOTES
[address]  Progress Note - Nelson Mancuso 1945, 69 y o  male MRN: [id number]  Unit/Bed#: ICU 03-01 Encounter: [id number]  Primary Care Provider: Jeremy Briones DO   Date and time admitted to hospital: 4/24/2022 12:45 PM    Pneumonia  Assessment & Plan  Primary reason for presentation  Improved   Antibiotics and treatment per Medical team     Acute on chronic respiratory failure [facility])  Assessment & Plan  Related to COPD exacerbation and pneumonia   No recurrence of symptoms overnight   Management per critical care    Ischemic cardiomyopathy  Assessment & Plan  EF 30% on echo 10/2020  Continue ace, BB  Pt declined ICD  Given resting tachycardia, and significant tachycardia minimal exertion in the setting of ischemic cardiomyopathy digoxin was added for rate control   Patient is doing well on dig 125 mcg daily    Ok to be D/savana to home on this dose    We will follow in the OP setting       NSTEMI (non-ST elevated myocardial infarction) ([facility] [address] )  Assessment & Plan  Type 2 MI in the setting of sepsis and resp distress  EKG with T wave inversions  Pt asymptomatic  Continue current medications  See medication adjustments       Coronary artery disease involving native coronary artery of native heart without angina pectoris  Assessment & Plan  Prior CABG  With ischemic cardiomyopathy   Last known EF 30% on echo 10/2020  Continue BB, statin, asa    * [facility]  Assessment & Plan  Management per medical team       Subjective:  Patient seen and examined at the bedside  No events overnight  Feeling better  No chest pain dyspnea, or palpitations  Edema is improved  The patient is anxious to go home  HR and BP are controlled  HR much better controlled with the addition of digoxin  Ok to be discharged to home on 125 mcg  Will need to have a dig level in three days       Objective:   Vitals: Blood pressure (!) 173/87, pulse 90, temperature (!) 96 1 °F (35 6 °C), temperature source Tympanic, resp  rate (!) 28, height 5' 10 5" (1 791 m), weight 88 2 kg (194 lb 7 1 oz), SpO2 96 % ,   Orthostatic Blood Pressures      Most Recent Value   Blood Pressure 173/87 filed at 04/28/2022 0600   Patient Position - Orthostatic VS Lying filed at 04/28/2022 0400          Telemetry: sinus rhythm controlled rate     Physical Exam:  Physical Exam  Vitals and nursing note reviewed  Constitutional:       Appearance: He is well-developed  He is morbidly obese  HENT:      Head: Normocephalic and atraumatic  Mouth/Throat:      Mouth: Mucous membranes are moist    Eyes:      General: No scleral icterus  Conjunctiva/sclera: Conjunctivae normal    Neck:      Thyroid: No thyromegaly  Vascular: JVD: Unable to assess  Comments: NC O2 in place   Cardiovascular:      Rate and Rhythm: Normal rate and regular rhythm  Heart sounds: S1 normal and S2 normal  Murmur heard  Systolic murmur is present with a grade of 2/6  No friction rub  No gallop  Comments: Midsternal scar  Pulmonary:      Effort: No respiratory distress  Breath sounds: No wheezing or rales  Abdominal:      General: Bowel sounds are normal  There is no distension  Palpations: Abdomen is soft  Tenderness: There is no abdominal tenderness  Comments: Aorta not palpable   Musculoskeletal:         General: No tenderness or deformity  Normal range of motion  Cervical back: Normal range of motion and neck supple  Right lower leg: No edema  Left lower leg: No edema  Skin:     General: Skin is warm and dry  Neurological:      General: No focal deficit present  Mental Status: He is alert and oriented to person, place, and time     Psychiatric:         Mood and Affect: Mood normal          Behavior: Behavior normal          Judgment: Judgment normal          Medications:    Current Facility-Administered Medications:     acetaminophen (TYLENOL) tablet 650 mg, 650 mg, Oral, Q4H PRN, Lexx Fischer Marian Pearson PA-C    albuterol inhalation solution 2 5 mg, 2 5 mg, Nebulization, Q4H PRN, Patrice Workman PA-C, 2 5 mg at 04/25/22 1731    aspirin (ECOTRIN LOW STRENGTH) EC tablet 81 mg, 81 mg, Oral, Every Other Day, Patrice Workman PA-C, 81 mg at 04/28/22 0848    cefTRIAXone (ROCEPHIN) IVPB (premix in dextrose) 1,000 mg 50 mL, 1,000 mg, Intravenous, Q24H, Patrice Workman PA-C, Stopped at 04/27/22 2000    digoxin (LANOXIN) tablet 125 mcg, 125 mcg, Oral, Daily, Huang Reyes PA-C, 125 mcg at 04/28/22 0848    enoxaparin (LOVENOX) subcutaneous injection 40 mg, 40 mg, Subcutaneous, Daily, Patrice Workman PA-C, 40 mg at 04/28/22 0848    formoterol (PERFOROMIST) nebulizer solution 20 mcg, 20 mcg, Nebulization, Q12H, Patrice Workman PA-C, 20 mcg at 04/28/22 1950    gabapentin (NEURONTIN) capsule 200 mg, 200 mg, Oral, HS, Skylar Booker PA-C, 200 mg at 04/27/22 2155    guaiFENesin (MUCINEX) 12 hr tablet 600 mg, 600 mg, Oral, BID, Skylar Booker PA-C, 600 mg at 04/28/22 0848    hydrALAZINE (APRESOLINE) injection 5 mg, 5 mg, Intravenous, Q6H PRN, Patrice Workman PA-C, 5 mg at 04/26/22 1519    insulin glargine (LANTUS) subcutaneous injection 10 Units 0 1 mL, 10 Units, Subcutaneous, HS, Patrice Workman PA-C, 10 Units at 04/27/22 2155    insulin lispro (HumaLOG) 100 units/mL subcutaneous injection 1-6 Units, 1-6 Units, Subcutaneous, TID AC, 3 Units at 04/28/22 0849 **AND** Fingerstick Glucose (POCT), , , TID MICHAEL, Skylar Wilhemena , PA-C    insulin lispro (HumaLOG) 100 units/mL subcutaneous injection 1-6 Units, 1-6 Units, Subcutaneous, HS, Sabine Figueroa PA-C, 3 Units at 04/27/22 2155    ipratropium (ATROVENT) 0 02 % inhalation solution 0 5 mg, 0 5 mg, Nebulization, TID, Patrice Workman PA-C, 0 5 mg at 04/28/22 0743    levalbuterol (XOPENEX) inhalation solution 1 25 mg, 1 25 mg, Nebulization, TID, Antwon Domingo, PA-C, 1 25 mg at 04/28/22 0743    lisinopril (ZESTRIL) tablet 40 mg, 40 mg, Oral, Daily, Antwon Chayo, PA-C, 40 mg at 04/28/22 0849    metoprolol (LOPRESSOR) injection 5 mg, 5 mg, Intravenous, Q6H PRN, Antwon Michelles, PA-C, 5 mg at 04/26/22 1637    metoprolol tartrate (LOPRESSOR) tablet 25 mg, 25 mg, Oral, Q12H [facility], Skylar Briggsariana, PA-C, 25 mg at 04/28/22 0849    nicotine (NICODERM CQ) 21 mg/24 hr TD 24 hr patch 1 patch, 1 patch, Transdermal, Daily, Antwon Domingo PA-C, 1 patch at 04/28/22 0853    ondansetron (ZOFRAN) injection 4 mg, 4 mg, Intravenous, Q6H PRN, Antwon Domingo PA-C    pravastatin (PRAVACHOL) tablet 80 mg, 80 mg, Oral, Daily With Dinner, Antwon Domingo PA-C, 80 mg at 04/27/22 1559    predniSONE tablet 40 mg, 40 mg, Oral, Daily, Toni Torres MD, 40 mg at 04/28/22 0850     Labs & Results:  Results from last 7 days   Lab Units 04/24/22  1319   BNP pg/mL 354*     Results from last 7 days   Lab Units 04/28/22  0501   WBC Thousand/uL 9 14   HEMOGLOBIN g/dL 11 8*   HEMATOCRIT % 36 9   PLATELETS Thousands/uL 206         Results from last 7 days   Lab Units 04/28/22  0501 04/27/22  0541 04/26/22  0934   POTASSIUM mmol/L 4 6   < >  --    CHLORIDE mmol/L 98   < >  --    CO2 mmol/L 32   < >  --    CO2, I-STAT mmol/L  --   --  28   BUN mg/dL 25   < >  --    CREATININE mg/dL 0 61   < >  --    GLUCOSE, ISTAT mg/dl  --   --  374*    < > = values in this interval not displayed       Results from last 7 days   Lab Units 04/24/22  1319   INR  0 96

## 2022-04-28 NOTE — ASSESSMENT & PLAN NOTE
· Likely secondary to COPD exacerbation and pneumonia  · Patient was briefly transferred to the ICU for BiPAP therapy, doing well now and has been transitioned back to med surg  · ABG result appreciated  · Desaturation study performed, patient will need 2 L of nasal cannula continuously as outpatient  · Case Management to set up home O2    Patient already has home oxygen in place which he uses at night

## 2022-04-28 NOTE — ASSESSMENT & PLAN NOTE
· Likely secondary to type 2 ischemia due to sepsis  · Patient states he does have a history of advanced heart disease and follows with cardiology as outpatient  · Denies any chest pain  · Cardiology input appreciated  · Outpatient follow-up

## 2022-04-28 NOTE — NURSING NOTE
Order for discharge received  Oxygen tank arrived to room  Discharge instructions reviewed with patient and questions addressed  IV x2 removed from left and right arms and covered with dsd  Awaiting family for pickup

## 2022-04-28 NOTE — RESPIRATORY THERAPY NOTE
Home Oxygen Qualifying Test     Patient name: Tejas Hair        : 1945   Date of Test:  2022  Diagnosis:    Home Oxygen Test:    **Medicare Guidelines require item(s) 1-5 on all ambulatory patients or 1 and 2 on non-ambulatory patients  1  Baseline SPO2 on Room Air at rest 88 %   a  If <= 88% on Room Air add O2 via NC to obtain SpO2 >=88%  If LPM needed, document LPM 2 needed to reach =>88%    2  SPO2 during exertion on Room Air n/a  %  a  During exertion monitor SPO2  If SPO2 increases >=89%, do not add supplemental oxygen    3  SPO2 on Oxygen at Rest 94 % at 2 LPM    4  SPO2 during exertion on Oxygen 90 % at 2 LPM    5  Test performed during exertion activity  [x]  Supplemental Home Oxygen is indicated  []  Client does not qualify for home oxygen      Respiratory Additional Notes-Pt walked entire ICU unit without difficulty    Arely Harding, RT

## 2022-04-28 NOTE — PROGRESS NOTES
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  Diabetes- type 2  Hypertension        Continue prednisone 40 mg- taper over 2 weeks  Continue Xopenex and Atrovent TID while inpatient  Resume budesonide BID  Perforomist BID (LABA)  Ceftriaxone day 4/5  Patient did not have hypercapnia on ABG- does not qualify for home NIPPV  Wean O2 for goal SPO2 >88%- now at baseline O2  Ambulatory test shows no increase in O2 requirement  When discharged should be on prednisone taper over 2 weeks and continue outpatient  Regimen of Budesonide BID, Perforomist BID, Atrovent neb TID, Albuterol neb/inhaler PRN, Azithromycin MWF and will follow-up in our clinic    Pulmonary medicine will sign off  Call back with questions  Subjective:   Feels back to baseline  No dyspnea currently  Objective:     Vitals: Blood pressure 165/75, pulse 83, temperature (!) 96 1 °F (35 6 °C), temperature source Tympanic, resp  rate 21, height 5' 10 5" (1 791 m), weight 88 2 kg (194 lb 7 1 oz), SpO2 (!) 88 %  ,Body mass index is 27 51 kg/m²  Intake/Output Summary (Last 24 hours) at 4/28/2022 1046  Last data filed at 4/28/2022 0801  Gross per 24 hour   Intake 1580 ml   Output 1850 ml   Net -270 ml       Invasive Devices  Report    Peripheral Intravenous Line            Peripheral IV 04/27/22 Distal;Left;Upper;Ventral (anterior) Antecubital 1 day              General:  Patient is awake, alert, non-toxic and in no acute respiratory distress  Eyes: PERRL, no scleral icterus  Neck: No JVD  CV:  Regular, +S1 and S2, No murmurs, gallops or rubs appreciated  Lungs: Distant breath sounds bilaterally   No wheeze  Abdomen: Soft, +BS, Non-tender, non-distended  Extremities: No clubbing, cyanosis or edema  Neuro: No focal motor/sensory deficits  Skin: Warm, No rashes or ulcerations        Labs: I have personally reviewed pertinent lab results  Imaging and other studies: I have personally reviewed pertinent reports     and I have personally reviewed pertinent films in PACS

## 2022-04-28 NOTE — ASSESSMENT & PLAN NOTE
Related to COPD exacerbation and pneumonia   No recurrence of symptoms overnight   Management per critical care

## 2022-04-28 NOTE — CASE MANAGEMENT
Case Management Discharge Planning Note    Patient name Diana Monk ICU 03/ICU  MRN [id number]  : 1945 Date 2022       Current Admission Date: 2022  Current Admission Diagnosis:COPD exacerbation [facility])   Patient Active Problem List    Diagnosis Date Noted    Elevated troponin 2022    [facility] 20[facility]    Pneumonia 2022    Sepsis due to pneumonia ([facility] ) 2022    Fatigue 2022    PAD (peripheral artery disease) ([place] [address] ) 2022    Diminished pulses in lower extremity 2021    Type 2 MI (myocardial infarction) ([place] [address] ) 2021    Diabetic ketoacidosis without coma associated with type 2 diabetes mellitus ([place] [address] ) 2021    Physical deconditioning 2021    Tinea unguium 2021    Type 2 diabetes mellitus with diabetic neuropathy, unspecified ([place] [address] ) 2021    Other age-related cataract 2021    Central retinal vein occlusion with macular edema 2021    Nonexudative age-related macular degeneration 2021    Obesity 2021    Ocular hypertension 2021    Onychomycosis due to dermatophyte 2021    Acute on chronic respiratory failure ([facility] ) 08/15/2020    COPD exacerbation ([place] [address] ) 2020    Other hyperlipidemia 2020    Ischemic cardiomyopathy 2019    Coronary artery disease involving native coronary artery of native heart without angina pectoris 2019    Benign prostatic hyperplasia with urinary retention 2019    Mucopurulent chronic bronchitis ([facility] ) 2019    Benign essential tremor 2019    Pill rolling tremor 2019    Prostate cancer ([facility] ) 2018    Ambulatory dysfunction 2017    On intra-aortic balloon pump assist 03/10/2017    S/P CABG x 4 2017    Tobacco abuse 2017    NSTEMI (non-ST elevated myocardial infarction) ([facility] ) 2017      LOS (days): 3  Geometric Mean LOS (GMLOS) (days): 4 80  Days to GMLOS:1 7     OBJECTIVE:  Risk of Unplanned Readmission Score: 33         Current admission status: Inpatient   Preferred Pharmacy:   [facility] [address] [facility] [address]  Phone: [phone] Fax: [phone]    Primary Care Provider: Lit Alvarez DO    Primary Insurance: [facility] [employer] [employer]  Secondary Insurance:     DISCHARGE DETAILS:    Discharge planning discussed with[de-identified] patient and wife was called at 09:56 am LM spoke wiht her at 12:00 pm  Freedom of Choice: Yes  Comments - Freedom of Choice: pt declined hhc, pt mynor need oxygen continuous pt and wife were not sure of the oxygn company, cm called around and found [facility] [address]  pt declined hhc  CM contacted family/caregiver?: Yes  Were Treatment Team discharge recommendations reviewed with patient/caregiver?: Yes  Did patient/caregiver verbalize understanding of patient care needs?: Yes  Were patient/caregiver advised of the risks associated with not following Treatment Team discharge recommendations?: Yes    Contacts  Patient Contacts: James Spencer  Relationship to Patient[de-identified] Family (wife)  Contact Method: Phone  Phone Number: [phone]  Reason/Outcome: Discharge [address]         Is the patient interested in [facility] at discharge?: No    DME Referral Provided  Referral made for DME?: Yes  DME referral completed for the following items[de-identified] Portable Oxygen tanks (pt has a concentrator from [employer]  they will deliver a portable tank for the transport home and deliver the needed equipment to the home)  DME Supplier Name[de-identified] [employer]    Other Referral/Resources/Interventions Provided:  Interventions: DME  Referral Comments: pt was set up for continuous oxgyen  pt had an order for hs only    Would you like to participate in our [address] service program?  : No - Declined    Treatment Team Recommendation: Home (home with spouse and outpt follow up-wife will transport after th portable oxygen is delivered)     Transport at Discharge : Aislinn Pierre by: Family member

## 2022-04-28 NOTE — PLAN OF CARE
Problem: PAIN - ADULT  Goal: Verbalizes/displays adequate comfort level or baseline comfort level  Description: Interventions:  - Encourage patient to monitor pain and request assistance  - Assess pain using appropriate pain scale  - Administer analgesics based on type and severity of pain and evaluate response  - Implement non-pharmacological measures as appropriate and evaluate response  - Consider cultural and social influences on pain and pain management  - Notify physician/advanced practitioner if interventions unsuccessful or patient reports new pain  Outcome: Progressing     Problem: INFECTION - ADULT  Goal: Absence or prevention of progression during hospitalization  Description: INTERVENTIONS:  - Assess and monitor for signs and symptoms of infection  - Monitor lab/diagnostic results  - Monitor all insertion sites, i e  indwelling lines, tubes, and drains  - Monitor endotracheal if appropriate and nasal secretions for changes in amount and color  - [place] appropriate cooling/warming therapies per order  - Administer medications as ordered  - Instruct and encourage patient and family to use good hand hygiene technique  - Identify and instruct in appropriate isolation precautions for identified infection/condition  Outcome: Progressing  Goal: Absence of fever/infection during neutropenic period  Description: INTERVENTIONS:  - Monitor WBC    Outcome: Progressing     Problem: SAFETY ADULT  Goal: Patient will remain free of falls  Description: INTERVENTIONS:  - Educate patient/family on patient safety including physical limitations  - Instruct patient to call for assistance with activity   - Consult OT/PT to assist with strengthening/mobility   - Keep Call bell within reach  - Keep bed low and locked with side rails adjusted as appropriate  - Keep care items and personal belongings within reach  - Initiate and maintain comfort rounds  - Make Fall Risk Sign visible to staff  - Offer Toileting every 2 Hours, in advance of need  - Initiate/Maintain fall alarm  - Obtain necessary fall risk management equipment: fall alarm  - Apply yellow socks and bracelet for high fall risk patients  - Consider moving patient to room near nurses station  Outcome: Progressing  Goal: Maintain or return to baseline ADL function  Description: INTERVENTIONS:  -  Assess patient's ability to carry out ADLs; assess patient's baseline for ADL function and identify physical deficits which impact ability to perform ADLs (bathing, care of mouth/teeth, toileting, grooming, dressing, etc )  - Assess/evaluate cause of self-care deficits   - Assess range of motion  - Assess patient's mobility; develop plan if impaired  - Assess patient's need for assistive devices and provide as appropriate  - Encourage maximum independence but intervene and supervise when necessary  - Involve family in performance of ADLs  - Assess for home care needs following discharge   - Consider OT consult to assist with ADL evaluation and planning for discharge  - Provide patient education as appropriate  Outcome: Progressing  Goal: Maintains/Returns to pre admission functional level  Description: INTERVENTIONS:  - Perform BMAT or MOVE assessment daily    - Set and communicate daily mobility goal to care team and patient/family/caregiver  - Collaborate with rehabilitation services on mobility goals if consulted  - Perform Range of Motion 3 times a day  - Reposition patient every 2 hours    - Dangle patient 3 times a day  - Stand patient 3 times a day  - Ambulate patient 3 times a day  - Out of bed to chair 3 times a day   - Out of bed for meals 3 times a day  - Out of bed for toileting  - Record patient progress and toleration of activity level   Outcome: Progressing     Problem: DISCHARGE PLANNING  Goal: Discharge to home or other facility with appropriate resources  Description: INTERVENTIONS:  - Identify barriers to discharge w/patient and caregiver  - Arrange for needed discharge resources and transportation as appropriate  - Identify discharge learning needs (meds, wound care, etc )  - Arrange for interpretive services to assist at discharge as needed  - Refer to Case Management Department for coordinating discharge planning if the patient needs post-hospital services based on physician/advanced practitioner order or complex needs related to functional status, cognitive ability, or social support system  Outcome: Progressing     Problem: Knowledge Deficit  Goal: Patient/family/caregiver demonstrates understanding of disease process, treatment plan, medications, and discharge instructions  Description: Complete learning assessment and assess knowledge base    Interventions:  - Provide teaching at level of understanding  - Provide teaching via preferred learning methods  Outcome: Progressing     Problem: Potential for Falls  Goal: Patient will remain free of falls  Description: INTERVENTIONS:  - Educate patient/family on patient safety including physical limitations  - Instruct patient to call for assistance with activity   - Consult OT/PT to assist with strengthening/mobility   - Keep Call bell within reach  - Keep bed low and locked with side rails adjusted as appropriate  - Keep care items and personal belongings within reach  - Initiate and maintain comfort rounds  - Make Fall Risk Sign visible to staff  - Offer Toileting every 2 Hours, in advance of need  - Initiate/Maintain fall alarm  - Obtain necessary fall risk management equipment: fall alarm  - Apply yellow socks and bracelet for high fall risk patients  - Consider moving patient to room near nurses station  Outcome: Progressing     Problem: MOBILITY - ADULT  Goal: Maintain or return to baseline ADL function  Description: INTERVENTIONS:  -  Assess patient's ability to carry out ADLs; assess patient's baseline for ADL function and identify physical deficits which impact ability to perform ADLs (bathing, care of mouth/teeth, toileting, grooming, dressing, etc )  - Assess/evaluate cause of self-care deficits   - Assess range of motion  - Assess patient's mobility; develop plan if impaired  - Assess patient's need for assistive devices and provide as appropriate  - Encourage maximum independence but intervene and supervise when necessary  - Involve family in performance of ADLs  - Assess for home care needs following discharge   - Consider OT consult to assist with ADL evaluation and planning for discharge  - Provide patient education as appropriate  Outcome: Progressing  Goal: Maintains/Returns to pre admission functional level  Description: INTERVENTIONS:  - Perform BMAT or MOVE assessment daily    - Set and communicate daily mobility goal to care team and patient/family/caregiver  - Collaborate with rehabilitation services on mobility goals if consulted  - Perform Range of Motion 3 times a day  - Reposition patient every 2 hours    - Dangle patient 3 times a day  - Stand patient 3 times a day  - Ambulate patient 3 times a day  - Out of bed to chair 3 times a day   - Out of bed for meals 3 times a day  - Out of bed for toileting  - Record patient progress and toleration of activity level   Outcome: Progressing     Problem: Prexisting or High Potential for Compromised Skin Integrity  Goal: Skin integrity is maintained or improved  Description: INTERVENTIONS:  - Identify patients at risk for skin breakdown  - Assess and monitor skin integrity  - Assess and monitor nutrition and hydration status  - Monitor labs   - Assess for incontinence   - Turn and reposition patient  - Assist with mobility/ambulation  - Relieve pressure over bony prominences  - Avoid friction and shearing  - Provide appropriate hygiene as needed including keeping skin clean and dry  - Evaluate need for skin moisturizer/barrier cream  - Collaborate with interdisciplinary team   - Patient/family teaching  - Consider wound care consult   Outcome: Progressing

## 2022-04-28 NOTE — ASSESSMENT & PLAN NOTE
· Chest x-ray with right lower lobe pneumonia  · Given cefepime in the ED  · Patient was maintained on ceftriaxone while in the hospital  · Discharge on cefdinir

## 2022-04-28 NOTE — NURSING NOTE
Awake, alert, and oriented x4  Cooperative  Pt denies pain, sob, dizzy, lightheaded, n/v/d, numbness, or tingling  Pt currently 98% on 3l n/c, and decreased to 2l n/c  Lungs clear and diminished b/l  Denies cough  Afebrile  Assessment as noted in computer charting  Safety in place

## 2022-04-28 NOTE — CASE MANAGEMENT
Case Management Assessment & Discharge Planning Note    Patient name Brissa Staley  Location ICU 03/ICU - MRN [id number]  : 1945 Date 2022       Current Admission Date: 2022  Current Admission Diagnosis:COPD exacerbation [facility])   Patient Active Problem List    Diagnosis Date Noted    Elevated troponin 2022    Congestive heart failure ([facility] ) 2022    Pneumonia 2022    [facility] to [facility] 20[facility]    Fatigue 2022    PAD (peripheral artery disease) ([facility] ) 2022    Diminished pulses in lower extremity 2021    Type 2 MI (myocardial infarction) ([facility] ) 2021    Diabetic ketoacidosis without coma associated with type 2 diabetes mellitus ([facility] ) 2021    Physical deconditioning 2021    Tinea unguium 2021    Type 2 diabetes mellitus with diabetic neuropathy, unspecified ([place] [address] ) 2021    Other age-related cataract 2021    Central retinal vein occlusion with macular edema 2021    Nonexudative age-related macular degeneration 2021    Obesity 2021    Ocular hypertension 2021    Onychomycosis due to dermatophyte 2021    Acute on chronic respiratory failure ([facility] ) 08/15/2020    COPD exacerbation ([facility] ) 2020    Other hyperlipidemia 2020    Ischemic cardiomyopathy 2019    Coronary artery disease involving native coronary artery of native heart without angina pectoris 2019    Benign prostatic hyperplasia with urinary retention 2019    Mucopurulent chronic bronchitis ([facility] ) 2019    Benign essential tremor 2019    Pill rolling tremor 2019    Prostate cancer ([facility] ) 2018    Ambulatory dysfunction 2017    On intra-aortic balloon pump assist 03/10/2017    S/P CABG x 4 2017    Tobacco abuse 2017    NSTEMI (non-ST elevated myocardial infarction) ([facility] ) 2017      LOS (days): 3  Geometric Mean LOS (GMLOS) (days): 4 80  Days to GMLOS:1 9     OBJECTIVE:    Risk of Unplanned Readmission Score: 32         Current admission status: Inpatient  Referral Reason: Other (d/c planning)    [facility] Pharm[facility]   [facility] [address] [address] [address] [address]  Phone: [phone] Fax: [phone]    Primary Care Provider: Mary Christina,     Primary Insurance: [facility] [employer]  Secondary Insurance:     ASSESSMENT:  [place] [address] Proxies    There are no active Health Care Proxies on file  Advance Directives  Does patient have a [address]?: No  Was patient offered paperwork?: Yes (pt declined paperwork)  Does patient have Advance Directives?: No  Was patient offered paperwork?: Yes (pt declined paperwork)         Readmission Root Cause  30 Day Readmission: No    Patient Information  Admitted from[de-identified] Home  Mental Status: Alert  During Assessment patient was accompanied by: Not accompanied during assessment  Assessment information provided by[de-identified] Patient  Primary Caregiver: Self  Support Systems: Spouse/significant other  [place] of Residence: [address] do you live in?: Via [employer] entry access options   Select all that apply : Stairs  Number of steps to enter home : 4  Do the steps have railings?: Yes  Type of Current Residence: 2 story home  Upon entering residence, is there a bedroom on the main floor (no further steps)?: No  A bedroom is located on the following floor levels of residence (select all that apply):: 2nd Floor  Upon entering residence, is there a bathroom on the main floor (no further steps)?: Yes  Number of steps to 2nd floor from main floor: One Flight  In the last 12 months, was there a time when you were not able to pay the mortgage or rent on time?: No  In the last 12 months, how many places have you lived?: 1  In the last 12 months, was there a time when you did not have a steady place to sleep or slept in a shelter (including now)?: No  Living Arrangements: Lives w/ Spouse/significant other    Activities of Daily Living Prior to Admission  Functional Status: Independent  Completes ADLs independently?: Yes  Ambulates independently?: Yes  Does patient use assisted devices?: Yes  Assisted Devices (DME) used: Straight Cane  Does patient currently own DME?: Yes  What DME does the patient currently own?: Bedside Commode,Shower Chair,Walker,Straight Cane,Portable Oxygen concentrator,Nebulizer (oxygen hs 2 liters)  Does patient have a history of Outpatient Therapy (PT/OT)?: Yes  Does the patient have a history of Short-Term Rehab?: No  Does patient have a history of HHC?: Yes  Does patient currently have [facility] [address]?: No         Patient Information Continued  Income Source: Pension/senior care  Does patient have prescription coverage?: Yes ([employer])  Within the past 12 months, you worried that your food would run out before you got the money to buy more : Never true  Within the past 12 months, the food you bought just didnt last and you didnt have money to get more : Never true  Food insecurity resource given?: N/A  Does patient receive dialysis treatments?: No  Does patient have a history of substance abuse?: No  Does patient have a history of Mental Health Diagnosis?: No         Means of Transportation  Means of Transport to Appts[de-identified] Drives Self  In the past 12 months, has lack of transportation kept you from medical appointments or from getting medications?: No  In the past 12 months, has lack of transportation kept you from meetings, work, or from getting things needed for daily living?: No  Was application for public transport provided?: N/A        DISCHARGE DETAILS:    Discharge planning discussed with[de-identified] patient  Freedom of Choice: Yes  Comments - Freedom of Choice: pt denies any d/c needs at this time,,  pt does have home oxygen at bedtime only,  he is not sure of his oxygen company  , pt is on oxygen at  3 liters at present, pt may need a desat study                               Other Referral/Resources/Interventions Provided:  Referral Comments: pt may need a desat study joe d/c pt has oxygen for hs only    Would you like to participate in our [address] service program?  : No - Declined    Treatment Team Recommendation: Home (home with spouse may need anew oxygen order he has hs only- family will transport)

## 2022-04-28 NOTE — ASSESSMENT & PLAN NOTE
EF 30% on echo 10/2020  Continue ace, BB  Pt declined ICD  Given resting tachycardia, and significant tachycardia minimal exertion in the setting of ischemic cardiomyopathy digoxin was added for rate control   Patient is doing well on dig 125 mcg daily    Ok to be D/savana to home on this dose    We will follow in the OP setting

## 2022-04-28 NOTE — ASSESSMENT & PLAN NOTE
Physical Therapy Treatment Note       01/22/20 1113   Pain Assessment   Pain Assessment No/denies pain  (at rest)   Pain Type Chronic pain;Acute pain   Pain Location Leg   Pain Orientation Right   Pain Rating: FLACC (Rest) - Face 0   Pain Rating: FLACC (Rest) - Legs 0   Pain Rating: FLACC (Rest) - Activity 0   Pain Rating: FLACC (Rest) - Cry 0   Pain Rating: FLACC (Rest) - Consolability 0   Score: FLACC (Rest) 0   Pain Rating: FLACC (Activity) - Face 1   Pain Rating: FLACC (Activity) - Legs 0   Pain Rating: FLACC (Activity) - Activity 0   Pain Rating: FLACC (Activity) - Cry 1   Pain Rating: FLACC (Activity) - Consolability 1   Score: FLACC (Activity) 3   Restrictions/Precautions   Weight Bearing Precautions Per Order No   Braces or Orthoses Splint;AFO  (L UE resting hand splint; L LE AFO for foot drop- not present upon hospital admission)   Other Precautions Droplet precautions; Chair Alarm; Bed Alarm;Limb alert; Fall Risk;Visual impairment;Pain;Multiple lines; Seizure  (hx of seizures; back safety precautions)   General   Chart Reviewed Yes   Response to Previous Treatment Patient with no complaints from previous session  Family/Caregiver Present No   Cognition   Overall Cognitive Status WFL   Arousal/Participation Alert; Cooperative   Attention Within functional limits   Orientation Level Oriented X4   Memory Within functional limits   Following Commands Follows all commands and directions without difficulty   Comments pt agreeable to PT treatment   Subjective   Subjective "I was very lightheaded earlier"   Bed Mobility   Additional Comments pt sitting OOB in recliner chair upon arrival   Transfers   Sit to Stand 4  Minimal assistance  (min A of 1 required from elevated surface height)   Additional items Assist x 2; Increased time required;Verbal cues;Armrests   Stand to Sit 4  Minimal assistance   Additional items Assist x 2; Increased time required;Verbal cues   Ambulation/Elevation   Gait pattern L Hemiparesis;L · Present on admission as evidenced by tachycardia and leukocytosis  · Secondary to community-acquired pneumonia  · Will discharge on cefdinir to complete course of therapy  · Blood cultures negative to date  · Procalcitonin improving  · Sputum culture with no significant growth of MRSA or Pseudomonas Knee Андрей; Improper Weight shift;Decreased foot clearance;Decreased L stance; Short stride; Excessively slow  (L hip hiking for L LE advancement and foot clearance)   Gait Assistance 4  Minimal assist   Additional items Assist x 1;Verbal cues  (+ chair follow for enhanced pt safety and fall prevention)   Assistive Device Rolling walker   Distance 15' x 2 + 3'   Stair Management Assistance Not tested   Balance   Static Sitting Good   Dynamic Sitting Fair +   Static Standing Fair   Dynamic Standing Fair -   Ambulatory Poor +   Endurance Deficit   Endurance Deficit Yes   Activity Tolerance   Activity Tolerance Patient limited by fatigue   Medical Staff Made Aware SPT Katie   Nurse Made Aware RN Medina confirmed pt appropriate for therapy; made aware of session outcomes   Assessment   Prognosis Good   Problem List Decreased strength;Decreased endurance; Impaired balance;Decreased mobility; Impaired sensation;Pain; Impaired vision   Assessment Pt seen for PT treatment session this date with interventions consisting of gait training w/ emphasis on improving pt's ability to ambulate level surfaces x 15' x 2 + 3' with min A provided by therapist with RW + chair follow for enhanced safety and fall prevention and therapeutic activity consisting of training: sit<>stand transfers  Pt agreeable to PT treatment session upon arrival, pt found seated OOB in recliner, in no apparent distress, A&O x 4 and responsive  In comparison to previous session, pt with improvements in functional transfers with improving independence and endurance as evidenced by distance ambulated on level surfaces using RW  Post session: pt returned back to recliner, chair alarm engaged, all needs in reach and RN notified of session findings/recommendations  Continue to recommend STR at time of d/c in order to maximize pt's functional independence and safety w/ mobility   Pt continues to be functioning below baseline level, and remains limited 2* factors listed above  PT will continue to see pt while here in order to address the deficits listed above and provide interventions consistent w/ POC in effort to achieve STGs  Barriers to Discharge Inaccessible home environment;Decreased caregiver support   Goals   Patient Goals "Am I going back to rehab?"   STG Expiration Date 01/30/20   PT Treatment Day 1   Plan   Treatment/Interventions Functional transfer training;LE strengthening/ROM; Elevations; Therapeutic exercise; Endurance training;Patient/family training;Equipment eval/education; Bed mobility;Gait training;Spoke to nursing   Progress Slow progress, decreased activity tolerance   PT Frequency Other (Comment)  (3-5x/wk)   Recommendation   Recommendation Short-term skilled PT   PT - OK to Discharge Yes  (when medically cleared; if to STR)     Maura Diaz, PT, DPT    Time of PT treatment session: 11:13-11:43  30 minutes

## 2022-04-28 NOTE — DISCHARGE SUMMARY
[address]  Discharge- Sixto Dc 1945, 69 y o  male MRN: [id number]  Unit/Bed#: ICU 03-01 Encounter: [id number]  Primary Care Provider: Fernando Rivera DO   Date and time admitted to hospital: 4/24/2022 12:45 PM    Sepsis due to pneumonia [facility])  Assessment & Plan  · Present on admission as evidenced by tachycardia and leukocytosis  · Secondary to community-acquired pneumonia  · Will discharge on cefdinir to complete course of therapy  · Blood cultures negative to date  · Procalcitonin improving  · Sputum culture with no significant growth of MRSA or Pseudomonas    Acute on chronic respiratory failure (HCC)  Assessment & Plan  · Likely secondary to COPD exacerbation and pneumonia  · Patient was briefly transferred to the ICU for BiPAP therapy, doing well now and has been transitioned back to med surg  · ABG result appreciated  · Desaturation study performed, patient will need 2 L of nasal cannula continuously as outpatient  · Case Management to set up home O2    Patient already has home oxygen in place which he uses at night    Elevated troponin  Assessment & Plan  · Likely secondary to type 2 ischemia due to sepsis  · Patient states he does have a history of advanced heart disease and follows with cardiology as outpatient  · Denies any chest pain  · Cardiology input appreciated  · Outpatient follow-up    Pneumonia  Assessment & Plan  · Chest x-ray with right lower lobe pneumonia  · Given cefepime in the ED  · Patient was maintained on ceftriaxone while in the hospital  · Discharge on cefdinir    Obesity  Assessment & Plan  · Present on admission as evidenced by BMI of 28  · Encouraged lifestyle modifications and weight loss    Type 2 diabetes mellitus with diabetic neuropathy, unspecified (HCC)  Assessment & Plan  · Blood glucose elevated on presentation likely secondary to pneumonia/steroids  · Continue home diabetic regimen    Ischemic cardiomyopathy  Assessment & Plan  · Patient has declined ICD in the past per cardiology  · Will continue metoprolol 25 mg twice daily, lisinopril 40 mg daily  · Digoxin added as heart rate has been uncontrolled at rest    * COPD exacerbation ([facility] [address] )  Assessment & Plan  · Patient presents with shortness of breath  · Likely COPD exacerbation secondary to community-acquired pneumonia  · Clinically improving  · Will discharge on prednisone taper and nebulizers per pulmonology recommendations  · Outpatient follow-up with pulmonology      Discharging Physician / Practitioner: Dagoberto Levy MD  PCP: Jonathan Sanchez DO  Admission Date:   Admission Orders (From admission, onward)     Ordered        04/25/22 1124  Inpatient Admission  Once            04/24/22 1527  Place in Observation  Once                      Discharge Date: 04/28/22    Medical Problems             Resolved Problems  Date Reviewed: 4/28/2022    None                Consultations During Hospital Stay:  · Pulmonology, Cardiology, Critical Care    Procedures Performed:   · None    Significant Findings / Test Results:   · COPD exacerbation    Incidental Findings:   · None     Test Results Pending at Discharge (will require follow up): · None     Outpatient Tests Requested:  · Routine labs with PCP as outpatient    Complications:     None    Reason for Admission:  COPD exacerbation    Hospital Course:     Bonnie Meeks is a 69 y o  male patient who originally presented to the hospital on 4/24/2022 due to shortness of breath  Patient was admitted to the medicine service due to COPD exacerbation and pneumonia  Patient was found to be septic present on admission  Patient was started on antibiotics and treated under sepsis protocol  Pulmonology was consulted given patient's history of COPD  Patient decompensated while on medical service and was transferred to the ICU for BiPAP    Patient was on the critical care service for approximately 48 hours and then transferred back to Medical Service  Patient was seen by Cardiology during admission for elevated troponin and tachycardia  Digoxin was added to patient's home regimen due to persistently elevated heart rate  Patient doing well today  Procalcitonin improving  Culture results appreciated  Patient discharged home on cefdinir  Also discharged on prednisone taper and nebulizers per pulmonology recommendation  Patient will need follow-up with pulmonology and Cardiology as outpatient    Please see above list of diagnoses and related plan for additional information  Condition at Discharge: stable     Discharge Day Visit / Exam:     Subjective:  Patient states he is feeling better today  Tolerating diet    Vitals: Blood Pressure: 165/75 (04/28/22 0900)  Pulse: 83 (04/28/22 1000)  Temperature: (!) 97 1 °F (36 2 °C) (04/28/22 1100)  Temp Source: Tympanic (04/28/22 1100)  Respirations: 21 (04/28/22 1000)  Height: 5' 10 5" (179 1 cm) (04/25/22 1445)  Weight - Scale: 88 2 kg (194 lb 7 1 oz) (04/28/22 0500)  SpO2: (!) 88 % (04/28/22 1018)     Exam:   Physical Exam  Constitutional:       General: He is not in acute distress  HENT:      Head: Normocephalic and atraumatic  Nose: Nose normal       Mouth/Throat:      Mouth: Mucous membranes are moist    Eyes:      Extraocular Movements: Extraocular movements intact  Conjunctiva/sclera: Conjunctivae normal    Cardiovascular:      Rate and Rhythm: Normal rate and regular rhythm  Pulmonary:      Effort: Pulmonary effort is normal  No respiratory distress  Abdominal:      Palpations: Abdomen is soft  Tenderness: There is no abdominal tenderness  Musculoskeletal:         General: Normal range of motion  Cervical back: Normal range of motion and neck supple  Skin:     General: Skin is warm and dry  Neurological:      General: No focal deficit present  Mental Status: He is alert  Cranial Nerves: No cranial nerve deficit     Psychiatric:         Mood and Affect: Mood normal          Behavior: Behavior normal          Discussion with Family:  Spoke with patient's wife Nasra Go over the phone regarding discharge plan    Discharge instructions/Information to patient and family:   See after visit summary for information provided to patient and family  Provisions for Follow-Up Care:  See after visit summary for information related to follow-up care and any pertinent home health orders  Disposition:     Home      Planned Readmission:    no     Discharge Statement:  I spent 35 minutes discharging the patient  This time was spent on the day of discharge  I had direct contact with the patient on the day of discharge  Greater than 50% of the total time was spent examining patient, answering all patient questions, arranging and discussing plan of care with patient as well as directly providing post-discharge instructions  Additional time then spent on discharge activities  Discharge Medications:  See after visit summary for reconciled discharge medications provided to patient and family        ** Please Note: This note has been constructed using a voice recognition system **

## 2022-04-28 NOTE — ASSESSMENT & PLAN NOTE
· Patient presents with shortness of breath  · Likely COPD exacerbation secondary to community-acquired pneumonia  · Clinically improving  · Will discharge on prednisone taper and nebulizers per pulmonology recommendations  · Outpatient follow-up with pulmonology

## 2022-04-28 NOTE — ASSESSMENT & PLAN NOTE
· Blood glucose elevated on presentation likely secondary to pneumonia/steroids  · Continue home diabetic regimen

## 2022-04-28 NOTE — DISCHARGE INSTRUCTIONS
COPD (Chronic Obstructive Pulmonary Disease)   WHAT YOU NEED TO KNOW:   COPD (chronic obstructive pulmonary disease) can get worse quickly  Your healthcare providers will help you create a care plan to use at home  The plan will give directions on how to prevent or manage shortness of breath  Your family members or anyone who cares for you will also get directions to help you  DISCHARGE INSTRUCTIONS:   Call your local emergency number (911 in the [address] [facility]) if:   · You feel lightheaded, short of breath, and have chest pain  Return to the emergency department if:   · You cough up blood  · You are confused, dizzy, or feel faint  · Your arm or leg feels warm, tender, and painful  It may look swollen and red  Call your doctor if:   · You have increased shortness of breath  · You need more medicine than usual to control your symptoms  · You are coughing or wheezing more than usual     · You are coughing up more mucus, or it has a new color or odor  · You gain more than 3 pounds in a week  · You have a fever, a runny or stuffy nose, and a sore throat, or other cold or flu symptoms  · Your skin, lips, or nails start to turn blue  · You have swelling in your legs or ankles  · You are very tired or weak for more than a day  · You notice changes in your mood, or changes in your ability to think or concentrate  · You have questions or concerns about your condition or care  Medicines:   · Short-acting bronchodilators  may be called rescue inhalers or relievers  They relieve sudden, severe symptoms and start to work right away  · Long-acting bronchodilators  may be called controllers  This medicine helps open the airways over time, and is used to decrease and prevent breathing problems  Long-acting bronchodilators should not be used to treat sudden, severe symptoms, such as trouble breathing      · Antibiotics may be given for up to 5 days to treat a bacterial infection during an exacerbation  · Take your medicine as directed  Contact your healthcare provider if you think your medicine is not helping or if you have side effects  Tell him or her if you are allergic to any medicine  Keep a list of the medicines, vitamins, and herbs you take  Include the amounts, and when and why you take them  Bring the list or the pill bottles to follow-up visits  Carry your medicine list with you in case of an emergency  Help make breathing easier:   · Use pursed-lip breathing any time you feel short of breath  Take a deep breath in through your nose  Slowly breathe out through your mouth with your lips pursed  Try to take 2 times as long to breathe out as to breathe in  This helps you get rid of as much air from your lungs as possible  You can also practice this breathing pattern while you bend, lift, climb stairs, or exercise  It slows down your breathing and helps move more air in and out of your lungs  · Avoid anything that makes your symptoms worse  Stay out of high altitudes and places with high humidity  Stay inside, or cover your mouth and nose with a scarf when you are outside in cold weather  Stay inside on days when air pollution or pollen counts are high  Do not use aerosol sprays such as deodorant, bug spray, and hairspray  · Exercise as directed  Your healthcare provider may recommend at least 20 minutes of exercise each day to help increase your energy and decrease shortness of breath  Talk to your provider about the best exercise plan for you  Manage COPD and help prevent exacerbations:  COPD is a serious condition that gets worse over time  A COPD exacerbation means your symptoms suddenly get worse  It is important to prevent exacerbations  An exacerbation can cause more lung damage  COPD cannot be cured, but you can take action to feel better and prevent exacerbations:  · Do not smoke    Nicotine and other chemicals in cigarettes and cigars can cause lung damage and make your COPD worse  Ask your healthcare provider for information if you currently smoke and need help to quit  E-cigarettes or smokeless tobacco still contain nicotine  Talk to your healthcare provider before you use these products  · Avoid secondhand smoke  This is smoke another person exhales  Even if you have never smoked or have quit, it is important to avoid secondhand smoke  This smoke can also cause lung damage or trigger an exacerbation  · Go to pulmonary rehabilitation (rehab) if directed  Rehab is a program run by specialists who help you learn to manage COPD  Examples include a pulmonologist (lung specialist), dietitian, or exercise therapist  The specialists will help you make a plan to avoid triggers that cause an exacerbation  · Take your medicines as directed  Refill your medicines before you are out so that you do not miss a dose  Ask your healthcare provider if you have any questions on how to take your medicines  · Protect yourself from germs  Germs can get into your lungs and cause an infection  An infection in your lungs can create more mucus and make it harder to breathe  An infection can also create swelling in your airway and prevent air from getting in  You can decrease your risk for infection by doing the following:         ? Wash your hands often with soap and water  Carry germ-killing gel with you  You can use the gel to clean your hands when soap and water are not available  ? Do not touch your eyes, nose, or mouth unless you have washed your hands first     ? Always cover your mouth when you cough  Cough into a tissue or your shirtsleeve so you do not spread germs from your hands  ? Try to avoid people who have a cold or the flu  If you are sick, stay away from others as much as possible  ? Ask about vaccines you may need  Influenza (the flu), pneumonia, and COVID-19 can become life-threatening for a person who has COPD   Get a yearly flu vaccine as soon as recommended, usually in September or October  The pneumonia vaccine may be given every 5 years, or as directed  COVID-19 vaccines are available in shots given in 1 or 2 doses  Your healthcare provider can tell you if you should also get other vaccines, and when to get them  · Drink liquids as directed  You may need to drink more liquid than usual  Liquid will help to keep your air passages moist and help you cough up mucus  Ask how much liquid to drink each day and which liquids are best for you  Follow up with your doctor as directed: You may need more tests  Your doctor may refer you to a specialist, depending on your needs  Some specialist services may be available through your pulmonary rehab program  Write down your questions so you remember to ask them during your visits  © Copyright [employer] 2022 Information is for End User's use only and may not be sold, redistributed or otherwise used for commercial purposes  All illustrations and images included in CareNotes® are the copyrighted property of A D A M , Inc  or [facility] [employer] [facility]  The above information is an  only  It is not intended as medical advice for individual conditions or treatments  Talk to your doctor, nurse or pharmacist before following any medical regimen to see if it is safe and effective for you  Acute Respiratory Failure   WHAT YOU NEED TO KNOW:   What is acute respiratory failure (ARF)? ARF is a condition that happens when your lungs cannot get enough oxygen into your blood  ARF can also happen when your lungs cannot get the carbon dioxide out of your blood  A buildup of carbon dioxide in your blood can cause damage to your organs  The decrease in oxygen and the buildup of carbon dioxide can happen at the same time  Acute respiratory failure may develop in minutes, hours, or days         What increases my risk for ARF?   · Pneumonia, fluid buildup in the lungs from heart failure, COVID-19, or acute respiratory distress syndrome (ARDS)    · A condition such as Guillain-Barré syndrome or myasthenia gravis that affects nerves or muscles used for breathing    · A blood clot in your lungs (pulmonary embolism, or PE) or brain (stroke)    · An exacerbation of COPD or asthma    · A lung condition such as cystic fibrosis or bronchiolitis    · A heart attack    · Medicines or drugs that slow your breathing, such as opioids or alcohol    · A head trauma or a collapsed lung    What are the symptoms of ARF? Symptoms depend on the kind of ARF you have, and if it is mild, moderate, or severe:  · Rapid or noisy breathing    · Flushed skin    · Heart rate that is faster or slower than usual    · Shortness of breath, dizziness, or headaches    · Muscle twitches, trouble walking, or tingling in your hands or feet    · Feeling drowsy or sleepy during the day, confusion, or mood changes    · Swelling of your hands and feet    · A bluish color on your skin, fingernails, and lips, or pale skin    · Vision problems, seizures, or loss of consciousness    How is ARF diagnosed? Your healthcare provider will ask about your symptoms and when they began  Tests do not always diagnose ARF  Your provider will also look for signs of medical conditions that may mean you have ARF  Examples include cor pulmonale and pulmonary hypertension  You may need any of the following:  · An arterial blood gas (ABG) test  measures the amount of oxygen and carbon dioxide in your blood  An ABG test also measures the pH of your blood  · Pulse oximetry  will show the level of oxygen in your blood  How is ARF treated? Treatment depends on the cause and how severe your symptoms are  You may need any of the following:  · Medicines  may be given to open your airways or relieve fluid buildup in your arms or legs  You may also need medicines to treat the cause of your ARF  · Extra oxygen  may be given if your blood oxygen levels are low      · Ventilation helps get oxygen into your lungs and carbon dioxide out  Ventilation also makes the work of breathing easier  Some systems, such as a CPAP or BiPAP, may only be needed while you sleep  A mechanical ventilator may be needed some or all of the time  It is attached to a mask or breathing tube  · A procedure  may be used to drain fluid if you have a pleural effusion (fluid between the lining of the lungs and the chest)  Air may be drained from around your lung if you have a collapsed lung  What can I do to manage or prevent shortness of breath? · Use your oxygen as directed  You may need extra oxygen if your blood oxygen level is lower than it should be  · Use pursed-lip breathing any time you feel short of breath  Take a deep breath in through your nose  Slowly breathe out through your mouth with your lips pursed for twice as long as you inhaled  You can also practice this breathing pattern while you bend, lift, climb stairs, or exercise  It slows down your breathing and helps move more air in and out of your lungs  What can I do to manage ARF?   · Do not smoke  Nicotine and other chemicals in cigarettes and cigars can cause lung damage and make your symptoms worse  Ask your healthcare provider for information if you currently smoke and need help to quit  E-cigarettes or smokeless tobacco still contain nicotine  Talk to your healthcare provider before you use these products  Secondhand smoke can also make your symptoms worse  Avoid others who are smoking  · Prevent the spread of germs  Wash your hands often with soap and water  Use gel hand cleanser when soap and water are not available  Do not touch your eyes, nose, or mouth unless you have washed your hands first  Cover your mouth when you cough  Cough into a tissue or your shirtsleeve so you do not spread germs from your hands  If you are sick, stay away from others as much as possible  · Limit alcohol    A drink of alcohol is 12 ounces of beer, 5 ounces of wine, or 1½ ounces of liquor  · Ask about vaccines you may need  Vaccines can help prevent some lung infections  Examples include pneumonia, COVID-19, pertussis (whooping cough), tuberculosis (TB), and diphtheria  Get a flu vaccine every year as soon as it is recommended, usually in September or October  · Prepare for emergencies  Keep phone numbers for your healthcare provider, hospital, and someone close to you with you at all times  Call your local emergency number (911 in the [address] [place] [address],[address]) or have someone call if:   · You have more trouble catching your breath  · You have stopped breathing  When should I call my doctor? · You have new symptoms  · Your symptoms get worse  · You have questions or concerns about your condition or care  CARE AGREEMENT:   You have the right to help plan your care  Learn about your health condition and how it may be treated  Discuss treatment options with your healthcare providers to decide what care you want to receive  You always have the right to refuse treatment  The above information is an  only  It is not intended as medical advice for individual conditions or treatments  Talk to your doctor, nurse or pharmacist before following any medical regimen to see if it is safe and effective for you  © Copyright [employer] 2022 Information is for End User's use only and may not be sold, redistributed or otherwise used for commercial purposes   All illustrations and images included in CareNotes® are the copyrighted property of A D A "[employer] , Inc  or [address] [place] [address] [employer]

## 2022-04-29 ENCOUNTER — TRANSITIONAL CARE MANAGEMENT (OUTPATIENT)
Dept: FAMILY MEDICINE CLINIC | Facility: CLINIC | Age: 77
End: 2022-04-29

## 2022-04-29 NOTE — UTILIZATION REVIEW
Notification of Discharge   This is a Notification of Discharge from our facility [address]  Please be advised that this patient has been discharge from our facility  Below you will find the admission and discharge date and time including the patients disposition  UTILIZATION REVIEW CONTACT:  Fatima Leventhal  Utilization   Network Utilization Review Department  Phone: [phone] carefully listen to the prompts  All voicemails are confidential   Email: [email or web address]     PHYSICIAN ADVISORY SERVICES:  FOR [id number] REVIEW - MEDICAL NECESSITY DENIAL  Phone: [phone]  Fax: [phone]  Email: [email or web address]     PRESENTATION DATE: 4/24/2022 12:45 PM  OBERVATION ADMISSION DATE:   INPATIENT ADMISSION DATE: 4/25/22 11:24 AM   DISCHARGE DATE: 4/28/2022  4:45 PM  DISPOSITION: Home/Self Care Home/Self Care      IMPORTANT INFORMATION:  Send all requests for admission clinical reviews, approved or denied determinations and any other requests to dedicated fax number below belonging to the campus where the patient is receiving treatment   List of dedicated fax numbers:  [address] DENIALS (Administrative/Medical Necessity) [phone]   1000  [facility] (Maternity/NICU/Pediatrics) [phone]   [facility] [phone]   130 [facility] [phone]   [address] [place] [address] [phone]   [address] [phone]   [facility]  [phone]   [address] [place] [address], [facility]  2401 [facility] 1000 [address] [facility] [phone]

## 2022-04-30 LAB
BACTERIA BLD CULT: NORMAL
BACTERIA BLD CULT: NORMAL

## 2022-05-02 DIAGNOSIS — J44.1 CHRONIC OBSTRUCTIVE PULMONARY DISEASE WITH ACUTE EXACERBATION (HCC): Primary | ICD-10-CM

## 2022-05-02 DIAGNOSIS — J44.9 COPD (CHRONIC OBSTRUCTIVE PULMONARY DISEASE) (HCC): ICD-10-CM

## 2022-05-02 RX ORDER — ARFORMOTEROL TARTRATE 15 UG/2ML
15 SOLUTION RESPIRATORY (INHALATION) 2 TIMES DAILY
Qty: 120 ML | Refills: 5 | Status: SHIPPED | OUTPATIENT
Start: 2022-05-02 | End: 2022-08-01 | Stop reason: SDUPTHER

## 2022-05-02 NOTE — TELEPHONE ENCOUNTER
Please apologize on our behalf  It looks like [facility]  Unsure of it's status  I will resend it now

## 2022-05-02 NOTE — TELEPHONE ENCOUNTER
Patient is calling stating he never received a call back about this medication issue and is not very happy  He was never told of a replacement medication being sent in and it doesn't look as though the medication was actually ever sent out  He would appreciate a call back to know what medication he can be prescribed   Please advise

## 2022-05-03 NOTE — TELEPHONE ENCOUNTER
Called pt's pharmacy for further clarification  Pulmicort neb solution covered 32$ copay  Atrovent neb solution covered 3$ copay  Brovana and Formoterol need prior auth  I submitted the auth for Stephany Martinez again today  Will update once I receive response from ins

## 2022-05-03 NOTE — TELEPHONE ENCOUNTER
Patients wife is calling back, she is very upset that they received a call frompedro gonzalez and the script for Sylvia Handy was so much more expensive and cost over $1,000   Please advise

## 2022-05-04 NOTE — TELEPHONE ENCOUNTER
Enmanuel Rogers approved, 79$ copay  Verified with pharmacy  Called and informed pt's wife  She said they are having a call today with [employer] and they might be able to help them with the price more  Med can be picked up if pt is willing to pay the copay for it

## 2022-05-05 ENCOUNTER — OFFICE VISIT (OUTPATIENT)
Dept: FAMILY MEDICINE CLINIC | Facility: CLINIC | Age: 77
End: 2022-05-05
Payer: COMMERCIAL

## 2022-05-05 ENCOUNTER — APPOINTMENT (OUTPATIENT)
Dept: LAB | Facility: CLINIC | Age: 77
End: 2022-05-05
Payer: COMMERCIAL

## 2022-05-05 ENCOUNTER — APPOINTMENT (OUTPATIENT)
Dept: RADIOLOGY | Facility: CLINIC | Age: 77
End: 2022-05-05
Payer: COMMERCIAL

## 2022-05-05 VITALS
HEART RATE: 77 BPM | BODY MASS INDEX: 27.5 KG/M2 | HEIGHT: 71 IN | TEMPERATURE: 96.6 F | OXYGEN SATURATION: 98 % | DIASTOLIC BLOOD PRESSURE: 72 MMHG | RESPIRATION RATE: 18 BRPM | WEIGHT: 196.4 LBS | SYSTOLIC BLOOD PRESSURE: 124 MMHG

## 2022-05-05 DIAGNOSIS — J96.21 ACUTE ON CHRONIC RESPIRATORY FAILURE WITH HYPOXIA (HCC): ICD-10-CM

## 2022-05-05 DIAGNOSIS — E11.40 TYPE 2 DIABETES MELLITUS WITH DIABETIC NEUROPATHY, WITHOUT LONG-TERM CURRENT USE OF INSULIN (HCC): Primary | ICD-10-CM

## 2022-05-05 DIAGNOSIS — J44.1 COPD EXACERBATION (HCC): ICD-10-CM

## 2022-05-05 DIAGNOSIS — J18.9 PNEUMONIA OF RIGHT LOWER LOBE DUE TO INFECTIOUS ORGANISM: ICD-10-CM

## 2022-05-05 DIAGNOSIS — I25.5 ISCHEMIC CARDIOMYOPATHY: ICD-10-CM

## 2022-05-05 DIAGNOSIS — I49.9 IRREGULAR HEART BEAT: ICD-10-CM

## 2022-05-05 LAB
ALBUMIN SERPL BCP-MCNC: 3.2 G/DL (ref 3.5–5)
ALP SERPL-CCNC: 84 U/L (ref 46–116)
ALT SERPL W P-5'-P-CCNC: 24 U/L (ref 12–78)
ANION GAP SERPL CALCULATED.3IONS-SCNC: 3 MMOL/L (ref 4–13)
AST SERPL W P-5'-P-CCNC: 14 U/L (ref 5–45)
BASOPHILS # BLD AUTO: 0.05 THOUSANDS/ΜL (ref 0–0.1)
BASOPHILS NFR BLD AUTO: 0 % (ref 0–1)
BILIRUB SERPL-MCNC: 0.4 MG/DL (ref 0.2–1)
BUN SERPL-MCNC: 9 MG/DL (ref 5–25)
CALCIUM ALBUM COR SERPL-MCNC: 9.1 MG/DL (ref 8.3–10.1)
CALCIUM SERPL-MCNC: 8.5 MG/DL (ref 8.3–10.1)
CHLORIDE SERPL-SCNC: 101 MMOL/L (ref 100–108)
CO2 SERPL-SCNC: 33 MMOL/L (ref 21–32)
CREAT SERPL-MCNC: 0.82 MG/DL (ref 0.6–1.3)
EOSINOPHIL # BLD AUTO: 0.51 THOUSAND/ΜL (ref 0–0.61)
EOSINOPHIL NFR BLD AUTO: 4 % (ref 0–6)
ERYTHROCYTE [DISTWIDTH] IN BLOOD BY AUTOMATED COUNT: 13.6 % (ref 11.6–15.1)
GFR SERPL CREATININE-BSD FRML MDRD: 85 ML/MIN/1.73SQ M
GLUCOSE SERPL-MCNC: 127 MG/DL (ref 65–140)
HCT VFR BLD AUTO: 41 % (ref 36.5–49.3)
HGB BLD-MCNC: 13.4 G/DL (ref 12–17)
IMM GRANULOCYTES # BLD AUTO: 0.22 THOUSAND/UL (ref 0–0.2)
IMM GRANULOCYTES NFR BLD AUTO: 2 % (ref 0–2)
LYMPHOCYTES # BLD AUTO: 1.84 THOUSANDS/ΜL (ref 0.6–4.47)
LYMPHOCYTES NFR BLD AUTO: 13 % (ref 14–44)
MCH RBC QN AUTO: 30.7 PG (ref 26.8–34.3)
MCHC RBC AUTO-ENTMCNC: 32.7 G/DL (ref 31.4–37.4)
MCV RBC AUTO: 94 FL (ref 82–98)
MONOCYTES # BLD AUTO: 1.24 THOUSAND/ΜL (ref 0.17–1.22)
MONOCYTES NFR BLD AUTO: 9 % (ref 4–12)
NEUTROPHILS # BLD AUTO: 10.44 THOUSANDS/ΜL (ref 1.85–7.62)
NEUTS SEG NFR BLD AUTO: 72 % (ref 43–75)
NRBC BLD AUTO-RTO: 0 /100 WBCS
PLATELET # BLD AUTO: 231 THOUSANDS/UL (ref 149–390)
PMV BLD AUTO: 11.5 FL (ref 8.9–12.7)
POTASSIUM SERPL-SCNC: 3.8 MMOL/L (ref 3.5–5.3)
PROT SERPL-MCNC: 6.3 G/DL (ref 6.4–8.2)
RBC # BLD AUTO: 4.36 MILLION/UL (ref 3.88–5.62)
SL AMB POCT HEMOGLOBIN AIC: 7.9 (ref ?–6.5)
SODIUM SERPL-SCNC: 137 MMOL/L (ref 136–145)
WBC # BLD AUTO: 14.3 THOUSAND/UL (ref 4.31–10.16)

## 2022-05-05 PROCEDURE — 36415 COLL VENOUS BLD VENIPUNCTURE: CPT

## 2022-05-05 PROCEDURE — 3051F HG A1C>EQUAL 7.0%<8.0%: CPT | Performed by: INTERNAL MEDICINE

## 2022-05-05 PROCEDURE — 83036 HEMOGLOBIN GLYCOSYLATED A1C: CPT | Performed by: FAMILY MEDICINE

## 2022-05-05 PROCEDURE — 71046 X-RAY EXAM CHEST 2 VIEWS: CPT

## 2022-05-05 PROCEDURE — 93000 ELECTROCARDIOGRAM COMPLETE: CPT | Performed by: FAMILY MEDICINE

## 2022-05-05 PROCEDURE — 80053 COMPREHEN METABOLIC PANEL: CPT

## 2022-05-05 PROCEDURE — 85025 COMPLETE CBC W/AUTO DIFF WBC: CPT

## 2022-05-05 PROCEDURE — 1111F DSCHRG MED/CURRENT MED MERGE: CPT | Performed by: FAMILY MEDICINE

## 2022-05-05 PROCEDURE — 99495 TRANSJ CARE MGMT MOD F2F 14D: CPT | Performed by: FAMILY MEDICINE

## 2022-05-05 NOTE — PROGRESS NOTES
Ilana Bajwa 1945 male MRN: [id number]    Family Medicine Transition of Care Visit      SUBJECTIVE    CC: [place] Visit     Transitional Care Management Review:  Ilana Bajwa is a 69 y o  male here for TCM follow up  Admitted 4/24-4/28 for sepsis due to pneumonia and COPD exacerbation  Hospital course as per discharge summary as below:     "Sepsis due to pneumonia [facility])  Assessment & Plan  Present on admission as evidenced by tachycardia and leukocytosis  Secondary to community-acquired pneumonia  Will discharge on cefdinir to complete course of therapy  Blood cultures negative to date  Procalcitonin improving  Sputum culture with no significant growth of MRSA or Pseudomonas     Acute on chronic respiratory failure (HCC)  Assessment & Plan  Likely secondary to COPD exacerbation and pneumonia  Patient was briefly transferred to the ICU for BiPAP therapy, doing well now and has been transitioned back to med surg  ABG result appreciated  Desaturation study performed, patient will need 2 L of nasal cannula continuously as outpatient  Case Management to set up home O2    Patient already has home oxygen in place which he uses at night     Elevated troponin  Assessment & Plan  Likely secondary to type 2 ischemia due to sepsis  Patient states he does have a history of advanced heart disease and follows with cardiology as outpatient  Denies any chest pain  Cardiology input appreciated  Outpatient follow-up     Pneumonia  Assessment & Plan  Chest x-ray with right lower lobe pneumonia  Given cefepime in the ED  Patient was maintained on ceftriaxone while in the hospital  Discharge on cefdinir     Obesity  Assessment & Plan  Present on admission as evidenced by BMI of 28  Encouraged lifestyle modifications and weight loss     Type 2 diabetes mellitus with diabetic neuropathy, unspecified ([facility] [address] )  Assessment & Plan  Blood glucose elevated on presentation likely secondary to pneumonia/steroids  Continue home diabetic regimen     Ischemic cardiomyopathy  Assessment & Plan  Patient has declined ICD in the past per cardiology  Will continue metoprolol 25 mg twice daily, lisinopril 40 mg daily  Digoxin added as heart rate has been uncontrolled at rest     * COPD exacerbation [facility])  Assessment & Plan  Patient presents with shortness of breath  Likely COPD exacerbation secondary to community-acquired pneumonia  Clinically improving  Will discharge on prednisone taper and nebulizers per pulmonology recommendations  Outpatient follow-up with pulmonology"    "Hai Ordonez is a 69 y o  male patient who originally presented to the hospital on 4/24/2022 due to shortness of breath  Patient was admitted to the medicine service due to COPD exacerbation and pneumonia  Patient was found to be septic present on admission  Patient was started on antibiotics and treated under sepsis protocol  Pulmonology was consulted given patient's history of COPD  Patient decompensated while on medical service and was transferred to the ICU for BiPAP  Patient was on the critical care service for approximately 48 hours and then transferred back to Medical Service  Patient was seen by Cardiology during admission for elevated troponin and tachycardia  Digoxin was added to patient's home regimen due to persistently elevated heart rate  Patient doing well today  Procalcitonin improving  Culture results appreciated  Patient discharged home on cefdinir  Also discharged on prednisone taper and nebulizers per pulmonology recommendation    Patient will need follow-up with pulmonology and Cardiology as outpatient"       During the TCM phone call patient stated:    TCM Call (since 4/4/2022)     Date and time call was made  4/29/2022 10:57 AM    Patient was hospitialized at  [address]        Date of Admission  04/24/22    Date of discharge  04/28/22    Diagnosis  COPD exacerbation    Disposition  Home    Current Symptoms  None      TCM Call (since 4/4/2022)     Post hospital issues  None    Scheduled for follow up? Yes    Did you obtain your prescribed medications  Yes    Do you need help managing your prescriptions or medications  No    Is transportation to your appointment needed  No    I have advised the patient to call PCP with any new or worsening symptoms  Yesi HENRY    Living Arrangements  Spouse or Significiant other    Support System  Spouse    The type of support provided  Emotional; Physical; Other (comment)    Do you have social support  Yes, as much as I need          During today's visit:    Diabetes- A1c today 7 9  Metformin just increased last visit, will keep same for now  COPD with exacerbation, pneumonia- Follow-up with pulmonology 5/23  Discharged with cefdinir, completed  Now just taking 3 times weekly azithromycin  Doing well with prednisone taper  Breathing is much better since hospital discharge, feeling well, no fevers  No cough  Bibasilar crackles noted today, will check repeat chest XR  Acute on chronic respiratory failure- 2L NC  No home oxygen monitor, advised to purchase home pulse ox, sats maintain >88%  Ischemic cardiomyopathy- Digoxin added during hospital stay for rate control  Follow-up with cardiology 5/18  He denies any chest pains or palpitations, denies any lightheadedness or dizziness  Irregular rhythm noted on exam today EKG done which showed no acute changes, no significant change compared to prior  PVCs and PACs which likely account for irregular rhythm  Fatigued since hospital stay, states he didn't sleep much, otherwise feeling well today  Has concerns about cost of [place], per patient too expensive  Advised will reach out to pulmonology to let them know  They are complying with their medication changes and discharge instructions  They have the following questions: As above    Review of Systems   All other systems reviewed and are negative        Historical Information     The patient history was reviewed as follows:    Past Medical History:   Diagnosis Date    BPH (benign prostatic hyperplasia)     45 days radiation treatment    COPD (chronic obstructive pulmonary disease)     COPD with acute exacerbation ([facility] [address] ) 2019    Coronary artery disease     CABG x4 in 2017    Diabetes mellitus     History of Arterial Duplex of LE 2017    Likely occlusion of the left superficial femoral artery  Calcific changes bilaterally  Despite these changes, the ankle-brachial index as a measure of peripheral blood flow only mildly impaired   History of echocardiogram 2017    EF 40%, mild LVH, mild MR     Hyperlipidemia     Hypertension     NSTEMI (non-ST elevated myocardial infarction)     Prostate cancer     prostate     PVD (peripheral vascular disease)     Tremor     [facility]     Past Surgical History:   Procedure Laterality Date    CARDIAC CATHETERIZATION  2017    Significant left main plus triple-vessel CAD   CORONARY ARTERY BYPASS GRAFT  2017    4V CABG:  LIMA to LAD, VG to RI, SVG to PDA to LVBR RCA      EYE SURGERY      shots in eye once a month [facility]    PROSTATE BIOPSY       Family History   Problem Relation Age of Onset    Cancer Father     Heart disease Brother       Social History   Social History     Substance and Sexual Activity   Alcohol Use Yes     Social History     Substance and Sexual Activity   Drug Use Never     Social History     Tobacco Use   Smoking Status Former Smoker    Packs/day: 0 25    Years: 60 00    Pack years: 15 00    Types: Cigarettes    Quit date: 2022    Years since quittin 0   Smokeless Tobacco Never Used       Medications:   Meds/Allergies     Current Outpatient Medications:     albuterol (PROVENTIL HFA,VENTOLIN HFA) 90 mcg/act inhaler, Inhale 2 puffs every 6 (six) hours as needed for wheezing or shortness of breath, Disp: , Rfl: 0    arformoterol (BROVANA) 15 mcg/2 mL nebulizer solution, Take 2 mL (15 mcg total) by nebulization 2 (two) times a day, Disp: 120 mL, Rfl: 5    aspirin (ECOTRIN LOW STRENGTH) 81 mg EC tablet, Take 1 tablet (81 mg total) by mouth every other day, Disp:  , Rfl: 0    azithromycin (ZITHROMAX) 250 mg tablet, Take 1 tablet (250 mg total) by mouth 3 (three) times a week Take once daily on Mondays Wednesdays and Fridays, Disp: 30 tablet, Rfl: 0    budesonide (Pulmicort) 0 5 mg/2 mL nebulizer solution, Take 2 mL (0 5 mg total) by nebulization 2 (two) times a day Rinse mouth after use , Disp: 120 mL, Rfl: 11    cyanocobalamin (VITAMIN B-12) 500 MCG tablet, TAKE ONE TABLET BY MOUTH EVERY MORNING *VITAMIN B12*, Disp: , Rfl:     digoxin (LANOXIN) 0 125 mg tablet, Take 1 tablet (125 mcg total) by mouth daily, Disp: 30 tablet, Rfl: 0    gabapentin (NEURONTIN) 100 mg capsule, Take 2 capsules (200 mg total) by mouth daily at bedtime, Disp: 180 capsule, Rfl: 3    glimepiride (AMARYL) 1 mg tablet, Take 2 tablets (2 mg total) by mouth daily with breakfast AND 1 tablet (1 mg total) daily with dinner , Disp: , Rfl: 0    ipratropium (ATROVENT) 0 02 % nebulizer solution, Take 2 5 mL (0 5 mg total) by nebulization 3 (three) times a day, Disp: 300 mL, Rfl: 11    lisinopril (ZESTRIL) 40 mg tablet, Take 1 tablet (40 mg total) by mouth daily, Disp: 90 tablet, Rfl: 5    metFORMIN (GLUCOPHAGE) 1000 MG tablet, Take 1 tablet (1,000 mg total) by mouth 2 (two) times a day with meals, Disp: 180 tablet, Rfl: 3    metoprolol tartrate (LOPRESSOR) 25 mg tablet, Take 1 tablet (25 mg total) by mouth every 12 (twelve) hours, Disp: 60 tablet, Rfl: 5    Multiple Vitamins-Minerals (PRESERVISION AREDS 2 PO), Take by mouth, Disp: , Rfl:     predniSONE 10 mg tablet, Take 4 tablets (40 mg total) by mouth daily for 3 days, THEN 3 tablets (30 mg total) daily for 3 days, THEN 2 tablets (20 mg total) daily for 3 days, THEN 1 tablet (10 mg total) daily for 3 days  , Disp: 30 tablet, Rfl: 0    primidone (MYSOLINE) 50 mg tablet, Take 100 mg by mouth 2 (two) times a day , Disp: , Rfl:     rosuvastatin (CRESTOR) 10 MG tablet, Take 1 tablet (10 mg total) by mouth daily, Disp: 90 tablet, Rfl: 3    terbinafine (LamISIL) 1 % cream, APPLY THIN LAYER TOPICALLY TWICE A DAY FOR FUNGAL INFECTION, Disp: , Rfl:   Allergies   Allergen Reactions    Atorvastatin Myalgia       OBJECTIVE    Vitals:   Vitals:    22 1050   BP: 124/72   Pulse: 77   Resp: 18   Temp: (!) 96 6 °F (35 9 °C)   TempSrc: Tympanic   SpO2: 98%   Weight: 89 1 kg (196 lb 6 4 oz)   Height: 5' 10 5" (1 791 m)       Body mass index is 27 78 kg/m²  Physical Exam:    Physical Exam  Vitals reviewed  Constitutional:       General: He is not in acute distress  Appearance: Normal appearance  He is not ill-appearing, toxic-appearing or diaphoretic  HENT:      Head: Normocephalic and atraumatic  Eyes:      General:         Right eye: No discharge  Left eye: No discharge  Extraocular Movements: Extraocular movements intact  Conjunctiva/sclera: Conjunctivae normal    Cardiovascular:      Rate and Rhythm: Normal rate  Rhythm irregular  Heart sounds: Normal heart sounds  No murmur heard  No friction rub  No gallop  Pulmonary:      Effort: Pulmonary effort is normal  No respiratory distress  Breath sounds: No stridor  Wheezing present  No rhonchi  Comments: Trace wheezing, diminished breath sounds bases, bibasilar crackles   Musculoskeletal:         General: No swelling, tenderness or signs of injury  Right lower le+ Edema present  Left lower le+ Edema present  Skin:     General: Skin is warm  Coloration: Skin is not pale  Findings: No erythema or rash  Neurological:      Mental Status: He is alert and oriented to person, place, and time  Motor: No weakness  Psychiatric:         Mood and Affect: Mood normal          Behavior: Behavior normal           Labs:   I have personally reviewed all pertinent results  No results displayed because visit has over 200 results  Imaging:  I have personally reviewed all pertinent results  Assessment/Plan    No problem-specific Assessment & Plan notes found for this encounter  Mikaela Chaudhary was seen today for transition of care management  Diagnoses and all orders for this visit:    Type 2 diabetes mellitus with diabetic neuropathy, without long-term current use of insulin (HCC)  -     POCT hemoglobin A1c    COPD exacerbation (HCC)  -     XR chest pa & lateral; Future  -     Comprehensive metabolic panel; Future  -     CBC and differential; Future    Acute on chronic respiratory failure with hypoxia (HCC)    Pneumonia of right lower lobe due to infectious organism  -     XR chest pa & lateral; Future  -     Comprehensive metabolic panel; Future  -     CBC and differential; Future    Ischemic cardiomyopathy  -     POCT ECG  -     Comprehensive metabolic panel;  Future  -     CBC and differential; Future    Irregular heart beat  -     POCT ECG        Future Appointments   Date Time Provider Sabine Oliver   5/18/2022  2:40 PM SPEEDY [facility]   5/23/2022 10:40 AM [facility]   6/9/2022 10:30 AM DO CARMINE Walker [place]  FP [facility]   7/14/2022  1:00 PM [address] [facility] VASCULAR 1 [address] [facility] Car  [facility], [address] [place] [address] Primary Care

## 2022-05-11 NOTE — ASSESSMENT & PLAN NOTE
Chief Complaint   Patient presents with   • Syncope     pt stated he passed out 3 times today. pt stated he was sitting in the chair. pt denies hitting his head . no loc. pt stated he was worried he was having a stroke.neuro exam is wdl.        History Of Present Illness  Luiz is a 79 year old male with hypertension, CKD, COPD presenting with transient loss of consciousness.   Patient reports that yesterday evening around 6 PM while sitting on a chair watching TV he states he blanked out.  Reports that 3 hours later at around 9 PM he regained consciousness.  Denies falling asleep.  States that he was very confused about where he was when he came to.  States he was in the same place and had not fallen.  States he told his friend and return to the chair.  States that the same thing happened again and this time lasted for 2 hours.  States that it again occurred prompting him to call the ambulance.  States that during the last episode he was conscious and felt as though his lip was twisting and he could feel his leg shaking.      No alleviating or exacerbating factors. No radiation.  No associated features.          Past Medical History  Past Medical History:   Diagnosis Date   • Chronic kidney disease    • Colon cancer (CMS/HCC)    • Congestive cardiac failure (CMS/HCC)    • COPD (chronic obstructive pulmonary disease) (CMS/HCC)    • Diabetes mellitus (CMS/HCC)    • Essential (primary) hypertension    • SOBIA (obstructive sleep apnea)         Surgical History  Past Surgical History:   Procedure Laterality Date   • Colectomy     • Joint replacement      Left Knee   • Knee arthroplasty Left    • Nephrectomy Left         Social History  Social History     Tobacco Use   • Smoking status: Former Smoker     Packs/day: 0.50     Years: 30.00     Pack years: 15.00     Types: Cigarettes     Quit date: 1996     Years since quittin.9   • Smokeless tobacco: Never Used   Vaping Use   • Vaping Use: never used   Substance  · Procalcitonin negative  · Symptoms significantly improved  · Patient comfortable on room air  · Plan to discharge home on prednisone taper and recommend outpatient follow-up with his PCP Use Topics   • Alcohol use: Yes     Alcohol/week: 2.0 standard drinks     Types: 1 Glasses of wine, 1 Cans of beer per week   • Drug use: No       Family History  Family History   Problem Relation Age of Onset   • Patient is unaware of any medical problems Mother    • Patient is unaware of any medical problems Father    • Cancer Sister    • Heart disease Brother         Allergies  ALLERGIES:  Seasonal    Medications  (Not in a hospital admission)      Review of Systems  12 point ROS negative except per HPI       Last Recorded Vitals  Blood pressure 122/81, pulse 84, temperature 99.2 °F (37.3 °C), temperature source Oral, resp. rate 16, height 5' 9.29\" (1.76 m), weight 108.9 kg (240 lb), SpO2 98 %.    Physical Exam  Vitals reviewed.   HENT:      Head: Normocephalic and atraumatic.   Eyes:      Extraocular Movements: Extraocular movements intact.   Neck:      Trachea: No tracheal deviation.   Cardiovascular:      Rate and Rhythm: Normal rate and regular rhythm.   Pulmonary:      Effort: Pulmonary effort is normal. No respiratory distress.      Breath sounds: Normal breath sounds. No stridor. No wheezing or rales.   Chest:      Chest wall: No tenderness.   Abdominal:      General: There is no distension.      Palpations: Abdomen is soft.      Tenderness: There is no abdominal tenderness. There is no guarding or rebound.   Musculoskeletal:         General: No tenderness or deformity. Normal range of motion.      Cervical back: Neck supple.   Skin:     General: Skin is warm and dry.      Findings: No erythema or rash.   Neurological:      General: No focal deficit present.      Mental Status: He is alert and oriented to person, place, and time.   Psychiatric:         Mood and Affect: Mood and affect normal.         Cognition and Memory: Memory normal.         Judgment: Judgment normal.          Imaging  XR CHEST PA OR AP 1 VIEW    Result Date: 5/11/2022  EXAM: XR CHEST PA OR AP 1 VIEW CLINICAL INDICATION: 79-year-old  male with dyspnea COMPARISON: 09/25/2021 FINDINGS: EKG leads overlie the chest.  Left costophrenic angle is not included on this exam.  There is moderate enlargement of the cardiomediastinal silhouette.  There is mild atherosclerotic calcification of the aortic arch.  Pulmonary vasculature is within normal limits.  Hemidiaphragms are distinct.  There is no pneumothorax.  No focal infiltrate is seen.  The visualized osseous structures are grossly stable with moderate degenerative spurring visualized spine.     1.    Cardiomegaly. 2.   Left costophrenic angle is not included on this exam, consider follow-up imaging clinically appropriate. 3.    Otherwise unremarkable chest radiograph. FOR PHYSICIAN USE ONLY - Please note that this report was generated using voice recognition software.  If you require clarification or feel that there has been an error in this report please contact me through [employer].  Thank you very much for allowing me to participate in the care of your patient. Electronically Signed by: REBECA WEST M.D. Signed on: 5/11/2022 1:40 PM        Labs   Recent Results (from the past 24 hour(s))   Comprehensive Metabolic Panel    Collection Time: 05/11/22  5:00 AM   Result Value Ref Range    Fasting Status      Sodium 143 135 - 145 mmol/L    Potassium 3.7 3.4 - 5.1 mmol/L    Chloride 107 98 - 107 mmol/L    Carbon Dioxide 28 21 - 32 mmol/L    Anion Gap 12 10 - 20 mmol/L    Glucose 116 (H) 70 - 99 mg/dL    BUN 32 (H) 6 - 20 mg/dL    Creatinine 2.43 (H) 0.67 - 1.17 mg/dL    Glomerular Filtration Rate 27 (L) >=60    BUN/ Creatinine Ratio 13 7 - 25    Calcium 8.9 8.4 - 10.2 mg/dL    Bilirubin, Total 0.3 0.2 - 1.0 mg/dL    GOT/AST 16 <=37 Units/L    GPT/ALT 28 <64 Units/L    Alkaline Phosphatase 77 45 - 117 Units/L    Albumin 3.1 (L) 3.6 - 5.1 g/dL    Protein, Total 7.2 6.4 - 8.2 g/dL    Globulin 4.1 (H) 2.0 - 4.0 g/dL    A/G Ratio 0.8 (L) 1.0 - 2.4   TROPONIN I, HIGH SENSITIVITY    Collection Time:  05/11/22  5:00 AM   Result Value Ref Range    Troponin I, High Sensitivity 14 <77 ng/L   CBC with Automated Differential (performable only)    Collection Time: 05/11/22  5:00 AM   Result Value Ref Range    WBC 3.1 (L) 4.2 - 11.0 K/mcL    RBC 3.39 (L) 4.50 - 5.90 mil/mcL    HGB 10.6 (L) 13.0 - 17.0 g/dL    HCT 32.8 (L) 39.0 - 51.0 %    MCV 96.8 78.0 - 100.0 fl    MCH 31.3 26.0 - 34.0 pg    MCHC 32.3 32.0 - 36.5 g/dL    RDW-CV 17.1 (H) 11.0 - 15.0 %    RDW-SD 61.3 (H) 39.0 - 50.0 fL     (L) 140 - 450 K/mcL    NRBC 0 <=0 /100 WBC    Neutrophil, Percent 49 %    Lymphocytes, Percent 29 %    Mono, Percent 14 %    Eosinophils, Percent 7 %    Basophils, Percent 1 %    Immature Granulocytes 0 %    Absolute Neutrophils 1.5 (L) 1.8 - 7.7 K/mcL    Absolute Lymphocytes 0.9 (L) 1.0 - 4.0 K/mcL    Absolute Monocytes 0.4 0.3 - 0.9 K/mcL    Absolute Eosinophils  0.2 0.0 - 0.5 K/mcL    Absolute Basophils 0.0 0.0 - 0.3 K/mcL    Absolute Immmature Granulocytes 0.0 0.0 - 0.2 K/mcL   GLUCOSE, BEDSIDE - POINT OF CARE    Collection Time: 05/11/22  5:04 AM   Result Value Ref Range    GLUCOSE, BEDSIDE - POINT OF CARE 104 (H) 70 - 99 mg/dL   Electrocardiogram 12-Lead    Collection Time: 05/11/22  5:09 AM   Result Value Ref Range    Ventricular Rate EKG/Min (BPM) 75     Atrial Rate (BPM) 75     AZ-Interval (MSEC) 216     QRS-Interval (MSEC) 156     QT-Interval (MSEC) 424     QTc 474     P Axis (Degrees) 36     R Axis (Degrees) -61     T Axis (Degrees) 75     REPORT TEXT       Sinus rhythm  with 1st degree AV block  Left axis deviation  Left bundle branch block  Abnormal ECG  When compared with ECG of  25-SEP-2021 19:36,  Nonspecific T wave abnormality, worse in  Lateral leads          Assessment & Plan  Transient loss of consciousness  Will consult cardiology and neurology.  Troponin negative x1 will repeat.  EKG with chronic left bundle branch block.  Monitor on telemetry.  Order 2D echo.  Check orthostatic vitals.    CKD stage  IV  Creatinine appears to be at baseline.  Monitor, avoid nephrotoxins    Hypertensive heart disease  Resume home meds as appropriate    COPD  Resume home meds    Thrombocytopenia  Monitor no overt bleeding      DVT Prophylaxis  Heparin subcutaneous       Code Status    Code Status: Prior    Primary Care Physician  Jatin Thurston MD    I certify observation status for treatment and evaluation of syncope. Anticipated length of stay less than 2 midnights.         This note was generated in part using \"Dragon\" voice recognition technology. The report was reviewed by this physician, but still may have unintentional errors due to inherent limitations of voice recognition technology.

## 2022-05-23 ENCOUNTER — OFFICE VISIT (OUTPATIENT)
Dept: PULMONOLOGY | Facility: CLINIC | Age: 77
End: 2022-05-23
Payer: COMMERCIAL

## 2022-05-23 VITALS
HEART RATE: 112 BPM | HEIGHT: 71 IN | RESPIRATION RATE: 16 BRPM | BODY MASS INDEX: 27.44 KG/M2 | WEIGHT: 196 LBS | OXYGEN SATURATION: 93 % | TEMPERATURE: 98 F | DIASTOLIC BLOOD PRESSURE: 68 MMHG | SYSTOLIC BLOOD PRESSURE: 118 MMHG

## 2022-05-23 DIAGNOSIS — G47.19 EXCESSIVE DAYTIME SLEEPINESS: Primary | ICD-10-CM

## 2022-05-23 DIAGNOSIS — J44.1 COPD EXACERBATION (HCC): ICD-10-CM

## 2022-05-23 DIAGNOSIS — J41.1 MUCOPURULENT CHRONIC BRONCHITIS (HCC): Chronic | ICD-10-CM

## 2022-05-23 DIAGNOSIS — J18.9 PNEUMONIA OF RIGHT LOWER LOBE DUE TO INFECTIOUS ORGANISM: ICD-10-CM

## 2022-05-23 DIAGNOSIS — Z72.0 TOBACCO ABUSE: Chronic | ICD-10-CM

## 2022-05-23 PROCEDURE — 99215 OFFICE O/P EST HI 40 MIN: CPT | Performed by: INTERNAL MEDICINE

## 2022-05-23 PROCEDURE — 1036F TOBACCO NON-USER: CPT | Performed by: INTERNAL MEDICINE

## 2022-05-23 PROCEDURE — 1160F RVW MEDS BY RX/DR IN RCRD: CPT | Performed by: INTERNAL MEDICINE

## 2022-05-23 NOTE — PROGRESS NOTES
Pulmonary Follow Up Note   Carlyle Shetty 69 y o  male MRN: [id number]  5/23/2022    Assessment:  Community-acquired pneumonia  · Recent hospitalization, required ICU stay for BiPAP   · Treated with antibiotics  · Improving since discharge   · Follow-up chest x-ray 5/5 showing improving infiltrates      COPD with recent exacerbation   · Likely 2/2 pneumonia   · Clear at baseline, GOLD stage IV D, last FEV1 of 26% in 2019   · Total of 5 exacerbations within the past year  · Quit smoking 2 months ago    Plan:   · Continue nebulized only treatment, Perforomist/budesonide q 12, Atrovent t i d    · Continue albuterol HFA/nebs q 6 p r n  · Continue azithromycin 250 3 times per week  · Agreeable for pulmonary rehab/will schedule referral  · TLC 88%, needs to be above 100% to qualify for a LVR/EBV however needs to complete the pulmonary rehab 1st, had this discussion with the patient today    Former tobacco abuse   · About 50 pack year history   · Quit completely in 03/2022   · States that he has no issues to stay abstinent   · No suspicious lesion on last CT chest for lung cancer screening done in 03/2022   · Next due for lung cancer screening CT chest in 03/2023    Positive BERONICA screen   · Excessive daytime sleepiness/fatigue, reported loud snoring at night   · Agreeable for in-lab sleep study testing and therapy if needed    Chronic hypoxemic respiratory failure  · Multi etiology, likely very severe COPD/CHF  · Continue supplemental oxygen 1-2 LPM at rest, may increase to 3-4 with exertion  · Will recheck 6 minute walk test at next visit      Return in about 3 months (around 8/23/2022)      History of Present Illness        Follow up for: COPD     Background:  69 y o  male with a h/o BPH, prostate cancer, COPD, CAD/CABG, combined systolic/diastolic CHF,DM2, HTN, PVD, active tobacco abuse, possible BERONICA      09/2021-COPD exacerbation/hospitalized  11/2021-the same  A early 03/2022-the same     Hospitalized from 3/18, 2 3/21 for COPD exacerbation  Treated with IV steroids/Zithromax then discharged on Augmentin and a steroid taper  Also treated for sepsis, suspected DKA        03/23/2022 visit-noted exertional hypoxemia required 1 LPM   Switched to nebulized inhalers only Perforomist/budesonide and Atrovent  Added azithromycin  Reordered PFT for candidacy of LVR/EBV  Not interested in nicotine replacement  04/2022-hospitalized for acute respiratory failure, [facility] community-acquired pneumonia  Required BiPAP, antibiotics, steroids and nebulized treatment  Interval History  Since discharge, feeling slowly better  However, continues with tired/excessive daytime sleepiness  Able to be independent in ADLs, has to slow down with activities such as walking at the surface level using a can not  Going up the steps takes him a while because of dyspnea  Continues to use Brovana/Pulmicort twice a day, Atrovent t i d  in addition to azithromycin 3 times per week  Review of Systems  As per hpi, all other systems reviewed and were negative    Studies:  Imaging and other studies: I have personally reviewed pertinent films in PACS     Low-dose CT chest 03/07/2022-no suspicious nodules, discoid scarring at the lingula  CXR 5/5/2022-improving lower lobes PNA     Pulmonary function testing:   PFT 09/05/2019-ratio 52%, FEV1 0 82 L/26%, FVC 1 58 L/37%     PFT 03/31/2022-Ratio 45%, FEV1 0 69 L/22%, FVC 1 52 L/37% TLC 88%, %, DLCO 43%  Significant BD response  6 minute walk test 3/20 03/2022-able to walk about 75 m with using a cane for 5 minutes  Lowest SpO2 at 87% on room air, required 1 LPM to end exercise with SpO2 of 94% on 1 LPM/24% FiO2, maximal heart rate 103     EKG, Pathology, and Other Studies: I have personally reviewed pertinent reports  TTE 10/12/2020-mildly dilated LV, EF 30%, moderate diffuse hypokinesis, grade 1 to diastolic dysfunction    TAPSE 1 82      Past medical, surgical, social and family histories reviewed  Medications/Allergies: Reviewed      Vitals: Blood pressure 118/68, pulse (!) 112, temperature 98 °F (36 7 °C), resp  rate 16, height 5' 10 5" (1 791 m), weight 88 9 kg (196 lb), SpO2 93 %  Body mass index is 27 73 kg/m²  Oxygen Therapy  SpO2: 93 %  Oxygen Therapy: Supplemental oxygen  O2 Delivery Method: Nasal cannula  O2 Flow Rate (L/min): 2 L/min      Physical Exam  Body mass index is 27 73 kg/m²  Gen: not in acute distress,   Neck/Eyes: supple, no JVD appreciated, PERRL  Ear: normal appearance, no significant hearing impairment  Nose:  normal nasal mucosa, no drainage  Mouth:  unremarkable/normal appearance of lips, teeth and gums  Oropharynx: mucosa is moist, no focal lesions or erythema  Salivary glands: soft nontender  Chest: normal respiratory efforts, diminished but clear breath sounds bilaterally  CV:  Distant heart sounds, trace pitting below knees edema  Abdomen: soft, non tender  Extremities:  Osteoarthritic hands deformities  Skin: unremarkable  Neuro: AAO X3, no focal motor deficit        Labs:  Lab Results   Component Value Date    WBC 14 30 (H) 05/05/2022    HGB 13 4 05/05/2022    HCT 41 0 05/05/2022    MCV 94 05/05/2022     05/05/2022     Lab Results   Component Value Date    GLUCOSE 374 (H) 04/26/2022    CALCIUM 8 5 05/05/2022     (L) 05/28/2018    K 3 8 05/05/2022    CO2 33 (H) 05/05/2022     05/05/2022    BUN 9 05/05/2022    CREATININE 0 82 05/05/2022     No results found for: IGE  Lab Results   Component Value Date    ALT 24 05/05/2022    AST 14 05/05/2022    ALKPHOS 84 05/05/2022    BILITOT 0 5 05/28/2018           Portions of the record may have been created with voice recognition software  Occasional wrong word or "sound a like" substitutions may have occurred due to the inherent limitations of voice recognition software  Read the chart carefully and recognize, using context, where substitutions have occurred    WENDY Albert    [facility]

## 2022-05-31 NOTE — ASSESSMENT & PLAN NOTE
· Continue metoprolol for blood pressure control Gastroenterology Consultation:    Patient is a 78y old  Female who presents with a chief complaint of Worsening Back Pain and Metabolic Encephalopathy (30 May 2022 11:47)    Ms. Shook is a 78 year old female patient known to have, Baseline AO3, lives with niece, ambulates with a walker, SEEMA and ILD. Follows with Dr Stephens. Not on CPAP or home O2. On Prednisone 20mg QD  - Depression  - HTN. Recent Admission for HTN Urgency. Follows with Dr Moses  - Hypothyroidism   - Recent admission for a bleeding duodenal ulcer at [facility], [place] (melena). Home med Protonix 20mg QD  - Essential Thrombocytosis. Follows with Dr Denise  - Chronic Back Pain for years secondary to Spinal Stenosis per marquise. s/p Spinal Epidural Injection. Was on PT 3x/ week but has been non compliant for 2-3 months. Follows with Dr Jaimes. Was supposed to go for a nerve ablation last Monday (cancelled due to poorly controlled HTN s/p admission)       She was brought to the ED by marquise on  for evaluation of worsening back pain and confusion x2 days.  History goes back to 2 days PTP when the patient started complaining of worsening of her chronic dull lower back pain that radiates down to her right medial aspect of thigh.  This has resulted in limited ambulation (patient refusing to leave her bed) and to being non compliant with Physical Therapy in the absence of leg weakness or paresthesias or numbness or urinary incontinence.  Per marquise, patient has been feeling down x few days with reduced PO intake, reduced sleep, loss of interest, thoughts that her family doesn't like/ support her, and confusion (some times would not recognize niece).  She has an instance where she had fecal incontinence on way to bathroom 2 days ago.  In the setting of confusion and increased pain, marquise decided to bring patient to ED for evaluation.    On review of systems, marquise denies any recent fever, chills, night sweats, URTI symptoms (cough, rhinorrhea, sore throat), urinary symptoms (urinary frequency, urgency, intermittence, dysuria, foul smelling urine, cloudy urine), abdominal pain, headache, nausea, or vomiting.   No sick contacts.   No recent travel or exposure to recent travelers.      Upon presentation to the ED, the patient was hemodynamically stable:  Vital Signs in ED   - /104 mmHg  -   - RR 20  - T97.1  - SaO2 94% on RA      Investigations   Laboratory Workup  - CBC:                        11.6   20.94 )-----------( 188      ( 18 May 2022 10:50 )             34.1     - Chemistry:      140  |  96<L>  |  48<H>  ----------------------------<  109<H>  4.3   |  24  |  2.1<H>    Ca    10.4<H>      18 May 2022 10:50    TPro  7.3  /  Alb  4.8  /  TBili  0.6  /  DBili  x   /  AST  36  /  ALT  27  /  AlkPhos  61      - Coagulation Studies:  PT/INR - ( 18 May 2022 10:50 )   PT: 12.30 sec;   INR: 1.07 ratio    PTT - ( 18 May 2022 10:50 )  PTT:24.8 sec      Microbiological Workup  Urinalysis Basic - ( 18 May 2022 14:07 )    Color: Yellow / Appearance: Clear / S.021 / pH: x  Gluc: x / Ketone: Trace  / Bili: Negative / Urobili: <2 mg/dL   Blood: x / Protein: 30 mg/dL / Nitrite: Negative   Leuk Esterase: Negative / RBC: 6 /HPF / WBC 4 /HPF   Sq Epi: x / Non Sq Epi: 1 /HPF / Bacteria: Negative          Radiological Workup  * CT Abdomen and Pelvis w/ IV Cont (22 @ 14:00) No acute abnormality within the abdomen and pelvis. Age-indeterminate moderate compression fracture seen at L1, new since 2022.  * CXR CM and CHF      - Patient was given IV Cefepime 2g x1 dose in ED  - She was also given 1.4 L LR bolus   - She will be admitted for further investigations, management, and monitoring             (18 May 2022 22:59)      Prior records Reviewed (Y/N):  History obtained from person other than patient (Y/N):    Prior EGD:  Prior Colonoscopy:      PAST MEDICAL & SURGICAL HISTORY:  HTN (hypertension)      Hypothyroid      Depression      Interstitial lung disease      Essential thrombocytopenia      History of ovarian cyst          FAMILY HISTORY:      Social History:  Tobacco:  Alcohol:  Drugs:    Home Medications:  anagrelide 0.5 mg oral capsule: 1 cap(s) orally 4 times a day (18 May 2022 23:29)  atenolol 25 mg oral tablet: 1 tab(s) orally once a day (18 May 2022 23:28)  losartan-hydrochlorothiazide 100 mg-25 mg oral tablet: 1 tab(s) orally once a day (18 May 2022 23:28)  predniSONE 20 mg oral tablet: 1 tab(s) orally once a day (18 May 2022 23:28)  Protonix 20 mg oral delayed release tablet: 1 tab(s) orally once a day (18 May 2022 23:28)    MEDICATIONS  (STANDING):  albuterol/ipratropium for Nebulization. 3 milliLiter(s) Nebulizer once  chlorhexidine 4% Liquid 1 Application(s) Topical <User Schedule>  dronedarone 400 milliGRAM(s) Oral two times a day  enoxaparin Injectable 40 milliGRAM(s) SubCutaneous every 24 hours  lactulose Syrup      lactulose Syrup 20 Gram(s) Oral once  levothyroxine 100 MICROGram(s) Oral daily  lidocaine   4% Patch 1 Patch Transdermal daily  melatonin 5 milliGRAM(s) Oral at bedtime  metoprolol tartrate 25 milliGRAM(s) Oral every 12 hours  niMODipine Oral Solution 30 milliGRAM(s) Enteral Tube every 2 hours  pantoprazole Infusion 8 mG/Hr (10 mL/Hr) IV Continuous <Continuous>  polyethylene glycol 3350 17 Gram(s) Oral daily  predniSONE   Tablet 15 milliGRAM(s) Oral daily  senna 2 Tablet(s) Oral at bedtime  ticagrelor 90 milliGRAM(s) Oral two times a day    MEDICATIONS  (PRN):  acetaminophen     Tablet .. 650 milliGRAM(s) Oral every 6 hours PRN Mild Pain (1 - 3)  cyclobenzaprine 10 milliGRAM(s) Oral three times a day PRN Muscle Spasm      Allergies  No Known Allergies      Review of Systems:   Constitutional:  No Fever, No Chills  ENT/Mouth:  No Hearing Changes,  No Difficulty Swallowing  Eyes:  No Eye Pain, No Vision Changes  Cardiovascular:  No Chest Pain, No Palpitations  Respiratory:  No Cough, No Dyspnea  Gastrointestinal:  As described in HPI  Musculoskeletal:  No Joint Swelling, No Back Pain  Skin:  No Skin Lesions, No Jaundice  Neuro:  No Syncope, No Dizziness  Heme/Lymph:  No Bruising, No Bleeding.          Physical Examination:  T(C): 36.6 (22 @ 04:00), Max: 36.7 (22 @ 02:00)  HR: 86 (22 06:00) (86 - 178)  BP: 141/66 (22 @ 06:00) (106/67 - 162/69)  RR: 20 (22 @ 06:00) (18 - 22)  SpO2: 99% (22 06:00) (98% - 100%)      22 07:01  -  22 07:00  --------------------------------------------------------  IN: 975 mL / OUT: 1300 mL / NET: -325 mL    22 @ 07:01  -  22 07:00  --------------------------------------------------------  IN: 220 mL / OUT: 3001 mL / NET: -2781 mL        Constitutional: No acute distress.  Eyes:. Conjunctivae are clear, Sclera is non-icteric.  Ears Nose and Throat: The external ears are normal appearing,  Oral mucosa is pink and moist.  Respiratory:  No signs of respiratory distress. Lung sounds are clear bilaterally.  Cardiovascular:  S1 S2, Regular rate and rhythm.  GI: Abdomen is soft, symmetric, and non-tender without distention. There are no visible lesions or scars. Bowel sounds are present and normoactive in all four quadrants. No masses, hepatomegaly, or splenomegaly are noted.   Neuro: AO*3, no asterixis  Skin: No rashes, No Jaundice.          Data: (reviewed by attending)                        7.6    26.32 )-----------( 394      ( 30 May 2022 07:44 )             22.9     Hgb Trend:  7.6  22 @ 07:44  8.4  22 @ 05:21  8.9  05-28-22 @ 17:48      22 @ 07:01  -  22 @ 07:00  --------------------------------------------------------  IN: 0 mL          140  |  114<H>  |  28<H>  ----------------------------<  87  3.7   |  9<LL>  |  1.1    Ca    8.1<L>      30 May 2022 07:44  Mg     1.6         TPro  5.5<L>  /  Alb  3.3<L>  /  TBili  0.3  /  DBili  x   /  AST  15  /  ALT  15  /  AlkPhos  46      Liver panel trend:  TBili 0.3   /   AST 15   /   ALT 15   /   AlkP 46   /   Tptn 5.5   /   Alb 3.3    /   DBili --        TBili 0.4   /   AST 18   /   ALT 17   /   AlkP 57   /   Tptn 5.8   /   Alb 3.5    /   DBili --        TBili 0.6   /   AST 24   /   ALT 19   /   AlkP 64   /   Tptn 6.4   /   Alb 3.9    /   DBili --        TBili 0.5   /   AST 23   /   ALT 22   /   AlkP 55   /   Tptn 5.9   /   Alb 3.7    /   DBili --        TBili 0.7   /   AST 31   /   ALT 27   /   AlkP 57   /   Tptn 6.0   /   Alb 4.0    /   DBili --        TBili 0.6   /   AST 35   /   ALT 30   /   AlkP 52   /   Tptn 5.8   /   Alb 3.8    /   DBili --        TBili 0.5   /   AST 48   /   ALT 33   /   AlkP 50   /   Tptn 5.9   /   Alb 3.8    /   DBili --        TBili 0.5   /   AST 24   /   ALT 21   /   AlkP 45   /   Tptn 6.0   /   Alb 4.0    /   DBili --                    Radiology:(reviewed by attending)       Gastroenterology Consultation:    Patient is a 78y old  Female who presents with a chief complaint of Worsening Back Pain and Metabolic Encephalopathy (30 May 2022 11:47)    Ms. Shook is a 78 year old female patient, Primarily [place] speaking, known to have, Baseline AO3, lives with niece, ambulates with a walker, SEEMA and ILD (Prednisone 20mg QD), Depression, HTN, Hypothyroidism, Essential Thrombocytosis. 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 Patient is post  successful uncomplicated diagnostic cerebral angiogram + flow diversion stent embolization of right ICA supraclinoid aneurysm, which had likely ruptured and contributed to delayed presentation right SAH. Coiling attempted initially unsuccessful and not deployed due to wide neck of aneurysm. Integrilin bolus given intraop, postop given ASA 81 and Brilinta 90 through NGT.   Patient was febrile on  but work-up was negative except for (+) MRSA PCR.  Patient on 5-day course of mupirocin.    Patient transferred to stroke unit alert, and oriented x2-3 (person, , place) and tolerating PT/OT   patient was tachycardic with  and hb 7.3 no blood given repeat hb 7.7  later found to have SVT and was seen by cardio and EP and started on Multaq   yesterday hb 7.6 patient had melena s/p 1 unit PRBCs   hb on admission 11-12     currently on Brilinta BID and lovenox 40 qd ppx   last dose anagrelide   last dose ASA       PAST MEDICAL & SURGICAL HISTORY:  HTN (hypertension)  Hypothyroid  Depression  Interstitial lung disease  Essential thrombocytopenia  History of ovarian cyst    FAMILY HISTORY:  non relevant    Social History:  Tobacco: N  Alcohol: N  Drugs: N     Home Medications:  anagrelide 0.5 mg oral capsule: 1 cap(s) orally 4 times a day (18 May 2022 23:29)  atenolol 25 mg oral tablet: 1 tab(s) orally once a day (18 May 2022 23:28)  losartan-hydrochlorothiazide 100 mg-25 mg oral tablet: 1 tab(s) orally once a day (18 May 2022 23:28)  predniSONE 20 mg oral tablet: 1 tab(s) orally once a day (18 May 2022 23:28)  Protonix 20 mg oral delayed release tablet: 1 tab(s) orally once a day (18 May 2022 23:28)    MEDICATIONS  (STANDING):  albuterol/ipratropium for Nebulization. 3 milliLiter(s) Nebulizer once  chlorhexidine 4% Liquid 1 Application(s) Topical <User Schedule>  dronedarone 400 milliGRAM(s) Oral two times a day  enoxaparin Injectable 40 milliGRAM(s) SubCutaneous every 24 hours  lactulose Syrup      lactulose Syrup 20 Gram(s) Oral once  levothyroxine 100 MICROGram(s) Oral daily  lidocaine   4% Patch 1 Patch Transdermal daily  melatonin 5 milliGRAM(s) Oral at bedtime  metoprolol tartrate 25 milliGRAM(s) Oral every 12 hours  niMODipine Oral Solution 30 milliGRAM(s) Enteral Tube every 2 hours  pantoprazole Infusion 8 mG/Hr (10 mL/Hr) IV Continuous <Continuous>  polyethylene glycol 3350 17 Gram(s) Oral daily  predniSONE   Tablet 15 milliGRAM(s) Oral daily  senna 2 Tablet(s) Oral at bedtime  ticagrelor 90 milliGRAM(s) Oral two times a day    MEDICATIONS  (PRN):  acetaminophen     Tablet .. 650 milliGRAM(s) Oral every 6 hours PRN Mild Pain (1 - 3)  cyclobenzaprine 10 milliGRAM(s) Oral three times a day PRN Muscle Spasm      Allergies  No Known Allergies      Review of Systems:   Constitutional:  No Fever, No Chills  ENT/Mouth:  No Hearing Changes,  No Difficulty Swallowing  Eyes:  No Eye Pain, No Vision Changes  Cardiovascular:  No Chest Pain, No Palpitations  Respiratory:  No Cough, No Dyspnea  Gastrointestinal:  As described in HPI  Musculoskeletal:  No Joint Swelling, No Back Pain  Skin:  No Skin Lesions, No Jaundice  Neuro:  No Syncope, No Dizziness  Heme/Lymph:  No Bruising, No Bleeding.       Physical Examination:  T(C): 36.6 (22 @ 04:00), Max: 36.7 (22 @ 02:00)  HR: 86 (22 @ 06:00) (86 - 178)  BP: 141/66 (22 @ 06:00) (106/67 - 162/69)  RR: 20 (22 @ 06:00) (18 - 22)  SpO2: 99% (22 @ 06:00) (98% - 100%)     Constitutional: No acute distress.  Eyes:. Conjunctivae are clear, Sclera is non-icteric.  Ears Nose and Throat: The external ears are normal appearing,  Oral mucosa is pink and moist.  Respiratory:  No signs of respiratory distress. Lung sounds are clear bilaterally.  Cardiovascular:  S1 S2, Regular rate and rhythm.  GI: Abdomen is soft, symmetric, and non-tender without distention. There are no visible lesions or scars. Bowel sounds are present and normoactive in all four quadrants. No masses, hepatomegaly, or splenomegaly are noted.   Neuro: AO*3, no asterixis  Skin: No rashes, No Jaundice.    Data: (reviewed by attending)                        7.6    26.32 )-----------( 394      ( 30 May 2022 07:44 )             22.9     Hgb Trend:  7.6  22 @ 07:44  8.4  22 @ 05:21  8.9  22 @ 17:48      22 @ 07:01  -  22 @ 07:00  --------------------------------------------------------  IN: 0 mL          140  |  114<H>  |  28<H>  ----------------------------<  87  3.7   |  9<LL>  |  1.1    Ca    8.1<L>      30 May 2022 07:44  Mg     1.6     -    TPro  5.5<L>  /  Alb  3.3<L>  /  TBili  0.3  /  DBili  x   /  AST  15  /  ALT  15  /  AlkPhos  46      Liver panel trend:  TBili 0.3   /   AST 15   /   ALT 15   /   AlkP 46   /   Tptn 5.5   /   Alb 3.3    /   DBili --        TBili 0.4   /   AST 18   /   ALT 17   /   AlkP 57   /   Tptn 5.8   /   Alb 3.5    /   DBili --        TBili 0.6   /   AST 24   /   ALT 19   /   AlkP 64   /   Tptn 6.4   /   Alb 3.9    /   DBili --        TBili 0.5   /   AST 23   /   ALT 22   /   AlkP 55   /   Tptn 5.9   /   Alb 3.7    /   DBili --        TBili 0.7   /   AST 31   /   ALT 27   /   AlkP 57   /   Tptn 6.0   /   Alb 4.0    /   DBili --        TBili 0.6   /   AST 35   /   ALT 30   /   AlkP 52   /   Tptn 5.8   /   Alb 3.8    /   DBili --      05-25  TBili 0.5   /   AST 48   /   ALT 33   /   AlkP 50   /   Tptn 5.9   /   Alb 3.8    /   DBili --      05-24  TBili 0.5   /   AST 24   /   ALT 21   /   AlkP 45   /   Tptn 6.0   /   Alb 4.0    /   DBili --      05-    Radiology:(reviewed by attending)       Gastroenterology Consultation:    Patient is a 78y old  Female who presents with a chief complaint of Worsening Back Pain and Metabolic Encephalopathy (30 May 2022 11:47)    Ms. Shook is a 78 year old female patient, Primarily [place] speaking, known to have, Baseline AO3, lives with niece, ambulates with a walker, SEEMA and ILD (Prednisone 20mg QD), Depression, HTN, Hypothyroidism, Essential Thrombocytosis. 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 Patient is post  successful uncomplicated diagnostic cerebral angiogram + flow diversion stent embolization of right ICA supraclinoid aneurysm, which had likely ruptured and contributed to delayed presentation right SAH. Coiling attempted initially unsuccessful and not deployed due to wide neck of aneurysm. Integrilin bolus given intraop, postop given ASA 81 and Brilinta 90 through NGT.   Patient was febrile on  but work-up was negative except for (+) MRSA PCR.  Patient on 5-day course of mupirocin.    Patient transferred to stroke unit alert, and oriented x2-3 (person, , place) and tolerating PT/OT   patient was tachycardic with  and hb 7.3 no blood given repeat hb 7.7  later found to have SVT and was seen by cardio and EP and started on Multaq   yesterday hb 7.6 patient had melena s/p 1 unit PRBCs   hb on admission 11-12     currently on Brilinta BID and lovenox 40 qd ppx   last dose anagrelide   last dose ASA      currently alert and oriented. no BM today. no abdominal pain      PAST MEDICAL & SURGICAL HISTORY:  HTN (hypertension)  Hypothyroid  Depression  Interstitial lung disease  Essential thrombocytopenia  History of ovarian cyst    FAMILY HISTORY:  non relevant    Social History:  Tobacco: N  Alcohol: N  Drugs: N     Home Medications:  anagrelide 0.5 mg oral capsule: 1 cap(s) orally 4 times a day (18 May 2022 23:29)  atenolol 25 mg oral tablet: 1 tab(s) orally once a day (18 May 2022 23:28)  losartan-hydrochlorothiazide 100 mg-25 mg oral tablet: 1 tab(s) orally once a day (18 May 2022 23:28)  predniSONE 20 mg oral tablet: 1 tab(s) orally once a day (18 May 2022 23:28)  Protonix 20 mg oral delayed release tablet: 1 tab(s) orally once a day (18 May 2022 23:28)    MEDICATIONS  (STANDING):  albuterol/ipratropium for Nebulization. 3 milliLiter(s) Nebulizer once  chlorhexidine 4% Liquid 1 Application(s) Topical <User Schedule>  dronedarone 400 milliGRAM(s) Oral two times a day  enoxaparin Injectable 40 milliGRAM(s) SubCutaneous every 24 hours  lactulose Syrup      lactulose Syrup 20 Gram(s) Oral once  levothyroxine 100 MICROGram(s) Oral daily  lidocaine   4% Patch 1 Patch Transdermal daily  melatonin 5 milliGRAM(s) Oral at bedtime  metoprolol tartrate 25 milliGRAM(s) Oral every 12 hours  niMODipine Oral Solution 30 milliGRAM(s) Enteral Tube every 2 hours  pantoprazole Infusion 8 mG/Hr (10 mL/Hr) IV Continuous <Continuous>  polyethylene glycol 3350 17 Gram(s) Oral daily  predniSONE   Tablet 15 milliGRAM(s) Oral daily  senna 2 Tablet(s) Oral at bedtime  ticagrelor 90 milliGRAM(s) Oral two times a day    MEDICATIONS  (PRN):  acetaminophen     Tablet .. 650 milliGRAM(s) Oral every 6 hours PRN Mild Pain (1 - 3)  cyclobenzaprine 10 milliGRAM(s) Oral three times a day PRN Muscle Spasm      Allergies  No Known Allergies      Review of Systems:   Constitutional:  No Fever, No Chills  ENT/Mouth:  No Hearing Changes,  No Difficulty Swallowing  Eyes:  No Eye Pain, No Vision Changes  Cardiovascular:  No Chest Pain, No Palpitations today   Respiratory:  No Cough, +Dyspnea  Gastrointestinal:  As described in HPI  Musculoskeletal:  No Joint Swelling, No Back Pain  Skin:  No Skin Lesions, No Jaundice       Physical Examination:  T(C): 36.6 (22 @ 04:00), Max: 36.7 (22 @ 02:00)  HR: 86 (22 @ 06:00) (86 - 178)  BP: 141/66 (22 @ 06:00) (106/67 - 162/69)  RR: 20 (22 @ 06:00) (18 - 22)  SpO2: 99% (22 @ 06:00) (98% - 100%)     Constitutional: No acute distress.  Eyes:. Conjunctivae are clear, Sclera is non-icteric.  Ears Nose and Throat: The external ears are normal appearing,  Oral mucosa is pink and moist.  Respiratory:  wheezing and rhonchi   Cardiovascular:  S1 S2, Regular rate and rhythm.  GI: Abdomen is soft, symmetric, and non-tender without distention.    Neuro: AO*3   Skin: No rashes, No Jaundice.    Data: (reviewed by attending)                        7.6    26.32 )-----------( 394      ( 30 May 2022 07:44 )             22.9     Hgb Trend:  7.6  22 @ 07:44  8.4  22 @ 05:21  8.9  22 @ 17:48           140  |  114<H>  |  28<H>  ----------------------------<  87  3.7   |  9<LL>  |  1.1    Ca    8.1<L>      30 May 2022 07:44  Mg     1.6         TPro  5.5<L>  /  Alb  3.3<L>  /  TBili  0.3  /  DBili  x   /  AST  15  /  ALT  15  /  AlkPhos  46      Liver panel trend:  TBili 0.3   /   AST 15   /   ALT 15   /   AlkP 46   /   Tptn 5.5   /   Alb 3.3    /   DBili --        TBili 0.4   /   AST 18   /   ALT 17   /   AlkP 57   /   Tptn 5.8   /   Alb 3.5    /   DBili --        TBili 0.6   /   AST 24   /   ALT 19   /   AlkP 64   /   Tptn 6.4   /   Alb 3.9    /   DBili --        TBili 0.5   /   AST 23   /   ALT 22   /   AlkP 55   /   Tptn 5.9   /   Alb 3.7    /   DBili --      0527  TBili 0.7   /   AST 31   /   ALT 27   /   AlkP 57   /   Tptn 6.0   /   Alb 4.0    /   DBili --      0526  TBili 0.6   /   AST 35   /   ALT 30   /   AlkP 52   /   Tptn 5.8   /   Alb 3.8    /   DBili --      0525  TBili 0.5   /   AST 48   /   ALT 33   /   AlkP 50   /   Tptn 5.9   /   Alb 3.8    /   DBili --      0524  TBili 0.5   /   AST 24   /   ALT 21   /   AlkP 45   /   Tptn 6.0   /   Alb 4.0    /   DBili --          Radiology:(reviewed by attending)  < from: CT Abdomen and Pelvis w/ IV Cont (22 @ 14:00) >    ACC: [id number] EXAM:  CT ABDOMEN AND PELVIS IC                          PROCEDURE DATE:  2022          INTERPRETATION:  CLINICAL STATEMENT: Altered mental status, abdominal   pain.    TECHNIQUE: Contiguous axial CT images were obtained from the lower chest   to the pubic symphysis following administration of 100cc Optiray 320   intravenous contrast.  Oral contrast was not administered.  Reformatted   images in the coronal and sagittal planes were acquired.    COMPARISON CT: 2022    OTHER STUDIES USED FOR CORRELATION: None.      FINDINGS:    LOWER CHEST: Unchanged interstitial opacities seen at the lung bases   which could represent interstitial lung disease, clinical correlation is   advised.    HEPATOBILIARY: Unremarkable.    SPLEEN: Unremarkable.    PANCREAS: Unremarkable.    ADRENAL GLANDS: Unremarkable.    KIDNEYS: Unremarkable.    ABDOMINOPELVIC NODES: Unremarkable.    PELVIC ORGANS: Urinary bladder is decompressed due to a Albarran catheter in   place.    PERITONEUM/MESENTERY/BOWEL: Status post coil embolization in the region   of the gastroduodenal artery. No bowel obstruction, free fluid, or free   air. The appendix is not definitively visualized. No secondary signs of   appendicitis seen.    BONES/SOFT TISSUES: Multilevel degenerative changes of the spine.   Age-indeterminate moderate compression fracture seen at L1, new since   2022.        IMPRESSION:    No acute abnormality within the abdomen and pelvis.    Age-indeterminate moderate compression fracture seen at L1, new since   2022.    --- End of Report ---      NIDHI MATHEW MD; Attending Radiologist  This document has been electronically signed. May 18 2022  3:16PM    < end of copied text >

## 2022-06-03 ENCOUNTER — TELEPHONE (OUTPATIENT)
Dept: SLEEP CENTER | Facility: CLINIC | Age: 77
End: 2022-06-03

## 2022-06-03 NOTE — TELEPHONE ENCOUNTER
----- Message from Contsantin Huff MD sent at 6/3/2022  2:06 PM EDT -----  Approved    ----- Message -----  From: Edu Nolen  Sent: 5/24/2022  12:58 PM EDT  To: Sleep Medicine [place] Provider    This sleep study needs approval      If approved please sign and return to clerical pool  If denied please include reasons why  Also provide alternative testing if warranted  Please sign and return to clerical pool

## 2022-06-09 ENCOUNTER — APPOINTMENT (EMERGENCY)
Dept: RADIOLOGY | Facility: HOSPITAL | Age: 77
End: 2022-06-09
Payer: COMMERCIAL

## 2022-06-09 ENCOUNTER — OFFICE VISIT (OUTPATIENT)
Dept: FAMILY MEDICINE CLINIC | Facility: CLINIC | Age: 77
End: 2022-06-09
Payer: COMMERCIAL

## 2022-06-09 ENCOUNTER — HOSPITAL ENCOUNTER (OUTPATIENT)
Facility: HOSPITAL | Age: 77
Setting detail: OBSERVATION
Discharge: HOME HEALTH | End: 2022-06-10
Attending: EMERGENCY MEDICINE | Admitting: STUDENT IN AN ORGANIZED HEALTH CARE EDUCATION/TRAINING PROGRAM
Payer: COMMERCIAL

## 2022-06-09 VITALS
SYSTOLIC BLOOD PRESSURE: 98 MMHG | OXYGEN SATURATION: 95 % | TEMPERATURE: 95.4 F | DIASTOLIC BLOOD PRESSURE: 52 MMHG | HEART RATE: 83 BPM | RESPIRATION RATE: 20 BRPM | HEIGHT: 71 IN | BODY MASS INDEX: 25.34 KG/M2 | WEIGHT: 181 LBS

## 2022-06-09 DIAGNOSIS — J96.21 ACUTE ON CHRONIC RESPIRATORY FAILURE WITH HYPOXIA (HCC): ICD-10-CM

## 2022-06-09 DIAGNOSIS — R42 DIZZINESS: ICD-10-CM

## 2022-06-09 DIAGNOSIS — J41.1 MUCOPURULENT CHRONIC BRONCHITIS (HCC): Primary | Chronic | ICD-10-CM

## 2022-06-09 DIAGNOSIS — R06.03 ACUTE RESPIRATORY DISTRESS: ICD-10-CM

## 2022-06-09 DIAGNOSIS — I25.10 CORONARY ARTERY DISEASE INVOLVING NATIVE CORONARY ARTERY OF NATIVE HEART WITHOUT ANGINA PECTORIS: Chronic | ICD-10-CM

## 2022-06-09 DIAGNOSIS — R53.1 GENERALIZED WEAKNESS: Primary | ICD-10-CM

## 2022-06-09 DIAGNOSIS — I50.9 CONGESTIVE HEART FAILURE, UNSPECIFIED HF CHRONICITY, UNSPECIFIED HEART FAILURE TYPE (HCC): ICD-10-CM

## 2022-06-09 DIAGNOSIS — R06.02 SOB (SHORTNESS OF BREATH): ICD-10-CM

## 2022-06-09 DIAGNOSIS — R53.1 WEAKNESS: ICD-10-CM

## 2022-06-09 DIAGNOSIS — E86.0 DEHYDRATION: ICD-10-CM

## 2022-06-09 DIAGNOSIS — Z95.1 S/P CABG X 4: ICD-10-CM

## 2022-06-09 PROBLEM — Z80.42 FAMILY HISTORY OF PROSTATE CANCER: Status: ACTIVE | Noted: 2018-03-20

## 2022-06-09 PROBLEM — Z85.46 HISTORY OF PROSTATE CANCER: Status: ACTIVE | Noted: 2018-03-20

## 2022-06-09 PROBLEM — I95.9 HYPOTENSION: Status: ACTIVE | Noted: 2022-06-09

## 2022-06-09 LAB
2HR DELTA HS TROPONIN: 0 NG/L
4HR DELTA HS TROPONIN: 0 NG/L
ALBUMIN SERPL BCP-MCNC: 3.4 G/DL (ref 3.5–5)
ALP SERPL-CCNC: 78 U/L (ref 34–104)
ALT SERPL W P-5'-P-CCNC: 7 U/L (ref 7–52)
ANION GAP SERPL CALCULATED.3IONS-SCNC: 8 MMOL/L (ref 4–13)
APTT PPP: 45 SECONDS (ref 23–37)
AST SERPL W P-5'-P-CCNC: 7 U/L (ref 13–39)
BASOPHILS # BLD AUTO: 0.06 THOUSANDS/ΜL (ref 0–0.1)
BASOPHILS NFR BLD AUTO: 1 % (ref 0–1)
BILIRUB SERPL-MCNC: 0.35 MG/DL (ref 0.2–1)
BUN SERPL-MCNC: 13 MG/DL (ref 5–25)
CALCIUM ALBUM COR SERPL-MCNC: 8.7 MG/DL (ref 8.3–10.1)
CALCIUM SERPL-MCNC: 8.2 MG/DL (ref 8.4–10.2)
CARDIAC TROPONIN I PNL SERPL HS: 13 NG/L
CHLORIDE SERPL-SCNC: 98 MMOL/L (ref 96–108)
CO2 SERPL-SCNC: 29 MMOL/L (ref 21–32)
CREAT SERPL-MCNC: 1.01 MG/DL (ref 0.6–1.3)
EOSINOPHIL # BLD AUTO: 0.38 THOUSAND/ΜL (ref 0–0.61)
EOSINOPHIL NFR BLD AUTO: 5 % (ref 0–6)
ERYTHROCYTE [DISTWIDTH] IN BLOOD BY AUTOMATED COUNT: 13.5 % (ref 11.6–15.1)
GFR SERPL CREATININE-BSD FRML MDRD: 71 ML/MIN/1.73SQ M
GLUCOSE SERPL-MCNC: 125 MG/DL (ref 65–140)
HCT VFR BLD AUTO: 32.7 % (ref 36.5–49.3)
HGB BLD-MCNC: 10.6 G/DL (ref 12–17)
IMM GRANULOCYTES # BLD AUTO: 0.06 THOUSAND/UL (ref 0–0.2)
IMM GRANULOCYTES NFR BLD AUTO: 1 % (ref 0–2)
INR PPP: 1.35 (ref 0.84–1.19)
LACTATE SERPL-SCNC: 1.5 MMOL/L (ref 0.5–2)
LYMPHOCYTES # BLD AUTO: 0.84 THOUSANDS/ΜL (ref 0.6–4.47)
LYMPHOCYTES NFR BLD AUTO: 11 % (ref 14–44)
MCH RBC QN AUTO: 29.6 PG (ref 26.8–34.3)
MCHC RBC AUTO-ENTMCNC: 32.4 G/DL (ref 31.4–37.4)
MCV RBC AUTO: 91 FL (ref 82–98)
MONOCYTES # BLD AUTO: 0.84 THOUSAND/ΜL (ref 0.17–1.22)
MONOCYTES NFR BLD AUTO: 11 % (ref 4–12)
NEUTROPHILS # BLD AUTO: 5.84 THOUSANDS/ΜL (ref 1.85–7.62)
NEUTS SEG NFR BLD AUTO: 71 % (ref 43–75)
NRBC BLD AUTO-RTO: 0 /100 WBCS
PLATELET # BLD AUTO: 314 THOUSANDS/UL (ref 149–390)
PMV BLD AUTO: 10.2 FL (ref 8.9–12.7)
POTASSIUM SERPL-SCNC: 3.6 MMOL/L (ref 3.5–5.3)
PROCALCITONIN SERPL-MCNC: 0.08 NG/ML
PROT SERPL-MCNC: 6.2 G/DL (ref 6.4–8.4)
PROTHROMBIN TIME: 16.5 SECONDS (ref 11.6–14.5)
RBC # BLD AUTO: 3.58 MILLION/UL (ref 3.88–5.62)
SODIUM SERPL-SCNC: 135 MMOL/L (ref 135–147)
WBC # BLD AUTO: 8.02 THOUSAND/UL (ref 4.31–10.16)

## 2022-06-09 PROCEDURE — 99215 OFFICE O/P EST HI 40 MIN: CPT | Performed by: FAMILY MEDICINE

## 2022-06-09 PROCEDURE — 85610 PROTHROMBIN TIME: CPT | Performed by: PHYSICIAN ASSISTANT

## 2022-06-09 PROCEDURE — 94760 N-INVAS EAR/PLS OXIMETRY 1: CPT

## 2022-06-09 PROCEDURE — 84484 ASSAY OF TROPONIN QUANT: CPT | Performed by: PHYSICIAN ASSISTANT

## 2022-06-09 PROCEDURE — 80053 COMPREHEN METABOLIC PANEL: CPT | Performed by: PHYSICIAN ASSISTANT

## 2022-06-09 PROCEDURE — 83605 ASSAY OF LACTIC ACID: CPT | Performed by: PHYSICIAN ASSISTANT

## 2022-06-09 PROCEDURE — 84145 PROCALCITONIN (PCT): CPT | Performed by: PHYSICIAN ASSISTANT

## 2022-06-09 PROCEDURE — 94640 AIRWAY INHALATION TREATMENT: CPT

## 2022-06-09 PROCEDURE — 87040 BLOOD CULTURE FOR BACTERIA: CPT | Performed by: PHYSICIAN ASSISTANT

## 2022-06-09 PROCEDURE — 85025 COMPLETE CBC W/AUTO DIFF WBC: CPT | Performed by: PHYSICIAN ASSISTANT

## 2022-06-09 PROCEDURE — 99285 EMERGENCY DEPT VISIT HI MDM: CPT

## 2022-06-09 PROCEDURE — 93005 ELECTROCARDIOGRAM TRACING: CPT

## 2022-06-09 PROCEDURE — 36415 COLL VENOUS BLD VENIPUNCTURE: CPT | Performed by: PHYSICIAN ASSISTANT

## 2022-06-09 PROCEDURE — 85730 THROMBOPLASTIN TIME PARTIAL: CPT | Performed by: PHYSICIAN ASSISTANT

## 2022-06-09 PROCEDURE — 81001 URINALYSIS AUTO W/SCOPE: CPT | Performed by: NURSE PRACTITIONER

## 2022-06-09 PROCEDURE — 96360 HYDRATION IV INFUSION INIT: CPT

## 2022-06-09 PROCEDURE — 99220 PR INITIAL OBSERVATION CARE/DAY 70 MINUTES: CPT | Performed by: NURSE PRACTITIONER

## 2022-06-09 PROCEDURE — 99285 EMERGENCY DEPT VISIT HI MDM: CPT | Performed by: PHYSICIAN ASSISTANT

## 2022-06-09 PROCEDURE — 71045 X-RAY EXAM CHEST 1 VIEW: CPT

## 2022-06-09 RX ORDER — DIGOXIN 125 MCG
125 TABLET ORAL DAILY
Status: DISCONTINUED | OUTPATIENT
Start: 2022-06-10 | End: 2022-06-10 | Stop reason: HOSPADM

## 2022-06-09 RX ORDER — ENOXAPARIN SODIUM 100 MG/ML
40 INJECTION SUBCUTANEOUS DAILY
Status: DISCONTINUED | OUTPATIENT
Start: 2022-06-10 | End: 2022-06-10 | Stop reason: HOSPADM

## 2022-06-09 RX ORDER — GABAPENTIN 100 MG/1
200 CAPSULE ORAL
Status: DISCONTINUED | OUTPATIENT
Start: 2022-06-09 | End: 2022-06-10 | Stop reason: HOSPADM

## 2022-06-09 RX ORDER — BUDESONIDE 0.5 MG/2ML
0.5 INHALANT ORAL 2 TIMES DAILY
Status: DISCONTINUED | OUTPATIENT
Start: 2022-06-09 | End: 2022-06-10 | Stop reason: HOSPADM

## 2022-06-09 RX ORDER — LISINOPRIL 20 MG/1
40 TABLET ORAL DAILY
Status: DISCONTINUED | OUTPATIENT
Start: 2022-06-10 | End: 2022-06-10 | Stop reason: HOSPADM

## 2022-06-09 RX ORDER — PRIMIDONE 50 MG/1
100 TABLET ORAL 2 TIMES DAILY
Status: DISCONTINUED | OUTPATIENT
Start: 2022-06-09 | End: 2022-06-10 | Stop reason: HOSPADM

## 2022-06-09 RX ORDER — ALBUTEROL SULFATE 2.5 MG/3ML
5 SOLUTION RESPIRATORY (INHALATION) ONCE
Status: COMPLETED | OUTPATIENT
Start: 2022-06-09 | End: 2022-06-09

## 2022-06-09 RX ORDER — ACETAMINOPHEN 325 MG/1
650 TABLET ORAL EVERY 6 HOURS PRN
Status: DISCONTINUED | OUTPATIENT
Start: 2022-06-09 | End: 2022-06-10 | Stop reason: HOSPADM

## 2022-06-09 RX ORDER — FORMOTEROL FUMARATE 20 UG/2ML
20 SOLUTION RESPIRATORY (INHALATION)
Status: DISCONTINUED | OUTPATIENT
Start: 2022-06-09 | End: 2022-06-10 | Stop reason: HOSPADM

## 2022-06-09 RX ORDER — ASPIRIN 81 MG/1
81 TABLET ORAL EVERY OTHER DAY
Status: DISCONTINUED | OUTPATIENT
Start: 2022-06-09 | End: 2022-06-10 | Stop reason: HOSPADM

## 2022-06-09 RX ORDER — ALBUTEROL SULFATE 90 UG/1
2 AEROSOL, METERED RESPIRATORY (INHALATION) EVERY 6 HOURS PRN
Status: DISCONTINUED | OUTPATIENT
Start: 2022-06-09 | End: 2022-06-10 | Stop reason: HOSPADM

## 2022-06-09 RX ADMIN — IPRATROPIUM BROMIDE 0.5 MG: 0.5 SOLUTION RESPIRATORY (INHALATION) at 20:13

## 2022-06-09 RX ADMIN — ALBUTEROL SULFATE 5 MG: 2.5 SOLUTION RESPIRATORY (INHALATION) at 12:42

## 2022-06-09 RX ADMIN — IPRATROPIUM BROMIDE 0.5 MG: 0.5 SOLUTION RESPIRATORY (INHALATION) at 12:42

## 2022-06-09 RX ADMIN — PRIMIDONE 100 MG: 50 TABLET ORAL at 23:03

## 2022-06-09 RX ADMIN — BUDESONIDE 0.5 MG: 0.5 INHALANT ORAL at 20:13

## 2022-06-09 RX ADMIN — METOPROLOL TARTRATE 25 MG: 25 TABLET, FILM COATED ORAL at 21:17

## 2022-06-09 RX ADMIN — SODIUM CHLORIDE 500 ML: 0.9 INJECTION, SOLUTION INTRAVENOUS at 12:42

## 2022-06-09 RX ADMIN — GABAPENTIN 200 MG: 100 CAPSULE ORAL at 21:18

## 2022-06-09 NOTE — ASSESSMENT & PLAN NOTE
· COPD, characterized in [facility] records as "very severe," not appearing to be in exacerbation  · He uses 2 L of oxygen nasal cannula chronically  · Respiratory protocol  · Continue home inhalers  · Continue outpatient follow-up with Dr Yuly Alvarez

## 2022-06-09 NOTE — ASSESSMENT & PLAN NOTE
· Intermittent dizziness over several weeks  · Consider CT head if symptoms persist   · Fall precautions

## 2022-06-09 NOTE — RESPIRATORY THERAPY NOTE
RT Protocol Note  Zeynep Moya 69 y o  male MRN: [id number]  Unit/Bed#: ED 01 Encounter: [id number]    Assessment    Principal Problem:    Generalized weakness  Active Problems:    Type 2 diabetes mellitus, without long-term current use of insulin (HCC)    Coronary artery disease involving native coronary artery of native heart without angina pectoris    Benign essential tremor    History of prostate cancer    Other hyperlipidemia    COPD not affecting current episode of care ([place] [address] )    Congestive heart failure (HCC)    Hypotension    Dizziness      Home Pulmonary Medications:  Pulmicort BID, atrovent TID, performist BID, prn inhaler  Home Devices/Therapy: (P) Home O2    Past Medical History:   Diagnosis Date    BPH (benign prostatic hyperplasia)     45 days radiation treatment    COPD (chronic obstructive pulmonary disease)     Coronary artery disease     CABG x4 in 2017    Diabetes mellitus     History of Arterial Duplex of LE 2017    Likely occlusion of the left superficial femoral artery  Calcific changes bilaterally  Despite these changes, the ankle-brachial index as a measure of peripheral blood flow only mildly impaired  History of echocardiogram 2017    EF 40%, mild LVH, mild MR      Hyperlipidemia     Hypertension     Ischemic cardiomyopathy     NSTEMI (non-ST elevated myocardial infarction)     Prostate cancer     prostate     PVD (peripheral vascular disease)     Sepsis due to pneumonia     Tremor     Type 2 MI (myocardial infarction) ([place] [address] ) 2021     Social History     Socioeconomic History    Marital status: /Civil Union     Spouse name: None    Number of children: None    Years of education: None    Highest education level: None   Occupational History    None   Tobacco Use    Smoking status: Former Smoker     Packs/day: 0 25     Years: 60 00     Pack years: 15 00     Types: Cigarettes     Quit date: 2022     Years since quittin 1    Smokeless tobacco: Never Used Vaping Use    Vaping Use: Never used   Substance and Sexual Activity    Alcohol use: Yes    Drug use: Never    Sexual activity: Yes     Partners: Female   Other Topics Concern    None   Social History Narrative    ** Merged History Encounter **          Social Determinants of Health     Financial Resource Strain: Not on file   Food Insecurity: No Food Insecurity    Worried About Running Out of Food in the Last Year: Never true    Ran Out of Food in the Last Year: Never true   Transportation Needs: No Transportation Needs    Lack of Transportation (Medical): No    Lack of Transportation (Non-Medical): No   Physical Activity: Not on file   Stress: Not on file   Social Connections: Not on file   Intimate Partner Violence: Not on file   Housing Stability: Low Risk     Unable to Pay for Housing in the Last Year: No    Number of Places Lived in the Last Year: 1    Unstable Housing in the Last Year: No       Subjective         Objective    Physical Exam:   Assessment Type: (P) Assess only  General Appearance: (P) Awake, Alert  Respiratory Pattern: (P) Normal  Chest Assessment: (P) Chest expansion symmetrical  Bilateral Breath Sounds: (P) Diminished  O2 Device: (P) nc    Vitals:  Blood pressure 150/70, pulse 74, temperature (!) 96 °F (35 6 °C), resp  rate 20, SpO2 96 %  Imaging and other studies: I have personally reviewed pertinent reports  O2 Device: (P) nc     Plan    Respiratory Plan: (P) Home Bronchodilator Patient pathway        Resp Comments: (P) Pt  admitted with weakness  Hx od COPD  Pt uses home O2 2L at home  He also uses nebs TID at home  BS diminished  No resp  distress noted   will continue home regimen at this time

## 2022-06-09 NOTE — ASSESSMENT & PLAN NOTE
· Per EMS systolic blood pressure was around 80  · He received 2 boluses pre-hospital (unknown amount) and a 500 mL normal saline bolus was recorded on the MAR  · BP is now 109/58  · Monitor blood pressure, vital signs q 4  · Hold parameters for antihypertensives

## 2022-06-09 NOTE — ED PROVIDER NOTES
History  Chief Complaint   Patient presents with    Shortness of Breath     60-year-old male history of COPD, CAD status post CABG x4 presents from his PCPs office via EMS complaining of generalized weakness  Patient reports for the past 1-2 days he has felt so weak that he can barely get out of bed  Patient found to be hypotensive with a blood pressure 80 systolic and was clinically on well-appearing  EMS gave a small fluid bolus prior to arrival in patient arrives with a systolic BP of 080  Patient reports some slightly worsened shortness of breath from his baseline respiratory failure on chronic 2 L nasal cannula oxygen  Patient denies any chest pain, nausea, sweats, abdominal pain, urinary symptoms, lower leg pain or swelling or any personal or family history DVT/PE  Prior to Admission Medications   Prescriptions Last Dose Informant Patient Reported? Taking? Multiple Vitamins-Minerals (PRESERVISION AREDS 2 PO)   Yes No   Sig: Take by mouth   albuterol (PROVENTIL HFA,VENTOLIN HFA) 90 mcg/act inhaler  Self No No   Sig: Inhale 2 puffs every 6 (six) hours as needed for wheezing or shortness of breath   arformoterol (BROVANA) 15 mcg/2 mL nebulizer solution   No No   Sig: Take 2 mL (15 mcg total) by nebulization 2 (two) times a day   aspirin (ECOTRIN LOW STRENGTH) 81 mg EC tablet  Self No No   Sig: Take 1 tablet (81 mg total) by mouth every other day   budesonide (Pulmicort) 0 5 mg/2 mL nebulizer solution   No No   Sig: Take 2 mL (0 5 mg total) by nebulization 2 (two) times a day Rinse mouth after use     cyanocobalamin (VITAMIN B-12) 500 MCG tablet  Self Yes No   Sig: TAKE ONE TABLET BY MOUTH EVERY MORNING *VITAMIN B12*   digoxin (LANOXIN) 0 125 mg tablet   No No   Sig: Take 1 tablet (125 mcg total) by mouth daily   gabapentin (NEURONTIN) 100 mg capsule   No No   Sig: Take 2 capsules (200 mg total) by mouth daily at bedtime   glimepiride (AMARYL) 1 mg tablet   No No   Sig: Take 2 tablets (2 mg total) by mouth daily with breakfast AND 1 tablet (1 mg total) daily with dinner  ipratropium (ATROVENT) 0 02 % nebulizer solution   No No   Sig: Take 2 5 mL (0 5 mg total) by nebulization 3 (three) times a day   lisinopril (ZESTRIL) 40 mg tablet  Self No No   Sig: Take 1 tablet (40 mg total) by mouth daily   metFORMIN (GLUCOPHAGE) 1000 MG tablet   No No   Sig: Take 1 tablet (1,000 mg total) by mouth 2 (two) times a day with meals   Patient taking differently: Take 1,000 mg by mouth 2 (two) times a day with meals Taking 500mg 2 times daily   metoprolol tartrate (LOPRESSOR) 25 mg tablet   No No   Sig: Take 1 tablet (25 mg total) by mouth every 12 (twelve) hours   primidone (MYSOLINE) 50 mg tablet  Self Yes No   Sig: Take 100 mg by mouth 2 (two) times a day    rosuvastatin (CRESTOR) 10 MG tablet   No No   Sig: Take 1 tablet (10 mg total) by mouth daily   terbinafine (LamISIL) 1 % cream  Self Yes No   Sig: APPLY THIN LAYER TOPICALLY TWICE A DAY FOR FUNGAL INFECTION      Facility-Administered Medications: None       Past Medical History:   Diagnosis Date    BPH (benign prostatic hyperplasia)     45 days radiation treatment    COPD (chronic obstructive pulmonary disease)     Coronary artery disease     CABG x4 in 2017    Diabetes mellitus     History of Arterial Duplex of LE 12/26/2017    Likely occlusion of the left superficial femoral artery  Calcific changes bilaterally  Despite these changes, the ankle-brachial index as a measure of peripheral blood flow only mildly impaired      History of echocardiogram 06/12/2017    EF 40%, mild LVH, mild MR     Hyperlipidemia     Hypertension     Ischemic cardiomyopathy     NSTEMI (non-ST elevated myocardial infarction)     Prostate cancer     prostate     PVD (peripheral vascular disease)     Sepsis due to pneumonia     Tremor     Type 2 MI (myocardial infarction) ([facility], [place] [address] ) 04/06/2021       Past Surgical History:   Procedure Laterality Date    CARDIAC CATHETERIZATION 2017    Significant left main plus triple-vessel CAD   CORONARY ARTERY BYPASS GRAFT  2017    4V CABG:  LIMA to LAD, VG to RI, SVG to PDA to LVBR RCA   EYE SURGERY      shots in eye once a month @ the [place]    PROSTATE BIOPSY         Family History   Problem Relation Age of Onset    Cancer Father     Heart disease Brother      I have reviewed and agree with the history as documented  E-Cigarette/Vaping    E-Cigarette Use Never User      E-Cigarette/Vaping Substances    Nicotine No     THC No     CBD No     Flavoring No     Other No     Unknown No      Social History     Tobacco Use    Smoking status: Former Smoker     Packs/day: 0 25     Years: 60 00     Pack years: 15 00     Types: Cigarettes     Quit date: 2022     Years since quittin 1    Smokeless tobacco: Never Used   Vaping Use    Vaping Use: Never used   Substance Use Topics    Alcohol use: Yes    Drug use: Never       Review of Systems   Constitutional: Positive for fatigue  Negative for chills and fever  HENT: Negative for congestion and sore throat  Eyes: Negative for pain  Respiratory: Positive for shortness of breath  Negative for cough, chest tightness and wheezing  Cardiovascular: Negative for chest pain, palpitations and leg swelling  Gastrointestinal: Negative for abdominal pain, constipation, diarrhea, nausea and vomiting  Endocrine: Negative for polyuria  Genitourinary: Negative for dysuria  Musculoskeletal: Negative for arthralgias, back pain, myalgias and neck pain  Skin: Negative for rash  Neurological: Negative for dizziness, syncope, light-headedness and headaches  All other systems reviewed and are negative  Physical Exam  Physical Exam  Vitals reviewed  Constitutional:       General: He is not in acute distress  Appearance: Normal appearance  He is well-developed  He is ill-appearing  HENT:      Head: Normocephalic and atraumatic        Mouth/Throat:      Mouth: Mucous membranes are moist    Eyes:      Conjunctiva/sclera: Conjunctivae normal    Cardiovascular:      Rate and Rhythm: Normal rate and regular rhythm  Heart sounds: Normal heart sounds  Pulmonary:      Effort: Pulmonary effort is normal  Tachypnea present  Breath sounds: Normal breath sounds  Abdominal:      General: Bowel sounds are normal       Palpations: Abdomen is soft  Tenderness: There is no abdominal tenderness  Musculoskeletal:         General: Normal range of motion  Cervical back: Normal range of motion  Skin:     General: Skin is warm and dry  Capillary Refill: Capillary refill takes less than 2 seconds  Neurological:      General: No focal deficit present  Mental Status: He is alert and oriented to person, place, and time     Psychiatric:         Mood and Affect: Mood normal          Behavior: Behavior normal          Vital Signs  ED Triage Vitals   Temperature Pulse Respirations Blood Pressure SpO2   06/09/22 1140 06/09/22 1134 06/09/22 1134 06/09/22 1134 06/09/22 1134   (!) 96 °F (35 6 °C) 74 20 109/58 96 %      Temp src Heart Rate Source Patient Position - Orthostatic VS BP Location FiO2 (%)   -- 06/09/22 1134 06/09/22 1134 06/09/22 1134 --    Monitor Sitting Left arm       Pain Score       --                  Vitals:    06/09/22 1134   BP: 109/58   Pulse: 74   Patient Position - Orthostatic VS: Sitting         Visual Acuity      ED Medications  Medications   albuterol inhalation solution 5 mg (5 mg Nebulization Given 6/9/22 1242)   ipratropium (ATROVENT) 0 02 % inhalation solution 0 5 mg (0 5 mg Nebulization Given 6/9/22 1242)   sodium chloride 0 9 % bolus 500 mL (500 mL Intravenous New Bag 6/9/22 1242)       Diagnostic Studies  Results Reviewed     Procedure Component Value Units Date/Time    HS Troponin I 4hr [[id number]]     Lab Status: No result Specimen: Blood     Procalcitonin [[id number]]  (Normal) Collected: 06/09/22 1207    Lab Status: Final result Specimen: Blood from Arm, Left Updated: 06/09/22 1243     Procalcitonin 0 08 ng/ml     HS Troponin 0hr (reflex protocol) [[id number]]  (Normal) Collected: 06/09/22 1207    Lab Status: Final result Specimen: Blood from Arm, Left Updated: 06/09/22 1240     hs TnI 0hr 13 ng/L     HS Troponin I 2hr [[id number]]     Lab Status: No result Specimen: Blood     Comprehensive metabolic panel [[id number]]  (Abnormal) Collected: 06/09/22 1207    Lab Status: Final result Specimen: Blood from Arm, Left Updated: 06/09/22 1235     Sodium 135 mmol/L      Potassium 3 6 mmol/L      Chloride 98 mmol/L      CO2 29 mmol/L      ANION GAP 8 mmol/L      BUN 13 mg/dL      Creatinine 1 01 mg/dL      Glucose 125 mg/dL      Calcium 8 2 mg/dL      Corrected Calcium 8 7 mg/dL      AST 7 U/L      ALT 7 U/L      Alkaline Phosphatase 78 U/L      Total Protein 6 2 g/dL      Albumin 3 4 g/dL      Total Bilirubin 0 35 mg/dL      eGFR 71 ml/min/1 73sq m     Narrative:      [facility] [place] by [facility] guidelines for Chronic Kidney Disease (CKD):     Stage 1 with normal or high GFR (GFR > 90 mL/min/1 73 square meters)    Stage 2 Mild CKD (GFR = 60-89 mL/min/1 73 square meters)    Stage 3A Moderate CKD (GFR = 45-59 mL/min/1 73 square meters)    Stage 3B Moderate CKD (GFR = 30-44 mL/min/1 73 square meters)    Stage 4 Severe CKD (GFR = 15-29 mL/min/1 73 square meters)    Stage 5 End Stage CKD (GFR <15 mL/min/1 73 square meters)  Note: GFR calculation is accurate only with a steady state creatinine    Lactic acid [[id number]]  (Normal) Collected: 06/09/22 1207    Lab Status: Final result Specimen: Blood from Arm, Left Updated: 06/09/22 1234     LACTIC ACID 1 5 mmol/L     Narrative:      Result may be elevated if tourniquet was used during collection      Protime-INR [[id number]]  (Abnormal) Collected: 06/09/22 1207    Lab Status: Final result Specimen: Blood from Arm, Left Updated: 06/09/22 1230     Protime 16 5 seconds      INR 1 35    APTT [[id number]]  (Abnormal) Collected: 06/09/22 1207    Lab Status: Final result Specimen: Blood from Arm, Left Updated: 06/09/22 1230     PTT 45 seconds     CBC and differential [[id number]]  (Abnormal) Collected: 06/09/22 1207    Lab Status: Final result Specimen: Blood from Arm, Left Updated: 06/09/22 1217     WBC 8 02 Thousand/uL      RBC 3 58 Million/uL      Hemoglobin 10 6 g/dL      Hematocrit 32 7 %      MCV 91 fL      MCH 29 6 pg      MCHC 32 4 g/dL      RDW 13 5 %      MPV 10 2 fL      Platelets 717 Thousands/uL      nRBC 0 /100 WBCs      Neutrophils Relative 71 %      Immat GRANS % 1 %      Lymphocytes Relative 11 %      Monocytes Relative 11 %      Eosinophils Relative 5 %      Basophils Relative 1 %      Neutrophils Absolute 5 84 Thousands/µL      Immature Grans Absolute 0 06 Thousand/uL      Lymphocytes Absolute 0 84 Thousands/µL      Monocytes Absolute 0 84 Thousand/µL      Eosinophils Absolute 0 38 Thousand/µL      Basophils Absolute 0 06 Thousands/µL     Blood culture #2 [[id number]] Collected: 06/09/22 1207    Lab Status: In process Specimen: Blood from Arm, Left Updated: 06/09/22 1214    Blood culture #1 [[id number]] Collected: 06/09/22 1207    Lab Status: In process Specimen: Blood from Arm, Right Updated: 06/09/22 1214    UA w Reflex to Microscopic w Reflex to Culture [[id number]]     Lab Status: No result Specimen: Urine                  XR chest portable   Final Result by Darya Graff MD (06/09 1321)      No acute cardiopulmonary disease  Workstation performed: [id number]                    Procedures  ECG 12 Lead Documentation Only    Date/Time: 6/9/2022 1:35 PM  Performed by: Aissatou Whitlock PA-C  Authorized by:  Aissatou Whitlock PA-C     ECG reviewed by me, the ED Provider: yes    Patient location:  ED  Previous ECG:     Previous ECG:  Compared to current    Similarity:  No change    Comparison to cardiac monitor: Yes    Interpretation:     Interpretation: normal Rate:     ECG rate:  75    ECG rate assessment: normal    Rhythm:     Rhythm: sinus rhythm    Ectopy:     Ectopy: none    QRS:     QRS axis:  Normal  Conduction:     Conduction: normal    ST segments:     ST segments:  Non-specific    Elevation:  V5, V6, II and III  T waves:     T waves: non-specific and inverted      Inverted:  V5 and V6  Comments:      No evidence of acute cardiac ischemia             ED Course                                             MDM  Number of Diagnoses or Management Options  Dehydration  Generalized weakness  SOB (shortness of breath)  Diagnosis management comments: Patient presented with generalized weakness and shortness of be hypotensive for EMS  Patient is somewhat ill-appearing upon somewhat improved  Patient also did later make note that he recent change in his metformin dosing  Patient states he went from 500 mg b i d  to 1000 mg b i d  and suffered rather severe diarrhea  Only mild creatinine elevation  Patient too weak to get out of bed  Will admit for observation with likely plan for PT/OT consult and ACS rule out given slightly worsened EKG with some inferior lateral ST depressions  Suspect this to likely be due to hypoperfusion and not ACS however will continue to monitor  Patient agreeable to plan  Disposition  Final diagnoses:   Generalized weakness   SOB (shortness of breath)   Dehydration     Time reflects when diagnosis was documented in both MDM as applicable and the Disposition within this note     Time User Action Codes Description Comment    6/9/2022  1:24 PM Ronny Brightly Add [R53 1] Generalized weakness     6/9/2022  1:24 PM Ronny Brightly Add [R06 02] SOB (shortness of breath)     6/9/2022  1:24 PM Ronny Brightly Add [E86 0] Dehydration       ED Disposition     ED Disposition   Admit    Condition   Stable    Date/Time   Thu Jun 9, 2022  1:28 PM    Comment   Discussed with Thad Nissen, will admit under Dr Sirisha Haney             Follow-up Information    None         Patient's Medications   Discharge Prescriptions    No medications on file       No discharge procedures on file      PDMP Review       Value Time User    PDMP Reviewed  Yes 9/3/2020 12:26 PM Yanna Alicia, [address]          ED Provider  Electronically Signed by           Merline Bran, PA-C  06/09/22 5755

## 2022-06-09 NOTE — ASSESSMENT & PLAN NOTE
· Patient presented to primary care for weakness and fatigue  He has also lost weight, possibly as much as 25 lb  Not eating  Dizzy at times  · Vital signs within defined limits  Labs essentially unremarkable  Chest x-ray:  No acute cardiopulmonary disease  · Normal saline at 75 mL/hr for 13 hours  · Recheck labs in the morning  · PT OT consult  · Check urinalysis  · History of prostate cancer    Per Epic review, no documentation of colonoscopy within past 5 years

## 2022-06-09 NOTE — ASSESSMENT & PLAN NOTE
· Rosuvastatin will be on hold as formulary substitute atorvastatin is listed as an allergy  · Resume rosuvastatin when appropriate on discharge

## 2022-06-09 NOTE — ASSESSMENT & PLAN NOTE
Wt Readings from Last 3 Encounters:   06/09/22 82 1 kg (181 lb)   05/23/22 88 9 kg (196 lb)   05/05/22 89 1 kg (196 lb 6 4 oz)     · Weight monitoring could be affected by suspected 15-25 lb weight loss  · Last echocardiogram June of 2020:  EF of 30%, G2 DD  · No rales on exam, minimal lower extremity edema  · Chest x-ray:  No acute cardiopulmonary disease  · Daily weights and I&Os

## 2022-06-09 NOTE — ASSESSMENT & PLAN NOTE
· History of prostate cancer treated with 6 months of radiation in 2019  · Urinary retention protocol  · Check PSA

## 2022-06-09 NOTE — ASSESSMENT & PLAN NOTE
Lab Results   Component Value Date    HGBA1C 7 9 (A) 05/05/2022       No results for input(s): POCGLU in the last 72 hours      Blood Sugar Average: Last 72 hrs:     · Diabetic diet  · Fingerstick blood sugar with sliding scale coverage  · Hold home metformin and glimepiride while in hospital

## 2022-06-09 NOTE — H&P
[address] 1945, 69 y o  male MRN: [id number]  Unit/Bed#: ED 01 Encounter: [id number]  Primary Care Provider: Adeline Pan DO   Date and time admitted to hospital: 6/9/2022 11:24 AM    * Generalized weakness  Assessment & Plan  · Patient presented to primary care for weakness and fatigue  He has also lost weight, possibly as much as 25 lb  Not eating  Dizzy at times  · Vital signs within defined limits  Labs essentially unremarkable  Chest x-ray:  No acute cardiopulmonary disease  · Normal saline at 75 mL/hr for 13 hours  · Recheck labs in the morning  · PT OT consult  · Check urinalysis  · History of prostate cancer    Per Epic review, no documentation of colonoscopy within past 5 years    Dizziness  Assessment & Plan  · Intermittent dizziness over several weeks  · Consider CT head if symptoms persist   · Fall precautions    Hypotension  Assessment & Plan  · Per EMS systolic blood pressure was around 80  · He received 2 boluses pre-hospital (unknown amount) and a 500 mL normal saline bolus was recorded on the MAR  · BP is now 109/58  · Monitor blood pressure, vital signs q 4  · Hold parameters for antihypertensives    Congestive heart failure (HCC)  Assessment & Plan  Wt Readings from Last 3 Encounters:   06/09/22 82 1 kg (181 lb)   05/23/22 88 9 kg (196 lb)   05/05/22 89 1 kg (196 lb 6 4 oz)     · Weight monitoring could be affected by suspected 15-25 lb weight loss  · Last echocardiogram June of 2020:  EF of 30%, G2 DD  · No rales on exam, minimal lower extremity edema  · Chest x-ray:  No acute cardiopulmonary disease  · Daily weights and I&Os          COPD not affecting current episode of care [facility])  Assessment & Plan  · COPD, characterized in [facility] records as "very severe," not appearing to be in exacerbation  · He uses 2 L of oxygen nasal cannula chronically  · Respiratory protocol  · Continue home inhalers  · Continue outpatient follow-up with Dr Catherien Bennett    Other hyperlipidemia  Assessment & Plan  · Rosuvastatin will be on hold as formulary substitute atorvastatin is listed as an allergy  · Resume rosuvastatin when appropriate on discharge    History of prostate cancer  Assessment & Plan  · History of prostate cancer treated with 6 months of radiation in 2019  · Urinary retention protocol  · Check PSA    Benign essential tremor  Assessment & Plan  · Continue primidone    Coronary artery disease involving native coronary artery of native heart without angina pectoris  Assessment & Plan  · History of CABG x4  · Denies chest pain  · Continue metoprolol  · Monitor for chest pain    Type 2 diabetes mellitus, without long-term current use of insulin (HCC)  Assessment & Plan  Lab Results   Component Value Date    HGBA1C 7 9 (A) 05/05/2022       No results for input(s): POCGLU in the last 72 hours  Blood Sugar Average: Last 72 hrs:     · Diabetic diet  · Fingerstick blood sugar with sliding scale coverage  · Hold home metformin and glimepiride while in hospital    VTE Prophylaxis: Enoxaparin (Lovenox)  / sequential compression device   Code Status:  Full  POLST: POLST form is not discussed and not completed at this time  Discussion with family:  Patient    Anticipated Length of Stay:  Patient will be admitted on an Observation basis with an anticipated length of stay of  less than 2 midnights  Justification for Hospital Stay:  Per plan above    Total Time for Visit, including Counseling / Coordination of Care: 45 minutes  Greater than 50% of this total time spent on direct patient counseling and coordination of care  Chief Complaint:   Generalized weakness    History of Present Illness:    Diana Holcomb is a 69 y o  male with history as listed below who presents with generalized weakness  He said that he has been feeling weak and tired for a while, but it has gotten worse within the past 3 days    He also reports weight loss, poor appetite, and dizziness  He says the dizziness is intermittent, and described as mild to moderate  He also says that he has shortness of breath, but he does at baseline  He denies fever or chills, chest pain, abdominal pain, vomiting, myalgia, or focal weakness  Review of Systems:    Review of Systems   Constitutional: Positive for activity change, appetite change, fatigue and unexpected weight change  HENT: Negative for congestion  Respiratory: Positive for shortness of breath  Cardiovascular: Negative for chest pain  Gastrointestinal: Negative for abdominal pain and vomiting  Genitourinary: Negative for dysuria  Musculoskeletal: Negative for arthralgias and myalgias  Neurological: Positive for dizziness and weakness  Past Medical and Surgical History:     Past Medical History:   Diagnosis Date    BPH (benign prostatic hyperplasia)     39 days radiation treatment    COPD (chronic obstructive pulmonary disease)     Coronary artery disease     CABG x4 in 2017    Diabetes mellitus     History of Arterial Duplex of LE 12/26/2017    Likely occlusion of the left superficial femoral artery  Calcific changes bilaterally  Despite these changes, the ankle-brachial index as a measure of peripheral blood flow only mildly impaired   History of echocardiogram 06/12/2017    EF 40%, mild LVH, mild MR     Hyperlipidemia     Hypertension     Ischemic cardiomyopathy     NSTEMI (non-ST elevated myocardial infarction)     Prostate cancer     prostate     PVD (peripheral vascular disease)     Sepsis due to pneumonia     Tremor     [facility]       Past Surgical History:   Procedure Laterality Date    CARDIAC CATHETERIZATION  03/08/2017    Significant left main plus triple-vessel CAD   CORONARY ARTERY BYPASS GRAFT  03/08/2017    4V CABG:  LIMA to LAD, VG to RI, SVG to PDA to LVBR RCA      EYE SURGERY      shots in eye once a month @ the [address] [place] [address] Meds/Allergies:    Prior to Admission medications    Medication Sig Start Date End Date Taking? Authorizing Provider   albuterol (PROVENTIL HFA,VENTOLIN HFA) 90 mcg/act inhaler Inhale 2 puffs every 6 (six) hours as needed for wheezing or shortness of breath 1/23/22   Cameron Del Cid PA-C   arformoterol (BROVANA) 15 mcg/2 mL nebulizer solution Take 2 mL (15 mcg total) by nebulization 2 (two) times a day 5/2/22   Javier Kan PA-C   aspirin (ECOTRIN LOW STRENGTH) 81 mg EC tablet Take 1 tablet (81 mg total) by mouth every other day 9/3/20   PENG Nugent   budesonide (Pulmicort) 0 5 mg/2 mL nebulizer solution Take 2 mL (0 5 mg total) by nebulization 2 (two) times a day Rinse mouth after use  3/23/22 6/9/22  Bonnie Fernando MD   cyanocobalamin (VITAMIN B-12) 500 MCG tablet TAKE ONE TABLET BY MOUTH EVERY MORNING *VITAMIN B12* 11/8/21   Historical Provider, MD   digoxin (LANOXIN) 0 125 mg tablet Take 1 tablet (125 mcg total) by mouth daily 4/29/22   Victor M Thapa MD   gabapentin (NEURONTIN) 100 mg capsule Take 2 capsules (200 mg total) by mouth daily at bedtime 4/22/22 6/9/22  Jaylene Hernandez,    glimepiride (AMARYL) 1 mg tablet Take 2 tablets (2 mg total) by mouth daily with breakfast AND 1 tablet (1 mg total) daily with dinner   4/22/22   Jaylene Hernandez DO   ipratropium (ATROVENT) 0 02 % nebulizer solution Take 2 5 mL (0 5 mg total) by nebulization 3 (three) times a day 3/23/22 6/9/22  Bonnie Fernando MD   lisinopril (ZESTRIL) 40 mg tablet Take 1 tablet (40 mg total) by mouth daily 1/7/22   Evelyn Braswell MD   metFORMIN (GLUCOPHAGE) 1000 MG tablet Take 1 tablet (1,000 mg total) by mouth 2 (two) times a day with meals  Patient taking differently: Take 1,000 mg by mouth 2 (two) times a day with meals Taking 500mg 2 times daily 4/22/22   Jaylene Hernandez DO   metoprolol tartrate (LOPRESSOR) 25 mg tablet Take 1 tablet (25 mg total) by mouth every 12 (twelve) hours 3/29/22   Claudia Galvan, DO   Multiple Vitamins-Minerals (PRESERVISION AREDS 2 PO) Take by mouth    Historical Provider, MD   primidone (MYSOLINE) 50 mg tablet Take 100 mg by mouth 2 (two) times a day     Historical Provider, MD   rosuvastatin (CRESTOR) 10 MG tablet Take 1 tablet (10 mg total) by mouth daily 22   Claudia Galvan, DO   terbinafine (LamISIL) 1 % cream APPLY THIN LAYER TOPICALLY TWICE A DAY FOR FUNGAL INFECTION 22   Historical Provider, MD     I have reviewed home medications with patient personally  Allergies: Allergies   Allergen Reactions    Atorvastatin Myalgia       Social History:     Marital Status: /Civil Union   Occupation:  Retired  Patient Pre-hospital Living Situation:  Home  Patient Pre-hospital Level of Mobility:  Requires assist  Patient Pre-hospital Diet Restrictions:  Cardiac  Substance Use History:   Social History     Substance and Sexual Activity   Alcohol Use Yes     Social History     Tobacco Use   Smoking Status Former Smoker    Packs/day: 0 25    Years: 60 00    Pack years: 15 00    Types: Cigarettes    Quit date: 2022    Years since quittin 1   Smokeless Tobacco Never Used     Social History     Substance and Sexual Activity   Drug Use Never       Family History:    Family History   Problem Relation Age of Onset    Cancer Father     Heart disease Brother        Physical Exam:     Vitals:   Blood Pressure: 150/70 (22 1600)  Pulse: 74 (22 1600)  Temperature: (!) 96 °F (35 6 °C) (22 1140)  Respirations: 20 (22 1134)  SpO2: 96 % (22 1600)    Physical Exam  Vitals and nursing note reviewed  Constitutional:       Appearance: He is ill-appearing  HENT:      Head: Normocephalic and atraumatic  Mouth/Throat:      Pharynx: Oropharynx is clear  Eyes:      Pupils: Pupils are equal, round, and reactive to light  Cardiovascular:      Rate and Rhythm: Normal rate     Pulmonary:      Effort: Pulmonary effort is normal  No respiratory distress  Breath sounds: Decreased breath sounds present  No wheezing or rales  Abdominal:      General: Bowel sounds are normal       Palpations: Abdomen is soft  Tenderness: There is no abdominal tenderness  Musculoskeletal:      Cervical back: Neck supple  Right lower leg: No edema  Left lower leg: No edema  Skin:     General: Skin is warm and dry  Capillary Refill: Capillary refill takes less than 2 seconds  Neurological:      General: No focal deficit present  Mental Status: He is alert and oriented to person, place, and time  Additional Data:     Lab Results: I have personally reviewed pertinent reports  Results from last 7 days   Lab Units 06/09/22  1207   WBC Thousand/uL 8 02   HEMOGLOBIN g/dL 10 6*   HEMATOCRIT % 32 7*   PLATELETS Thousands/uL 314   NEUTROS PCT % 71   LYMPHS PCT % 11*   MONOS PCT % 11   EOS PCT % 5     Results from last 7 days   Lab Units 06/09/22  1207   SODIUM mmol/L 135   POTASSIUM mmol/L 3 6   CHLORIDE mmol/L 98   CO2 mmol/L 29   BUN mg/dL 13   CREATININE mg/dL 1 01   ANION GAP mmol/L 8   CALCIUM mg/dL 8 2*   ALBUMIN g/dL 3 4*   TOTAL BILIRUBIN mg/dL 0 35   ALK PHOS U/L 78   ALT U/L 7   AST U/L 7*   GLUCOSE RANDOM mg/dL 125     Results from last 7 days   Lab Units 06/09/22  1207   INR  1 35*             Results from last 7 days   Lab Units 06/09/22  1207   LACTIC ACID mmol/L 1 5   PROCALCITONIN ng/ml 0 08       Imaging: I have personally reviewed pertinent reports  XR chest portable   Final Result by Kristopher Cotto MD (06/09 1321)      No acute cardiopulmonary disease  Workstation performed: [id number]           [employer] Records Reviewed: Yes     ** Please Note: This note has been constructed using a voice recognition system   **

## 2022-06-09 NOTE — PROGRESS NOTES
Assessment/Plan:       Problem List Items Addressed This Visit        Respiratory    Mucopurulent chronic bronchitis ([facility] ) - Primary (Chronic)    Acute on chronic respiratory failure (HCC)       Cardiovascular and Mediastinum    Coronary artery disease involving native coronary artery of native heart without angina pectoris (Chronic)    Congestive heart failure ([facility] )       Other    S/P CABG x 4      Other Visit Diagnoses     Acute respiratory distress        Dizziness        Relevant Orders    Transfer to other facility    Weakness        Relevant Orders    Transfer to other facility          Transported to ED via EMS  Subjective:      Patient ID: Nelson Mancuso is a 69 y o  male  HPI     Presenting today for follow-up, however on rooming noted to be extremely dyspneic, dizzy, weak  No chest pain  Has been progressively getting worse since hospital discharge  Lost 15 pounds since last visit  No eating at home, per sister sits in chair all day  The following portions of the patient's history were reviewed and updated as appropriate: allergies, current medications, past family history, past medical history, past social history, past surgical history, and problem list     Review of Systems   All other systems reviewed and are negative  Objective:      BP 98/52   Pulse 83   Temp (!) 95 4 °F (35 2 °C) (Tympanic)   Resp 20   Ht 5' 10 5" (1 791 m)   Wt 82 1 kg (181 lb)   SpO2 95%   BMI 25 60 kg/m²          Physical Exam  Vitals reviewed  Constitutional:       General: He is in acute distress  Appearance: Normal appearance  He is ill-appearing  He is not toxic-appearing or diaphoretic  Comments: Falling asleep during visit    Eyes:      General:         Right eye: No discharge  Left eye: No discharge  Extraocular Movements: Extraocular movements intact  Conjunctiva/sclera: Conjunctivae normal    Cardiovascular:      Rate and Rhythm: Normal rate and regular rhythm  Heart sounds: No murmur heard  No friction rub  No gallop  Pulmonary:      Effort: Respiratory distress present  Comments: Diminished breath sounds bases   Skin:     General: Skin is warm  Coloration: Skin is pale  Neurological:      Mental Status: He is alert and oriented to person, place, and time     Psychiatric:         Mood and Affect: Mood normal          Behavior: Behavior normal              DO Junito Graf [facility]

## 2022-06-10 ENCOUNTER — TRANSITIONAL CARE MANAGEMENT (OUTPATIENT)
Dept: FAMILY MEDICINE CLINIC | Facility: CLINIC | Age: 77
End: 2022-06-10

## 2022-06-10 VITALS
TEMPERATURE: 96.7 F | BODY MASS INDEX: 24.71 KG/M2 | WEIGHT: 172.62 LBS | RESPIRATION RATE: 18 BRPM | HEART RATE: 76 BPM | DIASTOLIC BLOOD PRESSURE: 53 MMHG | HEIGHT: 70 IN | SYSTOLIC BLOOD PRESSURE: 115 MMHG | OXYGEN SATURATION: 99 %

## 2022-06-10 LAB
ANION GAP SERPL CALCULATED.3IONS-SCNC: 10 MMOL/L (ref 4–13)
ATRIAL RATE: 75 BPM
ATRIAL RATE: 81 BPM
BACTERIA UR QL AUTO: ABNORMAL /HPF
BASOPHILS # BLD AUTO: 0.04 THOUSANDS/ΜL (ref 0–0.1)
BASOPHILS NFR BLD AUTO: 1 % (ref 0–1)
BILIRUB UR QL STRIP: NEGATIVE
BUN SERPL-MCNC: 12 MG/DL (ref 5–25)
CALCIUM SERPL-MCNC: 8.1 MG/DL (ref 8.4–10.2)
CHLORIDE SERPL-SCNC: 99 MMOL/L (ref 96–108)
CLARITY UR: CLEAR
CO2 SERPL-SCNC: 28 MMOL/L (ref 21–32)
COLOR UR: YELLOW
CREAT SERPL-MCNC: 0.74 MG/DL (ref 0.6–1.3)
EOSINOPHIL # BLD AUTO: 0.44 THOUSAND/ΜL (ref 0–0.61)
EOSINOPHIL NFR BLD AUTO: 7 % (ref 0–6)
ERYTHROCYTE [DISTWIDTH] IN BLOOD BY AUTOMATED COUNT: 13.6 % (ref 11.6–15.1)
GFR SERPL CREATININE-BSD FRML MDRD: 89 ML/MIN/1.73SQ M
GLUCOSE P FAST SERPL-MCNC: 81 MG/DL (ref 65–99)
GLUCOSE SERPL-MCNC: 81 MG/DL (ref 65–140)
GLUCOSE UR STRIP-MCNC: NEGATIVE MG/DL
HCT VFR BLD AUTO: 31.9 % (ref 36.5–49.3)
HGB BLD-MCNC: 10.2 G/DL (ref 12–17)
HGB UR QL STRIP.AUTO: NEGATIVE
IMM GRANULOCYTES # BLD AUTO: 0.05 THOUSAND/UL (ref 0–0.2)
IMM GRANULOCYTES NFR BLD AUTO: 1 % (ref 0–2)
KETONES UR STRIP-MCNC: NEGATIVE MG/DL
LEUKOCYTE ESTERASE UR QL STRIP: NEGATIVE
LYMPHOCYTES # BLD AUTO: 0.93 THOUSANDS/ΜL (ref 0.6–4.47)
LYMPHOCYTES NFR BLD AUTO: 16 % (ref 14–44)
MCH RBC QN AUTO: 29.4 PG (ref 26.8–34.3)
MCHC RBC AUTO-ENTMCNC: 32 G/DL (ref 31.4–37.4)
MCV RBC AUTO: 92 FL (ref 82–98)
MONOCYTES # BLD AUTO: 0.69 THOUSAND/ΜL (ref 0.17–1.22)
MONOCYTES NFR BLD AUTO: 12 % (ref 4–12)
NEUTROPHILS # BLD AUTO: 3.8 THOUSANDS/ΜL (ref 1.85–7.62)
NEUTS SEG NFR BLD AUTO: 63 % (ref 43–75)
NITRITE UR QL STRIP: NEGATIVE
NON-SQ EPI CELLS URNS QL MICRO: ABNORMAL /HPF
NRBC BLD AUTO-RTO: 0 /100 WBCS
P AXIS: 73 DEGREES
P AXIS: 84 DEGREES
PH UR STRIP.AUTO: 6 [PH]
PLATELET # BLD AUTO: 277 THOUSANDS/UL (ref 149–390)
PMV BLD AUTO: 10 FL (ref 8.9–12.7)
POTASSIUM SERPL-SCNC: 3.4 MMOL/L (ref 3.5–5.3)
PR INTERVAL: 178 MS
PR INTERVAL: 178 MS
PROT UR STRIP-MCNC: NEGATIVE MG/DL
PSA SERPL-MCNC: 0.1 NG/ML (ref 0–4)
QRS AXIS: 68 DEGREES
QRS AXIS: 79 DEGREES
QRSD INTERVAL: 104 MS
QRSD INTERVAL: 104 MS
QT INTERVAL: 402 MS
QT INTERVAL: 408 MS
QTC INTERVAL: 455 MS
QTC INTERVAL: 466 MS
RBC # BLD AUTO: 3.47 MILLION/UL (ref 3.88–5.62)
RBC #/AREA URNS AUTO: ABNORMAL /HPF
SODIUM SERPL-SCNC: 137 MMOL/L (ref 135–147)
SP GR UR STRIP.AUTO: 1.01 (ref 1–1.03)
T WAVE AXIS: 178 DEGREES
T WAVE AXIS: 234 DEGREES
UROBILINOGEN UR QL STRIP.AUTO: 0.2 E.U./DL
VENTRICULAR RATE: 75 BPM
VENTRICULAR RATE: 81 BPM
WBC # BLD AUTO: 5.95 THOUSAND/UL (ref 4.31–10.16)
WBC #/AREA URNS AUTO: ABNORMAL /HPF

## 2022-06-10 PROCEDURE — 94760 N-INVAS EAR/PLS OXIMETRY 1: CPT

## 2022-06-10 PROCEDURE — 99217 PR OBSERVATION CARE DISCHARGE MANAGEMENT: CPT | Performed by: INTERNAL MEDICINE

## 2022-06-10 PROCEDURE — 93010 ELECTROCARDIOGRAM REPORT: CPT | Performed by: INTERNAL MEDICINE

## 2022-06-10 PROCEDURE — 84153 ASSAY OF PSA TOTAL: CPT | Performed by: NURSE PRACTITIONER

## 2022-06-10 PROCEDURE — 97166 OT EVAL MOD COMPLEX 45 MIN: CPT

## 2022-06-10 PROCEDURE — 80048 BASIC METABOLIC PNL TOTAL CA: CPT | Performed by: NURSE PRACTITIONER

## 2022-06-10 PROCEDURE — 85025 COMPLETE CBC W/AUTO DIFF WBC: CPT | Performed by: NURSE PRACTITIONER

## 2022-06-10 PROCEDURE — 97162 PT EVAL MOD COMPLEX 30 MIN: CPT

## 2022-06-10 PROCEDURE — 94640 AIRWAY INHALATION TREATMENT: CPT

## 2022-06-10 RX ORDER — POTASSIUM CHLORIDE 20 MEQ/1
40 TABLET, EXTENDED RELEASE ORAL ONCE
Status: COMPLETED | OUTPATIENT
Start: 2022-06-10 | End: 2022-06-10

## 2022-06-10 RX ADMIN — ENOXAPARIN SODIUM 40 MG: 100 INJECTION SUBCUTANEOUS at 09:19

## 2022-06-10 RX ADMIN — LISINOPRIL 40 MG: 20 TABLET ORAL at 09:19

## 2022-06-10 RX ADMIN — CYANOCOBALAMIN TAB 500 MCG 500 MCG: 500 TAB at 09:19

## 2022-06-10 RX ADMIN — IPRATROPIUM BROMIDE 0.5 MG: 0.5 SOLUTION RESPIRATORY (INHALATION) at 07:00

## 2022-06-10 RX ADMIN — PRIMIDONE 100 MG: 50 TABLET ORAL at 09:25

## 2022-06-10 RX ADMIN — Medication 1 TABLET: at 09:19

## 2022-06-10 RX ADMIN — METOPROLOL TARTRATE 25 MG: 25 TABLET, FILM COATED ORAL at 09:19

## 2022-06-10 RX ADMIN — BUDESONIDE 0.5 MG: 0.5 INHALANT ORAL at 07:00

## 2022-06-10 RX ADMIN — ACETAMINOPHEN 650 MG: 325 TABLET ORAL at 05:22

## 2022-06-10 RX ADMIN — DIGOXIN 125 MCG: 125 TABLET ORAL at 09:19

## 2022-06-10 RX ADMIN — FORMOTEROL FUMARATE DIHYDRATE 20 MCG: 20 SOLUTION RESPIRATORY (INHALATION) at 07:00

## 2022-06-10 RX ADMIN — POTASSIUM CHLORIDE 40 MEQ: 1500 TABLET, EXTENDED RELEASE ORAL at 09:25

## 2022-06-10 NOTE — DISCHARGE INSTRUCTIONS
Discharge instructions from hospitalist  1  Follow-up with your primary care physician in 1 week in regards to recent hospitalization  Follow-up with your pulmonologist in 1-2 week    2  Take medications regularly    3  Come back to the ER if symptoms recur or worsen  4  Activity as tolerated  5   Diet :  Heart healthy diet; information packet has more detailed information

## 2022-06-10 NOTE — PLAN OF CARE
Problem: MOBILITY - ADULT  Goal: Maintain or return to baseline ADL function  Description: INTERVENTIONS:  -  Assess patient's ability to carry out ADLs; assess patient's baseline for ADL function and identify physical deficits which impact ability to perform ADLs (bathing, care of mouth/teeth, toileting, grooming, dressing, etc )  - Assess/evaluate cause of self-care deficits   - Assess range of motion  - Assess patient's mobility; develop plan if impaired  - Assess patient's need for assistive devices and provide as appropriate  - Encourage maximum independence but intervene and supervise when necessary  - Involve family in performance of ADLs  - Assess for home care needs following discharge   - Consider OT consult to assist with ADL evaluation and planning for discharge  - Provide patient education as appropriate  6/9/2022 2158 by Rashid Maxwell RN  Outcome: Progressing  6/9/2022 2158 by Rashid Maxwell RN  Outcome: Progressing  Goal: Maintains/Returns to pre admission functional level  Description: INTERVENTIONS:  - Perform BMAT or MOVE assessment daily    - Set and communicate daily mobility goal to care team and patient/family/caregiver  - Collaborate with rehabilitation services on mobility goals if consulted  - Perform Range of Motion 3 times a day  - Reposition patient every 2 hours    - Dangle patient 3 times a day  - Stand patient 3 times a day  - Ambulate patient 3 times a day  - Out of bed to chair 3 times a day   - Out of bed for meals 3 times a day  - Out of bed for toileting  - Record patient progress and toleration of activity level   6/9/2022 2158 by Rashid Maxwell RN  Outcome: Progressing  6/9/2022 2158 by Rashid Maxwell RN  Outcome: Progressing     Problem: Potential for Falls  Goal: Patient will remain free of falls  Description: INTERVENTIONS:  - Educate patient/family on patient safety including physical limitations  - Instruct patient to call for assistance with activity - Consult OT/PT to assist with strengthening/mobility   - Keep Call bell within reach  - Keep bed low and locked with side rails adjusted as appropriate  - Keep care items and personal belongings within reach  - Initiate and maintain comfort rounds  - Make Fall Risk Sign visible to staff  - Offer Toileting every 2 Hours, in advance of need  - Initiate/Maintain bed alarm  - Obtain necessary fall risk management equipment: bed alarm  - Apply yellow socks and bracelet for high fall risk patients  - Consider moving patient to room near nurses station  6/9/2022 2158 by Ish Young, RN  Outcome: Progressing  6/9/2022 2158 by Ish Young, RN  Outcome: Progressing

## 2022-06-10 NOTE — ASSESSMENT & PLAN NOTE
· History of prostate cancer treated with 6 months of radiation in 2019  · Urinary retention protocol  · Follow-up PSA

## 2022-06-10 NOTE — PHYSICAL THERAPY NOTE
Physical Therapy Evaluation     Patient's Name: Wagner Joe    Admitting Diagnosis  Dehydration [E86 0]  SOB (shortness of breath) [R06 02]  Generalized weakness [R53 1]    Problem List  Patient Active Problem List   Diagnosis    Type 2 diabetes mellitus, without long-term current use of insulin ([facility])    Tobacco abuse    Coronary artery disease involving native coronary artery of native heart without angina pectoris    Benign prostatic hyperplasia with urinary retention    Mucopurulent chronic bronchitis (HCC)    Benign essential tremor    Ambulatory dysfunction    NSTEMI (non-ST elevated myocardial infarction) ([address] )    On intra-aortic balloon pump assist    History of prostate cancer    S/P CABG x 4    Ischemic cardiomyopathy    Other hyperlipidemia    COPD not affecting current episode of care ([facility] )    Acute on chronic respiratory failure ([facility] )    Tinea unguium    Type 2 diabetes mellitus with diabetic neuropathy, unspecified ([facility] )    Other age-related cataract    Central retinal vein occlusion with macular edema    Nonexudative age-related macular degeneration    Obesity    Ocular hypertension    Onychomycosis due to dermatophyte    Pill rolling tremor    Physical deconditioning    Type 2 MI (myocardial infarction) ([address] )    Diabetic ketoacidosis without coma associated with type 2 diabetes mellitus (HCC)    Diminished pulses in lower extremity    PAD (peripheral artery disease) ([facility])    Generalized weakness    Pneumonia    Sepsis due to pneumonia ([facility])    Elevated troponin    Congestive heart failure ([facility])    Hypotension    Dizziness       Past Medical History  Past Medical History:   Diagnosis Date    BPH (benign prostatic hyperplasia)     45 days radiation treatment    COPD (chronic obstructive pulmonary disease)     Coronary artery disease     CABG x4 in 2017    Diabetes mellitus     History of Arterial Duplex of LE 12/26/2017    Likely occlusion of the left superficial femoral artery    Calcific changes bilaterally  Despite these changes, the ankle-brachial index as a measure of peripheral blood flow only mildly impaired  History of echocardiogram 06/12/2017    EF 40%, mild LVH, mild MR  Hyperlipidemia     Hypertension     Ischemic cardiomyopathy     NSTEMI (non-ST elevated myocardial infarction)     Prostate cancer     prostate     PVD (peripheral vascular disease)     Sepsis due to pneumonia     Tremor     Type 2 MI (myocardial infarction) ([facility] ) 04/06/2021       Past Surgical History  Past Surgical History:   Procedure Laterality Date    CARDIAC CATHETERIZATION  03/08/2017    Significant left main plus triple-vessel CAD  CORONARY ARTERY BYPASS GRAFT  03/08/2017    4V CABG:  LIMA to LAD, VG to RI, SVG to PDA to LVBR RCA  EYE SURGERY      shots in eye once a month @ the [address]          06/10/22 0871   PT Last Visit   PT Visit Date 06/10/22   Note Type   Note type Evaluation   Pain Assessment   Pain Assessment Tool 0-10   Pain Score No Pain   Restrictions/Precautions   Other Precautions Multiple lines;O2;Fall Risk; Chair Alarm; Bed Alarm   Home Living   Type of Home House  (half a double)   Home Layout Two level; Laundry in basement;Stairs to enter with rails  (four stairs to enter with bilateral railings  14 steps to go upstairs, with unilateral railing )   Bathroom Shower/Tub Tub/shower unit   Bathroom Toilet Standard   Bathroom Equipment Shower chair;Grab bars in shower;Commode   [address]   (uses a cane in the community)   Prior Function   Level of [place] Independent with ADLs and functional mobility   Lives With Manrique-Castaneda Help From Family  (Sister and wife  Sister drives )   ADL Assistance Independent   IADLs Needs assistance   Falls in the last 6 months 0   Vocational Retired   Comments pt  reports prior to the onset of his feeling of weakness, past three weeks, pt  drove  Pt  wears O2, 2L, at home 24/7     General Family/Caregiver Present   (On phone with sister upon entering the room )   Cognition   Overall Cognitive Status WFL   Arousal/Participation Responsive   Orientation Level Oriented X4   Memory Within functional limits   Following Commands Follows all commands and directions without difficulty   Subjective   Subjective pt  says he is here because at his last doctors visit his blood pressure dropped and his dr  called the ambulance  pt reports difficulty going up stairs due to weakness and SOB  Pt  reports no fals but diffculty with mobility and transfers from weakness  RUE Assessment   RUE Assessment   (Elbow extension 4/5 MMT)   LUE Assessment   LUE Assessment   (Elbow extension 4/5 MMT)   RLE Assessment   RLE Assessment WNL  (4/5 gross MMT; ROM WNL)   LLE Assessment   LLE Assessment WNL  (4/5 gross MMT; ROM WNL)   Vision-Basic Assessment   Visual History Cataracts  (lasix in L eye)   Vestibular   Spontaneous Nystagmus (-) no evidence of nystagmus at rest in room light   Coordination   Movements are Fluid and Coordinated 0   Coordination and Movement Description during ambulation and transfers pt  was bradykinetic   Sharp/Dull   RLE Sharp/Dull Grossly intact   LLE Sharp/Dull Grossly intact   Bed Mobility   Supine to Sit 5  Supervision   Additional items Impulsive;Verbal cues; Increased time required;HOB elevated;Assist x 1   Additional Comments Verbal cues for safety  Verbal cues to keep O2 on during transfers  Transfers   Sit to Stand   (CGA)   Additional items HOB elevated;Assist x 1; Armrests; Increased time required; Impulsive;Verbal cues   Stand to Sit 5  Supervision   Additional items Assist x 1; Armrests; Increased time required; Impulsive;Verbal cues   Additional Comments Verbal cues to place feet on the floor before standing, use hand for push off, use hand for descend  Verbal cues to await transfers and ambulation directions due to impulsivity  Ambulation/Elevation   Gait pattern Antalgic; Forward Flexion;Decreased foot clearance; Short stride; Excessively slow; Step through pattern   Gait Assistance 5  Supervision   Additional items Assist x 1;Verbal cues   Assistive Device Rolling walker   Distance 40 feet   Ambulation/Elevation Additional Comments Verbal cues for ambulatory direction and activity pacing  Verbal cues to take deep breaths during activity  Balance   Static Sitting Good   Dynamic Sitting Good   Static Standing Fair +   Dynamic Standing Fair +   Ambulatory Fair   Endurance Deficit   Endurance Deficit Yes   Endurance Deficit Description Pt  was SOB during activity  Pt  was given increased time and breathing instructions that helped decrease symptoms  Activity Tolerance   Activity Tolerance Patient limited by fatigue   Medical Staff Made Aware yes, Dr Opal French yes, Sharif Green RN   Assessment   Prognosis Fair   Problem List Decreased endurance; Impaired balance;Decreased mobility; Decreased coordination; Impaired judgement;Decreased safety awareness; Impaired vision   Assessment Pt  admitted to hospital on 6/9/22 for generalized weakness  Pt  evaluated by PT on 6/10/22 to assess functional mobility  Pt  was supine in bed with HOB elevated at the beginning of the session  Pt  was cognitively oriented and responsive to evaluation questions  Pain scale on a 0-10 was a 0  During bed mobility pt  required supervision  During transfers pt  required CGA   Pt  ambulated 40 feet with RW supervision  Gait pattern during ambulation was antalgic, forward flexion, decreased foot clearance, short stride, and excessively slow  During ambulation pt  displayed SOB, pt  was given increased time to remedy symptoms  At the end of the session pt  was seated in recliner with SCDs  Pt  will benefit from skilled physical therapy to help increase ambulation distance and endurance, LE strength, and activity pacing     Goals   Patient Goals pt  wants to be discharged   LTG Expiration Date 06/20/22   Long Term Goal #1 1)  Pt  Will increase gross B LE strength to [facility] to be able to improve ambulation endurance, decrease fall risk, improve step ambulation  2) Pt  Will increase ambulation to 100 ft RW supervision to be able walk within home environment  3) Pt  Will improve static standing balance to Good to be able to continue ADLs  4) Pt  Will improve sit to stand transfer to supervision to be able to get out of bed and off the toilet  Plan   Treatment/Interventions Functional transfer training;LE strengthening/ROM; Therapeutic exercise; Endurance training;Gait training   PT Frequency 3-5x/wk   Recommendation   PT Discharge Recommendation (S)  Home with home health rehabilitation   Equipment Recommended Walker   AM-PAC Basic Mobility Inpatient   Turning in Bed Without Bedrails 3   Lying on Back to Sitting on Edge of Flat Bed 3   Moving Bed to Chair 3   Standing Up From Chair 3   Walk in Room 3   Climb 3-5 Stairs 2   Basic Mobility Inpatient Raw Score 17   Basic Mobility Standardized Score 39 67   Highest Level Of Mobility   JH-HLM Goal 5: Stand one or more mins   JH-HLM Achieved 7: Walk 25 feet or more   Additional Treatment Session   Start Time 0845   End Time 0910   End of Consult   Patient Position at End of Consult   (pt  sitting in recliner with SCDs on)     History: generalized weakness, hypotension, congestive heart failure, COPD, CAD, type II DM  Body System Impairments: due to the PMH the pt  has dizziness, dyspnea, increased LE muscle strength, SOB  Clinical Presentation: decreased activity tolerance, increased risk of integumentary changes, decreased balance, increased fall risk  Based off this information the pts  clinical presentation is moderate         Triny Smith, SPT

## 2022-06-10 NOTE — ASSESSMENT & PLAN NOTE
· COPD, characterized in [facility] records as "very severe," not appearing to be in exacerbation  · He uses 2 L of oxygen nasal cannula chronically  · Respiratory protocol  · Continue home inhalers  · Continue outpatient follow-up with Dr Shelley Mitchell

## 2022-06-10 NOTE — ASSESSMENT & PLAN NOTE
· Per EMS systolic blood pressure was around 80  · He received 2 boluses pre-hospital (unknown amount) and a 500 mL normal saline bolus was recorded on the MAR  · BP acceptable with SBP 110s to 130s  · Monitor blood pressure, vital signs q 4

## 2022-06-10 NOTE — OCCUPATIONAL THERAPY NOTE
Occupational Therapy Evaluation     Patient Name: Zeynep Moya  DESQD'C Date: 6/10/2022  Problem List  Principal Problem:    Generalized weakness  Active Problems:    Type 2 diabetes mellitus, without long-term current use of insulin ([facility] )    Coronary artery disease involving native coronary artery of native heart without angina pectoris    Benign essential tremor    History of prostate cancer    Other hyperlipidemia    COPD not affecting current episode of care ([facility] )    Congestive heart failure ([facility] )    Hypotension    Dizziness    Past Medical History  Past Medical History:   Diagnosis Date    BPH (benign prostatic hyperplasia)     45 days radiation treatment    COPD (chronic obstructive pulmonary disease)     Coronary artery disease     CABG x4 in 2017    Diabetes mellitus     History of Arterial Duplex of LE 12/26/2017    Likely occlusion of the left superficial femoral artery  Calcific changes bilaterally  Despite these changes, the ankle-brachial index as a measure of peripheral blood flow only mildly impaired  History of echocardiogram 06/12/2017    EF 40%, mild LVH, mild MR  Hyperlipidemia     Hypertension     Ischemic cardiomyopathy     NSTEMI (non-ST elevated myocardial infarction)     Prostate cancer     prostate     PVD (peripheral vascular disease)     Sepsis due to pneumonia     Tremor     Type 2 MI (myocardial infarction) ([facility] ) 04/06/2021     Past Surgical History  Past Surgical History:   Procedure Laterality Date    CARDIAC CATHETERIZATION  03/08/2017    Significant left main plus triple-vessel CAD  CORONARY ARTERY BYPASS GRAFT  03/08/2017    4V CABG:  LIMA to LAD, VG to RI, SVG to PDA to LVBR RCA  EYE SURGERY      shots in eye once a month @ the [address] [place] [address]          06/10/22 1217   OT Last Visit   OT Visit Date 06/10/22   Note Type   Note type Evaluation   Additional Comments Pt received seated EOB self feeding lunch but agreeable to OT session on this date  Restrictions/Precautions   Weight Bearing Precautions Per Order No   Other Precautions Multiple lines;O2;Fall Risk;Bed Alarm; Chair Alarm  (2L O2)   Pain Assessment   Pain Assessment Tool 0-10   Pain Score No Pain   Home Living   Type of [address] Two level; Laundry in basement;Stairs to enter with rails  (4 BEATRIZ c BHR, 14 steps to 2nd level c RHR ascending)   Bathroom Shower/Tub Tub/shower unit   Bathroom Toilet Standard   Bathroom Equipment Shower chair;Grab bars in shower;Commode   [address] Cane;Walker  ([facility] "occasionally", owns but does not utilize RW)   Prior Function   Level of [place] Independent with ADLs and functional mobility   Lives With Manrique-Castaneda Help From Family  (Sister (transportation, laundry) and wife (grocery shopping, cooking)  )   ADL Assistance Independent   IADLs Needs assistance   Falls in the last 6 months 0   Vocational Retired   Comments +Drives, O2 use @ PLOF  Lifestyle   Autonomy Pt lives in 2 story home c wife, completes ADLs I'ly but receives A for IADLs @ PLOF  SPC for fxl mobility "occasionally "   Reciprocal Relationships Family, friends   Service to Others Family relations   Intrinsic Gratification Watch racing   Psychosocial   Psychosocial (WDL) WDL   Subjective   Subjective "My sister and wife help me with what I need "   ADL   Eating Assistance 7  Independent   Grooming Assistance 7  Independent   UB Bathing Assistance 7  Independent   LB Bathing Assistance 7  Independent   UB Dressing Assistance 7  Independent    [facility] [phone]  Supervision/Setup  ([address] [facility])   Toileting Deficit Steadying;Supervison/safety   Bed Mobility   Additional Comments DNT; received seated EOB self feeding lunch  Reports "I do it myself "   Transfers   Sit to Stand 5  Supervision  Eddie Stanley)   Additional items Assist x 1   Stand to Sit 5  Supervision   Additional items Assist x 1; Armrests Functional Mobility   Functional Mobility 5  Supervision  Saadia Vu)   Additional Comments ADL related fxl mobility performed on this date  SUP/CGA STS/fxl mob c RW from EOB sit-bathroom ~15 ft c no overt LOB /SOB noted + safe use of RW  Balance   Static Sitting Normal   Dynamic Sitting Good   Static Standing Fair +   Dynamic Standing Fair   Ambulatory Fair   Activity Tolerance   Activity Tolerance Patient limited by fatigue   RUE Assessment   RUE Assessment WFL   LUE Assessment   LUE Assessment WFL   Hand Function   Gross Motor Coordination Functional   Fine Motor Coordination Functional   Sensation   Light Touch No apparent deficits   Vision-Basic Assessment   Current Vision Wears glasses only for reading   Visual History Cataracts  (lasix in L eye)   Patient Visual Report Other (Comment)  (No acute changes since hospitalization )   Cognition   Overall Cognitive Status [facility]   Arousal/Participation Alert; Cooperative;Responsive   Attention Within functional limits   Orientation Level Oriented X4   Memory Within functional limits   Following Commands Follows all commands and directions without difficulty   Assessment   Limitation Decreased ADL status; Decreased UE strength;Decreased endurance;Decreased self-care trans;Decreased high-level ADLs   Prognosis Good   Assessment Pt is a 69 y o  male, admitted to [place] [address] 6/9/2022 d/t experiencing hypotension  Dx: generalized weakness  Pt with PMHx impacting their performance during ADL tasks, including: CAD, SOB, HLD, DMII, CHF, tremor, obesity  Prior to admission to the hospital Pt was performing ADLs without physical assistance  IADLs with physical assistance  Functional transfers/ambulation without physical assistance  Cognitive status was PTA was intact  OT order placed to assess Pt's ADLs, cognitive status, and performance during functional tasks in order to maximize safety and independence while making most appropriate d/c recommendations   Pt's clinical presentation is currently evolving given new onset deficits that effect Pt's occupational performance and ability to safely return to PLOF including decrease activity tolerance, decrease standing balance, decrease performance during ADL tasks, decrease generalized strength, decrease activity engagement and decrease performance during functional transfers combined with medical complications of hypotension, abnormal CBC and abnormal potassium values  Personal factors affecting Pt at time of initial evaluation include: step(s) to enter environment, multi-level environment, inability to perform ADLs, new need for AD, inability to ambulate household distances and inability to navigate community distances  Pt will benefit from continued skilled OT services to address deficits as defined above and to maximize level independence/participation during ADLs and functional tasks to facilitate return toward PLOF and improved quality of life  From an occupational therapy standpoint, recommendation at time of d/c would be home c HHOT  Plan   Treatment Interventions ADL retraining;Functional transfer training;UE strengthening/ROM; Endurance training;Patient/family training; Compensatory technique education; Energy conservation; Activityengagement   Goal Expiration Date 06/20/22   OT Treatment Day 0   OT Frequency 3-5x/wk   Recommendation   OT Discharge Recommendation Home with home health rehabilitation   OT - OK to Discharge Yes   Additional Comments  Once medically cleared + c coordinated d/c plan     AM-PAC Daily Activity Inpatient   Lower Body Dressing 3   Bathing 3   Toileting 3   Upper Body Dressing 4   Grooming 4   Eating 4   Daily Activity Raw Score 21   Daily Activity Standardized Score (Calc for Raw Score >=11) 44 27   AM-PAC Applied Cognition Inpatient   Following a Speech/Presentation 4   Understanding Ordinary Conversation 4   Taking Medications 4   Remembering Where Things Are Placed or Put Away 4   Remembering List of 4-5 Errands 4   Taking Care of Complicated Tasks 4   Applied Cognition Raw Score 24   Applied Cognition Standardized Score 62 21   Brief Interview for Mental Status (BIMS)   Repetition of Three Words (First Attempt) 3   Temporial Orientation: Year Correct   Temporal Orientation: Month Accurate within 5 days   Temporal Orientation: Day Correct   Recall: "Sock" Yes, no cue required   Recall: "Blue" Yes, no cue required   Recall: "Bed" Yes, no cue required   BIMS Summary Score 15     The patient's raw score on the AM-PAC Daily Activity inpatient short form is 21, standardized score is 44 27, greater than 39 4  Patients at this level are likely to benefit from DC to home  Please refer to the recommendation of the Occupational Therapist for safe DC planning  Pt goals to be met by 6/20/22:    Pt will demonstrate ability to complete LB dressing @ I in order to increase safety and independence during meaningful tasks  Pt will demonstrate ability to [place]/doff socks/shoes while sitting I @ EOB in order to increase safety and independence during meaningful tasks  Pt will demonstrate ability to complete toileting tasks including CM and pericare @ I in order to increase safety and independence during meaningful tasks  Pt will demonstrate ability to complete EOB, chair, toilet/commode transfers @ I in order to increase performance and participation during functional tasks  Pt will demonstrate ability to stand for 10 minutes while maintaining F balance with use of RW for UB support PRN  Pt will demonstrate ability to tolerate 30-35 minute OT session with no vc'ing for deep breathing or use of energy conservation techniques in order to increase activity tolerance during functional tasks  Pt will demonstrate Good carryover of use of energy conservation/compensatory strategies during ADLs and functional tasks in order to increase safety and reduce risk for falls     Pt will demonstrate Good attention and participation in continued evaluation of functional ambulation house hold distances in order to assist with safe d/c planning  Pt will attend to continued cognitive assessments 100% of the time in order to provide most appropriate d/c recommendations  Pt will follow 100% simple 2-step commands and be A&O x4 consistently with environmental cues to increase participation in functional activities  Pt will identify 3 areas of interest/hobbies and 1 intervention on how to incorporate into daily life in order to increase interaction with environment and peers as well as increase participation in meaningful tasks  Pt will demonstrate 100% carryover of BUE HEP in order to increase BUE MS and increase performance during functional tasks upon d/c home      Johanny Londono OTR/L

## 2022-06-10 NOTE — ASSESSMENT & PLAN NOTE
Lab Results   Component Value Date    HGBA1C 7 9 (A) 05/05/2022       No results for input(s): POCGLU in the last 72 hours      Blood Sugar Average: Last 72 hrs:     · Diabetic diet  · Fingerstick blood sugar with sliding scale coverage  · Will be discharged on home metformin and glimepiride

## 2022-06-10 NOTE — PLAN OF CARE
Problem: MOBILITY - ADULT  Goal: Maintain or return to baseline ADL function  Description: INTERVENTIONS:  -  Assess patient's ability to carry out ADLs; assess patient's baseline for ADL function and identify physical deficits which impact ability to perform ADLs (bathing, care of mouth/teeth, toileting, grooming, dressing, etc )  - Assess/evaluate cause of self-care deficits   - Assess range of motion  - Assess patient's mobility; develop plan if impaired  - Assess patient's need for assistive devices and provide as appropriate  - Encourage maximum independence but intervene and supervise when necessary  - Involve family in performance of ADLs  - Assess for home care needs following discharge   - Consider OT consult to assist with ADL evaluation and planning for discharge  - Provide patient education as appropriate  6/10/2022 1121 by Hansa Soto RN  Outcome: Adequate for Discharge  6/10/2022 1121 by Hansa Soto RN  Outcome: Adequate for Discharge  6/10/2022 0745 by Hansa Soto RN  Outcome: Progressing  Goal: Maintains/Returns to pre admission functional level  Description: INTERVENTIONS:  - Perform BMAT or MOVE assessment daily    - Set and communicate daily mobility goal to care team and patient/family/caregiver  - Collaborate with rehabilitation services on mobility goals if consulted  - Perform Range of Motion  times a day  - Reposition patient every  hours    - Dangle patient  times a day  - Stand patient  times a day  - Ambulate patient  times a day  - Out of bed to chair  times a day   - Out of bed for meals  times a day  - Out of bed for toileting  - Record patient progress and toleration of activity level   6/10/2022 1121 by Hansa Soto RN  Outcome: Adequate for Discharge  6/10/2022 1121 by Hansa Soto RN  Outcome: Adequate for Discharge  6/10/2022 0745 by Hansa Soto RN  Outcome: Progressing     Problem: Potential for Falls  Goal: Patient will remain free of falls  Description: INTERVENTIONS:  - Educate patient/family on patient safety including physical limitations  - Instruct patient to call for assistance with activity   - Consult OT/PT to assist with strengthening/mobility   - Keep Call bell within reach  - Keep bed low and locked with side rails adjusted as appropriate  - Keep care items and personal belongings within reach  - Initiate and maintain comfort rounds  - Make Fall Risk Sign visible to staff  - Offer Toileting every  Hours, in advance of need  - Initiate/Maintain alarm  - Obtain necessary fall risk management equipment:   - Apply yellow socks and bracelet for high fall risk patients  - Consider moving patient to room near nurses station  6/10/2022 1121 by Tim Schumacher RN  Outcome: Adequate for Discharge  6/10/2022 1121 by Tim Schumacher RN  Outcome: Adequate for Discharge  6/10/2022 0745 by Tim Schumacher RN  Outcome: Progressing     Problem: Prexisting or High Potential for Compromised Skin Integrity  Goal: Skin integrity is maintained or improved  Description: INTERVENTIONS:  - Identify patients at risk for skin breakdown  - Assess and monitor skin integrity  - Assess and monitor nutrition and hydration status  - Monitor labs   - Assess for incontinence   - Turn and reposition patient  - Assist with mobility/ambulation  - Relieve pressure over bony prominences  - Avoid friction and shearing  - Provide appropriate hygiene as needed including keeping skin clean and dry  - Evaluate need for skin moisturizer/barrier cream  - Collaborate with interdisciplinary team   - Patient/family teaching  - Consider wound care consult   6/10/2022 1121 by Tim Schumacher RN  Outcome: Adequate for Discharge  6/10/2022 1121 by Tim Schumacher RN  Outcome: Adequate for Discharge  6/10/2022 0745 by Tim Schumacher RN  Outcome: Progressing

## 2022-06-10 NOTE — DISCHARGE SUMMARY
[address]  [facility]charge[facility] 1945, 69 y o  male MRN: [id number]  Unit/Bed#: -01 Encounter: [id number]  Primary Care Provider: Fernando Rivera DO   Date and time admitted to hospital: 6/9/2022 11:24 AM    * Generalized weakness  Assessment & Plan  · Most likely due to poor p o  intake and overall deconditioning with multiple hospitalization  · Vital signs within defined limits  Labs essentially unremarkable  Chest x-ray:  No acute cardiopulmonary disease  · Normal saline at 75 mL/hr for 13 hours  · PT OT consult recommended home health  · History of prostate cancer    Per Epic review, no documentation of colonoscopy within past 5 years    Dizziness  Assessment & Plan  · Improved, Intermittent dizziness over several weeks  · No acute focal neurological deficit, no slurred speech or dysphagia   · Fall precautions    Hypotension  Assessment & Plan  · Per EMS systolic blood pressure was around 80  · He received 2 boluses pre-hospital (unknown amount) and a 500 mL normal saline bolus was recorded on the MAR  · BP acceptable with SBP 110s to 130s  · Monitor blood pressure, vital signs q 4      Congestive heart failure (HCC)  Assessment & Plan  Wt Readings from Last 3 Encounters:   06/10/22 78 3 kg (172 lb 9 9 oz)   06/09/22 82 1 kg (181 lb)   05/23/22 88 9 kg (196 lb)     · Weight monitoring could be affected by suspected 15-25 lb weight loss  · Last echocardiogram June of 2020:  EF of 30%, G2 DD  · No rales on exam, minimal lower extremity edema  · Chest x-ray:  No acute cardiopulmonary disease  · Daily weights and I&Os          COPD not affecting current episode of care [facility])  Assessment & Plan  · COPD, characterized in [facility] records as "very severe," not appearing to be in exacerbation  · He uses 2 L of oxygen nasal cannula chronically  · Respiratory protocol  · Continue home inhalers  · Continue outpatient follow-up with Dr Kyrie Ricardo hyperlipidemia  Assessment & Plan  · Rosuvastatin will be on hold as formulary substitute atorvastatin is listed as an allergy  · Resume rosuvastatin when appropriate on discharge    History of prostate cancer  Assessment & Plan  · History of prostate cancer treated with 6 months of radiation in 2019  · Urinary retention protocol  · Follow-up PSA    Benign essential tremor  Assessment & Plan  · Continue primidone    Coronary artery disease involving native coronary artery of native heart without angina pectoris  Assessment & Plan  · History of CABG x4  · Denies chest pain  · Continue metoprolol      Type 2 diabetes mellitus, without long-term current use of insulin (HCC)  Assessment & Plan  Lab Results   Component Value Date    HGBA1C 7 9 (A) 05/05/2022       No results for input(s): POCGLU in the last 72 hours      Blood Sugar Average: Last 72 hrs:     · Diabetic diet  · Fingerstick blood sugar with sliding scale coverage  · Will be discharged on home metformin and glimepiride       Discharging Physician / Practitioner: Nina Mercado MD  PCP: Edith Fisher DO  Admission Date:   Admission Orders (From admission, onward)     Ordered        06/09/22 1330  Place in Observation  Once                      Discharge Date: 06/10/22    Medical Problems             Resolved Problems  Date Reviewed: 6/10/2022   None                 Consultations During Hospital Stay:  · PT OT    Procedures Performed:   · None    Significant Findings / Test Results:   · Chest x-ray showed no acute cardiopulmonary disease    Incidental Findings:   · None     Test Results Pending at Discharge (will require follow up):   · PSA     Outpatient Tests Requested:  · No  Complications:  No    Reason for Admission:  Generalized weakness    Hospital Course:     Milton Tracy is a 69 y o  male patient with PMH of COPD on 2 L oxygen via NC, HTN, history of prostate cancer, CAD, NIDDM who originally presented to the hospital on 6/9/2022 due to generalized weakness  Patient stated he has been in and out of the hospital multiple times this year and had poor intake before admission  He said he was found to have low blood pressure in his doctor's office therefore he was sent to ED  During hospitalization, patient received IV fluid resuscitation  This morning patient stated his breathing is around his baseline, currently on 2 L oxygen via NC  Blood pressure improved  Patient denies light headache, dizziness  Patient was seen by PT and OT recommended home with home health  Patient is recommended to follow up with PCP and pulmonologist in 1-2 weeks  Patient is medically stable for discharge  Please see above list of diagnoses and related plan for additional information  Condition at Discharge: stable     Discharge Day Visit / Exam:     Subjective:  Patient was seen and examined at bedside  The patient denies any headache, blurry vision, chest pain, palpitation, shortness of breath, N/V, abd pain  Vitals: Blood Pressure: 115/53 (06/10/22 0710)  Pulse: 76 (06/10/22 0710)  Temperature: (!) 96 7 °F (35 9 °C) (06/10/22 0710)  Temp Source: Oral (06/09/22 1830)  Respirations: 18 (06/10/22 0710)  Height: 5' 10" (177 8 cm) (06/09/22 2117)  Weight - Scale: 78 3 kg (172 lb 9 9 oz) (06/10/22 0547)  SpO2: 99 % (06/10/22 0710)  Exam:   Physical Exam  General: breathing well on 2 L oxygen via NC, no acute distress  HEENT: NC/AT, PERRL, EOM - normal  Neck: Supple  Pulm/Chest: Normal chest wall expansion, clear breath sounds on both side, no wheezing/rhonchi or crackles appreciated  CVS: RRR, normal S1&S2, no murmur appreciated, capillary refill <2s  Abd: soft, non tender, non distended, bowel sounds +  MSK: move all 4 extremities spontaneously  Skin: warm  CNS: no acute focal neuro deficit    Discussion with Family:  Called the wife, no answer  Left a voicemail      Discharge instructions/Information to patient and family:   See after visit summary for information provided to patient and family  Provisions for Follow-Up Care:  See after visit summary for information related to follow-up care and any pertinent home health orders  Disposition:     Home with VNA Services (Reminder: Complete face to face encounter)    For Discharges to [facility]:   · Not Applicable to this Patient - Not Applicable to this Patient    Planned Readmission: NO     Discharge Statement:  I spent 45 minutes discharging the patient  This time was spent on the day of discharge  I had direct contact with the patient on the day of discharge  Greater than 50% of the total time was spent examining patient, answering all patient questions, arranging and discussing plan of care with patient as well as directly providing post-discharge instructions  Additional time then spent on discharge activities  Discharge Medications:  See after visit summary for reconciled discharge medications provided to patient and family        ** Please Note: This note has been constructed using a voice recognition system **

## 2022-06-10 NOTE — ASSESSMENT & PLAN NOTE
· Improved, Intermittent dizziness over several weeks  · No acute focal neurological deficit, no slurred speech or dysphagia   · Fall precautions

## 2022-06-10 NOTE — ASSESSMENT & PLAN NOTE
Wt Readings from Last 3 Encounters:   06/10/22 78 3 kg (172 lb 9 9 oz)   06/09/22 82 1 kg (181 lb)   05/23/22 88 9 kg (196 lb)     · Weight monitoring could be affected by suspected 15-25 lb weight loss  · Last echocardiogram June of 2020:  EF of 30%, G2 DD  · No rales on exam, minimal lower extremity edema  · Chest x-ray:  No acute cardiopulmonary disease  · Daily weights and I&Os

## 2022-06-10 NOTE — ASSESSMENT & PLAN NOTE
· Most likely due to poor p o  intake and overall deconditioning with multiple hospitalization  · Vital signs within defined limits  Labs essentially unremarkable  Chest x-ray:  No acute cardiopulmonary disease  · Normal saline at 75 mL/hr for 13 hours  · PT OT consult recommended home health  · History of prostate cancer    Per Epic review, no documentation of colonoscopy within past 5 years

## 2022-06-10 NOTE — PLAN OF CARE
Problem: PHYSICAL THERAPY ADULT  Goal: Performs mobility at highest level of function for planned discharge setting  See evaluation for individualized goals  Description: Treatment/Interventions: Functional transfer training, LE strengthening/ROM, Therapeutic exercise, Endurance training, Gait training  Equipment Recommended: [place]       See flowsheet documentation for full assessment, interventions and recommendations  Note: Prognosis: Fair  Problem List: Decreased endurance, Impaired balance, Decreased mobility, Decreased coordination, Impaired judgement, Decreased safety awareness, Impaired vision  Assessment: Pt  admitted to hospital on 6/9/22 for generalized weakness  Pt  evaluated by PT on 6/10/22 to assess functional mobility  Pt  was supine in bed with HOB elevated at the beginning of the session  Pt  was cognitively oriented and responsive to evaluation questions  Pain scale on a 0-10 was a 0  During bed mobility pt  required supervision  During transfers pt  required CGA   Pt  ambulated 40 feet with RW supervision  Gait pattern during ambulation was antalgic, forward flexion, decreased foot clearance, short stride, and excessively slow  During ambulation pt  displayed SOB, pt  was given increased time to remedy symptoms  At the end of the session pt  was seated in recliner with SCDs  Pt  will benefit from skilled physical therapy to help increase ambulation distance and endurance, LE strength, and activity pacing  PT Discharge Recommendation: (S) Home with home health rehabilitation          See flowsheet documentation for full assessment

## 2022-06-10 NOTE — NURSING NOTE
hospitalist was in to see and eval the pt and he is ok for dc to home, iv site removed with the tip intact, avs reviewed with the pt, all questions answered to his satisfaction, taken to family car via wc by the pca and the pt was dcd from the hospitac

## 2022-06-10 NOTE — CASE MANAGEMENT
Case Management Assessment & Discharge Planning Note    Patient name Tyree Monk /-89 MRN [id number]  : 1945 Date 6/10/2022       Current Admission Date: 2022  Current Admission Diagnosis:Generalized weakness   Patient Active Problem List    Diagnosis Date Noted    Hypotension 2022    Dizziness 2022    Elevated troponin 2022    [facility]est[facility] failure [facility] ) 2022    Pneumonia 2022    Sepsis due to pneumonia ([facility] ) 2022    Generalized weakness 2022    PAD (peripheral artery disease) ([facility] ) 2022    Diminished pulses in lower extremity 2021    Type 2 MI (myocardial infarction) ([facility] ) 2021    Diabetic ketoacidosis without coma associated with type 2 diabetes mellitus ([facility] ) 2021    Physical deconditioning 2021    Tinea unguium 2021    Type 2 diabetes mellitus with diabetic neuropathy, unspecified ([facility] ) 2021    Other age-related cataract 2021    Central retinal vein occlusion with macular edema 2021    Nonexudative age-related macular degeneration 2021    Obesity 2021    Ocular hypertension 2021    Onychomycosis due to dermatophyte 2021    Acute on chronic respiratory failure ([facility] ) 08/15/2020    COPD not affecting current episode of care ([facility] ) 2020    Other hyperlipidemia 2020    Ischemic cardiomyopathy 2019    Coronary artery disease involving native coronary artery of native heart without angina pectoris 2019    Benign prostatic hyperplasia with urinary retention 2019    Mucopurulent chronic bronchitis ([facility] ) 2019    Benign essential tremor 2019    Pill rolling tremor 2019    History of prostate cancer 2018    Ambulatory dysfunction 2017    On intra-aortic balloon pump assist 03/10/2017    S/P CABG x 4 2017    Type 2 diabetes mellitus, without long-term current use of insulin ([facility] ) 03/08/2017    Tobacco abuse 03/08/2017    NSTEMI (non-ST elevated myocardial infarction) ([facility] ) 03/08/2017      LOS (days): 0  Geometric Mean LOS (GMLOS) (days):   Days to GMLOS:     OBJECTIVE:              Current admission status: Observation  Referral Reason: Other (d/c planning)    Preferred Pharmacy:   [facility] [address] [facility] [address] [facility], [address] [place] [address]  Phone: [phone] Fax: [phone]    Primary Care Provider: Kimberly Jean Baptiste DO    Primary Insurance: [facility]  Secondary Insurance:     ASSESSMENT:  Chelsea Ma 10, 72 [address] Representative - Spouse   Primary Phone: [phone] (Home)               Advance Directives  Does patient have a [address]?: No  Was patient offered paperwork?: Yes (pt declined)  Does patient have Advance Directives?: No  Was patient offered paperwork?: Yes (pt declined)         Readmission Root Cause  30 Day Readmission: No    Patient Information  Admitted from[de-identified] Home  Mental Status: Alert  During Assessment patient was accompanied by: Not accompanied during assessment  Assessment information provided by[de-identified] Patient  Primary Caregiver: Self  Support Systems: Spouse/significant other  [place] of Residence: [address] do you live in?: Via [employer] entry access options   Select all that apply : Stairs  Number of steps to enter home : 4  Do the steps have railings?: Yes  Type of Current Residence: [address] [place] home  Upon entering residence, is there a bedroom on the main floor (no further steps)?: No  A bedroom is located on the following floor levels of residence (select all that apply):: 2nd Floor  Upon entering residence, is there a bathroom on the main floor (no further steps)?: Yes  Number of steps to 2nd floor from main floor: One Flight  In the last 12 months, was there a time when you were not able to pay the mortgage or rent on time?: No  In the last 12 months, how many places have you lived?: 1  In the last 12 months, was there a time when you did not have a steady place to sleep or slept in a shelter (including now)?: No  Homeless/housing insecurity resource given?: N/A  Living Arrangements: Lives w/ Spouse/significant other  Is patient a ?: Yes ([employer] he does receive some benefits)  Is patient active with [facility])?: No    Activities of Daily Living Prior to Admission  Functional Status: Independent  Completes ADLs independently?: Yes  Ambulates independently?: Yes  Does patient use assisted devices?: Yes  Assisted Devices (DME) used: Straight Cane  Does patient currently own DME?: Yes  What DME does the patient currently own?: Straight Cane, Walker, Bedside Commode, Shower Chair, Manrique-Castaneda, Home Oxygen concentrator, Portable Oxygen concentrator  Does patient have a history of Outpatient Therapy (PT/OT)?: Yes  Does the patient have a history of Short-Term Rehab?: Yes (aru)  Does patient have a history of HHC?: No  Does patient currently have [place] [address]?: No         Patient Information Continued  Income Source: Pension/care home  Does patient have prescription coverage?: Yes (Maximo Pedraza)  Within the past 12 months, you worried that your food would run out before you got the money to buy more : Never true  Within the past 12 months, the food you bought just didn't last and you didn't have money to get more : Never true  Food insecurity resource given?: N/A  Does patient receive dialysis treatments?: No  Does patient have a history of substance abuse?: No  Does patient have a history of Mental Health Diagnosis?: No         Means of Transportation  Means of Transport to Appts[de-identified] Drives Self  In the past 12 months, has lack of transportation kept you from medical appointments or from getting medications?: No  In the past 12 months, has lack of transportation kept you from meetings, work, or from getting things needed for daily living?: No  Was application for public transport provided?: N/A        DISCHARGE DETAILS:    Discharge planning discussed with[de-identified] patient and cm placed a call to his wife at 09:57 am and left a message to call to discuss d/c plan  Freedom of Choice: Yes  Comments - Freedom of Choice: [facility] ordered   pt was given choices  pt stated to make a blanket referral  CM contacted family/caregiver?: Yes (message left for his wife to call)  Were Treatment Team discharge recommendations reviewed with patient/caregiver?: Yes (patient)  Did patient/caregiver verbalize understanding of patient care needs?: Yes  Were patient/caregiver advised of the risks associated with not following Treatment Team discharge recommendations?: Yes    Contacts  Patient Contacts: Connor Feldman  Relationship to Patient[de-identified] Family (wife)  Contact Method: Phone  Phone Number: [phone]  message was left  Reason/Outcome: Discharge [address]         Is the patient interested in [facility] at discharge?: Yes              Would you like to participate in our [address] service program?  : No - Declined    Treatment Team Recommendation: Home with [employer] (home wiht wife & [facility] & outpt followup- sister and she will bring his portable concentrator)  Discharge Destination Plan[de-identified] Home with [employer] (home with spouse & [employer] [facility]& outpt follow up)  Transport at Discharge : Automobile, Family         pt was in agreement with the d/c and d/c plan              Accompanied by: Family member           Family notified[de-identified] message was left for his wife  pt called his sister

## 2022-06-10 NOTE — PLAN OF CARE
Problem: OCCUPATIONAL THERAPY ADULT  Goal: Performs self-care activities at highest level of function for planned discharge setting  See evaluation for individualized goals  Description: Treatment Interventions: ADL retraining, Functional transfer training, UE strengthening/ROM, Endurance training, Patient/family training, Compensatory technique education, Energy conservation, Activityengagement          See flowsheet documentation for full assessment, interventions and recommendations  Outcome: Progressing  Note: Limitation: Decreased ADL status, Decreased UE strength, Decreased endurance, Decreased self-care trans, Decreased high-level ADLs  Prognosis: Good  Assessment: Pt is a 69 y o  male, admitted to [facility] 6/9/2022 d/t experiencing hypotension  Dx: generalized weakness  Pt with PMHx impacting their performance during ADL tasks, including: CAD, SOB, HLD, DMII, CHF, tremor, obesity  Prior to admission to the hospital Pt was performing ADLs without physical assistance  IADLs with physical assistance  Functional transfers/ambulation without physical assistance  Cognitive status was PTA was intact  OT order placed to assess Pt's ADLs, cognitive status, and performance during functional tasks in order to maximize safety and independence while making most appropriate d/c recommendations  Pt's clinical presentation is currently evolving given new onset deficits that effect Pt's occupational performance and ability to safely return to [facility] including decrease activity tolerance, decrease standing balance, decrease performance during ADL tasks, decrease generalized strength, decrease activity engagement and decrease performance during functional transfers combined with medical complications of hypotension, abnormal CBC and abnormal potassium values   Personal factors affecting Pt at time of initial evaluation include: step(s) to enter environment, multi-level environment, inability to perform ADLs, new need for AD, inability to ambulate household distances and inability to navigate community distances  Pt will benefit from continued skilled OT services to address deficits as defined above and to maximize level independence/participation during ADLs and functional tasks to facilitate return toward PLOF and improved quality of life  From an occupational therapy standpoint, recommendation at time of d/c would be home c HHOT  OT Discharge Recommendation: Home with home health rehabilitation  OT - OK to Discharge:  Yes

## 2022-06-13 ENCOUNTER — TELEPHONE (OUTPATIENT)
Dept: PULMONOLOGY | Facility: CLINIC | Age: 77
End: 2022-06-13

## 2022-06-13 ENCOUNTER — RA CDI HCC (OUTPATIENT)
Dept: OTHER | Facility: HOSPITAL | Age: 77
End: 2022-06-13

## 2022-06-13 ENCOUNTER — TELEPHONE (OUTPATIENT)
Dept: CARDIAC REHAB | Facility: HOSPITAL | Age: 77
End: 2022-06-13

## 2022-06-13 NOTE — PROGRESS NOTES
[facility] 76  coding opportunities          Chart Reviewed number of suggestions sent to Provider: [phone]    [phone]     Patients Insurance     Medicare Insurance: [facility] Advantage

## 2022-06-13 NOTE — TELEPHONE ENCOUNTER
Patient is calling, the pulmonary rehab we referred him too with [facility] his insurance is not in network for  He would appreciate a call back to figure out what to do next   Please advise

## 2022-06-14 LAB
BACTERIA BLD CULT: NORMAL
BACTERIA BLD CULT: NORMAL

## 2022-06-15 ENCOUNTER — APPOINTMENT (OUTPATIENT)
Dept: LAB | Facility: CLINIC | Age: 77
End: 2022-06-15
Payer: COMMERCIAL

## 2022-06-15 ENCOUNTER — OFFICE VISIT (OUTPATIENT)
Dept: FAMILY MEDICINE CLINIC | Facility: CLINIC | Age: 77
End: 2022-06-15
Payer: COMMERCIAL

## 2022-06-15 VITALS
HEIGHT: 70 IN | RESPIRATION RATE: 17 BRPM | WEIGHT: 179 LBS | SYSTOLIC BLOOD PRESSURE: 90 MMHG | OXYGEN SATURATION: 98 % | HEART RATE: 63 BPM | DIASTOLIC BLOOD PRESSURE: 60 MMHG | BODY MASS INDEX: 25.62 KG/M2 | TEMPERATURE: 98.2 F

## 2022-06-15 DIAGNOSIS — E87.6 HYPOKALEMIA: ICD-10-CM

## 2022-06-15 DIAGNOSIS — R53.83 FATIGUE, UNSPECIFIED TYPE: ICD-10-CM

## 2022-06-15 DIAGNOSIS — E11.40 TYPE 2 DIABETES MELLITUS WITH DIABETIC NEUROPATHY, WITHOUT LONG-TERM CURRENT USE OF INSULIN (HCC): ICD-10-CM

## 2022-06-15 DIAGNOSIS — J41.1 MUCOPURULENT CHRONIC BRONCHITIS (HCC): Chronic | ICD-10-CM

## 2022-06-15 DIAGNOSIS — I10 ESSENTIAL HYPERTENSION: ICD-10-CM

## 2022-06-15 DIAGNOSIS — I50.9 CONGESTIVE HEART FAILURE, UNSPECIFIED HF CHRONICITY, UNSPECIFIED HEART FAILURE TYPE (HCC): ICD-10-CM

## 2022-06-15 DIAGNOSIS — J96.21 ACUTE ON CHRONIC RESPIRATORY FAILURE WITH HYPOXIA (HCC): ICD-10-CM

## 2022-06-15 DIAGNOSIS — I25.10 CORONARY ARTERY DISEASE INVOLVING NATIVE CORONARY ARTERY OF NATIVE HEART WITHOUT ANGINA PECTORIS: Chronic | ICD-10-CM

## 2022-06-15 DIAGNOSIS — I50.9 CONGESTIVE HEART FAILURE, UNSPECIFIED HF CHRONICITY, UNSPECIFIED HEART FAILURE TYPE (HCC): Primary | ICD-10-CM

## 2022-06-15 DIAGNOSIS — R53.1 GENERALIZED WEAKNESS: Primary | ICD-10-CM

## 2022-06-15 LAB
ALBUMIN SERPL BCP-MCNC: 3 G/DL (ref 3.5–5)
ALP SERPL-CCNC: 95 U/L (ref 46–116)
ALT SERPL W P-5'-P-CCNC: 16 U/L (ref 12–78)
ANION GAP SERPL CALCULATED.3IONS-SCNC: 2 MMOL/L (ref 4–13)
AST SERPL W P-5'-P-CCNC: 14 U/L (ref 5–45)
BASOPHILS # BLD AUTO: 0.09 THOUSANDS/ΜL (ref 0–0.1)
BASOPHILS NFR BLD AUTO: 1 % (ref 0–1)
BILIRUB SERPL-MCNC: 0.17 MG/DL (ref 0.2–1)
BUN SERPL-MCNC: 13 MG/DL (ref 5–25)
CALCIUM ALBUM COR SERPL-MCNC: 9.1 MG/DL (ref 8.3–10.1)
CALCIUM SERPL-MCNC: 8.3 MG/DL (ref 8.3–10.1)
CHLORIDE SERPL-SCNC: 105 MMOL/L (ref 100–108)
CO2 SERPL-SCNC: 30 MMOL/L (ref 21–32)
CREAT SERPL-MCNC: 0.84 MG/DL (ref 0.6–1.3)
EOSINOPHIL # BLD AUTO: 0.51 THOUSAND/ΜL (ref 0–0.61)
EOSINOPHIL NFR BLD AUTO: 7 % (ref 0–6)
ERYTHROCYTE [DISTWIDTH] IN BLOOD BY AUTOMATED COUNT: 14.1 % (ref 11.6–15.1)
GFR SERPL CREATININE-BSD FRML MDRD: 85 ML/MIN/1.73SQ M
GLUCOSE P FAST SERPL-MCNC: 84 MG/DL (ref 65–99)
HCT VFR BLD AUTO: 33.6 % (ref 36.5–49.3)
HGB BLD-MCNC: 10.8 G/DL (ref 12–17)
IMM GRANULOCYTES # BLD AUTO: 0.04 THOUSAND/UL (ref 0–0.2)
IMM GRANULOCYTES NFR BLD AUTO: 1 % (ref 0–2)
LYMPHOCYTES # BLD AUTO: 1.46 THOUSANDS/ΜL (ref 0.6–4.47)
LYMPHOCYTES NFR BLD AUTO: 19 % (ref 14–44)
MCH RBC QN AUTO: 29.2 PG (ref 26.8–34.3)
MCHC RBC AUTO-ENTMCNC: 32.1 G/DL (ref 31.4–37.4)
MCV RBC AUTO: 91 FL (ref 82–98)
MONOCYTES # BLD AUTO: 0.74 THOUSAND/ΜL (ref 0.17–1.22)
MONOCYTES NFR BLD AUTO: 9 % (ref 4–12)
NEUTROPHILS # BLD AUTO: 5.06 THOUSANDS/ΜL (ref 1.85–7.62)
NEUTS SEG NFR BLD AUTO: 63 % (ref 43–75)
NRBC BLD AUTO-RTO: 0 /100 WBCS
PLATELET # BLD AUTO: 316 THOUSANDS/UL (ref 149–390)
PMV BLD AUTO: 11 FL (ref 8.9–12.7)
POTASSIUM SERPL-SCNC: 4.2 MMOL/L (ref 3.5–5.3)
PROT SERPL-MCNC: 6.3 G/DL (ref 6.4–8.2)
RBC # BLD AUTO: 3.7 MILLION/UL (ref 3.88–5.62)
SODIUM SERPL-SCNC: 137 MMOL/L (ref 136–145)
TSH SERPL DL<=0.05 MIU/L-ACNC: 2.01 UIU/ML (ref 0.45–4.5)
WBC # BLD AUTO: 7.9 THOUSAND/UL (ref 4.31–10.16)

## 2022-06-15 PROCEDURE — 80053 COMPREHEN METABOLIC PANEL: CPT

## 2022-06-15 PROCEDURE — 84443 ASSAY THYROID STIM HORMONE: CPT

## 2022-06-15 PROCEDURE — 99495 TRANSJ CARE MGMT MOD F2F 14D: CPT | Performed by: FAMILY MEDICINE

## 2022-06-15 PROCEDURE — 4010F ACE/ARB THERAPY RXD/TAKEN: CPT | Performed by: FAMILY MEDICINE

## 2022-06-15 PROCEDURE — 1111F DSCHRG MED/CURRENT MED MERGE: CPT | Performed by: FAMILY MEDICINE

## 2022-06-15 PROCEDURE — 36415 COLL VENOUS BLD VENIPUNCTURE: CPT

## 2022-06-15 PROCEDURE — 85025 COMPLETE CBC W/AUTO DIFF WBC: CPT

## 2022-06-15 RX ORDER — LISINOPRIL 20 MG/1
20 TABLET ORAL DAILY
Qty: 30 TABLET | Refills: 1 | Status: SHIPPED | OUTPATIENT
Start: 2022-06-15

## 2022-06-15 NOTE — TELEPHONE ENCOUNTER
Pt called and will need the medication refilled    Digoxin 0 125 mg takes 1 QD # 30 refill ? This was ordered at the hospital and he wasn't sure that he was to continue the medication      Please advise    Pharmacy [facility] [place]

## 2022-06-15 NOTE — PROGRESS NOTES
Celia Jones 1945 male MRN: [id number]    Family Medicine Transition of Care Visit      SUBJECTIVE    CC: [facility] [place] Visit     Transitional Care Management Review:  Celia Jones is a 69 y o  male here for TCM follow up  Admitted 6/9-/10 for weakness  Hospital course as per discharge summary as below:    "Generalized weakness  Assessment & Plan  · Most likely due to poor p o  intake and overall deconditioning with multiple hospitalization  · Vital signs within defined limits  Labs essentially unremarkable  Chest x-ray:  No acute cardiopulmonary disease  · Normal saline at 75 mL/hr for 13 hours  · PT OT consult recommended home health  · History of prostate cancer    Per Epic review, no documentation of colonoscopy within past 5 years     Dizziness  Assessment & Plan  · Improved, Intermittent dizziness over several weeks  · No acute focal neurological deficit, no slurred speech or dysphagia   · Fall precautions     Hypotension  Assessment & Plan  · Per EMS systolic blood pressure was around 80  · He received 2 boluses pre-hospital (unknown amount) and a 500 mL normal saline bolus was recorded on the MAR  · BP acceptable with SBP 110s to 130s  · Monitor blood pressure, vital signs q 4        Congestive heart failure (HCC)  Assessment & Plan      Wt Readings from Last 3 Encounters:   06/10/22 78 3 kg (172 lb 9 9 oz)   06/09/22 82 1 kg (181 lb)   05/23/22 88 9 kg (196 lb)      · Weight monitoring could be affected by suspected 15-25 lb weight loss  · Last echocardiogram June of 2020:  EF of 30%, G2 DD  · No rales on exam, minimal lower extremity edema  · Chest x-ray:  No acute cardiopulmonary disease  · Daily weights and I&Os              COPD not affecting current episode of care [facility])  Assessment & Plan  · COPD, characterized in [facility] records as "very severe," not appearing to be in exacerbation  · He uses 2 L of oxygen nasal cannula chronically  · Respiratory protocol  · Continue home inhalers  · Continue outpatient follow-up with Dr Jonathan Holliday     Other hyperlipidemia  Assessment & Plan  · Rosuvastatin will be on hold as formulary substitute atorvastatin is listed as an allergy  · Resume rosuvastatin when appropriate on discharge     History of prostate cancer  Assessment & Plan  · History of prostate cancer treated with 6 months of radiation in 2019  · Urinary retention protocol  · Follow-up PSA     Benign essential tremor  Assessment & Plan  · Continue primidone     Coronary artery disease involving native coronary artery of native heart without angina pectoris  Assessment & Plan  · History of CABG x4  · Denies chest pain  · Continue metoprolol        Type 2 diabetes mellitus, without long-term current use of insulin (HCC)  Assessment & Plan        Lab Results   Component Value Date     HGBA1C 7 9 (A) 05/05/2022         No results for input(s): POCGLU in the last 72 hours      Blood Sugar Average: Last 72 hrs:     · Diabetic diet  · Fingerstick blood sugar with sliding scale coverage  · Will be discharged on home metformin and glimepiride "      "Jennifer Urbano is a 69 y o  male patient with PMH of COPD on 2 L oxygen via NC, HTN, history of prostate cancer, CAD, NIDDM who originally presented to the hospital on 6/9/2022 due to generalized weakness  Patient stated he has been in and out of the hospital multiple times this year and had poor intake before admission  He said he was found to have low blood pressure in his doctor's office therefore he was sent to ED  During hospitalization, patient received IV fluid resuscitation  This morning patient stated his breathing is around his baseline, currently on 2 L oxygen via NC  Blood pressure improved  Patient denies light headache, dizziness  Patient was seen by PT and OT recommended home with home health  Patient is recommended to follow up with PCP and pulmonologist in 1-2 weeks  "       During the TCM phone call patient stated:    TCM Call (since 5/15/2022) Date and time call was made  6/10/2022  3:03 PM    Date of Admission  06/09/22    Date of discharge  06/10/22    Diagnosis  generalized weakness    Disposition  Home    Current Symptoms  None      TCM Call (since 5/15/2022)     Post hospital issues  None    Scheduled for follow up? Yes    Did you obtain your prescribed medications  No    Do you need help managing your prescriptions or medications  No    Is transportation to your appointment needed  Yes    Specify why  does not drive    I have advised the patient to call PCP with any new or worsening symptoms  Yesi HENRY    Living Arrangements  Spouse or Significiant other    Support System  Spouse    The type of support provided  Emotional; Physical; Other (comment)    Do you have social support  Yes, as much as I need          During today's visit:    Weakness is much better, home health care coming to house but no PT/OT yet  Will make sure this occurs  Breathing not too bad, no chest pain  No lightheadedness or dizziness  Blood pressure lower side today  Recommended to cut lisinopril in half 20 mg daily and monitor closely at home  Repeat labs ordered  Per sister needs new order for pulm rehab from us  Not sleeping well  He is agreeable to coming back in couple weeks to address further  PSA reviewed and normal      They are complying with their medication changes and discharge instructions  They have the following questions: As above     Review of Systems   All other systems reviewed and are negative  Historical Information     The patient history was reviewed as follows:    Past Medical History:   Diagnosis Date    BPH (benign prostatic hyperplasia)     45 days radiation treatment    COPD (chronic obstructive pulmonary disease)     Coronary artery disease     CABG x4 in 2017    Diabetes mellitus     History of Arterial Duplex of LE 12/26/2017    Likely occlusion of the left superficial femoral artery    Calcific changes bilaterally  Despite these changes, the ankle-brachial index as a measure of peripheral blood flow only mildly impaired   History of echocardiogram 2017    EF 40%, mild LVH, mild MR     Hyperlipidemia     Hypertension     Ischemic cardiomyopathy     NSTEMI (non-ST elevated myocardial infarction)     Prostate cancer     prostate     PVD (peripheral vascular disease)     Sepsis due to pneumonia     Tremor     Type 2 MI (myocardial infarction) ([facility] [address] ) 2021     Past Surgical History:   Procedure Laterality Date    CARDIAC CATHETERIZATION  2017    Significant left main plus triple-vessel CAD   CORONARY ARTERY BYPASS GRAFT  2017    4V CABG:  LIMA to LAD, VG to RI, SVG to PDA to LVBR RCA      EYE SURGERY      shots in eye once a month @ the [place]    PROSTATE BIOPSY       Family History   Problem Relation Age of Onset    Cancer Father     Heart disease Brother       Social History   Social History     Substance and Sexual Activity   Alcohol Use Yes     Social History     Substance and Sexual Activity   Drug Use Never     Social History     Tobacco Use   Smoking Status Former Smoker    Packs/day: 0 25    Years: 60 00    Pack years: 15 00    Types: Cigarettes    Quit date: 2022    Years since quittin 2   Smokeless Tobacco Never Used       Medications:   Meds/Allergies     Current Outpatient Medications:     albuterol (PROVENTIL HFA,VENTOLIN HFA) 90 mcg/act inhaler, Inhale 2 puffs every 6 (six) hours as needed for wheezing or shortness of breath, Disp: , Rfl: 0    arformoterol (BROVANA) 15 mcg/2 mL nebulizer solution, Take 2 mL (15 mcg total) by nebulization 2 (two) times a day, Disp: 120 mL, Rfl: 5    aspirin (ECOTRIN LOW STRENGTH) 81 mg EC tablet, Take 1 tablet (81 mg total) by mouth every other day, Disp:  , Rfl: 0    cyanocobalamin (VITAMIN B-12) 500 MCG tablet, TAKE ONE TABLET BY MOUTH EVERY MORNING *VITAMIN B12*, Disp: , Rfl:     gabapentin (NEURONTIN) 100 mg capsule, Take 2 capsules (200 mg total) by mouth daily at bedtime, Disp: 180 capsule, Rfl: 3    glimepiride (AMARYL) 1 mg tablet, Take 2 tablets (2 mg total) by mouth daily with breakfast AND 1 tablet (1 mg total) daily with dinner , Disp: , Rfl: 0    lisinopril (ZESTRIL) 20 mg tablet, Take 1 tablet (20 mg total) by mouth daily, Disp: 30 tablet, Rfl: 1    metFORMIN (GLUCOPHAGE) 1000 MG tablet, Take 1 tablet (1,000 mg total) by mouth 2 (two) times a day with meals (Patient taking differently: Take 1,000 mg by mouth 2 (two) times a day with meals Taking 500mg 2 times daily), Disp: 180 tablet, Rfl: 3    metoprolol tartrate (LOPRESSOR) 25 mg tablet, Take 1 tablet (25 mg total) by mouth every 12 (twelve) hours, Disp: 60 tablet, Rfl: 5    Multiple Vitamins-Minerals (PRESERVISION AREDS 2 PO), Take by mouth, Disp: , Rfl:     primidone (MYSOLINE) 50 mg tablet, Take 100 mg by mouth 2 (two) times a day , Disp: , Rfl:     rosuvastatin (CRESTOR) 10 MG tablet, Take 1 tablet (10 mg total) by mouth daily, Disp: 90 tablet, Rfl: 3    terbinafine (LamISIL) 1 % cream, APPLY THIN LAYER TOPICALLY TWICE A DAY FOR FUNGAL INFECTION, Disp: , Rfl:     budesonide (Pulmicort) 0 5 mg/2 mL nebulizer solution, Take 2 mL (0 5 mg total) by nebulization 2 (two) times a day Rinse mouth after use   (Patient not taking: Reported on 6/9/2022), Disp: 120 mL, Rfl: 11    digoxin (LANOXIN) 0 125 mg tablet, Take 1 tablet (125 mcg total) by mouth daily (Patient not taking: Reported on 6/15/2022), Disp: 30 tablet, Rfl: 0    ipratropium (ATROVENT) 0 02 % nebulizer solution, Take 2 5 mL (0 5 mg total) by nebulization 3 (three) times a day (Patient not taking: Reported on 6/9/2022), Disp: 300 mL, Rfl: 11  Allergies   Allergen Reactions    Atorvastatin Myalgia       OBJECTIVE    Vitals:   Vitals:    06/15/22 1108   BP: 90/60   BP Location: Left arm   Patient Position: Sitting   Cuff Size: Standard   Pulse: 63   Resp: 17   Temp: 98 2 °F (36 8 °C)   SpO2: 98%   Weight: 81 2 kg (179 lb)   Height: 5' 10" (1 778 m)       Body mass index is 25 68 kg/m²  Physical Exam:    Physical Exam  Vitals reviewed  Constitutional:       General: He is not in acute distress  Appearance: Normal appearance  He is not ill-appearing, toxic-appearing or diaphoretic  HENT:      Head: Normocephalic and atraumatic  Eyes:      General:         Right eye: No discharge  Left eye: No discharge  Extraocular Movements: Extraocular movements intact  Conjunctiva/sclera: Conjunctivae normal    Cardiovascular:      Rate and Rhythm: Normal rate and regular rhythm  Heart sounds: Normal heart sounds  No murmur heard  No friction rub  No gallop  Comments: Distant heart sounds   Pulmonary:      Effort: Pulmonary effort is normal  No respiratory distress  Breath sounds: No stridor  No wheezing or rhonchi  Comments: Diminished breath sounds, crackles bibasilar   Musculoskeletal:         General: No swelling, tenderness or signs of injury  Right lower leg: No edema  Left lower leg: No edema  Skin:     General: Skin is warm  Coloration: Skin is not pale  Findings: No erythema or rash  Neurological:      Mental Status: He is alert and oriented to person, place, and time  Motor: No weakness  Psychiatric:         Mood and Affect: Mood normal          Behavior: Behavior normal           Labs: I have personally reviewed all pertinent results       Admission on 06/09/2022, Discharged on 06/10/2022   Component Date Value Ref Range Status    WBC 06/09/2022 8 02  4 31 - 10 16 Thousand/uL Final    RBC 06/09/2022 3 58 (A) 3 88 - 5 62 Million/uL Final    Hemoglobin 06/09/2022 10 6 (A) 12 0 - 17 0 g/dL Final    Hematocrit 06/09/2022 32 7 (A) 36 5 - 49 3 % Final    MCV 06/09/2022 91  82 - 98 fL Final    MCH 06/09/2022 29 6  26 8 - 34 3 pg Final    MCHC 06/09/2022 32 4  31 4 - 37 4 g/dL Final    RDW 06/09/2022 13 5  11 6 - 15 1 % Final    MPV 06/09/2022 10 2  8 9 - 12 7 fL Final    Platelets 18/46/0831 314  149 - 390 Thousands/uL Final    nRBC 06/09/2022 0  /100 WBCs Final    Neutrophils Relative 06/09/2022 71  43 - 75 % Final    Immat GRANS % 06/09/2022 1  0 - 2 % Final    Lymphocytes Relative 06/09/2022 11 (A) 14 - 44 % Final    Monocytes Relative 06/09/2022 11  4 - 12 % Final    Eosinophils Relative 06/09/2022 5  0 - 6 % Final    Basophils Relative 06/09/2022 1  0 - 1 % Final    Neutrophils Absolute 06/09/2022 5 84  1 85 - 7 62 Thousands/µL Final    Immature Grans Absolute 06/09/2022 0 06  0 00 - 0 20 Thousand/uL Final    Lymphocytes Absolute 06/09/2022 0 84  0 60 - 4 47 Thousands/µL Final    Monocytes Absolute 06/09/2022 0 84  0 17 - 1 22 Thousand/µL Final    Eosinophils Absolute 06/09/2022 0 38  0 00 - 0 61 Thousand/µL Final    Basophils Absolute 06/09/2022 0 06  0 00 - 0 10 Thousands/µL Final    Sodium 06/09/2022 135  135 - 147 mmol/L Final    Potassium 06/09/2022 3 6  3 5 - 5 3 mmol/L Final    Chloride 06/09/2022 98  96 - 108 mmol/L Final    CO2 06/09/2022 29  21 - 32 mmol/L Final    ANION GAP 06/09/2022 8  4 - 13 mmol/L Final    BUN 06/09/2022 13  5 - 25 mg/dL Final    Creatinine 06/09/2022 1 01  0 60 - 1 30 mg/dL Final    Standardized to IDMS reference method    Glucose 06/09/2022 125  65 - 140 mg/dL Final    If the patient is fasting, the ADA then defines impaired fasting glucose as > 100 mg/dL and diabetes as > or equal to 123 mg/dL  Specimen collection should occur prior to Sulfasalazine administration due to the potential for falsely depressed results  Specimen collection should occur prior to Sulfapyridine administration due to the potential for falsely elevated results      Calcium 06/09/2022 8 2 (A) 8 4 - 10 2 mg/dL Final    Corrected Calcium 06/09/2022 8 7  8 3 - 10 1 mg/dL Final    AST 06/09/2022 7 (A) 13 - 39 U/L Final    Specimen collection should occur prior to Sulfasalazine administration due to the potential for falsely depressed results   ALT 06/09/2022 7  7 - 52 U/L Final    Specimen collection should occur prior to Sulfasalazine administration due to the potential for falsely depressed results   Alkaline Phosphatase 06/09/2022 78  34 - 104 U/L Final    Total Protein 06/09/2022 6 2 (A) 6 4 - 8 4 g/dL Final    Albumin 06/09/2022 3 4 (A) 3 5 - 5 0 g/dL Final    Total Bilirubin 06/09/2022 0 35  0 20 - 1 00 mg/dL Final    eGFR 06/09/2022 71  ml/min/1 73sq m Final    LACTIC ACID 06/09/2022 1 5  0 5 - 2 0 mmol/L Final    Procalcitonin 06/09/2022 0 08  <=0 25 ng/ml Final    Comment: Suspected Lower Respiratory Tract Infection (LRTI):  - LESS than or EQUAL to 0 25 ng/mL:   low likelihood for bacterial LRTI; antibiotics DISCOURAGED   - GREATER than 0 25 ng/mL:   increased likelihood for bacterial LRTI; antibiotics ENCOURAGED  Suspected Sepsis:  - Strongly consider initiating antibiotics in ALL UNSTABLE patients  - LESS than or EQUAL to 0 5 ng/mL:   low likelihood for bacterial sepsis; antibiotics DISCOURAGED   - GREATER than 0 5 ng/mL:   increased likelihood for bacterial sepsis; antibiotics ENCOURAGED   - GREATER than 2 ng/mL:   high risk for severe sepsis / septic shock; antibiotics strongly ENCOURAGED  Decisions on antibiotic use should not be based solely on Procalcitonin (PCT) levels  If PCT is low but uncertainty exists with stopping antibiotics, repeat PCT in 6-24 hours to confirm the low level  If antibiotics are administered (regardless if initial PCT was high or low), repeat PCT every 1-2 days to consider early antibiotic cessation (when GREATER                            than 80% decrease from the peak OR when PCT drops below designated cutoffs, whichever comes first), so long as the infection is NOT one that typically requires prolonged treatment durations (e g , bone/joint infections, endocarditis, Staph  aureus bacteremia)      Situations of FALSE-POSITIVE Procalcitonin values:  1) Newborns < 67 hours old  2) Massive stress from severe trauma / burns, major surgery, acute pancreatitis, cardiogenic / hemorrhagic shock, sickle cell crisis, or other organ perfusion abnormalities  3) Malaria and some Candidal infections  4) Treatment with agents that stimulate cytokines (e g , OKT3, anti-lymphocyte globulins, alemtuzumab, IL-2, granulocyte transfusion [NOT GCSFs])  5) Chronic renal disease causes elevated baseline levels (consider GREATER than 0 75 ng/mL as an abnormal cut-off); initiating HD/CRRT may cause transient decreases  6) Paraneoplastic syndromes from medullary thyroid or SCLC, some forms of vasculitis, and acute heurf-xy-ieht                            disease    Situations of FALSE-NEGATIVE Procalcitonin values:  1) Too early in clinical course for PCT to have reached its peak (may repeat in 6-24 hours to confirm low level)  2) Localized infection WITHOUT systemic (SIRS / sepsis) response (e g , an abscess, osteomyelitis, cystitis)  3) Mycobacteria (e g , Tuberculosis, MAC)  4) Cystic fibrosis exacerbations      Protime 06/09/2022 16 5 (A) 11 6 - 14 5 seconds Final    INR 06/09/2022 1 35 (A) 0 84 - 1 19 Final    PTT 06/09/2022 45 (A) 23 - 37 seconds Final    Therapeutic Heparin Range =  60-90 seconds    Blood Culture 06/09/2022 No Growth After 5 Days  Final    Blood Culture 06/09/2022 No Growth After 5 Days  Final    hs TnI 0hr 06/09/2022 13  "Refer to ACS Flowchart"- see link ng/L Final    Comment:                                              Initial (time 0) result  If >=50 ng/L, Myocardial injury suggested ;  Type of myocardial injury and treatment strategy  to be determined  If 5-49 ng/L, a delta result at 2 hours and or 4 hours will be needed to further evaluate  If <4 ng/L, and chest pain has been >3 hours since onset, patient may qualify for discharge based on the HEART score in the ED    If <5 ng/L and <3hours since onset of chest pain, a delta result at 2 hours will be needed to further evaluate  HS Troponin 99th Percentile URL of a Health Population=12 ng/L with a 95% Confidence Interval of 8-18 ng/L  Second Troponin (time 2 hours)  If calculated delta >= 20 ng/L,  Myocardial injury suggested ; Type of myocardial injury and treatment strategy to be determined  If 5-49 ng/L and the calculated delta is 5-19 ng/L, consult medical service for evaluation  Continue evaluation for ischemia on ecg and other possible etiology and repeat hs troponin at 4 hours  If delta                            is <5 ng/L at 2 hours, consider discharge based on risk stratification via the HEART score (if in ED), or DEBBY risk score in IP/Observation  HS Troponin 99th Percentile URL of a Health Population=12 ng/L with a 95% Confidence Interval of 8-18 ng/L   hs TnI 2hr 06/09/2022 13  "Refer to ACS Flowchart"- see link ng/L Final    Comment:                                              Initial (time 0) result  If >=50 ng/L, Myocardial injury suggested ;  Type of myocardial injury and treatment strategy  to be determined  If 5-49 ng/L, a delta result at 2 hours and or 4 hours will be needed to further evaluate  If <4 ng/L, and chest pain has been >3 hours since onset, patient may qualify for discharge based on the HEART score in the ED  If <5 ng/L and <3hours since onset of chest pain, a delta result at 2 hours will be needed to further evaluate  HS Troponin 99th Percentile URL of a Health Population=12 ng/L with a 95% Confidence Interval of 8-18 ng/L  Second Troponin (time 2 hours)  If calculated delta >= 20 ng/L,  Myocardial injury suggested ; Type of myocardial injury and treatment strategy to be determined  If 5-49 ng/L and the calculated delta is 5-19 ng/L, consult medical service for evaluation  Continue evaluation for ischemia on ecg and other possible etiology and repeat hs troponin at 4 hours    If delta                            is <5 ng/L at 2 hours, consider discharge based on risk stratification via the HEART score (if in ED), or DEBBY risk score in IP/Observation  HS Troponin 99th Percentile URL of a Health Population=12 ng/L with a 95% Confidence Interval of 8-18 ng/L   Delta 2hr hsTnI 06/09/2022 0  <20 ng/L Final    hs TnI 4hr 06/09/2022 13  "Refer to ACS Flowchart"- see link ng/L Final    Comment:                                              Initial (time 0) result  If >=50 ng/L, Myocardial injury suggested ;  Type of myocardial injury and treatment strategy  to be determined  If 5-49 ng/L, a delta result at 2 hours and or 4 hours will be needed to further evaluate  If <4 ng/L, and chest pain has been >3 hours since onset, patient may qualify for discharge based on the HEART score in the ED  If <5 ng/L and <3hours since onset of chest pain, a delta result at 2 hours will be needed to further evaluate  HS Troponin 99th Percentile URL of a Health Population=12 ng/L with a 95% Confidence Interval of 8-18 ng/L  Second Troponin (time 2 hours)  If calculated delta >= 20 ng/L,  Myocardial injury suggested ; Type of myocardial injury and treatment strategy to be determined  If 5-49 ng/L and the calculated delta is 5-19 ng/L, consult medical service for evaluation  Continue evaluation for ischemia on ecg and other possible etiology and repeat hs troponin at 4 hours  If delta                            is <5 ng/L at 2 hours, consider discharge based on risk stratification via the HEART score (if in ED), or DEBBY risk score in IP/Observation  HS Troponin 99th Percentile URL of a Health Population=12 ng/L with a 95% Confidence Interval of 8-18 ng/L      Delta 4hr hsTnI 06/09/2022 0  <20 ng/L Final    Clarity, UA 06/09/2022 Clear  Hazy, Clear Final    Color, UA 06/09/2022 Yellow  Yellow, Straw Final    Specific Gravity, UA 06/09/2022 1 010  1 005 - 1 030 Final    pH, UA 06/09/2022 6 0  5 0, 5 5, 6 0, 6 5, 7 0, 7 5 Final    Glucose, UA 06/09/2022 Negative  Negative mg/dl Final    Ketones, UA 06/09/2022 Negative  Negative mg/dl Final    Blood, UA 06/09/2022 Negative  Negative Final    Protein, UA 06/09/2022 Negative  Negative, Interference- unable to analyze mg/dl Final    Nitrite, UA 06/09/2022 Negative  Negative Final    Bilirubin, UA 06/09/2022 Negative  Negative Final    Urobilinogen, UA 06/09/2022 0 2  0 2, 1 0 E U /dl E U /dl Final    Leukocytes, UA 06/09/2022 Negative  Negative Final    WBC, UA 06/09/2022 None Seen  None Seen, 2-4, 5-60 /hpf Final    RBC, UA 06/09/2022 1-2 (A) None Seen, 2-4 /hpf Final    Bacteria, UA 06/09/2022 None Seen  None Seen, Occasional /hpf Final    Epithelial Cells 06/09/2022 Occasional  None Seen, Occasional /hpf Final    Sodium 06/10/2022 137  135 - 147 mmol/L Final    Potassium 06/10/2022 3 4 (A) 3 5 - 5 3 mmol/L Final    Chloride 06/10/2022 99  96 - 108 mmol/L Final    CO2 06/10/2022 28  21 - 32 mmol/L Final    ANION GAP 06/10/2022 10  4 - 13 mmol/L Final    BUN 06/10/2022 12  5 - 25 mg/dL Final    Creatinine 06/10/2022 0 74  0 60 - 1 30 mg/dL Final    Standardized to IDMS reference method    Glucose 06/10/2022 81  65 - 140 mg/dL Final    If the patient is fasting, the ADA then defines impaired fasting glucose as > 100 mg/dL and diabetes as > or equal to 123 mg/dL  Specimen collection should occur prior to Sulfasalazine administration due to the potential for falsely depressed results  Specimen collection should occur prior to Sulfapyridine administration due to the potential for falsely elevated results   Glucose, Fasting 06/10/2022 81  65 - 99 mg/dL Final    Specimen collection should occur prior to Sulfasalazine administration due to the potential for falsely depressed results  Specimen collection should occur prior to Sulfapyridine administration due to the potential for falsely elevated results      Calcium 06/10/2022 8 1 (A) 8 4 - 10 2 mg/dL Final    eGFR 06/10/2022 89 ml/min/1 73sq m Final    WBC 06/10/2022 5 95  4 31 - 10 16 Thousand/uL Final    RBC 06/10/2022 3 47 (A) 3 88 - 5 62 Million/uL Final    Hemoglobin 06/10/2022 10 2 (A) 12 0 - 17 0 g/dL Final    Hematocrit 06/10/2022 31 9 (A) 36 5 - 49 3 % Final    MCV 06/10/2022 92  82 - 98 fL Final    MCH 06/10/2022 29 4  26 8 - 34 3 pg Final    MCHC 06/10/2022 32 0  31 4 - 37 4 g/dL Final    RDW 06/10/2022 13 6  11 6 - 15 1 % Final    MPV 06/10/2022 10 0  8 9 - 12 7 fL Final    Platelets 97/07/0771 277  149 - 390 Thousands/uL Final    nRBC 06/10/2022 0  /100 WBCs Final    Neutrophils Relative 06/10/2022 63  43 - 75 % Final    Immat GRANS % 06/10/2022 1  0 - 2 % Final    Lymphocytes Relative 06/10/2022 16  14 - 44 % Final    Monocytes Relative 06/10/2022 12  4 - 12 % Final    Eosinophils Relative 06/10/2022 7 (A) 0 - 6 % Final    Basophils Relative 06/10/2022 1  0 - 1 % Final    Neutrophils Absolute 06/10/2022 3 80  1 85 - 7 62 Thousands/µL Final    Immature Grans Absolute 06/10/2022 0 05  0 00 - 0 20 Thousand/uL Final    Lymphocytes Absolute 06/10/2022 0 93  0 60 - 4 47 Thousands/µL Final    Monocytes Absolute 06/10/2022 0 69  0 17 - 1 22 Thousand/µL Final    Eosinophils Absolute 06/10/2022 0 44  0 00 - 0 61 Thousand/µL Final    Basophils Absolute 06/10/2022 0 04  0 00 - 0 10 Thousands/µL Final    PSA, Diagnostic 06/10/2022 0 1  0 0 - 4 0 ng/mL Final    American Urological Association Guidelines define biochemical recurrence of prostate cancer as a detectable or rising PSA value post-radical prostatectomy that is greater than or equal to 0 2 ng/mL with a second confirmatory level of greater than or equal to 0 2 ng/mL      Ventricular Rate 06/09/2022 75  BPM Final    Atrial Rate 06/09/2022 75  BPM Final    TX Interval 06/09/2022 178  ms Final    QRSD Interval 06/09/2022 104  ms Final    QT Interval 06/09/2022 408  ms Final    QTC Interval 06/09/2022 455  ms Final    P Axis 06/09/2022 73  degrees Final  QRS [place] 06/09/2022 68  degrees Final    T Wave [place] 06/09/2022 178  degrees Final    Ventricular Rate 06/09/2022 81  BPM Final    Atrial Rate 06/09/2022 81  BPM Final    CT Interval 06/09/2022 178  ms Final    QRSD Interval 06/09/2022 104  ms Final    QT Interval 06/09/2022 402  ms Final    QTC Interval 06/09/2022 466  ms Final    P Axis 06/09/2022 84  degrees Final    QRS Axis 06/09/2022 79  degrees Final    T Wave [place] 06/09/2022 234  degrees Final       Imaging:  I have personally reviewed all pertinent results  Assessment/Plan    No problem-specific Assessment & Plan notes found for this encounter  Juanis Echevarria was seen today for transition of care management  Diagnoses and all orders for this visit:    Generalized weakness    Congestive heart failure, unspecified HF chronicity, unspecified heart failure type ([facility] [facility] )    Coronary artery disease involving native coronary artery of native heart without angina pectoris    Mucopurulent chronic bronchitis ([facility] )  -     Ambulatory Referral to Pulmonary Rehabilitation; Future    Acute on chronic respiratory failure with hypoxia [facility])  -     Ambulatory Referral to Pulmonary Rehabilitation; Future    Essential hypertension  -     CBC and differential; Future  -     Comprehensive metabolic panel; Future  -     lisinopril (ZESTRIL) 20 mg tablet;  Take 1 tablet (20 mg total) by mouth daily    Hypokalemia          Future Appointments   Date Time Provider Sabine Oliver   6/23/2022  3:20 PM PENG Franco [facility] Cardio [facility]   6/29/2022  2:00 PM DO CARMINE Dan Palm  [facility]   7/11/2022  2:30 PM [address] [place] VASCULAR 1 [address] [facility] [address]   8/1/2022  8:40 AM MD RADAMES Pisano [facility]   8/29/2022 10:40 AM MD RADAMES Pisano [facility]   10/21/2022  8:00 PM [place] SLEEP ROOM 01 [facility] Sleep lab [facility]   12/22/2022  4:00 PM Marcio Dumont MD [place] Sleep Med [facility] Erin [place], [address] [facility]

## 2022-06-15 NOTE — TELEPHONE ENCOUNTER
Called and spoke with Peng Forward, recommended he try to reach out to [facility] to see if they have a program that pars with his ins and we can send the order over   He will think about it, also advised the physician can discuss with him at the next visit

## 2022-06-16 ENCOUNTER — HOME CARE VISIT (OUTPATIENT)
Dept: HOME HEALTH SERVICES | Facility: HOME HEALTHCARE | Age: 77
End: 2022-06-16

## 2022-06-16 ENCOUNTER — HOME HEALTH ADMISSION (OUTPATIENT)
Dept: HOME HEALTH SERVICES | Facility: HOME HEALTHCARE | Age: 77
End: 2022-06-16

## 2022-06-16 RX ORDER — DIGOXIN 125 MCG
125 TABLET ORAL DAILY
Qty: 30 TABLET | Refills: 5 | Status: SHIPPED | OUTPATIENT
Start: 2022-06-16

## 2022-06-16 NOTE — CASE COMMUNICATION
Spoke to the patient regarding home therapy services  Patient reports that he has had home care services for nursing and physical therapy with [facility] since he was discharged from the hospital  Patient provided phone number for home care agency [phone]  Patient not admitted to [place] [address] home care services at this time   Thank you

## 2022-06-23 ENCOUNTER — OFFICE VISIT (OUTPATIENT)
Dept: CARDIOLOGY CLINIC | Facility: CLINIC | Age: 77
End: 2022-06-23
Payer: COMMERCIAL

## 2022-06-23 VITALS
SYSTOLIC BLOOD PRESSURE: 110 MMHG | DIASTOLIC BLOOD PRESSURE: 58 MMHG | HEIGHT: 70 IN | BODY MASS INDEX: 25.62 KG/M2 | WEIGHT: 179 LBS | HEART RATE: 72 BPM

## 2022-06-23 DIAGNOSIS — E78.49 OTHER HYPERLIPIDEMIA: ICD-10-CM

## 2022-06-23 DIAGNOSIS — Z95.1 S/P CABG X 4: Primary | ICD-10-CM

## 2022-06-23 DIAGNOSIS — I25.5 ISCHEMIC CARDIOMYOPATHY: ICD-10-CM

## 2022-06-23 PROCEDURE — 99214 OFFICE O/P EST MOD 30 MIN: CPT | Performed by: NURSE PRACTITIONER

## 2022-06-23 PROCEDURE — 1111F DSCHRG MED/CURRENT MED MERGE: CPT | Performed by: NURSE PRACTITIONER

## 2022-06-23 NOTE — PROGRESS NOTES
Patient ID: Claude Reno is a 69 y o  male  Plan:      Other hyperlipidemia  Taking as much statin as he can tolerate  S/P CABG x 4  Prior CABG (LIMA-LAD, SVG-RI, SVG-PDA, and ventricular branch of the RCA) 3/2017  Continue asa, statin, BB    Ischemic cardiomyopathy  EF 30%  Pt is not interested in prophylactic ICD given his pulmonary comorbidities       Follow up Plan/Summary Comments:  Mitzi Padgett is doing well from a cardiac perspective  I have not made any medication adjustments today  No further testing is recommended at this time  He will follow up with Dr Martha Aguilar in 4 months and will call sooner if needed  HPI:  I had the pleasure seeing Mitzi Padgett in the office today for a hospital follow-up visit  Mitzi Padgett was admitted to [place] [address] 06/09/2022 for weakness  Since his discharge, his lisinopril dose was reduced by his PCP for hypotension  Mitzi Padgett has been doing OK since home from the hospital   He is feeling much better on the lower dose of lisinopril  He denies any chest pain, pressure, palpitations, changes in his breathing  He is currently working with home PT/OT and has a visiting nurse check on him  Review of Systems   10  point ROS  was otherwise non pertinent or negative except as per HPI or as below  Gait: Hmealrimarychuy Guardado      Most recent or relevant cardiac/vascular testing:    Echo 11/12/2020:  EF 30% with global dysfunction  Echo 8/2019  - EF 30-40%  Global dysfunction with severe hypokinesis of the apex  Mild MR  Arterial duplex 12/2017 - likely occlusion of the left SFA  KEI mildly impaired        Objective:     /58   Pulse 72   Ht 5' 10" (1 778 m)   Wt 81 2 kg (179 lb)   BMI 25 68 kg/m²     PHYSICAL EXAM:    General:  Normal appearance, no acute distress  Eyes:  Anicteric  Oral mucosa:  Moist   Neck:  No JVD  Carotid upstrokes are brisk without bruits  No masses  Chest:  Clear to auscultation   Cardiac:  No palpable PMI  Normal S1 and S2    No murmur gallop or rub  Abdomen:  Soft and nontender  No palpable organomegaly or aortic enlargement  Extremities:  No peripheral edema  Musculoskeletal:  Symmetric  Vascular:  Pedal pulses are intact  Neuro:  Grossly symmetric  Psych:  Alert and oriented x3      Allergies   Allergen Reactions    Atorvastatin Myalgia       Current Outpatient Medications:     albuterol (PROVENTIL HFA,VENTOLIN HFA) 90 mcg/act inhaler, Inhale 2 puffs every 6 (six) hours as needed for wheezing or shortness of breath, Disp: , Rfl: 0    arformoterol (BROVANA) 15 mcg/2 mL nebulizer solution, Take 2 mL (15 mcg total) by nebulization 2 (two) times a day, Disp: 120 mL, Rfl: 5    aspirin (ECOTRIN LOW STRENGTH) 81 mg EC tablet, Take 1 tablet (81 mg total) by mouth every other day, Disp:  , Rfl: 0    cyanocobalamin (VITAMIN B-12) 500 MCG tablet, TAKE ONE TABLET BY MOUTH EVERY MORNING *VITAMIN B12*, Disp: , Rfl:     digoxin (LANOXIN) 0 125 mg tablet, Take 1 tablet (125 mcg total) by mouth daily, Disp: 30 tablet, Rfl: 5    glimepiride (AMARYL) 1 mg tablet, Take 2 tablets (2 mg total) by mouth daily with breakfast AND 1 tablet (1 mg total) daily with dinner , Disp: , Rfl: 0    lisinopril (ZESTRIL) 20 mg tablet, Take 1 tablet (20 mg total) by mouth daily, Disp: 30 tablet, Rfl: 1    metFORMIN (GLUCOPHAGE) 1000 MG tablet, Take 1 tablet (1,000 mg total) by mouth 2 (two) times a day with meals (Patient taking differently: Take 1,000 mg by mouth 2 (two) times a day with meals Taking 500mg 2 times daily), Disp: 180 tablet, Rfl: 3    metoprolol tartrate (LOPRESSOR) 25 mg tablet, Take 1 tablet (25 mg total) by mouth every 12 (twelve) hours, Disp: 60 tablet, Rfl: 5    Multiple Vitamins-Minerals (PRESERVISION AREDS 2 PO), Take by mouth, Disp: , Rfl:     primidone (MYSOLINE) 50 mg tablet, Take 100 mg by mouth 2 (two) times a day , Disp: , Rfl:     rosuvastatin (CRESTOR) 10 MG tablet, Take 1 tablet (10 mg total) by mouth daily, Disp: 90 tablet, Rfl: 3    terbinafine (LamISIL) 1 % cream, APPLY THIN LAYER TOPICALLY TWICE A DAY FOR FUNGAL INFECTION, Disp: , Rfl:     budesonide (Pulmicort) 0 5 mg/2 mL nebulizer solution, Take 2 mL (0 5 mg total) by nebulization 2 (two) times a day Rinse mouth after use  (Patient not taking: Reported on 6/9/2022), Disp: 120 mL, Rfl: 11    gabapentin (NEURONTIN) 100 mg capsule, Take 2 capsules (200 mg total) by mouth daily at bedtime, Disp: 180 capsule, Rfl: 3    ipratropium (ATROVENT) 0 02 % nebulizer solution, Take 2 5 mL (0 5 mg total) by nebulization 3 (three) times a day (Patient not taking: Reported on 6/9/2022), Disp: 300 mL, Rfl: 11  Past Medical History:   Diagnosis Date    BPH (benign prostatic hyperplasia)     45 days radiation treatment    COPD (chronic obstructive pulmonary disease)     Coronary artery disease     CABG x4 in 2017    Diabetes mellitus     History of Arterial Duplex of LE 12/26/2017    Likely occlusion of the left superficial femoral artery  Calcific changes bilaterally  Despite these changes, the ankle-brachial index as a measure of peripheral blood flow only mildly impaired   History of echocardiogram 06/12/2017    EF 40%, mild LVH, mild MR     Hyperlipidemia     Hypertension     Ischemic cardiomyopathy     NSTEMI (non-ST elevated myocardial infarction)     Prostate cancer     prostate     PVD (peripheral vascular disease)     Sepsis due to pneumonia     Tremor     [facility]21     Past Surgical History:   Procedure Laterality Date    CARDIAC CATHETERIZATION  03/08/2017    Significant left main plus triple-vessel CAD   CORONARY ARTERY BYPASS GRAFT  03/08/2017    4V CABG:  LIMA to LAD, VG to RI, SVG to PDA to LVBR RCA      EYE SURGERY      shots in eye once a month @ the [address] [place] [address]:   Lab Results   Component Value Date     (L) 05/28/2018    K 4 2 06/15/2022    K 4 2 05/28/2018     06/15/2022    CL 98 2018    CO2 30 06/15/2022    CO2 28 2022    BUN 13 06/15/2022    BUN 12 2018    CREATININE 0 84 06/15/2022    CREATININE 0 83 2018    GLUCOSE 374 (H) 2022    EGFR 85 06/15/2022     Lipid Profile:    Lab Results   Component Value Date    TRIG 277 (H) 2022    HDL 49 2022         Social History     Tobacco Use   Smoking Status Former Smoker    Packs/day: 0 25    Years: 60 00    Pack years: 15 00    Types: Cigarettes    Quit date: 2022    Years since quittin 2   Smokeless Tobacco Never Used

## 2022-06-23 NOTE — ASSESSMENT & PLAN NOTE
Prior CABG (LIMA-LAD, SVG-RI, SVG-PDA, and ventricular branch of the RCA) 3/2017  Continue asa, statin, BB

## 2022-06-29 ENCOUNTER — APPOINTMENT (OUTPATIENT)
Dept: RADIOLOGY | Facility: CLINIC | Age: 77
End: 2022-06-29
Payer: COMMERCIAL

## 2022-06-29 ENCOUNTER — OFFICE VISIT (OUTPATIENT)
Dept: FAMILY MEDICINE CLINIC | Facility: CLINIC | Age: 77
End: 2022-06-29
Payer: COMMERCIAL

## 2022-06-29 VITALS
TEMPERATURE: 97.3 F | RESPIRATION RATE: 20 BRPM | WEIGHT: 181 LBS | BODY MASS INDEX: 25.91 KG/M2 | DIASTOLIC BLOOD PRESSURE: 56 MMHG | HEART RATE: 71 BPM | HEIGHT: 70 IN | OXYGEN SATURATION: 95 % | SYSTOLIC BLOOD PRESSURE: 120 MMHG

## 2022-06-29 DIAGNOSIS — I10 PRIMARY HYPERTENSION: ICD-10-CM

## 2022-06-29 DIAGNOSIS — R77.0 LOW SERUM ALBUMIN: ICD-10-CM

## 2022-06-29 DIAGNOSIS — J44.9 CHRONIC OBSTRUCTIVE PULMONARY DISEASE, UNSPECIFIED COPD TYPE (HCC): ICD-10-CM

## 2022-06-29 DIAGNOSIS — I50.9 CONGESTIVE HEART FAILURE, UNSPECIFIED HF CHRONICITY, UNSPECIFIED HEART FAILURE TYPE (HCC): ICD-10-CM

## 2022-06-29 DIAGNOSIS — D64.9 NORMOCYTIC ANEMIA: ICD-10-CM

## 2022-06-29 DIAGNOSIS — J44.1 COPD EXACERBATION (HCC): ICD-10-CM

## 2022-06-29 DIAGNOSIS — E11.40 TYPE 2 DIABETES MELLITUS WITH DIABETIC NEUROPATHY, WITHOUT LONG-TERM CURRENT USE OF INSULIN (HCC): Chronic | ICD-10-CM

## 2022-06-29 DIAGNOSIS — G47.01 INSOMNIA DUE TO MEDICAL CONDITION: Primary | ICD-10-CM

## 2022-06-29 DIAGNOSIS — J44.9 COPD NOT AFFECTING CURRENT EPISODE OF CARE (HCC): ICD-10-CM

## 2022-06-29 DIAGNOSIS — J96.21 ACUTE ON CHRONIC RESPIRATORY FAILURE WITH HYPOXIA (HCC): ICD-10-CM

## 2022-06-29 PROCEDURE — 71046 X-RAY EXAM CHEST 2 VIEWS: CPT

## 2022-06-29 PROCEDURE — 3074F SYST BP LT 130 MM HG: CPT | Performed by: FAMILY MEDICINE

## 2022-06-29 PROCEDURE — 1036F TOBACCO NON-USER: CPT | Performed by: FAMILY MEDICINE

## 2022-06-29 PROCEDURE — 1111F DSCHRG MED/CURRENT MED MERGE: CPT | Performed by: FAMILY MEDICINE

## 2022-06-29 PROCEDURE — 3725F SCREEN DEPRESSION PERFORMED: CPT | Performed by: FAMILY MEDICINE

## 2022-06-29 PROCEDURE — 99214 OFFICE O/P EST MOD 30 MIN: CPT | Performed by: FAMILY MEDICINE

## 2022-06-29 PROCEDURE — 1160F RVW MEDS BY RX/DR IN RCRD: CPT | Performed by: FAMILY MEDICINE

## 2022-06-29 RX ORDER — GABAPENTIN 300 MG/1
300 CAPSULE ORAL
Qty: 90 CAPSULE | Refills: 1 | Status: SHIPPED | OUTPATIENT
Start: 2022-06-29 | End: 2022-08-01

## 2022-06-29 NOTE — PROGRESS NOTES
Assessment/Plan:       Problem List Items Addressed This Visit        Endocrine    Type 2 diabetes mellitus with diabetic neuropathy, unspecified (HCC) (Chronic)       Respiratory    COPD (chronic obstructive pulmonary disease) ([facility])     - Chest XR today given bibasilar crackles, no cough or fever            Relevant Medications    gabapentin (NEURONTIN) 300 mg capsule    Other Relevant Orders    XR chest pa & lateral    Acute on chronic respiratory failure ([facility])       Cardiovascular and Mediastinum    Congest[facility]    Primary hypertension     - Continue current regimen, advised to start monitoring BP at home and call with any low readings or lightheadedness/dizziness               Other    Insomnia due to medical condition - Primary     - Increase gabapentin 300 mg HS            Normocytic anemia     - Will plan on iron panel and B12 with next labs, possibly anemia of chronic disease component            Low serum albumin     - Counseled to increase protein intake, eat regular meals              Other Visit Diagnoses     COPD exacerbation ([facility] )        Relevant Medications    gabapentin (NEURONTIN) 300 mg capsule            Subjective:      Patient ID: Andrés Benson is a 69 y o  male  HPI     Insomnia- Feels this is due to his diabetic neuropathy, feet hurt him especially at night when he is trying to sleep  Currently taking gabapentin 200 mg HS  Hypertension- Currently taking lisinopril 20 mg daily, decreased since last visit  Recent kidney function stable  He is feeling well, no lightheadedness or dizziness  COPD- Breathing back to baseline, no chest pains  Has follow-up pulmonology scheduled  Recent blood work reviewed and normal, except for as outlined below:     Anemia- Hemoglobin improved to 10 8, normocytic  No blood in stool or black tarry stools, takes B12 supplement  Protein and albumin low       The following portions of the patient's history were reviewed and updated as appropriate: allergies, current medications, past family history, past medical history, past social history, past surgical history, and problem list     Review of Systems   All other systems reviewed and are negative  Objective:      /56   Pulse 71   Temp (!) 97 3 °F (36 3 °C) (Tympanic)   Resp 20   Ht 5' 10" (1 778 m)   Wt 82 1 kg (181 lb)   SpO2 95%   BMI 25 97 kg/m²          Physical Exam  Vitals reviewed  Constitutional:       General: He is not in acute distress  Appearance: Normal appearance  He is not ill-appearing, toxic-appearing or diaphoretic  HENT:      Head: Normocephalic and atraumatic  Eyes:      General:         Right eye: No discharge  Left eye: No discharge  Extraocular Movements: Extraocular movements intact  Conjunctiva/sclera: Conjunctivae normal    Cardiovascular:      Rate and Rhythm: Normal rate and regular rhythm  Heart sounds: Normal heart sounds  No murmur heard  No friction rub  No gallop  Pulmonary:      Effort: Pulmonary effort is normal  No respiratory distress  Breath sounds: No stridor  No wheezing or rhonchi  Comments: Diminished breath sounds throughout, bibasilar crackles   Musculoskeletal:         General: No swelling, tenderness or signs of injury  Right lower leg: No edema  Left lower leg: No edema  Skin:     General: Skin is warm  Coloration: Skin is not pale  Findings: No erythema or rash  Neurological:      Mental Status: He is alert and oriented to person, place, and time  Motor: No weakness     Psychiatric:         Mood and Affect: Mood normal          Behavior: Behavior normal              Tristen Rose, DO [facility] [address] [place] [address] Primary Care

## 2022-06-30 PROBLEM — I10 PRIMARY HYPERTENSION: Status: ACTIVE | Noted: 2022-06-30

## 2022-06-30 PROBLEM — R77.0 LOW SERUM ALBUMIN: Status: ACTIVE | Noted: 2022-06-30

## 2022-06-30 PROBLEM — D64.9 NORMOCYTIC ANEMIA: Status: ACTIVE | Noted: 2022-06-30

## 2022-06-30 PROBLEM — G47.01 INSOMNIA DUE TO MEDICAL CONDITION: Status: ACTIVE | Noted: 2022-06-30

## 2022-06-30 NOTE — ASSESSMENT & PLAN NOTE
- Continue current regimen, advised to start monitoring BP at home and call with any low readings or lightheadedness/dizziness

## 2022-07-11 ENCOUNTER — HOSPITAL ENCOUNTER (OUTPATIENT)
Dept: NON INVASIVE DIAGNOSTICS | Facility: HOSPITAL | Age: 77
Discharge: HOME | End: 2022-07-11
Payer: COMMERCIAL

## 2022-07-11 DIAGNOSIS — I73.9 PAD (PERIPHERAL ARTERY DISEASE) (HCC): ICD-10-CM

## 2022-07-11 PROCEDURE — 93922 UPR/L XTREMITY ART 2 LEVELS: CPT | Performed by: SURGERY

## 2022-07-11 PROCEDURE — 93923 UPR/LXTR ART STDY 3+ LVLS: CPT

## 2022-07-11 PROCEDURE — 93925 LOWER EXTREMITY STUDY: CPT | Performed by: SURGERY

## 2022-07-11 PROCEDURE — 93925 LOWER EXTREMITY STUDY: CPT

## 2022-07-14 ENCOUNTER — HOSPITAL ENCOUNTER (OUTPATIENT)
Facility: HOSPITAL | Age: 77
Setting detail: OBSERVATION
Discharge: HOME | End: 2022-07-16
Attending: EMERGENCY MEDICINE | Admitting: HOSPITALIST
Payer: COMMERCIAL

## 2022-07-14 ENCOUNTER — APPOINTMENT (EMERGENCY)
Dept: RADIOLOGY | Facility: HOSPITAL | Age: 77
End: 2022-07-14
Payer: COMMERCIAL

## 2022-07-14 DIAGNOSIS — E83.42 HYPOMAGNESEMIA: ICD-10-CM

## 2022-07-14 DIAGNOSIS — S09.90XA CLOSED HEAD INJURY, INITIAL ENCOUNTER: ICD-10-CM
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
## 2022-07-14 DIAGNOSIS — E87.6 HYPOKALEMIA: ICD-10-CM

## 2022-07-14 DIAGNOSIS — J44.1 COPD EXACERBATION (HCC): Primary | ICD-10-CM

## 2022-07-14 PROCEDURE — 36415 COLL VENOUS BLD VENIPUNCTURE: CPT | Performed by: EMERGENCY MEDICINE

## 2022-07-14 PROCEDURE — 84484 ASSAY OF TROPONIN QUANT: CPT | Performed by: EMERGENCY MEDICINE

## 2022-07-14 PROCEDURE — 93005 ELECTROCARDIOGRAM TRACING: CPT

## 2022-07-14 PROCEDURE — 83880 ASSAY OF NATRIURETIC PEPTIDE: CPT | Performed by: EMERGENCY MEDICINE

## 2022-07-14 PROCEDURE — 85025 COMPLETE CBC W/AUTO DIFF WBC: CPT | Performed by: EMERGENCY MEDICINE

## 2022-07-14 PROCEDURE — 94640 AIRWAY INHALATION TREATMENT: CPT

## 2022-07-14 PROCEDURE — 99285 EMERGENCY DEPT VISIT HI MDM: CPT

## 2022-07-14 PROCEDURE — 80053 COMPREHEN METABOLIC PANEL: CPT | Performed by: EMERGENCY MEDICINE

## 2022-07-14 PROCEDURE — 99285 EMERGENCY DEPT VISIT HI MDM: CPT | Performed by: EMERGENCY MEDICINE

## 2022-07-14 PROCEDURE — 83735 ASSAY OF MAGNESIUM: CPT | Performed by: EMERGENCY MEDICINE

## 2022-07-14 PROCEDURE — 84145 PROCALCITONIN (PCT): CPT | Performed by: NURSE PRACTITIONER

## 2022-07-14 PROCEDURE — 71045 X-RAY EXAM CHEST 1 VIEW: CPT

## 2022-07-14 RX ORDER — SODIUM CHLORIDE FOR INHALATION 0.9 %
3 VIAL, NEBULIZER (ML) INHALATION ONCE
Status: COMPLETED | OUTPATIENT
Start: 2022-07-14 | End: 2022-07-15

## 2022-07-15 ENCOUNTER — APPOINTMENT (EMERGENCY)
Dept: CT IMAGING | Facility: HOSPITAL | Age: 77
End: 2022-07-15
Payer: COMMERCIAL

## 2022-07-15 ENCOUNTER — APPOINTMENT (EMERGENCY)
Dept: RADIOLOGY | Facility: HOSPITAL | Age: 77
End: 2022-07-15
Payer: COMMERCIAL
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
## 2022-07-15 PROBLEM — H34.8320 TRIBUTARY (BRANCH) RETINAL VEIN OCCLUSION, LEFT EYE, WITH MACULAR EDEMA: Status: ACTIVE | Noted: 2022-07-15

## 2022-07-15 PROBLEM — J96.11 CHRONIC RESPIRATORY FAILURE WITH HYPOXIA (HCC): Status: ACTIVE | Noted: 2020-08-15

## 2022-07-15 LAB
2HR DELTA HS TROPONIN: 21 NG/L
4HR DELTA HS TROPONIN: 35 NG/L
ALBUMIN SERPL BCP-MCNC: 4 G/DL (ref 3.5–5)
ALP SERPL-CCNC: 90 U/L (ref 34–104)
ALT SERPL W P-5'-P-CCNC: 9 U/L (ref 7–52)
ANION GAP SERPL CALCULATED.3IONS-SCNC: 15 MMOL/L (ref 4–13)
AST SERPL W P-5'-P-CCNC: 14 U/L (ref 13–39)
ATRIAL RATE: 99 BPM
BASOPHILS # BLD AUTO: 0.06 THOUSANDS/ΜL (ref 0–0.1)
BASOPHILS NFR BLD AUTO: 1 % (ref 0–1)
BILIRUB SERPL-MCNC: 0.25 MG/DL (ref 0.2–1)
BNP SERPL-MCNC: 325 PG/ML (ref 0–100)
BUN SERPL-MCNC: 9 MG/DL (ref 5–25)
CALCIUM SERPL-MCNC: 8.3 MG/DL (ref 8.4–10.2)
CARDIAC TROPONIN I PNL SERPL HS: 17 NG/L
CARDIAC TROPONIN I PNL SERPL HS: 38 NG/L
CARDIAC TROPONIN I PNL SERPL HS: 52 NG/L
CHLORIDE SERPL-SCNC: 97 MMOL/L (ref 96–108)
CO2 SERPL-SCNC: 22 MMOL/L (ref 21–32)
CREAT SERPL-MCNC: 0.65 MG/DL (ref 0.6–1.3)
EOSINOPHIL # BLD AUTO: 0.16 THOUSAND/ΜL (ref 0–0.61)
EOSINOPHIL NFR BLD AUTO: 2 % (ref 0–6)
ERYTHROCYTE [DISTWIDTH] IN BLOOD BY AUTOMATED COUNT: 14.8 % (ref 11.6–15.1)
GFR SERPL CREATININE-BSD FRML MDRD: 94 ML/MIN/1.73SQ M
GLUCOSE SERPL-MCNC: 155 MG/DL (ref 65–140)
GLUCOSE SERPL-MCNC: 206 MG/DL (ref 65–140)
GLUCOSE SERPL-MCNC: 231 MG/DL (ref 65–140)
GLUCOSE SERPL-MCNC: 241 MG/DL (ref 65–140)
GLUCOSE SERPL-MCNC: 88 MG/DL (ref 65–140)
HCT VFR BLD AUTO: 37.3 % (ref 36.5–49.3)
HGB BLD-MCNC: 11.7 G/DL (ref 12–17)
IMM GRANULOCYTES # BLD AUTO: 0.13 THOUSAND/UL (ref 0–0.2)
IMM GRANULOCYTES NFR BLD AUTO: 2 % (ref 0–2)
LYMPHOCYTES # BLD AUTO: 1.17 THOUSANDS/ΜL (ref 0.6–4.47)
LYMPHOCYTES NFR BLD AUTO: 15 % (ref 14–44)
MAGNESIUM SERPL-MCNC: 1.7 MG/DL (ref 1.9–2.7)
MCH RBC QN AUTO: 29.5 PG (ref 26.8–34.3)
MCHC RBC AUTO-ENTMCNC: 31.4 G/DL (ref 31.4–37.4)
MCV RBC AUTO: 94 FL (ref 82–98)
MONOCYTES # BLD AUTO: 0.52 THOUSAND/ΜL (ref 0.17–1.22)
MONOCYTES NFR BLD AUTO: 7 % (ref 4–12)
NEUTROPHILS # BLD AUTO: 5.78 THOUSANDS/ΜL (ref 1.85–7.62)
NEUTS SEG NFR BLD AUTO: 73 % (ref 43–75)
NRBC BLD AUTO-RTO: 0 /100 WBCS
P AXIS: 75 DEGREES
PLATELET # BLD AUTO: 219 THOUSANDS/UL (ref 149–390)
PMV BLD AUTO: 10.3 FL (ref 8.9–12.7)
POTASSIUM SERPL-SCNC: 2.9 MMOL/L (ref 3.5–5.3)
POTASSIUM SERPL-SCNC: 4.4 MMOL/L (ref 3.5–5.3)
PR INTERVAL: 188 MS
PROCALCITONIN SERPL-MCNC: 0.05 NG/ML
PROT SERPL-MCNC: 6.3 G/DL (ref 6.4–8.4)
QRS AXIS: 63 DEGREES
QRSD INTERVAL: 96 MS
QT INTERVAL: 340 MS
QTC INTERVAL: 436 MS
RBC # BLD AUTO: 3.96 MILLION/UL (ref 3.88–5.62)
SODIUM SERPL-SCNC: 134 MMOL/L (ref 135–147)
T WAVE AXIS: 138 DEGREES
VENTRICULAR RATE: 99 BPM
WBC # BLD AUTO: 7.82 THOUSAND/UL (ref 4.31–10.16)

## 2022-07-15 PROCEDURE — 71275 CT ANGIOGRAPHY CHEST: CPT

## 2022-07-15 PROCEDURE — 96365 THER/PROPH/DIAG IV INF INIT: CPT

## 2022-07-15 PROCEDURE — 94640 AIRWAY INHALATION TREATMENT: CPT

## 2022-07-15 PROCEDURE — 84484 ASSAY OF TROPONIN QUANT: CPT | Performed by: NURSE PRACTITIONER

## 2022-07-15 PROCEDURE — 94760 N-INVAS EAR/PLS OXIMETRY 1: CPT

## 2022-07-15 PROCEDURE — 94644 CONT INHLJ TX 1ST HOUR: CPT

## 2022-07-15 PROCEDURE — 93005 ELECTROCARDIOGRAM TRACING: CPT

## 2022-07-15 PROCEDURE — 70450 CT HEAD/BRAIN W/O DYE: CPT

## 2022-07-15 PROCEDURE — 99220 PR INITIAL OBSERVATION CARE/DAY 70 MINUTES: CPT | Performed by: NURSE PRACTITIONER

## 2022-07-15 PROCEDURE — 84484 ASSAY OF TROPONIN QUANT: CPT | Performed by: EMERGENCY MEDICINE

## 2022-07-15 PROCEDURE — G1004 CDSM NDSC: HCPCS

## 2022-07-15 PROCEDURE — 84132 ASSAY OF SERUM POTASSIUM: CPT | Performed by: NURSE PRACTITIONER

## 2022-07-15 PROCEDURE — 74177 CT ABD & PELVIS W/CONTRAST: CPT

## 2022-07-15 PROCEDURE — 82397 CHEMILUMINESCENT ASSAY: CPT | Performed by: NURSE PRACTITIONER

## 2022-07-15 PROCEDURE — 72125 CT NECK SPINE W/O DYE: CPT

## 2022-07-15 PROCEDURE — 96375 TX/PRO/DX INJ NEW DRUG ADDON: CPT

## 2022-07-15 PROCEDURE — 82948 REAGENT STRIP/BLOOD GLUCOSE: CPT

## 2022-07-15 PROCEDURE — 93010 ELECTROCARDIOGRAM REPORT: CPT | Performed by: INTERNAL MEDICINE

## 2022-07-15 PROCEDURE — 96366 THER/PROPH/DIAG IV INF ADDON: CPT

## 2022-07-15 PROCEDURE — 36415 COLL VENOUS BLD VENIPUNCTURE: CPT | Performed by: EMERGENCY MEDICINE

## 2022-07-15 PROCEDURE — 96367 TX/PROPH/DG ADDL SEQ IV INF: CPT

## 2022-07-15 PROCEDURE — 72170 X-RAY EXAM OF PELVIS: CPT

## 2022-07-15 RX ORDER — ACETAMINOPHEN 325 MG/1
650 TABLET ORAL EVERY 6 HOURS PRN
Status: DISCONTINUED | OUTPATIENT
Start: 2022-07-15 | End: 2022-07-16 | Stop reason: HOSPADM

## 2022-07-15 RX ORDER — DIGOXIN 125 MCG
125 TABLET ORAL DAILY
Status: DISCONTINUED | OUTPATIENT
Start: 2022-07-15 | End: 2022-07-16 | Stop reason: HOSPADM

## 2022-07-15 RX ORDER — IPRATROPIUM BROMIDE AND ALBUTEROL SULFATE 2.5; .5 MG/3ML; MG/3ML
3 SOLUTION RESPIRATORY (INHALATION) ONCE
Status: COMPLETED | OUTPATIENT
Start: 2022-07-15 | End: 2022-07-15

## 2022-07-15 RX ORDER — LISINOPRIL 20 MG/1
20 TABLET ORAL DAILY
Status: DISCONTINUED | OUTPATIENT
Start: 2022-07-15 | End: 2022-07-16 | Stop reason: HOSPADM

## 2022-07-15 RX ORDER — CLOTRIMAZOLE 1 %
CREAM (GRAM) TOPICAL 2 TIMES DAILY
Status: DISCONTINUED | OUTPATIENT
Start: 2022-07-15 | End: 2022-07-16 | Stop reason: HOSPADM

## 2022-07-15 RX ORDER — BUDESONIDE 0.5 MG/2ML
0.5 INHALANT ORAL
Status: DISCONTINUED | OUTPATIENT
Start: 2022-07-15 | End: 2022-07-16 | Stop reason: HOSPADM

## 2022-07-15 RX ORDER — ASPIRIN 81 MG/1
81 TABLET ORAL EVERY OTHER DAY
Status: DISCONTINUED | OUTPATIENT
Start: 2022-07-15 | End: 2022-07-16 | Stop reason: HOSPADM

## 2022-07-15 RX ORDER — SODIUM CHLORIDE FOR INHALATION 0.9 %
3 VIAL, NEBULIZER (ML) INHALATION
Status: DISCONTINUED | OUTPATIENT
Start: 2022-07-15 | End: 2022-07-15

## 2022-07-15 RX ORDER — BUDESONIDE 0.5 MG/2ML
0.5 INHALANT ORAL
Status: DISCONTINUED | OUTPATIENT
Start: 2022-07-15 | End: 2022-07-15

## 2022-07-15 RX ORDER — GABAPENTIN 300 MG/1
300 CAPSULE ORAL
Status: DISCONTINUED | OUTPATIENT
Start: 2022-07-15 | End: 2022-07-16 | Stop reason: HOSPADM

## 2022-07-15 RX ORDER — INSULIN LISPRO 100 [IU]/ML
1-6 INJECTION, SOLUTION INTRAVENOUS; SUBCUTANEOUS
Status: DISCONTINUED | OUTPATIENT
Start: 2022-07-15 | End: 2022-07-16 | Stop reason: HOSPADM

## 2022-07-15 RX ORDER — MAGNESIUM SULFATE 1 G/100ML
1 INJECTION INTRAVENOUS ONCE
Status: COMPLETED | OUTPATIENT
Start: 2022-07-15 | End: 2022-07-15

## 2022-07-15 RX ORDER — ENOXAPARIN SODIUM 100 MG/ML
40 INJECTION SUBCUTANEOUS DAILY
Status: DISCONTINUED | OUTPATIENT
Start: 2022-07-15 | End: 2022-07-16 | Stop reason: HOSPADM

## 2022-07-15 RX ORDER — PREDNISONE 20 MG/1
40 TABLET ORAL DAILY
Status: DISCONTINUED | OUTPATIENT
Start: 2022-07-16 | End: 2022-07-16 | Stop reason: HOSPADM

## 2022-07-15 RX ORDER — ATORVASTATIN CALCIUM 80 MG/1
80 TABLET, FILM COATED ORAL
Status: DISCONTINUED | OUTPATIENT
Start: 2022-07-15 | End: 2022-07-16 | Stop reason: HOSPADM

## 2022-07-15 RX ORDER — LEVALBUTEROL 1.25 MG/.5ML
1.25 SOLUTION, CONCENTRATE RESPIRATORY (INHALATION)
Status: DISCONTINUED | OUTPATIENT
Start: 2022-07-15 | End: 2022-07-16 | Stop reason: HOSPADM

## 2022-07-15 RX ORDER — POTASSIUM CHLORIDE 20 MEQ/1
40 TABLET, EXTENDED RELEASE ORAL ONCE
Status: COMPLETED | OUTPATIENT
Start: 2022-07-15 | End: 2022-07-15

## 2022-07-15 RX ORDER — PRIMIDONE 50 MG/1
100 TABLET ORAL 2 TIMES DAILY
Status: DISCONTINUED | OUTPATIENT
Start: 2022-07-15 | End: 2022-07-16 | Stop reason: HOSPADM

## 2022-07-15 RX ORDER — METHYLPREDNISOLONE SODIUM SUCCINATE 125 MG/2ML
80 INJECTION, POWDER, LYOPHILIZED, FOR SOLUTION INTRAMUSCULAR; INTRAVENOUS ONCE
Status: COMPLETED | OUTPATIENT
Start: 2022-07-15 | End: 2022-07-15

## 2022-07-15 RX ORDER — POTASSIUM CHLORIDE 14.9 MG/ML
20 INJECTION INTRAVENOUS ONCE
Status: COMPLETED | OUTPATIENT
Start: 2022-07-15 | End: 2022-07-15

## 2022-07-15 RX ORDER — METHYLPREDNISOLONE SODIUM SUCCINATE 125 MG/2ML
125 INJECTION, POWDER, LYOPHILIZED, FOR SOLUTION INTRAMUSCULAR; INTRAVENOUS ONCE
Status: DISCONTINUED | OUTPATIENT
Start: 2022-07-15 | End: 2022-07-15

## 2022-07-15 RX ORDER — AZITHROMYCIN 250 MG/1
500 TABLET, FILM COATED ORAL EVERY 24 HOURS
Status: DISCONTINUED | OUTPATIENT
Start: 2022-07-15 | End: 2022-07-16 | Stop reason: HOSPADM

## 2022-07-15 RX ADMIN — MAGNESIUM SULFATE HEPTAHYDRATE 1 G: 1 INJECTION, SOLUTION INTRAVENOUS at 02:49

## 2022-07-15 RX ADMIN — CYANOCOBALAMIN TAB 500 MCG 500 MCG: 500 TAB at 13:18

## 2022-07-15 RX ADMIN — PRIMIDONE 100 MG: 50 TABLET ORAL at 13:32

## 2022-07-15 RX ADMIN — ACETAMINOPHEN 650 MG: 325 TABLET ORAL at 18:08

## 2022-07-15 RX ADMIN — IPRATROPIUM BROMIDE AND ALBUTEROL SULFATE 3 ML: 2.5; .5 SOLUTION RESPIRATORY (INHALATION) at 07:44

## 2022-07-15 RX ADMIN — ATORVASTATIN CALCIUM 80 MG: 40 TABLET, FILM COATED ORAL at 15:40

## 2022-07-15 RX ADMIN — IPRATROPIUM BROMIDE 0.5 MG: 0.5 SOLUTION RESPIRATORY (INHALATION) at 19:52

## 2022-07-15 RX ADMIN — INSULIN LISPRO 2 UNITS: 100 INJECTION, SOLUTION INTRAVENOUS; SUBCUTANEOUS at 21:19

## 2022-07-15 RX ADMIN — POTASSIUM CHLORIDE 40 MEQ: 1500 TABLET, EXTENDED RELEASE ORAL at 02:48

## 2022-07-15 RX ADMIN — METHYLPREDNISOLONE SODIUM SUCCINATE 80 MG: 125 INJECTION, POWDER, FOR SOLUTION INTRAMUSCULAR; INTRAVENOUS at 07:47

## 2022-07-15 RX ADMIN — ENOXAPARIN SODIUM 40 MG: 100 INJECTION SUBCUTANEOUS at 13:19

## 2022-07-15 RX ADMIN — PRIMIDONE 100 MG: 50 TABLET ORAL at 21:18

## 2022-07-15 RX ADMIN — METOPROLOL TARTRATE 25 MG: 25 TABLET, FILM COATED ORAL at 10:40

## 2022-07-15 RX ADMIN — INSULIN LISPRO 3 UNITS: 100 INJECTION, SOLUTION INTRAVENOUS; SUBCUTANEOUS at 15:39

## 2022-07-15 RX ADMIN — BUDESONIDE 0.5 MG: 0.5 INHALANT ORAL at 19:52

## 2022-07-15 RX ADMIN — INSULIN LISPRO 1 UNITS: 100 INJECTION, SOLUTION INTRAVENOUS; SUBCUTANEOUS at 13:32

## 2022-07-15 RX ADMIN — CLOTRIMAZOLE: 1 CREAM TOPICAL at 13:19

## 2022-07-15 RX ADMIN — ALBUTEROL SULFATE 10 MG: 2.5 SOLUTION RESPIRATORY (INHALATION) at 00:06

## 2022-07-15 RX ADMIN — LEVALBUTEROL HYDROCHLORIDE 1.25 MG: 1.25 SOLUTION, CONCENTRATE RESPIRATORY (INHALATION) at 13:02

## 2022-07-15 RX ADMIN — DIGOXIN 125 MCG: 125 TABLET ORAL at 10:40

## 2022-07-15 RX ADMIN — LISINOPRIL 20 MG: 20 TABLET ORAL at 10:40

## 2022-07-15 RX ADMIN — LEVALBUTEROL HYDROCHLORIDE 1.25 MG: 1.25 SOLUTION, CONCENTRATE RESPIRATORY (INHALATION) at 19:52

## 2022-07-15 RX ADMIN — METOPROLOL TARTRATE 25 MG: 25 TABLET, FILM COATED ORAL at 21:18

## 2022-07-15 RX ADMIN — IOHEXOL 70 ML: 350 INJECTION, SOLUTION INTRAVENOUS at 06:50

## 2022-07-15 RX ADMIN — Medication 1 TABLET: at 13:18

## 2022-07-15 RX ADMIN — ISODIUM CHLORIDE 3 ML: 0.03 SOLUTION RESPIRATORY (INHALATION) at 00:06

## 2022-07-15 RX ADMIN — AZITHROMYCIN MONOHYDRATE 500 MG: 250 TABLET ORAL at 13:18

## 2022-07-15 RX ADMIN — GABAPENTIN 300 MG: 300 CAPSULE ORAL at 21:18

## 2022-07-15 RX ADMIN — POTASSIUM CHLORIDE 20 MEQ: 14.9 INJECTION, SOLUTION INTRAVENOUS at 04:37

## 2022-07-15 RX ADMIN — IPRATROPIUM BROMIDE 0.5 MG: 0.5 SOLUTION RESPIRATORY (INHALATION) at 00:05

## 2022-07-15 RX ADMIN — ASPIRIN 81 MG: 81 TABLET, COATED ORAL at 13:18

## 2022-07-15 RX ADMIN — IPRATROPIUM BROMIDE 0.5 MG: 0.5 SOLUTION RESPIRATORY (INHALATION) at 13:02

## 2022-07-15 NOTE — ED PROCEDURE NOTE
PROCEDURE  ECG 12 Lead Documentation Only    Date/Time: 7/14/2022 11:47 PM  Performed by: Ad Ramírez MD  Authorized by: Ad Ramírez MD     Indications / Diagnosis:  Sob   ECG reviewed by me, the ED Provider: yes    Patient location:  ED  Previous ECG:     Comparison to cardiac monitor: Yes    Interpretation:     Interpretation: normal    Rate:     ECG rate:  102    ECG rate assessment: normal    Rhythm:     Rhythm: sinus rhythm    Ectopy:     Ectopy: none    QRS:     QRS axis:  Normal    QRS intervals:  Normal  Conduction:     Conduction: normal    ST segments:     ST segments:  Non-specific  T waves:     T waves: non-specific           Ad Ramírez MD  07/15/22 [id number]

## 2022-07-15 NOTE — ED PROCEDURE NOTE
PROCEDURE  CriticalCare Time  Performed by: France Hayward MD  Authorized by: France Hayward MD     Critical care provider statement:     Critical care time (minutes):  65    Critical care start time:  7/15/2022 12:00 AM    Critical care end time:  7/15/2022 6:19 AM    Critical care was necessary to treat or prevent imminent or life-threatening deterioration of the following conditions:  Trauma, respiratory failure, dehydration and metabolic crisis    Critical care was time spent personally by me on the following activities:  Examination of patient, evaluation of patient's response to treatment, discussions with primary provider, discussions with consultants, development of treatment plan with patient or surrogate, review of old charts, re-evaluation of patient's condition, ordering and review of radiographic studies, ordering and review of laboratory studies and ordering and performing treatments and interventions         France Hayward MD  07/15/22 [id number]

## 2022-07-15 NOTE — ASSESSMENT & PLAN NOTE
· RVF of 30%  · Follows with PENG Bang   · The patient declined prophylactic ICD at this time given his pulmonary comorbidities  · In no acute exacerbation

## 2022-07-15 NOTE — ASSESSMENT & PLAN NOTE
· Patient admitted to drinking 6 beers last night and tripped over a patch of grass walking back to his home  · No trauma or injury   · Imaging without acute finding  · PT/OT evaluation   · Fall precautions

## 2022-07-15 NOTE — ASSESSMENT & PLAN NOTE
Lab Results   Component Value Date    HGBA1C 7 9 (A) 05/05/2022       No results for input(s): POCGLU in the last 72 hours      Blood Sugar Average: Last 72 hrs:  · hold off on oral medications from home   · Placed on SSI   · Monitor closely with steroid use

## 2022-07-15 NOTE — H&P
[address] 1945, 69 y o  male MRN: [id number]  Unit/Bed#: ED 28 Encounter: [id number]  Primary Care Provider: Rebeca Alvarez DO   Date and time admitted to hospital: 7/14/2022 11:48 PM    * Chronic obstructive pulmonary disease with acute exacerbation ([facility] )  Assessment & Plan  Acute exacerbation is likely due to viral bronchitis  The patient with severe COPD  Reports worsening shortness of breath at night over the past 3-4 days  Per pulmonology outpatient, he is supposed to be on nebs only and azithromycin 250 mg 3 times per week  · Mild exacerbation   · Give one dose of Solu-Medrol 125 mg x1 today; transition to oral prednisone 40 mg x5 days  · Nebulizer- Xopenex and Atrovent t i d , budesonide b i d   · Azithromycin 500 mg x3 days  · Respiratory protocol  · Check procalcitonin     Fall  Assessment & Plan  · Patient admitted to drinking 6 beers last night and tripped over a patch of grass walking back to his home  · No trauma or injury   · Imaging without acute finding  · PT/OT evaluation   · Fall precautions     Primary hypertension  Assessment & Plan  · BP noted to be slightly elevated on admission   · Will continue with home regimen for now- metoprolol and lisinopril  · Will adjust regimen as indicated     Chronic respiratory failure with hypoxia ([facility] )  Assessment & Plan  · The patient uses 2 L nasal cannula at baseline; may increase to 3-4 L with exertion     · Continue with current setting  · Please refer to treatment outlined above    Ischemic cardiomyopathy  Assessment & Plan  · RVF of 30%  · Follows with Dr Darryle Hinds, CRNP   · The patient declined prophylactic ICD at this time given his pulmonary comorbidities  · In no acute exacerbation     Coronary artery disease involving native coronary artery of native heart without angina pectoris  Assessment & Plan  · Prior CABG x4 in 3/2017  · Continue aspirin, statin, beta-blocker    Type 2 diabetes mellitus, without long-term current use of insulin ([facility])  Assessment & Plan  Lab Results   Component Value Date    HGBA1C 7 9 (A) 05/05/2022       No results for input(s): POCGLU in the last 72 hours  Blood Sugar Average: Last 72 hrs:  · hold off on oral medications from home   · Placed on SSI   · Monitor closely with steroid use     VTE Prophylaxis: Enoxaparin (Lovenox)  Code Status: full code   POLST: POLST is not applicable to this patient  Discussion with family: none present during exam  Patient will update family himself  Anticipated Length of Stay:  Patient will be admitted on an Observation basis with an anticipated length of stay of  < 2 midnights  Justification for Hospital Stay: COPD exacerbation     Chief Complaint:   Fall     History of Present Illness:    Braden Stephens is a 69 y o  male who presents with a mechanical fall  The patient past medical history of ischemic cardiomyopathy, severe COPD using oxygen 2 L nasal cannula chronically, and diabetes  He states that he has been in his normal state of health except some increasing shortness of breath nighttime  He states that he went to a bar close to his home and had 6 beers  On the way back to his house, he tripped on a patch of grass and fell down  While he was in the ED, he was noticed to have worsening shortness of breath  He found to have SpO2 86% with ambulation and tachycardia in ED  The patient denies any chest pain  He states feeling very short of breath after ambulated to the bathroom  Review of Systems:  Review of Systems   Constitutional: Negative for chills, fatigue and fever  HENT: Negative for congestion, ear pain, postnasal drip and sore throat  Eyes: Negative for discharge, itching and visual disturbance  Respiratory: Positive for shortness of breath  Negative for cough and chest tightness  Cardiovascular: Negative for chest pain, palpitations and leg swelling     Gastrointestinal: Negative for abdominal pain, nausea and vomiting  Endocrine: Negative for cold intolerance and heat intolerance  Genitourinary: Negative for difficulty urinating, dysuria and hematuria  Musculoskeletal: Positive for gait problem  Negative for back pain and neck pain  Skin: Negative for rash and wound  Neurological: Negative for dizziness, speech difficulty, weakness, light-headedness and headaches  Psychiatric/Behavioral: Negative for confusion, decreased concentration and dysphoric mood  Past Medical and Surgical History:   Past Medical History:   Diagnosis Date    BPH (benign prostatic hyperplasia)     39 days radiation treatment    COPD (chronic obstructive pulmonary disease)     Coronary artery disease     CABG x4 in 2017    Diabetes mellitus     History of Arterial Duplex of LE 12/26/2017    Likely occlusion of the left superficial femoral artery  Calcific changes bilaterally  Despite these changes, the ankle-brachial index as a measure of peripheral blood flow only mildly impaired   History of echocardiogram 06/12/2017    EF 40%, mild LVH, mild MR     Hyperlipidemia     Hypertension     Ischemic cardiomyopathy     NSTEMI (non-ST elevated myocardial infarction)     Prostate cancer     prostate     PVD (peripheral vascular disease)     Sepsis due to pneumonia     Tremor     Type 2 MI (myocardial infarction) ([facility] ) 04/06/2021       Past Surgical History:   Procedure Laterality Date    CARDIAC CATHETERIZATION  03/08/2017    Significant left main plus triple-vessel CAD   CORONARY ARTERY BYPASS GRAFT  03/08/2017    4V CABG:  LIMA to LAD, VG to RI, SVG to PDA to LVBR RCA   EYE SURGERY      shots in eye once a month @ the [address] [place] [address]         Meds/Allergies:  Prior to Admission medications    Medication Sig Start Date End Date Taking?  Authorizing Provider   albuterol (PROVENTIL HFA,VENTOLIN HFA) 90 mcg/act inhaler Inhale 2 puffs every 6 (six) hours as needed for wheezing or shortness of breath 1/23/22   Tamra Del Cid PA-C   arformoterol (BROVANA) 15 mcg/2 mL nebulizer solution Take 2 mL (15 mcg total) by nebulization 2 (two) times a day 5/2/22   Javier Kan PA-C   aspirin (ECOTRIN LOW STRENGTH) 81 mg EC tablet Take 1 tablet (81 mg total) by mouth every other day 9/3/20   PENG Ruiz   budesonide (Pulmicort) 0 5 mg/2 mL nebulizer solution Take 2 mL (0 5 mg total) by nebulization 2 (two) times a day Rinse mouth after use  Patient not taking: Reported on 6/9/2022 3/23/22 6/9/22  Juan Hobbs MD   cyanocobalamin (VITAMIN B-12) 500 MCG tablet TAKE ONE TABLET BY MOUTH EVERY MORNING *VITAMIN B12* 11/8/21   Historical Provider, MD   digoxin (LANOXIN) 0 125 mg tablet Take 1 tablet (125 mcg total) by mouth daily 6/16/22   Cynthia Sanford DO   gabapentin (NEURONTIN) 300 mg capsule Take 1 capsule (300 mg total) by mouth daily at bedtime 6/29/22 7/29/22  Cynthia Sanford DO   glimepiride (AMARYL) 1 mg tablet Take 2 tablets (2 mg total) by mouth daily with breakfast AND 1 tablet (1 mg total) daily with dinner   4/22/22   Cynthia Sanford DO   ipratropium (ATROVENT) 0 02 % nebulizer solution Take 2 5 mL (0 5 mg total) by nebulization 3 (three) times a day  Patient not taking: Reported on 6/9/2022 3/23/22 6/9/22  Juan Hobbs MD   lisinopril (ZESTRIL) 20 mg tablet Take 1 tablet (20 mg total) by mouth daily 6/15/22   Cynthia Sanford DO   metFORMIN (GLUCOPHAGE) 1000 MG tablet Take 1 tablet (1,000 mg total) by mouth 2 (two) times a day with meals  Patient taking differently: Take 1,000 mg by mouth 2 (two) times a day with meals Taking 500mg 2 times daily 4/22/22   Cynthia Sanford DO   metoprolol tartrate (LOPRESSOR) 25 mg tablet Take 1 tablet (25 mg total) by mouth every 12 (twelve) hours 3/29/22   Cynthia Sanford, DO   Multiple Vitamins-Minerals (PRESERVISION AREDS 2 PO) Take by mouth    Historical Provider, MD   primidone (MYSOLINE) 50 mg tablet Take 100 mg by mouth 2 (two) times a day     Historical Provider, MD   rosuvastatin (CRESTOR) 10 MG tablet Take 1 tablet (10 mg total) by mouth daily 22   Lela Messina DO   terbinafine (LamISIL) 1 % cream APPLY THIN LAYER TOPICALLY TWICE A DAY FOR FUNGAL INFECTION 22   Historical Provider, MD     I have reviewed home medications with patient personally  Allergies: Allergies   Allergen Reactions    Atorvastatin Myalgia       Social History:  Marital Status: /Civil Union   Occupation: retired   Patient Pre-hospital Living Situation: family   Patient Pre-hospital Level of Mobility: independent   Patient Pre-hospital Diet Restrictions: cardiac and diabetic   Substance Use History:   Social History     Substance and Sexual Activity   Alcohol Use Yes     Social History     Tobacco Use   Smoking Status Former Smoker    Packs/day: 0 25    Years: 60 00    Pack years: 15 00    Types: Cigarettes    Quit date: 2022    Years since quittin 2   Smokeless Tobacco Never Used     Social History     Substance and Sexual Activity   Drug Use Never       Family History:  I have reviewed the patients family history    Physical Exam:   Vitals:   Blood Pressure: 159/80 (07/15/22 1035)  Pulse: (!) 117 (07/15/22 1035)  Temperature: 98 6 °F (37 °C) (07/15/22 0025)  Temp Source: Oral (07/15/22 0020)  Respirations: 20 (07/15/22 0111)  SpO2: 94 % (07/15/22 1035)    Physical Exam  Vitals and nursing note reviewed  Constitutional:       General: He is not in acute distress  Appearance: Normal appearance  HENT:      Head: Normocephalic and atraumatic  Right Ear: External ear normal       Left Ear: External ear normal       Nose: Nose normal       Mouth/Throat:      Mouth: Mucous membranes are moist       Pharynx: Oropharynx is clear  Eyes:      General:         Right eye: No discharge  Left eye: No discharge  Extraocular Movements: Extraocular movements intact        Pupils: Pupils are equal, round, and reactive to light  Cardiovascular:      Rate and Rhythm: Normal rate and regular rhythm  Pulses: Normal pulses  Heart sounds: Normal heart sounds  No murmur heard  Pulmonary:      Effort: Pulmonary effort is normal  No respiratory distress  Breath sounds: Normal breath sounds  No wheezing or rales  Abdominal:      General: Bowel sounds are normal  There is no distension  Palpations: Abdomen is soft  There is no mass  Tenderness: There is no abdominal tenderness  Musculoskeletal:         General: No swelling, tenderness or deformity  Normal range of motion  Cervical back: Normal range of motion and neck supple  No rigidity  Skin:     General: Skin is warm and dry  Capillary Refill: Capillary refill takes less than 2 seconds  Coloration: Skin is not pale  Findings: No erythema  Neurological:      General: No focal deficit present  Mental Status: He is alert and oriented to person, place, and time  Mental status is at baseline  Psychiatric:         Mood and Affect: Mood normal          Behavior: Behavior normal          Thought Content: Thought content normal          Judgment: Judgment normal          Additional Data:   Lab Results: I have personally reviewed pertinent reports  Results from last 7 days   Lab Units 07/14/22  2354   WBC Thousand/uL 7 82   HEMOGLOBIN g/dL 11 7*   HEMATOCRIT % 37 3   PLATELETS Thousands/uL 219   NEUTROS PCT % 73   LYMPHS PCT % 15   MONOS PCT % 7   EOS PCT % 2     Results from last 7 days   Lab Units 07/14/22  2354   SODIUM mmol/L 134*   POTASSIUM mmol/L 2 9*   CHLORIDE mmol/L 97   CO2 mmol/L 22   BUN mg/dL 9   CREATININE mg/dL 0 65   CALCIUM mg/dL 8 3*   ALK PHOS U/L 90   ALT U/L 9   AST U/L 14                   Imaging: I have personally reviewed pertinent reports      PE Study with CT abdomen & pelvis with contrast   Final Result by Jany Mobley MD (07/15 4579)      No pulmonary embolus  Measured RV/LV ratio is within normal limits at less than 0 9  Bilateral small pleural effusions  No acute intra-abdominal finding  Workstation performed: [id number]         TRAUMA - CT head wo contrast   Final Result by Stormy Woodard MD (07/15 0040)      No acute intracranial abnormality  Workstation performed: [id number]         TRAUMA - CT spine cervical wo contrast   Final Result by Stormy Woodard MD (07/15 0045)      No acute cervical spine fracture or traumatic malalignment  Workstation performed: [id number]         XR Trauma pelvis ap only 1 or 2 vw   Final Result by Nat Farfan DO (07/15 7905)      No fracture  Followup imaging may be obtained for any persistent or worsening symptoms  Workstation performed: [id number]         X-ray chest 1 view portable   Final Result by Renny Jones MD (07/15 9598)      Development of suspected early bibasal infiltrates                  Workstation performed: [id number]             EKG, Pathology, and Other Studies Reviewed on Admission:   · EKG: sinus tachy     Epic Records Reviewed: Yes     ** Please Note: This note has been constructed using a voice recognition system   **

## 2022-07-15 NOTE — ASSESSMENT & PLAN NOTE
· The patient uses 2 L nasal cannula at baseline; may increase to 3-4 L with exertion     · Continue with current setting  · Please refer to treatment outlined above

## 2022-07-15 NOTE — ASSESSMENT & PLAN NOTE
Acute exacerbation is likely due to viral bronchitis  The patient with severe COPD  Reports worsening shortness of breath at night over the past 3-4 days  Per pulmonology outpatient, he is supposed to be on nebs only and azithromycin 250 mg 3 times per week       · Mild exacerbation   · Give one dose of Solu-Medrol 125 mg x1 today; transition to oral prednisone 40 mg x5 days  · Nebulizer- Xopenex and Atrovent t i d , budesonide b i d   · Azithromycin 500 mg x3 days  · Respiratory protocol  · Check procalcitonin

## 2022-07-15 NOTE — ASSESSMENT & PLAN NOTE
· BP noted to be slightly elevated on admission   · Will continue with home regimen for now- metoprolol and lisinopril  · Will adjust regimen as indicated

## 2022-07-15 NOTE — PLAN OF CARE
Problem: Potential for Falls  Goal: Patient will remain free of falls  Description: INTERVENTIONS:  - Educate patient/family on patient safety including physical limitations  - Instruct patient to call for assistance with activity   - Consult OT/PT to assist with strengthening/mobility   - Keep Call bell within reach  - Keep bed low and locked with side rails adjusted as appropriate  - Keep care items and personal belongings within reach  - Initiate and maintain comfort rounds  - Make Fall Risk Sign visible to staff  - Offer Toileting every 2 Hours, in advance of need  - Initiate/Maintain bed/chair alarms  - Obtain necessary fall risk management equipment: alarms, yellow bracelet and socks  - Apply yellow socks and bracelet for high fall risk patients  - Consider moving patient to room near nurses station  Outcome: Progressing     Problem: SAFETY ADULT  Goal: Patient will remain free of falls  Description: INTERVENTIONS:  - Educate patient/family on patient safety including physical limitations  - Instruct patient to call for assistance with activity   - Consult OT/PT to assist with strengthening/mobility   - Keep Call bell within reach  - Keep bed low and locked with side rails adjusted as appropriate  - Keep care items and personal belongings within reach  - Initiate and maintain comfort rounds  - Make Fall Risk Sign visible to staff  - Offer Toileting every 2 Hours, in advance of need  - Initiate/Maintain bed/chair alarms  - Obtain necessary fall risk management equipment: alarms, yellow bracelet and socks  - Apply yellow socks and bracelet for high fall risk patients  - Consider moving patient to room near nurses station  Outcome: Progressing  Goal: Maintain or return to baseline ADL function  Description: INTERVENTIONS:  -  Assess patient's ability to carry out ADLs; assess patient's baseline for ADL function and identify physical deficits which impact ability to perform ADLs (bathing, care of mouth/teeth, toileting, grooming, dressing, etc )  - Assess/evaluate cause of self-care deficits   - Assess range of motion  - Assess patient's mobility; develop plan if impaired  - Assess patient's need for assistive devices and provide as appropriate  - Encourage maximum independence but intervene and supervise when necessary  - Involve family in performance of ADLs  - Assess for home care needs following discharge   - Consider OT consult to assist with ADL evaluation and planning for discharge  - Provide patient education as appropriate  Outcome: Progressing  Goal: Maintains/Returns to pre admission functional level  Description: INTERVENTIONS:  - Perform BMAT or MOVE assessment daily    - Set and communicate daily mobility goal to care team and patient/family/caregiver  - Collaborate with rehabilitation services on mobility goals if consulted  - Perform Range of Motion 3 times a day  - Reposition patient every 2 hours    - Dangle patient 3 times a day  - Stand patient 3 times a day  - Ambulate patient 3 times a day  - Out of bed to chair 3 times a day   - Out of bed for meals 3 times a day  - Out of bed for toileting  - Record patient progress and toleration of activity level   Outcome: Progressing

## 2022-07-15 NOTE — ED PROVIDER NOTES
History  Chief Complaint   Patient presents with    Shortness of Breath      Patient brought in by [facility], patient states he feels SOB denies any pain at this time  70-YEAR-OLD MALE    Pt here for SOB and wheezing  Pt is here for sob and wheezing    NO  COMPLAINTS OF DIAPHORESIS  No CP or pressure   HE DENIES ANY PAIN TO THE JAW NECK OR THE BACK  PATIENT DENIES FEVERS, REPORTS CHILLS  NO SINUS SYMPTOMS  NO HEADACHE OR NECK PAIN, NO DIZZINESS      VTE  RISK FACTORS:  NONE  NO HISTORY OF PE OR DVT  NO LONG CAR RIDES OR PLANE RIDES  NO IMMOBILIZATION  NO RECENT SURGERY OR TRAUMA  NO HEMOPTYSIS  OTHER ASSOCIATED SYMPTOMS:  NO ABDOMINAL PAIN  NO ACUTE BACK PAIN  NO NAUSEA OR VOMITING  NO STOOL CHANGES  NO URINARY COMPLAINTS: NO DYSURIA, NO HEMATURIA, NO FREQUENCY        History provided by:  Patient  Shortness of Breath  Severity:  Moderate  Onset quality:  Gradual  Relieved by:  Nothing  Worsened by:  Nothing  Ineffective treatments:  None tried  Associated symptoms: cough and wheezing    Associated symptoms: no abdominal pain, no chest pain, no claudication, no diaphoresis, no fever, no headaches, no hemoptysis, no neck pain, no rash, no sore throat, no sputum production and no vomiting        Prior to Admission Medications   Prescriptions Last Dose Informant Patient Reported? Taking?    Multiple Vitamins-Minerals (PRESERVISION AREDS 2 PO)   Yes No   Sig: Take by mouth   albuterol (PROVENTIL HFA,VENTOLIN HFA) 90 mcg/act inhaler  Self No No   Sig: Inhale 2 puffs every 6 (six) hours as needed for wheezing or shortness of breath   arformoterol (BROVANA) 15 mcg/2 mL nebulizer solution   No No   Sig: Take 2 mL (15 mcg total) by nebulization 2 (two) times a day   aspirin (ECOTRIN LOW STRENGTH) 81 mg EC tablet   No No   Sig: Take 1 tablet (81 mg total) by mouth every other day   budesonide (Pulmicort) 0 5 mg/2 mL nebulizer solution   No No   Sig: Take 2 mL (0 5 mg total) by nebulization 2 (two) times a day Rinse mouth after use  Patient not taking: Reported on 6/9/2022   cyanocobalamin (VITAMIN B-12) 500 MCG tablet  Self Yes No   Sig: TAKE ONE TABLET BY MOUTH EVERY MORNING *VITAMIN B12*   digoxin (LANOXIN) 0 125 mg tablet   No No   Sig: Take 1 tablet (125 mcg total) by mouth daily   gabapentin (NEURONTIN) 300 mg capsule   No No   Sig: Take 1 capsule (300 mg total) by mouth daily at bedtime   glimepiride (AMARYL) 1 mg tablet   No No   Sig: Take 2 tablets (2 mg total) by mouth daily with breakfast AND 1 tablet (1 mg total) daily with dinner  ipratropium (ATROVENT) 0 02 % nebulizer solution   No No   Sig: Take 2 5 mL (0 5 mg total) by nebulization 3 (three) times a day   Patient not taking: Reported on 6/9/2022   lisinopril (ZESTRIL) 20 mg tablet   No No   Sig: Take 1 tablet (20 mg total) by mouth daily   metFORMIN (GLUCOPHAGE) 1000 MG tablet   No No   Sig: Take 1 tablet (1,000 mg total) by mouth 2 (two) times a day with meals   Patient taking differently: Take 1,000 mg by mouth 2 (two) times a day with meals Taking 500mg 2 times daily   metoprolol tartrate (LOPRESSOR) 25 mg tablet   No No   Sig: Take 1 tablet (25 mg total) by mouth every 12 (twelve) hours   primidone (MYSOLINE) 50 mg tablet  Self Yes No   Sig: Take 100 mg by mouth 2 (two) times a day    rosuvastatin (CRESTOR) 10 MG tablet   No No   Sig: Take 1 tablet (10 mg total) by mouth daily   terbinafine (LamISIL) 1 % cream  Self Yes No   Sig: APPLY THIN LAYER TOPICALLY TWICE A DAY FOR FUNGAL INFECTION      Facility-Administered Medications: None       Past Medical History:   Diagnosis Date    BPH (benign prostatic hyperplasia)     45 days radiation treatment    COPD (chronic obstructive pulmonary disease)     Coronary artery disease     CABG x4 in 2017    Diabetes mellitus     History of Arterial Duplex of LE 12/26/2017    Likely occlusion of the left superficial femoral artery  Calcific changes bilaterally    Despite these changes, the ankle-brachial index as a measure of peripheral blood flow only mildly impaired   History of echocardiogram 2017    EF 40%, mild LVH, mild MR     Hyperlipidemia     Hypertension     Ischemic cardiomyopathy     NSTEMI (non-ST elevated myocardial infarction)     Prostate cancer     prostate     PVD (peripheral vascular disease)     Sepsis due to pneumonia     Tremor     Type 2 MI (myocardial infarction) ([facility] [address] ) 2021       Past Surgical History:   Procedure Laterality Date    CARDIAC CATHETERIZATION  2017    Significant left main plus triple-vessel CAD   CORONARY ARTERY BYPASS GRAFT  2017    4V CABG:  LIMA to LAD, VG to RI, SVG to PDA to LVBR RCA   EYE SURGERY      shots in eye once a month @ the  [facility]    PROSTATE BIOPSY         Family History   Problem Relation Age of Onset    Cancer Father     Heart disease Brother      I have reviewed and agree with the history as documented  E-Cigarette/Vaping    E-Cigarette Use Never User      E-Cigarette/Vaping Substances    Nicotine No     THC No     CBD No     Flavoring No     Other No     Unknown No      Social History     Tobacco Use    Smoking status: Former Smoker     Packs/day: 0 25     Years: 60 00     Pack years: 15 00     Types: Cigarettes     Quit date: 2022     Years since quittin 2    Smokeless tobacco: Never Used   Vaping Use    Vaping Use: Never used   Substance Use Topics    Alcohol use: Yes    Drug use: Never       Review of Systems   Constitutional: Negative for chills, diaphoresis, fatigue and fever  HENT: Negative for rhinorrhea, sinus pressure, sinus pain, sneezing, sore throat, trouble swallowing and voice change  Respiratory: Positive for cough, shortness of breath and wheezing  Negative for hemoptysis, sputum production and stridor  Cardiovascular: Negative for chest pain, palpitations, claudication and leg swelling     Gastrointestinal: Negative for abdominal pain, blood in stool, nausea and vomiting  Genitourinary: Negative for difficulty urinating, dysuria, flank pain and frequency  Musculoskeletal: Negative for arthralgias, back pain, gait problem, joint swelling, myalgias, neck pain and neck stiffness  Skin: Negative for rash and wound  Neurological: Negative for dizziness, light-headedness and headaches  All other systems reviewed and are negative  Primary Survey:   (A) Airway: intact  (B) Breathing: (+) breath sounds bilaterally  (C) Circulation: Pulses:   normal  (D) Disabliity:  GCS Total:  15  (E) Expose:  Completed      Physical Exam  Physical Exam  Constitutional:       General: He is not in acute distress  Appearance: He is well-developed  He is not ill-appearing, toxic-appearing or diaphoretic  HENT:      Head: Normocephalic and atraumatic  Right Ear: External ear normal       Left Ear: External ear normal       Nose: Nose normal       Mouth/Throat:      Mouth: Mucous membranes are moist       Pharynx: Oropharynx is clear  Eyes:      General: No scleral icterus  Right eye: No discharge  Left eye: No discharge  Extraocular Movements: Extraocular movements intact  Conjunctiva/sclera: Conjunctivae normal       Pupils: Pupils are equal, round, and reactive to light  Neck:      Vascular: No JVD  Trachea: No tracheal deviation  Cardiovascular:      Rate and Rhythm: Normal rate and regular rhythm  Pulses: Normal pulses  Heart sounds: Normal heart sounds  No murmur heard  No friction rub  No gallop  Pulmonary:      Effort: Pulmonary effort is normal  No tachypnea, accessory muscle usage or respiratory distress  Breath sounds: No stridor  Examination of the right-middle field reveals wheezing  Examination of the left-middle field reveals wheezing  Examination of the right-lower field reveals wheezing  Examination of the left-lower field reveals wheezing  Wheezing present  No rhonchi or rales     Chest: Chest wall: No tenderness  Abdominal:      General: Bowel sounds are normal  There is no distension  Palpations: Abdomen is soft  There is no mass  Tenderness: There is no abdominal tenderness  There is no right CVA tenderness, left CVA tenderness, guarding or rebound  Hernia: No hernia is present  Musculoskeletal:         General: No swelling, tenderness, deformity or signs of injury  Normal range of motion  Cervical back: Normal range of motion and neck supple  No rigidity or tenderness  Right lower leg: No tenderness  No edema  Left lower leg: No tenderness  No edema  Lymphadenopathy:      Cervical: No cervical adenopathy  Skin:     General: Skin is warm  Capillary Refill: Capillary refill takes less than 2 seconds  Coloration: Skin is not cyanotic, jaundiced or pale  Findings: No bruising, ecchymosis, erythema, lesion or rash  Neurological:      General: No focal deficit present  Mental Status: He is alert and oriented to person, place, and time  Cranial Nerves: No cranial nerve deficit  Sensory: No sensory deficit  Motor: No abnormal muscle tone  Coordination: Coordination normal    Psychiatric:         Behavior: Behavior normal          Thought Content: Thought content normal          Judgment: Judgment normal        Trauma Secondary exam performed (must be performed and documented on all traumas):   GCS 15,   full ROM of bilateral upper and lower extremities  Airway intact, bilateral breath sounds, palpable pulses  No active bleeding  No bony point tenderness in extremities, chest, abdomen or c/t,l spine  No crepitus, abdomen soft/non tender  Chest wall soft non tender with no deformities  Pelvis stable  Fast or pelvic xray prior to ordering a CT a/p?  Yes     Stat portable CXR prior to going to CT chest? Yes   FAST Results: Negative    Document C-spine clearance after CT c-spine came back normal - cspineclearsmartlist: Of note, C-spine clear after CT scans resulted              Vital Signs  ED Triage Vitals   Temperature Pulse Respirations Blood Pressure SpO2   07/14/22 2349 07/14/22 2349 07/14/22 2349 07/14/22 2349 07/14/22 2349   98 1 °F (36 7 °C) 101 18 107/68 93 %      Temp Source Heart Rate Source Patient Position - Orthostatic VS BP Location FiO2 (%)   07/14/22 2349 07/14/22 2349 07/14/22 2349 07/14/22 2349 --   Oral Monitor Sitting Left arm       Pain Score       07/15/22 0020       No Pain           Vitals:    07/15/22 0530 07/15/22 0535 07/15/22 0540 07/15/22 0540   BP:       Pulse: (!) 115 (!) 114 (!) 112 (!) 113   Patient Position - Orthostatic VS:             Visual Acuity      ED Medications  Medications   albuterol inhalation solution 10 mg (10 mg Nebulization Given 7/15/22 0006)     And   ipratropium (ATROVENT) 0 02 % inhalation solution 0 5 mg (0 5 mg Nebulization Given 7/15/22 0005)     And   sodium chloride 0 9 % inhalation solution 3 mL (3 mL Nebulization Given 7/15/22 0006)   magnesium sulfate IVPB (premix) SOLN 1 g (0 g Intravenous Stopped 7/15/22 0438)   potassium chloride (K-DUR,KLOR-CON) CR tablet 40 mEq (40 mEq Oral Given 7/15/22 0248)   potassium chloride 20 mEq IVPB (premix) (0 mEq Intravenous Stopped 7/15/22 0540)       Diagnostic Studies  Results Reviewed     Procedure Component Value Units Date/Time    HS Troponin I 2hr [[id number]]  (Abnormal) Collected: 07/15/22 0511    Lab Status: Final result Specimen: Blood Updated: 07/15/22 0544     hs TnI 2hr 38 ng/L      Delta 2hr hsTnI 21 ng/L     B-Type Natriuretic Peptide(BNP) AN, CA, EA Campuses Only [[id number]]  (Abnormal) Collected: 07/14/22 2354    Lab Status: Final result Specimen: Blood from Arm, Left Updated: 07/15/22 0030      pg/mL     HS Troponin 0hr (reflex protocol) [[id number]]  (Normal) Collected: 07/14/22 9453    Lab Status: Final result Specimen: Blood from Arm, Left Updated: 07/15/22 0029     hs TnI 0hr 17 ng/L HS Troponin I 4hr [[id number]]     Lab Status: No result Specimen: Blood     CMP [[id number]]  (Abnormal) Collected: 07/14/22 2354    Lab Status: Final result Specimen: Blood from Arm, Left Updated: 07/15/22 0024     Sodium 134 mmol/L      Potassium 2 9 mmol/L      Chloride 97 mmol/L      CO2 22 mmol/L      ANION GAP 15 mmol/L      BUN 9 mg/dL      Creatinine 0 65 mg/dL      Glucose 88 mg/dL      Calcium 8 3 mg/dL      AST 14 U/L      ALT 9 U/L      Alkaline Phosphatase 90 U/L      Total Protein 6 3 g/dL      Albumin 4 0 g/dL      Total Bilirubin 0 25 mg/dL      eGFR 94 ml/min/1 73sq m     Narrative:      National Kidney Disease Foundation guidelines for Chronic Kidney Disease (CKD):     Stage 1 with normal or high GFR (GFR > 90 mL/min/1 73 square meters)    Stage 2 Mild CKD (GFR = 60-89 mL/min/1 73 square meters)    Stage 3A Moderate CKD (GFR = 45-59 mL/min/1 73 square meters)    Stage 3B Moderate CKD (GFR = 30-44 mL/min/1 73 square meters)    Stage 4 Severe CKD (GFR = 15-29 mL/min/1 73 square meters)    Stage 5 End Stage CKD (GFR <15 mL/min/1 73 square meters)  Note: GFR calculation is accurate only with a steady state creatinine    Magnesium [[id number]]  (Abnormal) Collected: 07/14/22 2354    Lab Status: Final result Specimen: Blood from Arm, Left Updated: 07/15/22 0024     Magnesium 1 7 mg/dL     CBC and differential [[id number]]  (Abnormal) Collected: 07/14/22 2354    Lab Status: Final result Specimen: Blood from Arm, Left Updated: 07/15/22 0003     WBC 7 82 Thousand/uL      RBC 3 96 Million/uL      Hemoglobin 11 7 g/dL      Hematocrit 37 3 %      MCV 94 fL      MCH 29 5 pg      MCHC 31 4 g/dL      RDW 14 8 %      MPV 10 3 fL      Platelets 786 Thousands/uL      nRBC 0 /100 WBCs      Neutrophils Relative 73 %      Immat GRANS % 2 %      Lymphocytes Relative 15 %      Monocytes Relative 7 %      Eosinophils Relative 2 %      Basophils Relative 1 %      Neutrophils Absolute 5 78 Thousands/µL      Immature Grans Absolute 0 13 Thousand/uL      Lymphocytes Absolute 1 17 Thousands/µL      Monocytes Absolute 0 52 Thousand/µL      Eosinophils Absolute 0 16 Thousand/µL      Basophils Absolute 0 06 Thousands/µL                  TRAUMA - CT head wo contrast   Final Result by Gurinder Grayson MD (07/15 0040)      No acute intracranial abnormality  Workstation performed: [id number]         TRAUMA - CT spine cervical wo contrast   Final Result by Gurinder Grayson MD (07/15 0045)      No acute cervical spine fracture or traumatic malalignment  Workstation performed: [id number]         XR Trauma pelvis ap only 1 or 2 vw   Final Result by Koffi Santizo DO (07/15 3061)      No fracture  Followup imaging may be obtained for any persistent or worsening symptoms  Workstation performed: [id number]         X-ray chest 1 view portable    (Results Pending)   CTA ED chest PE Study    (Results Pending)   PE Study with CT abdomen & pelvis with contrast    (Results Pending)              Procedures  Procedures         ED Course  ED Course as of 07/15/22 0619   Thu Jul 14, 2022   2342 Patient seen and evaluated  Here for shortness of breath wheeze  He is 92% on 2 L  2 L is his typical oxygen saturation  Fri Jul 15, 2022   0003 patient stable    Family at bedside  They state pt has been drinking and fell pta  Uncertain if he hit his heat    Trauma eval called      0009 Fast exam is negative  Cervical collar applied   0129 Cervical collar cleared   0129 BNP(!): 325   0129 Magnesium(!): 1 7   0129 hs TnI 0hr: 17   0129 CMP(!)   0129 CBC and differential(!)   0130 CT BRAIN - WITHOUT CONTRAST     INDICATION:   Headache and head trauma      COMPARISON:  12/27/2019      TECHNIQUE:  CT examination of the brain was performed    In addition to axial images, sagittal and coronal 2D reformatted images were created and submitted for interpretation      Radiation dose length product (DLP) for this visit:  916 mGy-cm   This examination, like all CT scans performed in the [facility], was performed utilizing techniques to minimize radiation dose exposure, including the use of iterative   reconstruction and automated exposure control        IMAGE QUALITY:  Diagnostic      FINDINGS:     PARENCHYMA:  Periventricular and subcortical hypoattenuating foci, partially confluent, consistent with microangiopathic      No acute intracranial hemorrhage or mass effect      VENTRICLES AND EXTRA-AXIAL SPACES:  No hydrocephalus or extra-axial collection      VISUALIZED ORBITS AND PARANASAL SINUSES:  Intact globes and orbits  Mild mucosal thickening in the ethmoid air cells      CALVARIUM AND EXTRACRANIAL SOFT TISSUES:  No acute calvarial fracture      IMPRESSION:     No acute intracranial abnormality  0131 CT BRAIN - WITHOUT CONTRAST     INDICATION:   Headache and head trauma      COMPARISON:  12/27/2019      TECHNIQUE:  CT examination of the brain was performed  In addition to axial images, sagittal and coronal 2D reformatted images were created and submitted for interpretation      Radiation dose length product (DLP) for this visit:  916 mGy-cm   This examination, like all CT scans performed in the [facility], was performed utilizing techniques to minimize radiation dose exposure, including the use of iterative   reconstruction and automated exposure control        IMAGE QUALITY:  Diagnostic      FINDINGS:     PARENCHYMA:  Periventricular and subcortical hypoattenuating foci, partially confluent, consistent with microangiopathic      No acute intracranial hemorrhage or mass effect      VENTRICLES AND EXTRA-AXIAL SPACES:  No hydrocephalus or extra-axial collection      VISUALIZED ORBITS AND PARANASAL SINUSES:  Intact globes and orbits    Mild mucosal thickening in the ethmoid air cells      CALVARIUM AND EXTRACRANIAL SOFT TISSUES:  No acute calvarial fracture      IMPRESSION:     No acute intracranial abnormality  0143 Lungs clear to auscultation now  Vital signs stable  He is requesting discharge    Will replete the patient's potassium  He also needs delta troponin for discharge   0534 Delta troponin pending for discharge    0553 Pt wheezing again  Delta trop 21  Will obs    0555 [place] texted Marda Going with hospitalist    167Wendy Dewey CTA                                             MDM    Disposition  Final diagnoses:   COPD exacerbation ([address] )   Fall, initial encounter   Closed head injury, initial encounter   Hypomagnesemia   Hypokalemia     Time reflects when diagnosis was documented in both MDM as applicable and the Disposition within this note     Time User Action Codes Description Comment    7/15/2022  1:43 AM Mather Pitcher Add [J44 1] COPD exacerbation ([address] )     7/15/2022  1:43 AM Mather Pitcher Add [X74  EINO] Fall, initial encounter     7/15/2022  1:43 AM Mather Pitcher Add [S09 90XA] Closed head injury, initial encounter     7/15/2022  1:45 AM Mather Pitcher Add [E83 42] Hypomagnesemia     7/15/2022  1:45 AM Mather Pitcher Add [E87 6] Hypokalemia       ED Disposition     None      Follow-up Information     Follow up With Specialties Details Why Contact Info    Eris Vasquez,  Family Medicine Call today  [address]  [address]  [phone]            Patient's Medications   Discharge Prescriptions    No medications on file       No discharge procedures on file      PDMP Review       Value Time User    PDMP Reviewed  Yes 9/3/2020 12:26 PM Vi Dumont [facility]          ED Provider  Electronically Signed by           Jessica Flores MD  07/15/22 4307

## 2022-07-15 NOTE — RESPIRATORY THERAPY NOTE
RT Protocol Note  Vahid Bowles 69 y o  male MRN: [id number]  Unit/Bed#: ED 28 Encounter: [id number]    Assessment    Principal Problem:    Chronic obstructive pulmonary disease with acute exacerbation ([facility] )  Active Problems:    Type 2 diabetes mellitus without complication, without long-term current use of insulin (HCC)    Coronary artery disease involving native coronary artery of native heart without angina pectoris    Ischemic cardiomyopathy    Chronic respiratory failure with hypoxia ([facility] )    Primary hypertension    Fall      Home Pulmonary Medications:  Patient states he takes three meds in his nebulizer  Not sure which ones  Listed on med rec are  Atrovent  Brovana  Pulmicort   Albuterol MDI PRN     Home Devices/Therapy: Other (Comment), Home O2 (2 liters Continuous)    Past Medical History:   Diagnosis Date    BPH (benign prostatic hyperplasia)     45 days radiation treatment    COPD (chronic obstructive pulmonary disease)     Coronary artery disease     CABG x4 in 2017    Diabetes mellitus     History of Arterial Duplex of LE 12/26/2017    Likely occlusion of the left superficial femoral artery  Calcific changes bilaterally  Despite these changes, the ankle-brachial index as a measure of peripheral blood flow only mildly impaired      History of echocardiogram 06/12/2017    EF 40%, mild LVH, mild MR     Hyperlipidemia     Hypertension     Ischemic cardiomyopathy     NSTEMI (non-ST elevated myocardial infarction)     Prostate cancer     prostate     PVD (peripheral vascular disease)     Sepsis due to pneumonia     Tremor     Type 2 MI (myocardial infarction) ([place] [address] ) 04/06/2021     Social History     Socioeconomic History    Marital status: /Civil Union     Spouse name: None    Number of children: None    Years of education: None    Highest education level: None   Occupational History    None   Tobacco Use    Smoking status: Former Smoker     Packs/day: 0 25     Years: 60 00 Pack years: 15 00     Types: Cigarettes     Quit date: 2022     Years since quittin 2    Smokeless tobacco: Never Used   Vaping Use    Vaping Use: Never used   Substance and Sexual Activity    Alcohol use: Yes    Drug use: Never    Sexual activity: Yes     Partners: Female   Other Topics Concern    None   Social History Narrative    ** Merged History Encounter **          Social Determinants of Health     Financial Resource Strain: Not on file   Food Insecurity: No Food Insecurity    Worried About Running Out of Food in the Last Year: Never true    [place] of Food in the Last Year: Never true   Transportation Needs: No Transportation Needs    Lack of Transportation (Medical): No    Lack of Transportation (Non-Medical): No   Physical Activity: Not on file   Stress: Not on file   Social Connections: Not on file   Intimate Partner Violence: Not on file   Housing Stability: Low Risk     Unable to Pay for Housing in the Last Year: No    Number of Places Lived in the Last Year: 1    Unstable Housing in the Last Year: No       Subjective         Objective    Physical Exam:   Assessment Type: Pre-treatment  General Appearance: Awake, Alert  Respiratory Pattern: Dyspnea with exertion  Chest Assessment: Chest expansion symmetrical  Bilateral Breath Sounds: Diminished, Other (Comment) (few faint  wheezes)  Cough: None    Vitals:  Blood pressure 159/80, pulse 99, temperature 98 6 °F (37 °C), resp  rate 20, SpO2 95 %  Imaging and other studies: I have personally reviewed pertinent reports  Plan    Respiratory Plan: Mild Distress pathway, Home Bronchodilator Patient pathway        Resp Comments: UDN given with 1 25 mg Xopenex and 0 5 mg Atrovent  Pt is order on Xopenex and Atrovent treatment TID and Pulmicort BID   Will order an Albuterol Treatment Q 4 PRN also

## 2022-07-16 VITALS
HEART RATE: 102 BPM | DIASTOLIC BLOOD PRESSURE: 69 MMHG | OXYGEN SATURATION: 97 % | BODY MASS INDEX: 26.2 KG/M2 | WEIGHT: 182.98 LBS | SYSTOLIC BLOOD PRESSURE: 135 MMHG | RESPIRATION RATE: 20 BRPM | HEIGHT: 70 IN | TEMPERATURE: 98.2 F

## 2022-07-16 LAB
ANION GAP SERPL CALCULATED.3IONS-SCNC: 12 MMOL/L (ref 4–13)
BUN SERPL-MCNC: 14 MG/DL (ref 5–25)
CALCIUM SERPL-MCNC: 8.1 MG/DL (ref 8.4–10.2)
CHLORIDE SERPL-SCNC: 100 MMOL/L (ref 96–108)
CO2 SERPL-SCNC: 26 MMOL/L (ref 21–32)
CREAT SERPL-MCNC: 0.69 MG/DL (ref 0.6–1.3)
ERYTHROCYTE [DISTWIDTH] IN BLOOD BY AUTOMATED COUNT: 15.1 % (ref 11.6–15.1)
GFR SERPL CREATININE-BSD FRML MDRD: 92 ML/MIN/1.73SQ M
GLUCOSE P FAST SERPL-MCNC: 160 MG/DL (ref 65–99)
GLUCOSE SERPL-MCNC: 156 MG/DL (ref 65–140)
GLUCOSE SERPL-MCNC: 160 MG/DL (ref 65–140)
HCT VFR BLD AUTO: 36 % (ref 36.5–49.3)
HGB BLD-MCNC: 11.6 G/DL (ref 12–17)
MCH RBC QN AUTO: 30 PG (ref 26.8–34.3)
MCHC RBC AUTO-ENTMCNC: 32.2 G/DL (ref 31.4–37.4)
MCV RBC AUTO: 93 FL (ref 82–98)
PLATELET # BLD AUTO: 230 THOUSANDS/UL (ref 149–390)
PMV BLD AUTO: 11 FL (ref 8.9–12.7)
POTASSIUM SERPL-SCNC: 3.6 MMOL/L (ref 3.5–5.3)
PROCALCITONIN SERPL-MCNC: 0.12 NG/ML
RBC # BLD AUTO: 3.87 MILLION/UL (ref 3.88–5.62)
SODIUM SERPL-SCNC: 138 MMOL/L (ref 135–147)
WBC # BLD AUTO: 16.24 THOUSAND/UL (ref 4.31–10.16)

## 2022-07-16 PROCEDURE — 84145 PROCALCITONIN (PCT): CPT | Performed by: NURSE PRACTITIONER

## 2022-07-16 PROCEDURE — 80048 BASIC METABOLIC PNL TOTAL CA: CPT | Performed by: NURSE PRACTITIONER

## 2022-07-16 PROCEDURE — 82948 REAGENT STRIP/BLOOD GLUCOSE: CPT

## 2022-07-16 PROCEDURE — 94640 AIRWAY INHALATION TREATMENT: CPT

## 2022-07-16 PROCEDURE — 94760 N-INVAS EAR/PLS OXIMETRY 1: CPT

## 2022-07-16 PROCEDURE — 85027 COMPLETE CBC AUTOMATED: CPT | Performed by: NURSE PRACTITIONER

## 2022-07-16 PROCEDURE — 99217 PR OBSERVATION CARE DISCHARGE MANAGEMENT: CPT

## 2022-07-16 RX ORDER — ALBUTEROL SULFATE 2.5 MG/3ML
SOLUTION RESPIRATORY (INHALATION)
Status: COMPLETED
Start: 2022-07-16 | End: 2022-07-16

## 2022-07-16 RX ORDER — ALBUTEROL SULFATE 2.5 MG/3ML
2.5 SOLUTION RESPIRATORY (INHALATION) EVERY 4 HOURS PRN
Status: DISCONTINUED | OUTPATIENT
Start: 2022-07-16 | End: 2022-07-16 | Stop reason: HOSPADM

## 2022-07-16 RX ORDER — PREDNISONE 20 MG/1
40 TABLET ORAL DAILY
Qty: 10 TABLET | Refills: 0 | Status: SHIPPED | OUTPATIENT
Start: 2022-07-17 | End: 2022-08-01

## 2022-07-16 RX ADMIN — ALBUTEROL SULFATE 2.5 MG: 2.5 SOLUTION RESPIRATORY (INHALATION) at 01:18

## 2022-07-16 RX ADMIN — CLOTRIMAZOLE: 1 CREAM TOPICAL at 08:11

## 2022-07-16 RX ADMIN — ENOXAPARIN SODIUM 40 MG: 100 INJECTION SUBCUTANEOUS at 08:06

## 2022-07-16 RX ADMIN — BUDESONIDE 0.5 MG: 0.5 INHALANT ORAL at 07:14

## 2022-07-16 RX ADMIN — Medication 1 TABLET: at 08:06

## 2022-07-16 RX ADMIN — DIGOXIN 125 MCG: 125 TABLET ORAL at 08:06

## 2022-07-16 RX ADMIN — LISINOPRIL 20 MG: 20 TABLET ORAL at 08:05

## 2022-07-16 RX ADMIN — PRIMIDONE 100 MG: 50 TABLET ORAL at 08:09

## 2022-07-16 RX ADMIN — IPRATROPIUM BROMIDE 0.5 MG: 0.5 SOLUTION RESPIRATORY (INHALATION) at 07:14

## 2022-07-16 RX ADMIN — METOPROLOL TARTRATE 25 MG: 25 TABLET, FILM COATED ORAL at 08:05

## 2022-07-16 RX ADMIN — CYANOCOBALAMIN TAB 500 MCG 500 MCG: 500 TAB at 08:06

## 2022-07-16 RX ADMIN — LEVALBUTEROL HYDROCHLORIDE 1.25 MG: 1.25 SOLUTION, CONCENTRATE RESPIRATORY (INHALATION) at 07:14

## 2022-07-16 RX ADMIN — PREDNISONE 40 MG: 20 TABLET ORAL at 08:05

## 2022-07-16 RX ADMIN — INSULIN LISPRO 1 UNITS: 100 INJECTION, SOLUTION INTRAVENOUS; SUBCUTANEOUS at 08:05

## 2022-07-16 RX ADMIN — ACETAMINOPHEN 650 MG: 325 TABLET ORAL at 04:06

## 2022-07-16 NOTE — NURSING NOTE
Reviewed AVS with patient who verbalized understanding  Education provided on COPD exacerbation, managing COPD, managing heart failure, and new prescription for prednisone  IV catheter removed and intact  Patient escorted out by RN

## 2022-07-16 NOTE — PLAN OF CARE
Problem: Potential for Falls  Goal: Patient will remain free of falls  Description: INTERVENTIONS:  - Educate patient/family on patient safety including physical limitations  - Instruct patient to call for assistance with activity   - Consult OT/PT to assist with strengthening/mobility   - Keep Call bell within reach  - Keep bed low and locked with side rails adjusted as appropriate  - Keep care items and personal belongings within reach  - Initiate and maintain comfort rounds  - Make Fall Risk Sign visible to staff  - Offer Toileting every 2 Hours, in advance of need  - Initiate/Maintain bed/chair alarms  - Obtain necessary fall risk management equipment: alarms, yellow bracelet and socks  - Apply yellow socks and bracelet for high fall risk patients  - Consider moving patient to room near nurses station  Outcome: Progressing     Problem: Nutrition/Hydration-ADULT  Goal: Nutrient/Hydration intake appropriate for improving, restoring or maintaining nutritional needs  Description: Monitor and assess patient's nutrition/hydration status for malnutrition  Collaborate with interdisciplinary team and initiate plan and interventions as ordered  Monitor patient's weight and dietary intake as ordered or per policy  Utilize nutrition screening tool and intervene as necessary  Determine patient's food preferences and provide high-protein, high-caloric foods as appropriate       INTERVENTIONS:  - Monitor oral intake, urinary output, labs, and treatment plans  - Assess nutrition and hydration status and recommend course of action  - Evaluate amount of meals eaten  - Assist patient with eating if necessary   - Allow adequate time for meals  - Recommend/ encourage appropriate diets, oral nutritional supplements, and vitamin/mineral supplements  - Order, calculate, and assess calorie counts as needed  - Recommend, monitor, and adjust tube feedings and TPN/PPN based on assessed needs  - Assess need for intravenous fluids  - Provide specific nutrition/hydration education as appropriate  - Include patient/family/caregiver in decisions related to nutrition  Outcome: Progressing     Problem: PAIN - ADULT  Goal: Verbalizes/displays adequate comfort level or baseline comfort level  Description: Interventions:  - Encourage patient to monitor pain and request assistance  - Assess pain using appropriate pain scale  - Administer analgesics based on type and severity of pain and evaluate response  - Implement non-pharmacological measures as appropriate and evaluate response  - Consider cultural and social influences on pain and pain management  - Notify physician/advanced practitioner if interventions unsuccessful or patient reports new pain  Outcome: Progressing     Problem: INFECTION - ADULT  Goal: Absence or prevention of progression during hospitalization  Description: INTERVENTIONS:  - Assess and monitor for signs and symptoms of infection  - Monitor lab/diagnostic results  - Monitor all insertion sites, i e  indwelling lines, tubes, and drains  - Monitor endotracheal if appropriate and nasal secretions for changes in amount and color  - [place] appropriate cooling/warming therapies per order  - Administer medications as ordered  - Instruct and encourage patient and family to use good hand hygiene technique  - Identify and instruct in appropriate isolation precautions for identified infection/condition  Outcome: Progressing  Goal: Absence of fever/infection during neutropenic period  Description: INTERVENTIONS:  - Monitor WBC    Outcome: Progressing     Problem: SAFETY ADULT  Goal: Patient will remain free of falls  Description: INTERVENTIONS:  - Educate patient/family on patient safety including physical limitations  - Instruct patient to call for assistance with activity   - Consult OT/PT to assist with strengthening/mobility   - Keep Call bell within reach  - Keep bed low and locked with side rails adjusted as appropriate  - Keep care items and personal belongings within reach  - Initiate and maintain comfort rounds  - Make Fall Risk Sign visible to staff  - Offer Toileting every 2 Hours, in advance of need  - Initiate/Maintain bed/chair alarms  - Obtain necessary fall risk management equipment: alarms, yellow bracelet and socks  - Apply yellow socks and bracelet for high fall risk patients  - Consider moving patient to room near nurses station  Outcome: Progressing  Goal: Maintain or return to baseline ADL function  Description: INTERVENTIONS:  -  Assess patient's ability to carry out ADLs; assess patient's baseline for ADL function and identify physical deficits which impact ability to perform ADLs (bathing, care of mouth/teeth, toileting, grooming, dressing, etc )  - Assess/evaluate cause of self-care deficits   - Assess range of motion  - Assess patient's mobility; develop plan if impaired  - Assess patient's need for assistive devices and provide as appropriate  - Encourage maximum independence but intervene and supervise when necessary  - Involve family in performance of ADLs  - Assess for home care needs following discharge   - Consider OT consult to assist with ADL evaluation and planning for discharge  - Provide patient education as appropriate  Outcome: Progressing  Goal: Maintains/Returns to pre admission functional level  Description: INTERVENTIONS:  - Perform BMAT or MOVE assessment daily    - Set and communicate daily mobility goal to care team and patient/family/caregiver  - Collaborate with rehabilitation services on mobility goals if consulted  - Perform Range of Motion 3 times a day  - Reposition patient every 2 hours    - Dangle patient 3 times a day  - Stand patient 3 times a day  - Ambulate patient 3 times a day  - Out of bed to chair 3 times a day   - Out of bed for meals 3 times a day  - Out of bed for toileting  - Record patient progress and toleration of activity level   Outcome: Progressing     Problem: DISCHARGE PLANNING  Goal: Discharge to home or other facility with appropriate resources  Description: INTERVENTIONS:  - Identify barriers to discharge w/patient and caregiver  - Arrange for needed discharge resources and transportation as appropriate  - Identify discharge learning needs (meds, wound care, etc )  - Arrange for interpretive services to assist at discharge as needed  - Refer to Case Management Department for coordinating discharge planning if the patient needs post-hospital services based on physician/advanced practitioner order or complex needs related to functional status, cognitive ability, or social support system  Outcome: Progressing     Problem: Knowledge Deficit  Goal: Patient/family/caregiver demonstrates understanding of disease process, treatment plan, medications, and discharge instructions  Description: Complete learning assessment and assess knowledge base    Interventions:  - Provide teaching at level of understanding  - Provide teaching via preferred learning methods  Outcome: Progressing     Problem: Prexisting or High Potential for Compromised Skin Integrity  Goal: Skin integrity is maintained or improved  Description: INTERVENTIONS:  - Identify patients at risk for skin breakdown  - Assess and monitor skin integrity  - Assess and monitor nutrition and hydration status  - Monitor labs   - Assess for incontinence   - Turn and reposition patient  - Assist with mobility/ambulation  - Relieve pressure over bony prominences  - Avoid friction and shearing  - Provide appropriate hygiene as needed including keeping skin clean and dry  - Evaluate need for skin moisturizer/barrier cream  - Collaborate with interdisciplinary team   - Patient/family teaching  - Consider wound care consult   Outcome: Progressing

## 2022-07-16 NOTE — RESPIRATORY THERAPY NOTE
RT Protocol Note  Ingrid Chaudhry 69 y o  male MRN: [id number]  Unit/Bed#: -01 Encounter: [id number]    Assessment    Principal Problem:    Chronic obstructive pulmonary disease with acute exacerbation ([facility] )  Active Problems:    Type 2 diabetes mellitus without complication, without long-term current use of insulin (HCC)    Coronary artery disease involving native coronary artery of native heart without angina pectoris    Ischemic cardiomyopathy    Chronic respiratory failure with hypoxia ([place] [address] )    Primary hypertension    Fall      Home Pulmonary Medications:  Albuterol HFA, atrovent neb, pulmicort neb, brovana  Home Devices/Therapy: Other (Comment), Home O2 (2 liters Continuous)    Past Medical History:   Diagnosis Date    BPH (benign prostatic hyperplasia)     45 days radiation treatment    COPD (chronic obstructive pulmonary disease)     Coronary artery disease     CABG x4 in 2017    Diabetes mellitus     History of Arterial Duplex of LE 2017    Likely occlusion of the left superficial femoral artery  Calcific changes bilaterally  Despite these changes, the ankle-brachial index as a measure of peripheral blood flow only mildly impaired  History of echocardiogram 2017    EF 40%, mild LVH, mild MR      Hyperlipidemia     Hypertension     Ischemic cardiomyopathy     NSTEMI (non-ST elevated myocardial infarction)     Prostate cancer     prostate     PVD (peripheral vascular disease)     Sepsis due to pneumonia     Tremor     Type 2 MI (myocardial infarction) ([place] [address] ) 2021     Social History     Socioeconomic History    Marital status: /Civil Union     Spouse name: None    Number of children: None    Years of education: None    Highest education level: None   Occupational History    None   Tobacco Use    Smoking status: Former Smoker     Packs/day: 0 25     Years: 60 00     Pack years: 15 00     Types: Cigarettes     Quit date: 2022     Years since quittin 2    Smokeless tobacco: Never Used   Vaping Use    Vaping Use: Never used   Substance and Sexual Activity    Alcohol use: Yes    Drug use: Never    Sexual activity: Yes     Partners: Female   Other Topics Concern    None   Social History Narrative    ** Merged History Encounter **          Social Determinants of Health     Financial Resource Strain: Not on file   Food Insecurity: No Food Insecurity    Worried About Running Out of Food in the Last Year: Never true    Ran Out of Food in the Last Year: Never true   Transportation Needs: No Transportation Needs    Lack of Transportation (Medical): No    Lack of Transportation (Non-Medical): No   Physical Activity: Not on file   Stress: Not on file   Social Connections: Not on file   Intimate Partner Violence: Not on file   Housing Stability: Low Risk     Unable to Pay for Housing in the Last Year: No    Number of Places Lived in the Last Year: 1    Unstable Housing in the Last Year: No       Subjective         Objective    Physical Exam:   Assessment Type: During-treatment  General Appearance: Awake  Respiratory Pattern: Dyspnea at rest  Chest Assessment: Chest expansion symmetrical  Bilateral Breath Sounds: Diminished, Expiratory wheezes  Cough: None  O2 Device: 2 lpm nc  Vitals:  Blood pressure 159/80, pulse 99, temperature 98 6 °F (37 °C), resp  rate 20, height 5' 10" (1 778 m), weight 82 2 kg (181 lb 3 5 oz), SpO2 95 %  Imaging and other studies: I have personally reviewed pertinent reports  O2 Device: 2 lpm nc  Plan    Respiratory Plan: Mild Distress pathway, Home Bronchodilator Patient pathway        Resp Comments: pulse ox not functioning correctly, RN aware

## 2022-07-16 NOTE — ASSESSMENT & PLAN NOTE
Lab Results   Component Value Date    HGBA1C 7 9 (A) 05/05/2022       Recent Labs     07/15/22  1521 07/15/22  1631 07/15/22  2116 07/16/22  0636   POCGLU 231* 241* 206* 156*       Blood Sugar Average: Last 72 hrs:  · (P) 197 8   · Okay to resume all pre-admission hypoglycemic agents  · Routine PCP follow-up

## 2022-07-16 NOTE — ASSESSMENT & PLAN NOTE
· Patient admitted to drinking 6 beers last night and tripped over a patch of grass walking back to his home  · No trauma or injury   · Imaging without acute finding  · Stable for discharge home

## 2022-07-16 NOTE — ASSESSMENT & PLAN NOTE
· Prior CABG x4 in 3/2017  · Discharge on pre-admission aspirin, statin, beta-blocker  · Maintain regular outpatient Cardiology follow-up

## 2022-07-16 NOTE — ASSESSMENT & PLAN NOTE
Acute exacerbation is likely due to viral bronchitis  The patient with severe COPD  Reports worsening shortness of breath at night over the past 3-4 days  Per pulmonology outpatient, he is supposed to be on nebs only and azithromycin 250 mg 3 times per week       · Mild exacerbation   · Status post Solu-Medrol 125 mg x1 day  · Status post Nebulizer- Xopenex and Atrovent t i d , budesonide b i d   · Procalcitonin negative x2  · Azithromycin discontinued  · Stable for discharge on p o  prednisone 40 mg x 5 days

## 2022-07-16 NOTE — NURSING NOTE
Pt ambulated self to bathroom and removed oxygen and desaturated to the 70s and co sob  Pt provided with extension tubing for oxygen and increased to 5 L to recover back up to the low 90s  Pt instructed to use urinal at bed to prevent exertion on respiratory  Oxygen decreased to 3 liters to maintain O2 saturation at 93%  Bed low, call bell in reach  Will continue to monitor

## 2022-07-16 NOTE — ASSESSMENT & PLAN NOTE
· BP noted to be slightly elevated on admission, now well controlled  · Discharge on pre-admission metoprolol, lisinopril

## 2022-07-16 NOTE — ASSESSMENT & PLAN NOTE
· RVF of 30%  · Follows with PENG Rowe   · The patient declined prophylactic ICD at this time given his pulmonary comorbidities  · In no acute exacerbation   · Outpatient Cardiology follow-up

## 2022-07-16 NOTE — DISCHARGE SUMMARY
[facility]  Discharge- Rhea De Los Santos 1945, 69 y o  male MRN: [id number]  Unit/Bed#: -01 Encounter: [id number]  Primary Care Provider: Rebeca Alvarez DO   Date and time admitted to hospital: 7/14/2022 11:48 PM    * Chronic obstructive pulmonary disease with acute exacerbation ([facility] )  Assessment & Plan  Acute exacerbation is likely due to viral bronchitis  The patient with severe COPD  Reports worsening shortness of breath at night over the past 3-4 days  Per pulmonology outpatient, he is supposed to be on nebs only and azithromycin 250 mg 3 times per week  · Mild exacerbation   · Status post Solu-Medrol 125 mg x1 day  · Status post Nebulizer- Xopenex and Atrovent t i d , budesonide b i d   · Procalcitonin negative x2  · Azithromycin discontinued  · Stable for discharge on p o  prednisone 40 mg x 5 days    Fall  Assessment & Plan  · Patient admitted to drinking 6 beers last night and tripped over a patch of grass walking back to his home  · No trauma or injury   · Imaging without acute finding  · Stable for discharge home    Primary hypertension  Assessment & Plan  · BP noted to be slightly elevated on admission, now well controlled  · Discharge on pre-admission metoprolol, lisinopril    Chronic respiratory failure with hypoxia ([facility] 75 )  Assessment & Plan  · The patient uses 2 L nasal cannula at baseline; may increase to 3-4 L with exertion     · On 2 L, which is patient's baseline, saturating well  · Stable for discharge home    Ischemic cardiomyopathy  Assessment & Plan  · RVF of 30%  · Follows with Dr Darryle Hinds, CRNP   · The patient declined prophylactic ICD at this time given his pulmonary comorbidities  · In no acute exacerbation   · Outpatient Cardiology follow-up    Coronary artery disease involving native coronary artery of native heart without angina pectoris  Assessment & Plan  · Prior CABG x4 in 3/2017  · Discharge on pre-admission aspirin, statin, beta-blocker  · Maintain regular outpatient Cardiology follow-up    Type 2 diabetes mellitus without complication, without long-term current use of insulin [facility])  Assessment & Plan  Lab Results   Component Value Date    HGBA1C 7 9 (A) 05/05/2022       Recent Labs     07/15/22  1521 07/15/22  1631 07/15/22  2116 07/16/22  0636   POCGLU 231* 241* 206* 156*       Blood Sugar Average: Last 72 hrs:  · (P) 197 8   · Okay to resume all pre-admission hypoglycemic agents  · Routine PCP follow-up        Medical Problems             Resolved Problems  Date Reviewed: 7/16/2022   None               Discharging Physician / Practitioner: Estela Castellano PA-C  PCP: Bryan Tam DO  Admission Date:   Admission Orders (From admission, onward)     Ordered        07/15/22 1012  Place in Observation  Once                      Discharge Date: 07/16/22    Consultations During Hospital Stay:  · None    Procedures Performed:   · None    Significant Findings / Test Results:   · Chest x-ray:  Development of suspected early bibasal infiltrates  · CTA chest/abdomen/pelvis:  No pulmonary embolus  Bilateral small pleural effusions  Incidental Findings:   · None     Test Results Pending at Discharge (will require follow up): · None     Outpatient Tests Requested:  · None    Complications:  None    Reason for Admission:  COPD exacerbation    Hospital Course:   Zeynep Brock is a 69 y o  male patient with a past medical history significant for COPD on chronic 2 L at baseline, CAD, type 2 diabetes, ischemic cardiomyopathy, hypertension who originally presented to the hospital on 7/14/2022 due to a mechanical fall  Please refer to the initial history and physical as outlined by Akilah Wang on 07/15/2022 for additional presenting features  In brief, patient brought was brought in by EMS due to a mechanical fall after drinking 6 beers and attempting to walk home the previous night    Patient was found to be in acute COPD exacerbation during evaluation in the ED  Patient was admitted under observation status for further treatment for COPD exacerbation  Patient was treated with IV steroids, nebulizers, and antibiotics  Patient was initially with increased oxygen requirements up to 4 L via nasal cannula, but was titrated down to his baseline of 2 L prior to discharge  Exacerbation resolved, and patient was medically stable for discharge on 07/16/2022 with 40 mg oral prednisone x5 days and outpatient PCP follow-up  This is a brief discharge summary; please refer to the above assessment/plan as well as the remainder of the patient's medical record for further details  Please see above list of diagnoses and related plan for additional information  Condition at Discharge: good    Discharge Day Visit / Exam:   Subjective:  Patient seen and examined at the bedside, in no apparent distress  No acute events overnight  Vitals: Blood Pressure: 135/69 (07/16/22 0803)  Pulse: 102 (07/16/22 0803)  Temperature: 98 2 °F (36 8 °C) (07/16/22 0803)  Temp Source: Oral (07/16/22 0803)  Respirations: 20 (07/16/22 0803)  Height: 5' 10" (177 8 cm) (07/15/22 1634)  Weight - Scale: 83 kg (182 lb 15 7 oz) (07/16/22 0600)  SpO2: 97 % (07/16/22 0803)  Exam:   Physical Exam  Vitals and nursing note reviewed  Constitutional:       General: He is not in acute distress  Appearance: He is overweight  He is ill-appearing (Chronic)  He is not toxic-appearing  Interventions: Nasal cannula in place  HENT:      Head: Normocephalic and atraumatic  Mouth/Throat:      Mouth: Mucous membranes are moist    Eyes:      Conjunctiva/sclera: Conjunctivae normal    Cardiovascular:      Rate and Rhythm: Normal rate and regular rhythm  Pulses: Normal pulses  Heart sounds: Normal heart sounds  Pulmonary:      Effort: Pulmonary effort is normal  No respiratory distress  Breath sounds: Normal breath sounds  No wheezing, rhonchi or rales  Abdominal:      General: Bowel sounds are normal  There is no distension  Palpations: Abdomen is soft  Tenderness: There is no abdominal tenderness  Musculoskeletal:      Cervical back: Neck supple  Right lower leg: No edema  Left lower leg: No edema  Skin:     General: Skin is warm and dry  Neurological:      Mental Status: He is alert and oriented to person, place, and time  Cranial Nerves: No cranial nerve deficit  Sensory: No sensory deficit  Motor: No weakness  Coordination: Coordination normal    Psychiatric:         Mood and Affect: Mood normal          Behavior: Behavior normal          Thought Content: Thought content normal         Discussion with Family: Patient declined call to   Discharge instructions/Information to patient and family:   See after visit summary for information provided to patient and family  Provisions for Follow-Up Care:  See after visit summary for information related to follow-up care and any pertinent home health orders  Disposition:   Home    Planned Readmission:  None     Discharge Statement:  I spent 70 minutes discharging the patient  This time was spent on the day of discharge  I had direct contact with the patient on the day of discharge  Greater than 50% of the total time was spent examining patient, answering all patient questions, arranging and discussing plan of care with patient as well as directly providing post-discharge instructions  Additional time then spent on discharge activities  Discharge Medications:  See after visit summary for reconciled discharge medications provided to patient and/or family        **Please Note: This note may have been constructed using a voice recognition system**

## 2022-07-16 NOTE — DISCHARGE INSTR - AVS FIRST PAGE
Dear Melchor Cheng,     It was our pleasure to care for you here at [facility],  [address]  It is our hope that we were always able to exceed the expected standards for your care during your stay  You were hospitalized due to chronic obstructive pulmonary disease with acute exacerbation  You were cared for on the medical/surgical floor by Jennifer Mcbride PA-C under the service of Alissa Burton MD with the [facility] who covers for your primary care physician (PCP), Danis Tovar DO, while you were hospitalized  If you have any questions or concerns related to this hospitalization, you may contact us at [phone]  For follow up as well as any medication refills, we recommend that you follow up with your primary care physician  A registered nurse will reach out to you by phone within a few days after your discharge to answer any additional questions that you may have after going home  However, at this time we provide for you here, the most important instructions / recommendations at discharge:     Notable Medication Adjustments -   Prednisone 40 mg daily for the next 5 days, starting today  Please resume all other pre-admission medications at the pre-admission doses  Testing Required after Discharge -   None  Important follow up information -   Please follow-up up with outpatient cardiology office  Other Instructions -   Please maintain regular outpatient follow-up including routine lab work  Please maintain a healthy low-sodium diet  Please review this entire after visit summary as additional general instructions including medication list, appointments, activity, diet, any pertinent wound care, and other additional recommendations from your care team that may be provided for you        Sincerely,     Jennifer Mcbride PA-C

## 2022-07-16 NOTE — ASSESSMENT & PLAN NOTE
· The patient uses 2 L nasal cannula at baseline; may increase to 3-4 L with exertion     · On 2 L, which is patient's baseline, saturating well  · Stable for discharge home

## 2022-07-18 ENCOUNTER — TRANSITIONAL CARE MANAGEMENT (OUTPATIENT)
Dept: FAMILY MEDICINE CLINIC | Facility: CLINIC | Age: 77
End: 2022-07-18

## 2022-07-18 NOTE — UTILIZATION REVIEW
Observation Admission Authorization Request   NOTIFICATION OF OBSERVATION ADMISSION/OBSERVATION AUTHORIZATION REQUEST   SERVICING FACILITY:   [facility] [address] [facility] [place], [address]  Tax ID: [id number]  NPI: [id number]  Place of Service: On [address] [facility] Code: 22  CPT Code for Observation: CPT   CPT 19345     ATTENDING PROVIDER:  Attending Name and NPI#: Henry Green [[id number]]  Address: [address] [facility] [place], [address]  Phone: [phone]     UTILIZATION REVIEW CONTACT:  Edwar Lopez, Utilization Review Supervisor  Network Utilization Review Department  Phone: [phone]  Fax [phone]  Email: [email or web address]     PHYSICIAN ADVISORY SERVICES:  FOR UEJD-OF-RNNV REVIEW - MEDICAL NECESSITY DENIAL  Phone: [phone]  Fax: [phone]  Email: [email or web address]     TYPE OF REQUEST:  Observation Status     ADMISSION INFORMATION:  ADMISSION DATE/TIME: 07/15/22 10:12am   PATIENT DIAGNOSIS CODE/DESCRIPTION:  Shortness of breath [R06 02]  Hypokalemia [E87 6]  Hypomagnesemia [E83 42]  COPD exacerbation (HCC) [J44 1]  Closed head injury, initial encounter [S09 90XA]  Fall, initial encounter [W19  XXXA]  DISCHARGE DATE/TIME: 7/16/2022 11:21 AM   IMPORTANT INFORMATION:  Please contact Edwar Lopez directly with any questions or concerns regarding this request  Department voicemails are confidential     Send requests for admission clinical reviews, concurrent reviews, approvals, and administrative denials due to lack of clinical to fax [phone]

## 2022-07-20 LAB
ATRIAL RATE: 102 BPM
DIGITOXIN SERPL-MCNC: <5 NG/ML (ref 10–25)
P AXIS: 66 DEGREES
PR INTERVAL: 190 MS
QRS AXIS: 23 DEGREES
QRSD INTERVAL: 104 MS
QT INTERVAL: 364 MS
QTC INTERVAL: 474 MS
T WAVE AXIS: 150 DEGREES
VENTRICULAR RATE: 102 BPM

## 2022-07-20 PROCEDURE — 93010 ELECTROCARDIOGRAM REPORT: CPT | Performed by: INTERNAL MEDICINE

## 2022-07-21 ENCOUNTER — TELEPHONE (OUTPATIENT)
Dept: FAMILY MEDICINE CLINIC | Facility: CLINIC | Age: 77
End: 2022-07-21

## 2022-07-21 ENCOUNTER — OFFICE VISIT (OUTPATIENT)
Dept: FAMILY MEDICINE CLINIC | Facility: CLINIC | Age: 77
End: 2022-07-21
Payer: COMMERCIAL

## 2022-07-21 VITALS
BODY MASS INDEX: 26.23 KG/M2 | HEART RATE: 88 BPM | SYSTOLIC BLOOD PRESSURE: 136 MMHG | HEIGHT: 70 IN | TEMPERATURE: 96.4 F | DIASTOLIC BLOOD PRESSURE: 80 MMHG | WEIGHT: 183.2 LBS | OXYGEN SATURATION: 96 % | RESPIRATION RATE: 18 BRPM

## 2022-07-21 DIAGNOSIS — H54.62 VISION LOSS, LEFT EYE: ICD-10-CM

## 2022-07-21 DIAGNOSIS — E11.40 TYPE 2 DIABETES MELLITUS WITH DIABETIC NEUROPATHY, WITHOUT LONG-TERM CURRENT USE OF INSULIN (HCC): ICD-10-CM

## 2022-07-21 DIAGNOSIS — J44.9 CHRONIC OBSTRUCTIVE PULMONARY DISEASE, UNSPECIFIED COPD TYPE (HCC): Primary | ICD-10-CM

## 2022-07-21 DIAGNOSIS — I25.5 ISCHEMIC CARDIOMYOPATHY: ICD-10-CM

## 2022-07-21 DIAGNOSIS — I10 PRIMARY HYPERTENSION: ICD-10-CM

## 2022-07-21 DIAGNOSIS — J96.11 CHRONIC RESPIRATORY FAILURE WITH HYPOXIA (HCC): ICD-10-CM

## 2022-07-21 DIAGNOSIS — J90 BILATERAL PLEURAL EFFUSION: ICD-10-CM

## 2022-07-21 DIAGNOSIS — R26.2 AMBULATORY DYSFUNCTION: ICD-10-CM

## 2022-07-21 DIAGNOSIS — J44.1 COPD EXACERBATION (HCC): ICD-10-CM

## 2022-07-21 DIAGNOSIS — I25.10 CORONARY ARTERY DISEASE INVOLVING NATIVE CORONARY ARTERY OF NATIVE HEART WITHOUT ANGINA PECTORIS: Chronic | ICD-10-CM

## 2022-07-21 PROCEDURE — 99495 TRANSJ CARE MGMT MOD F2F 14D: CPT | Performed by: FAMILY MEDICINE

## 2022-07-21 PROCEDURE — 1111F DSCHRG MED/CURRENT MED MERGE: CPT | Performed by: FAMILY MEDICINE

## 2022-07-21 RX ORDER — PREDNISONE 10 MG/1
TABLET ORAL
Qty: 13 TABLET | Refills: 0 | Status: SHIPPED | OUTPATIENT
Start: 2022-07-21 | End: 2022-07-29

## 2022-07-21 NOTE — TELEPHONE ENCOUNTER
Couple things to follow-up our visit today after reviewing his chart further-     Please make sure after this hospital stay he is still receiving home health/home PT  I would like him to have an ECHO of his heart to check pumping function and valves, I was looking back last one was October of 2020  I've added an A1c to his blood work for when he gets that done  Let me know any questions- thanks!

## 2022-07-21 NOTE — PROGRESS NOTES
Rajiv Kim 1945 male MRN: [id number]    Family Medicine Transition of Care Visit      SUBJECTIVE    CC: YARI BONES Visit     Transitional Care Management Review:  Rajiv Kim is a 69 y o  male here for TCM follow up  He was hospitalized 7/14-7/16 COPD exacerbation  Hospital course as per discharge summery as below:     "Chronic obstructive pulmonary disease with acute exacerbation ([facility] [address] )  Assessment & Plan  Acute exacerbation is likely due to viral bronchitis  The patient with severe COPD  Reports worsening shortness of breath at night over the past 3-4 days  Per pulmonology outpatient, he is supposed to be on nebs only and azithromycin 250 mg 3 times per week       · Mild exacerbation   · Status post Solu-Medrol 125 mg x1 day  · Status post Nebulizer- Xopenex and Atrovent t i d , budesonide b i d   · Procalcitonin negative x2  · Azithromycin discontinued  · Stable for discharge on p o  prednisone 40 mg x 5 days    CT showed pleural effusion, mediastinal lymph nodes and mildly enlarged spleen  Will plan on follow-up imaging to follow this  Feeling better, breathing at baseline  Given severe COPD and multiple recent exacerbations, will provide patient with longer gradual prednisone taper  Follow-up with pulmonology 8/1       Fall  Assessment & Plan  · Patient admitted to drinking 6 beers last night and tripped over a patch of grass walking back to his home  · No trauma or injury   · Imaging without acute finding  · Stable for discharge home    No falls since discharge, although this morning admits he almost fell forward bending to get his socks  Felt equilibrium was off, first time this happened  Feels fine now  Chronic ambulatory dysfunction, 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)  Recommended labs to check electrolytes and hemoglobin     PT recommended home with home health, will make sure he is getting this       Primary hypertension  Assessment & Plan  · BP noted to be slightly elevated on admission, now well controlled  · Discharge on pre-admission metoprolol, lisinopril    Well-controlled       Chronic respiratory failure with hypoxia ([address] )  Assessment & Plan  · The patient uses 2 L nasal cannula at baseline; may increase to 3-4 L with exertion  · On 2 L, which is patient's baseline, saturating well  · Stable for discharge home    Using 2 L oxygen NC       Ischemic cardiomyopathy  Assessment & Plan  · RVF of 30%  · Follows with PENG Mobley   · The patient declined prophylactic ICD at this time given his pulmonary comorbidities  · In no acute exacerbation   · Outpatient Cardiology follow-up    Trace bilateral pitting edema, crackles on exam likely secondary to pleural effusion seen on imaging  Last ECHO 10/2020, given murmur appreciated today will order updated ECHO  No chest pains or palpitations  Has follow-up cardiology in October       Coronary artery disease involving native coronary artery of native heart without angina pectoris  Assessment & Plan  · Prior CABG x4 in 3/2017  · Discharge on pre-admission aspirin, statin, beta-blocker  · Maintain regular outpatient Cardiology follow-up     Type 2 diabetes mellitus without complication, without long-term current use of insulin [facility])  Assessment & Plan        Lab Results   Component Value Date     HGBA1C 7 9 (A) 05/05/2022                Recent Labs     07/15/22  1521 07/15/22  1631 07/15/22  2116 07/16/22  0636   POCGLU 231* 241* 206* 156*         Blood Sugar Average: Last 72 hrs:  · (P) 197 8   · Okay to resume all pre-admission hypoglycemic agents  · Routine PCP follow-up"    A1c ordered  "Adelina Garcia is a 69 y o  male patient with a past medical history significant for COPD on chronic 2 L at baseline, CAD, type 2 diabetes, ischemic cardiomyopathy, hypertension who originally presented to the hospital on 7/14/2022 due to a mechanical fall    Please refer to the initial history and physical as outlined by Rebecca Shipley on 07/15/2022 for additional presenting features  In brief, patient brought was brought in by EMS due to a mechanical fall after drinking 6 beers and attempting to walk home the previous night  Patient was found to be in acute COPD exacerbation during evaluation in the ED  Patient was admitted under observation status for further treatment for COPD exacerbation  Patient was treated with IV steroids, nebulizers, and antibiotics  Patient was initially with increased oxygen requirements up to 4 L via nasal cannula, but was titrated down to his baseline of 2 L prior to discharge  Exacerbation resolved, and patient was medically stable for discharge on 07/16/2022 with 40 mg oral prednisone x5 days and outpatient PCP follow-up  This is a brief discharge summary; please refer to the above assessment/plan as well as the remainder of the patient's medical record for further details "    During the TCM phone call patient stated:    TCM Call (since 6/20/2022)     Date and time call was made  7/18/2022  9:17 AM    Patient was hospitialized at  [address]    Date of Admission  07/14/22    Date of discharge  07/16/22    Diagnosis  chronic obstructive pulm disease with acute exacerbation    Disposition  Home    Current Symptoms  Cough; Shortness of breath    Cough Severity  Severe    Shortness of breath severity  Severe      TCM Call (since 6/20/2022)     Post hospital issues  None    Scheduled for follow up?   Yes    Did you obtain your prescribed medications  Yes    Do you need help managing your prescriptions or medications  No    Is transportation to your appointment needed  No    I have advised the patient to call PCP with any new or worsening symptoms  Yesi HENRY    Living Arrangements  Spouse or Significiant other    Support System  Spouse    The type of support provided  Emotional; Physical; Other (comment)    Do you have social support  Yes, as much as I need During today's visit:    Problems addressed as above in bold  They are complying with their medication changes and discharge instructions  They have the following questions: As above     Review of Systems   All other systems reviewed and are negative  Historical Information     The patient history was reviewed as follows:    Past Medical History:   Diagnosis Date    BPH (benign prostatic hyperplasia)     45 days radiation treatment    COPD (chronic obstructive pulmonary disease)     Coronary artery disease     CABG x4 in 2017    Diabetes mellitus     History of Arterial Duplex of LE 12/26/2017    Likely occlusion of the left superficial femoral artery  Calcific changes bilaterally  Despite these changes, the ankle-brachial index as a measure of peripheral blood flow only mildly impaired   History of echocardiogram 06/12/2017    EF 40%, mild LVH, mild MR     Hyperlipidemia     Hypertension     Ischemic cardiomyopathy     NSTEMI (non-ST elevated myocardial infarction)     Prostate cancer     prostate     PVD (peripheral vascular disease)     Sepsis due to pneumonia     Tremor     Type 2 MI (myocardial infarction) ([facility] [address] ) 04/06/2021     Past Surgical History:   Procedure Laterality Date    CARDIAC CATHETERIZATION  03/08/2017    Significant left main plus triple-vessel CAD   CORONARY ARTERY BYPASS GRAFT  03/08/2017    4V CABG:  LIMA to LAD, VG to RI, SVG to PDA to LVBR RCA      EYE SURGERY      shots in eye once a month @ the [place]    PROSTATE BIOPSY       Family History   Problem Relation Age of Onset    Cancer Father     Heart disease Brother       Social History   Social History     Substance and Sexual Activity   Alcohol Use Yes     Social History     Substance and Sexual Activity   Drug Use Never     Social History     Tobacco Use   Smoking Status Former Smoker    Packs/day: 0 25    Years: 60 00    Pack years: 15 00    Types: Cigarettes    Quit date: 04/2022    Years since quittin 3   Smokeless Tobacco Never Used       Medications:   Meds/Allergies     Current Outpatient Medications:     albuterol (PROVENTIL HFA,VENTOLIN HFA) 90 mcg/act inhaler, Inhale 2 puffs every 6 (six) hours as needed for wheezing or shortness of breath, Disp: , Rfl: 0    arformoterol (BROVANA) 15 mcg/2 mL nebulizer solution, Take 2 mL (15 mcg total) by nebulization 2 (two) times a day, Disp: 120 mL, Rfl: 5    aspirin (ECOTRIN LOW STRENGTH) 81 mg EC tablet, Take 1 tablet (81 mg total) by mouth every other day, Disp:  , Rfl: 0    cyanocobalamin (VITAMIN B-12) 500 MCG tablet, TAKE ONE TABLET BY MOUTH EVERY MORNING *VITAMIN B12*, Disp: , Rfl:     digoxin (LANOXIN) 0 125 mg tablet, Take 1 tablet (125 mcg total) by mouth daily, Disp: 30 tablet, Rfl: 5    gabapentin (NEURONTIN) 300 mg capsule, Take 1 capsule (300 mg total) by mouth daily at bedtime, Disp: 90 capsule, Rfl: 1    glimepiride (AMARYL) 1 mg tablet, Take 2 tablets (2 mg total) by mouth daily with breakfast AND 1 tablet (1 mg total) daily with dinner , Disp: , Rfl: 0    lisinopril (ZESTRIL) 20 mg tablet, Take 1 tablet (20 mg total) by mouth daily, Disp: 30 tablet, Rfl: 1    metFORMIN (GLUCOPHAGE) 1000 MG tablet, Take 1 tablet (1,000 mg total) by mouth 2 (two) times a day with meals (Patient taking differently: Take 1,000 mg by mouth 2 (two) times a day with meals Taking 500mg 2 times daily), Disp: 180 tablet, Rfl: 3    metoprolol tartrate (LOPRESSOR) 25 mg tablet, Take 1 tablet (25 mg total) by mouth every 12 (twelve) hours, Disp: 60 tablet, Rfl: 5    Multiple Vitamins-Minerals (PRESERVISION AREDS 2 PO), Take by mouth, Disp: , Rfl:     predniSONE 10 mg tablet, Take 3 tablets (30 mg total) by mouth daily for 2 days, THEN 2 tablets (20 mg total) daily for 2 days, THEN 1 tablet (10 mg total) daily for 2 days, THEN 0 5 tablets (5 mg total) daily for 2 days  , Disp: 13 tablet, Rfl: 0    predniSONE 20 mg tablet, Take 2 tablets (40 mg total) by mouth daily, Disp: 10 tablet, Rfl: 0    primidone (MYSOLINE) 50 mg tablet, Take 100 mg by mouth 2 (two) times a day , Disp: , Rfl:     rosuvastatin (CRESTOR) 10 MG tablet, Take 1 tablet (10 mg total) by mouth daily, Disp: 90 tablet, Rfl: 3    terbinafine (LamISIL) 1 % cream, APPLY THIN LAYER TOPICALLY TWICE A DAY FOR FUNGAL INFECTION, Disp: , Rfl:   Allergies   Allergen Reactions    Atorvastatin Myalgia       OBJECTIVE    Vitals:   Vitals:    22 1105   BP: 136/80   Pulse: 88   Resp: 18   Temp: (!) 96 4 °F (35 8 °C)   TempSrc: Tympanic   SpO2: 96%   Weight: 83 1 kg (183 lb 3 2 oz)   Height: 5' 10" (1 778 m)       Body mass index is 26 29 kg/m²  Physical Exam:    Physical Exam  Vitals reviewed  Constitutional:       General: He is not in acute distress  Appearance: Normal appearance  He is not ill-appearing, toxic-appearing or diaphoretic  HENT:      Head: Normocephalic and atraumatic  Eyes:      General:         Right eye: No discharge  Left eye: No discharge  Extraocular Movements: Extraocular movements intact  Conjunctiva/sclera: Conjunctivae normal       Pupils: Pupils are equal, round, and reactive to light  Cardiovascular:      Rate and Rhythm: Normal rate and regular rhythm  Heart sounds: Murmur heard  Systolic murmur is present with a grade of 2/6  No friction rub  No gallop  Comments: Occasional PVC  Pulmonary:      Effort: Pulmonary effort is normal  No respiratory distress  Breath sounds: No stridor  No wheezing  Comments: Bibasilar crackles   Musculoskeletal:      Right lower le+ Pitting Edema present  Left lower le+ Pitting Edema present  Skin:     General: Skin is warm  Coloration: Skin is not pale  Findings: No erythema or rash  Neurological:      Mental Status: He is alert and oriented to person, place, and time  Motor: No weakness     Psychiatric:         Mood and Affect: Mood normal          Behavior: Behavior normal           Labs: I have personally reviewed all pertinent results       Admission on 07/14/2022, Discharged on 07/16/2022   Component Date Value Ref Range Status    WBC 07/14/2022 7 82  4 31 - 10 16 Thousand/uL Final    RBC 07/14/2022 3 96  3 88 - 5 62 Million/uL Final    Hemoglobin 07/14/2022 11 7 (A) 12 0 - 17 0 g/dL Final    Hematocrit 07/14/2022 37 3  36 5 - 49 3 % Final    MCV 07/14/2022 94  82 - 98 fL Final    MCH 07/14/2022 29 5  26 8 - 34 3 pg Final    MCHC 07/14/2022 31 4  31 4 - 37 4 g/dL Final    RDW 07/14/2022 14 8  11 6 - 15 1 % Final    MPV 07/14/2022 10 3  8 9 - 12 7 fL Final    Platelets 05/04/5351 219  149 - 390 Thousands/uL Final    nRBC 07/14/2022 0  /100 WBCs Final    Neutrophils Relative 07/14/2022 73  43 - 75 % Final    Immat GRANS % 07/14/2022 2  0 - 2 % Final    Lymphocytes Relative 07/14/2022 15  14 - 44 % Final    Monocytes Relative 07/14/2022 7  4 - 12 % Final    Eosinophils Relative 07/14/2022 2  0 - 6 % Final    Basophils Relative 07/14/2022 1  0 - 1 % Final    Neutrophils Absolute 07/14/2022 5 78  1 85 - 7 62 Thousands/µL Final    Immature Grans Absolute 07/14/2022 0 13  0 00 - 0 20 Thousand/uL Final    Lymphocytes Absolute 07/14/2022 1 17  0 60 - 4 47 Thousands/µL Final    Monocytes Absolute 07/14/2022 0 52  0 17 - 1 22 Thousand/µL Final    Eosinophils Absolute 07/14/2022 0 16  0 00 - 0 61 Thousand/µL Final    Basophils Absolute 07/14/2022 0 06  0 00 - 0 10 Thousands/µL Final    Sodium 07/14/2022 134 (A) 135 - 147 mmol/L Final    Potassium 07/14/2022 2 9 (A) 3 5 - 5 3 mmol/L Final    Chloride 07/14/2022 97  96 - 108 mmol/L Final    CO2 07/14/2022 22  21 - 32 mmol/L Final    ANION GAP 07/14/2022 15 (A) 4 - 13 mmol/L Final    BUN 07/14/2022 9  5 - 25 mg/dL Final    Creatinine 07/14/2022 0 65  0 60 - 1 30 mg/dL Final    Standardized to IDMS reference method    Glucose 07/14/2022 88  65 - 140 mg/dL Final    If the patient is fasting, the ADA then defines impaired fasting glucose as > 100 mg/dL and diabetes as > or equal to 123 mg/dL  Specimen collection should occur prior to Sulfasalazine administration due to the potential for falsely depressed results  Specimen collection should occur prior to Sulfapyridine administration due to the potential for falsely elevated results   Calcium 07/14/2022 8 3 (A) 8 4 - 10 2 mg/dL Final    AST 07/14/2022 14  13 - 39 U/L Final    Specimen collection should occur prior to Sulfasalazine administration due to the potential for falsely depressed results   ALT 07/14/2022 9  7 - 52 U/L Final    Specimen collection should occur prior to Sulfasalazine administration due to the potential for falsely depressed results   Alkaline Phosphatase 07/14/2022 90  34 - 104 U/L Final    Total Protein 07/14/2022 6 3 (A) 6 4 - 8 4 g/dL Final    Albumin 07/14/2022 4 0  3 5 - 5 0 g/dL Final    Total Bilirubin 07/14/2022 0 25  0 20 - 1 00 mg/dL Final    eGFR 07/14/2022 94  ml/min/1 73sq m Final    Magnesium 07/14/2022 1 7 (A) 1 9 - 2 7 mg/dL Final    BNP 07/14/2022 325 (A) 0 - 100 pg/mL Final    hs TnI 0hr 07/14/2022 17  "Refer to ACS Flowchart"- see link ng/L Final    Comment:                                              Initial (time 0) result  If >=50 ng/L, Myocardial injury suggested ;  Type of myocardial injury and treatment strategy  to be determined  If 5-49 ng/L, a delta result at 2 hours and or 4 hours will be needed to further evaluate  If <4 ng/L, and chest pain has been >3 hours since onset, patient may qualify for discharge based on the HEART score in the ED  If <5 ng/L and <3hours since onset of chest pain, a delta result at 2 hours will be needed to further evaluate  HS Troponin 99th Percentile URL of a Health Population=12 ng/L with a 95% Confidence Interval of 8-18 ng/L      Second Troponin (time 2 hours)  If calculated delta >= 20 ng/L,  Myocardial injury suggested ; Type of myocardial injury and treatment strategy to be determined  If 5-49 ng/L and the calculated delta is 5-19 ng/L, consult medical service for evaluation  Continue evaluation for ischemia on ecg and other possible etiology and repeat hs troponin at 4 hours  If delta                            is <5 ng/L at 2 hours, consider discharge based on risk stratification via the HEART score (if in ED), or DEBBY risk score in IP/Observation  HS Troponin 99th Percentile URL of a Health Population=12 ng/L with a 95% Confidence Interval of 8-18 ng/L   hs TnI 2hr 07/15/2022 38  "Refer to ACS Flowchart"- see link ng/L Final    Comment:                                              Initial (time 0) result  If >=50 ng/L, Myocardial injury suggested ;  Type of myocardial injury and treatment strategy  to be determined  If 5-49 ng/L, a delta result at 2 hours and or 4 hours will be needed to further evaluate  If <4 ng/L, and chest pain has been >3 hours since onset, patient may qualify for discharge based on the HEART score in the ED  If <5 ng/L and <3hours since onset of chest pain, a delta result at 2 hours will be needed to further evaluate  HS Troponin 99th Percentile URL of a Health Population=12 ng/L with a 95% Confidence Interval of 8-18 ng/L  Second Troponin (time 2 hours)  If calculated delta >= 20 ng/L,  Myocardial injury suggested ; Type of myocardial injury and treatment strategy to be determined  If 5-49 ng/L and the calculated delta is 5-19 ng/L, consult medical service for evaluation  Continue evaluation for ischemia on ecg and other possible etiology and repeat hs troponin at 4 hours  If delta                            is <5 ng/L at 2 hours, consider discharge based on risk stratification via the HEART score (if in ED), or DEBBY risk score in IP/Observation  HS Troponin 99th Percentile URL of a Health Population=12 ng/L with a 95% Confidence Interval of 8-18 ng/L      Delta 2hr hsTnI 07/15/2022 21 (A) <20 ng/L Final    hs TnI 4hr 07/15/2022 52 (A) "Refer to ACS Flowchart"- see link ng/L Final    Comment:                                              Initial (time 0) result  If >=50 ng/L, Myocardial injury suggested ;  Type of myocardial injury and treatment strategy  to be determined  If 5-49 ng/L, a delta result at 2 hours and or 4 hours will be needed to further evaluate  If <4 ng/L, and chest pain has been >3 hours since onset, patient may qualify for discharge based on the HEART score in the ED  If <5 ng/L and <3hours since onset of chest pain, a delta result at 2 hours will be needed to further evaluate  HS Troponin 99th Percentile URL of a Health Population=12 ng/L with a 95% Confidence Interval of 8-18 ng/L  Second Troponin (time 2 hours)  If calculated delta >= 20 ng/L,  Myocardial injury suggested ; Type of myocardial injury and treatment strategy to be determined  If 5-49 ng/L and the calculated delta is 5-19 ng/L, consult medical service for evaluation  Continue evaluation for ischemia on ecg and other possible etiology and repeat hs troponin at 4 hours  If delta                            is <5 ng/L at 2 hours, consider discharge based on risk stratification via the HEART score (if in ED), or DEBBY risk score in IP/Observation  HS Troponin 99th Percentile URL of a Health Population=12 ng/L with a 95% Confidence Interval of 8-18 ng/L   Delta 4hr hsTnI 07/15/2022 35 (A) <20 ng/L Final    Potassium 07/15/2022 4 4  3 5 - 5 3 mmol/L Final    Digitoxin Lvl 07/15/2022 <5 0 (A) 10 - 25 ng/mL Final    Procalcitonin 07/14/2022 0 05  <=0 25 ng/ml Final    Comment: Suspected Lower Respiratory Tract Infection (LRTI):  - LESS than or EQUAL to 0 25 ng/mL:   low likelihood for bacterial LRTI; antibiotics DISCOURAGED   - GREATER than 0 25 ng/mL:   increased likelihood for bacterial LRTI; antibiotics ENCOURAGED  Suspected Sepsis:  - Strongly consider initiating antibiotics in ALL UNSTABLE patients    - LESS than or EQUAL to 0 5 ng/mL:   low likelihood for bacterial sepsis; antibiotics DISCOURAGED   - GREATER than 0 5 ng/mL:   increased likelihood for bacterial sepsis; antibiotics ENCOURAGED   - GREATER than 2 ng/mL:   high risk for severe sepsis / septic shock; antibiotics strongly ENCOURAGED  Decisions on antibiotic use should not be based solely on Procalcitonin (PCT) levels  If PCT is low but uncertainty exists with stopping antibiotics, repeat PCT in 6-24 hours to confirm the low level  If antibiotics are administered (regardless if initial PCT was high or low), repeat PCT every 1-2 days to consider early antibiotic cessation (when GREATER                            than 80% decrease from the peak OR when PCT drops below designated cutoffs, whichever comes first), so long as the infection is NOT one that typically requires prolonged treatment durations (e g , bone/joint infections, endocarditis, Staph  aureus bacteremia)      Situations of FALSE-POSITIVE Procalcitonin values:  1) Newborns < 67 hours old  2) Massive stress from severe trauma / burns, major surgery, acute pancreatitis, cardiogenic / hemorrhagic shock, sickle cell crisis, or other organ perfusion abnormalities  3) Malaria and some Candidal infections  4) Treatment with agents that stimulate cytokines (e g , OKT3, anti-lymphocyte globulins, alemtuzumab, IL-2, granulocyte transfusion [NOT GCSFs])  5) Chronic renal disease causes elevated baseline levels (consider GREATER than 0 75 ng/mL as an abnormal cut-off); initiating HD/CRRT may cause transient decreases  6) Paraneoplastic syndromes from medullary thyroid or SCLC, some forms of vasculitis, and acute [id number]                            disease    Situations of FALSE-NEGATIVE Procalcitonin values:  1) Too early in clinical course for PCT to have reached its peak (may repeat in 6-24 hours to confirm low level)  2) Localized infection WITHOUT systemic (SIRS / sepsis) response (e g , an abscess, osteomyelitis, cystitis)  3) Mycobacteria (e g , Tuberculosis, MAC)  4) Cystic fibrosis exacerbations      POC Glucose 07/15/2022 155 (A) 65 - 140 mg/dl Final    POC Glucose 07/15/2022 231 (A) 65 - 140 mg/dl Final    POC Glucose 07/15/2022 241 (A) 65 - 140 mg/dl Final    Ventricular Rate 07/15/2022 99  BPM Final    Atrial Rate 07/15/2022 99  BPM Final    HI Interval 07/15/2022 188  ms Final    QRSD Interval 07/15/2022 96  ms Final    QT Interval 07/15/2022 340  ms Final    QTC Interval 07/15/2022 436  ms Final    P Axis 07/15/2022 75  degrees Final    QRS Axis 07/15/2022 63  degrees Final    T [employer] [place] 07/15/2022 138  degrees Final    POC Glucose 07/15/2022 206 (A) 65 - 140 mg/dl Final    WBC 07/16/2022 16 24 (A) 4 31 - 10 16 Thousand/uL Final    RBC 07/16/2022 3 87 (A) 3 88 - 5 62 Million/uL Final    Hemoglobin 07/16/2022 11 6 (A) 12 0 - 17 0 g/dL Final    Hematocrit 07/16/2022 36 0 (A) 36 5 - 49 3 % Final    MCV 07/16/2022 93  82 - 98 fL Final    MCH 07/16/2022 30 0  26 8 - 34 3 pg Final    MCHC 07/16/2022 32 2  31 4 - 37 4 g/dL Final    RDW 07/16/2022 15 1  11 6 - 15 1 % Final    Platelets 19/02/2195 230  149 - 390 Thousands/uL Final    MPV 07/16/2022 11 0  8 9 - 12 7 fL Final    Sodium 07/16/2022 138  135 - 147 mmol/L Final    Potassium 07/16/2022 3 6  3 5 - 5 3 mmol/L Final    Chloride 07/16/2022 100  96 - 108 mmol/L Final    CO2 07/16/2022 26  21 - 32 mmol/L Final    ANION GAP 07/16/2022 12  4 - 13 mmol/L Final    BUN 07/16/2022 14  5 - 25 mg/dL Final    Creatinine 07/16/2022 0 69  0 60 - 1 30 mg/dL Final    Standardized to IDMS reference method    Glucose 07/16/2022 160 (A) 65 - 140 mg/dL Final    If the patient is fasting, the ADA then defines impaired fasting glucose as > 100 mg/dL and diabetes as > or equal to 123 mg/dL  Specimen collection should occur prior to Sulfasalazine administration due to the potential for falsely depressed results   Specimen collection should occur prior to Sulfapyridine administration due to the potential for falsely elevated results   Glucose, Fasting 07/16/2022 160 (A) 65 - 99 mg/dL Final    Specimen collection should occur prior to Sulfasalazine administration due to the potential for falsely depressed results  Specimen collection should occur prior to Sulfapyridine administration due to the potential for falsely elevated results   Calcium 07/16/2022 8 1 (A) 8 4 - 10 2 mg/dL Final    eGFR 07/16/2022 92  ml/min/1 73sq m Final    Procalcitonin 07/16/2022 0 12  <=0 25 ng/ml Final    Comment: Suspected Lower Respiratory Tract Infection (LRTI):  - LESS than or EQUAL to 0 25 ng/mL:   low likelihood for bacterial LRTI; antibiotics DISCOURAGED   - GREATER than 0 25 ng/mL:   increased likelihood for bacterial LRTI; antibiotics ENCOURAGED  Suspected Sepsis:  - Strongly consider initiating antibiotics in ALL UNSTABLE patients  - LESS than or EQUAL to 0 5 ng/mL:   low likelihood for bacterial sepsis; antibiotics DISCOURAGED   - GREATER than 0 5 ng/mL:   increased likelihood for bacterial sepsis; antibiotics ENCOURAGED   - GREATER than 2 ng/mL:   high risk for severe sepsis / septic shock; antibiotics strongly ENCOURAGED  Decisions on antibiotic use should not be based solely on Procalcitonin (PCT) levels  If PCT is low but uncertainty exists with stopping antibiotics, repeat PCT in 6-24 hours to confirm the low level  If antibiotics are administered (regardless if initial PCT was high or low), repeat PCT every 1-2 days to consider early antibiotic cessation (when GREATER                            than 80% decrease from the peak OR when PCT drops below designated cutoffs, whichever comes first), so long as the infection is NOT one that typically requires prolonged treatment durations (e g , bone/joint infections, endocarditis, Staph  aureus bacteremia)      Situations of FALSE-POSITIVE Procalcitonin values:  1) Newborns < 72 hours old  2) Massive stress from severe trauma / burns, major surgery, acute pancreatitis, cardiogenic / hemorrhagic shock, sickle cell crisis, or other organ perfusion abnormalities  3) Malaria and some Candidal infections  4) Treatment with agents that stimulate cytokines (e g , OKT3, anti-lymphocyte globulins, alemtuzumab, IL-2, granulocyte transfusion [NOT GCSFs])  5) Chronic renal disease causes elevated baseline levels (consider GREATER than 0 75 ng/mL as an abnormal cut-off); initiating HD/CRRT may cause transient decreases  6) Paraneoplastic syndromes from medullary thyroid or SCLC, some forms of vasculitis, and acute [id number]                            disease    Situations of FALSE-NEGATIVE Procalcitonin values:  1) Too early in clinical course for PCT to have reached its peak (may repeat in 6-24 hours to confirm low level)  2) Localized infection WITHOUT systemic (SIRS / sepsis) response (e g , an abscess, osteomyelitis, cystitis)  3) Mycobacteria (e g , Tuberculosis, MAC)  4) Cystic fibrosis exacerbations      POC Glucose 07/16/2022 156 (A) 65 - 140 mg/dl Final    Ventricular Rate 07/14/2022 102  BPM Final    Atrial Rate 07/14/2022 102  BPM Final    VT Interval 07/14/2022 190  ms Final    QRSD Interval 07/14/2022 104  ms Final    QT Interval 07/14/2022 364  ms Final    QTC Interval 07/14/2022 474  ms Final    P Axis 07/14/2022 66  degrees Final    QRS Axis 07/14/2022 23  degrees Final    T Wave Axis 07/14/2022 150  degrees Final       Imaging:  I have personally reviewed all pertinent results  Assessment/Plan    No problem-specific Assessment & Plan notes found for this encounter  Aisteve Zurita was seen today for transition of care management  Diagnoses and all orders for this visit:    Chronic obstructive pulmonary disease, unspecified [facility] ([facility]  -     predniSONE 10 mg tablet;  Take 3 tablets (30 mg total) by mouth daily for 2 days, THEN 2 tablets (20 mg total) daily for 2 days, THEN 1 tablet (10 mg total) daily for 2 days, THEN 0 5 tablets (5 mg total) daily for 2 days  -     CBC and differential; Future  -     Comprehensive metabolic panel; Future  -     XR chest pa & lateral; Future    COPD exacerbation (HCC)  -     predniSONE 10 mg tablet; Take 3 tablets (30 mg total) by mouth daily for 2 days, THEN 2 tablets (20 mg total) daily for 2 days, THEN 1 tablet (10 mg total) daily for 2 days, THEN 0 5 tablets (5 mg total) daily for 2 days  -     CBC and differential; Future  -     Comprehensive metabolic panel; Future  -     XR chest pa & lateral; Future    Bilateral pleural effusion    Ambulatory dysfunction    Vision loss, left eye    Primary hypertension    Chronic respiratory failure with hypoxia (HCC)    Ischemic cardiomyopathy  -     Echo complete w/ contrast if indicated; Future    Coronary artery disease involving native coronary artery of native heart without angina pectoris    Type 2 diabetes mellitus with diabetic neuropathy, without long-term current use of insulin (HCC)  -     HEMOGLOBIN A1C W/ EAG ESTIMATION;  Future          Future Appointments   Date Time Provider Sabine Oliver   8/1/2022  8:40 AM [facility]   8/12/2022 11:00 AM Sintia Silva PA-C [facility]   10/21/2022  8:00 PM [facility] SLEEP ROOM 01 [facility] Sleep lab [facility]   10/24/2022  9:20 AM Meli Gordillo MD BM Cardio [facility]   10/27/2022 10:30 AM DO GAUDENCIO Gama PC [facility]   12/22/2022  4:00 PM Emir Smith MD [facility] Sleep Med [address] [place] [address], [id number] [facility]

## 2022-07-26 ENCOUNTER — TELEPHONE (OUTPATIENT)
Dept: PULMONOLOGY | Facility: CLINIC | Age: 77
End: 2022-07-26

## 2022-07-26 DIAGNOSIS — J44.9 CHRONIC OBSTRUCTIVE PULMONARY DISEASE, UNSPECIFIED COPD TYPE (HCC): Primary | ICD-10-CM

## 2022-07-26 NOTE — TELEPHONE ENCOUNTER
Patient's wife calling stating the script for atrovent is showing as cancelled at the pharmacy but he takes it daily  They are asking if a new script can be sent to [facility]

## 2022-07-26 NOTE — TELEPHONE ENCOUNTER
Called and spoke with Loyda Goddard, she stated that the home health, home PT stopped already, they only came 8 times  Informed her of ECHO recommendation and labs getting added on, she verbalized understanding

## 2022-07-31 NOTE — PROGRESS NOTES
Pulmonary Follow Up Note   Zeynep Brock 69 y o  male MRN: [id number]  8/1/2022    Assessment:  COPD   · Very severe, gold stage IV D last FEV1 of 22%   · Multiple exacerbation/pneumonia over the past year   · Quit smoking in 03/2022     Plan:   · Recommended/reinforce the need for the triple inhaler via nebulizer  · Budesonide/Brovana q 12, Atrovent t i d   · Given written instructions about the medications intake  · Start roflumilast 250mg 1 tab daily for 28 days, if tolerated will increase to 500 daily given the frequent exacerbation   · Not a candidate for LV are/EBV, last TLC of 88%  · Close follow-up in 4 weeks to assess response, and will reorder referral to pulmonary rehab at next visit    Chronic hypoxemic respiratory failure   · Due to advanced COPD/ischemic cardiomyopathy   · Supplemental oxygen 2 LPM at rest, up to 4 LPM with exertion    Former tobacco abuse   · About 50 pack year history   · Quit completely in 03/2022   · No suspicious lesion on last CT chest for lung cancer screening done in 07/2022 while was admitted for exacerbation   · Next due for lung cancer screening CT chest in 07/2023      Return in about 4 weeks (around 8/29/2022)  History of Present Illness     Follow up for: COPD/HFU    Background:  69 y o  male with a h/o BPH, prostate cancer, COPD, CAD/CABG, combined systolic/diastolic CHF,DM2, HTN, PVD, active tobacco abuse, possible BERONICA      Hospitalized from 3/18, 2 3/21 for COPD exacerbation   Treated with IV steroids/Zithromax then discharged on Augmentin and a steroid taper   Also treated for sepsis, suspected DKA       03/23/2022 visit-noted exertional hypoxemia required 1 LPM   Switched to nebulized inhalers only Perforomist/budesonide and Atrovent  Added azithromycin  Reordered PFT for candidacy of LVR/EBV  Not interested in nicotine replacement      04/2022-hospitalized for acute respiratory failure, RLL community-acquired pneumonia    Required BiPAP, antibiotics, steroids and nebulized treatment  5/2022 [id number] chest x-ray pulmonary rehab referral   In-lab sleep study ordered    Interval History  Hospitalized in 07/2022 for COPD exacerbation, azithromycin was discontinued on discharge treated with steroids/taper  Quit smoking 4 months ago  Continues with limited exercise capacity due to dyspnea, no significant cough or sputum production  Able to be independent in ADLs  Review of Systems  As per hpi, all other systems reviewed and were negative    Studies:  Imaging and other studies: I have personally reviewed pertinent films in PACS     Low-dose CT chest 03/07/2022-no suspicious nodules, discoid scarring at the lingula      CXR 5/5/2022-improving lower lobes PNA     Pulmonary function testing:   PFT 09/05/2019-ratio 52%, FEV1 0 82 L/26%, FVC 1 58 L/37%     PFT 03/31/2022-Ratio 45%, FEV1 0 69 L/22%, FVC 1 52 L/37% TLC 88%, %, DLCO 43%  Significant BD response      6 minute walk test 3/20 03/2022-able to walk about 76 m with using a cane for 5 minutes   Lowest SpO2 at 87% on room air, required 1 LPM to end exercise with SpO2 of 94% on 1 LPM/24% FiO2, maximal heart rate 103     EKG, Pathology, and Other Studies: I have personally reviewed pertinent reports     TTE 10/12/2020-mildly dilated LV, EF 30%, moderate diffuse hypokinesis, grade 1 to diastolic dysfunction   TAPSE 1 82     Past medical, surgical, social and family histories reviewed  Medications/Allergies: Reviewed      Vitals: Blood pressure 154/74, pulse 88, temperature 98 °F (36 7 °C), temperature source Temporal, resp  rate 20, height 5' 10" (1 778 m), weight 84 6 kg (186 lb 6 4 oz), SpO2 91 %  Body mass index is 26 75 kg/m²  Oxygen Therapy  SpO2: 91 %  Oxygen Therapy: Supplemental oxygen  O2 Delivery Method: Nasal cannula  O2 Flow Rate (L/min): 2 L/min      Physical Exam  Body mass index is 26 75 kg/m²     Gen: not in acute distress,   Neck/Eyes: supple, PERRL  Ear: normal appearance, mild/moderate hearing impairment  Nose:  normal nasal mucosa, no drainage  Mouth:  unremarkable/normal appearance of lips, teeth and gums  Oropharynx: mucosa is moist, no focal lesions or erythema  Salivary glands: soft nontender  Chest: normal respiratory efforts, diminished/fine crackles posterior/basal   CV:  Distant heart sounds, 1+ pitting below knees edema  Abdomen: soft, non tender  Extremities:  No observed deformity   Skin: unremarkable  Neuro: AAO X3, no focal motor deficit        Labs:  Lab Results   Component Value Date    WBC 16 24 (H) 07/16/2022    HGB 11 6 (L) 07/16/2022    HCT 36 0 (L) 07/16/2022    MCV 93 07/16/2022     07/16/2022     Lab Results   Component Value Date    GLUCOSE 374 (H) 04/26/2022    CALCIUM 8 1 (L) 07/16/2022     (L) 05/28/2018    K 3 6 07/16/2022    CO2 26 07/16/2022     07/16/2022    BUN 14 07/16/2022    CREATININE 0 69 07/16/2022     No results found for: IGE  Lab Results   Component Value Date    ALT 9 07/14/2022    AST 14 07/14/2022    ALKPHOS 90 07/14/2022    BILITOT 0 5 05/28/2018           Portions of the record may have been created with voice recognition software  Occasional wrong word or "sound a like" substitutions may have occurred due to the inherent limitations of voice recognition software  Read the chart carefully and recognize, using context, where substitutions have occurred    WENDY Nichole [facility]

## 2022-08-01 ENCOUNTER — OFFICE VISIT (OUTPATIENT)
Dept: PULMONOLOGY | Facility: CLINIC | Age: 77
End: 2022-08-01
Payer: COMMERCIAL

## 2022-08-01 VITALS
RESPIRATION RATE: 20 BRPM | HEART RATE: 88 BPM | TEMPERATURE: 98 F | SYSTOLIC BLOOD PRESSURE: 154 MMHG | HEIGHT: 70 IN | BODY MASS INDEX: 26.69 KG/M2 | DIASTOLIC BLOOD PRESSURE: 74 MMHG | WEIGHT: 186.4 LBS | OXYGEN SATURATION: 91 %

## 2022-08-01 DIAGNOSIS — J96.11 CHRONIC RESPIRATORY FAILURE WITH HYPOXIA (HCC): Primary | ICD-10-CM

## 2022-08-01 DIAGNOSIS — J44.9 CHRONIC OBSTRUCTIVE PULMONARY DISEASE, UNSPECIFIED COPD TYPE (HCC): ICD-10-CM

## 2022-08-01 DIAGNOSIS — J44.9 COPD (CHRONIC OBSTRUCTIVE PULMONARY DISEASE) (HCC): ICD-10-CM

## 2022-08-01 DIAGNOSIS — J41.1 MUCOPURULENT CHRONIC BRONCHITIS (HCC): Chronic | ICD-10-CM

## 2022-08-01 PROCEDURE — 99214 OFFICE O/P EST MOD 30 MIN: CPT | Performed by: INTERNAL MEDICINE

## 2022-08-01 PROCEDURE — 1160F RVW MEDS BY RX/DR IN RCRD: CPT | Performed by: INTERNAL MEDICINE

## 2022-08-01 PROCEDURE — 1111F DSCHRG MED/CURRENT MED MERGE: CPT | Performed by: INTERNAL MEDICINE

## 2022-08-01 RX ORDER — ARFORMOTEROL TARTRATE 15 UG/2ML
15 SOLUTION RESPIRATORY (INHALATION) 2 TIMES DAILY
Qty: 120 ML | Refills: 5 | Status: SHIPPED | OUTPATIENT
Start: 2022-08-01

## 2022-08-01 RX ORDER — BUDESONIDE 0.5 MG/2ML
0.5 INHALANT ORAL 2 TIMES DAILY
Qty: 120 ML | Refills: 5 | Status: SHIPPED | OUTPATIENT
Start: 2022-08-01

## 2022-08-01 NOTE — PATIENT INSTRUCTIONS
Use budesonide and Brovana via the nebulizer once in the morning and once at night   Use Atrovent via nebulizer 3 times a day  Start using Daliresp 1 tablet once a day until next follow-up in 1 months

## 2022-08-12 ENCOUNTER — TELEPHONE (OUTPATIENT)
Dept: PULMONOLOGY | Facility: CLINIC | Age: 77
End: 2022-08-12

## 2022-08-12 ENCOUNTER — OFFICE VISIT (OUTPATIENT)
Dept: VASCULAR SURGERY | Facility: CLINIC | Age: 77
End: 2022-08-12
Payer: COMMERCIAL

## 2022-08-12 VITALS
SYSTOLIC BLOOD PRESSURE: 126 MMHG | HEIGHT: 70 IN | HEART RATE: 92 BPM | WEIGHT: 185 LBS | BODY MASS INDEX: 26.48 KG/M2 | DIASTOLIC BLOOD PRESSURE: 74 MMHG

## 2022-08-12 DIAGNOSIS — E11.9 TYPE 2 DIABETES MELLITUS WITHOUT COMPLICATION, WITHOUT LONG-TERM CURRENT USE OF INSULIN (HCC): ICD-10-CM

## 2022-08-12 DIAGNOSIS — I73.9 PAD (PERIPHERAL ARTERY DISEASE) (HCC): Primary | ICD-10-CM

## 2022-08-12 DIAGNOSIS — E78.49 OTHER HYPERLIPIDEMIA: ICD-10-CM

## 2022-08-12 PROCEDURE — 99213 OFFICE O/P EST LOW 20 MIN: CPT

## 2022-08-12 NOTE — PROGRESS NOTES
Assessment/Plan:    PAD (peripheral artery disease) ([facility] )  Patient is 77, former smoker history significant for HTN, HLD, NIDDM, ICM, CHF, CAD s/p CABG x4, BPH, history of prostate cancer, COPD on at home O2, PAD, presents today for results review noninvasive imaging done for surveillance  He is currently asymptomatic  He denies rest pain, claudication or tissue loss  -CASIE demonstrates 50-75% stenosis in distal EIA with long segment occlusion in proximal and mid SFA  >75% distal SFA stenosis with diffuse disease in fem-pop arteries and tibioperoneal disease  RLE KEI NC/46/ 27  LLE with segment occlusion in mid SFA and >75% stenosis with retrograde flow in distal SFA  LLE KEI NC/68/58  -BLE warm with 2+ edema in RLE  No ischemic ulcerations, discoloration, or discrepancy in temperature    -Since patient is currently asymptomatic, we will continue to monitor with yearly CASIE to assess for progression of disease    -Educated patient on pathophysiology of peripheral arterial disease, available treatment options, and indications for treatment    -Discussed risk factor modification, including adequate glycemic and lipid control, smoking cessation, blood pressure, and lifestyle modifications    -Continue medical optimization with daily ASA 81mg and statin therapy with LDL goal <100    -Recommend starting a structured walking program 3-5 times/week for 30 minutes  Patient should walk until claudication symptoms occur, rest for 5-10 minutes, then continue to walk  Patient should increase distance each week  - Leg elevation for leg swelling  Tubigrip sleeve size F provided today for leg edema  Patient not able to tolerate prescription compression hose  - Discussed the importance of diligent skin care routine and protective foot wear to prevent new wounds as patient is at risk for nonhealing    - Educated on s/sx of worsening condition and when to notify the office    -Follow up in 1 year or sooner if needed  -Instructed patient to call the office with questions, concerns, or new symptoms  Type 2 diabetes mellitus without complication, without long-term current use of insulin (HCC)    Lab Results   Component Value Date    HGBA1C 7 9 (A) 05/05/2022     -Uncontrolled   -optimize BS control to halt progression of vascular disease    -Mgmt per PCP    Other hyperlipidemia    -Encourage low cholesterol, low fat diet  -Continue with statin therapy  -Medical Management per PCP          Diagnoses and all orders for this visit:    PAD (peripheral artery disease) ([facility] )  -     VAS lower limb arterial duplex, complete bilateral; Future    Type 2 diabetes mellitus without complication, without long-term current use of insulin (HCC)    Other hyperlipidemia          Subjective:      Patient ID: Edwar Bautista is a 69 y o  male  HPI  Pt presents to the office to rev CASIE done on 7/11/22 for PAD  Pt denies claudication symptoms, rest pain, or tissue loss  He states his only complaint is his breathing and was placed on at home oxygen by his pulmonologist approximately 9 months ago and has been helping  He reports seeing the podiatrist for nail trimmings and foot care, although has not seen them in awhile  I recommended that he call and set up an appointment  He also reports chronic lower leg swelling R>L  He states he does not elevate his leg and typically sits in his recliner with his feet in dependent position  He does not wear compression stockings stating he is not able to put them on  I gave him tubigrip sleeves today and recommended frequent leg elevation and diligent skin care to prevent wounds  Pt is taking asa 81mg and rosuvastatin  The following portions of the patient's history were reviewed and updated as appropriate: allergies, current medications, past family history, past medical history, past social history, past surgical history and problem list     Review of Systems   Constitutional: Negative      HENT: Negative  Eyes: Negative  Respiratory: Positive for shortness of breath  Cardiovascular: Negative  Gastrointestinal: Negative  Endocrine: Negative  Genitourinary: Negative  Musculoskeletal: Negative  Skin: Negative  Allergic/Immunologic: Negative  Neurological: Negative  Hematological: Negative  Psychiatric/Behavioral: Negative  I have personally reviewed and made appropriate changes to the ROS that was input by the medical assistant         Objective:      Vitals:    08/12/22 1120   BP: 126/74   BP Location: Left arm   Patient Position: Sitting   Cuff Size: Standard   Pulse: 92   Weight: 83 9 kg (185 lb)   Height: 5' 10" (1 778 m)       Patient Active Problem List   Diagnosis    Type 2 diabetes mellitus without complication, without long-term current use of insulin ([facility])    Tobacco abuse    Coronary artery disease involving native coronary artery of native heart without angina pectoris    Benign prostatic hyperplasia with urinary retention    Mucopurulent chronic bronchitis ([facility])    Benign essential tremor    Ambulatory dysfunction    NSTEMI (non-ST elevated myocardial infarction) ([facility])    On intra-aortic balloon pump assist    History of prostate cancer    S/P CABG x 4    Ischemic cardiomyopathy    Other hyperlipidemia    COPD (chronic obstructive pulmonary disease) ([facility])    Chronic respiratory failure with hypoxia ([facility])    Tinea unguium    Type 2 diabetes mellitus with diabetic neuropathy, unspecified ([facility])    Other age-related cataract    Central retinal vein occlusion with macular edema    Nonexudative age-related macular degeneration    Obesity    Ocular hypertension    Onychomycosis due to dermatophyte    Pill rolling tremor    Physical deconditioning    Type 2 MI (myocardial infarction) ([facility] )    Diabetic ketoacidosis without coma associated with type 2 diabetes mellitus ([facility])    Diminished pulses in lower extremity    [facility]AD [facility]peripheral artery disease) ([facility] )    Generalized weakness    Pneumonia    [facility] to [facility]    Elevated troponin    Congestive heart failure (HCC)    Hypotension    Dizziness    Insomnia due to medical condition    Primary hypertension    Normocytic anemia    Low serum albumin    Tributary (branch) retinal vein occlusion, left eye, with macular edema    Fall    Bilateral pleural effusion    Vision loss, left eye       Past Surgical History:   Procedure Laterality Date    CARDIAC CATHETERIZATION  2017    Significant left main plus triple-vessel CAD   CORONARY ARTERY BYPASS GRAFT  2017    4V CABG:  LIMA to LAD, VG to RI, SVG to PDA to LVBR RCA   EYE SURGERY      [facility] in eye [facility] a [facility]    PROSTATE BIOPSY         Family History   Problem Relation Age of Onset    Cancer Father     Heart disease Brother        Social History     Socioeconomic History    Marital status: /Civil Union     Spouse name: Not on file    Number of children: Not on file    Years of education: Not on file    Highest education level: Not on file   Occupational History    Not on file   Tobacco Use    Smoking status: Former Smoker     Packs/day: 0 25     Years: 60 00     Pack years: 15 00     Types: Cigarettes     Quit date: 2022     Years since quittin 3    Smokeless tobacco: Never Used   Vaping Use    Vaping Use: Never used   Substance and Sexual Activity    Alcohol use: Yes    Drug use: Never    Sexual activity: Yes     Partners: Female   Other Topics Concern    Not on file   Social History Narrative    ** Merged History Encounter **          Social Determinants of Health     Financial Resource Strain: Not on file   Food Insecurity: No Food Insecurity    Worried About Running Out of Food in the Last Year: Never true    [place] of Food in the Last Year: Never true   Transportation Needs: No Transportation Needs    Lack of Transportation (Medical):  No    Lack of Transportation (Non-Medical):  No   Physical Activity: Not on file   Stress: Not on file   Social Connections: Not on file   Intimate Partner Violence: Not on file   Housing Stability: Low Risk     Unable to Pay for Housing in the Last Year: No    Number of Places Lived in the Last Year: 1    Unstable Housing in the Last Year: No       Allergies   Allergen Reactions    Atorvastatin Myalgia         Current Outpatient Medications:     albuterol (PROVENTIL HFA,VENTOLIN HFA) 90 mcg/act inhaler, Inhale 2 puffs every 6 (six) hours as needed for wheezing or shortness of breath, Disp: , Rfl: 0    arformoterol (BROVANA) 15 mcg/2 mL nebulizer solution, Take 2 mL (15 mcg total) by nebulization 2 (two) times a day, Disp: 120 mL, Rfl: 5    aspirin (ECOTRIN LOW STRENGTH) 81 mg EC tablet, Take 1 tablet (81 mg total) by mouth every other day, Disp:  , Rfl: 0    budesonide (Pulmicort) 0 5 mg/2 mL nebulizer solution, Take 2 mL (0 5 mg total) by nebulization 2 (two) times a day Rinse mouth after use , Disp: 120 mL, Rfl: 5    cyanocobalamin (VITAMIN B-12) 500 MCG tablet, TAKE ONE TABLET BY MOUTH EVERY MORNING *VITAMIN B12*, Disp: , Rfl:     digoxin (LANOXIN) 0 125 mg tablet, Take 1 tablet (125 mcg total) by mouth daily, Disp: 30 tablet, Rfl: 5    gabapentin (NEURONTIN) 300 mg capsule, Take 1 capsule (300 mg total) by mouth daily at bedtime, Disp: 90 capsule, Rfl: 1    glimepiride (AMARYL) 1 mg tablet, Take 2 tablets (2 mg total) by mouth daily with breakfast AND 1 tablet (1 mg total) daily with dinner , Disp: , Rfl: 0    ipratropium (ATROVENT) 0 02 % nebulizer solution, Take 2 5 mL (0 5 mg total) by nebulization 3 (three) times a day, Disp: 225 mL, Rfl: 5    lisinopril (ZESTRIL) 20 mg tablet, Take 1 tablet (20 mg total) by mouth daily, Disp: 30 tablet, Rfl: 1    metFORMIN (GLUCOPHAGE) 1000 MG tablet, Take 1 tablet (1,000 mg total) by mouth 2 (two) times a day with meals (Patient taking differently: Take 1,000 mg by mouth 2 (two) times a day with meals Taking 500mg 2 times daily), Disp: 180 tablet, Rfl: 3    metoprolol tartrate (LOPRESSOR) 25 mg tablet, Take 1 tablet (25 mg total) by mouth every 12 (twelve) hours, Disp: 60 tablet, Rfl: 5    Multiple Vitamins-Minerals (PRESERVISION AREDS 2 PO), Take by mouth, Disp: , Rfl:     primidone (MYSOLINE) 50 mg tablet, Take 100 mg by mouth 2 (two) times a day , Disp: , Rfl:     roflumilast (DALIRESP) 250 MCG tablet, Take 1 tablet (250 mcg total) by mouth daily, Disp: 28 tablet, Rfl: 0    rosuvastatin (CRESTOR) 10 MG tablet, Take 1 tablet (10 mg total) by mouth daily, Disp: 90 tablet, Rfl: 3    terbinafine (LamISIL) 1 % cream, APPLY THIN LAYER TOPICALLY TWICE A DAY FOR FUNGAL INFECTION, Disp: , Rfl:       /74 (BP Location: Left arm, Patient Position: Sitting, Cuff Size: Standard)   Pulse 92   Ht 5' 10" (1 778 m)   Wt 83 9 kg (185 lb)   BMI 26 54 kg/m²          Physical Exam  Vitals and nursing note reviewed  Constitutional:       Appearance: Normal appearance  HENT:      Head: Normocephalic and atraumatic  Neck:      Vascular: No carotid bruit  Cardiovascular:      Rate and Rhythm: Normal rate and regular rhythm  Pulses:           Carotid pulses are 2+ on the right side and 2+ on the left side  Radial pulses are 2+ on the right side and 2+ on the left side  Dorsalis pedis pulses are detected w/ Doppler on the right side and detected w/ Doppler on the left side  Posterior tibial pulses are detected w/ Doppler on the right side and detected w/ Doppler on the left side  Heart sounds: Normal heart sounds  Comments: No carotid bruit     Unable to palpate femoral pulses due to patient positioning   Pulmonary:      Effort: No respiratory distress  Breath sounds: Normal breath sounds  Comments: On O2 via NC  Abdominal:      General: Bowel sounds are normal  There is no distension  Palpations: Abdomen is soft        Comments: No abdominal bruit or pulsatile masses  Musculoskeletal:         General: No tenderness  Normal range of motion  Cervical back: Normal range of motion and neck supple  Right lower le+ Edema present  Left lower le+ Edema present  Skin:     General: Skin is warm and dry  Capillary Refill: Capillary refill takes less than 2 seconds  Findings: No erythema  Neurological:      General: No focal deficit present  Mental Status: He is alert and oriented to person, place, and time  Psychiatric:         Mood and Affect: Mood normal          Behavior: Behavior normal        Kamlesh Prajapati PA-C  [facility]  [phone]    This text is generated with voice recognition software  There may be translation, syntax, or grammatical errors  If have any questions, please contact the dictating provider

## 2022-08-12 NOTE — TELEPHONE ENCOUNTER
Pt mentioned when he came into the office that he has been getting frequent nose bleeds and is concerned that it could affect or have to do with the oxygen  Is there anything we can suggest to help this pt out? Additional Safety/Bands:

## 2022-08-12 NOTE — PATIENT INSTRUCTIONS
-You have evidence of arterial disease in both legs  Since you do not have any symptoms, we will continue to monitor with yearly ultrasound  Next due 072023  -If you start to have increased pain in the leg or new wounds, please call our office    -Continue aspirin and cholesterol medication    -We will follow up in 1 year or sooner if needed  -Please call with questions, concerns or new symptoms  Peripheral Artery Disease   AMBULATORY CARE:   Peripheral artery disease (PAD)  is narrow, weak, or blocked arteries  It may affect any arteries outside of your heart and brain  PAD is usually the result of a buildup of fat and cholesterol, also called plaque, along your artery walls  Inflammation, a blood clot, or abnormal cell growth could also block your arteries  PAD prevents normal blood flow to your legs and arms  You are at risk of an amputation if poor blood flow keeps wounds from healing or causes gangrene (tissue death)  Without treatment, PAD can also cause a heart attack or stroke  Common symptoms include:  Mild PAD usually does not cause symptoms   As the disease worsens over time, you may have the following:  Pain or cramps in your leg or hip while you walk    A numb, weak, or heavy feeling in your legs    Dry, scaly, red, or pale skin on your legs    Thick or brittle nails, or hair loss on your arms and legs    Foot sores that will not heal    Burning or aching in your feet and toes while resting (this may be worse when you lie down)    Call your local emergency number (911 in the [place]) if:   You have any of the following signs of a heart attack:      Squeezing, pressure, or pain in your chest    You may  also have any of the following:     Discomfort or pain in your back, neck, jaw, stomach, or arm    Shortness of breath    Nausea or vomiting    Lightheadedness or a sudden cold sweat    You have any of the following signs of a stroke:      Numbness or drooping on one side of your face     Weakness in an arm or leg    Confusion or difficulty speaking    Dizziness, a severe headache, or vision loss    Seek care immediately if:   You have sores or wounds that will not heal     You notice black or discolored skin on your arm or leg  Your skin is cool to the touch  Call your doctor if:   You have leg pain when you walk 1/8 mile (200 meters) or less, even with treatment  Your legs are red, dry, or pale, even with treatment  You have questions or concerns about your condition or care  Treatment for PAD  can help reduce your risk of a heart attack, stroke, or amputation  You may need more than one of the following:  Medicines  may be given to prevent blood clots and reduce the risk of a heart attack or stroke  You may be given medicine to help prevent your PAD from getting worse  A supervised exercise program  helps you stay active in normal daily activities  Healthcare providers will help you safely walk or do strength training exercises 3 times a week for 30 to 60 minutes  You will do this for several months, then transition to walking on your own  Angioplasty  is a procedure to open your artery so blood can flow through normally  A thin tube called a catheter is used to insert a small balloon into your artery  The balloon is inflated to open your blocked artery, and then removed  A tube called a stent may be placed in your artery to hold it open  Bypass surgery  is used to make a new connection to your artery with a vein from another part of your body, or an artificial graft  The vein or graft is attached to your artery above and below your blockage  This allows blood to flow around the blocked portion of your artery  Manage and prevent PAD:   Walk for 30 to 60 minutes at least 4 times a week  Your healthcare provider may also refer you to a supervised exercise program  The program helps increase how far you can walk without pain   It also helps you stay active in normal daily activities         Do not smoke  Nicotine and other chemicals in cigarettes and cigars can worsen PAD  They can also increase your risk for a heart attack or stroke  Ask your healthcare provider for information if you currently smoke and need help to quit  E-cigarettes or smokeless tobacco still contain nicotine  Talk to your healthcare provider before you use these products  Manage other health conditions  Take your medicines as directed and follow your healthcare provider's instructions if you have high blood pressure or high cholesterol  Perform foot care and check your blood sugar levels as directed if you have diabetes  Eat heart-healthy foods  Eat whole grains, fruits, and vegetables every day  Limit salt and high-fat foods  Ask your healthcare provider for more information on a heart healthy diet  Ask what a healthy weight is for you  Your healthcare provider can help you create a healthy weight-loss plan, if needed  Follow up with your doctor as directed:  Write down your questions so you remember to ask them during your visits  © [employer] 2022 Information is for End User's use only and may not be sold, redistributed or otherwise used for commercial purposes  All illustrations and images included in CareNotes® are the copyrighted property of [employer] Inc  or [facility] Kristen Smalls   The above information is an  only  It is not intended as medical advice for individual conditions or treatments  Talk to your doctor, nurse or pharmacist before following any medical regimen to see if it is safe and effective for you

## 2022-08-12 NOTE — ASSESSMENT & PLAN NOTE
Patient is 77, former smoker history significant for HTN, HLD, NIDDM, ICM, CHF, CAD s/p CABG x4, BPH, history of prostate cancer, COPD on at home O2, PAD, presents today for results review noninvasive imaging done for surveillance  He is currently asymptomatic  He denies rest pain, claudication or tissue loss  -CASIE demonstrates 50-75% stenosis in distal EIA with long segment occlusion in proximal and mid SFA  >75% distal SFA stenosis with diffuse disease in fem-pop arteries and tibioperoneal disease  RLE KEI NC/46/ 27  LLE with segment occlusion in mid SFA and >75% stenosis with retrograde flow in distal SFA  LLE KEI NC/68/58  -BLE warm with 2+ edema in RLE  No ischemic ulcerations, discoloration, or discrepancy in temperature    -Since patient is currently asymptomatic, we will continue to monitor with yearly CASIE to assess for progression of disease    -Educated patient on pathophysiology of peripheral arterial disease, available treatment options, and indications for treatment    -Discussed risk factor modification, including adequate glycemic and lipid control, smoking cessation, blood pressure, and lifestyle modifications    -Continue medical optimization with daily ASA 81mg and statin therapy with LDL goal <100    -Recommend starting a structured walking program 3-5 times/week for 30 minutes  Patient should walk until claudication symptoms occur, rest for 5-10 minutes, then continue to walk  Patient should increase distance each week  - Educated on s/sx of worsening condition and when to notify the office    -Follow up in 1 year or sooner if needed  -Instructed patient to call the office with questions, concerns, or new symptoms

## 2022-08-12 NOTE — ASSESSMENT & PLAN NOTE
Lab Results   Component Value Date    HGBA1C 7 9 (A) 05/05/2022     -Uncontrolled   -optimize BS control to halt progression of vascular disease    -Mgmt per PCP

## 2022-08-12 NOTE — ASSESSMENT & PLAN NOTE
-Encourage low cholesterol, low fat diet  -Continue with statin therapy  -Medical Management per PCP

## 2022-08-14 ENCOUNTER — HOSPITAL ENCOUNTER (INPATIENT)
Facility: HOSPITAL | Age: 77
LOS: 2 days | Discharge: HOME | DRG: 291 | End: 2022-08-16
Attending: EMERGENCY MEDICINE | Admitting: INTERNAL MEDICINE
Payer: COMMERCIAL

## 2022-08-14 ENCOUNTER — APPOINTMENT (EMERGENCY)
Dept: RADIOLOGY | Facility: HOSPITAL | Age: 77
DRG: 291 | End: 2022-08-14
Payer: COMMERCIAL

## 2022-08-14 ENCOUNTER — APPOINTMENT (EMERGENCY)
Dept: CT IMAGING | Facility: HOSPITAL | Age: 77
DRG: 291 | End: 2022-08-14
Payer: COMMERCIAL

## 2022-08-14 DIAGNOSIS — I50.9 CONGESTIVE HEART FAILURE, UNSPECIFIED HF CHRONICITY, UNSPECIFIED HEART FAILURE TYPE (HCC): ICD-10-CM

## 2022-08-14 DIAGNOSIS — R77.8 ELEVATED TROPONIN: ICD-10-CM

## 2022-08-14 DIAGNOSIS — R06.03 RESPIRATORY DISTRESS: Primary | ICD-10-CM

## 2022-08-14 DIAGNOSIS — I10 ESSENTIAL HYPERTENSION: ICD-10-CM

## 2022-08-14 DIAGNOSIS — I50.9 ACUTE EXACERBATION OF CHF (CONGESTIVE HEART FAILURE) (HCC): ICD-10-CM

## 2022-08-14 DIAGNOSIS — J90 PLEURAL EFFUSION: ICD-10-CM

## 2022-08-14 DIAGNOSIS — E04.1 THYROID NODULE: ICD-10-CM

## 2022-08-14 PROBLEM — E11.10 DIABETIC KETOACIDOSIS WITHOUT COMA ASSOCIATED WITH TYPE 2 DIABETES MELLITUS (HCC): Status: RESOLVED | Noted: 2021-04-06 | Resolved: 2022-08-14

## 2022-08-14 PROBLEM — I50.43 ACUTE ON CHRONIC COMBINED SYSTOLIC AND DIASTOLIC CONGESTIVE HEART FAILURE (HCC): Status: ACTIVE | Noted: 2022-08-14

## 2022-08-14 PROBLEM — R79.89 ELEVATED D-DIMER: Status: ACTIVE | Noted: 2022-08-14

## 2022-08-14 PROBLEM — A41.9 SEPSIS DUE TO PNEUMONIA (HCC): Status: RESOLVED | Noted: 2022-04-24 | Resolved: 2022-08-14

## 2022-08-14 PROBLEM — E87.2 INCREASED ANION GAP METABOLIC ACIDOSIS: Status: ACTIVE | Noted: 2022-08-14

## 2022-08-14 PROBLEM — I48.91 ATRIAL FIBRILLATION WITH RAPID VENTRICULAR RESPONSE (HCC): Status: ACTIVE | Noted: 2022-08-14

## 2022-08-14 PROBLEM — J18.9 SEPSIS DUE TO PNEUMONIA (HCC): Status: RESOLVED | Noted: 2022-04-24 | Resolved: 2022-08-14

## 2022-08-14 PROBLEM — I21.A1 TYPE 2 MI (MYOCARDIAL INFARCTION) (HCC): Status: RESOLVED | Noted: 2021-04-06 | Resolved: 2022-08-14

## 2022-08-14 PROBLEM — J18.9 PNEUMONIA: Status: RESOLVED | Noted: 2022-04-24 | Resolved: 2022-08-14

## 2022-08-14 PROBLEM — I95.9 HYPOTENSION: Status: RESOLVED | Noted: 2022-06-09 | Resolved: 2022-08-14

## 2022-08-14 PROBLEM — I21.4 NSTEMI (NON-ST ELEVATED MYOCARDIAL INFARCTION) (HCC): Status: RESOLVED | Noted: 2017-03-08 | Resolved: 2022-08-14

## 2022-08-14 PROBLEM — J96.21 ACUTE ON CHRONIC RESPIRATORY FAILURE WITH HYPOXIA (HCC): Status: ACTIVE | Noted: 2020-05-14

## 2022-08-14 PROBLEM — E87.29 INCREASED ANION GAP METABOLIC ACIDOSIS: Status: ACTIVE | Noted: 2022-08-14

## 2022-08-14 LAB
2HR DELTA HS TROPONIN: 252 NG/L
4HR DELTA HS TROPONIN: 312 NG/L
ALBUMIN SERPL BCP-MCNC: 4.1 G/DL (ref 3.5–5)
ALP SERPL-CCNC: 96 U/L (ref 34–104)
ALT SERPL W P-5'-P-CCNC: 6 U/L (ref 7–52)
ANION GAP SERPL CALCULATED.3IONS-SCNC: 11 MMOL/L (ref 4–13)
ANION GAP SERPL CALCULATED.3IONS-SCNC: 15 MMOL/L (ref 4–13)
AST SERPL W P-5'-P-CCNC: 15 U/L (ref 13–39)
ATRIAL RATE: 115 BPM
BASE EX.OXY STD BLDV CALC-SCNC: 86.9 % (ref 60–80)
BASE EXCESS BLDV CALC-SCNC: 0.2 MMOL/L
BASOPHILS # BLD MANUAL: 0 THOUSAND/UL (ref 0–0.1)
BASOPHILS NFR MAR MANUAL: 0 % (ref 0–1)
BETA-HYDROXYBUTYRATE: 2.3 MMOL/L
BILIRUB SERPL-MCNC: 0.61 MG/DL (ref 0.2–1)
BNP SERPL-MCNC: 784 PG/ML (ref 0–100)
BUN SERPL-MCNC: 20 MG/DL (ref 5–25)
BUN SERPL-MCNC: 20 MG/DL (ref 5–25)
CALCIUM SERPL-MCNC: 8.4 MG/DL (ref 8.4–10.2)
CALCIUM SERPL-MCNC: 9 MG/DL (ref 8.4–10.2)
CARDIAC TROPONIN I PNL SERPL HS: 311 NG/L
CARDIAC TROPONIN I PNL SERPL HS: 371 NG/L
CARDIAC TROPONIN I PNL SERPL HS: 59 NG/L
CHLORIDE SERPL-SCNC: 96 MMOL/L (ref 96–108)
CHLORIDE SERPL-SCNC: 99 MMOL/L (ref 96–108)
CO2 SERPL-SCNC: 23 MMOL/L (ref 21–32)
CO2 SERPL-SCNC: 30 MMOL/L (ref 21–32)
CREAT SERPL-MCNC: 0.84 MG/DL (ref 0.6–1.3)
CREAT SERPL-MCNC: 0.89 MG/DL (ref 0.6–1.3)
D DIMER PPP FEU-MCNC: 2.33 UG/ML FEU
EOSINOPHIL # BLD MANUAL: 0 THOUSAND/UL (ref 0–0.4)
EOSINOPHIL NFR BLD MANUAL: 0 % (ref 0–6)
ERYTHROCYTE [DISTWIDTH] IN BLOOD BY AUTOMATED COUNT: 14.5 % (ref 11.6–15.1)
EST. AVERAGE GLUCOSE BLD GHB EST-MCNC: 117 MG/DL
FLUAV RNA RESP QL NAA+PROBE: NEGATIVE
FLUBV RNA RESP QL NAA+PROBE: NEGATIVE
GFR SERPL CREATININE-BSD FRML MDRD: 83 ML/MIN/1.73SQ M
GFR SERPL CREATININE-BSD FRML MDRD: 85 ML/MIN/1.73SQ M
GLUCOSE SERPL-MCNC: 207 MG/DL (ref 65–140)
GLUCOSE SERPL-MCNC: 219 MG/DL (ref 65–140)
GLUCOSE SERPL-MCNC: 257 MG/DL (ref 65–140)
GLUCOSE SERPL-MCNC: 283 MG/DL (ref 65–140)
GLUCOSE SERPL-MCNC: 305 MG/DL (ref 65–140)
GLUCOSE SERPL-MCNC: 45 MG/DL (ref 65–140)
HBA1C MFR BLD: 5.7 %
HCO3 BLDV-SCNC: 24.6 MMOL/L (ref 24–30)
HCT VFR BLD AUTO: 37.9 % (ref 36.5–49.3)
HGB BLD-MCNC: 12.1 G/DL (ref 12–17)
LYMPHOCYTES # BLD AUTO: 0.82 THOUSAND/UL (ref 0.6–4.47)
LYMPHOCYTES # BLD AUTO: 7 % (ref 14–44)
MCH RBC QN AUTO: 30.3 PG (ref 26.8–34.3)
MCHC RBC AUTO-ENTMCNC: 31.9 G/DL (ref 31.4–37.4)
MCV RBC AUTO: 95 FL (ref 82–98)
MONOCYTES # BLD AUTO: 0.35 THOUSAND/UL (ref 0–1.22)
MONOCYTES NFR BLD: 3 % (ref 4–12)
NEUTROPHILS # BLD MANUAL: 10.59 THOUSAND/UL (ref 1.85–7.62)
NEUTS SEG NFR BLD AUTO: 90 % (ref 43–75)
O2 CT BLDV-SCNC: 15.8 ML/DL
P AXIS: 70 DEGREES
PCO2 BLDV: 39.3 MM HG (ref 42–50)
PH BLDV: 7.42 [PH] (ref 7.3–7.4)
PLATELET # BLD AUTO: 171 THOUSANDS/UL (ref 149–390)
PLATELET BLD QL SMEAR: ADEQUATE
PMV BLD AUTO: 11.7 FL (ref 8.9–12.7)
PO2 BLDV: 55.7 MM HG (ref 35–45)
POTASSIUM SERPL-SCNC: 3.4 MMOL/L (ref 3.5–5.3)
POTASSIUM SERPL-SCNC: 4.1 MMOL/L (ref 3.5–5.3)
PR INTERVAL: 174 MS
PROT SERPL-MCNC: 6.6 G/DL (ref 6.4–8.4)
QRS AXIS: 18 DEGREES
QRSD INTERVAL: 100 MS
QT INTERVAL: 344 MS
QTC INTERVAL: 475 MS
RBC # BLD AUTO: 4 MILLION/UL (ref 3.88–5.62)
RBC MORPH BLD: NORMAL
RSV RNA RESP QL NAA+PROBE: NEGATIVE
SARS-COV-2 RNA RESP QL NAA+PROBE: NEGATIVE
SODIUM SERPL-SCNC: 137 MMOL/L (ref 135–147)
SODIUM SERPL-SCNC: 137 MMOL/L (ref 135–147)
T WAVE AXIS: 129 DEGREES
VENTRICULAR RATE: 115 BPM
WBC # BLD AUTO: 11.77 THOUSAND/UL (ref 4.31–10.16)

## 2022-08-14 PROCEDURE — G1004 CDSM NDSC: HCPCS

## 2022-08-14 PROCEDURE — 85007 BL SMEAR W/DIFF WBC COUNT: CPT | Performed by: EMERGENCY MEDICINE

## 2022-08-14 PROCEDURE — 94760 N-INVAS EAR/PLS OXIMETRY 1: CPT

## 2022-08-14 PROCEDURE — 83036 HEMOGLOBIN GLYCOSYLATED A1C: CPT | Performed by: PHYSICIAN ASSISTANT

## 2022-08-14 PROCEDURE — 84484 ASSAY OF TROPONIN QUANT: CPT | Performed by: EMERGENCY MEDICINE

## 2022-08-14 PROCEDURE — 3044F HG A1C LEVEL LT 7.0%: CPT | Performed by: INTERNAL MEDICINE

## 2022-08-14 PROCEDURE — 82010 KETONE BODYS QUAN: CPT | Performed by: HOSPITALIST

## 2022-08-14 PROCEDURE — 0241U HB NFCT DS VIR RESP RNA 4 TRGT: CPT | Performed by: EMERGENCY MEDICINE

## 2022-08-14 PROCEDURE — 99223 1ST HOSP IP/OBS HIGH 75: CPT | Performed by: HOSPITALIST

## 2022-08-14 PROCEDURE — 71045 X-RAY EXAM CHEST 1 VIEW: CPT

## 2022-08-14 PROCEDURE — 99223 1ST HOSP IP/OBS HIGH 75: CPT | Performed by: INTERNAL MEDICINE

## 2022-08-14 PROCEDURE — 96374 THER/PROPH/DIAG INJ IV PUSH: CPT

## 2022-08-14 PROCEDURE — 80048 BASIC METABOLIC PNL TOTAL CA: CPT | Performed by: HOSPITALIST

## 2022-08-14 PROCEDURE — 99285 EMERGENCY DEPT VISIT HI MDM: CPT

## 2022-08-14 PROCEDURE — 93010 ELECTROCARDIOGRAM REPORT: CPT | Performed by: INTERNAL MEDICINE

## 2022-08-14 PROCEDURE — 82948 REAGENT STRIP/BLOOD GLUCOSE: CPT

## 2022-08-14 PROCEDURE — 36415 COLL VENOUS BLD VENIPUNCTURE: CPT | Performed by: EMERGENCY MEDICINE

## 2022-08-14 PROCEDURE — 85027 COMPLETE CBC AUTOMATED: CPT | Performed by: EMERGENCY MEDICINE

## 2022-08-14 PROCEDURE — 80053 COMPREHEN METABOLIC PANEL: CPT | Performed by: EMERGENCY MEDICINE

## 2022-08-14 PROCEDURE — 94002 VENT MGMT INPAT INIT DAY: CPT

## 2022-08-14 PROCEDURE — 93005 ELECTROCARDIOGRAM TRACING: CPT

## 2022-08-14 PROCEDURE — 83880 ASSAY OF NATRIURETIC PEPTIDE: CPT | Performed by: EMERGENCY MEDICINE

## 2022-08-14 PROCEDURE — 82805 BLOOD GASES W/O2 SATURATION: CPT | Performed by: EMERGENCY MEDICINE

## 2022-08-14 PROCEDURE — 71275 CT ANGIOGRAPHY CHEST: CPT

## 2022-08-14 PROCEDURE — 99291 CRITICAL CARE FIRST HOUR: CPT | Performed by: EMERGENCY MEDICINE

## 2022-08-14 PROCEDURE — 94640 AIRWAY INHALATION TREATMENT: CPT

## 2022-08-14 PROCEDURE — 85379 FIBRIN DEGRADATION QUANT: CPT | Performed by: EMERGENCY MEDICINE

## 2022-08-14 RX ORDER — FORMOTEROL FUMARATE 20 UG/2ML
20 SOLUTION RESPIRATORY (INHALATION)
Status: DISCONTINUED | OUTPATIENT
Start: 2022-08-14 | End: 2022-08-16 | Stop reason: HOSPADM

## 2022-08-14 RX ORDER — METHYLPREDNISOLONE SODIUM SUCCINATE 125 MG/2ML
INJECTION, POWDER, LYOPHILIZED, FOR SOLUTION INTRAMUSCULAR; INTRAVENOUS
Status: COMPLETED
Start: 2022-08-14 | End: 2022-08-14

## 2022-08-14 RX ORDER — GABAPENTIN 300 MG/1
300 CAPSULE ORAL
Status: DISCONTINUED | OUTPATIENT
Start: 2022-08-14 | End: 2022-08-16 | Stop reason: HOSPADM

## 2022-08-14 RX ORDER — FUROSEMIDE 10 MG/ML
40 INJECTION INTRAMUSCULAR; INTRAVENOUS ONCE
Status: COMPLETED | OUTPATIENT
Start: 2022-08-14 | End: 2022-08-14

## 2022-08-14 RX ORDER — IPRATROPIUM BROMIDE AND ALBUTEROL SULFATE 2.5; .5 MG/3ML; MG/3ML
SOLUTION RESPIRATORY (INHALATION)
Status: COMPLETED
Start: 2022-08-14 | End: 2022-08-14

## 2022-08-14 RX ORDER — LEVALBUTEROL 1.25 MG/.5ML
1.25 SOLUTION, CONCENTRATE RESPIRATORY (INHALATION)
Status: DISCONTINUED | OUTPATIENT
Start: 2022-08-14 | End: 2022-08-16 | Stop reason: HOSPADM

## 2022-08-14 RX ORDER — DIGOXIN 125 MCG
125 TABLET ORAL DAILY
Status: DISCONTINUED | OUTPATIENT
Start: 2022-08-14 | End: 2022-08-16 | Stop reason: HOSPADM

## 2022-08-14 RX ORDER — HEPARIN SODIUM 5000 [USP'U]/ML
5000 INJECTION, SOLUTION INTRAVENOUS; SUBCUTANEOUS EVERY 8 HOURS SCHEDULED
Status: DISCONTINUED | OUTPATIENT
Start: 2022-08-14 | End: 2022-08-16 | Stop reason: HOSPADM

## 2022-08-14 RX ORDER — ACETAMINOPHEN 325 MG/1
650 TABLET ORAL EVERY 4 HOURS PRN
Status: DISCONTINUED | OUTPATIENT
Start: 2022-08-14 | End: 2022-08-16 | Stop reason: HOSPADM

## 2022-08-14 RX ORDER — INSULIN LISPRO 100 [IU]/ML
1-6 INJECTION, SOLUTION INTRAVENOUS; SUBCUTANEOUS
Status: DISCONTINUED | OUTPATIENT
Start: 2022-08-14 | End: 2022-08-16 | Stop reason: HOSPADM

## 2022-08-14 RX ORDER — PRIMIDONE 50 MG/1
100 TABLET ORAL 2 TIMES DAILY
Status: DISCONTINUED | OUTPATIENT
Start: 2022-08-14 | End: 2022-08-16 | Stop reason: HOSPADM

## 2022-08-14 RX ORDER — LISINOPRIL 20 MG/1
20 TABLET ORAL DAILY
Status: DISCONTINUED | OUTPATIENT
Start: 2022-08-14 | End: 2022-08-16 | Stop reason: HOSPADM

## 2022-08-14 RX ORDER — ALBUTEROL SULFATE 90 UG/1
2 AEROSOL, METERED RESPIRATORY (INHALATION) EVERY 6 HOURS PRN
Status: DISCONTINUED | OUTPATIENT
Start: 2022-08-14 | End: 2022-08-16 | Stop reason: HOSPADM

## 2022-08-14 RX ORDER — DILTIAZEM HYDROCHLORIDE 5 MG/ML
INJECTION INTRAVENOUS
Status: COMPLETED
Start: 2022-08-14 | End: 2022-08-14

## 2022-08-14 RX ORDER — ASPIRIN 81 MG/1
81 TABLET ORAL EVERY OTHER DAY
Status: DISCONTINUED | OUTPATIENT
Start: 2022-08-14 | End: 2022-08-16 | Stop reason: HOSPADM

## 2022-08-14 RX ORDER — PRAVASTATIN SODIUM 40 MG
80 TABLET ORAL
Status: DISCONTINUED | OUTPATIENT
Start: 2022-08-14 | End: 2022-08-16 | Stop reason: HOSPADM

## 2022-08-14 RX ORDER — FUROSEMIDE 10 MG/ML
40 INJECTION INTRAMUSCULAR; INTRAVENOUS 2 TIMES DAILY
Status: DISCONTINUED | OUTPATIENT
Start: 2022-08-14 | End: 2022-08-16 | Stop reason: HOSPADM

## 2022-08-14 RX ORDER — ASPIRIN 81 MG/1
324 TABLET, CHEWABLE ORAL ONCE
Status: COMPLETED | OUTPATIENT
Start: 2022-08-14 | End: 2022-08-14

## 2022-08-14 RX ORDER — ALBUTEROL SULFATE 2.5 MG/3ML
SOLUTION RESPIRATORY (INHALATION)
Status: COMPLETED
Start: 2022-08-14 | End: 2022-08-14

## 2022-08-14 RX ORDER — BUDESONIDE 0.5 MG/2ML
0.5 INHALANT ORAL 2 TIMES DAILY
Status: DISCONTINUED | OUTPATIENT
Start: 2022-08-14 | End: 2022-08-16 | Stop reason: HOSPADM

## 2022-08-14 RX ORDER — ONDANSETRON 2 MG/ML
4 INJECTION INTRAMUSCULAR; INTRAVENOUS EVERY 6 HOURS PRN
Status: DISCONTINUED | OUTPATIENT
Start: 2022-08-14 | End: 2022-08-16 | Stop reason: HOSPADM

## 2022-08-14 RX ADMIN — ASPIRIN 81 MG CHEWABLE TABLET 324 MG: 81 TABLET CHEWABLE at 05:01

## 2022-08-14 RX ADMIN — HEPARIN SODIUM 5000 UNITS: 5000 INJECTION INTRAVENOUS; SUBCUTANEOUS at 08:07

## 2022-08-14 RX ADMIN — IPRATROPIUM BROMIDE 0.5 MG: 0.5 SOLUTION RESPIRATORY (INHALATION) at 13:10

## 2022-08-14 RX ADMIN — METOPROLOL TARTRATE 25 MG: 25 TABLET, FILM COATED ORAL at 21:28

## 2022-08-14 RX ADMIN — FUROSEMIDE 40 MG: 10 INJECTION, SOLUTION INTRAMUSCULAR; INTRAVENOUS at 17:02

## 2022-08-14 RX ADMIN — ASPIRIN 81 MG: 81 TABLET, COATED ORAL at 08:06

## 2022-08-14 RX ADMIN — PRAVASTATIN SODIUM 80 MG: 20 TABLET ORAL at 16:15

## 2022-08-14 RX ADMIN — METOPROLOL TARTRATE 25 MG: 25 TABLET, FILM COATED ORAL at 08:06

## 2022-08-14 RX ADMIN — BUDESONIDE 0.5 MG: 0.5 INHALANT ORAL at 07:35

## 2022-08-14 RX ADMIN — FUROSEMIDE 40 MG: 10 INJECTION, SOLUTION INTRAMUSCULAR; INTRAVENOUS at 04:17

## 2022-08-14 RX ADMIN — Medication 1 TABLET: at 08:07

## 2022-08-14 RX ADMIN — INSULIN LISPRO 4 UNITS: 100 INJECTION, SOLUTION INTRAVENOUS; SUBCUTANEOUS at 11:14

## 2022-08-14 RX ADMIN — HEPARIN SODIUM 5000 UNITS: 5000 INJECTION INTRAVENOUS; SUBCUTANEOUS at 13:40

## 2022-08-14 RX ADMIN — DIGOXIN 125 MCG: 125 TABLET ORAL at 08:07

## 2022-08-14 RX ADMIN — FORMOTEROL FUMARATE DIHYDRATE 20 MCG: 20 SOLUTION RESPIRATORY (INHALATION) at 07:42

## 2022-08-14 RX ADMIN — INSULIN LISPRO 2 UNITS: 100 INJECTION, SOLUTION INTRAVENOUS; SUBCUTANEOUS at 08:08

## 2022-08-14 RX ADMIN — IOHEXOL 75 ML: 350 INJECTION, SOLUTION INTRAVENOUS at 05:24

## 2022-08-14 RX ADMIN — PRIMIDONE 100 MG: 50 TABLET ORAL at 08:07

## 2022-08-14 RX ADMIN — IPRATROPIUM BROMIDE 0.5 MG: 0.5 SOLUTION RESPIRATORY (INHALATION) at 20:51

## 2022-08-14 RX ADMIN — LEVALBUTEROL HYDROCHLORIDE 1.25 MG: 1.25 SOLUTION, CONCENTRATE RESPIRATORY (INHALATION) at 20:51

## 2022-08-14 RX ADMIN — CYANOCOBALAMIN TAB 500 MCG 500 MCG: 500 TAB at 08:07

## 2022-08-14 RX ADMIN — BUDESONIDE 0.5 MG: 0.5 INHALANT ORAL at 20:51

## 2022-08-14 RX ADMIN — PRIMIDONE 100 MG: 50 TABLET ORAL at 21:29

## 2022-08-14 RX ADMIN — IPRATROPIUM BROMIDE 0.5 MG: 0.5 SOLUTION RESPIRATORY (INHALATION) at 07:35

## 2022-08-14 RX ADMIN — INSULIN LISPRO 4 UNITS: 100 INJECTION, SOLUTION INTRAVENOUS; SUBCUTANEOUS at 16:15

## 2022-08-14 RX ADMIN — FUROSEMIDE 40 MG: 10 INJECTION, SOLUTION INTRAMUSCULAR; INTRAVENOUS at 08:08

## 2022-08-14 RX ADMIN — LISINOPRIL 20 MG: 20 TABLET ORAL at 08:06

## 2022-08-14 RX ADMIN — GABAPENTIN 300 MG: 300 CAPSULE ORAL at 21:28

## 2022-08-14 RX ADMIN — HEPARIN SODIUM 5000 UNITS: 5000 INJECTION INTRAVENOUS; SUBCUTANEOUS at 21:30

## 2022-08-14 NOTE — ASSESSMENT & PLAN NOTE
· Patient with a known history of coronary artery disease-status post CABG x4 vessel  · Elevated troponins:-most likely a non MI related elevated troponinemia in the setting of having an acute exacerbation of congestive heart failure as outlined above    Troponin trend thus far 59, 311 with a delta differential of 252  · Patient denies any chest pain  · Arrival DEBBY score 6  · Continue cardiac based medications as outlined above

## 2022-08-14 NOTE — ASSESSMENT & PLAN NOTE
Wt Readings from Last 3 Encounters:   08/14/22 87 5 kg (193 lb)   08/12/22 83 9 kg (185 lb)   08/01/22 84 6 kg (186 lb 6 4 oz)     Continue Lasix 40 mg b i d    Low-sodium diet, daily weights, I/O monitoring  Continue beta-blocker and ACE-inhibitor

## 2022-08-14 NOTE — ASSESSMENT & PLAN NOTE
· Patient noted to have an increased anion gap, however the CO2 level was normal  · No evidence of DKA, will check a beta hydroxy  · Monitor closely with Lasix therapy  · Repeat a BMP later this afternoon

## 2022-08-14 NOTE — CONSULTS
[address] [facility] 1945, 69 y o  male MRN: [id number]  Unit/Bed#: ED 20 Encounter: [id number]  Primary Care Provider: Braeden Fernandez DO   Date and time admitted to hospital: 8/14/2022  4:08 AM    Inpatient consult to Cardiology  Consult performed by: PENG Glass  Consult ordered by: Hong Cuevas PA-C          Ischemic cardiomyopathy  Assessment & Plan  EF 30%  Given his advanced COPD, he has declined an ICD  Atrial fibrillation with rapid ventricular response (HCC)  Assessment & Plan  Per ER notes  No EKGs to suggest AFib  Sinus rhythm on the monitor  Continue to monitor on telemetry    Acute on chronic combined systolic and diastolic congestive heart failure [facility])  Assessment & Plan  Wt Readings from Last 3 Encounters:   08/14/22 87 5 kg (193 lb)   08/12/22 83 9 kg (185 lb)   08/01/22 84 6 kg (186 lb 6 4 oz)     Continue Lasix 40 mg b i d  Low-sodium diet, daily weights, I/O monitoring  Continue beta-blocker and ACE-inhibitor      COPD (chronic obstructive pulmonary disease) (HCC)  Assessment & Plan  Continue home medications management per medical team    Coronary artery disease involving native coronary artery of native heart without angina pectoris  Assessment & Plan  History of 4 vessel CABG, 03/2017  Continue aspirin, beta-blocker, statin    * Acute on chronic respiratory failure with hypoxia (HCC)  Assessment & Plan  Likely due to COPD exacerbation and volume overload  Continue IV diuretics  Management of COPD per SLIM    Other summary comments:   Continue IV diuresis and management of COPD per medical team   Continue to monitor on telemetry for recurrent episodes of atrial fibrillation  Review of telemetry and available EKG's does not suggest AFib  Consider outpatient monitoring if no recurrent AFib noted during hospitalization  Suspect troponin elevation is due to hypoxia, volume overload, and hypertension  Continue to trend  EKG is without ischemic changes  He denies any chest pain at this time  Outpatient Cardiologist: Dr Hermilo Alonzo    HPI: Ally Mack is a 69y o  year old male who presented with acute on chronic shortness of breath  Edwar Red has baseline chronic hypoxic respiratory failure and shortness of breath  He is on 2 L of supplemental oxygen around the clock  He notes that he has had worsening of his breathing in the last day  He noted a brief episode of chest tightness when his breathing became significantly worse this morning  This improved after being placed on BiPAP in the ambulance  According to ER notes, he was hypertensive with systolic blood pressures in the 200s on arrival   He was given a breathing treatment and placed on CPAP, along with nitro paste and Solu-Medrol  According to the ER notes, he was in AFib with RVR and given 10 mg of IV Cardizem with improvement of his heart rate and blood pressure  He was noted to have an elevated D-dimer, however CT of the chest was negative for PE  BNP level was elevated; HS troponin levels mildly elevated but appear to be flat trending  At the time of my evaluation, Edwar Red reports that he is feeling better  He denies any recurrent episodes of chest discomfort and notes that his breathing has improved  He denies any palpitations, dizziness, lightheadedness, syncope  Cardiac history is significant for CAD with prior 4 vessel CABG 03/2017 (LIMA-LAD, SVG-RI, SVG-PDA and ventricular branch of the RCA), ischemic cardiomyopathy  Given his advanced COPD, he declined an ICD  EKG: ST, LAE, NSSTW changes      MOST  RECENT CARDIAC IMAGING:   Echo 11/12/2020:  EF 30% with global dysfunction  Echo 8/2019  - EF 30-40%  Global dysfunction with severe hypokinesis of the apex  Mild MR  Arterial duplex 12/2017 - likely occlusion of the left SFA   KEI mildly impaired           Review of Systems: a 10 point review of systems was conducted and is negative except for as mentioned in the HPI or as below  Historical Information   Past Medical History:   Diagnosis Date    BPH (benign prostatic hyperplasia)     45 days radiation treatment    COPD (chronic obstructive pulmonary disease)     Coronary artery disease     CABG x4 in 2017    Diabetes mellitus     History of Arterial Duplex of LE 2017    Likely occlusion of the left superficial femoral artery  Calcific changes bilaterally  Despite these changes, the ankle-brachial index as a measure of peripheral blood flow only mildly impaired   History of echocardiogram 2017    EF 40%, mild LVH, mild MR     Hyperlipidemia     Hypertension     Ischemic cardiomyopathy     NSTEMI (non-ST elevated myocardial infarction)     Prostate cancer     prostate     PVD (peripheral vascular disease)     Sepsis due to pneumonia     Tremor     Type 2 MI (myocardial infarction) ([facility] [address] ) 2021     Past Surgical History:   Procedure Laterality Date    CARDIAC CATHETERIZATION  2017    Significant left main plus triple-vessel CAD   CORONARY ARTERY BYPASS GRAFT  2017    4V CABG:  LIMA to LAD, VG to RI, SVG to PDA to LVBR RCA      EYE SURGERY      shots in eye once a month @ the [facility] History     Substance and Sexual Activity   Alcohol Use Yes     Social History     Substance and Sexual Activity   Drug Use Never     Social History     Tobacco Use   Smoking Status Former Smoker    Packs/day: 0 25    Years: 60 00    Pack years: 15 00    Types: Cigarettes    Quit date: 2022    Years since quittin 3   Smokeless Tobacco Never Used       Family History:  No longer pertinent due to patient age      Meds/Allergies   all current active meds have been reviewed  (Not in a hospital admission)      Allergies   Allergen Reactions    Atorvastatin Myalgia       Objective   Vitals: Blood pressure 149/72, pulse 75, temperature 97 8 °F (36 6 °C), temperature source Tympanic, resp  rate 18, height 5' 10" (1 778 m), weight 87 5 kg (193 lb), SpO2 95 %  , Body mass index is 27 69 kg/m² ,   Orthostatic Blood Pressures    Flowsheet Row Most Recent Value   Blood Pressure 149/72 filed at 08/14/2022 4464   Patient Position - Orthostatic VS Sitting filed at 08/14/2022 3020          Systolic (14OWC), BGM:512 , Min:145 , USQ:305     Diastolic (26UYO), YBF:49, Min:68, Max:73      Physical Exam:    General:  Normal appearance in no distress  Eyes:  Anicteric  Oral mucosa:  Moist   Neck:  No JVD  Carotid upstrokes are brisk without bruits  No masses  Chest:  Decreased, occasional wheeze  Cardiac:  No palpable PMI  Normal S1 and S2  No murmur gallop or rub  Abdomen:  Soft and nontender  No palpable organomegaly or aortic enlargement  Extremities: +2 bilat LE edema  Musculoskeletal:  Symmetric  Vascular:  Pedal pulses are intact  Neuro:  Grossly symmetric  Psych:  Alert and oriented x3          Lab Results:     Troponins:    Results from last 7 days   Lab Units 08/14/22  0804 08/14/22  0626 08/14/22  0419   HS TNI 0HR ng/L  --   --  59*   HS TNI 2HR ng/L  --  311*  --    HSTNI D2 ng/L  --  252*  --    HS TNI 4HR ng/L 371*  --   --    HSTNI D4 ng/L 312*  --   --      BNP:   Results from last 6 Months   Lab Units 08/14/22  0419 07/14/22  2354   BNP pg/mL 784* 325*       CBC :   Results from last 7 days   Lab Units 08/14/22  0419   WBC Thousand/uL 11 77*   HEMOGLOBIN g/dL 12 1   HEMATOCRIT % 37 9   MCV fL 95   PLATELETS Thousands/uL 171     TSH:     CMP:   Results from last 7 days   Lab Units 08/14/22  0419   POTASSIUM mmol/L 4 1   CHLORIDE mmol/L 99   CO2 mmol/L 23   BUN mg/dL 20   CREATININE mg/dL 0 89   AST U/L 15   ALT U/L 6*   EGFR ml/min/1 73sq m 83     Lipid Profile:     Coags:

## 2022-08-14 NOTE — ASSESSMENT & PLAN NOTE
· Patient was noted to have rapid ventricular response earlier prior to arrival, he was treated with IV Cardizem  · Currently rate controlled  · Continue digoxin, and metoprolol  · Not a good candidate for long-term anticoagulation  · Continue aspirin only for now  · Await further input from Cardiology

## 2022-08-14 NOTE — ED PROVIDER NOTES
History  Chief Complaint   Patient presents with    Shortness of Breath      Patient brought in by ALS,  worsening SOB for two days, on scene patient was pale, diaphoretic, complaining of chest pain  Patient arrived on CPAP sat of 80      15-year-old male with history of chronic respiratory failure secondary to COPD on 2 L nasal cannula, CHF, CAD who presents the emergency department in respiratory distress  Per EMS report, patient woke up with chest pain and shortness of breath  When they arrived, they found him pale, diaphoretic, and hypoxic satting in the low 80s on room air  He was reportedly tripodding  On initial auscultation, patient had some wheezing and poor air movement  He was also noted to be significantly hypertensive with initial systolics in the 730B  He was placed on CPAP and given 1 DuoNeb, 1 additional albuterol treatment, and 125 mg of IV Solu-Medrol with some improvement  Patient was also given nitropaste  Patient was found to be in AFib with RVR with rates in the 130s  He was given 10 mg of Cardizem with improvement in both his heart rate and blood pressure  Upon arrival to the emergency department patient was still tachypneic with CPAP in place  Had some diminished breath sounds, but no wheezing  Was noted to have bilateral lower extremity edema worse on the right than the left  Patient reports progressively worsening shortness of breath over the past few days  He denies any fevers or chills  No abdominal pain, nausea or vomiting  He denies any history of blood clots  Prior to Admission Medications   Prescriptions Last Dose Informant Patient Reported? Taking?    Multiple Vitamins-Minerals (PRESERVISION AREDS 2 PO)  Self Yes No   Sig: Take by mouth   albuterol (PROVENTIL HFA,VENTOLIN HFA) 90 mcg/act inhaler  Self No No   Sig: Inhale 2 puffs every 6 (six) hours as needed for wheezing or shortness of breath   arformoterol (BROVANA) 15 mcg/2 mL nebulizer solution  Self No No   Sig: Take 2 mL (15 mcg total) by nebulization 2 (two) times a day   aspirin (ECOTRIN LOW STRENGTH) 81 mg EC tablet  Self No No   Sig: Take 1 tablet (81 mg total) by mouth every other day   budesonide (Pulmicort) 0 5 mg/2 mL nebulizer solution  Self No No   Sig: Take 2 mL (0 5 mg total) by nebulization 2 (two) times a day Rinse mouth after use  cyanocobalamin (VITAMIN B-12) 500 MCG tablet  Self Yes No   Sig: TAKE ONE TABLET BY MOUTH EVERY MORNING *VITAMIN B12*   digoxin (LANOXIN) 0 125 mg tablet  Self No No   Sig: Take 1 tablet (125 mcg total) by mouth daily   gabapentin (NEURONTIN) 300 mg capsule  Self No No   Sig: Take 1 capsule (300 mg total) by mouth daily at bedtime   glimepiride (AMARYL) 1 mg tablet  Self No No   Sig: Take 2 tablets (2 mg total) by mouth daily with breakfast AND 1 tablet (1 mg total) daily with dinner     ipratropium (ATROVENT) 0 02 % nebulizer solution  Self No No   Sig: Take 2 5 mL (0 5 mg total) by nebulization 3 (three) times a day   lisinopril (ZESTRIL) 20 mg tablet  Self No No   Sig: Take 1 tablet (20 mg total) by mouth daily   metFORMIN (GLUCOPHAGE) 1000 MG tablet   No No   Sig: Take 1 tablet (1,000 mg total) by mouth 2 (two) times a day with meals   Patient taking differently: Take 1,000 mg by mouth 2 (two) times a day with meals Taking 500mg 2 times daily   metoprolol tartrate (LOPRESSOR) 25 mg tablet  Self No No   Sig: Take 1 tablet (25 mg total) by mouth every 12 (twelve) hours   primidone (MYSOLINE) 50 mg tablet  Self Yes No   Sig: Take 100 mg by mouth 2 (two) times a day    roflumilast (DALIRESP) 250 MCG tablet  Self No No   Sig: Take 1 tablet (250 mcg total) by mouth daily   rosuvastatin (CRESTOR) 10 MG tablet  Self No No   Sig: Take 1 tablet (10 mg total) by mouth daily   terbinafine (LamISIL) 1 % cream  Self Yes No   Sig: APPLY THIN LAYER TOPICALLY TWICE A DAY FOR FUNGAL INFECTION      Facility-Administered Medications: None       Past Medical History:   Diagnosis Date  BPH (benign prostatic hyperplasia)     45 days radiation treatment    COPD (chronic obstructive pulmonary disease)     Coronary artery disease     CABG x4 in 2017    Diabetes mellitus     History of Arterial Duplex of LE 2017    Likely occlusion of the left superficial femoral artery  Calcific changes bilaterally  Despite these changes, the ankle-brachial index as a measure of peripheral blood flow only mildly impaired   History of echocardiogram 2017    EF 40%, mild LVH, mild MR     Hyperlipidemia     Hypertension     Ischemic cardiomyopathy     NSTEMI (non-ST elevated myocardial infarction)     Prostate cancer     prostate     PVD (peripheral vascular disease)     Sepsis due to pneumonia     Tremor     Type 2 MI (myocardial infarction) ([facility] [address] ) 2021       Past Surgical History:   Procedure Laterality Date    CARDIAC CATHETERIZATION  2017    Significant left main plus triple-vessel CAD   CORONARY ARTERY BYPASS GRAFT  2017    4V CABG:  LIMA to LAD, VG to RI, SVG to PDA to LVBR RCA   EYE SURGERY      shots in eye once a month [facility] the  [facility]    PROSTATE BIOPSY         Family History   Problem Relation Age of Onset    Cancer Father     Heart disease Brother      I have reviewed and agree with the history as documented  E-Cigarette/Vaping    E-Cigarette Use Never User      E-Cigarette/Vaping Substances    Nicotine No     THC No     CBD No     Flavoring No     Other No     Unknown No      Social History     Tobacco Use    Smoking status: Former Smoker     Packs/day: 0 25     Years: 60 00     Pack years: 15 00     Types: Cigarettes     Quit date: 2022     Years since quittin 3    Smokeless tobacco: Never Used   Vaping Use    Vaping Use: Never used   Substance Use Topics    Alcohol use: Yes    Drug use: Never       Review of Systems   Constitutional: Negative for chills and fever  HENT: Negative for ear pain and sore throat      Eyes: Negative for pain and visual disturbance  Respiratory: Positive for shortness of breath  Negative for cough  Cardiovascular: Positive for chest pain and leg swelling  Negative for palpitations  Gastrointestinal: Negative for abdominal pain, nausea and vomiting  Genitourinary: Negative for dysuria and hematuria  Musculoskeletal: Negative for arthralgias and back pain  Skin: Negative for color change and rash  Neurological: Negative for seizures and syncope  All other systems reviewed and are negative  Physical Exam  Physical Exam  Vitals and nursing note reviewed  Constitutional:       Comments: Respiratory distress   HENT:      Head: Normocephalic and atraumatic  Right Ear: External ear normal       Left Ear: External ear normal       Nose: Nose normal       Mouth/Throat:      Mouth: Mucous membranes are moist    Eyes:      Extraocular Movements: Extraocular movements intact  Pupils: Pupils are equal, round, and reactive to light  Cardiovascular:      Rate and Rhythm: Tachycardia present  Rhythm irregular  Pulses: Normal pulses  Heart sounds: No murmur heard  Pulmonary:      Effort: Tachypnea and respiratory distress present  Breath sounds: Decreased breath sounds and rhonchi present  No wheezing  Chest:      Chest wall: No tenderness or crepitus  Abdominal:      General: Bowel sounds are normal       Palpations: Abdomen is soft  Tenderness: There is no abdominal tenderness  There is no rebound  Musculoskeletal:         General: Normal range of motion  Cervical back: Normal range of motion and neck supple  Right lower leg: No tenderness  Edema present  Left lower leg: No tenderness  Edema present  Comments: Right greater than left lower extremity edema   Skin:     General: Skin is warm and dry  Capillary Refill: Capillary refill takes less than 2 seconds  Neurological:      General: No focal deficit present        Mental Status: He is alert and oriented to person, place, and time     Psychiatric:         Mood and Affect: Mood normal          Behavior: Behavior normal          Vital Signs  ED Triage Vitals   Temp Pulse Respirations Blood Pressure SpO2   -- 08/14/22 0409 08/14/22 0409 08/14/22 0409 08/14/22 0409    (!) 116 20 163/73 96 %      Temp src Heart Rate Source Patient Position - Orthostatic VS BP Location FiO2 (%)   -- 08/14/22 0409 08/14/22 0409 08/14/22 0409 --    Monitor Lying Left arm       Pain Score       08/14/22 0534       No Pain           Vitals:    08/14/22 0409 08/14/22 0515 08/14/22 0530 08/14/22 0545   BP: 163/73 165/73  149/68   Pulse: (!) 116  (!) 109 102   Patient Position - Orthostatic VS: Lying   Lying         Visual Acuity      ED Medications  Medications   methylPREDNISolone sodium succinate (Solu-MEDROL) 125 MG injection **ADS Override Pull** (  Not Given 8/14/22 0426)   albuterol (2 5 mg/3 mL) 0 083 % inhalation solution **ADS Override Pull** (  Not Given 8/14/22 0425)   ipratropium-albuterol (DUO-NEB) 0 5-2 5 mg/3 mL inhalation solution **ADS Override Pull** (  Not Given 8/14/22 0426)   diltiazem (CARDIZEM) 25 mg/5 mL injection **ADS Override Pull** (  Not Given 8/14/22 0426)   furosemide (LASIX) injection 40 mg (40 mg Intravenous Given 8/14/22 0417)   aspirin chewable tablet 324 mg (324 mg Oral Given 8/14/22 0501)   iohexol (OMNIPAQUE) 350 MG/ML injection (MULTI-DOSE) 75 mL (75 mL Intravenous Given 8/14/22 0524)       Diagnostic Studies  Results Reviewed     Procedure Component Value Units Date/Time    HS Troponin I 4hr [[id number]]     Lab Status: No result Specimen: Blood     CBC and differential [[id number]]  (Abnormal) Collected: 08/14/22 0419    Lab Status: Final result Specimen: Blood from Arm, Right Updated: 08/14/22 0510     WBC 11 77 Thousand/uL      RBC 4 00 Million/uL      Hemoglobin 12 1 g/dL      Hematocrit 37 9 %      MCV 95 fL      MCH 30 3 pg      MCHC 31 9 g/dL      RDW 14 5 %      MPV 11 7 fL Platelets 923 Thousands/uL     Narrative: This is an appended report  These results have been appended to a previously verified report      Manual Differential(PHLEBS Do Not Order) [[id number]]  (Abnormal) Collected: 08/14/22 0419    Lab Status: Final result Specimen: Blood from Arm, Right Updated: 08/14/22 0510     Segmented % 90 %      Lymphocytes % 7 %      Monocytes % 3 %      Eosinophils, % 0 %      Basophils % 0 %      Absolute Neutrophils 10 59 Thousand/uL      Lymphocytes Absolute 0 82 Thousand/uL      Monocytes Absolute 0 35 Thousand/uL      Eosinophils Absolute 0 00 Thousand/uL      Basophils Absolute 0 00 Thousand/uL      Total Counted --     RBC Morphology Normal     Platelet Estimate Adequate    Comprehensive metabolic panel [[id number]]  (Abnormal) Collected: 08/14/22 0419    Lab Status: Final result Specimen: Blood from Hand, Right Updated: 08/14/22 0508     Sodium 137 mmol/L      Potassium 4 1 mmol/L      Chloride 99 mmol/L      CO2 23 mmol/L      ANION GAP 15 mmol/L      BUN 20 mg/dL      Creatinine 0 89 mg/dL      Glucose 207 mg/dL      Calcium 9 0 mg/dL      AST 15 U/L      ALT 6 U/L      Alkaline Phosphatase 96 U/L      Total Protein 6 6 g/dL      Albumin 4 1 g/dL      Total Bilirubin 0 61 mg/dL      eGFR 83 ml/min/1 73sq m     Narrative:      [place] guidelines for Chronic Kidney Disease (CKD):     Stage 1 with normal or high GFR (GFR > 90 mL/min/1 73 square meters)    Stage 2 Mild CKD (GFR = 60-89 mL/min/1 73 square meters)    Stage 3A Moderate CKD (GFR = 45-59 mL/min/1 73 square meters)    Stage 3B Moderate CKD (GFR = 30-44 mL/min/1 73 square meters)    Stage 4 Severe CKD (GFR = 15-29 mL/min/1 73 square meters)    Stage 5 End Stage CKD (GFR <15 mL/min/1 73 square meters)  Note: GFR calculation is accurate only with a steady state creatinine    COVID/FLU/RSV [[id number]]  (Normal) Collected: 08/14/22 0419    Lab Status: Final result Specimen: Nares from Nose Updated: 08/14/22 0502     SARS-CoV-2 Negative     INFLUENZA A PCR Negative     INFLUENZA B PCR Negative     RSV PCR Negative    Narrative:      FOR PEDIATRIC PATIENTS - copy/paste COVID Guidelines URL to browser: [email or web address]  [email or web address]    SARS-CoV-2 assay is a Nucleic Acid Amplification assay intended for the  qualitative detection of nucleic acid from SARS-CoV-2 in nasopharyngeal  swabs  Results are for the presumptive identification of SARS-CoV-2 RNA  Positive results are indicative of infection with SARS-CoV-2, the virus  causing COVID-19, but do not rule out bacterial infection or co-infection  with other viruses  Laboratories within the [place] and its  territories are required to report all positive results to the appropriate  public health authorities  Negative results do not preclude SARS-CoV-2  infection and should not be used as the sole basis for treatment or other  patient management decisions  Negative results must be combined with  clinical observations, patient history, and epidemiological information  This test has not been FDA cleared or approved  This test has been authorized by FDA under an Emergency Use Authorization  (EUA)  This test is only authorized for the duration of time the  declaration that circumstances exist justifying the authorization of the  emergency use of an in vitro diagnostic tests for detection of SARS-CoV-2  virus and/or diagnosis of COVID-19 infection under section 564(b)(1) of  the Act, 21 U  [place] C  612JFV-0(Y)(3), unless the authorization is terminated  or revoked sooner  The test has been validated but independent review by FDA  and CLIA is pending  Test performed using [employer] Gene[employer]pert: This RT-PCR assay targets N2,  a region unique to SARS-CoV-2  A conserved region in the E-gene was chosen  for pan-Sarbecovirus detection which includes SARS-CoV-2      D-Dimer [[id number]]  (Abnormal) Collected: 08/14/22 5648    Lab Status: Final result Specimen: Blood from Arm, Right Updated: 08/14/22 0456     D-Dimer, Quant 2 33 ug/ml FEU     Narrative: In the evaluation for possible pulmonary embolism, in the appropriate (Well's Score of 4 or less) patient, the age adjusted d-dimer cutoff for this patient can be calculated as:    Age x 0 01 (in ug/mL) for Age-adjusted D-dimer exclusion threshold for a patient over 50 years  HS Troponin 0hr (reflex protocol) [[id number]]  (Abnormal) Collected: 08/14/22 0419    Lab Status: Final result Specimen: Blood from Hand, Right Updated: 08/14/22 0452     hs TnI 0hr 59 ng/L     HS Troponin I 2hr [[id number]]     Lab Status: No result Specimen: Blood     B-Type Natriuretic Peptide(BNP) AN, CA, EA Campuses Only [[id number]]  (Abnormal) Collected: 08/14/22 0419    Lab Status: Final result Specimen: Blood from Hand, Right Updated: 08/14/22 0451      pg/mL     Blood gas, venous [[id number]]  (Abnormal) Collected: 08/14/22 0419    Lab Status: Final result Specimen: Blood from Arm, Right Updated: 08/14/22 0431     pH, Daniel 7 415     pCO2, Daniel 39 3 mm Hg      pO2, Daniel 55 7 mm Hg      HCO3, Daniel 24 6 mmol/L      Base Excess, Daniel 0 2 mmol/L      O2 Content, Daniel 15 8 ml/dL      O2 HGB, VENOUS 86 9 %                  CTA ED chest PE Study   Final Result by Anderson Sales MD (08/14 0602)      No evidence of pulmonary embolism is seen  Measured RV/LV ratio is within normal limits at less than 0 9  There are small bilateral pleural effusions (right slightly larger than left), and the lungs demonstrate some septal thickening, suggesting mild fluid overload/CHF  Clinical correlation and follow-up is recommended  Atelectasis as described above  Mild emphysema  Atherosclerosis  Coronary artery disease  Prior sternotomy and CABG  Other nonemergent findings, as described above  Please see discussion        Workstation performed: [id number]         XR chest 1 view portable    (Results Pending)              Procedures  CriticalCare Time  Performed by: Christie London MD  Authorized by: Christie London MD     Critical care provider statement:     Critical care time (minutes):  45    Critical care start time:  8/14/2022 4:05 AM    Critical care end time:  8/14/2022 4:50 AM    Critical care time was exclusive of:  Separately billable procedures and treating other patients and teaching time    Critical care was necessary to treat or prevent imminent or life-threatening deterioration of the following conditions:  Cardiac failure and respiratory failure    Critical care was time spent personally by me on the following activities:  Obtaining history from patient or surrogate, development of treatment plan with patient or surrogate, evaluation of patient's response to treatment, examination of patient, review of old charts, re-evaluation of patient's condition, ordering and review of radiographic studies, ordering and review of laboratory studies, ordering and performing treatments and interventions and interpretation of cardiac output measurements (bipap management, cardiac ultrasound)             ED Course  ED Course as of 08/14/22 0620   Sun Aug 14, 2022   0410 Bedside ultrasound demonstrates more than 3 B lines in bilateral lungs, no pericardial effusion or tamponade physiology, but EF appears to be approximately 30% consistent with possible CHF exacerbation   0425 Patient reports the asymmetric leg swelling is not new   0453 EKG interpreted by myself demonstrates sinus tachycardia with a rate of 115, normal axis, normal intervals, there is T-wave flattening in lead 1 which is unchanged from prior, ST depressions in V4, V5, and V6 which also appear unchanged from EKG performed in January, no other significant changes   0459 Patient taken off of BiPAP on 2 L nasal cannula, will continue to monitor   0505 hs TnI 0hr(!): 59  Likely demand in the setting of hypoxia and respiratory distress, 324 of aspirin administered will continue to trend, patient currently without chest pain, no significant ST segment or T-wave changes compared to prior EKG   0551 On 3L   0607 Texted SLIM for admission             HEART Risk Score    Flowsheet Row Most Recent Value   Heart Score Risk Calculator    History 1 Filed at: 08/14/2022 0455   ECG 2 Filed at: 08/14/2022 0455   Age 2 Filed at: 08/14/2022 0455   Risk Factors 2 Filed at: 08/14/2022 0455   Troponin 2 Filed at: 08/14/2022 0455   HEART Score 9 Filed at: 08/14/2022 0455                        SBIRT 22yo+    Flowsheet Row Most Recent Value   SBIRT (25 yo +)    In order to provide better care to our patients, we are screening all of our patients for alcohol and drug use  Would it be okay to ask you these screening questions? Unable to answer at this time Filed at: 08/14/2022 2900 [facility]' Criteria for PE    Flowsheet Row Most Recent Value   [facility]' Criteria for PE    Clinical signs and symptoms of DVT 0 Filed at: 08/14/2022 2133   PE is primary diagnosis or equally likely 0 Filed at: 08/14/2022 0458   HR >100 1 5 Filed at: 08/14/2022 0458   Immobilization at least 3 days or Surgery in the previous 4 weeks 0 Filed at: 08/14/2022 0458   Previous, objectively diagnosed PE or DVT 0 Filed at: 08/14/2022 0458   Hemoptysis 0 Filed at: 08/14/2022 0458   Malignancy with treatment within 6 months or palliative 0 Filed at: 08/14/2022 0458   Paco' Criteria Total 1 5 Filed at: 08/14/2022 0458                MDM  Number of Diagnoses or Management Options  Acute exacerbation of CHF (congestive heart failure) (HCC)  Elevated troponin  Pleural effusion  Respiratory distress  Thyroid nodule  Diagnosis management comments: 69-year-old male with COPD, CHF, and significant cardiac history presents the emergency department in respiratory distress  Upon arrival to the department, patient was on CPAP, with tachypnea, diminished breath sounds    Bedside ultrasound demonstrated B lines bilaterally and diminished EF  Suspect symptoms likely secondary to CHF exacerbation, there may be a component of COPD exacerbation as well  Will check cardiac workup including D-dimer and BNP  Will leave pre-hospital nitropaste on and give 40 of IV Lasix  Patient received 10 mg of Cardizem pre-hospital for reported AFib with RVR with rates in the 130s  Will transition patient off of pre-hospital CPAP to BiPAP here  Initial EKG demonstrated sinus tachycardia with ST depressions in V4, V5, and V6, these appear relatively unchanged from prior EKG done in July  Labs remarkable for mildly elevated BNP, troponin also elevated I suspect this is likely secondary to respiratory distress and hypoxia, patient not currently having any active chest pain  D-dimer was elevated, CT scan was negative for pulmonary embolism but did demonstrate bilateral pleural effusions and other signs consistent with CHF exacerbation  Patient able to be weaned off of BiPAP to 3 L nasal cannula  Will admit to medicine for further management and evaluation         Amount and/or Complexity of Data Reviewed  Clinical lab tests: ordered and reviewed  Tests in the radiology section of CPT®: ordered and reviewed  Review and summarize past medical records: yes  Independent visualization of images, tracings, or specimens: yes        Disposition  Final diagnoses:   Respiratory distress   Elevated troponin   Thyroid nodule   Acute exacerbation of CHF (congestive heart failure) (HCC)   Pleural effusion     Time reflects when diagnosis was documented in both MDM as applicable and the Disposition within this note     Time User Action Codes Description Comment    8/14/2022  4:14 AM Check, Laveta Click Add [R06 03] Respiratory distress     8/14/2022  4:58 AM Check, Laveta Click Add [R77 8] Elevated troponin     8/14/2022  6:04 AM Check, Laveta Click Add [E04 1] Thyroid nodule     8/14/2022  6:04 AM Check, Laveta Click Add [I50 9] Acute exacerbation of CHF (congestive heart failure) ([facility] [address] )     8/14/2022  6:04 AM Check, Karolina Granados [J90] Chronic bilateral pleural effusions     8/14/2022  6:04 AM Check, Chris Nurse [J90] Chronic bilateral pleural effusions     8/14/2022  6:04 AM Check, Karolina Granados [J90] Chronic bilateral pleural effusions     8/14/2022  6:04 AM Check, Chris Nurse [J90] Chronic bilateral pleural effusions     8/14/2022  6:05 AM Check, Karolina Granados [J90] Pleural effusion       ED Disposition     ED Disposition   Admit    Condition   Stable    Date/Time   Sun Aug 14, 2022  6:09 AM    Comment   Case was discussed with IRINA and the patient's admission status was agreed to be Admission Status: inpatient status to the service of Dr Thomas Roberts  Follow-up Information    None         Patient's Medications   Discharge Prescriptions    No medications on file       No discharge procedures on file      PDMP Review       Value Time User    PDMP Reviewed  Yes 9/3/2020 12:26 PM Serjio Oconnor, [address]          ED Provider  Electronically Signed by           Babita Agarwal MD  08/14/22 8805

## 2022-08-14 NOTE — ASSESSMENT & PLAN NOTE
Lab Results   Component Value Date    HGBA1C 7 4 (H) 03/13/2019       No results for input(s): POCGLU in the last 72 hours  Blood Sugar Average: Last 72 hrs:  ·  Accu-Cheks Q a c   And HS with sliding scale insulin  · Hold patient's metformin and saxagliptin
Lab Results   Component Value Date    HGBA1C 8 1 (H) 07/05/2019       Recent Labs     07/05/19  1545 07/05/19 2022 07/06/19  0640   POCGLU 280* 293* 158*       Blood Sugar Average: Last 72 hrs:  · (P) 110 [id number] Accu-Cheks Q a c   And HS with sliding scale insulin  · Hold patient's metformin and saxagliptin
· Continue Lipitor
· Continue Lipitor
· Continue aspirin
· Continue aspirin
· Continue aspirin and metoprolol
· Continue aspirin and metoprolol
· Continue neb treatments
· Continue neb treatments
· Found on chest x-ray  · Was given Levaquin in the ER  · Will change IV antibiotics to Rocephin and azithromycin  · Sputum culture pending  · Urine Legionella and strep  · Aspiration precautions  · Continue nebulizer treatments  · Respiratory protocol  · Check procalcitonin
· Found on chest x-ray  · Was given Levaquin in the ER  · Will change IV antibiotics to Rocephin and azithromycin  · Sputum culture pending  · Urine Legionella and strep  · Aspiration precautions  · Continue nebulizer treatments  · Respiratory protocol  · Check procalcitonin
· Nicotine patch
· Nicotine patch
· Positive shortness of breath and wheeze for 2 days  · Cough with white sputum productivity
· Positive shortness of breath and wheeze for 2 days  · Cough with white sputum productivity  · Resolved
Yes

## 2022-08-14 NOTE — ASSESSMENT & PLAN NOTE
Lab Results   Component Value Date    HGBA1C 7 9 (A) 05/05/2022       Recent Labs     08/14/22  0706   POCGLU 219*       Blood Sugar Average: Last 72 hrs:  (P) 219   · Hold oral hypoglycemic medications occlusive of metformin, and glimepiride  · Target blood sugar for the hospital 140-180  · Implement Accu-Cheks AC and HS with sliding scale coverage  · Will add Lantus if necessary  · Diabetic neuropathy:-continue gabapentin

## 2022-08-14 NOTE — ASSESSMENT & PLAN NOTE
Likely due to COPD exacerbation and volume overload  Continue IV diuretics  Management of COPD per SLIM

## 2022-08-14 NOTE — H&P
[address]  H&P- [employer] 1945, 69 y o  male MRN: [id number]  Unit/Bed#: ED 20 Encounter: [id number]  Primary Care Provider: Lakeshia Blair DO   Date and time admitted to hospital: 8/14/2022  4:08 AM    * Acute on chronic respiratory failure with hypoxia [facility])  Assessment & Plan  · Admit inpatient to [facility]  · Patient was to initially an O2 sat the low 80s was reportedly tripoding  · Baseline requirement is 2 L of supplemental oxygen nasal cannula 24/7/365  · Patient arrived on CPAP, is 3 L of supplemental oxygen with a resultant O2 sat in the mid to low 90s  · Secondary to an acute exacerbation of combined systolic and diastolic dysfunction congestive heart failure-see the outline below  · Continue to wean oxygen as able    Acute on chronic combined systolic and diastolic congestive heart failure (HCC)  Assessment & Plan  Wt Readings from Last 3 Encounters:   08/14/22 87 5 kg (193 lb)   08/12/22 83 9 kg (185 lb)   08/01/22 84 6 kg (186 lb 6 4 oz)     · As evidenced by the BNP of 784, and clinically volume overloaded state  · CT chest revealed bilateral pleural effusions  · Most recent 2D echocardiogram from November of 2020 revealed an EF of 30%, and grade 2 diastolic dysfunction  · Start Lasix 40 mg IV b i d   · Monitor daily weights, input and output, fluid restriction  · Continue other current cardiac based medications which include aspirin, metoprolol tartrate, lisinopril, pravastatin  · Consult Cardiology        Coronary artery disease involving native coronary artery of native heart without angina pectoris  Assessment & Plan  · Patient with a known history of coronary artery disease-status post CABG x4 vessel  · Elevated troponins:-most likely a non MI related elevated troponinemia in the setting of having an acute exacerbation of congestive heart failure as outlined above    Troponin trend thus far 59, 311 with a delta differential of 252  · Patient denies any chest pain  · Arrival DEBBY score 6  · Continue cardiac based medications as outlined above    Atrial fibrillation with rapid ventricular response (HCC)  Assessment & Plan  · Patient was noted to have rapid ventricular response earlier prior to arrival, he was treated with IV Cardizem  · Currently rate controlled  · Continue digoxin, and metoprolol  · Not a good candidate for long-term anticoagulation  · Continue aspirin only for now  · Await further input from Cardiology    Primary hypertension  Assessment & Plan  · Blood pressure is controlled  · Continue medications as outlined above    COPD (chronic obstructive pulmonary disease) ([facility] )  Assessment & Plan  · With a baseline chronic hypoxic respiratory failure that has rendered him dependent on 2 L of supplemental oxygen via nasal cannula 24/7/365  · Currently without exacerbation  · Implement respiratory protocol  · Continue home medication  · Status post treatment with IV Solu-Medrol prior to admission, will hold off on further antibiotics since his current state is probably related more so to his acute CHF exacerbation    Type 2 diabetes mellitus without complication, without long-term current use of insulin ([facility] )  Assessment & Plan  Lab Results   Component Value Date    HGBA1C 7 9 (A) 05/05/2022       Recent Labs     08/14/22  0706   POCGLU 219*       Blood Sugar Average: Last 72 hrs:  (P) 219   · Hold oral hypoglycemic medications occlusive of metformin, and glimepiride  · Target blood sugar for the hospital 140-180  · Implement Accu-Cheks AC and HS with sliding scale coverage  · Will add Lantus if necessary  · Diabetic neuropathy:-continue gabapentin    Increased anion gap metabolic acidosis  Assessment & Plan  · Patient noted to have an increased anion gap, however the CO2 level was normal  · No evidence of DKA, will check a beta hydroxy  · Monitor closely with Lasix therapy  · Repeat a BMP later this afternoon    Elevated d-dimer  Assessment & Plan  · CTA chest PE study negative for PE  · No further workup at this time    Benign essential tremor  Assessment & Plan  · Continue primidone    VTE Prophylaxis: Heparin  Code Status:  Full code status level 1  Discussion with family:  No family members were present at the time of my H&P evaluation, will update the family members as the hospital course progresses    Anticipated Length of Stay:  Patient will be admitted on an Inpatient basis with an anticipated length of stay of  > 2 midnights  Justification for Hospital Stay:  Need for IV diuretics, cardiology consultation    Total Time for Visit, including Counseling / Coordination of Care: 1 hour  Greater than 50% of this total time spent on direct patient counseling and coordination of care  Chief Complaint:   Shortness of breath x1 day     History of Present Illness:    Harjit Cruz is a 69 y o  male who presents with acute on chronic shortness of breath  In brief, the patient is a 27-year-old male, well known to the hospital staff due to frequent admissions here at this campus, and is a patient that reportedly went to sleep in his usual chronic poor state of health in a recliner  Patient has a baseline chronic hypoxic respiratory failure which has rendered him dependent on 2 L of supplemental oxygen via nasal cannula 24/7/365  Patient reports that over the past day or 2 he has been noting increasing shortness of breath with minimal exertion  He has been noted to be resting more than what he is use to  After falling asleep in his recliner, he reports waking up with shortness of breath  He denied any chest pain, nausea, vomiting, diarrhea, fevers, chills, palpitations, numbness, tingling, but endorses weakness  911 was called    Evidently the patient was found to have an O2 sat of 80%, was found to be in AFib with RVR, and was brought into the hospital   He was treated with diuretics, Solu-Medrol, Cardizem, and then [address] [facility] was called to evaluate the patient for admission  Patient is being admitted to [facility] for an acute exacerbation of combined chronic systolic and diastolic dysfunction congestive heart failure  Review of Systems:    Review of Systems   Respiratory: Positive for chest tightness and shortness of breath  Cardiovascular: Positive for leg swelling  Negative for chest pain  Neurological: Positive for weakness  All other systems reviewed and are negative  Past Medical and Surgical History:     Past Medical History:   Diagnosis Date    BPH (benign prostatic hyperplasia)     39 days radiation treatment    COPD (chronic obstructive pulmonary disease)     Coronary artery disease     CABG x4 in 2017    Diabetes mellitus     History of Arterial Duplex of LE 12/26/2017    Likely occlusion of the left superficial femoral artery  Calcific changes bilaterally  Despite these changes, the ankle-brachial index as a measure of peripheral blood flow only mildly impaired   History of echocardiogram 06/12/2017    EF 40%, mild LVH, mild MR     Hyperlipidemia     Hypertension     Ischemic cardiomyopathy     NSTEMI (non-ST elevated myocardial infarction)     Prostate cancer     prostate     PVD (peripheral vascular disease)     Sepsis due to pneumonia     Tremor     Type 2 MI (myocardial infarction) ([facility] ) 04/06/2021       Past Surgical History:   Procedure Laterality Date    CARDIAC CATHETERIZATION  03/08/2017    Significant left main plus triple-vessel CAD   CORONARY ARTERY BYPASS GRAFT  03/08/2017    4V CABG:  LIMA to LAD, VG to RI, SVG to PDA to LVBR RCA   EYE SURGERY      shots in eye once a month @ the [address] [place] [address]         Meds/Allergies:    Prior to Admission medications    Medication Sig Start Date End Date Taking?  Authorizing Provider   albuterol (PROVENTIL HFA,VENTOLIN HFA) 90 mcg/act inhaler Inhale 2 puffs every 6 (six) hours as needed for wheezing or shortness of breath 1/23/22   Paul Barroso PA-SHADI   arformoterol (BROVANA) 15 mcg/2 mL nebulizer solution Take 2 mL (15 mcg total) by nebulization 2 (two) times a day 8/1/22   Ute Acevedo MD   aspirin (ECOTRIN LOW STRENGTH) 81 mg EC tablet Take 1 tablet (81 mg total) by mouth every other day 9/3/20   PENG Goetz   budesonide (Pulmicort) 0 5 mg/2 mL nebulizer solution Take 2 mL (0 5 mg total) by nebulization 2 (two) times a day Rinse mouth after use  8/1/22 8/31/22  Ute Acevedo MD   cyanocobalamin (VITAMIN B-12) 500 MCG tablet TAKE ONE TABLET BY MOUTH EVERY MORNING *VITAMIN B12* 11/8/21   Historical Provider, MD   digoxin (LANOXIN) 0 125 mg tablet Take 1 tablet (125 mcg total) by mouth daily 6/16/22   Yvonne Broderick DO   gabapentin (NEURONTIN) 300 mg capsule Take 1 capsule (300 mg total) by mouth daily at bedtime 6/29/22 8/12/22  Yvonne Broderick DO   glimepiride (AMARYL) 1 mg tablet Take 2 tablets (2 mg total) by mouth daily with breakfast AND 1 tablet (1 mg total) daily with dinner   4/22/22   Yvonne Broderick DO   ipratropium (ATROVENT) 0 02 % nebulizer solution Take 2 5 mL (0 5 mg total) by nebulization 3 (three) times a day 8/1/22   Ute Acevedo MD   lisinopril (ZESTRIL) 20 mg tablet Take 1 tablet (20 mg total) by mouth daily 6/15/22   Yvonne Broderick DO   metFORMIN (GLUCOPHAGE) 1000 MG tablet Take 1 tablet (1,000 mg total) by mouth 2 (two) times a day with meals  Patient taking differently: Take 1,000 mg by mouth 2 (two) times a day with meals Taking 500mg 2 times daily 4/22/22   Yvonne Broderick DO   metoprolol tartrate (LOPRESSOR) 25 mg tablet Take 1 tablet (25 mg total) by mouth every 12 (twelve) hours 3/29/22   Yvonne Broderick DO   Multiple Vitamins-Minerals (PRESERVISION AREDS 2 PO) Take by mouth    Historical Provider, MD   primidone (MYSOLINE) 50 mg tablet Take 100 mg by mouth 2 (two) times a day     Historical Provider, MD   roflumilast (DALIRESP) 250 MCG tablet Take 1 tablet (250 mcg total) by mouth daily 22   Star Abreu MD   rosuvastatin (CRESTOR) 10 MG tablet Take 1 tablet (10 mg total) by mouth daily 22   Chayo Knott DO   terbinafine (LamISIL) 1 % cream APPLY THIN LAYER TOPICALLY TWICE A DAY FOR FUNGAL INFECTION 22   Historical Provider, MD     all medications and allergies reviewed    Allergies: Allergies   Allergen Reactions    Atorvastatin Myalgia       Social History:     Marital Status: /Civil Union   Occupation:  Retired  Patient Pre-hospital Living Situation:  Lives at home with his wife  Patient Pre-hospital Level of Mobility:  Independent  Patient Pre-hospital Diet Restrictions:  Low-sodium diabetic diet  Substance Use History:   Social History     Substance and Sexual Activity   Alcohol Use Yes     Social History     Tobacco Use   Smoking Status Former Smoker    Packs/day: 0 25    Years: 60 00    Pack years: 15 00    Types: Cigarettes    Quit date: 2022    Years since quittin 3   Smokeless Tobacco Never Used     Social History     Substance and Sexual Activity   Drug Use Never       Family History:  I have reviewed the patient's family history - noncontributory in view of the patient being 69years old  Physical Exam:     Vitals:   Blood Pressure: 149/72 (22)  Pulse: 97 (22 0724)  Temperature: 97 8 °F (36 6 °C) (22)  Temp Source: Tympanic (22)  Respirations: 18 (22)  Height: 5' 10" (177 8 cm) (22)  Weight - Scale: 87 5 kg (193 lb) (22)  SpO2: 95 % (22 0738)    Physical Exam  Vitals and nursing note reviewed  Constitutional:       General: He is not in acute distress  Appearance: Normal appearance  He is not ill-appearing  HENT:      Head: Normocephalic and atraumatic  Nose: Nose normal    Eyes:      Extraocular Movements: Extraocular movements intact  Pupils: Pupils are equal, round, and reactive to light     Neck: Comments: Positive JVD to the angle of the mandible  Cardiovascular:      Rate and Rhythm: Normal rate and regular rhythm  Pulses: Normal pulses  Heart sounds: Normal heart sounds  No murmur heard  No friction rub  No gallop  Comments: S1 plus S2, irregularly irregular, 2+ bilateral lower extremity pitting ankle edema  Pulmonary:      Comments: Increased respiratory effort, decreased breath sounds bilaterally at the bases, faint bibasilar crackles, no wheezing  Abdominal:      General: There is no distension  Palpations: Abdomen is soft  There is no mass  Tenderness: There is no abdominal tenderness  There is no guarding or rebound  Musculoskeletal:         General: No swelling or tenderness  Normal range of motion  Cervical back: Normal range of motion and neck supple  No rigidity  No muscular tenderness  Right lower leg: No edema  Left lower leg: No edema  Skin:     General: Skin is warm  Capillary Refill: Capillary refill takes less than 2 seconds  Findings: No erythema or rash  Neurological:      General: No focal deficit present  Mental Status: He is alert and oriented to person, place, and time  Mental status is at baseline  Psychiatric:         Mood and Affect: Mood normal          Behavior: Behavior normal          Additional Data:     Lab Results: I have personally reviewed pertinent reports        Results from last 7 days   Lab Units 08/14/22  0419   WBC Thousand/uL 11 77*   HEMOGLOBIN g/dL 12 1   HEMATOCRIT % 37 9   PLATELETS Thousands/uL 171   LYMPHO PCT % 7*   MONO PCT % 3*   EOS PCT % 0     Results from last 7 days   Lab Units 08/14/22  0419   SODIUM mmol/L 137   POTASSIUM mmol/L 4 1   CHLORIDE mmol/L 99   CO2 mmol/L 23   BUN mg/dL 20   CREATININE mg/dL 0 89   ANION GAP mmol/L 15*   CALCIUM mg/dL 9 0   ALBUMIN g/dL 4 1   TOTAL BILIRUBIN mg/dL 0 61   ALK PHOS U/L 96   ALT U/L 6*   AST U/L 15   GLUCOSE RANDOM mg/dL 207*         Results from last 7 days   Lab Units 08/14/22  0706   POC GLUCOSE mg/dl 219*               Imaging: I have personally reviewed pertinent reports  CTA ED chest PE Study   Final Result by Pearson Dubin, MD (08/14 0602)      No evidence of pulmonary embolism is seen  Measured RV/LV ratio is within normal limits at less than 0 9  There are small bilateral pleural effusions (right slightly larger than left), and the lungs demonstrate some septal thickening, suggesting mild fluid overload/CHF  Clinical correlation and follow-up is recommended  Atelectasis as described above  Mild emphysema  Atherosclerosis  Coronary artery disease  Prior sternotomy and CABG  Other nonemergent findings, as described above  Please see discussion  Workstation performed: [id number]         XR chest 1 view portable    (Results Pending)         EKG, Pathology, and Other Studies Reviewed on Admission:   · EKG:  AFib, no changes any leads suggestive of active ischemia    Epic / Care Everywhere Records Reviewed: Yes    ** Please Note: This note has been constructed using a voice recognition system   **

## 2022-08-14 NOTE — ASSESSMENT & PLAN NOTE
Per ER notes  No EKGs to suggest AFib  Sinus rhythm on the monitor  Continue to monitor on telemetry

## 2022-08-14 NOTE — ASSESSMENT & PLAN NOTE
· Admit inpatient to [facility]  · Patient was to initially an O2 sat the low 80s was reportedly tripoding  · Baseline requirement is 2 L of supplemental oxygen nasal cannula 24/7/365  · Patient arrived on CPAP, is 3 L of supplemental oxygen with a resultant O2 sat in the mid to low 90s  · Secondary to an acute exacerbation of combined systolic and diastolic dysfunction congestive heart failure-see the outline below  · Continue to wean oxygen as able

## 2022-08-14 NOTE — ASSESSMENT & PLAN NOTE
· With a baseline chronic hypoxic respiratory failure that has rendered him dependent on 2 L of supplemental oxygen via nasal cannula 24/7/365  · Currently without exacerbation  · Implement respiratory protocol  · Continue home medication  · Status post treatment with IV Solu-Medrol prior to admission, will hold off on further antibiotics since his current state is probably related more so to his acute CHF exacerbation

## 2022-08-14 NOTE — ED NOTES
Blood glucose showing 45 at this time  Patient showing no symptoms of hypoglycemia  This nurse rechecking at this time   with recheck       Luiza Clarke RN  08/14/22 [id number]

## 2022-08-14 NOTE — ASSESSMENT & PLAN NOTE
Wt Readings from Last 3 Encounters:   08/14/22 87 5 kg (193 lb)   08/12/22 83 9 kg (185 lb)   08/01/22 84 6 kg (186 lb 6 4 oz)     · As evidenced by the BNP of 784, and clinically volume overloaded state  · CT chest revealed bilateral pleural effusions  · Most recent 2D echocardiogram from November of 2020 revealed an EF of 30%, and grade 2 diastolic dysfunction  · Start Lasix 40 mg IV b i d   · Monitor daily weights, input and output, fluid restriction  · Continue other current cardiac based medications which include aspirin, metoprolol tartrate, lisinopril, pravastatin  · Consult Cardiology

## 2022-08-14 NOTE — ED NOTES
Sister taking home belongings at this time with patient approval  Patient has self phone at bedside     Nellie Murrieta, [address] [facility]  08/14/22 [id number]

## 2022-08-15 DIAGNOSIS — I10 ESSENTIAL HYPERTENSION: ICD-10-CM

## 2022-08-15 LAB
ALBUMIN SERPL BCP-MCNC: 3.6 G/DL (ref 3.5–5)
ALP SERPL-CCNC: 79 U/L (ref 34–104)
ALT SERPL W P-5'-P-CCNC: 3 U/L (ref 7–52)
ANION GAP SERPL CALCULATED.3IONS-SCNC: 8 MMOL/L (ref 4–13)
AST SERPL W P-5'-P-CCNC: 6 U/L (ref 13–39)
ATRIAL RATE: 69 BPM
BASOPHILS # BLD AUTO: 0.05 THOUSANDS/ΜL (ref 0–0.1)
BASOPHILS NFR BLD AUTO: 1 % (ref 0–1)
BILIRUB SERPL-MCNC: 0.34 MG/DL (ref 0.2–1)
BUN SERPL-MCNC: 22 MG/DL (ref 5–25)
CALCIUM SERPL-MCNC: 8.5 MG/DL (ref 8.4–10.2)
CHLORIDE SERPL-SCNC: 98 MMOL/L (ref 96–108)
CO2 SERPL-SCNC: 32 MMOL/L (ref 21–32)
CREAT SERPL-MCNC: 0.82 MG/DL (ref 0.6–1.3)
EOSINOPHIL # BLD AUTO: 0.2 THOUSAND/ΜL (ref 0–0.61)
EOSINOPHIL NFR BLD AUTO: 2 % (ref 0–6)
ERYTHROCYTE [DISTWIDTH] IN BLOOD BY AUTOMATED COUNT: 14.1 % (ref 11.6–15.1)
GFR SERPL CREATININE-BSD FRML MDRD: 85 ML/MIN/1.73SQ M
GLUCOSE SERPL-MCNC: 137 MG/DL (ref 65–140)
GLUCOSE SERPL-MCNC: 149 MG/DL (ref 65–140)
GLUCOSE SERPL-MCNC: 150 MG/DL (ref 65–140)
GLUCOSE SERPL-MCNC: 176 MG/DL (ref 65–140)
GLUCOSE SERPL-MCNC: 197 MG/DL (ref 65–140)
GLUCOSE SERPL-MCNC: 205 MG/DL (ref 65–140)
HCT VFR BLD AUTO: 35 % (ref 36.5–49.3)
HGB BLD-MCNC: 11.5 G/DL (ref 12–17)
IMM GRANULOCYTES # BLD AUTO: 0.03 THOUSAND/UL (ref 0–0.2)
IMM GRANULOCYTES NFR BLD AUTO: 0 % (ref 0–2)
LYMPHOCYTES # BLD AUTO: 1.46 THOUSANDS/ΜL (ref 0.6–4.47)
LYMPHOCYTES NFR BLD AUTO: 18 % (ref 14–44)
MAGNESIUM SERPL-MCNC: 1.9 MG/DL (ref 1.9–2.7)
MCH RBC QN AUTO: 30 PG (ref 26.8–34.3)
MCHC RBC AUTO-ENTMCNC: 32.9 G/DL (ref 31.4–37.4)
MCV RBC AUTO: 91 FL (ref 82–98)
MONOCYTES # BLD AUTO: 0.87 THOUSAND/ΜL (ref 0.17–1.22)
MONOCYTES NFR BLD AUTO: 11 % (ref 4–12)
NEUTROPHILS # BLD AUTO: 5.6 THOUSANDS/ΜL (ref 1.85–7.62)
NEUTS SEG NFR BLD AUTO: 68 % (ref 43–75)
NRBC BLD AUTO-RTO: 0 /100 WBCS
P AXIS: 68 DEGREES
PHOSPHATE SERPL-MCNC: 3.1 MG/DL (ref 2.3–4.1)
PLATELET # BLD AUTO: 186 THOUSANDS/UL (ref 149–390)
PMV BLD AUTO: 11.1 FL (ref 8.9–12.7)
POTASSIUM SERPL-SCNC: 3.1 MMOL/L (ref 3.5–5.3)
PR INTERVAL: 198 MS
PROT SERPL-MCNC: 5.8 G/DL (ref 6.4–8.4)
QRS AXIS: 30 DEGREES
QRSD INTERVAL: 104 MS
QT INTERVAL: 496 MS
QTC INTERVAL: 531 MS
RBC # BLD AUTO: 3.83 MILLION/UL (ref 3.88–5.62)
SODIUM SERPL-SCNC: 138 MMOL/L (ref 135–147)
T WAVE AXIS: 87 DEGREES
VENTRICULAR RATE: 69 BPM
WBC # BLD AUTO: 8.21 THOUSAND/UL (ref 4.31–10.16)

## 2022-08-15 PROCEDURE — 94640 AIRWAY INHALATION TREATMENT: CPT

## 2022-08-15 PROCEDURE — 82948 REAGENT STRIP/BLOOD GLUCOSE: CPT

## 2022-08-15 PROCEDURE — 83735 ASSAY OF MAGNESIUM: CPT | Performed by: HOSPITALIST

## 2022-08-15 PROCEDURE — 93010 ELECTROCARDIOGRAM REPORT: CPT | Performed by: INTERNAL MEDICINE

## 2022-08-15 PROCEDURE — 84100 ASSAY OF PHOSPHORUS: CPT | Performed by: HOSPITALIST

## 2022-08-15 PROCEDURE — 94760 N-INVAS EAR/PLS OXIMETRY 1: CPT

## 2022-08-15 PROCEDURE — 99233 SBSQ HOSP IP/OBS HIGH 50: CPT | Performed by: NURSE PRACTITIONER

## 2022-08-15 PROCEDURE — 99232 SBSQ HOSP IP/OBS MODERATE 35: CPT | Performed by: INTERNAL MEDICINE

## 2022-08-15 PROCEDURE — 4010F ACE/ARB THERAPY RXD/TAKEN: CPT | Performed by: INTERNAL MEDICINE

## 2022-08-15 PROCEDURE — 85025 COMPLETE CBC W/AUTO DIFF WBC: CPT | Performed by: HOSPITALIST

## 2022-08-15 PROCEDURE — 80053 COMPREHEN METABOLIC PANEL: CPT | Performed by: HOSPITALIST

## 2022-08-15 RX ORDER — LISINOPRIL 20 MG/1
20 TABLET ORAL DAILY
Qty: 90 TABLET | Refills: 1 | Status: SHIPPED | OUTPATIENT
Start: 2022-08-15 | End: 2022-09-23

## 2022-08-15 RX ORDER — POTASSIUM CHLORIDE 20 MEQ/1
40 TABLET, EXTENDED RELEASE ORAL ONCE
Status: COMPLETED | OUTPATIENT
Start: 2022-08-15 | End: 2022-08-15

## 2022-08-15 RX ADMIN — METOPROLOL TARTRATE 25 MG: 25 TABLET, FILM COATED ORAL at 22:44

## 2022-08-15 RX ADMIN — IPRATROPIUM BROMIDE 0.5 MG: 0.5 SOLUTION RESPIRATORY (INHALATION) at 07:55

## 2022-08-15 RX ADMIN — INSULIN LISPRO 2 UNITS: 100 INJECTION, SOLUTION INTRAVENOUS; SUBCUTANEOUS at 11:12

## 2022-08-15 RX ADMIN — LISINOPRIL 20 MG: 20 TABLET ORAL at 08:38

## 2022-08-15 RX ADMIN — PRIMIDONE 100 MG: 50 TABLET ORAL at 08:36

## 2022-08-15 RX ADMIN — DIGOXIN 125 MCG: 125 TABLET ORAL at 08:37

## 2022-08-15 RX ADMIN — HEPARIN SODIUM 5000 UNITS: 5000 INJECTION INTRAVENOUS; SUBCUTANEOUS at 22:44

## 2022-08-15 RX ADMIN — HEPARIN SODIUM 5000 UNITS: 5000 INJECTION INTRAVENOUS; SUBCUTANEOUS at 13:35

## 2022-08-15 RX ADMIN — Medication 1 TABLET: at 08:37

## 2022-08-15 RX ADMIN — FUROSEMIDE 40 MG: 10 INJECTION, SOLUTION INTRAMUSCULAR; INTRAVENOUS at 17:53

## 2022-08-15 RX ADMIN — POTASSIUM CHLORIDE 40 MEQ: 1500 TABLET, EXTENDED RELEASE ORAL at 08:37

## 2022-08-15 RX ADMIN — FORMOTEROL FUMARATE DIHYDRATE 20 MCG: 20 SOLUTION RESPIRATORY (INHALATION) at 19:32

## 2022-08-15 RX ADMIN — BUDESONIDE 0.5 MG: 0.5 INHALANT ORAL at 07:54

## 2022-08-15 RX ADMIN — HEPARIN SODIUM 5000 UNITS: 5000 INJECTION INTRAVENOUS; SUBCUTANEOUS at 05:59

## 2022-08-15 RX ADMIN — INSULIN LISPRO 1 UNITS: 100 INJECTION, SOLUTION INTRAVENOUS; SUBCUTANEOUS at 22:49

## 2022-08-15 RX ADMIN — GABAPENTIN 300 MG: 300 CAPSULE ORAL at 22:44

## 2022-08-15 RX ADMIN — INSULIN LISPRO 1 UNITS: 100 INJECTION, SOLUTION INTRAVENOUS; SUBCUTANEOUS at 16:21

## 2022-08-15 RX ADMIN — LEVALBUTEROL HYDROCHLORIDE 1.25 MG: 1.25 SOLUTION, CONCENTRATE RESPIRATORY (INHALATION) at 07:54

## 2022-08-15 RX ADMIN — CYANOCOBALAMIN TAB 500 MCG 500 MCG: 500 TAB at 08:37

## 2022-08-15 RX ADMIN — BUDESONIDE 0.5 MG: 0.5 INHALANT ORAL at 19:18

## 2022-08-15 RX ADMIN — IPRATROPIUM BROMIDE 0.5 MG: 0.5 SOLUTION RESPIRATORY (INHALATION) at 19:18

## 2022-08-15 RX ADMIN — METOPROLOL TARTRATE 25 MG: 25 TABLET, FILM COATED ORAL at 08:37

## 2022-08-15 RX ADMIN — FUROSEMIDE 40 MG: 10 INJECTION, SOLUTION INTRAMUSCULAR; INTRAVENOUS at 08:38

## 2022-08-15 RX ADMIN — PRIMIDONE 100 MG: 50 TABLET ORAL at 22:44

## 2022-08-15 RX ADMIN — LEVALBUTEROL HYDROCHLORIDE 1.25 MG: 1.25 SOLUTION, CONCENTRATE RESPIRATORY (INHALATION) at 13:30

## 2022-08-15 RX ADMIN — PRAVASTATIN SODIUM 80 MG: 20 TABLET ORAL at 15:52

## 2022-08-15 RX ADMIN — LEVALBUTEROL HYDROCHLORIDE 1.25 MG: 1.25 SOLUTION, CONCENTRATE RESPIRATORY (INHALATION) at 19:18

## 2022-08-15 RX ADMIN — IPRATROPIUM BROMIDE 0.5 MG: 0.5 SOLUTION RESPIRATORY (INHALATION) at 13:30

## 2022-08-15 RX ADMIN — FORMOTEROL FUMARATE DIHYDRATE 20 MCG: 20 SOLUTION RESPIRATORY (INHALATION) at 08:11

## 2022-08-15 NOTE — ASSESSMENT & PLAN NOTE
· Secondary to CHF exacerbation  · Patient is chronically on 2 L of nasal cannula  · Will continue IV diuresis as tolerated

## 2022-08-15 NOTE — ED NOTES
Patient given fluids, taking fluids well  See I&O flowsheet for volume        Malvin Alex RN  08/15/22 [id number]

## 2022-08-15 NOTE — TELEPHONE ENCOUNTER
Patient requesting refill(s) of:  Lisinopril 20 mg  Last filled:6/15/22  Last appt:7/21/22  Next appt:10/27/22  Pharmacy:[facility] [place]

## 2022-08-15 NOTE — ASSESSMENT & PLAN NOTE
· Chronically on 2 L nasal cannula  · Currently without exacerbation  · Implement respiratory protocol  · Continue home medication  · Status post treatment with IV Solu-Medrol prior to admission, will hold off on further antibiotics since his current state is probably related more so to his acute CHF exacerbation

## 2022-08-15 NOTE — PROGRESS NOTES
[address] [address]  Progress Note - Rebeca García 1945, 69 y o  male MRN: [id number]  Unit/Bed#: ED 20 Encounter: [id number]  Primary Care Provider: Skyler Feng DO   Date and time admitted to hospital: 8/14/2022  4:08 AM    Ischemic cardiomyopathy  Assessment & Plan  EF 30%  Given his advanced COPD, he has declined an ICD  Atrial fibrillation with rapid ventricular response (HCC)  Assessment & Plan  Per ER notes  No EKG's to suggest AFib  Sinus rhythm on the monitor  Continue to monitor on telemetry    Acute on chronic combined systolic and diastolic congestive heart failure (HCC)  Assessment & Plan  Wt Readings from Last 3 Encounters:   08/14/22 87 5 kg (193 lb)   08/12/22 83 9 kg (185 lb)   08/01/22 84 6 kg (186 lb 6 4 oz)     Continue Lasix 40 mg b i d, replace K  Low-sodium diet, daily weights, I/O monitoring  Continue beta-blocker and ACE-inhibitor      COPD (chronic obstructive pulmonary disease) ([facility] [address] )  Assessment & Plan  Continue home medications management per medical team    Coronary artery disease involving native coronary artery of native heart without angina pectoris  Assessment & Plan  History of 4 vessel CABG, 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  Continue aspirin, beta-blocker, statin    * Acute on chronic respiratory failure with hypoxia (HCC)  Assessment & Plan  Likely due to COPD exacerbation and volume overload  Continue IV diuretics  Management of COPD per [facility]      Outpatient Cardiologist: Dr Alphonse West:   Patient seen and examined  No significant events overnight  Notes that breathing has significantly improved  Denies CP, palpitations  Summary comments:  Still was some evidence of volume overload  Would continue IV diuretics with transition to oral dosing tomorrow  Replete potassium  He may require low-dose diuretics on an outpatient basis  Will reassess this prior to discharge  No evidence of recurrent atrial fibrillation on telemetry or EKG      Continue remainder plan of care per medical team     Telemetry/ECG/Cardiac testing:   Echo 11/12/2020:  EF 30% with global dysfunction  Echo 8/2019  - EF 30-40%  Global dysfunction with severe hypokinesis of the apex  Mild MR  Arterial duplex 12/2017 - likely occlusion of the left SFA  KEI mildly impaired         Vitals: Blood pressure 137/78, pulse 77, temperature 98 °F (36 7 °C), temperature source Oral, resp  rate 18, height 5' 10" (1 778 m), weight 87 5 kg (193 lb), SpO2 97 % ,   Orthostatic Blood Pressures    Flowsheet Row Most Recent Value   Blood Pressure 137/78 filed at 08/15/2022 0601   Patient Position - Orthostatic VS Lying filed at 08/15/2022 0601      ,   Weight (last 2 days)     Date/Time Weight    08/14/22 0659 87 5 (193)    08/14/22 0409 87 7 (193 34)          Physical Exam:    General:  Normal appearance in no distress, wearing supplemental O2  Eyes:  Anicteric  Oral mucosa:  Moist   Neck:  No JV D  Carotid upstrokes are brisk without bruits  No masses  Chest:  Occasional wheeze  Cardiac:  No palpable PMI  Normal S1 and S2  No murmur gallop or rub  Abdomen:  Soft and nontender  No palpable organomegaly or aortic enlargement  Extremities:  +1-+2 pitting edema bilateral ankle and pedal areas  Neuro:  Grossly symmetric  Psych:  Alert and oriented x3        Medications:      Current Facility-Administered Medications:     acetaminophen (TYLENOL) tablet 650 mg, 650 mg, Oral, Q4H PRN, Chadwick Cobos PA-C    albuterol (PROVENTIL HFA,VENTOLIN HFA) inhaler 2 puff, 2 puff, Inhalation, Q6H PRN, Chadwick Cobos PA-C    aspirin (ECOTRIN LOW STRENGTH) EC tablet 81 mg, 81 mg, Oral, Every Other Day, Chadwick Cobos PA-C, 81 mg at 08/14/22 0806    budesonide (PULMICORT) inhalation solution 0 5 mg, 0 5 mg, Nebulization, BID, Chadwick Cobos PA-C, 0 5 mg at 08/15/22 0754    cyanocobalamin (VITAMIN B-12) tablet 500 mcg, 500 mcg, Oral, Daily, Chadwick Cobos PA-C, 500 mcg at 08/15/22 0837    digoxin (LANOXIN) tablet 125 mcg, 125 mcg, Oral, Daily, Delgado Piper PA-C, 125 mcg at 08/15/22 0837    formoterol (PERFOROMIST) nebulizer solution 20 mcg, 20 mcg, Nebulization, Q12H, Delgado Piper PA-C, 20 mcg at 08/15/22 0811    furosemide (LASIX) injection 40 mg, 40 mg, Intravenous, BID, Delgado Piper PA-C, 40 mg at 08/15/22 2890    gabapentin (NEURONTIN) capsule 300 mg, 300 mg, Oral, HS, Delgado Piper PA-C, 300 mg at 08/14/22 2128    heparin (porcine) subcutaneous injection 5,000 Units, 5,000 Units, Subcutaneous, Q8H [facility], Delgado Piper PA-C, 5,000 Units at 08/15/22 0559    insulin lispro (HumaLOG) 100 units/mL subcutaneous injection 1-6 Units, 1-6 Units, Subcutaneous, TID AC, 4 Units at 08/14/22 1615 **AND** Fingerstick Glucose (POCT), , , TID AC, Delgado Piper PA-C    insulin lispro (HumaLOG) 100 units/mL subcutaneous injection 1-6 Units, 1-6 Units, Subcutaneous, HS, Delgado Piper PA-C    ipratropium (ATROVENT) 0 02 % inhalation solution 0 5 mg, 0 5 mg, Nebulization, TID, Delgado Piper PA-C, 0 5 mg at 08/15/22 0755    levalbuterol (Mk Jose) inhalation solution 1 25 mg, 1 25 mg, Nebulization, TID, Lesa Cardona MD, 1 25 mg at 08/15/22 0754    lisinopril (ZESTRIL) tablet 20 mg, 20 mg, Oral, Daily, Delgado Piper PA-C, 20 mg at 08/15/22 0838    metoprolol tartrate (LOPRESSOR) tablet 25 mg, 25 mg, Oral, Q12H [facility], Delgado Piper PA-C, 25 mg at 08/15/22 0837    multivitamin-minerals (CENTRUM) tablet 1 tablet, 1 tablet, Oral, Daily, Delgado Piper PA-C, 1 tablet at 08/15/22 0837    ondansetron (ZOFRAN) injection 4 mg, 4 mg, Intravenous, Q6H PRN, Delgado Piper PA-C    pravastatin (PRAVACHOL) tablet 80 mg, 80 mg, Oral, Daily With Abigail Jones PA-C, 80 mg at 08/14/22 1615    primidone (MYSOLINE) tablet 100 mg, 100 mg, Oral, BID, Delgado Piper PA-C, 100 mg at 08/15/22 0836    roflumilast (DALIRESP) tablet 250 mcg, 250 mcg, Oral, Daily, Delgado Piper PA-C    Current Outpatient Medications:     albuterol (PROVENTIL HFA,VENTOLIN HFA) 90 mcg/act inhaler, Inhale 2 puffs every 6 (six) hours as needed for wheezing or shortness of breath, Disp: , Rfl: 0    arformoterol (BROVANA) 15 mcg/2 mL nebulizer solution, Take 2 mL (15 mcg total) by nebulization 2 (two) times a day, Disp: 120 mL, Rfl: 5    aspirin (ECOTRIN LOW STRENGTH) 81 mg EC tablet, Take 1 tablet (81 mg total) by mouth every other day, Disp:  , Rfl: 0    budesonide (Pulmicort) 0 5 mg/2 mL nebulizer solution, Take 2 mL (0 5 mg total) by nebulization 2 (two) times a day Rinse mouth after use , Disp: 120 mL, Rfl: 5    cyanocobalamin (VITAMIN B-12) 500 MCG tablet, TAKE ONE TABLET BY MOUTH EVERY MORNING *VITAMIN B12*, Disp: , Rfl:     digoxin (LANOXIN) 0 125 mg tablet, Take 1 tablet (125 mcg total) by mouth daily, Disp: 30 tablet, Rfl: 5    gabapentin (NEURONTIN) 300 mg capsule, Take 1 capsule (300 mg total) by mouth daily at bedtime, Disp: 90 capsule, Rfl: 1    glimepiride (AMARYL) 1 mg tablet, Take 2 tablets (2 mg total) by mouth daily with breakfast AND 1 tablet (1 mg total) daily with dinner , Disp: , Rfl: 0    ipratropium (ATROVENT) 0 02 % nebulizer solution, Take 2 5 mL (0 5 mg total) by nebulization 3 (three) times a day, Disp: 225 mL, Rfl: 5    lisinopril (ZESTRIL) 20 mg tablet, Take 1 tablet (20 mg total) by mouth daily, Disp: 30 tablet, Rfl: 1    metFORMIN (GLUCOPHAGE) 1000 MG tablet, Take 1 tablet (1,000 mg total) by mouth 2 (two) times a day with meals (Patient taking differently: Take 1,000 mg by mouth 2 (two) times a day with meals Taking 500mg 2 times daily), Disp: 180 tablet, Rfl: 3    metoprolol tartrate (LOPRESSOR) 25 mg tablet, Take 1 tablet (25 mg total) by mouth every 12 (twelve) hours, Disp: 60 tablet, Rfl: 5    Multiple Vitamins-Minerals (PRESERVISION AREDS 2 PO), Take by mouth, Disp: , Rfl:     primidone (MYSOLINE) 50 mg tablet, Take 100 mg by mouth 2 (two) times a day , Disp: , Rfl:     roflumilast (DALIRESP) 250 MCG tablet, Take 1 tablet (250 mcg total) by mouth daily, Disp: 28 tablet, Rfl: 0    rosuvastatin (CRESTOR) 10 MG tablet, Take 1 tablet (10 mg total) by mouth daily, Disp: 90 tablet, Rfl: 3    terbinafine (LamISIL) 1 % cream, APPLY THIN LAYER TOPICALLY TWICE A DAY FOR FUNGAL INFECTION, Disp: , Rfl:      Labs & Results:    Troponins:    Results from last 7 days   Lab Units 08/14/22  0804 08/14/22  0626 08/14/22  0419   HS TNI 0HR ng/L  --   --  59*   HS TNI 2HR ng/L  --  311*  --    HSTNI D2 ng/L  --  252*  --    HS TNI 4HR ng/L 371*  --   --    HSTNI D4 ng/L 312*  --   --         BNP:   Results from last 6 Months   Lab Units 08/14/22  0419 07/14/22  2354   BNP pg/mL 784* 325*     CBC with diff:   Results from last 7 days   Lab Units 08/15/22  0606 08/14/22  0419   WBC Thousand/uL 8 21 11 77*   HEMOGLOBIN g/dL 11 5* 12 1   HEMATOCRIT % 35 0* 37 9   MCV fL 91 95   PLATELETS Thousands/uL 186 171     TSH:     CMP:   Results from last 7 days   Lab Units 08/15/22  0606 08/14/22  1611 08/14/22  0419   POTASSIUM mmol/L 3 1* 3 4* 4 1   CHLORIDE mmol/L 98 96 99   CO2 mmol/L 32 30 23   BUN mg/dL 22 20 20   CREATININE mg/dL 0 82 0 84 0 89   AST U/L 6*  --  15   ALT U/L 3*  --  6*   EGFR ml/min/1 73sq m 85 85 83     Lipid Profile:     Coags:

## 2022-08-15 NOTE — PLAN OF CARE
Problem: Potential for Falls  Goal: Patient will remain free of falls  Description: INTERVENTIONS:  - Educate patient/family on patient safety including physical limitations  - Instruct patient to call for assistance with activity   - Consult OT/PT to assist with strengthening/mobility   - Keep Call bell within reach  - Keep bed low and locked with side rails adjusted as appropriate  - Keep care items and personal belongings within reach  - Initiate and maintain comfort rounds  - Make Fall Risk Sign visible to staff  - Offer Toileting every 2 Hours, in advance of need  - Initiate/Maintain bed alarm  - Obtain necessary fall risk management equipment: yellow socks applied, bed in lowest and locked position  - Apply yellow socks and bracelet for high fall risk patients  - Consider moving patient to room near nurses station  Outcome: Progressing     Problem: MOBILITY - ADULT  Goal: Maintain or return to baseline ADL function  Description: INTERVENTIONS:  -  Assess patient's ability to carry out ADLs; assess patient's baseline for ADL function and identify physical deficits which impact ability to perform ADLs (bathing, care of mouth/teeth, toileting, grooming, dressing, etc )  - Assess/evaluate cause of self-care deficits   - Assess range of motion  - Assess patient's mobility; develop plan if impaired  - Assess patient's need for assistive devices and provide as appropriate  - Encourage maximum independence but intervene and supervise when necessary  - Involve family in performance of ADLs  - Assess for home care needs following discharge   - Consider OT consult to assist with ADL evaluation and planning for discharge  - Provide patient education as appropriate  Outcome: Progressing  Goal: Maintains/Returns to pre admission functional level  Description: INTERVENTIONS:  - Perform BMAT or MOVE assessment daily    - Set and communicate daily mobility goal to care team and patient/family/caregiver     - Collaborate with rehabilitation services on mobility goals if consulted  - Perform Range of Motion 3 times a day  - Reposition patient every 2 hours    - Dangle patient 3 times a day  - Stand patient 3 times a day  - Ambulate patient 3 times a day  - Out of bed to chair 3 times a day   - Out of bed for meals 3 times a day  - Out of bed for toileting  - Record patient progress and toleration of activity level   Outcome: Progressing     Problem: Prexisting or High Potential for Compromised Skin Integrity  Goal: Skin integrity is maintained or improved  Description: INTERVENTIONS:  - Identify patients at risk for skin breakdown  - Assess and monitor skin integrity  - Assess and monitor nutrition and hydration status  - Monitor labs   - Assess for incontinence   - Turn and reposition patient  - Assist with mobility/ambulation  - Relieve pressure over bony prominences  - Avoid friction and shearing  - Provide appropriate hygiene as needed including keeping skin clean and dry  - Evaluate need for skin moisturizer/barrier cream  - Collaborate with interdisciplinary team   - Patient/family teaching  - Consider wound care consult   Outcome: Progressing     Problem: PAIN - ADULT  Goal: Verbalizes/displays adequate comfort level or baseline comfort level  Description: Interventions:  - Encourage patient to monitor pain and request assistance  - Assess pain using appropriate pain scale  - Administer analgesics based on type and severity of pain and evaluate response  - Implement non-pharmacological measures as appropriate and evaluate response  - Consider cultural and social influences on pain and pain management  - Notify physician/advanced practitioner if interventions unsuccessful or patient reports new pain  Outcome: Progressing     Problem: INFECTION - ADULT  Goal: Absence or prevention of progression during hospitalization  Description: INTERVENTIONS:  - Assess and monitor for signs and symptoms of infection  - Monitor lab/diagnostic results  - Monitor all insertion sites, i e  indwelling lines, tubes, and drains  - Monitor endotracheal if appropriate and nasal secretions for changes in amount and color  - Bracey appropriate cooling/warming therapies per order  - Administer medications as ordered  - Instruct and encourage patient and family to use good hand hygiene technique  - Identify and instruct in appropriate isolation precautions for identified infection/condition  Outcome: Progressing  Goal: Absence of fever/infection during neutropenic period  Description: INTERVENTIONS:  - Monitor WBC    Outcome: Progressing     Problem: SAFETY ADULT  Goal: Patient will remain free of falls  Description: INTERVENTIONS:  - Educate patient/family on patient safety including physical limitations  - Instruct patient to call for assistance with activity   - Consult OT/PT to assist with strengthening/mobility   - Keep Call bell within reach  - Keep bed low and locked with side rails adjusted as appropriate  - Keep care items and personal belongings within reach  - Initiate and maintain comfort rounds  - Make Fall Risk Sign visible to staff  - Offer Toileting every 2 Hours, in advance of need  - Initiate/Maintain bed alarm  - Obtain necessary fall risk management equipment: yellow socks applied, bed in lowest and locked position  - Apply yellow socks and bracelet for high fall risk patients  - Consider moving patient to room near nurses station  Outcome: Progressing  Goal: Maintain or return to baseline ADL function  Description: INTERVENTIONS:  -  Assess patient's ability to carry out ADLs; assess patient's baseline for ADL function and identify physical deficits which impact ability to perform ADLs (bathing, care of mouth/teeth, toileting, grooming, dressing, etc )  - Assess/evaluate cause of self-care deficits   - Assess range of motion  - Assess patient's mobility; develop plan if impaired  - Assess patient's need for assistive devices and provide as appropriate  - Encourage maximum independence but intervene and supervise when necessary  - Involve family in performance of ADLs  - Assess for home care needs following discharge   - Consider OT consult to assist with ADL evaluation and planning for discharge  - Provide patient education as appropriate  Outcome: Progressing  Goal: Maintains/Returns to pre admission functional level  Description: INTERVENTIONS:  - Perform BMAT or MOVE assessment daily    - Set and communicate daily mobility goal to care team and patient/family/caregiver  - Collaborate with rehabilitation services on mobility goals if consulted  - Perform Range of Motion 3 times a day  - Reposition patient every 2 hours  - Dangle patient 3 times a day  - Stand patient 3 times a day  - Ambulate patient 3 times a day  - Out of bed to chair 3 times a day   - Out of bed for meals 3 times a day  - Out of bed for toileting  - Record patient progress and toleration of activity level   Outcome: Progressing     Problem: DISCHARGE PLANNING  Goal: Discharge to home or other facility with appropriate resources  Description: INTERVENTIONS:  - Identify barriers to discharge w/patient and caregiver  - Arrange for needed discharge resources and transportation as appropriate  - Identify discharge learning needs (meds, wound care, etc )  - Arrange for interpretive services to assist at discharge as needed  - Refer to Case Management Department for coordinating discharge planning if the patient needs post-hospital services based on physician/advanced practitioner order or complex needs related to functional status, cognitive ability, or social support system  Outcome: Progressing     Problem: Knowledge Deficit  Goal: Patient/family/caregiver demonstrates understanding of disease process, treatment plan, medications, and discharge instructions  Description: Complete learning assessment and assess knowledge base    Interventions:  - Provide teaching at level of understanding  - Provide teaching via preferred learning methods  Outcome: Progressing

## 2022-08-15 NOTE — ASSESSMENT & PLAN NOTE
Wt Readings from Last 3 Encounters:   08/15/22 79 2 kg (174 lb 9 6 oz)   08/12/22 83 9 kg (185 lb)   08/01/22 84 6 kg (186 lb 6 4 oz)     · Elevated BNP noted on admission with bilateral effusions noted on CT imaging  · Will continue IV diuresis as tolerated  · Will defer need for repeat echo to Cardiology  · Continue current medications  · Monitor I&Os daily weight  · Oxygen as needed  · Cardiology following

## 2022-08-15 NOTE — ASSESSMENT & PLAN NOTE
Lab Results   Component Value Date    HGBA1C 5 7 (H) 08/14/2022       Recent Labs     08/14/22  1612 08/14/22  1614 08/14/22  2219 08/15/22  1050   POCGLU 45* 305* 149* 205*       Blood Sugar Average: Last 72 hrs:  (P) 201   · Hold oral hypoglycemic medications  · Target blood sugar for the hospital 140-180  · Implement Accu-Cheks AC and HS with sliding scale coverage  · Will add Lantus if necessary  · Diabetic neuropathy:-continue gabapentin

## 2022-08-15 NOTE — ASSESSMENT & PLAN NOTE
· Patient was noted to have rapid rates prior to admission which improved with Cardizem  · Currently rate controlled  · Continue digoxin, and metoprolol  · Not a good candidate for long-term anticoagulation  · Continue aspirin only for now  · Cardiology following

## 2022-08-15 NOTE — ASSESSMENT & PLAN NOTE
Wt Readings from Last 3 Encounters:   08/14/22 87 5 kg (193 lb)   08/12/22 83 9 kg (185 lb)   08/01/22 84 6 kg (186 lb 6 4 oz)     Continue Lasix 40 mg b i d, replace K  Low-sodium diet, daily weights, I/O monitoring  Continue beta-blocker and ACE-inhibitor

## 2022-08-15 NOTE — CASE MANAGEMENT
Case Management Assessment & Discharge Planning Note    Patient name Harjit Cruz  Location ED 20/ED 20 MRN [id number]  : 1945 Date 8/15/2022       Current Admission Date: 2022  Current Admission Diagnosis:Acute on chronic respiratory failure with hypoxia [facility])   Patient Active Problem List    Diagnosis Date Noted    Acute on chronic combined systolic and diastolic congestive heart failure ([facility] ) 2022    Atrial fibrillation with rapid ventricular response ([facility] ) 2022    Elevated d-dimer 2022    Increased anion gap metabolic acidosis     Bilateral pleural effusion 2022    Vision loss, left eye 2022    Tributary (branch) retinal vein occlusion, left eye, with macular edema 07/15/2022    Insomnia due to medical condition 2022    Primary hypertension 2022    Normocytic anemia 2022    Low serum albumin 2022    Dizziness 2022    Congestive heart failure ([facility] ) 2022    Generalized weakness 2022    PAD (peripheral artery disease) ([facility] ) 2022    Diminished pulses in lower extremity 2021    Physical deconditioning 2021    Tinea unguium 2021    Type 2 diabetes mellitus with diabetic neuropathy, unspecified ([facility] ) 2021    Other age-related cataract 2021    Central retinal vein occlusion with macular edema 2021    Nonexudative age-related macular degeneration 2021    Obesity 2021    Ocular hypertension 2021    Onychomycosis due to dermatophyte 2021    Chronic respiratory failure with hypoxia ([facility] ) 08/15/2020    COPD (chronic obstructive pulmonary disease) ([facility] ) 2020    Acute on chronic respiratory failure with hypoxia ([facility] ) 2020    Other hyperlipidemia 2020    Ischemic cardiomyopathy 2019    Coronary artery disease involving native coronary artery of native heart without angina pectoris 2019    Benign prostatic hyperplasia with urinary retention 02/22/2019    Mucopurulent chronic bronchitis ([facility] ) 02/22/2019    Benign essential tremor 02/22/2019    Pill rolling tremor 02/22/2019    History of prostate cancer 03/20/2018    Ambulatory dysfunction 03/16/2017    On intra-aortic balloon pump assist 03/10/2017    S/P CABG x 4 03/09/2017    Type 2 diabetes mellitus without complication, without long-term current use of insulin ([facility] ) 03/08/2017    Tobacco abuse 03/08/2017      LOS (days): 1  Geometric Mean LOS (GMLOS) (days): 3 80  Days to GMLOS:2 5     OBJECTIVE:    Risk of Unplanned Readmission Score: 28 11      Current admission status: Inpatient  Referral Reason: Other    Preferred Pharmacy:   [address]  [facility] [address] [address]  Phone: [phone] Fax: [phone]    Primary Care Provider: Shelley Hamilton DO    Primary Insurance: [facility]  Secondary Insurance:     ASSESSMENT:  Chelsea Ma 10, [address] Representative - Spouse   Primary Phone: [phone] (Home)               Advance Directives  Does patient have a [address] [facility] [address]?: No  Was patient offered paperwork?: Yes (Declines)  Does patient currently have a Health Care decision maker?: No  Does patient have Advance Directives?: No  Was patient offered paperwork?: Yes (Declines)  Primary Contact: Matilde Whitfield (Spouse)   [phone] (Home Phone)    Readmission Root Cause  30 Day Readmission: No    Patient Information  Admitted from[de-identified] Home  Mental Status: Alert  During Assessment patient was accompanied by: Not accompanied during assessment  Assessment information provided by[de-identified] Patient  Primary Caregiver: Self  Support Systems: Spouse/significant other, [facility] of Residence: [address] [place] [address] Avenue do you live in?: Via [employer] entry access options   Select all that apply : Stairs  Number of steps to enter home : 4  Do the steps have railings?: Yes  Type of Current Residence: 2 story home  Upon entering residence, is there a bedroom on the main floor (no further steps)?: No  A bedroom is located on the following floor levels of residence (select all that apply):: 2nd Floor  Upon entering residence, is there a bathroom on the main floor (no further steps)?: Yes  Number of steps to 2nd floor from main floor: One Flight  In the last 12 months, was there a time when you were not able to pay the mortgage or rent on time?: No  In the last 12 months, how many places have you lived?: 1  In the last 12 months, was there a time when you did not have a steady place to sleep or slept in a shelter (including now)?: No  Homeless/housing insecurity resource given?: N/A  Living Arrangements: Lives w/ Spouse/significant other  Is patient a ?: No    Activities of Daily Living Prior to Admission  Functional Status: Independent  Completes ADLs independently?: Yes  Ambulates independently?: Yes  Does patient use assisted devices?: Yes  Assisted Devices (DME) used: Straight Cane  Does patient currently own DME?: Yes  What DME does the patient currently own?: Straight Cane, Walker, Bedside Commode, Shower Chair, Nebulizer, Portable Oxygen concentrator, Portable Oxygen tanks  Does patient have a history of Outpatient Therapy (PT/OT)?: Yes  Does the patient have a history of Short-Term Rehab?: Yes  Does patient have a history of HHC?: Yes ([facility])  Does patient currently have [facility]?: No    Patient Information Continued  Income Source: Pension/senior care  Does patient have prescription coverage?: Yes  Within the past 12 months, you worried that your food would run out before you got the money to buy more : Never true  Within the past 12 months, the food you bought just didn't last and you didn't have money to get more : Never true  Food insecurity resource given?: N/A  Does patient receive dialysis treatments?: No  Does patient have a history of substance abuse?: No  Does patient have a history of Mental Health Diagnosis?: No    Means of Transportation  Means of Transport to Appts[de-identified] Drives Self  In the past 12 months, has lack of transportation kept you from medical appointments or from getting medications?: No  In the past 12 months, has lack of transportation kept you from meetings, work, or from getting things needed for daily living?: No  Was application for public transport provided?: N/A    DISCHARGE DETAILS:    Discharge planning discussed with[de-identified] Patient  Freedom of Choice: Yes     CM contacted family/caregiver?: No- see comments (Patient declines call to wife)    Contacts  Patient Contacts: Carrie Epps  Relationship to Patient[de-identified] Family  Contact Method: Phone  Phone Number: [phone]  Reason/Outcome: Emergency [address]         Is the patient interested in [place] [address] at discharge?: No    Other Referral/Resources/Interventions Provided:  Interventions: Other (Specify) (Continue to follow for discharge needs)     Additional Comments: Met with patient at bedside in ED  Patient reports being IPTA  Uses home O2  Patient reports he has a overnight sleep study soon to determine if he needs CPAP  Discussed possible HHC for SN for CHF mgt  Patient declines [place] [address]    CM department following thru discharge

## 2022-08-15 NOTE — ASSESSMENT & PLAN NOTE
Per ER notes  No EKG's to suggest AFib  Sinus rhythm on the monitor  Continue to monitor on telemetry

## 2022-08-15 NOTE — PROGRESS NOTES
[facility]  Progress Note - Ash Terry 1945, 69 y o  male MRN: [id number]  Unit/Bed#: ED 20 Encounter: [id number]  Primary Care Provider: Lakeshia Blair DO   Date and time admitted to hospital: 8/14/2022  4:08 AM    Acute on chronic combined systolic and diastolic congestive heart failure ([facility] )  Assessment & Plan  Wt Readings from Last 3 Encounters:   08/15/22 79 2 kg (174 lb 9 6 oz)   08/12/22 83 9 kg (185 lb)   08/01/22 84 6 kg (186 lb 6 4 oz)     · Elevated BNP noted on admission with bilateral effusions noted on CT imaging  · Will continue IV diuresis as tolerated  · Will defer need for repeat echo to Cardiology  · Continue current medications  · Monitor I&Os daily weight  · Oxygen as needed  · Cardiology following    * Acute on chronic respiratory failure with hypoxia (HCC)  Assessment & Plan  · Secondary to CHF exacerbation  · Patient is chronically on 2 L of nasal cannula  · Will continue IV diuresis as tolerated    Increased anion gap metabolic acidosis  Assessment & Plan  · resolved    Elevated d-dimer  Assessment & Plan  · CTA chest PE study negative for PE  · No further workup at this time    Atrial fibrillation with rapid ventricular response ([facility] )  Assessment & Plan  · Patient was noted to have rapid rates prior to admission which improved with Cardizem  · Currently rate controlled  · Continue digoxin, and metoprolol  · Not a good candidate for long-term anticoagulation  · Continue aspirin only for now  · Cardiology following    Primary hypertension  Assessment & Plan  · Blood pressure is controlled  · Continue current medications    COPD (chronic obstructive pulmonary disease) ([facility] )  Assessment & Plan  · Chronically on 2 L nasal cannula  · Currently without exacerbation  · Implement respiratory protocol  · Continue home medication  · Status post treatment with IV Solu-Medrol prior to admission, will hold off on further antibiotics since his current state is probably related more so to his acute CHF exacerbation    Benign essential tremor  Assessment & Plan  · Continue primidone    Type 2 diabetes mellitus without complication, without long-term current use of insulin [facility])  Assessment & Plan  Lab Results   Component Value Date    HGBA1C 5 7 (H) 2022       Recent Labs     22  1612 22  1614 22  2219 08/15/22  1050   POCGLU 45* 305* 149* 205*       Blood Sugar Average: Last 72 hrs:  (P) 201   · Hold oral hypoglycemic medications  · Target blood sugar for the hospital 140-180  · Implement Accu-Cheks AC and HS with sliding scale coverage  · Will add Lantus if necessary  · Diabetic neuropathy:-continue gabapentin    VTE Prophylaxis:  Heparin    Patient Centered Rounds: I have performed bedside rounds with nursing staff today  Discussions with Specialists or Other Care Team Provider: yes  Education and Discussions with Family / Patient:  Spoke with patient's wife over the phone regarding plan of care    Current Length of Stay: 1 day(s)    Current Patient Status: Inpatient   Certification Statement: The patient will continue to require additional inpatient hospital stay due to CHF exacerbation    Discharge Plan:  Hopeful discharge planning in the next 24-48 hours    Code Status: Level 1 - Full Code    Subjective:   Patient states he is feeling better today  Shortness of breath improved  Denies any chest pain  Tolerating diet    Objective:     Vitals:   Temp (24hrs), Av 3 °F (36 8 °C), Min:98 °F (36 7 °C), Max:98 6 °F (37 °C)    Temp:  [98 °F (36 7 °C)-98 6 °F (37 °C)] 98 °F (36 7 °C)  HR:  [69-85] 80  Resp:  [17-18] 18  BP: (137-146)/(63-78) 137/63  SpO2:  [89 %-100 %] 96 %  Body mass index is 25 05 kg/m²  Input and Output Summary (last 24 hours):        Intake/Output Summary (Last 24 hours) at 8/15/2022 1303  Last data filed at 8/15/2022 1057  Gross per 24 hour   Intake 1294 ml   Output 2925 ml   Net -1631 ml       Physical Exam: Physical Exam  Constitutional:       General: He is not in acute distress  HENT:      Head: Normocephalic and atraumatic  Nose: Nose normal       Mouth/Throat:      Mouth: Mucous membranes are moist    Eyes:      Extraocular Movements: Extraocular movements intact  Conjunctiva/sclera: Conjunctivae normal    Cardiovascular:      Rate and Rhythm: Normal rate and regular rhythm  Pulmonary:      Effort: Pulmonary effort is normal  No respiratory distress  Breath sounds: Rhonchi present  Abdominal:      Palpations: Abdomen is soft  Tenderness: There is no abdominal tenderness  Musculoskeletal:         General: Normal range of motion  Cervical back: Normal range of motion and neck supple  Right lower leg: Edema present  Left lower leg: Edema present  Skin:     General: Skin is warm and dry  Neurological:      General: No focal deficit present  Mental Status: He is alert  Mental status is at baseline  Cranial Nerves: No cranial nerve deficit  Psychiatric:         Mood and Affect: Mood normal          Behavior: Behavior normal          Additional Data:     Labs:    Results from last 7 days   Lab Units 08/15/22  0606   WBC Thousand/uL 8 21   HEMOGLOBIN g/dL 11 5*   HEMATOCRIT % 35 0*   PLATELETS Thousands/uL 186   NEUTROS PCT % 68   LYMPHS PCT % 18   MONOS PCT % 11   EOS PCT % 2     Results from last 7 days   Lab Units 08/15/22  0606   SODIUM mmol/L 138   POTASSIUM mmol/L 3 1*   CHLORIDE mmol/L 98   CO2 mmol/L 32   BUN mg/dL 22   CREATININE mg/dL 0 82   CALCIUM mg/dL 8 5   ALK PHOS U/L 79   ALT U/L 3*   AST U/L 6*         Results from last 7 days   Lab Units 08/15/22  1050 08/14/22  2219 08/14/22  1614 08/14/22  1612 08/14/22  1055 08/14/22  0706   POC GLUCOSE mg/dl 205* 149* 305* 45* 283* 219*     Results from last 7 days   Lab Units 08/14/22  0804   HEMOGLOBIN A1C % 5 7*       * I Have Reviewed All Lab Data Listed Above    * Additional Pertinent Lab Tests Reviewed: [facility] Admission  Reviewed    Imaging:  Imaging Reports Reviewed Today Include:  No new imaging    Recent Cultures (last 7 days):           Last 24 Hours Medication List:   Current Facility-Administered Medications   Medication Dose Route Frequency Provider Last Rate    acetaminophen  650 mg Oral Q4H PRN Ileana Camarena PA-C      albuterol  2 puff Inhalation Q6H PRN Ileana Camarena PA-C      aspirin  81 mg Oral Every Other Day Ileana Camarena PA-C      budesonide  0 5 mg Nebulization BID Ileana Camarena PA-C      cyanocobalamin  500 mcg Oral Daily Ileana Camarena PA-C      digoxin  125 mcg Oral Daily Ileana Camarena PA-C      formoterol  20 mcg Nebulization Q12H Ileana Camarena PA-C      furosemide  40 mg Intravenous BID Ileana Camarena PA-C      gabapentin  300 mg Oral HS Ileana Camarena PA-C      heparin (porcine)  5,000 Units Subcutaneous [facility] Ileana Camarena PA-C      insulin lispro  1-6 Units Subcutaneous TID [facility] Ileana Camarena PA-C      insulin lispro  1-6 Units Subcutaneous HS Ileana Camarena PA-C      ipratropium  0 5 mg Nebulization TID Ileana Camarena PA-C      levalbuterol  1 25 mg Nebulization TID Rosemarie Alonzo MD      lisinopril  20 mg Oral Daily Ileana Camarena PA-C      metoprolol tartrate  25 mg Oral Q12H [address] Ileana Camarena PA-C      multivitamin-minerals  1 tablet Oral Daily Ileana Camarena PA-C      ondansetron  4 mg Intravenous Q6H PRN Ileana Camarena PA-C      pravastatin  80 mg Oral Daily With [employer]      primidone  100 mg Oral BID Ileana Camarena PA-C      roflumilast  250 mcg Oral Daily Ileana Camarena PA-C          Today, Patient Was Seen By: Lissette Domínguez MD    ** Please Note: Dictation voice to text software may have been used in the creation of this document   **

## 2022-08-15 NOTE — UTILIZATION REVIEW
Initial Clinical Review    Admission: Date/Time/Statement:   Admission Orders (From admission, onward)     Ordered        08/14/22 0610  INPATIENT ADMISSION  Once                      Orders Placed This Encounter   Procedures    INPATIENT ADMISSION     Standing Status:   Standing     Number of Occurrences:   1     Order Specific Question:   Level of Care     Answer:   Med Surg [16]     Order Specific Question:   Estimated length of stay     Answer:   More than 2 Midnights     Order Specific Question:   Certification     Answer:   I certify that inpatient services are medically necessary for this patient for a duration of greater than two midnights  See H&P and MD Progress Notes for additional information about the patient's course of treatment  ED Arrival Information     Expected   -    Arrival   8/14/2022 04:06    Acuity   Emergent            Means of arrival   Ambulance    Escorted by   [facility]    Service   Hospitalist    Admission type   Emergency            Arrival complaint   respiratory distress           Chief Complaint   Patient presents with    Shortness of Breath      Patient brought in by ALS,  worsening SOB for two days, on scene patient was pale, diaphoretic, complaining of chest pain  Patient arrived on CPAP sat of 96  Initial Presentation: 69 y o  male to the ED from home via EMS with complaints of shortness of breath for 2 days prior to arrival   Admitted to inpatient for acute on chronic respiratory failure with hypoxia, CHF, CAD, afib with RVR, COPd, increased anion gap metabolic acidosis, elevated d-dimer  He arrives tachycardic, tachypneic, is dependent on [facility] chronically at home  ON EMS arrival, pulse ox 80%, placed on Cpap  On arrival, given Iv diuretics, solumedrol, cardizem iv  +JVD, irregularly irregular heart rate with bilateral lower extremity edema  D-dimer elevated  CT chest shows no PE  Mild emphysema noted  Cardiology consult  Known EF 30%    Has declined iCD  Continue nebulizers, IV solumedrol  Cardiology consult: Baseline chronic hypoxia with 2LNC  NSr currently  Continue to monitor tele  Continue IV lasix  I/O  H/O CABG  Date:   8/15  Day 2:  Shortness of breath improved  Hold on further IV abx  Coninue with iv steroids, IV diuresis  Rhonchi heard in lungs  Cardiology consult: Continue with IV diuretics  REplace potassium and recheck  No evidence of recurrent afib on monitor  ED Triage Vitals   Temperature Pulse Respirations Blood Pressure SpO2   08/14/22 0659 08/14/22 0409 08/14/22 0409 08/14/22 0409 08/14/22 0409   97 8 °F (36 6 °C) (!) 116 20 163/73 96 %      Temp Source Heart Rate Source Patient Position - Orthostatic VS BP Location FiO2 (%)   08/14/22 0659 08/14/22 0409 08/14/22 0409 08/14/22 0409 --   Tympanic Monitor Lying Left arm       Pain Score       08/14/22 0534       No Pain          Wt Readings from Last 1 Encounters:   08/15/22 79 2 kg (174 lb 9 6 oz)     Additional Vital Signs:   Date/Time Temp Pulse Resp BP MAP (mmHg) SpO2 Calculated FIO2 (%) - Nasal Cannula Nasal Cannula O2 Flow Rate (L/min) O2 Device O2 Interface Device Patient Position - Orthostatic VS   08/15/22 0811 -- -- -- -- -- 97 % -- -- -- -- --   08/15/22 0757 -- 77 -- -- -- 96 % -- -- -- -- --   08/15/22 0756 -- 78 -- -- -- 93 % -- -- -- -- --   08/15/22 0754 -- -- -- -- -- 89 % Abnormal  -- -- None (Room air)  -- --   O2 Device: Patient home O2 @ 2 liters reapplied   at 08/15/22 0754   08/15/22 0601 -- 69 18 137/78 102 95 % 28 2 L/min Nasal cannula -- Lying   08/14/22 2235 -- 79 17 -- -- 100 % 28 2 L/min Nasal cannula -- Lying   08/14/22 2128 -- 84 -- -- -- -- -- -- -- -- --   08/14/22 2015 98 °F (36 7 °C) 85 17 143/77 103 96 % 28 2 L/min Nasal cannula -- Lying   08/14/22 1830 -- 81 -- -- -- 97 % 28 2 L/min Nasal cannula -- --   08/14/22 1340 98 6 °F (37 °C) 79 18 146/65 -- 98 % -- -- Nasal cannula -- Lying   08/14/22 1310 -- -- -- -- -- 98 % 32 3 L/min Nasal cannula -- --   08/14/22 0915 -- 75 -- -- -- 95 % -- -- -- -- --   08/14/22 0738 -- -- -- -- -- 95 % 32 3 L/min Nasal cannula -- --   08/14/22 0724 -- 97 -- -- -- 95 % -- -- -- -- --   08/14/22 0659 97 8 °F (36 6 °C) 87 18 149/72 104 96 % -- -- Nasal cannula -- Sitting   08/14/22 0600 -- 103 20 145/71 101 92 % 32 3 L/min Nasal cannula -- Lying   08/14/22 0545 -- 102 22 149/68 98 92 % 32 3 L/min Nasal cannula -- Lying   08/14/22 0530 -- 109 Abnormal  -- -- -- 93 % -- -- -- -- --   08/14/22 0515 -- -- -- 165/73 105 -- -- -- -- -- --   08/14/22 0423 -- -- -- -- -- -- -- -- -- Full face mask --   08/14/22 0409 -- 116 Abnormal  20 163/73 105 96 % -- -- CPAP -- Lying   Intake/Output Summary (Last 24 hours) at 8/15/2022 1303  Last data filed at 8/15/2022 1057      Gross per 24 hour   Intake 1294 ml   Output 2925 ml   Net -1631 ml     Pertinent Labs/Diagnostic Test Results:   8/14 EKG: Sinus tachycardia  Left atrial enlargement  Nonspecific ST-t wave changes  Abnormal ECG  When compared with ECG of 15-JUL-2022 15:12,  No significant change was found  CTA ED chest PE Study   Final Result by Anderson Sales MD (08/14 0602)      No evidence of pulmonary embolism is seen  Measured RV/LV ratio is within normal limits at less than 0 9  There are small bilateral pleural effusions (right slightly larger than left), and the lungs demonstrate some septal thickening, suggesting mild fluid overload/CHF  Clinical correlation and follow-up is recommended  Atelectasis as described above  Mild emphysema  Atherosclerosis  Coronary artery disease  Prior sternotomy and CABG  Other nonemergent findings, as described above  Please see discussion  Workstation performed: [id number]         XR chest 1 view portable   Final Result by Shameka Murphy MD (08/14 0212)      Mild pulmonary venous congestion with small effusions                    Workstation performed: [id number]           Results from last 7 days   Lab Units 08/14/22  0419   SARS-COV-2  Negative     Results from last 7 days   Lab Units 08/15/22  0606 08/14/22  0419   WBC Thousand/uL 8 21 11 77*   HEMOGLOBIN g/dL 11 5* 12 1   HEMATOCRIT % 35 0* 37 9   PLATELETS Thousands/uL 186 171   NEUTROS ABS Thousands/µL 5 60  --          Results from last 7 days   Lab Units 08/15/22  0606 08/14/22  1611 08/14/22  0419   SODIUM mmol/L 138 137 137   POTASSIUM mmol/L 3 1* 3 4* 4 1   CHLORIDE mmol/L 98 96 99   CO2 mmol/L 32 30 23   ANION GAP mmol/L 8 11 15*   BUN mg/dL 22 20 20   CREATININE mg/dL 0 82 0 84 0 89   EGFR ml/min/1 73sq m 85 85 83   CALCIUM mg/dL 8 5 8 4 9 0   MAGNESIUM mg/dL 1 9  --   --    PHOSPHORUS mg/dL 3 1  --   --      Results from last 7 days   Lab Units 08/15/22  0606 08/14/22  0419   AST U/L 6* 15   ALT U/L 3* 6*   ALK PHOS U/L 79 96   TOTAL PROTEIN g/dL 5 8* 6 6   ALBUMIN g/dL 3 6 4 1   TOTAL BILIRUBIN mg/dL 0 34 0 61     Results from last 7 days   Lab Units 08/15/22  1050 08/14/22  2219 08/14/22  1614 08/14/22  1612 08/14/22  1055 08/14/22  0706   POC GLUCOSE mg/dl 205* 149* 305* 45* 283* 219*     Results from last 7 days   Lab Units 08/15/22  0606 08/14/22  1611 08/14/22  0419   GLUCOSE RANDOM mg/dL 137 257* 207*         Results from last 7 days   Lab Units 08/14/22  0804   HEMOGLOBIN A1C % 5 7*   EAG mg/dl 117     BETA-HYDROXYBUTYRATE   Date Value Ref Range Status   08/14/2022 2 3 (H) <0 6 mmol/L Final          Results from last 7 days   Lab Units 08/14/22  0419   PH GRETCHEN  7 415*   PCO2 GRETCHEN mm Hg 39 3*   PO2 GRETCHEN mm Hg 55 7*   HCO3 GRETCHEN mmol/L 24 6   BASE EXC GRETCHEN mmol/L 0 2   O2 CONTENT GRETCHEN ml/dL 15 8   O2 HGB, VENOUS % 86 9*             Results from last 7 days   Lab Units 08/14/22  0804 08/14/22  0626 08/14/22  0419   HS TNI 0HR ng/L  --   --  59*   HS TNI 2HR ng/L  --  311*  --    HSTNI D2 ng/L  --  252*  --    HS TNI 4HR ng/L 371*  --   --    HSTNI D4 ng/L 312*  --   --      Results from last 7 days   Lab Units 08/14/22  0419   D-DIMER QUANTITATIVE ug/ml FEU 2 33*       Results from last 7 days   Lab Units 08/14/22  0419   BNP pg/mL 784*       Results from last 7 days   Lab Units 08/14/22  0419   INFLUENZA A PCR  Negative   INFLUENZA B PCR  Negative   RSV PCR  Negative       ED Treatment:   Medication Administration from 08/14/2022 0406 to 08/15/2022 1110       Date/Time Order Dose Route Action     08/14/2022 0417 furosemide (LASIX) injection 40 mg 40 mg Intravenous Given     08/14/2022 0501 aspirin chewable tablet 324 mg 324 mg Oral Given     08/15/2022 0811 formoterol (PERFOROMIST) nebulizer solution 20 mcg 20 mcg Nebulization Given     08/14/2022 0742 formoterol (PERFOROMIST) nebulizer solution 20 mcg 20 mcg Nebulization Given     08/14/2022 0806 aspirin (ECOTRIN LOW STRENGTH) EC tablet 81 mg 81 mg Oral Given     08/15/2022 0754 budesonide (PULMICORT) inhalation solution 0 5 mg 0 5 mg Nebulization Given     08/14/2022 2051 budesonide (PULMICORT) inhalation solution 0 5 mg 0 5 mg Nebulization Given     08/14/2022 0735 budesonide (PULMICORT) inhalation solution 0 5 mg 0 5 mg Nebulization Given     08/15/2022 0837 cyanocobalamin (VITAMIN B-12) tablet 500 mcg 500 mcg Oral Given     08/14/2022 0807 cyanocobalamin (VITAMIN B-12) tablet 500 mcg 500 mcg Oral Given     08/15/2022 0837 digoxin (LANOXIN) tablet 125 mcg 125 mcg Oral Given     08/14/2022 0807 digoxin (LANOXIN) tablet 125 mcg 125 mcg Oral Given     08/14/2022 2128 gabapentin (NEURONTIN) capsule 300 mg 300 mg Oral Given     08/15/2022 0755 ipratropium (ATROVENT) 0 02 % inhalation solution 0 5 mg 0 5 mg Nebulization Given     08/14/2022 2051 ipratropium (ATROVENT) 0 02 % inhalation solution 0 5 mg 0 5 mg Nebulization Given     08/14/2022 1310 ipratropium (ATROVENT) 0 02 % inhalation solution 0 5 mg 0 5 mg Nebulization Given     08/14/2022 0735 ipratropium (ATROVENT) 0 02 % inhalation solution 0 5 mg 0 5 mg Nebulization Given     08/15/2022 0838 lisinopril (ZESTRIL) tablet 20 mg 20 mg Oral Given     08/14/2022 0806 lisinopril (ZESTRIL) tablet 20 mg 20 mg Oral Given     08/15/2022 0837 metoprolol tartrate (LOPRESSOR) tablet 25 mg 25 mg Oral Given     08/14/2022 2128 metoprolol tartrate (LOPRESSOR) tablet 25 mg 25 mg Oral Given     08/14/2022 0806 metoprolol tartrate (LOPRESSOR) tablet 25 mg 25 mg Oral Given     08/15/2022 0837 multivitamin-minerals (CENTRUM) tablet 1 tablet 1 tablet Oral Given     08/14/2022 0807 multivitamin-minerals (CENTRUM) tablet 1 tablet 1 tablet Oral Given     08/15/2022 0836 primidone (MYSOLINE) tablet 100 mg 100 mg Oral Given     08/14/2022 2129 primidone (MYSOLINE) tablet 100 mg 100 mg Oral Given     08/14/2022 0807 primidone (MYSOLINE) tablet 100 mg 100 mg Oral Given     08/14/2022 1615 pravastatin (PRAVACHOL) tablet 80 mg 80 mg Oral Given     08/15/2022 0838 furosemide (LASIX) injection 40 mg 40 mg Intravenous Given     08/14/2022 1702 furosemide (LASIX) injection 40 mg 40 mg Intravenous Given     08/14/2022 0808 furosemide (LASIX) injection 40 mg 40 mg Intravenous Given     08/15/2022 0559 heparin (porcine) subcutaneous injection 5,000 Units 5,000 Units Subcutaneous Given     08/14/2022 2130 heparin (porcine) subcutaneous injection 5,000 Units 5,000 Units Subcutaneous Given     08/14/2022 1340 heparin (porcine) subcutaneous injection 5,000 Units 5,000 Units Subcutaneous Given     08/14/2022 0807 heparin (porcine) subcutaneous injection 5,000 Units 5,000 Units Subcutaneous Given     08/14/2022 1615 insulin lispro (HumaLOG) 100 units/mL subcutaneous injection 1-6 Units 4 Units Subcutaneous Given     08/14/2022 1114 insulin lispro (HumaLOG) 100 units/mL subcutaneous injection 1-6 Units 4 Units Subcutaneous Given     08/14/2022 0808 insulin lispro (HumaLOG) 100 units/mL subcutaneous injection 1-6 Units 2 Units Subcutaneous Given     08/15/2022 0754 levalbuterol (XOPENEX) inhalation solution 1 25 mg 1 25 mg Nebulization Given     08/14/2022 2051 levalbuterol ([place]) inhalation solution 1 25 mg 1 25 mg Nebulization Given     08/15/2022 0837 potassium chloride (K-DUR,KLOR-CON) CR tablet 40 mEq 40 mEq Oral Given        Past Medical History:   Diagnosis Date    BPH (benign prostatic hyperplasia)     45 days radiation treatment    COPD (chronic obstructive pulmonary disease)     Coronary artery disease     CABG x4 in 2017    Diabetes mellitus     History of Arterial Duplex of LE 12/26/2017    Likely occlusion of the left superficial femoral artery  Calcific changes bilaterally  Despite these changes, the ankle-brachial index as a measure of peripheral blood flow only mildly impaired      History of echocardiogram 06/12/2017    EF 40%, mild LVH, mild MR     Hyperlipidemia     Hypertension     Ischemic cardiomyopathy     NSTEMI (non-ST elevated myocardial infarction)     Prostate cancer     prostate     PVD (peripheral vascular disease)     Sepsis due to pneumonia     Tremor     [facility]       Admitting Diagnosis: Shortness of breath [R06 02]  Age/Sex: 69 y o  male  Admission Orders:  SCDS  I/O  Tele  CBC, BMp  Scheduled Medications:  aspirin, 81 mg, Oral, Every Other Day  budesonide, 0 5 mg, Nebulization, BID  cyanocobalamin, 500 mcg, Oral, Daily  digoxin, 125 mcg, Oral, Daily  formoterol, 20 mcg, Nebulization, Q12H  furosemide, 40 mg, Intravenous, BID  gabapentin, 300 mg, Oral, HS  heparin (porcine), 5,000 Units, Subcutaneous, Q8H ENDER  insulin lispro, 1-6 Units, Subcutaneous, TID AC  insulin lispro, 1-6 Units, Subcutaneous, HS  ipratropium, 0 5 mg, Nebulization, TID  levalbuterol, 1 25 mg, Nebulization, TID  lisinopril, 20 mg, Oral, Daily  metoprolol tartrate, 25 mg, Oral, Q12H ENDER  multivitamin-minerals, 1 tablet, Oral, Daily  pravastatin, 80 mg, Oral, Daily With Dinner  primidone, 100 mg, Oral, BID  roflumilast, 250 mcg, Oral, Daily      Continuous IV Infusions:     PRN Meds:  acetaminophen, 650 mg, Oral, Q4H PRN  albuterol, 2 puff, Inhalation, Q6H PRN  ondansetron, 4 mg, Intravenous, Q6H PRN        IP CONSULT TO NUTRITION SERVICES  IP CONSULT TO CARDIOLOGY    Network Utilization Review Department  ATTENTION: Please call with any questions or concerns to [phone] and carefully listen to the prompts so that you are directed to the right person  All voicemails are confidential   Lynette Kussmaul all requests for admission clinical reviews, approved or denied determinations and any other requests to dedicated fax number below belonging to the campus where the patient is receiving treatment   List of dedicated fax numbers for the Facilities:  [address] DENIALS (Administrative/Medical Necessity) [phone]   [address] (Maternity/NICU/Pediatrics) [phone]   [address]  [address] [facility] [place] [address] [address] [place] [address] [address] [place] [address] [phone]

## 2022-08-15 NOTE — ED NOTES
RN messaged respiratory therapist regarding 2000 nebulizer treatments        Dannielle Castrejon RN  08/14/22 2012

## 2022-08-16 ENCOUNTER — TRANSITIONAL CARE MANAGEMENT (OUTPATIENT)
Dept: FAMILY MEDICINE CLINIC | Facility: CLINIC | Age: 77
End: 2022-08-16

## 2022-08-16 VITALS
HEART RATE: 82 BPM | DIASTOLIC BLOOD PRESSURE: 71 MMHG | HEIGHT: 70 IN | WEIGHT: 173.72 LBS | OXYGEN SATURATION: 98 % | TEMPERATURE: 99.4 F | BODY MASS INDEX: 24.87 KG/M2 | RESPIRATION RATE: 20 BRPM | SYSTOLIC BLOOD PRESSURE: 122 MMHG

## 2022-08-16 DIAGNOSIS — I50.43 ACUTE ON CHRONIC COMBINED SYSTOLIC AND DIASTOLIC CONGESTIVE HEART FAILURE (HCC): Primary | ICD-10-CM

## 2022-08-16 LAB
ANION GAP SERPL CALCULATED.3IONS-SCNC: 8 MMOL/L (ref 4–13)
BUN SERPL-MCNC: 22 MG/DL (ref 5–25)
CALCIUM SERPL-MCNC: 8.6 MG/DL (ref 8.4–10.2)
CHLORIDE SERPL-SCNC: 97 MMOL/L (ref 96–108)
CO2 SERPL-SCNC: 34 MMOL/L (ref 21–32)
CREAT SERPL-MCNC: 0.78 MG/DL (ref 0.6–1.3)
ERYTHROCYTE [DISTWIDTH] IN BLOOD BY AUTOMATED COUNT: 14.1 % (ref 11.6–15.1)
GFR SERPL CREATININE-BSD FRML MDRD: 87 ML/MIN/1.73SQ M
GLUCOSE SERPL-MCNC: 108 MG/DL (ref 65–140)
GLUCOSE SERPL-MCNC: 109 MG/DL (ref 65–140)
GLUCOSE SERPL-MCNC: 231 MG/DL (ref 65–140)
HCT VFR BLD AUTO: 34.9 % (ref 36.5–49.3)
HGB BLD-MCNC: 11 G/DL (ref 12–17)
MCH RBC QN AUTO: 28.9 PG (ref 26.8–34.3)
MCHC RBC AUTO-ENTMCNC: 31.5 G/DL (ref 31.4–37.4)
MCV RBC AUTO: 92 FL (ref 82–98)
PLATELET # BLD AUTO: 202 THOUSANDS/UL (ref 149–390)
PMV BLD AUTO: 11.1 FL (ref 8.9–12.7)
POTASSIUM SERPL-SCNC: 3.1 MMOL/L (ref 3.5–5.3)
RBC # BLD AUTO: 3.8 MILLION/UL (ref 3.88–5.62)
SODIUM SERPL-SCNC: 139 MMOL/L (ref 135–147)
WBC # BLD AUTO: 6.81 THOUSAND/UL (ref 4.31–10.16)

## 2022-08-16 PROCEDURE — 99239 HOSP IP/OBS DSCHRG MGMT >30: CPT | Performed by: INTERNAL MEDICINE

## 2022-08-16 PROCEDURE — 99233 SBSQ HOSP IP/OBS HIGH 50: CPT | Performed by: NURSE PRACTITIONER

## 2022-08-16 PROCEDURE — 94760 N-INVAS EAR/PLS OXIMETRY 1: CPT

## 2022-08-16 PROCEDURE — 82948 REAGENT STRIP/BLOOD GLUCOSE: CPT

## 2022-08-16 PROCEDURE — 85027 COMPLETE CBC AUTOMATED: CPT | Performed by: INTERNAL MEDICINE

## 2022-08-16 PROCEDURE — 80048 BASIC METABOLIC PNL TOTAL CA: CPT | Performed by: INTERNAL MEDICINE

## 2022-08-16 PROCEDURE — 94640 AIRWAY INHALATION TREATMENT: CPT

## 2022-08-16 RX ORDER — POTASSIUM CHLORIDE 20 MEQ/1
40 TABLET, EXTENDED RELEASE ORAL ONCE
Status: COMPLETED | OUTPATIENT
Start: 2022-08-16 | End: 2022-08-16

## 2022-08-16 RX ORDER — FUROSEMIDE 40 MG/1
40 TABLET ORAL DAILY
Qty: 30 TABLET | Refills: 0 | Status: ON HOLD | OUTPATIENT
Start: 2022-08-16 | End: 2022-09-23 | Stop reason: SDUPTHER

## 2022-08-16 RX ORDER — POTASSIUM CHLORIDE 750 MG/1
20 CAPSULE, EXTENDED RELEASE ORAL DAILY
Qty: 60 CAPSULE | Refills: 0 | Status: SHIPPED | OUTPATIENT
Start: 2022-08-16

## 2022-08-16 RX ADMIN — PRIMIDONE 100 MG: 50 TABLET ORAL at 08:19

## 2022-08-16 RX ADMIN — IPRATROPIUM BROMIDE 0.5 MG: 0.5 SOLUTION RESPIRATORY (INHALATION) at 07:26

## 2022-08-16 RX ADMIN — LEVALBUTEROL HYDROCHLORIDE 1.25 MG: 1.25 SOLUTION, CONCENTRATE RESPIRATORY (INHALATION) at 07:26

## 2022-08-16 RX ADMIN — Medication 1 TABLET: at 08:15

## 2022-08-16 RX ADMIN — INSULIN LISPRO 3 UNITS: 100 INJECTION, SOLUTION INTRAVENOUS; SUBCUTANEOUS at 11:53

## 2022-08-16 RX ADMIN — BUDESONIDE 0.5 MG: 0.5 INHALANT ORAL at 07:26

## 2022-08-16 RX ADMIN — METOPROLOL TARTRATE 25 MG: 25 TABLET, FILM COATED ORAL at 08:15

## 2022-08-16 RX ADMIN — LISINOPRIL 20 MG: 20 TABLET ORAL at 08:15

## 2022-08-16 RX ADMIN — CYANOCOBALAMIN TAB 500 MCG 500 MCG: 500 TAB at 08:15

## 2022-08-16 RX ADMIN — ASPIRIN 81 MG: 81 TABLET, COATED ORAL at 08:15

## 2022-08-16 RX ADMIN — FORMOTEROL FUMARATE DIHYDRATE 20 MCG: 20 SOLUTION RESPIRATORY (INHALATION) at 07:25

## 2022-08-16 RX ADMIN — FUROSEMIDE 40 MG: 10 INJECTION, SOLUTION INTRAMUSCULAR; INTRAVENOUS at 08:16

## 2022-08-16 RX ADMIN — HEPARIN SODIUM 5000 UNITS: 5000 INJECTION INTRAVENOUS; SUBCUTANEOUS at 05:29

## 2022-08-16 RX ADMIN — DIGOXIN 125 MCG: 125 TABLET ORAL at 08:15

## 2022-08-16 RX ADMIN — LEVALBUTEROL HYDROCHLORIDE 1.25 MG: 1.25 SOLUTION, CONCENTRATE RESPIRATORY (INHALATION) at 13:23

## 2022-08-16 RX ADMIN — IPRATROPIUM BROMIDE 0.5 MG: 0.5 SOLUTION RESPIRATORY (INHALATION) at 13:23

## 2022-08-16 RX ADMIN — POTASSIUM CHLORIDE 40 MEQ: 1500 TABLET, EXTENDED RELEASE ORAL at 11:54

## 2022-08-16 NOTE — PLAN OF CARE
Problem: Potential for Falls  Goal: Patient will remain free of falls  Description: INTERVENTIONS:  - Educate patient/family on patient safety including physical limitations  - Instruct patient to call for assistance with activity   - Consult OT/PT to assist with strengthening/mobility   - Keep Call bell within reach  - Keep bed low and locked with side rails adjusted as appropriate  - Keep care items and personal belongings within reach  - Initiate and maintain comfort rounds  - Make Fall Risk Sign visible to staff  - Offer Toileting every 2 Hours, in advance of need  - Initiate/Maintain bed alarm  - Obtain necessary fall risk management equipment: yellow socks applied, bed in lowest and locked position  - Apply yellow socks and bracelet for high fall risk patients  - Consider moving patient to room near nurses station  8/16/2022 1642 by Rosa Clarke RN  Outcome: Adequate for Discharge  8/16/2022 1025 by Rosa Clarke RN  Outcome: Progressing     Problem: MOBILITY - ADULT  Goal: Maintain or return to baseline ADL function  Description: INTERVENTIONS:  -  Assess patient's ability to carry out ADLs; assess patient's baseline for ADL function and identify physical deficits which impact ability to perform ADLs (bathing, care of mouth/teeth, toileting, grooming, dressing, etc )  - Assess/evaluate cause of self-care deficits   - Assess range of motion  - Assess patient's mobility; develop plan if impaired  - Assess patient's need for assistive devices and provide as appropriate  - Encourage maximum independence but intervene and supervise when necessary  - Involve family in performance of ADLs  - Assess for home care needs following discharge   - Consider OT consult to assist with ADL evaluation and planning for discharge  - Provide patient education as appropriate  8/16/2022 1642 by Rosa Clarke RN  Outcome: Adequate for Discharge  8/16/2022 1025 by Rosa Clarke RN  Outcome: Progressing  Goal: Maintains/Returns to pre admission functional level  Description: INTERVENTIONS:  - Perform BMAT or MOVE assessment daily    - Set and communicate daily mobility goal to care team and patient/family/caregiver  - Collaborate with rehabilitation services on mobility goals if consulted  - Perform Range of Motion 3 times a day  - Reposition patient every 2 hours    - Dangle patient 3 times a day  - Stand patient 3 times a day  - Ambulate patient 3 times a day  - Out of bed to chair 3 times a day   - Out of bed for meals 3 times a day  - Out of bed for toileting  - Record patient progress and toleration of activity level   8/16/2022 1642 by Renee Tipton RN  Outcome: Adequate for Discharge  8/16/2022 1025 by Renee Tipton RN  Outcome: Progressing     Problem: Prexisting or High Potential for Compromised Skin Integrity  Goal: Skin integrity is maintained or improved  Description: INTERVENTIONS:  - Identify patients at risk for skin breakdown  - Assess and monitor skin integrity  - Assess and monitor nutrition and hydration status  - Monitor labs   - Assess for incontinence   - Turn and reposition patient  - Assist with mobility/ambulation  - Relieve pressure over bony prominences  - Avoid friction and shearing  - Provide appropriate hygiene as needed including keeping skin clean and dry  - Evaluate need for skin moisturizer/barrier cream  - Collaborate with interdisciplinary team   - Patient/family teaching  - Consider wound care consult   8/16/2022 1642 by Renee Tipton RN  Outcome: Adequate for Discharge  8/16/2022 1025 by Renee Tipton RN  Outcome: Progressing     Problem: PAIN - ADULT  Goal: Verbalizes/displays adequate comfort level or baseline comfort level  Description: Interventions:  - Encourage patient to monitor pain and request assistance  - Assess pain using appropriate pain scale  - Administer analgesics based on type and severity of pain and evaluate response  - Implement non-pharmacological measures as appropriate and evaluate response  - Consider cultural and social influences on pain and pain management  - Notify physician/advanced practitioner if interventions unsuccessful or patient reports new pain  8/16/2022 1642 by Rosa Clarke RN  Outcome: Adequate for Discharge  8/16/2022 1025 by Rosa Clarke RN  Outcome: Progressing     Problem: INFECTION - ADULT  Goal: Absence or prevention of progression during hospitalization  Description: INTERVENTIONS:  - Assess and monitor for signs and symptoms of infection  - Monitor lab/diagnostic results  - Monitor all insertion sites, i e  indwelling lines, tubes, and drains  - Monitor endotracheal if appropriate and nasal secretions for changes in amount and color  - [place] appropriate cooling/warming therapies per order  - Administer medications as ordered  - Instruct and encourage patient and family to use good hand hygiene technique  - Identify and instruct in appropriate isolation precautions for identified infection/condition  8/16/2022 1642 by Rosa Clarke RN  Outcome: Adequate for Discharge  8/16/2022 1025 by Rosa Clarke RN  Outcome: Progressing  Goal: Absence of fever/infection during neutropenic period  Description: INTERVENTIONS:  - Monitor WBC    8/16/2022 1642 by Rosa Clarke RN  Outcome: Adequate for Discharge  8/16/2022 1025 by Rosa Clarke RN  Outcome: Progressing     Problem: SAFETY ADULT  Goal: Patient will remain free of falls  Description: INTERVENTIONS:  - Educate patient/family on patient safety including physical limitations  - Instruct patient to call for assistance with activity   - Consult OT/PT to assist with strengthening/mobility   - Keep Call bell within reach  - Keep bed low and locked with side rails adjusted as appropriate  - Keep care items and personal belongings within reach  - Initiate and maintain comfort rounds  - Make Fall Risk Sign visible to staff  - Offer Toileting every 2 Hours, in advance of need  - Initiate/Maintain bed alarm  - Obtain necessary fall risk management equipment: yellow socks applied, bed in lowest and locked position  - Apply yellow socks and bracelet for high fall risk patients  - Consider moving patient to room near nurses station  8/16/2022 1642 by Whitney Kee RN  Outcome: Adequate for Discharge  8/16/2022 1025 by Whitney Kee RN  Outcome: Progressing  Goal: Maintain or return to baseline ADL function  Description: INTERVENTIONS:  -  Assess patient's ability to carry out ADLs; assess patient's baseline for ADL function and identify physical deficits which impact ability to perform ADLs (bathing, care of mouth/teeth, toileting, grooming, dressing, etc )  - Assess/evaluate cause of self-care deficits   - Assess range of motion  - Assess patient's mobility; develop plan if impaired  - Assess patient's need for assistive devices and provide as appropriate  - Encourage maximum independence but intervene and supervise when necessary  - Involve family in performance of ADLs  - Assess for home care needs following discharge   - Consider OT consult to assist with ADL evaluation and planning for discharge  - Provide patient education as appropriate  8/16/2022 1642 by Whitney Kee RN  Outcome: Adequate for Discharge  8/16/2022 1025 by Whitney Kee RN  Outcome: Progressing  Goal: Maintains/Returns to pre admission functional level  Description: INTERVENTIONS:  - Perform BMAT or MOVE assessment daily    - Set and communicate daily mobility goal to care team and patient/family/caregiver  - Collaborate with rehabilitation services on mobility goals if consulted  - Perform Range of Motion 3 times a day  - Reposition patient every 2 hours    - Dangle patient 3 times a day  - Stand patient 3 times a day  - Ambulate patient 3 times a day  - Out of bed to chair 3 times a day   - Out of bed for meals 3 times a day  - Out of bed for toileting  - Record patient progress and toleration of activity level   8/16/2022 1642 by Chino Sandoval RN  Outcome: Adequate for Discharge  8/16/2022 1025 by Chino Sandoval RN  Outcome: Progressing     Problem: DISCHARGE PLANNING  Goal: Discharge to home or other facility with appropriate resources  Description: INTERVENTIONS:  - Identify barriers to discharge w/patient and caregiver  - Arrange for needed discharge resources and transportation as appropriate  - Identify discharge learning needs (meds, wound care, etc )  - Arrange for interpretive services to assist at discharge as needed  - Refer to Case Management Department for coordinating discharge planning if the patient needs post-hospital services based on physician/advanced practitioner order or complex needs related to functional status, cognitive ability, or social support system  8/16/2022 1642 by Chino Sandoval RN  Outcome: Adequate for Discharge  8/16/2022 1025 by Chino Sandoval RN  Outcome: Progressing     Problem: Knowledge Deficit  Goal: Patient/family/caregiver demonstrates understanding of disease process, treatment plan, medications, and discharge instructions  Description: Complete learning assessment and assess knowledge base    Interventions:  - Provide teaching at level of understanding  - Provide teaching via preferred learning methods  8/16/2022 1642 by Chino Sandoval RN  Outcome: Adequate for Discharge  8/16/2022 1025 by Chino Sandoval RN  Outcome: Progressing

## 2022-08-16 NOTE — PROGRESS NOTES
Jimbo U  67   Progress Note - Rhea De Los Santos 1945, 69 y o  male MRN: [id number]  Unit/Bed#: -01 Encounter: [id number]  Primary Care Provider: Rebeca Alvarez DO   Date and time admitted to hospital: 8/14/2022  4:08 AM    Atrial fibrillation with rapid ventricular response [facility])  Assessment & Plan  Per ER notes  No EKG's to suggest AFib  Sinus rhythm on the monitor  Continue to monitor on telemetry    Acute on chronic combined systolic and diastolic congestive heart failure [facility])  Assessment & Plan  Wt Readings from Last 3 Encounters:   08/16/22 78 8 kg (173 lb 11 6 oz)   08/12/22 83 9 kg (185 lb)   08/01/22 84 6 kg (186 lb 6 4 oz)     Good diuresis with lasix  Change to 40 mg once daily for weight > 175  Low-sodium diet, daily weights, I/O monitoring  Continue beta-blocker and ACE-inhibitor      COPD (chronic obstructive pulmonary disease) (HCC)  Assessment & Plan  Continue home medications management per medical team    Coronary artery disease involving native coronary artery of native heart without angina pectoris  Assessment & Plan  History of 4 vessel CABG, 03/2017  Continue aspirin, beta-blocker, statin    * Acute on chronic respiratory failure with hypoxia (HCC)  Assessment & Plan  Likely due to COPD exacerbation and volume overload  Change to PO prn diuretics  Management of COPD per [facility]      Outpatient Cardiologist: Dr Long Gandara      Subjective:   Patient seen and examined  No significant events overnight  Ambulatory in room without incident  Denies chest pain, breathing is at baseline  No dizziness, lightheadedness, syncope    Summary comments:  Pt stable from a cardiac perspective  He has diuresed well with IV lasix  Recommend lasix on an as needed basis on discharge  Recommend Lasix 40 mg prn for weight > 175 lbs with 20 Kcl  Pt is noted be hypokalemic-recommend replacing K prior to DC  Continue other cardiac meds    Will arrange for outpatient follow up       Telemetry/ECG/Cardiac testing:   Echo 11/12/2020:  EF 30% with global dysfunction  Echo 8/2019  - EF 30-40%  Global dysfunction with severe hypokinesis of the apex  Mild MR  Arterial duplex 12/2017 - likely occlusion of the left SFA  KEI mildly impaired           Vitals: Blood pressure 134/76, pulse 85, temperature 97 5 °F (36 4 °C), resp  rate 18, height 5' 10" (1 778 m), weight 78 8 kg (173 lb 11 6 oz), SpO2 98 % ,   Orthostatic Blood Pressures    Flowsheet Row Most Recent Value   Blood Pressure 134/76 filed at 08/16/2022 0700   Patient Position - Orthostatic VS Lying filed at 08/16/2022 0250      ,   Weight (last 2 days)     Date/Time Weight    08/16/22 0600 78 8 (173 72)    08/15/22 17:52:53 79 5 (175 16)    08/15/22 1045 79 2 (174 6)    08/14/22 0659 87 5 (193)    08/14/22 0409 87 7 (193 34)          Physical Exam:    General:  Normal appearance in no distress  Eyes:  Anicteric  Oral mucosa:  Moist   Neck:  No JVD  Carotid upstrokes are brisk without bruits  No masses  Chest:  Clear to auscultation   Cardiac:  No palpable PMI  Normal S1 and S2  No murmur gallop or rub  Abdomen:  Soft and nontender  No palpable organomegaly or aortic enlargement  Extremities:  +1 bilat LE edema  Neuro:  Grossly symmetric  Psych:  Alert and oriented x3        Medications:      Current Facility-Administered Medications:     acetaminophen (TYLENOL) tablet 650 mg, 650 mg, Oral, Q4H PRN, Osie Silence, PA-C    albuterol (PROVENTIL HFA,VENTOLIN HFA) inhaler 2 puff, 2 puff, Inhalation, Q6H PRN, Osie Silence, PA-C    aspirin (ECOTRIN LOW STRENGTH) EC tablet 81 mg, 81 mg, Oral, Every Other Day, Osie Silence, PA-C, 81 mg at 08/16/22 0815    budesonide (PULMICORT) inhalation solution 0 5 mg, 0 5 mg, Nebulization, BID, Osie Silence, PA-C, 0 5 mg at 08/16/22 0726    cyanocobalamin (VITAMIN B-12) tablet 500 mcg, 500 mcg, Oral, Daily, Osie Silence, PA-C, 500 mcg at 08/16/22 0815    digoxin (LANOXIN) tablet 125 mcg, 125 mcg, Oral, Daily, Chadwick Cobos PA-C, 125 mcg at 08/16/22 0815    formoterol (PERFOROMIST) nebulizer solution 20 mcg, 20 mcg, Nebulization, Q12H, Chadwick Cobos PA-C, 20 mcg at 08/16/22 0725    furosemide (LASIX) injection 40 mg, 40 mg, Intravenous, BID, Chadwick Cobos PA-C, 40 mg at 08/16/22 0816    gabapentin (NEURONTIN) capsule 300 mg, 300 mg, Oral, HS, Chadwick Cobos PA-C, 300 mg at 08/15/22 2244    heparin (porcine) subcutaneous injection 5,000 Units, 5,000 Units, Subcutaneous, Q8H [address], Chadwick Cobos PA-C, 5,000 Units at 08/16/22 0529    insulin lispro (HumaLOG) 100 units/mL subcutaneous injection 1-6 Units, 1-6 Units, Subcutaneous, TID AC, 1 Units at 08/15/22 1621 **AND** Fingerstick Glucose (POCT), , , TID AC, Chadwick Cobos PA-C    insulin lispro (HumaLOG) 100 units/mL subcutaneous injection 1-6 Units, 1-6 Units, Subcutaneous, HS, Chadwick Cobos PA-C, 1 Units at 08/15/22 2249    ipratropium (ATROVENT) 0 02 % inhalation solution 0 5 mg, 0 5 mg, Nebulization, TID, Chadwick Cobos PA-C, 0 5 mg at 08/16/22 0726    levalbuterol (XOPENEX) inhalation solution 1 25 mg, 1 25 mg, Nebulization, TID, Maurilio Buchanan MD, 1 25 mg at 08/16/22 0726    lisinopril (ZESTRIL) tablet 20 mg, 20 mg, Oral, Daily, Chadwick Cobos PA-C, 20 mg at 08/16/22 0815    metoprolol tartrate (LOPRESSOR) tablet 25 mg, 25 mg, Oral, Q12H [address], Chadwick Cobos PA-C, 25 mg at 08/16/22 0815    multivitamin-minerals (CENTRUM) tablet 1 tablet, 1 tablet, Oral, Daily, Chadwick Cobos PA-C, 1 tablet at 08/16/22 0815    ondansetron (ZOFRAN) injection 4 mg, 4 mg, Intravenous, Q6H PRN, Chadwick Cobos PA-C    pravastatin (PRAVACHOL) tablet 80 mg, 80 mg, Oral, Daily With Dinner, Chadwick Cobos PA-C, 80 mg at 08/15/22 1552    primidone (MYSOLINE) tablet 100 mg, 100 mg, Oral, BID, Chadwick Cobos PA-C, 100 mg at 08/16/22 1678    roflumilast (DALIRESP) tablet 250 mcg, 250 mcg, Oral, Daily, Chadwick Cobos PA-C     Labs & Results:    Troponins: Results from last 7 days   Lab Units 08/14/22  0804 08/14/22  0626 08/14/22  0419   HS TNI 0HR ng/L  --   --  59*   HS TNI 2HR ng/L  --  311*  --    HSTNI D2 ng/L  --  252*  --    HS TNI 4HR ng/L 371*  --   --    HSTNI D4 ng/L 312*  --   --         BNP:   Results from last 6 Months   Lab Units 08/14/22  0419 07/14/22  2354   BNP pg/mL 784* 325*     CBC with diff:   Results from last 7 days   Lab Units 08/16/22  0518 08/15/22  0606   WBC Thousand/uL 6 81 8 21   HEMOGLOBIN g/dL 11 0* 11 5*   HEMATOCRIT % 34 9* 35 0*   MCV fL 92 91   PLATELETS Thousands/uL 202 186     TSH:     CMP:   Results from last 7 days   Lab Units 08/16/22  0518 08/15/22  0606 08/14/22  1611 08/14/22  0419   POTASSIUM mmol/L 3 1* 3 1*   < > 4 1   CHLORIDE mmol/L 97 98   < > 99   CO2 mmol/L 34* 32   < > 23   BUN mg/dL 22 22   < > 20   CREATININE mg/dL 0 78 0 82   < > 0 89   AST U/L  --  6*  --  15   ALT U/L  --  3*  --  6*   EGFR ml/min/1 73sq m 87 85   < > 83    < > = values in this interval not displayed       Lipid Profile:     Coags: electronic

## 2022-08-16 NOTE — NURSING NOTE
Pt d/c to home  AVS reviewed and all questions and concerns addressed with pt  IV removed, D/S/D and pressure applied

## 2022-08-16 NOTE — PLAN OF CARE
Problem: Potential for Falls  Goal: Patient will remain free of falls  Description: INTERVENTIONS:  - Educate patient/family on patient safety including physical limitations  - Instruct patient to call for assistance with activity   - Consult OT/PT to assist with strengthening/mobility   - Keep Call bell within reach  - Keep bed low and locked with side rails adjusted as appropriate  - Keep care items and personal belongings within reach  - Initiate and maintain comfort rounds  - Make Fall Risk Sign visible to staff  - Offer Toileting every 2 Hours, in advance of need  - Initiate/Maintain bed alarm  - Obtain necessary fall risk management equipment: yellow socks applied, bed in lowest and locked position  - Apply yellow socks and bracelet for high fall risk patients  - Consider moving patient to room near nurses station  Outcome: Progressing     Problem: MOBILITY - ADULT  Goal: Maintain or return to baseline ADL function  Description: INTERVENTIONS:  -  Assess patient's ability to carry out ADLs; assess patient's baseline for ADL function and identify physical deficits which impact ability to perform ADLs (bathing, care of mouth/teeth, toileting, grooming, dressing, etc )  - Assess/evaluate cause of self-care deficits   - Assess range of motion  - Assess patient's mobility; develop plan if impaired  - Assess patient's need for assistive devices and provide as appropriate  - Encourage maximum independence but intervene and supervise when necessary  - Involve family in performance of ADLs  - Assess for home care needs following discharge   - Consider OT consult to assist with ADL evaluation and planning for discharge  - Provide patient education as appropriate  Outcome: Progressing  Goal: Maintains/Returns to pre admission functional level  Description: INTERVENTIONS:  - Perform BMAT or MOVE assessment daily    - Set and communicate daily mobility goal to care team and patient/family/caregiver     - Collaborate with rehabilitation services on mobility goals if consulted  - Perform Range of Motion 3 times a day  - Reposition patient every 2 hours    - Dangle patient 3 times a day  - Stand patient 3 times a day  - Ambulate patient 3 times a day  - Out of bed to chair 3 times a day   - Out of bed for meals 3 times a day  - Out of bed for toileting  - Record patient progress and toleration of activity level   Outcome: Progressing     Problem: Prexisting or High Potential for Compromised Skin Integrity  Goal: Skin integrity is maintained or improved  Description: INTERVENTIONS:  - Identify patients at risk for skin breakdown  - Assess and monitor skin integrity  - Assess and monitor nutrition and hydration status  - Monitor labs   - Assess for incontinence   - Turn and reposition patient  - Assist with mobility/ambulation  - Relieve pressure over bony prominences  - Avoid friction and shearing  - Provide appropriate hygiene as needed including keeping skin clean and dry  - Evaluate need for skin moisturizer/barrier cream  - Collaborate with interdisciplinary team   - Patient/family teaching  - Consider wound care consult   Outcome: Progressing     Problem: PAIN - ADULT  Goal: Verbalizes/displays adequate comfort level or baseline comfort level  Description: Interventions:  - Encourage patient to monitor pain and request assistance  - Assess pain using appropriate pain scale  - Administer analgesics based on type and severity of pain and evaluate response  - Implement non-pharmacological measures as appropriate and evaluate response  - Consider cultural and social influences on pain and pain management  - Notify physician/advanced practitioner if interventions unsuccessful or patient reports new pain  Outcome: Progressing     Problem: INFECTION - ADULT  Goal: Absence or prevention of progression during hospitalization  Description: INTERVENTIONS:  - Assess and monitor for signs and symptoms of infection  - Monitor lab/diagnostic results  - Monitor all insertion sites, i e  indwelling lines, tubes, and drains  - Monitor endotracheal if appropriate and nasal secretions for changes in amount and color  - [place] appropriate cooling/warming therapies per order  - Administer medications as ordered  - Instruct and encourage patient and family to use good hand hygiene technique  - Identify and instruct in appropriate isolation precautions for identified infection/condition  Outcome: Progressing  Goal: Absence of fever/infection during neutropenic period  Description: INTERVENTIONS:  - Monitor WBC    Outcome: Progressing     Problem: SAFETY ADULT  Goal: Patient will remain free of falls  Description: INTERVENTIONS:  - Educate patient/family on patient safety including physical limitations  - Instruct patient to call for assistance with activity   - Consult OT/PT to assist with strengthening/mobility   - Keep Call bell within reach  - Keep bed low and locked with side rails adjusted as appropriate  - Keep care items and personal belongings within reach  - Initiate and maintain comfort rounds  - Make Fall Risk Sign visible to staff  - Offer Toileting every 2 Hours, in advance of need  - Initiate/Maintain bed alarm  - Obtain necessary fall risk management equipment: yellow socks applied, bed in lowest and locked position  - Apply yellow socks and bracelet for high fall risk patients  - Consider moving patient to room near nurses station  Outcome: Progressing  Goal: Maintain or return to baseline ADL function  Description: INTERVENTIONS:  -  Assess patient's ability to carry out ADLs; assess patient's baseline for ADL function and identify physical deficits which impact ability to perform ADLs (bathing, care of mouth/teeth, toileting, grooming, dressing, etc )  - Assess/evaluate cause of self-care deficits   - Assess range of motion  - Assess patient's mobility; develop plan if impaired  - Assess patient's need for assistive devices and provide as appropriate  - Encourage maximum independence but intervene and supervise when necessary  - Involve family in performance of ADLs  - Assess for home care needs following discharge   - Consider OT consult to assist with ADL evaluation and planning for discharge  - Provide patient education as appropriate  Outcome: Progressing  Goal: Maintains/Returns to pre admission functional level  Description: INTERVENTIONS:  - Perform BMAT or MOVE assessment daily    - Set and communicate daily mobility goal to care team and patient/family/caregiver  - Collaborate with rehabilitation services on mobility goals if consulted  - Perform Range of Motion 3 times a day  - Reposition patient every 2 hours  - Dangle patient 3 times a day  - Stand patient 3 times a day  - Ambulate patient 3 times a day  - Out of bed to chair 3 times a day   - Out of bed for meals 3 times a day  - Out of bed for toileting  - Record patient progress and toleration of activity level   Outcome: Progressing     Problem: DISCHARGE PLANNING  Goal: Discharge to home or other facility with appropriate resources  Description: INTERVENTIONS:  - Identify barriers to discharge w/patient and caregiver  - Arrange for needed discharge resources and transportation as appropriate  - Identify discharge learning needs (meds, wound care, etc )  - Arrange for interpretive services to assist at discharge as needed  - Refer to Case Management Department for coordinating discharge planning if the patient needs post-hospital services based on physician/advanced practitioner order or complex needs related to functional status, cognitive ability, or social support system  Outcome: Progressing     Problem: Knowledge Deficit  Goal: Patient/family/caregiver demonstrates understanding of disease process, treatment plan, medications, and discharge instructions  Description: Complete learning assessment and assess knowledge base    Interventions:  - Provide teaching at level of understanding  - Provide teaching via preferred learning methods  Outcome: Progressing

## 2022-08-16 NOTE — ASSESSMENT & PLAN NOTE
Wt Readings from Last 3 Encounters:   08/16/22 78 8 kg (173 lb 11 6 oz)   08/12/22 83 9 kg (185 lb)   08/01/22 84 6 kg (186 lb 6 4 oz)     Good diuresis with lasix    Change to 40 mg once daily for weight > 175  Low-sodium diet, daily weights, I/O monitoring  Continue beta-blocker and ACE-inhibitor

## 2022-08-16 NOTE — ASSESSMENT & PLAN NOTE
Likely due to COPD exacerbation and volume overload  Change to PO prn diuretics  Management of COPD per [facility]

## 2022-08-16 NOTE — DISCHARGE SUMMARY
[address] 46  Discharge- Harjit Cruz 1945, 69 y o  male MRN: [id number]  Unit/Bed#: -01 Encounter: [id number]  Primary Care Provider: Deya Eldridge DO   Date and time admitted to hospital: 8/14/2022  4:08 AM    Acute on chronic combined systolic and diastolic congestive heart failure ([facility] )  Assessment & Plan  · Clinically improved in the hospital with IV diuresis  · Will transition to Lasix 40 mg daily as needed for weight greater than 175 lb per Cardiology recommendation  · Potassium supplementation with Lasix  · Will continue home digoxin, metoprolol, lisinopril  · Outpatient follow-up with cardiology    * Acute on chronic respiratory failure with hypoxia ([facility] )  Assessment & Plan  · Secondary to CHF exacerbation  · Patient is chronically on 2 L of nasal cannula  · Patient being transitioned to oral Lasix today    Outpatient follow-up with PCP/Cardiology    Increased anion gap metabolic acidosis  Assessment & Plan  · resolved    Elevated d-dimer  Assessment & Plan  · CTA chest PE study negative for PE  · No further workup at this time    Atrial fibrillation with rapid ventricular response (HCC)  Assessment & Plan  · Patient was noted to have rapid rates prior to admission which improved with Cardizem  · Currently rate controlled  · Continue digoxin, and metoprolol  · Not a good candidate for long-term anticoagulation  · Continue aspirin only for now  · Cardiology following    Primary hypertension  Assessment & Plan  · Blood pressure is controlled  · Continue current medications    COPD (chronic obstructive pulmonary disease) ([facility] )  Assessment & Plan  · Chronically on 2 L nasal cannula  · Currently without exacerbation  · Continue home medication    Benign essential tremor  Assessment & Plan  · Continue primidone    Coronary artery disease involving native coronary artery of native heart without angina pectoris  Assessment & Plan  · Patient with a known history of coronary artery disease-status post CABG x4 vessel  · Elevated troponins:-most likely a non MI related elevated troponinemia in the setting of having an acute exacerbation of congestive heart failure as outlined above  Troponin trend thus far 59, 311 with a delta differential of 252  · Patient denies any chest pain  · Arrival DEBBY score 6  · Continue cardiac based medications as outlined above  · Cardiology input appreciated  No acute intervention needed at this time  Continue outpatient follow-up    Type 2 diabetes mellitus without complication, without long-term current use of insulin ([address] )  Assessment & Plan  · Continue home diabetic regimen  · Diabetic neuropathy:-continue gabapentin      Discharging Physician / Practitioner: Analia Lemons MD  PCP: Hadley García DO  Admission Date:   Admission Orders (From admission, onward)     Ordered        08/14/22 0610  INPATIENT ADMISSION  Once                      Discharge Date: 08/16/22    Medical Problems             Resolved Problems  Date Reviewed: 8/16/2022   None                 Consultations During Hospital Stay:  · Cardiology    Procedures Performed:   · None    Significant Findings / Test Results:   · CHF exacerbation    Incidental Findings:   · None     Test Results Pending at Discharge (will require follow up): · None     Outpatient Tests Requested:  · Routine labs with PCP as outpatient  BMP in 1 week to monitor kidney function and potassium level    Complications:     None    Reason for Admission:  CHF exacerbation    Hospital Course:     Talib Brandt is a 69 y o  male patient who originally presented to the hospital on 8/14/2022 due to shortness of breath  Patient found have fluid overload secondary to combined diastolic/systolic heart failure exacerbation  Patient was started on IV diuresis  Cardiology was consulted  Troponins were elevated however likely secondary to CHF exacerbation, no acute intervention needed per Cardiology  Patient diuresed well and was clinically back to baseline  Cleared by Cardiology to be discharged home on oral Lasix  Patient will follow-up with cardiology as outpatient  Advised return to the ER if he had any worsening symptoms  Please see above list of diagnoses and related plan for additional information  Condition at Discharge: stable     Discharge Day Visit / Exam:     Subjective:  No complaints at this time  Patient states he is feeling well  Tolerating diet  Ambulating well  Vitals: Blood Pressure: 134/76 (08/16/22 0700)  Pulse: 85 (08/16/22 0725)  Temperature: 97 5 °F (36 4 °C) (08/16/22 0700)  Temp Source: Temporal (08/16/22 0250)  Respirations: 18 (08/16/22 0725)  Height: 5' 10" (177 8 cm) (08/15/22 1752)  Weight - Scale: 78 8 kg (173 lb 11 6 oz) (08/16/22 0600)  SpO2: 98 % (08/16/22 0725)     Exam:   Physical Exam  Constitutional:       General: He is not in acute distress  HENT:      Head: Normocephalic and atraumatic  Nose: Nose normal       Mouth/Throat:      Mouth: Mucous membranes are moist    Eyes:      Extraocular Movements: Extraocular movements intact  Conjunctiva/sclera: Conjunctivae normal    Cardiovascular:      Rate and Rhythm: Normal rate and regular rhythm  Pulmonary:      Effort: Pulmonary effort is normal  No respiratory distress  Abdominal:      Palpations: Abdomen is soft  Tenderness: There is no abdominal tenderness  Musculoskeletal:         General: Swelling present  Normal range of motion  Cervical back: Normal range of motion and neck supple  Skin:     General: Skin is warm and dry  Neurological:      General: No focal deficit present  Mental Status: He is alert  Cranial Nerves: No cranial nerve deficit     Psychiatric:         Mood and Affect: Mood normal          Behavior: Behavior normal          Discussion with Family: spoke with patients wife over the phone regarding plan of care    Discharge instructions/Information to patient and family:   See after visit summary for information provided to patient and family  Provisions for Follow-Up Care:  See after visit summary for information related to follow-up care and any pertinent home health orders  Disposition:     Home      Planned Readmission:    no     Discharge Statement:  I spent 35 minutes discharging the patient  This time was spent on the day of discharge  I had direct contact with the patient on the day of discharge  Greater than 50% of the total time was spent examining patient, answering all patient questions, arranging and discussing plan of care with patient as well as directly providing post-discharge instructions  Additional time then spent on discharge activities  Discharge Medications:  See after visit summary for reconciled discharge medications provided to patient and family        ** Please Note: This note has been constructed using a voice recognition system **

## 2022-08-16 NOTE — ASSESSMENT & PLAN NOTE
· Clinically improved in the hospital with IV diuresis  · Will transition to Lasix 40 mg daily as needed for weight greater than 175 lb per Cardiology recommendation  · Potassium supplementation with Lasix  · Will continue home digoxin, metoprolol, lisinopril  · Outpatient follow-up with cardiology

## 2022-08-16 NOTE — PLAN OF CARE
Problem: Potential for Falls  Goal: Patient will remain free of falls  Description: INTERVENTIONS:  - Educate patient/family on patient safety including physical limitations  - Instruct patient to call for assistance with activity   - Consult OT/PT to assist with strengthening/mobility   - Keep Call bell within reach  - Keep bed low and locked with side rails adjusted as appropriate  - Keep care items and personal belongings within reach  - Initiate and maintain comfort rounds  - Make Fall Risk Sign visible to staff  - Offer Toileting every 2 Hours, in advance of need  - Initiate/Maintain bed alarm  - Obtain necessary fall risk management equipment: yellow socks applied, bed in lowest and locked position  - Apply yellow socks and bracelet for high fall risk patients  - Consider moving patient to room near nurses station  Outcome: Progressing     Problem: MOBILITY - ADULT  Goal: Maintain or return to baseline ADL function  Description: INTERVENTIONS:  -  Assess patient's ability to carry out ADLs; assess patient's baseline for ADL function and identify physical deficits which impact ability to perform ADLs (bathing, care of mouth/teeth, toileting, grooming, dressing, etc )  - Assess/evaluate cause of self-care deficits   - Assess range of motion  - Assess patient's mobility; develop plan if impaired  - Assess patient's need for assistive devices and provide as appropriate  - Encourage maximum independence but intervene and supervise when necessary  - Involve family in performance of ADLs  - Assess for home care needs following discharge   - Consider OT consult to assist with ADL evaluation and planning for discharge  - Provide patient education as appropriate  Outcome: Progressing  Goal: Maintains/Returns to pre admission functional level  Description: INTERVENTIONS:  - Perform BMAT or MOVE assessment daily    - Set and communicate daily mobility goal to care team and patient/family/caregiver     - Collaborate with rehabilitation services on mobility goals if consulted  - Perform Range of Motion 3 times a day  - Reposition patient every 2 hours    - Dangle patient 3 times a day  - Stand patient 3 times a day  - Ambulate patient 3 times a day  - Out of bed to chair 3 times a day   - Out of bed for meals 3 times a day  - Out of bed for toileting  - Record patient progress and toleration of activity level   Outcome: Progressing     Problem: SAFETY ADULT  Goal: Patient will remain free of falls  Description: INTERVENTIONS:  - Educate patient/family on patient safety including physical limitations  - Instruct patient to call for assistance with activity   - Consult OT/PT to assist with strengthening/mobility   - Keep Call bell within reach  - Keep bed low and locked with side rails adjusted as appropriate  - Keep care items and personal belongings within reach  - Initiate and maintain comfort rounds  - Make Fall Risk Sign visible to staff  - Offer Toileting every 2 Hours, in advance of need  - Initiate/Maintain bed alarm  - Obtain necessary fall risk management equipment: yellow socks applied, bed in lowest and locked position  - Apply yellow socks and bracelet for high fall risk patients  - Consider moving patient to room near nurses station  Outcome: Progressing  Goal: Maintain or return to baseline ADL function  Description: INTERVENTIONS:  -  Assess patient's ability to carry out ADLs; assess patient's baseline for ADL function and identify physical deficits which impact ability to perform ADLs (bathing, care of mouth/teeth, toileting, grooming, dressing, etc )  - Assess/evaluate cause of self-care deficits   - Assess range of motion  - Assess patient's mobility; develop plan if impaired  - Assess patient's need for assistive devices and provide as appropriate  - Encourage maximum independence but intervene and supervise when necessary  - Involve family in performance of ADLs  - Assess for home care needs following discharge - Consider OT consult to assist with ADL evaluation and planning for discharge  - Provide patient education as appropriate  Outcome: Progressing  Goal: Maintains/Returns to pre admission functional level  Description: INTERVENTIONS:  - Perform BMAT or MOVE assessment daily    - Set and communicate daily mobility goal to care team and patient/family/caregiver  - Collaborate with rehabilitation services on mobility goals if consulted  - Perform Range of Motion 3 times a day  - Reposition patient every 2 hours  - Dangle patient 3 times a day  - Stand patient 3 times a day  - Ambulate patient 3 times a day  - Out of bed to chair 3 times a day   - Out of bed for meals 3 times a day  - Out of bed for toileting  - Record patient progress and toleration of activity level   Outcome: Progressing     Problem: DISCHARGE PLANNING  Goal: Discharge to home or other facility with appropriate resources  Description: INTERVENTIONS:  - Identify barriers to discharge w/patient and caregiver  - Arrange for needed discharge resources and transportation as appropriate  - Identify discharge learning needs (meds, wound care, etc )  - Arrange for interpretive services to assist at discharge as needed  - Refer to Case Management Department for coordinating discharge planning if the patient needs post-hospital services based on physician/advanced practitioner order or complex needs related to functional status, cognitive ability, or social support system  Outcome: Progressing     Problem: Knowledge Deficit  Goal: Patient/family/caregiver demonstrates understanding of disease process, treatment plan, medications, and discharge instructions  Description: Complete learning assessment and assess knowledge base    Interventions:  - Provide teaching at level of understanding  - Provide teaching via preferred learning methods  Outcome: Progressing

## 2022-08-16 NOTE — ASSESSMENT & PLAN NOTE
· Patient with a known history of coronary artery disease-status post CABG x4 vessel  · Elevated troponins:-most likely a non MI related elevated troponinemia in the setting of having an acute exacerbation of congestive heart failure as outlined above  Troponin trend thus far 59, 311 with a delta differential of 252  · Patient denies any chest pain  · Arrival DEBBY score 6  · Continue cardiac based medications as outlined above  · Cardiology input appreciated  No acute intervention needed at this time    Continue outpatient follow-up

## 2022-08-16 NOTE — ASSESSMENT & PLAN NOTE
· Secondary to CHF exacerbation  · Patient is chronically on 2 L of nasal cannula  · Patient being transitioned to oral Lasix today    Outpatient follow-up with PCP/Cardiology

## 2022-08-17 NOTE — UTILIZATION REVIEW
Notification of Discharge   This is a Notification of Discharge from our facility [address]  Please be advised that this patient has been discharge from our facility  Below you will find the admission and discharge date and time including the patients disposition  UTILIZATION REVIEW CONTACT:  Anna Merritt  Utilization   Network Utilization Review Department  Phone: [phone] carefully listen to the prompts  All voicemails are confidential   Email: [email or web address]     PHYSICIAN ADVISORY SERVICES:  FOR [id number] REVIEW - MEDICAL NECESSITY DENIAL  Phone: [phone]  Fax: [phone]  Email: [email or web address]     PRESENTATION DATE: 8/14/2022  4:08 AM  OBERVATION ADMISSION DATE:   INPATIENT ADMISSION DATE: 8/14/22  6:10 AM   DISCHARGE DATE: 8/16/2022  4:44 PM  DISPOSITION: Home/Self Care Home/Self Care      IMPORTANT INFORMATION:  Send all requests for admission clinical reviews, approved or denied determinations and any other requests to dedicated fax number below belonging to the campus where the patient is receiving treatment   List of dedicated fax numbers:  [address] DENIALS (Administrative/Medical Necessity) [phone]   1000  [facility] (Maternity/NICU/Pediatrics) [phone]   [place] [facility] [phone]   130 [facility] [phone]   [address] [place] [address] [phone]   2000 [facility] [address] [place] [address],[address] [phone]   [facility]  [phone]   2205 [facility]  [address] [phone]

## 2022-08-24 ENCOUNTER — APPOINTMENT (OUTPATIENT)
Dept: LAB | Facility: CLINIC | Age: 77
End: 2022-08-24
Payer: COMMERCIAL

## 2022-08-24 ENCOUNTER — OFFICE VISIT (OUTPATIENT)
Dept: FAMILY MEDICINE CLINIC | Facility: CLINIC | Age: 77
End: 2022-08-24
Payer: COMMERCIAL

## 2022-08-24 VITALS
HEIGHT: 70 IN | HEART RATE: 62 BPM | SYSTOLIC BLOOD PRESSURE: 110 MMHG | OXYGEN SATURATION: 97 % | DIASTOLIC BLOOD PRESSURE: 64 MMHG | RESPIRATION RATE: 17 BRPM | BODY MASS INDEX: 25.48 KG/M2 | WEIGHT: 178 LBS | TEMPERATURE: 97.5 F

## 2022-08-24 DIAGNOSIS — I48.0 PAF (PAROXYSMAL ATRIAL FIBRILLATION) (HCC): ICD-10-CM

## 2022-08-24 DIAGNOSIS — I50.9 CONGESTIVE HEART FAILURE, UNSPECIFIED HF CHRONICITY, UNSPECIFIED HEART FAILURE TYPE (HCC): Primary | ICD-10-CM

## 2022-08-24 DIAGNOSIS — E11.9 TYPE 2 DIABETES MELLITUS WITHOUT COMPLICATION, WITHOUT LONG-TERM CURRENT USE OF INSULIN (HCC): ICD-10-CM

## 2022-08-24 DIAGNOSIS — I10 PRIMARY HYPERTENSION: ICD-10-CM

## 2022-08-24 DIAGNOSIS — J96.11 CHRONIC RESPIRATORY FAILURE WITH HYPOXIA (HCC): ICD-10-CM

## 2022-08-24 DIAGNOSIS — J44.9 CHRONIC OBSTRUCTIVE PULMONARY DISEASE, UNSPECIFIED COPD TYPE (HCC): ICD-10-CM

## 2022-08-24 DIAGNOSIS — I25.10 CORONARY ARTERY DISEASE INVOLVING NATIVE CORONARY ARTERY OF NATIVE HEART WITHOUT ANGINA PECTORIS: Chronic | ICD-10-CM

## 2022-08-24 LAB
ANION GAP SERPL CALCULATED.3IONS-SCNC: 4 MMOL/L (ref 4–13)
BASOPHILS # BLD AUTO: 0.07 THOUSANDS/ΜL (ref 0–0.1)
BASOPHILS NFR BLD AUTO: 1 % (ref 0–1)
BUN SERPL-MCNC: 22 MG/DL (ref 5–25)
CALCIUM SERPL-MCNC: 8.7 MG/DL (ref 8.3–10.1)
CHLORIDE SERPL-SCNC: 107 MMOL/L (ref 96–108)
CO2 SERPL-SCNC: 29 MMOL/L (ref 21–32)
CREAT SERPL-MCNC: 0.88 MG/DL (ref 0.6–1.3)
EOSINOPHIL # BLD AUTO: 0.22 THOUSAND/ΜL (ref 0–0.61)
EOSINOPHIL NFR BLD AUTO: 3 % (ref 0–6)
ERYTHROCYTE [DISTWIDTH] IN BLOOD BY AUTOMATED COUNT: 14 % (ref 11.6–15.1)
GFR SERPL CREATININE-BSD FRML MDRD: 83 ML/MIN/1.73SQ M
GLUCOSE P FAST SERPL-MCNC: 78 MG/DL (ref 65–99)
HCT VFR BLD AUTO: 35.5 % (ref 36.5–49.3)
HGB BLD-MCNC: 11.5 G/DL (ref 12–17)
IMM GRANULOCYTES # BLD AUTO: 0.05 THOUSAND/UL (ref 0–0.2)
IMM GRANULOCYTES NFR BLD AUTO: 1 % (ref 0–2)
LYMPHOCYTES # BLD AUTO: 1.29 THOUSANDS/ΜL (ref 0.6–4.47)
LYMPHOCYTES NFR BLD AUTO: 15 % (ref 14–44)
MCH RBC QN AUTO: 29.7 PG (ref 26.8–34.3)
MCHC RBC AUTO-ENTMCNC: 32.4 G/DL (ref 31.4–37.4)
MCV RBC AUTO: 92 FL (ref 82–98)
MONOCYTES # BLD AUTO: 0.68 THOUSAND/ΜL (ref 0.17–1.22)
MONOCYTES NFR BLD AUTO: 8 % (ref 4–12)
NEUTROPHILS # BLD AUTO: 6.22 THOUSANDS/ΜL (ref 1.85–7.62)
NEUTS SEG NFR BLD AUTO: 72 % (ref 43–75)
NRBC BLD AUTO-RTO: 0 /100 WBCS
PLATELET # BLD AUTO: 256 THOUSANDS/UL (ref 149–390)
PMV BLD AUTO: 11.8 FL (ref 8.9–12.7)
POTASSIUM SERPL-SCNC: 3.9 MMOL/L (ref 3.5–5.3)
RBC # BLD AUTO: 3.87 MILLION/UL (ref 3.88–5.62)
SODIUM SERPL-SCNC: 140 MMOL/L (ref 135–147)
WBC # BLD AUTO: 8.53 THOUSAND/UL (ref 4.31–10.16)

## 2022-08-24 PROCEDURE — 1111F DSCHRG MED/CURRENT MED MERGE: CPT | Performed by: FAMILY MEDICINE

## 2022-08-24 PROCEDURE — 80048 BASIC METABOLIC PNL TOTAL CA: CPT | Performed by: NURSE PRACTITIONER

## 2022-08-24 PROCEDURE — 85025 COMPLETE CBC W/AUTO DIFF WBC: CPT

## 2022-08-24 PROCEDURE — 36415 COLL VENOUS BLD VENIPUNCTURE: CPT | Performed by: NURSE PRACTITIONER

## 2022-08-24 PROCEDURE — 99495 TRANSJ CARE MGMT MOD F2F 14D: CPT | Performed by: FAMILY MEDICINE

## 2022-08-24 NOTE — PROGRESS NOTES
Zofia Manning 1945 male MRN: [id number]    Family Medicine Transition of Care Visit      SUBJECTIVE    CC: YARI VARGAS Visit     Transitional Care Management Review:  Zofia Manning is a 69 y o  male here for TCM follow up  Admitted 8/14-8/16 for acute on chronic respiratory failure  Hospital course per discharge summary as below:    "   Acute on chronic combined systolic and diastolic congestive heart failure ([facility] )  Assessment & Plan  · Clinically improved in the hospital with IV diuresis  · Will transition to Lasix 40 mg daily as needed for weight greater than 175 lb per Cardiology recommendation  · Potassium supplementation with Lasix  · Will continue home digoxin, metoprolol, lisinopril  · Outpatient follow-up with cardiology     * Acute on chronic respiratory failure with hypoxia ([facility] )  Assessment & Plan  · Secondary to CHF exacerbation  · Patient is chronically on 2 L of nasal cannula  · Patient being transitioned to oral Lasix today    Outpatient follow-up with PCP/Cardiology     Increased anion gap metabolic acidosis  Assessment & Plan  · resolved     Elevated d-dimer  Assessment & Plan  · CTA chest PE study negative for PE  · No further workup at this time     Atrial fibrillation with rapid ventricular response (HCC)  Assessment & Plan  · Patient was noted to have rapid rates prior to admission which improved with Cardizem  · Currently rate controlled  · Continue digoxin, and metoprolol  · Not a good candidate for long-term anticoagulation  · Continue aspirin only for now  · Cardiology following     Primary hypertension  Assessment & Plan  · Blood pressure is controlled  · Continue current medications     COPD (chronic obstructive pulmonary disease) ([facility] )  Assessment & Plan  · Chronically on 2 L nasal cannula  · Currently without exacerbation  · Continue home medication     Benign essential tremor  Assessment & Plan  · Continue primidone     Coronary artery disease involving native coronary artery of native heart without angina pectoris  Assessment & Plan  · Patient with a known history of coronary artery disease-status post CABG x4 vessel  · Elevated troponins:-most likely a non MI related elevated troponinemia in the setting of having an acute exacerbation of congestive heart failure as outlined above  Troponin trend thus far 59, 311 with a delta differential of 252  · Patient denies any chest pain  · Arrival DEBBY score 6  · Continue cardiac based medications as outlined above  · Cardiology input appreciated  No acute intervention needed at this time  Continue outpatient follow-up     Type 2 diabetes mellitus without complication, without long-term current use of insulin ([facility] )  Assessment & Plan  · Continue home diabetic regimen  · Diabetic neuropathy:-continue gabapentin"    "Zofia Manning is a 69 y o  male patient who originally presented to the hospital on 8/14/2022 due to shortness of breath  Patient found have fluid overload secondary to combined diastolic/systolic heart failure exacerbation  Patient was started on IV diuresis  Cardiology was consulted  Troponins were elevated however likely secondary to CHF exacerbation, no acute intervention needed per Cardiology  Patient diuresed well and was clinically back to baseline  Cleared by Cardiology to be discharged home on oral Lasix  Patient will follow-up with cardiology as outpatient  Advised return to the ER if he had any worsening symptoms "    During the TCM phone call patient stated:    TCM Call     Date and time call was made  8/16/2022  1:44 PM    Patient was hospitialized at  [address]    Date of Admission  08/14/22    Date of discharge  08/16/22    Diagnosis  Acute on chrnic respiratory failure with hypoxia    Disposition  Home    Current Symptoms  None    Cough Severity  Severe    Shortness of breath severity  Severe      TCM Call     Post hospital issues  None    Scheduled for follow up?   Yes    Did you obtain your prescribed medications  Yes    Do you need help managing your prescriptions or medications  No    Is transportation to your appointment needed  No    Specify why  does not drive    I have advised the patient to call PCP with any new or worsening symptoms  Yesi HENRY    Living Arrangements  Spouse or Significiant other; Family members    Support System  Family; Spouse    The type of support provided  Emotional; Physical; Other (comment)    Do you have social support  Yes, as much as I need          During today's visit:    Cardiology appointment 9/12, pulmonology appointment 8/29  He reports he is doing well, breathing is back to baseline  Leg swelling is better  Denies any chest pains or palpitations  Admits to eating salty foods, [place] and pizza for dinner this week  Not monitoring weight at home, no Lasix use  They are complying with their medication changes and discharge instructions  They have the following questions: As above     Review of Systems   All other systems reviewed and are negative  Historical Information     The patient history was reviewed as follows:    Past Medical History:   Diagnosis Date    BPH (benign prostatic hyperplasia)     45 days radiation treatment    COPD (chronic obstructive pulmonary disease)     Coronary artery disease     CABG x4 in 2017    Diabetes mellitus     History of Arterial Duplex of LE 12/26/2017    Likely occlusion of the left superficial femoral artery  Calcific changes bilaterally  Despite these changes, the ankle-brachial index as a measure of peripheral blood flow only mildly impaired      History of echocardiogram 06/12/2017    EF 40%, mild LVH, mild MR     Hyperlipidemia     Hypertension     Ischemic cardiomyopathy     NSTEMI (non-ST elevated myocardial infarction)     Prostate cancer     prostate     PVD (peripheral vascular disease)     Sepsis due to pneumonia     Tremor     Type 2 MI (myocardial infarction) ([facility] ) 04/06/2021     Past Surgical History:   Procedure Laterality Date    CARDIAC CATHETERIZATION  2017    Significant left main plus triple-vessel CAD   CORONARY ARTERY BYPASS GRAFT  2017    4V CABG:  LIMA to LAD, VG to RI, SVG to PDA to LVBR RCA      EYE SURGERY      shots in eye once a month @ the [place]    PROSTATE BIOPSY       Family History   Problem Relation Age of Onset    Cancer Father     Heart disease Brother       Social History   Social History     Substance and Sexual Activity   Alcohol Use Yes     Social History     Substance and Sexual Activity   Drug Use Never     Social History     Tobacco Use   Smoking Status Former Smoker    Packs/day: 0 25    Years: 60 00    Pack years: 15 00    Types: Cigarettes    Quit date: 2022    Years since quittin 3   Smokeless Tobacco Never Used       Medications:   Meds/Allergies     Current Outpatient Medications:     albuterol (PROVENTIL HFA,VENTOLIN HFA) 90 mcg/act inhaler, Inhale 2 puffs every 6 (six) hours as needed for wheezing or shortness of breath, Disp: , Rfl: 0    arformoterol (BROVANA) 15 mcg/2 mL nebulizer solution, Take 2 mL (15 mcg total) by nebulization 2 (two) times a day, Disp: 120 mL, Rfl: 5    aspirin (ECOTRIN LOW STRENGTH) 81 mg EC tablet, Take 1 tablet (81 mg total) by mouth every other day, Disp:  , Rfl: 0    budesonide (Pulmicort) 0 5 mg/2 mL nebulizer solution, Take 2 mL (0 5 mg total) by nebulization 2 (two) times a day Rinse mouth after use , Disp: 120 mL, Rfl: 5    cyanocobalamin (VITAMIN B-12) 500 MCG tablet, TAKE ONE TABLET BY MOUTH EVERY MORNING *VITAMIN B12*, Disp: , Rfl:     digoxin (LANOXIN) 0 125 mg tablet, Take 1 tablet (125 mcg total) by mouth daily, Disp: 30 tablet, Rfl: 5    furosemide (LASIX) 40 mg tablet, Take 1 tablet (40 mg total) by mouth daily Take once daily for weight greater than 175 lbs, Disp: 30 tablet, Rfl: 0    gabapentin (NEURONTIN) 300 mg capsule, Take 1 capsule (300 mg total) by mouth daily at bedtime, Disp: 90 capsule, Rfl: 1    glimepiride (AMARYL) 1 mg tablet, Take 2 tablets (2 mg total) by mouth daily with breakfast AND 1 tablet (1 mg total) daily with dinner , Disp: , Rfl: 0    ipratropium (ATROVENT) 0 02 % nebulizer solution, Take 2 5 mL (0 5 mg total) by nebulization 3 (three) times a day, Disp: 225 mL, Rfl: 5    lisinopril (ZESTRIL) 20 mg tablet, Take 1 tablet (20 mg total) by mouth daily, Disp: 90 tablet, Rfl: 1    metFORMIN (GLUCOPHAGE) 500 mg tablet, Take 1 tablet (500 mg total) by mouth 2 (two) times a day with meals, Disp: 180 tablet, Rfl: 3    metoprolol tartrate (LOPRESSOR) 25 mg tablet, Take 1 tablet (25 mg total) by mouth every 12 (twelve) hours, Disp: 60 tablet, Rfl: 5    Multiple Vitamins-Minerals (PRESERVISION AREDS 2 PO), Take by mouth, Disp: , Rfl:     potassium chloride (MICRO-K) 10 MEQ CR capsule, Take 2 capsules (20 mEq total) by mouth daily Take with Lasix  If Lasix is not being taken, do not take potassium, Disp: 60 capsule, Rfl: 0    primidone (MYSOLINE) 50 mg tablet, Take 100 mg by mouth 2 (two) times a day , Disp: , Rfl:     roflumilast (DALIRESP) 250 MCG tablet, Take 1 tablet (250 mcg total) by mouth daily, Disp: 28 tablet, Rfl: 0    rosuvastatin (CRESTOR) 10 MG tablet, Take 1 tablet (10 mg total) by mouth daily, Disp: 90 tablet, Rfl: 3    terbinafine (LamISIL) 1 % cream, APPLY THIN LAYER TOPICALLY TWICE A DAY FOR FUNGAL INFECTION, Disp: , Rfl:   Allergies   Allergen Reactions    Atorvastatin Myalgia       OBJECTIVE    Vitals:   Vitals:    08/24/22 1101   BP: 110/64   Pulse: 62   Resp: 17   Temp: 97 5 °F (36 4 °C)   SpO2: 97%   Weight: 80 7 kg (178 lb)   Height: 5' 10" (1 778 m)       Body mass index is 25 54 kg/m²  Physical Exam:    Physical Exam  Vitals reviewed  Constitutional:       General: He is not in acute distress  Appearance: Normal appearance  He is not ill-appearing, toxic-appearing or diaphoretic  HENT:      Head: Normocephalic and atraumatic  Eyes:      General:         Right eye: No discharge  Left eye: No discharge  Extraocular Movements: Extraocular movements intact  Conjunctiva/sclera: Conjunctivae normal    Cardiovascular:      Rate and Rhythm: Normal rate and regular rhythm  Heart sounds: Normal heart sounds  No murmur heard  No friction rub  No gallop  Pulmonary:      Effort: Pulmonary effort is normal  No respiratory distress  Breath sounds: No stridor  No wheezing or rhonchi  Comments: Bibasilar crackles   Musculoskeletal:         General: No swelling, tenderness or signs of injury  Right lower leg: Edema present  Left lower leg: Edema present  Comments: Trace pitting edema R>L   Skin:     General: Skin is warm  Coloration: Skin is not pale  Findings: No erythema or rash  Neurological:      Mental Status: He is alert and oriented to person, place, and time  Motor: No weakness  Psychiatric:         Mood and Affect: Mood normal          Behavior: Behavior normal           Labs: I have personally reviewed all pertinent results       Admission on 08/14/2022, Discharged on 08/16/2022   Component Date Value Ref Range Status    WBC 08/14/2022 11 77 (A) 4 31 - 10 16 Thousand/uL Final    RBC 08/14/2022 4 00  3 88 - 5 62 Million/uL Final    Hemoglobin 08/14/2022 12 1  12 0 - 17 0 g/dL Final    Hematocrit 08/14/2022 37 9  36 5 - 49 3 % Final    MCV 08/14/2022 95  82 - 98 fL Final    MCH 08/14/2022 30 3  26 8 - 34 3 pg Final    MCHC 08/14/2022 31 9  31 4 - 37 4 g/dL Final    RDW 08/14/2022 14 5  11 6 - 15 1 % Final    MPV 08/14/2022 11 7  8 9 - 12 7 fL Final    Platelets 29/13/7919 171  149 - 390 Thousands/uL Final    Sodium 08/14/2022 137  135 - 147 mmol/L Final    Potassium 08/14/2022 4 1  3 5 - 5 3 mmol/L Final    Chloride 08/14/2022 99  96 - 108 mmol/L Final    CO2 08/14/2022 23  21 - 32 mmol/L Final    ANION GAP 08/14/2022 15 (A) 4 - 13 mmol/L Final    BUN 08/14/2022 20  5 - 25 mg/dL Final    Creatinine 08/14/2022 0 89  0 60 - 1 30 mg/dL Final    Standardized to IDMS reference method    Glucose 08/14/2022 207 (A) 65 - 140 mg/dL Final    If the patient is fasting, the ADA then defines impaired fasting glucose as > 100 mg/dL and diabetes as > or equal to 123 mg/dL  Specimen collection should occur prior to Sulfasalazine administration due to the potential for falsely depressed results  Specimen collection should occur prior to Sulfapyridine administration due to the potential for falsely elevated results   Calcium 08/14/2022 9 0  8 4 - 10 2 mg/dL Final    AST 08/14/2022 15  13 - 39 U/L Final    Slightly Hemolyzed:Results may be affected  Specimen collection should occur prior to Sulfasalazine administration due to the potential for falsely depressed results   ALT 08/14/2022 6 (A) 7 - 52 U/L Final    Specimen collection should occur prior to Sulfasalazine administration due to the potential for falsely depressed results   Alkaline Phosphatase 08/14/2022 96  34 - 104 U/L Final    Total Protein 08/14/2022 6 6  6 4 - 8 4 g/dL Final    Albumin 08/14/2022 4 1  3 5 - 5 0 g/dL Final    Total Bilirubin 08/14/2022 0 61  0 20 - 1 00 mg/dL Final    eGFR 08/14/2022 83  ml/min/1 73sq m Final    BNP 08/14/2022 784 (A) 0 - 100 pg/mL Final    hs TnI 0hr 08/14/2022 59 (A) "Refer to ACS Flowchart"- see link ng/L Final    Comment:                                              Initial (time 0) result  If >=50 ng/L, Myocardial injury suggested ;  Type of myocardial injury and treatment strategy  to be determined  If 5-49 ng/L, a delta result at 2 hours and or 4 hours will be needed to further evaluate  If <4 ng/L, and chest pain has been >3 hours since onset, patient may qualify for discharge based on the HEART score in the ED  If <5 ng/L and <3hours since onset of chest pain, a delta result at 2 hours will be needed to further evaluate      HS Troponin 99th Percentile URL of a Health Population=12 ng/L with a 95% Confidence Interval of 8-18 ng/L  Second Troponin (time 2 hours)  If calculated delta >= 20 ng/L,  Myocardial injury suggested ; Type of myocardial injury and treatment strategy to be determined  If 5-49 ng/L and the calculated delta is 5-19 ng/L, consult medical service for evaluation  Continue evaluation for ischemia on ecg and other possible etiology and repeat hs troponin at 4 hours  If delta                            is <5 ng/L at 2 hours, consider discharge based on risk stratification via the HEART score (if in ED), or DEBBY risk score in IP/Observation  HS Troponin 99th Percentile URL of a Health Population=12 ng/L with a 95% Confidence Interval of 8-18 ng/L   D-Dimer, Quant 08/14/2022 2 33 (A) <0 50 ug/ml FEU Final    Reference and upper limits to exclude DVT and PE are the same  Do not use to exclude if clinical symptoms are present   SARS-CoV-2 08/14/2022 Negative  Negative Final    INFLUENZA A PCR 08/14/2022 Negative  Negative Final    INFLUENZA B PCR 08/14/2022 Negative  Negative Final    RSV PCR 08/14/2022 Negative  Negative Final    pH, Daniel 08/14/2022 7 415 (A) 7 300 - 7 400 Final    pCO2, Daniel 08/14/2022 39 3 (A) 42 0 - 50 0 mm Hg Final    pO2, Daniel 08/14/2022 55 7 (A) 35 0 - 45 0 mm Hg Final    HCO3, Daniel 08/14/2022 24 6  24 - 30 mmol/L Final    Base Excess, Daniel 08/14/2022 0 2  mmol/L Final    O2 Content, Daniel 08/14/2022 15 8  ml/dL Final    O2 HGB, VENOUS 08/14/2022 86 9 (A) 60 0 - 80 0 % Final    hs TnI 2hr 08/14/2022 311 (A) "Refer to ACS Flowchart"- see link ng/L Final    Comment:                                              Initial (time 0) result  If >=50 ng/L, Myocardial injury suggested ;  Type of myocardial injury and treatment strategy  to be determined  If 5-49 ng/L, a delta result at 2 hours and or 4 hours will be needed to further evaluate    If <4 ng/L, and chest pain has been >3 hours since onset, patient may qualify for discharge based on the HEART score in the ED  If <5 ng/L and <3hours since onset of chest pain, a delta result at 2 hours will be needed to further evaluate  HS Troponin 99th Percentile URL of a Health Population=12 ng/L with a 95% Confidence Interval of 8-18 ng/L  Second Troponin (time 2 hours)  If calculated delta >= 20 ng/L,  Myocardial injury suggested ; Type of myocardial injury and treatment strategy to be determined  If 5-49 ng/L and the calculated delta is 5-19 ng/L, consult medical service for evaluation  Continue evaluation for ischemia on ecg and other possible etiology and repeat hs troponin at 4 hours  If delta                            is <5 ng/L at 2 hours, consider discharge based on risk stratification via the HEART score (if in ED), or DEBBY risk score in IP/Observation  HS Troponin 99th Percentile URL of a Health Population=12 ng/L with a 95% Confidence Interval of 8-18 ng/L      Delta 2hr hsTnI 08/14/2022 252 (A) <20 ng/L Final    Segmented % 08/14/2022 90 (A) 43 - 75 % Final    Lymphocytes % 08/14/2022 7 (A) 14 - 44 % Final    Monocytes % 08/14/2022 3 (A) 4 - 12 % Final    Eosinophils, % 08/14/2022 0  0 - 6 % Final    Basophils % 08/14/2022 0  0 - 1 % Final    Absolute Neutrophils 08/14/2022 10 59 (A) 1 85 - 7 62 Thousand/uL Final    Lymphocytes Absolute 08/14/2022 0 82  0 60 - 4 47 Thousand/uL Final    Monocytes Absolute 08/14/2022 0 35  0 00 - 1 22 Thousand/uL Final    Eosinophils Absolute 08/14/2022 0 00  0 00 - 0 40 Thousand/uL Final    Basophils Absolute 08/14/2022 0 00  0 00 - 0 10 Thousand/uL Final    RBC Morphology 08/14/2022 Normal   Final    Platelet Estimate 70/71/7755 Adequate  Adequate Final    hs TnI 4hr 08/14/2022 371 (A) "Refer to ACS Flowchart"- see link ng/L Final    Comment:                                              Initial (time 0) result  If >=50 ng/L, Myocardial injury suggested ;  Type of myocardial injury and treatment strategy  to be determined  If 5-49 ng/L, a delta result at 2 hours and or 4 hours will be needed to further evaluate  If <4 ng/L, and chest pain has been >3 hours since onset, patient may qualify for discharge based on the HEART score in the ED  If <5 ng/L and <3hours since onset of chest pain, a delta result at 2 hours will be needed to further evaluate  HS Troponin 99th Percentile URL of a Health Population=12 ng/L with a 95% Confidence Interval of 8-18 ng/L  Second Troponin (time 2 hours)  If calculated delta >= 20 ng/L,  Myocardial injury suggested ; Type of myocardial injury and treatment strategy to be determined  If 5-49 ng/L and the calculated delta is 5-19 ng/L, consult medical service for evaluation  Continue evaluation for ischemia on ecg and other possible etiology and repeat hs troponin at 4 hours  If delta                            is <5 ng/L at 2 hours, consider discharge based on risk stratification via the HEART score (if in ED), or DEBBY risk score in IP/Observation  HS Troponin 99th Percentile URL of a Health Population=12 ng/L with a 95% Confidence Interval of 8-18 ng/L   Delta 4hr hsTnI 08/14/2022 312 (A) <20 ng/L Final    Hemoglobin A1C 08/14/2022 5 7 (A) Normal 3 8-5 6%; PreDiabetic 5 7-6 4%;  Diabetic >=6 5%; Glycemic control for adults with diabetes <7 0% % Final    EAG 08/14/2022 117  mg/dl Final    POC Glucose 08/14/2022 219 (A) 65 - 140 mg/dl Final    Ventricular Rate 08/14/2022 115  BPM Final    Atrial Rate 08/14/2022 115  BPM Final    [place] Interval 08/14/2022 174  ms Final    QRSD Interval 08/14/2022 100  ms Final    QT Interval 08/14/2022 344  ms Final    QTC Interval 08/14/2022 475  ms Final    P Axis 08/14/2022 70  degrees Final    QRS Axis 08/14/2022 18  degrees Final    T Wave [place] 08/14/2022 129  degrees Final    BETA-HYDROXYBUTYRATE 08/14/2022 2 3 (A) <0 6 mmol/L Final    POC Glucose 08/14/2022 283 (A) 65 - 140 mg/dl Final    Sodium 08/14/2022 137  135 - 147 mmol/L Final    Potassium 08/14/2022 3 4 (A) 3 5 - 5 3 mmol/L Final    Chloride 08/14/2022 96  96 - 108 mmol/L Final    CO2 08/14/2022 30  21 - 32 mmol/L Final    ANION GAP 08/14/2022 11  4 - 13 mmol/L Final    BUN 08/14/2022 20  5 - 25 mg/dL Final    Creatinine 08/14/2022 0 84  0 60 - 1 30 mg/dL Final    Standardized to IDMS reference method    Glucose 08/14/2022 257 (A) 65 - 140 mg/dL Final    If the patient is fasting, the ADA then defines impaired fasting glucose as > 100 mg/dL and diabetes as > or equal to 123 mg/dL  Specimen collection should occur prior to Sulfasalazine administration due to the potential for falsely depressed results  Specimen collection should occur prior to Sulfapyridine administration due to the potential for falsely elevated results   Calcium 08/14/2022 8 4  8 4 - 10 2 mg/dL Final    eGFR 08/14/2022 85  ml/min/1 73sq m Final    POC Glucose 08/14/2022 45 (A) 65 - 140 mg/dl Final    CRITICAL VALUE NOTED    POC Glucose 08/14/2022 305 (A) 65 - 140 mg/dl Final    Sodium 08/15/2022 138  135 - 147 mmol/L Final    Potassium 08/15/2022 3 1 (A) 3 5 - 5 3 mmol/L Final    Chloride 08/15/2022 98  96 - 108 mmol/L Final    CO2 08/15/2022 32  21 - 32 mmol/L Final    ANION GAP 08/15/2022 8  4 - 13 mmol/L Final    BUN 08/15/2022 22  5 - 25 mg/dL Final    Creatinine 08/15/2022 0 82  0 60 - 1 30 mg/dL Final    Standardized to IDMS reference method    Glucose 08/15/2022 137  65 - 140 mg/dL Final    If the patient is fasting, the ADA then defines impaired fasting glucose as > 100 mg/dL and diabetes as > or equal to 123 mg/dL  Specimen collection should occur prior to Sulfasalazine administration due to the potential for falsely depressed results  Specimen collection should occur prior to Sulfapyridine administration due to the potential for falsely elevated results      Calcium 08/15/2022 8 5  8 4 - 10 2 mg/dL Final    AST 08/15/2022 6 (A) 13 - 39 U/L Final    Specimen collection should occur prior to Sulfasalazine administration due to the potential for falsely depressed results   ALT 08/15/2022 3 (A) 7 - 52 U/L Final    Specimen collection should occur prior to Sulfasalazine administration due to the potential for falsely depressed results       Alkaline Phosphatase 08/15/2022 79  34 - 104 U/L Final    Total Protein 08/15/2022 5 8 (A) 6 4 - 8 4 g/dL Final    Albumin 08/15/2022 3 6  3 5 - 5 0 g/dL Final    Total Bilirubin 08/15/2022 0 34  0 20 - 1 00 mg/dL Final    eGFR 08/15/2022 85  ml/min/1 73sq m Final    WBC 08/15/2022 8 21  4 31 - 10 16 Thousand/uL Final    RBC 08/15/2022 3 83 (A) 3 88 - 5 62 Million/uL Final    Hemoglobin 08/15/2022 11 5 (A) 12 0 - 17 0 g/dL Final    Hematocrit 08/15/2022 35 0 (A) 36 5 - 49 3 % Final    MCV 08/15/2022 91  82 - 98 fL Final    MCH 08/15/2022 30 0  26 8 - 34 3 pg Final    MCHC 08/15/2022 32 9  31 4 - 37 4 g/dL Final    RDW 08/15/2022 14 1  11 6 - 15 1 % Final    MPV 08/15/2022 11 1  8 9 - 12 7 fL Final    Platelets 33/36/4119 186  149 - 390 Thousands/uL Final    nRBC 08/15/2022 0  /100 WBCs Final    Neutrophils Relative 08/15/2022 68  43 - 75 % Final    Immat GRANS % 08/15/2022 0  0 - 2 % Final    Lymphocytes Relative 08/15/2022 18  14 - 44 % Final    Monocytes Relative 08/15/2022 11  4 - 12 % Final    Eosinophils Relative 08/15/2022 2  0 - 6 % Final    Basophils Relative 08/15/2022 1  0 - 1 % Final    Neutrophils Absolute 08/15/2022 5 60  1 85 - 7 62 Thousands/µL Final    Immature Grans Absolute 08/15/2022 0 03  0 00 - 0 20 Thousand/uL Final    Lymphocytes Absolute 08/15/2022 1 46  0 60 - 4 47 Thousands/µL Final    Monocytes Absolute 08/15/2022 0 87  0 17 - 1 22 Thousand/µL Final    Eosinophils Absolute 08/15/2022 0 20  0 00 - 0 61 Thousand/µL Final    Basophils Absolute 08/15/2022 0 05  0 00 - 0 10 Thousands/µL Final    Magnesium 08/15/2022 1 9  1 9 - 2 7 mg/dL Final    Phosphorus 08/15/2022 3 1  2 3 - 4 1 mg/dL Final    POC Glucose 08/14/2022 149 (A) 65 - 140 mg/dl Final    Ventricular Rate 08/14/2022 69  BPM Final    Atrial Rate 08/14/2022 69  BPM Final    [place] Interval 08/14/2022 198  ms Final    QRSD Interval 08/14/2022 104  ms Final    QT Interval 08/14/2022 496  ms Final    QTC Interval 08/14/2022 531  ms Final    P Axis 08/14/2022 68  degrees Final    QRS Axis 08/14/2022 30  degrees Final    T Wave [place] 08/14/2022 87  degrees Final    POC Glucose 08/15/2022 205 (A) 65 - 140 mg/dl Final    POC Glucose 08/15/2022 176 (A) 65 - 140 mg/dl Final    POC Glucose 08/15/2022 197 (A) 65 - 140 mg/dl Final    POC Glucose 08/15/2022 150 (A) 65 - 140 mg/dl Final    WBC 08/16/2022 6 81  4 31 - 10 16 Thousand/uL Final    RBC 08/16/2022 3 80 (A) 3 88 - 5 62 Million/uL Final    Hemoglobin 08/16/2022 11 0 (A) 12 0 - 17 0 g/dL Final    Hematocrit 08/16/2022 34 9 (A) 36 5 - 49 3 % Final    MCV 08/16/2022 92  82 - 98 fL Final    MCH 08/16/2022 28 9  26 8 - 34 3 pg Final    MCHC 08/16/2022 31 5  31 4 - 37 4 g/dL Final    RDW 08/16/2022 14 1  11 6 - 15 1 % Final    Platelets 82/32/5951 202  149 - 390 Thousands/uL Final    MPV 08/16/2022 11 1  8 9 - 12 7 fL Final    Sodium 08/16/2022 139  135 - 147 mmol/L Final    Potassium 08/16/2022 3 1 (A) 3 5 - 5 3 mmol/L Final    Chloride 08/16/2022 97  96 - 108 mmol/L Final    CO2 08/16/2022 34 (A) 21 - 32 mmol/L Final    ANION GAP 08/16/2022 8  4 - 13 mmol/L Final    BUN 08/16/2022 22  5 - 25 mg/dL Final    Creatinine 08/16/2022 0 78  0 60 - 1 30 mg/dL Final    Standardized to IDMS reference method    Glucose 08/16/2022 109  65 - 140 mg/dL Final    If the patient is fasting, the ADA then defines impaired fasting glucose as > 100 mg/dL and diabetes as > or equal to 123 mg/dL  Specimen collection should occur prior to Sulfasalazine administration due to the potential for falsely depressed results   Specimen collection should occur prior to Sulfapyridine administration due to the potential for falsely elevated results   Calcium 08/16/2022 8 6  8 4 - 10 2 mg/dL Final    eGFR 08/16/2022 87  ml/min/1 73sq m Final    POC Glucose 08/16/2022 108  65 - 140 mg/dl Final    POC Glucose 08/16/2022 231 (A) 65 - 140 mg/dl Final       Imaging:  I have personally reviewed all pertinent results  Assessment/Plan    No problem-specific Assessment & Plan notes found for this encounter  Konstantin Villasenor was seen today for transition of care management  Diagnoses and all orders for this visit:    Congestive heart failure, unspecified HF chronicity, unspecified heart failure type ([facility] )    Chronic respiratory failure with hypoxia ([facility] )  -     Basic metabolic panel; Future  -     CBC and differential; Future    Coronary artery disease involving native coronary artery of native heart without angina pectoris    Primary hypertension    Chronic obstructive pulmonary disease, unspecified COPD type ([facility])    PAF (paroxysmal atrial fibrillation) ([facility])    Type 2 diabetes mellitus without complication, without long-term current use of insulin ([facility])  -     metFORMIN (GLUCOPHAGE) 500 mg tablet; Take 1 tablet (500 mg total) by mouth 2 (two) times a day with meals    Other orders  -     Cancel: IRIS Diabetic eye exam      Counseled about salt intake, given weight gain 3 pounds 178 today, recommended to take Lasix today and monitor daily weights  Blood work ordered  ED precautions discussed       Future Appointments   Date Time Provider Sabine Oliver   8/29/2022  2:20 PM Dar Olivera MD [facility]   9/12/2022 10:40 AM Kamlesh Tapia MD  Cardio [facility]   10/21/2022  8:00 PM MI SLEEP ROOM 01 [facility] Sleep lab [facility]   10/24/2022  9:20 AM Kamlesh Tapia MD  Cardio [facility]   10/27/2022 10:30 AM DO GAUDENCIO Alaniz PC [facility]   12/22/2022  4:00 PM Wisam Hansen MD [place] Sleep Med [facility]   7/12/2023 11:00 AM CA VASCULAR 2 CA Car NI DO Zena Arteagacarryan [facility]

## 2022-08-29 ENCOUNTER — OFFICE VISIT (OUTPATIENT)
Dept: PULMONOLOGY | Facility: CLINIC | Age: 77
End: 2022-08-29
Payer: COMMERCIAL

## 2022-08-29 VITALS
BODY MASS INDEX: 25.22 KG/M2 | WEIGHT: 176.2 LBS | HEART RATE: 81 BPM | TEMPERATURE: 98.1 F | HEIGHT: 70 IN | RESPIRATION RATE: 18 BRPM | SYSTOLIC BLOOD PRESSURE: 120 MMHG | OXYGEN SATURATION: 93 % | DIASTOLIC BLOOD PRESSURE: 64 MMHG

## 2022-08-29 DIAGNOSIS — J41.1 MUCOPURULENT CHRONIC BRONCHITIS (HCC): ICD-10-CM

## 2022-08-29 DIAGNOSIS — J44.9 CHRONIC OBSTRUCTIVE PULMONARY DISEASE, UNSPECIFIED COPD TYPE (HCC): ICD-10-CM

## 2022-08-29 DIAGNOSIS — J96.11 CHRONIC RESPIRATORY FAILURE WITH HYPOXIA (HCC): Primary | ICD-10-CM

## 2022-08-29 PROCEDURE — 99214 OFFICE O/P EST MOD 30 MIN: CPT | Performed by: INTERNAL MEDICINE

## 2022-08-29 NOTE — PROGRESS NOTES
Pulmonary Follow Up Note   Ash Terry 69 y o  male MRN: [id number]  8/29/2022    Assessment:  COPD   · Advanced stage, gold stage IV D, last FEV1 of 22 %   · Last exacerbation/hospital in 07/2022   · Started PD4 inhibitor roflumilast 4 weeks ago  · Not a candidate for lung volume reduction/EBV, TLC is below 100%    Plan:   · Continue nebulized only treatment budesonide/Brovana q 12, Atrovent t i d    · No intolerance to roflumilast, increased to 500 mg daily     Chronic hypoxemic respiratory failure   · Due to advanced COPD/ischemic cardiomyopathy   · Supplemental oxygen 2 LPM  · Scheduled for sleep study in October    Former tobacco abuse   · Quit in 03/2022, 50 pack year   · No suspicious lesion on CT chest in 08/2022, next due for lung cancer screening in 08/2023    Return in about 3 months (around 11/29/2022)      History of Present Illness    Follow up for: COPD     Background:  69 y o  male with a h/o BPH, prostate cancer, COPD, CAD/CABG, combined systolic/diastolic CHF,DM2, HTN, PVD, active tobacco abuse, possible BERONICA      Hospitalized from 3/18, 2 3/21 for COPD exacerbation   Treated with IV steroids/Zithromax then discharged on Augmentin and a steroid taper   Also treated for sepsis, suspected DKA       03/23/2022 visit-noted exertional hypoxemia required 1 LPM   Switched to nebulized inhalers only Perforomist/budesonide and Atrovent   Added azithromycin   Reordered PFT for candidacy of LVR/EBV   Not interested in nicotine replacement      04/2022-hospitalized for acute respiratory failure, RLL community-acquired pneumonia   Required BiPAP, antibiotics, steroids and nebulized treatment      5/2022 [id number] chest x-ray pulmonary rehab referral   In-lab sleep study ordered    8/1/22 visit-started roflumilast 250 1 tablet for 28 days, continued on nebulized only treatment    8/14/22-hospitalized for acute hypoxic respiratory failure/treated for CHF exacerbation    Interval History  Since last discharge, feels back to baseline  Still very limited exercise capacity due to deconditioning/dyspnea  Reports that leg swelling is better  Using supplemental oxygen continuously  Still on nebulized only treatment  No intolerance, or diarrhea with the roflumilast 250      Review of Systems  As per hpi, all other systems reviewed and were negative    Studies:  Imaging and other studies: I have personally reviewed pertinent films in PACS     Low-dose CT chest 03/07/2022-no suspicious nodules, discoid scarring at the lingula      CXR 5/5/2022-improving lower lobes PNA     Pulmonary function testing:   PFT 09/05/2019-ratio 52%, FEV1 0 82 L/26%, FVC 1 58 L/37%     PFT 03/31/2022-Ratio 45%, FEV1 0 69 L/22%, FVC 1 52 L/37% TLC 88%, %, DLCO 43%   Significant BD response      6 minute walk test 3/20 03/2022-able to walk about 76 m with using a cane for 5 minutes   Lowest SpO2 at 87% on room air, required 1 LPM to end exercise with SpO2 of 94% on 1 LPM/24% FiO2, maximal heart rate 103     EKG, Pathology, and Other Studies: I have personally reviewed pertinent reports     TTE 10/12/2020-mildly dilated LV, EF 30%, moderate diffuse hypokinesis, grade 1 to diastolic dysfunction   TAPSE 1 82    Past medical, surgical, social and family histories reviewed  Medications/Allergies: Reviewed      Vitals: Blood pressure 120/64, pulse 81, temperature 98 1 °F (36 7 °C), resp  rate 18, height 5' 10" (1 778 m), weight 79 9 kg (176 lb 3 2 oz), SpO2 93 %  Body mass index is 25 28 kg/m²  Oxygen Therapy  SpO2: 93 % (on 2L)      Physical Exam  Body mass index is 25 28 kg/m²     Gen: not in acute distress, ill-appearing  Neck/Eyes: supple, PERRL, mild to moderate thoracic kyphosis  Ear: normal appearance, mild hearing impairment  Nose:  normal nasal mucosa, no drainage  Mouth:  unremarkable/normal appearance of lips, teeth and gums  Oropharynx: mucosa is moist, no focal lesions or erythema  Salivary glands: soft nontender  Chest: normal respiratory efforts, diminished/fine crackles basilar more on the right  CV:  Distant heart sounds, 1+ pitting above ankles edema  Abdomen: soft, non tender  Extremities:  No observed deformity   Skin: unremarkable  Neuro: AAO X3, no focal motor deficit        Labs:  Lab Results   Component Value Date    WBC 8 53 08/24/2022    HGB 11 5 (L) 08/24/2022    HCT 35 5 (L) 08/24/2022    MCV 92 08/24/2022     08/24/2022     Lab Results   Component Value Date    GLUCOSE 374 (H) 04/26/2022    CALCIUM 8 7 08/24/2022     (L) 05/28/2018    K 3 9 08/24/2022    CO2 29 08/24/2022     08/24/2022    BUN 22 08/24/2022    CREATININE 0 88 08/24/2022     No results found for: IGE  Lab Results   Component Value Date    ALT 3 (L) 08/15/2022    AST 6 (L) 08/15/2022    ALKPHOS 79 08/15/2022    BILITOT 0 5 05/28/2018           Portions of the record may have been created with voice recognition software  Occasional wrong word or "sound a like" substitutions may have occurred due to the inherent limitations of voice recognition software  Read the chart carefully and recognize, using context, where substitutions have occurred    [facility]

## 2022-09-12 ENCOUNTER — OFFICE VISIT (OUTPATIENT)
Dept: CARDIOLOGY CLINIC | Facility: CLINIC | Age: 77
End: 2022-09-12
Payer: COMMERCIAL

## 2022-09-12 VITALS
DIASTOLIC BLOOD PRESSURE: 66 MMHG | WEIGHT: 175 LBS | SYSTOLIC BLOOD PRESSURE: 124 MMHG | HEART RATE: 72 BPM | HEIGHT: 70 IN | BODY MASS INDEX: 25.05 KG/M2 | OXYGEN SATURATION: 94 %

## 2022-09-12 DIAGNOSIS — J96.11 CHRONIC RESPIRATORY FAILURE WITH HYPOXIA (HCC): ICD-10-CM

## 2022-09-12 DIAGNOSIS — I25.5 ISCHEMIC CARDIOMYOPATHY: ICD-10-CM

## 2022-09-12 DIAGNOSIS — I25.10 CORONARY ARTERY DISEASE INVOLVING NATIVE CORONARY ARTERY OF NATIVE HEART WITHOUT ANGINA PECTORIS: Primary | Chronic | ICD-10-CM

## 2022-09-12 PROCEDURE — 1111F DSCHRG MED/CURRENT MED MERGE: CPT | Performed by: INTERNAL MEDICINE

## 2022-09-12 PROCEDURE — 99214 OFFICE O/P EST MOD 30 MIN: CPT | Performed by: INTERNAL MEDICINE

## 2022-09-12 NOTE — PATIENT INSTRUCTIONS
Weigh yourself daily in the morning  Probably if the weight at home is below 175 you do not need Lasix at all on that date

## 2022-09-12 NOTE — PROGRESS NOTES
Patient ID: Sylvia Salcido is a 69 y o  male  Plan:      Ischemic cardiomyopathy  EF 30%  Pt is appropriately not interested in prophylactic ICD given  comorbidities    Coronary artery disease involving native coronary artery of native heart without angina pectoris  No current symptoms  Chronic respiratory failure with hypoxia (HCC)  Significant underlying lung disease  Also seeing our pulmonology service  Recent note reviewed  Follow up Plan/Other summary comments:  Return in April unless there are interval issues  We talked about trying to obtain daily weights so that diuretic use can be weight adjusted  HPI:  Patient is seen in follow-up today regarding the above above  Recent visits were reviewed  He is on chronic oxygen  He is also on a new medication for his underlying lung disease  No recent chest pain or chest pressure  To reiterate, he had 4 vessel CABG in March of 2017 with left internal mammary to the LAD, vein graft to the ramus, vein graft to posterior descending artery and the ventricular branch of the right coronary artery  He has had a moderate depression of ejection fraction since then  Biggest limitation has been dyspnea with repeat he admissions but BNP below 200  He seems to respond to steroids  Most recent or relevant cardiac/vascular testing:    Echo 8/2019  - EF 30-40%  Global dysfunction with severe hypokinesis of the apex  Mild MR  Echo 11/12/2020:  EF 30% with global dysfunction  Arterial duplex 12/2017 - likely occlusion of the left SFA  KEI mildly impaired          Past Surgical History:   Procedure Laterality Date    CARDIAC CATHETERIZATION  03/08/2017    Significant left main plus triple-vessel CAD   CORONARY ARTERY BYPASS GRAFT  03/08/2017    4V CABG:  LIMA to LAD, VG to RI, SVG to PDA to LVBR RCA      EYE SURGERY      shots in eye once a month @ the [address]:   Lab Results   Component Value Date     (L) 05/28/2018    K 3 9 08/24/2022    K 4 2 05/28/2018     08/24/2022    CL 98 05/28/2018    CO2 29 08/24/2022    CO2 28 04/26/2022    BUN 22 08/24/2022    BUN 12 05/28/2018    CREATININE 0 88 08/24/2022    CREATININE 0 83 05/28/2018    GLUCOSE 374 (H) 04/26/2022    EGFR 83 08/24/2022       Lipid Profile:   Lab Results   Component Value Date    TRIG 277 (H) 01/06/2022    HDL 49 01/06/2022         Review of Systems   10  point ROS  was otherwise non pertinent or negative except as per HPI or as below  Gait:  Slow  Objective:     /66   Pulse 72   Ht 5' 10" (1 778 m)   Wt 79 4 kg (175 lb)   SpO2 94%   BMI 25 11 kg/m²     PHYSICAL EXAM:    General:  Normal appearance in no distress  Eyes:  Anicteric  Oral mucosa:  Moist   Neck:  No JVD  Carotid upstrokes are brisk without bruits  No masses  Chest:  Decreased breath sounds throughout  Well healed midline sternotomy scar  Cardiac:  Normal PMI  Normal S1 and S2  No murmur gallop or rub  Abdomen:  Soft and nontender  No palpable organomegaly or aortic enlargement  Extremities:  No peripheral edema  Musculoskeletal:  Symmetric  Vascular:  Femoral popliteal and pedal pulses are absent  Neuro:  Grossly symmetric  Psych:  Alert and oriented x3          Current Outpatient Medications:     albuterol (PROVENTIL HFA,VENTOLIN HFA) 90 mcg/act inhaler, Inhale 2 puffs every 6 (six) hours as needed for wheezing or shortness of breath, Disp: , Rfl: 0    arformoterol (BROVANA) 15 mcg/2 mL nebulizer solution, Take 2 mL (15 mcg total) by nebulization 2 (two) times a day, Disp: 120 mL, Rfl: 5    aspirin (ECOTRIN LOW STRENGTH) 81 mg EC tablet, Take 1 tablet (81 mg total) by mouth every other day, Disp:  , Rfl: 0    budesonide (Pulmicort) 0 5 mg/2 mL nebulizer solution, Take 2 mL (0 5 mg total) by nebulization 2 (two) times a day Rinse mouth after use , Disp: 120 mL, Rfl: 5    cyanocobalamin (VITAMIN B-12) 500 MCG tablet, TAKE ONE TABLET BY MOUTH EVERY MORNING *VITAMIN B12*, Disp: , Rfl:     digoxin (LANOXIN) 0 125 mg tablet, Take 1 tablet (125 mcg total) by mouth daily, Disp: 30 tablet, Rfl: 5    furosemide (LASIX) 40 mg tablet, Take 1 tablet (40 mg total) by mouth daily Take once daily for weight greater than 175 lbs, Disp: 30 tablet, Rfl: 0    gabapentin (NEURONTIN) 300 mg capsule, Take 1 capsule (300 mg total) by mouth daily at bedtime, Disp: 90 capsule, Rfl: 1    glimepiride (AMARYL) 1 mg tablet, Take 2 tablets (2 mg total) by mouth daily with breakfast AND 1 tablet (1 mg total) daily with dinner , Disp: , Rfl: 0    ipratropium (ATROVENT) 0 02 % nebulizer solution, Take 2 5 mL (0 5 mg total) by nebulization 3 (three) times a day, Disp: 225 mL, Rfl: 5    lisinopril (ZESTRIL) 20 mg tablet, Take 1 tablet (20 mg total) by mouth daily, Disp: 90 tablet, Rfl: 1    metFORMIN (GLUCOPHAGE) 500 mg tablet, Take 1 tablet (500 mg total) by mouth 2 (two) times a day with meals, Disp: 180 tablet, Rfl: 3    metoprolol tartrate (LOPRESSOR) 25 mg tablet, Take 1 tablet (25 mg total) by mouth every 12 (twelve) hours, Disp: 60 tablet, Rfl: 5    Multiple Vitamins-Minerals (PRESERVISION AREDS 2 PO), Take by mouth, Disp: , Rfl:     potassium chloride (MICRO-K) 10 MEQ CR capsule, Take 2 capsules (20 mEq total) by mouth daily Take with Lasix    If Lasix is not being taken, do not take potassium, Disp: 60 capsule, Rfl: 0    primidone (MYSOLINE) 50 mg tablet, Take 100 mg by mouth 2 (two) times a day , Disp: , Rfl:     roflumilast (Daliresp) 500 mcg tablet, Take 1 tablet (500 mcg total) by mouth daily, Disp: 30 tablet, Rfl: 5    rosuvastatin (CRESTOR) 10 MG tablet, Take 1 tablet (10 mg total) by mouth daily, Disp: 90 tablet, Rfl: 3    terbinafine (LamISIL) 1 % cream, APPLY THIN LAYER TOPICALLY TWICE A DAY FOR FUNGAL INFECTION, Disp: , Rfl:   Allergies   Allergen Reactions    Atorvastatin Myalgia     Past Medical History:   Diagnosis Date    BPH (benign prostatic hyperplasia)     45 days radiation treatment    COPD (chronic obstructive pulmonary disease)     Coronary artery disease     CABG x4 in 2017    Diabetes mellitus     History of Arterial Duplex of LE 2017    Likely occlusion of the left superficial femoral artery  Calcific changes bilaterally  Despite these changes, the ankle-brachial index as a measure of peripheral blood flow only mildly impaired      History of echocardiogram 2017    EF 40%, mild LVH, mild MR     Hyperlipidemia     Hypertension     Ischemic cardiomyopathy     NSTEMI (non-ST elevated myocardial infarction)     Prostate cancer     prostate     PVD (peripheral vascular disease)     Sepsis due to pneumonia     Tremor     [facility] infarction[facility] 2021           Social History     Tobacco Use   Smoking Status Former Smoker    Packs/day: 0 25    Years: 60 00    Pack years: 15 00    Types: Cigarettes    Quit date: 2022    Years since quittin 4   Smokeless Tobacco Never Used

## 2022-09-19 ENCOUNTER — HOSPITAL ENCOUNTER (OUTPATIENT)
Dept: SLEEP CENTER | Facility: HOSPITAL | Age: 77
Discharge: HOME | DRG: 291 | End: 2022-09-19
Attending: INTERNAL MEDICINE
Payer: COMMERCIAL

## 2022-09-19 DIAGNOSIS — G47.19 EXCESSIVE DAYTIME SLEEPINESS: ICD-10-CM

## 2022-09-19 PROCEDURE — 95810 POLYSOM 6/> YRS 4/> PARAM: CPT | Performed by: INTERNAL MEDICINE

## 2022-09-19 PROCEDURE — 95810 POLYSOM 6/> YRS 4/> PARAM: CPT

## 2022-09-20 ENCOUNTER — HOSPITAL ENCOUNTER (INPATIENT)
Facility: HOSPITAL | Age: 77
LOS: 3 days | Discharge: HOME | DRG: 291 | End: 2022-09-23
Attending: EMERGENCY MEDICINE | Admitting: INTERNAL MEDICINE
Payer: COMMERCIAL

## 2022-09-20 ENCOUNTER — APPOINTMENT (OUTPATIENT)
Dept: NON INVASIVE DIAGNOSTICS | Facility: HOSPITAL | Age: 77
DRG: 291 | End: 2022-09-20
Payer: COMMERCIAL

## 2022-09-20 ENCOUNTER — APPOINTMENT (OUTPATIENT)
Dept: RADIOLOGY | Facility: HOSPITAL | Age: 77
DRG: 291 | End: 2022-09-20
Payer: COMMERCIAL

## 2022-09-20 DIAGNOSIS — I50.9 CONGESTIVE HEART FAILURE, UNSPECIFIED HF CHRONICITY, UNSPECIFIED HEART FAILURE TYPE (HCC): ICD-10-CM

## 2022-09-20 DIAGNOSIS — R06.00 DYSPNEA: ICD-10-CM

## 2022-09-20 DIAGNOSIS — I50.9 CHF (CONGESTIVE HEART FAILURE) (HCC): Primary | ICD-10-CM

## 2022-09-20 DIAGNOSIS — I50.43 ACUTE ON CHRONIC COMBINED SYSTOLIC AND DIASTOLIC CONGESTIVE HEART FAILURE (HCC): ICD-10-CM

## 2022-09-20 DIAGNOSIS — J96.21 ACUTE ON CHRONIC RESPIRATORY FAILURE WITH HYPOXIA (HCC): ICD-10-CM

## 2022-09-20 PROBLEM — R65.10 SIRS (SYSTEMIC INFLAMMATORY RESPONSE SYNDROME) (HCC): Status: ACTIVE | Noted: 2022-09-20

## 2022-09-20 LAB
2HR DELTA HS TROPONIN: 307 NG/L
4HR DELTA HS TROPONIN: 922 NG/L
ALBUMIN SERPL BCP-MCNC: 3.6 G/DL (ref 3.5–5)
ALP SERPL-CCNC: 118 U/L (ref 46–116)
ALT SERPL W P-5'-P-CCNC: 12 U/L (ref 12–78)
ANION GAP SERPL CALCULATED.3IONS-SCNC: 11 MMOL/L (ref 4–13)
AORTIC ROOT: 3.5 CM
APICAL FOUR CHAMBER EJECTION FRACTION: 19 %
APTT PPP: 33 SECONDS (ref 23–37)
ASCENDING AORTA: 3.5 CM
AST SERPL W P-5'-P-CCNC: 9 U/L (ref 5–45)
ATRIAL RATE: 139 BPM
AV LVOT MEAN GRADIENT: 2 MMHG
AV LVOT PEAK GRADIENT: 3 MMHG
BACTERIA UR QL AUTO: ABNORMAL /HPF
BASOPHILS # BLD AUTO: 0.05 THOUSANDS/ΜL (ref 0–0.1)
BASOPHILS # BLD AUTO: 0.07 THOUSANDS/ΜL (ref 0–0.1)
BASOPHILS NFR BLD AUTO: 1 % (ref 0–1)
BASOPHILS NFR BLD AUTO: 1 % (ref 0–1)
BILIRUB SERPL-MCNC: 0.31 MG/DL (ref 0.2–1)
BILIRUB UR QL STRIP: NEGATIVE
BUN SERPL-MCNC: 16 MG/DL (ref 5–25)
CALCIUM SERPL-MCNC: 8.6 MG/DL (ref 8.3–10.1)
CARDIAC TROPONIN I PNL SERPL HS: 1267 NG/L (ref 8–18)
CARDIAC TROPONIN I PNL SERPL HS: 344 NG/L
CARDIAC TROPONIN I PNL SERPL HS: 37 NG/L
CARDIAC TROPONIN I PNL SERPL HS: 959 NG/L
CHLORIDE SERPL-SCNC: 103 MMOL/L (ref 96–108)
CLARITY UR: CLEAR
CO2 SERPL-SCNC: 30 MMOL/L (ref 21–32)
COLOR UR: ABNORMAL
CREAT SERPL-MCNC: 0.91 MG/DL (ref 0.6–1.3)
D DIMER PPP FEU-MCNC: 0.56 UG/ML FEU
DIGOXIN SERPL-MCNC: 1.3 NG/ML (ref 0.8–2)
DOP CALC LVOT PEAK VEL VTI: 16.28 CM
DOP CALC LVOT PEAK VEL: 0.88 M/S
E WAVE DECELERATION TIME: 118 MS
EOSINOPHIL # BLD AUTO: 0.22 THOUSAND/ΜL (ref 0–0.61)
EOSINOPHIL # BLD AUTO: 0.3 THOUSAND/ΜL (ref 0–0.61)
EOSINOPHIL NFR BLD AUTO: 3 % (ref 0–6)
EOSINOPHIL NFR BLD AUTO: 3 % (ref 0–6)
ERYTHROCYTE [DISTWIDTH] IN BLOOD BY AUTOMATED COUNT: 14.2 % (ref 11.6–15.1)
ERYTHROCYTE [DISTWIDTH] IN BLOOD BY AUTOMATED COUNT: 14.2 % (ref 11.6–15.1)
FLUAV RNA RESP QL NAA+PROBE: NEGATIVE
FLUBV RNA RESP QL NAA+PROBE: NEGATIVE
FRACTIONAL SHORTENING: 11 % (ref 28–44)
GFR SERPL CREATININE-BSD FRML MDRD: 81 ML/MIN/1.73SQ M
GLUCOSE SERPL-MCNC: 110 MG/DL (ref 65–140)
GLUCOSE SERPL-MCNC: 159 MG/DL (ref 65–140)
GLUCOSE SERPL-MCNC: 165 MG/DL (ref 65–140)
GLUCOSE SERPL-MCNC: 92 MG/DL (ref 65–140)
GLUCOSE SERPL-MCNC: 95 MG/DL (ref 65–140)
GLUCOSE UR STRIP-MCNC: NEGATIVE MG/DL
HCT VFR BLD AUTO: 33.9 % (ref 36.5–49.3)
HCT VFR BLD AUTO: 36.5 % (ref 36.5–49.3)
HGB BLD-MCNC: 11.1 G/DL (ref 12–17)
HGB BLD-MCNC: 12.1 G/DL (ref 12–17)
HGB UR QL STRIP.AUTO: NEGATIVE
IMM GRANULOCYTES # BLD AUTO: 0.03 THOUSAND/UL (ref 0–0.2)
IMM GRANULOCYTES # BLD AUTO: 0.05 THOUSAND/UL (ref 0–0.2)
IMM GRANULOCYTES NFR BLD AUTO: 0 % (ref 0–2)
IMM GRANULOCYTES NFR BLD AUTO: 1 % (ref 0–2)
INTERVENTRICULAR SEPTUM IN DIASTOLE (PARASTERNAL SHORT AXIS VIEW): 0.9 CM
INTERVENTRICULAR SEPTUM: 0.9 CM (ref 0.6–1.1)
KETONES UR STRIP-MCNC: NEGATIVE MG/DL
LAAS-AP2: 26.4 CM2
LAAS-AP4: 27.5 CM2
LEFT ATRIUM SIZE: 4.4 CM
LEFT INTERNAL DIMENSION IN SYSTOLE: 5.6 CM (ref 2.1–4)
LEFT VENTRICULAR INTERNAL DIMENSION IN DIASTOLE: 6.3 CM (ref 3.5–6)
LEFT VENTRICULAR POSTERIOR WALL IN END DIASTOLE: 0.9 CM
LEFT VENTRICULAR STROKE VOLUME: 48 ML
LEUKOCYTE ESTERASE UR QL STRIP: NEGATIVE
LVSV (TEICH): 48 ML
LYMPHOCYTES # BLD AUTO: 1.13 THOUSANDS/ΜL (ref 0.6–4.47)
LYMPHOCYTES # BLD AUTO: 1.25 THOUSANDS/ΜL (ref 0.6–4.47)
LYMPHOCYTES NFR BLD AUTO: 12 % (ref 14–44)
LYMPHOCYTES NFR BLD AUTO: 13 % (ref 14–44)
MAGNESIUM SERPL-MCNC: 2 MG/DL (ref 1.6–2.6)
MCH RBC QN AUTO: 29.5 PG (ref 26.8–34.3)
MCH RBC QN AUTO: 29.8 PG (ref 26.8–34.3)
MCHC RBC AUTO-ENTMCNC: 32.7 G/DL (ref 31.4–37.4)
MCHC RBC AUTO-ENTMCNC: 33.2 G/DL (ref 31.4–37.4)
MCV RBC AUTO: 90 FL (ref 82–98)
MCV RBC AUTO: 90 FL (ref 82–98)
MONOCYTES # BLD AUTO: 0.68 THOUSAND/ΜL (ref 0.17–1.22)
MONOCYTES # BLD AUTO: 0.69 THOUSAND/ΜL (ref 0.17–1.22)
MONOCYTES NFR BLD AUTO: 7 % (ref 4–12)
MONOCYTES NFR BLD AUTO: 8 % (ref 4–12)
MV E'TISSUE VEL-LAT: 5 CM/S
MV E'TISSUE VEL-SEP: 6 CM/S
MV PEAK A VEL: 0.86 M/S
MV PEAK E VEL: 102 CM/S
MV STENOSIS PRESSURE HALF TIME: 34 MS
MV VALVE AREA P 1/2 METHOD: 6.47 CM2
NEUTROPHILS # BLD AUTO: 6.49 THOUSANDS/ΜL (ref 1.85–7.62)
NEUTROPHILS # BLD AUTO: 7.92 THOUSANDS/ΜL (ref 1.85–7.62)
NEUTS SEG NFR BLD AUTO: 75 % (ref 43–75)
NEUTS SEG NFR BLD AUTO: 76 % (ref 43–75)
NITRITE UR QL STRIP: NEGATIVE
NON-SQ EPI CELLS URNS QL MICRO: ABNORMAL /HPF
NRBC BLD AUTO-RTO: 0 /100 WBCS
NRBC BLD AUTO-RTO: 0 /100 WBCS
NT-PROBNP SERPL-MCNC: 3924 PG/ML
P AXIS: 67 DEGREES
PA SYSTOLIC PRESSURE: 53 MMHG
PH UR STRIP.AUTO: 5.5 [PH]
PLATELET # BLD AUTO: 215 THOUSANDS/UL (ref 149–390)
PLATELET # BLD AUTO: 216 THOUSANDS/UL (ref 149–390)
PLATELET # BLD AUTO: 231 THOUSANDS/UL (ref 149–390)
PMV BLD AUTO: 10.3 FL (ref 8.9–12.7)
PMV BLD AUTO: 10.5 FL (ref 8.9–12.7)
PMV BLD AUTO: 10.7 FL (ref 8.9–12.7)
POTASSIUM SERPL-SCNC: 3.6 MMOL/L (ref 3.5–5.3)
PR INTERVAL: 174 MS
PROT SERPL-MCNC: 7.1 G/DL (ref 6.4–8.4)
PROT UR STRIP-MCNC: NEGATIVE MG/DL
QRS AXIS: 36 DEGREES
QRSD INTERVAL: 94 MS
QT INTERVAL: 236 MS
QTC INTERVAL: 359 MS
RBC # BLD AUTO: 3.76 MILLION/UL (ref 3.88–5.62)
RBC # BLD AUTO: 4.06 MILLION/UL (ref 3.88–5.62)
RBC #/AREA URNS AUTO: ABNORMAL /HPF
RIGHT ATRIAL 2D VOLUME: 49 ML
RIGHT ATRIUM AREA SYSTOLE A4C: 18.2 CM2
RIGHT VENTRICLE ID DIMENSION: 2.6 CM
RSV RNA RESP QL NAA+PROBE: NEGATIVE
SARS-COV-2 RNA RESP QL NAA+PROBE: NEGATIVE
SL CV LEFT ATRIUM LENGTH A2C: 6.2 CM
SL CV LV EF: 30
SL CV PED ECHO LEFT VENTRICLE DIASTOLIC VOLUME (MOD BIPLANE) 2D: 202 ML
SL CV PED ECHO LEFT VENTRICLE SYSTOLIC VOLUME (MOD BIPLANE) 2D: 154 ML
SODIUM SERPL-SCNC: 144 MMOL/L (ref 135–147)
SP GR UR STRIP.AUTO: 1.01 (ref 1–1.03)
T WAVE AXIS: 205 DEGREES
TR MAX PG: 48 MMHG
TR PEAK VELOCITY: 3.5 M/S
TRICUSPID VALVE PEAK REGURGITATION VELOCITY: 3.45 M/S
TSH SERPL DL<=0.05 MIU/L-ACNC: 0.91 UIU/ML (ref 0.45–4.5)
UROBILINOGEN UR QL STRIP.AUTO: 0.2 E.U./DL
VENTRICULAR RATE: 139 BPM
WBC # BLD AUTO: 10.27 THOUSAND/UL (ref 4.31–10.16)
WBC # BLD AUTO: 8.61 THOUSAND/UL (ref 4.31–10.16)
WBC #/AREA URNS AUTO: ABNORMAL /HPF

## 2022-09-20 PROCEDURE — 94002 VENT MGMT INPAT INIT DAY: CPT

## 2022-09-20 PROCEDURE — 82948 REAGENT STRIP/BLOOD GLUCOSE: CPT

## 2022-09-20 PROCEDURE — 99291 CRITICAL CARE FIRST HOUR: CPT | Performed by: EMERGENCY MEDICINE

## 2022-09-20 PROCEDURE — 85025 COMPLETE CBC W/AUTO DIFF WBC: CPT

## 2022-09-20 PROCEDURE — 83880 ASSAY OF NATRIURETIC PEPTIDE: CPT | Performed by: EMERGENCY MEDICINE

## 2022-09-20 PROCEDURE — 93306 TTE W/DOPPLER COMPLETE: CPT | Performed by: INTERNAL MEDICINE

## 2022-09-20 PROCEDURE — 85049 AUTOMATED PLATELET COUNT: CPT

## 2022-09-20 PROCEDURE — 0241U HB NFCT DS VIR RESP RNA 4 TRGT: CPT | Performed by: INTERNAL MEDICINE

## 2022-09-20 PROCEDURE — 80162 ASSAY OF DIGOXIN TOTAL: CPT | Performed by: EMERGENCY MEDICINE

## 2022-09-20 PROCEDURE — 99285 EMERGENCY DEPT VISIT HI MDM: CPT

## 2022-09-20 PROCEDURE — 83735 ASSAY OF MAGNESIUM: CPT | Performed by: EMERGENCY MEDICINE

## 2022-09-20 PROCEDURE — 99223 1ST HOSP IP/OBS HIGH 75: CPT | Performed by: INTERNAL MEDICINE

## 2022-09-20 PROCEDURE — 97167 OT EVAL HIGH COMPLEX 60 MIN: CPT

## 2022-09-20 PROCEDURE — 84484 ASSAY OF TROPONIN QUANT: CPT

## 2022-09-20 PROCEDURE — 85025 COMPLETE CBC W/AUTO DIFF WBC: CPT | Performed by: EMERGENCY MEDICINE

## 2022-09-20 PROCEDURE — 85730 THROMBOPLASTIN TIME PARTIAL: CPT

## 2022-09-20 PROCEDURE — 93005 ELECTROCARDIOGRAM TRACING: CPT

## 2022-09-20 PROCEDURE — 99223 1ST HOSP IP/OBS HIGH 75: CPT | Performed by: FAMILY MEDICINE

## 2022-09-20 PROCEDURE — 84443 ASSAY THYROID STIM HORMONE: CPT | Performed by: EMERGENCY MEDICINE

## 2022-09-20 PROCEDURE — 81001 URINALYSIS AUTO W/SCOPE: CPT

## 2022-09-20 PROCEDURE — 97163 PT EVAL HIGH COMPLEX 45 MIN: CPT

## 2022-09-20 PROCEDURE — 94760 N-INVAS EAR/PLS OXIMETRY 1: CPT

## 2022-09-20 PROCEDURE — 85379 FIBRIN DEGRADATION QUANT: CPT

## 2022-09-20 PROCEDURE — 36415 COLL VENOUS BLD VENIPUNCTURE: CPT | Performed by: EMERGENCY MEDICINE

## 2022-09-20 PROCEDURE — 71045 X-RAY EXAM CHEST 1 VIEW: CPT

## 2022-09-20 PROCEDURE — 93306 TTE W/DOPPLER COMPLETE: CPT

## 2022-09-20 PROCEDURE — 80053 COMPREHEN METABOLIC PANEL: CPT | Performed by: EMERGENCY MEDICINE

## 2022-09-20 PROCEDURE — 93010 ELECTROCARDIOGRAM REPORT: CPT | Performed by: INTERNAL MEDICINE

## 2022-09-20 PROCEDURE — 94640 AIRWAY INHALATION TREATMENT: CPT

## 2022-09-20 PROCEDURE — 84484 ASSAY OF TROPONIN QUANT: CPT | Performed by: EMERGENCY MEDICINE

## 2022-09-20 PROCEDURE — 96374 THER/PROPH/DIAG INJ IV PUSH: CPT

## 2022-09-20 RX ORDER — HEPARIN SODIUM 5000 [USP'U]/ML
5000 INJECTION, SOLUTION INTRAVENOUS; SUBCUTANEOUS EVERY 8 HOURS SCHEDULED
Status: DISCONTINUED | OUTPATIENT
Start: 2022-09-20 | End: 2022-09-23 | Stop reason: HOSPADM

## 2022-09-20 RX ORDER — BUDESONIDE 0.5 MG/2ML
0.5 INHALANT ORAL 2 TIMES DAILY
Status: DISCONTINUED | OUTPATIENT
Start: 2022-09-20 | End: 2022-09-23 | Stop reason: HOSPADM

## 2022-09-20 RX ORDER — LISINOPRIL 20 MG/1
20 TABLET ORAL DAILY
Status: DISCONTINUED | OUTPATIENT
Start: 2022-09-20 | End: 2022-09-20

## 2022-09-20 RX ORDER — ALBUTEROL SULFATE 90 UG/1
2 AEROSOL, METERED RESPIRATORY (INHALATION) EVERY 6 HOURS PRN
Status: DISCONTINUED | OUTPATIENT
Start: 2022-09-20 | End: 2022-09-23 | Stop reason: HOSPADM

## 2022-09-20 RX ORDER — ASPIRIN 325 MG
325 TABLET ORAL ONCE
Status: COMPLETED | OUTPATIENT
Start: 2022-09-20 | End: 2022-09-20

## 2022-09-20 RX ORDER — NITROGLYCERIN 0.4 MG/1
0.4 TABLET SUBLINGUAL ONCE
Status: COMPLETED | OUTPATIENT
Start: 2022-09-20 | End: 2022-09-20

## 2022-09-20 RX ORDER — INSULIN LISPRO 100 [IU]/ML
1-6 INJECTION, SOLUTION INTRAVENOUS; SUBCUTANEOUS
Status: DISCONTINUED | OUTPATIENT
Start: 2022-09-20 | End: 2022-09-23 | Stop reason: HOSPADM

## 2022-09-20 RX ORDER — POTASSIUM CHLORIDE 20 MEQ/1
40 TABLET, EXTENDED RELEASE ORAL ONCE
Status: COMPLETED | OUTPATIENT
Start: 2022-09-20 | End: 2022-09-20

## 2022-09-20 RX ORDER — FUROSEMIDE 10 MG/ML
40 INJECTION INTRAMUSCULAR; INTRAVENOUS ONCE
Status: COMPLETED | OUTPATIENT
Start: 2022-09-20 | End: 2022-09-20

## 2022-09-20 RX ORDER — GABAPENTIN 300 MG/1
300 CAPSULE ORAL
Status: DISCONTINUED | OUTPATIENT
Start: 2022-09-20 | End: 2022-09-23 | Stop reason: HOSPADM

## 2022-09-20 RX ORDER — ONDANSETRON 2 MG/ML
4 INJECTION INTRAMUSCULAR; INTRAVENOUS EVERY 6 HOURS PRN
Status: DISCONTINUED | OUTPATIENT
Start: 2022-09-20 | End: 2022-09-23 | Stop reason: HOSPADM

## 2022-09-20 RX ORDER — PRIMIDONE 50 MG/1
100 TABLET ORAL 2 TIMES DAILY
Status: DISCONTINUED | OUTPATIENT
Start: 2022-09-20 | End: 2022-09-23 | Stop reason: HOSPADM

## 2022-09-20 RX ORDER — CARVEDILOL 3.12 MG/1
6.25 TABLET ORAL 2 TIMES DAILY WITH MEALS
Status: DISCONTINUED | OUTPATIENT
Start: 2022-09-20 | End: 2022-09-23 | Stop reason: HOSPADM

## 2022-09-20 RX ORDER — PRAVASTATIN SODIUM 40 MG
80 TABLET ORAL
Status: DISCONTINUED | OUTPATIENT
Start: 2022-09-20 | End: 2022-09-23 | Stop reason: HOSPADM

## 2022-09-20 RX ORDER — ASPIRIN 81 MG/1
81 TABLET ORAL EVERY OTHER DAY
Status: DISCONTINUED | OUTPATIENT
Start: 2022-09-20 | End: 2022-09-23 | Stop reason: HOSPADM

## 2022-09-20 RX ORDER — FORMOTEROL FUMARATE 20 UG/2ML
20 SOLUTION RESPIRATORY (INHALATION)
Status: DISCONTINUED | OUTPATIENT
Start: 2022-09-20 | End: 2022-09-23 | Stop reason: HOSPADM

## 2022-09-20 RX ORDER — LABETALOL HYDROCHLORIDE 5 MG/ML
10 INJECTION, SOLUTION INTRAVENOUS EVERY 6 HOURS PRN
Status: DISCONTINUED | OUTPATIENT
Start: 2022-09-20 | End: 2022-09-23 | Stop reason: HOSPADM

## 2022-09-20 RX ORDER — FUROSEMIDE 10 MG/ML
40 INJECTION INTRAMUSCULAR; INTRAVENOUS 2 TIMES DAILY
Status: DISCONTINUED | OUTPATIENT
Start: 2022-09-20 | End: 2022-09-22

## 2022-09-20 RX ORDER — NITROGLYCERIN 20 MG/100ML
5-200 INJECTION INTRAVENOUS
Status: DISCONTINUED | OUTPATIENT
Start: 2022-09-20 | End: 2022-09-20

## 2022-09-20 RX ORDER — NITROGLYCERIN 20 MG/100ML
INJECTION INTRAVENOUS
Status: DISCONTINUED
Start: 2022-09-20 | End: 2022-09-20

## 2022-09-20 RX ORDER — ALBUTEROL SULFATE 90 UG/1
2 AEROSOL, METERED RESPIRATORY (INHALATION) EVERY 6 HOURS PRN
Status: DISCONTINUED | OUTPATIENT
Start: 2022-09-20 | End: 2022-09-20

## 2022-09-20 RX ORDER — DIGOXIN 125 MCG
125 TABLET ORAL DAILY
Status: DISCONTINUED | OUTPATIENT
Start: 2022-09-20 | End: 2022-09-23 | Stop reason: HOSPADM

## 2022-09-20 RX ADMIN — INSULIN LISPRO 2 UNITS: 100 INJECTION, SOLUTION INTRAVENOUS; SUBCUTANEOUS at 12:20

## 2022-09-20 RX ADMIN — PRIMIDONE 100 MG: 50 TABLET ORAL at 21:32

## 2022-09-20 RX ADMIN — BUDESONIDE 0.5 MG: 0.5 INHALANT ORAL at 10:22

## 2022-09-20 RX ADMIN — METOPROLOL TARTRATE 25 MG: 25 TABLET, FILM COATED ORAL at 10:15

## 2022-09-20 RX ADMIN — PRAVASTATIN SODIUM 80 MG: 40 TABLET ORAL at 17:42

## 2022-09-20 RX ADMIN — BUDESONIDE 0.5 MG: 0.5 INHALANT ORAL at 19:41

## 2022-09-20 RX ADMIN — NITROGLYCERIN 0.4 MG: 0.4 TABLET SUBLINGUAL at 00:51

## 2022-09-20 RX ADMIN — GABAPENTIN 300 MG: 300 CAPSULE ORAL at 21:32

## 2022-09-20 RX ADMIN — HEPARIN SODIUM 5000 UNITS: 5000 INJECTION INTRAVENOUS; SUBCUTANEOUS at 16:11

## 2022-09-20 RX ADMIN — FUROSEMIDE 40 MG: 10 INJECTION, SOLUTION INTRAMUSCULAR; INTRAVENOUS at 17:40

## 2022-09-20 RX ADMIN — IPRATROPIUM BROMIDE 0.5 MG: 0.5 SOLUTION RESPIRATORY (INHALATION) at 10:22

## 2022-09-20 RX ADMIN — HEPARIN SODIUM 5000 UNITS: 5000 INJECTION INTRAVENOUS; SUBCUTANEOUS at 06:34

## 2022-09-20 RX ADMIN — FUROSEMIDE 40 MG: 10 INJECTION, SOLUTION INTRAMUSCULAR; INTRAVENOUS at 10:14

## 2022-09-20 RX ADMIN — FUROSEMIDE 40 MG: 10 INJECTION, SOLUTION INTRAMUSCULAR; INTRAVENOUS at 01:13

## 2022-09-20 RX ADMIN — IPRATROPIUM BROMIDE 0.5 MG: 0.5 SOLUTION RESPIRATORY (INHALATION) at 19:41

## 2022-09-20 RX ADMIN — POTASSIUM CHLORIDE 40 MEQ: 1500 TABLET, EXTENDED RELEASE ORAL at 11:29

## 2022-09-20 RX ADMIN — LISINOPRIL 20 MG: 20 TABLET ORAL at 10:15

## 2022-09-20 RX ADMIN — CARVEDILOL 6.25 MG: 3.12 TABLET, FILM COATED ORAL at 17:47

## 2022-09-20 RX ADMIN — HEPARIN SODIUM 5000 UNITS: 5000 INJECTION INTRAVENOUS; SUBCUTANEOUS at 21:32

## 2022-09-20 RX ADMIN — IPRATROPIUM BROMIDE 0.5 MG: 0.5 SOLUTION RESPIRATORY (INHALATION) at 15:35

## 2022-09-20 RX ADMIN — CYANOCOBALAMIN TAB 500 MCG 500 MCG: 500 TAB at 10:15

## 2022-09-20 RX ADMIN — FORMOTEROL FUMARATE DIHYDRATE 20 MCG: 20 SOLUTION RESPIRATORY (INHALATION) at 19:41

## 2022-09-20 RX ADMIN — CARVEDILOL 6.25 MG: 3.12 TABLET, FILM COATED ORAL at 12:24

## 2022-09-20 RX ADMIN — ASPIRIN 81 MG: 81 TABLET, COATED ORAL at 10:15

## 2022-09-20 RX ADMIN — ASPIRIN 325 MG ORAL TABLET 325 MG: 325 PILL ORAL at 04:24

## 2022-09-20 RX ADMIN — ROFLUMILAST 500 MCG: 250 TABLET ORAL at 11:30

## 2022-09-20 RX ADMIN — PRIMIDONE 100 MG: 50 TABLET ORAL at 11:29

## 2022-09-20 RX ADMIN — DIGOXIN 125 MCG: 125 TABLET ORAL at 10:15

## 2022-09-20 NOTE — ASSESSMENT & PLAN NOTE
· Shortness of breath appears to be attributed to CHF    No wheezing on exam  · Will continue home Pulmicort nebulization, Atrovent, albuterol inhaler  · Continue roflumilast 500 mcg oral daily  · Respiratory protocol

## 2022-09-20 NOTE — ASSESSMENT & PLAN NOTE
Lab Results   Component Value Date    HGBA1C 5 7 (H) 08/14/2022       No results for input(s): POCGLU in the last 72 hours      Blood Sugar Average: Last 72 hrs:  ·  blood glucose 110 upon admission  · Patient currently takes metformin 500 mg oral b i d , glimepiride 1 mg 2 tablets in the morning 1 tablet at night  · Will hold home medications  · Sliding scale insulin  · A c  HS fingerstick glucose checks

## 2022-09-20 NOTE — NURSING NOTE
Pt sitting up at side of bed in tripod position  C/O SOB  Increased WOB noted  Pt currently on 2LNC  O2 sat 97-98%  RR 36   Pt now tachycardic   Dr Brooke Waddell aware and ordered pt to be placed on bipap  Pt agreeable to trying bipap  Pt placed on bipap by respiratory  WOB, RR, and HR improved on bipap  Pt remains on bipap at this time

## 2022-09-20 NOTE — ED PROVIDER NOTES
History  Chief Complaint   Patient presents with    Shortness of Breath     Pt came from sleep center with complaints of increase in shortness of breath  Wears o2 2L at home, history of COPD     31-year-old male with a past medical history of CHF with ejection fraction of 30%, COPD, recent admission for CHF exacerbation approximately 1 month ago, who presents for shortness of breath  Patient was currently at the [address] when this occurred  Patient states that came on suddenly, he denies any chest pain  No congestion or cough  No fevers or chills  No nausea or vomiting, no abdominal pain  No leg pain or swelling  ROS otherwise negative  Prior to Admission Medications   Prescriptions Last Dose Informant Patient Reported? Taking? Multiple Vitamins-Minerals (PRESERVISION AREDS 2 PO)  Self Yes No   Sig: Take by mouth   albuterol (PROVENTIL HFA,VENTOLIN HFA) 90 mcg/act inhaler  Self No No   Sig: Inhale 2 puffs every 6 (six) hours as needed for wheezing or shortness of breath   arformoterol (BROVANA) 15 mcg/2 mL nebulizer solution  Self No No   Sig: Take 2 mL (15 mcg total) by nebulization 2 (two) times a day   aspirin (ECOTRIN LOW STRENGTH) 81 mg EC tablet  Self No No   Sig: Take 1 tablet (81 mg total) by mouth every other day   budesonide (Pulmicort) 0 5 mg/2 mL nebulizer solution  Self No No   Sig: Take 2 mL (0 5 mg total) by nebulization 2 (two) times a day Rinse mouth after use     cyanocobalamin (VITAMIN B-12) 500 MCG tablet  Self Yes No   Sig: TAKE ONE TABLET BY MOUTH EVERY MORNING *VITAMIN B12*   digoxin (LANOXIN) 0 125 mg tablet  Self No No   Sig: Take 1 tablet (125 mcg total) by mouth daily   furosemide (LASIX) 40 mg tablet   No No   Sig: Take 1 tablet (40 mg total) by mouth daily Take once daily for weight greater than 175 lbs   gabapentin (NEURONTIN) 300 mg capsule  Self No No   Sig: Take 1 capsule (300 mg total) by mouth daily at bedtime   glimepiride (AMARYL) 1 mg tablet  Self No No Sig: Take 2 tablets (2 mg total) by mouth daily with breakfast AND 1 tablet (1 mg total) daily with dinner  ipratropium (ATROVENT) 0 02 % nebulizer solution  Self No No   Sig: Take 2 5 mL (0 5 mg total) by nebulization 3 (three) times a day   lisinopril (ZESTRIL) 20 mg tablet   No No   Sig: Take 1 tablet (20 mg total) by mouth daily   metFORMIN (GLUCOPHAGE) 500 mg tablet   No No   Sig: Take 1 tablet (500 mg total) by mouth 2 (two) times a day with meals   metoprolol tartrate (LOPRESSOR) 25 mg tablet  Self No No   Sig: Take 1 tablet (25 mg total) by mouth every 12 (twelve) hours   potassium chloride (MICRO-K) 10 MEQ CR capsule   No No   Sig: Take 2 capsules (20 mEq total) by mouth daily Take with Lasix  If Lasix is not being taken, do not take potassium   primidone (MYSOLINE) 50 mg tablet  Self Yes No   Sig: Take 100 mg by mouth 2 (two) times a day    roflumilast (Daliresp) 500 mcg tablet   No No   Sig: Take 1 tablet (500 mcg total) by mouth daily   rosuvastatin (CRESTOR) 10 MG tablet  Self No No   Sig: Take 1 tablet (10 mg total) by mouth daily   terbinafine (LamISIL) 1 % cream  Self Yes No   Sig: APPLY THIN LAYER TOPICALLY TWICE A DAY FOR FUNGAL INFECTION      Facility-Administered Medications: None       Past Medical History:   Diagnosis Date    BPH (benign prostatic hyperplasia)     45 days radiation treatment    COPD (chronic obstructive pulmonary disease)     Coronary artery disease     CABG x4 in 2017    Diabetes mellitus     History of Arterial Duplex of LE 12/26/2017    Likely occlusion of the left superficial femoral artery  Calcific changes bilaterally  Despite these changes, the ankle-brachial index as a measure of peripheral blood flow only mildly impaired      History of echocardiogram 06/12/2017    EF 40%, mild LVH, mild MR     Hyperlipidemia     Hypertension     Ischemic cardiomyopathy     NSTEMI (non-ST elevated myocardial infarction)     Prostate cancer     prostate     PVD (peripheral vascular disease)     Sepsis due to pneumonia     Tremor     Type 2 MI (myocardial infarction) ([facility] [address] ) 2021       Past Surgical History:   Procedure Laterality Date    CARDIAC CATHETERIZATION  2017    Significant left main plus triple-vessel CAD   CORONARY ARTERY BYPASS GRAFT  2017    4V CABG:  LIMA to LAD, VG to RI, SVG to PDA to LVBR RCA   EYE SURGERY      shots in eye once a month [facility] the [facility]    PROSTATE BIOPSY         Family History   Problem Relation Age of Onset    Cancer Father     Heart disease Brother      I have reviewed and agree with the history as documented  E-Cigarette/Vaping    E-Cigarette Use Never User      E-Cigarette/Vaping Substances    Nicotine No     THC No     CBD No     Flavoring No     Other No     Unknown No      Social History     Tobacco Use    Smoking status: Former Smoker     Packs/day: 0 25     Years: 60 00     Pack years: 15 00     Types: Cigarettes     Quit date: 2022     Years since quittin 4    Smokeless tobacco: Never Used   Vaping Use    Vaping Use: Never used   Substance Use Topics    Alcohol use: Yes    Drug use: Never       Review of Systems   Constitutional: Negative for chills, diaphoresis, fatigue and fever  HENT: Negative for congestion and sore throat  Eyes: Negative for visual disturbance  Respiratory: Positive for shortness of breath  Negative for cough and chest tightness  Cardiovascular: Negative for chest pain, palpitations and leg swelling  Gastrointestinal: Negative for abdominal distention, abdominal pain, constipation, diarrhea, nausea and vomiting  Genitourinary: Negative for difficulty urinating and dysuria  Musculoskeletal: Negative for arthralgias and myalgias  Skin: Negative for rash  Neurological: Negative for dizziness, weakness, light-headedness, numbness and headaches  Psychiatric/Behavioral: Negative for agitation, behavioral problems and confusion   The patient is not nervous/anxious  All other systems reviewed and are negative  Physical Exam  Physical Exam  Constitutional:       Appearance: He is well-developed  HENT:      Head: Normocephalic and atraumatic  Cardiovascular:      Rate and Rhythm: Regular rhythm  Tachycardia present  Heart sounds: Normal heart sounds  No murmur heard  Pulmonary:      Effort: Pulmonary effort is normal  No respiratory distress  Breath sounds: Examination of the right-upper field reveals rales  Examination of the left-upper field reveals rales  Examination of the right-middle field reveals rales  Examination of the left-middle field reveals rales  Examination of the right-lower field reveals rales  Examination of the left-lower field reveals rales  Rales present  No wheezing or rhonchi  Abdominal:      General: Bowel sounds are normal  There is no distension  Palpations: Abdomen is soft  Tenderness: There is no abdominal tenderness  There is no guarding or rebound  Musculoskeletal:         General: No deformity  Skin:     General: Skin is warm  Findings: No rash  Neurological:      Mental Status: He is alert and oriented to person, place, and time  Psychiatric:         Behavior: Behavior normal          Thought Content:  Thought content normal          Judgment: Judgment normal          Vital Signs  ED Triage Vitals   Temperature Pulse Respirations Blood Pressure SpO2   09/20/22 0045 09/20/22 0043 09/20/22 0043 09/20/22 0043 09/20/22 0043   98 4 °F (36 9 °C) (!) 135 (!) 24 (!) 199/93 91 %      Temp src Heart Rate Source Patient Position - Orthostatic VS BP Location FiO2 (%)   -- 09/20/22 0043 09/20/22 0043 09/20/22 0043 --    Monitor Sitting Left arm       Pain Score       --                  Vitals:    09/20/22 0043 09/20/22 0100 09/20/22 0115 09/20/22 0130   BP: (!) 199/93 158/75 148/63 125/61   Pulse: (!) 135 (!) 133 (!) 129 (!) 122   Patient Position - Orthostatic VS: Sitting Lying  Sitting Visual Acuity      ED Medications  Medications   nitroglycerin (NITROSTAT) SL tablet 0 4 mg (0 4 mg Sublingual Given 9/20/22 0051)   furosemide (LASIX) injection 40 mg (40 mg Intravenous Given 9/20/22 0113)       Diagnostic Studies  Results Reviewed     Procedure Component Value Units Date/Time    Digoxin level [[id number]]  (Normal) Collected: 09/20/22 0047    Lab Status: Final result Specimen: Blood Updated: 09/20/22 0142     Digoxin Lvl 1 3 ng/mL     Magnesium [[id number]]  (Normal) Collected: 09/20/22 0047    Lab Status: Final result Specimen: Blood from Arm, Right Updated: 09/20/22 0125     Magnesium 2 0 mg/dL     TSH, 3rd generation [[id number]]  (Normal) Collected: 09/20/22 0047    Lab Status: Final result Specimen: Blood from Arm, Right Updated: 09/20/22 0125     TSH 3RD GENERATON 0 913 uIU/mL     Narrative:      Patients undergoing fluorescein dye angiography may retain small amounts of fluorescein in the body for 48-72 hours post procedure  Samples containing fluorescein can produce falsely depressed TSH values  If the patient had this procedure,a specimen should be resubmitted post fluorescein clearance        NT-BNP PRO [[id number]]  (Abnormal) Collected: 09/20/22 0047    Lab Status: Final result Specimen: Blood from Arm, Right Updated: 09/20/22 0125     NT-proBNP 3,924 pg/mL     HS Troponin I 2hr [[id number]]     Lab Status: No result Specimen: Blood     HS Troponin 0hr (reflex protocol) [[id number]]  (Normal) Collected: 09/20/22 0047    Lab Status: Final result Specimen: Blood from Arm, Right Updated: 09/20/22 0119     hs TnI 0hr 37 ng/L     Comprehensive metabolic panel [[id number]]  (Abnormal) Collected: 09/20/22 0047    Lab Status: Final result Specimen: Blood from Arm, Right Updated: 09/20/22 0114     Sodium 144 mmol/L      Potassium 3 6 mmol/L      Chloride 103 mmol/L      CO2 30 mmol/L      ANION GAP 11 mmol/L      BUN 16 mg/dL      Creatinine 0 91 mg/dL      Glucose 110 mg/dL      Calcium 8 6 mg/dL      AST 9 U/L      ALT 12 U/L      Alkaline Phosphatase 118 U/L      Total Protein 7 1 g/dL      Albumin 3 6 g/dL      Total Bilirubin 0 31 mg/dL      eGFR 81 ml/min/1 73sq m     Narrative:      [place] guidelines for Chronic Kidney Disease (CKD):     Stage 1 with normal or high GFR (GFR > 90 mL/min/1 73 square meters)    Stage 2 Mild CKD (GFR = 60-89 mL/min/1 73 square meters)    Stage 3A Moderate CKD (GFR = 45-59 mL/min/1 73 square meters)    Stage 3B Moderate CKD (GFR = 30-44 mL/min/1 73 square meters)    Stage 4 Severe CKD (GFR = 15-29 mL/min/1 73 square meters)    Stage 5 End Stage CKD (GFR <15 mL/min/1 73 square meters)  Note: GFR calculation is accurate only with a steady state creatinine    CBC and differential [[id number]]  (Abnormal) Collected: 09/20/22 0047    Lab Status: Final result Specimen: Blood from Arm, Right Updated: 09/20/22 0057     WBC 10 27 Thousand/uL      RBC 4 06 Million/uL      Hemoglobin 12 1 g/dL      Hematocrit 36 5 %      MCV 90 fL      MCH 29 8 pg      MCHC 33 2 g/dL      RDW 14 2 %      MPV 10 5 fL      Platelets 673 Thousands/uL      nRBC 0 /100 WBCs      Neutrophils Relative 76 %      Immat GRANS % 1 %      Lymphocytes Relative 12 %      Monocytes Relative 7 %      Eosinophils Relative 3 %      Basophils Relative 1 %      Neutrophils Absolute 7 92 Thousands/µL      Immature Grans Absolute 0 05 Thousand/uL      Lymphocytes Absolute 1 25 Thousands/µL      Monocytes Absolute 0 68 Thousand/µL      Eosinophils Absolute 0 30 Thousand/µL      Basophils Absolute 0 07 Thousands/µL                  XR chest portable    (Results Pending)              Procedures  ECG 12 Lead Documentation Only    Date/Time: 9/20/2022 2:37 AM  Performed by: Radha Graham MD  Authorized by: Radha Graham MD     Indications / Diagnosis:  Sob  ECG reviewed by me, the ED Provider: yes    Patient location:  ED  Previous ECG:     Previous ECG: Unavailable  Interpretation:     Interpretation: abnormal    Rate:     ECG rate:  121    ECG rate assessment: tachycardic    Rhythm:     Rhythm: sinus rhythm    Ectopy:     Ectopy: none    QRS:     QRS axis:  Normal    QRS intervals:  Normal  Conduction:     Conduction: normal    ST segments:     ST segments:  Normal  T waves:     T waves: normal    Other findings:     Other findings: poor R wave progression    ECG 12 Lead Documentation Only    Date/Time: 9/20/2022 2:38 AM  Performed by: Rissa Martinez MD  Authorized by: Rissa Martinez MD     Indications / Diagnosis:  Sob  ECG reviewed by me, the ED Provider: yes    Patient location:  ED  Previous ECG:     Previous ECG:  Unavailable  Interpretation:     Interpretation: abnormal    Rate:     ECG rate:  135    ECG rate assessment: tachycardic    Rhythm:     Rhythm: sinus rhythm    Ectopy:     Ectopy: PVCs      PVCs:  Infrequent and unifocal  QRS:     QRS axis:  Normal    QRS intervals:  Normal  Conduction:     Conduction: normal    ST segments:     ST segments:  Normal  T waves:     T waves: normal    CriticalCare Time  Performed by: Rissa Martinez MD  Authorized by: Rissa Martinez MD     Critical care provider statement:     Critical care start time:  9/20/2022 12:42 AM    Critical care end time:  9/20/2022 1:17 AM    Critical care time was exclusive of:  Separately billable procedures and treating other patients    Critical care was necessary to treat or prevent imminent or life-threatening deterioration of the following conditions:  Cardiac failure and respiratory failure    Critical care was time spent personally by me on the following activities:  Obtaining history from patient or surrogate, development of treatment plan with patient or surrogate, evaluation of patient's response to treatment, examination of patient, interpretation of cardiac output measurements, ordering and performing treatments and interventions, ordering and review of laboratory studies, ordering and review of radiographic studies, re-evaluation of patient's condition and review of old charts    I assumed direction of critical care for this patient from another provider in my specialty: no    Comments:      Patient came in CHF exacerbation, satting in the high 80s in resiratory distress, respiratory rate in the 30s, tachycardic in the 130s, hypertensive in the 190s  Necessitated frequent re-evaluation for close blood pressure monitoring, improvement in respiratory status, and cardiac monitoring  ED Course  ED Course as of 09/20/22 0245   Tue Sep 20, 2022   0111 No wheezing, + rales and B-lines on US, concern for CHF exacerbation   0128 NT-proBNP(!): 3,924  Markedly elevated, even when compared to prior admission for CHF    0131 HR improving, 122 currently  Still tachypneic at 30, but appears much more comfortable than before, BNP elevated, will continue to trend  [facility]  Number of Diagnoses or Management Options  [facility] )  Dyspnea  Diagnosis management comments: Patient's presentation was concerning for CHF exacerbation given recent admission, prior echo demonstrating EF of 30%, B-lines bilaterally on beside US, BNP of 4000  Patient responded well to nitroglycerin SL tabs x3 with improvement in pressure and respiratory status, HR  Critical care: Patient came in CHF exacerbation, satting in the high 80s in resiratory distress, respiratory rate in the 30s, tachycardic in the 130s, hypertensive in the 190s  Necessitated frequent re-evaluation for close blood pressure monitoring, improvement in respiratory status, and cardiac monitoring  Admitted to [address] [place] [address] for further care        Disposition  Final diagnoses:   CHF (congestive heart failure) ([facility] )   Dyspnea     Time reflects when diagnosis was documented in both MDM as applicable and the Disposition within this note     Time User Action Codes Description Comment    9/20/2022  2:20 AM Ghazal Cook Blaine Add [I50 9] CHF (congestive heart failure) ([facility] [address] )     9/20/2022  2:20 AM Brennan Benz Add [R06 00] Dyspnea       ED Disposition     ED Disposition   Admit    Condition   Stable    Date/Time   Tue Sep 20, 2022  2:20 AM    Comment   Case was discussed with IRINA and the patient's admission status was agreed to be Admission Status: inpatient status to the service of Dr Simon Cuevas   Follow-up Information    None         Patient's Medications   Discharge Prescriptions    No medications on file       No discharge procedures on file      PDMP Review       Value Time User    PDMP Reviewed  Yes 9/3/2020 12:26 PM Lizbeth Diane [facility]          ED Provider  Electronically Signed by           Dom Hartmann MD  09/20/22 6280

## 2022-09-20 NOTE — ASSESSMENT & PLAN NOTE
· Patient has history of paroxysmal AFib  · Patient is not currently in AFib    Tachycardic on presentation but in sinus tach  · Continue metoprolol tartrate 25 mg oral b i d   · Continue digoxin 125 mcg oral daily

## 2022-09-20 NOTE — PLAN OF CARE
Problem: PHYSICAL THERAPY ADULT  Goal: Performs mobility at highest level of function for planned discharge setting  See evaluation for individualized goals  Description: Treatment/Interventions: Functional transfer training, LE strengthening/ROM, Elevations, Therapeutic exercise, Endurance training, Bed mobility, Gait training          See flowsheet documentation for full assessment, interventions and recommendations  Note: Prognosis: Good  Problem List: Decreased strength, Decreased endurance, Impaired balance, Decreased mobility  Assessment: Patient is a 69 y o  male evaluated by Physical Therapy s/p admit to [address] [place] [address] on 9/20/2022 with admitting diagnosis of: CHF (congestive heart failure), Dyspnea, SOB (shortness of breath), Acute on chronic respiratory failure with hypoxia, and principal problem of: Acute on chronic combined systolic and diastolic congestive heart failure  PT was consulted to assess patient's functional mobility and discharge needs  Ordered are PT Evaluation and treatment with activity level of: up as tolerated  Comorbidities affecting patient's physical performance at time of assessment include: BPH, HTN, CAD, hx of prostate cancer, PVD, COPD, DM  Personal factors affecting the patient at time of IE include: lives in 2 story home, ambulating with assistive device, step(s) to enter home and inability/difficulty performing IADLs  Please locate objective findings from PT assessment regarding body systems outlined above  Upon evaluation, pt able to perform all functional mobility with SUP, SPC, and increased time  Occasional verbal cuing provided for sequencing and direction  Pt able to ambulate ~200' before taking seated rest break  Moderate postural sway demonstrated with mobility, however no true LOB experienced  HR and SpO2 remained WFL on 2L O2 throughout  The patient's AM-PAC Basic Mobility Inpatient Short Form Raw Score is 20   A Raw score of greater than 16 suggests the patient may benefit from discharge to home  Please also refer to the recommendation of the Physical Therapist for safe discharge planning  Co treatment with OT secondary to complex medical condition of pt, possible A of 2 required to achieve and maintain transitional movements, requiring the need of skilled therapeutic intervention of 2 therapists to achieve delivery of services  Pt will benefit from continued PT intervention during LOS to address current deficits, increase LOF, and facilitate safe d/c to next level of care when medically appropriate  D/c recommendation at this time is home with outpatient rehabilitation  PT Discharge Recommendation: Home with outpatient rehabilitation    See flowsheet documentation for full assessment

## 2022-09-20 NOTE — PROGRESS NOTES
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   Caffeine use:NO    Alcohol:Dayshift workers after 5PM: Alcohol use:NO    Typical day for patient:YES       Study Documentation    Sleep Study Indications: The patient is here for snoring, excessive daytime sleepiness and chronic/am headaches  Sleep Study: Diagnostic   Snore:None  Supplemental O2: yes  O2 flow rate (L/min) range 2  O2 flow rate (L/min) final 2  O2 flow rate (L/min) range 2  O2 flow rate (L/min) final 2  Minimum SaO2 80  Baseline SaO2 93        Mode of Therapy:N/A    EKG abnormalities: Tachycardia noted throughout study    EEG abnormalities: no    Sleep Study Recorded < 2 hours: N/A    Sleep Study Recorded > 2 hours but incomplete study: yes patient taken to ER for shortness of Breath and heart rate >130    Sleep Study Recorded 6 hours but no sleep obtained:no    Patient classification: retired       Post-Sleep Study    Medication used at bedtime or during sleep study:YES other prescription medications    Patient reports time it took to fall asleep:patient did not sleep    Patient reports waking up during study:N/A    Patient reports sleeping N/A    Patient reports sleep during study: N/A    Patient rated sleepiness: N/A    PAP treatment:no

## 2022-09-20 NOTE — CONSULTS
Consultation - Cardiology Team One  Niki Ordonez 69 y o  male MRN: [id number]  Unit/Bed#: [facility] Encounter: [id number]    Inpatient consult to Cardiology  Consult performed by: PENG Rice  Consult ordered by: [place] Walters PA-C          Physician Requesting Consult: Dionna Zepeda MD  Reason for Consult / Principal Problem: CHF    Assessment:    Acute on chronic HFrEF  · Sent to ER from sleep center last night as he became acutely SOB lying flat for sleeps study  · Patient denies to me any recent symptoms of worsening shortness of breath with exertion above baseline, no lower extremity edema --patient does not weigh himself daily so he cannot tell me about weight gain; no orthopnea at home prior to last night  · Chest xray with mild pulm vascular congestion   · proBNP 3,924  · Home med: lasix 40 mg PRN for weight over 175 lbs--patient does not weigh himself daily, he states that he takes his medication as needed for right lower extremity edema states he takes this medication roughly once a week and and occasionally twice a week  · Likely he will need to go home on daily diuretics  · IV lasix 40 mg given just after midnight and now ordered as BID dosing, agree to continue today and monitor UO; goal UO is 1 L net negative in 24 hrs  · I/O: total UO recorded since admit is 600 cc  · Weight this AM is 167 lbs; dry weight is unknown as patient does not weigh himself; weight recorded at cardiology office visit 9/12/2022 was 175 lbs  · TTE as below, repeat TTE pending  · Daily weights; strict I/O's; 2 gm sodium restricted diet with fluid restriction    Acute on chronic hypoxic respiratory failure  · Chronic due to advanced COPD; follows with Pulmonary team as outpatient  · Chronically on 2 L NC  · Scheduled for sleep study   · Noted to be hypoxic on home dose of 2 L NC, placed on BIPAP in ED overnight presumably for increased WOB, now with adequate O2 sats on 3 L NC at time of examk  · Acute exacerbation Likely secondary to CHF     Sinus tachycardia  · EKG on admit is sinus tachycardia with rate of 139  · Tele at time of exam is sinus tachycardia with rates in the low 100's  · Chart review notes hx of afib but this seems to be a diagnosis he picked up in error during hospitalization in August of 2022 where his tachycardia was presumed to be AFib but by Cardiology is review it was indeed sinus tachycardia; no prior EKGs to show Afib  · Sinus tachycardia secondary to acuity of condition, reduced ejection fraction acute CHF    Ischemic cardiomyopathy  · TTE from 2020: EF 30%; grade 2 DD; mild MR  · Etiology deemed to be ischemic  · Repeat TTE pending  · Patient does not wish to have ICD placed  · GDMT: lisinopril 20 mg QD, metoprolol tartrate 25 mg BID, digoxin 125 mcg QD and lasix 40 mg PRN    Non-ischemic myocardial injury  · EKG on admit is sinus tachycardia rate of 139  · No c/o CP  · hsTn 37->344->959->1,267  · Likely non-ischemic myocardial injury in the setting of hypoxia and CHF    CAD  · Hx of CABG with LIMA-LAD, SVG-ramus, SVG-PDA and ventricular branch of RCA in 2017  · GDMT: aspirin 81 mg QD, Crestor 10 mg QD, metoprolol tartrate 25 mg BID    HLD  · Crestor 10 mg QD    DM2  · HgbA1c 5 7  · Management per primary team    Essential HTN  · BP elevated on admit 199/93  · Had then normalized with SBP in the 140's  · Now BP this AM is 180/81; may be related to acute condition, continue to monitor      Plan/Recommendations:  · Await repeat TTE  · Agree to continue IV lasix 40 mg BID today and monitor UO; goal net negative 1 L in 24 hrs  · Continue remainder of home meds    ________________________________________________________________________      History of Present Illness   HPI: Ash Terry is a 69y o  year old male with PMH of prostate cancer, COPD, CAD s/p CABG, chronic HFrEF, DM2, HTN, PVD, HLD and probable BERONICA  He follows with cardiologist Dr See Bradford and was last seen 9/12/2022    Patient is sent to the ED last evening from [facility] where he was undergoing a sleep study  He was sent to the ED secondary to him abruptly becoming short of breath upon lying flat  The patient states that last evening was the 1st time he had orthopnea  He states he has not been having orthopnea symptoms at home  He denies any worsening of his dyspnea on exertion from his baseline to me  He denies any shortness of breath at rest to me  He denies any lower extremity edema prior to admission  The patient does not weigh himself daily so he is unable to tell me if he has gained any weight  In the ED the patient was noted to be hypoxic on his baseline 2 L nasal cannula of oxygen  He was placed on BiPAP, presumably secondary to the fact it was the evening time and he likely has BERONICA in conjunction to increased work of breathing  Chest x-ray did reveal mild pulmonary edema and proBNP is elevated therefore IV Lasix was started for presumed CHF exacerbation  EKG on admit is sinus tachycardia with a rate of 139  Cardiology is consulted secondary to presumed CHF exacerbation  At time of interview exam the patient is resting in his chair with no acute complaints  He is on 3 L of oxygen with adequate O2 sats  The patient tells me he does not regularly weigh himself so he is unsure what his dry weight is and he is also unsure if he has been gaining weight  His oral diuretic is prescribed as p r n  for weight gain but the patient states that he typically takes this when his right lower extremity is swollen as that is his indicator he is holding onto fluid  He states he takes this medication usually once a week and occasionally twice a week  He states his last dose was roughly 2-3 days ago  The patient states that he does follow a low-sodium diet at home overall but does occasionally eat lunch meats          EKG reviewed personally: EKG on admit is sinus tachycardia rate of 139        Telemetry reviewed personally:  Sinus tachycardia with rates in the low 100s      Review of Systems   Constitutional: Negative for chills, fever and malaise/fatigue  HENT: Negative for congestion  Cardiovascular: Positive for orthopnea  Negative for chest pain, dyspnea on exertion, leg swelling and palpitations  Respiratory: Negative for cough, shortness of breath (no SOB at rest) and wheezing  Endocrine: Negative  Hematologic/Lymphatic: Negative  Skin: Negative  Musculoskeletal: Negative  Gastrointestinal: Negative for bloating, abdominal pain, nausea and vomiting  Genitourinary: Negative  Neurological: Negative for dizziness and light-headedness  Psychiatric/Behavioral: Negative  All other systems reviewed and are negative  Historical Information   Past Medical History:   Diagnosis Date    BPH (benign prostatic hyperplasia)     45 days radiation treatment    COPD (chronic obstructive pulmonary disease)     Coronary artery disease     CABG x4 in 2017    Diabetes mellitus     History of Arterial Duplex of LE 12/26/2017    Likely occlusion of the left superficial femoral artery  Calcific changes bilaterally  Despite these changes, the ankle-brachial index as a measure of peripheral blood flow only mildly impaired   History of echocardiogram 06/12/2017    EF 40%, mild LVH, mild MR     Hyperlipidemia     Hypertension     Ischemic cardiomyopathy     NSTEMI (non-ST elevated myocardial infarction)     Prostate cancer     prostate     PVD (peripheral vascular disease)     Sepsis due to pneumonia     Tremor     [facility]     Past Surgical History:   Procedure Laterality Date    CARDIAC CATHETERIZATION  03/08/2017    Significant left main plus triple-vessel CAD   CORONARY ARTERY BYPASS GRAFT  03/08/2017    4V CABG:  LIMA to LAD, VG to RI, SVG to PDA to LVBR RCA      EYE SURGERY      shots in eye once a month @ [facility] History Substance and Sexual Activity   Alcohol Use Yes     Social History     Substance and Sexual Activity   Drug Use Never     Social History     Tobacco Use   Smoking Status Former Smoker    Packs/day: 0 25    Years: 60 00    Pack years: 15 00    Types: Cigarettes    Quit date: 2022    Years since quittin 4   Smokeless Tobacco Never Used     Family History:   Family History   Problem Relation Age of Onset    Cancer Father     Heart disease Brother        Meds/Allergies   current meds:   Current Facility-Administered Medications   Medication Dose Route Frequency    albuterol (PROVENTIL HFA,VENTOLIN HFA) inhaler 2 puff  2 puff Inhalation Q6H PRN    aspirin (ECOTRIN LOW STRENGTH) EC tablet 81 mg  81 mg Oral Every Other Day    budesonide (PULMICORT) inhalation solution 0 5 mg  0 5 mg Nebulization BID    cyanocobalamin (VITAMIN B-12) tablet 500 mcg  500 mcg Oral Daily    digoxin (LANOXIN) tablet 125 mcg  125 mcg Oral Daily    furosemide (LASIX) injection 40 mg  40 mg Intravenous BID    gabapentin (NEURONTIN) capsule 300 mg  300 mg Oral HS    heparin (porcine) subcutaneous injection 5,000 Units  5,000 Units Subcutaneous Q8H [facility]    insulin lispro (HumaLOG) 100 units/mL subcutaneous injection 1-6 Units  1-6 Units Subcutaneous TID AC    ipratropium (ATROVENT) 0 02 % inhalation solution 0 5 mg  0 5 mg Nebulization TID    lisinopril (ZESTRIL) tablet 20 mg  20 mg Oral Daily    metoprolol tartrate (LOPRESSOR) tablet 25 mg  25 mg Oral Q12H ENDER    ondansetron (ZOFRAN) injection 4 mg  4 mg Intravenous Q6H PRN    perflutren lipid microsphere (DEFINITY) injection  0 4 mL/min Intravenous Once in imaging    pravastatin (PRAVACHOL) tablet 80 mg  80 mg Oral Daily With Dinner    primidone (MYSOLINE) tablet 100 mg  100 mg Oral BID    roflumilast (DALIRESP) tablet 500 mcg  500 mcg Oral Daily          Allergies   Allergen Reactions    Atorvastatin Myalgia       Objective   Vitals: Blood pressure (!) 180/81, pulse 96, temperature 97 9 °F (36 6 °C), temperature source Temporal, resp  rate 21, height 5' 10 5" (1 791 m), weight 75 8 kg (167 lb 1 7 oz), SpO2 98 %  ,     Body mass index is 23 64 kg/m²  ,     Systolic (45OXS), SXC:379 , Min:125 , NDS:125     Diastolic (66OBB), FGJ:08, Min:61, Max:93    Wt Readings from Last 3 Encounters:   09/20/22 75 8 kg (167 lb 1 7 oz)   09/12/22 79 4 kg (175 lb)   08/29/22 79 9 kg (176 lb 3 2 oz)      Lab Results   Component Value Date    CREATININE 0 91 09/20/2022    CREATININE 0 88 08/24/2022    CREATININE 0 78 08/16/2022           No intake or output data in the 24 hours ending 09/20/22 0805  Weight (last 2 days)     Date/Time Weight    09/20/22 0700 75 8 (167 11)    09/20/22 0043 75 8 (167 11)        Invasive Devices  Report    Peripheral Intravenous Line  Duration           Peripheral IV 09/20/22 Right Antecubital <1 day                  Physical Exam  Vitals reviewed  Constitutional:       General: He is not in acute distress  HENT:      Head: Normocephalic and atraumatic  Mouth/Throat:      Mouth: Mucous membranes are moist    Cardiovascular:      Rate and Rhythm: Regular rhythm  Tachycardia present  Heart sounds: Normal heart sounds, S1 normal and S2 normal  No murmur heard  Pulmonary:      Effort: Pulmonary effort is normal  No respiratory distress  Breath sounds: Examination of the right-lower field reveals rales  Examination of the left-lower field reveals rales  Rales present  Abdominal:      General: Bowel sounds are normal  There is no distension  Palpations: Abdomen is soft  Musculoskeletal:         General: Normal range of motion  Cervical back: Normal range of motion and neck supple  Right lower leg: No edema  Left lower leg: No edema  Skin:     General: Skin is warm and dry  Neurological:      Mental Status: He is alert and oriented to person, place, and time     Psychiatric:         Mood and Affect: Mood normal  LABORATORY RESULTS:      CBC with diff:   Results from last 7 days   Lab Units 22  0450 22  0254 22  0047   WBC Thousand/uL 8 61  --  10 27*   HEMOGLOBIN g/dL 11 1*  --  12 1   HEMATOCRIT % 33 9*  --  36 5   MCV fL 90  --  90   PLATELETS Thousands/uL 216 215 231   MCH pg 29 5  --  29 8   MCHC g/dL 32 7  --  33 2   RDW % 14 2  --  14 2   MPV fL 10 3 10 7 10 5   NRBC AUTO /100 WBCs 0  --  0       CMP:  Results from last 7 days   Lab Units 22  0047   POTASSIUM mmol/L 3 6   CHLORIDE mmol/L 103   CO2 mmol/L 30   BUN mg/dL 16   CREATININE mg/dL 0 91   CALCIUM mg/dL 8 6   AST U/L 9   ALT U/L 12   ALK PHOS U/L 118*   EGFR ml/min/1 73sq m 81       BMP:  Results from last 7 days   Lab Units 22  0047   POTASSIUM mmol/L 3 6   CHLORIDE mmol/L 103   CO2 mmol/L 30   BUN mg/dL 16   CREATININE mg/dL 0 91   CALCIUM mg/dL 8 6          Lab Results   Component Value Date    NTBNP 3,924 (H) 2022            Results from last 7 days   Lab Units 22  0047   MAGNESIUM mg/dL 2 0                 Results from last 7 days   Lab Units 22  0047   TSH 3RD GENERATON uIU/mL 0 913           Lipid Profile:   No results found for: CHOL  Lab Results   Component Value Date    HDL 49 2022     Lab Results   Component Value Date    LDLCALC 107 (H) 2022     Lab Results   Component Value Date    TRIG 277 (H) 2022         Cardiac testing:   Results for orders placed during the hospital encounter of 10/12/20    Echo complete with contrast if indicated    Narrative  [address] [place] [address]    Transthoracic Echocardiogram  2D, M-mode, Doppler, and Color Doppler    Study date:  12-Oct-2020    Patient: Harvey Nickerson  MR number: [id number]  Account number: [de-identified]  : 1945  Age: 77 years  Gender: Male  Status: Outpatient  Location: [facility]  Height: 70 in  Weight: 200 6 lb  BP: 110/ 60 mmHg    Indications: Cardiomyopathy  Diagnoses: I25 5 - Ischemic cardiomyopathy    Sonographer:  Tom Braun  Referring Physician:  Kamlesh Tapia MD  Group:  [facility]  Interpreting Physician: KWADWO Liz     SUMMARY    LEFT VENTRICLE:  The ventricle was mildly dilated  Systolic function was moderately reduced  Ejection fraction was estimated to be 30 %  There was moderate diffuse hypokinesis  Features were consistent with a pseudonormal left ventricular filling pattern, with concomitant abnormal relaxation and increased filling pressure (grade 2 diastolic dysfunction)  Doppler parameters were consistent with high ventricular filling pressure  LEFT ATRIUM:  The atrium was mildly dilated  MITRAL VALVE:  There was mild regurgitation  PULMONIC VALVE:  There was trace regurgitation  HISTORY: PRIOR HISTORY: Previous (remote) myocardial infarction  Ischemic cardiomyopathy  Risk factors: hypertension, diabetes, hypercholesterolemia, and a history of current cigarette use (within the last month)  Chronic lung disease  PRIOR PROCEDURES: Coronary bypass  PROCEDURE: The study was performed in the [facility] [place]  This was a routine study  The transthoracic approach was used  The study included complete 2D imaging, M-mode, complete spectral Doppler, and color Doppler  The  heart rate was 70 bpm, at the start of the study  Images were obtained from the parasternal, apical, subcostal, and suprasternal notch acoustic windows  Echocardiographic views were limited due to poor acoustic window availability,  decreased penetration, and lung interference  This was a technically difficult study  LEFT VENTRICLE: The ventricle was mildly dilated  Systolic function was moderately reduced  Ejection fraction was estimated to be 30 %  There was moderate diffuse hypokinesis  Wall thickness was normal  No evidence of apical thrombus    DOPPLER: Features were consistent with a pseudonormal left ventricular filling pattern, with concomitant abnormal relaxation and increased filling pressure (grade 2 diastolic dysfunction)  Doppler parameters were consistent with high  ventricular filling pressure  RIGHT VENTRICLE: The size was normal  Systolic function was normal  Wall thickness was normal     LEFT ATRIUM: The atrium was mildly dilated  RIGHT ATRIUM: Size was normal     MITRAL VALVE: Valve structure was normal  There was normal leaflet separation  DOPPLER: The transmitral velocity was within the normal range  There was no evidence for stenosis  There was mild regurgitation  AORTIC VALVE: The valve was trileaflet  Leaflets exhibited normal thickness and normal cuspal separation  DOPPLER: Transaortic velocity was within the normal range  There was no evidence for stenosis  There was no significant  regurgitation  TRICUSPID VALVE: The valve structure was normal  There was normal leaflet separation  DOPPLER: The transtricuspid velocity was within the normal range  There was no evidence for stenosis  There was no significant regurgitation  PULMONIC VALVE: Leaflets exhibited normal thickness, no calcification, and normal cuspal separation  DOPPLER: The transpulmonic velocity was within the normal range  There was trace regurgitation  PERICARDIUM: There was no pericardial effusion  The pericardium was normal in appearance  AORTA: The root exhibited normal size  SYSTEMIC VEINS: IVC: The inferior vena cava was normal in size      SYSTEM MEASUREMENT TABLES    2D  %FS: 14 39 %  AV Diam: 3 04 cm  Ao asc: 3 29 cm  EDV(Teich): 163 32 ml  EF(Cube): 37 25 %  EF(Teich): 30 16 %  ESV(Cube): 119 34 ml  ESV(Teich): 114 07 ml  IVSd: 1 01 cm  LA Area: 18 72 cm2  LA Diam: 4 19 cm  LVEDV MOD A4C: 152 02 ml  LVEF MOD A4C: 32 95 %  LVESV MOD A4C: 101 93 ml  LVIDd: 5 75 cm  LVIDs: 4 92 cm  LVLd A4C: 8 42 cm  LVLs A4C: 7 84 cm  LVOT Diam: 1 99 cm  LVPWd: 1 07 cm  RA Area: 17 53 cm2  RVIDd: 3 89 cm  SI(Cube): 33 89 ml/m2  SI(Teich): 23 57 ml/m2  SV MOD A4C: 50 09 ml  SV(Cube): 70 83 ml  SV(Teich): 49 25 ml    CW  AV Vmax: 1 4 m/s  AV maxP 88 mmHg  TR Vmax: 2 01 m/s  TR maxP 11 mmHg    MM  TAPSE: 1 82 cm    PW  NADER Vmax, Pt: 1 75 cm2  AVAI (Vmax) Pt: 0 84 cm2/m2  E': 0 05 m/s  E/E': 21 43  LVOT Vmax: 0 79 m/s  LVOT maxP 49 mmHg  MV A Ronald: 1 08 m/s  MV Dec Leake: 5 54 m/s2  MV DecT: 211 27 ms  MV E Ronald: 1 17 m/s  MV E/A Ratio: 1 08    [facility]    Prepared and electronically signed by    KWADWO Liz  Signed 12-Oct-2020 12:39:44    No results found for this or any previous visit  No valid procedures specified  No results found for this or any previous visit  Imaging: I have personally reviewed pertinent reports  No results found  Counseling / Coordination of Care  Total floor / unit time spent today 45 minutes  Greater than 50% of total time was spent with the patient and / or family counseling and / or coordination of care  A description of the counseling / coordination of care: Review of history, current assessment, development of a plan  Code Status: Level 1 - Full Code    ** Please Note: Dragon 360 Dictation voice to text software may have been used in the creation of this document   **

## 2022-09-20 NOTE — ED NOTES
Dr Christie Canela removed bipap from patient at this time   Pt transitioned to 3L nasal cannula     Anel Rhodes, RN  09/20/22 1545

## 2022-09-20 NOTE — RESPIRATORY THERAPY NOTE
RT Protocol Note  Edwar Bautista 69 y o  male MRN: [id number]  Unit/Bed#: WD54 Encounter: [id number]    Assessment    Active Problems:    * No active hospital problems  *      Home Pulmonary Medications:  MDI Albuterol, Atrovent, Brovana and Pulmicort at home  Pt qualified on 2 LPM NC for home use  Pt came to ER from the sleep lab with increased WOB and elevated HR  Home Devices/Therapy: Other (Comment) (MDI Albuterol)    Past Medical History:   Diagnosis Date    BPH (benign prostatic hyperplasia)     45 days radiation treatment    COPD (chronic obstructive pulmonary disease)     Coronary artery disease     CABG x4 in 2017    Diabetes mellitus     History of Arterial Duplex of LE 2017    Likely occlusion of the left superficial femoral artery  Calcific changes bilaterally  Despite these changes, the ankle-brachial index as a measure of peripheral blood flow only mildly impaired   History of echocardiogram 2017    EF 40%, mild LVH, mild MR     Hyperlipidemia     Hypertension     Ischemic cardiomyopathy     NSTEMI (non-ST elevated myocardial infarction)     Prostate cancer     prostate     PVD (peripheral vascular disease)     Sepsis due to pneumonia     Tremor     [facility] ([facility] ) 2021     Social History     Socioeconomic History    Marital status: /Civil Union     Spouse name: None    Number of children: None    Years of education: None    Highest education level: None   Occupational History    None   Tobacco Use    Smoking status: Former Smoker     Packs/day: 0 25     Years: 60 00     Pack years: 15 00     Types: Cigarettes     Quit date: 2022     Years since quittin 4    Smokeless tobacco: Never Used   Vaping Use    Vaping Use: Never used   Substance and Sexual Activity    Alcohol use:  Yes    Drug use: Never    Sexual activity: Yes     Partners: Female   Other Topics Concern    None   Social History Narrative    ** Merged History Encounter **          Social Determinants of Health     Financial Resource Strain: Not on file   Food Insecurity: No Food Insecurity    Worried About Running Out of Food in the Last Year: Never true    [place] of Food in the Last Year: Never true   Transportation Needs: No Transportation Needs    Lack of Transportation (Medical): No    Lack of Transportation (Non-Medical): No   Physical Activity: Not on file   Stress: Not on file   Social Connections: Not on file   Intimate Partner Violence: Not on file   Housing Stability: Low Risk     Unable to Pay for Housing in the Last Year: No    Number of Places Lived in the Last Year: 1    Unstable Housing in the Last Year: No       Subjective         Objective    Physical Exam:   Assessment Type: During-treatment  General Appearance: Alert, Awake  Respiratory Pattern: Normal  Chest Assessment: Chest expansion symmetrical  Bilateral Breath Sounds: Diminished, Rales, Coarse  Cough: Non-productive    Vitals:  Blood pressure 148/67, pulse (!) 107, temperature 98 4 °F (36 9 °C), resp  rate 22, height 5' 10 5" (1 791 m), weight 75 8 kg (167 lb 1 7 oz), SpO2 100 %  Imaging and other studies: I have personally reviewed pertinent reports              Plan             Resp Comments: Pt placed on BiPAP unit in order to assist with CHF and work of breathing

## 2022-09-20 NOTE — H&P
[address] [facility] [address]  H&P- Jigna Chavira 1945, 69 y o  male MRN: [id number]  Unit/Bed#: BS26 Encounter: [id number]  Primary Care Provider: Chanda Saint, DO   Date and time admitted to hospital: 9/20/2022 12:49 AM    Acute on chronic combined systolic and diastolic congestive heart failure ([facility] [address] )  Assessment & Plan  Wt Readings from Last 3 Encounters:   09/20/22 75 8 kg (167 lb 1 7 oz)   09/12/22 79 4 kg (175 lb)   08/29/22 79 9 kg (176 lb 3 2 oz)   · Patient presented from [facility] sleep lab with acute worsening of shortness of breath as well as tachycardia  · ProBNP elevated at 3924  · Chest x-ray appears to show evidence of pulmonary vascular congestion  Will await official radiology read  · Patient initially treated with IV Lasix in the ED which improved symptoms dramatically  · Will continue with Lasix 40 mg IV b i d  · Strict input output  · Daily standing weights  · Cardiac diet  · Cardiology consult  · Echocardiogram ordered  · Patient placed on BiPAP to improve respiratory rate as well as to treat pulmonary edema  · 48 hour tele          Coronary artery disease involving native coronary artery of native heart without angina pectoris  Assessment & Plan  · Patient has known history of CAD status post CABG x4  · Patient has had troponin elevation since arrival to ED from   Similar to past hospitalization for CHF  Suspect troponin elevation is due to increased demand from CHF exacerbation    · Patient denies any chest pain at this time  · Will administer aspirin 325  · Continue aspirin 81  · Continue metoprolol tartrate 25 mg oral b i d   · Continue digoxin 125 mcg oral daily  · Trend troponin  · Cardiology consult    SIRS (systemic inflammatory response syndrome) ([facility] )  Assessment & Plan  · Patient presented meeting SIRS criteria for tachycardia, tachypnea  · Patient does have slightly elevated white count at 10 27  · Tachycardia and tachypnea improving following administration of Lasix  · Currently very low suspicion for infection at this time  Chest x-ray does not appear to show any evidence of pneumonia  · Will check urinalysis  · No evidence of any skin infection noted on exam, history  · At this time no antibiotics indicated  · Continue to monitor white blood cells and for signs of any infection    PAF (paroxysmal atrial fibrillation) ([facility] [address] )  Assessment & Plan  · Patient has history of paroxysmal AFib  · Patient is not currently in AFib  Tachycardic on presentation but in sinus tach  · Continue metoprolol tartrate 25 mg oral b i d   · Continue digoxin 125 mcg oral daily    Type 2 diabetes mellitus with diabetic neuropathy, unspecified ([facility])  Assessment & Plan  Lab Results   Component Value Date    HGBA1C 5 7 (H) 08/14/2022       No results for input(s): POCGLU in the last 72 hours  Blood Sugar Average: Last 72 hrs:  ·  blood glucose 110 upon admission  · Patient currently takes metformin 500 mg oral b i d , glimepiride 1 mg 2 tablets in the morning 1 tablet at night  · Will hold home medications  · Sliding scale insulin  · A c  HS fingerstick glucose checks    COPD (chronic obstructive pulmonary disease) ([facility])  Assessment & Plan  · Shortness of breath appears to be attributed to CHF  No wheezing on exam  · Will continue home Pulmicort nebulization, Atrovent, albuterol inhaler  · Continue roflumilast 500 mcg oral daily  · Respiratory protocol    Other hyperlipidemia  Assessment & Plan  · Pravachol 80 mg oral daily      VTE Pharmacologic Prophylaxis: VTE Score: 9 High Risk (Score >/= 5) - Pharmacological DVT Prophylaxis Ordered: heparin  Sequential Compression Devices Ordered  Code Status: Level 1 - Full Code   Discussion with family: Patient declined call to   Anticipated Length of Stay: Patient will be admitted on an inpatient basis with an anticipated length of stay of greater than 2 midnights secondary to Acute on chronic CHF, SIRs       Total Time for Visit, including Counseling / Coordination of Care: 60 minutes Greater than 50% of this total time spent on direct patient counseling and coordination of care  Chief Complaint: Shortness of breath     History of Present Illness:  Melchor Cheng is a 69 y o  male with a PMH of PAF, CHF, BERONICA, BPH, COPD, type 2 diabetes, CAD with CABG who presents with acute shortness of breath beginning tonight  The patient was sent to the ED from [address] where he was supposed to undergo sleep study  The patient states over the past day, he has felt increasingly short of breath  He states that when he was laid flat at the [facility] shortness of breath worsened  He was found at [address] to have a heart rate rising up to the 120s  As well, had a high RR of 28  Due to history of CHF, AFib patient was sent in to the ED for evaluation  The patient does note that he has had some dyspnea with exertion  He states that now it is at rest   He states it is worse when he lays flat  Denies any paroxysmal nocturnal dyspnea  He states that shortness of breath began over the last several days  Denies any chest pain  Denies any increased lower extremity swelling  He does report good compliance with his home medications  Patient was found on chest x-ray to have evidence of pulmonary vascular congestion  As well, found to have elevated proBNP  Of note, patient did have rising troponins upon admission  This is similar to past admission at [address] [place] [address] several weeks ago where he was admitted for CHF  Patient does feel significantly improved in his shortness of breath since he received IV Lasix  He has had good urine output  Review of Systems:  Review of Systems   Constitutional: Negative for chills, fatigue and fever  HENT: Negative for ear pain and sore throat  Eyes: Negative for pain and visual disturbance  Respiratory: Positive for shortness of breath   Negative for cough, chest tightness and wheezing  Cardiovascular: Negative for chest pain, palpitations and leg swelling  Gastrointestinal: Negative for abdominal distention, abdominal pain, constipation, diarrhea, nausea and vomiting  Genitourinary: Negative for dysuria and hematuria  Musculoskeletal: Negative for arthralgias and back pain  Skin: Negative for color change and rash  Neurological: Negative for dizziness, seizures, syncope, facial asymmetry, weakness, light-headedness, numbness and headaches  Psychiatric/Behavioral: Negative for agitation and confusion  All other systems reviewed and are negative  Past Medical and Surgical History:   Past Medical History:   Diagnosis Date    BPH (benign prostatic hyperplasia)     39 days radiation treatment    COPD (chronic obstructive pulmonary disease)     Coronary artery disease     CABG x4 in 2017    Diabetes mellitus     History of Arterial Duplex of LE 12/26/2017    Likely occlusion of the left superficial femoral artery  Calcific changes bilaterally  Despite these changes, the ankle-brachial index as a measure of peripheral blood flow only mildly impaired   History of echocardiogram 06/12/2017    EF 40%, mild LVH, mild MR     Hyperlipidemia     Hypertension     Ischemic cardiomyopathy     NSTEMI (non-ST elevated myocardial infarction)     Prostate cancer     prostate     PVD (peripheral vascular disease)     Sepsis due to pneumonia     Tremor     Type 2 MI (myocardial infarction) ([facility] [address] ) 04/06/2021       Past Surgical History:   Procedure Laterality Date    CARDIAC CATHETERIZATION  03/08/2017    Significant left main plus triple-vessel CAD   CORONARY ARTERY BYPASS GRAFT  03/08/2017    4V CABG:  LIMA to LAD, VG to RI, SVG to PDA to LVBR RCA   EYE SURGERY      shots in eye once a month @ the [address] [place] [address]         Meds/Allergies:  Prior to Admission medications    Medication Sig Start Date End Date Taking?  Authorizing Provider albuterol (PROVENTIL HFA,VENTOLIN HFA) 90 mcg/act inhaler Inhale 2 puffs every 6 (six) hours as needed for wheezing or shortness of breath 1/23/22   [facility]   arformoterol (BROVANA) 15 mcg/2 mL nebulizer solution Take 2 mL (15 mcg total) by nebulization 2 (two) times a day 8/1/22   Dar Olivera MD   aspirin (ECOTRIN LOW STRENGTH) 81 mg EC tablet Take 1 tablet (81 mg total) by mouth every other day 9/3/20   PENG Lr   budesonide (Pulmicort) 0 5 mg/2 mL nebulizer solution Take 2 mL (0 5 mg total) by nebulization 2 (two) times a day Rinse mouth after use  8/1/22   Dar Olivera MD   cyanocobalamin (VITAMIN B-12) 500 MCG tablet TAKE ONE TABLET BY MOUTH EVERY MORNING *VITAMIN B12* 11/8/21   Historical Provider, MD   digoxin (LANOXIN) 0 125 mg tablet Take 1 tablet (125 mcg total) by mouth daily 6/16/22   Bob Lovett DO   furosemide (LASIX) 40 mg tablet Take 1 tablet (40 mg total) by mouth daily Take once daily for weight greater than 175 lbs 8/16/22   Faviola Mercer MD   gabapentin (NEURONTIN) 300 mg capsule Take 1 capsule (300 mg total) by mouth daily at bedtime 6/29/22   Bob Lovett DO   glimepiride (AMARYL) 1 mg tablet Take 2 tablets (2 mg total) by mouth daily with breakfast AND 1 tablet (1 mg total) daily with dinner   4/22/22   Bob Lovett DO   ipratropium (ATROVENT) 0 02 % nebulizer solution Take 2 5 mL (0 5 mg total) by nebulization 3 (three) times a day 8/1/22   Dar Olivera MD   lisinopril (ZESTRIL) 20 mg tablet Take 1 tablet (20 mg total) by mouth daily 8/15/22   Bob Lovett DO   metFORMIN (GLUCOPHAGE) 500 mg tablet Take 1 tablet (500 mg total) by mouth 2 (two) times a day with meals 8/24/22   Bob Lovett DO   metoprolol tartrate (LOPRESSOR) 25 mg tablet Take 1 tablet (25 mg total) by mouth every 12 (twelve) hours 3/29/22   Bob Lovett, DO   Multiple Vitamins-Minerals (PRESERVISION AREDS 2 PO) Take by mouth    Historical Provider, MD   potassium chloride (MICRO-K) 10 MEQ CR capsule Take 2 capsules (20 mEq total) by mouth daily Take with Lasix  If Lasix is not being taken, do not take potassium 22   Arsalan Dominguez MD   primidone (MYSOLINE) 50 mg tablet Take 100 mg by mouth 2 (two) times a day     Historical Provider, MD   roflumilast (Daliresp) 500 mcg tablet Take 1 tablet (500 mcg total) by mouth daily 22  Spring Caceres MD   rosuvastatin (CRESTOR) 10 MG tablet Take 1 tablet (10 mg total) by mouth daily 22   Gris Dsouza DO   terbinafine (LamISIL) 1 % cream APPLY THIN LAYER TOPICALLY TWICE A DAY FOR FUNGAL INFECTION 22   Historical Provider, MD     I have reviewed home medications with patient personally  Allergies:    Allergies   Allergen Reactions    Atorvastatin Myalgia       Social History:  Marital Status: /Civil Union   Occupation: none   Patient Pre-hospital Living Situation: Home  Patient Pre-hospital Level of Mobility: walks  Patient Pre-hospital Diet Restrictions: none   Substance Use History:   Social History     Substance and Sexual Activity   Alcohol Use Yes     Social History     Tobacco Use   Smoking Status Former Smoker    Packs/day: 0 25    Years: 60 00    Pack years: 15 00    Types: Cigarettes    Quit date: 2022    Years since quittin 4   Smokeless Tobacco Never Used     Social History     Substance and Sexual Activity   Drug Use Never       Family History:  Family History   Problem Relation Age of Onset    Cancer Father     Heart disease Brother        Physical Exam:     Vitals:   Blood Pressure: 129/61 (220)  Pulse: 100 (22)  Temperature: 98 4 °F (36 9 °C) (22)  Respirations: (!) 24 (22)  Height: 5' 10 5" (179 1 cm) (22)  Weight - Scale: 75 8 kg (167 lb 1 7 oz) (22)  SpO2: 98 % (22)    Physical Exam  Vitals and nursing note reviewed  Constitutional:       General: He is not in acute distress  Appearance: Normal appearance  He is well-developed  He is not ill-appearing  HENT:      Head: Normocephalic and atraumatic  Nose: Nose normal  No congestion or rhinorrhea  Mouth/Throat:      Mouth: Mucous membranes are moist       Pharynx: Oropharynx is clear  No oropharyngeal exudate or posterior oropharyngeal erythema  Eyes:      General: No scleral icterus  Right eye: No discharge  Left eye: No discharge  Extraocular Movements: Extraocular movements intact  Conjunctiva/sclera: Conjunctivae normal       Pupils: Pupils are equal, round, and reactive to light  Cardiovascular:      Rate and Rhythm: Regular rhythm  Tachycardia present  Pulses: Normal pulses  Heart sounds: Normal heart sounds  No murmur heard  No friction rub  No gallop  Pulmonary:      Effort: Pulmonary effort is normal  Tachypnea present  No respiratory distress  Breath sounds: Examination of the right-middle field reveals rales  Examination of the left-middle field reveals rales  Examination of the right-lower field reveals decreased breath sounds and rales  Examination of the left-lower field reveals decreased breath sounds and rales  Decreased breath sounds and rales present  No wheezing or rhonchi  Abdominal:      General: Abdomen is flat  Bowel sounds are normal  There is no distension  Palpations: Abdomen is soft  Tenderness: There is no abdominal tenderness  There is no guarding or rebound  Musculoskeletal:      Cervical back: Neck supple  Right lower le+ Edema present  Left lower leg: No edema  Skin:     General: Skin is warm and dry  Capillary Refill: Capillary refill takes less than 2 seconds  Neurological:      General: No focal deficit present  Mental Status: He is alert and oriented to person, place, and time  Mental status is at baseline        Cranial Nerves: No cranial nerve deficit  Motor: No weakness  Coordination: Coordination normal    Psychiatric:         Mood and Affect: Mood normal          Behavior: Behavior normal           Additional Data:     Lab Results:  Results from last 7 days   Lab Units 09/20/22  0254 09/20/22  0047   WBC Thousand/uL  --  10 27*   HEMOGLOBIN g/dL  --  12 1   HEMATOCRIT %  --  36 5   PLATELETS Thousands/uL 215 231   NEUTROS PCT %  --  76*   LYMPHS PCT %  --  12*   MONOS PCT %  --  7   EOS PCT %  --  3     Results from last 7 days   Lab Units 09/20/22  0047   SODIUM mmol/L 144   POTASSIUM mmol/L 3 6   CHLORIDE mmol/L 103   CO2 mmol/L 30   BUN mg/dL 16   CREATININE mg/dL 0 91   ANION GAP mmol/L 11   CALCIUM mg/dL 8 6   ALBUMIN g/dL 3 6   TOTAL BILIRUBIN mg/dL 0 31   ALK PHOS U/L 118*   ALT U/L 12   AST U/L 9   GLUCOSE RANDOM mg/dL 110                       Imaging: Personally reviewed the following imaging: chest xray  XR chest portable    (Results Pending)       EKG and Other Studies Reviewed on Admission:   · EKG: Sinus Tachycardia    No evidence of ST elevations suggestive of STEMI on 12 Lead EKG     ** Please Note: This note has been constructed using a voice recognition system   **

## 2022-09-20 NOTE — CONSULTS
Consult for CHF diet education  Had been unable to educate patient today as he was unavailable or not appropriate for education when RD attempted visits  Will follow up to provide education  See nutrition review note in nutrition documentation for detail  No recommendations at this time  Thank you for the consult  6

## 2022-09-20 NOTE — PROGRESS NOTES
Patient came into the sleep lab for a diagnostic test referred by pulmonary disease  Once patient was hooked up he had heart rates starting in the low 100's however the heart rate increased to 120's-130's at rest   Patient was having periods of shortness of breath  The on-call pulmonologist was messaged and it was recommended that he go to the ER for further testing  Test was ended and patient was sent to the ER  I did ask the patient if he would like me to call his family to let them know he was going to the ER, but he said no, they would be in bed

## 2022-09-20 NOTE — NURSING NOTE
Pt requesting bipap be removed  Bipap removed  Placed on [facility]  RR 18-20  O2 sat 98-99%  Respirations easy and nonlabored

## 2022-09-20 NOTE — ASSESSMENT & PLAN NOTE
· Patient presented meeting SIRS criteria for tachycardia, tachypnea  · Patient does have slightly elevated white count at 10 27  · Tachycardia and tachypnea improving following administration of Lasix  · Currently very low suspicion for infection at this time    Chest x-ray does not appear to show any evidence of pneumonia  · Will check urinalysis  · No evidence of any skin infection noted on exam, history  · At this time no antibiotics indicated  · Continue to monitor white blood cells and for signs of any infection

## 2022-09-20 NOTE — ASSESSMENT & PLAN NOTE
Wt Readings from Last 3 Encounters:   09/20/22 75 8 kg (167 lb 1 7 oz)   09/12/22 79 4 kg (175 lb)   08/29/22 79 9 kg (176 lb 3 2 oz)   · Patient presented from [facility] sleep lab with acute worsening of shortness of breath as well as tachycardia  · ProBNP elevated at 3924  · Chest x-ray appears to show evidence of pulmonary vascular congestion  Will await official radiology read  · Patient initially treated with IV Lasix in the ED which improved symptoms dramatically  · Will continue with Lasix 40 mg IV b i d    · Strict input output  · Daily standing weights  · Cardiac diet  · Cardiology consult  · Echocardiogram ordered  · Patient placed on BiPAP to improve respiratory rate as well as to treat pulmonary edema  · 48 hour tele

## 2022-09-20 NOTE — PLAN OF CARE
Problem: CARDIOVASCULAR - ADULT  Goal: Maintains optimal cardiac output and hemodynamic stability  Description: INTERVENTIONS:  - Monitor I/O, vital signs and rhythm  - Monitor for S/S and trends of decreased cardiac output  - Administer and titrate ordered vasoactive medications to optimize hemodynamic stability  - Assess quality of pulses, skin color and temperature  - Assess for signs of decreased coronary artery perfusion  - Instruct patient to report change in severity of symptoms  Outcome: Progressing     Problem: RESPIRATORY - ADULT  Goal: Achieves optimal ventilation and oxygenation  Description: INTERVENTIONS:  - Assess for changes in respiratory status  - Assess for changes in mentation and behavior  - Position to facilitate oxygenation and minimize respiratory effort  - Oxygen administered by appropriate delivery if ordered  - Initiate smoking cessation education as indicated  - Encourage broncho-pulmonary hygiene including cough, deep breathe, Incentive Spirometry  - Assess the need for suctioning and aspirate as needed  - Assess and instruct to report SOB or any respiratory difficulty  - Respiratory Therapy support as indicated  Outcome: Progressing     Problem: PAIN - ADULT  Goal: Verbalizes/displays adequate comfort level or baseline comfort level  Description: Interventions:  - Encourage patient to monitor pain and request assistance  - Assess pain using appropriate pain scale  - Administer analgesics based on type and severity of pain and evaluate response  - Implement non-pharmacological measures as appropriate and evaluate response  - Consider cultural and social influences on pain and pain management  - Notify physician/advanced practitioner if interventions unsuccessful or patient reports new pain  Outcome: Progressing     Problem: INFECTION - ADULT  Goal: Absence or prevention of progression during hospitalization  Description: INTERVENTIONS:  - Assess and monitor for signs and symptoms of infection  - Monitor lab/diagnostic results  - Monitor all insertion sites, i e  indwelling lines, tubes, and drains  - Monitor endotracheal if appropriate and nasal secretions for changes in amount and color  - [place] appropriate cooling/warming therapies per order  - Administer medications as ordered  - Instruct and encourage patient and family to use good hand hygiene technique  - Identify and instruct in appropriate isolation precautions for identified infection/condition  Outcome: Progressing  Goal: Absence of fever/infection during neutropenic period  Description: INTERVENTIONS:  - Monitor WBC    Outcome: Progressing     Problem: SAFETY ADULT  Goal: Patient will remain free of falls  Description: INTERVENTIONS:  - Educate patient/family on patient safety including physical limitations  - Instruct patient to call for assistance with activity   - Consult OT/PT to assist with strengthening/mobility   - Keep Call bell within reach  - Keep bed low and locked with side rails adjusted as appropriate  - Keep care items and personal belongings within reach  - Initiate and maintain comfort rounds  - Make Fall Risk Sign visible to staff  - Offer Toileting every 2 Hours, in advance of need  - Initiate/Maintain bed/chair alarm  - Apply yellow socks and bracelet for high fall risk patients  - Consider moving patient to room near nurses station  Outcome: Progressing  Goal: Maintain or return to baseline ADL function  Description: INTERVENTIONS:  -  Assess patient's ability to carry out ADLs; assess patient's baseline for ADL function and identify physical deficits which impact ability to perform ADLs (bathing, care of mouth/teeth, toileting, grooming, dressing, etc )  - Assess/evaluate cause of self-care deficits   - Assess range of motion  - Assess patient's mobility; develop plan if impaired  - Assess patient's need for assistive devices and provide as appropriate  - Encourage maximum independence but intervene and supervise when necessary  - Involve family in performance of ADLs  - Assess for home care needs following discharge   - Consider OT consult to assist with ADL evaluation and planning for discharge  - Provide patient education as appropriate  Outcome: Progressing  Goal: Maintains/Returns to pre admission functional level  Description: INTERVENTIONS:  - Perform BMAT or MOVE assessment daily    - Set and communicate daily mobility goal to care team and patient/family/caregiver  - Collaborate with rehabilitation services on mobility goals if consulted  - Perform Range of Motion 3-4 times a day  - Reposition patient every 2 hours  - Dangle patient 3 times a day  - Stand patient 3 times a day  - Ambulate patient 3 times a day  - Out of bed to chair 3 times a day   - Out of bed for meals 3 times a day  - Out of bed for toileting  - Record patient progress and toleration of activity level   Outcome: Progressing     Problem: DISCHARGE PLANNING  Goal: Discharge to home or other facility with appropriate resources  Description: INTERVENTIONS:  - Identify barriers to discharge w/patient and caregiver  - Arrange for needed discharge resources and transportation as appropriate  - Identify discharge learning needs (meds, wound care, etc )  - Arrange for interpretive services to assist at discharge as needed  - Refer to Case Management Department for coordinating discharge planning if the patient needs post-hospital services based on physician/advanced practitioner order or complex needs related to functional status, cognitive ability, or social support system  Outcome: Progressing     Problem: Knowledge Deficit  Goal: Patient/family/caregiver demonstrates understanding of disease process, treatment plan, medications, and discharge instructions  Description: Complete learning assessment and assess knowledge base    Interventions:  - Provide teaching at level of understanding  - Provide teaching via preferred learning methods  Outcome: Progressing

## 2022-09-20 NOTE — PHYSICAL THERAPY NOTE
Physical Therapy Evaluation    Patient Name: Mahogany LANG Date: 9/20/2022     Problem List  Principal Problem:    Acute on chronic combined systolic and diastolic congestive heart failure ([facility] )  Active Problems:    Coronary artery disease involving native coronary artery of native heart without angina pectoris    Other hyperlipidemia    COPD (chronic obstructive pulmonary disease) ([facility] )    Type 2 diabetes mellitus with diabetic neuropathy, unspecified (HCC)    PAF (paroxysmal atrial fibrillation) ([facility] )    SIRS (systemic inflammatory response syndrome) ([facility] )       Past Medical History  Past Medical History:   Diagnosis Date    BPH (benign prostatic hyperplasia)     45 days radiation treatment    COPD (chronic obstructive pulmonary disease)     Coronary artery disease     CABG x4 in 2017    Diabetes mellitus     History of Arterial Duplex of LE 12/26/2017    Likely occlusion of the left superficial femoral artery  Calcific changes bilaterally  Despite these changes, the ankle-brachial index as a measure of peripheral blood flow only mildly impaired  History of echocardiogram 06/12/2017    EF 40%, mild LVH, mild MR  Hyperlipidemia     Hypertension     Ischemic cardiomyopathy     NSTEMI (non-ST elevated myocardial infarction)     Prostate cancer     prostate     PVD (peripheral vascular disease)     Sepsis due to pneumonia     Tremor     Type 2 MI (myocardial infarction) ([facility] ) 04/06/2021        Past Surgical History  Past Surgical History:   Procedure Laterality Date    CARDIAC CATHETERIZATION  03/08/2017    Significant left main plus triple-vessel CAD  CORONARY ARTERY BYPASS GRAFT  03/08/2017    4V CABG:  LIMA to LAD, VG to RI, SVG to PDA to LVBR RCA      EYE SURGERY      shots in eye once a month @ the [address]          09/20/22 0920   PT Last Visit   PT Visit Date 09/20/22   Note Type   Note type Evaluation   Pain Assessment Pain Assessment Tool 0-10   Pain Score No Pain   Restrictions/Precautions   Weight Bearing Precautions Per Order No   Other Precautions Fall Risk;O2;Multiple lines;Telemetry; Chair Alarm   Home Living   Type of [address] Two level;Bed/bath upstairs;Stairs to enter with rails  (4 BEATRIZ c HR)   Bathroom Shower/Tub Tub/shower unit   Bathroom Toilet Standard   Bathroom Equipment Grab bars in shower; Shower chair;Commode   Bathroom Accessibility Accessible   Home Equipment Cane   Additional Comments pt reports community use of cane   Prior Function   Level of [place] Independent with ADLs and functional mobility; Needs assistance with IADLs   Lives With Spouse   Receives Help From Family   ADL Assistance Independent   IADLs Needs assistance   Falls in the last 6 months 0   Vocational Retired   Comments family drives   General   Family/Caregiver Present No   Cognition   Overall Cognitive Status WFL   Arousal/Participation Alert   Orientation Level Oriented X4   Following Commands Follows all commands and directions without difficulty   Comments pt pleasant and cooperative   Subjective   Subjective "I should call my wife"   RLE Assessment   RLE Assessment WFL   LLE Assessment   LLE Assessment WFL   Bed Mobility   Additional Comments pt EOB at start of session; OOB at end of session   Transfers   Sit to Stand 5  Supervision   Additional items Increased time required;Verbal cues   Stand to Sit 5  Supervision   Additional items Increased time required;Verbal cues   Additional Comments SPC used   Ambulation/Elevation   Gait pattern Excessively slow; Short stride; Improper Weight shift;Decreased foot clearance   Gait Assistance 5  Supervision   Additional items Verbal cues   Assistive Device [facility]   Distance 200'   Balance   Static Sitting Good   Dynamic Sitting Fair +   Static Standing Fair   Dynamic Standing [id number] -  (with SPC)   Endurance Deficit   Endurance Deficit Yes   Activity Tolerance Activity Tolerance Patient limited by fatigue   Assessment   Prognosis Good   Problem List Decreased strength;Decreased endurance; Impaired balance;Decreased mobility   Assessment Patient is a 69 y o  male evaluated by Physical Therapy s/p admit to [address] [facility] on 9/20/2022 with admitting diagnosis of: CHF (congestive heart failure), Dyspnea, SOB (shortness of breath), Acute on chronic respiratory failure with hypoxia, and principal problem of: Acute on chronic combined systolic and diastolic congestive heart failure  PT was consulted to assess patient's functional mobility and discharge needs  Ordered are PT Evaluation and treatment with activity level of: up as tolerated  Comorbidities affecting patient's physical performance at time of assessment include: BPH, HTN, CAD, hx of prostate cancer, PVD, COPD, DM  Personal factors affecting the patient at time of IE include: lives in 2 story home, ambulating with assistive device, step(s) to enter home and inability/difficulty performing IADLs  Please locate objective findings from PT assessment regarding body systems outlined above  Upon evaluation, pt able to perform all functional mobility with SUP, SPC, and increased time  Occasional verbal cuing provided for sequencing and direction  Pt able to ambulate ~200' before taking seated rest break  Moderate postural sway demonstrated with mobility, however no true LOB experienced  HR and SpO2 remained WFL on 2L O2 throughout  The patient's AM-PAC Basic Mobility Inpatient Short Form Raw Score is 20  A Raw score of greater than 16 suggests the patient may benefit from discharge to home  Please also refer to the recommendation of the Physical Therapist for safe discharge planning   Co treatment with OT secondary to complex medical condition of pt, possible A of 2 required to achieve and maintain transitional movements, requiring the need of skilled therapeutic intervention of 2 therapists to achieve delivery of services  Pt will benefit from continued PT intervention during LOS to address current deficits, increase LOF, and facilitate safe d/c to next level of care when medically appropriate  D/c recommendation at this time is home with outpatient rehabilitation  Goals   Patient Goals to go home   LTG Expiration Date 10/04/22   Long Term Goal #1 Pt will participate in B LE strengthening exercises to facilitate improved functional activity tolerance  Pt will perform all functional transfers and bed mobility mod(I) with good safety awareness  Pt will ambulate 250' mod(I) with LRAD while maintaining good functional dynamic balance  Pt will ascend/descend a FFOS with HR and SUP to reflect the ability to safely navigate the home  Plan   Treatment/Interventions Functional transfer training;LE strengthening/ROM; Elevations; Therapeutic exercise; Endurance training;Bed mobility;Gait training   PT Frequency 3-5x/wk   Recommendation   PT Discharge Recommendation Home with outpatient rehabilitation   AM-PAC Basic Mobility Inpatient   Turning in Bed Without Bedrails 4   Lying on Back to Sitting on Edge of Flat Bed 4   Moving Bed to Chair 3   Standing Up From Chair 3   Walk in Room 3   Climb 3-5 Stairs 3   Basic Mobility Inpatient Raw Score 20   Basic Mobility Standardized Score 43 99   Highest Level Of Mobility   JH-HLM Goal 6: Walk 10 steps or more   JH-HLM Achieved 7: Walk 25 feet or more   End of Consult   Patient Position at End of Consult Bedside chair;Bed/Chair alarm activated; All needs within reach

## 2022-09-20 NOTE — ASSESSMENT & PLAN NOTE
· Patient has known history of CAD status post CABG x4  · Patient has had troponin elevation since arrival to ED from   Similar to past hospitalization for CHF  Suspect troponin elevation is due to increased demand from CHF exacerbation    · Patient denies any chest pain at this time  · Will administer aspirin 325  · Continue aspirin 81  · Continue metoprolol tartrate 25 mg oral b i d   · Continue digoxin 125 mcg oral daily  · Trend troponin  · Cardiology consult

## 2022-09-20 NOTE — RESPIRATORY THERAPY NOTE
Post nebulizer treatment, pt sat up on the side of the bed, in the tripod position, complaining of SOB  Pt appeared in distress  Pt on 2L NC with SpO2 of 98%, RR of 30 and HR of 118  Pre treatment Hr of 88  Dr Mara Roldan made aware and ICU RNLouis  Bipap ordered for pt, and pt agreed to try it again  Pt placed on Bipap of 12/6x30%  RR, WOB and HR decreased  Plan to maintain pt on Bipap at current time and reassess for trial off

## 2022-09-20 NOTE — PLAN OF CARE
Problem: OCCUPATIONAL THERAPY ADULT  Goal: Performs self-care activities at highest level of function for planned discharge setting  See evaluation for individualized goals  Description: Treatment Interventions: ADL retraining, Functional transfer training, UE strengthening/ROM, Endurance training, Patient/family training, Equipment evaluation/education, Activityengagement          See flowsheet documentation for full assessment, interventions and recommendations  Note: Limitation: Decreased ADL status, Decreased UE strength, Decreased Safe judgement during ADL, Decreased endurance, Decreased self-care trans, Decreased high-level ADLs     Assessment: Pt is a 69 y o  male seen for OT evaluation s/p admit to [facility] on 9/20/2022 w/ Acute on chronic combined systolic and diastolic congestive heart failure ([facility] )  Comorbidities affecting pt's functional performance at time of assessment include: BPH, HTN, CAD, prostate CA, MI, hyperlipidemia, tremor, COPD, sepsis  Personal factors affecting pt at time of IE include:steps to enter environment, difficulty performing ADLS, difficulty performing IADLS  and health management   Prior to admission, pt was (I) with ADLs and (A) with IADLs with use of SPC during mobility  Upon evaluation: Pt requires (S) level with use of SPC during mobility 2* the following deficits impacting occupational performance: weakness, decreased strength, decreased balance, decreased tolerance, impaired initiation, decreased safety awareness and impaired interpersonal skills  Pt to benefit from continued skilled OT tx while in the hospital to address deficits as defined above and maximize level of functional independence w ADL's and functional mobility  Occupational Performance areas to address include: grooming, bathing/shower, toilet hygiene, dressing, functional mobility, community mobility and clothing management   The patient's raw score on the AM-PAC Daily Activity inpatient short form is 21, standardized score is 44 27, greater than 39 4  Patients at this level are likely to benefit from discharge to home  Please refer to the recommendation of the Occupational Therapist for safe discharge planning  Pt benefited from co-evaluation of skilled OT and PT therapists in order to most appropriately address functional deficits d/t extensive assistance required for safe functional mobility, decreased activity tolerance, and regression from functioning level prior to admission and/or onset of present illness  OT/PT objectives were addressed separately; please see PT note for specific goal areas targeted       OT Discharge Recommendation: Home with outpatient rehabilitation

## 2022-09-20 NOTE — OCCUPATIONAL THERAPY NOTE
Occupational Therapy Evaluation     Patient Name: Mahogany Boogie  QIZRV'P Date: 9/20/2022  Problem List  Principal Problem:    Acute on chronic combined systolic and diastolic congestive heart failure ([facility] )  Active Problems:    Coronary artery disease involving native coronary artery of native heart without angina pectoris    Other hyperlipidemia    COPD (chronic obstructive pulmonary disease) ([facility] )    Type 2 diabetes mellitus with diabetic neuropathy, unspecified (HCC)    PAF (paroxysmal atrial fibrillation) ([facility] )    SIRS (systemic inflammatory response syndrome) ([facility] )    Past Medical History  Past Medical History:   Diagnosis Date    BPH (benign prostatic hyperplasia)     45 days radiation treatment    COPD (chronic obstructive pulmonary disease)     Coronary artery disease     CABG x4 in 2017    Diabetes mellitus     History of Arterial Duplex of LE 12/26/2017    Likely occlusion of the left superficial femoral artery  Calcific changes bilaterally  Despite these changes, the ankle-brachial index as a measure of peripheral blood flow only mildly impaired  History of echocardiogram 06/12/2017    EF 40%, mild LVH, mild MR  Hyperlipidemia     Hypertension     Ischemic cardiomyopathy     NSTEMI (non-ST elevated myocardial infarction)     Prostate cancer     prostate     PVD (peripheral vascular disease)     Sepsis due to pneumonia     Tremor     Type 2 MI (myocardial infarction) ([facility] ) 04/06/2021     Past Surgical History  Past Surgical History:   Procedure Laterality Date    CARDIAC CATHETERIZATION  03/08/2017    Significant left main plus triple-vessel CAD  CORONARY ARTERY BYPASS GRAFT  03/08/2017    4V CABG:  LIMA to LAD, VG to RI, SVG to PDA to LVBR RCA      EYE SURGERY      shots in eye once a month @ the [facility] [address]               09/20/22 0921   OT Last Visit   OT Visit Date 09/20/22   Note Type   Note type Evaluation   Restrictions/Precautions   Weight Bearing Precautions Per Order No Other Precautions Chair Alarm;Multiple lines;Telemetry;O2;Fall Risk   Pain Assessment   Pain Score No Pain   Home Living   Type of [address] [facility] Two level;Bed/bath upstairs;Stairs to enter with rails; Other (Comment)  (4 BEATRIZ c HR; 14 steps to 2nd c HR)   Bathroom Shower/Tub Tub/shower unit   Bathroom Toilet Standard   Bathroom Equipment Grab bars in shower; Shower chair;Commode   [address] Walker;Cane;Grab bars   Additional Comments pt reports use of SPC at baseline during functional mobility   Prior Function   Level of [place] Independent with ADLs and functional mobility; Needs assistance with IADLs   Lives With Spouse   Receives Help From Family   ADL Assistance Independent   IADLs Needs assistance   Falls in the last 6 months 0   Vocational Retired   Comments pt's family drives   Psychosocial   Psychosocial (WDL) WDL   Subjective   Subjective "I was getting a sleep study"   ADL   Where Assessed Edge of bed   LB Dressing Assistance 5  Supervision/Setup   LB Dressing Deficit Don/doff R shoe;Don/doff L shoe   Toileting Assistance  5  Supervision/Setup   Toileting Deficit Use of bedpan/urinal setup   Additional Comments pt performs seated EOB with (S) level with use of urinal as well as shoe donning   Bed Mobility   Additional Comments pt seated EOB at start of session and in chair at end of session; SpO2 >90% throughout session on 2L O2-which is pt baseline O2 requirement   Transfers   Sit to Stand 5  Supervision   Additional items Increased time required;Verbal cues   Stand to Sit 5  Supervision   Additional items Increased time required;Verbal cues   Additional Comments pt performs functional transfers with [facility] and (S) lev ; no significant LOB, however mild instability initially during functional mobility   Functional Mobility   Functional Mobility 5  Supervision   Additional Comments pt performs ~200ft with no significant LOB, however minor instability with self-correction; utilizes [facility]   Additional items SPC   Balance   Static Sitting Good   Dynamic Sitting Fair +   Static Standing Fair   Dynamic Standing Fair   Ambulatory Fair -   Activity Tolerance   Activity Tolerance Patient limited by fatigue   RUE Assessment   RUE Assessment WFL   LUE Assessment   LUE Assessment WFL   Hand Function   Gross Motor Coordination Functional   Fine Motor Coordination Functional   Sensation   Light Touch No apparent deficits   Sharp/Dull No apparent deficits   Cognition   Overall Cognitive Status WFL   Arousal/Participation Alert   Attention Within functional limits   Orientation Level Oriented X4   Memory Within functional limits   Following Commands Follows all commands and directions without difficulty   Assessment   Limitation Decreased ADL status; Decreased UE strength;Decreased Safe judgement during ADL;Decreased endurance;Decreased self-care trans;Decreased high-level ADLs   Assessment Pt is a 69 y o  male seen for OT evaluation s/p admit to [facility] on 9/20/2022 w/ Acute on chronic combined systolic and diastolic congestive heart failure ([facility] )  Comorbidities affecting pt's functional performance at time of assessment include: BPH, HTN, CAD, prostate CA, MI, hyperlipidemia, tremor, COPD, sepsis  Personal factors affecting pt at time of IE include:steps to enter environment, difficulty performing ADLS, difficulty performing IADLS  and health management   Prior to admission, pt was (I) with ADLs and (A) with IADLs with use of SPC during mobility  Upon evaluation: Pt requires (S) level with use of SPC during mobility 2* the following deficits impacting occupational performance: weakness, decreased strength, decreased balance, decreased tolerance, impaired initiation, decreased safety awareness and impaired interpersonal skills   Pt to benefit from continued skilled OT tx while in the hospital to address deficits as defined above and maximize level of functional independence w ADL's and functional mobility  Occupational Performance areas to address include: grooming, bathing/shower, toilet hygiene, dressing, functional mobility, community mobility and clothing management  The patient's raw score on the AM-PAC Daily Activity inpatient short form is 21, standardized score is 44 27, greater than 39 4  Patients at this level are likely to benefit from discharge to home  Please refer to the recommendation of the Occupational Therapist for safe discharge planning  Pt benefited from co-evaluation of skilled OT and PT therapists in order to most appropriately address functional deficits d/t extensive assistance required for safe functional mobility, decreased activity tolerance, and regression from functioning level prior to admission and/or onset of present illness  OT/PT objectives were addressed separately; please see PT note for specific goal areas targeted  Goals   Patient Goals to go home   Short Term Goal  pt will perform UE strengthening exercises   Long Term Goal #1 pt will demonstrate functional mobility with SPC at mod (I) level   Long Term Goal #2 pt will demonstrate UB/LB bathing and grooming tasks at (I) level   Long Term Goal pt will demonstrate toilet transfers and hygiene at (I) level   Plan   Treatment Interventions ADL retraining;Functional transfer training;UE strengthening/ROM; Endurance training;Patient/family training;Equipment evaluation/education; Activityengagement   Goal Expiration Date 10/04/22   OT Frequency 3-5x/wk   Recommendation   OT Discharge Recommendation Home with outpatient rehabilitation   [facility] Daily Activity Inpatient   Lower Body Dressing 3   Bathing 3   Toileting 3   Upper Body Dressing 4   Grooming 4   Eating 4   Daily Activity Raw Score 21   Daily Activity Standardized Score (Calc for Raw Score >=11) 44 27   AM-PAC Applied Cognition Inpatient   Following a Speech/Presentation 4   Understanding Ordinary Conversation 4   Taking Medications 4   Remembering Where Things Are Placed or Put Away 4   Remembering List of 4-5 Errands 4   Taking Care of Complicated Tasks 4   Applied Cognition Raw Score 24   Applied Cognition Standardized Score 62 21

## 2022-09-21 PROBLEM — R77.8 ELEVATED TROPONIN: Status: ACTIVE | Noted: 2022-09-21

## 2022-09-21 LAB
ANION GAP SERPL CALCULATED.3IONS-SCNC: 7 MMOL/L (ref 4–13)
BASOPHILS # BLD AUTO: 0.06 THOUSANDS/ΜL (ref 0–0.1)
BASOPHILS NFR BLD AUTO: 1 % (ref 0–1)
BUN SERPL-MCNC: 16 MG/DL (ref 5–25)
CALCIUM SERPL-MCNC: 8.5 MG/DL (ref 8.3–10.1)
CHLORIDE SERPL-SCNC: 102 MMOL/L (ref 96–108)
CO2 SERPL-SCNC: 32 MMOL/L (ref 21–32)
CREAT SERPL-MCNC: 0.82 MG/DL (ref 0.6–1.3)
EOSINOPHIL # BLD AUTO: 0.31 THOUSAND/ΜL (ref 0–0.61)
EOSINOPHIL NFR BLD AUTO: 5 % (ref 0–6)
ERYTHROCYTE [DISTWIDTH] IN BLOOD BY AUTOMATED COUNT: 14 % (ref 11.6–15.1)
GFR SERPL CREATININE-BSD FRML MDRD: 85 ML/MIN/1.73SQ M
GLUCOSE SERPL-MCNC: 136 MG/DL (ref 65–140)
GLUCOSE SERPL-MCNC: 196 MG/DL (ref 65–140)
GLUCOSE SERPL-MCNC: 83 MG/DL (ref 65–140)
GLUCOSE SERPL-MCNC: 90 MG/DL (ref 65–140)
GLUCOSE SERPL-MCNC: 93 MG/DL (ref 65–140)
HCT VFR BLD AUTO: 34.8 % (ref 36.5–49.3)
HGB BLD-MCNC: 11.5 G/DL (ref 12–17)
IMM GRANULOCYTES # BLD AUTO: 0.01 THOUSAND/UL (ref 0–0.2)
IMM GRANULOCYTES NFR BLD AUTO: 0 % (ref 0–2)
LYMPHOCYTES # BLD AUTO: 1.48 THOUSANDS/ΜL (ref 0.6–4.47)
LYMPHOCYTES NFR BLD AUTO: 24 % (ref 14–44)
MAGNESIUM SERPL-MCNC: 1.9 MG/DL (ref 1.6–2.6)
MCH RBC QN AUTO: 29.6 PG (ref 26.8–34.3)
MCHC RBC AUTO-ENTMCNC: 33 G/DL (ref 31.4–37.4)
MCV RBC AUTO: 90 FL (ref 82–98)
MONOCYTES # BLD AUTO: 0.63 THOUSAND/ΜL (ref 0.17–1.22)
MONOCYTES NFR BLD AUTO: 10 % (ref 4–12)
NEUTROPHILS # BLD AUTO: 3.73 THOUSANDS/ΜL (ref 1.85–7.62)
NEUTS SEG NFR BLD AUTO: 60 % (ref 43–75)
NRBC BLD AUTO-RTO: 0 /100 WBCS
PLATELET # BLD AUTO: 223 THOUSANDS/UL (ref 149–390)
PMV BLD AUTO: 11.4 FL (ref 8.9–12.7)
POTASSIUM SERPL-SCNC: 3.8 MMOL/L (ref 3.5–5.3)
RBC # BLD AUTO: 3.88 MILLION/UL (ref 3.88–5.62)
SODIUM SERPL-SCNC: 141 MMOL/L (ref 135–147)
WBC # BLD AUTO: 6.22 THOUSAND/UL (ref 4.31–10.16)

## 2022-09-21 PROCEDURE — 80048 BASIC METABOLIC PNL TOTAL CA: CPT | Performed by: FAMILY MEDICINE

## 2022-09-21 PROCEDURE — 94640 AIRWAY INHALATION TREATMENT: CPT

## 2022-09-21 PROCEDURE — 85025 COMPLETE CBC W/AUTO DIFF WBC: CPT | Performed by: FAMILY MEDICINE

## 2022-09-21 PROCEDURE — 97116 GAIT TRAINING THERAPY: CPT

## 2022-09-21 PROCEDURE — 83735 ASSAY OF MAGNESIUM: CPT | Performed by: FAMILY MEDICINE

## 2022-09-21 PROCEDURE — 94760 N-INVAS EAR/PLS OXIMETRY 1: CPT

## 2022-09-21 PROCEDURE — 97530 THERAPEUTIC ACTIVITIES: CPT

## 2022-09-21 PROCEDURE — 99233 SBSQ HOSP IP/OBS HIGH 50: CPT | Performed by: INTERNAL MEDICINE

## 2022-09-21 PROCEDURE — 82948 REAGENT STRIP/BLOOD GLUCOSE: CPT

## 2022-09-21 PROCEDURE — 99232 SBSQ HOSP IP/OBS MODERATE 35: CPT | Performed by: FAMILY MEDICINE

## 2022-09-21 RX ADMIN — FORMOTEROL FUMARATE DIHYDRATE 20 MCG: 20 SOLUTION RESPIRATORY (INHALATION) at 19:58

## 2022-09-21 RX ADMIN — INSULIN LISPRO 2 UNITS: 100 INJECTION, SOLUTION INTRAVENOUS; SUBCUTANEOUS at 11:55

## 2022-09-21 RX ADMIN — HEPARIN SODIUM 5000 UNITS: 5000 INJECTION INTRAVENOUS; SUBCUTANEOUS at 22:25

## 2022-09-21 RX ADMIN — BUDESONIDE 0.5 MG: 0.5 INHALANT ORAL at 19:58

## 2022-09-21 RX ADMIN — FUROSEMIDE 40 MG: 10 INJECTION, SOLUTION INTRAMUSCULAR; INTRAVENOUS at 17:03

## 2022-09-21 RX ADMIN — PRAVASTATIN SODIUM 80 MG: 40 TABLET ORAL at 16:55

## 2022-09-21 RX ADMIN — DIGOXIN 125 MCG: 125 TABLET ORAL at 08:37

## 2022-09-21 RX ADMIN — FUROSEMIDE 40 MG: 10 INJECTION, SOLUTION INTRAMUSCULAR; INTRAVENOUS at 08:37

## 2022-09-21 RX ADMIN — BUDESONIDE 0.5 MG: 0.5 INHALANT ORAL at 09:14

## 2022-09-21 RX ADMIN — IPRATROPIUM BROMIDE 0.5 MG: 0.5 SOLUTION RESPIRATORY (INHALATION) at 19:58

## 2022-09-21 RX ADMIN — IPRATROPIUM BROMIDE 0.5 MG: 0.5 SOLUTION RESPIRATORY (INHALATION) at 13:50

## 2022-09-21 RX ADMIN — GABAPENTIN 300 MG: 300 CAPSULE ORAL at 22:25

## 2022-09-21 RX ADMIN — HEPARIN SODIUM 5000 UNITS: 5000 INJECTION INTRAVENOUS; SUBCUTANEOUS at 05:28

## 2022-09-21 RX ADMIN — FORMOTEROL FUMARATE DIHYDRATE 20 MCG: 20 SOLUTION RESPIRATORY (INHALATION) at 09:14

## 2022-09-21 RX ADMIN — HEPARIN SODIUM 5000 UNITS: 5000 INJECTION INTRAVENOUS; SUBCUTANEOUS at 14:51

## 2022-09-21 RX ADMIN — CARVEDILOL 6.25 MG: 3.12 TABLET, FILM COATED ORAL at 08:37

## 2022-09-21 RX ADMIN — ROFLUMILAST 500 MCG: 250 TABLET ORAL at 08:39

## 2022-09-21 RX ADMIN — PRIMIDONE 100 MG: 50 TABLET ORAL at 20:48

## 2022-09-21 RX ADMIN — IPRATROPIUM BROMIDE 0.5 MG: 0.5 SOLUTION RESPIRATORY (INHALATION) at 09:14

## 2022-09-21 RX ADMIN — CYANOCOBALAMIN TAB 500 MCG 500 MCG: 500 TAB at 08:37

## 2022-09-21 RX ADMIN — PRIMIDONE 100 MG: 50 TABLET ORAL at 08:40

## 2022-09-21 RX ADMIN — CARVEDILOL 6.25 MG: 3.12 TABLET, FILM COATED ORAL at 16:56

## 2022-09-21 NOTE — ASSESSMENT & PLAN NOTE
Wt Readings from Last 3 Encounters:   09/20/22 75 8 kg (167 lb 1 7 oz)   09/12/22 79 4 kg (175 lb)   08/29/22 79 9 kg (176 lb 3 2 oz)   Chronic diuretic regimen Lasix 40 mg as needed for weight over 175  Updated echo reveals:  EF 34%, grade 2 diastolic dysfunction, PA pressure 53 mm Hg,  Day 2 Lasix 40 mg IV b i d    Negative fluid balance, weights declining renal function preserved

## 2022-09-21 NOTE — PHYSICAL THERAPY NOTE
PHYSICAL THERAPY NOTE          Patient Name: Rebeca García  IZOJP'H Date: 9/21/2022 09/21/22 0824   PT Last Visit   PT Visit Date 09/21/22   Note Type   Note Type Treatment   Pain Assessment   Pain Assessment Tool 0-10   Pain Score No Pain   Restrictions/Precautions   Weight Bearing Precautions Per Order No   Other Precautions   (Fall Risk; O2; Multiple lines; Telemetry; Chair Alarm)   General   Family/Caregiver Present No   Cognition   Overall Cognitive Status WFL   Arousal/Participation Alert   Following Commands Follows all commands and directions without difficulty   Subjective   Subjective Agreeable to therapy  Bed Mobility   Supine to Sit 5  Supervision   Additional items Assist x 1;HOB elevated; Increased time required;Verbal cues   Transfers   Sit to Stand 5  Supervision   Additional items Increased time required;Verbal cues   Stand to Sit 5  Supervision   Additional items Verbal cues; Increased time required   Stand pivot 5  Supervision   Additional items Verbal cues   Ambulation/Elevation   Gait pattern   (Excessively slow; Short stride; Improper Weight shift; Decreased foot clearance)   Gait Assistance 5  Supervision   Assistive Device Straight cane   Distance 240'   Balance   Static Sitting Fair +   Dynamic Sitting Fair +   Static Standing Fair   Dynamic Standing Fair   Ambulatory [facility])   Endurance Deficit   Endurance Deficit Yes   Activity Tolerance   Activity Tolerance Patient limited by fatigue   Assessment   Prognosis Good   Problem List   (Decreased strength; Decreased endurance; Impaired balance; Decreased mobility)   Assessment Pt  seen for PT treatment session this date with interventions consisting of bed mobility, transfers and  gait training w/ emphasis on improving pt's ability to ambulate  Pt  Requiring cues for sequence and safety  In comparison to previous session, Pt   With no change in activity tolerance  May benefit from use of RW for balance and stably  Pt is in need of continued activity in PT to improve strength balance endurance mobility transfers and ambulation with return to maximize LOF  From PT/mobility standpoint, recommendation at time of d/c would be OPPT in order to promote return to PLOF and independence  The patient's AM-PAC Basic Mobility Inpatient Short Form Raw Score is 20  A Raw score of greater than 16 suggests the patient may benefit from discharge to home  Please also refer to physical therapy recommendation for safe DC planning  Goals   LTG Expiration Date 10/04/22   Plan   Treatment/Interventions   (Functional transfer training; LE strengthening/ROM; Elevations; Therapeutic exercise;  Endurance training; Bed mobility; Gait training)   Progress Progressing toward goals   PT Frequency 3-5x/wk   Recommendation   PT Discharge Recommendation Home with outpatient rehabilitation   [address] [place] [address] Mobility Inpatient   Turning in Bed Without Bedrails 4   Lying on Back to Sitting on Edge of Flat Bed 4   Moving Bed to Chair 3   Standing Up From Chair 3   Walk in Room 3   Climb 3-5 Stairs 3   Basic Mobility Inpatient Raw Score 20   Basic Mobility Standardized Score 43 99   Highest Level Of Mobility   JH-HLM Goal 6: Walk 10 steps or more   JH-HLM Achieved 7: Walk 25 feet or more   Education   Education Provided Mobility training   Patient Demonstrates verbal understanding   End of Consult   Patient Position at End of Consult Bedside chair;Bed/Chair alarm activated   End of Consult Comments discussed POC with PT

## 2022-09-21 NOTE — ASSESSMENT & PLAN NOTE
· Currently in sinus rhythm  · AV allie blocking regimen with digoxin 0 125 mg daily, Coreg 6 25 mg p o  b i d  · Replaced Toprol-XL with Coreg 6 25 mg b i d

## 2022-09-21 NOTE — RESPIRATORY THERAPY NOTE
Pt given breathing treatment  After the treatment, the pt was tachypneic and immediately requested to go on BiPAP  BiPAP applied and the WOB immediately decreased

## 2022-09-21 NOTE — RESPIRATORY THERAPY NOTE
09/20/22 1900   Inhalation Therapy Tx   $ Inhalation Therapy Performed Yes   $ Pulse Oximetry Spot Check Charge Completed   SpO2 100 %   Pre-Treatment Pulse 102   Pre-Treatment Respirations 22   Duration 15   Breath Sounds Pre-Treatment Bilateral Diminished   Delivery Source UDN   Position Semi Bermudez's   Treatment Tolerance Tolerated well   Resp Comments O2 Weaned from 2 5 LPM to 2 LPM NC

## 2022-09-21 NOTE — PLAN OF CARE
Problem: CARDIOVASCULAR - ADULT  Goal: Maintains optimal cardiac output and hemodynamic stability  Description: INTERVENTIONS:  - Monitor I/O, vital signs and rhythm  - Monitor for S/S and trends of decreased cardiac output  - Administer and titrate ordered vasoactive medications to optimize hemodynamic stability  - Assess quality of pulses, skin color and temperature  - Assess for signs of decreased coronary artery perfusion  - Instruct patient to report change in severity of symptoms  Outcome: Progressing     Problem: RESPIRATORY - ADULT  Goal: Achieves optimal ventilation and oxygenation  Description: INTERVENTIONS:  - Assess for changes in respiratory status  - Assess for changes in mentation and behavior  - Position to facilitate oxygenation and minimize respiratory effort  - Oxygen administered by appropriate delivery if ordered  - Initiate smoking cessation education as indicated  - Encourage broncho-pulmonary hygiene including cough, deep breathe, Incentive Spirometry  - Assess the need for suctioning and aspirate as needed  - Assess and instruct to report SOB or any respiratory difficulty  - Respiratory Therapy support as indicated  Outcome: Progressing     Problem: PAIN - ADULT  Goal: Verbalizes/displays adequate comfort level or baseline comfort level  Description: Interventions:  - Encourage patient to monitor pain and request assistance  - Assess pain using appropriate pain scale  - Administer analgesics based on type and severity of pain and evaluate response  - Implement non-pharmacological measures as appropriate and evaluate response  - Consider cultural and social influences on pain and pain management  - Notify physician/advanced practitioner if interventions unsuccessful or patient reports new pain  Outcome: Progressing     Problem: INFECTION - ADULT  Goal: Absence or prevention of progression during hospitalization  Description: INTERVENTIONS:  - Assess and monitor for signs and symptoms of infection  - Monitor lab/diagnostic results  - Monitor all insertion sites, i e  indwelling lines, tubes, and drains  - Monitor endotracheal if appropriate and nasal secretions for changes in amount and color  - [place] appropriate cooling/warming therapies per order  - Administer medications as ordered  - Instruct and encourage patient and family to use good hand hygiene technique  - Identify and instruct in appropriate isolation precautions for identified infection/condition  Outcome: Progressing  Goal: Absence of fever/infection during neutropenic period  Description: INTERVENTIONS:  - Monitor WBC    Outcome: Progressing     Problem: SAFETY ADULT  Goal: Patient will remain free of falls  Description: INTERVENTIONS:  - Educate patient/family on patient safety including physical limitations  - Instruct patient to call for assistance with activity   - Consult OT/PT to assist with strengthening/mobility   - Keep Call bell within reach  - Keep bed low and locked with side rails adjusted as appropriate  - Keep care items and personal belongings within reach  - Initiate and maintain comfort rounds  - Make Fall Risk Sign visible to staff  - Offer Toileting every 2 Hours, in advance of need  - Initiate/Maintain bed/chair alarm  - Apply yellow socks and bracelet for high fall risk patients  - Consider moving patient to room near nurses station  Outcome: Progressing  Goal: Maintain or return to baseline ADL function  Description: INTERVENTIONS:  -  Assess patient's ability to carry out ADLs; assess patient's baseline for ADL function and identify physical deficits which impact ability to perform ADLs (bathing, care of mouth/teeth, toileting, grooming, dressing, etc )  - Assess/evaluate cause of self-care deficits   - Assess range of motion  - Assess patient's mobility; develop plan if impaired  - Assess patient's need for assistive devices and provide as appropriate  - Encourage maximum independence but intervene and supervise when necessary  - Involve family in performance of ADLs  - Assess for home care needs following discharge   - Consider OT consult to assist with ADL evaluation and planning for discharge  - Provide patient education as appropriate  Outcome: Progressing  Goal: Maintains/Returns to pre admission functional level  Description: INTERVENTIONS:  - Perform BMAT or MOVE assessment daily    - Set and communicate daily mobility goal to care team and patient/family/caregiver  - Collaborate with rehabilitation services on mobility goals if consulted  - Perform Range of Motion 3 times a day  - Reposition patient every 2 hours  - Dangle patient 3 times a day  - Stand patient 3 times a day  - Ambulate patient 3 times a day  - Out of bed to chair 3 times a day   - Out of bed for meals 3 times a day  - Out of bed for toileting  - Record patient progress and toleration of activity level   Outcome: Progressing     Problem: DISCHARGE PLANNING  Goal: Discharge to home or other facility with appropriate resources  Description: INTERVENTIONS:  - Identify barriers to discharge w/patient and caregiver  - Arrange for needed discharge resources and transportation as appropriate  - Identify discharge learning needs (meds, wound care, etc )  - Arrange for interpretive services to assist at discharge as needed  - Refer to Case Management Department for coordinating discharge planning if the patient needs post-hospital services based on physician/advanced practitioner order or complex needs related to functional status, cognitive ability, or social support system  Outcome: Progressing     Problem: Knowledge Deficit  Goal: Patient/family/caregiver demonstrates understanding of disease process, treatment plan, medications, and discharge instructions  Description: Complete learning assessment and assess knowledge base    Interventions:  - Provide teaching at level of understanding  - Provide teaching via preferred learning methods  Outcome: Progressing     Problem: Potential for Falls  Goal: Patient will remain free of falls  Description: INTERVENTIONS:  - Educate patient/family on patient safety including physical limitations  - Instruct patient to call for assistance with activity   - Consult OT/PT to assist with strengthening/mobility   - Keep Call bell within reach  - Keep bed low and locked with side rails adjusted as appropriate  - Keep care items and personal belongings within reach  - Initiate and maintain comfort rounds  - Make Fall Risk Sign visible to staff  - Offer Toileting every 2 Hours, in advance of need  - Initiate/Maintain bed/chair alarm  - Apply yellow socks and bracelet for high fall risk patients  - Consider moving patient to room near nurses station  Outcome: Progressing

## 2022-09-21 NOTE — PLAN OF CARE
Problem: PHYSICAL THERAPY ADULT  Goal: Performs mobility at highest level of function for planned discharge setting  See evaluation for individualized goals  Description: Treatment/Interventions: Functional transfer training, LE strengthening/ROM, Elevations, Therapeutic exercise, Endurance training, Bed mobility, Gait training          See flowsheet documentation for full assessment, interventions and recommendations  Outcome: Progressing  Note: Prognosis: Good  Problem List:  (Decreased strength; Decreased endurance; Impaired balance; Decreased mobility)  Assessment: Pt  seen for PT treatment session this date with interventions consisting of bed mobility, transfers and  gait training w/ emphasis on improving pt's ability to ambulate  Pt  Requiring cues for sequence and safety  In comparison to previous session, Pt  With no change in activity tolerance  May benefit from use of RW for balance and stably  Pt is in need of continued activity in PT to improve strength balance endurance mobility transfers and ambulation with return to maximize LOF  From PT/mobility standpoint, recommendation at time of d/c would be OPPT in order to promote return to PLOF and independence  The patient's AM-PAC Basic Mobility Inpatient Short Form Raw Score is 20  A Raw score of greater than 16 suggests the patient may benefit from discharge to home  Please also refer to physical therapy recommendation for safe DC planning  PT Discharge Recommendation: Home with outpatient rehabilitation    See flowsheet documentation for full assessment

## 2022-09-21 NOTE — UTILIZATION REVIEW
Initial Clinical Review    Admission: Date/Time/Statement:   Admission Orders (From admission, onward)     Ordered        09/20/22 0221  INPATIENT ADMISSION  Once                      Orders Placed This Encounter   Procedures    INPATIENT ADMISSION     Standing Status:   Standing     Number of Occurrences:   1     Order Specific Question:   Level of Care     Answer:   Med Surg [16]     Order Specific Question:   Estimated length of stay     Answer:   More than 2 Midnights     Order Specific Question:   Certification     Answer:   I certify that inpatient services are medically necessary for this patient for a duration of greater than two midnights  See H&P and MD Progress Notes for additional information about the patient's course of treatment  ED Arrival Information     Expected   -    Arrival   9/20/2022 00:39    Acuity   Emergent            Means of arrival   Wheelchair    Escorted by   Self    Service   Hospitalist    Admission type   Emergency            Arrival complaint   -           Chief Complaint   Patient presents with    Shortness of Breath     Pt came from sleep center with complaints of increase in shortness of breath  Wears o2 2L at home, history of COPD       Initial Presentation: 69 y o  male to ED from home w/ acute SOB beginning tonight   Was sent to ED from sleep center , was supposed to undergo sleep study   While lying flat felt more SOB   On arrival RR 28, , /93  Elevated BNP , rising trop   PMHX PAF , CHF , BERONICA, BPH , COPD, DM , CAD w/ CABG   Feels significantly improved SOB following IV lasix  Admitted IP status w/ acute on chronic CHF plan to cont IV lasix , monitor I&O , weights , cardiology consult , echo , placed on bipap to improve resp rate and to treat pulm edema , tele   CAD cont asa, lopressor , digoxin   SIRS low suspicion for infection , check ua   DM SSI and monitor   COPD cont nebs , inhalers        PE: tachycardia, tachypnea, leonel rales, dec BS , + 1 RLE edema 9/20 Cardiology Consult   Acute on chronic HF BNP 3924, cont IV lasix , I&O , TTE pending   Cont daily weights, I&O , fld restriction   Acute on chronic resp failure chronically on 2l , currently on 3l  ST sec to acuity of condition , reduced EF acute CHF   Plan await repeat TTE , cont IV lasix , monitor urine output   Goal net neg 1l in 24 hrs   Cont remainder home meds  Date: 9/21   Day 2: cont iv lasix BID , neg fluid balance , weights declining renal function preserved  Feeling better w/ less SOB   On 2l w/ O2 sat 97-98 %   Lungs clear   Last data filed at 9/21/2022 0913      Gross per 24 hour   Intake 790 ml   Output 1625 ml   Net -835 ml     9/21 Cardiology Note   Acute on chronic systolic and diastolic congestive heart failure: Continue Lasix 40 mg IV q 12h today also  Volume status has improved, still with scattered rales   Echo with an EF around 30%, similar to prior   Sinus tachycardia:  Improved,  Likely compensated reef for his acute CHF   Continue tele   ED Triage Vitals   Temperature Pulse Respirations Blood Pressure SpO2   09/20/22 0045 09/20/22 0043 09/20/22 0043 09/20/22 0043 09/20/22 0043   98 4 °F (36 9 °C) (!) 135 (!) 24 (!) 199/93 91 %      Temp Source Heart Rate Source Patient Position - Orthostatic VS BP Location FiO2 (%)   09/20/22 0700 09/20/22 0043 09/20/22 0043 09/20/22 0043 09/20/22 1600   Temporal Monitor Sitting Left arm 30      Pain Score       09/20/22 0830       No Pain          Wt Readings from Last 1 Encounters:   09/21/22 74 8 kg (164 lb 14 5 oz)     Additional Vital Signs:   09/21/22 0914 -- -- -- -- -- 98 % -- 28 2 L/min 2 L/min Nasal cannula -- --   09/21/22 0836 -- 91 24 Abnormal  152/74 104 97 % -- 28 -- 2 L/min Nasal cannula -- Sitting   09/21/22 0700 97 5 °F (36 4 °C) 89 25 Abnormal  147/67 96 99 % -- -- -- -- Nasal cannula -- Lying   09/21/22 0300 -- -- -- -- -- 98 % -- 28 -- 2 L/min Nasal cannula -- --   09/20/22 2140 -- -- -- -- -- 98 % -- -- -- -- BiPAP -- -- 09/20/22 2137 -- 97 33 Abnormal  -- -- 100 % -- 28 -- 2 L/min Nasal cannula -- --   09/20/22 2023 -- 100 31 Abnormal  148/82 109 100 % -- -- -- -- BiPAP -- Lying   09/20/22 1900 98 7 °F (37 1 °C) 108 Abnormal  18 -- 104 100 % -- 28 -- 2 L/min Nasal cannula -- Sitting   09/20/22 1747 -- 90 18 140/77 103 99 % -- 32 -- 3 L/min Nasal cannula -- Lying   09/20/22 1700 -- 73 21 134/71 96 99 % 30 32 -- 3 L/min BiPAP -- Lying   09/20/22 1628 -- -- -- -- -- 100 % -- -- -- -- -- Full face mask  --   O2 Interface Device: Medium at 09/20/22 1628   09/20/22 1612 -- 84 27 Abnormal  134/74 98 100 % 30 -- -- -- BiPAP -- Lying   09/20/22 1600 -- 101 35 Abnormal  159/87 116 98 % 30 -- -- -- BiPAP -- Lying   09/20/22 1557 -- 102 32 Abnormal  177/101 Abnormal  130 99 % -- 28 -- 2 L/min Nasal cannula -- Lying   09/20/22 1554 97 6 °F (36 4 °C) 110 Abnormal  36 Abnormal  189/98 Abnormal  136 98 % -- 28 -- 2 L/min -- -- Sitting   09/20/22 1536 -- -- -- -- -- 98 % -- -- -- -- -- -- --   09/20/22 1400 -- 84 22 141/73 102 99 % -- 28 -- 2 L/min Nasal cannula -- Lying   09/20/22 1300 -- 80 26 Abnormal  122/63 87 98 % -- 28 -- 2 L/min Nasal cannula -- Lying   09/20/22 1224 -- 85 27 Abnormal  133/79 100 97 % -- 32 -- 3 L/min Nasal cannula -- Lying   09/20/22 1200 -- 75 32 Abnormal  130/77 98 99 % -- 32 -- 3 L/min Nasal cannula -- Sitting   09/20/22 1100 -- 84 25 Abnormal  137/74 100 98 % -- -- -- -- Nasal cannula -- Sitting   09/20/22 1020 -- -- -- -- -- 98 % -- -- -- -- -- -- --   09/20/22 1015 -- 98 22 158/76 109 100 % -- 32 -- 3 L/min Nasal cannula -- Sitting   09/20/22 1000 97 8 °F (36 6 °C) 95 24 Abnormal  162/76 109 98 % -- 32 -- 3 L/min Nasal cannula -- Sitting   09/20/22 0800 -- 94 20 140/80 86 94 % -- 28 -- 2 L/min Nasal cannula -- Lying   09/20/22 0700 97 9 °F (36 6 °C) 96 21 180/81 Abnormal  117 98 % -- -- -- -- BiPAP -- Lying   09/20/22 0600 -- 93 22 150/71 104 98 % -- -- -- -- BiPAP -- Lying   09/20/22 0500 -- 90 22 137/68 96 97 % -- -- -- -- BiPAP -- Lying   09/20/22 0400 -- 100 24 Abnormal  144/69 99 98 % -- -- -- -- BiPAP -- Lying   09/20/22 0330 -- 100 24 Abnormal  129/61 88 98 % -- -- -- -- BiPAP -- Lying   09/20/22 0245 -- 107 Abnormal  22 148/67 96 100 % -- 28 -- 2 L/min Nasal cannula -- Sitting   09/20/22 0243 -- 108 Abnormal  28 Abnormal  -- -- 94 % -- -- -- -- -- --  --   O2 Interface Device: fritz at 09/20/22 0243   09/20/22 0130 -- 122 Abnormal  26 Abnormal  125/61 82 91 % -- 28 -- 2 L/min Nasal cannula -- Sitting   09/20/22 0115 -- 129 Abnormal  33 Abnormal  148/63 91 89 % Abnormal  -- -- -- -- -- -- --   09/20/22 0100 -- 133 Abnormal  24 Abnormal  158/75 106 90 % -- 28 -- 2 L/min None (Room air) -- Lying   09/20/22 0045 98 4 °F (36 9 °C) -- -- -- --               Pertinent Labs/Diagnostic Test Results:   · 9/20 EKG ST   No evidence of ST elevations suggestive of STEMI on 12 Lead EKG     XR chest portable   Final Result by Jayesh Arita MD (09/20 5437)      Mild pulmonary edema with trace effusions                    Workstation performed: [id number]           Results from last 7 days   Lab Units 09/20/22  0815   SARS-COV-2  Negative     Results from last 7 days   Lab Units 09/21/22  0429 09/20/22  0450 09/20/22  0254 09/20/22  0047   WBC Thousand/uL 6 22 8 61  --  10 27*   HEMOGLOBIN g/dL 11 5* 11 1*  --  12 1   HEMATOCRIT % 34 8* 33 9*  --  36 5   PLATELETS Thousands/uL 223 216 215 231   NEUTROS ABS Thousands/µL 3 73 6 49  --  7 92*     Results from last 7 days   Lab Units 09/21/22  0429 09/20/22  0047   SODIUM mmol/L 141 144   POTASSIUM mmol/L 3 8 3 6   CHLORIDE mmol/L 102 103   CO2 mmol/L 32 30   ANION GAP mmol/L 7 11   BUN mg/dL 16 16   CREATININE mg/dL 0 82 0 91   EGFR ml/min/1 73sq m 85 81   CALCIUM mg/dL 8 5 8 6   MAGNESIUM mg/dL 1 9 2 0     Results from last 7 days   Lab Units 09/20/22  0047   AST U/L 9   ALT U/L 12   ALK PHOS U/L 118*   TOTAL PROTEIN g/dL 7 1   ALBUMIN g/dL 3 6   TOTAL BILIRUBIN mg/dL 0 31 Results from last 7 days   Lab Units 09/21/22  0716 09/20/22  2101 09/20/22  1647 09/20/22  1212 09/20/22  0741   POC GLUCOSE mg/dl 83 165* 95 159* 92     Results from last 7 days   Lab Units 09/21/22  0429 09/20/22  0047   GLUCOSE RANDOM mg/dL 90 110     BETA-HYDROXYBUTYRATE   Date Value Ref Range Status   08/14/2022 2 3 (H) <0 6 mmol/L Final     Results from last 7 days   Lab Units 09/20/22  0450 09/20/22  0254 09/20/22  0047   HS TNI 0HR ng/L  --   --  37   HS TNI 2HR ng/L  --  344*  --    HSTNI D2 ng/L  --  307*  --    HS TNI 4HR ng/L 959*  --   --    HSTNI D4 ng/L 922*  --   --      Results from last 7 days   Lab Units 09/20/22  0450   D-DIMER QUANTITATIVE ug/ml FEU 0 56*     Results from last 7 days   Lab Units 09/20/22  0450   PTT seconds 33     Results from last 7 days   Lab Units 09/20/22  0047   TSH 3RD GENERATON uIU/mL 0 913     Results from last 7 days   Lab Units 09/20/22  0047   DIGOXIN LVL ng/mL 1 3     Results from last 7 days   Lab Units 09/20/22  0047   NT-PRO BNP pg/mL 3,924*     Results from last 7 days   Lab Units 09/20/22  0450   CLARITY UA  Clear   COLOR UA  Light Yellow   SPEC GRAV UA  1 015   PH UA  5 5   GLUCOSE UA mg/dl Negative   KETONES UA mg/dl Negative   BLOOD UA  Negative   PROTEIN UA mg/dl Negative   NITRITE UA  Negative   BILIRUBIN UA  Negative   UROBILINOGEN UA E U /dl 0 2   LEUKOCYTES UA  Negative   WBC UA /hpf 0-1*   RBC UA /hpf 0-1*   BACTERIA UA /hpf Occasional   EPITHELIAL CELLS WET PREP /hpf None Seen     Results from last 7 days   Lab Units 09/20/22  0815   INFLUENZA A PCR  Negative   INFLUENZA B PCR  Negative   RSV PCR  Negative       ED Treatment:   Medication Administration from 09/20/2022 0039 to 09/20/2022 0850       Date/Time Order Dose Route Action     09/20/2022 0051 nitroglycerin (NITROSTAT) SL tablet 0 4 mg 0 4 mg Sublingual Given     09/20/2022 0113 furosemide (LASIX) injection 40 mg 40 mg Intravenous Given     09/20/2022 0634 heparin (porcine) subcutaneous injection 5,000 Units 5,000 Units Subcutaneous Given     09/20/2022 0424 aspirin tablet 325 mg 325 mg Oral Given        Past Medical History:   Diagnosis Date    BPH (benign prostatic hyperplasia)     45 days radiation treatment    COPD (chronic obstructive pulmonary disease)     Coronary artery disease     CABG x4 in 2017    Diabetes mellitus     History of Arterial Duplex of LE 12/26/2017    Likely occlusion of the left superficial femoral artery  Calcific changes bilaterally  Despite these changes, the ankle-brachial index as a measure of peripheral blood flow only mildly impaired      History of echocardiogram 06/12/2017    EF 40%, mild LVH, mild MR     Hyperlipidemia     Hypertension     Ischemic cardiomyopathy     NSTEMI (non-ST elevated myocardial infarction)     Prostate cancer     prostate     PVD (peripheral vascular disease)     Sepsis due to pneumonia     Tremor     [facility]21     Present on Admission:   Coronary artery disease involving native coronary artery of native heart without angina pectoris   Type 2 diabetes mellitus with diabetic neuropathy, unspecified ([facility])   Other hyperlipidemia   Acute on chronic combined systolic and diastolic congestive heart failure ([facility])   PAF (paroxysmal atrial fibrillation) ([facility])   COPD (chronic obstructive pulmonary disease) ([facility])      Admitting Diagnosis: CHF (congestive heart failure) ([facility]) [I50 9]  Dyspnea [R06 00]  SOB (shortness of breath) [R06 02]  Acute on chronic respiratory failure with hypoxia ([facility]) [J96 21]  Age/Sex: 69 y o  male  Admission Orders:  Scheduled Medications:  aspirin, 81 mg, Oral, Every Other Day  budesonide, 0 5 mg, Nebulization, BID  carvedilol, 6 25 mg, Oral, BID With Meals  cyanocobalamin, 500 mcg, Oral, Daily  digoxin, 125 mcg, Oral, Daily  formoterol, 20 mcg, Nebulization, Q12H  furosemide, 40 mg, Intravenous, BID  gabapentin, 300 mg, Oral, HS  heparin (porcine), 5,000 Units, Subcutaneous, Q8H [address]  insulin lispro, 1-6 Units, Subcutaneous, TID AC  ipratropium, 0 5 mg, Nebulization, TID  pravastatin, 80 mg, Oral, Daily With Dinner  primidone, 100 mg, Oral, BID  roflumilast, 500 mcg, Oral, Daily      Continuous IV Infusions:     PRN Meds:  albuterol, 2 puff, Inhalation, Q6H PRN  labetalol, 10 mg, Intravenous, Q6H PRN  ondansetron, 4 mg, Intravenous, Q6H PRN  perflutren lipid microsphere, 0 4 mL/min, Intravenous, Once in imaging    Fingerstick ac and hs   Fld restriction 1800 ml   PT OT eval   Tele   Daily weights   I&O   Up as dorina     IP CONSULT TO NUTRITION SERVICES  IP CONSULT TO CARDIOLOGY    Network Utilization Review Department  ATTENTION: Please call with any questions or concerns to [phone] and carefully listen to the prompts so that you are directed to the right person  All voicemails are confidential   [facility] all requests for admission clinical reviews, approved or denied determinations and any other requests to dedicated fax number below belonging to the campus where the patient is receiving treatment   List of dedicated fax numbers for the Facilities:  [address] DENIALS (Administrative/Medical Necessity) [phone]   [address] (Maternity/NICU/Pediatrics) [phone]   [address]  [address] [facility] [place] [address] [id number] [place] [address] [address] [facility] [place] [address] [phone]

## 2022-09-21 NOTE — PROGRESS NOTES
Progress Note - Cardiology   Kalli Arevalo 69 y o  male MRN: [id number]  Unit/Bed#: 411-01 Encounter: [id number]  09/21/22  10:41 AM    Impression and Plan:     55-year-old with coronary artery disease, prior CABG, ischemic cardiomyopathy, most recent ejection fraction around 30% although on very poor quality echocardiographic images in 2020, hypertension, diabetes, PVD, dyslipidemia, admitted with acute on chronic systolic and diastolic congestive heart failure  Plan:    Acute on chronic systolic and diastolic congestive heart failure: Continue Lasix 40 mg IV q 12h today also  Volume status has improved, still with scattered rales on exam   Echo with an ejection fraction around 30%, similar to prior  No clear etiology for his exacerbation but he does not check his weight at home  Denies any changes in appetite/satiety  No evidence of low output state on exam    Cardiomyopathy:  Ischemic, ejection fraction had been around 30%, will consider attempting Entresto this admission if renal function remains stable, lisinopril was discontinued yesterday,  Currently stable renal function  Discussed ICD-he is undecided and would like to discuss with his primary cardiologist    Sinus tachycardia:  Improved,  Likely compensated reef for his acute CHF  Continue to watch,     Coronary artery disease, hypertension, dyslipidemia:   now controlled  LDL of 107, non HDL of 160  CAD is clinically stable with no ischemic symptoms, troponin elevation is a non MI troponin elevation in the setting of acute CHF  On rosuvastatin 10 mg at baseline, should increased to 20 mg at discharge, currently on pravastatin-hospital formulary    Might be stable for discharge tomorrow      ===================================================================    Chief Complaint:   Chief Complaint   Patient presents with    Shortness of Breath     Pt came from sleep center with complaints of increase in shortness of breath   Wears o2 2L at home, history of COPD         Subjective/Objective     Subjective:  Denies any complaints    Objective:  No distress    Patient Active Problem List   Diagnosis    Type 2 diabetes mellitus without complication, without long-term current use of insulin ([facility] [address] )    Tobacco abuse    Coronary artery disease involving native coronary artery of native heart without angina pectoris    Benign prostatic hyperplasia with urinary retention    Mucopurulent [facility] bronchitis [facility]    Benign essential tremor    Ambulatory dysfunction    On intra-aortic balloon pump assist    History of prostate cancer    S/P CABG x 4    Ischemic cardiomyopathy    Other hyperlipidemia    Acute on chronic respiratory failure with hypoxia ([facility]    COPD (chronic obstructive pulmonary disease) ([facility]    Chronic respiratory failure with hypoxia ([facility])    Tinea unguium    Type 2 diabetes mellitus with diabetic neuropathy, unspecified ([facility])    Other age-related cataract    Central retinal vein occlusion with macular edema    Nonexudative age-related macular degeneration    Obesity    Ocular hypertension    Onychomycosis due to dermatophyte    Pill rolling tremor    Physical deconditioning    Diminished pulses in lower extremity    PAD (peripheral artery disease) ([facility])    Generalized weakness    Congestive heart failure ([facility])    Dizziness    Insomnia due to medical condition    Primary hypertension    Normocytic anemia    Low serum albumin    Tributary (branch) retinal vein occlusion, left eye, with macular edema    Bilateral pleural effusion    Vision loss, left eye    Acute on chronic combined systolic and diastolic congestive heart failure ([facility])    PAF (paroxysmal atrial fibrillation) ([facility])    Elevated d-dimer    Increased anion gap metabolic acidosis    BERONICA (obstructive sleep apnea)    SIRS (systemic inflammatory response syndrome) ([facility])    Excessive daytime sleepiness    Elevated troponin       Vitals: /74 (BP Location: Left arm)   Pulse 91   Temp 97 5 °F (36 4 °C) (Tympanic)   Resp (!) 24   Ht 5' 10" (1 778 m)   Wt 74 8 kg (164 lb 14 5 oz)   SpO2 98%   BMI 23 66 kg/m²     I/O this shift:  In: 300 [P O :300]  Out: 125 [Urine:125]  Wt Readings from Last 3 Encounters:   09/21/22 74 8 kg (164 lb 14 5 oz)   09/12/22 79 4 kg (175 lb)   08/29/22 79 9 kg (176 lb 3 2 oz)       Intake/Output Summary (Last 24 hours) at 9/21/2022 1041  Last data filed at 9/21/2022 0913  Gross per 24 hour   Intake 790 ml   Output 1625 ml   Net -835 ml     I/O last 3 completed shifts: In: 56 [P O :600;  I V :10]  Out: 2100 [Urine:2100]    Invasive Devices  Report    Peripheral Intravenous Line  Duration           Peripheral IV 09/20/22 Right Antecubital 1 day                  Physical Exam:  GEN: Sylvia Salcido appears well, alert and oriented x 3, pleasant and cooperative   HEENT: pupils equal, round, and reactive to light; extraocular muscles intact  NECK: supple, no carotid bruits or JVD  HEART: regular rhythm, normal S1 and S2, no murmur, no clicks, gallops or rubs   LUNGS: clear to auscultation bilaterally; no wheezes or rhonchi, few scattered rales  ABDOMEN/GI: normal bowel sounds, soft, no tenderness, no distention  EXTREMITIES/Musculoskeltal: peripheral pulses normal; no clubbing, cyanosis, no edema  NEURO: no focal motor findings   SKIN: normal without suspicious lesions on exposed skin              Lab Results:       Results from last 7 days   Lab Units 09/21/22  0429 09/20/22  0450 09/20/22  0254 09/20/22  0047   WBC Thousand/uL 6 22 8 61  --  10 27*   HEMOGLOBIN g/dL 11 5* 11 1*  --  12 1   HEMATOCRIT % 34 8* 33 9*  --  36 5   PLATELETS Thousands/uL 223 216 215 231         Results from last 7 days   Lab Units 09/21/22  0429 09/20/22  0047   POTASSIUM mmol/L 3 8 3 6   CHLORIDE mmol/L 102 103   CO2 mmol/L 32 30   BUN mg/dL 16 16   CREATININE mg/dL 0 82 0 91   CALCIUM mg/dL 8 5 8 6   ALK PHOS U/L  --  118*   ALT U/L  --  12   AST U/L --  9         Imaging: I have personally reviewed pertinent reports  EKG/Telemtry:  No events    Scheduled Meds:  Current Facility-Administered Medications   Medication Dose Route Frequency Provider Last Rate    albuterol  2 puff Inhalation Q6H PRN Oma Romano MD      aspirin  81 mg Oral Every Other Day Oma Romano MD      budesonide  0 5 mg Nebulization BID Oma Romano MD      carvedilol  6 25 mg Oral BID With Meals Oma Romano MD      cyanocobalamin  500 mcg Oral Daily Oma Romano MD      digoxin  125 mcg Oral Daily Oma Romano MD      formoterol  20 mcg Nebulization Q12H Oma Romano MD      furosemide  40 mg Intravenous BID Oma Romano MD      gabapentin  300 mg Oral HS Oma Romano MD      heparin (porcine)  5,000 Units Subcutaneous [facility] Oma Romano MD      insulin lispro  1-6 Units Subcutaneous TID AC Oma Romano MD      ipratropium  0 5 mg Nebulization TID Oma Romano MD      labetalol  10 mg Intravenous Q6H PRN Oma Romano MD      ondansetron  4 mg Intravenous Q6H PRN Oma Romano MD      perflutren lipid microsphere  0 4 mL/min Intravenous Once in imaging Ailyn Mcfarland MD      pravastatin  80 mg Oral Daily With Delaney Cerna MD      primidone  100 mg Oral BID MD Hemanth Villalobos roflumilast  500 mcg Oral Daily Oma Romano MD       Continuous Infusions:       VTE Pharmacologic Prophylaxis: Heparin  VTE Mechanical Prophylaxis: sequential compression device    This note was completed in part utilizing [employer] direct voice recognition software  Grammatical errors, random word insertion, spelling mistakes, occasional wrong word or "sound-alike" substitutions and incomplete sentences may be an occasional consequence of the system secondary to software limitations, ambient noise and hardware issues  At the time of dictation, efforts were made to edit, clarify and /or correct errors    Please read the chart carefully and recognize, using context, where substitutions have occurred  If you have any questions or concerns about the context, text or information contained within the body of this dictation, please contact myself, the provider, for further clarification

## 2022-09-21 NOTE — CASE MANAGEMENT
Case Management Assessment & Discharge Planning Note    Patient name Christelle Vila  Location [address] [id number] MRN [id number]  : 1945 Date 2022       Current Admission Date: 2022  Current Admission Diagnosis:Acute on chronic combined systolic and diastolic congestive heart failure [facility])   Patient Active Problem List    Diagnosis Date Noted    Elevated troponin 2022    SIRS (systemic inflammatory response syndrome) ([facility] [address] ) 2022    BERONICA (obstructive sleep apnea)     Excessive daytime sleepiness     Acute on chronic combined systolic and diastolic congestive heart failure ([address] ) 2022    PAF (paroxysmal atrial fibrillation) ([facility] [address] ) 2022    Elevated d-dimer 2022    Increased anion gap metabolic acidosis     Bilateral pleural effusion 2022    Vision loss, left eye 2022    Tributary (branch) retinal vein occlusion, left eye, with macular edema 07/15/2022    Insomnia due to medical condition 2022    Primary hypertension 2022    Normocytic anemia 2022    Low serum albumin 2022    Dizziness 2022    Congestive heart failure ([facility] [address] ) 2022    Generalized weakness 2022    PAD (peripheral artery disease) ([facility] [address] ) 2022    Diminished pulses in lower extremity 2021    Physical deconditioning 2021    Tinea unguium 2021    Type 2 diabetes mellitus with diabetic neuropathy, unspecified ([facility] [address] ) 2021    Other age-related cataract 2021    Central retinal vein occlusion with macular edema 2021    Nonexudative age-related macular degeneration 2021    Obesity 2021    Ocular hypertension 2021    Onychomycosis due to dermatophyte 2021    Chronic respiratory failure with hypoxia ([address] ) 08/15/2020    COPD (chronic obstructive pulmonary disease) ([facility] [address] ) 2020    Acute on chronic respiratory failure with hypoxia ([facility] [address] ) 2020    Other hyperlipidemia 03/12/2020    Ischemic cardiomyopathy 08/20/2019    Coronary artery disease involving native coronary artery of native heart without angina pectoris 02/22/2019    Benign prostatic hyperplasia with urinary retention 02/22/2019    Mucopurulent chronic bronchitis ([facility] [address] ) 02/22/2019    Benign essential tremor 02/22/2019    Pill rolling tremor 02/22/2019    History of prostate cancer 03/20/2018    Ambulatory dysfunction 03/16/2017    On intra-aortic balloon pump assist 03/10/2017    S/P CABG x 4 03/09/2017    Type 2 diabetes mellitus without complication, without long-term current use of insulin ([facility] [address] ) 03/08/2017    Tobacco abuse 03/08/2017      LOS (days): 1  Geometric Mean LOS (GMLOS) (days): 3 80  Days to GMLOS:2 2     OBJECTIVE:    Risk of Unplanned Readmission Score: 25 84         Current admission status: Inpatient       Preferred Pharmacy:   [facility] [address]AYLIN CHRIS [address] [place] [address] [place] [address]  Phone: [phone] Fax: [phone]    Primary Care Provider: Milo Merlos DO    Primary Insurance: [facility]  Secondary Insurance:     ASSESSMENT:  Chelsea Ma [address], [address] Representative - Spouse   Primary Phone: [phone] (Home)                         Readmission Root Cause  30 Day Readmission: No    Patient Information  Admitted from[de-identified] Home  Mental Status: Alert  During Assessment patient was accompanied by: Not accompanied during assessment  Assessment information provided by[de-identified] Patient  Primary Caregiver: Self  Support Systems: [facility] of Residence: [address] do you live in?: Via [employer] entry access options   Select all that apply : Stairs  Number of steps to enter home : 4  Do the steps have railings?: Yes  Type of Current Residence: [address] [place] [address] home  Upon entering residence, is there a bedroom on the main floor (no further steps)?: No  A bedroom is located on the following floor levels of residence (select all that apply):: 2nd Floor  Upon entering residence, is there a bathroom on the main floor (no further steps)?: Yes (Pt has a bathroom on the 1rst and 2nd floor  )  Indicate which floors of current residence have a bathroom (select all the apply):: 2nd Floor  Number of steps to 2nd floor from main floor: One Flight  In the last 12 months, was there a time when you were not able to pay the mortgage or rent on time?: No  In the last 12 months, how many places have you lived?: 1  In the last 12 months, was there a time when you did not have a steady place to sleep or slept in a shelter (including now)?: No  Homeless/housing insecurity resource given?: N/A  Living Arrangements: Lives w/ Spouse/significant other  Is patient a ?: No    Activities of Daily Living Prior to Admission  Functional Status: Independent  Completes ADLs independently?: Yes  Ambulates independently?: Yes  Does patient use assisted devices?: Yes (Pt uses the cane when he ambulates outside )  Assisted Devices (DME) used: Nelia Giffordam, Elfida Parry, Portable Oxygen tanks, Home Oxygen concentrator  DME Company Name (respiratory supplies): Pt is unsure what company he gets his 02 from  O2 Rate(s): 2 liters  Does patient currently own DME?: Yes  What DME does the patient currently own?: Stra[employer] Lida Harper  Does patient have a history of Outpatient Therapy (PT/OT)?: Yes  Does the patient have a history of Short-Term Rehab?: Yes (Pt was at the [employer])  Does patient have a history of HHC?: Yes (Pt is unsure what agency he had )  Does patient currently have [facility]?: No         Patient Information Continued  Income Source: Pension/[employer]  Does patient have prescription coverage?: Yes  Within the past 12 months, you worried that your food would run out before you got the money to buy more : Never true  Within the past 12 months, the food you bought just didn't last and you didn't have money to get more : Never true  Food insecurity resource given?: N/A  Does patient receive dialysis treatments?: No  Does patient have a history of substance abuse?: No  Does patient have a history of Mental Health Diagnosis?: No         Means of Transportation  Means of Transport to Appts[de-identified] Drives Self (Wife will give him a ride home from the hospital)  In the past 12 months, has lack of transportation kept you from medical appointments or from getting medications?: No  In the past 12 months, has lack of transportation kept you from meetings, work, or from getting things needed for daily living?: No        DISCHARGE DETAILS:    Discharge planning discussed with[de-identified] Pt  Freedom of Choice: Yes     CM contacted family/caregiver?: No- see comments (Pt is alert and oriented x3 )    I will continue to follow for any Case Management needs

## 2022-09-21 NOTE — ASSESSMENT & PLAN NOTE
Lab Results   Component Value Date    HGBA1C 5 7 (H) 08/14/2022       Recent Labs     09/20/22  1212 09/20/22  1647 09/20/22  2101 09/21/22  0716   POCGLU 159* 95 165* 83       Blood Sugar Average: Last 72 hrs:  · (P) 118 8 blood glucose 110 upon admission  · FBS 90, continue to hold glimperide and consider holding on discharge

## 2022-09-21 NOTE — PROGRESS NOTES
[facility] [address]  Progress Note - Niki Ordonez 1945, 69 y o  male MRN: [id number]  Unit/Bed#: 411-01 Encounter: [id number]  Primary Care Provider: Yaniv Rojas DO   Date and time admitted to hospital: 9/20/2022 12:49 AM    * Acute on chronic combined systolic and diastolic congestive heart failure (HCC)  Assessment & Plan  Wt Readings from Last 3 Encounters:   09/20/22 75 8 kg (167 lb 1 7 oz)   09/12/22 79 4 kg (175 lb)   08/29/22 79 9 kg (176 lb 3 2 oz)   Chronic diuretic regimen Lasix 40 mg as needed for weight over 175  Updated echo reveals:  EF 89%, grade 2 diastolic dysfunction, PA pressure 53 mm Hg,  Day 2 Lasix 40 mg IV b i d  Negative fluid balance, weights declining renal function preserved      Coronary artery disease involving native coronary artery of native heart without angina pectoris  Assessment & Plan  · Status post CABG [id number], SVG-ramus, SVG-PDA and ventricular branch of RCA in   · Coreg 6 25 BID substituted for Toprol XL  · Digoxin   125mg daily  · Aspirin 81mg      Elevated troponin  Assessment & Plan  Likely nonischemic myocardial injury  Cardiology on board  Telemetry monitoring    PAF (paroxysmal atrial fibrillation) ([facility])  Assessment & Plan  · Currently in sinus rhythm  · AV allie blocking regimen with digoxin 0 125 mg daily, Coreg 6 25 mg p o  b i d    · Replaced Toprol-XL with Coreg 6 25 mg b i d   · Not on systemic AC     Type 2 diabetes mellitus with diabetic neuropathy, unspecified [facility])  Assessment & Plan  Lab Results   Component Value Date    HGBA1C 5 7 (H) 08/14/2022       Recent Labs     09/20/22  1212 09/20/22  1647 09/20/22  2101 09/21/22  0716   POCGLU 159* 95 165* 83       Blood Sugar Average: Last 72 hrs:  · (P) 118 8 blood glucose 110 upon admission  · FBS 90, continue to hold glimperide and consider holding on discharge     COPD (chronic obstructive pulmonary disease) ([facility])  Assessment & Plan  · No wheezing on exam, symptoms likely related to CHF  · Continue with respiratory protocol and p r n  nebs    Acute on chronic respiratory failure with hypoxia [facility])  Assessment & Plan  Chronically utilizes 2-3 L nasal cannula  Requiring intermittent BiPAP therapy    Other hyperlipidemia  Assessment & Plan  · Pravachol 80 mg oral daily        Progress Note - Lincoln Child 69 y o  male MRN: [id number]    Unit/Bed#: 411-01 Encounter: [id number]        Subjective:   Patient seen and examined at bedside  Feels better today, less sob     Objective:     Vitals:   Vitals:    09/21/22 0914   BP:    Pulse:    Resp:    Temp:    SpO2: 98%     Body mass index is 23 66 kg/m²      Intake/Output Summary (Last 24 hours) at 9/21/2022 1011  Last data filed at 9/21/2022 0913  Gross per 24 hour   Intake 790 ml   Output 1625 ml   Net -835 ml       Physical Exam:   /74 (BP Location: Left arm)   Pulse 91   Temp 97 5 °F (36 4 °C) (Tympanic)   Resp (!) 24   Ht 5' 10" (1 778 m)   Wt 74 8 kg (164 lb 14 5 oz)   SpO2 98%   BMI 23 66 kg/m²   General appearance: alert and oriented, in no acute distress  Head: Normocephalic, without obvious abnormality, atraumatic  Lungs: clear to auscultation bilaterally  Heart: regular rate and rhythm, S1, S2 normal, no murmur, click, rub or gallop  Abdomen: soft, non-tender; bowel sounds normal; no masses,  no organomegaly  Extremities: extremities normal, warm and well-perfused; no cyanosis, clubbing, or edema  Pulses: 2+ and symmetric  Neurologic: Grossly normal     Invasive Devices  Report    Peripheral Intravenous Line  Duration           Peripheral IV 09/20/22 Right Antecubital 1 day                Results from last 7 days   Lab Units 09/21/22  0429 09/20/22  0450 09/20/22  0254 09/20/22  0047   WBC Thousand/uL 6 22 8 61  --  10 27*   HEMOGLOBIN g/dL 11 5* 11 1*  --  12 1   HEMATOCRIT % 34 8* 33 9*  --  36 5   PLATELETS Thousands/uL 223 216 215 231       Results from last 7 days   Lab Units 09/21/22  0429 09/20/22  0047   POTASSIUM mmol/L 3 8 3 6   CHLORIDE mmol/L 102 103   CO2 mmol/L 32 30   BUN mg/dL 16 16   CREATININE mg/dL 0 82 0 91   CALCIUM mg/dL 8 5 8 6   ALK PHOS U/L  --  118*   ALT U/L  --  12   AST U/L  --  9       Medication Administration - last 24 hours from 09/20/2022 1011 to 09/21/2022 1011       Date/Time Order Dose Route Action Action by     09/20/2022 1015 aspirin (ECOTRIN LOW STRENGTH) EC tablet 81 mg 81 mg Oral Given [facility], RN     09/21/2022 0914 budesonide (PULMICORT) inhalation solution 0 5 mg 0 5 mg Nebulization Given Barbra Lay     09/20/2022 1941 budesonide (PULMICORT) inhalation solution 0 5 mg 0 5 mg Nebulization Given Jina Starks, RT     09/20/2022 1022 budesonide (PULMICORT) inhalation solution 0 5 mg 0 5 mg Nebulization Given [facility], RN     09/21/2022 0376 cyanocobalamin (VITAMIN B-12) tablet 500 mcg 500 mcg Oral Given Mac , RN     09/20/2022 1015 cyanocobalamin (VITAMIN B-12) tablet 500 mcg 500 mcg Oral Given [facility], RN     09/21/2022 0837 digoxin (LANOXIN) tablet 125 mcg 125 mcg Oral Given Mac Templeton, RN     09/20/2022 1015 digoxin (LANOXIN) tablet 125 mcg 125 mcg Oral Given [facility], RN     09/20/2022 2132 gabapentin (NEURONTIN) capsule 300 mg 300 mg Oral Given Mc Po, RN     09/21/2022 0914 ipratropium (ATROVENT) 0 02 % inhalation solution 0 5 mg 0 5 mg Nebulization Given Barbra Lay     09/20/2022 1941 ipratropium (ATROVENT) 0 02 % inhalation solution 0 5 mg 0 5 mg Nebulization Given Jina Starks, RT     09/20/2022 1535 ipratropium (ATROVENT) 0 02 % inhalation solution 0 5 mg 0 5 mg Nebulization Given Smith Hart, RT     09/20/2022 1022 ipratropium (ATROVENT) 0 02 % inhalation solution 0 5 mg 0 5 mg Nebulization Given J[facility], RN     09/20/2022 1015 lisinopril (ZESTRIL) tablet 20 mg 20 mg Oral Given Jaden Rodriguez, RN     09/20/2022 1015 metoprolol tartrate (LOPRESSOR) tablet 25 mg 25 mg Oral Given Jaden Rodriguez, NATALIA 09/21/2022 0840 primidone (MYSOLINE) tablet 100 mg 100 mg Oral Given Radha Garcia RN     09/20/2022 2132 primidone (MYSOLINE) tablet 100 mg 100 mg Oral Given Fabiana Longoria RN     09/20/2022 1129 primidone (MYSOLINE) tablet 100 mg 100 mg Oral Given Irwin Patino RN     09/21/2022 0133 roflumilast (DALIRESP) tablet 500 mcg 500 mcg Oral Given Radha Garcia RN     09/20/2022 1130 roflumilast (DALIRESP) tablet 500 mcg 500 mcg Oral Given Irwin Patino RN     09/20/2022 1742 pravastatin (PRAVACHOL) tablet 80 mg 80 mg Oral Given Irwin Patino RN     09/20/2022 1621 pravastatin (PRAVACHOL) tablet 80 mg 80 mg Oral Not Given Irwin Patino RN     09/21/2022 0528 heparin (porcine) subcutaneous injection 5,000 Units 5,000 Units Subcutaneous Given Fabiana Longoria RN     09/20/2022 2132 heparin (porcine) subcutaneous injection 5,000 Units 5,000 Units Subcutaneous Given Fabiana Longoria RN     09/20/2022 1611 heparin (porcine) subcutaneous injection 5,000 Units 5,000 Units Subcutaneous Given Irwin Patino RN     09/21/2022 5063 furosemide (LASIX) injection 40 mg 40 mg Intravenous Given Radha Garcia RN     09/20/2022 1740 furosemide (LASIX) injection 40 mg 40 mg Intravenous Given Irwin Patino RN     09/20/2022 1014 furosemide (LASIX) injection 40 mg 40 mg Intravenous Given Irwin Patino RN     09/21/2022 3658 insulin lispro (HumaLOG) 100 units/mL subcutaneous injection 1-6 Units 1 Units Subcutaneous Not Given Radha Garcia RN     09/20/2022 1648 insulin lispro (HumaLOG) 100 units/mL subcutaneous injection 1-6 Units 1 Units Subcutaneous Not Given Irwin Patino RN     09/20/2022 1220 insulin lispro (HumaLOG) 100 units/mL subcutaneous injection 1-6 Units 2 Units Subcutaneous Given Irwin Patino RN     09/21/2022 1002 perflutren lipid microsphere (DEFINITY) injection   Intravenous PEGGY Coleman MD     09/21/2022 0954 perflutren lipid microsphere (DEFINITY) injection Intravenous MAR Hold Automatic Transfer Provider     09/20/2022 1129 potassium chloride (K-DUR,KLOR-CON) CR tablet 40 mEq 40 mEq Oral Given Andrey Jones RN     09/21/2022 1002 albuterol (PROVENTIL HFA,VENTOLIN HFA) inhaler 2 puff   Inhalation MAR Unhold Oma Jj MD     09/21/2022 0954 albuterol (PROVENTIL HFA,VENTOLIN HFA) inhaler 2 puff   Inhalation MAR Hold Automatic Transfer Provider     09/21/2022 1002 carvedilol (COREG) tablet 6 25 mg   Oral MAR Unhold Oma Jj MD     09/21/2022 0954 carvedilol (COREG) tablet 6 25 mg   Oral MAR Hold Automatic Transfer Provider     09/21/2022 0837 carvedilol (COREG) tablet 6 25 mg 6 25 mg Oral Given Meghna Eduardo RN     09/20/2022 1747 carvedilol (COREG) tablet 6 25 mg 6 25 mg Oral Given Andrey Jones RN     09/20/2022 1621 carvedilol (COREG) tablet 6 25 mg 6 25 mg Oral Not Given Andrey Jones RN     09/20/2022 1224 carvedilol (COREG) tablet 6 25 mg 6 25 mg Oral Given Andrey Jones RN     09/21/2022 1002 formoterol (PERFOROMIST) nebulizer solution 20 mcg   Nebulization MAR Unhold Oma Jj MD     09/21/2022 0954 formoterol (PERFOROMIST) nebulizer solution 20 mcg   Nebulization MAR Hold Automatic Transfer Provider     09/21/2022 0914 formoterol (PERFOROMIST) nebulizer solution 20 mcg 20 mcg Nebulization Given Barbra Lay     09/20/2022 1941 formoterol (PERFOROMIST) nebulizer solution 20 mcg 20 mcg Nebulization Given RT Argelia     09/21/2022 1002 labetalol (NORMODYNE) injection 10 mg   Intravenous MAR Unhold Oma Jj MD     09/21/2022 0954 labetalol (NORMODYNE) injection 10 mg   Intravenous MAR Hold Automatic Transfer Provider            Lab, Imaging and other studies: I have personally reviewed pertinent reports      VTE Pharmacologic Prophylaxis: Enoxaparin (Lovenox)  VTE Mechanical Prophylaxis: sequential compression device     Anali Grissom MD  9/21/2022,10:11 AM

## 2022-09-21 NOTE — ASSESSMENT & PLAN NOTE
· No wheezing on exam, symptoms likely related to CHF  · Continue with respiratory protocol and p r n  nebs

## 2022-09-21 NOTE — ASSESSMENT & PLAN NOTE
· Status post CABG [id number], SVG-ramus, SVG-PDA and ventricular branch of RCA in   · Coreg 6 25 BID substituted for Toprol XL  · Digoxin   125mg daily  · Aspirin 81mg

## 2022-09-21 NOTE — UTILIZATION REVIEW
Inpatient Admission Authorization Request   NOTIFICATION OF INPATIENT ADMISSION/INPATIENT AUTHORIZATION REQUEST   SERVICING FACILITY:   [address] [place] [address]  [address]  Tax ID:  [id number]  NPI: [id number]  Place of Service: [place] [address]  Place of Service Code: 24     ATTENDING PROVIDER:  Attending Name and NPI#: Roque Parra [place] [[id number]]  Address: [address]  Phone: [phone]     UTILIZATION REVIEW CONTACT:  Jeffy Tony, Utilization   Network Utilization Review Department  Phone: [phone]  Fax [phone]  Email: [email or web address]     PHYSICIAN ADVISORY SERVICES:  FOR JISD-TC-SVNC REVIEW - MEDICAL NECESSITY DENIAL  Phone: [phone]  Fax: [phone]  Email: [email or web address]     TYPE OF REQUEST:  Inpatient Status     ADMISSION INFORMATION:  ADMISSION DATE/TIME: 9/20/22  2:21 AM  PATIENT DIAGNOSIS CODE/DESCRIPTION:  CHF (congestive heart failure) (HCC) [I50 9]  Dyspnea [R06 00]  SOB (shortness of breath) [R06 02]  Acute on chronic respiratory failure with hypoxia (HCC) [J96 21]  DISCHARGE DATE/TIME: No discharge date for patient encounter  IMPORTANT INFORMATION:  Please contact Jeffy Gagnon (Marilou Gum) directly with any questions or concerns regarding this request  Department voicemails are confidential     Send requests for admission clinical reviews, concurrent reviews, approvals, and administrative denials due to lack of clinical to fax [phone]

## 2022-09-21 NOTE — RESPIRATORY THERAPY NOTE
09/21/22 0300   Respiratory Assessment   Resp Comments Pt requested to come off BiPAP unit at 0115  No distress noted, and pt now resting comfortably on 2 LPM NC     Oxygen Therapy/Pulse Ox   O2 Device Nasal cannula   Nasal Cannula O2 Flow Rate (L/min) 2 L/min   Calculated FIO2 (%) - Nasal Cannula 28   SpO2 98 %   SpO2 Activity At Rest   $ Pulse Oximetry Spot Check Charge Completed

## 2022-09-21 NOTE — RESPIRATORY THERAPY NOTE
09/20/22 2000   Respiratory Assessment   Assessment Type During-treatment   General Appearance Alert; Awake   Respiratory Pattern Tachypneic   Chest Assessment Chest expansion symmetrical   Bilateral Breath Sounds Diminished   Resp Comments Pt was SOB and immediately requested to be placed on BiPAP unit     Non-Invasive Settings   IPAP (cm) 12 cm   EPAP (cm) 6 cm   Rate (Set) 18   FiO2 (%) 30   Rise Time 3   Inspiratory Time (Set) 0 9   Non-Invasive Readings   Skin Intervention Mask rotated;Skin intact   Total Rate 28   Vt (mL) (Mech) 723   MV (Mech) 19 9   Peak Pressure (Obs) 14   I/E Ratio (Obs) 1:2 7   Leak (lpm) 19   Non-Invasive Alarms   Insp Pressure High (cm H20) 26   Insp Pressure Low (cm H20) 4   MV Low (L/min) 4   Vt High (mL) 1 1   Vt Low (mL) 300   High Resp Rate (BPM) 40 BPM   Low Resp Rate (BPM) 8 BPM   Apnea Interval (sec) 20

## 2022-09-22 LAB
ANION GAP SERPL CALCULATED.3IONS-SCNC: 5 MMOL/L (ref 4–13)
BUN SERPL-MCNC: 19 MG/DL (ref 5–25)
CALCIUM SERPL-MCNC: 8.5 MG/DL (ref 8.3–10.1)
CHLORIDE SERPL-SCNC: 101 MMOL/L (ref 96–108)
CO2 SERPL-SCNC: 33 MMOL/L (ref 21–32)
CREAT SERPL-MCNC: 0.81 MG/DL (ref 0.6–1.3)
GFR SERPL CREATININE-BSD FRML MDRD: 86 ML/MIN/1.73SQ M
GLUCOSE SERPL-MCNC: 100 MG/DL (ref 65–140)
GLUCOSE SERPL-MCNC: 137 MG/DL (ref 65–140)
GLUCOSE SERPL-MCNC: 175 MG/DL (ref 65–140)
GLUCOSE SERPL-MCNC: 84 MG/DL (ref 65–140)
GLUCOSE SERPL-MCNC: 96 MG/DL (ref 65–140)
MAGNESIUM SERPL-MCNC: 1.8 MG/DL (ref 1.6–2.6)
POTASSIUM SERPL-SCNC: 3.6 MMOL/L (ref 3.5–5.3)
SODIUM SERPL-SCNC: 139 MMOL/L (ref 135–147)

## 2022-09-22 PROCEDURE — 94762 N-INVAS EAR/PLS OXIMTRY CONT: CPT

## 2022-09-22 PROCEDURE — 94640 AIRWAY INHALATION TREATMENT: CPT

## 2022-09-22 PROCEDURE — 99232 SBSQ HOSP IP/OBS MODERATE 35: CPT | Performed by: FAMILY MEDICINE

## 2022-09-22 PROCEDURE — 80048 BASIC METABOLIC PNL TOTAL CA: CPT | Performed by: FAMILY MEDICINE

## 2022-09-22 PROCEDURE — 94760 N-INVAS EAR/PLS OXIMETRY 1: CPT

## 2022-09-22 PROCEDURE — 99231 SBSQ HOSP IP/OBS SF/LOW 25: CPT | Performed by: INTERNAL MEDICINE

## 2022-09-22 PROCEDURE — 97116 GAIT TRAINING THERAPY: CPT

## 2022-09-22 PROCEDURE — 97110 THERAPEUTIC EXERCISES: CPT

## 2022-09-22 PROCEDURE — 82948 REAGENT STRIP/BLOOD GLUCOSE: CPT

## 2022-09-22 PROCEDURE — 94660 CPAP INITIATION&MGMT: CPT

## 2022-09-22 PROCEDURE — 83735 ASSAY OF MAGNESIUM: CPT | Performed by: FAMILY MEDICINE

## 2022-09-22 RX ORDER — FUROSEMIDE 40 MG/1
40 TABLET ORAL DAILY
Status: DISCONTINUED | OUTPATIENT
Start: 2022-09-23 | End: 2022-09-23 | Stop reason: HOSPADM

## 2022-09-22 RX ORDER — POTASSIUM CHLORIDE 20 MEQ/1
40 TABLET, EXTENDED RELEASE ORAL ONCE
Status: COMPLETED | OUTPATIENT
Start: 2022-09-22 | End: 2022-09-22

## 2022-09-22 RX ADMIN — HEPARIN SODIUM 5000 UNITS: 5000 INJECTION INTRAVENOUS; SUBCUTANEOUS at 06:09

## 2022-09-22 RX ADMIN — SACUBITRIL AND VALSARTAN 1 TABLET: 24; 26 TABLET, FILM COATED ORAL at 21:39

## 2022-09-22 RX ADMIN — PRIMIDONE 100 MG: 50 TABLET ORAL at 21:39

## 2022-09-22 RX ADMIN — HEPARIN SODIUM 5000 UNITS: 5000 INJECTION INTRAVENOUS; SUBCUTANEOUS at 13:17

## 2022-09-22 RX ADMIN — ROFLUMILAST 500 MCG: 250 TABLET ORAL at 08:44

## 2022-09-22 RX ADMIN — CARVEDILOL 6.25 MG: 3.12 TABLET, FILM COATED ORAL at 08:37

## 2022-09-22 RX ADMIN — PRIMIDONE 100 MG: 50 TABLET ORAL at 08:37

## 2022-09-22 RX ADMIN — INSULIN LISPRO 1 UNITS: 100 INJECTION, SOLUTION INTRAVENOUS; SUBCUTANEOUS at 11:43

## 2022-09-22 RX ADMIN — IPRATROPIUM BROMIDE 0.5 MG: 0.5 SOLUTION RESPIRATORY (INHALATION) at 14:57

## 2022-09-22 RX ADMIN — HEPARIN SODIUM 5000 UNITS: 5000 INJECTION INTRAVENOUS; SUBCUTANEOUS at 21:39

## 2022-09-22 RX ADMIN — GABAPENTIN 300 MG: 300 CAPSULE ORAL at 21:39

## 2022-09-22 RX ADMIN — SACUBITRIL AND VALSARTAN 1 TABLET: 24; 26 TABLET, FILM COATED ORAL at 13:19

## 2022-09-22 RX ADMIN — IPRATROPIUM BROMIDE 0.5 MG: 0.5 SOLUTION RESPIRATORY (INHALATION) at 08:07

## 2022-09-22 RX ADMIN — FORMOTEROL FUMARATE DIHYDRATE 20 MCG: 20 SOLUTION RESPIRATORY (INHALATION) at 20:52

## 2022-09-22 RX ADMIN — PRAVASTATIN SODIUM 80 MG: 40 TABLET ORAL at 16:44

## 2022-09-22 RX ADMIN — FUROSEMIDE 40 MG: 10 INJECTION, SOLUTION INTRAMUSCULAR; INTRAVENOUS at 08:37

## 2022-09-22 RX ADMIN — ASPIRIN 81 MG: 81 TABLET, COATED ORAL at 08:37

## 2022-09-22 RX ADMIN — BUDESONIDE 0.5 MG: 0.5 INHALANT ORAL at 08:07

## 2022-09-22 RX ADMIN — POTASSIUM CHLORIDE 40 MEQ: 1500 TABLET, EXTENDED RELEASE ORAL at 08:37

## 2022-09-22 RX ADMIN — FORMOTEROL FUMARATE DIHYDRATE 20 MCG: 20 SOLUTION RESPIRATORY (INHALATION) at 08:58

## 2022-09-22 RX ADMIN — CYANOCOBALAMIN TAB 500 MCG 500 MCG: 500 TAB at 08:37

## 2022-09-22 RX ADMIN — IPRATROPIUM BROMIDE 0.5 MG: 0.5 SOLUTION RESPIRATORY (INHALATION) at 20:52

## 2022-09-22 RX ADMIN — CARVEDILOL 6.25 MG: 3.12 TABLET, FILM COATED ORAL at 16:46

## 2022-09-22 RX ADMIN — DIGOXIN 125 MCG: 125 TABLET ORAL at 08:37

## 2022-09-22 RX ADMIN — BUDESONIDE 0.5 MG: 0.5 INHALANT ORAL at 20:52

## 2022-09-22 NOTE — RESPIRATORY THERAPY NOTE
09/22/22 0810   Inhalation Therapy Tx   $ Inhalation Therapy Performed Yes   $ Pulse Oximetry Spot Check Charge Completed   SpO2 96 %  (2)   Pre-Treatment Pulse 88   Pre-Treatment Respirations 18   Duration 15   Breath Sounds Pre-Treatment Bilateral Diminished   Breath Sounds Pre-Treatment Left Rales  (insp rales LLL)   Breath Sounds Post-Treatment Bilateral Diminished   Breath Sounds Post-Treatment Left Rales  (insp rales LLL)   Post-Treatment Pulse 80   Post-Treatment Respirations 18   Delivery Source Air;UDN   Position Sitting   Treatment Tolerance Tolerated well

## 2022-09-22 NOTE — PLAN OF CARE
Problem: CARDIOVASCULAR - ADULT  Goal: Maintains optimal cardiac output and hemodynamic stability  Description: INTERVENTIONS:  - Monitor I/O, vital signs and rhythm  - Monitor for S/S and trends of decreased cardiac output  - Administer and titrate ordered vasoactive medications to optimize hemodynamic stability  - Assess quality of pulses, skin color and temperature  - Assess for signs of decreased coronary artery perfusion  - Instruct patient to report change in severity of symptoms  Outcome: Progressing     Problem: RESPIRATORY - ADULT  Goal: Achieves optimal ventilation and oxygenation  Description: INTERVENTIONS:  - Assess for changes in respiratory status  - Assess for changes in mentation and behavior  - Position to facilitate oxygenation and minimize respiratory effort  - Oxygen administered by appropriate delivery if ordered  - Initiate smoking cessation education as indicated  - Encourage broncho-pulmonary hygiene including cough, deep breathe, Incentive Spirometry  - Assess and instruct to report SOB or any respiratory difficulty  - Respiratory Therapy support as indicated  Outcome: Progressing     Problem: PAIN - ADULT  Goal: Verbalizes/displays adequate comfort level or baseline comfort level  Description: Interventions:  - Encourage patient to monitor pain and request assistance  - Assess pain using appropriate pain scale(0-10)  - Administer analgesics based on type and severity of pain and evaluate response  - Implement non-pharmacological measures as appropriate and evaluate response  - Consider cultural and social influences on pain and pain management  - Notify physician/advanced practitioner if interventions unsuccessful or patient reports new pain  Outcome: Progressing     Problem: INFECTION - ADULT  Goal: Absence or prevention of progression during hospitalization  Description: INTERVENTIONS:  - Assess and monitor for signs and symptoms of infection  - Monitor lab/diagnostic results  - Monitor all insertion sites, i e  indwelling lines, tubes, and drains  - Administer medications as ordered  - Instruct and encourage patient and family to use good hand hygiene technique  - Identify and instruct in appropriate isolation precautions for identified infection/condition  Outcome: Progressing     Problem: SAFETY ADULT  Goal: Patient will remain free of falls  Description: INTERVENTIONS:  - Educate patient/family on patient safety including physical limitations  - Instruct patient to call for assistance with activity   - Consult OT/PT to assist with strengthening/mobility   - Keep Call bell within reach  - Keep bed low and locked with side rails adjusted as appropriate  - Keep care items and personal belongings within reach  - Initiate and maintain comfort rounds  - Make Fall Risk Sign visible to staff  - Offer Toileting every 2 Hours, in advance of need  - Initiate/Maintain bed/chair alarm  - Apply yellow socks and bracelet for high fall risk patients  - Consider moving patient to room near nurses station  Outcome: Progressing  Goal: Maintain or return to baseline ADL function  Description: INTERVENTIONS:  -  Assess patient's ability to carry out ADLs; assess patient's baseline for ADL function and identify physical deficits which impact ability to perform ADLs (bathing, care of mouth/teeth, toileting, grooming, dressing, etc )  - Assess/evaluate cause of self-care deficits   - Assess range of motion  - Assess patient's mobility; develop plan if impaired  - Assess patient's need for assistive devices and provide as appropriate  - Encourage maximum independence but intervene and supervise when necessary  - Involve family in performance of ADLs  - Assess for home care needs following discharge   - Consider OT consult to assist with ADL evaluation and planning for discharge  - Provide patient education as appropriate  Outcome: Progressing  Goal: Maintains/Returns to pre admission functional level  Description: INTERVENTIONS:  - Perform BMAT or MOVE assessment daily    - Set and communicate daily mobility goal to care team and patient/family/caregiver  - Collaborate with rehabilitation services on mobility goals if consulted  - Perform Range of Motion 3 times a day  - Reposition patient every 3 hours  - Dangle patient 3 times a day  - Stand patient 3 times a day  - Ambulate patient 3 times a day  - Out of bed to chair 3 times a day   - Out of bed for meals 3 times a day  - Out of bed for toileting  - Record patient progress and toleration of activity level   Outcome: Progressing     Problem: DISCHARGE PLANNING  Goal: Discharge to home or other facility with appropriate resources  Description: INTERVENTIONS:  - Identify barriers to discharge w/patient and caregiver  - Arrange for needed discharge resources and transportation as appropriate  - Identify discharge learning needs (meds, wound care, etc )  - Arrange for interpretive services to assist at discharge as needed  - Refer to Case Management Department for coordinating discharge planning if the patient needs post-hospital services based on physician/advanced practitioner order or complex needs related to functional status, cognitive ability, or social support system  Outcome: Progressing     Problem: Knowledge Deficit  Goal: Patient/family/caregiver demonstrates understanding of disease process, treatment plan, medications, and discharge instructions  Description: Complete learning assessment and assess knowledge base    Interventions:  - Provide teaching at level of understanding  - Provide teaching via preferred learning methods  Outcome: Progressing     Problem: Potential for Falls  Goal: Patient will remain free of falls  Description: INTERVENTIONS:  - Educate patient/family on patient safety including physical limitations  - Instruct patient to call for assistance with activity   - Consult OT/PT to assist with strengthening/mobility   - Keep Call bell within reach  - Keep bed low and locked with side rails adjusted as appropriate  - Keep care items and personal belongings within reach  - Initiate and maintain comfort rounds  - Make Fall Risk Sign visible to staff  - Offer Toileting every 2 Hours, in advance of need  - Initiate/Maintain bed/chair alarm  - Apply yellow socks and bracelet for high fall risk patients  - Consider moving patient to room near nurses station  Outcome: Progressing     Problem: MOBILITY - ADULT  Goal: Maintain or return to baseline ADL function  Description: INTERVENTIONS:  -  Assess patient's ability to carry out ADLs; assess patient's baseline for ADL function and identify physical deficits which impact ability to perform ADLs (bathing, care of mouth/teeth, toileting, grooming, dressing, etc )  - Assess/evaluate cause of self-care deficits   - Assess range of motion  - Assess patient's mobility; develop plan if impaired  - Assess patient's need for assistive devices and provide as appropriate  - Encourage maximum independence but intervene and supervise when necessary  - Involve family in performance of ADLs  - Assess for home care needs following discharge   - Consider OT consult to assist with ADL evaluation and planning for discharge  - Provide patient education as appropriate  Outcome: Progressing  Goal: Maintains/Returns to pre admission functional level  Description: INTERVENTIONS:  - Perform BMAT or MOVE assessment daily    - Set and communicate daily mobility goal to care team and patient/family/caregiver     - Collaborate with rehabilitation services on mobility goals if consulted  - Stand patient 3 times a day  - Ambulate patient 3 times a day  - Out of bed to chair 3 times a day   - Out of bed for meals 3 times a day  - Out of bed for toileting  - Record patient progress and toleration of activity level   Outcome: Progressing

## 2022-09-22 NOTE — PROGRESS NOTES
Progress Note - Cardiology   Edwar Bautista 69 y o  male MRN: [id number]  Unit/Bed#: 411-01 Encounter: [id number]  09/22/22  10:50 AM    Impression and Plan:     71-year-old with coronary artery disease, prior CABG, ischemic cardiomyopathy, most recent ejection fraction around 30% although on very poor quality echocardiographic images in 2020, hypertension, diabetes, PVD, dyslipidemia, admitted with acute on chronic systolic and diastolic congestive heart failure  Plan:    Acute on chronic systolic and diastolic congestive heart failure: Continue Lasix 40 mg IV q 12h today also  Volume status has improved   lungs are clear to auscultation  Echo with an ejection fraction around 30%, similar to prior  No clear etiology for his exacerbation but he does not check his weight at home  Denies any changes in appetite/satiety  No evidence of low output state on exam  He can be discharged on Lasix p o  40 mg daily, discussed with him that he needs check daily weights  Long-term follow-up with his primary cardiologist-Dr Pérez    Cardiomyopathy:  Ischemic, ejection fraction had been around 30%, will consider attempting Entresto this admission if renal function remains stable, lisinopril was discontinued yesterday,  Currently stable renal function  Discussed ICD-he is undecided and would like to discuss with his primary cardiologist  Will attempt Entresto low-dose, BMP in 1 week, lisinopril was discontinued at admission    Hypertension:  Mildly elevated but should improve with Entresto    Sinus tachycardia:  Improved,  Likely compensatory for his acute CHF    Continue to watch,     Coronary artery disease, hypertension, dyslipidemia:   now controlled  LDL of 107, non HDL of 160  CAD is clinically stable with no ischemic symptoms, troponin elevation is a non MI troponin elevation in the setting of acute CHF  On rosuvastatin 10 mg at baseline, should increased to 20 mg at discharge, currently on pravastatin-hospital formulary    Okay for discharge from a cardiac standpoint,    ===================================================================    Chief Complaint:   Chief Complaint   Patient presents with    Shortness of Breath     Pt came from sleep center with complaints of increase in shortness of breath   Wears o2 2L at home, history of COPD         Subjective/Objective     Subjective:  Denies any complaints    Objective:  No distress    Patient Active Problem List   Diagnosis    Type 2 diabetes mellitus without complication, without long-term current use of insulin ([address] )    Tobacco abuse    Coronary artery disease involving native coronary artery of native heart without angina pectoris    Benign prostatic hyperplasia with urinary retention    Mucopurulent chronic bronchitis ([facility]    Benign essential tremor    Ambulatory dysfunction    On intra-aortic balloon pump assist    History of prostate cancer    S/P CABG x 4    Ischemic cardiomyopathy    Other hyperlipidemia    Acute on chronic respiratory failure with hypoxia ([facility])    COPD (chronic obstructive pulmonary disease) ([facility]    Chronic respiratory failure with hypoxia ([facility])    Tinea unguium    Type 2 diabetes mellitus with diabetic neuropathy, unspecified ([address] )    Other age-related cataract    Central retinal vein occlusion with macular edema    Nonexudative age-related macular degeneration    Obesity    Ocular hypertension    Onychomycosis due to dermatophyte    Pill rolling tremor    Physical deconditioning    Diminished pulses in lower extremity    PAD (peripheral artery disease) ([facility])    Generalized weakness    Congestive heart failure ([facility])    Dizziness    Insomnia due to medical condition    Primary hypertension    Normocytic anemia    Low serum albumin    Tributary (branch) retinal vein occlusion, left eye, with macular edema    Bilateral pleural effusion    Vision loss, left eye    Acute on chronic combined systolic and diastolic congestive heart failure (HCC)    PAF (paroxysmal atrial fibrillation) (HCC)    Elevated d-dimer    Increased anion gap metabolic acidosis    BERONICA (obstructive sleep apnea)    SIRS (systemic inflammatory response syndrome) (HCC)    Excessive daytime sleepiness    Elevated troponin       Vitals: /95   Pulse 92   Temp 97 8 °F (36 6 °C) (Tympanic)   Resp 19   Ht 5' 10" (1 778 m)   Wt 74 5 kg (164 lb 3 9 oz)   SpO2 95%   BMI 23 57 kg/m²     I/O this shift:  In: 240 [P O :240]  Out: -   Wt Readings from Last 3 Encounters:   09/22/22 74 5 kg (164 lb 3 9 oz)   09/12/22 79 4 kg (175 lb)   08/29/22 79 9 kg (176 lb 3 2 oz)       Intake/Output Summary (Last 24 hours) at 9/22/2022 1050  Last data filed at 9/22/2022 0835  Gross per 24 hour   Intake 840 ml   Output 1200 ml   Net -360 ml     I/O last 3 completed shifts: In: 1150 [P O :1140;  I V :10]  Out: 1725 [Urine:1725]    Invasive Devices  Report    Peripheral Intravenous Line  Duration           Peripheral IV 09/20/22 Right Antecubital 2 days                  Physical Exam:  GEN: Lincoln Child appears well, alert and oriented x 3, pleasant and cooperative   HEENT: pupils equal, round, and reactive to light; extraocular muscles intact  NECK: supple, no carotid bruits or JVD  HEART: regular rhythm, normal S1 and S2, no murmur, no clicks, gallops or rubs   LUNGS: clear to auscultation bilaterally; no wheezes or rhonchi, few scattered rales  ABDOMEN/GI: normal bowel sounds, soft, no tenderness, no distention  EXTREMITIES/Musculoskeltal: peripheral pulses normal; no clubbing, cyanosis, no edema  NEURO: no focal motor findings   SKIN: normal without suspicious lesions on exposed skin              Lab Results:       Results from last 7 days   Lab Units 09/21/22  0429 09/20/22  0450 09/20/22  0254 09/20/22  0047   WBC Thousand/uL 6 22 8 61  --  10 27*   HEMOGLOBIN g/dL 11 5* 11 1*  --  12 1   HEMATOCRIT % 34 8* 33 9*  --  36 5   PLATELETS Thousands/uL 223 216 215 231         Results from last 7 days   Lab Units 09/22/22  0447 09/21/22  0429 09/20/22  0047   POTASSIUM mmol/L 3 6 3 8 3 6   CHLORIDE mmol/L 101 102 103   CO2 mmol/L 33* 32 30   BUN mg/dL 19 16 16   CREATININE mg/dL 0 81 0 82 0 91   CALCIUM mg/dL 8 5 8 5 8 6   ALK PHOS U/L  --   --  118*   ALT U/L  --   --  12   AST U/L  --   --  9         Imaging: I have personally reviewed pertinent reports  EKG/Telemtry:  No events    Scheduled Meds:  Current Facility-Administered Medications   Medication Dose Route Frequency Provider Last Rate    albuterol  2 puff Inhalation Q6H PRN Oma Roger MD      aspirin  81 mg Oral Every Other Day Oma Roger MD      budesonide  0 5 mg Nebulization BID Oma Roger MD      carvedilol  6 25 mg Oral BID With Meals Oma Roger MD      cyanocobalamin  500 mcg Oral Daily Oma Roger MD      digoxin  125 mcg Oral Daily Oma Roger MD      formoterol  20 mcg Nebulization Q12H Oma Roger MD      furosemide  40 mg Intravenous BID Oma Roger MD      gabapentin  300 mg Oral HS Oma Roger MD      heparin (porcine)  5,000 Units Subcutaneous [facility] Oma Roger MD      insulin lispro  1-6 Units Subcutaneous TID AC Oma Roger MD      ipratropium  0 5 mg Nebulization TID Oma Roger MD      labetalol  10 mg Intravenous Q6H PRN Oma Roger MD      ondansetron  4 mg Intravenous Q6H PRN Oma Roger MD      perflutren lipid microsphere  0 4 mL/min Intravenous Once in imaging Anna Schuler MD      pravastatin  80 mg Oral Daily With Javier Martinez MD      primidone  100 mg Oral BID MD Candis Barrios roflumilast  500 mcg Oral Daily Oma Roger MD       Continuous Infusions:       VTE Pharmacologic Prophylaxis: Heparin  VTE Mechanical Prophylaxis: sequential compression device    This note was completed in part utilizing m-modal fluency direct voice recognition software     Grammatical errors, random word insertion, spelling mistakes, occasional wrong word or "sound-alike" substitutions and incomplete sentences may be an occasional consequence of the system secondary to software limitations, ambient noise and hardware issues  At the time of dictation, efforts were made to edit, clarify and /or correct errors  Please read the chart carefully and recognize, using context, where substitutions have occurred  If you have any questions or concerns about the context, text or information contained within the body of this dictation, please contact myself, the provider, for further clarification

## 2022-09-22 NOTE — PLAN OF CARE
Problem: PHYSICAL THERAPY ADULT  Goal: Performs mobility at highest level of function for planned discharge setting  See evaluation for individualized goals  Description: Treatment/Interventions: Functional transfer training, LE strengthening/ROM, Elevations, Therapeutic exercise, Endurance training, Bed mobility, Gait training          See flowsheet documentation for full assessment, interventions and recommendations  Outcome: Progressing  Note: Prognosis: Good  Problem List:  (Decreased strength; Decreased endurance; Impaired balance; Decreased mobility)  Assessment: Pt  seen for PT treatment session this date with interventions consisting of  therapeutic exercises, transfers and  gait training w/ emphasis on improving pt's ability to ambulate  Pt  Requiring occasional cues for sequence and safety  In comparison to previous session, Pt  With improvements in activity tolerance  Improving balance and stability  Pt is in need of continued activity in PT to improve strength balance endurance mobility transfers and ambulation with return to maximize LOF  From PT/mobility standpoint, recommendation at time of d/c would be home health rehab  in order to promote return to PLOF and independence  The patient's AM-PAC Basic Mobility Inpatient Short Form Raw Score is 20  A Raw score of greater than 16 suggests the patient may benefit from discharge to home  Please also refer to physical therapy recommendation for safe DC planning  PT Discharge Recommendation: Home with outpatient rehabilitation    See flowsheet documentation for full assessment

## 2022-09-22 NOTE — PHYSICAL THERAPY NOTE
PHYSICAL THERAPY NOTE          Patient Name: Vahid Bowles  JZTJB'[id number] Date: 9/22/2022 09/22/22 0919   PT Last Visit   PT Visit Date 09/22/22   Note Type   Note Type Treatment   Pain Assessment   Pain Assessment Tool 0-10   Pain Score No Pain   Restrictions/Precautions   Weight Bearing Precautions Per Order No   Other Precautions   (Fall Risk; O2; Multiple lines; Telemetry; Chair Alarm)   General   Response to Previous Treatment Patient with no complaints from previous session  Family/Caregiver Present No   Cognition   Overall Cognitive Status WFL   Following Commands Follows all commands and directions without difficulty   Subjective   Subjective Agreeable to therapy  Bed Mobility   Additional Comments OOB in chair at start and end of PT session   Transfers   Sit to Stand 5  Supervision   Additional items Armrests; Increased time required;Verbal cues   Stand to Sit 5  Supervision   Additional items Armrests; Increased time required;Verbal cues   Stand pivot 5  Supervision   Ambulation/Elevation   Gait pattern   (Excessively slow; Short stride; Improper Weight shift; Decreased foot clearance)   Gait Assistance 5  Supervision   Additional items Verbal cues   Assistive Device [facility]   Distance 200'   Stair Management Technique Two rails; Step to pattern; Foreward   Number of Stairs 2   Balance   Static Sitting Fair +   Dynamic Sitting Fair +   Static Standing Fair   Dynamic Standing Fair   Ambulatory [facility])   Endurance Deficit   Endurance Deficit Yes   Activity Tolerance   Activity Tolerance Patient limited by fatigue   Exercises   Hip Flexion Sitting;20 reps;AROM; Bilateral   Hip Abduction Sitting;20 reps;AROM; Bilateral   Hip Adduction Sitting;20 reps;AROM; Bilateral   Knee AROM Long Arc Quad Sitting;20 reps;AROM; Bilateral   Ankle Pumps Sitting;20 reps;AROM; Bilateral   Assessment   Prognosis Good   Problem List   (Decreased strength; Decreased endurance; Impaired balance; Decreased mobility)   Assessment Pt  seen for PT treatment session this date with interventions consisting of  therapeutic exercises, transfers and  gait training w/ emphasis on improving pt's ability to ambulate  Pt  Requiring occasional cues for sequence and safety  In comparison to previous session, Pt  With improvements in activity tolerance  Improving balance and stability  Pt is in need of continued activity in PT to improve strength balance endurance mobility transfers and ambulation with return to maximize LOF  From PT/mobility standpoint, recommendation at time of d/c would be home health rehab  in order to promote return to PLOF and independence  The patient's AM-PAC Basic Mobility Inpatient Short Form Raw Score is 20  A Raw score of greater than 16 suggests the patient may benefit from discharge to home  Please also refer to physical therapy recommendation for safe DC planning  Goals   LTG Expiration Date 10/04/22   PT Treatment Day 2   Plan   Treatment/Interventions   (Functional transfer training; LE strengthening/ROM; Elevations; Therapeutic exercise; Endurance training; Bed mobility; Gait training)   Progress Progressing toward goals   PT Frequency 3-5x/wk   Recommendation   PT Discharge Recommendation Home with outpatient rehabilitation   [address] [place] [address] Mobility Inpatient   Turning in Bed Without Bedrails 4   Lying on Back to Sitting on Edge of Flat Bed 4   Moving Bed to Chair 3   Standing Up From Chair 3   Walk in Room 3   Climb 3-5 Stairs 3   Basic Mobility Inpatient Raw Score 20   Basic Mobility Standardized Score 43 99   Highest Level Of Mobility   JH-HLM Goal 6: Walk 10 steps or more   JH-HLM Achieved 7: Walk 25 feet or more   Education   Education Provided Mobility training   Patient Demonstrates verbal understanding   End of Consult   Patient Position at End of Consult Bedside chair;Bed/Chair alarm activated; All needs within reach   End of Consult Comments discussed POC with PT

## 2022-09-22 NOTE — PLAN OF CARE
Problem: CARDIOVASCULAR - ADULT  Goal: Maintains optimal cardiac output and hemodynamic stability  Description: INTERVENTIONS:  - Monitor I/O, vital signs and rhythm  - Monitor for S/S and trends of decreased cardiac output  - Administer and titrate ordered vasoactive medications to optimize hemodynamic stability  - Assess quality of pulses, skin color and temperature  - Assess for signs of decreased coronary artery perfusion  - Instruct patient to report change in severity of symptoms  Outcome: Progressing     Problem: RESPIRATORY - ADULT  Goal: Achieves optimal ventilation and oxygenation  Description: INTERVENTIONS:  - Assess for changes in respiratory status  - Assess for changes in mentation and behavior  - Position to facilitate oxygenation and minimize respiratory effort  - Oxygen administered by appropriate delivery if ordered  - Initiate smoking cessation education as indicated  - Encourage broncho-pulmonary hygiene including cough, deep breathe, Incentive Spirometry  - Assess the need for suctioning and aspirate as needed  - Assess and instruct to report SOB or any respiratory difficulty  - Respiratory Therapy support as indicated  Outcome: Progressing     Problem: PAIN - ADULT  Goal: Verbalizes/displays adequate comfort level or baseline comfort level  Description: Interventions:  - Encourage patient to monitor pain and request assistance  - Assess pain using appropriate pain scale  - Administer analgesics based on type and severity of pain and evaluate response  - Implement non-pharmacological measures as appropriate and evaluate response  - Consider cultural and social influences on pain and pain management  - Notify physician/advanced practitioner if interventions unsuccessful or patient reports new pain  Outcome: Progressing     Problem: INFECTION - ADULT  Goal: Absence or prevention of progression during hospitalization  Description: INTERVENTIONS:  - Assess and monitor for signs and symptoms of infection  - Monitor lab/diagnostic results  - Monitor all insertion sites, i e  indwelling lines, tubes, and drains  - Monitor endotracheal if appropriate and nasal secretions for changes in amount and color  - [place] appropriate cooling/warming therapies per order  - Administer medications as ordered  - Instruct and encourage patient and family to use good hand hygiene technique  - Identify and instruct in appropriate isolation precautions for identified infection/condition  Outcome: Progressing  Goal: Absence of fever/infection during neutropenic period  Description: INTERVENTIONS:  - Monitor WBC    Outcome: Progressing     Problem: DISCHARGE PLANNING  Goal: Discharge to home or other facility with appropriate resources  Description: INTERVENTIONS:  - Identify barriers to discharge w/patient and caregiver  - Arrange for needed discharge resources and transportation as appropriate  - Identify discharge learning needs (meds, wound care, etc )  - Arrange for interpretive services to assist at discharge as needed  - Refer to Case Management Department for coordinating discharge planning if the patient needs post-hospital services based on physician/advanced practitioner order or complex needs related to functional status, cognitive ability, or social support system  Outcome: Progressing     Problem: Knowledge Deficit  Goal: Patient/family/caregiver demonstrates understanding of disease process, treatment plan, medications, and discharge instructions  Description: Complete learning assessment and assess knowledge base    Interventions:  - Provide teaching at level of understanding  - Provide teaching via preferred learning methods  Outcome: Progressing     Problem: Potential for Falls  Goal: Patient will remain free of falls  Description: INTERVENTIONS:  - Educate patient/family on patient safety including physical limitations  - Instruct patient to call for assistance with activity   - Consult OT/PT to assist with strengthening/mobility   - Keep Call bell within reach  - Keep bed low and locked with side rails adjusted as appropriate  - Keep care items and personal belongings within reach  - Initiate and maintain comfort rounds  - Make Fall Risk Sign visible to staff  - Offer Toileting every 2 Hours, in advance of need  - Initiate/Maintain bed/chair alarm  - Apply yellow socks and bracelet for high fall risk patients  - Consider moving patient to room near nurses station  Outcome: Progressing     Problem: MOBILITY - ADULT  Goal: Maintain or return to baseline ADL function  Description: INTERVENTIONS:  -  Assess patient's ability to carry out ADLs; assess patient's baseline for ADL function and identify physical deficits which impact ability to perform ADLs (bathing, care of mouth/teeth, toileting, grooming, dressing, etc )  - Assess/evaluate cause of self-care deficits   - Assess range of motion  - Assess patient's mobility; develop plan if impaired  - Assess patient's need for assistive devices and provide as appropriate  - Encourage maximum independence but intervene and supervise when necessary  - Involve family in performance of ADLs  - Assess for home care needs following discharge   - Consider OT consult to assist with ADL evaluation and planning for discharge  - Provide patient education as appropriate  Outcome: Progressing  Goal: Maintains/Returns to pre admission functional level  Description: INTERVENTIONS:  - Perform BMAT or MOVE assessment daily    - Set and communicate daily mobility goal to care team and patient/family/caregiver  - Collaborate with rehabilitation services on mobility goals if consulted  - Perform Range of Motion  times a day  - Reposition patient every  hours    - Dangle patient  times a day  - Stand patient  times a day  - Ambulate patient  times a day  - Out of bed to chair  times a day   - Out of bed for meal times a day  - Out of bed for toileting  - Record patient progress and toleration of activity level   Outcome: Progressing

## 2022-09-22 NOTE — PROGRESS NOTES
[address] [facility] [address]  Progress Note - Celment Cruz 1945, 69 y o  male MRN: [id number]  Unit/Bed#: 411-01 Encounter: [id number]  Primary Care Provider: Micheline Grande DO   Date and time admitted to hospital: 9/20/2022 12:49 AM    * Acute on chronic combined systolic and diastolic congestive heart failure (HCC)  Assessment & Plan  Wt Readings from Last 3 Encounters:   09/22/22 74 5 kg (164 lb 3 9 oz)   09/12/22 79 4 kg (175 lb)   08/29/22 79 9 kg (176 lb 3 2 oz)   Chronic diuretic regimen Lasix 40 mg as needed for weight over 175  Updated echo reveals:  EF 15%, grade 2 diastolic dysfunction, PA pressure 53 mm Hg,  Day 3 Lasix 40 mg IV b i d  can likely transition to oral diuretics within 24 hours but defer to Cardiology  3 lb weight loss overall, patient feels improvement      Elevated troponin  Assessment & Plan  Likely nonischemic myocardial injury  Cardiology on board  Telemetry monitoring    COPD (chronic obstructive pulmonary disease) (HCC)  Assessment & Plan  · No wheezing on exam, symptoms likely related to CHF  · Continue with respiratory protocol and p r n  nebs    Acute on chronic respiratory failure with hypoxia (HCC)  Assessment & Plan  Chronically utilizes 2-3 L nasal cannula  Requiring intermittent BiPAP therapy  Resolved    Other hyperlipidemia  Assessment & Plan  · Pravachol 80 mg oral daily        Progress Note - Clement Cruz 69 y o  male MRN: [id number]    Unit/Bed#: 411-01 Encounter: [id number]        Subjective:     Patient seen and examined at bedside, reports breathing improvement  No acute complaints or events overnight     Objective:     Vitals:   Vitals:    09/22/22 0703   BP: 130/69   Pulse: 85   Resp: 19   Temp:    SpO2: 94%     Body mass index is 23 57 kg/m²      Intake/Output Summary (Last 24 hours) at 9/22/2022 0750  Last data filed at 9/22/2022 0600  Gross per 24 hour   Intake 900 ml   Output 1325 ml   Net -425 ml       Physical Exam:   /69   Pulse 85 Temp 97 9 °F (36 6 °C)   Resp 19   Ht 5' 10" (1 778 m)   Wt 74 5 kg (164 lb 3 9 oz)   SpO2 94%   BMI 23 57 kg/m²   General appearance: alert and oriented, in no acute distress  Head: Normocephalic, without obvious abnormality, atraumatic  Lungs: clear to auscultation bilaterally  Heart: regular rate and rhythm, S1, S2 normal, no murmur, click, rub or gallop  Abdomen: soft, non-tender; bowel sounds normal; no masses,  no organomegaly  Extremities: extremities normal, warm and well-perfused; no cyanosis, clubbing, or edema  Pulses: 2+ and symmetric  Neurologic: Grossly normal     Invasive Devices  Report    Peripheral Intravenous Line  Duration           Peripheral IV 09/20/22 Right Antecubital 2 days                Results from last 7 days   Lab Units 09/21/22  0429 09/20/22  0450 09/20/22  0254 09/20/22  0047   WBC Thousand/uL 6 22 8 61  --  10 27*   HEMOGLOBIN g/dL 11 5* 11 1*  --  12 1   HEMATOCRIT % 34 8* 33 9*  --  36 5   PLATELETS Thousands/uL 223 216 215 231       Results from last 7 days   Lab Units 09/22/22  0447 09/21/22  0429 09/20/22  0047   POTASSIUM mmol/L 3 6 3 8 3 6   CHLORIDE mmol/L 101 102 103   CO2 mmol/L 33* 32 30   BUN mg/dL 19 16 16   CREATININE mg/dL 0 81 0 82 0 91   CALCIUM mg/dL 8 5 8 5 8 6   ALK PHOS U/L  --   --  118*   ALT U/L  --   --  12   AST U/L  --   --  9       Medication Administration - last 24 hours from 09/21/2022 0750 to 09/22/2022 0750       Date/Time Order Dose Route Action Action by     09/21/2022 1958 budesonide (PULMICORT) inhalation solution 0 5 mg 0 5 mg Nebulization Given RT Mohan     09/21/2022 0914 budesonide (PULMICORT) inhalation solution 0 5 mg 0 5 mg Nebulization Given Barbra Lay     09/21/2022 0837 cyanocobalamin (VITAMIN B-12) tablet 500 mcg 500 mcg Oral Given Bert Benavidez RN     09/21/2022 0318 digoxin (LANOXIN) tablet 125 mcg 125 mcg Oral Given Bert Benavidez RN     09/21/2022 6329 gabapentin (NEURONTIN) capsule 300 mg 300 mg Oral Given Hayley Naz, RN     09/21/2022 1958 ipratropium (ATROVENT) 0 02 % inhalation solution 0 5 mg 0 5 mg Nebulization Given [facility],      09/21/2022 1350 ipratropium (ATROVENT) 0 02 % inhalation solution 0 5 mg 0 5 mg Nebulization Given [facility]     09/21/2022 0914 ipratropium (ATROVENT) 0 02 % inhalation solution 0 5 mg 0 5 mg Nebulization Given [facility]     09/21/2022 2048 primidone (MYSOLINE) tablet 100 mg 100 mg Oral Given Hayley Naz, RN     09/21/2022 0840 primidone (MYSOLINE) tablet 100 mg 100 mg Oral Given [facility], RN     09/21/2022 7957 roflumilast (DALIRESP) tablet 500 mcg 500 mcg Oral Given [facility], RN     09/21/2022 1655 pravastatin (PRAVACHOL) tablet 80 mg 80 mg Oral Given [facility], RN     09/22/2022 5715 heparin (porcine) subcutaneous injection 5,000 Units 5,000 Units Subcutaneous Given Hayley Naz, RN     09/21/2022 2225 heparin (porcine) subcutaneous injection 5,000 Units 5,000 Units Subcutaneous Given Hayley Naz, RN     09/21/2022 1451 heparin (porcine) subcutaneous injection 5,000 Units 5,000 Units Subcutaneous Given [facility], RN     09/21/2022 1703 furosemide (LASIX) injection 40 mg 40 mg Intravenous Given [facility], RN     09/21/2022 8539 furosemide (LASIX) injection 40 mg 40 mg Intravenous Given [facility], RN     09/21/2022 1654 insulin lispro (HumaLOG) 100 units/mL subcutaneous injection 1-6 Units 1 Units Subcutaneous Not Given [place] Ney, NATALIA     09/21/2022 1155 insulin lispro (HumaLOG) 100 units/mL subcutaneous injection 1-6 Units 2 Units Subcutaneous Given [facility], RN     09/21/2022 8465 insulin lispro (HumaLOG) 100 units/mL subcutaneous injection 1-6 Units 1 Units Subcutaneous Not Given [facility] Ney, RN     09/21/2022 1002 perflutren lipid microsphere (DEFINITY) injection   Intravenous PEGGY Bolton MD     09/21/2022 0954 perflutren lipid microsphere (DEFINITY) injection Intravenous MAR Hold Automatic Transfer Provider     09/21/2022 1002 albuterol (PROVENTIL HFA,VENTOLIN HFA) inhaler 2 puff   Inhalation MAR Unhold Oma Smiley MD     09/21/2022 0954 albuterol (PROVENTIL HFA,VENTOLIN HFA) inhaler 2 puff   Inhalation MAR Hold Automatic Transfer Provider     09/21/2022 1656 carvedilol (COREG) tablet 6 25 mg 6 25 mg Oral Given Salina Pelayo RN     09/21/2022 1002 carvedilol (COREG) tablet 6 25 mg   Oral MAR Unhold Margaretann Nageotte, MD     09/21/2022 0954 carvedilol (COREG) tablet 6 25 mg   Oral MAR Hold Automatic Transfer Provider     09/21/2022 0837 carvedilol (COREG) tablet 6 25 mg 6 25 mg Oral Given Salina Pelayo RN     09/21/2022 1958 formoterol (PERFOROMIST) nebulizer solution 20 mcg 20 mcg Nebulization Given RT Francisca     09/21/2022 1002 formoterol (PERFOROMIST) nebulizer solution 20 mcg   Nebulization MAR Unhold Oma Smiley MD     09/21/2022 0954 formoterol (PERFOROMIST) nebulizer solution 20 mcg   Nebulization MAR Hold Automatic Transfer Provider     09/21/2022 0914 formoterol (PERFOROMIST) nebulizer solution 20 mcg 20 mcg Nebulization Given Barbra Lay     09/21/2022 1002 labetalol (NORMODYNE) injection 10 mg   Intravenous MAR Unhold Oma Smiley MD     09/21/2022 0954 labetalol (NORMODYNE) injection 10 mg   Intravenous MAR Hold Automatic Transfer Provider            Lab, Imaging and other studies: I have personally reviewed pertinent reports      VTE Pharmacologic Prophylaxis: heparin  VTE Mechanical Prophylaxis: sequential compression device     Margaretann Nageotte, MD  9/22/2022,7:50 AM

## 2022-09-22 NOTE — ASSESSMENT & PLAN NOTE
Wt Readings from Last 3 Encounters:   09/22/22 74 5 kg (164 lb 3 9 oz)   09/12/22 79 4 kg (175 lb)   08/29/22 79 9 kg (176 lb 3 2 oz)   Chronic diuretic regimen Lasix 40 mg as needed for weight over 175  Updated echo reveals:  EF 60%, grade 2 diastolic dysfunction, PA pressure 53 mm Hg,  Day 3 Lasix 40 mg IV b i d  can likely transition to oral diuretics within 24 hours but defer to Cardiology  3 lb weight loss overall, patient feels improvement

## 2022-09-23 ENCOUNTER — TRANSITIONAL CARE MANAGEMENT (OUTPATIENT)
Dept: FAMILY MEDICINE CLINIC | Facility: CLINIC | Age: 77
End: 2022-09-23

## 2022-09-23 VITALS
OXYGEN SATURATION: 98 % | DIASTOLIC BLOOD PRESSURE: 63 MMHG | WEIGHT: 162.04 LBS | RESPIRATION RATE: 18 BRPM | SYSTOLIC BLOOD PRESSURE: 116 MMHG | BODY MASS INDEX: 23.2 KG/M2 | HEIGHT: 70 IN | HEART RATE: 85 BPM | TEMPERATURE: 97.1 F

## 2022-09-23 LAB
ANION GAP SERPL CALCULATED.3IONS-SCNC: 5 MMOL/L (ref 4–13)
ATRIAL RATE: 121 BPM
ATRIAL RATE: 135 BPM
BUN SERPL-MCNC: 14 MG/DL (ref 5–25)
CALCIUM SERPL-MCNC: 8.6 MG/DL (ref 8.3–10.1)
CHLORIDE SERPL-SCNC: 103 MMOL/L (ref 96–108)
CO2 SERPL-SCNC: 30 MMOL/L (ref 21–32)
CREAT SERPL-MCNC: 0.74 MG/DL (ref 0.6–1.3)
GFR SERPL CREATININE-BSD FRML MDRD: 89 ML/MIN/1.73SQ M
GLUCOSE SERPL-MCNC: 106 MG/DL (ref 65–140)
GLUCOSE SERPL-MCNC: 96 MG/DL (ref 65–140)
P AXIS: 66 DEGREES
P AXIS: 76 DEGREES
POTASSIUM SERPL-SCNC: 3.7 MMOL/L (ref 3.5–5.3)
PR INTERVAL: 168 MS
PR INTERVAL: 180 MS
QRS AXIS: 37 DEGREES
QRS AXIS: 41 DEGREES
QRSD INTERVAL: 100 MS
QRSD INTERVAL: 98 MS
QT INTERVAL: 262 MS
QT INTERVAL: 310 MS
QTC INTERVAL: 393 MS
QTC INTERVAL: 440 MS
SODIUM SERPL-SCNC: 138 MMOL/L (ref 135–147)
T WAVE AXIS: 185 DEGREES
T WAVE AXIS: 210 DEGREES
VENTRICULAR RATE: 121 BPM
VENTRICULAR RATE: 135 BPM

## 2022-09-23 PROCEDURE — 94640 AIRWAY INHALATION TREATMENT: CPT

## 2022-09-23 PROCEDURE — 99239 HOSP IP/OBS DSCHRG MGMT >30: CPT | Performed by: FAMILY MEDICINE

## 2022-09-23 PROCEDURE — 93010 ELECTROCARDIOGRAM REPORT: CPT | Performed by: INTERNAL MEDICINE

## 2022-09-23 PROCEDURE — 80048 BASIC METABOLIC PNL TOTAL CA: CPT | Performed by: FAMILY MEDICINE

## 2022-09-23 PROCEDURE — 82948 REAGENT STRIP/BLOOD GLUCOSE: CPT

## 2022-09-23 PROCEDURE — 94760 N-INVAS EAR/PLS OXIMETRY 1: CPT

## 2022-09-23 RX ORDER — CARVEDILOL 6.25 MG/1
6.25 TABLET ORAL 2 TIMES DAILY WITH MEALS
Qty: 60 TABLET | Refills: 0 | Status: SHIPPED | OUTPATIENT
Start: 2022-09-23

## 2022-09-23 RX ORDER — FUROSEMIDE 40 MG/1
40 TABLET ORAL DAILY
Qty: 30 TABLET | Refills: 0 | Status: SHIPPED | OUTPATIENT
Start: 2022-09-23 | End: 2022-09-27

## 2022-09-23 RX ADMIN — SACUBITRIL AND VALSARTAN 1 TABLET: 24; 26 TABLET, FILM COATED ORAL at 08:21

## 2022-09-23 RX ADMIN — ROFLUMILAST 500 MCG: 250 TABLET ORAL at 08:24

## 2022-09-23 RX ADMIN — HEPARIN SODIUM 5000 UNITS: 5000 INJECTION INTRAVENOUS; SUBCUTANEOUS at 05:06

## 2022-09-23 RX ADMIN — BUDESONIDE 0.5 MG: 0.5 INHALANT ORAL at 08:03

## 2022-09-23 RX ADMIN — CYANOCOBALAMIN TAB 500 MCG 500 MCG: 500 TAB at 08:20

## 2022-09-23 RX ADMIN — PRIMIDONE 100 MG: 50 TABLET ORAL at 08:20

## 2022-09-23 RX ADMIN — FORMOTEROL FUMARATE DIHYDRATE 20 MCG: 20 SOLUTION RESPIRATORY (INHALATION) at 09:59

## 2022-09-23 RX ADMIN — IPRATROPIUM BROMIDE 0.5 MG: 0.5 SOLUTION RESPIRATORY (INHALATION) at 08:03

## 2022-09-23 RX ADMIN — DIGOXIN 125 MCG: 125 TABLET ORAL at 08:20

## 2022-09-23 RX ADMIN — FUROSEMIDE 40 MG: 40 TABLET ORAL at 08:20

## 2022-09-23 RX ADMIN — CARVEDILOL 6.25 MG: 3.12 TABLET, FILM COATED ORAL at 08:20

## 2022-09-23 NOTE — CASE MANAGEMENT
Case Management Discharge Planning Note    Patient name Rhea De Los Santos  Location [id number] MRN [id number]  : 1945 Date 2022       Current Admission Date: 2022  Current Admission Diagnosis:Acute on chronic combined systolic and diastolic congestive heart failure [facility])   Patient Active Problem List    Diagnosis Date Noted    Elevated troponin 2022    SIRS (systemic inflammatory response syndrome) ([facility] [address] ) 2022    BERONICA (obstructive sleep apnea)     Excessive daytime sleepiness     Acute on chronic combined systolic and diastolic congestive heart failure ([address] ) 2022    PAF (paroxysmal atrial fibrillation) ([facility] [address] ) 2022    Elevated d-dimer 2022    Increased anion gap metabolic acidosis     Bilateral pleural effusion 2022    Vision loss, left eye 2022    Tributary (branch) retinal vein occlusion, left eye, with macular edema 07/15/2022    Insomnia due to medical condition 2022    Primary hypertension 2022    Normocytic anemia 2022    Low serum albumin 2022    Dizziness 2022    Congestive heart failure ([address] ) 2022    Generalized weakness 2022    PAD (peripheral artery disease) ([facility] [address] [facility] ) 2022    Diminished pulses in lower extremity 2021    Physical deconditioning 2021    Tinea unguium 2021    Type 2 diabetes mellitus with diabetic neuropathy, unspecified ([facility] [address] ) 2021    Other age-related cataract 2021    Central retinal vein occlusion with macular edema 2021    Nonexudative age-related macular degeneration 2021    Obesity 2021    Ocular hypertension 2021    Onychomycosis due to dermatophyte 2021    Chronic respiratory failure with hypoxia ([address] ) 08/15/2020    COPD (chronic obstructive pulmonary disease) ([facility] [facility] ) 2020    Acute on chronic respiratory failure with hypoxia ([facility] [address] ) 2020    Other hyperlipidemia 2020  Ischemic cardiomyopathy 08/20/2019    Coronary artery disease involving native coronary artery of native heart without angina pectoris 02/22/2019    Benign prostatic hyperplasia with urinary retention 02/22/2019    Mucopurulent chronic bronchitis ([facility] [address] ) 02/22/2019    Benign essential tremor 02/22/2019    Pill rolling tremor 02/22/2019    History of prostate cancer 03/20/2018    Ambulatory dysfunction 03/16/2017    On intra-aortic balloon pump assist 03/10/2017    S/P CABG x 4 03/09/2017    Type 2 diabetes mellitus without complication, without long-term current use of insulin ([facility] [address] ) 03/08/2017    Tobacco abuse 03/08/2017      LOS (days): 3  Geometric Mean LOS (GMLOS) (days): 3 80  Days to GMLOS:0 5     OBJECTIVE:  Risk of Unplanned Readmission Score: 27 14         Current admission status: Inpatient   Preferred Pharmacy:   [facility] [address] JAYLIN CHRIS [address]  [address] [address]  Phone: [phone] Fax: [phone]    Primary Care Provider: Skyler Feng DO    Primary Insurance: [facility]  Secondary Insurance:     DISCHARGE DETAILS:    Discharge planning discussed with[de-identified] patient       [address]         Is the patient interested in [address] at discharge?: No    DME Referral Provided  Referral made for DME?: No         Would you like to participate in our [address] service program?  : No - Declined       Discharge Destination Plan[de-identified] Home  Transport at Discharge : Family (sister)      Pt is being dc'd home on this date with OP follow up  CM in basket 2 [facility] Cardiology re: a follow up appt

## 2022-09-23 NOTE — PLAN OF CARE
Problem: CARDIOVASCULAR - ADULT  Goal: Maintains optimal cardiac output and hemodynamic stability  Description: INTERVENTIONS:  - Monitor I/O, vital signs and rhythm  - Monitor for S/S and trends of decreased cardiac output  - Administer and titrate ordered vasoactive medications to optimize hemodynamic stability  - Assess quality of pulses, skin color and temperature  - Assess for signs of decreased coronary artery perfusion  - Instruct patient to report change in severity of symptoms  Outcome: Progressing     Problem: RESPIRATORY - ADULT  Goal: Achieves optimal ventilation and oxygenation  Description: INTERVENTIONS:  - Assess for changes in respiratory status  - Assess for changes in mentation and behavior  - Position to facilitate oxygenation and minimize respiratory effort  - Oxygen administered by appropriate delivery if ordered  - Initiate smoking cessation education as indicated  - Encourage broncho-pulmonary hygiene including cough, deep breathe, Incentive Spirometry  - Assess and instruct to report SOB or any respiratory difficulty  - Respiratory Therapy support as indicated  Outcome: Progressing     Problem: PAIN - ADULT  Goal: Verbalizes/displays adequate comfort level or baseline comfort level  Description: Interventions:  - Encourage patient to monitor pain and request assistance  - Assess pain using appropriate pain scale(0-10)  - Administer analgesics based on type and severity of pain and evaluate response  - Implement non-pharmacological measures as appropriate and evaluate response  - Consider cultural and social influences on pain and pain management  - Notify physician/advanced practitioner if interventions unsuccessful or patient reports new pain  Outcome: Progressing     Problem: INFECTION - ADULT  Goal: Absence or prevention of progression during hospitalization  Description: INTERVENTIONS:  - Assess and monitor for signs and symptoms of infection  - Monitor lab/diagnostic results  - Monitor all insertion sites, i e  indwelling lines, tubes, and drains  - Administer medications as ordered  - Instruct and encourage patient and family to use good hand hygiene technique  - Identify and instruct in appropriate isolation precautions for identified infection/condition  Outcome: Progressing     Problem: SAFETY ADULT  Goal: Patient will remain free of falls  Description: INTERVENTIONS:  - Educate patient/family on patient safety including physical limitations  - Instruct patient to call for assistance with activity   - Consult OT/PT to assist with strengthening/mobility   - Keep Call bell within reach  - Keep bed low and locked with side rails adjusted as appropriate  - Keep care items and personal belongings within reach  - Initiate and maintain comfort rounds  - Make Fall Risk Sign visible to staff  - Offer Toileting every 2 Hours, in advance of need  - Initiate/Maintain bed/chair alarm  - Apply yellow socks and bracelet for high fall risk patients  - Consider moving patient to room near nurses station  Outcome: Progressing  Goal: Maintain or return to baseline ADL function  Description: INTERVENTIONS:  -  Assess patient's ability to carry out ADLs; assess patient's baseline for ADL function and identify physical deficits which impact ability to perform ADLs (bathing, care of mouth/teeth, toileting, grooming, dressing, etc )  - Assess/evaluate cause of self-care deficits   - Assess range of motion  - Assess patient's mobility; develop plan if impaired  - Assess patient's need for assistive devices and provide as appropriate  - Encourage maximum independence but intervene and supervise when necessary  - Involve family in performance of ADLs  - Assess for home care needs following discharge   - Consider OT consult to assist with ADL evaluation and planning for discharge  - Provide patient education as appropriate  Outcome: Progressing  Goal: Maintains/Returns to pre admission functional level  Description: INTERVENTIONS:  - Perform BMAT or MOVE assessment daily    - Set and communicate daily mobility goal to care team and patient/family/caregiver  - Collaborate with rehabilitation services on mobility goals if consulted  - Perform Range of Motion 3 times a day  - Reposition patient every 2 hours  - Dangle patient 3 times a day  - Stand patient 3 times a day  - Ambulate patient 3 times a day  - Out of bed to chair 3 times a day   - Out of bed for meals 3 times a day  - Out of bed for toileting  - Record patient progress and toleration of activity level   Outcome: Progressing     Problem: DISCHARGE PLANNING  Goal: Discharge to home or other facility with appropriate resources  Description: INTERVENTIONS:  - Identify barriers to discharge w/patient and caregiver  - Arrange for needed discharge resources and transportation as appropriate  - Identify discharge learning needs (meds, wound care, etc )  - Arrange for interpretive services to assist at discharge as needed  - Refer to Case Management Department for coordinating discharge planning if the patient needs post-hospital services based on physician/advanced practitioner order or complex needs related to functional status, cognitive ability, or social support system  Outcome: Progressing     Problem: Knowledge Deficit  Goal: Patient/family/caregiver demonstrates understanding of disease process, treatment plan, medications, and discharge instructions  Description: Complete learning assessment and assess knowledge base    Interventions:  - Provide teaching at level of understanding  - Provide teaching via preferred learning methods  Outcome: Progressing     Problem: Potential for Falls  Goal: Patient will remain free of falls  Description: INTERVENTIONS:  - Educate patient/family on patient safety including physical limitations  - Instruct patient to call for assistance with activity   - Consult OT/PT to assist with strengthening/mobility   - Keep Call bell within reach  - Keep bed low and locked with side rails adjusted as appropriate  - Keep care items and personal belongings within reach  - Initiate and maintain comfort rounds  - Make Fall Risk Sign visible to staff  - Offer Toileting every 2 Hours, in advance of need  - Initiate/Maintain bed/chair alarm  - Apply yellow socks and bracelet for high fall risk patients  - Consider moving patient to room near nurses station  Outcome: Progressing     Problem: MOBILITY - ADULT  Goal: Maintain or return to baseline ADL function  Description: INTERVENTIONS:  -  Assess patient's ability to carry out ADLs; assess patient's baseline for ADL function and identify physical deficits which impact ability to perform ADLs (bathing, care of mouth/teeth, toileting, grooming, dressing, etc )  - Assess/evaluate cause of self-care deficits   - Assess range of motion  - Assess patient's mobility; develop plan if impaired  - Assess patient's need for assistive devices and provide as appropriate  - Encourage maximum independence but intervene and supervise when necessary  - Involve family in performance of ADLs  - Assess for home care needs following discharge   - Consider OT consult to assist with ADL evaluation and planning for discharge  - Provide patient education as appropriate  Outcome: Progressing  Goal: Maintains/Returns to pre admission functional level  Description: INTERVENTIONS:  - Perform BMAT or MOVE assessment daily    - Set and communicate daily mobility goal to care team and patient/family/caregiver  - Collaborate with rehabilitation services on mobility goals if consulted  - Perform Range of Motion 3 times a day  - Reposition patient every 2 hours    - Dangle patient 3 times a day  - Stand patient 3 times a day  - Ambulate patient 3 times a day  - Out of bed to chair 3 times a day   - Out of bed for meals 3 times a day  - Out of bed for toileting  - Record patient progress and toleration of activity level Outcome: Progressing

## 2022-09-23 NOTE — PLAN OF CARE
Problem: CARDIOVASCULAR - ADULT  Goal: Maintains optimal cardiac output and hemodynamic stability  Description: INTERVENTIONS:  - Monitor I/O, vital signs and rhythm  - Monitor for S/S and trends of decreased cardiac output  - Administer and titrate ordered vasoactive medications to optimize hemodynamic stability  - Assess quality of pulses, skin color and temperature  - Assess for signs of decreased coronary artery perfusion  - Instruct patient to report change in severity of symptoms  Outcome: Progressing     Problem: RESPIRATORY - ADULT  Goal: Achieves optimal ventilation and oxygenation  Description: INTERVENTIONS:  - Assess for changes in respiratory status  - Assess for changes in mentation and behavior  - Position to facilitate oxygenation and minimize respiratory effort  - Oxygen administered by appropriate delivery if ordered  - Initiate smoking cessation education as indicated  - Encourage broncho-pulmonary hygiene including cough, deep breathe, Incentive Spirometry  - Assess and instruct to report SOB or any respiratory difficulty  - Respiratory Therapy support as indicated  Outcome: Progressing     Problem: PAIN - ADULT  Goal: Verbalizes/displays adequate comfort level or baseline comfort level  Description: Interventions:  - Encourage patient to monitor pain and request assistance  - Assess pain using appropriate pain scale(0-10)  - Administer analgesics based on type and severity of pain and evaluate response  - Implement non-pharmacological measures as appropriate and evaluate response  - Consider cultural and social influences on pain and pain management  - Notify physician/advanced practitioner if interventions unsuccessful or patient reports new pain  Outcome: Progressing     Problem: INFECTION - ADULT  Goal: Absence or prevention of progression during hospitalization  Description: INTERVENTIONS:  - Assess and monitor for signs and symptoms of infection  - Monitor lab/diagnostic results  - Monitor all insertion sites, i e  indwelling lines, tubes, and drains  - Administer medications as ordered  - Instruct and encourage patient and family to use good hand hygiene technique  - Identify and instruct in appropriate isolation precautions for identified infection/condition  Outcome: Progressing     Problem: SAFETY ADULT  Goal: Patient will remain free of falls  Description: INTERVENTIONS:  - Educate patient/family on patient safety including physical limitations  - Instruct patient to call for assistance with activity   - Consult OT/PT to assist with strengthening/mobility   - Keep Call bell within reach  - Keep bed low and locked with side rails adjusted as appropriate  - Keep care items and personal belongings within reach  - Initiate and maintain comfort rounds  - Make Fall Risk Sign visible to staff  - Offer Toileting every 2 Hours, in advance of need  - Initiate/Maintain bed/chair alarm  - Apply yellow socks and bracelet for high fall risk patients  - Consider moving patient to room near nurses station  Outcome: Progressing  Goal: Maintain or return to baseline ADL function  Description: INTERVENTIONS:  -  Assess patient's ability to carry out ADLs; assess patient's baseline for ADL function and identify physical deficits which impact ability to perform ADLs (bathing, care of mouth/teeth, toileting, grooming, dressing, etc )  - Assess/evaluate cause of self-care deficits   - Assess range of motion  - Assess patient's mobility; develop plan if impaired  - Assess patient's need for assistive devices and provide as appropriate  - Encourage maximum independence but intervene and supervise when necessary  - Involve family in performance of ADLs  - Assess for home care needs following discharge   - Consider OT consult to assist with ADL evaluation and planning for discharge  - Provide patient education as appropriate  Outcome: Progressing  Goal: Maintains/Returns to pre admission functional level  Description: INTERVENTIONS:  - Perform BMAT or MOVE assessment daily    - Set and communicate daily mobility goal to care team and patient/family/caregiver  - Collaborate with rehabilitation services on mobility goals if consulted  - Perform Range of Motion 3 times a day  - Reposition patient every 3 hours  - Dangle patient 3 times a day  - Stand patient 3 times a day  - Ambulate patient 3 times a day  - Out of bed to chair 3 times a day   - Out of bed for meals 3 times a day  - Out of bed for toileting  - Record patient progress and toleration of activity level   Outcome: Progressing     Problem: DISCHARGE PLANNING  Goal: Discharge to home or other facility with appropriate resources  Description: INTERVENTIONS:  - Identify barriers to discharge w/patient and caregiver  - Arrange for needed discharge resources and transportation as appropriate  - Identify discharge learning needs (meds, wound care, etc )  - Arrange for interpretive services to assist at discharge as needed  - Refer to Case Management Department for coordinating discharge planning if the patient needs post-hospital services based on physician/advanced practitioner order or complex needs related to functional status, cognitive ability, or social support system  Outcome: Progressing     Problem: Knowledge Deficit  Goal: Patient/family/caregiver demonstrates understanding of disease process, treatment plan, medications, and discharge instructions  Description: Complete learning assessment and assess knowledge base    Interventions:  - Provide teaching at level of understanding  - Provide teaching via preferred learning methods  Outcome: Progressing     Problem: Potential for Falls  Goal: Patient will remain free of falls  Description: INTERVENTIONS:  - Educate patient/family on patient safety including physical limitations  - Instruct patient to call for assistance with activity   - Consult OT/PT to assist with strengthening/mobility   - Keep Call bell within reach  - Keep bed low and locked with side rails adjusted as appropriate  - Keep care items and personal belongings within reach  - Initiate and maintain comfort rounds  - Make Fall Risk Sign visible to staff  - Offer Toileting every 2 Hours, in advance of need  - Initiate/Maintain bed/chair alarm  - Apply yellow socks and bracelet for high fall risk patients  - Consider moving patient to room near nurses station  Outcome: Progressing     Problem: MOBILITY - ADULT  Goal: Maintain or return to baseline ADL function  Description: INTERVENTIONS:  -  Assess patient's ability to carry out ADLs; assess patient's baseline for ADL function and identify physical deficits which impact ability to perform ADLs (bathing, care of mouth/teeth, toileting, grooming, dressing, etc )  - Assess/evaluate cause of self-care deficits   - Assess range of motion  - Assess patient's mobility; develop plan if impaired  - Assess patient's need for assistive devices and provide as appropriate  - Encourage maximum independence but intervene and supervise when necessary  - Involve family in performance of ADLs  - Assess for home care needs following discharge   - Consider OT consult to assist with ADL evaluation and planning for discharge  - Provide patient education as appropriate  Outcome: Progressing  Goal: Maintains/Returns to pre admission functional level  Description: INTERVENTIONS:  - Perform BMAT or MOVE assessment daily    - Set and communicate daily mobility goal to care team and patient/family/caregiver  - Collaborate with rehabilitation services on mobility goals if consulted  - Perform Range of Motion 3 times a day  - Reposition patient every 3 hours    - Dangle patient 3 times a day  - Stand patient 3 times a day  - Ambulate patient 3 times a day  - Out of bed to chair 3 times a day   - Out of bed for meals 3 times a day  - Out of bed for toileting  - Record patient progress and toleration of activity level Outcome: Progressing

## 2022-09-23 NOTE — DISCHARGE SUMMARY
Discharge Summary - Clement Cruz 69 y o  male MRN: [id number]    Unit/Bed#: 045-11 Encounter: [id number]    Admission Date:   Admission Orders (From admission, onward)     Ordered        09/20/22 0221  INPATIENT ADMISSION  Once                        Admitting Diagnosis: CHF (congestive heart failure) ([facility]) [I50 9]  Dyspnea [R06 00]  SOB (shortness of breath) [R06 02]  Acute on chronic respiratory failure with hypoxia ([address] ) [J96 21]    HPI: Clement Cruz is a 69 y o  male with a PMH of PAF, CHF, BERONICA, BPH, COPD, type 2 diabetes, CAD with CABG who presents with acute shortness of breath beginning tonight  The patient was sent to the ED from [facility] where he was supposed to undergo sleep study  The patient states over the past day, he has felt increasingly short of breath  He states that [facility] short[facility] of breath worsened  He was found at [address] to have a heart rate rising up to the 120s  As well, had a high RR of 28  Due to history of CHF, AFib patient was sent in to the ED for evaluation  The patient does note that he has had some dyspnea with exertion  He states that now it is at rest   He states it is worse when he lays flat  Denies any paroxysmal nocturnal dyspnea  He states that shortness of breath began over the last several days  Denies any chest pain  Denies any increased lower extremity swelling  He does report good compliance with his home medications  Patient was found on chest x-ray to have evidence of pulmonary vascular congestion  As well, found to have elevated proBNP  Of note, patient did have rising troponins upon admission  This is similar to past admission at [facility] several weeks ago where he was admitted for CHF  Patient does feel significantly improved in his shortness of breath since he received IV Lasix  He has had good urine output      Procedures Performed:   Orders Placed This Encounter Procedures   [facility] ED ECG Documentation Only    ED ECG Documentation Only   [address] Course:     Acute on chronic respiratory failure with hypoxia    Acute on chronic combined CHF    52-year-old male with past medical history significant for ischemic cardiomyopathy with prior EF 30% presented with shortness of breath, and hypoxia  Found to have an elevated troponin initiated on IV Lasix  He was placed on BiPAP therapy and admitted to step-down unit  He was seen in consultation by Cardiology, and continued on  Lasix 40 mg IV b i d   Troponin was elevated but was deemed a non ischemic myocardial injury  Patient diuresed well, negative fluid balance and stable renal function  Toprol XL was discontinued and Coreg was substituted  Lisinopril was discontinued and Entresto was started  He will need follow-up within 1 week of discharge his primary cardiologist and blood work  Significant Findings, Care, Treatment and Services Provided:     CXR    Mild pulmonary edema with trace effusions    Complications: none    Discharge Diagnosis: see above    Medical Problems             Resolved Problems  Date Reviewed: 9/22/2022   None                 Condition at Discharge: fair         Discharge instructions/Information to patient and family:   See after visit summary for information provided to patient and family  Provisions for Follow-Up Care:  See after visit summary for information related to follow-up care and any pertinent home health orders  PCP: Carmel Kuhn DO    Disposition: Home    Planned Readmission: No      Discharge Statement   I spent 45 minutes discharging the patient  This time was spent on the day of discharge  I had direct contact with the patient on the day of discharge  Additional documentation is required if more than 30 minutes were spent on discharge       Discharge Medications:  See after visit summary for reconciled discharge medications provided to patient and family

## 2022-09-23 NOTE — RESPIRATORY THERAPY NOTE
09/23/22 0805   Inhalation Therapy Tx   $ Inhalation Therapy Performed Yes   $ Pulse Oximetry Spot Check Charge Completed   SpO2 99 %  (2)   Pre-Treatment Pulse 84   Pre-Treatment Respirations 16   Duration 15   Breath Sounds Pre-Treatment Bilateral Diminished;Clear   Breath Sounds Post-Treatment Bilateral Diminished;Clear   Post-Treatment Pulse 85   Post-Treatment Respirations 16   Delivery Source Air;UDN   Position Sitting   Treatment Tolerance Tolerated well

## 2022-09-26 ENCOUNTER — TELEPHONE (OUTPATIENT)
Dept: FAMILY MEDICINE CLINIC | Facility: CLINIC | Age: 77
End: 2022-09-26

## 2022-09-26 ENCOUNTER — TELEPHONE (OUTPATIENT)
Dept: SLEEP CENTER | Facility: CLINIC | Age: 77
End: 2022-09-26

## 2022-09-26 NOTE — TELEPHONE ENCOUNTER
Yes they should follow discharge instructions and changes- please have Janey Worthy/social work team reach out to see if there's anything we can assist with medication cost

## 2022-09-26 NOTE — TELEPHONE ENCOUNTER
Diagnostic sleep study was not completed  Per study results:  Study was technically limited as patient was taken to the ED during the study  Upon placement of leads patient's heart rate increased to 120s to 130s at rest   He was having periods of shortness of breath  On-call pulmonologist recommended that patient be taken to the ED  Dr Poly Granados, if you would like patient to be rescheduled for the sleep study, please place a new order for the test  Thanks

## 2022-09-26 NOTE — TELEPHONE ENCOUNTER
Patients wife called about the hospital changing his medications  Per ED note it states stop Lisinopril and start Entresto  Stop Toprol XL and start Coreg  She is going to call cardiology to see if they really want her to do this because she can't afford all these medications and medication changes

## 2022-09-27 ENCOUNTER — PATIENT OUTREACH (OUTPATIENT)
Dept: FAMILY MEDICINE CLINIC | Facility: CLINIC | Age: 77
End: 2022-09-27

## 2022-09-27 ENCOUNTER — RA CDI HCC (OUTPATIENT)
Dept: OTHER | Facility: HOSPITAL | Age: 77
End: 2022-09-27

## 2022-09-27 ENCOUNTER — OFFICE VISIT (OUTPATIENT)
Dept: CARDIOLOGY CLINIC | Facility: CLINIC | Age: 77
End: 2022-09-27
Payer: COMMERCIAL

## 2022-09-27 VITALS
HEART RATE: 55 BPM | SYSTOLIC BLOOD PRESSURE: 112 MMHG | HEIGHT: 70 IN | BODY MASS INDEX: 23.91 KG/M2 | WEIGHT: 167 LBS | RESPIRATION RATE: 14 BRPM | DIASTOLIC BLOOD PRESSURE: 60 MMHG

## 2022-09-27 DIAGNOSIS — I50.9 CONGESTIVE HEART FAILURE, UNSPECIFIED HF CHRONICITY, UNSPECIFIED HEART FAILURE TYPE (HCC): ICD-10-CM

## 2022-09-27 DIAGNOSIS — I25.5 ISCHEMIC CARDIOMYOPATHY: Primary | ICD-10-CM

## 2022-09-27 DIAGNOSIS — I25.10 CORONARY ARTERY DISEASE INVOLVING NATIVE CORONARY ARTERY OF NATIVE HEART WITHOUT ANGINA PECTORIS: Chronic | ICD-10-CM

## 2022-09-27 PROCEDURE — 1111F DSCHRG MED/CURRENT MED MERGE: CPT | Performed by: INTERNAL MEDICINE

## 2022-09-27 PROCEDURE — 99214 OFFICE O/P EST MOD 30 MIN: CPT | Performed by: INTERNAL MEDICINE

## 2022-09-27 PROCEDURE — 3078F DIAST BP <80 MM HG: CPT | Performed by: INTERNAL MEDICINE

## 2022-09-27 PROCEDURE — 1160F RVW MEDS BY RX/DR IN RCRD: CPT | Performed by: INTERNAL MEDICINE

## 2022-09-27 PROCEDURE — 3074F SYST BP LT 130 MM HG: CPT | Performed by: INTERNAL MEDICINE

## 2022-09-27 PROCEDURE — 4010F ACE/ARB THERAPY RXD/TAKEN: CPT | Performed by: INTERNAL MEDICINE

## 2022-09-27 RX ORDER — LISINOPRIL 20 MG/1
20 TABLET ORAL DAILY
Qty: 90 TABLET | Refills: 5
Start: 2022-09-27

## 2022-09-27 RX ORDER — FUROSEMIDE 40 MG/1
40 TABLET ORAL DAILY
Qty: 30 TABLET | Refills: 0 | Status: SHIPPED | OUTPATIENT
Start: 2022-09-27

## 2022-09-27 NOTE — PROGRESS NOTES
[address]  coding opportunities          Chart Reviewed number of suggestions sent to Provider: 2  E11 [id number]  I11 0     Patients Insurance     Medicare Insurance: [facility] [employer]

## 2022-09-27 NOTE — ASSESSMENT & PLAN NOTE
Wt Readings from Last 3 Encounters:   09/27/22 75 8 kg (167 lb)   09/23/22 73 5 kg (162 lb 0 6 oz)   09/12/22 79 4 kg (175 lb)       Recent episode resolving with diuretics  Still O2 dependent with concomittent COPD

## 2022-09-27 NOTE — PROGRESS NOTES
Outpatient Care Management Note:  In basket message received from PCP office  Patient may need assistance with cost of medication  Chart review completed  Appointment with cardiology today  Will follow up to assess needs

## 2022-09-27 NOTE — PROGRESS NOTES
Patient ID: Braden Stephens is a 69 y o  male  Plan:      Coronary artery disease involving native coronary artery of native heart without angina pectoris  No current symptoms  Ischemic cardiomyopathy  EF 30%  Pt is appropriately not interested in prophylactic ICD given  comorbidities    Congestive heart failure ([address] )  Wt Readings from Last 3 Encounters:   09/27/22 75 8 kg (167 lb)   09/23/22 73 5 kg (162 lb 0 6 oz)   09/12/22 79 4 kg (175 lb)       Recent episode resolving with diuretics  Still O2 dependent with concomittent COPD  Follow up Plan/Other summary comments:  Return in about 2 months (around 11/27/2022)  I want to be sure meds are proper  Recent hospital stay rxed entresto but can't afford it  I gave the following instructions:  Resume taking lisinopril 20 mg once daily  Don't take furosemide or potassium on days that the weight is below 165  Don't pickup sacubitril  Take (pickup) carvedilol instead of the metoprolol  HPI:   Patient seen in f/u re the above  Recent hospital stay reviewed  Family preferred avoiding entresto because of cost   Weight has stayed down  Remains O2 dependent  To reiterate, he had 4 vessel CABG in March of 2017 with left internal mammary to the LAD, vein graft to the ramus, vein graft to posterior descending artery and the ventricular branch of the right coronary artery  He has had a moderate depression of ejection fraction since then  Biggest limitation has been dyspnea with repeat  admissions but BNP below 200  He seems to respond to steroids  Recent spell however with high BNP and increase in weight  Most recent or relevant cardiac/vascular testing:    Echo 8/2019  - EF 30-40%  Global dysfunction with severe hypokinesis of the apex  Mild MR  Echo 11/12/2020:  EF 30% with global dysfunction  Arterial duplex 12/2017 - likely occlusion of the left SFA  KEI mildly impaired    TTE 9/20/2022: EF 30%        Past Surgical History:   Procedure Laterality Date    CARDIAC CATHETERIZATION  03/08/2017    Significant left main plus triple-vessel CAD   CORONARY ARTERY BYPASS GRAFT  03/08/2017    4V CABG:  LIMA to LAD, VG to RI, SVG to PDA to LVBR RCA   EYE SURGERY      shots in eye once a month @ the [address]:   Lab Results   Component Value Date     (L) 05/28/2018    K 3 7 09/23/2022    K 4 2 05/28/2018     09/23/2022    CL 98 05/28/2018    CO2 30 09/23/2022    CO2 28 04/26/2022    BUN 14 09/23/2022    BUN 12 05/28/2018    CREATININE 0 74 09/23/2022    CREATININE 0 83 05/28/2018    GLUCOSE 374 (H) 04/26/2022    EGFR 89 09/23/2022       Lipid Profile:   Lab Results   Component Value Date    TRIG 277 (H) 01/06/2022    HDL 49 01/06/2022         Review of Systems   10  point ROS  was otherwise non pertinent or negative except as per HPI or as below  Gait: In a wheelchair today  Objective:     /60   Pulse 55   Resp 14   Ht 5' 10" (1 778 m)   Wt 75 8 kg (167 lb)   BMI 23 96 kg/m²     PHYSICAL EXAM:    General:  Normal appearance in no distress  Eyes:  Anicteric  Oral mucosa:  Moist   Neck:  No JVD  Carotid upstrokes are brisk without bruits  No masses  Chest:  Decreased breath sounds throughout  Well healed midline sternotomy scar  Cardiac:  Normal PMI  Normal S1 and S2  No murmur gallop or rub  Abdomen:  Soft and nontender  No palpable organomegaly or aortic enlargement  Extremities:  No peripheral edema  Musculoskeletal:  Symmetric  Vascular:  Femoral popliteal and pedal pulses are absent  Neuro:  Grossly symmetric  Psych:  Alert and oriented x3            Current Outpatient Medications:     albuterol (PROVENTIL HFA,VENTOLIN HFA) 90 mcg/act inhaler, Inhale 2 puffs every 6 (six) hours as needed for wheezing or shortness of breath, Disp: , Rfl: 0    arformoterol (BROVANA) 15 mcg/2 mL nebulizer solution, Take 2 mL (15 mcg total) by nebulization 2 (two) times a day, Disp: 120 mL, Rfl: 5    aspirin (ECOTRIN LOW STRENGTH) 81 mg EC tablet, Take 1 tablet (81 mg total) by mouth every other day, Disp:  , Rfl: 0    budesonide (Pulmicort) 0 5 mg/2 mL nebulizer solution, Take 2 mL (0 5 mg total) by nebulization 2 (two) times a day Rinse mouth after use , Disp: 120 mL, Rfl: 5    cyanocobalamin (VITAMIN B-12) 500 MCG tablet, TAKE ONE TABLET BY MOUTH EVERY MORNING *VITAMIN B12*, Disp: , Rfl:     digoxin (LANOXIN) 0 125 mg tablet, Take 1 tablet (125 mcg total) by mouth daily, Disp: 30 tablet, Rfl: 5    furosemide (LASIX) 40 mg tablet, Take 1 tablet (40 mg total) by mouth daily Hold for morning weight less than 165, Disp: 30 tablet, Rfl: 0    gabapentin (NEURONTIN) 300 mg capsule, Take 1 capsule (300 mg total) by mouth daily at bedtime, Disp: 90 capsule, Rfl: 1    glimepiride (AMARYL) 1 mg tablet, Take 2 tablets (2 mg total) by mouth daily with breakfast AND 1 tablet (1 mg total) daily with dinner , Disp: , Rfl: 0    ipratropium (ATROVENT) 0 02 % nebulizer solution, Take 2 5 mL (0 5 mg total) by nebulization 3 (three) times a day, Disp: 225 mL, Rfl: 5    lisinopril (ZESTRIL) 20 mg tablet, Take 1 tablet (20 mg total) by mouth daily, Disp: 90 tablet, Rfl: 5    metFORMIN (GLUCOPHAGE) 500 mg tablet, Take 1 tablet (500 mg total) by mouth 2 (two) times a day with meals, Disp: 180 tablet, Rfl: 3    Multiple Vitamins-Minerals (PRESERVISION AREDS 2 PO), Take by mouth, Disp: , Rfl:     potassium chloride (MICRO-K) 10 MEQ CR capsule, Take 2 capsules (20 mEq total) by mouth daily Take with Lasix    If Lasix is not being taken, do not take potassium, Disp: 60 capsule, Rfl: 0    primidone (MYSOLINE) 50 mg tablet, Take 100 mg by mouth 2 (two) times a day , Disp: , Rfl:     roflumilast (Daliresp) 500 mcg tablet, Take 1 tablet (500 mcg total) by mouth daily, Disp: 30 tablet, Rfl: 5    rosuvastatin (CRESTOR) 10 MG tablet, Take 1 tablet (10 mg total) by mouth daily, Disp: 90 tablet, Rfl: 3   terbinafine (LamISIL) 1 % cream, APPLY THIN LAYER TOPICALLY TWICE A DAY FOR FUNGAL INFECTION, Disp: , Rfl:     carvedilol (COREG) 6 25 mg tablet, Take 1 tablet (6 25 mg total) by mouth 2 (two) times a day with meals (Patient not taking: Reported on 2022), Disp: 60 tablet, Rfl: 0  Allergies   Allergen Reactions    Atorvastatin Myalgia     Past Medical History:   Diagnosis Date    BPH (benign prostatic hyperplasia)     45 days radiation treatment    COPD (chronic obstructive pulmonary disease)     Coronary artery disease     CABG x4 in 2017    Diabetes mellitus     History of Arterial Duplex of LE 2017    Likely occlusion of the left superficial femoral artery  Calcific changes bilaterally  Despite these changes, the ankle-brachial index as a measure of peripheral blood flow only mildly impaired      History of echocardiogram 2017    EF 40%, mild LVH, mild MR     Hyperlipidemia     Hypertension     Ischemic cardiomyopathy     NSTEMI (non-ST elevated myocardial infarction)     Prostate cancer     prostate     PVD (peripheral vascular disease)     Sepsis due to pneumonia     Tremor     Type 2 MI [facility]myocardial infarction[facility] ([facility] ) 2021           Social History     Tobacco Use   Smoking Status Former Smoker    Packs/day: 0 25    Years: 60 00    Pack years: 15 00    Types: Cigarettes    Quit date: 2022    Years since quittin 4   Smokeless Tobacco Never Used   Tobacco Comment    Pt former smoker

## 2022-09-27 NOTE — PATIENT INSTRUCTIONS
Resume taking lisinopril 20 mg once daily  Don't take furosemide or potassium on days that the weight is below 165  Don't pickup sacubitril  Take (pickup) carvedilol instead of the metoprolol

## 2022-09-28 ENCOUNTER — PATIENT OUTREACH (OUTPATIENT)
Dept: FAMILY MEDICINE CLINIC | Facility: CLINIC | Age: 77
End: 2022-09-28

## 2022-09-28 NOTE — PROGRESS NOTES
Outpatient Care Management Note:  Outreach call placed to Wood Rai at PCP request regarding possible prescription assistance  Wood Rai saw his regular cardiologist yesterday and Aditya Alizakimberly was discontinued  Reviewed income and they would not qualify for PACENET at this time  Stevansiena Johnson is currently paying $19 for her trulicity and Wood Rai is paying &47 for his Daliresp  Discussed that prescription assistance programs are available for both of those medications  Stevansiena Alex would like to apply to those programs as the medications "add up" with their other bills  Wood Rai has an appointment with the PCP on 10/4/2022  Arranged to meet them at that appointment for signatures

## 2022-10-04 ENCOUNTER — TELEPHONE (OUTPATIENT)
Dept: FAMILY MEDICINE CLINIC | Facility: CLINIC | Age: 77
End: 2022-10-04

## 2022-10-04 ENCOUNTER — OFFICE VISIT (OUTPATIENT)
Dept: FAMILY MEDICINE CLINIC | Facility: CLINIC | Age: 77
End: 2022-10-04
Payer: COMMERCIAL

## 2022-10-04 ENCOUNTER — PATIENT OUTREACH (OUTPATIENT)
Dept: FAMILY MEDICINE CLINIC | Facility: CLINIC | Age: 77
End: 2022-10-04

## 2022-10-04 VITALS
TEMPERATURE: 97.3 F | HEIGHT: 70 IN | RESPIRATION RATE: 16 BRPM | SYSTOLIC BLOOD PRESSURE: 98 MMHG | HEART RATE: 68 BPM | BODY MASS INDEX: 23.86 KG/M2 | OXYGEN SATURATION: 98 % | DIASTOLIC BLOOD PRESSURE: 52 MMHG | WEIGHT: 166.7 LBS

## 2022-10-04 DIAGNOSIS — I50.9 CONGESTIVE HEART FAILURE, UNSPECIFIED HF CHRONICITY, UNSPECIFIED HEART FAILURE TYPE (HCC): Primary | ICD-10-CM

## 2022-10-04 DIAGNOSIS — I10 PRIMARY HYPERTENSION: ICD-10-CM

## 2022-10-04 DIAGNOSIS — Z23 ENCOUNTER FOR IMMUNIZATION: ICD-10-CM

## 2022-10-04 DIAGNOSIS — I48.0 PAF (PAROXYSMAL ATRIAL FIBRILLATION) (HCC): ICD-10-CM

## 2022-10-04 PROCEDURE — 90662 IIV NO PRSV INCREASED AG IM: CPT

## 2022-10-04 PROCEDURE — G0008 ADMIN INFLUENZA VIRUS VAC: HCPCS

## 2022-10-04 PROCEDURE — 99495 TRANSJ CARE MGMT MOD F2F 14D: CPT | Performed by: FAMILY MEDICINE

## 2022-10-04 NOTE — PROGRESS NOTES
Outpatient Care Management Note:  Met with Jhon Francisco and his wife, Kaykay Evans at PCP appointment  Prescription assistance applications completed and faxed  Discussed with the [employer] that I would follow up on the applications on 90/7/4878 and provide them with an update at that time

## 2022-10-04 NOTE — PROGRESS NOTES
Johnathon Vaughan 1945 male MRN: [id number]    Family Medicine Transition of Care Visit      SUBJECTIVE    CC: [facility] Visit     Transitional Care Management Review:  Johnathon Vaughan is a 69 y o  male here for TCM follow up  Admitted 9/20-9/23 for acute CHF  Hospital course as per discharge summary as below:    "HPI: Aaliyah Najera a 69 y  o  male with a PMH of PAF, CHF, BERONICA, BPH, COPD, type 2 diabetes, CAD with CABG who presents with acute shortness of breath beginning tonight   The patient was sent to the ED from [facility] where he was supposed to undergo sleep study   The patient states over the past day, he has felt increasingly short of breath   He states that [facility] shortness of breath worsened  [facility] was found at [address] to have a heart rate rising up to the 120s   As well, had a high RR of 28  Due to history of CHF, AFib patient was sent in to the ED for evaluation   The patient does note that he has had some dyspnea with exertion   He states that now it is at rest  [facility] states it is worse when he lays flat   Denies any paroxysmal nocturnal dyspnea   He states that shortness of breath began over the last several days   Denies any chest pain   Denies any increased lower extremity swelling   He does report good compliance with his home medications   Patient was found on chest x-ray to have evidence of pulmonary vascular congestion   As well, found to have elevated proBNP   Of note, patient did have rising troponins upon admission   This is similar to past admission at [facility] several weeks ago where he was admitted for CHF   Patient does feel significantly improved in his shortness of breath since he received IV Lasix   He has had good urine output "    "50-year-old male with past medical history significant for ischemic cardiomyopathy with prior EF 30% presented with shortness of breath, and hypoxia    Found to have an elevated troponin initiated on IV Lasix  He was placed on BiPAP therapy and admitted to step-down unit  He was seen in consultation by Cardiology, and continued on  Lasix 40 mg IV b i d   Troponin was elevated but was deemed a non ischemic myocardial injury  Patient diuresed well, negative fluid balance and stable renal function  Toprol XL was discontinued and Coreg was substituted  Lisinopril was discontinued and Entresto was started  He will need follow-up within 1 week of discharge his primary cardiologist and blood work  "    During the TCM phone call patient stated:    TCM Call     Date and time call was made  9/23/2022  2:18 PM    Patient was hospitialized at  [address]    Date of Admission  09/20/22    Date of discharge  09/23/22    Diagnosis  Acute on chronic combinded systolic and diastolic    Disposition  Home    Current Symptoms  None    Cough Severity  Severe    Shortness of breath severity  Severe      TCM Call     Post hospital issues  None    Scheduled for follow up? Yes    Did you obtain your prescribed medications  No    Why were you unable to obtain your medications  no medication prescribed    Do you need help managing your prescriptions or medications  No    Is transportation to your appointment needed  Yes    Specify why  does not drive    I have advised the patient to call PCP with any new or worsening symptoms  Yesi Herbertneeraj     Living Arrangements  Family members    Support System  Family    The type of support provided  Emotional; Physical; Other (comment)    Do you have social support  Yes, as much as I need          During today's visit:    Since discharge met with cardiology, medications were adjusted, could not afford Entresto  Following with Dr Teagan Holland  Weight has been stable, no leg swelling  Breathing much better  Overall feeling better  No chest pains  They are complying with their medication changes and discharge instructions       They have the following questions: As above     Review of Systems   All other systems reviewed and are negative  Historical Information     The patient history was reviewed as follows:    Past Medical History:   Diagnosis Date    BPH (benign prostatic hyperplasia)     45 days radiation treatment    COPD (chronic obstructive pulmonary disease)     Coronary artery disease     CABG x4 in 2017    Diabetes mellitus     History of Arterial Duplex of LE 2017    Likely occlusion of the left superficial femoral artery  Calcific changes bilaterally  Despite these changes, the ankle-brachial index as a measure of peripheral blood flow only mildly impaired   History of echocardiogram 2017    EF 40%, mild LVH, mild MR     Hyperlipidemia     Hypertension     Ischemic cardiomyopathy     NSTEMI (non-ST elevated myocardial infarction)     Prostate cancer     prostate     PVD (peripheral vascular disease)     Sepsis due to pneumonia     Tremor     Type 2 MI (myocardial infarction) ([facility] [address] ) 2021     Past Surgical History:   Procedure Laterality Date    CARDIAC CATHETERIZATION  2017    Significant left main plus triple-vessel CAD   CORONARY ARTERY BYPASS GRAFT  2017    4V CABG:  LIMA to LAD, VG to RI, SVG to PDA to LVBR RCA      EYE SURGERY      shots in eye once a month @ the [place]    PROSTATE BIOPSY       Family History   Problem Relation Age of Onset    Cancer Father     Heart disease Brother       Social History   Social History     Substance and Sexual Activity   Alcohol Use Yes    Alcohol/week: 3 0 standard drinks    Types: 3 Cans of beer per week     Social History     Substance and Sexual Activity   Drug Use Never     Social History     Tobacco Use   Smoking Status Former Smoker    Packs/day: 0 25    Years: 60 00    Pack years: 15 00    Types: Cigarettes    Quit date: 2022    Years since quittin 5   Smokeless Tobacco Never Used   Tobacco Comment    Pt former smoker       Medications: Meds/Allergies     Current Outpatient Medications:     albuterol (PROVENTIL HFA,VENTOLIN HFA) 90 mcg/act inhaler, Inhale 2 puffs every 6 (six) hours as needed for wheezing or shortness of breath, Disp: , Rfl: 0    arformoterol (BROVANA) 15 mcg/2 mL nebulizer solution, Take 2 mL (15 mcg total) by nebulization 2 (two) times a day, Disp: 120 mL, Rfl: 5    aspirin (ECOTRIN LOW STRENGTH) 81 mg EC tablet, Take 1 tablet (81 mg total) by mouth every other day, Disp:  , Rfl: 0    budesonide (Pulmicort) 0 5 mg/2 mL nebulizer solution, Take 2 mL (0 5 mg total) by nebulization 2 (two) times a day Rinse mouth after use , Disp: 120 mL, Rfl: 5    carvedilol (COREG) 6 25 mg tablet, Take 1 tablet (6 25 mg total) by mouth 2 (two) times a day with meals, Disp: 60 tablet, Rfl: 0    cyanocobalamin (VITAMIN B-12) 500 MCG tablet, TAKE ONE TABLET BY MOUTH EVERY MORNING *VITAMIN B12*, Disp: , Rfl:     digoxin (LANOXIN) 0 125 mg tablet, Take 1 tablet (125 mcg total) by mouth daily, Disp: 30 tablet, Rfl: 5    furosemide (LASIX) 40 mg tablet, Take 1 tablet (40 mg total) by mouth daily Hold for morning weight less than 165, Disp: 30 tablet, Rfl: 0    gabapentin (NEURONTIN) 300 mg capsule, Take 1 capsule (300 mg total) by mouth daily at bedtime, Disp: 90 capsule, Rfl: 1    glimepiride (AMARYL) 1 mg tablet, Take 2 tablets (2 mg total) by mouth daily with breakfast AND 1 tablet (1 mg total) daily with dinner , Disp: , Rfl: 0    ipratropium (ATROVENT) 0 02 % nebulizer solution, Take 2 5 mL (0 5 mg total) by nebulization 3 (three) times a day, Disp: 225 mL, Rfl: 5    lisinopril (ZESTRIL) 20 mg tablet, Take 1 tablet (20 mg total) by mouth daily, Disp: 90 tablet, Rfl: 5    metFORMIN (GLUCOPHAGE) 500 mg tablet, Take 1 tablet (500 mg total) by mouth 2 (two) times a day with meals, Disp: 180 tablet, Rfl: 3    Multiple Vitamins-Minerals (PRESERVISION AREDS 2 PO), Take by mouth, Disp: , Rfl:     potassium chloride (MICRO-K) 10 MEQ CR capsule, Take 2 capsules (20 mEq total) by mouth daily Take with Lasix  If Lasix is not being taken, do not take potassium, Disp: 60 capsule, Rfl: 0    primidone (MYSOLINE) 50 mg tablet, Take 100 mg by mouth 2 (two) times a day , Disp: , Rfl:     rosuvastatin (CRESTOR) 10 MG tablet, Take 1 tablet (10 mg total) by mouth daily, Disp: 90 tablet, Rfl: 3    terbinafine (LamISIL) 1 % cream, APPLY THIN LAYER TOPICALLY TWICE A DAY FOR FUNGAL INFECTION, Disp: , Rfl:     roflumilast (Daliresp) 500 mcg tablet, Take 1 tablet (500 mcg total) by mouth daily, Disp: 30 tablet, Rfl: 5  Allergies   Allergen Reactions    Atorvastatin Myalgia       OBJECTIVE    Vitals:   Vitals:    10/04/22 1144   BP: 98/52   Pulse: 68   Resp: 16   Temp: (!) 97 3 °F (36 3 °C)   SpO2: 98%   Weight: 75 6 kg (166 lb 11 2 oz)   Height: 5' 10" (1 778 m)       Body mass index is 23 92 kg/m²  Physical Exam:    Physical Exam  Vitals reviewed  Constitutional:       General: He is not in acute distress  Appearance: Normal appearance  He is not ill-appearing, toxic-appearing or diaphoretic  HENT:      Head: Normocephalic and atraumatic  Eyes:      General:         Right eye: No discharge  Left eye: No discharge  Extraocular Movements: Extraocular movements intact  Conjunctiva/sclera: Conjunctivae normal    Cardiovascular:      Rate and Rhythm: Normal rate and regular rhythm  Heart sounds: Murmur heard  No friction rub  No gallop  Pulmonary:      Effort: Pulmonary effort is normal  No respiratory distress  Breath sounds: No stridor  No wheezing or rhonchi  Comments: Trace bibasilar crackles   Musculoskeletal:         General: No swelling, tenderness or signs of injury  Right lower leg: No edema  Left lower leg: No edema  Skin:     General: Skin is warm  Coloration: Skin is not pale  Findings: No erythema or rash     Neurological:      Mental Status: He is alert and oriented to person, place, and time  Motor: No weakness  Psychiatric:         Mood and Affect: Mood normal          Behavior: Behavior normal           Labs: I have personally reviewed all pertinent results  No results displayed because visit has over 200 results  Imaging:  I have personally reviewed all pertinent results  Assessment/Plan    No problem-specific Assessment & Plan notes found for this encounter  Edwar Red was seen today for transition of care management  Diagnoses and all orders for this visit:    Congestive heart failure, unspecified HF chronicity, unspecified heart failure type ([facility] [address] )  -     Cancel: Comprehensive metabolic panel; Future  -     Basic metabolic panel; Future    Primary hypertension  -     Cancel: Comprehensive metabolic panel; Future    PAF (paroxysmal atrial fibrillation) (HCC)  -     Cancel: Comprehensive metabolic panel; Future    Encounter for immunization  -     influenza vaccine, high-dose, PF 0 7 mL (FLUZONE HIGH-DOSE)      Blood pressure borderline low side today, although asymptomatic, no lightheadedness or dizziness, does have fatigue which is chronic  Recommended start home BP log, will call in 1 week for readings  Continue current medications  Advised to complete labs to check kidneys and electrolytes  Keep follow-up as scheduled       Future Appointments   Date Time Provider Sabine Oliver   10/27/2022 10:30 AM DO GAUDENCIO Mackenzie PC [facility]   11/21/2022 10:00 AM Daljit Matthews MD BM Cardio [facility]   12/12/2022 10:20 AM Kaitlin Jacob MD [facility]   12/22/2022  4:00 PM Cheli Murillo MD [facility]   7/12/2023 11:00 AM CA VASCULAR 2 CA Car NI DO Tyrese Arteaga  [facility]

## 2022-10-04 NOTE — TELEPHONE ENCOUNTER
Left message for patient advising that Dr Riley Salgado does not wish to reschedule sleep study at this time  He will reassess at patient's next follow up visit on 12/12/22

## 2022-10-07 ENCOUNTER — PATIENT OUTREACH (OUTPATIENT)
Dept: FAMILY MEDICINE CLINIC | Facility: CLINIC | Age: 77
End: 2022-10-07

## 2022-10-07 NOTE — PROGRESS NOTES
Outpatient Care Management Note:  Call placed to [address] [place] [address] for status update on prescription assistance application  Automated system states the eligibility was unable to be determined  Unable to speak with live representative after 15 minutes on hold  Call placed to Spencer Luna with an update  Will attempt to contact [phone] at a later time  Call placed to Spencer Luna to update them on same

## 2022-10-10 ENCOUNTER — PATIENT OUTREACH (OUTPATIENT)
Dept: FAMILY MEDICINE CLINIC | Facility: CLINIC | Age: 77
End: 2022-10-10

## 2022-10-10 NOTE — PROGRESS NOTES
Outpatient Care Management Note:  Call attempted to [facility] to determine reason they were unable to determine eligibility and assess if further documentation was needed  On hold for 25 minutes without being able to speak with a representative  Will follow up with Leeanne Mei to determine if they received any documentation

## 2022-10-10 NOTE — TELEPHONE ENCOUNTER
Called and patients wife picked up she stated that she does not know where the patient keeps his logs,and will have patient call back

## 2022-10-11 ENCOUNTER — TELEPHONE (OUTPATIENT)
Dept: FAMILY MEDICINE CLINIC | Facility: CLINIC | Age: 77
End: 2022-10-11

## 2022-10-13 DIAGNOSIS — R25.1 TREMOR: Primary | ICD-10-CM

## 2022-10-13 NOTE — TELEPHONE ENCOUNTER
Spoke with patients wife:     10/7: 116/57  10/8:143/81  10/9: 145/76  10/10: 122/70  10/11: 135/78  10/12: 125/66  10/13: 139/78

## 2022-10-17 ENCOUNTER — PATIENT OUTREACH (OUTPATIENT)
Dept: FAMILY MEDICINE CLINIC | Facility: CLINIC | Age: 77
End: 2022-10-17

## 2022-10-17 ENCOUNTER — TELEPHONE (OUTPATIENT)
Dept: FAMILY MEDICINE CLINIC | Facility: CLINIC | Age: 77
End: 2022-10-17

## 2022-10-17 NOTE — PROGRESS NOTES
Outpatient Care Management Note;  Call placed to [facility] in an attempt to determine if additional information was needed for application  Unable to speak with representative after 30 minutes of hold time  Call placed to the [facility]  They have received no documentation from [facility]  Advised them if they get any time of documentation they may reach out to me for further assistance  Closing case at this time

## 2022-10-17 NOTE — TELEPHONE ENCOUNTER
Hermann Sepulveda is calling back, she said that someone called and ask for Quirino Lovett she does not know who it was but she said that they hung up

## 2022-10-27 ENCOUNTER — OFFICE VISIT (OUTPATIENT)
Dept: FAMILY MEDICINE CLINIC | Facility: CLINIC | Age: 77
End: 2022-10-27
Payer: COMMERCIAL

## 2022-10-27 VITALS
RESPIRATION RATE: 16 BRPM | TEMPERATURE: 98.2 F | HEART RATE: 82 BPM | SYSTOLIC BLOOD PRESSURE: 124 MMHG | WEIGHT: 165 LBS | OXYGEN SATURATION: 95 % | HEIGHT: 70 IN | BODY MASS INDEX: 23.62 KG/M2 | DIASTOLIC BLOOD PRESSURE: 62 MMHG

## 2022-10-27 DIAGNOSIS — I25.10 CORONARY ARTERY DISEASE INVOLVING NATIVE CORONARY ARTERY OF NATIVE HEART WITHOUT ANGINA PECTORIS: Chronic | ICD-10-CM

## 2022-10-27 DIAGNOSIS — I10 PRIMARY HYPERTENSION: ICD-10-CM

## 2022-10-27 DIAGNOSIS — E78.49 OTHER HYPERLIPIDEMIA: ICD-10-CM

## 2022-10-27 DIAGNOSIS — J96.11 CHRONIC RESPIRATORY FAILURE WITH HYPOXIA (HCC): ICD-10-CM

## 2022-10-27 DIAGNOSIS — I48.0 PAF (PAROXYSMAL ATRIAL FIBRILLATION) (HCC): ICD-10-CM

## 2022-10-27 DIAGNOSIS — E11.40 TYPE 2 DIABETES MELLITUS WITH DIABETIC NEUROPATHY, WITHOUT LONG-TERM CURRENT USE OF INSULIN (HCC): Primary | ICD-10-CM

## 2022-10-27 DIAGNOSIS — J44.9 CHRONIC OBSTRUCTIVE PULMONARY DISEASE, UNSPECIFIED COPD TYPE (HCC): ICD-10-CM

## 2022-10-27 DIAGNOSIS — I50.9 CONGESTIVE HEART FAILURE, UNSPECIFIED HF CHRONICITY, UNSPECIFIED HEART FAILURE TYPE (HCC): ICD-10-CM

## 2022-10-27 LAB
LEFT EYE DIABETIC RETINOPATHY: NORMAL
LEFT EYE IMAGE QUALITY: NORMAL
LEFT EYE MACULAR EDEMA: NORMAL
LEFT EYE OTHER RETINOPATHY: NORMAL
RIGHT EYE DIABETIC RETINOPATHY: NORMAL
RIGHT EYE IMAGE QUALITY: NORMAL
RIGHT EYE MACULAR EDEMA: NORMAL
RIGHT EYE OTHER RETINOPATHY: NORMAL
SEVERITY (EYE EXAM): NORMAL

## 2022-10-27 PROCEDURE — 99214 OFFICE O/P EST MOD 30 MIN: CPT | Performed by: FAMILY MEDICINE

## 2022-10-27 NOTE — PROGRESS NOTES
Assessment/Plan:       Problem List Items Addressed This Visit        Endocrine    Type 2 diabetes mellitus with diabetic neuropathy, unspecified ([facility] ) - Primary (Chronic)    Relevant Orders    IRIS Diabetic eye exam    HEMOGLOBIN A1C W/ EAG ESTIMATION    Comprehensive metabolic panel    CBC and differential    Lipid Panel with Direct LDL reflex       Respiratory    COPD (chronic obstructive pulmonary disease) (HCC)    Chronic respiratory failure with hypoxia (HCC)       Cardiovascular and Mediastinum    Coronary artery disease involving native coronary artery of native heart without angina pectoris (Chronic)    Congestive heart failure ([facility] )    Primary hypertension    PAF (paroxysmal atrial fibrillation) ([facility] )       Other    Other hyperlipidemia            Continue current medications, blood work ordered  Follow-up with specialists as scheduled  Follow-up 3 months  Subjective:      Patient ID: Kimberly Andrews is a 69 y o  male  HPI     He reports he is doing well, no acute complaints or concerns  Breathing is good/at baseline, no chest pains  He did gain 4 pounds 2 days ago, took Lasix and potassium and weight came back down to baseline  The following portions of the patient's history were reviewed and updated as appropriate: allergies, current medications, past family history, past medical history, past social history, past surgical history, and problem list     Review of Systems   All other systems reviewed and are negative  Objective:      /62   Pulse 82   Temp 98 2 °F (36 8 °C)   Resp 16   Ht 5' 10" (1 778 m)   Wt 74 8 kg (165 lb)   SpO2 95%   BMI 23 68 kg/m²          Physical Exam  Vitals reviewed  Constitutional:       General: He is not in acute distress  Appearance: Normal appearance  He is not ill-appearing, toxic-appearing or diaphoretic  HENT:      Head: Normocephalic and atraumatic  Eyes:      General:         Right eye: No discharge           Left eye: No discharge  Extraocular Movements: Extraocular movements intact  Conjunctiva/sclera: Conjunctivae normal    Cardiovascular:      Rate and Rhythm: Normal rate and regular rhythm  Heart sounds: Normal heart sounds  No murmur heard  No friction rub  No gallop  Pulmonary:      Effort: Pulmonary effort is normal  No respiratory distress  Breath sounds: Normal breath sounds  No stridor  No wheezing or rhonchi  Comments: Diminished breath sounds at bases   Musculoskeletal:         General: No swelling, tenderness or signs of injury  Right lower leg: Edema present  Left lower leg: Edema present  Comments: Trace pitting edema    Skin:     General: Skin is warm  Coloration: Skin is not pale  Findings: No erythema or rash  Neurological:      Mental Status: He is alert and oriented to person, place, and time  Motor: No weakness     Psychiatric:         Mood and Affect: Mood normal          Behavior: Behavior normal              DO Junito Hines [address] [place] [address] [facility]

## 2022-11-29 DIAGNOSIS — I50.9 CONGESTIVE HEART FAILURE, UNSPECIFIED HF CHRONICITY, UNSPECIFIED HEART FAILURE TYPE (HCC): ICD-10-CM

## 2022-11-29 RX ORDER — LISINOPRIL 20 MG/1
20 TABLET ORAL DAILY
Qty: 90 TABLET | Refills: 3 | Status: SHIPPED | OUTPATIENT
Start: 2022-11-29

## 2022-12-05 DIAGNOSIS — I50.9 CONGESTIVE HEART FAILURE, UNSPECIFIED HF CHRONICITY, UNSPECIFIED HEART FAILURE TYPE (HCC): ICD-10-CM

## 2022-12-05 RX ORDER — DIGOXIN 125 MCG
125 TABLET ORAL DAILY
Qty: 30 TABLET | Refills: 5 | Status: SHIPPED | OUTPATIENT
Start: 2022-12-05

## 2022-12-05 NOTE — TELEPHONE ENCOUNTER
Patient requesting refill(s) of: Digoxin     Last filled: 6/16/2022 30 tabs 5 refills   Last appt: 10/27/2022  Next appt: 1/25/2023  Pharm[facility]: [facility]

## 2022-12-12 ENCOUNTER — OFFICE VISIT (OUTPATIENT)
Dept: PULMONOLOGY | Facility: CLINIC | Age: 77
End: 2022-12-12

## 2022-12-12 VITALS
RESPIRATION RATE: 20 BRPM | OXYGEN SATURATION: 91 % | BODY MASS INDEX: 23.62 KG/M2 | DIASTOLIC BLOOD PRESSURE: 74 MMHG | HEART RATE: 84 BPM | WEIGHT: 165 LBS | HEIGHT: 70 IN | SYSTOLIC BLOOD PRESSURE: 118 MMHG

## 2022-12-12 DIAGNOSIS — J96.11 CHRONIC RESPIRATORY FAILURE WITH HYPOXIA (HCC): Primary | ICD-10-CM

## 2022-12-12 DIAGNOSIS — J44.9 CHRONIC OBSTRUCTIVE PULMONARY DISEASE, UNSPECIFIED COPD TYPE (HCC): ICD-10-CM

## 2022-12-12 NOTE — PROGRESS NOTES
Pulmonary Follow Up Note   Talib Brandt 69 y o  male MRN: [id number]  12/12/2022    Assessment:  Advanced COPD  · GOLD stage IVd, last FEV1 of 22%  · Last COPD exacerbation was in 7/2020 at home  · Started on Roflumilast in July  · Recent hospitalization for CHF exacerbation  · On nebulized only treatment    Plan:  · Continue budesonide/Brovana every 12, Atrovent 3 times daily  · Continue Roflumilast 500 mg daily  · Not interested in pulmonary rehab  · Discussed extensively, about the advanced stage, currently below the threshold for lung volume reduction/EBV  · Offered palliative care referral, however not interested at this point would like to think about it    Chronic hypoxemic respiratory failure  · Multifactorial due to advanced COPD/ischemic cardiomyopathy/deconditioning likely BERONICA  · SPECT underlying hypercapnia given the elevated serum bicarb chronically  · Could not tolerate sleep study/not interested in rescheduling  · We will check ABG, if noted hypercarbia will qualify for home BiPAP    Former tobacco abuse  · At least 50-pack-year quit in 3/2022  · Due for LDCT in 8/2023    Return in about 3 months (around 3/12/2023)  History of Present Illness     Follow up for: COPD     Background:  69 y  o  male with a h/o BPH, prostate cancer, COPD, CAD/CABG, combined systolic/diastolic CHF,DM2, HTN, PVD, active tobacco abuse, possible BERONICA      Hospitalized from 3/18, 2 3/21 for COPD exacerbation   Treated with IV steroids/Zithromax then discharged on Augmentin and a steroid taper   Also treated for sepsis, suspected DKA      03/23/2022 visit-noted exertional hypoxemia required 1 LPM   Switched to nebulized inhalers only Perforomist/budesonide and Atrovent   Added azithromycin   Reordered PFT for candidacy of LVR/EBV   Not interested in nicotine replacement      04/2022-hospitalized for acute respiratory failure, RLL community-acquired pneumonia   Required BiPAP, antibiotics, steroids and nebulized treatment      5/2022 [id number] chest x-ray pulmonary rehab referral   In-lab sleep study ordered     8/1/22 visit-started roflumilast 250 1 tablet for 28 days, continued on nebulized only treatment     8/14/22-hospitalized for acute hypoxic respiratory failure/treated for CHF exacerbation    8/2022 visit-increased Roflumilast to 500 daily, sleep study in October    Interval History  Could not complete the sleep study, noted significant tachycardia overnight  He was transferred to the ED, hospitalized for CHF exacerbation  Since discharge, feeling better, watching his diet/fluids intake and checking daily weight  Continues to have significant fatigue, tiredness every day  Limited exercise capacity due to dyspnea  Denies worsening leg edema/swelling  Still using supplemental oxygen continuously, still with unrefreshing sleep  Does not feel comfortable going back to the sleep study  Review of Systems  As per hpi, all other systems reviewed and were negative    Studies:    Imaging and other studies: I have personally reviewed pertinent films in PACS     Low-dose CT chest 03/07/2022-no suspicious nodules, discoid scarring at the lingula      CXR 5/5/2022-improving lower lobes PNA     Pulmonary function testing:   PFT 09/05/2019-ratio 52%, FEV1 0 82 L/26%, FVC 1 58 L/37%     PFT 03/31/2022-Ratio 45%, FEV1 0 69 L/22%, FVC 1 52 L/37% TLC 88%, %, DLCO 43%   Significant BD response      6 minute walk test 3/20 03/2022-able to walk about 76 m with using a cane for 5 minutes   Lowest SpO2 at 87% on room air, required 1 LPM to end exercise with SpO2 of 94% on 1 LPM/24% FiO2, maximal heart rate 103     EKG, Pathology, and Other Studies: I have personally reviewed pertinent reports     TTE 10/12/2020-mildly dilated LV, EF 30%, moderate diffuse hypokinesis, grade 1 to diastolic dysfunction   TAPSE 1 82       Past medical, surgical, social and family histories reviewed        Medications/Allergies: Reviewed      Vitals: Blood pressure 118/74, pulse 84, resp  rate 20, height 5' 10" (1 778 m), weight 74 8 kg (165 lb), SpO2 91 %  Body mass index is 23 68 kg/m²  Oxygen Therapy  SpO2: 91 %      Physical Exam  Body mass index is 23 68 kg/m²  Gen: not in acute distress, sitting in wheelchair  Neck/Eyes: supple, no JVD appreciated, PERRL  Ear: normal appearance, no significant hearing impairment  Nose:  normal nasal mucosa, no drainage  Oropharynx: mucosa is moist, no focal lesions or erythema  Salivary glands: soft nontender  Chest: normal respiratory efforts, fine bibasilar crackles  CV: Distant heart sounds,, no edema  Abdomen: soft, non tender  Extremities:  No observed deformity   Skin: unremarkable  Neuro: AAO X3, no focal motor deficit        Labs:  Lab Results   Component Value Date    WBC 6 22 09/21/2022    HGB 11 5 (L) 09/21/2022    HCT 34 8 (L) 09/21/2022    MCV 90 09/21/2022     09/21/2022     Lab Results   Component Value Date    GLUCOSE 374 (H) 04/26/2022    CALCIUM 8 6 09/23/2022     (L) 05/28/2018    K 3 7 09/23/2022    CO2 30 09/23/2022     09/23/2022    BUN 14 09/23/2022    CREATININE 0 74 09/23/2022     No results found for: IGE  Lab Results   Component Value Date    ALT 12 09/20/2022    AST 9 09/20/2022    ALKPHOS 118 (H) 09/20/2022    BILITOT 0 5 05/28/2018           Portions of the record may have been created with voice recognition software  Occasional wrong word or "sound a like" substitutions may have occurred due to the inherent limitations of voice recognition software  Read the chart carefully and recognize, using context, where substitutions have occurred    [facility]

## 2022-12-17 ENCOUNTER — HOSPITAL ENCOUNTER (EMERGENCY)
Facility: HOSPITAL | Age: 77
Discharge: HOME | End: 2022-12-17
Attending: EMERGENCY MEDICINE

## 2022-12-17 ENCOUNTER — APPOINTMENT (EMERGENCY)
Dept: RADIOLOGY | Facility: HOSPITAL | Age: 77
End: 2022-12-17

## 2022-12-17 VITALS
RESPIRATION RATE: 22 BRPM | HEART RATE: 100 BPM | SYSTOLIC BLOOD PRESSURE: 158 MMHG | OXYGEN SATURATION: 95 % | TEMPERATURE: 97.8 F | DIASTOLIC BLOOD PRESSURE: 74 MMHG

## 2022-12-17 DIAGNOSIS — R06.02 SOB (SHORTNESS OF BREATH): ICD-10-CM

## 2022-12-17 DIAGNOSIS — I50.9 CHF (CONGESTIVE HEART FAILURE) (HCC): Primary | ICD-10-CM

## 2022-12-17 LAB
2HR DELTA HS TROPONIN: -3 NG/L
4HR DELTA HS TROPONIN: -2 NG/L
ALBUMIN SERPL BCP-MCNC: 4 G/DL (ref 3.5–5)
ALP SERPL-CCNC: 113 U/L (ref 34–104)
ALT SERPL W P-5'-P-CCNC: 10 U/L (ref 7–52)
ANION GAP SERPL CALCULATED.3IONS-SCNC: 10 MMOL/L (ref 4–13)
APTT PPP: 38 SECONDS (ref 23–37)
AST SERPL W P-5'-P-CCNC: 13 U/L (ref 13–39)
ATRIAL RATE: 93 BPM
BASE EX.OXY STD BLDV CALC-SCNC: 57.1 % (ref 60–80)
BASE EXCESS BLDV CALC-SCNC: 5.4 MMOL/L
BASOPHILS # BLD AUTO: 0.11 THOUSANDS/ÂΜL (ref 0–0.1)
BASOPHILS NFR BLD AUTO: 1 % (ref 0–1)
BILIRUB SERPL-MCNC: 0.39 MG/DL (ref 0.2–1)
BNP SERPL-MCNC: 1012 PG/ML (ref 0–100)
BUN SERPL-MCNC: 17 MG/DL (ref 5–25)
CALCIUM SERPL-MCNC: 8.9 MG/DL (ref 8.4–10.2)
CARDIAC TROPONIN I PNL SERPL HS: 13 NG/L
CARDIAC TROPONIN I PNL SERPL HS: 14 NG/L
CARDIAC TROPONIN I PNL SERPL HS: 16 NG/L
CHLORIDE SERPL-SCNC: 99 MMOL/L (ref 96–108)
CO2 SERPL-SCNC: 31 MMOL/L (ref 21–32)
CREAT SERPL-MCNC: 0.72 MG/DL (ref 0.6–1.3)
EOSINOPHIL # BLD AUTO: 0.71 THOUSAND/ÂΜL (ref 0–0.61)
EOSINOPHIL NFR BLD AUTO: 7 % (ref 0–6)
ERYTHROCYTE [DISTWIDTH] IN BLOOD BY AUTOMATED COUNT: 14.8 % (ref 11.6–15.1)
GFR SERPL CREATININE-BSD FRML MDRD: 90 ML/MIN/1.73SQ M
GLUCOSE SERPL-MCNC: 99 MG/DL (ref 65–140)
HCO3 BLDV-SCNC: 32.9 MMOL/L (ref 24–30)
HCT VFR BLD AUTO: 37.8 % (ref 36.5–49.3)
HGB BLD-MCNC: 12.1 G/DL (ref 12–17)
IMM GRANULOCYTES # BLD AUTO: 0.04 THOUSAND/UL (ref 0–0.2)
IMM GRANULOCYTES NFR BLD AUTO: 0 % (ref 0–2)
INR PPP: 1.23 (ref 0.84–1.19)
LACTATE SERPL-SCNC: 1.3 MMOL/L (ref 0.5–2)
LYMPHOCYTES # BLD AUTO: 1.48 THOUSANDS/ÂΜL (ref 0.6–4.47)
LYMPHOCYTES NFR BLD AUTO: 15 % (ref 14–44)
MCH RBC QN AUTO: 30.3 PG (ref 26.8–34.3)
MCHC RBC AUTO-ENTMCNC: 32 G/DL (ref 31.4–37.4)
MCV RBC AUTO: 95 FL (ref 82–98)
MONOCYTES # BLD AUTO: 0.72 THOUSAND/ÂΜL (ref 0.17–1.22)
MONOCYTES NFR BLD AUTO: 7 % (ref 4–12)
NEUTROPHILS # BLD AUTO: 7.11 THOUSANDS/ÂΜL (ref 1.85–7.62)
NEUTS SEG NFR BLD AUTO: 70 % (ref 43–75)
NRBC BLD AUTO-RTO: 0 /100 WBCS
O2 CT BLDV-SCNC: 10.6 ML/DL
P AXIS: 85 DEGREES
PCO2 BLDV: 61.7 MM HG (ref 42–50)
PH BLDV: 7.34 [PH] (ref 7.3–7.4)
PLATELET # BLD AUTO: 258 THOUSANDS/UL (ref 149–390)
PMV BLD AUTO: 10.7 FL (ref 8.9–12.7)
PO2 BLDV: 32.2 MM HG (ref 35–45)
POTASSIUM SERPL-SCNC: 3.8 MMOL/L (ref 3.5–5.3)
PR INTERVAL: 188 MS
PROCALCITONIN SERPL-MCNC: 0.05 NG/ML
PROT SERPL-MCNC: 7 G/DL (ref 6.4–8.4)
PROTHROMBIN TIME: 15.5 SECONDS (ref 11.6–14.5)
QRS AXIS: 109 DEGREES
QRSD INTERVAL: 100 MS
QT INTERVAL: 362 MS
QTC INTERVAL: 450 MS
RBC # BLD AUTO: 4 MILLION/UL (ref 3.88–5.62)
SODIUM SERPL-SCNC: 140 MMOL/L (ref 135–147)
T WAVE AXIS: -52 DEGREES
VENTRICULAR RATE: 93 BPM
WBC # BLD AUTO: 10.17 THOUSAND/UL (ref 4.31–10.16)

## 2022-12-17 RX ORDER — POTASSIUM CHLORIDE 20 MEQ/1
40 TABLET, EXTENDED RELEASE ORAL ONCE
Status: COMPLETED | OUTPATIENT
Start: 2022-12-17 | End: 2022-12-17

## 2022-12-17 RX ORDER — FUROSEMIDE 10 MG/ML
40 INJECTION INTRAMUSCULAR; INTRAVENOUS ONCE
Status: COMPLETED | OUTPATIENT
Start: 2022-12-17 | End: 2022-12-17

## 2022-12-17 RX ORDER — SODIUM CHLORIDE FOR INHALATION 0.9 %
3 VIAL, NEBULIZER (ML) INHALATION ONCE
Status: COMPLETED | OUTPATIENT
Start: 2022-12-17 | End: 2022-12-17

## 2022-12-17 RX ORDER — METHYLPREDNISOLONE SODIUM SUCCINATE 125 MG/2ML
125 INJECTION, POWDER, LYOPHILIZED, FOR SOLUTION INTRAMUSCULAR; INTRAVENOUS ONCE
Status: COMPLETED | OUTPATIENT
Start: 2022-12-17 | End: 2022-12-17

## 2022-12-17 RX ORDER — FUROSEMIDE 10 MG/ML
20 INJECTION INTRAMUSCULAR; INTRAVENOUS ONCE
Status: COMPLETED | OUTPATIENT
Start: 2022-12-17 | End: 2022-12-17

## 2022-12-17 RX ORDER — IPRATROPIUM BROMIDE AND ALBUTEROL SULFATE .5; 3 MG/3ML; MG/3ML
1 SOLUTION RESPIRATORY (INHALATION) ONCE
Status: COMPLETED | OUTPATIENT
Start: 2022-12-17 | End: 2022-12-17

## 2022-12-17 RX ADMIN — FUROSEMIDE 40 MG: 10 INJECTION, SOLUTION INTRAMUSCULAR; INTRAVENOUS at 18:35

## 2022-12-17 RX ADMIN — ISODIUM CHLORIDE 3 ML: 0.03 SOLUTION RESPIRATORY (INHALATION) at 16:17

## 2022-12-17 RX ADMIN — ALBUTEROL SULFATE 10 MG: 2.5 SOLUTION RESPIRATORY (INHALATION) at 16:18

## 2022-12-17 RX ADMIN — IPRATROPIUM BROMIDE 1 MG: 0.5 SOLUTION RESPIRATORY (INHALATION) at 16:17

## 2022-12-17 RX ADMIN — METHYLPREDNISOLONE SODIUM SUCCINATE 125 MG: 125 INJECTION, POWDER, FOR SOLUTION INTRAMUSCULAR; INTRAVENOUS at 16:29

## 2022-12-17 RX ADMIN — FUROSEMIDE 20 MG: 10 INJECTION, SOLUTION INTRAMUSCULAR; INTRAVENOUS at 20:44

## 2022-12-17 RX ADMIN — POTASSIUM CHLORIDE 40 MEQ: 1500 TABLET, EXTENDED RELEASE ORAL at 20:44

## 2022-12-17 NOTE — ED PROVIDER NOTES
History  Chief Complaint   Patient presents with   • Shortness of Breath     Pt presents via EMS c/o SOB for the last few days  Patient is a 50-year-old male with a history of severe COPD on 2 L O2 chronically, CHF, who presents for evaluation of shortness of breath  Patient says it has been progressing over the past few days  Patient says he took his COPD medications at home without relief  He says that he has having increased dyspnea on exertion  He admits to some intermittent left-sided chest discomfort which is similar to discomfort he has had before  He says that this feels similar to his COPD exacerbations in the past   He denies any increased cough, fevers, chills  He denies any increased leg swelling from baseline  Prior to Admission Medications   Prescriptions Last Dose Informant Patient Reported? Taking? Multiple Vitamins-Minerals (PRESERVISION AREDS 2 PO)   Yes No   Sig: Take by mouth   albuterol (PROVENTIL HFA,VENTOLIN HFA) 90 mcg/act inhaler   No No   Sig: Inhale 2 puffs every 6 (six) hours as needed for wheezing or shortness of breath   arformoterol (BROVANA) 15 mcg/2 mL nebulizer solution   No No   Sig: Take 2 mL (15 mcg total) by nebulization 2 (two) times a day   aspirin (ECOTRIN LOW STRENGTH) 81 mg EC tablet   No No   Sig: Take 1 tablet (81 mg total) by mouth every other day   budesonide (Pulmicort) 0 5 mg/2 mL nebulizer solution   No No   Sig: Take 2 mL (0 5 mg total) by nebulization 2 (two) times a day Rinse mouth after use     carvedilol (COREG) 6 25 mg tablet   No No   Sig: Take 1 tablet (6 25 mg total) by mouth 2 (two) times a day with meals   cyanocobalamin (VITAMIN B-12) 500 MCG tablet   Yes No   Sig: TAKE ONE TABLET BY MOUTH EVERY MORNING *VITAMIN B12*   digoxin (LANOXIN) 0 125 mg tablet   No No   Sig: Take 1 tablet (125 mcg total) by mouth daily   furosemide (LASIX) 40 mg tablet   No No   Sig: Take 1 tablet (40 mg total) by mouth daily Hold for morning weight less than 165   gabapentin (NEURONTIN) 300 mg capsule   No No   Sig: Take 1 capsule (300 mg total) by mouth daily at bedtime   glimepiride (AMARYL) 1 mg tablet   No No   Sig: Take 2 tablets (2 mg total) by mouth daily with breakfast AND 1 tablet (1 mg total) daily with dinner  ipratropium (ATROVENT) 0 02 % nebulizer solution   No No   Sig: Take 2 5 mL (0 5 mg total) by nebulization 3 (three) times a day   lisinopril (ZESTRIL) 20 mg tablet   No No   Sig: Take 1 tablet (20 mg total) by mouth daily   metFORMIN (GLUCOPHAGE) 500 mg tablet   No No   Sig: Take 1 tablet (500 mg total) by mouth 2 (two) times a day with meals   potassium chloride (MICRO-K) 10 MEQ CR capsule   No No   Sig: Take 2 capsules (20 mEq total) by mouth daily Take with Lasix  If Lasix is not being taken, do not take potassium   primidone (MYSOLINE) 50 mg tablet   Yes No   Sig: Take 100 mg by mouth 2 (two) times a day    roflumilast (Daliresp) 500 mcg tablet   No No   Sig: Take 1 tablet (500 mcg total) by mouth daily   rosuvastatin (CRESTOR) 10 MG tablet   No No   Sig: Take 1 tablet (10 mg total) by mouth daily   terbinafine (LamISIL) 1 % cream   Yes No   Sig: APPLY THIN LAYER TOPICALLY TWICE A DAY FOR FUNGAL INFECTION      Facility-Administered Medications: None       Past Medical History:   Diagnosis Date   • BPH (benign prostatic hyperplasia)     45 days radiation treatment   • COPD (chronic obstructive pulmonary disease)    • Coronary artery disease     CABG x4 in 2017   • Diabetes mellitus    • History of Arterial Duplex of LE 12/26/2017    Likely occlusion of the left superficial femoral artery  Calcific changes bilaterally  Despite these changes, the ankle-brachial index as a measure of peripheral blood flow only mildly impaired     • History of echocardiogram 06/12/2017    EF 40%, mild LVH, mild MR    • Hyperlipidemia    • Hypertension    • Ischemic cardiomyopathy    • NSTEMI (non-ST elevated myocardial infarction)    • Prostate cancer     prostate • PVD (peripheral vascular disease)    • Sepsis due to pneumonia    • Tremor    • Type 2 MI (myocardial infarction) ([facility] [address] ) 2021       Past Surgical History:   Procedure Laterality Date   • CARDIAC CATHETERIZATION  2017    Significant left main plus triple-vessel CAD  • CORONARY ARTERY BYPASS GRAFT  2017    4V CABG:  LIMA to LAD, VG to RI, SVG to PDA to LVBR RCA  • EYE SURGERY      [facility]   • PROSTATE BIOPSY         Family History   Problem Relation Age of Onset   • Cancer Father    • Heart disease Brother      I have reviewed and agree with the history as documented  E-Cigarette/Vaping   • E-Cigarette Use Never User      E-Cigarette/Vaping Substances   • Nicotine No    • THC No    • CBD No    • Flavoring No    • Other No    • Unknown No      Social History     Tobacco Use   • Smoking status: Former     Packs/day: 0 25     Years: 60 00     Pack years: 15 00     Types: Cigarettes     Quit date: 2022     Years since quittin 7   • Smokeless tobacco: Never   • Tobacco comments:     Pt former smoker   Vaping Use   • Vaping Use: Never used   Substance Use Topics   • Alcohol use: Yes     Alcohol/week: 3 0 standard drinks     Types: 3 Cans of beer per week   • Drug use: Never       Review of Systems   Constitutional: Negative for chills, fever and unexpected weight change  HENT: Negative for congestion, sore throat and trouble swallowing  Eyes: Negative for pain, discharge and itching  Respiratory: Positive for shortness of breath  Negative for cough, chest tightness and wheezing  Cardiovascular: Negative for chest pain, palpitations and leg swelling  Gastrointestinal: Negative for abdominal pain, blood in stool, diarrhea, nausea and vomiting  Endocrine: Negative for polyuria  Genitourinary: Negative for difficulty urinating, dysuria, frequency and hematuria  Musculoskeletal: Negative for arthralgias and back pain     Neurological: Negative for dizziness, syncope, weakness, light-headedness and headaches  Physical Exam  Physical Exam  Vitals and nursing note reviewed  Constitutional:       General: He is not in acute distress  Appearance: He is well-developed  HENT:      Head: Normocephalic and atraumatic  Right Ear: External ear normal       Left Ear: External ear normal    Eyes:      Conjunctiva/sclera: Conjunctivae normal       Pupils: Pupils are equal, round, and reactive to light  Cardiovascular:      Rate and Rhythm: Normal rate and regular rhythm  Heart sounds: Normal heart sounds  No murmur heard  No friction rub  No gallop  Pulmonary:      Effort: Pulmonary effort is normal  No respiratory distress  Breath sounds: Decreased breath sounds present  No wheezing or rales  Abdominal:      General: Bowel sounds are normal  There is no distension  Palpations: Abdomen is soft  Tenderness: There is no abdominal tenderness  There is no guarding  Musculoskeletal:         General: No tenderness or deformity  Normal range of motion  Cervical back: Normal range of motion  Lymphadenopathy:      Cervical: No cervical adenopathy  Skin:     General: Skin is warm and dry  Neurological:      General: No focal deficit present  Mental Status: He is alert and oriented to person, place, and time  Mental status is at baseline  Cranial Nerves: No cranial nerve deficit  Sensory: No sensory deficit  Motor: No weakness or abnormal muscle tone     Psychiatric:         Behavior: Behavior normal          Vital Signs  ED Triage Vitals [12/17/22 1601]   Temperature Pulse Respirations Blood Pressure SpO2   97 8 °F (36 6 °C) 89 (!) 24 158/88 96 %      Temp Source Heart Rate Source Patient Position - Orthostatic VS BP Location FiO2 (%)   Oral Monitor Sitting Left arm --      Pain Score       --           Vitals:    12/17/22 1601 12/17/22 1800   BP: 158/88 158/74   Pulse: 89 100   Patient Position - Orthostatic VS: Sitting Sitting         Visual Acuity      ED Medications  Medications   albuterol inhalation solution 10 mg (10 mg Nebulization Given 12/17/22 1618)     And   ipratropium (ATROVENT) 0 02 % inhalation solution 1 mg (1 mg Nebulization Given 12/17/22 1617)     And   sodium chloride 0 9 % inhalation solution 3 mL (3 mL Nebulization Given 12/17/22 1617)   ipratropium-albuterol (FOR EMS ONLY) (DUO-NEB) 0 5-2 5 mg/3 mL inhalation solution 3 mL (0 mL Does not apply Given to EMS 12/17/22 1559)   methylPREDNISolone sodium succinate (Solu-MEDROL) injection 125 mg (125 mg Intravenous Given 12/17/22 1629)   furosemide (LASIX) injection 40 mg (40 mg Intravenous Given 12/17/22 1835)   furosemide (LASIX) injection 20 mg (20 mg Intravenous Given 12/17/22 2044)   potassium chloride (K-DUR,KLOR-CON) CR tablet 40 mEq (40 mEq Oral Given 12/17/22 2044)       Diagnostic Studies  Results Reviewed     Procedure Component Value Units Date/Time    HS Troponin I 4hr [[id number]]  (Normal) Collected: 12/17/22 2044    Lab Status: Final result Specimen: Blood from Arm, Left Updated: 12/17/22 2118     hs TnI 4hr 14 ng/L      Delta 4hr hsTnI -2 ng/L     HS Troponin I 2hr [[id number]]  (Normal) Collected: 12/17/22 1821    Lab Status: Final result Specimen: Blood from Arm, Right Updated: 12/17/22 1851     hs TnI 2hr 13 ng/L      Delta 2hr hsTnI -3 ng/L     Procalcitonin [[id number]]  (Normal) Collected: 12/17/22 1613    Lab Status: Final result Specimen: Blood from Arm, Left Updated: 12/17/22 1650     Procalcitonin 0 05 ng/ml     HS Troponin 0hr (reflex protocol) [[id number]]  (Normal) Collected: 12/17/22 1613    Lab Status: Final result Specimen: Blood from Arm, Left Updated: 12/17/22 1647     hs TnI 0hr 16 ng/L     B-Type Natriuretic Peptide(BNP), AN, CA, EA Campuses Only [[id number]]  (Abnormal) Collected: 12/17/22 1613    Lab Status: Final result Specimen: Blood from Arm, Left Updated: 12/17/22 1647     BNP 1,012 pg/mL Comprehensive metabolic panel [[id number]]  (Abnormal) Collected: 12/17/22 1613    Lab Status: Final result Specimen: Blood from Arm, Left Updated: 12/17/22 1640     Sodium 140 mmol/L      Potassium 3 8 mmol/L      Chloride 99 mmol/L      CO2 31 mmol/L      ANION GAP 10 mmol/L      BUN 17 mg/dL      Creatinine 0 72 mg/dL      Glucose 99 mg/dL      Calcium 8 9 mg/dL      AST 13 U/L      ALT 10 U/L      Alkaline Phosphatase 113 U/L      Total Protein 7 0 g/dL      Albumin 4 0 g/dL      Total Bilirubin 0 39 mg/dL      eGFR 90 ml/min/1 73sq m     Narrative:      [place] guidelines for Chronic Kidney Disease (CKD):   •  Stage 1 with normal or high GFR (GFR > 90 mL/min/1 73 square meters)  •  Stage 2 Mild CKD (GFR = 60-89 mL/min/1 73 square meters)  •  Stage 3A Moderate CKD (GFR = 45-59 mL/min/1 73 square meters)  •  Stage 3B Moderate CKD (GFR = 30-44 mL/min/1 73 square meters)  •  Stage 4 Severe CKD (GFR = 15-29 mL/min/1 73 square meters)  •  Stage 5 End Stage CKD (GFR <15 mL/min/1 73 square meters)  Note: GFR calculation is accurate only with a steady state creatinine    Protime-INR [[id number]]  (Abnormal) Collected: 12/17/22 1613    Lab Status: Final result Specimen: Blood from Arm, Left Updated: 12/17/22 1638     Protime 15 5 seconds      INR 1 23    APTT [[id number]]  (Abnormal) Collected: 12/17/22 1613    Lab Status: Final result Specimen: Blood from Arm, Left Updated: 12/17/22 1638     PTT 38 seconds     Lactic acid [[id number]]  (Normal) Collected: 12/17/22 1613    Lab Status: Final result Specimen: Blood from Arm, Left Updated: 12/17/22 1638     LACTIC ACID 1 3 mmol/L     Narrative:      Result may be elevated if tourniquet was used during collection      Blood gas, venous [[id number]]  (Abnormal) Collected: 12/17/22 1613    Lab Status: Final result Specimen: Blood from Arm, Left Updated: 12/17/22 1624     pH, Daniel 7 345     pCO2, Daniel 61 7 mm Hg      pO2, Daniel 32 2 mm Hg      HCO3, Daniel 32 9 mmol/L      Base Excess, Daniel 5 4 mmol/L      O2 Content, Daniel 10 6 ml/dL      O2 HGB, VENOUS 57 1 %     CBC and differential [[id number]]  (Abnormal) Collected: 12/17/22 1613    Lab Status: Final result Specimen: Blood from Arm, Left Updated: 12/17/22 1621     WBC 10 17 Thousand/uL      RBC 4 00 Million/uL      Hemoglobin 12 1 g/dL      Hematocrit 37 8 %      MCV 95 fL      MCH 30 3 pg      MCHC 32 0 g/dL      RDW 14 8 %      MPV 10 7 fL      Platelets 062 Thousands/uL      nRBC 0 /100 WBCs      Neutrophils Relative 70 %      Immat GRANS % 0 %      Lymphocytes Relative 15 %      Monocytes Relative 7 %      Eosinophils Relative 7 %      Basophils Relative 1 %      Neutrophils Absolute 7 11 Thousands/µL      Immature Grans Absolute 0 04 Thousand/uL      Lymphocytes Absolute 1 48 Thousands/µL      Monocytes Absolute 0 72 Thousand/µL      Eosinophils Absolute 0 71 Thousand/µL      Basophils Absolute 0 11 Thousands/µL     Blood culture #2 [[id number]] Collected: 12/17/22 1613    Lab Status: In process Specimen: Blood from Arm, Right Updated: 12/17/22 1618    Blood culture #1 [[id number]] Collected: 12/17/22 1613    Lab Status: In process Specimen: Blood from Arm, Left Updated: 12/17/22 1618                 XR chest 1 view portable   Final Result by Marta Clement MD (63/91 1935)      Development of mild vascular congestion                  Workstation performed: [id number]                    Procedures  Procedures         ED Course  ED Course as of 12/17/22 2154   Sat Dec 17, 2022   2006 Patient sating 95-97% on home 2L, ambulatory pulse ox remained 95%  Labwork at baseline  Patients work of breathing improved, no respiratory distress  Patient has not been taking his lasix at home  Believe he is stable for discharge and told him that he needs to restart his lasix and follow up with his cardiologist   Patient is comfortable with this plan  2142 Spoke with Dr Susy Aguilera of cardiology who reviewed EKGs    She is not concerned with changes given story  Trops negative x 3  Will send patient to dr Joleen Dias office to follow up with him on  Monday  SBIRT 22yo+    Flowsheet Row Most Recent Value   SBIRT (23 yo +)    In order to provide better care to our patients, we are screening all of our patients for alcohol and drug use  Would it be okay to ask you these screening questions? No Filed at: 12/17/2022 1623                    MDM  Number of Diagnoses or Management Options  CHF (congestive heart failure) (HCC)  SOB (shortness of breath)  Diagnosis management comments: 78-year-old male presenting for shortness of breath  History of severe COPD on 2 L chronically, history of CHF  Worsening dyspnea on exertion  Denies increased leg swelling  Satting 96% on 2 L  Decreased breath sounds throughout  Will obtain cardiac work-up  Symptoms likely secondary to COPD versus CHF  Chest x-ray shows mild vascular congestion  Patient's symptoms improved after IV Lasix  Patient not requiring submental oxygen from his baseline  Is feeling better at this time  Told patient to continue to take his Lasix as prescribed  Message was sent to Dr Audra Yoder for follow-up in the office  Disposition  Final diagnoses:   CHF (congestive heart failure) (HCC)   SOB (shortness of breath)     Time reflects when diagnosis was documented in both MDM as applicable and the Disposition within this note     Time User Action Codes Description Comment    12/17/2022  9:52 PM Genevieve Harman Add [I50 9] CHF (congestive heart failure) ([facility] [address] )     12/17/2022  9:52 PM Genevieve Harman Add [R06 02] SOB (shortness of breath)       ED Disposition     ED Disposition   Discharge    Condition   Stable    Date/Time   Sat Dec 17, 2022  9:52 PM    Comment   Luis Manuel Rodriguez discharge to home/self care                 Follow-up Information     Follow up With Specialties Details Why Barb Suarez MD Cardiology Schedule an appointment as soon as possible for a visit  For follow up of symptoms [address]  [phone]            Patient's Medications   Discharge Prescriptions    No medications on file       No discharge procedures on file      PDMP Review       Value Time User    PDMP Reviewed  Yes 9/3/2020 12:26 PM Lizbeth Lamb          ED Provider  Electronically Signed by           Sulema Aaron DO  12/17/22 1738

## 2022-12-18 LAB
ATRIAL RATE: 97 BPM
P AXIS: 87 DEGREES
PR INTERVAL: 180 MS
QRS AXIS: 106 DEGREES
QRSD INTERVAL: 104 MS
QT INTERVAL: 366 MS
QTC INTERVAL: 464 MS
T WAVE AXIS: -60 DEGREES
VENTRICULAR RATE: 97 BPM

## 2022-12-19 ENCOUNTER — TELEPHONE (OUTPATIENT)
Dept: PULMONOLOGY | Facility: CLINIC | Age: 77
End: 2022-12-19

## 2022-12-19 ENCOUNTER — HOSPITAL ENCOUNTER (OUTPATIENT)
Dept: PULMONOLOGY | Facility: HOSPITAL | Age: 77
Discharge: HOME | End: 2022-12-19
Attending: INTERNAL MEDICINE

## 2022-12-19 DIAGNOSIS — J96.11 CHRONIC RESPIRATORY FAILURE WITH HYPOXIA AND HYPERCAPNIA (HCC): Primary | ICD-10-CM

## 2022-12-19 DIAGNOSIS — J96.12 CHRONIC RESPIRATORY FAILURE WITH HYPOXIA AND HYPERCAPNIA (HCC): Primary | ICD-10-CM

## 2022-12-19 DIAGNOSIS — J44.9 CHRONIC OBSTRUCTIVE PULMONARY DISEASE, UNSPECIFIED COPD TYPE (HCC): ICD-10-CM

## 2022-12-19 DIAGNOSIS — J96.11 CHRONIC RESPIRATORY FAILURE WITH HYPOXIA (HCC): ICD-10-CM

## 2022-12-19 LAB
BASE EXCESS BLDA CALC-SCNC: 4 MMOL/L (ref -2–3)
CA-I BLD-SCNC: 1.19 MMOL/L (ref 1.12–1.32)
FIO2 GAS DIL.REBREATH: 28 L
GLUCOSE SERPL-MCNC: 167 MG/DL (ref 65–140)
HCO3 BLDA-SCNC: 29.7 MMOL/L (ref 22–28)
HCT VFR BLD CALC: 34 % (ref 36.5–49.3)
HGB BLDA-MCNC: 11.6 G/DL (ref 12–17)
PCO2 BLD: 31 MMOL/L (ref 21–32)
PCO2 BLD: 46.4 MM HG (ref 36–44)
PH BLD: 7.41 [PH] (ref 7.35–7.45)
PO2 BLD: 57 MM HG (ref 75–129)
POTASSIUM BLD-SCNC: 4.3 MMOL/L (ref 3.5–5.3)
SAO2 % BLD FROM PO2: 89 % (ref 60–85)
SODIUM BLD-SCNC: 139 MMOL/L (ref 136–145)
SPECIMEN SOURCE: ABNORMAL

## 2022-12-20 ENCOUNTER — OFFICE VISIT (OUTPATIENT)
Dept: CARDIOLOGY CLINIC | Facility: CLINIC | Age: 77
End: 2022-12-20

## 2022-12-20 VITALS
HEIGHT: 70 IN | WEIGHT: 163 LBS | BODY MASS INDEX: 23.34 KG/M2 | SYSTOLIC BLOOD PRESSURE: 142 MMHG | OXYGEN SATURATION: 89 % | HEART RATE: 98 BPM | DIASTOLIC BLOOD PRESSURE: 80 MMHG

## 2022-12-20 DIAGNOSIS — R53.81 PHYSICAL DECONDITIONING: Primary | ICD-10-CM

## 2022-12-20 DIAGNOSIS — J44.9 CHRONIC OBSTRUCTIVE PULMONARY DISEASE, UNSPECIFIED COPD TYPE (HCC): ICD-10-CM

## 2022-12-20 DIAGNOSIS — I25.10 CORONARY ARTERY DISEASE INVOLVING NATIVE CORONARY ARTERY OF NATIVE HEART WITHOUT ANGINA PECTORIS: Chronic | ICD-10-CM

## 2022-12-20 DIAGNOSIS — I25.5 ISCHEMIC CARDIOMYOPATHY: ICD-10-CM

## 2022-12-20 NOTE — ASSESSMENT & PLAN NOTE
Quite severe  I spoke to patient and his sister about goals of care  He has spoken to pulmonary about palliative care and I spoke about this as well  I believe further formal discussion with palliative care is proper and he is now amenable

## 2022-12-20 NOTE — ASSESSMENT & PLAN NOTE
Significant  Not clear to me that CHF is contributing to dyspnea but for now, will try targeting a base weight of 160 with sliding diuretic therapy

## 2022-12-20 NOTE — PATIENT INSTRUCTIONS
Take the water pill twice a day as long as the weight is over 160  Do not take it at all when the weight is below 160  At least lets try this to see if this helps shortness of breath  Also I want you to talk to our palliative care colleagues about general aspects of care and expectations

## 2022-12-20 NOTE — PROGRESS NOTES
Patient ID: Rhea Cyr is a 69 y o  male  Plan:      Coronary artery disease involving native coronary artery of native heart without angina pectoris  No current symptoms  Ischemic cardiomyopathy  Significant  Not clear to me that CHF is contributing to dyspnea but for now, will try targeting a base weight of 160 with sliding diuretic therapy  Chronic respiratory failure with hypoxia (HCC)  Severe  Seeing pulmonary as well  Physical deconditioning  Quite severe  I spoke to patient and his sister about goals of care  He has spoken to pulmonary about palliative care and I spoke about this as well  I believe further formal discussion with palliative care is proper and he is now amenable  Follow up Plan/Other summary comments:  Return in about 6 months (around 6/20/2023)  I made  the following suggestions: Take the water pill twice a day as long as the weight is over 160  Do not take it at all when the weight is below 160  At least lets try this to see if this helps shortness of breath  Also I want you to talk to our palliative care colleagues about general aspects of care and expectations  HPI: Patient seen in f/u regarding the above issues  Recent ER visit reviewed  There was worsening dyspnea and some of this persists  He remains O2 dependent  To reiterate, he had 4 vessel CABG in March of 2017 with left internal mammary to the LAD, vein graft to the ramus, vein graft to posterior descending artery and the ventricular branch of the right coronary artery  He has had a moderate depression of ejection fraction since then  Biggest limitation has been dyspnea with repeat he admissions but BNP below 200  He seems to respond to steroids  Most recent or relevant cardiac/vascular testing:         Echo 11/12/2020:  EF 30% with global dysfunction  TTE 9/20/2022: LVEF 30%  PA pressure 53  Arterial duplex 12/2017 - likely occlusion of the left SFA   KEI mildly impaired Past Surgical History:   Procedure Laterality Date   • CARDIAC CATHETERIZATION  03/08/2017    Significant left main plus triple-vessel CAD  • CORONARY ARTERY BYPASS GRAFT  03/08/2017    4V CABG:  LIMA to LAD, VG to RI, SVG to PDA to LVBR RCA  • EYE SURGERY      shots in eye once a month [facility]   • PROSTATE BIOPSY         Lipid Profile:   Lab Results   Component Value Date    TRIG 277 (H) 01/06/2022    HDL 49 01/06/2022         Review of Systems   10  point ROS  was otherwise non pertinent or negative except as per HPI or as below  Gait: Wheel chair  He remains oxygen dependent  Objective:     /80   Pulse 98   Ht 5' 10" (1 778 m)   Wt 73 9 kg (163 lb)   SpO2 (!) 89%   BMI 23 39 kg/m²     PHYSICAL EXAM:  General:  Normal appearance in no distress  Eyes:  Anicteric  Oral mucosa:  Moist   Neck:  No JVD  Carotid upstrokes are brisk without bruits  No masses  Chest:  Decreased breath sounds throughout  Well healed midline sternotomy scar  Cardiac:  Normal PMI  Normal S1 and S2  No murmur gallop or rub  Abdomen:  Soft and nontender  No palpable organomegaly or aortic enlargement  Extremities:  Trace peripheral edema  Musculoskeletal:  Symmetric  Vascular:  Femoral popliteal and pedal pulses are absent  Neuro:  Grossly symmetric  Psych:  Alert and oriented x3          Current Outpatient Medications:   •  albuterol (PROVENTIL HFA,VENTOLIN HFA) 90 mcg/act inhaler, Inhale 2 puffs every 6 (six) hours as needed for wheezing or shortness of breath, Disp: , Rfl: 0  •  arformoterol (BROVANA) 15 mcg/2 mL nebulizer solution, Take 2 mL (15 mcg total) by nebulization 2 (two) times a day, Disp: 120 mL, Rfl: 5  •  aspirin (ECOTRIN LOW STRENGTH) 81 mg EC tablet, Take 1 tablet (81 mg total) by mouth every other day, Disp:  , Rfl: 0  •  budesonide (Pulmicort) 0 5 mg/2 mL nebulizer solution, Take 2 mL (0 5 mg total) by nebulization 2 (two) times a day Rinse mouth after use , Disp: 120 mL, Rfl: 5  •  carvedilol (COREG) 6 25 mg tablet, Take 1 tablet (6 25 mg total) by mouth 2 (two) times a day with meals, Disp: 60 tablet, Rfl: 0  •  cyanocobalamin (VITAMIN B-12) 500 MCG tablet, TAKE ONE TABLET BY MOUTH EVERY MORNING *VITAMIN B12*, Disp: , Rfl:   •  digoxin (LANOXIN) 0 125 mg tablet, Take 1 tablet (125 mcg total) by mouth daily, Disp: 30 tablet, Rfl: 5  •  furosemide (LASIX) 40 mg tablet, Take 1 tablet (40 mg total) by mouth daily Hold for morning weight less than 165, Disp: 30 tablet, Rfl: 0  •  gabapentin (NEURONTIN) 300 mg capsule, Take 1 capsule (300 mg total) by mouth daily at bedtime, Disp: 90 capsule, Rfl: 1  •  glimepiride (AMARYL) 1 mg tablet, Take 2 tablets (2 mg total) by mouth daily with breakfast AND 1 tablet (1 mg total) daily with dinner , Disp: , Rfl: 0  •  ipratropium (ATROVENT) 0 02 % nebulizer solution, Take 2 5 mL (0 5 mg total) by nebulization 3 (three) times a day, Disp: 225 mL, Rfl: 5  •  lisinopril (ZESTRIL) 20 mg tablet, Take 1 tablet (20 mg total) by mouth daily, Disp: 90 tablet, Rfl: 3  •  metFORMIN (GLUCOPHAGE) 500 mg tablet, Take 1 tablet (500 mg total) by mouth 2 (two) times a day with meals, Disp: 180 tablet, Rfl: 3  •  Multiple Vitamins-Minerals (PRESERVISION AREDS 2 PO), Take by mouth, Disp: , Rfl:   •  potassium chloride (MICRO-K) 10 MEQ CR capsule, Take 2 capsules (20 mEq total) by mouth daily Take with Lasix    If Lasix is not being taken, do not take potassium, Disp: 60 capsule, Rfl: 0  •  primidone (MYSOLINE) 50 mg tablet, Take 100 mg by mouth 2 (two) times a day , Disp: , Rfl:   •  rosuvastatin (CRESTOR) 10 MG tablet, Take 1 tablet (10 mg total) by mouth daily, Disp: 90 tablet, Rfl: 3  •  terbinafine (LamISIL) 1 % cream, APPLY THIN LAYER TOPICALLY TWICE A DAY FOR FUNGAL INFECTION, Disp: , Rfl:   •  roflumilast (Daliresp) 500 mcg tablet, Take 1 tablet (500 mcg total) by mouth daily, Disp: 30 tablet, Rfl: 5  Allergies   Allergen Reactions   • Atorvastatin Myalgia     Past Medical History:   Diagnosis Date   • BPH (benign prostatic hyperplasia)     45 days radiation treatment   • COPD (chronic obstructive pulmonary disease)    • Coronary artery disease     CABG x4 in 2017   • Diabetes mellitus    • History of Arterial Duplex of LE 2017    Likely occlusion of the left superficial femoral artery  Calcific changes bilaterally  Despite these changes, the ankle-brachial index as a measure of peripheral blood flow only mildly impaired     • History of echocardiogram 2017    EF 40%, mild LVH, mild MR    • Hyperlipidemia    • Hypertension    • Ischemic cardiomyopathy    • NSTEMI (non-ST elevated myocardial infarction)    • Prostate cancer     prostate    • PVD (peripheral vascular disease)    • Sepsis due to pneumonia    • Tremor    • [facility]           Social History     Tobacco Use   Smoking Status Former   • Packs/day: 0 25   • Years: 60 00   • Pack years: 15 00   • Types: Cigarettes   • Quit date: 2022   • Years since quittin 7   Smokeless Tobacco Never   Tobacco Comments    Pt former smoker

## 2022-12-23 LAB
BACTERIA BLD CULT: NORMAL
BACTERIA BLD CULT: NORMAL

## 2022-12-24 ENCOUNTER — APPOINTMENT (EMERGENCY)
Dept: RADIOLOGY | Facility: HOSPITAL | Age: 77
End: 2022-12-24

## 2022-12-24 ENCOUNTER — HOSPITAL ENCOUNTER (INPATIENT)
Facility: HOSPITAL | Age: 77
LOS: 3 days | Discharge: HOME | End: 2022-12-27
Attending: EMERGENCY MEDICINE | Admitting: INTERNAL MEDICINE

## 2022-12-24 DIAGNOSIS — U07.1 COVID-19 VIRUS INFECTION: ICD-10-CM

## 2022-12-24 DIAGNOSIS — U07.1 COVID-19: ICD-10-CM

## 2022-12-24 DIAGNOSIS — G47.33 OSA (OBSTRUCTIVE SLEEP APNEA): ICD-10-CM

## 2022-12-24 DIAGNOSIS — R06.02 SOB (SHORTNESS OF BREATH): Primary | ICD-10-CM

## 2022-12-24 DIAGNOSIS — J90 BILATERAL PLEURAL EFFUSION: ICD-10-CM

## 2022-12-24 DIAGNOSIS — I50.9 CHF (CONGESTIVE HEART FAILURE) (HCC): ICD-10-CM

## 2022-12-24 PROBLEM — I48.0 PAF (PAROXYSMAL ATRIAL FIBRILLATION) (HCC): Chronic | Status: ACTIVE | Noted: 2022-08-14

## 2022-12-24 PROBLEM — E87.29 INCREASED ANION GAP METABOLIC ACIDOSIS: Status: RESOLVED | Noted: 2022-08-14 | Resolved: 2022-12-24

## 2022-12-24 PROBLEM — I50.42 CHRONIC COMBINED SYSTOLIC AND DIASTOLIC CONGESTIVE HEART FAILURE (HCC): Chronic | Status: ACTIVE | Noted: 2022-08-14

## 2022-12-24 PROBLEM — I10 PRIMARY HYPERTENSION: Chronic | Status: ACTIVE | Noted: 2022-01-01

## 2022-12-24 PROBLEM — J44.9 COPD (CHRONIC OBSTRUCTIVE PULMONARY DISEASE) (HCC): Chronic | Status: ACTIVE | Noted: 2020-08-13

## 2022-12-24 PROBLEM — E11.9 TYPE 2 DIABETES MELLITUS WITHOUT COMPLICATION, WITHOUT LONG-TERM CURRENT USE OF INSULIN (HCC): Status: RESOLVED | Noted: 2017-03-08 | Resolved: 2022-12-24

## 2022-12-24 LAB
2HR DELTA HS TROPONIN: 2 NG/L
4HR DELTA HS TROPONIN: 3 NG/L
ALBUMIN SERPL BCP-MCNC: 4.3 G/DL (ref 3.5–5)
ALP SERPL-CCNC: 119 U/L (ref 34–104)
ALT SERPL W P-5'-P-CCNC: 13 U/L (ref 7–52)
ANION GAP SERPL CALCULATED.3IONS-SCNC: 10 MMOL/L (ref 4–13)
AST SERPL W P-5'-P-CCNC: 12 U/L (ref 13–39)
ATRIAL RATE: 93 BPM
BASE EX.OXY STD BLDV CALC-SCNC: 66 % (ref 60–80)
BASE EXCESS BLDV CALC-SCNC: 3.5 MMOL/L
BASOPHILS # BLD AUTO: 0.12 THOUSANDS/ÂΜL (ref 0–0.1)
BASOPHILS NFR BLD AUTO: 1 % (ref 0–1)
BILIRUB SERPL-MCNC: 0.45 MG/DL (ref 0.2–1)
BNP SERPL-MCNC: 962 PG/ML (ref 0–100)
BUN SERPL-MCNC: 24 MG/DL (ref 5–25)
CALCIUM SERPL-MCNC: 9.1 MG/DL (ref 8.4–10.2)
CARDIAC TROPONIN I PNL SERPL HS: 15 NG/L
CARDIAC TROPONIN I PNL SERPL HS: 17 NG/L
CARDIAC TROPONIN I PNL SERPL HS: 18 NG/L
CHLORIDE SERPL-SCNC: 97 MMOL/L (ref 96–108)
CO2 SERPL-SCNC: 34 MMOL/L (ref 21–32)
CREAT SERPL-MCNC: 0.77 MG/DL (ref 0.6–1.3)
CRP SERPL QL: 123.7 MG/L
D DIMER PPP FEU-MCNC: 0.31 UG/ML FEU
EOSINOPHIL # BLD AUTO: 0.47 THOUSAND/ÂΜL (ref 0–0.61)
EOSINOPHIL NFR BLD AUTO: 3 % (ref 0–6)
ERYTHROCYTE [DISTWIDTH] IN BLOOD BY AUTOMATED COUNT: 14.8 % (ref 11.6–15.1)
FLUAV RNA RESP QL NAA+PROBE: NEGATIVE
FLUBV RNA RESP QL NAA+PROBE: NEGATIVE
GFR SERPL CREATININE-BSD FRML MDRD: 88 ML/MIN/1.73SQ M
GLUCOSE SERPL-MCNC: 142 MG/DL (ref 65–140)
HCO3 BLDV-SCNC: 31.5 MMOL/L (ref 24–30)
HCT VFR BLD AUTO: 38.1 % (ref 36.5–49.3)
HGB BLD-MCNC: 11.9 G/DL (ref 12–17)
IMM GRANULOCYTES # BLD AUTO: 0.08 THOUSAND/UL (ref 0–0.2)
IMM GRANULOCYTES NFR BLD AUTO: 0 % (ref 0–2)
LYMPHOCYTES # BLD AUTO: 1.52 THOUSANDS/ÂΜL (ref 0.6–4.47)
LYMPHOCYTES NFR BLD AUTO: 8 % (ref 14–44)
MCH RBC QN AUTO: 30.1 PG (ref 26.8–34.3)
MCHC RBC AUTO-ENTMCNC: 31.2 G/DL (ref 31.4–37.4)
MCV RBC AUTO: 97 FL (ref 82–98)
MONOCYTES # BLD AUTO: 1.24 THOUSAND/ÂΜL (ref 0.17–1.22)
MONOCYTES NFR BLD AUTO: 7 % (ref 4–12)
NEUTROPHILS # BLD AUTO: 14.75 THOUSANDS/ÂΜL (ref 1.85–7.62)
NEUTS SEG NFR BLD AUTO: 81 % (ref 43–75)
NRBC BLD AUTO-RTO: 0 /100 WBCS
O2 CT BLDV-SCNC: 12.2 ML/DL
P AXIS: 78 DEGREES
PCO2 BLDV: 63.9 MM HG (ref 42–50)
PH BLDV: 7.31 [PH] (ref 7.3–7.4)
PLATELET # BLD AUTO: 264 THOUSANDS/UL (ref 149–390)
PMV BLD AUTO: 11.5 FL (ref 8.9–12.7)
PO2 BLDV: 36.1 MM HG (ref 35–45)
POTASSIUM SERPL-SCNC: 3.5 MMOL/L (ref 3.5–5.3)
PR INTERVAL: 196 MS
PROCALCITONIN SERPL-MCNC: 0.05 NG/ML
PROT SERPL-MCNC: 7.3 G/DL (ref 6.4–8.4)
QRS AXIS: 90 DEGREES
QRSD INTERVAL: 106 MS
QT INTERVAL: 344 MS
QTC INTERVAL: 427 MS
RBC # BLD AUTO: 3.95 MILLION/UL (ref 3.88–5.62)
RSV RNA RESP QL NAA+PROBE: NEGATIVE
SARS-COV-2 RNA RESP QL NAA+PROBE: POSITIVE
SODIUM SERPL-SCNC: 141 MMOL/L (ref 135–147)
T WAVE AXIS: -8 DEGREES
VENTRICULAR RATE: 93 BPM
WBC # BLD AUTO: 18.18 THOUSAND/UL (ref 4.31–10.16)

## 2022-12-24 PROCEDURE — XW033E5 INTRODUCTION OF REMDESIVIR ANTI-INFECTIVE INTO PERIPHERAL VEIN, PERCUTANEOUS APPROACH, NEW TECHNOLOGY GROUP 5: ICD-10-PCS | Performed by: INTERNAL MEDICINE

## 2022-12-24 RX ORDER — FUROSEMIDE 10 MG/ML
40 INJECTION INTRAMUSCULAR; INTRAVENOUS ONCE
Status: COMPLETED | OUTPATIENT
Start: 2022-12-24 | End: 2022-12-24

## 2022-12-24 RX ORDER — LEVALBUTEROL 1.25 MG/.5ML
1.25 SOLUTION, CONCENTRATE RESPIRATORY (INHALATION)
Status: DISCONTINUED | OUTPATIENT
Start: 2022-12-24 | End: 2022-12-27 | Stop reason: HOSPADM

## 2022-12-24 RX ORDER — DIGOXIN 125 MCG
125 TABLET ORAL DAILY
Status: DISCONTINUED | OUTPATIENT
Start: 2022-12-24 | End: 2022-12-27 | Stop reason: HOSPADM

## 2022-12-24 RX ORDER — DEXAMETHASONE SODIUM PHOSPHATE 4 MG/ML
10 INJECTION, SOLUTION INTRA-ARTICULAR; INTRALESIONAL; INTRAMUSCULAR; INTRAVENOUS; SOFT TISSUE EVERY 24 HOURS
Status: DISCONTINUED | OUTPATIENT
Start: 2022-12-24 | End: 2022-12-24

## 2022-12-24 RX ORDER — FUROSEMIDE 40 MG/1
40 TABLET ORAL DAILY
Status: DISCONTINUED | OUTPATIENT
Start: 2022-12-25 | End: 2022-12-27 | Stop reason: HOSPADM

## 2022-12-24 RX ORDER — METHYLPREDNISOLONE SODIUM SUCCINATE 125 MG/2ML
100 INJECTION, POWDER, LYOPHILIZED, FOR SOLUTION INTRAMUSCULAR; INTRAVENOUS ONCE
Status: COMPLETED | OUTPATIENT
Start: 2022-12-24 | End: 2022-12-24

## 2022-12-24 RX ORDER — ASPIRIN 81 MG/1
81 TABLET ORAL EVERY OTHER DAY
Status: DISCONTINUED | OUTPATIENT
Start: 2022-12-24 | End: 2022-12-27 | Stop reason: HOSPADM

## 2022-12-24 RX ORDER — IPRATROPIUM BROMIDE AND ALBUTEROL SULFATE 2.5; .5 MG/3ML; MG/3ML
3 SOLUTION RESPIRATORY (INHALATION)
Status: DISCONTINUED | OUTPATIENT
Start: 2022-12-24 | End: 2022-12-24

## 2022-12-24 RX ORDER — PRIMIDONE 50 MG/1
100 TABLET ORAL 2 TIMES DAILY
Status: DISCONTINUED | OUTPATIENT
Start: 2022-12-24 | End: 2022-12-27 | Stop reason: HOSPADM

## 2022-12-24 RX ORDER — LISINOPRIL 20 MG/1
20 TABLET ORAL DAILY
Status: DISCONTINUED | OUTPATIENT
Start: 2022-12-24 | End: 2022-12-27 | Stop reason: HOSPADM

## 2022-12-24 RX ORDER — ACETAMINOPHEN 325 MG/1
650 TABLET ORAL EVERY 4 HOURS PRN
Status: DISCONTINUED | OUTPATIENT
Start: 2022-12-24 | End: 2022-12-27 | Stop reason: HOSPADM

## 2022-12-24 RX ORDER — METHYLPREDNISOLONE SODIUM SUCCINATE 125 MG/2ML
INJECTION, POWDER, LYOPHILIZED, FOR SOLUTION INTRAMUSCULAR; INTRAVENOUS
Status: COMPLETED
Start: 2022-12-24 | End: 2022-12-24

## 2022-12-24 RX ORDER — POTASSIUM CHLORIDE 20 MEQ/1
20 TABLET, EXTENDED RELEASE ORAL DAILY
Status: DISCONTINUED | OUTPATIENT
Start: 2022-12-24 | End: 2022-12-27 | Stop reason: HOSPADM

## 2022-12-24 RX ORDER — ONDANSETRON 2 MG/ML
4 INJECTION INTRAMUSCULAR; INTRAVENOUS EVERY 6 HOURS PRN
Status: DISCONTINUED | OUTPATIENT
Start: 2022-12-24 | End: 2022-12-27 | Stop reason: HOSPADM

## 2022-12-24 RX ORDER — FUROSEMIDE 10 MG/ML
40 INJECTION INTRAMUSCULAR; INTRAVENOUS DAILY
Status: DISCONTINUED | OUTPATIENT
Start: 2022-12-24 | End: 2022-12-24

## 2022-12-24 RX ORDER — BUDESONIDE 0.5 MG/2ML
0.5 INHALANT ORAL 2 TIMES DAILY
Status: DISCONTINUED | OUTPATIENT
Start: 2022-12-24 | End: 2022-12-27 | Stop reason: HOSPADM

## 2022-12-24 RX ORDER — IPRATROPIUM BROMIDE AND ALBUTEROL SULFATE 2.5; .5 MG/3ML; MG/3ML
SOLUTION RESPIRATORY (INHALATION)
Status: COMPLETED
Start: 2022-12-24 | End: 2022-12-24

## 2022-12-24 RX ORDER — CARVEDILOL 3.12 MG/1
6.25 TABLET ORAL 2 TIMES DAILY WITH MEALS
Status: DISCONTINUED | OUTPATIENT
Start: 2022-12-24 | End: 2022-12-27 | Stop reason: HOSPADM

## 2022-12-24 RX ORDER — MAGNESIUM SULFATE HEPTAHYDRATE 40 MG/ML
2 INJECTION, SOLUTION INTRAVENOUS ONCE
Status: COMPLETED | OUTPATIENT
Start: 2022-12-24 | End: 2022-12-24

## 2022-12-24 RX ORDER — GABAPENTIN 300 MG/1
300 CAPSULE ORAL
Status: DISCONTINUED | OUTPATIENT
Start: 2022-12-24 | End: 2022-12-27 | Stop reason: HOSPADM

## 2022-12-24 RX ORDER — ENOXAPARIN SODIUM 100 MG/ML
30 INJECTION SUBCUTANEOUS EVERY 12 HOURS SCHEDULED
Status: DISCONTINUED | OUTPATIENT
Start: 2022-12-24 | End: 2022-12-27 | Stop reason: HOSPADM

## 2022-12-24 RX ORDER — POTASSIUM CHLORIDE 20 MEQ/1
40 TABLET, EXTENDED RELEASE ORAL ONCE
Status: COMPLETED | OUTPATIENT
Start: 2022-12-24 | End: 2022-12-24

## 2022-12-24 RX ADMIN — ASPIRIN 81 MG: 81 TABLET, COATED ORAL at 09:15

## 2022-12-24 RX ADMIN — CYANOCOBALAMIN TAB 500 MCG 500 MCG: 500 TAB at 09:14

## 2022-12-24 RX ADMIN — LEVALBUTEROL HYDROCHLORIDE 1.25 MG: 1.25 SOLUTION, CONCENTRATE RESPIRATORY (INHALATION) at 22:40

## 2022-12-24 RX ADMIN — FUROSEMIDE 40 MG: 10 INJECTION, SOLUTION INTRAMUSCULAR; INTRAVENOUS at 04:18

## 2022-12-24 RX ADMIN — LEVALBUTEROL HYDROCHLORIDE 1.25 MG: 1.25 SOLUTION, CONCENTRATE RESPIRATORY (INHALATION) at 14:02

## 2022-12-24 RX ADMIN — IPRATROPIUM BROMIDE 0.5 MG: 0.5 SOLUTION RESPIRATORY (INHALATION) at 22:40

## 2022-12-24 RX ADMIN — DEXAMETHASONE SODIUM PHOSPHATE 10 MG: 4 INJECTION, SOLUTION INTRAMUSCULAR; INTRAVENOUS at 04:17

## 2022-12-24 RX ADMIN — IPRATROPIUM BROMIDE AND ALBUTEROL SULFATE 3 ML: 2.5; .5 SOLUTION RESPIRATORY (INHALATION) at 01:57

## 2022-12-24 RX ADMIN — BUDESONIDE 0.5 MG: 0.5 INHALANT ORAL at 22:40

## 2022-12-24 RX ADMIN — CARVEDILOL 6.25 MG: 3.12 TABLET, FILM COATED ORAL at 15:53

## 2022-12-24 RX ADMIN — POTASSIUM CHLORIDE 20 MEQ: 1500 TABLET, EXTENDED RELEASE ORAL at 09:15

## 2022-12-24 RX ADMIN — POTASSIUM CHLORIDE 40 MEQ: 1500 TABLET, EXTENDED RELEASE ORAL at 09:15

## 2022-12-24 RX ADMIN — LISINOPRIL 20 MG: 20 TABLET ORAL at 09:14

## 2022-12-24 RX ADMIN — BUDESONIDE 0.5 MG: 0.5 INHALANT ORAL at 07:30

## 2022-12-24 RX ADMIN — IPRATROPIUM BROMIDE 0.5 MG: 0.5 SOLUTION RESPIRATORY (INHALATION) at 07:30

## 2022-12-24 RX ADMIN — METHYLPREDNISOLONE SODIUM SUCCINATE 100 MG: 125 INJECTION, POWDER, LYOPHILIZED, FOR SOLUTION INTRAMUSCULAR; INTRAVENOUS at 01:56

## 2022-12-24 RX ADMIN — METHYLPREDNISOLONE SODIUM SUCCINATE 100 MG: 125 INJECTION, POWDER, FOR SOLUTION INTRAMUSCULAR; INTRAVENOUS at 01:56

## 2022-12-24 RX ADMIN — MAGNESIUM SULFATE HEPTAHYDRATE 2 G: 40 INJECTION, SOLUTION INTRAVENOUS at 01:00

## 2022-12-24 RX ADMIN — DIGOXIN 125 MCG: 250 TABLET ORAL at 09:13

## 2022-12-24 RX ADMIN — ENOXAPARIN SODIUM 30 MG: 100 INJECTION SUBCUTANEOUS at 09:16

## 2022-12-24 RX ADMIN — IPRATROPIUM BROMIDE 0.5 MG: 0.5 SOLUTION RESPIRATORY (INHALATION) at 14:02

## 2022-12-24 RX ADMIN — PRIMIDONE 100 MG: 50 TABLET ORAL at 09:14

## 2022-12-24 RX ADMIN — GABAPENTIN 300 MG: 300 CAPSULE ORAL at 21:49

## 2022-12-24 RX ADMIN — LEVALBUTEROL HYDROCHLORIDE 1.25 MG: 1.25 SOLUTION, CONCENTRATE RESPIRATORY (INHALATION) at 07:30

## 2022-12-24 RX ADMIN — PRIMIDONE 100 MG: 50 TABLET ORAL at 21:49

## 2022-12-24 RX ADMIN — ENOXAPARIN SODIUM 30 MG: 100 INJECTION SUBCUTANEOUS at 21:50

## 2022-12-24 RX ADMIN — CARVEDILOL 6.25 MG: 3.12 TABLET, FILM COATED ORAL at 09:14

## 2022-12-24 RX ADMIN — REMDESIVIR 200 MG: 100 INJECTION, POWDER, LYOPHILIZED, FOR SOLUTION INTRAVENOUS at 03:30

## 2022-12-24 NOTE — RESPIRATORY THERAPY NOTE
RT Protocol Note  Mohsen Dys 69 y o  male MRN: [id number]  Unit/Bed#: ED 02 Encounter: [id number]    Assessment    Principal Problem:    COVID-19 virus infection  Active Problems:    Coronary artery disease involving native coronary artery of native heart without angina pectoris    Benign essential tremor    COPD (chronic obstructive pulmonary disease) (HCC)    Chronic respiratory failure with hypoxia (HCC)    Type 2 diabetes mellitus with diabetic neuropathy, unspecified (HCC)    Physical deconditioning    Primary hypertension    Chronic combined systolic and diastolic congestive heart failure (HCC)    PAF (paroxysmal atrial fibrillation) ([facility])      Home Pulmonary Medications:  Pt states that he takes bronchodilators at home at least 3 times a day and as needed  Home Devices/Therapy: BiPAP/CPAP    Past Medical History:   Diagnosis Date   • BPH (benign prostatic hyperplasia)     45 days radiation treatment   • COPD (chronic obstructive pulmonary disease)    • Coronary artery disease     CABG x4 in 2017   • Diabetes mellitus    • History of Arterial Duplex of LE 12/26/2017    Likely occlusion of the left superficial femoral artery  Calcific changes bilaterally  Despite these changes, the ankle-brachial index as a measure of peripheral blood flow only mildly impaired     • History of echocardiogram 06/12/2017    EF 40%, mild LVH, mild MR    • Hyperlipidemia    • Hypertension    • Increased anion gap metabolic acidosis 4/32/3459   • Ischemic cardiomyopathy    • NSTEMI (non-ST elevated myocardial infarction)    • Prostate cancer     prostate    • PVD (peripheral vascular disease)    • Sepsis due to pneumonia    • Tremor    • Type 2 MI (myocardial infarction) ([facility] ) 04/06/2021     Social History     Socioeconomic History   • Marital status: /Civil Union     Spouse name: None   • Number of children: None   • Years of education: None   • Highest education level: None   Occupational History   • None   Tobacco Use   • Smoking status: Former     Packs/day: 0 25     Years: 60 00     Pack years: 15 00     Types: Cigarettes     Quit date: 2022     Years since quittin 7   • Smokeless tobacco: Never   • Tobacco comments:     Pt former smoker   Vaping Use   • Vaping Use: Never used   Substance and Sexual Activity   • Alcohol use: Not Currently     Alcohol/week: 3 0 standard drinks     Types: 3 Cans of beer per week   • Drug use: Never   • Sexual activity: Yes     Partners: Female   Other Topics Concern   • None   Social History Narrative    ** Merged History Encounter **          Social Determinants of Health     Financial Resource Strain: Not on file   Food Insecurity: No Food Insecurity   • Worried About Running Out of Food in the Last Year: Never true   • Ran Out of Food in the Last Year: Never true   Transportation Needs: No Transportation Needs   • Lack of Transportation (Medical): No   • Lack of Transportation (Non-Medical): No   Physical Activity: Not on file   Stress: Not on file   Social Connections: Not on file   Intimate Partner Violence: Not on file   Housing Stability: Low Risk    • Unable to Pay for Housing in the Last Year: No   • Number of Places Lived in the Last Year: 1   • Unstable Housing in the Last Year: No       Subjective     Pt assessed per RT protocol  He presented to the ED with increasing SOB since last week  He has a history of severe COPD, CHF and BERONICA  I assessed the patient in the ED prior to putting him on nocturnal BiPAP  He had coarse wheezes, which have since cleared since he has been wearing the BIPAP  His cough on request was non-productive at this time  Patient's plan of care discussed with PA  Pulmicort was ordered by the PA, and I placed the patient on Xopenex and Atrovent TID        Objective    Physical Exam:   Assessment Type: Assess only  General Appearance: Alert, Awake  Respiratory Pattern: Normal  Chest Assessment: Chest expansion symmetrical  Bilateral Breath Sounds: Diminished  Cough: Non-productive    Vitals:  Blood pressure (!) 179/85, pulse 88, temperature 98 7 °F (37 1 °C), resp  rate 22, height 5' 10" (1 778 m), weight 72 6 kg (160 lb), SpO2 95 %  Imaging and other studies: I have personally reviewed pertinent reports  Plan    Respiratory Plan: Mild Distress pathway, Home Bronchodilator Patient pathway    Bronchodilator Therapy TID, improve breathing, BiPAP for HOS      Resp Comments: Pt placed on BiPAP unit for HOS

## 2022-12-24 NOTE — ED PROVIDER NOTES
History  Chief Complaint   Patient presents with   • Shortness of Breath     COPD wears 2 liters of oxygen at home; was stating his oxygen was 88% at home; Given 125mg solu-medrol and 2 duoneb by ems; feels better     68 yo male with hx of CHF and COPD chronically on 2L nasal cannula at home  Was seen in the ed about 1-2 weeks ago with CHF exacerbation and felt better after treatment and DC'd home but states over the 24-28 hours his sob has gotten significantly worse and its worse than it was last time he was here  Patient was noted by EMS to be saturating 88% on his home NC upon their arrival  En-route to the ed given 2 duonebs and solumedrol 125mg IV which patient states made him feel somewhat better at that time  States that he is unable to lay flat at home when he sleeps because of being short of breath and that he normally doesn't have leg swelling but that hes noticing it recently  Denies recent illness, fevers, sore throat, abdominal pain, N/V, diarrhea, constipation, dysuria, hematuria  Prior to Admission Medications   Prescriptions Last Dose Informant Patient Reported? Taking? Multiple Vitamins-Minerals (PRESERVISION AREDS 2 PO)   Yes No   Sig: Take by mouth   albuterol (PROVENTIL HFA,VENTOLIN HFA) 90 mcg/act inhaler   No No   Sig: Inhale 2 puffs every 6 (six) hours as needed for wheezing or shortness of breath   arformoterol (BROVANA) 15 mcg/2 mL nebulizer solution   No No   Sig: Take 2 mL (15 mcg total) by nebulization 2 (two) times a day   aspirin (ECOTRIN LOW STRENGTH) 81 mg EC tablet   No No   Sig: Take 1 tablet (81 mg total) by mouth every other day   budesonide (Pulmicort) 0 5 mg/2 mL nebulizer solution   No No   Sig: Take 2 mL (0 5 mg total) by nebulization 2 (two) times a day Rinse mouth after use     carvedilol (COREG) 6 25 mg tablet   No No   Sig: Take 1 tablet (6 25 mg total) by mouth 2 (two) times a day with meals   cyanocobalamin (VITAMIN B-12) 500 MCG tablet   Yes No   Sig: TAKE ONE TABLET BY MOUTH EVERY MORNING *VITAMIN B12*   digoxin (LANOXIN) 0 125 mg tablet   No No   Sig: Take 1 tablet (125 mcg total) by mouth daily   furosemide (LASIX) 40 mg tablet   No No   Sig: Take 1 tablet (40 mg total) by mouth daily Hold for morning weight less than 165   gabapentin (NEURONTIN) 300 mg capsule   No No   Sig: Take 1 capsule (300 mg total) by mouth daily at bedtime   glimepiride (AMARYL) 1 mg tablet   No No   Sig: Take 2 tablets (2 mg total) by mouth daily with breakfast AND 1 tablet (1 mg total) daily with dinner  ipratropium (ATROVENT) 0 02 % nebulizer solution   No No   Sig: Take 2 5 mL (0 5 mg total) by nebulization 3 (three) times a day   lisinopril (ZESTRIL) 20 mg tablet   No No   Sig: Take 1 tablet (20 mg total) by mouth daily   metFORMIN (GLUCOPHAGE) 500 mg tablet   No No   Sig: Take 1 tablet (500 mg total) by mouth 2 (two) times a day with meals   potassium chloride (MICRO-K) 10 MEQ CR capsule   No No   Sig: Take 2 capsules (20 mEq total) by mouth daily Take with Lasix  If Lasix is not being taken, do not take potassium   primidone (MYSOLINE) 50 mg tablet   Yes No   Sig: Take 100 mg by mouth 2 (two) times a day    roflumilast (Daliresp) 500 mcg tablet   No No   Sig: Take 1 tablet (500 mcg total) by mouth daily   rosuvastatin (CRESTOR) 10 MG tablet   No No   Sig: Take 1 tablet (10 mg total) by mouth daily   terbinafine (LamISIL) 1 % cream   Yes No   Sig: APPLY THIN LAYER TOPICALLY TWICE A DAY FOR FUNGAL INFECTION      Facility-Administered Medications: None       Past Medical History:   Diagnosis Date   • BPH (benign prostatic hyperplasia)     45 days radiation treatment   • COPD (chronic obstructive pulmonary disease)    • Coronary artery disease     CABG x4 in 2017   • Diabetes mellitus    • History of Arterial Duplex of LE 12/26/2017    Likely occlusion of the left superficial femoral artery  Calcific changes bilaterally    Despite these changes, the ankle-brachial index as a measure of peripheral blood flow only mildly impaired  • History of echocardiogram 2017    EF 40%, mild LVH, mild MR    • Hyperlipidemia    • Hypertension    • Ischemic cardiomyopathy    • NSTEMI (non-ST elevated myocardial infarction)    • Prostate cancer     prostate    • PVD (peripheral vascular disease)    • Sepsis due to pneumonia    • Tremor    • Type 2 MI (myocardial infarction) ([facility] ) 2021       Past Surgical History:   Procedure Laterality Date   • CARDIAC CATHETERIZATION  2017    Significant left main plus triple-vessel CAD  • CORONARY ARTERY BYPASS GRAFT  2017    4V CABG:  LIMA to LAD, VG to RI, SVG to PDA to LVBR RCA  • EYE SURGERY      [facility]   • PROSTATE BIOPSY         Family History   Problem Relation Age of Onset   • Cancer Father    • Heart disease Brother      I have reviewed and agree with the history as documented  E-Cigarette/Vaping   • E-Cigarette Use Never User      E-Cigarette/Vaping Substances   • Nicotine No    • THC No    • CBD No    • Flavoring No    • Other No    • Unknown No      Social History     Tobacco Use   • Smoking status: Former     Packs/day: 0 25     Years: 60 00     Pack years: 15 00     Types: Cigarettes     Quit date: 2022     Years since quittin 7   • Smokeless tobacco: Never   • Tobacco comments:     Pt former smoker   Vaping Use   • Vaping Use: Never used   Substance Use Topics   • Alcohol use: Not Currently     Alcohol/week: 3 0 standard drinks     Types: 3 Cans of beer per week   • Drug use: Never       Review of Systems   Constitutional: Positive for fatigue  Respiratory: Positive for cough and shortness of breath  Cardiovascular: Positive for chest pain and leg swelling  Neurological: Positive for weakness  All other systems reviewed and are negative  Physical Exam  Physical Exam  Vitals and nursing note reviewed  Constitutional:       General: He is not in acute distress       Appearance: He is well-developed  He is ill-appearing  He is not toxic-appearing or diaphoretic  Interventions: He is not intubated  HENT:      Head: Normocephalic and atraumatic  Right Ear: External ear normal       Left Ear: External ear normal       Nose: Nose normal    Eyes:      General: No scleral icterus  Right eye: No discharge  Left eye: No discharge  Conjunctiva/sclera: Conjunctivae normal    Cardiovascular:      Rate and Rhythm: Normal rate and regular rhythm  Heart sounds: Normal heart sounds  No murmur heard  No friction rub  No gallop  Pulmonary:      Effort: Tachypnea and accessory muscle usage present  No bradypnea or respiratory distress  He is not intubated  Breath sounds: No stridor  Examination of the right-lower field reveals rales  Examination of the left-lower field reveals rales  Wheezing and rales present  Abdominal:      General: Bowel sounds are normal  There is no distension  Palpations: Abdomen is soft  There is no mass  Tenderness: There is no abdominal tenderness  There is no guarding  Musculoskeletal:         General: No tenderness or deformity  Normal range of motion  Cervical back: Normal range of motion and neck supple  Right lower leg: No tenderness  Edema (2) present  Left lower leg: No tenderness  Edema (2+) present  Skin:     General: Skin is warm and dry  Coloration: Skin is not cyanotic or pale  Findings: No ecchymosis, erythema or rash  Neurological:      Mental Status: He is alert and oriented to person, place, and time  Psychiatric:         Behavior: Behavior normal          Thought Content:  Thought content normal          Judgment: Judgment normal          Vital Signs  ED Triage Vitals [12/24/22 0053]   Temperature Pulse Respirations Blood Pressure SpO2   98 7 °F (37 1 °C) 99 20 (!) 179/85 95 %      Temp src Heart Rate Source Patient Position - Orthostatic VS BP Location FiO2 (%)   -- Monitor Sitting Left arm --      Pain Score       No Pain           Vitals:    12/24/22 0053   BP: (!) 179/85   Pulse: 99   Patient Position - Orthostatic VS: Sitting         Visual Acuity      ED Medications  Medications   ipratropium-albuterol (DUO-NEB) 0 5-2 5 mg/3 mL inhalation solution 3 mL (3 mL Nebulization Given 12/24/22 0157)   magnesium sulfate 2 g/50 mL IVPB (premix) 2 g (2 g Intravenous New Bag 12/24/22 0100)   furosemide (LASIX) injection 40 mg (has no administration in time range)   methylPREDNISolone sodium succinate (Solu-MEDROL) injection 100 mg (100 mg Intravenous Given 12/24/22 0156)       Diagnostic Studies  Results Reviewed     Procedure Component Value Units Date/Time    Procalcitonin [[id number]]  (Normal) Collected: 12/24/22 0100    Lab Status: Final result Specimen: Blood from Arm, Left Updated: 12/24/22 0200     Procalcitonin 0 05 ng/ml     FLU/RSV/COVID - if FLU/RSV clinically relevant [[id number]]  (Abnormal) Collected: 12/24/22 0100    Lab Status: Final result Specimen: Nares from Nose Updated: 12/24/22 0147     SARS-CoV-2 Positive     INFLUENZA A PCR Negative     INFLUENZA B PCR Negative     RSV PCR Negative    Narrative:      FOR PEDIATRIC PATIENTS - copy/paste COVID Guidelines URL to browser: [email or web address]    SARS-CoV-2 assay is a Nucleic Acid Amplification assay intended for the  qualitative detection of nucleic acid from SARS-CoV-2 in nasopharyngeal  swabs  Results are for the presumptive identification of SARS-CoV-2 RNA  Positive results are indicative of infection with SARS-CoV-2, the virus  causing COVID-19, but do not rule out bacterial infection or co-infection  with other viruses  Laboratories within the [place] and its  territories are required to report all positive results to the appropriate  public health authorities   Negative results do not preclude SARS-CoV-2  infection and should not be used as the sole basis for treatment or other  patient management decisions  Negative results must be combined with  clinical observations, patient history, and epidemiological information  This test has not been FDA cleared or approved  This test has been authorized by FDA under an Emergency Use Authorization  (EUA)  This test is only authorized for the duration of time the  declaration that circumstances exist justifying the authorization of the  emergency use of an in vitro diagnostic tests for detection of SARS-CoV-2  virus and/or diagnosis of COVID-19 infection under section 564(b)(1) of  the Act, 21 U  S C  983YFF-2(A)(2), unless the authorization is terminated  or revoked sooner  The test has been validated but independent review by FDA  and CLIA is pending  Test performed using [employer] GeneXpert: This RT-PCR assay targets N2,  a region unique to SARS-CoV-2  A conserved region in the E-gene was chosen  for pan-Sarbecovirus detection which includes SARS-CoV-2  According to CMS-2020-01-R, this platform meets the definition of high-throughput technology      HS Troponin I 2hr [[id number]]     Lab Status: No result Specimen: Blood     HS Troponin 0hr (reflex protocol) [[id number]]  (Normal) Collected: 12/24/22 0100    Lab Status: Final result Specimen: Blood from Arm, Left Updated: 12/24/22 0141     hs TnI 0hr 15 ng/L     B-Type Natriuretic Peptide(BNP), AN, CA, EA Campuses Only [[id number]]  (Abnormal) Collected: 12/24/22 0100    Lab Status: Final result Specimen: Blood from Arm, Left Updated: 12/24/22 0140      pg/mL     Comprehensive metabolic panel [[id number]]  (Abnormal) Collected: 12/24/22 0100    Lab Status: Final result Specimen: Blood from Arm, Left Updated: 12/24/22 0137     Sodium 141 mmol/L      Potassium 3 5 mmol/L      Chloride 97 mmol/L      CO2 34 mmol/L      ANION GAP 10 mmol/L      BUN 24 mg/dL      Creatinine 0 77 mg/dL      Glucose 142 mg/dL      Calcium 9 1 mg/dL      AST 12 U/L      ALT 13 U/L Alkaline Phosphatase 119 U/L      Total Protein 7 3 g/dL      Albumin 4 3 g/dL      Total Bilirubin 0 45 mg/dL      eGFR 88 ml/min/1 73sq m     Narrative:      National Kidney Disease Foundation guidelines for Chronic Kidney Disease (CKD):   •  Stage 1 with normal or high GFR (GFR > 90 mL/min/1 73 square meters)  •  Stage 2 Mild CKD (GFR = 60-89 mL/min/1 73 square meters)  •  Stage 3A Moderate CKD (GFR = 45-59 mL/min/1 73 square meters)  •  Stage 3B Moderate CKD (GFR = 30-44 mL/min/1 73 square meters)  •  Stage 4 Severe CKD (GFR = 15-29 mL/min/1 73 square meters)  •  Stage 5 End Stage CKD (GFR <15 mL/min/1 73 square meters)  Note: GFR calculation is accurate only with a steady state creatinine    CBC and differential [[id number]]  (Abnormal) Collected: 12/24/22 0100    Lab Status: Final result Specimen: Blood from Arm, Left Updated: 12/24/22 0113     WBC 18 18 Thousand/uL      RBC 3 95 Million/uL      Hemoglobin 11 9 g/dL      Hematocrit 38 1 %      MCV 97 fL      MCH 30 1 pg      MCHC 31 2 g/dL      RDW 14 8 %      MPV 11 5 fL      Platelets 479 Thousands/uL      nRBC 0 /100 WBCs      Neutrophils Relative 81 %      Immat GRANS % 0 %      Lymphocytes Relative 8 %      Monocytes Relative 7 %      Eosinophils Relative 3 %      Basophils Relative 1 %      Neutrophils Absolute 14 75 Thousands/µL      Immature Grans Absolute 0 08 Thousand/uL      Lymphocytes Absolute 1 52 Thousands/µL      Monocytes Absolute 1 24 Thousand/µL      Eosinophils Absolute 0 47 Thousand/µL      Basophils Absolute 0 12 Thousands/µL     Blood gas, venous [[id number]]  (Abnormal) Collected: 12/24/22 0100    Lab Status: Final result Specimen: Blood from Arm, Left Updated: 12/24/22 0113     pH, Daniel 7 310     pCO2, Daniel 63 9 mm Hg      pO2, Daniel 36 1 mm Hg      HCO3, Daniel 31 5 mmol/L      Base Excess, Daniel 3 5 mmol/L      O2 Content, Daniel 12 2 ml/dL      O2 HGB, VENOUS 66 0 %                  XR chest 1 view portable    (Results Pending) Procedures  ECG 12 Lead Documentation Only    Date/Time: 12/24/2022 1:12 AM  Performed by: Alison Martins DO  Authorized by: Alison Martins DO     Previous ECG:     Previous ECG:  Compared to current    Similarity:  No change  Interpretation:     Interpretation: abnormal    Rate:     ECG rate:  93    ECG rate assessment: normal    Rhythm:     Rhythm: sinus rhythm    Ectopy:     Ectopy: none    QRS:     QRS axis:  Normal    QRS intervals:  Normal  Conduction:     Conduction: normal    ST segments:     ST segments:  Normal  T waves:     T waves: normal    Q waves:     Q waves:  V1, V2 and V3             ED Course  ED Course as of 12/24/22 0210   Sat Dec 24, 2022   0140 BNP(!): 962  Giving 40IV lasix as he takes 40 PO daily normally   0156 SARS-COV-2(!): Positive   0156 WBC(!): 18 18   0201 Procalcitonin: 0 05  With a positive Covid and negative procal and afebrile on exam will hold antibiotics at this time  HEART Risk Score    Flowsheet Row Most Recent Value   Heart Score Risk Calculator    History 0 Filed at: 12/24/2022 0210   ECG 1 Filed at: 12/24/2022 0210   Age 2 Filed at: 12/24/2022 0210   Risk Factors 2 Filed at: 12/24/2022 0210   Troponin 1 Filed at: 12/24/2022 0210   HEART Score 6 Filed at: 12/24/2022 0210                                      MDM  Number of Diagnoses or Management Options  Diagnosis management comments: Pt with hx of COPD, CHF on chronic O2 at 2L with increased work of breathing/sob and hypoxia on baseline O2  Patient received 2 duonebs and 125mg solumedrol with EMS with overall improvement per patient and ems however still with accessory muscle usage and sob on examination with B/L LE pitting edema and rales  Cardiac workup + BNP, CXR, IV magnesium, covid/flu/rsv swab  Patient will likely require IV lasix with concern for possible fluid overload vs COPD exacerbation       Patient noted to be covid + and to have a negative Procal so at this time will hold antibiotics  Patient given IV lasix as he clinically evaluates as being fluid overloaded and has an elevated BNP on testing  Patient to be admitted to medicine at this time for worsening sob with Covid and CHF exacerbation  Disposition  Final diagnoses:   SOB (shortness of breath)   CHF (congestive heart failure) ([facility] )   COVID-19     Time reflects when diagnosis was documented in both MDM as applicable and the Disposition within this note     Time User Action Codes Description Comment    12/24/2022  2:09 AM Avonne Melva Add [R06 02] SOB (shortness of breath)     12/24/2022  2:09 AM Avonne Melva Add [I50 9] CHF (congestive heart failure) ([facility] )     12/24/2022  2:09 AM Avonne Melva Add [U07 1] COVID-19       ED Disposition     ED Disposition   Admit    Condition   Stable    Date/Time   Sat Dec 24, 2022  2:09 AM    Comment   Case was discussed with IRINA and the patient's admission status was agreed to be Admission Status: inpatient status to the service of Dr Nancy Mckeon  Follow-up Information    None         Patient's Medications   Discharge Prescriptions    No medications on file       No discharge procedures on file      PDMP Review       Value Time User    PDMP Reviewed  Yes 9/3/2020 12:26 PM Maya Nieves          ED Provider  Electronically Signed by           Helena Jerome DO  12/24/22 0217

## 2022-12-24 NOTE — ASSESSMENT & PLAN NOTE
Lab Results   Component Value Date    HGBA1C 5 7 (H) 08/14/2022       No results for input(s): POCGLU in the last 72 hours      Blood Sugar Average: Last 72 hrs:

## 2022-12-24 NOTE — H&P
[address] 1945, 69 y o  male MRN: [id number]  Unit/Bed#: TR 03 Encounter: [id number]  Primary Care Provider: Srini Buchanan DO   Date and time admitted to hospital: 12/24/2022 12:47 AM    * COVID-19 virus infection  Assessment & Plan  · Mild pathway  · Supportive care for symptoms  · Serial labs    Chronic combined systolic and diastolic congestive heart failure (HCC)  Assessment & Plan  Wt Readings from Last 3 Encounters:   12/24/22 72 6 kg (160 lb)   12/20/22 73 9 kg (163 lb)   12/12/22 74 8 kg (165 lb)   ·   · Lasix 40mg IV in ED x1  · On home lasix 40mg prn weight >160  · Monitor I&O  · Seen by Cardiology 12/20 was agreeable to palliative care consult  Consider consult as IP  · Echo 9/22: EF 47%, grade 2 diastolic dysfunction  Mitral valve mild regurg          COPD (chronic obstructive pulmonary disease) ([facility])  Assessment & Plan  · Respiratory protocol  · Decadron  · Albuterol MDI    PAF (paroxysmal atrial fibrillation) ([facility])  Assessment & Plan  · Continue carvedilol 6 25 mg twice daily and digoxin 125 mcg, aspirin 81 mg  · Patient not on anticoagulation    Primary hypertension  Assessment & Plan  · Continue home medications with hold parameters    Physical deconditioning  Assessment & Plan  · PT/OT eval for d/c planning with covid and progression of pulmonary and cardiac disease    Type 2 diabetes mellitus with diabetic neuropathy, unspecified ([facility] )  Assessment & Plan  Lab Results   Component Value Date    HGBA1C 5 7 (H) 08/14/2022       No results for input(s): POCGLU in the last 72 hours  Blood Sugar Average: Last 72 hrs:  · Hold oral medication  · Sliding scale insulin coverage while in the hospital    Chronic respiratory failure with hypoxia ([facility] )  Assessment & Plan  · Chronic 2L NC  · Follows with pulmonary  · BiPAP order was sent on 12/20/22 for home unit  Unable to see order   Discuss w/ Pulmonary in AM for setting    Benign essential tremor  Assessment & Plan  · Continue primidone    Coronary artery disease involving native coronary artery of native heart without angina pectoris  Assessment & Plan  · Status post CABG x4  · Continue aspirin 81 mg  · Continue carvedilol 6 25 mg twice daily  · Trend troponins        VTE Pharmacologic Prophylaxis: VTE Score: 7 High Risk (Score >/= 5) - Pharmacological DVT Prophylaxis Ordered: enoxaparin (Lovenox)  Sequential Compression Devices Ordered  Code Status: Code 3  Discussion with patient      Anticipated Length of Stay: Patient will be admitted on an inpatient basis with an anticipated length of stay of greater than 2 midnights secondary to respiratory monitoring, supportive care  Chief Complaint: shortness of breath    History of Present Illness:  Connie Constantino is a 69 y o  male with a PMH of PAF, CHF, BERONICA, BPH, COPD, DM2 not on insulin, CAD w/ CABG x4 who presents with shortness of breath  Patient reports that he noted his breathing was worse on Friday and overnight his breathing got real bad  He notes chills, loss of appetite, fatigue, weakness  No sick contacts that he is aware of, reports wife is not sick at this time  He was seen in the ED on 12/17 for CHF issues, chest x-ray with vascular congestion he was given 40 mg IV Lasix and discharged home  He had follow-up with Dr Kimani Fields on 12/20, they discussed referral to palliative care  Was brought in by EMS he was was noted to be 88% on his 2 L nasal cannula, given 2 duo nebs and Solu-Medrol 125 mg reported to feel little better when he arrived in the ED  Reports he is not able to lie flat secondary to his breathing  He saw pulmonary on 12/17 and ABG was performed and plan for BiPAP order  Review of Systems:  Review of Systems   Constitutional: Positive for chills and fatigue  Negative for fever  HENT: Positive for congestion  Negative for rhinorrhea, sore throat and trouble swallowing  Eyes: Negative for discharge and redness  Respiratory: Positive for cough and shortness of breath  Cardiovascular: Positive for leg swelling  Negative for chest pain  Gastrointestinal: Negative for abdominal pain, diarrhea, nausea and vomiting  Genitourinary: Negative for dysuria and hematuria  Musculoskeletal: Negative for back pain, myalgias and neck pain  Skin: Negative for rash and wound  Neurological: Positive for weakness  Negative for dizziness and headaches  Psychiatric/Behavioral: Negative for agitation and confusion  Past Medical and Surgical History:   Past Medical History:   Diagnosis Date   • BPH (benign prostatic hyperplasia)     45 days radiation treatment   • COPD (chronic obstructive pulmonary disease)    • Coronary artery disease     CABG x4 in 2017   • Diabetes mellitus    • History of Arterial Duplex of LE 12/26/2017    Likely occlusion of the left superficial femoral artery  Calcific changes bilaterally  Despite these changes, the ankle-brachial index as a measure of peripheral blood flow only mildly impaired  • History of echocardiogram 06/12/2017    EF 40%, mild LVH, mild MR    • Hyperlipidemia    • Hypertension    • Increased anion gap metabolic acidosis 2/99/1811   • Ischemic cardiomyopathy    • NSTEMI (non-ST elevated myocardial infarction)    • Prostate cancer     prostate    • PVD (peripheral vascular disease)    • Sepsis due to pneumonia    • Tremor    • Type 2 MI (myocardial infarction) ([facility] ) 04/06/2021       Past Surgical History:   Procedure Laterality Date   • CARDIAC CATHETERIZATION  03/08/2017    Significant left main plus triple-vessel CAD  • CORONARY ARTERY BYPASS GRAFT  03/08/2017    4V CABG:  LIMA to LAD, VG to RI, SVG to PDA to LVBR RCA  • EYE SURGERY      [facility]   • PROSTATE BIOPSY       Meds/Allergies:  Prior to Admission medications    Medication Sig Start Date End Date Taking?  Authorizing Provider   albuterol (PROVENTIL HFA,VENTOLIN HFA) 90 mcg/act inhaler Inhale 2 puffs every 6 (six) hours as needed for wheezing or shortness of breath 1/23/22   Cori Del Cid PA-C   arformoterol (BROVANA) 15 mcg/2 mL nebulizer solution Take 2 mL (15 mcg total) by nebulization 2 (two) times a day 8/1/22   David Zuleta MD   aspirin (ECOTRIN LOW STRENGTH) 81 mg EC tablet Take 1 tablet (81 mg total) by mouth every other day 9/3/20   Becki Hatchet Meckes, CRNP   budesonide (Pulmicort) 0 5 mg/2 mL nebulizer solution Take 2 mL (0 5 mg total) by nebulization 2 (two) times a day Rinse mouth after use  8/1/22   David Zuleta MD   carvedilol (COREG) 6 25 mg tablet Take 1 tablet (6 25 mg total) by mouth 2 (two) times a day with meals 9/23/22   Oma Acosta MD   cyanocobalamin (VITAMIN B-12) 500 MCG tablet TAKE ONE TABLET BY MOUTH EVERY MORNING *VITAMIN B12* 11/8/21   Historical Provider, MD   digoxin (LANOXIN) 0 125 mg tablet Take 1 tablet (125 mcg total) by mouth daily 12/5/22   Alex Tucker, DO   furosemide (LASIX) 40 mg tablet Take 1 tablet (40 mg total) by mouth daily Hold for morning weight less than 165 9/27/22   David Jeffries MD   gabapentin (NEURONTIN) 300 mg capsule Take 1 capsule (300 mg total) by mouth daily at bedtime 6/29/22   Alex Tucker, DO   glimepiride (AMARYL) 1 mg tablet Take 2 tablets (2 mg total) by mouth daily with breakfast AND 1 tablet (1 mg total) daily with dinner   4/22/22   Alex Tucker, DO   ipratropium (ATROVENT) 0 02 % nebulizer solution Take 2 5 mL (0 5 mg total) by nebulization 3 (three) times a day 8/1/22   David Zuleta MD   lisinopril (ZESTRIL) 20 mg tablet Take 1 tablet (20 mg total) by mouth daily 11/29/22   David Jeffries MD   metFORMIN (GLUCOPHAGE) 500 mg tablet Take 1 tablet (500 mg total) by mouth 2 (two) times a day with meals 8/24/22   Alex Crooked, DO   Multiple Vitamins-Minerals (PRESERVISION AREDS 2 PO) Take by mouth    Historical Provider, MD   potassium chloride (MICRO-K) 10 MEQ CR capsule Take 2 capsules (20 mEq total) by mouth daily Take with Lasix  If Lasix is not being taken, do not take potassium 22   Jean Carlos Willard MD   primidone (MYSOLINE) 50 mg tablet Take 100 mg by mouth 2 (two) times a day     Historical Provider, MD   roflumilast (Daliresp) 500 mcg tablet Take 1 tablet (500 mcg total) by mouth daily 22  Stan Mckinnon MD   rosuvastatin (CRESTOR) 10 MG tablet Take 1 tablet (10 mg total) by mouth daily 22   Mary Bangura DO   terbinafine (LamISIL) 1 % cream APPLY THIN LAYER TOPICALLY TWICE A DAY FOR FUNGAL INFECTION 22   Historical Provider, MD     I have reviewed home medications with patient personally  Allergies:    Allergies   Allergen Reactions   • Atorvastatin Myalgia       Social History:  Marital Status: /Civil Union   Occupation: retired  Patient Pre-hospital Living Situation: Home with wife  Patient Pre-hospital Level of Mobility: walks with cane  Patient Pre-hospital Diet Restrictions: cardiac and diabetic  Substance Use History:   Social History     Substance and Sexual Activity   Alcohol Use Not Currently   • Alcohol/week: 3 0 standard drinks   • Types: 3 Cans of beer per week     Social History     Tobacco Use   Smoking Status Former   • Packs/day: 0 25   • Years: 60 00   • Pack years: 15 00   • Types: Cigarettes   • Quit date: 2022   • Years since quittin 7   Smokeless Tobacco Never   Tobacco Comments    Pt former smoker     Social History     Substance and Sexual Activity   Drug Use Never       Family History:  Family History   Problem Relation Age of Onset   • Cancer Father    • Heart disease Brother      Physical Exam:     Vitals:   Blood Pressure: (!) 179/85 (22)  Pulse: 99 (22)  Temperature: 98 7 °F (37 1 °C) (22)  Respirations: 20 (22)  Height: 5' 10" (177 8 cm) (22)  Weight - Scale: 72 6 kg (160 lb) (22)  SpO2: 95 % (12/24/22 8301)    Physical Exam  Vitals reviewed  Constitutional:       General: He is not in acute distress  Interventions: Face mask in place  Comments: Fatigued and ill-appearing elderly  male   HENT:      Head: Normocephalic and atraumatic  Right Ear: External ear normal       Left Ear: External ear normal       Nose: Congestion present  Eyes:      General:         Right eye: No discharge  Left eye: No discharge  Conjunctiva/sclera: Conjunctivae normal    Cardiovascular:      Rate and Rhythm: Normal rate and regular rhythm  Heart sounds: Normal heart sounds  No murmur heard  Pulmonary:      Effort: Pulmonary effort is normal  No respiratory distress  Breath sounds: Decreased breath sounds and rales present  No wheezing  Abdominal:      General: Bowel sounds are normal  There is no distension  Palpations: Abdomen is soft  Tenderness: There is no abdominal tenderness  There is no guarding  Musculoskeletal:         General: Normal range of motion  Cervical back: Normal range of motion  Right lower leg: Edema present  Left lower leg: Edema present  Skin:     General: Skin is warm and dry  Coloration: Skin is pale  Neurological:      Mental Status: Mental status is at baseline  Motor: Weakness present     Psychiatric:         Mood and Affect: Mood normal          Behavior: Behavior normal         Additional Data:     Lab Results:  Results from last 7 days   Lab Units 12/24/22  0100   WBC Thousand/uL 18 18*   HEMOGLOBIN g/dL 11 9*   HEMATOCRIT % 38 1   PLATELETS Thousands/uL 264   NEUTROS PCT % 81*   LYMPHS PCT % 8*   MONOS PCT % 7   EOS PCT % 3     Results from last 7 days   Lab Units 12/24/22  0100   SODIUM mmol/L 141   POTASSIUM mmol/L 3 5   CHLORIDE mmol/L 97   CO2 mmol/L 34*   BUN mg/dL 24   CREATININE mg/dL 0 77   ANION GAP mmol/L 10   CALCIUM mg/dL 9 1   ALBUMIN g/dL 4 3   TOTAL BILIRUBIN mg/dL 0 45   ALK PHOS U/L 119* ALT U/L 13   AST U/L 12*   GLUCOSE RANDOM mg/dL 142*     Results from last 7 days   Lab Units 12/17/22  1613   INR  1 23*             Results from last 7 days   Lab Units 12/24/22  0100 12/17/22  1613   LACTIC ACID mmol/L  --  1 3   PROCALCITONIN ng/ml 0 05 0 05     Lines/Drains:  Invasive Devices     Peripheral Intravenous Line  Duration           Peripheral IV 12/17/22 Proximal;Right;Ventral (anterior) Forearm 6 days              Imaging: formal read pending  XR chest 1 view portable    (Results Pending)       ** Please Note: This note has been constructed using a voice recognition system   **

## 2022-12-24 NOTE — ASSESSMENT & PLAN NOTE
· Status post CABG x4  · Continue aspirin 81 mg  · Continue carvedilol 6 25 mg twice daily  · Trend troponins

## 2022-12-24 NOTE — ASSESSMENT & PLAN NOTE
Wt Readings from Last 3 Encounters:   12/24/22 72 6 kg (160 lb)   12/20/22 73 9 kg (163 lb)   12/12/22 74 8 kg (165 lb)   ·   · Lasix 40mg IV in ED x1  · On home lasix 40mg prn weight >160  · Monitor I&O  · Seen by Cardiology 12/20 was agreeable to palliative care consult  Consider consult as IP  · Echo 9/22: EF 79%, grade 2 diastolic dysfunction    Mitral valve mild regurg

## 2022-12-24 NOTE — ASSESSMENT & PLAN NOTE
· Chronic 2L NC  · Follows with pulmonary  · BiPAP order was sent on 12/20/22 for home unit  Unable to see order   Discuss w/ Pulmonary in AM for setting

## 2022-12-24 NOTE — CONSULTS
Pulmonary Consultation   Jerica Norwood 69 y o  male MRN: [id number]   ED 02 Encounter: [id number]      Reason for consultation:   Acute on chronic hypoxemic respiratory failure  Requesting physician:   Amalia Quigley MD      Impressions:   • Acute on chronic hypoxemic respiratory failure  • Severe chronic obstructive pulmonary disease without acute exacerbation  • Acute on chronic systolic congestive heart failure  • COVID-19 infection  Recommendations:  • Discontinue steroids  • Budesonide 0 5 mg nebulized twice a day  • Ipratropium/levalbuterol nebulizer treatments 3 times a day  • Oxygen supplement at 2 L  • Continue diuretics  History of Present Illness   HPI:  Jerica Norwood is a 69 y o  male who was admitted because of increasing shortness of breath  The patient has severe COPD and also congestive heart failure  The patient was brought to the emergency room because of worsening shortness of breath  He was treated with IV Lasix and steroids  Also placed on BiPAP  He tested positive for COVID  He denies fever or chills  He is back to his baseline  His oxygen requirement improved and currently on 2 L nasal cannula which is what he uses at home  Review of systems:  12 point review of systems was completed and was otherwise negative except as listed in HPI  Historical Information   Past Medical History:   Diagnosis Date   • BPH (benign prostatic hyperplasia)     45 days radiation treatment   • COPD (chronic obstructive pulmonary disease)    • Coronary artery disease     CABG x4 in 2017   • Diabetes mellitus    • History of Arterial Duplex of LE 12/26/2017    Likely occlusion of the left superficial femoral artery  Calcific changes bilaterally  Despite these changes, the ankle-brachial index as a measure of peripheral blood flow only mildly impaired     • History of echocardiogram 06/12/2017    EF 40%, mild LVH, mild MR    • Hyperlipidemia    • Hypertension    • Increased anion gap metabolic acidosis 9/18/1235   • Ischemic cardiomyopathy    • NSTEMI (non-ST elevated myocardial infarction)    • Prostate cancer     prostate    • PVD (peripheral vascular disease)    • Sepsis due to pneumonia    • Tremor    • Type 2 MI (myocardial infarction) ([facility] ) 04/06/2021     Past Surgical History:   Procedure Laterality Date   • CARDIAC CATHETERIZATION  03/08/2017    Significant left main plus triple-vessel CAD  • CORONARY ARTERY BYPASS GRAFT  03/08/2017    4V CABG:  LIMA to LAD, VG to RI, SVG to PDA to LVBR RCA  • EYE SURGERY      [facility]   • PROSTATE BIOPSY       Family History   Problem Relation Age of Onset   • Cancer Father    • Heart disease Brother        Family History:  No family history of chronic lung disease  Social History:  The patient is  and lives with his wife  He has about 40-pack-year smoking history        Meds/Allergies   Current Facility-Administered Medications   Medication Dose Route Frequency   • acetaminophen (TYLENOL) tablet 650 mg  650 mg Oral Q4H PRN   • aspirin (ECOTRIN LOW STRENGTH) EC tablet 81 mg  81 mg Oral Every Other Day   • budesonide (PULMICORT) inhalation solution 0 5 mg  0 5 mg Nebulization BID   • carvedilol (COREG) tablet 6 25 mg  6 25 mg Oral BID With Meals   • cyanocobalamin (VITAMIN B-12) tablet 500 mcg  500 mcg Oral Daily   • dexamethasone (DECADRON) injection 10 mg  10 mg Intravenous Q24H   • digoxin (LANOXIN) tablet 125 mcg  125 mcg Oral Daily   • enoxaparin (LOVENOX) subcutaneous injection 30 mg  30 mg Subcutaneous Q12H [facility]   • [START ON 12/25/2022] furosemide (LASIX) tablet 40 mg  40 mg Oral Daily   • gabapentin (NEURONTIN) capsule 300 mg  300 mg Oral HS   • ipratropium (ATROVENT) 0 02 % inhalation solution 0 5 mg  0 5 mg Nebulization TID   • levalbuterol (XOPENEX) inhalation solution 1 25 mg  1 25 mg Nebulization TID   • lisinopril (ZESTRIL) tablet 20 mg  20 mg Oral Daily   • ondansetron (ZOFRAN) injection 4 mg  4 mg Intravenous Q6H PRN   • potassium chloride (K-DUR,KLOR-CON) CR tablet 20 mEq  20 mEq Oral Daily   • primidone (MYSOLINE) tablet 100 mg  100 mg Oral BID   • [START ON 12/25/2022] remdesivir (Veklury) 100 mg in sodium chloride 0 9 % 270 mL IVPB  100 mg Intravenous Q24H     (Not in a hospital admission)    Allergies   Allergen Reactions   • Atorvastatin Myalgia       Vitals: Blood pressure 145/68, pulse 94, temperature 98 7 °F (37 1 °C), temperature source Temporal, resp  rate 20, height 5' 10" (1 778 m), weight 72 6 kg (160 lb), SpO2 95 % ,      Intake/Output Summary (Last 24 hours) at 12/24/2022 1530  Last data filed at 12/24/2022 1356  Gross per 24 hour   Intake 480 ml   Output 300 ml   Net 180 ml       Physical exam:        Head/eyes:    Normocephalic, without obvious abnormality, atraumatic,         PERRL, extraocular muscles intact, no scleral icterus    Nose:   Nares normal, septum midline, mucosa normal, no drainage    or sinus tenderness   Throat:   Moist mucous membranes, no thrush   Neck:   Supple, trachea midline, no adenopathy; no carotid    bruit or JVD   Lungs:    Decreased breath sounds  No wheezing  Heart:    Regular rate and rhythm, S1 and S2 normal, no murmur, rub   or gallop   Abdomen:     Soft, non-tender, bowel sounds active all four quadrants,     no masses, no organomegaly   Extremities:   Extremities normal, atraumatic, no cyanosis    2+ edema   Skin:   Warm, dry, turgor normal, no rashes or lesions   Neurologic:   CNII-XII intact, normal strength, non-focal             Labs:   CBC:   Lab Results   Component Value Date    WBC 18 18 (H) 12/24/2022    HGB 11 9 (L) 12/24/2022    HCT 38 1 12/24/2022    MCV 97 12/24/2022     12/24/2022    MCH 30 1 12/24/2022    MCHC 31 2 (L) 12/24/2022    RDW 14 8 12/24/2022    MPV 11 5 12/24/2022    NRBC 0 12/24/2022   , CMP:   Lab Results   Component Value Date    SODIUM 141 12/24/2022    K 3 5 12/24/2022    CL 97 12/24/2022    CO2 34 (H) 12/24/2022    BUN 24 12/24/2022    CREATININE 0 77 12/24/2022    CALCIUM 9 1 12/24/2022    AST 12 (L) 12/24/2022    ALT 13 12/24/2022    ALKPHOS 119 (H) 12/24/2022    EGFR 88 12/24/2022       Imaging and other studies: I have personally reviewed pertinent films in PACS chest x-rays are reviewed on the PACS system  Chest x-ray showed no acute pulmonary findings  Hyperinflated lungs          Maciej Go MD

## 2022-12-24 NOTE — ASSESSMENT & PLAN NOTE
· Continue carvedilol 6 25 mg twice daily and digoxin 125 mcg, aspirin 81 mg  · Patient not on anticoagulation

## 2022-12-24 NOTE — ASSESSMENT & PLAN NOTE
Lab Results   Component Value Date    HGBA1C 5 7 (H) 08/14/2022       No results for input(s): POCGLU in the last 72 hours      Blood Sugar Average: Last 72 hrs:  · Hold oral medication  · Sliding scale insulin coverage while in the hospital

## 2022-12-25 LAB
ALBUMIN SERPL BCP-MCNC: 3.7 G/DL (ref 3.5–5)
ALP SERPL-CCNC: 101 U/L (ref 34–104)
ALT SERPL W P-5'-P-CCNC: 11 U/L (ref 7–52)
ANION GAP SERPL CALCULATED.3IONS-SCNC: 8 MMOL/L (ref 4–13)
AST SERPL W P-5'-P-CCNC: 12 U/L (ref 13–39)
BASOPHILS # BLD AUTO: 0.09 THOUSANDS/ÂΜL (ref 0–0.1)
BASOPHILS NFR BLD AUTO: 1 % (ref 0–1)
BILIRUB SERPL-MCNC: 0.34 MG/DL (ref 0.2–1)
BUN SERPL-MCNC: 27 MG/DL (ref 5–25)
CALCIUM SERPL-MCNC: 8.7 MG/DL (ref 8.4–10.2)
CHLORIDE SERPL-SCNC: 96 MMOL/L (ref 96–108)
CO2 SERPL-SCNC: 33 MMOL/L (ref 21–32)
CREAT SERPL-MCNC: 0.81 MG/DL (ref 0.6–1.3)
CRP SERPL QL: 105.2 MG/L
EOSINOPHIL # BLD AUTO: 0.7 THOUSAND/ÂΜL (ref 0–0.61)
EOSINOPHIL NFR BLD AUTO: 7 % (ref 0–6)
ERYTHROCYTE [DISTWIDTH] IN BLOOD BY AUTOMATED COUNT: 14.8 % (ref 11.6–15.1)
GFR SERPL CREATININE-BSD FRML MDRD: 86 ML/MIN/1.73SQ M
GLUCOSE SERPL-MCNC: 93 MG/DL (ref 65–140)
HCT VFR BLD AUTO: 39.6 % (ref 36.5–49.3)
HGB BLD-MCNC: 12.3 G/DL (ref 12–17)
IMM GRANULOCYTES # BLD AUTO: 0.04 THOUSAND/UL (ref 0–0.2)
IMM GRANULOCYTES NFR BLD AUTO: 0 % (ref 0–2)
LYMPHOCYTES # BLD AUTO: 1.45 THOUSANDS/ÂΜL (ref 0.6–4.47)
LYMPHOCYTES NFR BLD AUTO: 14 % (ref 14–44)
MCH RBC QN AUTO: 29.9 PG (ref 26.8–34.3)
MCHC RBC AUTO-ENTMCNC: 31.1 G/DL (ref 31.4–37.4)
MCV RBC AUTO: 96 FL (ref 82–98)
MONOCYTES # BLD AUTO: 0.99 THOUSAND/ÂΜL (ref 0.17–1.22)
MONOCYTES NFR BLD AUTO: 10 % (ref 4–12)
NEUTROPHILS # BLD AUTO: 6.89 THOUSANDS/ÂΜL (ref 1.85–7.62)
NEUTS SEG NFR BLD AUTO: 68 % (ref 43–75)
NRBC BLD AUTO-RTO: 0 /100 WBCS
PLATELET # BLD AUTO: 220 THOUSANDS/UL (ref 149–390)
PMV BLD AUTO: 11.1 FL (ref 8.9–12.7)
POTASSIUM SERPL-SCNC: 4.3 MMOL/L (ref 3.5–5.3)
PROCALCITONIN SERPL-MCNC: 0.06 NG/ML
PROT SERPL-MCNC: 6.2 G/DL (ref 6.4–8.4)
RBC # BLD AUTO: 4.11 MILLION/UL (ref 3.88–5.62)
SODIUM SERPL-SCNC: 137 MMOL/L (ref 135–147)
WBC # BLD AUTO: 10.16 THOUSAND/UL (ref 4.31–10.16)

## 2022-12-25 RX ADMIN — IPRATROPIUM BROMIDE 0.5 MG: 0.5 SOLUTION RESPIRATORY (INHALATION) at 21:23

## 2022-12-25 RX ADMIN — POTASSIUM CHLORIDE 20 MEQ: 1500 TABLET, EXTENDED RELEASE ORAL at 11:04

## 2022-12-25 RX ADMIN — BUDESONIDE 0.5 MG: 0.5 INHALANT ORAL at 08:54

## 2022-12-25 RX ADMIN — ENOXAPARIN SODIUM 30 MG: 100 INJECTION SUBCUTANEOUS at 21:04

## 2022-12-25 RX ADMIN — IPRATROPIUM BROMIDE 0.5 MG: 0.5 SOLUTION RESPIRATORY (INHALATION) at 14:18

## 2022-12-25 RX ADMIN — CARVEDILOL 6.25 MG: 3.12 TABLET, FILM COATED ORAL at 11:05

## 2022-12-25 RX ADMIN — IPRATROPIUM BROMIDE 0.5 MG: 0.5 SOLUTION RESPIRATORY (INHALATION) at 08:54

## 2022-12-25 RX ADMIN — ENOXAPARIN SODIUM 30 MG: 100 INJECTION SUBCUTANEOUS at 11:10

## 2022-12-25 RX ADMIN — GABAPENTIN 300 MG: 300 CAPSULE ORAL at 21:04

## 2022-12-25 RX ADMIN — LISINOPRIL 20 MG: 20 TABLET ORAL at 11:04

## 2022-12-25 RX ADMIN — DIGOXIN 125 MCG: 250 TABLET ORAL at 11:04

## 2022-12-25 RX ADMIN — LEVALBUTEROL HYDROCHLORIDE 1.25 MG: 1.25 SOLUTION, CONCENTRATE RESPIRATORY (INHALATION) at 08:54

## 2022-12-25 RX ADMIN — REMDESIVIR 100 MG: 100 INJECTION, POWDER, LYOPHILIZED, FOR SOLUTION INTRAVENOUS at 02:21

## 2022-12-25 RX ADMIN — BUDESONIDE 0.5 MG: 0.5 INHALANT ORAL at 21:23

## 2022-12-25 RX ADMIN — PRIMIDONE 100 MG: 50 TABLET ORAL at 11:07

## 2022-12-25 RX ADMIN — LEVALBUTEROL HYDROCHLORIDE 1.25 MG: 1.25 SOLUTION, CONCENTRATE RESPIRATORY (INHALATION) at 21:23

## 2022-12-25 RX ADMIN — CYANOCOBALAMIN TAB 500 MCG 500 MCG: 500 TAB at 11:07

## 2022-12-25 RX ADMIN — PRIMIDONE 100 MG: 50 TABLET ORAL at 21:04

## 2022-12-25 RX ADMIN — LEVALBUTEROL HYDROCHLORIDE 1.25 MG: 1.25 SOLUTION, CONCENTRATE RESPIRATORY (INHALATION) at 14:18

## 2022-12-25 RX ADMIN — CARVEDILOL 6.25 MG: 3.12 TABLET, FILM COATED ORAL at 16:11

## 2022-12-25 RX ADMIN — FUROSEMIDE 40 MG: 40 TABLET ORAL at 11:04

## 2022-12-25 NOTE — ASSESSMENT & PLAN NOTE
Wt Readings from Last 3 Encounters:   12/24/22 72 6 kg (160 lb)   12/20/22 73 9 kg (163 lb)   12/12/22 74 8 kg (165 lb)   ·   · Lasix 40mg IV in ED x1  · On home lasix 40mg prn weight >160  · Monitor I&O  · Seen by Cardiology 12/20 was agreeable to palliative care consult  · Echo 9/22: EF 95%, grade 2 diastolic dysfunction    Mitral valve mild regurg  · Will continue current cardiac medications and monitor

## 2022-12-25 NOTE — PROGRESS NOTES
Progress Note - Pulmonary   Chasidy Lindsay 69 y o  male MRN: [id number]  Unit/Bed#: -01 Encounter: [id number]      Assessment:  · Acute on chronic hypoxemic respiratory failure  Improved  Currently on baseline oxygen requirement  · Severe COPD without acute exacerbation  Acute on chronic systolic congestive heart failure  · COVID-19 infection  Plan:  · Continue remdesivir to complete 5 days  · Budesonide 0 5 mg nebulized twice a day  · Ipratropium/levalbuterol nebulized 3 times a day  · Oxygen supplement at 2 L  · Continue diuresis  Discussed with Dr Karina Dye  Subjective: The patient is feeling better  Breathing at baseline  His oxygen requirement is 2 L which is what he uses at home  Denies any significant cough or sputum production  He still has dyspnea on exertion which is chronic  Vitals: Blood pressure 131/70, pulse 104, temperature (!) 96 9 °F (36 1 °C), resp  rate (!) 23, height 5' 10" (1 778 m), weight 72 6 kg (160 lb), SpO2 97 %  , Body mass index is 22 96 kg/m²  Intake/Output Summary (Last 24 hours) at 12/25/2022 1524  Last data filed at 12/25/2022 1128  Gross per 24 hour   Intake 480 ml   Output 450 ml   Net 30 ml       Physical Exam  Gen: Awake, alert, oriented x 3, no acute distress  HEENT: Mucous membranes moist, no oral lesions, no thrush  NECK: No accessory muscle use, JVP not elevated  Cardiac: Regular, single S1, single S2, no murmurs, no rubs, no gallops  Lungs: Decreased breath sounds  No wheezing or rhonchi    Abdomen: normoactive bowel sounds, soft nontender, nondistended, no rebound or rigidity, no guarding  Extremities: no cyanosis, no clubbing, no edema    Labs:   CBC:   Lab Results   Component Value Date    WBC 10 16 12/25/2022    HGB 12 3 12/25/2022    HCT 39 6 12/25/2022    MCV 96 12/25/2022     12/25/2022    MCH 29 9 12/25/2022    MCHC 31 1 (L) 12/25/2022    RDW 14 8 12/25/2022    MPV 11 1 12/25/2022    NRBC 0 12/25/2022   , CMP:   Lab Results Component Value Date    SODIUM 137 12/25/2022    K 4 3 12/25/2022    CL 96 12/25/2022    CO2 33 (H) 12/25/2022    BUN 27 (H) 12/25/2022    CREATININE 0 81 12/25/2022    CALCIUM 8 7 12/25/2022    AST 12 (L) 12/25/2022    ALT 11 12/25/2022    ALKPHOS 101 12/25/2022    EGFR 86 12/25/2022           Lindsay Linda MD

## 2022-12-25 NOTE — ASSESSMENT & PLAN NOTE
· We will continue current nebulizers/inhalers  · Currently on baseline oxygen requirement of 2 L  · Pulmonology following

## 2022-12-25 NOTE — PROGRESS NOTES
[facility]  Progress Note - Jory Opitz 1945, 69 y o  male MRN: [id number]  Unit/Bed#: ED 02 Encounter: [id number]  Primary Care Provider: Mary Snider DO   Date and time admitted to hospital: 12/24/2022 12:47 AM    * COVID-19 virus infection  Assessment & Plan  · Patient currently on treatment under mild pathway  · Currently requiring 2 L nasal cannula which is patient's baseline as he is on chronic home O2  · Discussed case with pulmonology, recommending discontinuing steroids as patient is currently at baseline from an oxygenation standpoint  · Continue remdesivir  · Will monitor inflammatory markers  · Supportive care  · Encourage ambulation and incentive spirometry    Chronic combined systolic and diastolic congestive heart failure ([facility] )  Assessment & Plan  Wt Readings from Last 3 Encounters:   12/24/22 72 6 kg (160 lb)   12/20/22 73 9 kg (163 lb)   12/12/22 74 8 kg (165 lb)   ·   · Lasix 40mg IV in ED x1  · On home lasix 40mg prn weight >160  · Monitor I&O  · Seen by Cardiology 12/20 was agreeable to palliative care consult  · Echo 9/22: EF 14%, grade 2 diastolic dysfunction  Mitral valve mild regurg  · Will continue current cardiac medications and monitor    Chronic respiratory failure with hypoxia (HCC)  Assessment & Plan  · Chronic 2L NC  · BiPAP order was sent on 12/20/22 for home unit  · Pulmonology following    PAF (paroxysmal atrial fibrillation) ([facility])  Assessment & Plan  · Continue carvedilol 6 25 mg twice daily and digoxin 125 mcg, aspirin 81 mg  · Patient not on anticoagulation    Physical deconditioning  Assessment & Plan  · PT/OT eval for d/c planning with covid and progression of pulmonary and cardiac disease    Type 2 diabetes mellitus with diabetic neuropathy, unspecified ([facility] )  Assessment & Plan  Lab Results   Component Value Date    HGBA1C 5 7 (H) 08/14/2022       No results for input(s): POCGLU in the last 72 hours      Blood Sugar Average: Last 72 hrs:  · Hold oral medication  · Sliding scale insulin coverage while in the hospital    COPD (chronic obstructive pulmonary disease) ([facility] )  Assessment & Plan  · We will continue current nebulizers/inhalers  · Currently on baseline oxygen requirement of 2 L  · Pulmonology following    Coronary artery disease involving native coronary artery of native heart without angina pectoris  Assessment & Plan  · Status post CABG x4  · Continue aspirin 81 mg  · Continue carvedilol 6 25 mg twice daily      VTE Prophylaxis:  Enoxaparin (Lovenox)    Patient Centered Rounds: I have performed bedside rounds with nursing staff today  Discussions with Specialists or Other Care Team Provider: yes  Education and Discussions with Family / Patient: Spoke with patient's sister over the phone regarding plan of care    Current Length of Stay: 1 day(s)    Current Patient Status: Inpatient   Certification Statement: The patient will continue to require additional inpatient hospital stay due to COVID infection    Discharge Plan: Pending hospital course    Code Status: Level 3 - DNAR and DNI    Subjective:   No overnight events noted  Patient currently afebrile  Tolerating diet  Currently on 2 L nasal cannula which patient is chronically on at home    Objective:     Vitals:   Temp (24hrs), Av °F (36 7 °C), Min:97 4 °F (36 3 °C), Max:98 7 °F (37 1 °C)    Temp:  [97 4 °F (36 3 °C)-98 7 °F (37 1 °C)] 97 4 °F (36 3 °C)  HR:  [] 105  Resp:  [18-22] 18  BP: (123-156)/() 139/67  SpO2:  [92 %-96 %] 96 %  Body mass index is 22 96 kg/m²  Input and Output Summary (last 24 hours): Intake/Output Summary (Last 24 hours) at 2022 1203  Last data filed at 2022 1128  Gross per 24 hour   Intake 960 ml   Output 750 ml   Net 210 ml       Physical Exam:   Physical Exam  Constitutional:       General: He is not in acute distress  HENT:      Head: Normocephalic and atraumatic        Nose: Nose normal  Mouth/Throat:      Mouth: Mucous membranes are moist    Eyes:      Extraocular Movements: Extraocular movements intact  Conjunctiva/sclera: Conjunctivae normal    Cardiovascular:      Rate and Rhythm: Normal rate and regular rhythm  Pulmonary:      Effort: Pulmonary effort is normal  No respiratory distress  Abdominal:      Palpations: Abdomen is soft  Tenderness: There is no abdominal tenderness  Musculoskeletal:         General: Normal range of motion  Cervical back: Normal range of motion and neck supple  Skin:     General: Skin is warm and dry  Neurological:      General: No focal deficit present  Mental Status: He is alert  Mental status is at baseline  Cranial Nerves: No cranial nerve deficit  Psychiatric:         Mood and Affect: Mood normal          Behavior: Behavior normal          Additional Data:     Labs:    Results from last 7 days   Lab Units 12/25/22  0548   WBC Thousand/uL 10 16   HEMOGLOBIN g/dL 12 3   HEMATOCRIT % 39 6   PLATELETS Thousands/uL 220   NEUTROS PCT % 68   LYMPHS PCT % 14   MONOS PCT % 10   EOS PCT % 7*     Results from last 7 days   Lab Units 12/25/22  0548 12/24/22  0100 12/19/22  1024   SODIUM mmol/L 137   < >  --    POTASSIUM mmol/L 4 3   < >  --    CHLORIDE mmol/L 96   < >  --    CO2 mmol/L 33*   < >  --    CO2, I-STAT mmol/L  --   --  31   BUN mg/dL 27*   < >  --    CREATININE mg/dL 0 81   < >  --    CALCIUM mg/dL 8 7   < >  --    ALK PHOS U/L 101   < >  --    ALT U/L 11   < >  --    AST U/L 12*   < >  --    GLUCOSE, ISTAT mg/dl  --   --  167*    < > = values in this interval not displayed  * I Have Reviewed All Lab Data Listed Above  * Additional Pertinent Lab Tests Reviewed:  [facility] Admission  Reviewed    Imaging:  Imaging Reports Reviewed Today Include: No new imaging    Recent Cultures (last 7 days):     Results from last 7 days   Lab Units 12/24/22  0431   BLOOD CULTURE  Received in Microbiology Lab  Culture in Progress  Received in Microbiology Lab  Culture in Progress  Last 24 Hours Medication List:   Current Facility-Administered Medications   Medication Dose Route Frequency Provider Last Rate   • acetaminophen  650 mg Oral Q4H PRN Prakash Orellana PA-C     • aspirin  81 mg Oral Every Other Day Prakash Orellana PA-C     • budesonide  0 5 mg Nebulization BID Prakash Orellana PA-C     • carvedilol  6 25 mg Oral BID With Meals Prakash Orellana PA-C     • cyanocobalamin  500 mcg Oral Daily Prakash Orellana PA-C     • digoxin  125 mcg Oral Daily Prakash Orellana PA-C     • enoxaparin  30 mg Subcutaneous Q12H [facility] Skylar Eduardo [place], [place]     • furosemide  40 mg Oral Daily Leonora Nicholson MD     • gabapentin  300 mg Oral HS Prakash Orellana PA-C     • ipratropium  0 5 mg Nebulization TID [facility] Prieto, DO     • levalbuterol  1 25 mg Nebulization TID [facility] Prieto, DO     • lisinopril  20 mg Oral Daily Prakash Orellana PA-C     • ondansetron  4 mg Intravenous Q6H PRN Prakash Orellana PA-C     • potassium chloride  20 mEq Oral Daily Prakash Orellana PA-C     • primidone  100 mg Oral BID Prakash Orellana PA-C     • remdesivir  100 mg Intravenous Q24H Israelshantel Starr [place], [place] Stopped (12/25/22 0300)        Today, Patient Was Seen By: Leonora Nicholson MD    ** Please Note: Dictation voice to text software may have been used in the creation of this document   **

## 2022-12-25 NOTE — ASSESSMENT & PLAN NOTE
· Patient currently on treatment under mild pathway  · Currently requiring 2 L nasal cannula which is patient's baseline as he is on chronic home O2  · Discussed case with pulmonology, recommending discontinuing steroids as patient is currently at baseline from an oxygenation standpoint  · Continue remdesivir  · Will monitor inflammatory markers  · Supportive care  · Encourage ambulation and incentive spirometry

## 2022-12-26 ENCOUNTER — HOME CARE VISIT (OUTPATIENT)
Dept: HOME HEALTH SERVICES | Facility: HOME HEALTHCARE | Age: 77
End: 2022-12-26

## 2022-12-26 PROBLEM — Z71.89 GOALS OF CARE, COUNSELING/DISCUSSION: Status: ACTIVE | Noted: 2022-12-26

## 2022-12-26 RX ADMIN — ASPIRIN 81 MG: 81 TABLET, COATED ORAL at 10:09

## 2022-12-26 RX ADMIN — FUROSEMIDE 40 MG: 40 TABLET ORAL at 10:09

## 2022-12-26 RX ADMIN — IPRATROPIUM BROMIDE 0.5 MG: 0.5 SOLUTION RESPIRATORY (INHALATION) at 13:14

## 2022-12-26 RX ADMIN — BUDESONIDE 0.5 MG: 0.5 INHALANT ORAL at 19:33

## 2022-12-26 RX ADMIN — LISINOPRIL 20 MG: 20 TABLET ORAL at 10:09

## 2022-12-26 RX ADMIN — LEVALBUTEROL HYDROCHLORIDE 1.25 MG: 1.25 SOLUTION, CONCENTRATE RESPIRATORY (INHALATION) at 08:23

## 2022-12-26 RX ADMIN — IPRATROPIUM BROMIDE 0.5 MG: 0.5 SOLUTION RESPIRATORY (INHALATION) at 08:23

## 2022-12-26 RX ADMIN — BUDESONIDE 0.5 MG: 0.5 INHALANT ORAL at 08:23

## 2022-12-26 RX ADMIN — GABAPENTIN 300 MG: 300 CAPSULE ORAL at 21:04

## 2022-12-26 RX ADMIN — CARVEDILOL 6.25 MG: 3.12 TABLET, FILM COATED ORAL at 15:44

## 2022-12-26 RX ADMIN — ENOXAPARIN SODIUM 30 MG: 100 INJECTION SUBCUTANEOUS at 21:04

## 2022-12-26 RX ADMIN — PRIMIDONE 100 MG: 50 TABLET ORAL at 21:04

## 2022-12-26 RX ADMIN — IPRATROPIUM BROMIDE 0.5 MG: 0.5 SOLUTION RESPIRATORY (INHALATION) at 19:33

## 2022-12-26 RX ADMIN — PRIMIDONE 100 MG: 50 TABLET ORAL at 10:16

## 2022-12-26 RX ADMIN — REMDESIVIR 100 MG: 100 INJECTION, POWDER, LYOPHILIZED, FOR SOLUTION INTRAVENOUS at 02:31

## 2022-12-26 RX ADMIN — CYANOCOBALAMIN TAB 500 MCG 500 MCG: 500 TAB at 10:09

## 2022-12-26 RX ADMIN — POTASSIUM CHLORIDE 20 MEQ: 1500 TABLET, EXTENDED RELEASE ORAL at 10:09

## 2022-12-26 RX ADMIN — LEVALBUTEROL HYDROCHLORIDE 1.25 MG: 1.25 SOLUTION, CONCENTRATE RESPIRATORY (INHALATION) at 13:14

## 2022-12-26 RX ADMIN — DIGOXIN 125 MCG: 250 TABLET ORAL at 10:09

## 2022-12-26 RX ADMIN — LEVALBUTEROL HYDROCHLORIDE 1.25 MG: 1.25 SOLUTION, CONCENTRATE RESPIRATORY (INHALATION) at 19:33

## 2022-12-26 RX ADMIN — CARVEDILOL 6.25 MG: 3.12 TABLET, FILM COATED ORAL at 07:35

## 2022-12-26 RX ADMIN — ENOXAPARIN SODIUM 30 MG: 100 INJECTION SUBCUTANEOUS at 10:09

## 2022-12-26 NOTE — UTILIZATION REVIEW
Initial Clinical Review    Admission: Date/Time/Statement:   Admission Orders (From admission, onward)     Ordered        12/24/22 0209  INPATIENT ADMISSION  Once                      Orders Placed This Encounter   Procedures   • INPATIENT ADMISSION     Standing Status:   Standing     Number of Occurrences:   1     Order Specific Question:   Level of Care     Answer:   Med Surg [16]     Order Specific Question:   Estimated length of stay     Answer:   More than 2 Midnights     Order Specific Question:   Certification     Answer:   I certify that inpatient services are medically necessary for this patient for a duration of greater than two midnights  See H&P and MD Progress Notes for additional information about the patient's course of treatment  ED Arrival Information     Expected   -    Arrival   12/24/2022 00:46    Acuity   Emergent            Means of arrival   Ambulance    Escorted by   [facility] WITH [facility]    Service   Hospitalist    Admission type   Emergency            Arrival complaint   respiratory distress           Chief Complaint   Patient presents with   • Shortness of Breath     COPD wears 2 liters of oxygen at home; was stating his oxygen was 88% at home; Given 125mg solu-medrol and 2 duoneb by ems; feels better       Initial Presentation: 69 y o  male  with a PMH of PAF, CHF, BERONICA, BPH, COPD, DM2 not on insulin, and CAD w/ CABG x4 who presents to the ED from home with shortness of breath since Friday, worse overnight, associated with chills, loss of appetite, fatigue and weakness  He was seen in the ED on 12/17 for CHF issues, chest x-ray with vascular congestion he was given 40 mg IV Lasix and discharged home  He had follow-up with Cardiology on 12/20, they discussed referral to palliative care  Was brought in by EMS, noted to be 88% on his home 2 L nasal cannula, given 2 duo nebs and Solu-Medrol 125 mg reported to feel little better when he arrived in the ED    Reports he is not able to lie flat secondary to his breathing  He saw pulmonary on 12/17 and ABG was performed and plan for BiPAP order  PE: Alert, decreased breath sounds, rales, B/L LE edema  12/24 Plan: Inpatient admission for evaluation and treatment of COVID-19 virus infection, CHF, COPD:  Remdesivir, decadron  Monitor I/O  PT/OT eval      12/24 Pulmonology consult:  Acute on chronic hypoxemic respiratory failure  Severe chronic obstructive pulmonary disease without acute exacerbation  Acute on chronic systolic congestive heart failure  COVID-19 infection  Recommendations: D/C steroids, budesonide 0 5 mg nebulized BID, ipratropium/levalbuterol nebulizer treatments TID, supplemental O2 at 2L  , continue diuretics  PEL Decreased brath sounds, 2+ B/L LE edema  12/25 Internal Medicine:  No overnight events  Currently on baseline O2 at 2L NC  Continue Remdesivir, monitor inflammatory markers  Continue current nebulizers/inhalers  Pulmonology:  Breathing at baseline, still has dyspnea on exertion which is chronic  Continue Remdesivir to complete five days, nebulizers, 2L O2, diuretics  PE:  Decreased breath sounds            ED Triage Vitals   Temperature Pulse Respirations Blood Pressure SpO2   12/24/22 0053 12/24/22 0053 12/24/22 0053 12/24/22 0053 12/24/22 0053   98 7 °F (37 1 °C) 99 20 (!) 179/85 95 %      Temp Source Heart Rate Source Patient Position - Orthostatic VS BP Location FiO2 (%)   12/24/22 1459 12/24/22 0053 12/24/22 0053 12/24/22 0053 12/24/22 0731   Temporal Monitor Sitting Left arm 32      Pain Score       12/24/22 0053       No Pain          Wt Readings from Last 1 Encounters:   12/26/22 72 4 kg (159 lb 9 8 oz)     Additional Vital Signs:   Date/Time Temp Pulse Resp BP MAP (mmHg) SpO2 FiO2 (%) Calculated FIO2 (%) - Nasal Cannula Nasal Cannula O2 Flow Rate (L/min) O2 Device O2 Interface Device Patient Position - Orthostatic VS   12/25/22 2300 -- -- -- -- -- -- -- 28 2 L/min Nasal cannula -- --   12/25/22 22:13:39 97 8 °F (36 6 °C) 97 22 139/72 94 92 % -- -- -- Nasal cannula -- Sitting   12/25/22 2123 -- -- -- -- -- -- -- 28 2 L/min Nasal cannula -- --   12/25/22 16:12:12 97 9 °F (36 6 °C) 103 -- -- -- 96 % -- -- -- -- -- --   12/25/22 14:59:47 96 9 °F (36 1 °C) Abnormal  104 23 Abnormal  131/70 90 97 % -- 28 2 L/min Nasal cannula -- --   12/25/22 1418 -- -- -- -- -- 97 % -- 28 2 L/min Nasal cannula -- --   12/25/22 13:16:52 97 °F (36 1 °C) Abnormal  111 Abnormal  32 Abnormal  159/100 120 96 % -- -- -- -- -- --   12/25/22 1115 97 4 °F (36 3 °C) Abnormal  105 -- 139/67 96 96 % -- 28 2 L/min -- -- --   12/25/22 1104 -- 100 -- 139/67 -- -- -- -- -- -- -- --   12/25/22 0854 -- -- -- -- -- 94 % -- 28 2 L/min Nasal cannula -- --   12/25/22 0145 -- 72 18 123/57 -- 96 % -- -- -- CPAP -- Lying   12/24/22 2240 -- -- -- -- -- -- -- -- -- -- Full face mask --   12/24/22 1915 98 °F (36 7 °C) 82 18 132/66 -- 96 % -- -- -- -- -- Sitting   12/24/22 1800 -- 90 -- 144/77 104 92 % -- 28 2 L/min Nasal cannula -- Sitting   12/24/22 1759 -- 89 22 144/77 -- 92 % -- 28 2 L/min Nasal cannula -- --   12/24/22 1745 -- 84 -- -- -- 96 % -- 28 2 L/min Nasal cannula -- Sitting   12/24/22 1715 -- 94 -- -- -- 94 % -- 28 2 L/min Nasal cannula -- Sitting   12/24/22 1553 -- 93 22 156/103 Abnormal  -- 94 % -- 28 2 L/min Nasal cannula -- Sitting   12/24/22 1459 98 7 °F (37 1 °C) -- -- -- -- -- -- -- -- -- -- --   12/24/22 1402 -- -- -- -- -- 95 % -- 28 2 L/min Nasal cannula -- --   12/24/22 1315 -- 94 20 145/68 98 92 % -- 28 2 L/min Nasal cannula -- Sitting   12/24/22 0914 -- -- -- 148/71 -- -- -- -- -- -- -- --   12/24/22 0913 -- 92 -- -- -- -- -- -- -- -- -- --   12/24/22 0731 -- -- -- -- -- 97 % 32 -- -- BiPAP -- --   12/24/22 0335 -- -- -- -- -- 95 % -- -- -- -- Full face mask --   12/24/22 0315 -- 88 22 -- -- 95 % -- 28 2 L/min Nasal cannula -- --         Pertinent Labs/Diagnostic Test Results:   XR chest 1 view portable   Final Result by Cecily Balderas MD (12/24 2045)      Pulmonary vascular congestion with probable small bilateral pleural effusions                     12/24 EKG:    Normal sinus rhythm  Left atrial enlargement  Rightward axis  Nonspecific ST-t wave changes  When compared with ECG of 24-DEC-2022 02:02, (unconfirmed)  Premature ventricular complexes are no longer Present    Results from last 7 days   Lab Units 12/24/22  0100   SARS-COV-2  Positive*     Results from last 7 days   Lab Units 12/25/22  0548 12/24/22  0100   WBC Thousand/uL 10 16 18 18*   HEMOGLOBIN g/dL 12 3 11 9*   HEMATOCRIT % 39 6 38 1   PLATELETS Thousands/uL 220 264   NEUTROS ABS Thousands/µL 6 89 14 75*         Results from last 7 days   Lab Units 12/25/22  0548 12/24/22  0100   SODIUM mmol/L 137 141   POTASSIUM mmol/L 4 3 3 5   CHLORIDE mmol/L 96 97   CO2 mmol/L 33* 34*   ANION GAP mmol/L 8 10   BUN mg/dL 27* 24   CREATININE mg/dL 0 81 0 77   EGFR ml/min/1 73sq m 86 88   CALCIUM mg/dL 8 7 9 1     Results from last 7 days   Lab Units 12/25/22  0548 12/24/22  0100   AST U/L 12* 12*   ALT U/L 11 13   ALK PHOS U/L 101 119*   TOTAL PROTEIN g/dL 6 2* 7 3   ALBUMIN g/dL 3 7 4 3   TOTAL BILIRUBIN mg/dL 0 34 0 45         Results from last 7 days   Lab Units 12/25/22  0548 12/24/22  0100   GLUCOSE RANDOM mg/dL 93 142*             BETA-HYDROXYBUTYRATE   Date Value Ref Range Status   08/14/2022 2 3 (H) <0 6 mmol/L Final   03/18/2022 4 2 (H) <0 6 mmol/L Final          Results from last 7 days   Lab Units 12/24/22  0100   PH GRETCHEN  7 310   PCO2 GRETCHEN mm Hg 63 9*   PO2 GRETCHEN mm Hg 36 1   HCO3 GRETCHEN mmol/L 31 5*   BASE EXC GRETCHEN mmol/L 3 5   O2 CONTENT GRETCHEN ml/dL 12 2   O2 HGB, VENOUS % 66 0             Results from last 7 days   Lab Units 12/24/22  0744 12/24/22  0431 12/24/22  0100   HS TNI 0HR ng/L  --   --  15   HS TNI 2HR ng/L  --  17  --    HSTNI D2 ng/L  --  2  --    HS TNI 4HR ng/L 18  --   --    HSTNI D4 ng/L 3  --   --      Results from last 7 days   Lab Units 12/24/22  0100   D-DIMER QUANTITATIVE ug/ml FEU 0 31             Results from last 7 days   Lab Units 12/25/22  0548 12/24/22  0100   PROCALCITONIN ng/ml 0 06 0 05                 Results from last 7 days   Lab Units 12/24/22  0100   BNP pg/mL 962*                 Results from last 7 days   Lab Units 12/25/22  0548 12/24/22  0100   CRP mg/L 105 2* 123 7*                 Results from last 7 days   Lab Units 12/24/22  0100   INFLUENZA A PCR  Negative   INFLUENZA B PCR  Negative   RSV PCR  Negative                             Results from last 7 days   Lab Units 12/24/22  0431   BLOOD CULTURE  No Growth at 24 hrs  No Growth at 24 hrs                 ED Treatment:   Medication Administration from 12/24/2022 0046 to 12/25/2022 1314       Date/Time Order Dose Route Action Comments     12/24/2022 0157 EST ipratropium-albuterol (DUO-NEB) 0 5-2 5 mg/3 mL inhalation solution 3 mL 3 mL Nebulization Given given per EMS     12/24/2022 0156 EST methylPREDNISolone sodium succinate (Solu-MEDROL) injection 100 mg 100 mg Intravenous Given --     12/24/2022 0648 EST magnesium sulfate 2 g/50 mL IVPB (premix) 2 g 0 g Intravenous Stopped --     12/24/2022 0100 EST magnesium sulfate 2 g/50 mL IVPB (premix) 2 g 2 g Intravenous New Bag --     12/24/2022 0418 EST furosemide (LASIX) injection 40 mg 40 mg Intravenous Given --     12/24/2022 0417 EST dexamethasone (DECADRON) injection 10 mg 10 mg Intravenous Given --     12/24/2022 0330 EST remdesivir (Veklury) 200 mg in sodium chloride 0 9 % 290 mL IVPB 200 mg Intravenous Given --     12/25/2022 0300 EST remdesivir (Veklury) 100 mg in sodium chloride 0 9 % 270 mL IVPB 0 mg Intravenous Stopped --     12/25/2022 0221 EST remdesivir (Veklury) 100 mg in sodium chloride 0 9 % 270 mL IVPB 100 mg Intravenous New Bag --     12/25/2022 1104 EST potassium chloride (K-DUR,KLOR-CON) CR tablet 20 mEq 20 mEq Oral Given --     12/24/2022 0915 EST potassium chloride (K-DUR,KLOR-CON) CR tablet 20 mEq 20 mEq Oral Given --     12/24/2022 0915 EST potassium chloride (K-DUR,KLOR-CON) CR tablet 40 mEq 40 mEq Oral Given Pt on bipap at 0430, too lethargic  Retimed       12/24/2022 0915 EST aspirin (ECOTRIN LOW STRENGTH) EC tablet 81 mg 81 mg Oral Given --     12/25/2022 1105 EST carvedilol (COREG) tablet 6 25 mg 6 25 mg Oral Given --     12/24/2022 1553 EST carvedilol (COREG) tablet 6 25 mg 6 25 mg Oral Given --     12/24/2022 0914 EST carvedilol (COREG) tablet 6 25 mg 6 25 mg Oral Given --     12/25/2022 1104 EST digoxin (LANOXIN) tablet 125 mcg 125 mcg Oral Given --     12/24/2022 0913 EST digoxin (LANOXIN) tablet 125 mcg 125 mcg Oral Given --     12/25/2022 1107 EST cyanocobalamin (VITAMIN B-12) tablet 500 mcg 500 mcg Oral Given --     12/24/2022 0914 EST cyanocobalamin (VITAMIN B-12) tablet 500 mcg 500 mcg Oral Given --     12/24/2022 2149 EST gabapentin (NEURONTIN) capsule 300 mg 300 mg Oral Given --     12/25/2022 1104 EST lisinopril (ZESTRIL) tablet 20 mg 20 mg Oral Given --     12/24/2022 0914 EST lisinopril (ZESTRIL) tablet 20 mg 20 mg Oral Given --     12/25/2022 1107 EST primidone (MYSOLINE) tablet 100 mg 100 mg Oral Given --     12/24/2022 2149 EST primidone (MYSOLINE) tablet 100 mg 100 mg Oral Given --     12/24/2022 0914 EST primidone (MYSOLINE) tablet 100 mg 100 mg Oral Given --     12/25/2022 0854 EST budesonide (PULMICORT) inhalation solution 0 5 mg 0 5 mg Nebulization Given --     12/24/2022 2240 EST budesonide (PULMICORT) inhalation solution 0 5 mg 0 5 mg Nebulization Given --     12/24/2022 0730 EST budesonide (PULMICORT) inhalation solution 0 5 mg 0 5 mg Nebulization Given --     12/25/2022 1110 EST enoxaparin (LOVENOX) subcutaneous injection 30 mg 30 mg Subcutaneous Given --     12/24/2022 2150 EST enoxaparin (LOVENOX) subcutaneous injection 30 mg 30 mg Subcutaneous Given --     12/24/2022 0916 EST enoxaparin (LOVENOX) subcutaneous injection 30 mg 30 mg Subcutaneous Given --     12/25/2022 0854 EST levalbuterol (XOPENEX) inhalation solution 1 25 mg 1 25 mg Nebulization Given --     12/24/2022 2240 EST levalbuterol (XOPENEX) inhalation solution 1 25 mg 1 25 mg Nebulization Given --     12/24/2022 1402 EST levalbuterol (XOPENEX) inhalation solution 1 25 mg 1 25 mg Nebulization Given --     12/24/2022 0730 EST levalbuterol (XOPENEX) inhalation solution 1 25 mg 1 25 mg Nebulization Given --     12/25/2022 0854 EST ipratropium (ATROVENT) 0 02 % inhalation solution 0 5 mg 0 5 mg Nebulization Given --     12/24/2022 2240 EST ipratropium (ATROVENT) 0 02 % inhalation solution 0 5 mg 0 5 mg Nebulization Given --     12/24/2022 1402 EST ipratropium (ATROVENT) 0 02 % inhalation solution 0 5 mg 0 5 mg Nebulization Given --     12/24/2022 0730 EST ipratropium (ATROVENT) 0 02 % inhalation solution 0 5 mg 0 5 mg Nebulization Given --     12/25/2022 1104 EST furosemide (LASIX) tablet 40 mg 40 mg Oral Given --        Past Medical History:   Diagnosis Date   • BPH (benign prostatic hyperplasia)     45 days radiation treatment   • COPD (chronic obstructive pulmonary disease)    • Coronary artery disease     CABG x4 in 2017   • Diabetes mellitus    • History of Arterial Duplex of LE 12/26/2017    Likely occlusion of the left superficial femoral artery  Calcific changes bilaterally  Despite these changes, the ankle-brachial index as a measure of peripheral blood flow only mildly impaired     • History of echocardiogram 06/12/2017    EF 40%, mild LVH, mild MR    • Hyperlipidemia    • Hypertension    • Increased anion gap metabolic acidosis 1/73/1705   • Ischemic cardiomyopathy    • NSTEMI (non-ST elevated myocardial infarction)    • Prostate cancer     prostate    • PVD (peripheral vascular disease)    • Sepsis due to pneumonia    • Tremor    • Type 2 MI (myocardial infarction) ([facility] ) 04/06/2021     Present on Admission:  • COVID-19 virus infection  • COPD (chronic obstructive pulmonary disease) ([facility] )  • (Resolved) Type 2 diabetes mellitus without complication, without long-term current use of insulin ([facility] )  • Type 2 diabetes mellitus with diabetic neuropathy, unspecified ([facility] )  • Primary hypertension  • PAF (paroxysmal atrial fibrillation) ([facility])  • Benign essential tremor  • Coronary artery disease involving native coronary artery of native heart without angina pectoris  • Physical deconditioning  • Chronic respiratory failure with hypoxia ([facility])  • Chronic combined systolic and diastolic congestive heart failure ([facility])      Admitting Diagnosis: Shortness of breath [R06 02]  CHF (congestive heart failure) ([facility]) [I50 9]  SOB (shortness of breath) [R06 02]  COVID-19 virus infection [U07 1]  COVID-19 [U07 1]  Age/Sex: 69 y o  male       Admission Orders:  Titrate O2 to maintain SaO2 at least 90%, 1500 ml fluid restriction,  daily weight, I/O  BiPap at HS, SCD  Scheduled Medications:    aspirin, 81 mg, Oral, Every Other Day  budesonide, 0 5 mg, Nebulization, BID  carvedilol, 6 25 mg, Oral, BID With Meals  cyanocobalamin, 500 mcg, Oral, Daily  digoxin, 125 mcg, Oral, Daily  enoxaparin, 30 mg, Subcutaneous, Q12H ENDER  furosemide, 40 mg, Oral, Daily  gabapentin, 300 mg, Oral, HS  ipratropium, 0 5 mg, Nebulization, TID  levalbuterol, 1 25 mg, Nebulization, TID  lisinopril, 20 mg, Oral, Daily  potassium chloride, 20 mEq, Oral, Daily  primidone, 100 mg, Oral, BID  remdesivir, 100 mg, Intravenous, Q24H      dexamethasone (DECADRON) injection 10 mg x 1 dose  Dose: 10 mg  Freq: Every 24 hours Route: IV  Start: 12/24/22 0230 End: 12/24/22 1557    Continuous IV Infusions: None  PRN Meds:  acetaminophen, 650 mg, Oral, Q4H PRN  ondansetron, 4 mg, Intravenous, Q6H PRN        IP CONSULT TO NUTRITION SERVICES  IP CONSULT TO PULMONOLOGY    Network Utilization Review Department  ATTENTION: Please call with any questions or concerns to [phone] and carefully listen to the prompts so that you are directed to the right person   All voicemails are confidential   Jorge Rendon all requests for admission clinical reviews, approved or denied determinations and any other requests to dedicated fax number below belonging to the campus where the patient is receiving treatment   List of dedicated fax numbers for the Facilities:  [address] [place] [address] DENIALS (Administrative/Medical Necessity) [phone]   [address] (Maternity/NICU/Pediatrics) [phone]   [address] [phone]   [facility] [address] [phone]   [address] [address] [address] [facility] [phone]   [address] [facility] [address] [facility] [phone]

## 2022-12-26 NOTE — PROGRESS NOTES
Progress Note - Pulmonary   Gaby Spears 69 y o  male MRN: [id number]  Unit/Bed#: -01 Encounter: [id number]      Assessment:  · Acute on chronic hypoxemic respiratory failure  Improved  · Severe chronic obstructive pulmonary disease without acute exacerbation  · Acute on chronic systolic congestive heart failure  Improved  · COVID-19 infection  Plan:  · Continue remdesivir to complete 5 days  · Ipratropium/albuterol nebulizer treatments 3 times a day  · Budesonide 0 5 mg nebulized twice a day  · Continue oxygen at 2 L  · Continue diuresis  Will sign off  Please call with questions  Subjective: The patient is feeling better  Still has dyspnea on exertion which is chronic  No significant cough  Vitals: Blood pressure 133/83, pulse 90, temperature 97 8 °F (36 6 °C), temperature source Oral, resp  rate 20, height 5' 10" (1 778 m), weight 72 4 kg (159 lb 9 8 oz), SpO2 97 %  , Body mass index is 22 9 kg/m²  Intake/Output Summary (Last 24 hours) at 12/26/2022 1432  Last data filed at 12/26/2022 1339  Gross per 24 hour   Intake 360 ml   Output 1600 ml   Net -1240 ml       Physical Exam  Gen: Awake, alert, oriented x 3, no acute distress  HEENT: Mucous membranes moist, no oral lesions, no thrush  NECK: No accessory muscle use, JVP not elevated  Cardiac: Regular, single S1, single S2, no murmurs, no rubs, no gallops  Lungs: Decreased breath sounds  No wheezing or rhonchi  Abdomen: normoactive bowel sounds, soft nontender, nondistended, no rebound or rigidity, no guarding  Extremities: no cyanosis, no clubbing, no edema    Blood cultures x2 no growth so far        Gopi Fraser MD

## 2022-12-26 NOTE — ASSESSMENT & PLAN NOTE
Wt Readings from Last 3 Encounters:   12/26/22 72 4 kg (159 lb 9 8 oz)   12/20/22 73 9 kg (163 lb)   12/12/22 74 8 kg (165 lb)   ·   · Lasix 40mg IV in ED x1  · On home lasix 40mg prn weight >160  · Monitor I&O  · Seen by Cardiology 12/20 was agreeable to palliative care consult  · Echo 9/22: EF 44%, grade 2 diastolic dysfunction    Mitral valve mild regurg  · Will continue current cardiac medications and monitor  · Will consult hospice

## 2022-12-26 NOTE — CASE MANAGEMENT
Case Management Assessment & Discharge Planning Note    Patient name Carol Ann Card  Location /-14 MRN [id number]  : 1945 Date 2022       Current Admission Date: 2022  Current Admission Diagnosis:COVID-19 virus infection   Patient Active Problem List    Diagnosis Date Noted   • Goals of care, counseling/discussion 2022   • COVID-19 virus infection 2022   • Elevated troponin 2022   • BERONICA (obstructive sleep apnea)    • Excessive daytime sleepiness    • Chronic combined systolic and diastolic congestive heart failure (HCC) 2022   • PAF (paroxysmal atrial fibrillation) ([facility] ) 2022   • Elevated d-dimer 2022   • Bilateral pleural effusion 2022   • Vision loss, left eye 2022   • Tributary (branch) retinal vein occlusion, left eye, with macular edema 07/15/2022   • Insomnia due to medical condition 2022   • Primary hypertension 2022   • Normocytic anemia 2022   • Low serum albumin 2022   • PAD (peripheral artery disease) ([facility] ) 2022   • Diminished pulses in lower extremity 2021   • Physical deconditioning 2021   • Tinea unguium 2021   • Type 2 diabetes mellitus with diabetic neuropathy, unspecified ([facility] ) 2021   • Other age-related cataract 2021   • Central retinal vein occlusion with macular edema 2021   • Nonexudative age-related macular degeneration 2021   • Ocular hypertension 2021   • Onychomycosis due to dermatophyte 2021   • Chronic respiratory failure with hypoxia ([facility] ) 08/15/2020   • COPD (chronic obstructive pulmonary disease) ([facility] ) 2020   • Acute on chronic respiratory failure with hypoxia ([facility] ) 2020   • Other hyperlipidemia 2020   • Ischemic cardiomyopathy 2019   • Coronary artery disease involving native coronary artery of native heart without angina pectoris 2019   • Benign prostatic hyperplasia with urinary retention 02/22/2019   • Mucopurulent chronic bronchitis ([address] ) 02/22/2019   • Benign essential tremor 02/22/2019   • History of prostate cancer 03/20/2018   • Ambulatory dysfunction 03/16/2017   • On intra-aortic balloon pump assist 03/10/2017   • S/P CABG x 4 03/09/2017      LOS (days): 2  Geometric Mean LOS (GMLOS) (days):   Days to GMLOS:     OBJECTIVE:    Risk of Unplanned Readmission Score: 24 74      Current admission status: Inpatient  Referral Reason: Hospice    Preferred Pharmacy:   [facility] [address] [facility] [address]  [facility] [address] [address]  Phone: [phone] Fax: [phone]    Primary Care Provider: Kenny Haq DO    Primary Insurance: [facility] [employer]  Secondary Insurance:     ASSESSMENT:  Chelsea Ma 10, 72 [address]kes Representative - Spouse   Primary Phone: [phone] (Home)               Advance Directives  Does patient have a [address]?: No  Was patient offered paperwork?: Yes (Declines)  Does patient currently have a Health Care decision maker?: No  Does patient have Advance Directives?: No  Was patient offered paperwork?: Yes (Declines)  Primary Contact: vaniKavitha michael (Sister)   [phone] (Mobile)    Readmission Root Cause  30 Day Readmission: No    Patient Information  Admitted from[de-identified] Home  Mental Status: Alert  During Assessment patient was accompanied by: Not accompanied during assessment  Assessment information provided by[de-identified] Patient, Sister  Primary Caregiver: Self  Support Systems: Spouse/significant other, Family members, Friends/neighbors, [facility] of Residence: [address] do you live in?: Via [employer] Medical entry access options   Select all that apply : Stairs  Number of steps to enter home : 4  Do the steps have railings?: Yes  Type of Current Residence: [address] [place] home  Upon entering residence, is there a bedroom on the main floor (no further steps)?: No  A bedroom is located on the following floor levels of residence (select all that apply):: 2nd Floor  Upon entering residence, is there a bathroom on the main floor (no further steps)?: Yes  Number of steps to 2nd floor from main floor: One Flight  In the last 12 months, was there a time when you were not able to pay the mortgage or rent on time?: No  In the last 12 months, how many places have you lived?: 1  In the last 12 months, was there a time when you did not have a steady place to sleep or slept in a shelter (including now)?: No  Homeless/housing insecurity resource given?: N/A  Living Arrangements: Lives w/ Spouse/significant other  Is patient a ?: No    Activities of Daily Living Prior to Admission  Functional Status: Independent  Completes ADLs independently?: Yes  Ambulates independently?: Yes  Does patient use assisted devices?: Yes  Assisted Devices (DME) used: Home Oxygen concentrator, Portable Oxygen concentrator, [facility]  DME Company Name (respiratory supplies):  [employer]  O2 Rate(s): 2  Does patient currently own DME?: Yes  What DME does the patient currently own?: Bedside Commode, Walker, Home Oxygen concentrator, Nebulizer, Portable Oxygen tanks  Does patient have a history of Outpatient Therapy (PT/OT)?: Yes  Does the patient have a history of [facility]?: [facility]  Does patient have a history of HHC?: No  Does patient currently have [facility]?: No    Patient Information Continued  Income Source: Pension/assisted  Does patient have prescription coverage?: Yes  Within the past 12 months, you worried that your food would run out before you got the money to buy more : Never true  Within the past 12 months, the food you bought just didn't last and you didn't have money to get more : Never true  Food insecurity resource given?: N/A  Does patient receive dialysis treatments?: No  Does patient have a history of substance abuse?: No  Does patient have a history of Mental Health Diagnosis?: No    Means of Transportation  Means of Transport to Appts[de-identified] Family transport  In the past 12 months, has lack of transportation kept you from medical appointments or from getting medications?: No  In the past 12 months, has lack of transportation kept you from meetings, work, or from getting things needed for daily living?: No  Was application for public transport provided?: N/A    DISCHARGE DETAILS:    Discharge planning discussed with[de-identified] Patient, Sister  Freedom of Choice: Yes  Comments - [place] of Choice: Home hospice  CM contacted family/caregiver?: Yes  Were Treatment Team discharge recommendations reviewed with patient/caregiver?: Yes  Did patient/caregiver verbalize understanding of patient care needs?: Yes  Were patient/caregiver advised of the risks associated with not following Treatment Team discharge recommendations?: Yes    Contacts  Patient Contacts: Kavitha ludwig (Sister)   [phone] (Mobile)  Relationship to Patient[de-identified] Family  Contact Method: Phone  Phone Number: Leonie Daley (Sister)   [phone] (Mobile)  Reason/Outcome: Emergency Contact    Other Referral/Resources/Interventions Provided:  Interventions: Hospice    Treatment Team Recommendation: Hospice     Additional Comments: Met with patient at bedside to discuss hospice care  Agreeable to home hospice  Would like [facility] referral made via A[facility]  Call to sister Angélica Garcia to discuss same she requests that spouse Keith Griffin be called  Call to Keith underwood  requesting   CM department following

## 2022-12-26 NOTE — ASSESSMENT & PLAN NOTE
· Patient currently on treatment under mild pathway  · Currently requiring 2 L nasal cannula which is patient's baseline as he is on chronic home O2  · Discussed case with pulmonology, recommending discontinuing steroids as patient is currently at baseline from an oxygenation standpoint  · Continue remdesivir day 3  · Will monitor inflammatory markers  · Supportive care  · Encourage ambulation and incentive spirometry

## 2022-12-26 NOTE — PROGRESS NOTES
[address]  Progress Note - Samuel Chavarria 1945, 69 y o  male MRN: [id number]  Unit/Bed#: -01 Encounter: [id number]  Primary Care Provider: Sabina Cannon DO   Date and time admitted to hospital: 12/24/2022 12:47 AM    * COVID-19 virus infection  Assessment & Plan  · Patient currently on treatment under mild pathway  · Currently requiring 2 L nasal cannula which is patient's baseline as he is on chronic home O2  · Discussed case with pulmonology, recommending discontinuing steroids as patient is currently at baseline from an oxygenation standpoint  · Continue remdesivir day 3  · Will monitor inflammatory markers  · Supportive care  · Encourage ambulation and incentive spirometry    Chronic combined systolic and diastolic congestive heart failure (HCC)  Assessment & Plan  Wt Readings from Last 3 Encounters:   12/26/22 72 4 kg (159 lb 9 8 oz)   12/20/22 73 9 kg (163 lb)   12/12/22 74 8 kg (165 lb)   ·   · Lasix 40mg IV in ED x1  · On home lasix 40mg prn weight >160  · Monitor I&O  · Seen by Cardiology 12/20 was agreeable to palliative care consult  · Echo 9/22: EF 89%, grade 2 diastolic dysfunction  Mitral valve mild regurg  · Will continue current cardiac medications and monitor  · Will consult hospice    Chronic respiratory failure with hypoxia (HCC)  Assessment & Plan  · Chronic 2L NC  · BiPAP order was sent on 12/20/22 for home unit  · Pulmonology following    Goals of care, counseling/discussion  Assessment & Plan  · Spoke with patient regarding goals of care  Upon reviewing previous notes it appears that cardiology was recommending palliative care evaluation  I also spoke with patient's Sister Gorge Villeda over the phone who agrees with proceeding with hospice evaluation    · Consult hospice team, case management notified    PAF (paroxysmal atrial fibrillation) (HCC)  Assessment & Plan  · Continue carvedilol 6 25 mg twice daily and digoxin 125 mcg, aspirin 81 mg  · Patient not on anticoagulation    Physical deconditioning  Assessment & Plan  · PT/OT eval for d/c planning with covid and progression of pulmonary and cardiac disease    Type 2 diabetes mellitus with diabetic neuropathy, unspecified ([facility] )  Assessment & Plan  Lab Results   Component Value Date    HGBA1C 5 7 (H) 2022       No results for input(s): POCGLU in the last 72 hours  Blood Sugar Average: Last 72 hrs:  · Hold oral medication  · Sliding scale insulin coverage while in the hospital    COPD (chronic obstructive pulmonary disease) ([facility] )  Assessment & Plan  · We will continue current nebulizers/inhalers  · Currently on baseline oxygen requirement of 2 L  · Pulmonology following    Benign essential tremor  Assessment & Plan  · Continue primidone    Coronary artery disease involving native coronary artery of native heart without angina pectoris  Assessment & Plan  · Status post CABG x4  · Continue aspirin 81 mg  · Continue carvedilol 6 25 mg twice daily      VTE Prophylaxis:  Enoxaparin (Lovenox)    Patient Centered Rounds: I have performed bedside rounds with nursing staff today  Discussions with Specialists or Other Care Team Provider: yes  Education and Discussions with Family / Patient: Spoke with patient's sister over the phone regarding plan of care    Current Length of Stay: 2 day(s)    Current Patient Status: Inpatient   Certification Statement: The patient will continue to require additional inpatient hospital stay due to COVID infection    Discharge Plan: Pending hospital course    Code Status: Level 3 - DNAR and DNI    Subjective:   No overnight events noted  Patient currently on 2 L nasal cannula    Patient gets short of breath quickly with exertion    Objective:     Vitals:   Temp (24hrs), Av 4 °F (36 3 °C), Min:96 9 °F (36 1 °C), Max:97 9 °F (36 6 °C)    Temp:  [96 9 °F (36 1 °C)-97 9 °F (36 6 °C)] 97 8 °F (36 6 °C)  HR:  [] 90  Resp:  [20-32] 20  BP: (131-159)/() 133/83  SpO2:  [92 %-99 %] 98 %  Body mass index is 22 9 kg/m²  Input and Output Summary (last 24 hours): Intake/Output Summary (Last 24 hours) at 12/26/2022 1219  Last data filed at 12/26/2022 0529  Gross per 24 hour   Intake 360 ml   Output 1150 ml   Net -790 ml       Physical Exam:   Physical Exam  Constitutional:       General: He is not in acute distress  Appearance: He is ill-appearing  HENT:      Head: Normocephalic and atraumatic  Nose: Nose normal       Mouth/Throat:      Mouth: Mucous membranes are moist    Eyes:      Extraocular Movements: Extraocular movements intact  Conjunctiva/sclera: Conjunctivae normal    Cardiovascular:      Rate and Rhythm: Normal rate and regular rhythm  Pulmonary:      Effort: Pulmonary effort is normal  No respiratory distress  Abdominal:      Palpations: Abdomen is soft  Tenderness: There is no abdominal tenderness  Musculoskeletal:         General: Normal range of motion  Cervical back: Normal range of motion and neck supple  Comments: Generalized weakness   Skin:     General: Skin is warm and dry  Neurological:      General: No focal deficit present  Mental Status: He is alert  Mental status is at baseline  Cranial Nerves: No cranial nerve deficit  Psychiatric:         Mood and Affect: Mood normal          Behavior: Behavior normal          Additional Data:     Labs:    Results from last 7 days   Lab Units 12/25/22  0548   WBC Thousand/uL 10 16   HEMOGLOBIN g/dL 12 3   HEMATOCRIT % 39 6   PLATELETS Thousands/uL 220   NEUTROS PCT % 68   LYMPHS PCT % 14   MONOS PCT % 10   EOS PCT % 7*     Results from last 7 days   Lab Units 12/25/22  0548   SODIUM mmol/L 137   POTASSIUM mmol/L 4 3   CHLORIDE mmol/L 96   CO2 mmol/L 33*   BUN mg/dL 27*   CREATININE mg/dL 0 81   CALCIUM mg/dL 8 7   ALK PHOS U/L 101   ALT U/L 11   AST U/L 12*                   * I Have Reviewed All Lab Data Listed Above    * Additional Pertinent Lab Tests Reviewed: [address] [address] Admission  Reviewed    Imaging:  Imaging Reports Reviewed Today Include: No new imaging    Recent Cultures (last 7 days):     Results from last 7 days   Lab Units 12/24/22  0431   BLOOD CULTURE  No Growth at 24 hrs  No Growth at 24 hrs  Last 24 Hours Medication List:   Current Facility-Administered Medications   Medication Dose Route Frequency Provider Last Rate   • acetaminophen  650 mg Oral Q4H PRN Geni Lehman PA-C     • aspirin  81 mg Oral Every Other Day Geni Lehman PA-C     • budesonide  0 5 mg Nebulization BID Geni Lehman PA-C     • carvedilol  6 25 mg Oral BID With Meals Geni Lehman PA-C     • cyanocobalamin  500 mcg Oral Daily Geni Lehman PA-C     • digoxin  125 mcg Oral Daily Geni Lehman PA-C     • enoxaparin  30 mg Subcutaneous Q12H [address] [place], [place]     • furosemide  40 mg Oral Daily Eleno Esparza MD     • gabapentin  300 mg Oral HS Geni Lehman PA-C     • ipratropium  0 5 mg Nebulization TID [place] Prieto, DO     • levalbuterol  1 25 mg Nebulization TID [facility], DO     • lisinopril  20 mg Oral Daily Geni Lehman PA-C     • ondansetron  4 mg Intravenous Q6H PRN Geni Lehman PA-C     • potassium chloride  20 mEq Oral Daily Geni Lehman PA-C     • primidone  100 mg Oral BID Geni Lehman PA-C     • remdesivir  100 mg Intravenous Q24H Geni Lehman PA-C 0 mg (12/25/22 0300)        Today, Patient Was Seen By: Eleno Esparza MD    ** Please Note: Dictation voice to text software may have been used in the creation of this document   **

## 2022-12-26 NOTE — ASSESSMENT & PLAN NOTE
· Spoke with patient regarding goals of care  Upon reviewing previous notes it appears that cardiology was recommending palliative care evaluation  I also spoke with patient's Sister Tashi Nunez over the phone who agrees with proceeding with hospice evaluation    · Consult hospice team, case management notified

## 2022-12-27 ENCOUNTER — HOME CARE VISIT (OUTPATIENT)
Dept: HOME HEALTH SERVICES | Facility: HOME HEALTHCARE | Age: 77
End: 2022-12-27

## 2022-12-27 VITALS
HEIGHT: 70 IN | TEMPERATURE: 96.7 F | BODY MASS INDEX: 22.5 KG/M2 | RESPIRATION RATE: 19 BRPM | OXYGEN SATURATION: 98 % | SYSTOLIC BLOOD PRESSURE: 152 MMHG | HEART RATE: 101 BPM | WEIGHT: 157.19 LBS | DIASTOLIC BLOOD PRESSURE: 81 MMHG

## 2022-12-27 RX ADMIN — FUROSEMIDE 40 MG: 40 TABLET ORAL at 09:42

## 2022-12-27 RX ADMIN — IPRATROPIUM BROMIDE 0.5 MG: 0.5 SOLUTION RESPIRATORY (INHALATION) at 07:39

## 2022-12-27 RX ADMIN — IPRATROPIUM BROMIDE 0.5 MG: 0.5 SOLUTION RESPIRATORY (INHALATION) at 13:31

## 2022-12-27 RX ADMIN — CARVEDILOL 6.25 MG: 3.12 TABLET, FILM COATED ORAL at 09:42

## 2022-12-27 RX ADMIN — REMDESIVIR 100 MG: 100 INJECTION, POWDER, LYOPHILIZED, FOR SOLUTION INTRAVENOUS at 02:20

## 2022-12-27 RX ADMIN — DIGOXIN 125 MCG: 250 TABLET ORAL at 09:42

## 2022-12-27 RX ADMIN — ENOXAPARIN SODIUM 30 MG: 100 INJECTION SUBCUTANEOUS at 09:42

## 2022-12-27 RX ADMIN — CYANOCOBALAMIN TAB 500 MCG 500 MCG: 500 TAB at 09:42

## 2022-12-27 RX ADMIN — LISINOPRIL 20 MG: 20 TABLET ORAL at 09:42

## 2022-12-27 RX ADMIN — BUDESONIDE 0.5 MG: 0.5 INHALANT ORAL at 07:39

## 2022-12-27 RX ADMIN — LEVALBUTEROL HYDROCHLORIDE 1.25 MG: 1.25 SOLUTION, CONCENTRATE RESPIRATORY (INHALATION) at 13:31

## 2022-12-27 RX ADMIN — POTASSIUM CHLORIDE 20 MEQ: 1500 TABLET, EXTENDED RELEASE ORAL at 09:42

## 2022-12-27 RX ADMIN — LEVALBUTEROL HYDROCHLORIDE 1.25 MG: 1.25 SOLUTION, CONCENTRATE RESPIRATORY (INHALATION) at 07:39

## 2022-12-27 RX ADMIN — PRIMIDONE 100 MG: 50 TABLET ORAL at 10:59

## 2022-12-27 NOTE — ASSESSMENT & PLAN NOTE
Wt Readings from Last 3 Encounters:   12/27/22 71 3 kg (157 lb 3 oz)   12/20/22 73 9 kg (163 lb)   12/12/22 74 8 kg (165 lb)   ·   · Lasix 40mg IV in ED x1  · On home lasix 40mg prn weight >160  · Monitor I&O  · Seen by Cardiology 12/20 was agreeable to palliative care consult  · Echo 9/22: EF 50%, grade 2 diastolic dysfunction    Mitral valve mild regurg  · Will continue current cardiac medications and monitor  · Will consult hospice

## 2022-12-27 NOTE — HOSPICE NOTE
I spoke with patient and reviewed hospice services  He was not interested at this time  He stated he would want to come back to the hospital for treatment if he were to be sick again   I did update Carlton Dutta

## 2022-12-27 NOTE — ASSESSMENT & PLAN NOTE
· Spoke with patient regarding goals of care  Upon reviewing previous notes it appears that cardiology was recommending palliative care evaluation  I also spoke with patient's Sister Felisa Brooks over the phone who agrees with proceeding with hospice evaluation    · Consult hospice team, case management notified

## 2022-12-27 NOTE — CASE MANAGEMENT
Case Management Discharge Planning Note    Patient name Peg Johnson  Location [address] [id number] MRN [id number]  : 1945 Date 2022       Current Admission Date: 2022  Current Admission Diagnosis:COVID-19 virus infection   Patient Active Problem List    Diagnosis Date Noted   • Goals of care, counseling/discussion 2022   • COVID-19 virus infection 2022   • Elevated troponin 2022   • BERONICA (obstructive sleep apnea)    • Excessive daytime sleepiness    • Chronic combined systolic and diastolic congestive heart failure (HCC) 2022   • PAF (paroxysmal atrial fibrillation) ([facility] ) 2022   • Elevated d-dimer 2022   • Bilateral pleural effusion 2022   • Vision loss, left eye 2022   • Tributary (branch) retinal vein occlusion, left eye, with macular edema 07/15/2022   • Insomnia due to medical condition 2022   • Primary hypertension 2022   • Normocytic anemia 2022   • Low serum albumin 2022   • PAD (peripheral artery disease) ([facility] ) 2022   • Diminished pulses in lower extremity 2021   • Physical deconditioning 2021   • Tinea unguium 2021   • Type 2 diabetes mellitus with diabetic neuropathy, unspecified ([facility] ) 2021   • Other age-related cataract 2021   • Central retinal vein occlusion with macular edema 2021   • Nonexudative age-related macular degeneration 2021   • Ocular hypertension 2021   • Onychomycosis due to dermatophyte 2021   • Chronic respiratory failure with hypoxia ([address] ) 08/15/2020   • COPD (chronic obstructive pulmonary disease) ([facility] ) 2020   • Acute on chronic respiratory failure with hypoxia ([facility] ) 2020   • Other hyperlipidemia 2020   • Ischemic cardiomyopathy 2019   • Coronary artery disease involving native coronary artery of native heart without angina pectoris 2019   • Benign prostatic hyperplasia with urinary retention 2019 • Mucopurulent chronic bronchitis ([facility] ) 02/22/2019   • Benign essential tremor 02/22/2019   • History of prostate cancer 03/20/2018   • Ambulatory dysfunction 03/16/2017   • On intra-aortic balloon pump assist 03/10/2017   • S/P CABG x 4 03/09/2017      LOS (days): 3  Geometric Mean LOS (GMLOS) (days):   Days to GMLOS:     OBJECTIVE:  Risk of Unplanned Readmission Score: 25 01         Current admission status: Inpatient   Preferred Pharmacy:   [facility] [address]IS [address] [place] [address]  Phone: [phone] Fax: [phone]    Primary Care Provider: Christal Shelton DO    Primary Insurance: [facility] [employer]  Secondary Insurance:     DISCHARGE DETAILS:    Other Referral/Resources/Interventions Provided:  Interventions: Hospice  Referral Comments: [address] able to accept as per Aidin  Awaiting evaluation and determination  CM to follow

## 2022-12-27 NOTE — DISCHARGE SUMMARY
[facility] [address]  Discharge- Karen Peterson 1945, 69 y o  male MRN: [id number]  Unit/Bed#: -01 Encounter: [id number]  Primary Care Provider: Kenny Haq DO   Date and time admitted to hospital: 12/24/2022 12:47 AM    COPD (chronic obstructive pulmonary disease) ([facility] )  Assessment & Plan  · We will continue current nebulizers/inhalers  · Currently on baseline oxygen requirement of 2 L  · Pulmonology following    Type 2 diabetes mellitus with diabetic neuropathy, unspecified ([facility] )  Assessment & Plan  Lab Results   Component Value Date    HGBA1C 5 7 (H) 08/14/2022       No results for input(s): POCGLU in the last 72 hours  Blood Sugar Average: Last 72 hrs:  · Hold oral medication  · Sliding scale insulin coverage while in the hospital    Chronic respiratory failure with hypoxia (HCC)  Assessment & Plan  · Chronic 2L NC  · BiPAP order was sent on 12/20/22 for home unit  · Pulmonology following    Goals of care, counseling/discussion  Assessment & Plan  · Spoke with patient regarding goals of care  Upon reviewing previous notes it appears that cardiology was recommending palliative care evaluation  I also spoke with patient's Sister Héctor Robb over the phone who agrees with proceeding with hospice evaluation  · Consult hospice team, case management notified    PAF (paroxysmal atrial fibrillation) (HCC)  Assessment & Plan  · Continue carvedilol 6 25 mg twice daily and digoxin 125 mcg, aspirin 81 mg  · Patient not on anticoagulation    Chronic combined systolic and diastolic congestive heart failure (HCC)  Assessment & Plan  Wt Readings from Last 3 Encounters:   12/27/22 71 3 kg (157 lb 3 oz)   12/20/22 73 9 kg (163 lb)   12/12/22 74 8 kg (165 lb)   ·   · Lasix 40mg IV in ED x1  · On home lasix 40mg prn weight >160  · Monitor I&O  · Seen by Cardiology 12/20 was agreeable to palliative care consult  · Echo 9/22: EF 60%, grade 2 diastolic dysfunction    Mitral valve mild regurg  · Will continue current cardiac medications and monitor  · Will consult hospice    Primary hypertension  Assessment & Plan  · Continue home medications with hold parameters    Physical deconditioning  Assessment & Plan  · PT/OT eval for d/c planning with covid and progression of pulmonary and cardiac disease    Benign essential tremor  Assessment & Plan  · Continue primidone    Coronary artery disease involving native coronary artery of native heart without angina pectoris  Assessment & Plan  · Status post CABG x4  · Continue aspirin 81 mg  · Continue carvedilol 6 25 mg twice daily    * COVID-19 virus infection  Assessment & Plan  · Patient currently on treatment under mild pathway  · Currently requiring 2 L nasal cannula which is patient's baseline as he is on chronic home O2  · Discussed case with pulmonology, recommending discontinuing steroids as patient is currently at baseline from an oxygenation standpoint  · Continue remdesivir day 3  · Will monitor inflammatory markers  · Supportive care  · Encourage ambulation and incentive spirometry      Discharging Physician / Practitioner: Anabella Childs DO  PCP: Gabo Palacios DO  Admission Date:   Admission Orders (From admission, onward)     Ordered        12/24/22 0209  INPATIENT ADMISSION  Once                      Discharge Date: 12/27/22    Medical Problems     Resolved Problems  Date Reviewed: 12/27/2022          Resolved    Type 2 diabetes mellitus without complication, without long-term current use of insulin ([facility] ) 12/24/2022     Resolved by  Rubio Beard PA-C          Consultations During Hospital Stay: Pulmonology    Procedures Performed: Not applicable    Significant Findings / Test Results:     XR chest 1 view portable    Result Date: 12/24/2022  Impression: Pulmonary vascular congestion with probable small bilateral pleural effusions   Workstation performed: [id number]       Incidental Findings: Not applicable    Test Results Pending at Discharge (will require follow up): Not applicable     Outpatient Tests Requested: Not applicable    Reason for Admission: Shortness of breath    Hospital Course:     Maddy Soliz is a 69 y o  male with a PMH of PAF, CHF, BERONICA, BPH, COPD, DM2 not on insulin, CAD w/ CABG x4 who presents with shortness of breath  Patient reports that he noted his breathing was worse on Friday and overnight his breathing got real bad  He notes chills, loss of appetite, fatigue, weakness  No sick contacts that he is aware of, reports wife is not sick at this time  He was seen in the ED on 12/17 for CHF issues, chest x-ray with vascular congestion he was given 40 mg IV Lasix and discharged home  He had follow-up with Dr Meagan William on 12/20, they discussed referral to palliative care  Was brought in by EMS he was was noted to be 88% on his 2 L nasal cannula, given 2 duo nebs and Solu-Medrol 125 mg reported to feel little better when he arrived in the ED  Reports he is not able to lie flat secondary to his breathing  He saw pulmonary on 12/17 and ABG was performed and plan for BiPAP order  The patient has been saturating well on home O2 requirement  He was seen by hospice as per recommendation by cardiology however the patient refused home hospice  The patient states he would prefer to be discharged to home  He has remained hemodynamically stable and saturating well on baseline home O2 requirement  Daughter will provide transportation home  Condition at Discharge: stable     Discharge Day Visit / Exam:     Subjective: Patient seen and examined at bedside  No acute events overnight  Denies chest pain, SOB, diaphoresis, nausea/vomiting/diarrhea, fevers/chills       Vitals: Blood Pressure: 152/81 (12/27/22 0716)  Pulse: 101 (12/27/22 0716)  Temperature: (!) 96 7 °F (35 9 °C) (12/27/22 0716)  Temp Source: Tympanic (12/27/22 0716)  Respirations: 19 (12/27/22 0716)  Height: 5' 10" (177 8 cm) (12/26/22 1339)  Weight - Scale: 71 3 kg (157 lb 3 oz) (12/27/22 7171)  SpO2: 98 % (12/27/22 0739)     Exam:   Physical Exam  Vitals and nursing note reviewed  Constitutional:       General: He is not in acute distress  Appearance: He is well-developed  HENT:      Head: Normocephalic and atraumatic  Eyes:      Conjunctiva/sclera: Conjunctivae normal    Cardiovascular:      Rate and Rhythm: Normal rate and regular rhythm  Heart sounds: No murmur heard  Pulmonary:      Effort: Pulmonary effort is normal  No respiratory distress  Breath sounds: Normal breath sounds  Abdominal:      Palpations: Abdomen is soft  Tenderness: There is no abdominal tenderness  Musculoskeletal:         General: No swelling  Cervical back: Neck supple  Skin:     General: Skin is warm and dry  Capillary Refill: Capillary refill takes less than 2 seconds  Neurological:      Mental Status: He is alert  Psychiatric:         Mood and Affect: Mood normal            Discharge instructions/Information to patient and family:   See after visit summary for information provided to patient and family  Provisions for Follow-Up Care:  See after visit summary for information related to follow-up care and any pertinent home health orders  Disposition:     Home with family support  Outpatient follow-up with PCP  Resume preadmission medications  Continue home O2     Discharge Statement:  I spent 60 minutes discharging the patient  This time was spent on the day of discharge  I had direct contact with the patient on the day of discharge  Greater than 50% of the total time was spent examining patient, answering all patient questions, arranging and discussing plan of care with patient as well as directly providing post-discharge instructions  Additional time then spent on discharge activities  Discharge Medications:  See after visit summary for reconciled discharge medications provided to patient and family        ** Please Note: This note has been constructed using a voice recognition system **

## 2022-12-27 NOTE — PLAN OF CARE
Problem: Prexisting or High Potential for Compromised Skin Integrity  Goal: Skin integrity is maintained or improved  Description: INTERVENTIONS:  - Identify patients at risk for skin breakdown  - Assess and monitor skin integrity  - Assess and monitor nutrition and hydration status  - Monitor labs   - Assess for incontinence   - Turn and reposition patient  - Assist with mobility/ambulation  - Relieve pressure over bony prominences  - Avoid friction and shearing  - Provide appropriate hygiene as needed including keeping skin clean and dry  - Evaluate need for skin moisturizer/barrier cream  - Collaborate with interdisciplinary team   - Patient/family teaching  - Consider wound care consult   Outcome: Progressing     Problem: DISCHARGE PLANNING  Goal: Discharge to home or other facility with appropriate resources  Description: INTERVENTIONS:  - Identify barriers to discharge w/patient and caregiver  - Arrange for needed discharge resources and transportation as appropriate  - Identify discharge learning needs (meds, wound care, etc )  - Arrange for interpretive services to assist at discharge as needed  - Refer to Case Management Department for coordinating discharge planning if the patient needs post-hospital services based on physician/advanced practitioner order or complex needs related to functional status, cognitive ability, or social support system  Outcome: Progressing     Problem: Knowledge Deficit  Goal: Patient/family/caregiver demonstrates understanding of disease process, treatment plan, medications, and discharge instructions  Description: Complete learning assessment and assess knowledge base    Interventions:  - Provide teaching at level of understanding  - Provide teaching via preferred learning methods  Outcome: Progressing     Problem: CARDIOVASCULAR - ADULT  Goal: Maintains optimal cardiac output and hemodynamic stability  Description: INTERVENTIONS:  - Monitor I/O, vital signs and rhythm  - Monitor for S/S and trends of decreased cardiac output  - Administer and titrate ordered vasoactive medications to optimize hemodynamic stability  - Assess quality of pulses, skin color and temperature  - Assess for signs of decreased coronary artery perfusion  - Instruct patient to report change in severity of symptoms  Outcome: Progressing  Goal: Absence of cardiac dysrhythmias or at baseline rhythm  Description: INTERVENTIONS:  - Continuous cardiac monitoring, vital signs, obtain 12 lead EKG if ordered  - Administer antiarrhythmic and heart rate control medications as ordered  - Monitor electrolytes and administer replacement therapy as ordered  Outcome: Progressing     Problem: RESPIRATORY - ADULT  Goal: Achieves optimal ventilation and oxygenation  Description: INTERVENTIONS:  - Assess for changes in respiratory status  - Assess for changes in mentation and behavior  - Position to facilitate oxygenation and minimize respiratory effort  - Oxygen administered by appropriate delivery if ordered  - Initiate smoking cessation education as indicated  - Encourage broncho-pulmonary hygiene including cough, deep breathe, Incentive Spirometry  - Assess the need for suctioning and aspirate as needed  - Assess and instruct to report SOB or any respiratory difficulty  - Respiratory Therapy support as indicated  Outcome: Progressing     Problem: MOBILITY - ADULT  Goal: Maintain or return to baseline ADL function  Description: INTERVENTIONS:  -  Assess patient's ability to carry out ADLs; assess patient's baseline for ADL function and identify physical deficits which impact ability to perform ADLs (bathing, care of mouth/teeth, toileting, grooming, dressing, etc )  - Assess/evaluate cause of self-care deficits   - Assess range of motion  - Assess patient's mobility; develop plan if impaired  - Assess patient's need for assistive devices and provide as appropriate  - Encourage maximum independence but intervene and supervise when necessary  - Involve family in performance of ADLs  - Assess for home care needs following discharge   - Consider OT consult to assist with ADL evaluation and planning for discharge  - Provide patient education as appropriate  Outcome: Progressing  Goal: Maintains/Returns to pre admission functional level  Description: INTERVENTIONS:  - Perform BMAT or MOVE assessment daily    - Set and communicate daily mobility goal to care team and patient/family/caregiver  - Collaborate with rehabilitation services on mobility goals if consulted  - Perform Range of Motion 3 times a day  - Reposition patient every 2 hours    - Dangle patient 3 times a day  - Stand patient 3 times a day  - Ambulate patient 3 times a day  - Out of bed to chair 3 times a day   - Out of bed for meals 3 times a day  - Out of bed for toileting  - Record patient progress and toleration of activity level   Outcome: Progressing     Problem: PAIN - ADULT  Goal: Verbalizes/displays adequate comfort level or baseline comfort level  Description: Interventions:  - Encourage patient to monitor pain and request assistance  - Assess pain using appropriate pain scale  - Administer analgesics based on type and severity of pain and evaluate response  - Implement non-pharmacological measures as appropriate and evaluate response  - Consider cultural and social influences on pain and pain management  - Notify physician/advanced practitioner if interventions unsuccessful or patient reports new pain  Outcome: Progressing     Problem: INFECTION - ADULT  Goal: Absence or prevention of progression during hospitalization  Description: INTERVENTIONS:  - Assess and monitor for signs and symptoms of infection  - Monitor lab/diagnostic results  - Monitor all insertion sites, i e  indwelling lines, tubes, and drains  - Monitor endotracheal if appropriate and nasal secretions for changes in amount and color  - Gatzke appropriate cooling/warming therapies per order  - Administer medications as ordered  - Instruct and encourage patient and family to use good hand hygiene technique  - Identify and instruct in appropriate isolation precautions for identified infection/condition  Outcome: Progressing  Goal: Absence of fever/infection during neutropenic period  Description: INTERVENTIONS:  - Monitor WBC    Outcome: Progressing     Problem: SAFETY ADULT  Goal: Maintain or return to baseline ADL function  Description: INTERVENTIONS:  -  Assess patient's ability to carry out ADLs; assess patient's baseline for ADL function and identify physical deficits which impact ability to perform ADLs (bathing, care of mouth/teeth, toileting, grooming, dressing, etc )  - Assess/evaluate cause of self-care deficits   - Assess range of motion  - Assess patient's mobility; develop plan if impaired  - Assess patient's need for assistive devices and provide as appropriate  - Encourage maximum independence but intervene and supervise when necessary  - Involve family in performance of ADLs  - Assess for home care needs following discharge   - Consider OT consult to assist with ADL evaluation and planning for discharge  - Provide patient education as appropriate  Outcome: Progressing     Problem: Nutrition/Hydration-ADULT  Goal: Nutrient/Hydration intake appropriate for improving, restoring or maintaining nutritional needs  Description: Monitor and assess patient's nutrition/hydration status for malnutrition  Collaborate with interdisciplinary team and initiate plan and interventions as ordered  Monitor patient's weight and dietary intake as ordered or per policy  Utilize nutrition screening tool and intervene as necessary  Determine patient's food preferences and provide high-protein, high-caloric foods as appropriate       INTERVENTIONS:  - Monitor oral intake, urinary output, labs, and treatment plans  - Assess nutrition and hydration status and recommend course of action  - Evaluate amount of meals eaten  - Assist patient with eating if necessary   - Allow adequate time for meals  - Recommend/ encourage appropriate diets, oral nutritional supplements, and vitamin/mineral supplements  - Order, calculate, and assess calorie counts as needed  - Recommend, monitor, and adjust tube feedings and TPN/PPN based on assessed needs  - Assess need for intravenous fluids  - Provide specific nutrition/hydration education as appropriate  - Include patient/family/caregiver in decisions related to nutrition  Outcome: Progressing     Problem: Potential for Falls  Goal: Patient will remain free of falls  Description: INTERVENTIONS:  - Educate patient/family on patient safety including physical limitations  - Instruct patient to call for assistance with activity   - Consult OT/PT to assist with strengthening/mobility   - Keep Call bell within reach  - Keep bed low and locked with side rails adjusted as appropriate  - Keep care items and personal belongings within reach  - Initiate and maintain comfort rounds  - Make Fall Risk Sign visible to staff  - Offer Toileting every 2 Hours, in advance of need  - Initiate/Maintain bed alarm  - Obtain necessary fall risk management equipment  - Apply yellow socks and bracelet for high fall risk patients  - Consider moving patient to room near nurses station  Outcome: Progressing

## 2022-12-28 ENCOUNTER — TRANSITIONAL CARE MANAGEMENT (OUTPATIENT)
Dept: FAMILY MEDICINE CLINIC | Facility: CLINIC | Age: 77
End: 2022-12-28

## 2022-12-28 ENCOUNTER — TELEPHONE (OUTPATIENT)
Dept: FAMILY MEDICINE CLINIC | Facility: CLINIC | Age: 77
End: 2022-12-28

## 2022-12-28 NOTE — TELEPHONE ENCOUNTER
Spoke with patients wife she doesn't know what to do and would like us to call his sister  Called and spoke with patients sister she will talk with Severo Galan and Dom Fox and call back with if they would like a sooner appointment

## 2022-12-28 NOTE — TELEPHONE ENCOUNTER
Alesha Palmer called yesterday and is asking for Dr Roxann Villa to convince Rosa Shaw that going on Hospice would work for him    The reason he declined hospice was because they told him if he as an episode that he could not go back to the hospital   He didn't understand that they would come to him    Spoke with his sister on this matter and it is getting hard for her to bring in to the office       Please advise    Phone [phone]

## 2022-12-28 NOTE — UTILIZATION REVIEW
Notification of Discharge   This is a Notification of Discharge from our facility [address]  Please be advised that this patient has been discharge from our facility  Below you will find the admission and discharge date and time including the patient’s disposition  UTILIZATION REVIEW CONTACT:  Jose Johnson  Utilization   Network Utilization Review Department  Phone: [phone] carefully listen to the prompts  All voicemails are confidential   Email: [email or web address]     PHYSICIAN ADVISORY SERVICES:  FOR [id number] REVIEW - MEDICAL NECESSITY DENIAL  Phone: [phone]  Fax: [phone]  Email: [email or web address]     PRESENTATION DATE: 12/24/2022 12:47 AM  OBERVATION ADMISSION DATE:   INPATIENT ADMISSION DATE: 12/24/22  2:09 AM   DISCHARGE DATE: 12/27/2022  3:25 PM  DISPOSITION: Home/Self Care Home/Self Care      IMPORTANT INFORMATION:  Send all requests for admission clinical reviews, approved or denied determinations and any other requests to dedicated fax number below belonging to the campus where the patient is receiving treatment   List of dedicated fax numbers:  [address] [place] [address] DENIALS (Administrative/Medical Necessity) [phone]   [address] [place] [address] (Maternity/NICU/Pediatrics) [phone]   [facility] [phone]   [facility] [phone]   [phone]   [address] [facility] [phone]   [facility] [phone]   [address] [place] [address] [phone]   [facility]  [phone]   [facility] [phone]   [facility] [address] [facility] [phone]

## 2022-12-29 ENCOUNTER — APPOINTMENT (EMERGENCY)
Dept: RADIOLOGY | Facility: HOSPITAL | Age: 77
End: 2022-12-29

## 2022-12-29 ENCOUNTER — TELEPHONE (OUTPATIENT)
Dept: FAMILY MEDICINE CLINIC | Facility: CLINIC | Age: 77
End: 2022-12-29

## 2022-12-29 ENCOUNTER — HOSPITAL ENCOUNTER (EMERGENCY)
Facility: HOSPITAL | Age: 77
Discharge: HOME | End: 2022-12-29
Attending: EMERGENCY MEDICINE

## 2022-12-29 ENCOUNTER — HOME CARE VISIT (OUTPATIENT)
Dept: HOME HEALTH SERVICES | Facility: HOME HEALTHCARE | Age: 77
End: 2022-12-29

## 2022-12-29 ENCOUNTER — TELEMEDICINE (OUTPATIENT)
Dept: FAMILY MEDICINE CLINIC | Facility: CLINIC | Age: 77
End: 2022-12-29

## 2022-12-29 ENCOUNTER — HOSPICE ADMISSION (OUTPATIENT)
Dept: HOME HOSPICE | Facility: HOSPICE | Age: 77
End: 2022-12-29

## 2022-12-29 VITALS
RESPIRATION RATE: 18 BRPM | DIASTOLIC BLOOD PRESSURE: 73 MMHG | HEART RATE: 88 BPM | WEIGHT: 157 LBS | HEIGHT: 70 IN | OXYGEN SATURATION: 97 % | SYSTOLIC BLOOD PRESSURE: 130 MMHG | BODY MASS INDEX: 22.48 KG/M2 | TEMPERATURE: 98.3 F

## 2022-12-29 DIAGNOSIS — R06.2 WHEEZING: ICD-10-CM

## 2022-12-29 DIAGNOSIS — J44.1 COPD EXACERBATION (HCC): ICD-10-CM

## 2022-12-29 DIAGNOSIS — J98.01 BRONCHOSPASM: Primary | ICD-10-CM

## 2022-12-29 DIAGNOSIS — J44.0 CHRONIC OBSTRUCTIVE PULMONARY DISEASE WITH ACUTE LOWER RESPIRATORY INFECTION (HCC): Primary | Chronic | ICD-10-CM

## 2022-12-29 DIAGNOSIS — U07.1 COVID: ICD-10-CM

## 2022-12-29 DIAGNOSIS — G47.33 OSA (OBSTRUCTIVE SLEEP APNEA): ICD-10-CM

## 2022-12-29 DIAGNOSIS — J96.11 CHRONIC RESPIRATORY FAILURE WITH HYPOXIA (HCC): ICD-10-CM

## 2022-12-29 LAB
ALBUMIN SERPL BCP-MCNC: 3.8 G/DL (ref 3.5–5)
ALP SERPL-CCNC: 115 U/L (ref 34–104)
ALT SERPL W P-5'-P-CCNC: 14 U/L (ref 7–52)
ANION GAP SERPL CALCULATED.3IONS-SCNC: 2 MMOL/L (ref 4–13)
AST SERPL W P-5'-P-CCNC: 15 U/L (ref 13–39)
BACTERIA BLD CULT: NORMAL
BACTERIA BLD CULT: NORMAL
BASOPHILS # BLD AUTO: 0.07 THOUSANDS/ÂΜL (ref 0–0.1)
BASOPHILS NFR BLD AUTO: 1 % (ref 0–1)
BILIRUB SERPL-MCNC: 0.5 MG/DL (ref 0.2–1)
BUN SERPL-MCNC: 16 MG/DL (ref 5–25)
CALCIUM SERPL-MCNC: 8.5 MG/DL (ref 8.4–10.2)
CHLORIDE SERPL-SCNC: 95 MMOL/L (ref 96–108)
CO2 SERPL-SCNC: 36 MMOL/L (ref 21–32)
CREAT SERPL-MCNC: 0.6 MG/DL (ref 0.6–1.3)
EOSINOPHIL # BLD AUTO: 0.31 THOUSAND/ÂΜL (ref 0–0.61)
EOSINOPHIL NFR BLD AUTO: 2 % (ref 0–6)
ERYTHROCYTE [DISTWIDTH] IN BLOOD BY AUTOMATED COUNT: 14.6 % (ref 11.6–15.1)
GFR SERPL CREATININE-BSD FRML MDRD: 97 ML/MIN/1.73SQ M
GLUCOSE SERPL-MCNC: 185 MG/DL (ref 65–140)
HCT VFR BLD AUTO: 37.3 % (ref 36.5–49.3)
HGB BLD-MCNC: 11.8 G/DL (ref 12–17)
IMM GRANULOCYTES # BLD AUTO: 0.05 THOUSAND/UL (ref 0–0.2)
IMM GRANULOCYTES NFR BLD AUTO: 0 % (ref 0–2)
LYMPHOCYTES # BLD AUTO: 1.6 THOUSANDS/ÂΜL (ref 0.6–4.47)
LYMPHOCYTES NFR BLD AUTO: 13 % (ref 14–44)
MAGNESIUM SERPL-MCNC: 2.3 MG/DL (ref 1.9–2.7)
MCH RBC QN AUTO: 29.8 PG (ref 26.8–34.3)
MCHC RBC AUTO-ENTMCNC: 31.6 G/DL (ref 31.4–37.4)
MCV RBC AUTO: 94 FL (ref 82–98)
MONOCYTES # BLD AUTO: 0.84 THOUSAND/ÂΜL (ref 0.17–1.22)
MONOCYTES NFR BLD AUTO: 7 % (ref 4–12)
NEUTROPHILS # BLD AUTO: 9.9 THOUSANDS/ÂΜL (ref 1.85–7.62)
NEUTS SEG NFR BLD AUTO: 77 % (ref 43–75)
NRBC BLD AUTO-RTO: 0 /100 WBCS
PLATELET # BLD AUTO: 247 THOUSANDS/UL (ref 149–390)
PMV BLD AUTO: 11.8 FL (ref 8.9–12.7)
POTASSIUM SERPL-SCNC: 4.6 MMOL/L (ref 3.5–5.3)
PROT SERPL-MCNC: 6.4 G/DL (ref 6.4–8.4)
RBC # BLD AUTO: 3.96 MILLION/UL (ref 3.88–5.62)
SODIUM SERPL-SCNC: 133 MMOL/L (ref 135–147)
WBC # BLD AUTO: 12.77 THOUSAND/UL (ref 4.31–10.16)

## 2022-12-29 RX ORDER — ALBUTEROL SULFATE 90 UG/1
2 AEROSOL, METERED RESPIRATORY (INHALATION) ONCE
Status: COMPLETED | OUTPATIENT
Start: 2022-12-29 | End: 2022-12-29

## 2022-12-29 RX ORDER — CEFUROXIME AXETIL 500 MG/1
500 TABLET ORAL EVERY 12 HOURS SCHEDULED
Qty: 10 TABLET | Refills: 0 | Status: SHIPPED | OUTPATIENT
Start: 2022-12-29 | End: 2023-01-03

## 2022-12-29 RX ORDER — ALBUTEROL SULFATE 90 UG/1
2 AEROSOL, METERED RESPIRATORY (INHALATION) EVERY 6 HOURS PRN
Qty: 8 G | Refills: 0 | Status: SHIPPED | OUTPATIENT
Start: 2022-12-29 | End: 2023-01-04

## 2022-12-29 RX ORDER — AZITHROMYCIN 250 MG/1
500 TABLET, FILM COATED ORAL ONCE
Status: DISCONTINUED | OUTPATIENT
Start: 2022-12-29 | End: 2022-12-29

## 2022-12-29 RX ORDER — IPRATROPIUM BROMIDE AND ALBUTEROL SULFATE 2.5; .5 MG/3ML; MG/3ML
3 SOLUTION RESPIRATORY (INHALATION) ONCE
Status: COMPLETED | OUTPATIENT
Start: 2022-12-29 | End: 2022-12-29

## 2022-12-29 RX ORDER — CEFUROXIME AXETIL 250 MG/1
500 TABLET ORAL ONCE
Status: COMPLETED | OUTPATIENT
Start: 2022-12-29 | End: 2022-12-29

## 2022-12-29 RX ORDER — ALBUTEROL SULFATE 2.5 MG/3ML
2.5 SOLUTION RESPIRATORY (INHALATION) EVERY 6 HOURS PRN
Qty: 75 ML | Refills: 0 | Status: SHIPPED | OUTPATIENT
Start: 2022-12-29

## 2022-12-29 RX ADMIN — ALBUTEROL SULFATE 2 PUFF: 90 AEROSOL, METERED RESPIRATORY (INHALATION) at 14:26

## 2022-12-29 RX ADMIN — IPRATROPIUM BROMIDE AND ALBUTEROL SULFATE 3 ML: 2.5; .5 SOLUTION RESPIRATORY (INHALATION) at 12:52

## 2022-12-29 RX ADMIN — CEFUROXIME AXETIL 500 MG: 250 TABLET, FILM COATED ORAL at 14:26

## 2022-12-29 NOTE — PROGRESS NOTES
Virtual TCM Visit:    Verification of patient location:    Patient is located in the following state in which I hold an active license [place]    Assessment/Plan:        Problem List Items Addressed This Visit        Respiratory    COPD (chronic obstructive pulmonary disease) ([address] ) - Primary (Chronic)    Chronic respiratory failure with hypoxia (HCC)    BERONICA (obstructive sleep apnea)         We discussed home hospice and he is agreeable to this, however at current time given symptoms and oxygenation recommended ED evaluation to stabilize  Reason for visit is TCM    Encounter provider Selena Pearson DO     Provider located at [address] [place] [address] PRIMARY CARE  [address] [place] [id number]  [phone]    Recent Visits  Date Type Provider Dept   12/29/22 Telephone Yesi Sanches [facility]   12/29/22 [facility]   12/28/22 Telephone Yesi Parks [address] [place] [facility]   Showing recent visits within past 7 days and meeting all other requirements  Future Appointments  No visits were found meeting these conditions  Showing future appointments within next 150 days and meeting all other requirements       After connecting through [employer], the patient was identified by name and date of birth  Chasidy Lindsay was informed that this is a telemedicine visit and that the visit is being conducted through Telephone  My office door was closed  No one else was in the room  He acknowledged consent and understanding of privacy and security of the video platform  The patient has agreed to participate and understands they can discontinue the visit at any time  It was my intent to perform this visit via video technology but the patient was not able to do a video connection so the visit was completed via audio telephone only  Patient is aware this is a billable service      Transitional Care Management Review:  Anna Luna is a 69 y o  male here for TCM follow up  Admitted 12/24-12/27 for COPD exacerbation  Hospital course as per discharge summary as below:    "COPD (chronic obstructive pulmonary disease) ([facility] )  Assessment & Plan  • We will continue current nebulizers/inhalers  • Currently on baseline oxygen requirement of 2 L  • Pulmonology following     Type 2 diabetes mellitus with diabetic neuropathy, unspecified ([facility] )  Assessment & Plan        Lab Results   Component Value Date     HGBA1C 5 7 (H) 08/14/2022         No results for input(s): POCGLU in the last 72 hours      Blood Sugar Average: Last 72 hrs:  • Hold oral medication  • Sliding scale insulin coverage while in the hospital     Chronic respiratory failure with hypoxia [facility])  Assessment & Plan  • Chronic 2L NC  • BiPAP order was sent on 12/20/22 for home unit  • Pulmonology following     Goals of care, counseling/discussion  Assessment & Plan  • Spoke with patient regarding goals of care  Upon reviewing previous notes it appears that cardiology was recommending palliative care evaluation  I also spoke with patient's Sister Mando Stiles over the phone who agrees with proceeding with hospice evaluation  • Consult hospice team, case management notified     PAF (paroxysmal atrial fibrillation) (HCC)  Assessment & Plan  • Continue carvedilol 6 25 mg twice daily and digoxin 125 mcg, aspirin 81 mg  • Patient not on anticoagulation     Chronic combined systolic and diastolic congestive heart failure (HCC)  Assessment & Plan      • Wt Readings from Last 3 Encounters:   • 12/27/22 • 71 3 kg (157 lb 3 oz)   • 12/20/22 • 73 9 kg (163 lb)   • 12/12/22 • 74 8 kg (165 lb)   •   • Lasix 40mg IV in ED x1  • On home lasix 40mg prn weight >160  • Monitor I&O  • Seen by Cardiology 12/20 was agreeable to palliative care consult  • Echo 9/22: EF 28%, grade 2 diastolic dysfunction    Mitral valve mild regurg  • Will continue current cardiac medications and monitor  • Will consult hospice     Primary hypertension  Assessment & Plan  • Continue home medications with hold parameters     Physical deconditioning  Assessment & Plan  • PT/OT eval for d/c planning with covid and progression of pulmonary and cardiac disease     Benign essential tremor  Assessment & Plan  • Continue primidone     Coronary artery disease involving native coronary artery of native heart without angina pectoris  Assessment & Plan  • Status post CABG x4  • Continue aspirin 81 mg  • Continue carvedilol 6 25 mg twice daily     * COVID-19 virus infection  Assessment & Plan  • Patient currently on treatment under mild pathway  • Currently requiring 2 L nasal cannula which is patient's baseline as he is on chronic home O2  • Discussed case with pulmonology, recommending discontinuing steroids as patient is currently at baseline from an oxygenation standpoint  • Continue remdesivir day 3  • Will monitor inflammatory markers  • Supportive care  • Encourage ambulation and incentive spirometry"    "[place] a 69 y  o  male with a PMH of PAF, CHF, BERONICA, BPH, COPD, DM2 not on insulin, CAD w/ CABG x4 who presents with shortness of breath   Patient reports that he noted his breathing was worse on Friday and overnight his breathing got real bad   He notes chills, loss of appetite, fatigue, weakness  No sick contacts that he is aware of, reports wife is not sick at this time   He was seen in the ED on 12/17 for CHF issues, chest x-ray with vascular congestion he was given 40 mg IV Lasix and discharged home  [facility] had follow-up with Dr Hai French on 12/20, they discussed referral to palliative care   Was brought in by EMS he was was noted to be 88% on his 2 L nasal cannula, given 2 duo nebs and Solu-Medrol 125 mg reported to feel little better when he arrived in the ED   Reports he is not able to lie flat secondary to his breathing   He saw pulmonary on 12/17 and ABG was performed and plan for BiPAP order      The patient has been saturating well on home O2 requirement  He was seen by hospice as per recommendation by cardiology however the patient refused home hospice  The patient states he would prefer to be discharged to home  He has remained hemodynamically stable and saturating well on baseline home O2 requirement  Daughter will provide transportation home "    During the TCM phone call patient stated:    TCM Call     Date and time call was made  12/28/2022  9:16 AM    Patient was hospitialized at  [address]    Date of Admission  12/24/22    Date of discharge  12/27/22    Diagnosis  covid 17 virus infection    Disposition  Home    Current Symptoms  Shortness of breath  he stated that they took him off 3 medications from pulmology    Cough Severity  Severe    Shortness of breath severity  Severe      TCM Call     Post hospital issues  None    Scheduled for follow up? Yes    Did you obtain your prescribed medications  No    Why were you unable to obtain your medications  no new medication    Do you need help managing your prescriptions or medications  No    Is transportation to your appointment needed  No    Specify why  does not drive    I have advised the patient to call PCP with any new or worsening symptoms  Yesi Del Rosariolayton     Living Arrangements  Family members    Support System  Family    The type of support provided  Emotional; Physical; Other (comment)    Do you have social support  Yes, as much as I need        Subjective:     Patient ID: Karrie Matthews is a 69 y o  male  HPI     Since hospital discharge, he has not been taking medications/inhalers as they were not sure which medications to take  Starting last night, he is very cold, no fevers but can't get warm, feeling SOB, legs are more swollen  Oxygen dropped down to 60% off of oxygen, came up to 80-84% with 3 L NC  Review of Systems  - see HPI    Objective: There were no vitals filed for this visit      Physical Exam - no video  Medications have been reviewed by provider in current encounter    I spent 30 minutes with the patient during this visit  Daniella Henry DO      VIRTUAL VISIT 23 Cheikh Choudhary verbally agrees to participate in [employer]  Pt is aware that [employer] could be limited without vital signs or the ability to perform a full hands-on physical Mable  understands he or the provider may request at any time to terminate the video visit and request the patient to seek care or treatment in person

## 2022-12-29 NOTE — ED PROVIDER NOTES
History  Chief Complaint   Patient presents with   • Shortness of Breath     Known covid, worsening shortness of breath  Productive cough  16-year-old male presenting with shortness of breath, wheezing, which has been getting worse over the last couple days, worse today  Patient states he stopped taking his duo nebs several days ago  Shortness of Breath  Severity:  Moderate  Onset quality:  Gradual  Timing:  Constant  Progression:  Worsening  Chronicity:  New  Relieved by:  Nothing  Worsened by:  Nothing  Associated symptoms: wheezing    Associated symptoms: no abdominal pain, no chest pain, no cough, no fever, no rash, no sore throat and no vomiting        Prior to Admission Medications   Prescriptions Last Dose Informant Patient Reported? Taking? albuterol (PROVENTIL HFA,VENTOLIN HFA) 90 mcg/act inhaler   No No   Sig: Inhale 2 puffs every 6 (six) hours as needed for wheezing or shortness of breath   albuterol (PROVENTIL HFA,VENTOLIN HFA) 90 mcg/act inhaler   No Yes   Sig: Inhale 2 puffs every 6 (six) hours as needed for wheezing or shortness of breath   aspirin (ECOTRIN LOW STRENGTH) 81 mg EC tablet   No No   Sig: Take 1 tablet (81 mg total) by mouth every other day   carvedilol (COREG) 6 25 mg tablet   No No   Sig: Take 1 tablet (6 25 mg total) by mouth 2 (two) times a day with meals   cyanocobalamin (VITAMIN B-12) 500 MCG tablet   Yes No   Sig: TAKE ONE TABLET BY MOUTH EVERY MORNING *VITAMIN B12*   digoxin (LANOXIN) 0 125 mg tablet   No No   Sig: Take 1 tablet (125 mcg total) by mouth daily   furosemide (LASIX) 40 mg tablet   No No   Sig: Take 1 tablet (40 mg total) by mouth daily Hold for morning weight less than 165   gabapentin (NEURONTIN) 300 mg capsule   No No   Sig: Take 1 capsule (300 mg total) by mouth daily at bedtime   glimepiride (AMARYL) 1 mg tablet   No No   Sig: Take 2 tablets (2 mg total) by mouth daily with breakfast AND 1 tablet (1 mg total) daily with dinner     lisinopril (ZESTRIL) 20 mg tablet   No No   Sig: Take 1 tablet (20 mg total) by mouth daily   metFORMIN (GLUCOPHAGE) 500 mg tablet   No No   Sig: Take 1 tablet (500 mg total) by mouth 2 (two) times a day with meals   potassium chloride (MICRO-K) 10 MEQ CR capsule   No No   Sig: Take 2 capsules (20 mEq total) by mouth daily Take with Lasix  If Lasix is not being taken, do not take potassium   primidone (MYSOLINE) 50 mg tablet   Yes No   Sig: Take 100 mg by mouth 2 (two) times a day    roflumilast (Daliresp) 500 mcg tablet   No No   Sig: Take 1 tablet (500 mcg total) by mouth daily   rosuvastatin (CRESTOR) 10 MG tablet   No No   Sig: Take 1 tablet (10 mg total) by mouth daily   terbinafine (LamISIL) 1 % cream   Yes No   Sig: APPLY THIN LAYER TOPICALLY TWICE A DAY FOR FUNGAL INFECTION      Facility-Administered Medications: None       Past Medical History:   Diagnosis Date   • BPH (benign prostatic hyperplasia)     45 days radiation treatment   • COPD (chronic obstructive pulmonary disease)    • Coronary artery disease     CABG x4 in 2017   • Diabetes mellitus    • History of Arterial Duplex of LE 12/26/2017    Likely occlusion of the left superficial femoral artery  Calcific changes bilaterally  Despite these changes, the ankle-brachial index as a measure of peripheral blood flow only mildly impaired  • History of echocardiogram 06/12/2017    EF 40%, mild LVH, mild MR    • Hyperlipidemia    • Hypertension    • Increased anion gap metabolic acidosis 6/85/7983   • Ischemic cardiomyopathy    • NSTEMI (non-ST elevated myocardial infarction)    • Prostate cancer     prostate    • PVD (peripheral vascular disease)    • Sepsis due to pneumonia    • Tremor    • [facility] 04/06/2021       Past Surgical History:   Procedure Laterality Date   • CARDIAC CATHETERIZATION  03/08/2017    Significant left main plus triple-vessel CAD     • CORONARY ARTERY BYPASS GRAFT  03/08/2017    4V CABG:  LIMA to LAD, VG to RI, SVG to PDA to LVBR RCA  • EYE SURGERY      [facility]   • PROSTATE BIOPSY         Family History   Problem Relation Age of Onset   • Cancer Father    • Heart disease Brother      I have reviewed and agree with the history as documented  E-Cigarette/Vaping   • E-Cigarette Use Never User      E-Cigarette/Vaping Substances   • Nicotine No    • THC No    • CBD No    • Flavoring No    • Other No    • Unknown No      Social History     Tobacco Use   • Smoking status: Former     Packs/day: 0 25     Years: 60 00     Pack years: 15 00     Types: Cigarettes     Quit date: 2022     Years since quittin 7   • Smokeless tobacco: Never   • Tobacco comments:     Pt former smoker   Vaping Use   • Vaping Use: Never used   Substance Use Topics   • Alcohol use: Not Currently     Alcohol/week: 3 0 standard drinks     Types: 3 Cans of beer per week   • Drug use: Never       Review of Systems   Constitutional: Negative for chills and fever  HENT: Negative for congestion, nosebleeds, rhinorrhea and sore throat  Eyes: Negative for pain and visual disturbance  Respiratory: Positive for shortness of breath and wheezing  Negative for cough  Cardiovascular: Positive for leg swelling (baseline)  Negative for chest pain  Gastrointestinal: Negative for abdominal distention, abdominal pain, diarrhea, nausea and vomiting  Genitourinary: Negative for dysuria and frequency  Musculoskeletal: Negative for back pain and joint swelling  Skin: Negative for rash and wound  Neurological: Negative for weakness and numbness  Psychiatric/Behavioral: Negative for decreased concentration and suicidal ideas  Physical Exam  Physical Exam  Vitals and nursing note reviewed  Constitutional:       Comments: Chronically deconditioned   HENT:      Head: Normocephalic and atraumatic  Eyes:      Conjunctiva/sclera: Conjunctivae normal       Pupils: Pupils are equal, round, and reactive to light     Neck: Trachea: No tracheal deviation  Cardiovascular:      Rate and Rhythm: Normal rate and regular rhythm  Heart sounds: Normal heart sounds  No murmur heard  Pulmonary:      Effort: Tachypnea present  No respiratory distress  Breath sounds: Examination of the right-upper field reveals wheezing  Examination of the left-upper field reveals wheezing  Examination of the right-middle field reveals wheezing  Examination of the left-middle field reveals wheezing  Examination of the right-lower field reveals wheezing  Examination of the left-lower field reveals wheezing  Wheezing present  No rales  Abdominal:      General: Bowel sounds are normal  There is no distension  Palpations: Abdomen is soft  Tenderness: There is no abdominal tenderness  Musculoskeletal:         General: No deformity  Cervical back: Normal range of motion and neck supple  Right lower leg: Edema present  Left lower leg: Edema present  Skin:     General: Skin is warm and dry  Capillary Refill: Capillary refill takes less than 2 seconds  Neurological:      Mental Status: He is alert and oriented to person, place, and time  Sensory: No sensory deficit     Psychiatric:         Judgment: Judgment normal          Vital Signs  ED Triage Vitals   Temperature Pulse Respirations Blood Pressure SpO2   12/29/22 1242 12/29/22 1238 12/29/22 1238 12/29/22 1238 12/29/22 1238   98 3 °F (36 8 °C) 91 22 135/63 97 %      Temp Source Heart Rate Source Patient Position - Orthostatic VS BP Location FiO2 (%)   12/29/22 1242 12/29/22 1238 12/29/22 1238 12/29/22 1238 --   Tympanic Monitor Lying Left arm       Pain Score       12/29/22 1238       No Pain           Vitals:    12/29/22 1238 12/29/22 1243 12/29/22 1335   BP: 135/63  130/73   Pulse: 91 87 88   Patient Position - Orthostatic VS: Lying           Visual Acuity      ED Medications  Medications   ipratropium-albuterol (DUO-NEB) 0 5-2 5 mg/3 mL inhalation solution 3 mL (3 mL Nebulization Given 12/29/22 1252)   albuterol (PROVENTIL HFA,VENTOLIN HFA) inhaler 2 puff (2 puffs Inhalation Given 12/29/22 1426)   cefuroxime (CEFTIN) tablet 500 mg (500 mg Oral Given 12/29/22 1426)       Diagnostic Studies  Results Reviewed     Procedure Component Value Units Date/Time    Comprehensive metabolic panel [[id number]]  (Abnormal) Collected: 12/29/22 1251    Lab Status: Final result Specimen: Blood from Arm, Right Updated: 12/29/22 1319     Sodium 133 mmol/L      Potassium 4 6 mmol/L      Chloride 95 mmol/L      CO2 36 mmol/L      ANION GAP 2 mmol/L      BUN 16 mg/dL      Creatinine 0 60 mg/dL      Glucose 185 mg/dL      Calcium 8 5 mg/dL      AST 15 U/L      ALT 14 U/L      Alkaline Phosphatase 115 U/L      Total Protein 6 4 g/dL      Albumin 3 8 g/dL      Total Bilirubin 0 50 mg/dL      eGFR 97 ml/min/1 73sq m     Narrative:      [place] guidelines for Chronic Kidney Disease (CKD):   •  Stage 1 with normal or high GFR (GFR > 90 mL/min/1 73 square meters)  •  Stage 2 Mild CKD (GFR = 60-89 mL/min/1 73 square meters)  •  Stage 3A Moderate CKD (GFR = 45-59 mL/min/1 73 square meters)  •  Stage 3B Moderate CKD (GFR = 30-44 mL/min/1 73 square meters)  •  Stage 4 Severe CKD (GFR = 15-29 mL/min/1 73 square meters)  •  Stage 5 End Stage CKD (GFR <15 mL/min/1 73 square meters)  Note: GFR calculation is accurate only with a steady state creatinine    Magnesium [[id number]]  (Normal) Collected: 12/29/22 1251    Lab Status: Final result Specimen: Blood from Arm, Right Updated: 12/29/22 1319     Magnesium 2 3 mg/dL     CBC and differential [[id number]]  (Abnormal) Collected: 12/29/22 1251    Lab Status: Final result Specimen: Blood from Arm, Right Updated: 12/29/22 1257     WBC 12 77 Thousand/uL      RBC 3 96 Million/uL      Hemoglobin 11 8 g/dL      Hematocrit 37 3 %      MCV 94 fL      MCH 29 8 pg      MCHC 31 6 g/dL      RDW 14 6 %      MPV 11 8 fL      Platelets 080 Thousands/uL nRBC 0 /100 WBCs      Neutrophils Relative 77 %      Immat GRANS % 0 %      Lymphocytes Relative 13 %      Monocytes Relative 7 %      Eosinophils Relative 2 %      Basophils Relative 1 %      Neutrophils Absolute 9 90 Thousands/µL      Immature Grans Absolute 0 05 Thousand/uL      Lymphocytes Absolute 1 60 Thousands/µL      Monocytes Absolute 0 84 Thousand/µL      Eosinophils Absolute 0 31 Thousand/µL      Basophils Absolute 0 07 Thousands/µL                  XR chest 1 view portable   Final Result by Kaykay Rincon MD (12/29 1413)      Findings suspicious for mild interstitial edema with trace effusions as on prior study  No acute focal infiltrate is seen                  Workstation performed: [id number]                    Procedures  Procedures         ED Course  ED Course as of 12/29/22 2156   Thu Dec 29, 2022   1335 Patient states he is feeling better  Out hyponatremia, hypochloremia, asymptomatic consistent with COVID-19 infection  Slight leukocytosis however clinically doubt bacterial pneumonia  Azithromycin will be considered due to patient's increased mucus production and color change  At this point because patient's symptoms have improved drastically after DuoNeb I believe he is stable for discharge  Patient states he feels comfortable going home  Counseled on medication, he understands that he should continue his duo nebs as prescribed  5 AVS does show med rec that his nebs were in fact discontinued   Patient states he also stopped taking his albuterol as well    1350 Will prescribe ceftin as azithro alternative for bronchitis due to digoxin                                             MDM  Number of Diagnoses or Management Options  Bronchospasm: new and requires workup  COPD exacerbation ([facility] ): new and requires workup  COVID: new and requires workup  Wheezing: new and requires workup  Diagnosis management comments: Patient presents with shortness of breath, wheezing, increased coughing insistent with a COPD exacerbation    I do not see any indication in the patient's medical record that he is supposed to stop taking his DuoNeb  I do see that steroids are discontinued as he was at his baseline oxygenation, and a note from pulmonology 4 days ago says patient is to continue duonebs  Patient is saturating well on 2 L nasal cannula which is his baseline  Symptoms are not consistent with ACS, no chest pain, diaphoresis, epigastric pain, etc     Not consistent with acute CHF exacerbation; no orthopnea, no rales/rhonchi on exam    Patient's symptoms resolved completely after DuoNeb  Discussed with patient's wife, they are comfortable with patient going home with strict return precautions  Amount and/or Complexity of Data Reviewed  Review and summarize past medical records: yes  Independent visualization of images, tracings, or specimens: yes    Risk of Complications, Morbidity, and/or Mortality  Presenting problems: high  Diagnostic procedures: minimal  Management options: high        Disposition  Final diagnoses:   Bronchospasm   Wheezing   COVID   COPD exacerbation ([facility] )     Time reflects when diagnosis was documented in both MDM as applicable and the Disposition within this note     Time User Action Codes Description Comment    12/29/2022  1:35 PM Rosalene Merl Add [J98 01] Bronchospasm     12/29/2022  1:35 PM Rosalene Merl Add [R06 2] Wheezing     12/29/2022  1:37 PM Rosalene Merl Add [U07 1] COVID     12/29/2022  1:45 PM Rosalene Merl Add [J44 1] COPD exacerbation [facility])       ED Disposition     ED Disposition   Discharge    Condition   Stable    Date/Time   Thu Dec 29, 2022  1:35 PM    Comment   Anna Luna discharge to home/self care                 Follow-up Information     Follow up With Specialties Details Why Contact Info    Cristi Samuels, DO Family Medicine  As needed [address] [facility]  [address] [facility] [address]    [address]  [phone]            Discharge Medication List as of 12/29/2022  1:50 PM      START taking these medications    Details   albuterol (2 5 mg/3 mL) 0 083 % nebulizer solution Take 3 mL (2 5 mg total) by nebulization every 6 (six) hours as needed for wheezing or shortness of breath, Starting Thu 12/29/2022, Normal      cefuroxime (CEFTIN) 500 mg tablet Take 1 tablet (500 mg total) by mouth every 12 (twelve) hours for 5 days, Starting Thu 12/29/2022, Until Tue 1/3/2023, Normal         CONTINUE these medications which have CHANGED    Details   albuterol (PROVENTIL HFA,VENTOLIN HFA) 90 mcg/act inhaler Inhale 2 puffs every 6 (six) hours as needed for wheezing or shortness of breath, Starting Thu 12/29/2022, Normal         CONTINUE these medications which have NOT CHANGED    Details   aspirin (ECOTRIN LOW STRENGTH) 81 mg EC tablet Take 1 tablet (81 mg total) by mouth every other day, Starting Thu 9/3/2020, No Print      carvedilol (COREG) 6 25 mg tablet Take 1 tablet (6 25 mg total) by mouth 2 (two) times a day with meals, Starting Fri 9/23/2022, Normal      cyanocobalamin (VITAMIN B-12) 500 MCG tablet TAKE ONE TABLET BY MOUTH EVERY MORNING *VITAMIN B12*, Historical Med      digoxin (LANOXIN) 0 125 mg tablet Take 1 tablet (125 mcg total) by mouth daily, Starting Mon 12/5/2022, Normal      furosemide (LASIX) 40 mg tablet Take 1 tablet (40 mg total) by mouth daily Hold for morning weight less than 165, Starting Tue 9/27/2022, Normal      gabapentin (NEURONTIN) 300 mg capsule Take 1 capsule (300 mg total) by mouth daily at bedtime, Starting Wed 6/29/2022, Normal      glimepiride (AMARYL) 1 mg tablet Multiple Dosages:Starting Fri 4/22/2022Take 2 tablets (2 mg total) by mouth daily with breakfast AND 1 tablet (1 mg total) daily with dinner , No Print      lisinopril (ZESTRIL) 20 mg tablet Take 1 tablet (20 mg total) by mouth daily, Starting Tue 11/29/2022, Normal      metFORMIN (GLUCOPHAGE) 500 mg tablet Take 1 tablet (500 mg total) by mouth 2 (two) times a day with meals, Starting Wed 8/24/2022, Normal      potassium chloride (MICRO-K) 10 MEQ CR capsule Take 2 capsules (20 mEq total) by mouth daily Take with Lasix    If Lasix is not being taken, do not take potassium, Starting Tue 8/16/2022, Normal      primidone (MYSOLINE) 50 mg tablet Take 100 mg by mouth 2 (two) times a day , Historical Med      roflumilast (Daliresp) 500 mcg tablet Take 1 tablet (500 mcg total) by mouth daily, Starting Mon 8/29/2022, Until Wed 9/28/2022, Normal      rosuvastatin (CRESTOR) 10 MG tablet Take 1 tablet (10 mg total) by mouth daily, Starting Fri 4/22/2022, Normal      terbinafine (LamISIL) 1 % cream APPLY THIN LAYER TOPICALLY TWICE A DAY FOR FUNGAL INFECTION, Historical Med                 PDMP Review       Value Time User    PDMP Reviewed  Yes 9/3/2020 12:26 PM Chris Gavin [place]          ED Provider  Electronically Signed by           Khoa Richard DO  12/29/22 0615

## 2022-12-29 NOTE — Clinical Note
Shahab Obregon 68 yo male  1945  Pt admitted to [address] after going to Ed with sob  Pt Covid 19 positive   Pt also combines chronic systolic and diastolic Congested HF with Ef of 30 percent and COPD  pt is dependent on supplemental oxygen at 2l and has recently been ordered bi pap at night This is pt 4th hospital admission since July r/t shortness and breath and he has had one additional Ed visit 22  She was approved by Dr Kiana Pond on 2022 and patient refused and now he is agreeable  Approve, RLOC?

## 2022-12-29 NOTE — Clinical Note
Jessie Vazquez was seen and treated in our emergency department on 12/29/2022  Diagnosis:     Alford Duane    He may return on this date: If you have any questions or concerns, please don't hesitate to call        Albert Peter,     ______________________________           _______________          _______________  Hospital Representative                              Date                                Time

## 2022-12-29 NOTE — DISCHARGE INSTRUCTIONS
Take your albuterol nebilizer or inhaler every 6 hours for your shortness of breath  Additional prescriptions were sent to your pharmacy      Return if symptoms worsen or if new symptoms develop or if you have any other concerns

## 2022-12-29 NOTE — TELEPHONE ENCOUNTER
Jesseeniraj Weiners is Dc from Ed  Lexx Venkata is asking some questions  When they took Robert Núñez to the hospital they changed his O2 to #2 and Dr Savage Mace had changed it to #3 (liters)  Arnolniraj Venkata needs to know what she is to give him????? Next will need a new referral for Hospice    He declined the first consult, but would like to move forward with the Hospice    They also send over 3 medications    She is asking for a call from Dr Savage Mace so she understands what is going on    Please advise    Phone: [phone]

## 2022-12-30 ENCOUNTER — HOME CARE VISIT (OUTPATIENT)
Dept: HOME HEALTH SERVICES | Facility: HOME HEALTHCARE | Age: 77
End: 2022-12-30

## 2022-12-30 ENCOUNTER — TELEPHONE (OUTPATIENT)
Dept: PULMONOLOGY | Facility: CLINIC | Age: 77
End: 2022-12-30

## 2022-12-30 DIAGNOSIS — Z51.5 HOSPICE CARE PATIENT: Primary | ICD-10-CM

## 2022-12-30 RX ORDER — PREDNISONE 1 MG/1
5 TABLET ORAL DAILY
Qty: 5 TABLET | Refills: 0 | Status: SHIPPED | OUTPATIENT
Start: 2022-12-30 | End: 2023-01-03 | Stop reason: CLARIF

## 2022-12-30 RX ORDER — MORPHINE SULFATE 100 MG/5ML
5 SOLUTION, CONCENTRATE ORAL EVERY 4 HOURS PRN
Qty: 15 ML | Refills: 0 | Status: SHIPPED | OUTPATIENT
Start: 2022-12-30

## 2022-12-30 RX ORDER — LORAZEPAM 0.5 MG/1
1 TABLET ORAL EVERY 6 HOURS PRN
Qty: 10 TABLET | Refills: 0 | Status: SHIPPED | OUTPATIENT
Start: 2022-12-30

## 2022-12-30 NOTE — TELEPHONE ENCOUNTER
I don't see any record of that per my personal review  Reviewed documentation in the ED and copied from ED provider's note:  I do not see any indication in the patient's medical record that he is supposed to stop taking his DuoNeb  I do see that steroids are discontinued as he was at his baseline oxygenation, and a note from pulmonology 4 days ago says patient is to continue duonebs  Patient is saturating well on 2 L nasal cannula which is his baseline  Therefore, would not stop her nebulized medicines  He should follow up with Dr Maris Cao for further instruction

## 2022-12-30 NOTE — TELEPHONE ENCOUNTER
I had recommended an increase to 3L based on the readings they were giving me over the phone earlier today, he was satting low 80s on 2 L  The ED actually saw and evaluated him in person though, so I would do what they recommended  The ED put in a new order for home hospice, and it looks like they are already working on his case  Nothing further we need to do  Yes it looks like they sent in an antibiotic and albuterol inhaler/neb for him  There is also a question of Brovana/Pulmicort being discontinued on hospital discharge  Please reach out to his pulmonologist to see if he should continue with those nebs? Thanks!

## 2022-12-30 NOTE — TELEPHONE ENCOUNTER
Soledad Hernandez called from [facility] called [phone]  and said that when patient was discharged they were told to discontinue their nebulizer, should they have stopped their nebulizer?

## 2022-12-30 NOTE — TELEPHONE ENCOUNTER
Called and spoke with Fransico Johnson  Informed her about O2  She verbalized understanding  Fransico Johnson stated hospice had called her already and they are working on getting him in  They had picked up the antibiotic and neb yesterday and report that he seems to be doing better since starting them  Reached out to pulm in regards to patient, message was sent to nurse and dr about nebs and they will be getting back to us  Informed Fransico Johnson of the same, she verbalized understanding

## 2022-12-31 ENCOUNTER — HOME CARE VISIT (OUTPATIENT)
Dept: HOME HEALTH SERVICES | Facility: HOME HEALTHCARE | Age: 77
End: 2022-12-31

## 2022-12-31 ENCOUNTER — HOME CARE VISIT (OUTPATIENT)
Dept: HOME HOSPICE | Facility: HOSPICE | Age: 77
End: 2022-12-31

## 2023-01-01 ENCOUNTER — HOME CARE VISIT (OUTPATIENT)
Dept: HOME HEALTH SERVICES | Facility: HOME HEALTHCARE | Age: 78
End: 2023-01-01

## 2023-01-01 ENCOUNTER — HOME CARE VISIT (OUTPATIENT)
Dept: HOME HOSPICE | Facility: HOSPICE | Age: 78
End: 2023-01-01

## 2023-01-01 VITALS
OXYGEN SATURATION: 99 % | DIASTOLIC BLOOD PRESSURE: 81 MMHG | RESPIRATION RATE: 20 BRPM | SYSTOLIC BLOOD PRESSURE: 124 MMHG | WEIGHT: 157 LBS | TEMPERATURE: 98.1 F | BODY MASS INDEX: 22.53 KG/M2 | HEART RATE: 77 BPM

## 2023-01-01 VITALS
TEMPERATURE: 97.1 F | DIASTOLIC BLOOD PRESSURE: 74 MMHG | RESPIRATION RATE: 18 BRPM | HEART RATE: 67 BPM | SYSTOLIC BLOOD PRESSURE: 122 MMHG

## 2023-01-01 VITALS — RESPIRATION RATE: 22 BRPM | TEMPERATURE: 98.7 F | HEART RATE: 106 BPM

## 2023-01-01 VITALS
SYSTOLIC BLOOD PRESSURE: 124 MMHG | DIASTOLIC BLOOD PRESSURE: 74 MMHG | RESPIRATION RATE: 22 BRPM | TEMPERATURE: 98.5 F | HEART RATE: 76 BPM

## 2023-01-01 VITALS — DIASTOLIC BLOOD PRESSURE: 58 MMHG | HEART RATE: 81 BPM | RESPIRATION RATE: 24 BRPM | SYSTOLIC BLOOD PRESSURE: 98 MMHG

## 2023-01-02 VITALS
DIASTOLIC BLOOD PRESSURE: 87 MMHG | SYSTOLIC BLOOD PRESSURE: 118 MMHG | RESPIRATION RATE: 18 BRPM | TEMPERATURE: 98.7 F | HEART RATE: 52 BPM

## 2023-01-03 ENCOUNTER — HOME CARE VISIT (OUTPATIENT)
Dept: HOME HEALTH SERVICES | Facility: HOME HEALTHCARE | Age: 78
End: 2023-01-03

## 2023-01-03 ENCOUNTER — HOME CARE VISIT (OUTPATIENT)
Dept: HOME HOSPICE | Facility: HOSPICE | Age: 78
End: 2023-01-03

## 2023-01-03 VITALS
HEART RATE: 89 BPM | TEMPERATURE: 97.8 F | SYSTOLIC BLOOD PRESSURE: 132 MMHG | DIASTOLIC BLOOD PRESSURE: 74 MMHG | RESPIRATION RATE: 18 BRPM

## 2023-01-05 ENCOUNTER — HOME CARE VISIT (OUTPATIENT)
Dept: HOME HOSPICE | Facility: HOSPICE | Age: 78
End: 2023-01-05

## 2023-01-05 ENCOUNTER — HOME CARE VISIT (OUTPATIENT)
Dept: HOME HEALTH SERVICES | Facility: HOME HEALTHCARE | Age: 78
End: 2023-01-05

## 2023-01-05 VITALS
RESPIRATION RATE: 18 BRPM | SYSTOLIC BLOOD PRESSURE: 118 MMHG | DIASTOLIC BLOOD PRESSURE: 67 MMHG | HEART RATE: 82 BPM | TEMPERATURE: 98 F

## 2023-01-09 ENCOUNTER — HOME CARE VISIT (OUTPATIENT)
Dept: HOME HEALTH SERVICES | Facility: HOME HEALTHCARE | Age: 78
End: 2023-01-09

## 2023-01-09 VITALS
DIASTOLIC BLOOD PRESSURE: 68 MMHG | SYSTOLIC BLOOD PRESSURE: 127 MMHG | RESPIRATION RATE: 18 BRPM | TEMPERATURE: 98.4 F | HEART RATE: 94 BPM

## 2023-01-10 ENCOUNTER — HOME CARE VISIT (OUTPATIENT)
Dept: HOME HEALTH SERVICES | Facility: HOME HEALTHCARE | Age: 78
End: 2023-01-10

## 2023-01-11 ENCOUNTER — HOME CARE VISIT (OUTPATIENT)
Dept: HOME HEALTH SERVICES | Facility: HOME HEALTHCARE | Age: 78
End: 2023-01-11

## 2023-01-12 ENCOUNTER — HOME CARE VISIT (OUTPATIENT)
Dept: HOME HEALTH SERVICES | Facility: HOME HEALTHCARE | Age: 78
End: 2023-01-12

## 2023-01-12 VITALS
SYSTOLIC BLOOD PRESSURE: 136 MMHG | TEMPERATURE: 98 F | RESPIRATION RATE: 18 BRPM | HEART RATE: 56 BPM | DIASTOLIC BLOOD PRESSURE: 76 MMHG

## 2023-01-13 ENCOUNTER — HOME CARE VISIT (OUTPATIENT)
Dept: HOME HEALTH SERVICES | Facility: HOME HEALTHCARE | Age: 78
End: 2023-01-13

## 2023-01-16 ENCOUNTER — HOME CARE VISIT (OUTPATIENT)
Dept: HOME HOSPICE | Facility: HOSPICE | Age: 78
End: 2023-01-16

## 2023-01-16 ENCOUNTER — HOME CARE VISIT (OUTPATIENT)
Dept: HOME HEALTH SERVICES | Facility: HOME HEALTHCARE | Age: 78
End: 2023-01-16

## 2023-01-16 VITALS
HEART RATE: 96 BPM | TEMPERATURE: 98.7 F | DIASTOLIC BLOOD PRESSURE: 76 MMHG | RESPIRATION RATE: 21 BRPM | SYSTOLIC BLOOD PRESSURE: 123 MMHG

## 2023-01-16 RX ADMIN — MORPHINE SULFATE 5 MG: 100 SOLUTION, CONCENTRATE ORAL at 12:10

## 2023-01-16 RX ADMIN — ALBUTEROL SULFATE 2.5 MG: 2.5 SOLUTION RESPIRATORY (INHALATION) at 12:10

## 2023-01-17 ENCOUNTER — HOME CARE VISIT (OUTPATIENT)
Dept: HOME HOSPICE | Facility: HOSPICE | Age: 78
End: 2023-01-17

## 2023-01-17 ENCOUNTER — HOME CARE VISIT (OUTPATIENT)
Dept: HOME HEALTH SERVICES | Facility: HOME HEALTHCARE | Age: 78
End: 2023-01-17

## 2023-01-18 ENCOUNTER — HOME CARE VISIT (OUTPATIENT)
Dept: HOME HOSPICE | Facility: HOSPICE | Age: 78
End: 2023-01-18

## 2023-01-18 ENCOUNTER — HOME CARE VISIT (OUTPATIENT)
Dept: HOME HEALTH SERVICES | Facility: HOME HEALTHCARE | Age: 78
End: 2023-01-18

## 2023-01-19 ENCOUNTER — HOME CARE VISIT (OUTPATIENT)
Dept: HOME HEALTH SERVICES | Facility: HOME HEALTHCARE | Age: 78
End: 2023-01-19

## 2023-01-19 ENCOUNTER — HOME CARE VISIT (OUTPATIENT)
Dept: HOME HOSPICE | Facility: HOSPICE | Age: 78
End: 2023-01-19

## 2023-01-19 VITALS
DIASTOLIC BLOOD PRESSURE: 89 MMHG | SYSTOLIC BLOOD PRESSURE: 142 MMHG | RESPIRATION RATE: 18 BRPM | TEMPERATURE: 98.9 F | HEART RATE: 89 BPM

## 2023-01-21 ENCOUNTER — HOME CARE VISIT (OUTPATIENT)
Dept: HOME HEALTH SERVICES | Facility: HOME HEALTHCARE | Age: 78
End: 2023-01-21

## 2023-01-22 ENCOUNTER — HOME CARE VISIT (OUTPATIENT)
Dept: HOME HEALTH SERVICES | Facility: HOME HEALTHCARE | Age: 78
End: 2023-01-22

## 2023-01-23 ENCOUNTER — HOME CARE VISIT (OUTPATIENT)
Dept: HOME HEALTH SERVICES | Facility: HOME HEALTHCARE | Age: 78
End: 2023-01-23

## 2023-01-23 VITALS
HEART RATE: 51 BPM | DIASTOLIC BLOOD PRESSURE: 77 MMHG | RESPIRATION RATE: 18 BRPM | SYSTOLIC BLOOD PRESSURE: 121 MMHG | TEMPERATURE: 97.8 F

## 2023-01-24 ENCOUNTER — HOME CARE VISIT (OUTPATIENT)
Dept: HOME HEALTH SERVICES | Facility: HOME HEALTHCARE | Age: 78
End: 2023-01-24

## 2023-01-25 ENCOUNTER — HOME CARE VISIT (OUTPATIENT)
Dept: HOME HEALTH SERVICES | Facility: HOME HEALTHCARE | Age: 78
End: 2023-01-25

## 2023-01-25 VITALS
SYSTOLIC BLOOD PRESSURE: 132 MMHG | TEMPERATURE: 98.9 F | RESPIRATION RATE: 18 BRPM | DIASTOLIC BLOOD PRESSURE: 87 MMHG | HEART RATE: 76 BPM

## 2023-01-26 ENCOUNTER — HOME CARE VISIT (OUTPATIENT)
Dept: HOME HEALTH SERVICES | Facility: HOME HEALTHCARE | Age: 78
End: 2023-01-26

## 2023-01-27 ENCOUNTER — HOME CARE VISIT (OUTPATIENT)
Dept: HOME HEALTH SERVICES | Facility: HOME HEALTHCARE | Age: 78
End: 2023-01-27

## 2023-01-27 VITALS
HEART RATE: 61 BPM | SYSTOLIC BLOOD PRESSURE: 123 MMHG | RESPIRATION RATE: 18 BRPM | TEMPERATURE: 99.1 F | DIASTOLIC BLOOD PRESSURE: 67 MMHG

## 2023-01-28 ENCOUNTER — HOME CARE VISIT (OUTPATIENT)
Dept: HOME HEALTH SERVICES | Facility: HOME HEALTHCARE | Age: 78
End: 2023-01-28

## 2023-01-29 ENCOUNTER — HOME CARE VISIT (OUTPATIENT)
Dept: HOME HEALTH SERVICES | Facility: HOME HEALTHCARE | Age: 78
End: 2023-01-29

## 2023-01-30 ENCOUNTER — HOME CARE VISIT (OUTPATIENT)
Dept: HOME HEALTH SERVICES | Facility: HOME HEALTHCARE | Age: 78
End: 2023-01-30

## 2023-01-31 ENCOUNTER — HOME CARE VISIT (OUTPATIENT)
Dept: HOME HEALTH SERVICES | Facility: HOME HEALTHCARE | Age: 78
End: 2023-01-31

## 2023-01-31 VITALS
TEMPERATURE: 98 F | RESPIRATION RATE: 18 BRPM | HEART RATE: 82 BPM | SYSTOLIC BLOOD PRESSURE: 132 MMHG | DIASTOLIC BLOOD PRESSURE: 97 MMHG

## 2023-02-01 ENCOUNTER — HOME CARE VISIT (OUTPATIENT)
Dept: HOME HEALTH SERVICES | Facility: HOME HEALTHCARE | Age: 78
End: 2023-02-01

## 2023-02-02 ENCOUNTER — HOME CARE VISIT (OUTPATIENT)
Dept: HOME HEALTH SERVICES | Facility: HOME HEALTHCARE | Age: 78
End: 2023-02-02

## 2023-02-02 VITALS
DIASTOLIC BLOOD PRESSURE: 76 MMHG | RESPIRATION RATE: 18 BRPM | HEART RATE: 95 BPM | TEMPERATURE: 98.6 F | SYSTOLIC BLOOD PRESSURE: 137 MMHG

## 2023-02-03 ENCOUNTER — HOME CARE VISIT (OUTPATIENT)
Dept: HOME HEALTH SERVICES | Facility: HOME HEALTHCARE | Age: 78
End: 2023-02-03

## 2023-02-04 ENCOUNTER — HOME CARE VISIT (OUTPATIENT)
Dept: HOME HEALTH SERVICES | Facility: HOME HEALTHCARE | Age: 78
End: 2023-02-04

## 2023-02-05 ENCOUNTER — HOME CARE VISIT (OUTPATIENT)
Dept: HOME HEALTH SERVICES | Facility: HOME HEALTHCARE | Age: 78
End: 2023-02-05

## 2023-02-06 ENCOUNTER — HOME CARE VISIT (OUTPATIENT)
Dept: HOME HEALTH SERVICES | Facility: HOME HEALTHCARE | Age: 78
End: 2023-02-06

## 2023-02-06 VITALS
DIASTOLIC BLOOD PRESSURE: 72 MMHG | SYSTOLIC BLOOD PRESSURE: 133 MMHG | TEMPERATURE: 97.8 F | HEART RATE: 80 BPM | RESPIRATION RATE: 18 BRPM

## 2023-02-07 ENCOUNTER — HOME CARE VISIT (OUTPATIENT)
Dept: HOME HEALTH SERVICES | Facility: HOME HEALTHCARE | Age: 78
End: 2023-02-07

## 2023-02-08 ENCOUNTER — HOME CARE VISIT (OUTPATIENT)
Dept: HOME HEALTH SERVICES | Facility: HOME HEALTHCARE | Age: 78
End: 2023-02-08

## 2023-02-08 ENCOUNTER — HOME CARE VISIT (OUTPATIENT)
Dept: HOME HOSPICE | Facility: HOSPICE | Age: 78
End: 2023-02-08

## 2023-02-08 VITALS
SYSTOLIC BLOOD PRESSURE: 124 MMHG | HEART RATE: 88 BPM | RESPIRATION RATE: 18 BRPM | DIASTOLIC BLOOD PRESSURE: 87 MMHG | TEMPERATURE: 98.9 F

## 2023-02-09 ENCOUNTER — HOME CARE VISIT (OUTPATIENT)
Dept: HOME HEALTH SERVICES | Facility: HOME HEALTHCARE | Age: 78
End: 2023-02-09

## 2023-02-10 ENCOUNTER — HOME CARE VISIT (OUTPATIENT)
Dept: HOME HEALTH SERVICES | Facility: HOME HEALTHCARE | Age: 78
End: 2023-02-10

## 2023-02-11 ENCOUNTER — HOME CARE VISIT (OUTPATIENT)
Dept: HOME HEALTH SERVICES | Facility: HOME HEALTHCARE | Age: 78
End: 2023-02-11

## 2023-02-12 ENCOUNTER — HOME CARE VISIT (OUTPATIENT)
Dept: HOME HEALTH SERVICES | Facility: HOME HEALTHCARE | Age: 78
End: 2023-02-12

## 2023-02-13 ENCOUNTER — HOME CARE VISIT (OUTPATIENT)
Dept: HOME HEALTH SERVICES | Facility: HOME HEALTHCARE | Age: 78
End: 2023-02-13

## 2023-02-13 VITALS
RESPIRATION RATE: 18 BRPM | DIASTOLIC BLOOD PRESSURE: 78 MMHG | TEMPERATURE: 98.2 F | SYSTOLIC BLOOD PRESSURE: 128 MMHG | HEART RATE: 81 BPM

## 2023-02-14 ENCOUNTER — HOME CARE VISIT (OUTPATIENT)
Dept: HOME HEALTH SERVICES | Facility: HOME HEALTHCARE | Age: 78
End: 2023-02-14

## 2023-02-15 ENCOUNTER — HOME CARE VISIT (OUTPATIENT)
Dept: HOME HEALTH SERVICES | Facility: HOME HEALTHCARE | Age: 78
End: 2023-02-15

## 2023-02-16 ENCOUNTER — HOME CARE VISIT (OUTPATIENT)
Dept: HOME HEALTH SERVICES | Facility: HOME HEALTHCARE | Age: 78
End: 2023-02-16

## 2023-02-17 ENCOUNTER — HOME CARE VISIT (OUTPATIENT)
Dept: HOME HEALTH SERVICES | Facility: HOME HEALTHCARE | Age: 78
End: 2023-02-17

## 2023-02-18 ENCOUNTER — HOME CARE VISIT (OUTPATIENT)
Dept: HOME HEALTH SERVICES | Facility: HOME HEALTHCARE | Age: 78
End: 2023-02-18

## 2023-02-18 ENCOUNTER — HOME CARE VISIT (OUTPATIENT)
Dept: HOME HOSPICE | Facility: HOSPICE | Age: 78
End: 2023-02-18

## 2023-02-19 ENCOUNTER — HOME CARE VISIT (OUTPATIENT)
Dept: HOME HEALTH SERVICES | Facility: HOME HEALTHCARE | Age: 78
End: 2023-02-19

## 2023-02-20 ENCOUNTER — HOME CARE VISIT (OUTPATIENT)
Dept: HOME HOSPICE | Facility: HOSPICE | Age: 78
End: 2023-02-20

## 2023-02-20 ENCOUNTER — HOME CARE VISIT (OUTPATIENT)
Dept: HOME HEALTH SERVICES | Facility: HOME HEALTHCARE | Age: 78
End: 2023-02-20

## 2023-02-21 ENCOUNTER — HOME CARE VISIT (OUTPATIENT)
Dept: HOME HEALTH SERVICES | Facility: HOME HEALTHCARE | Age: 78
End: 2023-02-21

## 2023-02-22 ENCOUNTER — HOME CARE VISIT (OUTPATIENT)
Dept: HOME HEALTH SERVICES | Facility: HOME HEALTHCARE | Age: 78
End: 2023-02-22

## 2023-02-22 VITALS
HEART RATE: 80 BPM | DIASTOLIC BLOOD PRESSURE: 66 MMHG | TEMPERATURE: 97.3 F | SYSTOLIC BLOOD PRESSURE: 120 MMHG | RESPIRATION RATE: 22 BRPM

## 2023-02-22 VITALS — DIASTOLIC BLOOD PRESSURE: 62 MMHG | SYSTOLIC BLOOD PRESSURE: 122 MMHG | RESPIRATION RATE: 18 BRPM | HEART RATE: 72 BPM

## 2023-02-23 ENCOUNTER — HOME CARE VISIT (OUTPATIENT)
Dept: HOME HEALTH SERVICES | Facility: HOME HEALTHCARE | Age: 78
End: 2023-02-23

## 2023-02-24 ENCOUNTER — HOME CARE VISIT (OUTPATIENT)
Dept: HOME HEALTH SERVICES | Facility: HOME HEALTHCARE | Age: 78
End: 2023-02-24

## 2023-02-24 VITALS
RESPIRATION RATE: 19 BRPM | TEMPERATURE: 97.7 F | SYSTOLIC BLOOD PRESSURE: 117 MMHG | HEART RATE: 84 BPM | DIASTOLIC BLOOD PRESSURE: 67 MMHG

## 2023-02-25 ENCOUNTER — HOME CARE VISIT (OUTPATIENT)
Dept: HOME HEALTH SERVICES | Facility: HOME HEALTHCARE | Age: 78
End: 2023-02-25

## 2023-02-26 ENCOUNTER — HOME CARE VISIT (OUTPATIENT)
Dept: HOME HEALTH SERVICES | Facility: HOME HEALTHCARE | Age: 78
End: 2023-02-26

## 2023-02-27 ENCOUNTER — HOME CARE VISIT (OUTPATIENT)
Dept: HOME HEALTH SERVICES | Facility: HOME HEALTHCARE | Age: 78
End: 2023-02-27

## 2023-02-27 VITALS
TEMPERATURE: 98 F | SYSTOLIC BLOOD PRESSURE: 126 MMHG | DIASTOLIC BLOOD PRESSURE: 74 MMHG | HEART RATE: 93 BPM | RESPIRATION RATE: 18 BRPM

## 2023-02-28 ENCOUNTER — HOME CARE VISIT (OUTPATIENT)
Dept: HOME HEALTH SERVICES | Facility: HOME HEALTHCARE | Age: 78
End: 2023-02-28

## 2023-02-28 NOTE — PLAN OF CARE
Health Maintenance Due   Topic Date Due   • COVID-19 Vaccine (1) Never done   • Influenza Vaccine (1) 09/01/2022   • Depression Screening  11/15/2022       Patient is due for topics as listed above but is not proceeding with Immunization(s) COVID-19 and Influenza at this time. Education provided for Immunization(s) COVID-19 and Influenza.    Recent PHQ 2/9 Score    PHQ 2:  Date Adult PHQ 2 Score Adult PHQ 2 Interpretation   2/28/2023 0 No further screening needed       PHQ 9:        Problem: Potential for Falls  Goal: Patient will remain free of falls  Description  INTERVENTIONS:  - Assess patient frequently for physical needs  -  Identify cognitive and physical deficits and behaviors that affect risk of falls  -  [place] fall precautions as indicated by assessment   - Educate patient/family on patient safety including physical limitations  - Instruct patient to call for assistance with activity based on assessment  - Modify environment to reduce risk of injury  - Consider OT/PT consult to assist with strengthening/mobility  Outcome: Progressing     Problem: Nutrition/Hydration-ADULT  Goal: Nutrient/Hydration intake appropriate for improving, restoring or maintaining nutritional needs  Description  Monitor and assess patient's nutrition/hydration status for malnutrition (ex- brittle hair, bruises, dry skin, pale skin and conjunctiva, muscle wasting, smooth red tongue, and disorientation)  Collaborate with interdisciplinary team and initiate plan and interventions as ordered  Monitor patient's weight and dietary intake as ordered or per policy  Utilize nutrition screening tool and intervene per policy  Determine patient's food preferences and provide high-protein, high-caloric foods as appropriate       INTERVENTIONS:  - Monitor oral intake, urinary output, labs, and treatment plans  - Assess nutrition and hydration status and recommend course of action  - Evaluate amount of meals eaten  - Assist patient with eating if necessary   - Allow adequate time for meals  - Recommend/ encourage appropriate diets, oral nutritional supplements, and vitamin/mineral supplements  - Order, calculate, and assess calorie counts as needed  - Recommend, monitor, and adjust tube feedings and TPN/PPN based on assessed needs  - Assess need for intravenous fluids  - Provide specific nutrition/hydration education as appropriate  - Include patient/family/caregiver in decisions related to nutrition  Outcome: Progressing     Problem: PAIN - ADULT  Goal: Verbalizes/displays adequate comfort level or baseline comfort level  Description  Interventions:  - Encourage patient to monitor pain and request assistance  - Assess pain using appropriate pain scale  - Administer analgesics based on type and severity of pain and evaluate response  - Implement non-pharmacological measures as appropriate and evaluate response  - Consider cultural and social influences on pain and pain management  - Notify physician/advanced practitioner if interventions unsuccessful or patient reports new pain  Outcome: Progressing     Problem: INFECTION - ADULT  Goal: Absence or prevention of progression during hospitalization  Description  INTERVENTIONS:  - Assess and monitor for signs and symptoms of infection  - Monitor lab/diagnostic results  - Monitor all insertion sites, i e  indwelling lines, tubes, and drains  - Monitor endotracheal (as able) and nasal secretions for changes in amount and color  - [place] appropriate cooling/warming therapies per order  - Administer medications as ordered  - Instruct and encourage patient and family to use good hand hygiene technique  - Identify and instruct in appropriate isolation precautions for identified infection/condition  Outcome: Progressing  Goal: Absence of fever/infection during neutropenic period  Description  INTERVENTIONS:  - Monitor WBC  - Implement neutropenic guidelines  Outcome: Progressing     Problem: SAFETY ADULT  Goal: Maintain or return to baseline ADL function  Description  INTERVENTIONS:  -  Assess patient's ability to carry out ADLs; assess patient's baseline for ADL function and identify physical deficits which impact ability to perform ADLs (bathing, care of mouth/teeth, toileting, grooming, dressing, etc )  - Assess/evaluate cause of self-care deficits   - Assess range of motion  - Assess patient's mobility; develop plan if impaired  - Assess patient's need for assistive devices and provide as appropriate  - Encourage maximum independence but intervene and supervise when necessary  ¯ Involve family in performance of ADLs  ¯ Assess for home care needs following discharge   ¯ Request OT consult to assist with ADL evaluation and planning for discharge  ¯ Provide patient education as appropriate  Outcome: Progressing  Goal: Maintain or return mobility status to optimal level  Description  INTERVENTIONS:  - Assess patient's baseline mobility status (ambulation, transfers, stairs, etc )    - Identify cognitive and physical deficits and behaviors that affect mobility  - Identify mobility aids required to assist with transfers and/or ambulation (gait belt, sit-to-stand, lift, walker, cane, etc )  - [place] fall precautions as indicated by assessment  - Record patient progress and toleration of activity level on Mobility SBAR; progress patient to next Phase/Stage  - Instruct patient to call for assistance with activity based on assessment  - Request Rehabilitation consult to assist with strengthening/weightbearing, etc   Outcome: Progressing     Problem: DISCHARGE PLANNING  Goal: Discharge to home or other facility with appropriate resources  Description  INTERVENTIONS:  - Identify barriers to discharge w/patient and caregiver  - Arrange for needed discharge resources and transportation as appropriate  - Identify discharge learning needs (meds, wound care, etc )  - Arrange for interpretive services to assist at discharge as needed  - Refer to Case Management Department for coordinating discharge planning if the patient needs post-hospital services based on physician/advanced practitioner order or complex needs related to functional status, cognitive ability, or social support system  Outcome: Progressing     Problem: Knowledge Deficit  Goal: Patient/family/caregiver demonstrates understanding of disease process, treatment plan, medications, and discharge instructions  Description  Complete learning assessment and assess knowledge base    Interventions:  - Provide teaching at level of understanding  - Provide teaching via preferred learning methods  Outcome: Progressing

## 2023-03-01 ENCOUNTER — HOME CARE VISIT (OUTPATIENT)
Dept: HOME HEALTH SERVICES | Facility: HOME HEALTHCARE | Age: 78
End: 2023-03-01

## 2023-03-02 ENCOUNTER — HOME CARE VISIT (OUTPATIENT)
Dept: HOME HEALTH SERVICES | Facility: HOME HEALTHCARE | Age: 78
End: 2023-03-02

## 2023-03-03 ENCOUNTER — HOME CARE VISIT (OUTPATIENT)
Dept: HOME HEALTH SERVICES | Facility: HOME HEALTHCARE | Age: 78
End: 2023-03-03

## 2023-03-03 VITALS
DIASTOLIC BLOOD PRESSURE: 67 MMHG | HEART RATE: 86 BPM | TEMPERATURE: 98 F | SYSTOLIC BLOOD PRESSURE: 137 MMHG | RESPIRATION RATE: 18 BRPM

## 2023-03-05 ENCOUNTER — HOME CARE VISIT (OUTPATIENT)
Dept: HOME HEALTH SERVICES | Facility: HOME HEALTHCARE | Age: 78
End: 2023-03-05

## 2023-03-06 ENCOUNTER — HOME CARE VISIT (OUTPATIENT)
Dept: HOME HEALTH SERVICES | Facility: HOME HEALTHCARE | Age: 78
End: 2023-03-06

## 2023-03-07 ENCOUNTER — HOME CARE VISIT (OUTPATIENT)
Dept: HOME HEALTH SERVICES | Facility: HOME HEALTHCARE | Age: 78
End: 2023-03-07

## 2023-03-07 VITALS
TEMPERATURE: 98.9 F | HEART RATE: 87 BPM | SYSTOLIC BLOOD PRESSURE: 141 MMHG | RESPIRATION RATE: 18 BRPM | DIASTOLIC BLOOD PRESSURE: 72 MMHG

## 2023-03-08 ENCOUNTER — HOME CARE VISIT (OUTPATIENT)
Dept: HOME HEALTH SERVICES | Facility: HOME HEALTHCARE | Age: 78
End: 2023-03-08

## 2023-03-08 ENCOUNTER — HOME CARE VISIT (OUTPATIENT)
Dept: HOME HOSPICE | Facility: HOSPICE | Age: 78
End: 2023-03-08

## 2023-03-09 ENCOUNTER — HOME CARE VISIT (OUTPATIENT)
Dept: HOME HEALTH SERVICES | Facility: HOME HEALTHCARE | Age: 78
End: 2023-03-09

## 2023-03-10 ENCOUNTER — HOME CARE VISIT (OUTPATIENT)
Dept: HOME HEALTH SERVICES | Facility: HOME HEALTHCARE | Age: 78
End: 2023-03-10

## 2023-03-10 VITALS
SYSTOLIC BLOOD PRESSURE: 121 MMHG | HEART RATE: 93 BPM | TEMPERATURE: 98 F | RESPIRATION RATE: 18 BRPM | DIASTOLIC BLOOD PRESSURE: 67 MMHG

## 2023-03-11 ENCOUNTER — HOME CARE VISIT (OUTPATIENT)
Dept: HOME HEALTH SERVICES | Facility: HOME HEALTHCARE | Age: 78
End: 2023-03-11

## 2023-03-12 ENCOUNTER — HOME CARE VISIT (OUTPATIENT)
Dept: HOME HEALTH SERVICES | Facility: HOME HEALTHCARE | Age: 78
End: 2023-03-12

## 2023-03-13 ENCOUNTER — HOME CARE VISIT (OUTPATIENT)
Dept: HOME HEALTH SERVICES | Facility: HOME HEALTHCARE | Age: 78
End: 2023-03-13

## 2023-03-13 VITALS
HEART RATE: 89 BPM | DIASTOLIC BLOOD PRESSURE: 57 MMHG | TEMPERATURE: 97.2 F | RESPIRATION RATE: 18 BRPM | SYSTOLIC BLOOD PRESSURE: 126 MMHG

## 2023-03-14 ENCOUNTER — HOME CARE VISIT (OUTPATIENT)
Dept: HOME HEALTH SERVICES | Facility: HOME HEALTHCARE | Age: 78
End: 2023-03-14

## 2023-03-16 ENCOUNTER — HOME CARE VISIT (OUTPATIENT)
Dept: HOME HEALTH SERVICES | Facility: HOME HEALTHCARE | Age: 78
End: 2023-03-16

## 2023-03-17 ENCOUNTER — HOME CARE VISIT (OUTPATIENT)
Dept: HOME HEALTH SERVICES | Facility: HOME HEALTHCARE | Age: 78
End: 2023-03-17

## 2023-03-17 VITALS
SYSTOLIC BLOOD PRESSURE: 138 MMHG | HEART RATE: 103 BPM | TEMPERATURE: 97.8 F | DIASTOLIC BLOOD PRESSURE: 78 MMHG | RESPIRATION RATE: 18 BRPM

## 2023-03-18 ENCOUNTER — HOME CARE VISIT (OUTPATIENT)
Dept: HOME HEALTH SERVICES | Facility: HOME HEALTHCARE | Age: 78
End: 2023-03-18

## 2023-03-19 ENCOUNTER — HOME CARE VISIT (OUTPATIENT)
Dept: HOME HEALTH SERVICES | Facility: HOME HEALTHCARE | Age: 78
End: 2023-03-19

## 2023-03-20 ENCOUNTER — HOME CARE VISIT (OUTPATIENT)
Dept: HOME HEALTH SERVICES | Facility: HOME HEALTHCARE | Age: 78
End: 2023-03-20

## 2023-03-20 VITALS
RESPIRATION RATE: 18 BRPM | HEART RATE: 79 BPM | TEMPERATURE: 98 F | DIASTOLIC BLOOD PRESSURE: 76 MMHG | SYSTOLIC BLOOD PRESSURE: 128 MMHG

## 2023-03-21 ENCOUNTER — HOME CARE VISIT (OUTPATIENT)
Dept: HOME HEALTH SERVICES | Facility: HOME HEALTHCARE | Age: 78
End: 2023-03-21

## 2023-03-22 ENCOUNTER — HOME CARE VISIT (OUTPATIENT)
Dept: HOME HOSPICE | Facility: HOSPICE | Age: 78
End: 2023-03-22

## 2023-03-22 ENCOUNTER — HOME CARE VISIT (OUTPATIENT)
Dept: HOME HEALTH SERVICES | Facility: HOME HEALTHCARE | Age: 78
End: 2023-03-22

## 2023-03-23 ENCOUNTER — HOME CARE VISIT (OUTPATIENT)
Dept: HOME HEALTH SERVICES | Facility: HOME HEALTHCARE | Age: 78
End: 2023-03-23

## 2023-03-23 VITALS
HEART RATE: 87 BPM | DIASTOLIC BLOOD PRESSURE: 73 MMHG | TEMPERATURE: 98.4 F | RESPIRATION RATE: 18 BRPM | SYSTOLIC BLOOD PRESSURE: 133 MMHG

## 2023-03-24 ENCOUNTER — HOME CARE VISIT (OUTPATIENT)
Dept: HOME HEALTH SERVICES | Facility: HOME HEALTHCARE | Age: 78
End: 2023-03-24

## 2023-03-25 ENCOUNTER — HOME CARE VISIT (OUTPATIENT)
Dept: HOME HEALTH SERVICES | Facility: HOME HEALTHCARE | Age: 78
End: 2023-03-25

## 2023-03-26 ENCOUNTER — HOME CARE VISIT (OUTPATIENT)
Dept: HOME HEALTH SERVICES | Facility: HOME HEALTHCARE | Age: 78
End: 2023-03-26

## 2023-03-27 ENCOUNTER — HOME CARE VISIT (OUTPATIENT)
Dept: HOME HEALTH SERVICES | Facility: HOME HEALTHCARE | Age: 78
End: 2023-03-27

## 2023-03-27 VITALS
RESPIRATION RATE: 18 BRPM | SYSTOLIC BLOOD PRESSURE: 132 MMHG | DIASTOLIC BLOOD PRESSURE: 78 MMHG | TEMPERATURE: 99.3 F | HEART RATE: 87 BPM

## 2023-03-28 ENCOUNTER — HOME CARE VISIT (OUTPATIENT)
Dept: HOME HEALTH SERVICES | Facility: HOME HEALTHCARE | Age: 78
End: 2023-03-28

## 2023-03-29 ENCOUNTER — HOME CARE VISIT (OUTPATIENT)
Dept: HOME HEALTH SERVICES | Facility: HOME HEALTHCARE | Age: 78
End: 2023-03-29

## 2023-03-30 ENCOUNTER — HOME CARE VISIT (OUTPATIENT)
Dept: HOME HEALTH SERVICES | Facility: HOME HEALTHCARE | Age: 78
End: 2023-03-30

## 2023-03-30 ENCOUNTER — HOME CARE VISIT (OUTPATIENT)
Dept: HOME HOSPICE | Facility: HOSPICE | Age: 78
End: 2023-03-30

## 2023-03-31 ENCOUNTER — HOME CARE VISIT (OUTPATIENT)
Dept: HOME HEALTH SERVICES | Facility: HOME HEALTHCARE | Age: 78
End: 2023-03-31

## 2023-04-01 ENCOUNTER — HOME CARE VISIT (OUTPATIENT)
Dept: HOME HEALTH SERVICES | Facility: HOME HEALTHCARE | Age: 78
End: 2023-04-01

## 2023-04-01 VITALS — DIASTOLIC BLOOD PRESSURE: 75 MMHG | RESPIRATION RATE: 18 BRPM | SYSTOLIC BLOOD PRESSURE: 137 MMHG | HEART RATE: 82 BPM

## 2023-04-02 ENCOUNTER — HOME CARE VISIT (OUTPATIENT)
Dept: HOME HEALTH SERVICES | Facility: HOME HEALTHCARE | Age: 78
End: 2023-04-02

## 2023-04-03 ENCOUNTER — HOME CARE VISIT (OUTPATIENT)
Dept: HOME HEALTH SERVICES | Facility: HOME HEALTHCARE | Age: 78
End: 2023-04-03

## 2023-04-04 ENCOUNTER — HOME CARE VISIT (OUTPATIENT)
Dept: HOME HEALTH SERVICES | Facility: HOME HEALTHCARE | Age: 78
End: 2023-04-04

## 2023-04-04 VITALS
HEART RATE: 88 BPM | RESPIRATION RATE: 18 BRPM | DIASTOLIC BLOOD PRESSURE: 78 MMHG | TEMPERATURE: 97.9 F | SYSTOLIC BLOOD PRESSURE: 137 MMHG

## 2023-04-05 ENCOUNTER — HOME CARE VISIT (OUTPATIENT)
Dept: HOME HEALTH SERVICES | Facility: HOME HEALTHCARE | Age: 78
End: 2023-04-05

## 2023-04-06 ENCOUNTER — HOME CARE VISIT (OUTPATIENT)
Dept: HOME HEALTH SERVICES | Facility: HOME HEALTHCARE | Age: 78
End: 2023-04-06

## 2023-04-07 ENCOUNTER — HOME CARE VISIT (OUTPATIENT)
Dept: HOME HEALTH SERVICES | Facility: HOME HEALTHCARE | Age: 78
End: 2023-04-07

## 2023-04-07 VITALS
SYSTOLIC BLOOD PRESSURE: 154 MMHG | DIASTOLIC BLOOD PRESSURE: 90 MMHG | RESPIRATION RATE: 22 BRPM | TEMPERATURE: 97.5 F | HEART RATE: 90 BPM

## 2023-04-28 ENCOUNTER — HOME CARE VISIT (OUTPATIENT)
Dept: HOME HOSPICE | Facility: HOSPICE | Age: 78
End: 2023-04-28

## 2023-05-01 ENCOUNTER — HOME CARE VISIT (OUTPATIENT)
Dept: HOME HOSPICE | Facility: HOSPICE | Age: 78
End: 2023-05-01

## 2023-05-10 ENCOUNTER — HOME CARE VISIT (OUTPATIENT)
Dept: HOME HOSPICE | Facility: HOSPICE | Age: 78
End: 2023-05-10

## 2024-08-08 NOTE — ASSESSMENT & PLAN NOTE
· Patient presents with shortness of breath  · Likely COPD exacerbation secondary to community-acquired pneumonia  · Solu-Medrol 40 mg IV q8 hours  · Breo Ellipta  · Mucinex  · Atrovent  · Respiratory and airway clearance protocols  · IV antibiotics as below in the setting of bacterial consolidation on chest x-ray no

## 2025-04-28 NOTE — ED PROCEDURE NOTE
PROCEDURE  POC Lung US    Date/Time: 9/20/2022 5:07 AM  Performed by: Eryn Betts MD  Authorized by: Eryn Betts MD     Patient location:  ED  Other Assisting Provider: No    Procedure details:     Exam Type:  Diagnostic    Indications: dyspnea      Assessment / Evaluation for:  Interstitial syndrome    Structures Visualized: pleural line, rib, left hemithorax and right hemithorax      Exam Type: initial exam      Image quality: diagnostic      Image availability:  Images available in PACS  Left Hemithorax Findings:     Left pleura visualized:  Visualized    Left Hemithorax Findings: B-line pattern      Left lung findings: normal interstitium    Right Lung Findings:     Right pleural visualized:  Visualized    Right hemithorax findings: B-line pattern      Right lung findings: normal interstitium    Interpretation:     Findings: abnormal thoracic ultrasound       B-lines consistent with interstitial syndrome, in this setting concerning for CHF exacerbation           Eryn Betts MD  09/20/22 [id number] 28-Apr-2025